# Patient Record
Sex: MALE | Race: WHITE | Employment: OTHER | URBAN - METROPOLITAN AREA
[De-identification: names, ages, dates, MRNs, and addresses within clinical notes are randomized per-mention and may not be internally consistent; named-entity substitution may affect disease eponyms.]

---

## 2017-01-01 ENCOUNTER — HOSPITAL ENCOUNTER (OUTPATIENT)
Dept: GENERAL RADIOLOGY | Age: 67
Discharge: HOME | End: 2017-01-31
Attending: INTERNAL MEDICINE | Admitting: INTERNAL MEDICINE

## 2017-01-03 ENCOUNTER — HOSPITAL ENCOUNTER (OUTPATIENT)
Dept: GENERAL RADIOLOGY | Age: 67
Discharge: HOME | End: 2017-01-03
Attending: FAMILY MEDICINE | Admitting: FAMILY MEDICINE

## 2017-01-03 LAB
ALBUMIN SERPL-MCNC: 4.3 GM/DL (ref 3.4–5)
ALP BLD-CCNC: 97 IU/L (ref 40–128)
ALT SERPL-CCNC: 26 U/L (ref 10–40)
ANION GAP SERPL CALCULATED.3IONS-SCNC: 12 MMOL/L (ref 4–16)
AST SERPL-CCNC: 24 IU/L (ref 15–37)
BASOPHILS ABSOLUTE: 0.1 K/CU MM
BASOPHILS RELATIVE PERCENT: 1 % (ref 0–1)
BILIRUB SERPL-MCNC: 0.5 MG/DL (ref 0–1)
BUN BLDV-MCNC: 10 MG/DL (ref 6–23)
CALCIUM SERPL-MCNC: 9.3 MG/DL (ref 8.3–10.6)
CHLORIDE BLD-SCNC: 103 MMOL/L (ref 99–110)
CHOLESTEROL: 139 MG/DL
CO2: 30 MMOL/L (ref 21–32)
CREAT SERPL-MCNC: 0.9 MG/DL (ref 0.9–1.3)
DIFFERENTIAL TYPE: ABNORMAL
EOSINOPHILS ABSOLUTE: 0.2 K/CU MM
EOSINOPHILS RELATIVE PERCENT: 2.3 % (ref 0–3)
ESTIMATED AVERAGE GLUCOSE: 146 MG/DL
GFR AFRICAN AMERICAN: >60 ML/MIN/1.73M2
GFR NON-AFRICAN AMERICAN: >60 ML/MIN/1.73M2
GLUCOSE FASTING: 127 MG/DL (ref 70–99)
HBA1C MFR BLD: 6.7 % (ref 4.2–6.3)
HCT VFR BLD CALC: 40.9 % (ref 42–52)
HDLC SERPL-MCNC: 36 MG/DL
HEMOGLOBIN: 14.1 GM/DL (ref 13.5–18)
IMMATURE NEUTROPHIL %: 0.4 % (ref 0–0.43)
LDL CHOLESTEROL DIRECT: 79 MG/DL
LYMPHOCYTES ABSOLUTE: 2.1 K/CU MM
LYMPHOCYTES RELATIVE PERCENT: 25.2 % (ref 24–44)
MCH RBC QN AUTO: 34.4 PG (ref 27–31)
MCHC RBC AUTO-ENTMCNC: 34.5 % (ref 32–36)
MCV RBC AUTO: 99.8 FL (ref 78–100)
MONOCYTES ABSOLUTE: 0.6 K/CU MM
MONOCYTES RELATIVE PERCENT: 7.8 % (ref 0–4)
NUCLEATED RBC %: 0 %
PDW BLD-RTO: 13.9 % (ref 11.7–14.9)
PLATELET # BLD: 214 K/CU MM (ref 140–440)
PMV BLD AUTO: 10.1 FL (ref 7.5–11.1)
POTASSIUM SERPL-SCNC: 4.3 MMOL/L (ref 3.5–5.1)
RBC # BLD: 4.1 M/CU MM (ref 4.6–6.2)
SEGMENTED NEUTROPHILS ABSOLUTE COUNT: 5.2 K/CU MM
SEGMENTED NEUTROPHILS RELATIVE PERCENT: 63.3 % (ref 36–66)
SODIUM BLD-SCNC: 145 MMOL/L (ref 135–145)
T4 FREE: 1.04 NG/DL (ref 0.9–1.8)
TOTAL IMMATURE NEUTOROPHIL: 0.03 K/CU MM
TOTAL NUCLEATED RBC: 0 K/CU MM
TOTAL PROTEIN: 7.2 GM/DL (ref 6.4–8.2)
TRIGL SERPL-MCNC: 207 MG/DL
TSH HIGH SENSITIVITY: 2.54 UIU/ML (ref 0.27–4.2)
WBC # BLD: 8.2 K/CU MM (ref 4–10.5)

## 2017-01-05 ENCOUNTER — TELEPHONE (OUTPATIENT)
Dept: CARDIOLOGY CLINIC | Age: 67
End: 2017-01-05

## 2017-01-06 ENCOUNTER — ANTI-COAG VISIT (OUTPATIENT)
Dept: CARDIOLOGY CLINIC | Age: 67
End: 2017-01-06

## 2017-01-06 DIAGNOSIS — I48.91 ATRIAL FIBRILLATION, UNSPECIFIED TYPE (HCC): ICD-10-CM

## 2017-01-12 ENCOUNTER — HOSPITAL ENCOUNTER (OUTPATIENT)
Dept: OTHER | Age: 67
Discharge: HOME | End: 2017-01-31
Attending: INTERNAL MEDICINE | Admitting: INTERNAL MEDICINE

## 2017-01-12 LAB
POC INR: 1.7 INDEX
PROTHROMBIN TIME, POC: 20.1 SECONDS (ref 10–14.3)

## 2017-01-26 LAB
POC INR: 1.7 INDEX
PROTHROMBIN TIME, POC: 20.5 SECONDS (ref 10–14.3)

## 2017-02-01 ENCOUNTER — HOSPITAL ENCOUNTER (OUTPATIENT)
Dept: GENERAL RADIOLOGY | Age: 67
Discharge: HOME | End: 2017-02-28
Attending: INTERNAL MEDICINE | Admitting: INTERNAL MEDICINE

## 2017-02-01 ENCOUNTER — HOSPITAL ENCOUNTER (OUTPATIENT)
Dept: OTHER | Age: 67
Discharge: HOME | End: 2017-02-28
Attending: INTERNAL MEDICINE | Admitting: INTERNAL MEDICINE

## 2017-02-09 LAB
POC INR: 2.4 INDEX
PROTHROMBIN TIME, POC: 28.7 SECONDS (ref 10–14.3)

## 2017-03-01 ENCOUNTER — HOSPITAL ENCOUNTER (OUTPATIENT)
Dept: GENERAL RADIOLOGY | Age: 67
Discharge: HOME | End: 2017-03-31
Attending: INTERNAL MEDICINE | Admitting: INTERNAL MEDICINE

## 2017-03-09 LAB
POC INR: 2.2 INDEX
PROTHROMBIN TIME, POC: 26.7 SECONDS (ref 10–14.3)

## 2017-04-01 ENCOUNTER — HOSPITAL ENCOUNTER (OUTPATIENT)
Dept: GENERAL RADIOLOGY | Age: 67
Discharge: HOME | End: 2017-04-30
Attending: INTERNAL MEDICINE | Admitting: INTERNAL MEDICINE

## 2017-05-08 ENCOUNTER — TELEPHONE (OUTPATIENT)
Dept: CARDIOLOGY CLINIC | Age: 67
End: 2017-05-08

## 2017-05-12 ENCOUNTER — TELEPHONE (OUTPATIENT)
Dept: CARDIOLOGY CLINIC | Age: 67
End: 2017-05-12

## 2017-06-07 RX ORDER — WARFARIN SODIUM 2 MG/1
2 TABLET ORAL
Qty: 30 TABLET | Refills: 0 | Status: SHIPPED | OUTPATIENT
Start: 2017-06-07 | End: 2017-12-27 | Stop reason: SDUPTHER

## 2017-06-07 RX ORDER — WARFARIN SODIUM 3 MG/1
3 TABLET ORAL EVERY 24 HOURS
Qty: 30 TABLET | Refills: 0 | Status: SHIPPED | OUTPATIENT
Start: 2017-06-07 | End: 2017-08-10 | Stop reason: SDUPTHER

## 2017-06-09 ENCOUNTER — TELEPHONE (OUTPATIENT)
Dept: CARDIOLOGY CLINIC | Age: 67
End: 2017-06-09

## 2017-06-09 DIAGNOSIS — Z79.01 ANTICOAGULATED ON WARFARIN: ICD-10-CM

## 2017-06-09 DIAGNOSIS — I48.91 ATRIAL FIBRILLATION, UNSPECIFIED TYPE (HCC): Primary | ICD-10-CM

## 2017-06-16 ENCOUNTER — HOSPITAL ENCOUNTER (OUTPATIENT)
Dept: GENERAL RADIOLOGY | Age: 67
Discharge: HOME | End: 2017-06-16
Attending: INTERNAL MEDICINE | Admitting: INTERNAL MEDICINE

## 2017-06-16 LAB
INR BLD: 2.32 INDEX
PROTHROMBIN TIME: 27.2 SECONDS (ref 9.12–12.5)

## 2017-06-19 ENCOUNTER — ANTI-COAG VISIT (OUTPATIENT)
Dept: CARDIOLOGY CLINIC | Age: 67
End: 2017-06-19

## 2017-06-19 DIAGNOSIS — I48.91 ATRIAL FIBRILLATION, UNSPECIFIED TYPE (HCC): ICD-10-CM

## 2017-06-19 RX ORDER — DILTIAZEM HYDROCHLORIDE 120 MG/1
120 TABLET, FILM COATED ORAL DAILY
Qty: 90 TABLET | Refills: 3 | Status: SHIPPED | OUTPATIENT
Start: 2017-06-19 | End: 2018-04-02 | Stop reason: SDUPTHER

## 2017-06-19 RX ORDER — DIGOXIN 250 MCG
250 TABLET ORAL DAILY
Qty: 90 TABLET | Refills: 3 | Status: SHIPPED | OUTPATIENT
Start: 2017-06-19 | End: 2018-05-31 | Stop reason: SDUPTHER

## 2017-06-20 ENCOUNTER — TELEPHONE (OUTPATIENT)
Dept: CARDIOLOGY CLINIC | Age: 67
End: 2017-06-20

## 2017-06-28 ENCOUNTER — OFFICE VISIT (OUTPATIENT)
Dept: INTERNAL MEDICINE CLINIC | Age: 67
End: 2017-06-28

## 2017-06-28 VITALS
HEART RATE: 70 BPM | BODY MASS INDEX: 40.21 KG/M2 | HEIGHT: 73 IN | WEIGHT: 303.4 LBS | DIASTOLIC BLOOD PRESSURE: 64 MMHG | OXYGEN SATURATION: 96 % | SYSTOLIC BLOOD PRESSURE: 128 MMHG

## 2017-06-28 DIAGNOSIS — J44.9 OBSTRUCTIVE CHRONIC BRONCHITIS WITHOUT EXACERBATION (HCC): ICD-10-CM

## 2017-06-28 DIAGNOSIS — E66.01 MORBID OBESITY WITH BMI OF 40.0-44.9, ADULT (HCC): ICD-10-CM

## 2017-06-28 DIAGNOSIS — I48.91 ATRIAL FIBRILLATION, UNSPECIFIED TYPE (HCC): Primary | ICD-10-CM

## 2017-06-28 DIAGNOSIS — Z23 NEED FOR PROPHYLACTIC VACCINATION AGAINST STREPTOCOCCUS PNEUMONIAE (PNEUMOCOCCUS): ICD-10-CM

## 2017-06-28 DIAGNOSIS — E11.9 TYPE 2 DIABETES MELLITUS WITHOUT COMPLICATION, WITH LONG-TERM CURRENT USE OF INSULIN (HCC): ICD-10-CM

## 2017-06-28 DIAGNOSIS — J44.9 CHRONIC OBSTRUCTIVE PULMONARY DISEASE, UNSPECIFIED COPD TYPE (HCC): ICD-10-CM

## 2017-06-28 DIAGNOSIS — E66.01 MORBID OBESITY DUE TO EXCESS CALORIES (HCC): ICD-10-CM

## 2017-06-28 DIAGNOSIS — Z79.4 TYPE 2 DIABETES MELLITUS WITHOUT COMPLICATION, WITH LONG-TERM CURRENT USE OF INSULIN (HCC): ICD-10-CM

## 2017-06-28 DIAGNOSIS — I25.10 CORONARY ARTERY DISEASE INVOLVING NATIVE CORONARY ARTERY OF NATIVE HEART WITHOUT ANGINA PECTORIS: ICD-10-CM

## 2017-06-28 DIAGNOSIS — Z11.59 NEED FOR HEPATITIS C SCREENING TEST: ICD-10-CM

## 2017-06-28 LAB
A/G RATIO: 1.3 (ref 1.1–2.2)
ALBUMIN SERPL-MCNC: 4.3 G/DL (ref 3.4–5)
ALP BLD-CCNC: 116 U/L (ref 40–129)
ALT SERPL-CCNC: 26 U/L (ref 10–40)
ANION GAP SERPL CALCULATED.3IONS-SCNC: 15 MMOL/L (ref 3–16)
AST SERPL-CCNC: 24 U/L (ref 15–37)
BILIRUB SERPL-MCNC: 0.3 MG/DL (ref 0–1)
BUN BLDV-MCNC: 12 MG/DL (ref 7–20)
CALCIUM SERPL-MCNC: 9.3 MG/DL (ref 8.3–10.6)
CHLORIDE BLD-SCNC: 102 MMOL/L (ref 99–110)
CO2: 28 MMOL/L (ref 21–32)
CREAT SERPL-MCNC: 1.1 MG/DL (ref 0.8–1.3)
CREATININE URINE: 429.7 MG/DL (ref 39–259)
GFR AFRICAN AMERICAN: >60
GFR NON-AFRICAN AMERICAN: >60
GLOBULIN: 3.3 G/DL
GLUCOSE BLD-MCNC: 75 MG/DL (ref 70–99)
HCT VFR BLD CALC: 40.3 % (ref 40.5–52.5)
HEMOGLOBIN: 13.8 G/DL (ref 13.5–17.5)
HEPATITIS C ANTIBODY INTERPRETATION: NORMAL
INTERNATIONAL NORMALIZATION RATIO, POC: 2.8
MCH RBC QN AUTO: 34.1 PG (ref 26–34)
MCHC RBC AUTO-ENTMCNC: 34.2 G/DL (ref 31–36)
MCV RBC AUTO: 99.8 FL (ref 80–100)
MICROALBUMIN UR-MCNC: 23.3 MG/DL
MICROALBUMIN/CREAT UR-RTO: 54.2 MG/G (ref 0–30)
PDW BLD-RTO: 15 % (ref 12.4–15.4)
PLATELET # BLD: 222 K/UL (ref 135–450)
PMV BLD AUTO: 8.3 FL (ref 5–10.5)
POTASSIUM SERPL-SCNC: 4 MMOL/L (ref 3.5–5.1)
PROTHROMBIN TIME, POC: 33
RBC # BLD: 4.04 M/UL (ref 4.2–5.9)
SODIUM BLD-SCNC: 145 MMOL/L (ref 136–145)
TOTAL PROTEIN: 7.6 G/DL (ref 6.4–8.2)
WBC # BLD: 8.2 K/UL (ref 4–11)

## 2017-06-28 PROCEDURE — 3288F FALL RISK ASSESSMENT DOCD: CPT | Performed by: NURSE PRACTITIONER

## 2017-06-28 PROCEDURE — G0009 ADMIN PNEUMOCOCCAL VACCINE: HCPCS | Performed by: NURSE PRACTITIONER

## 2017-06-28 PROCEDURE — 85610 PROTHROMBIN TIME: CPT | Performed by: NURSE PRACTITIONER

## 2017-06-28 PROCEDURE — G8510 SCR DEP NEG, NO PLAN REQD: HCPCS | Performed by: NURSE PRACTITIONER

## 2017-06-28 PROCEDURE — 90670 PCV13 VACCINE IM: CPT | Performed by: NURSE PRACTITIONER

## 2017-06-28 PROCEDURE — 99215 OFFICE O/P EST HI 40 MIN: CPT | Performed by: NURSE PRACTITIONER

## 2017-06-28 ASSESSMENT — PATIENT HEALTH QUESTIONNAIRE - PHQ9
SUM OF ALL RESPONSES TO PHQ QUESTIONS 1-9: 0
1. LITTLE INTEREST OR PLEASURE IN DOING THINGS: 0
2. FEELING DOWN, DEPRESSED OR HOPELESS: 0
SUM OF ALL RESPONSES TO PHQ9 QUESTIONS 1 & 2: 0

## 2017-07-03 DIAGNOSIS — E11.9 TYPE 2 DIABETES MELLITUS WITHOUT COMPLICATION, WITH LONG-TERM CURRENT USE OF INSULIN (HCC): Primary | ICD-10-CM

## 2017-07-03 DIAGNOSIS — Z79.4 TYPE 2 DIABETES MELLITUS WITHOUT COMPLICATION, WITH LONG-TERM CURRENT USE OF INSULIN (HCC): Primary | ICD-10-CM

## 2017-07-25 ENCOUNTER — NURSE ONLY (OUTPATIENT)
Dept: INTERNAL MEDICINE CLINIC | Age: 67
End: 2017-07-25

## 2017-07-25 VITALS — DIASTOLIC BLOOD PRESSURE: 62 MMHG | OXYGEN SATURATION: 97 % | HEART RATE: 73 BPM | SYSTOLIC BLOOD PRESSURE: 136 MMHG

## 2017-07-25 DIAGNOSIS — I25.10 CORONARY ARTERY DISEASE INVOLVING NATIVE CORONARY ARTERY OF NATIVE HEART WITHOUT ANGINA PECTORIS: Primary | ICD-10-CM

## 2017-07-25 LAB
INTERNATIONAL NORMALIZATION RATIO, POC: 2.8
PROTHROMBIN TIME, POC: 33.3

## 2017-07-25 PROCEDURE — 85610 PROTHROMBIN TIME: CPT | Performed by: NURSE PRACTITIONER

## 2017-08-08 RX ORDER — WARFARIN SODIUM 3 MG/1
3 TABLET ORAL EVERY 24 HOURS
Qty: 30 TABLET | Refills: 0 | OUTPATIENT
Start: 2017-08-08

## 2017-08-10 RX ORDER — WARFARIN SODIUM 3 MG/1
3 TABLET ORAL EVERY 24 HOURS
Qty: 30 TABLET | Refills: 0 | Status: SHIPPED | OUTPATIENT
Start: 2017-08-10 | End: 2017-09-30 | Stop reason: SDUPTHER

## 2017-08-22 ENCOUNTER — NURSE ONLY (OUTPATIENT)
Dept: INTERNAL MEDICINE CLINIC | Age: 67
End: 2017-08-22

## 2017-08-22 VITALS
BODY MASS INDEX: 40.42 KG/M2 | SYSTOLIC BLOOD PRESSURE: 146 MMHG | WEIGHT: 306.4 LBS | HEART RATE: 98 BPM | DIASTOLIC BLOOD PRESSURE: 78 MMHG | OXYGEN SATURATION: 97 %

## 2017-08-22 DIAGNOSIS — I25.10 CORONARY ARTERY DISEASE INVOLVING NATIVE CORONARY ARTERY OF NATIVE HEART WITHOUT ANGINA PECTORIS: ICD-10-CM

## 2017-08-22 LAB
INTERNATIONAL NORMALIZATION RATIO, POC: 2.1
PROTHROMBIN TIME, POC: 24.7

## 2017-08-22 PROCEDURE — 85610 PROTHROMBIN TIME: CPT | Performed by: NURSE PRACTITIONER

## 2017-08-29 ENCOUNTER — TELEPHONE (OUTPATIENT)
Dept: FAMILY MEDICINE CLINIC | Age: 67
End: 2017-08-29

## 2017-09-05 ENCOUNTER — OFFICE VISIT (OUTPATIENT)
Dept: INTERNAL MEDICINE CLINIC | Age: 67
End: 2017-09-05

## 2017-09-05 ENCOUNTER — HOSPITAL ENCOUNTER (OUTPATIENT)
Dept: GENERAL RADIOLOGY | Age: 67
Discharge: HOME | End: 2017-09-05
Attending: NURSE PRACTITIONER | Admitting: NURSE PRACTITIONER

## 2017-09-05 VITALS
HEIGHT: 73 IN | DIASTOLIC BLOOD PRESSURE: 68 MMHG | BODY MASS INDEX: 40.82 KG/M2 | OXYGEN SATURATION: 96 % | SYSTOLIC BLOOD PRESSURE: 144 MMHG | HEART RATE: 68 BPM | WEIGHT: 308 LBS

## 2017-09-05 DIAGNOSIS — Z79.4 TYPE 2 DIABETES MELLITUS WITHOUT COMPLICATION, WITH LONG-TERM CURRENT USE OF INSULIN (HCC): Primary | ICD-10-CM

## 2017-09-05 DIAGNOSIS — E11.9 TYPE 2 DIABETES MELLITUS WITHOUT COMPLICATION, WITH LONG-TERM CURRENT USE OF INSULIN (HCC): Primary | ICD-10-CM

## 2017-09-05 DIAGNOSIS — I48.91 ATRIAL FIBRILLATION, UNSPECIFIED TYPE (HCC): ICD-10-CM

## 2017-09-05 DIAGNOSIS — J44.9 CHRONIC OBSTRUCTIVE PULMONARY DISEASE, UNSPECIFIED COPD TYPE (HCC): ICD-10-CM

## 2017-09-05 DIAGNOSIS — E78.2 MIXED HYPERLIPIDEMIA: ICD-10-CM

## 2017-09-05 DIAGNOSIS — I10 ESSENTIAL HYPERTENSION: ICD-10-CM

## 2017-09-05 LAB
ALBUMIN SERPL-MCNC: 4.2 GM/DL (ref 3.4–5)
ALP BLD-CCNC: 109 IU/L (ref 40–128)
ALT SERPL-CCNC: 23 U/L (ref 10–40)
ANION GAP SERPL CALCULATED.3IONS-SCNC: 15 MMOL/L (ref 4–16)
AST SERPL-CCNC: 21 IU/L (ref 15–37)
BILIRUB SERPL-MCNC: 0.4 MG/DL (ref 0–1)
BUN BLDV-MCNC: 13 MG/DL (ref 6–23)
CALCIUM SERPL-MCNC: 9.3 MG/DL (ref 8.3–10.6)
CHLORIDE BLD-SCNC: 103 MMOL/L (ref 99–110)
CHOLESTEROL: 152 MG/DL
CO2: 25 MMOL/L (ref 21–32)
CREAT SERPL-MCNC: 0.9 MG/DL (ref 0.9–1.3)
CREATININE URINE: 166.4 MG/DL (ref 39–259)
ESTIMATED AVERAGE GLUCOSE: 166 MG/DL
GFR AFRICAN AMERICAN: >60 ML/MIN/1.73M2
GFR NON-AFRICAN AMERICAN: >60 ML/MIN/1.73M2
GLUCOSE FASTING: 93 MG/DL (ref 70–99)
HBA1C MFR BLD: 7.4 % (ref 4.2–6.3)
HDLC SERPL-MCNC: 35 MG/DL
INTERNATIONAL NORMALIZATION RATIO, POC: 2.8
LDL CHOLESTEROL DIRECT: 88 MG/DL
MICROALBUMIN/CREAT 24H UR: 3.7 MG/DL
MICROALBUMIN/CREAT UR-RTO: 22.2 MG/G CREAT (ref 0–30)
POTASSIUM SERPL-SCNC: 3.9 MMOL/L (ref 3.5–5.1)
PROTHROMBIN TIME, POC: 34.1
SODIUM BLD-SCNC: 143 MMOL/L (ref 135–145)
TOTAL PROTEIN: 7.6 GM/DL (ref 6.4–8.2)
TRIGL SERPL-MCNC: 190 MG/DL

## 2017-09-05 PROCEDURE — 99214 OFFICE O/P EST MOD 30 MIN: CPT | Performed by: NURSE PRACTITIONER

## 2017-09-05 PROCEDURE — 85610 PROTHROMBIN TIME: CPT | Performed by: NURSE PRACTITIONER

## 2017-09-13 ENCOUNTER — OFFICE VISIT (OUTPATIENT)
Dept: CARDIOLOGY CLINIC | Age: 67
End: 2017-09-13

## 2017-09-13 VITALS
DIASTOLIC BLOOD PRESSURE: 62 MMHG | BODY MASS INDEX: 40.82 KG/M2 | HEART RATE: 68 BPM | HEIGHT: 73 IN | WEIGHT: 308 LBS | SYSTOLIC BLOOD PRESSURE: 138 MMHG

## 2017-09-13 DIAGNOSIS — I25.110 CORONARY ARTERY DISEASE INVOLVING NATIVE CORONARY ARTERY OF NATIVE HEART WITH UNSTABLE ANGINA PECTORIS (HCC): Primary | ICD-10-CM

## 2017-09-13 PROCEDURE — 99214 OFFICE O/P EST MOD 30 MIN: CPT | Performed by: INTERNAL MEDICINE

## 2017-09-20 ENCOUNTER — PROCEDURE VISIT (OUTPATIENT)
Dept: CARDIOLOGY CLINIC | Age: 67
End: 2017-09-20

## 2017-09-20 DIAGNOSIS — I25.110 CORONARY ARTERY DISEASE INVOLVING NATIVE CORONARY ARTERY OF NATIVE HEART WITH UNSTABLE ANGINA PECTORIS (HCC): ICD-10-CM

## 2017-09-20 DIAGNOSIS — R07.9 CHEST PAIN, UNSPECIFIED TYPE: Primary | ICD-10-CM

## 2017-09-20 DIAGNOSIS — R06.02 SOBOE (SHORTNESS OF BREATH ON EXERTION): ICD-10-CM

## 2017-09-20 LAB
LV EF: 60 %
LVEF MODALITY: NORMAL

## 2017-09-20 PROCEDURE — A9500 TC99M SESTAMIBI: HCPCS | Performed by: INTERNAL MEDICINE

## 2017-09-20 PROCEDURE — 93017 CV STRESS TEST TRACING ONLY: CPT | Performed by: INTERNAL MEDICINE

## 2017-09-20 PROCEDURE — 78452 HT MUSCLE IMAGE SPECT MULT: CPT | Performed by: INTERNAL MEDICINE

## 2017-09-20 PROCEDURE — 93018 CV STRESS TEST I&R ONLY: CPT | Performed by: INTERNAL MEDICINE

## 2017-09-20 PROCEDURE — 93016 CV STRESS TEST SUPVJ ONLY: CPT | Performed by: INTERNAL MEDICINE

## 2017-09-21 ENCOUNTER — TELEPHONE (OUTPATIENT)
Dept: CARDIOLOGY CLINIC | Age: 67
End: 2017-09-21

## 2017-09-26 ENCOUNTER — TELEPHONE (OUTPATIENT)
Dept: INTERNAL MEDICINE CLINIC | Age: 67
End: 2017-09-26

## 2017-09-26 DIAGNOSIS — K59.00 CONSTIPATION, UNSPECIFIED CONSTIPATION TYPE: Primary | ICD-10-CM

## 2017-09-26 RX ORDER — POLYETHYLENE GLYCOL 3350 17 G/17G
17 POWDER, FOR SOLUTION ORAL DAILY
Qty: 225 G | Refills: 2 | Status: SHIPPED | OUTPATIENT
Start: 2017-09-26 | End: 2017-12-04 | Stop reason: SDUPTHER

## 2017-09-26 RX ORDER — CARVEDILOL 25 MG/1
25 TABLET ORAL 3 TIMES DAILY
Qty: 90 TABLET | Refills: 3 | Status: SHIPPED | OUTPATIENT
Start: 2017-09-26 | End: 2018-04-02 | Stop reason: SDUPTHER

## 2017-09-26 NOTE — TELEPHONE ENCOUNTER
Patient called wanting a refill on Glycolax. He would like this sent to [facility] on [facility]. Please advise.

## 2017-10-02 DIAGNOSIS — I48.91 ATRIAL FIBRILLATION, UNSPECIFIED TYPE (HCC): Primary | ICD-10-CM

## 2017-10-02 RX ORDER — WARFARIN SODIUM 3 MG/1
TABLET ORAL
Qty: 30 TABLET | Refills: 0 | Status: SHIPPED | OUTPATIENT
Start: 2017-10-02 | End: 2018-02-13 | Stop reason: SDUPTHER

## 2017-10-02 RX ORDER — WARFARIN SODIUM 3 MG/1
TABLET ORAL
Qty: 30 TABLET | Refills: 0 | OUTPATIENT
Start: 2017-10-02

## 2017-10-02 RX ORDER — WARFARIN SODIUM 3 MG/1
3 TABLET ORAL EVERY 24 HOURS
Qty: 30 TABLET | Refills: 1 | Status: SHIPPED | OUTPATIENT
Start: 2017-10-02 | End: 2018-03-08 | Stop reason: SDUPTHER

## 2017-10-11 NOTE — TELEPHONE ENCOUNTER
Patient calls requesting refill Cymbalta 60mg once daily. Don't where [facility] Alonzo has approved it in the past. Please advise.

## 2017-10-12 RX ORDER — DULOXETIN HYDROCHLORIDE 60 MG/1
60 CAPSULE, DELAYED RELEASE ORAL DAILY
Qty: 30 CAPSULE | Refills: 3 | Status: SHIPPED | OUTPATIENT
Start: 2017-10-12 | End: 2018-02-18 | Stop reason: SDUPTHER

## 2017-10-18 RX ORDER — FUROSEMIDE 40 MG/1
40 TABLET ORAL NIGHTLY
Qty: 60 TABLET | Refills: 2 | Status: SHIPPED | OUTPATIENT
Start: 2017-10-18 | End: 2018-04-02 | Stop reason: SDUPTHER

## 2017-10-18 RX ORDER — VALSARTAN 160 MG/1
160 TABLET ORAL DAILY
Qty: 30 TABLET | Refills: 2 | Status: SHIPPED | OUTPATIENT
Start: 2017-10-18 | End: 2018-01-25 | Stop reason: SDUPTHER

## 2017-11-03 ENCOUNTER — TELEPHONE (OUTPATIENT)
Dept: INTERNAL MEDICINE CLINIC | Age: 67
End: 2017-11-03

## 2017-11-03 DIAGNOSIS — E78.2 MIXED HYPERLIPIDEMIA: ICD-10-CM

## 2017-11-03 DIAGNOSIS — E07.9 THYROID DISEASE: ICD-10-CM

## 2017-11-03 RX ORDER — PRAVASTATIN SODIUM 80 MG/1
80 TABLET ORAL DAILY
Qty: 30 TABLET | Refills: 2 | Status: SHIPPED | OUTPATIENT
Start: 2017-11-03 | End: 2018-01-05 | Stop reason: SDUPTHER

## 2017-11-03 RX ORDER — LEVOTHYROXINE SODIUM 0.05 MG/1
50 TABLET ORAL DAILY
Qty: 30 TABLET | Refills: 2 | Status: SHIPPED | OUTPATIENT
Start: 2017-11-03 | End: 2017-11-06 | Stop reason: SDUPTHER

## 2017-11-03 NOTE — TELEPHONE ENCOUNTER
Patient calls requesting refill on Synthroid 50mcg once a day and Pravastatin 80mg once a day. Don't see where Nina Pope has prescribed in the past. Please advise.

## 2017-11-06 ENCOUNTER — TELEPHONE (OUTPATIENT)
Dept: INTERNAL MEDICINE CLINIC | Age: 67
End: 2017-11-06

## 2017-11-06 ENCOUNTER — OFFICE VISIT (OUTPATIENT)
Dept: INTERNAL MEDICINE CLINIC | Age: 67
End: 2017-11-06

## 2017-11-06 VITALS
RESPIRATION RATE: 17 BRPM | HEART RATE: 82 BPM | SYSTOLIC BLOOD PRESSURE: 126 MMHG | OXYGEN SATURATION: 96 % | DIASTOLIC BLOOD PRESSURE: 82 MMHG

## 2017-11-06 DIAGNOSIS — I48.91 ATRIAL FIBRILLATION, UNSPECIFIED TYPE (HCC): ICD-10-CM

## 2017-11-06 DIAGNOSIS — D68.9 COAGULOPATHY (HCC): ICD-10-CM

## 2017-11-06 DIAGNOSIS — E07.9 THYROID DISEASE: ICD-10-CM

## 2017-11-06 DIAGNOSIS — E11.9 TYPE 2 DIABETES MELLITUS WITHOUT COMPLICATION, WITH LONG-TERM CURRENT USE OF INSULIN (HCC): ICD-10-CM

## 2017-11-06 DIAGNOSIS — Z79.4 TYPE 2 DIABETES MELLITUS WITHOUT COMPLICATION, WITH LONG-TERM CURRENT USE OF INSULIN (HCC): ICD-10-CM

## 2017-11-06 DIAGNOSIS — J01.00 ACUTE NON-RECURRENT MAXILLARY SINUSITIS: Primary | ICD-10-CM

## 2017-11-06 LAB
HBA1C MFR BLD: 7.7 %
INTERNATIONAL NORMALIZATION RATIO, POC: 2.2
PROTHROMBIN TIME, POC: 26.5

## 2017-11-06 PROCEDURE — 85610 PROTHROMBIN TIME: CPT | Performed by: INTERNAL MEDICINE

## 2017-11-06 PROCEDURE — 83036 HEMOGLOBIN GLYCOSYLATED A1C: CPT | Performed by: INTERNAL MEDICINE

## 2017-11-06 PROCEDURE — 99213 OFFICE O/P EST LOW 20 MIN: CPT | Performed by: INTERNAL MEDICINE

## 2017-11-06 RX ORDER — LEVOTHYROXINE SODIUM 0.05 MG/1
50 TABLET ORAL DAILY
Qty: 30 TABLET | Refills: 2 | Status: SHIPPED | OUTPATIENT
Start: 2017-11-06 | End: 2018-01-05 | Stop reason: SDUPTHER

## 2017-11-06 RX ORDER — AZITHROMYCIN 250 MG/1
TABLET, FILM COATED ORAL
Qty: 6 TABLET | Refills: 0 | Status: SHIPPED | OUTPATIENT
Start: 2017-11-06 | End: 2017-11-06

## 2017-11-06 RX ORDER — AMOXICILLIN 500 MG/1
500 CAPSULE ORAL 2 TIMES DAILY
Qty: 14 CAPSULE | Refills: 0 | Status: SHIPPED | OUTPATIENT
Start: 2017-11-06 | End: 2017-11-13

## 2017-11-06 NOTE — PROGRESS NOTES
Ron Alonzo  1950 11/06/17    SUBJECTIVE:    The past two weeks w onset of sinus congestion, yellow discharge, no fever or chills. Sl coughing is noted  Denies n/v/diarrhea. DM- [facility] too expensive but last a1c in Sept not bad at 7.4. However, sugars ~200 in am.  Regimen is NPH 60 units at night/hs and R/novolin is 30 units TID. Afib- due for recheck INR and ok today at 2.2. Coumadin is 2mg WMF and 3mg all other days. Denies palpitations. Hypothyroid- due for rf synthroid    OBJECTIVE:    /82   Pulse 82   Resp 17   SpO2 96%     Physical Exam   Constitutional: He appears well-developed and well-nourished. No distress. HENT:   Head: Normocephalic and atraumatic. Nose: Mucosal edema and rhinorrhea present. No nasal deformity. No epistaxis. Mouth/Throat: Oropharynx is clear and moist. No oropharyngeal exudate. Bilateral nasal congestion with clear discharge noted, no bilateral maxillary sinus tenderness   Eyes: Conjunctivae are normal. No scleral icterus. Neck: Neck supple. Cardiovascular: Normal rate and normal heart sounds. No murmur heard. IRR   Pulmonary/Chest: Effort normal and breath sounds normal. No respiratory distress. He has no wheezes. He has no rales. Abdominal: Soft. Bowel sounds are normal. He exhibits no distension. There is no tenderness. Musculoskeletal: He exhibits no edema. Lymphadenopathy:     He has cervical adenopathy. Neurological: He is alert. Vitals reviewed. ASSESSMENT:    1. Acute non-recurrent maxillary sinusitis    2. Type 2 diabetes mellitus without complication, with long-term current use of insulin ([address]    3. Atrial fibrillation, unspecified type ([address]    4. Coronary artery disease involving native coronary artery of native heart without angina pectoris    5. Thyroid disease        PLAN:    Jackie Paez was seen today for sinusitis and office visit for anticoagulation management.     Diagnoses and all orders for this visit:    Acute non-recurrent maxillary sinusitis- INITIALLY PLANNED EMPIRIC RX ON ZPAK BUT W INTERACTION TO DIG, SWITCH TO AMOX FOR ONE WEEK, 500MG BID.   -     Discontinue: azithromycin (ZITHROMAX Z-MARYANA) 250 MG tablet; Take two tabs once then one tab daily till completed    Type 2 diabetes mellitus without complication, with long-term current use of insulin ([address]- A1C HIGHER AND W ELEVATED GLC IN AM INCR NPH FR 60-65 UNITS QHS THEN TO D/W GILBERTO GANDARA CNP AT F/U VISIT ONE MONTH  -     POCT glycosylated hemoglobin (Hb A1C)  -     insulin NPH (HUMULIN N;NOVOLIN N) 100 UNIT/ML injection vial; Inject 65 Units into the skin nightly    Atrial fibrillation, unspecified type ([address]- RATE STABLE AS WELL AS INR, CONT CURRENT DOSING    Thyroid disease- RF RX  -     levothyroxine (SYNTHROID) 50 MCG tablet; Take 1 tablet by mouth daily    Coagulopathy ([address]- COUMADIN DOSE SAME/MANTAINED.     Other orders  -     POCT INR  -     POCT INR

## 2017-11-13 RX ORDER — ALLOPURINOL 100 MG/1
100 TABLET ORAL 2 TIMES DAILY
Qty: 60 TABLET | Refills: 3 | Status: SHIPPED | OUTPATIENT
Start: 2017-11-13 | End: 2018-03-19 | Stop reason: SDUPTHER

## 2017-12-04 ENCOUNTER — TELEPHONE (OUTPATIENT)
Dept: INTERNAL MEDICINE CLINIC | Age: 67
End: 2017-12-04

## 2017-12-04 ENCOUNTER — OFFICE VISIT (OUTPATIENT)
Dept: INTERNAL MEDICINE CLINIC | Age: 67
End: 2017-12-04

## 2017-12-04 VITALS
HEART RATE: 84 BPM | WEIGHT: 311.2 LBS | SYSTOLIC BLOOD PRESSURE: 130 MMHG | BODY MASS INDEX: 41.06 KG/M2 | OXYGEN SATURATION: 97 % | DIASTOLIC BLOOD PRESSURE: 76 MMHG

## 2017-12-04 DIAGNOSIS — I48.91 ATRIAL FIBRILLATION, UNSPECIFIED TYPE (HCC): ICD-10-CM

## 2017-12-04 DIAGNOSIS — J40 BRONCHITIS: Primary | ICD-10-CM

## 2017-12-04 DIAGNOSIS — E11.9 TYPE 2 DIABETES MELLITUS WITHOUT COMPLICATION, WITH LONG-TERM CURRENT USE OF INSULIN (HCC): ICD-10-CM

## 2017-12-04 DIAGNOSIS — E78.2 MIXED HYPERLIPIDEMIA: ICD-10-CM

## 2017-12-04 DIAGNOSIS — Z79.4 TYPE 2 DIABETES MELLITUS WITHOUT COMPLICATION, WITH LONG-TERM CURRENT USE OF INSULIN (HCC): ICD-10-CM

## 2017-12-04 DIAGNOSIS — K59.00 CONSTIPATION, UNSPECIFIED CONSTIPATION TYPE: ICD-10-CM

## 2017-12-04 DIAGNOSIS — K59.01 SLOW TRANSIT CONSTIPATION: ICD-10-CM

## 2017-12-04 DIAGNOSIS — M72.0 DUPUYTREN'S CONTRACTURE OF HAND: ICD-10-CM

## 2017-12-04 LAB
INTERNATIONAL NORMALIZATION RATIO, POC: 2.2
PROTHROMBIN TIME, POC: 26.4

## 2017-12-04 PROCEDURE — 85610 PROTHROMBIN TIME: CPT | Performed by: NURSE PRACTITIONER

## 2017-12-04 PROCEDURE — 99214 OFFICE O/P EST MOD 30 MIN: CPT | Performed by: NURSE PRACTITIONER

## 2017-12-04 RX ORDER — PREDNISONE 10 MG/1
TABLET ORAL
Qty: 20 TABLET | Refills: 0 | Status: SHIPPED | OUTPATIENT
Start: 2017-12-04 | End: 2018-01-05 | Stop reason: ALTCHOICE

## 2017-12-04 RX ORDER — POLYETHYLENE GLYCOL 3350 17 G/17G
17 POWDER, FOR SOLUTION ORAL DAILY
Qty: 225 G | Refills: 2 | Status: SHIPPED | OUTPATIENT
Start: 2017-12-04 | End: 2018-02-05 | Stop reason: SDUPTHER

## 2017-12-04 NOTE — PROGRESS NOTES
Ron lAonzo  1950 12/04/17    Chief Complaint   Patient presents with    Office Visit for Anticoagulation Management       SUBJECTIVE:      Patient reports compliant with medications for diabetes. Blood sugars have averaged around 150-225, with pt checking blood sugar 3 time daily. Last Eye Exam was August 2017. Pt denies foot ulcerations, hypoglycemia , increase appetite, nausea, paresthesia of the feet, polydipsia, polyuria and visual disturbances. Patient here for follow-up of elevated blood pressure. Patient denies: chest pain, dyspnea, lower extremity edema, or TIA's. He reports compliant with taking his medications as directed to control blood pressure and reports no medication side effects noted. Patient presents with hyperlipidemia. Patient tolerating cholesterol medication without difficulty, denies muscle pain or jaundice associated with his medications. The patient is eating a low fat diet and exercising. Anticoagulation: Patient here for followup of chronic anticoagulation  Indication: atrial fibrillation  Bleeding Signs/Symptoms:  None  Thromboembolic Signs/Symptoms:  None  Missed Coumadin Doses:  None  Medication Changes:  no  Dietary Changes:  No  INR:  2.2  Patient has been on the same dose for over 5 years. Patient also with complaints of cough. Onset was a couple weeks ago. States symptoms have improved but continues with cough. Review of Systems    OBJECTIVE:    /76 (Site: Left Arm, Position: Sitting, Cuff Size: Large Adult)   Pulse 84   Wt (!) 311 lb 3.2 oz (141.2 kg)   SpO2 97%   BMI 41.06 kg/m²     Physical Exam   Constitutional: He is oriented to person, place, and time. He appears well-developed and well-nourished. Eyes: Conjunctivae are normal. No scleral icterus. Cardiovascular: Normal rate, regular rhythm and normal heart sounds. No murmur heard. Pulmonary/Chest: Effort normal and breath sounds normal. No respiratory distress.  He has no

## 2017-12-04 NOTE — TELEPHONE ENCOUNTER
[address] [facility] called stating pt would like Miralax refill, and also a rx for Novolin R, as he states he would like to take both the Novolin N and R. Please advise.

## 2017-12-14 ENCOUNTER — TELEPHONE (OUTPATIENT)
Dept: INTERNAL MEDICINE CLINIC | Age: 67
End: 2017-12-14

## 2017-12-14 DIAGNOSIS — E11.9 TYPE 2 DIABETES MELLITUS WITHOUT COMPLICATION, WITH LONG-TERM CURRENT USE OF INSULIN (HCC): ICD-10-CM

## 2017-12-14 DIAGNOSIS — Z79.4 TYPE 2 DIABETES MELLITUS WITHOUT COMPLICATION, WITH LONG-TERM CURRENT USE OF INSULIN (HCC): ICD-10-CM

## 2017-12-27 RX ORDER — WARFARIN SODIUM 2 MG/1
2 TABLET ORAL
Qty: 30 TABLET | Refills: 1 | Status: SHIPPED | OUTPATIENT
Start: 2017-12-27 | End: 2018-03-08 | Stop reason: SDUPTHER

## 2018-01-05 ENCOUNTER — OFFICE VISIT (OUTPATIENT)
Dept: INTERNAL MEDICINE CLINIC | Age: 68
End: 2018-01-05

## 2018-01-05 VITALS
SYSTOLIC BLOOD PRESSURE: 144 MMHG | WEIGHT: 314.8 LBS | HEART RATE: 66 BPM | BODY MASS INDEX: 41.53 KG/M2 | DIASTOLIC BLOOD PRESSURE: 80 MMHG | OXYGEN SATURATION: 98 %

## 2018-01-05 DIAGNOSIS — I48.20 CHRONIC ATRIAL FIBRILLATION (HCC): Primary | ICD-10-CM

## 2018-01-05 DIAGNOSIS — E78.2 MIXED HYPERLIPIDEMIA: ICD-10-CM

## 2018-01-05 DIAGNOSIS — J44.9 CHRONIC OBSTRUCTIVE PULMONARY DISEASE, UNSPECIFIED COPD TYPE (HCC): ICD-10-CM

## 2018-01-05 DIAGNOSIS — E07.9 THYROID DISEASE: ICD-10-CM

## 2018-01-05 DIAGNOSIS — J00 ACUTE NASOPHARYNGITIS: ICD-10-CM

## 2018-01-05 DIAGNOSIS — E11.9 TYPE 2 DIABETES MELLITUS WITHOUT COMPLICATION, WITH LONG-TERM CURRENT USE OF INSULIN (HCC): ICD-10-CM

## 2018-01-05 DIAGNOSIS — E66.01 MORBID OBESITY WITH BMI OF 40.0-44.9, ADULT (HCC): ICD-10-CM

## 2018-01-05 DIAGNOSIS — R10.84 GENERALIZED ABDOMINAL PAIN: ICD-10-CM

## 2018-01-05 DIAGNOSIS — Z79.4 TYPE 2 DIABETES MELLITUS WITHOUT COMPLICATION, WITH LONG-TERM CURRENT USE OF INSULIN (HCC): ICD-10-CM

## 2018-01-05 LAB
HBA1C MFR BLD: 8.1 %
INTERNATIONAL NORMALIZATION RATIO, POC: 2.5
PROTHROMBIN TIME, POC: 29.7

## 2018-01-05 PROCEDURE — 85610 PROTHROMBIN TIME: CPT | Performed by: NURSE PRACTITIONER

## 2018-01-05 PROCEDURE — 99214 OFFICE O/P EST MOD 30 MIN: CPT | Performed by: NURSE PRACTITIONER

## 2018-01-05 PROCEDURE — 83036 HEMOGLOBIN GLYCOSYLATED A1C: CPT | Performed by: NURSE PRACTITIONER

## 2018-01-05 RX ORDER — LEVOTHYROXINE SODIUM 0.05 MG/1
50 TABLET ORAL DAILY
Qty: 30 TABLET | Refills: 2 | Status: SHIPPED | OUTPATIENT
Start: 2018-01-05 | End: 2018-04-02 | Stop reason: SDUPTHER

## 2018-01-05 RX ORDER — PRAVASTATIN SODIUM 80 MG/1
80 TABLET ORAL DAILY
Qty: 30 TABLET | Refills: 2 | Status: SHIPPED | OUTPATIENT
Start: 2018-01-05 | End: 2018-04-02 | Stop reason: SDUPTHER

## 2018-01-05 RX ORDER — FLUTICASONE PROPIONATE 50 MCG
1 SPRAY, SUSPENSION (ML) NASAL 2 TIMES DAILY
Qty: 1 BOTTLE | Refills: 2 | Status: SHIPPED | OUTPATIENT
Start: 2018-01-05 | End: 2018-04-02 | Stop reason: SDUPTHER

## 2018-01-05 NOTE — PROGRESS NOTES
Syed [facility]  1950 01/05/18    Chief Complaint   Patient presents with    Coagulation Disorder     PT/INR and A1C (POCT)       SUBJECTIVE:      Patient reports compliant with medications for diabetes. Blood sugars have averaged around 200, with pt checking blood sugar 3 time daily. A1C today is 8.1. Pt denies foot ulcerations, hypoglycemia , increase appetite, nausea, paresthesia of the feet, polydipsia, polyuria and visual disturbances. Patient presents with hyperlipidemia. Patient tolerating cholesterol medication without difficulty, denies muscle pain or jaundice associated with his medications.     Anticoagulation: Patient here for followup of chronic anticoagulation  Indication: atrial fibrillation  Bleeding Signs/Symptoms:  None  Thromboembolic Signs/Symptoms:  None  Missed Coumadin Doses:  None  Medication Changes:  no  Dietary Changes:  No  INR:  2.5  Patient has been on the same dose for over 5 years. Patient states he has noticed some abdominal pain in his stomach. Has noticed increase in pain after he eats. Pain is constant and describes as a \"bruise\" feeling. Patient states he has had an [address] previously for the same issue. States does have an enlarged and fatty liver. Also had a CT abdomen/pelvis in August.    Review of Systems    OBJECTIVE:    BP (!) 144/80 (Site: Left Arm, Position: Sitting, Cuff Size: Large Adult)   Pulse 66   Wt (!) 314 lb 12.8 oz (142.8 kg)   SpO2 98%   BMI 41.53 kg/m²     Physical Exam   Constitutional: He appears well-developed and well-nourished. Eyes: Conjunctivae are normal. No scleral icterus. Cardiovascular: Normal rate, regular rhythm and normal heart sounds. No murmur heard. Pulmonary/Chest: Effort normal and breath sounds normal. No respiratory distress. He has no wheezes. Abdominal: There is generalized tenderness (but increased in LLQ and RUQ). ASSESSMENT:    1. Atrial fibrillation, unspecified type ([address]    2.  Type 2 diabetes mellitus without complication, with long-term current use of insulin ([address]    3. Thyroid disease    4. Mixed hyperlipidemia    5. Generalized abdominal pain      PLAN:    Enrique Schuster was seen today for coagulation disorder. INR today is 2.5; continue current dose of coumadin and recheck in 4 weeks. Will reorder medication and encouraged patient to minimize nighttime snacking. Will recheck A1C in 3 months; may need to add/increase medication. Patient with a history of abdominal pain and diverticulosis; sees Dr. Brigida Ogden. Advised to follow up with him. Diagnoses and all orders for this visit:    Atrial fibrillation, unspecified type ([address]  -     POCT INR    Type 2 diabetes mellitus without complication, with long-term current use of insulin (HCC)  -     POCT glycosylated hemoglobin (Hb A1C)    Thyroid disease  -     levothyroxine (SYNTHROID) 50 MCG tablet; Take 1 tablet by mouth daily    Mixed hyperlipidemia  -     pravastatin (PRAVACHOL) 80 MG tablet; Take 1 tablet by mouth daily    Generalized abdominal pain    Other orders  -     fluticasone (FLONASE) 50 MCG/ACT nasal spray; 1 spray by Nasal route 2 times daily    Return in about 3 months (around 4/5/2018).

## 2018-01-25 RX ORDER — VALSARTAN 160 MG/1
160 TABLET ORAL DAILY
Qty: 30 TABLET | Refills: 3 | Status: SHIPPED | OUTPATIENT
Start: 2018-01-25 | End: 2018-05-30 | Stop reason: SDUPTHER

## 2018-02-02 ENCOUNTER — NURSE ONLY (OUTPATIENT)
Dept: INTERNAL MEDICINE CLINIC | Age: 68
End: 2018-02-02

## 2018-02-02 VITALS
OXYGEN SATURATION: 96 % | BODY MASS INDEX: 41.64 KG/M2 | WEIGHT: 315 LBS | DIASTOLIC BLOOD PRESSURE: 64 MMHG | HEART RATE: 103 BPM | SYSTOLIC BLOOD PRESSURE: 116 MMHG

## 2018-02-02 DIAGNOSIS — I48.91 ATRIAL FIBRILLATION, UNSPECIFIED TYPE (HCC): Primary | ICD-10-CM

## 2018-02-02 LAB
INTERNATIONAL NORMALIZATION RATIO, POC: 1.7
PROTHROMBIN TIME, POC: 20.7

## 2018-02-02 PROCEDURE — 85610 PROTHROMBIN TIME: CPT | Performed by: NURSE PRACTITIONER

## 2018-02-05 ENCOUNTER — TELEPHONE (OUTPATIENT)
Dept: INTERNAL MEDICINE CLINIC | Age: 68
End: 2018-02-05

## 2018-02-05 DIAGNOSIS — K59.00 CONSTIPATION, UNSPECIFIED CONSTIPATION TYPE: ICD-10-CM

## 2018-02-05 RX ORDER — POLYETHYLENE GLYCOL 3350 17 G/17G
17 POWDER, FOR SOLUTION ORAL DAILY
Qty: 225 G | Refills: 2 | Status: SHIPPED | OUTPATIENT
Start: 2018-02-05 | End: 2018-11-02 | Stop reason: SDUPTHER

## 2018-02-09 ENCOUNTER — TELEPHONE (OUTPATIENT)
Dept: INTERNAL MEDICINE CLINIC | Age: 68
End: 2018-02-09

## 2018-02-13 ENCOUNTER — NURSE ONLY (OUTPATIENT)
Dept: INTERNAL MEDICINE CLINIC | Age: 68
End: 2018-02-13

## 2018-02-13 VITALS
DIASTOLIC BLOOD PRESSURE: 52 MMHG | HEART RATE: 78 BPM | SYSTOLIC BLOOD PRESSURE: 102 MMHG | WEIGHT: 315 LBS | OXYGEN SATURATION: 97 % | BODY MASS INDEX: 41.64 KG/M2

## 2018-02-13 DIAGNOSIS — E11.9 TYPE 2 DIABETES MELLITUS WITHOUT COMPLICATION, WITH LONG-TERM CURRENT USE OF INSULIN (HCC): ICD-10-CM

## 2018-02-13 DIAGNOSIS — Z79.4 TYPE 2 DIABETES MELLITUS WITHOUT COMPLICATION, WITH LONG-TERM CURRENT USE OF INSULIN (HCC): ICD-10-CM

## 2018-02-13 DIAGNOSIS — I48.91 ATRIAL FIBRILLATION, UNSPECIFIED TYPE (HCC): Primary | ICD-10-CM

## 2018-02-13 LAB
INTERNATIONAL NORMALIZATION RATIO, POC: 2.1
PROTHROMBIN TIME, POC: 24.6

## 2018-02-13 PROCEDURE — 85610 PROTHROMBIN TIME: CPT | Performed by: NURSE PRACTITIONER

## 2018-02-19 RX ORDER — DULOXETIN HYDROCHLORIDE 60 MG/1
CAPSULE, DELAYED RELEASE ORAL
Qty: 30 CAPSULE | Refills: 3 | Status: SHIPPED | OUTPATIENT
Start: 2018-02-19 | End: 2018-05-31 | Stop reason: SDUPTHER

## 2018-03-08 DIAGNOSIS — I48.91 ATRIAL FIBRILLATION, UNSPECIFIED TYPE (HCC): ICD-10-CM

## 2018-03-08 RX ORDER — WARFARIN SODIUM 2 MG/1
2 TABLET ORAL
Qty: 30 TABLET | Refills: 1 | Status: SHIPPED | OUTPATIENT
Start: 2018-03-09 | End: 2018-07-10 | Stop reason: SDUPTHER

## 2018-03-08 RX ORDER — WARFARIN SODIUM 3 MG/1
3 TABLET ORAL EVERY 24 HOURS
Qty: 30 TABLET | Refills: 1 | Status: SHIPPED | OUTPATIENT
Start: 2018-03-08 | End: 2018-06-19 | Stop reason: SDUPTHER

## 2018-03-19 RX ORDER — ALLOPURINOL 100 MG/1
100 TABLET ORAL 2 TIMES DAILY
Qty: 60 TABLET | Refills: 0 | Status: SHIPPED | OUTPATIENT
Start: 2018-03-19 | End: 2018-04-02 | Stop reason: SDUPTHER

## 2018-03-20 DIAGNOSIS — E11.9 TYPE 2 DIABETES MELLITUS WITHOUT COMPLICATION, WITH LONG-TERM CURRENT USE OF INSULIN (HCC): ICD-10-CM

## 2018-03-20 DIAGNOSIS — Z79.4 TYPE 2 DIABETES MELLITUS WITHOUT COMPLICATION, WITH LONG-TERM CURRENT USE OF INSULIN (HCC): ICD-10-CM

## 2018-04-02 ENCOUNTER — OFFICE VISIT (OUTPATIENT)
Dept: INTERNAL MEDICINE CLINIC | Age: 68
End: 2018-04-02

## 2018-04-02 VITALS
SYSTOLIC BLOOD PRESSURE: 144 MMHG | OXYGEN SATURATION: 97 % | WEIGHT: 315 LBS | DIASTOLIC BLOOD PRESSURE: 76 MMHG | BODY MASS INDEX: 42.88 KG/M2 | HEART RATE: 58 BPM

## 2018-04-02 DIAGNOSIS — M10.9 ACUTE GOUTY ARTHRITIS: ICD-10-CM

## 2018-04-02 DIAGNOSIS — Z79.4 TYPE 2 DIABETES MELLITUS WITHOUT COMPLICATION, WITH LONG-TERM CURRENT USE OF INSULIN (HCC): ICD-10-CM

## 2018-04-02 DIAGNOSIS — J00 ACUTE NASOPHARYNGITIS: ICD-10-CM

## 2018-04-02 DIAGNOSIS — R60.0 BILATERAL LOWER EXTREMITY EDEMA: ICD-10-CM

## 2018-04-02 DIAGNOSIS — E11.9 TYPE 2 DIABETES MELLITUS WITHOUT COMPLICATION, WITH LONG-TERM CURRENT USE OF INSULIN (HCC): ICD-10-CM

## 2018-04-02 DIAGNOSIS — R06.02 SHORTNESS OF BREATH: ICD-10-CM

## 2018-04-02 DIAGNOSIS — I48.91 ATRIAL FIBRILLATION, UNSPECIFIED TYPE (HCC): Primary | ICD-10-CM

## 2018-04-02 DIAGNOSIS — E78.2 MIXED HYPERLIPIDEMIA: ICD-10-CM

## 2018-04-02 DIAGNOSIS — E07.9 THYROID DISEASE: ICD-10-CM

## 2018-04-02 DIAGNOSIS — J44.9 CHRONIC OBSTRUCTIVE PULMONARY DISEASE, UNSPECIFIED COPD TYPE (HCC): ICD-10-CM

## 2018-04-02 LAB
INTERNATIONAL NORMALIZATION RATIO, POC: 2.2
PROTHROMBIN TIME, POC: 26.7

## 2018-04-02 PROCEDURE — 99214 OFFICE O/P EST MOD 30 MIN: CPT | Performed by: NURSE PRACTITIONER

## 2018-04-02 PROCEDURE — 85610 PROTHROMBIN TIME: CPT | Performed by: NURSE PRACTITIONER

## 2018-04-02 RX ORDER — CARVEDILOL 25 MG/1
25 TABLET ORAL 3 TIMES DAILY
Qty: 90 TABLET | Refills: 3 | Status: SHIPPED | OUTPATIENT
Start: 2018-04-02 | End: 2018-05-30 | Stop reason: SDUPTHER

## 2018-04-02 RX ORDER — FUROSEMIDE 40 MG/1
40 TABLET ORAL NIGHTLY
Qty: 60 TABLET | Refills: 2 | Status: SHIPPED | OUTPATIENT
Start: 2018-04-02 | End: 2018-10-24 | Stop reason: SDUPTHER

## 2018-04-02 RX ORDER — DILTIAZEM HYDROCHLORIDE 120 MG/1
120 TABLET, FILM COATED ORAL DAILY
Qty: 90 TABLET | Refills: 3 | Status: SHIPPED | OUTPATIENT
Start: 2018-04-02 | End: 2018-11-02 | Stop reason: SDUPTHER

## 2018-04-02 RX ORDER — ALLOPURINOL 100 MG/1
100 TABLET ORAL 2 TIMES DAILY
Qty: 60 TABLET | Refills: 0 | Status: SHIPPED | OUTPATIENT
Start: 2018-04-02 | End: 2018-05-22 | Stop reason: SDUPTHER

## 2018-04-02 RX ORDER — LEVOTHYROXINE SODIUM 0.05 MG/1
50 TABLET ORAL DAILY
Qty: 30 TABLET | Refills: 2 | Status: SHIPPED | OUTPATIENT
Start: 2018-04-02 | End: 2018-07-10 | Stop reason: SDUPTHER

## 2018-04-02 RX ORDER — PRAVASTATIN SODIUM 80 MG/1
80 TABLET ORAL DAILY
Qty: 30 TABLET | Refills: 2 | Status: SHIPPED | OUTPATIENT
Start: 2018-04-02 | End: 2018-07-10 | Stop reason: SDUPTHER

## 2018-04-02 RX ORDER — FLUTICASONE PROPIONATE 50 MCG
1 SPRAY, SUSPENSION (ML) NASAL 2 TIMES DAILY
Qty: 1 BOTTLE | Refills: 2 | Status: SHIPPED | OUTPATIENT
Start: 2018-04-02 | End: 2018-09-12

## 2018-04-03 DIAGNOSIS — J44.9 CHRONIC OBSTRUCTIVE PULMONARY DISEASE, UNSPECIFIED COPD TYPE (HCC): ICD-10-CM

## 2018-04-03 DIAGNOSIS — E11.9 TYPE 2 DIABETES MELLITUS WITHOUT COMPLICATION, WITH LONG-TERM CURRENT USE OF INSULIN (HCC): ICD-10-CM

## 2018-04-03 DIAGNOSIS — E78.2 MIXED HYPERLIPIDEMIA: ICD-10-CM

## 2018-04-03 DIAGNOSIS — E07.9 THYROID DISEASE: ICD-10-CM

## 2018-04-03 DIAGNOSIS — I48.91 ATRIAL FIBRILLATION, UNSPECIFIED TYPE (HCC): ICD-10-CM

## 2018-04-03 DIAGNOSIS — Z79.4 TYPE 2 DIABETES MELLITUS WITHOUT COMPLICATION, WITH LONG-TERM CURRENT USE OF INSULIN (HCC): ICD-10-CM

## 2018-04-03 LAB
HCT VFR BLD CALC: 43.6 % (ref 40.5–52.5)
HEMOGLOBIN: 14.8 G/DL (ref 13.5–17.5)
MCH RBC QN AUTO: 34.7 PG (ref 26–34)
MCHC RBC AUTO-ENTMCNC: 34 G/DL (ref 31–36)
MCV RBC AUTO: 102.1 FL (ref 80–100)
PDW BLD-RTO: 15.1 % (ref 12.4–15.4)
PLATELET # BLD: 189 K/UL (ref 135–450)
PMV BLD AUTO: 8.7 FL (ref 5–10.5)
RBC # BLD: 4.27 M/UL (ref 4.2–5.9)
WBC # BLD: 6.9 K/UL (ref 4–11)

## 2018-04-04 LAB
A/G RATIO: 1.4 (ref 1.1–2.2)
ALBUMIN SERPL-MCNC: 4.1 G/DL (ref 3.4–5)
ALP BLD-CCNC: 93 U/L (ref 40–129)
ALT SERPL-CCNC: 27 U/L (ref 10–40)
ANION GAP SERPL CALCULATED.3IONS-SCNC: 18 MMOL/L (ref 3–16)
AST SERPL-CCNC: 31 U/L (ref 15–37)
BILIRUB SERPL-MCNC: 0.5 MG/DL (ref 0–1)
BUN BLDV-MCNC: 14 MG/DL (ref 7–20)
CALCIUM SERPL-MCNC: 9 MG/DL (ref 8.3–10.6)
CHLORIDE BLD-SCNC: 101 MMOL/L (ref 99–110)
CHOLESTEROL, TOTAL: 164 MG/DL (ref 0–199)
CO2: 25 MMOL/L (ref 21–32)
CREAT SERPL-MCNC: 0.8 MG/DL (ref 0.8–1.3)
ESTIMATED AVERAGE GLUCOSE: 185.8 MG/DL
GFR AFRICAN AMERICAN: >60
GFR NON-AFRICAN AMERICAN: >60
GLOBULIN: 2.9 G/DL
GLUCOSE BLD-MCNC: 189 MG/DL (ref 70–99)
HBA1C MFR BLD: 8.1 %
HDLC SERPL-MCNC: 38 MG/DL (ref 40–60)
LDL CHOLESTEROL CALCULATED: 77 MG/DL
POTASSIUM SERPL-SCNC: 4.3 MMOL/L (ref 3.5–5.1)
PRO-BNP: 499 PG/ML (ref 0–124)
SODIUM BLD-SCNC: 144 MMOL/L (ref 136–145)
TOTAL PROTEIN: 7 G/DL (ref 6.4–8.2)
TRIGL SERPL-MCNC: 243 MG/DL (ref 0–150)
TSH SERPL DL<=0.05 MIU/L-ACNC: 1.66 UIU/ML (ref 0.27–4.2)
VLDLC SERPL CALC-MCNC: 49 MG/DL

## 2018-04-16 ENCOUNTER — TELEPHONE (OUTPATIENT)
Dept: INTERNAL MEDICINE CLINIC | Age: 68
End: 2018-04-16

## 2018-05-22 DIAGNOSIS — M10.9 ACUTE GOUTY ARTHRITIS: ICD-10-CM

## 2018-05-22 RX ORDER — ALLOPURINOL 100 MG/1
100 TABLET ORAL 2 TIMES DAILY
Qty: 60 TABLET | Refills: 0 | Status: SHIPPED | OUTPATIENT
Start: 2018-05-22 | End: 2018-05-31 | Stop reason: SDUPTHER

## 2018-05-30 DIAGNOSIS — I48.91 ATRIAL FIBRILLATION, UNSPECIFIED TYPE (HCC): ICD-10-CM

## 2018-05-31 DIAGNOSIS — M10.9 ACUTE GOUTY ARTHRITIS: ICD-10-CM

## 2018-05-31 RX ORDER — VALSARTAN 160 MG/1
160 TABLET ORAL DAILY
Qty: 30 TABLET | Refills: 3 | Status: SHIPPED | OUTPATIENT
Start: 2018-05-31 | End: 2018-07-26 | Stop reason: SINTOL

## 2018-05-31 RX ORDER — ALLOPURINOL 100 MG/1
TABLET ORAL
Qty: 60 TABLET | Refills: 0 | Status: SHIPPED | OUTPATIENT
Start: 2018-05-31 | End: 2018-07-23 | Stop reason: SDUPTHER

## 2018-05-31 RX ORDER — VALSARTAN 160 MG/1
TABLET ORAL
Qty: 30 TABLET | Refills: 3 | Status: SHIPPED | OUTPATIENT
Start: 2018-05-31 | End: 2018-09-12

## 2018-05-31 RX ORDER — CARVEDILOL 25 MG/1
25 TABLET ORAL 3 TIMES DAILY
Qty: 90 TABLET | Refills: 3 | Status: SHIPPED | OUTPATIENT
Start: 2018-05-31 | End: 2018-11-02 | Stop reason: SDUPTHER

## 2018-05-31 RX ORDER — WARFARIN SODIUM 3 MG/1
TABLET ORAL
Qty: 30 TABLET | Refills: 0 | OUTPATIENT
Start: 2018-05-31

## 2018-05-31 RX ORDER — DULOXETIN HYDROCHLORIDE 60 MG/1
CAPSULE, DELAYED RELEASE ORAL
Qty: 30 CAPSULE | Refills: 3 | Status: SHIPPED | OUTPATIENT
Start: 2018-05-31 | End: 2018-10-24 | Stop reason: SDUPTHER

## 2018-06-06 RX ORDER — DIGOXIN 250 MCG
250 TABLET ORAL DAILY
Qty: 90 TABLET | Refills: 3 | Status: SHIPPED | OUTPATIENT
Start: 2018-06-06 | End: 2018-07-10 | Stop reason: SDUPTHER

## 2018-06-19 DIAGNOSIS — I48.91 ATRIAL FIBRILLATION, UNSPECIFIED TYPE (HCC): ICD-10-CM

## 2018-06-19 RX ORDER — WARFARIN SODIUM 3 MG/1
3 TABLET ORAL EVERY 24 HOURS
Qty: 30 TABLET | Refills: 1 | Status: SHIPPED | OUTPATIENT
Start: 2018-06-19 | End: 2018-07-10 | Stop reason: SDUPTHER

## 2018-07-10 ENCOUNTER — OFFICE VISIT (OUTPATIENT)
Dept: INTERNAL MEDICINE CLINIC | Age: 68
End: 2018-07-10

## 2018-07-10 VITALS
BODY MASS INDEX: 42.43 KG/M2 | SYSTOLIC BLOOD PRESSURE: 120 MMHG | DIASTOLIC BLOOD PRESSURE: 64 MMHG | HEART RATE: 74 BPM | WEIGHT: 315 LBS | OXYGEN SATURATION: 97 %

## 2018-07-10 DIAGNOSIS — E11.9 TYPE 2 DIABETES MELLITUS WITHOUT COMPLICATION, WITH LONG-TERM CURRENT USE OF INSULIN (HCC): ICD-10-CM

## 2018-07-10 DIAGNOSIS — Z79.4 TYPE 2 DIABETES MELLITUS WITHOUT COMPLICATION, WITH LONG-TERM CURRENT USE OF INSULIN (HCC): ICD-10-CM

## 2018-07-10 DIAGNOSIS — E78.2 MIXED HYPERLIPIDEMIA: ICD-10-CM

## 2018-07-10 DIAGNOSIS — E07.9 THYROID DISEASE: ICD-10-CM

## 2018-07-10 DIAGNOSIS — I48.91 ATRIAL FIBRILLATION, UNSPECIFIED TYPE (HCC): Primary | ICD-10-CM

## 2018-07-10 DIAGNOSIS — J44.9 CHRONIC OBSTRUCTIVE PULMONARY DISEASE, UNSPECIFIED COPD TYPE (HCC): ICD-10-CM

## 2018-07-10 LAB
INTERNATIONAL NORMALIZATION RATIO, POC: 2.4
PROTHROMBIN TIME, POC: 29.1

## 2018-07-10 PROCEDURE — 85610 PROTHROMBIN TIME: CPT | Performed by: NURSE PRACTITIONER

## 2018-07-10 PROCEDURE — 99214 OFFICE O/P EST MOD 30 MIN: CPT | Performed by: NURSE PRACTITIONER

## 2018-07-10 RX ORDER — WARFARIN SODIUM 3 MG/1
3 TABLET ORAL EVERY 24 HOURS
Qty: 30 TABLET | Refills: 1 | Status: SHIPPED | OUTPATIENT
Start: 2018-07-10 | End: 2018-11-02 | Stop reason: SDUPTHER

## 2018-07-10 RX ORDER — WARFARIN SODIUM 2 MG/1
2 TABLET ORAL
Qty: 30 TABLET | Refills: 1 | Status: SHIPPED | OUTPATIENT
Start: 2018-07-11 | End: 2018-11-02 | Stop reason: SDUPTHER

## 2018-07-10 RX ORDER — DIGOXIN 250 MCG
250 TABLET ORAL DAILY
Qty: 90 TABLET | Refills: 3 | Status: SHIPPED | OUTPATIENT
Start: 2018-07-10 | End: 2018-11-02 | Stop reason: SDUPTHER

## 2018-07-10 RX ORDER — PRAVASTATIN SODIUM 80 MG/1
80 TABLET ORAL DAILY
Qty: 30 TABLET | Refills: 2 | Status: SHIPPED | OUTPATIENT
Start: 2018-07-10 | End: 2018-11-02 | Stop reason: SDUPTHER

## 2018-07-10 RX ORDER — LEVOTHYROXINE SODIUM 0.05 MG/1
50 TABLET ORAL DAILY
Qty: 30 TABLET | Refills: 2 | Status: SHIPPED | OUTPATIENT
Start: 2018-07-10 | End: 2018-11-02 | Stop reason: SDUPTHER

## 2018-07-10 ASSESSMENT — PATIENT HEALTH QUESTIONNAIRE - PHQ9
2. FEELING DOWN, DEPRESSED OR HOPELESS: 0
SUM OF ALL RESPONSES TO PHQ QUESTIONS 1-9: 0
1. LITTLE INTEREST OR PLEASURE IN DOING THINGS: 0
SUM OF ALL RESPONSES TO PHQ9 QUESTIONS 1 & 2: 0

## 2018-07-10 NOTE — PROGRESS NOTES
Physical Exam   Constitutional: He is oriented to person, place, and time. He appears well-developed and well-nourished. No distress. HENT:   Head: Normocephalic and atraumatic. Mouth/Throat: Oropharynx is clear and moist. No oropharyngeal exudate. Eyes: Conjunctivae and EOM are normal. Pupils are equal, round, and reactive to light. Neck: Normal range of motion. Neck supple. No JVD present. No tracheal deviation present. No thyromegaly present. Cardiovascular: Normal rate and intact distal pulses. An irregular rhythm present. Pulmonary/Chest: Effort normal and breath sounds normal. No stridor. No respiratory distress. He has no wheezes. He has no rales. Abdominal: Soft. He exhibits no distension and no mass. There is tenderness. There is no rebound and no guarding. A knot is felt with palpation over RUQ. There is no change with bearing down. Musculoskeletal: Normal range of motion. He exhibits edema. Lymphadenopathy:     He has no cervical adenopathy. Neurological: He is alert and oriented to person, place, and time. Skin: Skin is warm and dry. No rash noted. He is not diaphoretic. No erythema. No pallor. Psychiatric: He has a normal mood and affect.  His behavior is normal. Judgment and thought content normal.       Lab Results   Component Value Date    WBC 6.9 04/03/2018    HGB 14.8 04/03/2018    HCT 43.6 04/03/2018    .1 (H) 04/03/2018     04/03/2018     Lab Results   Component Value Date     04/03/2018    K 4.3 04/03/2018     04/03/2018    CO2 25 04/03/2018    BUN 14 04/03/2018    CREATININE 0.8 04/03/2018    GLUCOSE 189 (H) 04/03/2018    CALCIUM 9.0 04/03/2018    PROT 7.0 04/03/2018    LABALBU 4.1 04/03/2018    BILITOT 0.5 04/03/2018    ALKPHOS 93 04/03/2018    AST 31 04/03/2018    ALT 27 04/03/2018    LABGLOM >60 04/03/2018    GFRAA >60 04/03/2018    AGRATIO 1.4 04/03/2018    GLOB 2.9 04/03/2018     Lab Results   Component Value Date    CHOL 164 Take 1 tablet by mouth daily     Dispense:  30 tablet     Refill:  2    pravastatin (PRAVACHOL) 80 MG tablet     Sig: Take 1 tablet by mouth daily     Dispense:  30 tablet     Refill:  2       Return in about 3 months (around 10/10/2018).     ANKITA Maradiaga CNP  07/10/18  10:20 PM

## 2018-07-23 DIAGNOSIS — I48.91 ATRIAL FIBRILLATION, UNSPECIFIED TYPE (HCC): ICD-10-CM

## 2018-07-23 DIAGNOSIS — E78.2 MIXED HYPERLIPIDEMIA: ICD-10-CM

## 2018-07-23 DIAGNOSIS — E11.9 TYPE 2 DIABETES MELLITUS WITHOUT COMPLICATION, WITH LONG-TERM CURRENT USE OF INSULIN (HCC): ICD-10-CM

## 2018-07-23 DIAGNOSIS — M10.9 ACUTE GOUTY ARTHRITIS: ICD-10-CM

## 2018-07-23 DIAGNOSIS — Z79.4 TYPE 2 DIABETES MELLITUS WITHOUT COMPLICATION, WITH LONG-TERM CURRENT USE OF INSULIN (HCC): ICD-10-CM

## 2018-07-23 DIAGNOSIS — J44.9 CHRONIC OBSTRUCTIVE PULMONARY DISEASE, UNSPECIFIED COPD TYPE (HCC): ICD-10-CM

## 2018-07-23 LAB
A/G RATIO: 1.5 (ref 1.1–2.2)
ALBUMIN SERPL-MCNC: 4.3 G/DL (ref 3.4–5)
ALP BLD-CCNC: 110 U/L (ref 40–129)
ALT SERPL-CCNC: 22 U/L (ref 10–40)
ANION GAP SERPL CALCULATED.3IONS-SCNC: 17 MMOL/L (ref 3–16)
AST SERPL-CCNC: 24 U/L (ref 15–37)
BILIRUB SERPL-MCNC: 0.4 MG/DL (ref 0–1)
BUN BLDV-MCNC: 14 MG/DL (ref 7–20)
CALCIUM SERPL-MCNC: 9 MG/DL (ref 8.3–10.6)
CHLORIDE BLD-SCNC: 102 MMOL/L (ref 99–110)
CHOLESTEROL, TOTAL: 139 MG/DL (ref 0–199)
CO2: 25 MMOL/L (ref 21–32)
CREAT SERPL-MCNC: 0.9 MG/DL (ref 0.8–1.3)
GFR AFRICAN AMERICAN: >60
GFR NON-AFRICAN AMERICAN: >60
GLOBULIN: 2.9 G/DL
GLUCOSE BLD-MCNC: 212 MG/DL (ref 70–99)
HCT VFR BLD CALC: 40.7 % (ref 40.5–52.5)
HDLC SERPL-MCNC: 35 MG/DL (ref 40–60)
HEMOGLOBIN: 14.1 G/DL (ref 13.5–17.5)
LDL CHOLESTEROL CALCULATED: 69 MG/DL
MCH RBC QN AUTO: 35.6 PG (ref 26–34)
MCHC RBC AUTO-ENTMCNC: 34.6 G/DL (ref 31–36)
MCV RBC AUTO: 102.9 FL (ref 80–100)
PDW BLD-RTO: 14.5 % (ref 12.4–15.4)
PLATELET # BLD: 223 K/UL (ref 135–450)
PMV BLD AUTO: 8.5 FL (ref 5–10.5)
POTASSIUM SERPL-SCNC: 4.5 MMOL/L (ref 3.5–5.1)
RBC # BLD: 3.95 M/UL (ref 4.2–5.9)
SODIUM BLD-SCNC: 144 MMOL/L (ref 136–145)
TOTAL PROTEIN: 7.2 G/DL (ref 6.4–8.2)
TRIGL SERPL-MCNC: 175 MG/DL (ref 0–150)
VLDLC SERPL CALC-MCNC: 35 MG/DL
WBC # BLD: 7 K/UL (ref 4–11)

## 2018-07-23 RX ORDER — ALLOPURINOL 100 MG/1
TABLET ORAL
Qty: 60 TABLET | Refills: 0 | Status: SHIPPED | OUTPATIENT
Start: 2018-07-23 | End: 2018-07-26 | Stop reason: SDUPTHER

## 2018-07-24 LAB
ESTIMATED AVERAGE GLUCOSE: 194.4 MG/DL
HBA1C MFR BLD: 8.4 %

## 2018-07-26 DIAGNOSIS — M10.9 ACUTE GOUTY ARTHRITIS: ICD-10-CM

## 2018-07-26 RX ORDER — ALLOPURINOL 100 MG/1
TABLET ORAL
Qty: 180 TABLET | Refills: 0 | Status: SHIPPED | OUTPATIENT
Start: 2018-07-26 | End: 2018-11-02 | Stop reason: SDUPTHER

## 2018-07-26 RX ORDER — LOSARTAN POTASSIUM 50 MG/1
50 TABLET ORAL DAILY
Qty: 30 TABLET | Refills: 3 | Status: SHIPPED | OUTPATIENT
Start: 2018-07-26 | End: 2018-11-02 | Stop reason: SDUPTHER

## 2018-07-26 NOTE — TELEPHONE ENCOUNTER
Pt stated his pharmacy said we need to send over rx for allopurinol, and I let him it was sent 7/23/18, but we would resend as a #90 per pharmacy note. Order pending.

## 2018-08-07 LAB — DIABETIC RETINOPATHY: NEGATIVE

## 2018-09-12 ENCOUNTER — OFFICE VISIT (OUTPATIENT)
Dept: FAMILY MEDICINE CLINIC | Age: 68
End: 2018-09-12

## 2018-09-12 VITALS
HEIGHT: 73 IN | SYSTOLIC BLOOD PRESSURE: 108 MMHG | WEIGHT: 315 LBS | TEMPERATURE: 98 F | HEART RATE: 76 BPM | BODY MASS INDEX: 41.75 KG/M2 | OXYGEN SATURATION: 97 % | DIASTOLIC BLOOD PRESSURE: 64 MMHG

## 2018-09-12 DIAGNOSIS — R59.9 LYMPH NODE ENLARGEMENT: Primary | ICD-10-CM

## 2018-09-12 DIAGNOSIS — J30.2 SEASONAL ALLERGIES: ICD-10-CM

## 2018-09-12 DIAGNOSIS — J44.9 CHRONIC OBSTRUCTIVE PULMONARY DISEASE, UNSPECIFIED COPD TYPE (HCC): ICD-10-CM

## 2018-09-12 LAB
A/G RATIO: 1.6 (ref 1.1–2.2)
ALBUMIN SERPL-MCNC: 4.6 G/DL (ref 3.4–5)
ALP BLD-CCNC: 115 U/L (ref 40–129)
ALT SERPL-CCNC: 29 U/L (ref 10–40)
ANION GAP SERPL CALCULATED.3IONS-SCNC: 17 MMOL/L (ref 3–16)
AST SERPL-CCNC: 29 U/L (ref 15–37)
BASOPHILS ABSOLUTE: 0.1 K/UL (ref 0–0.2)
BASOPHILS RELATIVE PERCENT: 1.1 %
BILIRUB SERPL-MCNC: 0.4 MG/DL (ref 0–1)
BUN BLDV-MCNC: 20 MG/DL (ref 7–20)
CALCIUM SERPL-MCNC: 9.6 MG/DL (ref 8.3–10.6)
CHLORIDE BLD-SCNC: 99 MMOL/L (ref 99–110)
CO2: 28 MMOL/L (ref 21–32)
CREAT SERPL-MCNC: 1.1 MG/DL (ref 0.8–1.3)
EOSINOPHILS ABSOLUTE: 0.2 K/UL (ref 0–0.6)
EOSINOPHILS RELATIVE PERCENT: 1.8 %
GFR AFRICAN AMERICAN: >60
GFR NON-AFRICAN AMERICAN: >60
GLOBULIN: 2.8 G/DL
GLUCOSE BLD-MCNC: 182 MG/DL (ref 70–99)
HCT VFR BLD CALC: 41 % (ref 40.5–52.5)
HEMOGLOBIN: 14 G/DL (ref 13.5–17.5)
LACTATE DEHYDROGENASE: 170 U/L (ref 100–190)
LYMPHOCYTES ABSOLUTE: 2.1 K/UL (ref 1–5.1)
LYMPHOCYTES RELATIVE PERCENT: 23.5 %
MCH RBC QN AUTO: 34.8 PG (ref 26–34)
MCHC RBC AUTO-ENTMCNC: 34.1 G/DL (ref 31–36)
MCV RBC AUTO: 102 FL (ref 80–100)
MONOCYTES ABSOLUTE: 0.9 K/UL (ref 0–1.3)
MONOCYTES RELATIVE PERCENT: 10 %
NEUTROPHILS ABSOLUTE: 5.5 K/UL (ref 1.7–7.7)
NEUTROPHILS RELATIVE PERCENT: 63.6 %
PDW BLD-RTO: 14.5 % (ref 12.4–15.4)
PLATELET # BLD: 222 K/UL (ref 135–450)
PMV BLD AUTO: 9.2 FL (ref 5–10.5)
POTASSIUM SERPL-SCNC: 4.8 MMOL/L (ref 3.5–5.1)
RBC # BLD: 4.02 M/UL (ref 4.2–5.9)
SODIUM BLD-SCNC: 144 MMOL/L (ref 136–145)
TOTAL PROTEIN: 7.4 G/DL (ref 6.4–8.2)
WBC # BLD: 8.7 K/UL (ref 4–11)

## 2018-09-12 PROCEDURE — 36415 COLL VENOUS BLD VENIPUNCTURE: CPT | Performed by: NURSE PRACTITIONER

## 2018-09-12 PROCEDURE — 99213 OFFICE O/P EST LOW 20 MIN: CPT | Performed by: NURSE PRACTITIONER

## 2018-09-12 RX ORDER — GEMFIBROZIL 600 MG/1
600 TABLET, FILM COATED ORAL
COMMUNITY
Start: 2015-11-09 | End: 2018-09-12

## 2018-09-12 RX ORDER — CLOPIDOGREL BISULFATE 75 MG/1
75 TABLET ORAL DAILY
COMMUNITY
End: 2018-11-02

## 2018-09-12 RX ORDER — INSULIN GLARGINE 100 [IU]/ML
60 INJECTION, SOLUTION SUBCUTANEOUS NIGHTLY
COMMUNITY
End: 2018-09-12

## 2018-09-12 RX ORDER — LANCETS 30 GAUGE
EACH MISCELLANEOUS
COMMUNITY
Start: 2012-12-03 | End: 2019-01-29 | Stop reason: SDUPTHER

## 2018-09-12 RX ORDER — ALBUTEROL SULFATE 90 UG/1
2 AEROSOL, METERED RESPIRATORY (INHALATION) EVERY 6 HOURS PRN
Qty: 1 INHALER | Refills: 3 | Status: SHIPPED | OUTPATIENT
Start: 2018-09-12 | End: 2018-11-02 | Stop reason: SDUPTHER

## 2018-09-12 ASSESSMENT — ENCOUNTER SYMPTOMS
CHEST TIGHTNESS: 0
COUGH: 0
TROUBLE SWALLOWING: 0
WHEEZING: 0
SHORTNESS OF BREATH: 1
EYES NEGATIVE: 1
SORE THROAT: 0
SINUS PRESSURE: 1

## 2018-09-12 NOTE — PROGRESS NOTES
Jensen Mejía  1950  80 y.o. SUBJECT [place]:    Chief Complaint   Patient presents with    Mass     soft mass found on right side of his jose alberto's apple and a lump under chin    Ear Fullness     feels like they are swollen and hurt alot    Shortness of Breath     HPI 80year old male presents for abnormal cervical lymph nodes which he first noticed several months ago. He states the lymph nodes (initially just post mandibular-cervical, now posterior cervical) have gotten progressively larger, harder, and more tender. He has also noticed tenderness to bilateral axilla. Pt denies fever, chills, diaphoresis, and unexplained weight loss. He has had some sinus congestion and pressure which is common for him this time of year with seasonal allergies. He has flonase at home but has not used recently. Pt's medical record indicates history of COPD. He does not believe that he has ever had PFT. He has felt more SOB than usual. No cough, wheezing. Past Medical History:   Diagnosis Date    Allergic rhinitis     Anticoagulated on Coumadin     1/6/17-**[facility] to follow pt's PT/INRs, along w/prescribing his Coumadin. **    Anxiety     Arthritis     Atrial fibrillation (HCC)     On Coumadin.     CAD (coronary artery disease)     COPD (chronic obstructive pulmonary disease) (HCC)     Depression     Diabetes mellitus (HCC)     NIDDM- dx over 10 yrs ago- follows with Dr Gris Pinon's contracture of hand     Right hand    Family history of cardiovascular disease     Father-MI    GERD (gastroesophageal reflux disease)     H/O cardiac catheterization 03/2007    cardiac cath- stent LAD patent, [id number]%, RCA 40-50%    H/O cardiac catheterization 06/12/2008    cardiac cath- mild disease mid RCA    H/O cardiovascular stress test 4/10/2014    cardiolite- normal, no ischemia, EF63%    H/O cardiovascular stress test 09/21/2016    EF59% normal study  pt in atrial fib    H/O cardiovascular stress test 09/20/2017    EF 60%   afib    H/O Doppler ultrasound     1/11/2010-CAROTID_Intimal thickening but no significant atherosclerotic plaque noted in LAUREN. Doppler flow velocities within the LAUREN are WNL. Intimal thickening but no significant atherosclerotic plaque noted in LICA. Doppler flow velociities within the LICA are WNL.  H/O echocardiogram 04/10/2014    EF 60%, normal LV systolic function, mild mitral and tricuspid insufficiencies, no pericardial effusion.     H/O echocardiogram 09/21/2016    EF60% normal study    H/O hiatal hernia     Hyperlipidemia     Hypertension     Obesity     S/P PTCA (percutaneous transluminal coronary angioplasty) 03/21/2005    s/p PTCA with 3.5 X 16 TAXUS stent to LAD- follows with Dr Annalee Hartman Sleep apnea     had sleep study done 10+ yrs ago- has cpap but does not use it    Thyroid disease     hypothyroid       Current Outpatient Prescriptions on File Prior to Visit   Medication Sig Dispense Refill    losartan (COZAAR) 50 MG tablet Take 1 tablet by mouth daily 30 tablet 3    allopurinol (ZYLOPRIM) 100 MG tablet TAKE 1 TABLET BY MOUTH TWICE DAILY 180 tablet 0    warfarin (COUMADIN) 2 MG tablet Take 1 tablet by mouth three times a week **On MWF.** 30 tablet 1    warfarin (COUMADIN) 3 MG tablet Take 1 tablet by mouth every 24 hours **OR AS DIRECTED PER ** 30 tablet 1    digoxin (LANOXIN) 250 MCG tablet Take 1 tablet by mouth daily 90 tablet 3    levothyroxine (SYNTHROID) 50 MCG tablet Take 1 tablet by mouth daily 30 tablet 2    pravastatin (PRAVACHOL) 80 MG tablet Take 1 tablet by mouth daily 30 tablet 2    carvedilol (COREG) 25 MG tablet Take 1 tablet by mouth 3 times daily 90 tablet 3    DULoxetine (CYMBALTA) 60 MG extended release capsule TAKE 1 CAPSULE BY MOUTH ONCE DAILY 30 capsule 3    metFORMIN (GLUCOPHAGE) 1000 MG tablet TAKE ONE TABLET BY MOUTH TWICE DAILY WITH MEALS 60 tablet 3    furosemide (LASIX) 40 MG tablet Take 1 22 07/23/2018    ALKPHOS 110 07/23/2018             No results found for this visit on 09/12/18. ASSESSMENT AND PLAN:     1. Lymph node enlargement  - CT SOFT TISSUE NECK W WO CONTRAST; Future  - CBC WITH AUTO DIFFERENTIAL; Future  - COMPREHENSIVE METABOLIC PANEL; Future  - LACTATE DEHYDROGENASE; Future  - CALCIUM; Future    2. Chronic obstructive pulmonary disease, unspecified COPD type (HCC)  - albuterol sulfate HFA (VENTOLIN HFA) 108 (90 Base) MCG/ACT inhaler; Inhale 2 puffs into the lungs every 6 hours as needed for Wheezing  Dispense: 1 Inhaler; Refill: 3  - FULL PFT STUDY WITH PRE AND POST; Future    3. Seasonal allergies  - Advised to resume use of flonase. Return in about 2 weeks (around 9/26/2018) for Lab Results, Medication Re-Evaluation. Care discussed with patient. Patient educated on signs and symptoms of exacerbation and when to seek further medical attention. Advised to call for any problems, questions, or concerns. Patient verbalizes understanding and agrees with plan. Medications reviewed and reconciled. Continue current medications. Appropriate prescriptions are ordered. Risks and benefits of meds are discussed. After visit summary provided.

## 2018-09-12 NOTE — PATIENT INSTRUCTIONS
We are committed to providing you the best care possible. If you receive a survey after visiting one of our offices, please take time to share your experience concerning your physician office visit. These surveys are confidential and no health information about you is shared. We are eager to improve for you and we are counting on your feedback to help make that happen. Patient Education        Seasonal Allergies: Care Instructions  Your Care Instructions  Allergies occur when your body's defense system (immune system) overreacts to certain substances. The immune system treats a harmless substance as if it were a harmful germ or virus. Many things can cause this to happen. Examples include pollens, medicine, food, dust, animal dander, and mold. Your allergies are seasonal if you have symptoms just at certain times of the year. In that case, you are probably allergic to pollens from certain trees, grasses, or weeds. Allergies can be mild or severe. Over-the-counter allergy medicine may help with some symptoms. Read and follow all instructions on the label. Managing your allergies is an important part of staying healthy. Your doctor may suggest that you have tests to help find the cause of your allergies. When you know what things trigger your symptoms, you can avoid them. This can prevent allergy symptoms and other health problems. In some cases, immunotherapy might help. For this treatment, you get shots or use pills that have a small amount of certain allergens in them. Your body \"gets used to\" the allergen, so you react less to it over time. This kind of treatment may help prevent or reduce some allergy symptoms. Follow-up care is a key part of your treatment and safety. Be sure to make and go to all appointments, and call your doctor if you are having problems. It's also a good idea to know your test results and keep a list of the medicines you take. How can you care for yourself at home?   · Be always ask your healthcare professional. [place] [address] any warranty or liability for your use of this information. Patient Education        Chronic Obstructive Pulmonary Disease (COPD): Care Instructions  Your Care Instructions    Chronic obstructive pulmonary disease (COPD) is a general term for a group of lung diseases, including emphysema and chronic bronchitis. People with COPD have decreased airflow in and out of the lungs, which makes it hard to breathe. The airways also can get clogged with thick mucus. Cigarette smoking is a major cause of COPD. Although there is no cure for COPD, you can slow its progress. Following your treatment plan and taking care of yourself can help you feel better and live longer. Follow-up care is a key part of your treatment and safety. Be sure to make and go to all appointments, and call your doctor if you are having problems. It's also a good idea to know your test results and keep a list of the medicines you take. How can you care for yourself at home?   Staying healthy    · Do not smoke. This is the most important step you can take to prevent more damage to your lungs. If you need help quitting, talk to your doctor about stop-smoking programs and medicines. These can increase your chances of quitting for good.     · Avoid colds and flu. Get a pneumococcal vaccine shot. If you have had one before, ask your doctor whether you need a second dose. Get the flu vaccine every fall. If you must be around people with colds or the flu, wash your hands often.     · Avoid secondhand smoke, air pollution, and high altitudes. Also avoid cold, dry air and hot, humid air. Stay at home with your windows closed when air pollution is bad.    Medicines and oxygen therapy    · Take your medicines exactly as prescribed. Call your doctor if you think you are having a problem with your medicine.     · You may be taking medicines such as:  ¨ Bronchodilators.  These help

## 2018-09-19 ENCOUNTER — OFFICE VISIT (OUTPATIENT)
Dept: CARDIOLOGY CLINIC | Age: 68
End: 2018-09-19

## 2018-09-19 VITALS
SYSTOLIC BLOOD PRESSURE: 138 MMHG | HEIGHT: 73 IN | DIASTOLIC BLOOD PRESSURE: 70 MMHG | WEIGHT: 315 LBS | HEART RATE: 60 BPM | BODY MASS INDEX: 41.75 KG/M2

## 2018-09-19 DIAGNOSIS — I48.0 PAF (PAROXYSMAL ATRIAL FIBRILLATION) (HCC): ICD-10-CM

## 2018-09-19 DIAGNOSIS — I25.110 CORONARY ARTERY DISEASE INVOLVING NATIVE CORONARY ARTERY OF NATIVE HEART WITH UNSTABLE ANGINA PECTORIS (HCC): Primary | ICD-10-CM

## 2018-09-19 PROCEDURE — 99214 OFFICE O/P EST MOD 30 MIN: CPT | Performed by: INTERNAL MEDICINE

## 2018-09-19 NOTE — PROGRESS NOTES
SURGERY Right 1996      As reviewed   Family History   Problem Relation Age of Onset    Cancer Mother         breast ca    Coronary Art Dis Father          MI    Heart Disease Father     Rheum Arthritis Other     Rheum Arthritis Sister     No Known Problems Brother     Rheum Arthritis Sister      Social History   Substance Use Topics    Smoking status: Former Smoker     Packs/day: 1.00     Years: 10.00     Types: Cigarettes     Quit date: 1/1/1976    Smokeless tobacco: Never Used    Alcohol use 0.6 - 1.2 oz/week     1 - 2 Cans of beer per week      Comment: occasionally      Review of Systems:    Constitutional: Negative for diaphoresis and fatigue  Psychological:Negative for anxiety or depression  HENT: Negative for headaches, nasal congestion, sinus pain or vertigo  Eyes: Negative for visual disturbance. Endocrine: Negative for polydipsia/polyuria  Respiratory: Negative for shortness of breath  Cardiovascular: SOB, CP  Gastrointestinal: Negative for abdominal pain or heartburn  Genito-Urinary: Negative for urinary frequency/urgency  Musculoskeletal: Negative for muscle pain, muscular weakness, negative for pain in arm and leg or swelling in foot and leg  Neurological: Negative for dizziness, headaches, memory loss, numbness/tingling, visual changes, syncope  Dermatological: Negative for rash    Objective:  /70   Pulse 60   Ht 6' 1\" (1.854 m)   Wt (!) 331 lb 3.2 oz (150.2 kg)   BMI 43.70 kg/m²   Wt Readings from Last 3 Encounters:   09/19/18 (!) 331 lb 3.2 oz (150.2 kg)   09/12/18 (!) 321 lb 6.4 oz (145.8 kg)   07/10/18 (!) 321 lb 9.6 oz (145.9 kg)     Body mass index is 43.7 kg/m². GENERAL - Alert, oriented, pleasant, in no apparent distress. EYES: No jaundice, no conjunctival pallor. SKIN: It is warm & dry. No rashes. No Echhymosis    HEENT  No clinically significant abnormalities seen. Neck - Supple. No jugular venous distention noted. No carotid bruits.    Cardiovascular  Normal S1 E78.5    Dupuytren's contracture of hand M72.0       Assessment & Plan:         - cardiolite normal 9/17      -     CORONARY ARTERY DISEASE:  asymptomatic     All available  tests in chart reviewed. Management discussed . Testing ordered  no                               -  Hypertension: Patients blood pressure is normal. Patient is advised about low sodium diet. Present medical regimen will not be changed. -  LIPID MANAGEMENT:  Available lipid  lab data reviewed  and patient was given dietary advice. NCEP- ATP III guidelines reviewed with patient. -   Changes  in medicines made: No         -  afib   .chadsvasc of 3  CPM    -   DIABETES MELLITUS: Available pertinent lab data reviewed   and  patient was given dietary advice . Advised to check blood glucose level on a regular basis. -   Changes  in medicines made: No                Body mass index is 43.7 kg/m². Mortality from the morbid obesity is very high:   Wt loss advised                                             [address]

## 2018-09-25 ENCOUNTER — HOSPITAL ENCOUNTER (OUTPATIENT)
Age: 68
Discharge: HOME | End: 2018-09-25
Payer: COMMERCIAL

## 2018-09-25 ENCOUNTER — HOSPITAL ENCOUNTER (OUTPATIENT)
Dept: CT IMAGING | Age: 68
Discharge: HOME | End: 2018-09-25
Payer: COMMERCIAL

## 2018-09-25 DIAGNOSIS — R59.9 LYMPH NODE ENLARGEMENT: ICD-10-CM

## 2018-09-25 PROCEDURE — 70491 CT SOFT TISSUE NECK W/DYE: CPT

## 2018-09-25 PROCEDURE — 6360000004 HC RX CONTRAST MEDICATION: Performed by: NURSE PRACTITIONER

## 2018-09-25 RX ADMIN — IOPAMIDOL 75 ML: 755 INJECTION, SOLUTION INTRAVENOUS at 11:56

## 2018-09-26 ENCOUNTER — HOSPITAL ENCOUNTER (OUTPATIENT)
Dept: CT IMAGING | Age: 68
Discharge: HOME | End: 2018-09-26
Payer: COMMERCIAL

## 2018-10-08 DIAGNOSIS — R59.9 SWOLLEN LYMPH NODES: Primary | ICD-10-CM

## 2018-10-24 RX ORDER — DULOXETIN HYDROCHLORIDE 60 MG/1
CAPSULE, DELAYED RELEASE ORAL
Qty: 30 CAPSULE | Refills: 0 | Status: SHIPPED | OUTPATIENT
Start: 2018-10-24 | End: 2018-11-02 | Stop reason: SDUPTHER

## 2018-10-24 RX ORDER — FUROSEMIDE 40 MG/1
40 TABLET ORAL NIGHTLY
Qty: 30 TABLET | Refills: 0 | Status: SHIPPED | OUTPATIENT
Start: 2018-10-24 | End: 2018-11-02 | Stop reason: SDUPTHER

## 2018-11-01 ENCOUNTER — HOSPITAL ENCOUNTER (OUTPATIENT)
Dept: PULMONOLOGY | Age: 68
Discharge: HOME | End: 2018-11-01
Payer: COMMERCIAL

## 2018-11-01 LAB
DLCO %PRED: 82 %
DLCO PRED: NORMAL ML/MIN/MMHG
DLCO/VA %PRED: NORMAL %
DLCO/VA PRED: NORMAL ML/MIN/MMHG
DLCO/VA: NORMAL ML/MIN/MMHG
DLCO: NORMAL ML/MIN/MMHG
EXPIRATORY TIME: NORMAL SEC
FEF 25-75% %PRED-PRE: NORMAL L/SEC
FEF 25-75% PRED: NORMAL L/SEC
FEF 25-75%-PRE: NORMAL L/SEC
FEV1 %PRED-PRE: 71 %
FEV1 PRED: NORMAL L
FEV1/FVC %PRED-PRE: NORMAL %
FEV1/FVC PRED: NORMAL %
FEV1/FVC: 81 %
FEV1: NORMAL L
FVC %PRED-PRE: NORMAL %
FVC PRED: NORMAL L
FVC: NORMAL L
GAW %PRED: NORMAL %
GAW PRED: NORMAL L/S/CMH2O
GAW: NORMAL L/S/CMH2O
IC %PRED: NORMAL %
IC PRED: NORMAL L
IC: NORMAL L
MVV %PRED-PRE: NORMAL %
MVV PRED: NORMAL L/MIN
MVV-PRE: NORMAL L/MIN
PEF %PRED-PRE: NORMAL L/SEC
PEF PRED: NORMAL L/SEC
PEF-PRE: NORMAL L/SEC
RAW %PRED: NORMAL %
RAW PRED: NORMAL CMH2O/L/S
RAW: NORMAL CMH2O/L/S
RV %PRED: NORMAL %
RV PRED: NORMAL L
RV: NORMAL L
SVC %PRED: NORMAL %
SVC PRED: NORMAL L
SVC: NORMAL L
TLC %PRED: 72 %
TLC PRED: NORMAL L
TLC: NORMAL L
VA %PRED: NORMAL %
VA PRED: NORMAL L
VA: NORMAL L
VTG %PRED: NORMAL %
VTG PRED: NORMAL L
VTG: NORMAL L

## 2018-11-01 PROCEDURE — 94060 EVALUATION OF WHEEZING: CPT

## 2018-11-01 PROCEDURE — 94727 GAS DIL/WSHOT DETER LNG VOL: CPT

## 2018-11-01 PROCEDURE — 94729 DIFFUSING CAPACITY: CPT

## 2018-11-01 ASSESSMENT — PULMONARY FUNCTION TESTS
FEV1/FVC: 81
FEV1_PERCENT_PREDICTED_PRE: 71

## 2018-11-02 ENCOUNTER — OFFICE VISIT (OUTPATIENT)
Dept: INTERNAL MEDICINE CLINIC | Age: 68
End: 2018-11-02
Payer: COMMERCIAL

## 2018-11-02 VITALS
HEART RATE: 63 BPM | OXYGEN SATURATION: 97 % | WEIGHT: 315 LBS | BODY MASS INDEX: 43.17 KG/M2 | DIASTOLIC BLOOD PRESSURE: 60 MMHG | SYSTOLIC BLOOD PRESSURE: 132 MMHG

## 2018-11-02 DIAGNOSIS — R06.09 DYSPNEA ON EXERTION: ICD-10-CM

## 2018-11-02 DIAGNOSIS — L03.039 INFECTED NAIL BED OF TOE, UNSPECIFIED LATERALITY: ICD-10-CM

## 2018-11-02 DIAGNOSIS — E78.2 MIXED HYPERLIPIDEMIA: ICD-10-CM

## 2018-11-02 DIAGNOSIS — K59.00 CONSTIPATION, UNSPECIFIED CONSTIPATION TYPE: ICD-10-CM

## 2018-11-02 DIAGNOSIS — J44.9 CHRONIC OBSTRUCTIVE PULMONARY DISEASE, UNSPECIFIED COPD TYPE (HCC): ICD-10-CM

## 2018-11-02 DIAGNOSIS — I48.91 ATRIAL FIBRILLATION, UNSPECIFIED TYPE (HCC): ICD-10-CM

## 2018-11-02 DIAGNOSIS — E07.9 THYROID DISEASE: ICD-10-CM

## 2018-11-02 DIAGNOSIS — M10.9 ACUTE GOUTY ARTHRITIS: ICD-10-CM

## 2018-11-02 DIAGNOSIS — R07.89 ATYPICAL CHEST PAIN: Primary | ICD-10-CM

## 2018-11-02 DIAGNOSIS — E11.9 TYPE 2 DIABETES MELLITUS WITHOUT COMPLICATION, WITH LONG-TERM CURRENT USE OF INSULIN (HCC): ICD-10-CM

## 2018-11-02 DIAGNOSIS — Z79.4 TYPE 2 DIABETES MELLITUS WITHOUT COMPLICATION, WITH LONG-TERM CURRENT USE OF INSULIN (HCC): ICD-10-CM

## 2018-11-02 DIAGNOSIS — M79.89 LEG SWELLING: ICD-10-CM

## 2018-11-02 LAB
INTERNATIONAL NORMALIZATION RATIO, POC: 2.5
PROTHROMBIN TIME, POC: 29.9

## 2018-11-02 PROCEDURE — 85610 PROTHROMBIN TIME: CPT | Performed by: NURSE PRACTITIONER

## 2018-11-02 PROCEDURE — 99213 OFFICE O/P EST LOW 20 MIN: CPT | Performed by: NURSE PRACTITIONER

## 2018-11-02 RX ORDER — CARVEDILOL 25 MG/1
25 TABLET ORAL 3 TIMES DAILY
Qty: 90 TABLET | Refills: 3 | Status: SHIPPED | OUTPATIENT
Start: 2018-11-02 | End: 2019-01-29 | Stop reason: SDUPTHER

## 2018-11-02 RX ORDER — ALLOPURINOL 100 MG/1
TABLET ORAL
Qty: 180 TABLET | Refills: 0 | Status: SHIPPED | OUTPATIENT
Start: 2018-11-02 | End: 2019-01-29 | Stop reason: SDUPTHER

## 2018-11-02 RX ORDER — WARFARIN SODIUM 2 MG/1
2 TABLET ORAL
Qty: 30 TABLET | Refills: 1 | Status: SHIPPED | OUTPATIENT
Start: 2018-11-02 | End: 2019-01-29 | Stop reason: SDUPTHER

## 2018-11-02 RX ORDER — WARFARIN SODIUM 3 MG/1
3 TABLET ORAL EVERY 24 HOURS
Qty: 30 TABLET | Refills: 1 | Status: SHIPPED | OUTPATIENT
Start: 2018-11-02 | End: 2019-01-29 | Stop reason: SDUPTHER

## 2018-11-02 RX ORDER — FUROSEMIDE 40 MG/1
40 TABLET ORAL NIGHTLY
Qty: 30 TABLET | Refills: 0 | Status: SHIPPED | OUTPATIENT
Start: 2018-11-02 | End: 2019-01-25 | Stop reason: SDUPTHER

## 2018-11-02 RX ORDER — DULOXETIN HYDROCHLORIDE 60 MG/1
CAPSULE, DELAYED RELEASE ORAL
Qty: 30 CAPSULE | Refills: 0 | Status: SHIPPED | OUTPATIENT
Start: 2018-11-02 | End: 2019-01-03 | Stop reason: SDUPTHER

## 2018-11-02 RX ORDER — PRAVASTATIN SODIUM 80 MG/1
80 TABLET ORAL DAILY
Qty: 30 TABLET | Refills: 2 | Status: SHIPPED | OUTPATIENT
Start: 2018-11-02 | End: 2019-01-29 | Stop reason: SDUPTHER

## 2018-11-02 RX ORDER — LEVOTHYROXINE SODIUM 0.05 MG/1
50 TABLET ORAL DAILY
Qty: 30 TABLET | Refills: 2 | Status: SHIPPED | OUTPATIENT
Start: 2018-11-02 | End: 2019-01-29 | Stop reason: SDUPTHER

## 2018-11-02 RX ORDER — DILTIAZEM HYDROCHLORIDE 120 MG/1
120 TABLET, FILM COATED ORAL DAILY
Qty: 90 TABLET | Refills: 3 | Status: SHIPPED | OUTPATIENT
Start: 2018-11-02 | End: 2019-01-29 | Stop reason: SDUPTHER

## 2018-11-02 RX ORDER — DIGOXIN 250 MCG
250 TABLET ORAL DAILY
Qty: 90 TABLET | Refills: 3 | Status: SHIPPED | OUTPATIENT
Start: 2018-11-02 | End: 2019-01-29 | Stop reason: SDUPTHER

## 2018-11-02 RX ORDER — ALBUTEROL SULFATE 90 UG/1
2 AEROSOL, METERED RESPIRATORY (INHALATION) EVERY 6 HOURS PRN
Qty: 1 INHALER | Refills: 3 | Status: SHIPPED | OUTPATIENT
Start: 2018-11-02 | End: 2019-01-29 | Stop reason: SDUPTHER

## 2018-11-02 RX ORDER — LOSARTAN POTASSIUM 50 MG/1
50 TABLET ORAL DAILY
Qty: 30 TABLET | Refills: 3 | Status: SHIPPED | OUTPATIENT
Start: 2018-11-02 | End: 2019-01-29 | Stop reason: SDUPTHER

## 2018-11-02 RX ORDER — POLYETHYLENE GLYCOL 3350 17 G/17G
17 POWDER, FOR SOLUTION ORAL DAILY
Qty: 225 G | Refills: 2 | Status: SHIPPED | OUTPATIENT
Start: 2018-11-02 | End: 2019-01-29 | Stop reason: SDUPTHER

## 2018-11-02 ASSESSMENT — ENCOUNTER SYMPTOMS
SORE THROAT: 0
EYES NEGATIVE: 1
ABDOMINAL PAIN: 0
WHEEZING: 0
CHEST TIGHTNESS: 0
DIARRHEA: 0
SINUS PAIN: 0
SHORTNESS OF BREATH: 1
NAUSEA: 0
CONSTIPATION: 0
ABDOMINAL DISTENTION: 0
SINUS PRESSURE: 0
COLOR CHANGE: 0

## 2018-11-02 NOTE — PROGRESS NOTES
Shannan Canela  1950 11/02/18    Chief Complaint   Patient presents with    3 Month Follow-Up       SUBJECTIVE:      Patient presents for 3 month follow up:    Patient reports c/o worsening shortness of breath over over the last few months. States that he experiences significant dyspnea on exertion even with minimal effort and that this is happening frequently. Was recently evaluated by Dr. Leona Pugh on 9/19/2018, however, last stress test was 2016 and last heart catheterization was approximately ten years. Patient states symptoms have been progressively worsening and he is concerned. While at [facility], patient also had c/o lymph node swelling which he underwent a CT Neck W WO Contrast which was normal. States symptoms have since resolved mostly. Atrial-Fibrillation:  Anticoagulation: Patient here for followup of chronic anticoagulation. Indication: atrial fibrillation  Bleeding Signs/Symptoms:  None  Missed Coumadin Doses:  None  Medication Changes:  no  Dietary Changes:  no    VITAL SIGNS reviewed    PLAN:  Continue current regiment. INR 2.5    Patient is tolerating cholesterol medications without difficulty without c/o myalgia. Patient does report c/o constant BLE swelling. States his swelling has worsened somewhat over the last few months. Is currently on Lasix 40 mg daily. Admits he does not exercise and that his diet is poor. Patient also reports c/o toe nail color change and loss. States his toe nails are consistently yellow in color and 1 has even fallen of his right great toe recently. States he trims his own nails at home. Has never seen podiatry, even as a diabetic. Review of Systems   Constitutional: Positive for fatigue. Negative for activity change, appetite change and fever. HENT: Negative for congestion, sinus pain, sinus pressure and sore throat. Eyes: Negative. Respiratory: Positive for shortness of breath. Negative for chest tightness and wheezing. adenopathy.   +2 pitting edema noted to BLE. Neurological: He is alert and oriented to person, place, and time. He displays normal reflexes. No cranial nerve deficit or sensory deficit. Coordination normal. GCS eye subscore is 4. GCS verbal subscore is 5. GCS motor subscore is 6. Skin: Skin is warm and dry. Capillary refill takes less than 2 seconds. No rash noted. He is not diaphoretic. Psychiatric: He has a normal mood and affect. His behavior is normal. Thought content normal.       ASSESSMENT:    1. Atypical chest pain    2. Dyspnea on exertion    3. Atrial fibrillation, unspecified type ([address]    4. Type 2 diabetes mellitus without complication, with long-term current use of insulin ([address]    5. Chronic obstructive pulmonary disease, unspecified COPD type (HCC)    6. Leg swelling    7. Mixed hyperlipidemia    8. Thyroid disease    9. Constipation, unspecified constipation type    10. Acute gouty arthritis    11. Infected nail bed of toe, unspecified laterality        PLAN:    Telma Bowers was seen today for 3 month follow-up. Diagnoses and all orders for this visit:    Atypical chest pain   [facility], Tess Johansen MD  Dyspnea on Exertion  -     BRAIN NATRIURETIC PEPTIDE (BNP); Future   --[facility], Tess Johansen MD   With reports of continued dyspnea on minimal exertion, I have suspicion for worsening CAD. Given that patient's INR is therapeutic and lung exam was normal, I have low concern for PE or other respiratory issue. PFT results are still pending at this time, however, I feel patients ATP/FORDE is cardiac in origin. Will have patient re-evaluated with cardiologist for possible stress test.    Atrial fibrillation, unspecified type ([address]  -     POCT INR  -     warfarin (COUMADIN) 2 MG tablet; Take 1 tablet by mouth three times a week **On MWF.**  -     warfarin (COUMADIN) 3 MG tablet;  Take 1 tablet by mouth every 24 hours **OR AS DIRECTED PER **  -     carvedilol Differential; Future  -     Comprehensive Metabolic Panel; Future  -     Lipid, Fasting; Future    Thyroid disease  -     levothyroxine (SYNTHROID) 50 MCG tablet; Take 1 tablet by mouth daily  -     TSH with Reflex; Future    Constipation, unspecified constipation type  -     polyethylene glycol (GLYCOLAX) powder; Take 17 g by mouth daily    Acute gouty arthritis  -     allopurinol (ZYLOPRIM) 100 MG tablet; TAKE 1 TABLET BY MOUTH TWICE DAILY    Infected nail bed of toe, unspecified laterality  -     External Referral To Podiatry    Leg swelling  -     Cancel: Compression Stocking  -     Elastic Bandages & Supports (MEDICAL COMPRESSION STOCKINGS) MISC; 1 each by Does not apply route daily   -given that patient is already on 40 mg of Lasix daily, I believe  patient could benefit from compression stocking use. Other orders  -     diltiazem (CARDIZEM) 120 MG tablet; Take 1 tablet by mouth daily  -     DULoxetine (CYMBALTA) 60 MG extended release capsule; TAKE 1 CAPSULE BY MOUTH ONCE DAILY  -     furosemide (LASIX) 40 MG tablet; Take 1 tablet by mouth nightly  -     metFORMIN (GLUCOPHAGE) 1000 MG tablet; TAKE ONE TABLET BY MOUTH TWICE DAILY WITH MEALS  -     losartan (COZAAR) 50 MG tablet; Take 1 tablet by mouth daily  -     Cancel: INFLUENZA, HIGH DOSE, 65 YRS +, IM, PF, PREFILL SYR, 0.5ML (FLUZONE HD)        No flowsheet data found. Return in about 3 months (around 2/2/2019).

## 2018-11-06 ENCOUNTER — OFFICE VISIT (OUTPATIENT)
Dept: CARDIOLOGY CLINIC | Age: 68
End: 2018-11-06
Payer: COMMERCIAL

## 2018-11-06 ENCOUNTER — CARE COORDINATION (OUTPATIENT)
Dept: CARE COORDINATION | Age: 68
End: 2018-11-06

## 2018-11-06 ENCOUNTER — TELEPHONE (OUTPATIENT)
Dept: INTERNAL MEDICINE CLINIC | Age: 68
End: 2018-11-06

## 2018-11-06 VITALS
BODY MASS INDEX: 40.43 KG/M2 | DIASTOLIC BLOOD PRESSURE: 82 MMHG | SYSTOLIC BLOOD PRESSURE: 128 MMHG | HEIGHT: 74 IN | HEART RATE: 66 BPM | WEIGHT: 315 LBS

## 2018-11-06 DIAGNOSIS — I25.119 CORONARY ARTERY DISEASE INVOLVING NATIVE CORONARY ARTERY OF NATIVE HEART WITH ANGINA PECTORIS (HCC): Primary | ICD-10-CM

## 2018-11-06 DIAGNOSIS — R06.02 SOB (SHORTNESS OF BREATH): ICD-10-CM

## 2018-11-06 PROCEDURE — 99214 OFFICE O/P EST MOD 30 MIN: CPT | Performed by: INTERNAL MEDICINE

## 2018-11-06 NOTE — PROGRESS NOTES
ENDOSCOPY, COLON, DIAGNOSTIC  2014    egd    HAND SURGERY Right 1997      As reviewed   Family History   Problem Relation Age of Onset    Cancer Mother         breast ca    Coronary Art Dis Father          MI    Heart Disease Father     Rheum Arthritis Other     Rheum Arthritis Sister     No Known Problems Brother     Rheum Arthritis Sister      Social History   Substance Use Topics    Smoking status: Former Smoker     Packs/day: 1.00     Years: 10.00     Types: Cigarettes     Quit date: 1/1/1976    Smokeless tobacco: Never Used    Alcohol use 0.6 - 1.2 oz/week     1 - 2 Cans of beer per week      Comment: occasionally      Review of Systems:    Constitutional: Negative for diaphoresis and fatigue  Psychological:Negative for anxiety or depression  HENT: Negative for headaches, nasal congestion, sinus pain or vertigo  Eyes: Negative for visual disturbance. Endocrine: Negative for polydipsia/polyuria  Respiratory: Negative for shortness of breath  Cardiovascular: SOB, CP  Gastrointestinal: Negative for abdominal pain or heartburn  Genito-Urinary: Negative for urinary frequency/urgency  Musculoskeletal: Negative for muscle pain, muscular weakness, negative for pain in arm and leg or swelling in foot and leg  Neurological: Negative for dizziness, headaches, memory loss, numbness/tingling, visual changes, syncope  Dermatological: Negative for rash    Objective:  /82   Pulse 66   Ht 6' 2\" (1.88 m)   Wt (!) 330 lb (149.7 kg)   BMI 42.37 kg/m²   Wt Readings from Last 3 Encounters:   11/06/18 (!) 330 lb (149.7 kg)   11/02/18 (!) 327 lb 3.2 oz (148.4 kg)   09/19/18 (!) 331 lb 3.2 oz (150.2 kg)     Body mass index is 42.37 kg/m². GENERAL - Alert, oriented, pleasant, in no apparent distress. EYES: No jaundice, no conjunctival pallor. SKIN: It is warm & dry. No rashes. No Echhymosis    HEENT - No clinically significant abnormalities seen. Neck - Supple. No jugular venous distention noted.  No carotid

## 2018-11-06 NOTE — CARE COORDINATION
[facility] received referral for pt enroll in CC Program from Irvin Junior NP, due to poorly controlled diabetes and COPD worsening. Mailed Introduction Letter in hopes to enroll pt in the Care Coordination Program, [facility] will f/u per CC Program Protocol. Keith Nicholson RN  Ambulatory Care Coordinator  [phone]  [email or web address]

## 2018-11-07 ENCOUNTER — PROCEDURE VISIT (OUTPATIENT)
Dept: CARDIOLOGY CLINIC | Age: 68
End: 2018-11-07
Payer: COMMERCIAL

## 2018-11-07 DIAGNOSIS — I25.119 CORONARY ARTERY DISEASE INVOLVING NATIVE CORONARY ARTERY OF NATIVE HEART WITH ANGINA PECTORIS (HCC): ICD-10-CM

## 2018-11-07 DIAGNOSIS — R06.02 SOB (SHORTNESS OF BREATH): ICD-10-CM

## 2018-11-07 DIAGNOSIS — R06.02 SOB (SHORTNESS OF BREATH): Primary | ICD-10-CM

## 2018-11-07 LAB
LV EF: 58 %
LV EF: 60 %
LVEF MODALITY: NORMAL
LVEF MODALITY: NORMAL

## 2018-11-07 PROCEDURE — 78452 HT MUSCLE IMAGE SPECT MULT: CPT | Performed by: INTERNAL MEDICINE

## 2018-11-07 PROCEDURE — 93017 CV STRESS TEST TRACING ONLY: CPT | Performed by: INTERNAL MEDICINE

## 2018-11-07 PROCEDURE — 93306 TTE W/DOPPLER COMPLETE: CPT | Performed by: INTERNAL MEDICINE

## 2018-11-07 PROCEDURE — 93018 CV STRESS TEST I&R ONLY: CPT | Performed by: INTERNAL MEDICINE

## 2018-11-07 PROCEDURE — 93016 CV STRESS TEST SUPVJ ONLY: CPT | Performed by: INTERNAL MEDICINE

## 2018-11-07 PROCEDURE — A9500 TC99M SESTAMIBI: HCPCS | Performed by: INTERNAL MEDICINE

## 2018-11-09 ENCOUNTER — TELEPHONE (OUTPATIENT)
Dept: CARDIOLOGY CLINIC | Age: 68
End: 2018-11-09

## 2018-11-09 NOTE — TELEPHONE ENCOUNTER
Normal Lexiscan nuclear scintigraphic study suggestive of normal myocardial    perfusion.    Gated images demonstrate normal left ventricular systolic function with EF    of 60 %.           Left ventricular systolic function is normal with an ejection fraction of   55-60%.   No significant valvular disease or diastolic dysfunction noted.   Mild concentric left ventricular hypertrophy.   The left atrium is mildly dilated.   No evidence of pericardial effusion    Results to patient

## 2019-01-03 RX ORDER — DULOXETIN HYDROCHLORIDE 60 MG/1
CAPSULE, DELAYED RELEASE ORAL
Qty: 30 CAPSULE | Refills: 0 | Status: SHIPPED | OUTPATIENT
Start: 2019-01-03 | End: 2019-01-29 | Stop reason: SDUPTHER

## 2019-01-07 ENCOUNTER — HOSPITAL ENCOUNTER (OUTPATIENT)
Age: 69
Discharge: HOME | End: 2019-01-07
Payer: COMMERCIAL

## 2019-01-07 ENCOUNTER — OFFICE VISIT (OUTPATIENT)
Dept: INTERNAL MEDICINE CLINIC | Age: 69
End: 2019-01-07
Payer: COMMERCIAL

## 2019-01-07 ENCOUNTER — HOSPITAL ENCOUNTER (OUTPATIENT)
Dept: GENERAL RADIOLOGY | Age: 69
Discharge: HOME | End: 2019-01-07
Payer: COMMERCIAL

## 2019-01-07 VITALS
DIASTOLIC BLOOD PRESSURE: 54 MMHG | HEIGHT: 74 IN | TEMPERATURE: 98.5 F | WEIGHT: 315 LBS | OXYGEN SATURATION: 93 % | RESPIRATION RATE: 16 BRPM | BODY MASS INDEX: 40.43 KG/M2 | HEART RATE: 113 BPM | SYSTOLIC BLOOD PRESSURE: 102 MMHG

## 2019-01-07 DIAGNOSIS — R05.9 COUGH: ICD-10-CM

## 2019-01-07 DIAGNOSIS — Z79.4 TYPE 2 DIABETES MELLITUS WITHOUT COMPLICATION, WITH LONG-TERM CURRENT USE OF INSULIN (HCC): ICD-10-CM

## 2019-01-07 DIAGNOSIS — R06.09 DYSPNEA ON EXERTION: ICD-10-CM

## 2019-01-07 DIAGNOSIS — E11.9 TYPE 2 DIABETES MELLITUS WITHOUT COMPLICATION, WITH LONG-TERM CURRENT USE OF INSULIN (HCC): ICD-10-CM

## 2019-01-07 DIAGNOSIS — I48.91 ATRIAL FIBRILLATION, UNSPECIFIED TYPE (HCC): ICD-10-CM

## 2019-01-07 DIAGNOSIS — E07.9 THYROID DISEASE: ICD-10-CM

## 2019-01-07 DIAGNOSIS — J01.00 ACUTE NON-RECURRENT MAXILLARY SINUSITIS: Primary | ICD-10-CM

## 2019-01-07 DIAGNOSIS — E78.2 MIXED HYPERLIPIDEMIA: ICD-10-CM

## 2019-01-07 LAB
A/G RATIO: 1.3 (ref 1.1–2.2)
ALBUMIN SERPL-MCNC: 4.5 G/DL (ref 3.4–5)
ALP BLD-CCNC: 115 U/L (ref 40–129)
ALT SERPL-CCNC: 29 U/L (ref 10–40)
ANION GAP SERPL CALCULATED.3IONS-SCNC: 19 MMOL/L (ref 3–16)
AST SERPL-CCNC: 30 U/L (ref 15–37)
BASOPHILS ABSOLUTE: 0.1 K/UL (ref 0–0.2)
BASOPHILS RELATIVE PERCENT: 1.2 %
BILIRUB SERPL-MCNC: 0.3 MG/DL (ref 0–1)
BUN BLDV-MCNC: 15 MG/DL (ref 7–20)
CALCIUM SERPL-MCNC: 9.4 MG/DL (ref 8.3–10.6)
CHLORIDE BLD-SCNC: 99 MMOL/L (ref 99–110)
CHOLESTEROL, FASTING: 151 MG/DL (ref 0–199)
CO2: 27 MMOL/L (ref 21–32)
CREAT SERPL-MCNC: 1 MG/DL (ref 0.8–1.3)
CREATININE URINE: 211.1 MG/DL (ref 39–259)
EOSINOPHILS ABSOLUTE: 0 K/UL (ref 0–0.6)
EOSINOPHILS RELATIVE PERCENT: 0.8 %
GFR AFRICAN AMERICAN: >60
GFR NON-AFRICAN AMERICAN: >60
GLOBULIN: 3.4 G/DL
GLUCOSE BLD-MCNC: 136 MG/DL (ref 70–99)
HCT VFR BLD CALC: 43.9 % (ref 40.5–52.5)
HDLC SERPL-MCNC: 37 MG/DL (ref 40–60)
HEMOGLOBIN: 15.1 G/DL (ref 13.5–17.5)
LDL CHOLESTEROL CALCULATED: 82 MG/DL
LYMPHOCYTES ABSOLUTE: 1.4 K/UL (ref 1–5.1)
LYMPHOCYTES RELATIVE PERCENT: 30 %
MCH RBC QN AUTO: 35.3 PG (ref 26–34)
MCHC RBC AUTO-ENTMCNC: 34.3 G/DL (ref 31–36)
MCV RBC AUTO: 102.8 FL (ref 80–100)
MICROALBUMIN UR-MCNC: 10.3 MG/DL
MICROALBUMIN/CREAT UR-RTO: 48.8 MG/G (ref 0–30)
MONOCYTES ABSOLUTE: 0.9 K/UL (ref 0–1.3)
MONOCYTES RELATIVE PERCENT: 20.4 %
NEUTROPHILS ABSOLUTE: 2.2 K/UL (ref 1.7–7.7)
NEUTROPHILS RELATIVE PERCENT: 47.6 %
PDW BLD-RTO: 14.6 % (ref 12.4–15.4)
PLATELET # BLD: 184 K/UL (ref 135–450)
PMV BLD AUTO: 9.2 FL (ref 5–10.5)
POTASSIUM SERPL-SCNC: 4 MMOL/L (ref 3.5–5.1)
PRO-BNP: 436 PG/ML (ref 0–124)
RBC # BLD: 4.27 M/UL (ref 4.2–5.9)
SODIUM BLD-SCNC: 145 MMOL/L (ref 136–145)
TOTAL PROTEIN: 7.9 G/DL (ref 6.4–8.2)
TRIGLYCERIDE, FASTING: 159 MG/DL (ref 0–150)
TSH REFLEX: 3 UIU/ML (ref 0.27–4.2)
VLDLC SERPL CALC-MCNC: 32 MG/DL
WBC # BLD: 4.6 K/UL (ref 4–11)

## 2019-01-07 PROCEDURE — 71046 X-RAY EXAM CHEST 2 VIEWS: CPT

## 2019-01-07 PROCEDURE — 99213 OFFICE O/P EST LOW 20 MIN: CPT | Performed by: NURSE PRACTITIONER

## 2019-01-07 RX ORDER — GUAIFENESIN/DEXTROMETHORPHAN 100-10MG/5
5 SYRUP ORAL 3 TIMES DAILY PRN
Qty: 120 ML | Refills: 0 | Status: SHIPPED | OUTPATIENT
Start: 2019-01-07 | End: 2019-01-17

## 2019-01-07 RX ORDER — METHYLPREDNISOLONE 4 MG/1
TABLET ORAL
Qty: 1 KIT | Refills: 0 | Status: SHIPPED | OUTPATIENT
Start: 2019-01-07 | End: 2019-01-13

## 2019-01-07 RX ORDER — DOXYCYCLINE HYCLATE 100 MG
100 TABLET ORAL 2 TIMES DAILY
Qty: 14 TABLET | Refills: 0 | Status: SHIPPED | OUTPATIENT
Start: 2019-01-07 | End: 2019-01-14

## 2019-01-07 ASSESSMENT — ENCOUNTER SYMPTOMS
ABDOMINAL PAIN: 0
COUGH: 1
CONSTIPATION: 0
EYES NEGATIVE: 1
DIARRHEA: 0
SINUS PAIN: 1
COLOR CHANGE: 0
SINUS PRESSURE: 1
SHORTNESS OF BREATH: 0
CHEST TIGHTNESS: 0
NAUSEA: 0
ABDOMINAL DISTENTION: 0
SORE THROAT: 0
WHEEZING: 0

## 2019-01-08 DIAGNOSIS — Z79.4 TYPE 2 DIABETES MELLITUS WITHOUT COMPLICATION, WITH LONG-TERM CURRENT USE OF INSULIN (HCC): ICD-10-CM

## 2019-01-08 DIAGNOSIS — E11.9 TYPE 2 DIABETES MELLITUS WITHOUT COMPLICATION, WITH LONG-TERM CURRENT USE OF INSULIN (HCC): ICD-10-CM

## 2019-01-08 LAB
ESTIMATED AVERAGE GLUCOSE: 200.1 MG/DL
HBA1C MFR BLD: 8.6 %

## 2019-01-09 ENCOUNTER — APPOINTMENT (OUTPATIENT)
Dept: GENERAL RADIOLOGY | Age: 69
End: 2019-01-09
Payer: COMMERCIAL

## 2019-01-09 ENCOUNTER — HOSPITAL ENCOUNTER (EMERGENCY)
Age: 69
Discharge: HOME | End: 2019-01-09
Attending: EMERGENCY MEDICINE
Payer: COMMERCIAL

## 2019-01-09 ENCOUNTER — TELEPHONE (OUTPATIENT)
Dept: INTERNAL MEDICINE CLINIC | Age: 69
End: 2019-01-09

## 2019-01-09 ENCOUNTER — APPOINTMENT (OUTPATIENT)
Dept: CT IMAGING | Age: 69
End: 2019-01-09
Payer: COMMERCIAL

## 2019-01-09 VITALS
TEMPERATURE: 98.1 F | RESPIRATION RATE: 18 BRPM | SYSTOLIC BLOOD PRESSURE: 170 MMHG | OXYGEN SATURATION: 95 % | HEIGHT: 74 IN | WEIGHT: 315 LBS | HEART RATE: 78 BPM | BODY MASS INDEX: 40.43 KG/M2 | DIASTOLIC BLOOD PRESSURE: 80 MMHG

## 2019-01-09 DIAGNOSIS — J18.9 PNEUMONIA OF RIGHT LOWER LOBE DUE TO INFECTIOUS ORGANISM: Primary | ICD-10-CM

## 2019-01-09 DIAGNOSIS — R05.9 COUGH: ICD-10-CM

## 2019-01-09 LAB
ALBUMIN SERPL-MCNC: 4.1 GM/DL (ref 3.4–5)
ALP BLD-CCNC: 145 IU/L (ref 40–129)
ALT SERPL-CCNC: 27 U/L (ref 10–40)
ANION GAP SERPL CALCULATED.3IONS-SCNC: 12 MMOL/L (ref 4–16)
AST SERPL-CCNC: 21 IU/L (ref 15–37)
BASOPHILS ABSOLUTE: 0 K/CU MM
BASOPHILS RELATIVE PERCENT: 0.4 % (ref 0–1)
BILIRUB SERPL-MCNC: 0.3 MG/DL (ref 0–1)
BUN BLDV-MCNC: 18 MG/DL (ref 6–23)
CALCIUM SERPL-MCNC: 9.8 MG/DL (ref 8.3–10.6)
CHLORIDE BLD-SCNC: 102 MMOL/L (ref 99–110)
CO2: 28 MMOL/L (ref 21–32)
CREAT SERPL-MCNC: 0.8 MG/DL (ref 0.9–1.3)
DIFFERENTIAL TYPE: ABNORMAL
EOSINOPHILS ABSOLUTE: 0 K/CU MM
EOSINOPHILS RELATIVE PERCENT: 0.1 % (ref 0–3)
GFR AFRICAN AMERICAN: >60 ML/MIN/1.73M2
GFR NON-AFRICAN AMERICAN: >60 ML/MIN/1.73M2
GLUCOSE BLD-MCNC: 171 MG/DL (ref 70–99)
HCT VFR BLD CALC: 45.6 % (ref 42–52)
HEMOGLOBIN: 14.7 GM/DL (ref 13.5–18)
IMMATURE NEUTROPHIL %: 1 % (ref 0–0.43)
LACTATE: 2.1 MMOL/L (ref 0.4–2)
LYMPHOCYTES ABSOLUTE: 1.3 K/CU MM
LYMPHOCYTES RELATIVE PERCENT: 18 % (ref 24–44)
MCH RBC QN AUTO: 33.3 PG (ref 27–31)
MCHC RBC AUTO-ENTMCNC: 32.2 % (ref 32–36)
MCV RBC AUTO: 103.2 FL (ref 78–100)
MONOCYTES ABSOLUTE: 0.7 K/CU MM
MONOCYTES RELATIVE PERCENT: 9.9 % (ref 0–4)
NUCLEATED RBC %: 0 %
PDW BLD-RTO: 13.5 % (ref 11.7–14.9)
PLATELET # BLD: 199 K/CU MM (ref 140–440)
PMV BLD AUTO: 9.5 FL (ref 7.5–11.1)
POTASSIUM SERPL-SCNC: 4.3 MMOL/L (ref 3.5–5.1)
PRO-BNP: 440.9 PG/ML
RBC # BLD: 4.42 M/CU MM (ref 4.6–6.2)
SEGMENTED NEUTROPHILS ABSOLUTE COUNT: 5 K/CU MM
SEGMENTED NEUTROPHILS RELATIVE PERCENT: 70.6 % (ref 36–66)
SODIUM BLD-SCNC: 142 MMOL/L (ref 135–145)
TOTAL IMMATURE NEUTOROPHIL: 0.07 K/CU MM
TOTAL NUCLEATED RBC: 0 K/CU MM
TOTAL PROTEIN: 8 GM/DL (ref 6.4–8.2)
WBC # BLD: 7.1 K/CU MM (ref 4–10.5)

## 2019-01-09 PROCEDURE — 6370000000 HC RX 637 (ALT 250 FOR IP): Performed by: EMERGENCY MEDICINE

## 2019-01-09 PROCEDURE — 93005 ELECTROCARDIOGRAM TRACING: CPT | Performed by: PHYSICIAN ASSISTANT

## 2019-01-09 PROCEDURE — 83605 ASSAY OF LACTIC ACID: CPT

## 2019-01-09 PROCEDURE — 94761 N-INVAS EAR/PLS OXIMETRY MLT: CPT

## 2019-01-09 PROCEDURE — 36415 COLL VENOUS BLD VENIPUNCTURE: CPT

## 2019-01-09 PROCEDURE — 99284 EMERGENCY DEPT VISIT MOD MDM: CPT

## 2019-01-09 PROCEDURE — 94640 AIRWAY INHALATION TREATMENT: CPT

## 2019-01-09 PROCEDURE — 71046 X-RAY EXAM CHEST 2 VIEWS: CPT

## 2019-01-09 PROCEDURE — 71250 CT THORAX DX C-: CPT

## 2019-01-09 PROCEDURE — 85025 COMPLETE CBC W/AUTO DIFF WBC: CPT

## 2019-01-09 PROCEDURE — 83880 ASSAY OF NATRIURETIC PEPTIDE: CPT

## 2019-01-09 PROCEDURE — 80053 COMPREHEN METABOLIC PANEL: CPT

## 2019-01-09 PROCEDURE — 6370000000 HC RX 637 (ALT 250 FOR IP): Performed by: PHYSICIAN ASSISTANT

## 2019-01-09 PROCEDURE — 93010 ELECTROCARDIOGRAM REPORT: CPT | Performed by: INTERNAL MEDICINE

## 2019-01-09 RX ORDER — HYDROCODONE POLISTIREX AND CHLORPHENIRAMINE POLISTIREX 10; 8 MG/5ML; MG/5ML
5 SUSPENSION, EXTENDED RELEASE ORAL ONCE
Status: COMPLETED | OUTPATIENT
Start: 2019-01-09 | End: 2019-01-09

## 2019-01-09 RX ORDER — AMOXICILLIN AND CLAVULANATE POTASSIUM 875; 125 MG/1; MG/1
1 TABLET, FILM COATED ORAL 2 TIMES DAILY
Qty: 20 TABLET | Refills: 0 | Status: SHIPPED | OUTPATIENT
Start: 2019-01-09 | End: 2019-01-19

## 2019-01-09 RX ORDER — ACETAMINOPHEN AND CODEINE PHOSPHATE 120; 12 MG/5ML; MG/5ML
5-10 SOLUTION ORAL EVERY 6 HOURS PRN
Qty: 90 ML | Refills: 0 | Status: SHIPPED | OUTPATIENT
Start: 2019-01-09 | End: 2019-01-14

## 2019-01-09 RX ORDER — IPRATROPIUM BROMIDE AND ALBUTEROL SULFATE 2.5; .5 MG/3ML; MG/3ML
1 SOLUTION RESPIRATORY (INHALATION) ONCE
Status: COMPLETED | OUTPATIENT
Start: 2019-01-09 | End: 2019-01-09

## 2019-01-09 RX ORDER — PREDNISONE 20 MG/1
60 TABLET ORAL ONCE
Status: DISCONTINUED | OUTPATIENT
Start: 2019-01-09 | End: 2019-01-09 | Stop reason: HOSPADM

## 2019-01-09 RX ADMIN — IPRATROPIUM BROMIDE AND ALBUTEROL SULFATE 1 AMPULE: .5; 3 SOLUTION RESPIRATORY (INHALATION) at 16:42

## 2019-01-09 RX ADMIN — Medication 5 ML: at 16:19

## 2019-01-10 ENCOUNTER — CARE COORDINATION (OUTPATIENT)
Dept: CARE COORDINATION | Age: 69
End: 2019-01-10

## 2019-01-10 ASSESSMENT — ENCOUNTER SYMPTOMS: DYSPNEA ASSOCIATED WITH: MINIMAL EXERTION

## 2019-01-11 LAB
EKG ATRIAL RATE: 72 BPM
EKG DIAGNOSIS: NORMAL
EKG Q-T INTERVAL: 414 MS
EKG QRS DURATION: 158 MS
EKG QTC CALCULATION (BAZETT): 406 MS
EKG R AXIS: -41 DEGREES
EKG T AXIS: 34 DEGREES
EKG VENTRICULAR RATE: 58 BPM

## 2019-01-21 ENCOUNTER — TELEPHONE (OUTPATIENT)
Dept: INTERNAL MEDICINE CLINIC | Age: 69
End: 2019-01-21

## 2019-01-28 ENCOUNTER — OFFICE VISIT (OUTPATIENT)
Dept: INTERNAL MEDICINE CLINIC | Age: 69
End: 2019-01-28
Payer: COMMERCIAL

## 2019-01-28 VITALS
HEART RATE: 60 BPM | SYSTOLIC BLOOD PRESSURE: 150 MMHG | HEIGHT: 74 IN | RESPIRATION RATE: 20 BRPM | BODY MASS INDEX: 40.43 KG/M2 | DIASTOLIC BLOOD PRESSURE: 70 MMHG | WEIGHT: 315 LBS

## 2019-01-28 DIAGNOSIS — Z79.01 CHRONIC ANTICOAGULATION: ICD-10-CM

## 2019-01-28 DIAGNOSIS — E11.9 TYPE 2 DIABETES MELLITUS WITHOUT COMPLICATION, WITH LONG-TERM CURRENT USE OF INSULIN (HCC): ICD-10-CM

## 2019-01-28 DIAGNOSIS — J44.1 COPD EXACERBATION (HCC): Primary | ICD-10-CM

## 2019-01-28 DIAGNOSIS — Z79.4 TYPE 2 DIABETES MELLITUS WITHOUT COMPLICATION, WITH LONG-TERM CURRENT USE OF INSULIN (HCC): ICD-10-CM

## 2019-01-28 DIAGNOSIS — I48.20 CHRONIC ATRIAL FIBRILLATION (HCC): ICD-10-CM

## 2019-01-28 LAB
INTERNATIONAL NORMALIZATION RATIO, POC: 1.5
PROTHROMBIN TIME, POC: 17.8

## 2019-01-28 PROCEDURE — 85610 PROTHROMBIN TIME: CPT | Performed by: NURSE PRACTITIONER

## 2019-01-28 PROCEDURE — 99213 OFFICE O/P EST LOW 20 MIN: CPT | Performed by: NURSE PRACTITIONER

## 2019-01-28 RX ORDER — LEVOFLOXACIN 500 MG/1
500 TABLET, FILM COATED ORAL DAILY
Qty: 10 TABLET | Refills: 0 | Status: SHIPPED | OUTPATIENT
Start: 2019-01-28 | End: 2019-02-07

## 2019-01-28 RX ORDER — PREDNISONE 10 MG/1
TABLET ORAL
Qty: 20 TABLET | Refills: 0 | Status: SHIPPED | OUTPATIENT
Start: 2019-01-28 | End: 2019-02-26 | Stop reason: ALTCHOICE

## 2019-01-28 RX ORDER — GLIMEPIRIDE 2 MG/1
2 TABLET ORAL
Qty: 90 TABLET | Refills: 1 | Status: SHIPPED | OUTPATIENT
Start: 2019-01-28 | End: 2020-12-16 | Stop reason: SDUPTHER

## 2019-01-28 RX ORDER — FUROSEMIDE 40 MG/1
40 TABLET ORAL NIGHTLY
Qty: 30 TABLET | Refills: 1 | OUTPATIENT
Start: 2019-01-28

## 2019-01-28 ASSESSMENT — ENCOUNTER SYMPTOMS
SORE THROAT: 0
SHORTNESS OF BREATH: 1
SINUS PRESSURE: 0
ALLERGIC/IMMUNOLOGIC NEGATIVE: 1
COLOR CHANGE: 0
DIARRHEA: 0
SINUS PAIN: 0
CHEST TIGHTNESS: 0
ABDOMINAL DISTENTION: 0
ABDOMINAL PAIN: 0
CONSTIPATION: 0
WHEEZING: 0
COUGH: 1
EYES NEGATIVE: 1
NAUSEA: 0

## 2019-01-29 DIAGNOSIS — J44.9 CHRONIC OBSTRUCTIVE PULMONARY DISEASE, UNSPECIFIED COPD TYPE (HCC): ICD-10-CM

## 2019-01-29 DIAGNOSIS — E11.9 TYPE 2 DIABETES MELLITUS WITHOUT COMPLICATION, WITH LONG-TERM CURRENT USE OF INSULIN (HCC): ICD-10-CM

## 2019-01-29 DIAGNOSIS — E78.2 MIXED HYPERLIPIDEMIA: ICD-10-CM

## 2019-01-29 DIAGNOSIS — I48.91 ATRIAL FIBRILLATION, UNSPECIFIED TYPE (HCC): ICD-10-CM

## 2019-01-29 DIAGNOSIS — E07.9 THYROID DISEASE: ICD-10-CM

## 2019-01-29 DIAGNOSIS — K59.00 CONSTIPATION, UNSPECIFIED CONSTIPATION TYPE: ICD-10-CM

## 2019-01-29 DIAGNOSIS — Z79.4 TYPE 2 DIABETES MELLITUS WITHOUT COMPLICATION, WITH LONG-TERM CURRENT USE OF INSULIN (HCC): ICD-10-CM

## 2019-01-29 DIAGNOSIS — M10.9 ACUTE GOUTY ARTHRITIS: ICD-10-CM

## 2019-01-29 DIAGNOSIS — M79.89 LEG SWELLING: ICD-10-CM

## 2019-01-29 DIAGNOSIS — J44.1 COPD EXACERBATION (HCC): ICD-10-CM

## 2019-01-29 RX ORDER — FUROSEMIDE 40 MG/1
40 TABLET ORAL NIGHTLY
Qty: 30 TABLET | Refills: 1 | Status: SHIPPED | OUTPATIENT
Start: 2019-01-29 | End: 2019-06-29 | Stop reason: SDUPTHER

## 2019-01-29 RX ORDER — LANCETS 30 GAUGE
EACH MISCELLANEOUS
Qty: 100 EACH | Refills: 3 | Status: SHIPPED | OUTPATIENT
Start: 2019-01-29 | End: 2022-07-25 | Stop reason: SDUPTHER

## 2019-01-29 RX ORDER — OMEPRAZOLE 20 MG/1
20 CAPSULE, DELAYED RELEASE ORAL DAILY
Qty: 90 CAPSULE | Refills: 1 | Status: SHIPPED | OUTPATIENT
Start: 2019-01-29 | End: 2019-04-01 | Stop reason: CLARIF

## 2019-01-29 RX ORDER — LOSARTAN POTASSIUM 50 MG/1
50 TABLET ORAL DAILY
Qty: 30 TABLET | Refills: 3 | Status: SHIPPED | OUTPATIENT
Start: 2019-01-29 | End: 2019-07-15 | Stop reason: SDUPTHER

## 2019-01-29 RX ORDER — GLIMEPIRIDE 2 MG/1
2 TABLET ORAL
Qty: 90 TABLET | Refills: 1 | OUTPATIENT
Start: 2019-01-29

## 2019-01-29 RX ORDER — LEVOFLOXACIN 500 MG/1
500 TABLET, FILM COATED ORAL DAILY
Qty: 10 TABLET | Refills: 0 | OUTPATIENT
Start: 2019-01-29 | End: 2019-02-08

## 2019-01-29 RX ORDER — PRAVASTATIN SODIUM 80 MG/1
80 TABLET ORAL DAILY
Qty: 30 TABLET | Refills: 2 | Status: SHIPPED | OUTPATIENT
Start: 2019-01-29 | End: 2019-05-10 | Stop reason: SDUPTHER

## 2019-01-29 RX ORDER — DULOXETIN HYDROCHLORIDE 60 MG/1
60 CAPSULE, DELAYED RELEASE ORAL DAILY
Qty: 90 CAPSULE | Refills: 1 | Status: SHIPPED | OUTPATIENT
Start: 2019-01-29 | End: 2019-08-30 | Stop reason: SDUPTHER

## 2019-01-29 RX ORDER — LEVOTHYROXINE SODIUM 0.05 MG/1
50 TABLET ORAL DAILY
Qty: 30 TABLET | Refills: 2 | Status: SHIPPED | OUTPATIENT
Start: 2019-01-29 | End: 2019-06-29 | Stop reason: SDUPTHER

## 2019-01-29 RX ORDER — WARFARIN SODIUM 2 MG/1
2 TABLET ORAL
Qty: 30 TABLET | Refills: 1 | Status: SHIPPED | OUTPATIENT
Start: 2019-01-30 | End: 2019-09-18 | Stop reason: SDUPTHER

## 2019-01-29 RX ORDER — WARFARIN SODIUM 3 MG/1
3 TABLET ORAL EVERY 24 HOURS
Qty: 30 TABLET | Refills: 1 | Status: SHIPPED | OUTPATIENT
Start: 2019-01-29 | End: 2019-09-18 | Stop reason: SDUPTHER

## 2019-01-29 RX ORDER — CARVEDILOL 25 MG/1
25 TABLET ORAL 3 TIMES DAILY
Qty: 90 TABLET | Refills: 3 | Status: ON HOLD | OUTPATIENT
Start: 2019-01-29 | End: 2019-06-05 | Stop reason: SDUPTHER

## 2019-01-29 RX ORDER — ALBUTEROL SULFATE 90 UG/1
2 AEROSOL, METERED RESPIRATORY (INHALATION) EVERY 6 HOURS PRN
Qty: 1 INHALER | Refills: 3 | Status: SHIPPED | OUTPATIENT
Start: 2019-01-29 | End: 2019-04-01 | Stop reason: SDUPTHER

## 2019-01-29 RX ORDER — POLYETHYLENE GLYCOL 3350 17 G/17G
17 POWDER, FOR SOLUTION ORAL DAILY
Qty: 225 G | Refills: 2 | Status: SHIPPED | OUTPATIENT
Start: 2019-01-29 | End: 2020-12-16 | Stop reason: SDUPTHER

## 2019-01-29 RX ORDER — ALLOPURINOL 100 MG/1
TABLET ORAL
Qty: 180 TABLET | Refills: 0 | Status: SHIPPED | OUTPATIENT
Start: 2019-01-29 | End: 2019-10-15 | Stop reason: SDUPTHER

## 2019-01-29 RX ORDER — DILTIAZEM HYDROCHLORIDE 120 MG/1
120 TABLET, FILM COATED ORAL DAILY
Qty: 90 TABLET | Refills: 3 | Status: SHIPPED | OUTPATIENT
Start: 2019-01-29 | End: 2019-10-15 | Stop reason: SDUPTHER

## 2019-01-29 RX ORDER — DIGOXIN 250 MCG
250 TABLET ORAL DAILY
Qty: 90 TABLET | Refills: 3 | Status: ON HOLD | OUTPATIENT
Start: 2019-01-29 | End: 2019-06-05 | Stop reason: HOSPADM

## 2019-01-29 RX ORDER — PREDNISONE 10 MG/1
TABLET ORAL
Qty: 20 TABLET | Refills: 0 | OUTPATIENT
Start: 2019-01-29

## 2019-02-26 ENCOUNTER — OFFICE VISIT (OUTPATIENT)
Dept: CARDIOLOGY CLINIC | Age: 69
End: 2019-02-26
Payer: COMMERCIAL

## 2019-02-26 ENCOUNTER — TELEPHONE (OUTPATIENT)
Dept: INTERNAL MEDICINE CLINIC | Age: 69
End: 2019-02-26

## 2019-02-26 ENCOUNTER — CARE COORDINATION (OUTPATIENT)
Dept: CARE COORDINATION | Age: 69
End: 2019-02-26

## 2019-02-26 VITALS
BODY MASS INDEX: 42.66 KG/M2 | SYSTOLIC BLOOD PRESSURE: 138 MMHG | OXYGEN SATURATION: 97 % | HEIGHT: 72 IN | WEIGHT: 315 LBS | DIASTOLIC BLOOD PRESSURE: 86 MMHG | HEART RATE: 96 BPM

## 2019-02-26 DIAGNOSIS — M79.89 SWELLING OF EXTREMITY: ICD-10-CM

## 2019-02-26 PROCEDURE — 99214 OFFICE O/P EST MOD 30 MIN: CPT | Performed by: INTERNAL MEDICINE

## 2019-03-04 DIAGNOSIS — M10.9 ACUTE GOUTY ARTHRITIS: ICD-10-CM

## 2019-03-04 RX ORDER — ALLOPURINOL 100 MG/1
TABLET ORAL
Qty: 180 TABLET | Refills: 0 | Status: SHIPPED | OUTPATIENT
Start: 2019-03-04 | End: 2019-04-01 | Stop reason: CLARIF

## 2019-03-05 ENCOUNTER — NURSE ONLY (OUTPATIENT)
Dept: INTERNAL MEDICINE CLINIC | Age: 69
End: 2019-03-05
Payer: COMMERCIAL

## 2019-03-05 DIAGNOSIS — Z79.01 CHRONIC ANTICOAGULATION: Primary | ICD-10-CM

## 2019-03-05 LAB
INTERNATIONAL NORMALIZATION RATIO, POC: 1.9
PROTHROMBIN TIME, POC: 2.5

## 2019-03-05 PROCEDURE — 85610 PROTHROMBIN TIME: CPT | Performed by: NURSE PRACTITIONER

## 2019-03-05 PROCEDURE — 99212 OFFICE O/P EST SF 10 MIN: CPT | Performed by: NURSE PRACTITIONER

## 2019-03-05 ASSESSMENT — ENCOUNTER SYMPTOMS
CONSTIPATION: 0
DIARRHEA: 0
COUGH: 0
ABDOMINAL DISTENTION: 0
SHORTNESS OF BREATH: 0
NAUSEA: 0
EYES NEGATIVE: 1
SORE THROAT: 0
WHEEZING: 0
CHEST TIGHTNESS: 0
SINUS PRESSURE: 0
ABDOMINAL PAIN: 0
COLOR CHANGE: 0
SINUS PAIN: 0
ALLERGIC/IMMUNOLOGIC NEGATIVE: 1

## 2019-03-14 ENCOUNTER — PROCEDURE VISIT (OUTPATIENT)
Dept: CARDIOLOGY CLINIC | Age: 69
End: 2019-03-14
Payer: COMMERCIAL

## 2019-03-14 DIAGNOSIS — M79.89 SWELLING OF EXTREMITY: ICD-10-CM

## 2019-03-14 PROCEDURE — 93970 EXTREMITY STUDY: CPT | Performed by: INTERNAL MEDICINE

## 2019-03-18 ENCOUNTER — TELEPHONE (OUTPATIENT)
Dept: CARDIOLOGY CLINIC | Age: 69
End: 2019-03-18

## 2019-04-01 ENCOUNTER — OFFICE VISIT (OUTPATIENT)
Dept: INTERNAL MEDICINE CLINIC | Age: 69
End: 2019-04-01
Payer: COMMERCIAL

## 2019-04-01 VITALS
BODY MASS INDEX: 40.43 KG/M2 | SYSTOLIC BLOOD PRESSURE: 130 MMHG | HEIGHT: 74 IN | DIASTOLIC BLOOD PRESSURE: 70 MMHG | RESPIRATION RATE: 20 BRPM | WEIGHT: 315 LBS

## 2019-04-01 DIAGNOSIS — E11.9 TYPE 2 DIABETES MELLITUS WITHOUT COMPLICATION, WITH LONG-TERM CURRENT USE OF INSULIN (HCC): ICD-10-CM

## 2019-04-01 DIAGNOSIS — E07.9 THYROID DISEASE: ICD-10-CM

## 2019-04-01 DIAGNOSIS — I48.20 CHRONIC ATRIAL FIBRILLATION (HCC): ICD-10-CM

## 2019-04-01 DIAGNOSIS — E78.2 MIXED HYPERLIPIDEMIA: ICD-10-CM

## 2019-04-01 DIAGNOSIS — Z23 NEED FOR PNEUMOCOCCAL VACCINATION: ICD-10-CM

## 2019-04-01 DIAGNOSIS — R09.81 NASAL CONGESTION: ICD-10-CM

## 2019-04-01 DIAGNOSIS — J44.9 CHRONIC OBSTRUCTIVE PULMONARY DISEASE, UNSPECIFIED COPD TYPE (HCC): Primary | ICD-10-CM

## 2019-04-01 DIAGNOSIS — Z79.4 TYPE 2 DIABETES MELLITUS WITHOUT COMPLICATION, WITH LONG-TERM CURRENT USE OF INSULIN (HCC): ICD-10-CM

## 2019-04-01 DIAGNOSIS — I25.119 CORONARY ARTERY DISEASE INVOLVING NATIVE CORONARY ARTERY OF NATIVE HEART WITH ANGINA PECTORIS (HCC): ICD-10-CM

## 2019-04-01 LAB
INTERNATIONAL NORMALIZATION RATIO, POC: 2.4
PROTHROMBIN TIME, POC: 28.9

## 2019-04-01 PROCEDURE — G0009 ADMIN PNEUMOCOCCAL VACCINE: HCPCS | Performed by: NURSE PRACTITIONER

## 2019-04-01 PROCEDURE — 90732 PPSV23 VACC 2 YRS+ SUBQ/IM: CPT | Performed by: NURSE PRACTITIONER

## 2019-04-01 PROCEDURE — 85610 PROTHROMBIN TIME: CPT | Performed by: NURSE PRACTITIONER

## 2019-04-01 PROCEDURE — 99214 OFFICE O/P EST MOD 30 MIN: CPT | Performed by: NURSE PRACTITIONER

## 2019-04-01 PROCEDURE — G0444 DEPRESSION SCREEN ANNUAL: HCPCS | Performed by: NURSE PRACTITIONER

## 2019-04-01 RX ORDER — ALBUTEROL SULFATE 90 UG/1
2 AEROSOL, METERED RESPIRATORY (INHALATION) EVERY 6 HOURS PRN
Qty: 1 INHALER | Refills: 3 | Status: SHIPPED | OUTPATIENT
Start: 2019-04-01 | End: 2019-10-15 | Stop reason: SDUPTHER

## 2019-04-01 RX ORDER — CETIRIZINE HYDROCHLORIDE 10 MG/1
10 TABLET ORAL DAILY
Qty: 90 TABLET | Refills: 1 | Status: SHIPPED | OUTPATIENT
Start: 2019-04-01 | End: 2019-10-15 | Stop reason: SDUPTHER

## 2019-04-01 ASSESSMENT — ENCOUNTER SYMPTOMS
SINUS PRESSURE: 0
EYES NEGATIVE: 1
DIARRHEA: 0
ABDOMINAL PAIN: 0
ALLERGIC/IMMUNOLOGIC NEGATIVE: 1
SINUS PAIN: 0
SORE THROAT: 0
CONSTIPATION: 0
ABDOMINAL DISTENTION: 0
WHEEZING: 0
SHORTNESS OF BREATH: 0
NAUSEA: 0
COLOR CHANGE: 0
CHEST TIGHTNESS: 0
COUGH: 0

## 2019-04-01 ASSESSMENT — PATIENT HEALTH QUESTIONNAIRE - PHQ9
DEPRESSION UNABLE TO ASSESS: FUNCTIONAL CAPACITY MOTIVATION LIMITS ACCURACY
SUM OF ALL RESPONSES TO PHQ9 QUESTIONS 1 & 2: 2
7. TROUBLE CONCENTRATING ON THINGS, SUCH AS READING THE NEWSPAPER OR WATCHING TELEVISION: 2
4. FEELING TIRED OR HAVING LITTLE ENERGY: 3
SUM OF ALL RESPONSES TO PHQ QUESTIONS 1-9: 13
SUM OF ALL RESPONSES TO PHQ QUESTIONS 1-9: 13
1. LITTLE INTEREST OR PLEASURE IN DOING THINGS: 0
5. POOR APPETITE OR OVEREATING: 3
9. THOUGHTS THAT YOU WOULD BE BETTER OFF DEAD, OR OF HURTING YOURSELF: 0
8. MOVING OR SPEAKING SO SLOWLY THAT OTHER PEOPLE COULD HAVE NOTICED. OR THE OPPOSITE, BEING SO FIGETY OR RESTLESS THAT YOU HAVE BEEN MOVING AROUND A LOT MORE THAN USUAL: 0
6. FEELING BAD ABOUT YOURSELF - OR THAT YOU ARE A FAILURE OR HAVE LET YOURSELF OR YOUR FAMILY DOWN: 0
3. TROUBLE FALLING OR STAYING ASLEEP: 3
2. FEELING DOWN, DEPRESSED OR HOPELESS: 2
10. IF YOU CHECKED OFF ANY PROBLEMS, HOW DIFFICULT HAVE THESE PROBLEMS MADE IT FOR YOU TO DO YOUR WORK, TAKE CARE OF THINGS AT HOME, OR GET ALONG WITH OTHER PEOPLE: 1

## 2019-04-01 NOTE — PROGRESS NOTES
Uzma Freeze  1950 04/01/19    Chief Complaint   Patient presents with    Blister     blisters on the top of feet        SUBJECTIVE:      Patient presents for 1 month follow up:    Patient's COPD well controled on current medications. Patient denies any shortness of breath, wheezing. Able to execute activities of daily living without breathing complications. Patient compliant with all current medications for diabetes. Blood sugars have been averaging around 110-200, and the patient reports checking their blood sugar 1-2 times daily. Patient has had no episodes of significant hypoglycemia, fainting, dizziness, or loss of consciousness. States he does often miss his sliding scale coverage with meals. Patient denies any chest pain, shortness of breath, myalgias, Patient is tolerating cholesterol medications without difficulty. Lab Results   Component Value Date    LDLCALC 82 01/07/2019    LDLDIRECT 88 09/05/2017     Patient also with 2 small blisters to top of feet, reports over last week but improving. A-fib/Coumadin use:  Lab Results   Component Value Date    INR 2.4 04/01/2019    INR 1.9 03/05/2019    INR 1.5 01/28/2019    PROTIME 28.9 04/01/2019    PROTIME 2.5 03/05/2019    PROTIME 17.8 01/28/2019     Anticoagulation: Patient here for followup of chronic anticoagulation. Indication: atrial fibrillation  Bleeding Signs/Symptoms:  None  Missed Coumadin Doses:  None  Medication Changes:  no  Dietary Changes:  no    VITAL SIGNS reviewed    PLAN:  Keep current regiment: 2 mg MWF, 3 mg Sat, T, R, Sun    Health Maintenance:  -wants pneumonia vaccine today    Review of Systems   Constitutional: Negative for activity change, appetite change, fatigue and fever. HENT: Negative for congestion, sinus pressure, sinus pain and sore throat. Eyes: Negative. Respiratory: Negative for cough, chest tightness, shortness of breath and wheezing. Cardiovascular: Negative for chest pain and palpitations. Gastrointestinal: Negative for abdominal distention, abdominal pain, constipation, diarrhea and nausea. Endocrine: Negative. Genitourinary: Negative for difficulty urinating, dysuria, flank pain, frequency and hematuria. Musculoskeletal: Negative for arthralgias, gait problem, joint swelling and myalgias. Skin: Positive for wound. Negative for color change and rash. Allergic/Immunologic: Negative. Neurological: Negative for dizziness, light-headedness and numbness. Hematological: Negative. Psychiatric/Behavioral: Negative. OBJECTIVE:    /70   Resp 20   Ht 6' 2\" (1.88 m)   Wt (!) 330 lb (149.7 kg)   BMI 42.37 kg/m²     Physical Exam   Constitutional: He is oriented to person, place, and time. He appears well-developed and well-nourished. No distress. HENT:   Head: Normocephalic and atraumatic. Nose: Nose normal.   Eyes: Pupils are equal, round, and reactive to light. Conjunctivae and EOM are normal.   Neck: Normal range of motion. Neck supple. No thyromegaly present. Cardiovascular: Normal rate, regular rhythm and normal heart sounds. Pulmonary/Chest: Effort normal and breath sounds normal.   Abdominal: Soft. Bowel sounds are normal. He exhibits no distension. There is no tenderness. Musculoskeletal: Normal range of motion. Lymphadenopathy:     He has no cervical adenopathy. Neurological: He is alert and oriented to person, place, and time. GCS eye subscore is 4. GCS verbal subscore is 5. GCS motor subscore is 6. Skin: Skin is warm and dry. Capillary refill takes less than 2 seconds. No rash noted. He is not diaphoretic.        2 small blisters to anterior aspect of feet, scabbing present. Healing. Psychiatric: He has a normal mood and affect. His behavior is normal. Thought content normal.       ASSESSMENT:    1. Chronic obstructive pulmonary disease, unspecified COPD type ([facility]    2.  Coronary artery disease involving native coronary artery of native heart

## 2019-04-19 ENCOUNTER — APPOINTMENT (OUTPATIENT)
Dept: CT IMAGING | Age: 69
End: 2019-04-19
Payer: COMMERCIAL

## 2019-04-19 ENCOUNTER — HOSPITAL ENCOUNTER (EMERGENCY)
Age: 69
Discharge: HOME | End: 2019-04-19
Attending: EMERGENCY MEDICINE
Payer: COMMERCIAL

## 2019-04-19 VITALS
BODY MASS INDEX: 40.43 KG/M2 | RESPIRATION RATE: 16 BRPM | HEIGHT: 74 IN | OXYGEN SATURATION: 93 % | DIASTOLIC BLOOD PRESSURE: 78 MMHG | WEIGHT: 315 LBS | TEMPERATURE: 97.8 F | SYSTOLIC BLOOD PRESSURE: 169 MMHG | HEART RATE: 72 BPM

## 2019-04-19 DIAGNOSIS — M54.50 ACUTE RIGHT-SIDED LOW BACK PAIN WITHOUT SCIATICA: Primary | ICD-10-CM

## 2019-04-19 LAB
ALBUMIN SERPL-MCNC: 4 GM/DL (ref 3.4–5)
ALP BLD-CCNC: 92 IU/L (ref 40–129)
ALT SERPL-CCNC: 23 U/L (ref 10–40)
ANION GAP SERPL CALCULATED.3IONS-SCNC: 12 MMOL/L (ref 4–16)
AST SERPL-CCNC: 25 IU/L (ref 15–37)
BACTERIA: NEGATIVE /HPF
BASOPHILS ABSOLUTE: 0.1 K/CU MM
BASOPHILS RELATIVE PERCENT: 1 % (ref 0–1)
BILIRUB SERPL-MCNC: 0.5 MG/DL (ref 0–1)
BILIRUBIN URINE: NEGATIVE MG/DL
BLOOD, URINE: NEGATIVE
BUN BLDV-MCNC: 14 MG/DL (ref 6–23)
CALCIUM SERPL-MCNC: 9.3 MG/DL (ref 8.3–10.6)
CHLORIDE BLD-SCNC: 102 MMOL/L (ref 99–110)
CLARITY: CLEAR
CO2: 29 MMOL/L (ref 21–32)
COLOR: YELLOW
CREAT SERPL-MCNC: 1 MG/DL (ref 0.9–1.3)
DIFFERENTIAL TYPE: ABNORMAL
EOSINOPHILS ABSOLUTE: 0.2 K/CU MM
EOSINOPHILS RELATIVE PERCENT: 2.8 % (ref 0–3)
GFR AFRICAN AMERICAN: >60 ML/MIN/1.73M2
GFR NON-AFRICAN AMERICAN: >60 ML/MIN/1.73M2
GLUCOSE BLD-MCNC: 146 MG/DL (ref 70–99)
GLUCOSE, URINE: NEGATIVE MG/DL
HCT VFR BLD CALC: 39.8 % (ref 42–52)
HEMOGLOBIN: 13.3 GM/DL (ref 13.5–18)
IMMATURE NEUTROPHIL %: 0.9 % (ref 0–0.43)
KETONES, URINE: NEGATIVE MG/DL
LEUKOCYTE ESTERASE, URINE: NEGATIVE
LYMPHOCYTES ABSOLUTE: 2.1 K/CU MM
LYMPHOCYTES RELATIVE PERCENT: 26.4 % (ref 24–44)
MCH RBC QN AUTO: 34.7 PG (ref 27–31)
MCHC RBC AUTO-ENTMCNC: 33.4 % (ref 32–36)
MCV RBC AUTO: 103.9 FL (ref 78–100)
MONOCYTES ABSOLUTE: 0.8 K/CU MM
MONOCYTES RELATIVE PERCENT: 9.9 % (ref 0–4)
MUCUS: ABNORMAL HPF
NITRITE URINE, QUANTITATIVE: NEGATIVE
NUCLEATED RBC %: 0.3 %
PDW BLD-RTO: 14.4 % (ref 11.7–14.9)
PH, URINE: 5 (ref 5–8)
PLATELET # BLD: 225 K/CU MM (ref 140–440)
PMV BLD AUTO: 9.6 FL (ref 7.5–11.1)
POTASSIUM SERPL-SCNC: 4.3 MMOL/L (ref 3.5–5.1)
PROTEIN UA: NEGATIVE MG/DL
RBC # BLD: 3.83 M/CU MM (ref 4.6–6.2)
RBC URINE: <1 /HPF (ref 0–3)
SEGMENTED NEUTROPHILS ABSOLUTE COUNT: 4.6 K/CU MM
SEGMENTED NEUTROPHILS RELATIVE PERCENT: 59 % (ref 36–66)
SODIUM BLD-SCNC: 143 MMOL/L (ref 135–145)
SPECIFIC GRAVITY UA: 1.02 (ref 1–1.03)
SQUAMOUS EPITHELIAL: <1 /HPF
TOTAL IMMATURE NEUTOROPHIL: 0.07 K/CU MM
TOTAL NUCLEATED RBC: 0 K/CU MM
TOTAL PROTEIN: 7.6 GM/DL (ref 6.4–8.2)
TRICHOMONAS: ABNORMAL /HPF
UROBILINOGEN, URINE: 2 MG/DL (ref 0.2–1)
WBC # BLD: 7.8 K/CU MM (ref 4–10.5)
WBC UA: <1 /HPF (ref 0–2)

## 2019-04-19 PROCEDURE — 99284 EMERGENCY DEPT VISIT MOD MDM: CPT

## 2019-04-19 PROCEDURE — 74176 CT ABD & PELVIS W/O CONTRAST: CPT

## 2019-04-19 PROCEDURE — 85025 COMPLETE CBC W/AUTO DIFF WBC: CPT

## 2019-04-19 PROCEDURE — 80053 COMPREHEN METABOLIC PANEL: CPT

## 2019-04-19 PROCEDURE — 81001 URINALYSIS AUTO W/SCOPE: CPT

## 2019-04-19 RX ORDER — CYCLOBENZAPRINE HCL 5 MG
5 TABLET ORAL 2 TIMES DAILY PRN
Qty: 10 TABLET | Refills: 0 | Status: SHIPPED | OUTPATIENT
Start: 2019-04-19 | End: 2019-04-29

## 2019-04-19 ASSESSMENT — ENCOUNTER SYMPTOMS
SORE THROAT: 0
DIARRHEA: 0
SHORTNESS OF BREATH: 0
VOMITING: 0
EYE REDNESS: 0
CONSTIPATION: 0
NAUSEA: 0
ABDOMINAL PAIN: 1
COUGH: 0
BACK PAIN: 1
RHINORRHEA: 0

## 2019-04-19 ASSESSMENT — PAIN DESCRIPTION - DESCRIPTORS: DESCRIPTORS: ACHING

## 2019-04-19 ASSESSMENT — PAIN DESCRIPTION - PAIN TYPE: TYPE: ACUTE PAIN

## 2019-04-19 ASSESSMENT — PAIN SCALES - GENERAL: PAINLEVEL_OUTOF10: 10

## 2019-04-19 ASSESSMENT — PAIN DESCRIPTION - FREQUENCY: FREQUENCY: CONTINUOUS

## 2019-04-19 ASSESSMENT — PAIN DESCRIPTION - ORIENTATION: ORIENTATION: RIGHT

## 2019-04-19 ASSESSMENT — PAIN DESCRIPTION - LOCATION: LOCATION: FLANK

## 2019-04-19 NOTE — ED PROVIDER NOTES
abused: Not on file     Physically abused: Not on file     Forced sexual activity: Not on file   Other Topics Concern    Not on file   Social History Narrative    Not on file     No current facility-administered medications for this encounter.       Current Outpatient Medications   Medication Sig Dispense Refill    cyclobenzaprine (FLEXERIL) 5 MG tablet Take 1 tablet by mouth 2 times daily as needed for Muscle spasms 10 tablet 0    albuterol sulfate HFA (VENTOLIN HFA) 108 (90 Base) MCG/ACT inhaler Inhale 2 puffs into the lungs every 6 hours as needed for Wheezing 1 Inhaler 3    cetirizine (ZYRTEC) 10 MG tablet Take 1 tablet by mouth daily 90 tablet 1    carvedilol (COREG) 25 MG tablet Take 1 tablet by mouth 3 times daily 90 tablet 3    allopurinol (ZYLOPRIM) 100 MG tablet TAKE 1 TABLET BY MOUTH TWICE DAILY 180 tablet 0    digoxin (LANOXIN) 250 MCG tablet Take 1 tablet by mouth daily 90 tablet 3    diltiazem (CARDIZEM) 120 MG tablet Take 1 tablet by mouth daily 90 tablet 3    DULoxetine (CYMBALTA) 60 MG extended release capsule Take 1 capsule by mouth daily 90 capsule 1    Elastic Bandages & Supports (MEDICAL COMPRESSION STOCKINGS) MISC 1 each by Does not apply route daily 1 each 0    furosemide (LASIX) 40 MG tablet Take 1 tablet by mouth nightly 30 tablet 1    insulin NPH (HUMULIN N;NOVOLIN N) 100 UNIT/ML injection vial Inject 35 Units into the skin 2 times daily (before meals) 3 vial 2    insulin regular (NOVOLIN R) 100 UNIT/ML injection Inject 0-10 Units into the skin 3 times daily (before meals) 1 vial 3    levothyroxine (SYNTHROID) 50 MCG tablet Take 1 tablet by mouth daily 30 tablet 2    losartan (COZAAR) 50 MG tablet Take 1 tablet by mouth daily 30 tablet 3    metFORMIN (GLUCOPHAGE) 1000 MG tablet TAKE ONE TABLET BY MOUTH TWICE DAILY WITH MEALS 180 tablet 1    polyethylene glycol (GLYCOLAX) powder Take 17 g by mouth daily 225 g 2    pravastatin (PRAVACHOL) 80 MG tablet Take 1 tablet by mouth daily 30 tablet 2    warfarin (COUMADIN) 2 MG tablet Take 1 tablet by mouth three times a week **On MWF.** 30 tablet 1    warfarin (COUMADIN) 3 MG tablet Take 1 tablet by mouth every 24 hours **OR AS DIRECTED PER ** 30 tablet 1    Lancets MISC by D3EA route three times a day. 100 each 3    glimepiride (AMARYL) 2 MG tablet Take 1 tablet by mouth every morning (before breakfast) 90 tablet 1     No Known Allergies    Nursing Notes Reviewed     Physical Exam:   ED Triage Vitals [04/19/19 0803]   Enc Vitals Group      BP (!) 141/69      Pulse 77      Resp 16      Temp 97.8 °F (36.6 °C)      Temp Source Oral      SpO2 95 %      Weight (!) 320 lb (145.2 kg)      Height 6' 2\" (1.88 m)      Head Circumference       Peak Flow       Pain Score       Pain Loc       Pain Edu? Excl. in [address]? BP (!) 169/78   Pulse 72   Temp 97.8 °F (36.6 °C) (Oral)   Resp 16   Ht 6' 2\" (1.88 m)   Wt (!) 320 lb (145.2 kg)   SpO2 93%   BMI 41.09 kg/m²   My pulse ox interpretation is - normal  Physical Exam   Constitutional: He appears well-developed and well-nourished. No distress. HENT:   Head: Normocephalic and atraumatic. Eyes: Conjunctivae are normal. Right eye exhibits no discharge. Left eye exhibits no discharge. Cardiovascular: Normal rate, regular rhythm and intact distal pulses. Pulmonary/Chest: Effort normal and breath sounds normal. No respiratory distress. He has no wheezes. Abdominal: Soft. He exhibits no distension and no mass. There is tenderness (mild RLQ). There is no rebound. Musculoskeletal: Normal range of motion. He exhibits no deformity. Back:    Neurological: He is alert. No cranial nerve deficit. Skin: Skin is warm and dry. He is not diaphoretic. Psychiatric: He has a normal mood and affect.  His behavior is normal.       I have reviewed and interpreted all of the currently available lab results from this visit (if applicable):  Results for orders placed or performed during the hospital encounter of 04/19/19   CBC Auto Differential   Result Value Ref Range    WBC 7.8 4.0 - 10.5 K/CU MM    RBC 3.83 (L) 4.6 - 6.2 M/CU MM    Hemoglobin 13.3 (L) 13.5 - 18.0 GM/DL    Hematocrit 39.8 (L) 42 - 52 %    .9 (H) 78 - 100 FL    MCH 34.7 (H) 27 - 31 PG    MCHC 33.4 32.0 - 36.0 %    RDW 14.4 11.7 - 14.9 %    Platelets 112 010 - 739 K/CU MM    MPV 9.6 7.5 - 11.1 FL    Differential Type AUTOMATED DIFFERENTIAL     Segs Relative 59.0 36 - 66 %    Lymphocytes % 26.4 24 - 44 %    Monocytes % 9.9 (H) 0 - 4 %    Eosinophils % 2.8 0 - 3 %    Basophils % 1.0 0 - 1 %    Segs Absolute 4.6 K/CU MM    Lymphocytes # 2.1 K/CU MM    Monocytes # 0.8 K/CU MM    Eosinophils # 0.2 K/CU MM    Basophils # 0.1 K/CU MM    Nucleated RBC % 0.3 %    Total Nucleated RBC 0.0 K/CU MM    Total Immature Neutrophil 0.07 K/CU MM    Immature Neutrophil % 0.9 (H) 0 - 0.43 %   Comprehensive Metabolic Panel w/ Reflex to MG   Result Value Ref Range    Sodium 143 135 - 145 MMOL/L    Potassium 4.3 3.5 - 5.1 MMOL/L    Chloride 102 99 - 110 mMol/L    CO2 29 21 - 32 MMOL/L    BUN 14 6 - 23 MG/DL    CREATININE 1.0 0.9 - 1.3 MG/DL    Glucose 146 (H) 70 - 99 MG/DL    Calcium 9.3 8.3 - 10.6 MG/DL    Alb 4.0 3.4 - 5.0 GM/DL    Total Protein 7.6 6.4 - 8.2 GM/DL    Total Bilirubin 0.5 0.0 - 1.0 MG/DL    ALT 23 10 - 40 U/L    AST 25 15 - 37 IU/L    Alkaline Phosphatase 92 40 - 129 IU/L    GFR Non-African American >60 >60 mL/min/1.73m2    GFR African American >60 >60 mL/min/1.73m2    Anion Gap 12 4 - 16   Urinalysis, reflex to microscopic   Result Value Ref Range    Color, UA YELLOW YELLOW    Clarity, UA CLEAR CLEAR    Glucose, Urine NEGATIVE NEGATIVE MG/DL    Bilirubin Urine NEGATIVE NEGATIVE MG/DL    Ketones, Urine NEGATIVE NEGATIVE MG/DL    Specific Gravity, UA 1.017 1.001 - 1.035    Blood, Urine NEGATIVE NEGATIVE    pH, Urine 5.0 5.0 - 8.0    Protein, UA NEGATIVE NEGATIVE MG/DL    Urobilinogen, Urine 2.0 (H) 0.2 - 1.0 MG/DL    Nitrite Urine, Quantitative NEGATIVE NEGATIVE    Leukocyte Esterase, Urine NEGATIVE NEGATIVE    RBC, UA <1 0 - 3 /HPF    WBC, UA <1 0 - 2 /HPF    Bacteria, UA NEGATIVE NEGATIVE /HPF    Squam Epithel, UA <1 /HPF    Mucus, UA RARE (A) NEGATIVE HPF    Trichomonas, UA NONE SEEN NONE SEEN /HPF      Radiographs (if obtained):  [] The following radiograph was interpreted by myself in the absence of a radiologist:  [x]Radiologist's Report Reviewed:  CT ABDOMEN PELVIS WO CONTRAST Additional Contrast? None   Preliminary Result   1. No evidence of obstructive renal calculi or hydronephrosis. 2. Nonobstructive 4 mm right renal calculus. 3. Moderate sigmoid diverticulosis, without acute diverticulitis. EKG (if obtained): (All EKG's are interpreted by myself in the absence of a cardiologist)    MDM:  Differential diagnoses considered include renal colic, pyelonephritis, urinary tract infection, appendicitis, muscle strain, muscle spasm. Basic labs are obtained and are unremarkable. Urine is not concerning for infection and does not have any red blood cells in it. CT scan of his abdomen shows no evidence of obstructive renal calculi or hydronephrosis. There is a nonobstructive stone in his right renal pelvis, though I do not suspect this is causing his symptoms. No evidence of diverticulitis. No acute abnormalities seen within his abdomen or pelvis. I suspect his pain is muscular as it is worse with movement. I do not suspect an emergent cause of his symptoms. We'll have him continue to take ibuprofen and add Tylenol as needed. We'll also give him exercises to help with his muscular pain. Recommended he follow up closely with his primary care physician. Plan of care explained to patient. Concerning signs and symptoms warranting a return visit to the Emergency Department were explained in detail. All questions and concerns were addressed to the patient's satisfaction.  Patient understood and agreed with plan.    The likelihood of other entities in the differential is insufficient to justify any further testing for them. This was explained to the patient. The patient was advised that persistent or worsening symptoms would requirefurther evaluation. Clinical Impression:  1. Acute right-sided low back pain without sciatica          Isaiah Terrell MD       Please note that portions of this note may have been complete with a voice recognition program.  Effortswere made to edit the dictations, but occasional words are mis-transcribed.          Isaiah Terrell MD  04/19/19 0706

## 2019-04-19 NOTE — ED NOTES
Discharge instructions reviewed with patient; pt voiced understanding at this time.       Chester Swan RN  04/19/19 2157

## 2019-05-10 DIAGNOSIS — E78.2 MIXED HYPERLIPIDEMIA: ICD-10-CM

## 2019-05-10 RX ORDER — PRAVASTATIN SODIUM 80 MG/1
TABLET ORAL
Qty: 30 TABLET | Refills: 2 | Status: SHIPPED | OUTPATIENT
Start: 2019-05-10 | End: 2019-09-05 | Stop reason: SDUPTHER

## 2019-06-02 ENCOUNTER — APPOINTMENT (OUTPATIENT)
Dept: GENERAL RADIOLOGY | Age: 69
DRG: 494 | End: 2019-06-02
Payer: COMMERCIAL

## 2019-06-02 ENCOUNTER — HOSPITAL ENCOUNTER (INPATIENT)
Age: 69
LOS: 3 days | Discharge: NURSING, REHAB OR LONG-TERM CARE | DRG: 494 | End: 2019-06-05
Attending: EMERGENCY MEDICINE | Admitting: HOSPITALIST
Payer: COMMERCIAL

## 2019-06-02 DIAGNOSIS — S82.891A CLOSED FRACTURE OF RIGHT ANKLE, INITIAL ENCOUNTER: Primary | ICD-10-CM

## 2019-06-02 DIAGNOSIS — I48.91 ATRIAL FIBRILLATION, UNSPECIFIED TYPE (HCC): ICD-10-CM

## 2019-06-02 DIAGNOSIS — S82.841A BIMALLEOLAR FRACTURE, RIGHT, CLOSED, INITIAL ENCOUNTER: ICD-10-CM

## 2019-06-02 PROBLEM — S82.899A ANKLE FRACTURE: Status: ACTIVE | Noted: 2019-06-02

## 2019-06-02 PROBLEM — S82.892A ANKLE FRACTURE, LEFT, CLOSED, INITIAL ENCOUNTER: Status: ACTIVE | Noted: 2019-06-02

## 2019-06-02 PROCEDURE — 73610 X-RAY EXAM OF ANKLE: CPT

## 2019-06-02 PROCEDURE — 6360000002 HC RX W HCPCS: Performed by: EMERGENCY MEDICINE

## 2019-06-02 PROCEDURE — 1200000000 HC SEMI PRIVATE

## 2019-06-02 PROCEDURE — 4500000028 HC INTERMEDIATE PROCEDURE

## 2019-06-02 PROCEDURE — 96372 THER/PROPH/DIAG INJ SC/IM: CPT

## 2019-06-02 PROCEDURE — 73560 X-RAY EXAM OF KNEE 1 OR 2: CPT

## 2019-06-02 PROCEDURE — 99285 EMERGENCY DEPT VISIT HI MDM: CPT

## 2019-06-02 RX ORDER — MORPHINE SULFATE 4 MG/ML
4 INJECTION, SOLUTION INTRAMUSCULAR; INTRAVENOUS EVERY 30 MIN PRN
Status: DISCONTINUED | OUTPATIENT
Start: 2019-06-02 | End: 2019-06-03

## 2019-06-02 RX ADMIN — MORPHINE SULFATE 4 MG: 4 INJECTION INTRAVENOUS at 23:34

## 2019-06-02 ASSESSMENT — PAIN DESCRIPTION - FREQUENCY: FREQUENCY: CONTINUOUS

## 2019-06-02 ASSESSMENT — PAIN DESCRIPTION - ORIENTATION: ORIENTATION: RIGHT

## 2019-06-02 ASSESSMENT — PAIN DESCRIPTION - PROGRESSION: CLINICAL_PROGRESSION: NOT CHANGED

## 2019-06-02 ASSESSMENT — PAIN DESCRIPTION - LOCATION: LOCATION: ANKLE

## 2019-06-02 ASSESSMENT — ENCOUNTER SYMPTOMS: SHORTNESS OF BREATH: 0

## 2019-06-02 ASSESSMENT — PAIN DESCRIPTION - PAIN TYPE: TYPE: ACUTE PAIN

## 2019-06-02 ASSESSMENT — PAIN DESCRIPTION - DESCRIPTORS: DESCRIPTORS: SHARP

## 2019-06-02 ASSESSMENT — PAIN SCALES - GENERAL
PAINLEVEL_OUTOF10: 7
PAINLEVEL_OUTOF10: 4

## 2019-06-02 ASSESSMENT — PAIN DESCRIPTION - ONSET: ONSET: ON-GOING

## 2019-06-03 ENCOUNTER — ANESTHESIA (OUTPATIENT)
Dept: OPERATING ROOM | Age: 69
DRG: 494 | End: 2019-06-03
Payer: COMMERCIAL

## 2019-06-03 ENCOUNTER — APPOINTMENT (OUTPATIENT)
Dept: GENERAL RADIOLOGY | Age: 69
DRG: 494 | End: 2019-06-03
Payer: COMMERCIAL

## 2019-06-03 ENCOUNTER — ANESTHESIA EVENT (OUTPATIENT)
Dept: OPERATING ROOM | Age: 69
DRG: 494 | End: 2019-06-03
Payer: COMMERCIAL

## 2019-06-03 VITALS
RESPIRATION RATE: 5 BRPM | DIASTOLIC BLOOD PRESSURE: 45 MMHG | SYSTOLIC BLOOD PRESSURE: 100 MMHG | TEMPERATURE: 88.6 F | OXYGEN SATURATION: 86 %

## 2019-06-03 PROBLEM — S82.62XD: Status: ACTIVE | Noted: 2019-06-03

## 2019-06-03 PROBLEM — S82.872D CLOSED DISPLACED PILON FRACTURE OF LEFT TIBIA WITH ROUTINE HEALING: Status: ACTIVE | Noted: 2019-06-03

## 2019-06-03 LAB
ALBUMIN SERPL-MCNC: 3.9 GM/DL (ref 3.4–5)
ALP BLD-CCNC: 99 IU/L (ref 40–128)
ALT SERPL-CCNC: 25 U/L (ref 10–40)
ANION GAP SERPL CALCULATED.3IONS-SCNC: 15 MMOL/L (ref 4–16)
ANION GAP SERPL CALCULATED.3IONS-SCNC: 15 MMOL/L (ref 4–16)
APTT: 32.3 SECONDS (ref 21.2–33)
AST SERPL-CCNC: 38 IU/L (ref 15–37)
BASOPHILS ABSOLUTE: 0.1 K/CU MM
BASOPHILS ABSOLUTE: 0.1 K/CU MM
BASOPHILS RELATIVE PERCENT: 0.8 % (ref 0–1)
BASOPHILS RELATIVE PERCENT: 0.9 % (ref 0–1)
BILIRUB SERPL-MCNC: 0.3 MG/DL (ref 0–1)
BUN BLDV-MCNC: 19 MG/DL (ref 6–23)
BUN BLDV-MCNC: 20 MG/DL (ref 6–23)
CALCIUM SERPL-MCNC: 9 MG/DL (ref 8.3–10.6)
CALCIUM SERPL-MCNC: 9.2 MG/DL (ref 8.3–10.6)
CHLORIDE BLD-SCNC: 100 MMOL/L (ref 99–110)
CHLORIDE BLD-SCNC: 103 MMOL/L (ref 99–110)
CO2: 23 MMOL/L (ref 21–32)
CO2: 24 MMOL/L (ref 21–32)
CREAT SERPL-MCNC: 0.9 MG/DL (ref 0.9–1.3)
CREAT SERPL-MCNC: 0.9 MG/DL (ref 0.9–1.3)
DIFFERENTIAL TYPE: ABNORMAL
DIFFERENTIAL TYPE: ABNORMAL
EOSINOPHILS ABSOLUTE: 0.2 K/CU MM
EOSINOPHILS ABSOLUTE: 0.2 K/CU MM
EOSINOPHILS RELATIVE PERCENT: 2.3 % (ref 0–3)
EOSINOPHILS RELATIVE PERCENT: 2.3 % (ref 0–3)
ESTIMATED AVERAGE GLUCOSE: 169 MG/DL
GFR AFRICAN AMERICAN: >60 ML/MIN/1.73M2
GFR AFRICAN AMERICAN: >60 ML/MIN/1.73M2
GFR NON-AFRICAN AMERICAN: >60 ML/MIN/1.73M2
GFR NON-AFRICAN AMERICAN: >60 ML/MIN/1.73M2
GLUCOSE BLD-MCNC: 150 MG/DL (ref 70–99)
GLUCOSE BLD-MCNC: 156 MG/DL (ref 70–99)
GLUCOSE BLD-MCNC: 162 MG/DL (ref 70–99)
GLUCOSE BLD-MCNC: 251 MG/DL (ref 70–99)
GLUCOSE BLD-MCNC: 263 MG/DL (ref 70–99)
GLUCOSE BLD-MCNC: 270 MG/DL (ref 70–99)
GLUCOSE BLD-MCNC: 276 MG/DL (ref 70–99)
GLUCOSE BLD-MCNC: 348 MG/DL (ref 70–99)
HBA1C MFR BLD: 7.5 % (ref 4.2–6.3)
HCT VFR BLD CALC: 39 % (ref 42–52)
HCT VFR BLD CALC: 39.2 % (ref 42–52)
HEMOGLOBIN: 12.8 GM/DL (ref 13.5–18)
HEMOGLOBIN: 12.9 GM/DL (ref 13.5–18)
IMMATURE NEUTROPHIL %: 0.6 % (ref 0–0.43)
IMMATURE NEUTROPHIL %: 0.7 % (ref 0–0.43)
INR BLD: 1.65 INDEX
INR BLD: 1.98 INDEX
INR BLD: 2.08 INDEX
LYMPHOCYTES ABSOLUTE: 2.3 K/CU MM
LYMPHOCYTES ABSOLUTE: 2.3 K/CU MM
LYMPHOCYTES RELATIVE PERCENT: 26.2 % (ref 24–44)
LYMPHOCYTES RELATIVE PERCENT: 27.1 % (ref 24–44)
MCH RBC QN AUTO: 33.1 PG (ref 27–31)
MCH RBC QN AUTO: 34.3 PG (ref 27–31)
MCHC RBC AUTO-ENTMCNC: 32.7 % (ref 32–36)
MCHC RBC AUTO-ENTMCNC: 33.1 % (ref 32–36)
MCV RBC AUTO: 101.3 FL (ref 78–100)
MCV RBC AUTO: 103.7 FL (ref 78–100)
MONOCYTES ABSOLUTE: 0.7 K/CU MM
MONOCYTES ABSOLUTE: 0.7 K/CU MM
MONOCYTES RELATIVE PERCENT: 8 % (ref 0–4)
MONOCYTES RELATIVE PERCENT: 8.2 % (ref 0–4)
NUCLEATED RBC %: 0 %
NUCLEATED RBC %: 0 %
PDW BLD-RTO: 13.9 % (ref 11.7–14.9)
PDW BLD-RTO: 13.9 % (ref 11.7–14.9)
PLATELET # BLD: 193 K/CU MM (ref 140–440)
PLATELET # BLD: 199 K/CU MM (ref 140–440)
PMV BLD AUTO: 9.5 FL (ref 7.5–11.1)
PMV BLD AUTO: 9.7 FL (ref 7.5–11.1)
POTASSIUM SERPL-SCNC: 4.1 MMOL/L (ref 3.5–5.1)
POTASSIUM SERPL-SCNC: 4.7 MMOL/L (ref 3.5–5.1)
PROTHROMBIN TIME: 19.1 SECONDS (ref 9.12–12.5)
PROTHROMBIN TIME: 22.4 SECONDS (ref 9.12–12.5)
PROTHROMBIN TIME: 24 SECONDS (ref 9.12–12.5)
RBC # BLD: 3.76 M/CU MM (ref 4.6–6.2)
RBC # BLD: 3.87 M/CU MM (ref 4.6–6.2)
SEGMENTED NEUTROPHILS ABSOLUTE COUNT: 5.3 K/CU MM
SEGMENTED NEUTROPHILS ABSOLUTE COUNT: 5.4 K/CU MM
SEGMENTED NEUTROPHILS RELATIVE PERCENT: 60.9 % (ref 36–66)
SEGMENTED NEUTROPHILS RELATIVE PERCENT: 62 % (ref 36–66)
SODIUM BLD-SCNC: 139 MMOL/L (ref 135–145)
SODIUM BLD-SCNC: 141 MMOL/L (ref 135–145)
TOTAL IMMATURE NEUTOROPHIL: 0.05 K/CU MM
TOTAL IMMATURE NEUTOROPHIL: 0.06 K/CU MM
TOTAL NUCLEATED RBC: 0 K/CU MM
TOTAL NUCLEATED RBC: 0 K/CU MM
TOTAL PROTEIN: 7.4 GM/DL (ref 6.4–8.2)
WBC # BLD: 8.6 K/CU MM (ref 4–10.5)
WBC # BLD: 8.7 K/CU MM (ref 4–10.5)

## 2019-06-03 PROCEDURE — 76000 FLUOROSCOPY <1 HR PHYS/QHP: CPT

## 2019-06-03 PROCEDURE — 6360000002 HC RX W HCPCS: Performed by: NURSE ANESTHETIST, CERTIFIED REGISTERED

## 2019-06-03 PROCEDURE — 6370000000 HC RX 637 (ALT 250 FOR IP): Performed by: ANESTHESIOLOGY

## 2019-06-03 PROCEDURE — 0QSG04Z REPOSITION RIGHT TIBIA WITH INTERNAL FIXATION DEVICE, OPEN APPROACH: ICD-10-PCS | Performed by: ORTHOPAEDIC SURGERY

## 2019-06-03 PROCEDURE — 2500000003 HC RX 250 WO HCPCS: Performed by: NURSE ANESTHETIST, CERTIFIED REGISTERED

## 2019-06-03 PROCEDURE — 2700000000 HC OXYGEN THERAPY PER DAY

## 2019-06-03 PROCEDURE — 6370000000 HC RX 637 (ALT 250 FOR IP): Performed by: PHYSICIAN ASSISTANT

## 2019-06-03 PROCEDURE — 6370000000 HC RX 637 (ALT 250 FOR IP): Performed by: ORTHOPAEDIC SURGERY

## 2019-06-03 PROCEDURE — 0QSJ04Z REPOSITION RIGHT FIBULA WITH INTERNAL FIXATION DEVICE, OPEN APPROACH: ICD-10-PCS | Performed by: ORTHOPAEDIC SURGERY

## 2019-06-03 PROCEDURE — 7100000001 HC PACU RECOVERY - ADDTL 15 MIN: Performed by: ORTHOPAEDIC SURGERY

## 2019-06-03 PROCEDURE — 6370000000 HC RX 637 (ALT 250 FOR IP): Performed by: NURSE PRACTITIONER

## 2019-06-03 PROCEDURE — 27828 TREAT LOWER LEG FRACTURE: CPT | Performed by: ORTHOPAEDIC SURGERY

## 2019-06-03 PROCEDURE — 6360000002 HC RX W HCPCS: Performed by: EMERGENCY MEDICINE

## 2019-06-03 PROCEDURE — 85730 THROMBOPLASTIN TIME PARTIAL: CPT

## 2019-06-03 PROCEDURE — C1713 ANCHOR/SCREW BN/BN,TIS/BN: HCPCS | Performed by: ORTHOPAEDIC SURGERY

## 2019-06-03 PROCEDURE — 3700000000 HC ANESTHESIA ATTENDED CARE: Performed by: ORTHOPAEDIC SURGERY

## 2019-06-03 PROCEDURE — 99223 1ST HOSP IP/OBS HIGH 75: CPT | Performed by: ORTHOPAEDIC SURGERY

## 2019-06-03 PROCEDURE — 82962 GLUCOSE BLOOD TEST: CPT

## 2019-06-03 PROCEDURE — 96374 THER/PROPH/DIAG INJ IV PUSH: CPT

## 2019-06-03 PROCEDURE — 6360000002 HC RX W HCPCS: Performed by: ORTHOPAEDIC SURGERY

## 2019-06-03 PROCEDURE — 2709999900 HC NON-CHARGEABLE SUPPLY: Performed by: ORTHOPAEDIC SURGERY

## 2019-06-03 PROCEDURE — 6370000000 HC RX 637 (ALT 250 FOR IP): Performed by: HOSPITALIST

## 2019-06-03 PROCEDURE — 85610 PROTHROMBIN TIME: CPT

## 2019-06-03 PROCEDURE — 85025 COMPLETE CBC W/AUTO DIFF WBC: CPT

## 2019-06-03 PROCEDURE — 3700000001 HC ADD 15 MINUTES (ANESTHESIA): Performed by: ORTHOPAEDIC SURGERY

## 2019-06-03 PROCEDURE — 2580000003 HC RX 258: Performed by: ANESTHESIOLOGY

## 2019-06-03 PROCEDURE — 2580000003 HC RX 258: Performed by: HOSPITALIST

## 2019-06-03 PROCEDURE — 94761 N-INVAS EAR/PLS OXIMETRY MLT: CPT

## 2019-06-03 PROCEDURE — 6360000002 HC RX W HCPCS: Performed by: PHYSICIAN ASSISTANT

## 2019-06-03 PROCEDURE — 7100000000 HC PACU RECOVERY - FIRST 15 MIN: Performed by: ORTHOPAEDIC SURGERY

## 2019-06-03 PROCEDURE — 36415 COLL VENOUS BLD VENIPUNCTURE: CPT

## 2019-06-03 PROCEDURE — 80048 BASIC METABOLIC PNL TOTAL CA: CPT

## 2019-06-03 PROCEDURE — 3600000004 HC SURGERY LEVEL 4 BASE: Performed by: ORTHOPAEDIC SURGERY

## 2019-06-03 PROCEDURE — 2580000003 HC RX 258: Performed by: PHYSICIAN ASSISTANT

## 2019-06-03 PROCEDURE — 1200000000 HC SEMI PRIVATE

## 2019-06-03 PROCEDURE — 6360000002 HC RX W HCPCS: Performed by: HOSPITALIST

## 2019-06-03 PROCEDURE — 2580000003 HC RX 258: Performed by: ORTHOPAEDIC SURGERY

## 2019-06-03 PROCEDURE — 80053 COMPREHEN METABOLIC PANEL: CPT

## 2019-06-03 PROCEDURE — 6360000002 HC RX W HCPCS: Performed by: ANESTHESIOLOGY

## 2019-06-03 PROCEDURE — 3600000014 HC SURGERY LEVEL 4 ADDTL 15MIN: Performed by: ORTHOPAEDIC SURGERY

## 2019-06-03 PROCEDURE — 83036 HEMOGLOBIN GLYCOSYLATED A1C: CPT

## 2019-06-03 PROCEDURE — 94640 AIRWAY INHALATION TREATMENT: CPT

## 2019-06-03 PROCEDURE — 2720000010 HC SURG SUPPLY STERILE: Performed by: ORTHOPAEDIC SURGERY

## 2019-06-03 DEVICE — IMPLANTABLE DEVICE: Type: IMPLANTABLE DEVICE | Site: ANKLE | Status: FUNCTIONAL

## 2019-06-03 DEVICE — SCREW BNE L30MM DIA3.5MM CORT S STL ST NONCANNULATED LOK: Type: IMPLANTABLE DEVICE | Site: ANKLE | Status: FUNCTIONAL

## 2019-06-03 DEVICE — SCREW BNE L36MM DIA3.5MM CORT S STL ST NONCANNULATED LOK: Type: IMPLANTABLE DEVICE | Site: ANKLE | Status: FUNCTIONAL

## 2019-06-03 DEVICE — SCREW BNE L50MM DIA3.5MM CORT S STL ST NONCANNULATED LOK: Type: IMPLANTABLE DEVICE | Site: ANKLE | Status: FUNCTIONAL

## 2019-06-03 DEVICE — SCREW BNE L28MM DIA3.5MM CORT S STL ST NONCANNULATED LOK: Type: IMPLANTABLE DEVICE | Site: ANKLE | Status: FUNCTIONAL

## 2019-06-03 DEVICE — SCREW BNE L46MM DIA3.5MM S STL ST FULL THRD T15 STARDRV: Type: IMPLANTABLE DEVICE | Site: ANKLE | Status: FUNCTIONAL

## 2019-06-03 DEVICE — SCREW BNE L42MM DIA3.5MM CORT S STL ST LOK FULL THRD: Type: IMPLANTABLE DEVICE | Site: ANKLE | Status: FUNCTIONAL

## 2019-06-03 RX ORDER — SODIUM CHLORIDE, SODIUM LACTATE, POTASSIUM CHLORIDE, CALCIUM CHLORIDE 600; 310; 30; 20 MG/100ML; MG/100ML; MG/100ML; MG/100ML
INJECTION, SOLUTION INTRAVENOUS CONTINUOUS
Status: DISCONTINUED | OUTPATIENT
Start: 2019-06-03 | End: 2019-06-05 | Stop reason: HOSPADM

## 2019-06-03 RX ORDER — SUCCINYLCHOLINE/SOD CL,ISO/PF 100 MG/5ML
SYRINGE (ML) INTRAVENOUS PRN
Status: DISCONTINUED | OUTPATIENT
Start: 2019-06-03 | End: 2019-06-05 | Stop reason: SDUPTHER

## 2019-06-03 RX ORDER — DEXTROSE MONOHYDRATE 25 G/50ML
12.5 INJECTION, SOLUTION INTRAVENOUS PRN
Status: DISCONTINUED | OUTPATIENT
Start: 2019-06-03 | End: 2019-06-05 | Stop reason: HOSPADM

## 2019-06-03 RX ORDER — SODIUM CHLORIDE 0.9 % (FLUSH) 0.9 %
SYRINGE (ML) INJECTION
Status: DISPENSED
Start: 2019-06-03 | End: 2019-06-04

## 2019-06-03 RX ORDER — PHYTONADIONE 5 MG/1
5 TABLET ORAL ONCE
Status: COMPLETED | OUTPATIENT
Start: 2019-06-03 | End: 2019-06-03

## 2019-06-03 RX ORDER — LEVOTHYROXINE SODIUM 0.05 MG/1
50 TABLET ORAL DAILY
Status: DISCONTINUED | OUTPATIENT
Start: 2019-06-03 | End: 2019-06-05 | Stop reason: HOSPADM

## 2019-06-03 RX ORDER — SODIUM CHLORIDE 0.9 % (FLUSH) 0.9 %
10 SYRINGE (ML) INJECTION EVERY 12 HOURS SCHEDULED
Status: DISCONTINUED | OUTPATIENT
Start: 2019-06-03 | End: 2019-06-05 | Stop reason: HOSPADM

## 2019-06-03 RX ORDER — CEFAZOLIN SODIUM 2 G/100ML
2 INJECTION, SOLUTION INTRAVENOUS EVERY 8 HOURS
Status: COMPLETED | OUTPATIENT
Start: 2019-06-03 | End: 2019-06-04

## 2019-06-03 RX ORDER — ALBUTEROL SULFATE 2.5 MG/3ML
2.5 SOLUTION RESPIRATORY (INHALATION) EVERY 6 HOURS PRN
Status: DISCONTINUED | OUTPATIENT
Start: 2019-06-03 | End: 2019-06-04

## 2019-06-03 RX ORDER — ACETAMINOPHEN 325 MG/1
650 TABLET ORAL EVERY 4 HOURS PRN
Status: DISCONTINUED | OUTPATIENT
Start: 2019-06-03 | End: 2019-06-05 | Stop reason: HOSPADM

## 2019-06-03 RX ORDER — DIGOXIN 125 MCG
125 TABLET ORAL DAILY
Status: DISCONTINUED | OUTPATIENT
Start: 2019-06-03 | End: 2019-06-05 | Stop reason: HOSPADM

## 2019-06-03 RX ORDER — DULOXETIN HYDROCHLORIDE 30 MG/1
60 CAPSULE, DELAYED RELEASE ORAL DAILY
Status: DISCONTINUED | OUTPATIENT
Start: 2019-06-03 | End: 2019-06-05 | Stop reason: HOSPADM

## 2019-06-03 RX ORDER — LIDOCAINE HYDROCHLORIDE 20 MG/ML
INJECTION, SOLUTION EPIDURAL; INFILTRATION; INTRACAUDAL; PERINEURAL PRN
Status: DISCONTINUED | OUTPATIENT
Start: 2019-06-03 | End: 2019-06-05 | Stop reason: SDUPTHER

## 2019-06-03 RX ORDER — MORPHINE SULFATE 4 MG/ML
2 INJECTION, SOLUTION INTRAMUSCULAR; INTRAVENOUS EVERY 4 HOURS PRN
Status: DISCONTINUED | OUTPATIENT
Start: 2019-06-03 | End: 2019-06-05 | Stop reason: HOSPADM

## 2019-06-03 RX ORDER — ALBUTEROL SULFATE 2.5 MG/3ML
2.5 SOLUTION RESPIRATORY (INHALATION) EVERY 6 HOURS PRN
Status: DISCONTINUED | OUTPATIENT
Start: 2019-06-03 | End: 2019-06-05 | Stop reason: HOSPADM

## 2019-06-03 RX ORDER — SODIUM CHLORIDE, SODIUM LACTATE, POTASSIUM CHLORIDE, CALCIUM CHLORIDE 600; 310; 30; 20 MG/100ML; MG/100ML; MG/100ML; MG/100ML
INJECTION, SOLUTION INTRAVENOUS ONCE
Status: COMPLETED | OUTPATIENT
Start: 2019-06-03 | End: 2019-06-03

## 2019-06-03 RX ORDER — NICOTINE POLACRILEX 4 MG
15 LOZENGE BUCCAL PRN
Status: DISCONTINUED | OUTPATIENT
Start: 2019-06-03 | End: 2019-06-05 | Stop reason: HOSPADM

## 2019-06-03 RX ORDER — HYDROMORPHONE HCL 110MG/55ML
PATIENT CONTROLLED ANALGESIA SYRINGE INTRAVENOUS PRN
Status: DISCONTINUED | OUTPATIENT
Start: 2019-06-03 | End: 2019-06-05 | Stop reason: SDUPTHER

## 2019-06-03 RX ORDER — PRAVASTATIN SODIUM 80 MG/1
1 TABLET ORAL DAILY
Status: DISCONTINUED | OUTPATIENT
Start: 2019-06-03 | End: 2019-06-03 | Stop reason: CLARIF

## 2019-06-03 RX ORDER — ONDANSETRON 2 MG/ML
INJECTION INTRAMUSCULAR; INTRAVENOUS PRN
Status: DISCONTINUED | OUTPATIENT
Start: 2019-06-03 | End: 2019-06-05 | Stop reason: SDUPTHER

## 2019-06-03 RX ORDER — CEFAZOLIN SODIUM 2 G/50ML
2 SOLUTION INTRAVENOUS EVERY 8 HOURS
Status: DISCONTINUED | OUTPATIENT
Start: 2019-06-03 | End: 2019-06-03 | Stop reason: CLARIF

## 2019-06-03 RX ORDER — CARVEDILOL 25 MG/1
25 TABLET ORAL 3 TIMES DAILY
Status: DISCONTINUED | OUTPATIENT
Start: 2019-06-03 | End: 2019-06-05 | Stop reason: HOSPADM

## 2019-06-03 RX ORDER — SIMVASTATIN 40 MG
40 TABLET ORAL NIGHTLY
Status: DISCONTINUED | OUTPATIENT
Start: 2019-06-03 | End: 2019-06-05 | Stop reason: HOSPADM

## 2019-06-03 RX ORDER — PROPOFOL 10 MG/ML
INJECTION, EMULSION INTRAVENOUS PRN
Status: DISCONTINUED | OUTPATIENT
Start: 2019-06-03 | End: 2019-06-05 | Stop reason: SDUPTHER

## 2019-06-03 RX ORDER — IPRATROPIUM BROMIDE AND ALBUTEROL SULFATE 2.5; .5 MG/3ML; MG/3ML
1 SOLUTION RESPIRATORY (INHALATION)
Status: DISCONTINUED | OUTPATIENT
Start: 2019-06-03 | End: 2019-06-03 | Stop reason: SDUPTHER

## 2019-06-03 RX ORDER — DILTIAZEM HYDROCHLORIDE 60 MG/1
120 TABLET, FILM COATED ORAL DAILY
Status: DISCONTINUED | OUTPATIENT
Start: 2019-06-03 | End: 2019-06-05 | Stop reason: HOSPADM

## 2019-06-03 RX ORDER — LIDOCAINE HYDROCHLORIDE 20 MG/ML
INJECTION, SOLUTION INTRAVENOUS PRN
Status: DISCONTINUED | OUTPATIENT
Start: 2019-06-03 | End: 2019-06-05 | Stop reason: SDUPTHER

## 2019-06-03 RX ORDER — DEXAMETHASONE SODIUM PHOSPHATE 4 MG/ML
INJECTION, SOLUTION INTRA-ARTICULAR; INTRALESIONAL; INTRAMUSCULAR; INTRAVENOUS; SOFT TISSUE PRN
Status: DISCONTINUED | OUTPATIENT
Start: 2019-06-03 | End: 2019-06-05 | Stop reason: SDUPTHER

## 2019-06-03 RX ORDER — MORPHINE SULFATE 4 MG/ML
4 INJECTION, SOLUTION INTRAMUSCULAR; INTRAVENOUS EVERY 30 MIN PRN
Status: DISCONTINUED | OUTPATIENT
Start: 2019-06-03 | End: 2019-06-04 | Stop reason: SDUPTHER

## 2019-06-03 RX ORDER — LOSARTAN POTASSIUM 50 MG/1
50 TABLET ORAL DAILY
Status: DISCONTINUED | OUTPATIENT
Start: 2019-06-03 | End: 2019-06-05 | Stop reason: HOSPADM

## 2019-06-03 RX ORDER — DEXTROSE MONOHYDRATE 50 MG/ML
100 INJECTION, SOLUTION INTRAVENOUS PRN
Status: DISCONTINUED | OUTPATIENT
Start: 2019-06-03 | End: 2019-06-05 | Stop reason: HOSPADM

## 2019-06-03 RX ORDER — SODIUM CHLORIDE 0.9 % (FLUSH) 0.9 %
10 SYRINGE (ML) INJECTION PRN
Status: DISCONTINUED | OUTPATIENT
Start: 2019-06-03 | End: 2019-06-05 | Stop reason: HOSPADM

## 2019-06-03 RX ORDER — WARFARIN SODIUM 4 MG/1
4 TABLET ORAL
Status: COMPLETED | OUTPATIENT
Start: 2019-06-03 | End: 2019-06-03

## 2019-06-03 RX ORDER — OXYCODONE HYDROCHLORIDE 5 MG/1
5 TABLET ORAL EVERY 4 HOURS PRN
Status: DISCONTINUED | OUTPATIENT
Start: 2019-06-03 | End: 2019-06-05 | Stop reason: HOSPADM

## 2019-06-03 RX ORDER — ROCURONIUM BROMIDE 10 MG/ML
INJECTION, SOLUTION INTRAVENOUS PRN
Status: DISCONTINUED | OUTPATIENT
Start: 2019-06-03 | End: 2019-06-03

## 2019-06-03 RX ORDER — ROCURONIUM BROMIDE 10 MG/ML
INJECTION, SOLUTION INTRAVENOUS PRN
Status: DISCONTINUED | OUTPATIENT
Start: 2019-06-03 | End: 2019-06-05 | Stop reason: SDUPTHER

## 2019-06-03 RX ORDER — ALLOPURINOL 100 MG/1
100 TABLET ORAL 2 TIMES DAILY
Status: DISCONTINUED | OUTPATIENT
Start: 2019-06-03 | End: 2019-06-05 | Stop reason: HOSPADM

## 2019-06-03 RX ORDER — FENTANYL CITRATE 50 UG/ML
INJECTION, SOLUTION INTRAMUSCULAR; INTRAVENOUS PRN
Status: DISCONTINUED | OUTPATIENT
Start: 2019-06-03 | End: 2019-06-05 | Stop reason: SDUPTHER

## 2019-06-03 RX ORDER — IPRATROPIUM BROMIDE AND ALBUTEROL SULFATE 2.5; .5 MG/3ML; MG/3ML
1 SOLUTION RESPIRATORY (INHALATION) ONCE
Status: COMPLETED | OUTPATIENT
Start: 2019-06-03 | End: 2019-06-03

## 2019-06-03 RX ORDER — KETOROLAC TROMETHAMINE 30 MG/ML
INJECTION, SOLUTION INTRAMUSCULAR; INTRAVENOUS PRN
Status: DISCONTINUED | OUTPATIENT
Start: 2019-06-03 | End: 2019-06-05 | Stop reason: SDUPTHER

## 2019-06-03 RX ORDER — ACETAMINOPHEN 10 MG/ML
1000 INJECTION, SOLUTION INTRAVENOUS ONCE
Status: COMPLETED | OUTPATIENT
Start: 2019-06-03 | End: 2019-06-03

## 2019-06-03 RX ORDER — ONDANSETRON 2 MG/ML
4 INJECTION INTRAMUSCULAR; INTRAVENOUS EVERY 6 HOURS PRN
Status: DISCONTINUED | OUTPATIENT
Start: 2019-06-03 | End: 2019-06-05 | Stop reason: HOSPADM

## 2019-06-03 RX ADMIN — SODIUM CHLORIDE, POTASSIUM CHLORIDE, SODIUM LACTATE AND CALCIUM CHLORIDE: 600; 310; 30; 20 INJECTION, SOLUTION INTRAVENOUS at 13:15

## 2019-06-03 RX ADMIN — SUGAMMADEX 100 MG: 100 INJECTION, SOLUTION INTRAVENOUS at 16:12

## 2019-06-03 RX ADMIN — INSULIN LISPRO 1 UNITS: 100 INJECTION, SOLUTION INTRAVENOUS; SUBCUTANEOUS at 02:34

## 2019-06-03 RX ADMIN — CARVEDILOL 25 MG: 25 TABLET, FILM COATED ORAL at 06:17

## 2019-06-03 RX ADMIN — KETOROLAC TROMETHAMINE 15 MG: 30 INJECTION, SOLUTION INTRAMUSCULAR; INTRAVENOUS at 15:50

## 2019-06-03 RX ADMIN — SODIUM CHLORIDE, PRESERVATIVE FREE 10 ML: 5 INJECTION INTRAVENOUS at 09:46

## 2019-06-03 RX ADMIN — HYDRALAZINE HYDROCHLORIDE 10 MG: 20 INJECTION INTRAMUSCULAR; INTRAVENOUS at 02:34

## 2019-06-03 RX ADMIN — PHENYLEPHRINE HYDROCHLORIDE 200 MCG: 10 INJECTION INTRAVENOUS at 15:10

## 2019-06-03 RX ADMIN — PHENYLEPHRINE HYDROCHLORIDE 200 MCG: 10 INJECTION INTRAVENOUS at 14:31

## 2019-06-03 RX ADMIN — ONDANSETRON 4 MG: 2 INJECTION INTRAMUSCULAR; INTRAVENOUS at 18:45

## 2019-06-03 RX ADMIN — PHYTONADIONE 5 MG: 5 TABLET ORAL at 09:31

## 2019-06-03 RX ADMIN — FENTANYL CITRATE 100 MCG: 50 INJECTION INTRAMUSCULAR; INTRAVENOUS at 14:12

## 2019-06-03 RX ADMIN — CEFAZOLIN SODIUM 2 G: 2 INJECTION, SOLUTION INTRAVENOUS at 21:29

## 2019-06-03 RX ADMIN — SODIUM CHLORIDE, POTASSIUM CHLORIDE, SODIUM LACTATE AND CALCIUM CHLORIDE: 600; 310; 30; 20 INJECTION, SOLUTION INTRAVENOUS at 15:58

## 2019-06-03 RX ADMIN — LIDOCAINE HYDROCHLORIDE 100 MG: 20 INJECTION, SOLUTION INTRAVENOUS at 15:48

## 2019-06-03 RX ADMIN — PHENYLEPHRINE HYDROCHLORIDE 200 MCG: 10 INJECTION INTRAVENOUS at 14:58

## 2019-06-03 RX ADMIN — IPRATROPIUM BROMIDE AND ALBUTEROL SULFATE 1 AMPULE: .5; 3 SOLUTION RESPIRATORY (INHALATION) at 13:25

## 2019-06-03 RX ADMIN — LOSARTAN POTASSIUM 50 MG: 50 TABLET, FILM COATED ORAL at 09:37

## 2019-06-03 RX ADMIN — PHENYLEPHRINE HYDROCHLORIDE 200 MCG: 10 INJECTION INTRAVENOUS at 14:36

## 2019-06-03 RX ADMIN — INSULIN LISPRO 3 UNITS: 100 INJECTION, SOLUTION INTRAVENOUS; SUBCUTANEOUS at 18:51

## 2019-06-03 RX ADMIN — HYDROMORPHONE HYDROCHLORIDE 0.4 MG: 2 INJECTION INTRAMUSCULAR; INTRAVENOUS; SUBCUTANEOUS at 15:40

## 2019-06-03 RX ADMIN — MORPHINE SULFATE 2 MG: 4 INJECTION INTRAVENOUS at 05:59

## 2019-06-03 RX ADMIN — PHENYLEPHRINE HYDROCHLORIDE 200 MCG: 10 INJECTION INTRAVENOUS at 15:25

## 2019-06-03 RX ADMIN — DIGOXIN 125 MCG: 125 TABLET ORAL at 09:37

## 2019-06-03 RX ADMIN — SODIUM CHLORIDE, POTASSIUM CHLORIDE, SODIUM LACTATE AND CALCIUM CHLORIDE: 600; 310; 30; 20 INJECTION, SOLUTION INTRAVENOUS at 14:06

## 2019-06-03 RX ADMIN — DILTIAZEM HYDROCHLORIDE 120 MG: 60 TABLET, FILM COATED ORAL at 09:37

## 2019-06-03 RX ADMIN — INSULIN LISPRO 3 UNITS: 100 INJECTION, SOLUTION INTRAVENOUS; SUBCUTANEOUS at 09:38

## 2019-06-03 RX ADMIN — ACETAMINOPHEN 1000 MG: 10 INJECTION, SOLUTION INTRAVENOUS at 15:58

## 2019-06-03 RX ADMIN — CARVEDILOL 25 MG: 25 TABLET, FILM COATED ORAL at 21:27

## 2019-06-03 RX ADMIN — Medication 100 MG: at 14:18

## 2019-06-03 RX ADMIN — PHENYLEPHRINE HYDROCHLORIDE 200 MCG: 10 INJECTION INTRAVENOUS at 15:37

## 2019-06-03 RX ADMIN — LIDOCAINE HYDROCHLORIDE 50 MG: 20 INJECTION, SOLUTION EPIDURAL; INFILTRATION; INTRACAUDAL; PERINEURAL at 14:16

## 2019-06-03 RX ADMIN — SODIUM CHLORIDE, POTASSIUM CHLORIDE, SODIUM LACTATE AND CALCIUM CHLORIDE: 600; 310; 30; 20 INJECTION, SOLUTION INTRAVENOUS at 23:21

## 2019-06-03 RX ADMIN — SUGAMMADEX 50 MG: 100 INJECTION, SOLUTION INTRAVENOUS at 16:10

## 2019-06-03 RX ADMIN — SIMVASTATIN 40 MG: 40 TABLET, FILM COATED ORAL at 02:34

## 2019-06-03 RX ADMIN — PROPOFOL 50 MG: 10 INJECTION, EMULSION INTRAVENOUS at 14:56

## 2019-06-03 RX ADMIN — SIMVASTATIN 40 MG: 40 TABLET, FILM COATED ORAL at 21:27

## 2019-06-03 RX ADMIN — FENTANYL CITRATE 50 MCG: 50 INJECTION INTRAMUSCULAR; INTRAVENOUS at 15:25

## 2019-06-03 RX ADMIN — FENTANYL CITRATE 25 MCG: 50 INJECTION INTRAMUSCULAR; INTRAVENOUS at 15:08

## 2019-06-03 RX ADMIN — MORPHINE SULFATE 4 MG: 4 INJECTION INTRAVENOUS at 00:40

## 2019-06-03 RX ADMIN — DEXAMETHASONE SODIUM PHOSPHATE 4 MG: 4 INJECTION, SOLUTION INTRAMUSCULAR; INTRAVENOUS at 14:35

## 2019-06-03 RX ADMIN — PHENYLEPHRINE HYDROCHLORIDE 200 MCG: 10 INJECTION INTRAVENOUS at 15:17

## 2019-06-03 RX ADMIN — INSULIN LISPRO 2 UNITS: 100 INJECTION, SOLUTION INTRAVENOUS; SUBCUTANEOUS at 21:31

## 2019-06-03 RX ADMIN — ALLOPURINOL 100 MG: 100 TABLET ORAL at 21:27

## 2019-06-03 RX ADMIN — PROPOFOL 150 MG: 10 INJECTION, EMULSION INTRAVENOUS at 14:16

## 2019-06-03 RX ADMIN — WARFARIN SODIUM 4 MG: 2 TABLET ORAL at 21:29

## 2019-06-03 RX ADMIN — FENTANYL CITRATE 25 MCG: 50 INJECTION INTRAMUSCULAR; INTRAVENOUS at 15:32

## 2019-06-03 RX ADMIN — DEXTROSE MONOHYDRATE 3 G: 50 INJECTION, SOLUTION INTRAVENOUS at 14:06

## 2019-06-03 RX ADMIN — LEVOTHYROXINE SODIUM 50 MCG: 50 TABLET ORAL at 06:17

## 2019-06-03 RX ADMIN — PHENYLEPHRINE HYDROCHLORIDE 200 MCG: 10 INJECTION INTRAVENOUS at 14:51

## 2019-06-03 RX ADMIN — PHENYLEPHRINE HYDROCHLORIDE 200 MCG: 10 INJECTION INTRAVENOUS at 15:39

## 2019-06-03 RX ADMIN — ROCURONIUM BROMIDE 50 MG: 10 INJECTION INTRAVENOUS at 14:33

## 2019-06-03 RX ADMIN — ONDANSETRON 4 MG: 2 INJECTION INTRAMUSCULAR; INTRAVENOUS at 14:16

## 2019-06-03 RX ADMIN — SUGAMMADEX 50 MG: 100 INJECTION, SOLUTION INTRAVENOUS at 15:58

## 2019-06-03 RX ADMIN — MORPHINE SULFATE 2 MG: 4 INJECTION INTRAVENOUS at 23:30

## 2019-06-03 RX ADMIN — INSULIN LISPRO 3 UNITS: 100 INJECTION, SOLUTION INTRAVENOUS; SUBCUTANEOUS at 16:49

## 2019-06-03 RX ADMIN — ALLOPURINOL 100 MG: 100 TABLET ORAL at 02:33

## 2019-06-03 ASSESSMENT — PULMONARY FUNCTION TESTS
PIF_VALUE: 30
PIF_VALUE: 20
PIF_VALUE: 2
PIF_VALUE: 31
PIF_VALUE: 31
PIF_VALUE: 35
PIF_VALUE: 12
PIF_VALUE: 33
PIF_VALUE: 34
PIF_VALUE: 4
PIF_VALUE: 17
PIF_VALUE: 19
PIF_VALUE: 30
PIF_VALUE: 1
PIF_VALUE: 18
PIF_VALUE: 21
PIF_VALUE: 36
PIF_VALUE: 34
PIF_VALUE: 1
PIF_VALUE: 34
PIF_VALUE: 5
PIF_VALUE: 30
PIF_VALUE: 34
PIF_VALUE: 1
PIF_VALUE: 20
PIF_VALUE: 35
PIF_VALUE: 22
PIF_VALUE: 18
PIF_VALUE: 31
PIF_VALUE: 15
PIF_VALUE: 6
PIF_VALUE: 17
PIF_VALUE: 20
PIF_VALUE: 31
PIF_VALUE: 31
PIF_VALUE: 40
PIF_VALUE: 34
PIF_VALUE: 13
PIF_VALUE: 30
PIF_VALUE: 16
PIF_VALUE: 33
PIF_VALUE: 1
PIF_VALUE: 15
PIF_VALUE: 23
PIF_VALUE: 19
PIF_VALUE: 35
PIF_VALUE: 1
PIF_VALUE: 21
PIF_VALUE: 34
PIF_VALUE: 34
PIF_VALUE: 30
PIF_VALUE: 17
PIF_VALUE: 30
PIF_VALUE: 13
PIF_VALUE: 4
PIF_VALUE: 34
PIF_VALUE: 33
PIF_VALUE: 17
PIF_VALUE: 34
PIF_VALUE: 36
PIF_VALUE: 36
PIF_VALUE: 1
PIF_VALUE: 20
PIF_VALUE: 34
PIF_VALUE: 17
PIF_VALUE: 30
PIF_VALUE: 17
PIF_VALUE: 34
PIF_VALUE: 16
PIF_VALUE: 35
PIF_VALUE: 35
PIF_VALUE: 1
PIF_VALUE: 36
PIF_VALUE: 16
PIF_VALUE: 18
PIF_VALUE: 30
PIF_VALUE: 34
PIF_VALUE: 22
PIF_VALUE: 21
PIF_VALUE: 30
PIF_VALUE: 35
PIF_VALUE: 36
PIF_VALUE: 29
PIF_VALUE: 19
PIF_VALUE: 18
PIF_VALUE: 21
PIF_VALUE: 35
PIF_VALUE: 19
PIF_VALUE: 34
PIF_VALUE: 34
PIF_VALUE: 21
PIF_VALUE: 24
PIF_VALUE: 4
PIF_VALUE: 19
PIF_VALUE: 30
PIF_VALUE: 17
PIF_VALUE: 18
PIF_VALUE: 34
PIF_VALUE: 17
PIF_VALUE: 29
PIF_VALUE: 22
PIF_VALUE: 21
PIF_VALUE: 34
PIF_VALUE: 36
PIF_VALUE: 33
PIF_VALUE: 33
PIF_VALUE: 34
PIF_VALUE: 34
PIF_VALUE: 18
PIF_VALUE: 1
PIF_VALUE: 17
PIF_VALUE: 5
PIF_VALUE: 34
PIF_VALUE: 40
PIF_VALUE: 17
PIF_VALUE: 21
PIF_VALUE: 5
PIF_VALUE: 17
PIF_VALUE: 36
PIF_VALUE: 34
PIF_VALUE: 18
PIF_VALUE: 32
PIF_VALUE: 34
PIF_VALUE: 28
PIF_VALUE: 34

## 2019-06-03 ASSESSMENT — PAIN SCALES - GENERAL
PAINLEVEL_OUTOF10: 4
PAINLEVEL_OUTOF10: 0
PAINLEVEL_OUTOF10: 0
PAINLEVEL_OUTOF10: 7
PAINLEVEL_OUTOF10: 7
PAINLEVEL_OUTOF10: 2

## 2019-06-03 ASSESSMENT — ENCOUNTER SYMPTOMS: SHORTNESS OF BREATH: 1

## 2019-06-03 ASSESSMENT — PAIN DESCRIPTION - ONSET: ONSET: ON-GOING

## 2019-06-03 ASSESSMENT — PAIN DESCRIPTION - PAIN TYPE: TYPE: ACUTE PAIN

## 2019-06-03 ASSESSMENT — PAIN DESCRIPTION - FREQUENCY: FREQUENCY: CONTINUOUS

## 2019-06-03 ASSESSMENT — PAIN - FUNCTIONAL ASSESSMENT: PAIN_FUNCTIONAL_ASSESSMENT: PREVENTS OR INTERFERES SOME ACTIVE ACTIVITIES AND ADLS

## 2019-06-03 ASSESSMENT — PAIN DESCRIPTION - DESCRIPTORS: DESCRIPTORS: DISCOMFORT;SHARP

## 2019-06-03 ASSESSMENT — PAIN DESCRIPTION - ORIENTATION: ORIENTATION: RIGHT

## 2019-06-03 ASSESSMENT — PAIN DESCRIPTION - LOCATION: LOCATION: ANKLE

## 2019-06-03 ASSESSMENT — PAIN DESCRIPTION - PROGRESSION: CLINICAL_PROGRESSION: GRADUALLY IMPROVING

## 2019-06-03 ASSESSMENT — COPD QUESTIONNAIRES: CAT_SEVERITY: MILD

## 2019-06-03 NOTE — CONSULTS
ORTHOPEDIC CONSULT      6/3/2019    Patient name: Fan Shepard  : 1950    CHIEF COMPLAINT  Chief Complaint   Patient presents with    Ankle Pain     R ankle injury after falling off lawnmower       HPI  The patient was seen and examined. Fan Shepard is a 77 y.o. male who presents after he jumped off of his tractor yesterday landing on his right leg. He minimizes complains. He does live alone. PAST MEDICAL HISTORY  Past Medical History:   Diagnosis Date    Allergic rhinitis     Anticoagulated on Coumadin     17-**[place] to follow pt's PT/INRs, along w/prescribing his Coumadin. **    Anxiety     Arthritis     Atrial fibrillation (HCC)     On Coumadin.  CAD (coronary artery disease)     COPD (chronic obstructive pulmonary disease) (HCC)     Depression     Diabetes mellitus (HCC)     NIDDM- dx over 10 yrs ago- follows with Dr Nolberto Pinon's contracture of hand     Right hand    Family history of cardiovascular disease     Father-MI    GERD (gastroesophageal reflux disease)     H/O cardiac catheterization 2007    cardiac cath- stent LAD patent, [id number]-28-49%, RCA 40-50%    H/O cardiac catheterization 2008    cardiac cath- mild disease mid RCA    H/O cardiovascular stress test 4/10/2014    cardiolite- normal, no ischemia, EF63%    H/O cardiovascular stress test 2016    EF59% normal study  pt in atrial fib    H/O cardiovascular stress test 2017    EF 60%   afib    H/O cardiovascular stress test 2018    EF60% normal study    H/O Doppler ultrasound     2010-CAROTID_Intimal thickening but no significant atherosclerotic plaque noted in LAUREN. Doppler flow velocities within the LAUREN are WNL. Intimal thickening but no significant atherosclerotic plaque noted in LICA. Doppler flow velociities within the LICA are WNL.      H/O echocardiogram 04/10/2014    EF 60%, normal LV systolic function, mild mitral and tricuspid insufficiencies, no pericardial effusion.  H/O echocardiogram 09/21/2016    EF60% normal study    H/O echocardiogram 11/07/2018    EF55-60% no significant valular disease    H/O hiatal hernia     Hx of Venous Doppler 03/14/2019    Significant reflux in Left Deep System, No reflux in right lower extremity, No DVT or SVT    Hyperlipidemia     Hypertension     Obesity     S/P PTCA (percutaneous transluminal coronary angioplasty) 03/21/2005    s/p PTCA with 3.5 X 16 TAXUS stent to LAD- follows with Dr Zander Dumont Sleep apnea     had sleep study done 10+ yrs ago- has cpap but does not use it    Thyroid disease     hypothyroid       CURRENT MEDICATIONS  Prior to Admission medications    Medication Sig Start Date End Date Taking?  Authorizing Provider   pravastatin (PRAVACHOL) 80 MG tablet TAKE 1 TABLET BY MOUTH ONCE DAILY 5/10/19  Yes ANKITA Henderson CNP   albuterol sulfate HFA (VENTOLIN HFA) 108 (90 Base) MCG/ACT inhaler Inhale 2 puffs into the lungs every 6 hours as needed for Wheezing 4/1/19  Yes ANKITA Henderson CNP   cetirizine (ZYRTEC) 10 MG tablet Take 1 tablet by mouth daily 4/1/19  Yes ANKITA Henderson CNP   carvedilol (COREG) 25 MG tablet Take 1 tablet by mouth 3 times daily 1/29/19  Yes ANKITA Henderson CNP   allopurinol (ZYLOPRIM) 100 MG tablet TAKE 1 TABLET BY MOUTH TWICE DAILY 1/29/19  Yes ANKITA Henderson CNP   digoxin (LANOXIN) 250 MCG tablet Take 1 tablet by mouth daily 1/29/19  Yes ANKITA Henderson CNP   diltiazem (CARDIZEM) 120 MG tablet Take 1 tablet by mouth daily 1/29/19  Yes ANKITA Henderson CNP   DULoxetine (CYMBALTA) 60 MG extended release capsule Take 1 capsule by mouth daily 1/29/19 6/3/19 Yes ANKITA Henderson CNP   Elastic Bandages & Supports ([address]) [facility] 1 each by Does not apply route daily 1/29/19  Yes ANKITA Henderson CNP   furosemide (LASIX) 40 MG tablet Take 1 tablet by mouth nightly 1/29/19  Yes Alexis Rojas, APRN - CNP   insulin regular (NOVOLIN R) 100 UNIT/ML injection Inject 0-10 Units into the skin 3 times daily (before meals) 1/29/19  Yes Ed ANKITA Clement CNP   levothyroxine (SYNTHROID) 50 MCG tablet Take 1 tablet by mouth daily 1/29/19  Yes Ed ANKITA Clement CNP   losartan (COZAAR) 50 MG tablet Take 1 tablet by mouth daily 1/29/19  Yes Ed ANKITA Clement CNP   metFORMIN (GLUCOPHAGE) 1000 MG tablet TAKE ONE TABLET BY MOUTH TWICE DAILY WITH MEALS 1/29/19  Yes Ed ANKITA Clement CNP   polyethylene glycol (GLYCOLAX) powder Take 17 g by mouth daily 1/29/19  Yes Ed ANKITA Clement CNP   warfarin (COUMADIN) 2 MG tablet Take 1 tablet by mouth three times a week **On MWF.** 1/30/19  Yes Ed ANKITA Clement CNP   warfarin (COUMADIN) 3 MG tablet Take 1 tablet by mouth every 24 hours **OR AS DIRECTED PER ** 1/29/19  Yes Ed ANKITA Clement CNP   Lancets MISC by D3EA route three times a day.  1/29/19  Yes Ed ANKITA Clement CNP   glimepiride (AMARYL) 2 MG tablet Take 1 tablet by mouth every morning (before breakfast) 1/28/19  Yes Ed ANKITA Clement CNP   insulin NPH (HUMULIN N;NOVOLIN N) 100 UNIT/ML injection vial Inject 35 Units into the skin 2 times daily (before meals) 1/29/19   Ed ANKITA Clement CNP       ALLERGIES  No Known Allergies    SURGICAL HISTORY  Past Surgical History:   Procedure Laterality Date    CARDIAC CATHETERIZATION  6/12/08    mild disease mid RCA,  stent to LAD patent,    CARDIAC CATHETERIZATION  3/07    Stent to LAD patent, Diagonal 40-50%, RCA 40-50%    CARDIAC CATHETERIZATION  3/05    COLONOSCOPY  2011    COLONOSCOPY  5/18/16    1 polyp removed, diverticulosis and hemorrhoids noted    CORONARY ANGIOPLASTY WITH STENT PLACEMENT  03/21/2005    PTCA with 3.5 x 16 TAXUS stent to LAD    ENDOSCOPY, COLON, DIAGNOSTIC  2014    egd    HAND SURGERY Right 1997       FAMILY HISTORY  Family History   Problem Relation Age of Onset    Cancer Mother         breast ca   Baljinder Valdez Coronary Art Dis Father          MI    Heart Disease Father     Rheum Arthritis Other     Rheum Arthritis Sister     No Known Problems Brother     Rheum Arthritis Sister        SOCIAL HISTORY  Social History     Socioeconomic History    Marital status:      Spouse name: None    Number of children: 1    Years of education: None    Highest education level: None   Occupational History     Comment: RETIRED   Social Needs    Financial resource strain: None    Food insecurity:     Worry: None     Inability: None    Transportation needs:     Medical: None     Non-medical: None   Tobacco Use    Smoking status: Former Smoker     Packs/day: 1.00     Years: 10.00     Pack years: 10.00     Types: Cigarettes     Last attempt to quit: 1976     Years since quittin.4    Smokeless tobacco: Never Used   Substance and Sexual Activity    Alcohol use:  Yes     Alcohol/week: 0.6 - 1.2 oz     Types: 1 - 2 Cans of beer per week     Comment: 1-2 times per week    Drug use: No    Sexual activity: Never   Lifestyle    Physical activity:     Days per week: None     Minutes per session: None    Stress: None   Relationships    Social connections:     Talks on phone: None     Gets together: None     Attends [employer] service: None     Active member of club or organization: None     Attends meetings of clubs or organizations: None     Relationship status: None    Intimate partner violence:     Fear of current or ex partner: None     Emotionally abused: None     Physically abused: None     Forced sexual activity: None   Other Topics Concern    None   Social History Narrative    None       REVIEW OF SYSTEMS   Review of Systems - { denies f/c/n/v    PHYSICAL EXAM  VITAL SIGNS: BP (!) 176/81   Pulse 102   Temp 98.8 °F (37.1 °C) (Oral)   Resp 18   Ht 6' 2\" (1.88 m)   Wt (!) 310 lb (140.6 kg)   SpO2 93%   BMI 39.80 kg/m²    General Appearance Alert, well appearing, No acute distress   Eyes clear   Ears, Nose,

## 2019-06-03 NOTE — ED PROVIDER NOTES
Triage Chief Complaint:   Ankle Pain (R ankle injury after falling off lawnmower)    [place]:  Sebas De La Rosa is a 77 y.o. male that presents with right ankle pain after he stepped off his lawnmower and felt instant pain and a pop in that ankle. He feels some numbness over his toes in that foot which is new. He does have some chronic swelling is bilateral legs but feels that there is more swelling in his right ankle than previous. He denies any other injuries from the incident. He is on coumadin. ROS:   Review of Systems   Constitutional: Negative for chills and diaphoresis. Respiratory: Negative for shortness of breath. Cardiovascular: Negative for chest pain. Musculoskeletal: Positive for arthralgias (R ankle pain) and joint swelling (R ankle). Neurological: Positive for numbness (right toes). Negative for dizziness, weakness and light-headedness. Past Medical History:   Diagnosis Date    Allergic rhinitis     Anticoagulated on Coumadin     1/6/17-**[facility] to follow pt's PT/INRs, along w/prescribing his Coumadin. **    Anxiety     Arthritis     Atrial fibrillation (HCC)     On Coumadin.     CAD (coronary artery disease)     COPD (chronic obstructive pulmonary disease) (HCC)     Depression     Diabetes mellitus (HCC)     NIDDM- dx over 10 yrs ago- follows with Dr Christiano Pinon's contracture of hand     Right hand    Family history of cardiovascular disease     Father-MI    GERD (gastroesophageal reflux disease)     H/O cardiac catheterization 03/2007    cardiac cath- stent LAD patent, [place]-56-50%, RCA 40-50%    H/O cardiac catheterization 06/12/2008    cardiac cath- mild disease mid RCA    H/O cardiovascular stress test 4/10/2014    cardiolite- normal, no ischemia, EF63%    H/O cardiovascular stress test 09/21/2016    EF59% normal study  pt in atrial fib    H/O cardiovascular stress test 09/20/2017    EF 60%   afib    H/O cardiovascular stress test 11/07/2018    EF60% normal study    H/O Doppler ultrasound     1/11/2010-CAROTID_Intimal thickening but no significant atherosclerotic plaque noted in LAUREN. Doppler flow velocities within the LAUREN are WNL. Intimal thickening but no significant atherosclerotic plaque noted in LICA. Doppler flow velociities within the LICA are WNL.  H/O echocardiogram 04/10/2014    EF 60%, normal LV systolic function, mild mitral and tricuspid insufficiencies, no pericardial effusion.  H/O echocardiogram 09/21/2016    EF60% normal study    H/O echocardiogram 11/07/2018    EF55-60% no significant valular disease    H/O hiatal hernia     Hx of Venous Doppler 03/14/2019    Significant reflux in Left Deep System, No reflux in right lower extremity, No DVT or SVT    Hyperlipidemia     Hypertension     Obesity     S/P PTCA (percutaneous transluminal coronary angioplasty) 03/21/2005    s/p PTCA with 3.5 X 16 TAXUS stent to LAD- follows with Dr Ruben Hylton Sleep apnea     had sleep study done 10+ yrs ago- has cpap but does not use it    Thyroid disease     hypothyroid     Past Surgical History:   Procedure Laterality Date    CARDIAC CATHETERIZATION  6/12/08    mild disease mid RCA,  stent to LAD patent,    CARDIAC CATHETERIZATION  3/07    Stent to LAD patent, Diagonal 40-50%, RCA 40-50%    CARDIAC CATHETERIZATION  3/05    COLONOSCOPY  2011    COLONOSCOPY  5/18/16    1 polyp removed, diverticulosis and hemorrhoids noted    CORONARY ANGIOPLASTY WITH STENT PLACEMENT  03/21/2005    PTCA with 3.5 x 16 TAXUS stent to LAD    ENDOSCOPY, COLON, DIAGNOSTIC  2014    egd    HAND SURGERY Right 1997     Family History   Problem Relation Age of Onset    Cancer Mother         breast ca    Coronary Art Dis Father          MI    Heart Disease Father     Rheum Arthritis Other     Rheum Arthritis Sister     No Known Problems Brother     Rheum Arthritis Sister      Social History     Socioeconomic History    Marital status:   Spouse name: Not on file    Number of children: 1    Years of education: Not on file    Highest education level: Not on file   Occupational History     Comment: RETIRED   Social Needs    Financial resource strain: Not on file    Food insecurity:     Worry: Not on file     Inability: Not on file    Transportation needs:     Medical: Not on file     Non-medical: Not on file   Tobacco Use    Smoking status: Former Smoker     Packs/day: 1.00     Years: 10.00     Pack years: 10.00     Types: Cigarettes     Last attempt to quit: 1976     Years since quittin.4    Smokeless tobacco: Never Used   Substance and Sexual Activity    Alcohol use:  Yes     Alcohol/week: 0.6 - 1.2 oz     Types: 1 - 2 Cans of beer per week     Comment: 1-2 times per week    Drug use: No    Sexual activity: Never   Lifestyle    Physical activity:     Days per week: Not on file     Minutes per session: Not on file    Stress: Not on file   Relationships    Social connections:     Talks on phone: Not on file     Gets together: Not on file     Attends Baptism service: Not on file     Active member of club or organization: Not on file     Attends meetings of clubs or organizations: Not on file     Relationship status: Not on file    Intimate partner violence:     Fear of current or ex partner: Not on file     Emotionally abused: Not on file     Physically abused: Not on file     Forced sexual activity: Not on file   Other Topics Concern    Not on file   Social History Narrative    Not on file     Current Facility-Administered Medications   Medication Dose Route Frequency Provider Last Rate Last Dose    morphine sulfate (PF) injection 4 mg  4 mg Intramuscular Q30 Min PRN Lalit Jackson MD         Current Outpatient Medications   Medication Sig Dispense Refill    pravastatin (PRAVACHOL) 80 MG tablet TAKE 1 TABLET BY MOUTH ONCE DAILY 30 tablet 2    albuterol sulfate HFA (VENTOLIN HFA) 108 (90 Base) MCG/ACT inhaler Inhale 2 puffs into the lungs every 6 hours as needed for Wheezing 1 Inhaler 3    cetirizine (ZYRTEC) 10 MG tablet Take 1 tablet by mouth daily 90 tablet 1    carvedilol (COREG) 25 MG tablet Take 1 tablet by mouth 3 times daily 90 tablet 3    allopurinol (ZYLOPRIM) 100 MG tablet TAKE 1 TABLET BY MOUTH TWICE DAILY 180 tablet 0    digoxin (LANOXIN) 250 MCG tablet Take 1 tablet by mouth daily 90 tablet 3    diltiazem (CARDIZEM) 120 MG tablet Take 1 tablet by mouth daily 90 tablet 3    DULoxetine (CYMBALTA) 60 MG extended release capsule Take 1 capsule by mouth daily 90 capsule 1    Elastic Bandages & Supports (MEDICAL COMPRESSION STOCKINGS) MISC 1 each by Does not apply route daily 1 each 0    furosemide (LASIX) 40 MG tablet Take 1 tablet by mouth nightly 30 tablet 1    insulin NPH (HUMULIN N;NOVOLIN N) 100 UNIT/ML injection vial Inject 35 Units into the skin 2 times daily (before meals) 3 vial 2    insulin regular (NOVOLIN R) 100 UNIT/ML injection Inject 0-10 Units into the skin 3 times daily (before meals) 1 vial 3    levothyroxine (SYNTHROID) 50 MCG tablet Take 1 tablet by mouth daily 30 tablet 2    losartan (COZAAR) 50 MG tablet Take 1 tablet by mouth daily 30 tablet 3    metFORMIN (GLUCOPHAGE) 1000 MG tablet TAKE ONE TABLET BY MOUTH TWICE DAILY WITH MEALS 180 tablet 1    polyethylene glycol (GLYCOLAX) powder Take 17 g by mouth daily 225 g 2    warfarin (COUMADIN) 2 MG tablet Take 1 tablet by mouth three times a week **On MWF.** 30 tablet 1    warfarin (COUMADIN) 3 MG tablet Take 1 tablet by mouth every 24 hours **OR AS DIRECTED PER ** 30 tablet 1    Lancets MISC by D3EA route three times a day.  100 each 3    glimepiride (AMARYL) 2 MG tablet Take 1 tablet by mouth every morning (before breakfast) 90 tablet 1     No Known Allergies    Nursing Notes Reviewed     Physical Exam:   ED Triage Vitals [06/02/19 2137]   Enc Vitals Group      BP       Pulse       Resp       Temp       Temp Source Oral      SpO2 strain, sprain. X-ray shows a comminuted mildly displaced fracture of the distal tibia with involvement of the articular surface. There is also a comminuted impacted fracture of the distal fibula and medial malleolus with subtle widening of the ankle mortise. Patient given pain medication as needed. I did consult with Dr. Pooja Rodriguez - on call for orthopedic surgery - and discussed patient's history, ED presentation/course including any pertinent laboratory findings and imaging study findings. He recommends admission to the hospital for repair of the fracture. I spoke with Dr. Rut Richardson, who graciously agreed to admit the patient. Plan of care explained to patient. All questions and concerns were addressed to the patient's satisfaction. Patient understood and agreed with plan. Clinical Impression:  1. Bimalleolar fracture, right, closed, initial encounter          Angelita Shahid MD       Please note that portions of this note may have been complete with a voice recognition program.  Effortswere made to edit the dictations, but occasional words are mis-transcribed. Procedure Note:  SPLINT PLACEMENT   R ankle     A splint was applied by the emergency department technician. The splint is in good position. The patient's extremity is neurovascularly intact after the splint placement.        Angelita Shahid MD  06/02/19 0737

## 2019-06-03 NOTE — H&P
History and Physical      Name:  Uzma Coronado /Age/Sex: 1950  (77 y.o. male)   MRN & CSN:  [id number] & [id number] Admission Date/Time: 2019  9:36 PM   Location:  [place] [address] PCP: Jony Maciel 112 Day: 1    Assessment and Plan:   Uzma Coronado is a 77 y.o.  male  who presents with Right ankle pain s/p fall, dx with ankle fracture. > Acute right ankle fracture s/p fall  - admit, NPO, ortho consulted, warfarin held, INR pending, plan to give Vit K po if elevated. > Chronic atrial fibrillation   - hold warfarin, continue with digoxin, cardizem, INR pending, plan to give Vit K po if elevated. > Type 2 diabetes mellitus without complication, with long-term current use of insulin  - hold home meds, cover with SSI, consult endo for kylah op management. ,     > Essential HTN  - continue home meds, IV hydralazine prn    > h/o COPD  > h/o Coronary artery disease   > Hypothyroidism  > Mixed hyperlipidemia   - continue home meds    Diet No diet orders on file   DVT Prophylaxis Hold warfarin for anticipated surgery   Code Status Prior     History of Present Illness:     Chief Complaint: Right ankle pain s/p fall, dx with ankle fracture. Uzma Coronado is a 77 y.o.  male  who presents with Right ankle pain s/p fall, dx with ankle fracture. Pt presented with right ankle sharp pain, no radiation, worse with movement, better with rest, associated with edema. Happened after he fell off of his mower. No dizziness, no chest pain, no dyspnea, no abd pain, no N/V, no F/C.     10-14 point ROS reviewed negative, unless as noted above     Objective:   No intake or output data in the 24 hours ending 19 2332   Vitals:   Vitals:    19 2205   BP: (!) 156/77   Pulse: 83   Resp: 23   Temp:    SpO2: 97%     Physical Exam:    GEN Awake male, sitting upright in bed in no apparent distress. Obese, Appears given age. EYES Pupils are equally round.   No scleral erythema, discharge, or conjunctivitis. HENT Mucous membranes are moist.   NECK No apparent thyromegaly or masses. RESP Clear to auscultation, no wheezes, rales or rhonchi. Symmetric chest movement . CARDIO/VASC S1/S2 auscultated. Regular rate . peripheral edema. GI Abdomen is soft without significant tenderness, or guarding. Bowel sounds are normoactive. Rectal exam deferred.  Gonzalez catheter is not present. MSK Right ankle tender localized swelling, tenderness with ROM. Spontaneous movement of all extremities  SKIN Normal coloration, warm, dry. NEURO Cranial nerves appear grossly intact, normal speech, no lateralizing weakness. PSYCH Awake, alert, oriented x 4. Affect appropriate. Past Medical History:      Past Medical History:   Diagnosis Date    Allergic rhinitis     Anticoagulated on Coumadin     1/6/17-**[facility] to follow pt's PT/INRs, along w/prescribing his Coumadin. **    Anxiety     Arthritis     Atrial fibrillation (HCC)     On Coumadin.  CAD (coronary artery disease)     COPD (chronic obstructive pulmonary disease) (HCC)     Depression     Diabetes mellitus (HCC)     NIDDM- dx over 10 yrs ago- follows with Dr Leena New's contracture of hand     Right hand    Family history of cardiovascular disease     Father-MI    GERD (gastroesophageal reflux disease)     H/O cardiac catheterization 03/2007    cardiac cath- stent LAD patent, [place]-12-05%, RCA 40-50%    H/O cardiac catheterization 06/12/2008    cardiac cath- mild disease mid RCA    H/O cardiovascular stress test 4/10/2014    cardiolite- normal, no ischemia, EF63%    H/O cardiovascular stress test 09/21/2016    EF59% normal study  pt in atrial fib    H/O cardiovascular stress test 09/20/2017    EF 60%   afib    H/O cardiovascular stress test 11/07/2018    EF60% normal study    H/O Doppler ultrasound     1/11/2010-CAROTID_Intimal thickening but no significant atherosclerotic plaque noted in LAUREN. Doppler flow velocities within the LAUREN are WNL. Intimal thickening but no significant atherosclerotic plaque noted in LICA. Doppler flow velociities within the LICA are WNL.  H/O echocardiogram 04/10/2014    EF 60%, normal LV systolic function, mild mitral and tricuspid insufficiencies, no pericardial effusion.  H/O echocardiogram 09/21/2016    EF60% normal study    H/O echocardiogram 11/07/2018    EF55-60% no significant valular disease    H/O hiatal hernia     Hx of Venous Doppler 03/14/2019    Significant reflux in Left Deep System, No reflux in right lower extremity, No DVT or SVT    Hyperlipidemia     Hypertension     Obesity     S/P PTCA (percutaneous transluminal coronary angioplasty) 03/21/2005    s/p PTCA with 3.5 X 16 TAXUS stent to LAD- follows with Dr Laura Atkinson Sleep apnea     had sleep study done 10+ yrs ago- has cpap but does not use it    Thyroid disease     hypothyroid     PSHX:  has a past surgical history that includes Coronary angioplasty with stent (03/21/2005); Cardiac catheterization (6/12/08); Cardiac catheterization (3/07); Cardiac catheterization (3/05); Endoscopy, colon, diagnostic (2014); Colonoscopy (2011); Colonoscopy (5/18/16); and Hand surgery (Right, 1997). Allergies: No Known Allergies    FAM HX: family history includes Cancer in his mother; Coronary Art Dis in his father; Heart Disease in his father; No Known Problems in his brother; Rheum Arthritis in his sister, sister and another family member. Soc HX:   Social History     Socioeconomic History    Marital status:       Spouse name: None    Number of children: 1    Years of education: None    Highest education level: None   Occupational History     Comment: RETIRED   Social Needs    Financial resource strain: None    Food insecurity:     Worry: None     Inability: None    Transportation needs:     Medical: None     Non-medical: None   Tobacco Use    Smoking status: Former Smoker     Packs/day: .00     Years: 10.00     Pack years: 10.00     Types: Cigarettes     Last attempt to quit: 1976     Years since quittin.4    Smokeless tobacco: Never Used   Substance and Sexual Activity    Alcohol use: Yes     Alcohol/week: 0.6 - 1.2 oz     Types: 1 - 2 Cans of beer per week     Comment: 1-2 times per week    Drug use: No    Sexual activity: Never   Lifestyle    Physical activity:     Days per week: None     Minutes per session: None    Stress: None   Relationships    Social connections:     Talks on phone: None     Gets together: None     Attends Buddhism service: None     Active member of club or organization: None     Attends meetings of clubs or organizations: None     Relationship status: None    Intimate partner violence:     Fear of current or ex partner: None     Emotionally abused: None     Physically abused: None     Forced sexual activity: None   Other Topics Concern    None   Social History Narrative    None       Medications:   Medications:   Prior to Admission medications    Medication Sig Start Date End Date Taking?  Authorizing Provider   pravastatin (PRAVACHOL) 80 MG tablet TAKE 1 TABLET BY MOUTH ONCE DAILY 5/10/19   Janeice ANKITA Dias CNP   albuterol sulfate HFA (VENTOLIN HFA) 108 (90 Base) MCG/ACT inhaler Inhale 2 puffs into the lungs every 6 hours as needed for Wheezing 4/1/19   Janeice FootANKITA menezes CNP   cetirizine (ZYRTEC) 10 MG tablet Take 1 tablet by mouth daily 4/1/19   Janeice FootANKITA menezes CNP   carvedilol (COREG) 25 MG tablet Take 1 tablet by mouth 3 times daily 1/29/19   Janeice FootANKITA menezes CNP   allopurinol (ZYLOPRIM) 100 MG tablet TAKE 1 TABLET BY MOUTH TWICE DAILY 1/29/19   Janeice Footman, APRN - CNP   digoxin (LANOXIN) 250 MCG tablet Take 1 tablet by mouth daily 1/29/19   Janeice Footclif, APRN - CNP   diltiazem (CARDIZEM) 120 MG tablet Take 1 tablet by mouth daily 1/29/19   Janeice Footclif, ANKITA - CNP   DULoxetine (CYMBALTA) 60 MG extended release capsule Take 1 capsule by mouth daily 1/29/19 4/29/19  Ed ANKITA Clement CNP   Elastic Bandages & Supports ([address] [facility]) [address] 1 each by Does not apply route daily 1/29/19   Ed ANKITA Clement CNP   furosemide (LASIX) 40 MG tablet Take 1 tablet by mouth nightly 1/29/19   Ed ANKITA Clement CNP   insulin NPH (HUMULIN N;NOVOLIN N) 100 UNIT/ML injection vial Inject 35 Units into the skin 2 times daily (before meals) 1/29/19   Ed ANKITA Clement CNP   insulin regular (NOVOLIN R) 100 UNIT/ML injection Inject 0-10 Units into the skin 3 times daily (before meals) 1/29/19   Ed ANKITA Clement CNP   levothyroxine (SYNTHROID) 50 MCG tablet Take 1 tablet by mouth daily 1/29/19   Ed ANKITA Clement CNP   losartan (COZAAR) 50 MG tablet Take 1 tablet by mouth daily 1/29/19   Ed ANKITA Clement CNP   metFORMIN (GLUCOPHAGE) 1000 MG tablet TAKE ONE TABLET BY MOUTH TWICE DAILY WITH MEALS 1/29/19   Ed ANKITA Clement CNP   polyethylene glycol [facility]) powder Take 17 g by mouth daily 1/29/19   Ed ANKITA Clement CNP   warfarin (COUMADIN) 2 MG tablet Take 1 tablet by mouth three times a week **On MWF.** 1/30/19   Ed ANKITA Clement CNP   warfarin (COUMADIN) 3 MG tablet Take 1 tablet by mouth every 24 hours **OR AS DIRECTED PER ** 1/29/19   Ed ANKITA Clement CNP   Lancets MISC by D3EA route three times a day.  1/29/19   Ed ANKITA Clement CNP   glimepiride (AMARYL) 2 MG tablet Take 1 tablet by mouth every morning (before breakfast) 1/28/19   Ed ANKITA Clement CNP      Infusions:   PRN Meds:   morphine 4 mg Q30 Min PRN         Electronically signed by Amanda Linn MD on 6/2/2019 at 11:32 PM

## 2019-06-03 NOTE — ED NOTES
Dr Ira Peña and Mya CHOUDHARY at bedside applying splint at this time.       Caren Hernández RN  06/03/19 [id number]

## 2019-06-03 NOTE — ED NOTES
Bed: 04TR-04  Expected date:   Expected time:   Means of arrival:   Comments:  Jeff Less. 77 M fell while getting off , Possible Right ankle fracture, VSS, A&O     Heather B. Hyla Severs  06/02/19 [id number]

## 2019-06-03 NOTE — ANESTHESIA PRE PROCEDURE
Department of Anesthesiology  Preprocedure Note       Name:  Pilar Lambert   Age:  77 y.o.  :  1950                                          MRN:  [id number]         Date:  6/3/2019      Surgeon: Ashley Paul):  Tae España MD    Procedure: RIGHT ANKLE EXTERNAL FIXATOR APPLICATION VS. OPEN REDUCTION INTERNAL FIXATION (Right )    Medications prior to admission:   Prior to Admission medications    Medication Sig Start Date End Date Taking?  Authorizing Provider   pravastatin (PRAVACHOL) 80 MG tablet TAKE 1 TABLET BY MOUTH ONCE DAILY 5/10/19  Yes Derryl Rolls, APRN - CNP   albuterol sulfate HFA (VENTOLIN HFA) 108 (90 Base) MCG/ACT inhaler Inhale 2 puffs into the lungs every 6 hours as needed for Wheezing 19  Yes Derryl Rolls, APRN - CNP   cetirizine (ZYRTEC) 10 MG tablet Take 1 tablet by mouth daily 19  Yes Derryl Rolls, APRN - CNP   carvedilol (COREG) 25 MG tablet Take 1 tablet by mouth 3 times daily 19  Yes Derryl Rolls, APRN - CNP   allopurinol (ZYLOPRIM) 100 MG tablet TAKE 1 TABLET BY MOUTH TWICE DAILY 19  Yes Derryl Rolls, APRN - CNP   digoxin (LANOXIN) 250 MCG tablet Take 1 tablet by mouth daily 19  Yes Derryl Rolls, APRN - CNP   diltiazem (CARDIZEM) 120 MG tablet Take 1 tablet by mouth daily 19  Yes Derryl Rolls, APRN - CNP   DULoxetine (CYMBALTA) 60 MG extended release capsule Take 1 capsule by mouth daily 1/29/19 6/3/19 Yes Derryl Rolls, APRN - CNP   Elastic Bandages & Supports ([address] [facility]) [address] 1 each by Does not apply route daily 19  Yes Derryl Rolls, APRN - CNP   furosemide (LASIX) 40 MG tablet Take 1 tablet by mouth nightly 19  Yes Derryl Rolls, APRN - CNP   insulin regular (NOVOLIN R) 100 UNIT/ML injection Inject 0-10 Units into the skin 3 times daily (before meals) 19  Yes Derryl Rolls, APRN - CNP   levothyroxine (SYNTHROID) 50 MCG tablet Take 1 tablet by mouth daily 19  Yes Maribel Frias, APRN - MAGED   losartan (COZAAR) 50 MG tablet Take 1 tablet by mouth daily 1/29/19  Yes ANKITA Thompson CNP   metFORMIN (GLUCOPHAGE) 1000 MG tablet TAKE ONE TABLET BY MOUTH TWICE DAILY WITH MEALS 1/29/19  Yes ANKITA Thompson CNP   polyethylene glycol [facility]) powder Take 17 g by mouth daily 1/29/19  Yes ANKITA Thompson CNP   warfarin (COUMADIN) 2 MG tablet Take 1 tablet by mouth three times a week **On MWF.** 1/30/19  Yes ANKITA Thompson CNP   warfarin (COUMADIN) 3 MG tablet Take 1 tablet by mouth every 24 hours **OR AS DIRECTED PER ** 1/29/19  Yes ANKITA Thompson CNP   Lancets MISC by D3EA route three times a day.  1/29/19  Yes ANKITA Thompson CNP   glimepiride (AMARYL) 2 MG tablet Take 1 tablet by mouth every morning (before breakfast) 1/28/19  Yes ANKITA Thompson CNP   insulin NPH (HUMULIN N;NOVOLIN N) 100 UNIT/ML injection vial Inject 35 Units into the skin 2 times daily (before meals) 1/29/19   ANKITA Thompson CNP       Current medications:    Current Facility-Administered Medications   Medication Dose Route Frequency Provider Last Rate Last Dose    sodium chloride flush 0.9 % injection 10 mL  10 mL Intravenous 2 times per day Kyree Castellanos MD   10 mL at 06/03/19 0946    sodium chloride flush 0.9 % injection 10 mL  10 mL Intravenous PRN Kyree Castellanos MD        magnesium hydroxide (MILK OF MAGNESIA) 400 MG/5ML suspension 30 mL  30 mL Oral Daily PRN Kyree Castellanos MD        ondansetron (ZOFRAN) injection 4 mg  4 mg Intravenous Q6H PRN Yimi Brown MD        glucose (GLUTOSE) 40 % oral gel 15 g  15 g Oral PRN Kyree Castellanos MD        dextrose 50 % solution 12.5 g  12.5 g Intravenous PRN Kyree Castellanos MD        glucagon (rDNA) injection 1 mg  1 mg Intramuscular PRN Kyree Castellanos MD        dextrose 5 % solution  100 mL/hr Intravenous PRN Kyree Castellanos MD        insulin lispro (HUMALOG) injection vial 0-6 Units  0-6 Units Subcutaneous TID  Kyree Castellanos MD   3 Units at 06/03/19 4232    insulin lispro (HUMALOG) injection vial 0-3 Units  0-3 Units Subcutaneous Nightly Violette Ramirez MD   1 Units at 06/03/19 0234    albuterol (PROVENTIL) nebulizer solution 2.5 mg  2.5 mg Nebulization Q6H PRN Yimi Brown MD        acetaminophen (TYLENOL) tablet 650 mg  650 mg Oral Q4H PRN Yimi Brown MD        oxyCODONE (ROXICODONE) immediate release tablet 5 mg  5 mg Oral Q4H PRN Yimi Brown MD        morphine sulfate (PF) injection 2 mg  2 mg Intravenous Q4H PRN Violette Ramirez MD   2 mg at 06/03/19 0559    morphine sulfate (PF) injection 4 mg  4 mg Intravenous Q30 Min PRN Violette Ramirez MD   4 mg at 06/03/19 0040    albuterol (PROVENTIL) nebulizer solution 2.5 mg  2.5 mg Nebulization Q6H PRN Yimi Brown MD        allopurinol (ZYLOPRIM) tablet 100 mg  100 mg Oral BID Violette Ramirez MD   100 mg at 06/03/19 0233    carvedilol (COREG) tablet 25 mg  25 mg Oral TID Violette Ramirez MD   25 mg at 06/03/19 0617    digoxin (LANOXIN) tablet 125 mcg  125 mcg Oral Daily Yimi Brown MD   125 mcg at 06/03/19 1477    diltiazem (CARDIZEM) tablet 120 mg  120 mg Oral Daily Violette Ramirez MD   120 mg at 06/03/19 9315    DULoxetine (CYMBALTA) extended release capsule 60 mg  60 mg Oral Daily Violette Ramirez MD        levothyroxine (SYNTHROID) tablet 50 mcg  50 mcg Oral Daily Yimi Brown MD   50 mcg at 06/03/19 0617    losartan (COZAAR) tablet 50 mg  50 mg Oral Daily Violette Ramirez MD   50 mg at 06/03/19 1729    hydrALAZINE (APRESOLINE) 10 mg in sodium chloride 0.9 % 50 mL ivpb  10 mg Intravenous Q4H PRN Violette Ramirez MD   Stopped at 06/03/19 0315    simvastatin (ZOCOR) tablet 40 mg  40 mg Oral Nightly Violette Ramirez MD   40 mg at 06/03/19 0234       Allergies:  No Known Allergies    Problem List:    Patient Active Problem List   Diagnosis Code    Atrial fibrillation (HCC) I48.91    CAD (coronary artery disease) I25.10    Obesity E66.9    [facility] J44.9    Sleep apnea G47.30    Type 2 diabetes mellitus (HCC) E11.9    Acute gouty arthritis M10.9    Thyroid disease E07.9    Hyperlipidemia E78.5    Dupuytren's contracture of hand M72.0    SOB (shortness of breath) R06.02    Swelling of extremity M79.89    Ankle fracture S82.899A    Ankle fracture, left, closed, initial encounter G76.275O       Past Medical History:        Diagnosis Date    Allergic rhinitis     Anticoagulated on Coumadin     1/6/17-**[place] to follow pt's PT/INRs, along w/prescribing his Coumadin. **    Anxiety     Arthritis     Atrial fibrillation (HCC)     On Coumadin.  CAD (coronary artery disease)     [facility]) ([facility])     Depression     Diabetes mellitus ([facility])     NIDDM- dx over 10 yrs ago- follows with Dr Celestino Duongtrekatie's contracture of hand     Right hand    Family history of cardiovascular disease     Father-MI    GERD (gastroesophageal reflux disease)     H/O cardiac catheterization 03/2007    cardiac cath- stent LAD patent, Qsdjoxrs-12-21%, RCA 40-50%    H/O cardiac catheterization 06/12/2008    cardiac cath- mild disease mid RCA    H/O cardiovascular stress test 4/10/2014    cardiolite- normal, no ischemia, EF63%    H/O cardiovascular stress test 09/21/2016    EF59% normal study  pt in atrial fib    H/O cardiovascular stress test 09/20/2017    EF 60%   afib    H/O cardiovascular stress test 11/07/2018    EF60% normal study    H/O Doppler ultrasound     1/11/2010-CAROTID_Intimal thickening but no significant atherosclerotic plaque noted in LAUREN. Doppler flow velocities within the LAUREN are WNL. Intimal thickening but no significant atherosclerotic plaque noted in LICA. Doppler flow velociities within the LICA are WNL.  H/O echocardiogram 04/10/2014    EF 60%, normal LV systolic function, mild mitral and tricuspid insufficiencies, no pericardial effusion.     H/O echocardiogram 09/21/2016 EF60% normal study    H/O echocardiogram 2018    EF55-60% no significant valular disease    H/O hiatal hernia     Hx of Venous Doppler 2019    Significant reflux in Left Deep System, No reflux in right lower extremity, No DVT or SVT    Hyperlipidemia     Hypertension     Obesity     S/P PTCA (percutaneous transluminal coronary angioplasty) 2005    s/p PTCA with 3.5 X 16 TAXUS stent to LAD- follows with Dr Enedina Hyatt Sleep apnea     had sleep study done 10+ yrs ago- has cpap but does not use it    Thyroid disease     hypothyroid       Past Surgical History:        Procedure Laterality Date    CARDIAC CATHETERIZATION  08    mild disease mid RCA,  stent to LAD patent,    CARDIAC CATHETERIZATION  3/07    Stent to LAD patent, Diagonal 40-50%, RCA 40-50%    CARDIAC CATHETERIZATION  3/05    COLONOSCOPY      COLONOSCOPY  16    1 polyp removed, diverticulosis and hemorrhoids noted    CORONARY ANGIOPLASTY WITH STENT PLACEMENT  2005    PTCA with 3.5 x 16 TAXUS stent to LAD    ENDOSCOPY, COLON, DIAGNOSTIC      egd    HAND SURGERY Right        Social History:    Social History     Tobacco Use    Smoking status: Former Smoker     Packs/day: 1.00     Years: 10.00     Pack years: 10.00     Types: Cigarettes     Last attempt to quit: 1976     Years since quittin.4    Smokeless tobacco: Never Used   Substance Use Topics    Alcohol use:  Yes     Alcohol/week: 0.6 - 1.2 oz     Types: 1 - 2 Cans of beer per week     Comment: 1-2 times per week                                Counseling given: Not Answered      Vital Signs (Current):   Vitals:    19 0030 19 0105 19 0345 19 0930   BP: (!) 172/59 (!) 188/87 (!) 160/70 (!) 176/81   Pulse: 86 93 105 102   Resp:  18   Temp:  37 °C (98.6 °F) 37.3 °C (99.1 °F) 37.1 °C (98.8 °F)   TempSrc:  Oral Oral Oral   SpO2: 97% 94% 94% 93%   Weight:       Height: BP Readings from Last 3 Encounters:   06/03/19 (!) 176/81   04/19/19 (!) 169/78   04/01/19 130/70       NPO Status:                                                                                 BMI:   Wt Readings from Last 3 Encounters:   06/02/19 (!) 310 lb (140.6 kg)   04/19/19 (!) 320 lb (145.2 kg)   04/01/19 (!) 330 lb (149.7 kg)     Body mass index is 39.8 kg/m². CBC:   Lab Results   Component Value Date    WBC 8.7 06/03/2019    RBC 3.76 06/03/2019    HGB 12.9 06/03/2019    HCT 39.0 06/03/2019    .7 06/03/2019    RDW 13.9 06/03/2019     06/03/2019       CMP:   Lab Results   Component Value Date     06/03/2019    K 4.1 06/03/2019     06/03/2019    CO2 23 06/03/2019    BUN 20 06/03/2019    CREATININE 0.9 06/03/2019    GFRAA >60 06/03/2019    AGRATIO 1.3 01/07/2019    LABGLOM >60 06/03/2019    GLUCOSE 162 06/03/2019    PROT 7.4 06/03/2019    CALCIUM 9.0 06/03/2019    BILITOT 0.3 06/03/2019    ALKPHOS 99 06/03/2019    AST 38 06/03/2019    ALT 25 06/03/2019       POC Tests:   Recent Labs     06/03/19  0917   POCGLU 270*       Coags:   Lab Results   Component Value Date    PROTIME 22.4 06/03/2019    PROTIME 28.9 04/01/2019    INR 1.98 06/03/2019    APTT 32.3 06/03/2019       HCG (If Applicable): No results found for: PREGTESTUR, PREGSERUM, HCG, HCGQUANT     ABGs: No results found for: PHART, PO2ART, JVD7CDG, SER3WZQ, BEART, J9UQHKOC     Type & Screen (If Applicable):  No results found for: [facility]    Anesthesia Evaluation  Patient summary reviewed and Nursing notes reviewed  Airway: Mallampati: III  TM distance: >3 FB   Neck ROM: full  Mouth opening: > = 3 FB Dental: normal exam         Pulmonary:normal exam    (+) COPD: mild,  shortness of breath:  sleep apnea: on noncompliant,            Patient did not smoke on day of surgery.                 ROS comment: etoh use fw beers 3 days week  Ex smoker    Cardiovascular:  Exercise tolerance: poor (<4 METS),   (+) hypertension: mild, CAD:, CABG/stent:, dysrhythmias: atrial fibrillation, hyperlipidemia        Rhythm: irregular  Rate: abnormal           Beta Blocker:  Dose within 24 Hrs      ROS comment: Atrial fibrillation with slow ventricular response with a competing junctional pacemaker   Left axis deviation   Right bundle branch block   Minimal voltage criteria for LVH, may be normal variant   Abnormal ECG   When compared with ECG of 24-FEB-2017 10:40,   Right bundle branch block has replaced Incomplete right bundle branch block   Confirmed by BRIA Drake ([id number]) on 1/9/2019 5:50:34 PM   Testing Performed By     [facility]   Left ventricular systolic function is normal with an ejection fraction of   55-60%.   No significant valvular disease or diastolic dysfunction noted.   Mild concentric left ventricular hypertrophy.   The left atrium is mildly dilated.   No evidence of pericardial effusion.      Signature      ------------------------------------------------------------------   Electronically signed by Joao Nur physician Dr. Evelio Winter .  Valentino Childress nuclear scintigraphic study suggestive of normal myocardial   perfusion.   Gated images demonstrate normal left ventricular systolic function with EF   of 60 %.       Recommendation   Continue present medical therapy and followup in office as scheduled      Signatures      ------------------------------------------------------------------   Electronically signed by Joao Monroe MD  [id number] cardiologist) on 11/08/2018 at 08:13   ------------------------------------------------------------------       hysician) on 11/07/2018 at 11:21 AM   ------------------------------------------------------------------   stent 2004 lad     Neuro/Psych:   (+) depression/anxiety             GI/Hepatic/Renal:   (+) GERD: well controlled, morbid obesity          Endo/Other:    (+) DiabetesType I DM, using insulin, hypothyroidism, blood dyscrasia: anticoagulation therapy, arthritis: OA., .                 Abdominal:           Vascular:   + PVD, aortic or cerebral, . Anesthesia Plan      general     ASA 3       Induction: intravenous. MIPS: Postoperative opioids intended and Prophylactic antiemetics administered. Anesthetic plan and risks discussed with patient. Use of blood products discussed with patient whom consented to blood products. Plan discussed with CRNA.     Attending anesthesiologist reviewed and agrees with Pre Eval content            ANKITA Samuels - CRNA   6/3/2019

## 2019-06-03 NOTE — ED NOTES
Report called and given to 4E RN. Pt to be transported to room 4030.      Sreedhar Dubose RN  06/03/19 [id number]

## 2019-06-03 NOTE — ED NOTES
Pt fell getting off of mower. Landed on R ankle, has R ankle pain and swelling. PMS intact. Pt does feel tingling around ankle area.       Rosina Vazquez RN  06/02/19 [id number]

## 2019-06-03 NOTE — CONSULTS
Endocrinology   Consult Note  Dear Doctor Hung Sage for the Consult     Pt. Was Admitted for : Ankle fracture after a fall    Reason for Consult: Better control of blood glucose    History Obtained From:  Patient/ EMR       HISTORY OF PRESENT ILLNESS:                The patient is a 77 y.o. male with significant past medical history of diabetes mellitus, CAD, COPD, Dupuytren's contractures of hand mostly right hand, hypothyroidism, sleep apnea, CAD with PTCA, hypertension and hyperlipidemia had a fall and fracture right ankle. I was  consulted for better control of blood glucose. ROS:   Pt's ROS done in detail. Abnormal ROS are noted in Medical and Surgical History Section below: Other Medical History:        Diagnosis Date    Allergic rhinitis     Anticoagulated on Coumadin     1/6/17-**[facility] to follow pt's PT/INRs, along w/prescribing his Coumadin. **    Anxiety     Arthritis     Atrial fibrillation (HCC)     On Coumadin.  CAD (coronary artery disease)     COPD (chronic obstructive pulmonary disease) (HCC)     Depression     Diabetes mellitus (HCC)     NIDDM- dx over 10 yrs ago- follows with Dr Merlin Faden Dupjesustrekatie's contracture of hand     Right hand    Family history of cardiovascular disease     Father-MI    GERD (gastroesophageal reflux disease)     H/O cardiac catheterization 03/2007    cardiac cath- stent LAD patent, [id number]%, RCA 40-50%    H/O cardiac catheterization 06/12/2008    cardiac cath- mild disease mid RCA    H/O cardiovascular stress test 4/10/2014    cardiolite- normal, no ischemia, EF63%    H/O cardiovascular stress test 09/21/2016    EF59% normal study  pt in atrial fib    H/O cardiovascular stress test 09/20/2017    EF 60%   afib    H/O cardiovascular stress test 11/07/2018    EF60% normal study    H/O Doppler ultrasound     1/11/2010-CAROTID_Intimal thickening but no significant atherosclerotic plaque noted in [facility].  Doppler m)   Wt (!) 310 lb (140.6 kg)   SpO2 94%   BMI 39.80 kg/m²     CONSTITUTIONAL:  awake, alert, cooperative, appears stated age   EYES:  vision intact Fundoscopic Exam not performed   ENT:Normal  NECK:  Supple, No JVD. Thyroid Exam:Normal   LUNGS:  Has Vesicular Breath Sounds,   CARDIOVASCULAR:  Normal apical impulse, regular rate and rhythm, normal S1 and S2, no S3 or S4, and has no  murmur   ABDOMEN:  No scars, normal bowel sounds, soft, non-distended, non-tender, no masses palpated, no hepatolienomegaly  Musculoskeletal: Normal  Extremities: Normal, peripheral pulses normal, , has no edema fracture right ankle  NEUROLOGIC:  Awake, alert, oriented to name, place and time. Cranial nerves II-XII are grossly intact. Motor is  intact. Sensory neuropathy t. ,  and gait is abnormal and unstable l.     DATA:    CBC:   Recent Labs     06/03/19  0017 06/03/19  0238   WBC 8.6 8.7   HGB 12.8* 12.9*    193    CMP:  Recent Labs     06/03/19  0017 06/03/19  0238    141   K 4.7 4.1    103   CO2 24 23   BUN 19 20   CREATININE 0.9 0.9   CALCIUM 9.2 9.0   PROT 7.4  --    LABALBU 3.9  --    BILITOT 0.3  --    ALKPHOS 99  --    AST 38*  --    ALT 25  --      Lipids:   Lab Results   Component Value Date    CHOL 139 07/23/2018    HDL 37 01/07/2019    TRIG 175 07/23/2018     Glucose:   Recent Labs     06/03/19  0226   POCGLU 156*     Hemoglobin A1C:   Lab Results   Component Value Date    LABA1C 7.5 06/03/2019     Free T4:   Lab Results   Component Value Date    T4FREE 1.04 01/03/2017     Free T3: No results found for: FT3  TSH High Sensitivity:   Lab Results   Component Value Date    TSHHS 2.540 01/03/2017       Xr Knee Right (1-2 Views)    Result Date: 6/3/2019  EXAMINATION: 2 X-RAY VIEWS OF THE RIGHT KNEE, 6/2/2019 11:28 pm COMPARISON: None HISTORY: ORDERING SYSTEM PROVIDED HISTORY: Pain TECHNOLOGIST PROVIDED HISTORY: Reason for exam:->Pain Ordering Physician Provided Reason for Exam: Pain Acuity: Acute Type of Exam: Initial Right knee pain. FINDINGS: There is no acute fracture, dislocation, or bone destruction. Significant patellar enthesopathy is noted. There is no evidence of a joint effusion. Moderate to severe patellar enthesopathy. No acute osseous abnormality. Xr Ankle Right (min 3 Views)    Result Date: 6/2/2019  EXAMINATION: 3 XRAY VIEWS OF THE RIGHT ANKLE 6/2/2019 9:40 pm COMPARISON: None. HISTORY: ORDERING SYSTEM PROVIDED HISTORY: trauma TECHNOLOGIST PROVIDED HISTORY: Reason for exam:->trauma Ordering Physician Provided Reason for Exam: fall off tractor Acuity: Acute Type of Exam: Initial FINDINGS: Comminuted oblique displaced fracture of the distal shaft of the tibia is noted. A component of this fracture appears to extend to the articular surface. There is also a comminuted impacted fracture of the lateral malleolus at the level of the joint line. Subtle asymmetric widening of the ankle mortise is noted medially. The talar dome is intact. Diffuse soft tissue swelling is noted. Enthesophyte formation is noted about the Achilles insertion and there is relative soft tissue enlargement near the Achilles insertion. Plantar calcaneal spur. Comminuted mildly displaced fracture in the distal tibia with involvement of the articular surface. Comminuted impacted fracture of the distal fibula and medial malleolus with subtle widening of the ankle mortise. Soft tissue enlargement at the level of the Achilles insertion. Enthesophyte formation is noted without discrete fracture in this location.        Scheduled Medicines   Medications:    sodium chloride flush  10 mL Intravenous 2 times per day    insulin lispro  0-6 Units Subcutaneous TID WC    insulin lispro  0-3 Units Subcutaneous Nightly    allopurinol  100 mg Oral BID    carvedilol  25 mg Oral TID    digoxin  125 mcg Oral Daily    diltiazem  120 mg Oral Daily    DULoxetine  60 mg Oral Daily    levothyroxine  50 mcg Oral Daily    losartan  50 mg Oral Daily    simvastatin  40 mg Oral Nightly      Infusions:    dextrose           IMPRESSION  Patient Active Problem List   Diagnosis    Atrial fibrillation (HCC)    CAD (coronary artery disease)    Obesity    COPD (chronic obstructive pulmonary disease) (HCC)    Sleep apnea    Type 2 diabetes mellitus (HCC)    Acute gouty arthritis    Thyroid disease    Hyperlipidemia    Dupuytren's contracture of hand    SOB (shortness of breath)    Swelling of extremity    Ankle fracture    Ankle fracture, right closed, initial encounter         RECOMMENDATIONS:      1. Reviewed POC blood glucose . Labs and X ray results   2. Reviewed Home and Current Medicines   3. Will Start On Correction bolus Humalog Insulin regime  4. Monitor Blood glucose frequently   5. Modify  the dose of Insulin/ OHGD  as needed        Will follow with you  Again thank you for sharing pt's care with me.      Truly yours,       Reva Roper MD

## 2019-06-03 NOTE — BRIEF OP NOTE
Brief Postoperative Note  ______________________________________________________________    Patient: Marybeth Wolff  YOB: 1950  MRN: [id number]  Date of Procedure: 6/3/2019    Pre-Op Diagnosis: Right closed displaced comminute distal tibia and fibula fracture     Post-Op Diagnosis: Same       Procedure(s):  RIGHT OPEN REDUCTION INTERNAL FIXATION OF DISTAL TIBIA  AND FIBULA    Anesthesia: General    Surgeon(s):  Fidelia Chaparro MD    Assistant: Cristhian Mccloud PA-C    Estimated Blood Loss (mL): 197     Complications: None    Specimens:   * No specimens in log *    Implants:  Implant Name Type Inv.  Item Serial No.  Lot No. LRB No. Used   SCREW CORTX SLFTP FTHRD 3.5X36MM Screw/Plate/Nail/Tutu SCREW CORTX SLFTP FTHRD 3.5X36MM  SYNTHES  Right 1   SCREW LK FTHRD SLFTP 3.5X46MM Screw/Plate/Nail/Tutu SCREW LK FTHRD SLFTP 3.5X46MM  SYNTHES  Right 3   SCREW CORTX SLFTP FTHRD 3.5X50MM Screw/Plate/Nail/Tutu SCREW CORTX SLFTP FTHRD 3.5X50MM  SYNTHES  Right 1   PLATE TIB M D LCP ST  12 RT [id number] Screw/Plate/Nail/Tutu PLATE TIB M D LCP ST  12 RT 3.0L583NX  SYNTHES  Right 1   SCREW LK SLFTP W/ 3.5X42MM Screw/Plate/Nail/Tutu SCREW LK SLFTP W/ 3.5X42MM  SYNTHES  Right 2   SCREW CORTX SLFTP FTHRD 3.5X28MM Screw/Plate/Nail/Tutu SCREW CORTX SLFTP FTHRD 3.5X28MM  SYNTHES  Right 1   SCREW CORTX SLFTP FTHRD 3.5X30MM Screw/Plate/Nail/Tutu SCREW CORTX SLFTP FTHRD 3.5X30MM  SYNTHES  Right 2   SCREW CORTX FTHRD SLFTP 3.7U798LH Screw/Plate/Nail/Tutu SCREW CORTX FTHRD SLFTP 3.8N941ON  SYNTHES  Right 1         Drains: * No LDAs found *    Findings: consistent with preop    Dictation#:    Plan  1.) Non-weight bearing right leg 8-10 weeks  2.) Elevate right leg slightly above heart level  3.) Encourage patient to wiggle toes  4.) Ancef x 24 hours  5.) DVT proph: OK to restart coumadin today, ok for Lovenox 12 hours post op  6.) Follow up with Dr. Allison Lopez in 2 weeks  7.) D/C planning: Patient will likely need SNF placement. He lives along, numerous medical problems and NWBRLE for 2 months.       Bess Varghese MD  Date: 6/3/2019  Time: 4:07 PM

## 2019-06-04 LAB
GLUCOSE BLD-MCNC: 215 MG/DL (ref 70–99)
GLUCOSE BLD-MCNC: 282 MG/DL (ref 70–99)
GLUCOSE BLD-MCNC: 314 MG/DL (ref 70–99)
GLUCOSE BLD-MCNC: 356 MG/DL (ref 70–99)
INR BLD: 1.42 INDEX
PROTHROMBIN TIME: 16.1 SECONDS (ref 9.12–12.5)

## 2019-06-04 PROCEDURE — 97530 THERAPEUTIC ACTIVITIES: CPT

## 2019-06-04 PROCEDURE — 97535 SELF CARE MNGMENT TRAINING: CPT

## 2019-06-04 PROCEDURE — 97166 OT EVAL MOD COMPLEX 45 MIN: CPT

## 2019-06-04 PROCEDURE — 6370000000 HC RX 637 (ALT 250 FOR IP): Performed by: NURSE PRACTITIONER

## 2019-06-04 PROCEDURE — 6360000002 HC RX W HCPCS: Performed by: PHYSICIAN ASSISTANT

## 2019-06-04 PROCEDURE — 82962 GLUCOSE BLOOD TEST: CPT

## 2019-06-04 PROCEDURE — 2580000003 HC RX 258: Performed by: PHYSICIAN ASSISTANT

## 2019-06-04 PROCEDURE — 6370000000 HC RX 637 (ALT 250 FOR IP): Performed by: PHYSICIAN ASSISTANT

## 2019-06-04 PROCEDURE — 6370000000 HC RX 637 (ALT 250 FOR IP): Performed by: INTERNAL MEDICINE

## 2019-06-04 PROCEDURE — 27828 TREAT LOWER LEG FRACTURE: CPT | Performed by: PHYSICIAN ASSISTANT

## 2019-06-04 PROCEDURE — 97162 PT EVAL MOD COMPLEX 30 MIN: CPT

## 2019-06-04 PROCEDURE — 1200000000 HC SEMI PRIVATE

## 2019-06-04 PROCEDURE — 85610 PROTHROMBIN TIME: CPT

## 2019-06-04 RX ORDER — INSULIN GLARGINE 100 [IU]/ML
20 INJECTION, SOLUTION SUBCUTANEOUS NIGHTLY
Status: DISCONTINUED | OUTPATIENT
Start: 2019-06-04 | End: 2019-06-05

## 2019-06-04 RX ORDER — WARFARIN SODIUM 3 MG/1
3 TABLET ORAL
Status: COMPLETED | OUTPATIENT
Start: 2019-06-04 | End: 2019-06-04

## 2019-06-04 RX ORDER — INSULIN GLARGINE 100 [IU]/ML
30 INJECTION, SOLUTION SUBCUTANEOUS NIGHTLY
Status: DISCONTINUED | OUTPATIENT
Start: 2019-06-04 | End: 2019-06-04

## 2019-06-04 RX ADMIN — INSULIN LISPRO 4 UNITS: 100 INJECTION, SOLUTION INTRAVENOUS; SUBCUTANEOUS at 21:44

## 2019-06-04 RX ADMIN — LEVOTHYROXINE SODIUM 50 MCG: 50 TABLET ORAL at 06:47

## 2019-06-04 RX ADMIN — MAGNESIUM HYDROXIDE 30 ML: 400 SUSPENSION ORAL at 14:51

## 2019-06-04 RX ADMIN — CARVEDILOL 25 MG: 25 TABLET, FILM COATED ORAL at 06:47

## 2019-06-04 RX ADMIN — INSULIN LISPRO 10 UNITS: 100 INJECTION, SOLUTION INTRAVENOUS; SUBCUTANEOUS at 17:46

## 2019-06-04 RX ADMIN — ENOXAPARIN SODIUM 40 MG: 100 INJECTION SUBCUTANEOUS at 05:24

## 2019-06-04 RX ADMIN — ALLOPURINOL 100 MG: 100 TABLET ORAL at 21:43

## 2019-06-04 RX ADMIN — INSULIN LISPRO 8 UNITS: 100 INJECTION, SOLUTION INTRAVENOUS; SUBCUTANEOUS at 09:31

## 2019-06-04 RX ADMIN — INSULIN LISPRO 8 UNITS: 100 INJECTION, SOLUTION INTRAVENOUS; SUBCUTANEOUS at 12:57

## 2019-06-04 RX ADMIN — CARVEDILOL 25 MG: 25 TABLET, FILM COATED ORAL at 13:00

## 2019-06-04 RX ADMIN — LOSARTAN POTASSIUM 50 MG: 50 TABLET, FILM COATED ORAL at 09:33

## 2019-06-04 RX ADMIN — ALLOPURINOL 100 MG: 100 TABLET ORAL at 09:33

## 2019-06-04 RX ADMIN — INSULIN LISPRO 6 UNITS: 100 INJECTION, SOLUTION INTRAVENOUS; SUBCUTANEOUS at 17:46

## 2019-06-04 RX ADMIN — CARVEDILOL 25 MG: 25 TABLET, FILM COATED ORAL at 21:43

## 2019-06-04 RX ADMIN — SIMVASTATIN 40 MG: 40 TABLET, FILM COATED ORAL at 21:43

## 2019-06-04 RX ADMIN — INSULIN LISPRO 10 UNITS: 100 INJECTION, SOLUTION INTRAVENOUS; SUBCUTANEOUS at 09:30

## 2019-06-04 RX ADMIN — HYDRALAZINE HYDROCHLORIDE 10 MG: 20 INJECTION INTRAMUSCULAR; INTRAVENOUS at 05:31

## 2019-06-04 RX ADMIN — SODIUM CHLORIDE, POTASSIUM CHLORIDE, SODIUM LACTATE AND CALCIUM CHLORIDE: 600; 310; 30; 20 INJECTION, SOLUTION INTRAVENOUS at 21:50

## 2019-06-04 RX ADMIN — DILTIAZEM HYDROCHLORIDE 120 MG: 60 TABLET, FILM COATED ORAL at 09:33

## 2019-06-04 RX ADMIN — OXYCODONE HYDROCHLORIDE 5 MG: 5 TABLET ORAL at 02:58

## 2019-06-04 RX ADMIN — DIGOXIN 125 MCG: 125 TABLET ORAL at 09:33

## 2019-06-04 RX ADMIN — CEFAZOLIN SODIUM 2 G: 2 INJECTION, SOLUTION INTRAVENOUS at 06:47

## 2019-06-04 RX ADMIN — INSULIN GLARGINE 20 UNITS: 100 INJECTION, SOLUTION SUBCUTANEOUS at 21:44

## 2019-06-04 RX ADMIN — DULOXETINE HYDROCHLORIDE 60 MG: 30 CAPSULE, DELAYED RELEASE ORAL at 09:33

## 2019-06-04 RX ADMIN — WARFARIN SODIUM 3 MG: 3 TABLET ORAL at 17:46

## 2019-06-04 RX ADMIN — OXYCODONE HYDROCHLORIDE 5 MG: 5 TABLET ORAL at 11:56

## 2019-06-04 RX ADMIN — INSULIN LISPRO 10 UNITS: 100 INJECTION, SOLUTION INTRAVENOUS; SUBCUTANEOUS at 12:57

## 2019-06-04 RX ADMIN — MORPHINE SULFATE 2 MG: 4 INJECTION INTRAVENOUS at 21:32

## 2019-06-04 ASSESSMENT — PAIN DESCRIPTION - PROGRESSION: CLINICAL_PROGRESSION: GRADUALLY IMPROVING

## 2019-06-04 ASSESSMENT — PAIN DESCRIPTION - ORIENTATION: ORIENTATION: RIGHT

## 2019-06-04 ASSESSMENT — PAIN - FUNCTIONAL ASSESSMENT: PAIN_FUNCTIONAL_ASSESSMENT: PREVENTS OR INTERFERES SOME ACTIVE ACTIVITIES AND ADLS

## 2019-06-04 ASSESSMENT — PAIN SCALES - GENERAL
PAINLEVEL_OUTOF10: 6
PAINLEVEL_OUTOF10: 4
PAINLEVEL_OUTOF10: 8

## 2019-06-04 ASSESSMENT — PAIN DESCRIPTION - FREQUENCY: FREQUENCY: CONTINUOUS

## 2019-06-04 ASSESSMENT — PAIN DESCRIPTION - LOCATION: LOCATION: ANKLE

## 2019-06-04 ASSESSMENT — PAIN DESCRIPTION - DESCRIPTORS: DESCRIPTORS: ACHING;DISCOMFORT;THROBBING;DULL

## 2019-06-04 ASSESSMENT — PAIN DESCRIPTION - ONSET: ONSET: ON-GOING

## 2019-06-04 ASSESSMENT — PAIN DESCRIPTION - PAIN TYPE: TYPE: ACUTE PAIN

## 2019-06-04 NOTE — PROGRESS NOTES
Physical Therapy  [facility] ACUTE CARE PHYSICAL THERAPY EVALUATION  Kurt Freedman, 1950, 4030/4030-A, 6/4/2019    History  [place]:  The encounter diagnosis was Bimalleolar fracture, right, closed, initial encounter. Patient  has a past medical history of Allergic rhinitis, Anticoagulated on Coumadin, Anxiety, Arthritis, Atrial fibrillation (HCC), CAD (coronary artery disease), COPD (chronic obstructive pulmonary disease) ([address] Depression, Diabetes mellitus ([address] Dupuytren's contracture of hand, Family history of cardiovascular disease, GERD (gastroesophageal reflux disease), H/O cardiac catheterization, H/O cardiac catheterization, H/O cardiovascular stress test, H/O cardiovascular stress test, H/O cardiovascular stress test, H/O cardiovascular stress test, H/O Doppler ultrasound, H/O echocardiogram, H/O echocardiogram, H/O echocardiogram, H/O hiatal hernia, Hx of Venous Doppler, Hyperlipidemia, Hypertension, Obesity, S/P PTCA (percutaneous transluminal coronary angioplasty), Sleep apnea, and Thyroid disease. Patient  has a past surgical history that includes Coronary angioplasty with stent (03/21/2005); Cardiac catheterization (6/12/08); Cardiac catheterization (3/07); Cardiac catheterization (3/05); Endoscopy, colon, diagnostic (2014); Colonoscopy (2011); Colonoscopy (5/18/16); Hand surgery (Right, 1997); and pr open rx ankle dislocatn+fixatn (Right, 6/3/2019). Subjective:  Patient states:  \"I gained a lot of weight in my custodial and its just too much to have on one leg\"   Pain:  Denies   Communication with other providers:  Handoff to RN, co-eval with OT.    Restrictions: general precautions, fall risk, NWB RLE, IV     Home Setup/Prior level of function  Social/Functional History  Lives With: Alone  Type of Home: House  Home Layout: One level  Home Access: Stairs to enter without rails(holds onto door molding )  Entrance Stairs - Number of Steps: 2  Bathroom Shower/Tub: Tub/Shower unit  Bathroom Toilet: Standard  Bathroom Equipment: Grab bars in shower, Shower chair  Home Equipment: Rolling walker(can borrow equipment from [employer])  ADL Assistance: [address]: [address] [place] [address] Responsibilities: Yes  Ambulation Assistance: Independent  Transfer Assistance: Independent  Active : Yes  Occupation: Retired  Additional Comments: Brother lives near by and is retired     Examination of body systems (includes body structures/functions, activity/participation limitations):  · Observation:  Supine in bed upon arrival. Agreeable to work with PT/OT. Casted R ankle   · Vision:  Glasses at all times   · Hearing:  [facility]   · Cardiopulmonary:  Stable on room air   · Orientation: [facility]     Musculoskeletal  · ROM R/L:  WFL BLEs (R ankle NT). · Strength R/L:  RLE grossly 4/5 (R ankle NT w/ cast), LLE 5/5, fair in function and endurance. Quick fatigue and inc effort when in function performing with NWB RLE. · Neuro:  [facility]     Mobility/treatment:  · Rolling L/R:  NT  · Supine to sit:  SBA with HOB flat and use of bed rail   · Transfers: sit>stand from EOB modA. Stand>sit to recliner maxA. Step pivot with RW NWB RLE Marcio for balance. Lack of eccentric control lowering to chair. Inc cues and demonstration for UE placement/sequencing as well as R LE placement to maintain NWB precautions. · Sitting balance:   SBA EOB static and light dynamic   · Standing balance:  Marcio at RW with UE support   · Gait: 3ft with RW to recliner Marcio. Dec foot clearance, quick fatigue, unsteady, cues for walker placement/management, inc effort to maintain NWB RLE. · Educated on POC, role of PT, NWB precautions, DME, discharge recommendation.  VCs/demonstration for sequencing, UE/LE placement, weight shift, balance, posture     [facility] 6 Clicks Inpatient Mobility:  AM-PAC Inpatient Mobility Raw Score : 15    Safety: patient left in chair, call light within reach, RN notified, gait belt used.    Assessment: Body structures, Functions, Activity limitations: Decreased functional mobility ; Decreased ROM; Decreased endurance; Decreased safe awareness; Decreased balance; Decreased high-level IADLs; Decreased ADL status; Decreased strength  Pt is a 77year old male admitted with distal tib/fib fx s/p ORIF. Recommend subacute rehab once medically stable. Prior to admission pt was indep without use of AD for gross mobility in home/community, ADLS, and IADLS. Currently, pt is needing up to maxA to safely perform transfers. Pt functioning below his typical baseline and has no 24/7 assist at home. Pt would benefit from continued therapy to address deficits, dec potential fall risk, and restore function. Complexity: Moderate  Prognosis: Good, no significant barriers to participation at this time. Plan Times per week: 6+/week, 1 week,   Discharge Recommendations: Subacute/Skilled Nursing Facility  Equipment: continue to assess at next level of care     Goals:  Short term goals  Time Frame for Short term goals: 1 week   Short term goal 1: Pt will perform sit><supine indep   Short term goal 2: Pt will transfer to bed/recliner CGA w/ RW while maintainin NWB RLE  Short term goal 3: Pt will transition sit><stand while maintaining NWB RLE CGA   Short term goal 4: Pt will ambulate 15ft with RW NWB RLE to safely access bathroom   Short term goal 5: Pt will propel WC 150ft Gwendolyn        Treatment plan:  Strengthening; Balance Training; Transfer Training; ROM; Functional Mobility Training; ADL/Self-care Training; Gait Training; Stair training; Endurance Training; IADL Training; Wheelchair Mobility Training; Neuromuscular Re-education; Home Exercise Program; Patient/Caregiver Education & Training; Modalities;  Safety Education & Training; Equipment Evaluation, Education, & procurement; Positioning    Recommendations for NURSING mobility: up to recliner with RW     Time:   Time in: 0830  Time out: 0909  Timed treatment

## 2019-06-04 NOTE — PROGRESS NOTES
Occupational Therapy  [facility] OCCUPATIONAL THERAPY EVALUATION    History  [place]:  The encounter diagnosis was Bimalleolar fracture, right, closed, initial encounter. Restrictions:  Restrictions/Precautions  Restrictions/Precautions: Weight Bearing, General Precautions, Fall Risk           Lower Extremity Weight Bearing Restrictions  Right Lower Extremity Weight Bearing: Non Weight Bearing            Communication with other providers: RN, PT    Subjective:  Patient states:  \"so you want to see if I can go home? \"  Pain:  Denies at rest.  Patient goal:  Home. Occupational profile (relevant social history and personal factors):    Social/Functional History  Lives With: Alone  Type of Home: House  Home Layout: One level  Home Access: Stairs to enter without rails(holds onto door molding )  Entrance Stairs - Number of Steps: 2  Bathroom Shower/Tub: Tub/Shower unit  Bathroom Toilet: Standard  Bathroom Equipment: Grab bars in shower, Shower chair  Home Equipment: Rolling walker(can borrow equipment from [employer])  ADL Assistance: [address] [place] [address] Avenue: Independent  Homemaking Responsibilities: Yes  Ambulation Assistance: Independent  Transfer Assistance: Independent  Active : Yes  Occupation: Retired  Additional Comments: Brother lives near by and is retired     Examination of body systems (includes body structures/functions, activity/participation limitations):  · Orientation: [employer]   · Cognition:  WFL   · Observation:  Received pt in bed. Eating breakfast. R ankle splinted. · Vision:  glasses  · Hearing:  Good. · ROM:  WFL BUE  · Strength: WFL BUE with MMT, but functional weakness observed with mobility  · Sensation: WFL BUE    ADLs  Feeding: independent    Grooming: independent in seated    Dressing: UB predict SBA in seated LB Dep. Will need training with [address] AE for distal components of dressing.  Pt attempted to don sock and shoe but was unable due to unable to avoid RLE WB with trunk flexion seated EOB. Unable to release 1UE from RW for pants hiking. Bathing: UB predict SBA in seated LB predict MaxA based on standing balance and reaching abilities    Toileting: predict MaxA based on standing and t/f     *Some ADL determined per observation of actual ADL performance, functional mobility, balance, activity tolerance, and cognition. AM-PAC 6 click short form for inpatient daily activity:    24/24 = unimpaired  23/24 = 1-20% impaired   20/24-22/24 = 21-40% impaired  15/24-19/24 = 41-59% impaired   10/24-14/24 = 60%-79% impaired  7/24-9/24 = 80%-99% impaired  6/24 = 100% impaired    Functional Mobility  Bed mobility:   Supine to sit: modified indep c bed rail and with max effort    Sitting balance: modified indep     Transfers:   Sit to stand: ModA from EOB  Stand to sit: MaxA into low chair  *cues for safe and effective BUE placement    Standing balance: Ivan c RW. Needs continuous UE support on walker    Ambulation:  Ivan c RW 3'. Unsteady, shaky, effortful. Reports BUE fatigue. Highly appropriate for w/c while NWB. Activity tolerance  good    Assessment:  Assessment  Performance deficits / Impairments: Decreased functional mobility , Decreased balance, Decreased ADL status, Decreased high-level IADLs, Decreased strength  Treatment Diagnosis: ORIF right distal tibia and fibula  Prognosis: Good  Decision Making: Medium Complexity  REQUIRES OT FOLLOW UP: Yes  Discharge Recommendations: Subacute/Skilled Nursing Facility(77 y.o. M normally independent but now well below baseline s/p ORIF right distal tibia and fibula. Will need OT to address modified ADL and mobility to restore functional independence. )        Goals:  By d/c or goals met:     Pt will perform all bed mobility with modified independence in prep for EOB/OOB activity. Pt will perform all functional transfers with Ivan and appropriate use of LRD to bed, toilet, chair in prep for increased functional independence.    Pt will perform UB ADLs with modified independence in seated to increase functional independence. Pt will perform LB ADLs with ModA using AE to increase functional independence. Pt will perform all aspects of toileting with Ivan to increase functional independence. Pt will participate in therex/therax c emphasis on strength, activity tolerance,  safety, FARZAD tasks. Plan:  Plan  Times per week: 3x    Treatment today:    Self Care Training:   Self care training was performed today. Cues were given for safety, sequence, UE/LE placement, visual cues, and balance. Activities performed today included dressing, toileting, hand hygiene, and grooming (simulation and education)  Therapeutic Activity Training:   Therapeutic activity training was instructed today. Cues were given for safety, sequence, UE/LE placement, awareness, and balance. Activities performed today included bed mobility training, sup-sit, sit-stand, SPT. Education: Role of OT, OT POC, d/c needs, home safety    Safety: Left in chair with all needs in reach. chair alarm applied. Gait belt used for transfer and mobility. Time in:  0830  Time out:  0909  Timed treatment minutes:  24  Total treatment time:  44    Electronically signed by:    [address], OTR/L, [facility]   [id number]   1:03 PM, 6/4/2019

## 2019-06-04 NOTE — PLAN OF CARE
Problem: Falls - Risk of:  Goal: Will remain free from falls  Description  Will remain free from falls  Outcome: Met This Shift  Goal: Absence of physical injury  Description  Absence of physical injury  Outcome: Met This Shift     Problem: Safety:  Goal: Free from accidental physical injury  Description  Free from accidental physical injury  Outcome: Met This Shift  Goal: Free from intentional harm  Description  Free from intentional harm  Outcome: Met This Shift     Problem: Daily Care:  Goal: Daily care needs are met  Description  Daily care needs are met  Outcome: Met This Shift     Problem: Infection:  Goal: Will remain free from infection  Description  Will remain free from infection  Outcome: Ongoing     Problem: Pain:  Goal: Patient's pain/discomfort is manageable  Description  Patient's pain/discomfort is manageable  Outcome: Ongoing  Goal: Pain level will decrease  Description  Pain level will decrease  Outcome: Ongoing  Goal: Control of acute pain  Description  Control of acute pain  Outcome: Ongoing  Goal: Control of chronic pain  Description  Control of chronic pain  Outcome: Ongoing

## 2019-06-04 NOTE — OP NOTE
[address], [address] [place] [address]                                OPERATIVE REPORT    PATIENT NAME: Glenis Jain                    :        1950  MED REC NO:   [id number]                          ROOM:       [id number]  ACCOUNT NO:   [de-identified]                           ADMIT DATE: 2019  PROVIDER:     Romulo Jaimes    DATE OF PROCEDURE:  2019    PREOPERATIVE DIAGNOSES:  1. Right closed displaced extra-articular distal tibia fracture. 2.  Comminuted displaced distal fibula fracture. POSTOPERATIVE DIAGNOSES:  1. Right closed displaced extra-articular distal tibia fracture. 2.  Comminuted displaced distal fibula fracture. PROCEDURES:  1. Open reduction and internal fixation, right distal tibia. 2.  Open reduction and internal fixation, right distal fibula. SURGEON:  Dr. Romulo Jaimes. ASSISTANT:  Caroilne Renteria PA-C    ANESTHESIA:  General endotracheal.    ESTIMATED BLOOD LOSS:  100 mL. SPECIMENS:  None. IMPLANTS:  Synthes 3.5 cortical screw, Synthes 3.5 mm medial distal  tibial locking plate, 96-CIGV. COMPLICATIONS:  None. INDICATIONS:  A 59-year-old male who is a diabetic with AFib, on  Coumadin, was on his tractor and jumped off and he states that he landed  all of his weight onto this right ankle, sustained a comminuted distal  tibia fracture and fibula fracture. He was admitted. INR was corrected  to less than 1.7. Risk assessment was complete. I discussed with him  the risks and benefits of operative versus nonoperative treatment, ORIF  Verse ex-fix. Risks and benefits included, but not limited to infection,  bleeding, nerve injury, malunion, nonunion, postoperative infection,  need for additional surgery, extended recovery, increased risk for  complications related to his numerus comorbidities.   After our discussion,  all questions answered, he was agreeable to operative another locking screw, had three good  points of fixation distally. For added fixation, I did place two  additional screws slight more proximal around the distal level of the  fracture. I then placed two additional cortical screws proximally,  pleased with the overall alignment on the AP and lateral.  He did have a  comminuted distal fibula fracture. With his comorbidities and the  alignment and good fixation of the medial side, I felt that a retrograde  fibular screw for added stability, he had a small fibular canal and I  placed a guidewire for a 4-0 cannulated screw, drilled to the fracture  site. I then placed a 3.5 fully-threaded cortical screw across the  fracture, had good fixation and adequate alignment of the fibula. Final  x-rays were taken. The wounds were irrigated and closed with 2-0 Vicryl  and 3-0 nylon. Xeroform, 4 x 4's and a bulky splint were applied. General anesthesia was withdrawn. He was taken to postanesthesia care  unit in stable condition. Abby Anderson PA-C, was present and  assisted with all aspects of the case.         Hira Rivera    D: 06/04/2019 10:49:23       T: 06/04/2019 12:21:28     [email or web address]  Job#: [id number]     Doc#: [id number]    CC:

## 2019-06-04 NOTE — CONSULTS
PHARMACY TO DOSE WARFARIN PER NP KAYLA JEANINEYAW  INDICATION = Atrial Fibrillation   GOAL INR = 2-3     HOME DOSE = 2mg on Mondays/Wednesdays/Fridays; 3mg all other days  DAILY INR ORDERED? Yes    AGE = 77 y.o.  SEX = male  HEIGHT = 6' 2\" (1.88 m)  Wt Readings from Last 3 Encounters:   06/02/19 (!) 310 lb (140.6 kg)   04/19/19 (!) 320 lb (145.2 kg)   04/01/19 (!) 330 lb (149.7 kg)     Recent Labs     06/03/19  0017 06/03/19  0238 06/03/19  1030   INR 2.08 1.98 1.65   HGB 12.8* 12.9*  --    HCT 39.2* 39.0*  --     193  --        BLEEDING RISKS = Hx HTN  POTENTIAL DRUG INTERACTIONS = Cefazolin (may potentiate INR)  DIETARY INTAKE of VITAMIN K = Consistent     Patient previously received PO vitamin K (6/3/19) prior to right open reduction of tibia & fibula. DOSING PLAN:  1). Give warfarin 4mg tonight (slightly higher dose from home regimen due to sub-therapeutic INR and recent vitamin K intake)  2). Monitor INR & drug-drug interactions daily and adjust warfarin dose accordingly to achieve a goal INR between 2-3    Thank you for the consult & the opportunity to serve you and your patient!   Saleem Dewitt [facility], PharmD, BCPS  6/3/2019   8:34 PM  _______________________________________________

## 2019-06-04 NOTE — PROGRESS NOTES
.Progress Note (Hospitalist, Internal Medicine)  IDENTIFYING INFORMATION   PATIENT:  Fredy Sepulveda  MRN:  [id number]  ADMIT DATE: 6/2/2019  TIME OF EVALUATION: 6/4/2019 3:47 PM      HISTORY OF PRESENT ILLNESS   Fredy Sepulveda is a 77 y.o. male who presents with fall with right ankle fracture    Patient is a 50-year-old male who presented to the ER with right ankle pain after failing of his mower. X-ray done on presentation showed comminuted mildly displaced fracture in the distal tibia with involvement of the articular surface. Comminuted impacted fracture of the distal fibula and medial malleolus with subtle widening of the ankle mortise. Patient seen by orthopedic surgery and he had an open reduction and internal fixation done on 6/3/19. He tolerated the procedure well  SUBJECTIVE     Postop day 1 of right ankle repair.   Pain is well controlled    MEDICATIONS   Medications Prior to Admission  Medications Prior to Admission: pravastatin (PRAVACHOL) 80 MG tablet, TAKE 1 TABLET BY MOUTH ONCE DAILY  albuterol sulfate HFA (VENTOLIN HFA) 108 (90 Base) MCG/ACT inhaler, Inhale 2 puffs into the lungs every 6 hours as needed for Wheezing  cetirizine (ZYRTEC) 10 MG tablet, Take 1 tablet by mouth daily  carvedilol (COREG) 25 MG tablet, Take 1 tablet by mouth 3 times daily  allopurinol (ZYLOPRIM) 100 MG tablet, TAKE 1 TABLET BY MOUTH TWICE DAILY  digoxin (LANOXIN) 250 MCG tablet, Take 1 tablet by mouth daily  diltiazem (CARDIZEM) 120 MG tablet, Take 1 tablet by mouth daily  DULoxetine (CYMBALTA) 60 MG extended release capsule, Take 1 capsule by mouth daily  Elastic Bandages & Supports (MEDICAL COMPRESSION STOCKINGS) MISC, 1 each by Does not apply route daily  furosemide (LASIX) 40 MG tablet, Take 1 tablet by mouth nightly  insulin regular (NOVOLIN R) 100 UNIT/ML injection, Inject 0-10 Units into the skin 3 times daily (before meals)  levothyroxine (SYNTHROID) 50 MCG tablet, Take 1 tablet by mouth daily  losartan (COZAAR) 50 MG tablet, Take 1 tablet by mouth daily  metFORMIN (GLUCOPHAGE) 1000 MG tablet, TAKE ONE TABLET BY MOUTH TWICE DAILY WITH MEALS  polyethylene glycol (GLYCOLAX) powder, Take 17 g by mouth daily  warfarin (COUMADIN) 2 MG tablet, Take 1 tablet by mouth three times a week **On MWF.**  warfarin (COUMADIN) 3 MG tablet, Take 1 tablet by mouth every 24 hours **OR AS DIRECTED PER ** (Patient taking differently: Take 3 mg by mouth every 24 hours Indications: Sat/Tue/Thur/Sun Sat/Tue/Thur/Sun)  Lancets MISC, by D3EA route three times a day.   glimepiride (AMARYL) 2 MG tablet, Take 1 tablet by mouth every morning (before breakfast)  insulin NPH (HUMULIN N;NOVOLIN N) 100 UNIT/ML injection vial, Inject 35 Units into the skin 2 times daily (before meals)    Current Medications  Current Facility-Administered Medications   Medication Dose Route Frequency Provider Last Rate Last Dose    insulin lispro (HUMALOG) injection vial 10 Units  10 Units Subcutaneous TID UGO Otto MD   10 Units at 06/04/19 1257    insulin glargine (LANTUS) injection vial 20 Units  20 Units Subcutaneous Nightly Ne Otto MD        insulin lispro (HUMALOG) injection vial 0-12 Units  0-12 Units Subcutaneous TID UGO Otto MD   8 Units at 06/04/19 1257    insulin lispro (HUMALOG) injection vial 0-12 Units  0-12 Units Subcutaneous 2 times per day Ne Otto MD        warfarin (COUMADIN) tablet 3 mg  3 mg Oral Once Morley Bence, APRN - CNP        sodium chloride flush 0.9 % injection 10 mL  10 mL Intravenous 2 times per day Paramjit Hunt PA-C   10 mL at 06/03/19 0946    sodium chloride flush 0.9 % injection 10 mL  10 mL Intravenous PRN Caroline Renteria PA-C        magnesium hydroxide (MILK OF MAGNESIA) 400 MG/5ML suspension 30 mL  30 mL Oral Daily PRN Caroline Renteria PA-C   30 mL at 06/04/19 1451    ondansetron (ZOFRAN) injection 4 mg  4 mg Intravenous Q6H PRN Caroline Renteria PA-C   4 mg at 06/03/19 1841    PA-C   40 mg at 06/04/19 0524    warfarin (COUMADIN) daily dosing (placeholder)   Other RX Placeholder ANKITA Luque CNP         Facility-Administered Medications Ordered in Other Encounters   Medication Dose Route Frequency Provider Last Rate Last Dose    fentaNYL (SUBLIMAZE) injection    PRN Gara Crissy, APRN - CRNA   25 mcg at 06/03/19 1532    propofol injection    PRN Gara Weakley, APRN - CRNA   50 mg at 06/03/19 1456    lidocaine PF 2 % injection    PRN Gara Crissy, APRN - CRNA   50 mg at 06/03/19 1416    ondansetron (ZOFRAN) injection    PRN Gara Crissy, APRN - CRNA   4 mg at 06/03/19 1416    succinylcholine chloride (ANECTINE) injection    PRN Gara Crissy, APRN - CRNA   100 mg at 06/03/19 1418    Dexamethasone Sodium Phosphate injection    PRN Sandrea Commander, APRN - CRNA   4 mg at 06/03/19 1435    phenylephrine (SAMMY-SYNEPHRINE) injection    PRN Sandrea Commander, APRN - CRNA   200 mcg at 06/03/19 1539    rocuronium (ZEMURON) injection    PRN Sandrea Commander, APRN - CRNA   50 mg at 06/03/19 1433    HYDROmorphone (DILAUDID) injection    PRN Artelia Ped, APRN - CRNA   0.4 mg at 06/03/19 1540    ketorolac (TORADOL) injection    PRN Artelia Ped, APRN - CRNA   15 mg at 06/03/19 1550    lidocaine (cardiac) (XYLOCAINE) injection    PRN Artelia Ped, APRN - CRNA   100 mg at 06/03/19 1548    sugammadex (BRIDION) injection    PRN Artelia Ped, APRN - CRNA   100 mg at 06/03/19 1612         Allergies  No Known Allergies    REVIEW OF SYSTEMS     Within above limitations. 14 point review of systems reviewed. Pertinent positive or negative as per HPI or otherwise negative per 14 point systems review. Reviewed 6/4/2019 at 3:47 PM    PHYSICAL EXAM       Blood pressure (!) 146/67, pulse 96, temperature 98 °F (36.7 °C), temperature source Oral, resp. rate 18, height 6' 2\" (1.88 m), weight (!) 310 lb (140.6 kg), SpO2 93 %.     General - AAO x 3  Psych - Appropriate affect/speech. No agitation  Eyes - Eye lids intact. No scleral icterus  ENT - Lips wnl. External ear clear/dry/intact. No thyromegaly on inspection  Neuro - No gross peripheral or central neuro deficits on inspection  Heart - Sinus. RRR. S1 and S2 present. No added HS/murmurs appreciated. No elevated JVD appreciated  Lung - Adequate air entry b/l, No crackles/wheezes appreciated  GI -  Soft. No guarding/rigidity. No hepatosplenomegaly/ascites. BS+    Skin - Intact. No rash/petechiae/ecchymosis. Warm extremities  MSK - right leg ankle and foot in a cast      Lines/Drains/Airways/Wounds:  [unfilled]    LABS AND IMAGING   CBC  [unfilled]    Last 3 Hemoglobin  Lab Results   Component Value Date    HGB 12.9 06/03/2019    HGB 12.8 06/03/2019    HGB 13.3 04/19/2019     Last 3 WBC/ANC  Lab Results   Component Value Date    WBC 8.7 06/03/2019    WBC 8.6 06/03/2019    WBC 7.8 04/19/2019     No components found for: GRNLOCTYABS  Last 3 Platelets  No results found for: PLATELET  Chemistry  [unfilled]  [unfilled]  Lab Results   Component Value Date     09/12/2018     Coagulation Studies  Lab Results   Component Value Date    INR 1.42 06/04/2019     Liver Function Studies  Lab Results   Component Value Date    ALT 25 06/03/2019    AST 38 06/03/2019    ALKPHOS 99 06/03/2019       Recent Imaging        Relevant labs and imaging reviewed    ASSESSMENT AND PLAN     Right ankle fracture secondary to a fall  Status post open reduction and internal fixation.   He tolerated the procedure well  Ensure adequate pain control    Chronic atrial fibrillation  Patient is currently rate controlled  Stroke prophylaxis with Coumadin    DM type II  Blood sugar control suboptimal  Added long-acting insulin to his regimen    Essential hypertension  Blood pressure control acceptable    History of COPD not in exacerbation  Continue home medications    CAD  Hypothyroidism  Levothyroxine    DVT prophylaxis  Lovenox    Disposition: Discharge to a skilled nursing facility for short-term rehab    [facility], Internal Medicine  6/4/2019 at 3:47 PM

## 2019-06-04 NOTE — CARE COORDINATION
Met c pt to initiate discharge planning. Pt worked with therapy today who rec'd SNF, pt agreeable. Provided SNF list, [facility] [place]. Pt will review and let me know choice. Met c pt again to follow up on SNF choice, pt has not decided yet, states he wants to discuss with family/ lady friend first.  Advised I would be back in am to follow up and I anticipate pt will be ready for discharge tomorrow so choice would be needed by then, pt expressed understanding.

## 2019-06-04 NOTE — PROGRESS NOTES
Progress Note( Dr. Sloan Saravia)  6/4/2019  Subjective:   Admit Date: 6/2/2019  PCP: ANKITA Carrillo - [facility]    Admitted For : Right spiral ankle fracture after a fall     Consulted For: Better control of blood glucose    Interval History:  Right ankle spiral fracture   open reduction with internal fixation right distal tibia and fibula (ORIF) done 6/3/2019      Denies any chest pains,   Denies SOB . Denies nausea or vomiting. No new bowel or bladder symptoms. Intake/Output Summary (Last 24 hours) at 6/4/2019 0709  Last data filed at 6/4/2019 9899  Gross per 24 hour   Intake 2225 ml   Output 1650 ml   Net 575 ml       DATA    CBC:   Recent Labs     06/03/19  0017 06/03/19  0238   WBC 8.6 8.7   HGB 12.8* 12.9*    193    CMP:  Recent Labs     06/03/19  0017 06/03/19  0238    141   K 4.7 4.1    103   CO2 24 23   BUN 19 20   CREATININE 0.9 0.9   CALCIUM 9.2 9.0   PROT 7.4  --    LABALBU 3.9  --    BILITOT 0.3  --    ALKPHOS 99  --    AST 38*  --    ALT 25  --      Lipids:   Lab Results   Component Value Date    CHOL 139 07/23/2018    HDL 37 01/07/2019    TRIG 175 07/23/2018     Glucose:  Recent Labs     06/03/19  1620 06/03/19  1849 06/03/19  2111   POCGLU 263* 276* 348*     [id number]:  Lab Results   Component Value Date    LABA1C 7.5 06/03/2019     High Sensitivity TSH:   Lab Results   Component Value Date    TSHHS 2.540 01/03/2017     Free T3: No results found for: FT3  Free T4:  Lab Results   Component Value Date    T4FREE 1.04 01/03/2017       Xr Knee Right (1-2 Views)    Result Date: 6/3/2019  EXAMINATION: 2 X-RAY VIEWS OF THE RIGHT KNEE, 6/2/2019 11:28 pm COMPARISON: None HISTORY: ORDERING SYSTEM PROVIDED HISTORY: Pain TECHNOLOGIST PROVIDED HISTORY: Reason for exam:->Pain Ordering Physician Provided Reason for Exam: Pain Acuity: Acute Type of Exam: Initial Right knee pain. FINDINGS: There is no acute fracture, dislocation, or bone destruction.   Significant patellar enthesopathy is noted.  There is no evidence of a joint effusion. Moderate to severe patellar enthesopathy. No acute osseous abnormality. Xr Ankle Right (min 3 Views)    Result Date: 6/2/2019  EXAMINATION: 3 XRAY VIEWS OF THE RIGHT ANKLE 6/2/2019 9:40 pm COMPARISON: None. HISTORY: ORDERING SYSTEM PROVIDED HISTORY: trauma TECHNOLOGIST PROVIDED HISTORY: Reason for exam:->trauma Ordering Physician Provided Reason for Exam: fall off tractor Acuity: Acute Type of Exam: Initial FINDINGS: Comminuted oblique displaced fracture of the distal shaft of the tibia is noted. A component of this fracture appears to extend to the articular surface. There is also a comminuted impacted fracture of the lateral malleolus at the level of the joint line. Subtle asymmetric widening of the ankle mortise is noted medially. The talar dome is intact. Diffuse soft tissue swelling is noted. Enthesophyte formation is noted about the Achilles insertion and there is relative soft tissue enlargement near the Achilles insertion. Plantar calcaneal spur. Comminuted mildly displaced fracture in the distal tibia with involvement of the articular surface. Comminuted impacted fracture of the distal fibula and medial malleolus with subtle widening of the ankle mortise. Soft tissue enlargement at the level of the Achilles insertion. Enthesophyte formation is noted without discrete fracture in this location.        Scheduled Medicines   Medications:    insulin lispro  10 Units Subcutaneous TID WC    insulin glargine  20 Units Subcutaneous Nightly    insulin lispro  0-12 Units Subcutaneous TID     insulin lispro  0-12 Units Subcutaneous 2 times per day    sodium chloride flush  10 mL Intravenous 2 times per day    allopurinol  100 mg Oral BID    carvedilol  25 mg Oral TID    digoxin  125 mcg Oral Daily    diltiazem  120 mg Oral Daily    DULoxetine  60 mg Oral Daily    levothyroxine  50 mcg Oral Daily    losartan  50 mg Oral Daily    simvastatin  40 mg Oral Nightly    enoxaparin  40 mg Subcutaneous Daily    ceFAZolin  2 g Intravenous Q8H    warfarin (COUMADIN) daily dosing (placeholder)   Other RX Placeholder      Infusions:    dextrose      lactated ringers 100 mL/hr at 06/03/19 9171         Objective:   Vitals: BP (!) 170/64   Pulse 90   Temp 97.9 °F (36.6 °C) (Oral)   Resp 18   Ht 6' 2\" (1.88 m)   Wt (!) 310 lb (140.6 kg)   SpO2 97%   BMI 39.80 kg/m²   General appearance: alert and cooperative with exam  Neck: no JVD or bruit  Thyroid : Normal lobes   Lungs: Has Vesicular Breath sounds   Heart:  regular rate and rhythm  Abdomen: soft, non-tender; bowel sounds normal; no masses,  no organomegaly  Musculoskeletal: Normal  Extremities: extremities normal, , no edema  Neurologic:  Awake, alert, oriented to name, place and time. Cranial nerves II-XII are grossly intact. Motor is  intact. Sensory is intact. ,  and gait is normal.    Assessment:     Patient Active Problem List:     Atrial fibrillation (HCC)     CAD (coronary artery disease)     Obesity     COPD (chronic obstructive pulmonary disease) (HCC)     Sleep apnea     Type 2 diabetes mellitus (HCC)     Acute gouty arthritis     Thyroid disease     Hyperlipidemia     Dupuytren's contracture of hand     SOB (shortness of breath)     Swelling of extremity     Ankle fracture     Ankle fracture, right, closed, initial encounter     Closed displaced pilon fracture of l right tibia with routine healing     Closed disp fracture of left right lateral malleolus with routine healing      open reduction with internal fixation right distal tibia and fibula (ORIF) 6/3/2019        Plan:     1. Reviewed POC blood glucose . Labs and X ray results   2. Reviewed Current Medicines   3. On meal/ Correction bolus Humalog/ Basal Lantus Insulin regime   4. Monitor Blood glucose frequently   5. Modified  the dose of Insulin/ other medicines as needed   6. Will follow     .      Ryan Duran MD

## 2019-06-04 NOTE — PLAN OF CARE
Problem: Falls - Risk of:  Goal: Will remain free from falls  Description  Will remain free from falls  6/4/2019 0838 by Santos Hernandez LPN  Outcome: Ongoing  6/4/2019 0328 by Tab Medeiros RN  Outcome: Met This Shift  Goal: Absence of physical injury  Description  Absence of physical injury  6/4/2019 0838 by Santos Hernandez LPN  Outcome: Ongoing  6/4/2019 0328 by Tab Medeiros RN  Outcome: Met This Shift     Problem: Infection:  Goal: Will remain free from infection  Description  Will remain free from infection  6/4/2019 0838 by Santos Hernandez LPN  Outcome: Ongoing  6/4/2019 0328 by Tab Medeiros RN  Outcome: Ongoing     Problem: Safety:  Goal: Free from accidental physical injury  Description  Free from accidental physical injury  6/4/2019 0838 by Santos Hernandez LPN  Outcome: Ongoing  6/4/2019 0328 by Tab Medeiros RN  Outcome: Met This Shift  Goal: Free from intentional harm  Description  Free from intentional harm  6/4/2019 0838 by Santos Hernandez LPN  Outcome: Ongoing  6/4/2019 0328 by Tab Medeiros RN  Outcome: Met This Shift     Problem: Daily Care:  Goal: Daily care needs are met  Description  Daily care needs are met  6/4/2019 0838 by Santos Hernandez LPN  Outcome: Ongoing  6/4/2019 0328 by Tab Medeiros RN  Outcome: Met This Shift     Problem: Pain:  Goal: Patient's pain/discomfort is manageable  Description  Patient's pain/discomfort is manageable  6/4/2019 0838 by Santos Hernandez LPN  Outcome: Ongoing  6/4/2019 0328 by Tab Medeiros RN  Outcome: Ongoing  Goal: Pain level will decrease  Description  Pain level will decrease  6/4/2019 0838 by Santos Hernandez LPN  Outcome: Ongoing  6/4/2019 0328 by Tab Medeiros RN  Outcome: Ongoing  Goal: Control of acute pain  Description  Control of acute pain  6/4/2019 0838 by Santos Hernandez LPN  Outcome: Ongoing  6/4/2019 0328 by Tab Medeiros RN  Outcome: Ongoing  Goal: Control of chronic pain  Description  Control of chronic pain  6/4/2019 0838 by Brennon Gray LPN  Outcome: Ongoing  6/4/2019 0328 by David Lockwood RN  Outcome: Ongoing

## 2019-06-04 NOTE — DISCHARGE INSTR - COC
Continuity of Care Form    Patient Name: Elmira Damico   :  1950  MRN:  [id number]    Admit date:  2019  Discharge date:  2019      Code Status Order: Full Code   Advance Directives:   Advance Care Flowsheet Documentation     Date/Time Healthcare Directive Type of Healthcare Directive Copy in [address] Agent's Name Healthcare Agent's Phone Number    19 1151  No, patient does not have an advance directive for healthcare treatment -- -- -- -- --    19 0601  No, patient does not have an advance directive for healthcare treatment -- -- -- -- --          Admitting Physician:  Percival Homans, MD  PCP: ANKITA Barahona - CNP    Discharging Nurse: Electronically signed by Dante Sosa RN on 2019 at [address] [place] [address] Unit/Room#: 4030/4030-A  Discharging Unit Phone Number: [phone]    Emergency Contact:   Extended Emergency Contact Information  Primary Emergency Contact:  Paul Brown  Address: [address] Phone: [phone]  Mobile Phone: [phone]  Relation: Brother/Sister    Past Surgical History:  Past Surgical History:   Procedure Laterality Date    CARDIAC CATHETERIZATION  08    mild disease mid RCA,  stent to LAD patent,    CARDIAC CATHETERIZATION  3/07    Stent to LAD patent, Diagonal 40-50%, RCA 40-50%    CARDIAC CATHETERIZATION  3/05    COLONOSCOPY      COLONOSCOPY  16    1 polyp removed, diverticulosis and hemorrhoids noted    CORONARY ANGIOPLASTY WITH STENT PLACEMENT  2005    PTCA with 3.5 x 16 TAXUS stent to LAD    ENDOSCOPY, COLON, DIAGNOSTIC      egd    HAND SURGERY Right     ND OPEN RX ANKLE DISLOCATN+FIXATN Right 6/3/2019    RIGHT OPEN REDUCTION INTERNAL FIXATION OF DISTAL TIBIA  AND FIBULA performed by Robyn Muñoz MD at [facility] OR       Immunization History:   Immunization History   Administered Date(s) Administered  Influenza Vaccine, unspecified formulation 11/01/2017    Influenza Virus Vaccine 10/01/2018    Influenza, Triv, 3 Years and older, IM (Afluria (5 yrs and older) 10/01/2011, 10/15/2014    Pneumococcal 13-valent Conjugate ([id number]) 06/28/2017    Pneumococcal Polysaccharide ([id number]) 01/01/2008, 05/18/2013, 04/01/2019    Tdap (Boostrix, Adacel) 12/17/2011       Active Problems:  Patient Active Problem List   Diagnosis Code    Atrial fibrillation ([facility]) I48.91    CAD (coronary artery disease) I25.10    Obesity E66.9    COPD (chronic obstructive pulmonary disease) ([facility]) J44.9    Sleep apnea G47.30    Type 2 diabetes mellitus ([facility]) E11.9    Acute gouty arthritis M10.9    Thyroid disease E07.9    Hyperlipidemia E78.5    Dupuytren's contracture of hand M72.0    SOB (shortness of breath) R06.02    Swelling of extremity M79.89    Ankle fracture S82.899A    Ankle fracture, left, closed, initial encounter S82.892A    Closed displaced pilon fracture of left tibia with routine healing S82.872D    Closed disp fracture of left lateral malleolus with routine healing S82. 58XD    Bimalleolar fracture, right, closed, initial encounter S82.841A       Isolation/Infection:   Isolation          No Isolation            Nurse Assessment:  Last Vital Signs: BP (!) 146/67   Pulse 96   Temp 98 °F (36.7 °C) (Oral)   Resp 18   Ht 6' 2\" (1.88 m)   Wt (!) 310 lb (140.6 kg)   SpO2 93%   BMI 39.80 kg/m²     Last documented pain score (0-10 scale): Pain Level: 6  Last Weight:   Wt Readings from Last 1 Encounters:   06/02/19 (!) 310 lb (140.6 kg)     Mental Status:  oriented    IV Access:  - None    Nursing Mobility/ADLs:  Walking   Dependent  Transfer  Assisted  Bathing  Assisted  Dressing  Assisted  Toileting  Assisted  Feeding  Independent  Med Admin  Dependent  Med Delivery   whole    Wound Care Documentation and Therapy:        Elimination:  Continence:   · Bowel:  Yes  · Bladder: Yes  Urinary Catheter: None Colostomy/Ileostomy/Ileal Conduit: No       Date of Last BM: 6/3/19    Intake/Output Summary (Last 24 hours) at 6/4/2019 1333  Last data filed at 6/4/2019 1114  Gross per 24 hour   Intake 2225 ml   Output 1850 ml   Net 375 ml     I/O last 3 completed shifts: In: 1200 [I.V.:1200]  Out: 1375 [Urine:1275; Blood:100]    Safety Concerns:     History of Falls (last 30 days)    Impairments/Disabilities:      None      Patient's personal belongings (please select all that are sent with patient):  Glasses    RN SIGNATURE:  Electronically signed by Shonna Healy RN on 6/5/19 at 4:15 PM        PHYSICIAN SECTION  Nutrition Therapy:  Current Nutrition Therapy:   - Oral Diet:  Cardiac    Routes of Feeding: Oral  Liquids: No Restrictions  Daily Fluid Restriction: no  Last Modified Barium Swallow with Video (Video Swallowing Test): not done    Treatments at the Time of Hospital Discharge:   Respiratory Treatments:   Oxygen Therapy:  is not on home oxygen therapy. Ventilator:    - No ventilator support    Rehab Therapies: Physical Therapy and Occupational Therapy  Weight Bearing Status/Restrictions: Non-weight bearing on right leg  Other Medical Equipment (for information only, NOT a DME order):  wheelchair  Other Treatments: ***    Prognosis: Good    Condition at Discharge: Stable    Rehab Potential (if transferring to Rehab): Good    Recommended Labs or Other Treatments After Discharge:     Physician Certification: I certify the above information and transfer of Chris Dunlap  is necessary for the continuing treatment of the diagnosis listed and that he requires [facility] for less 30 days.      Update Admission H&P: No change in H&P    PHYSICIAN SIGNATURE:  Electronically signed by Lucy Torres MD on 6/5/19 at 2:00 PM

## 2019-06-05 VITALS
TEMPERATURE: 98.2 F | BODY MASS INDEX: 39.78 KG/M2 | SYSTOLIC BLOOD PRESSURE: 139 MMHG | DIASTOLIC BLOOD PRESSURE: 65 MMHG | HEIGHT: 74 IN | HEART RATE: 101 BPM | OXYGEN SATURATION: 90 % | RESPIRATION RATE: 16 BRPM | WEIGHT: 310 LBS

## 2019-06-05 LAB
ANION GAP SERPL CALCULATED.3IONS-SCNC: 8 MMOL/L (ref 4–16)
BUN BLDV-MCNC: 18 MG/DL (ref 6–23)
CALCIUM SERPL-MCNC: 8.1 MG/DL (ref 8.3–10.6)
CHLORIDE BLD-SCNC: 103 MMOL/L (ref 99–110)
CO2: 28 MMOL/L (ref 21–32)
CREAT SERPL-MCNC: 0.9 MG/DL (ref 0.9–1.3)
GFR AFRICAN AMERICAN: >60 ML/MIN/1.73M2
GFR NON-AFRICAN AMERICAN: >60 ML/MIN/1.73M2
GLUCOSE BLD-MCNC: 231 MG/DL (ref 70–99)
GLUCOSE BLD-MCNC: 238 MG/DL (ref 70–99)
GLUCOSE BLD-MCNC: 247 MG/DL (ref 70–99)
GLUCOSE BLD-MCNC: 272 MG/DL (ref 70–99)
HCT VFR BLD CALC: 31.4 % (ref 42–52)
HEMOGLOBIN: 10.3 GM/DL (ref 13.5–18)
INR BLD: 1.63 INDEX
MCH RBC QN AUTO: 35.3 PG (ref 27–31)
MCHC RBC AUTO-ENTMCNC: 32.8 % (ref 32–36)
MCV RBC AUTO: 107.5 FL (ref 78–100)
PDW BLD-RTO: 14.1 % (ref 11.7–14.9)
PLATELET # BLD: 152 K/CU MM (ref 140–440)
PMV BLD AUTO: 10 FL (ref 7.5–11.1)
POTASSIUM SERPL-SCNC: 4.4 MMOL/L (ref 3.5–5.1)
PROTHROMBIN TIME: 18.5 SECONDS (ref 9.12–12.5)
RBC # BLD: 2.92 M/CU MM (ref 4.6–6.2)
SODIUM BLD-SCNC: 139 MMOL/L (ref 135–145)
WBC # BLD: 7.4 K/CU MM (ref 4–10.5)

## 2019-06-05 PROCEDURE — 6370000000 HC RX 637 (ALT 250 FOR IP): Performed by: PHYSICIAN ASSISTANT

## 2019-06-05 PROCEDURE — 97110 THERAPEUTIC EXERCISES: CPT

## 2019-06-05 PROCEDURE — 97530 THERAPEUTIC ACTIVITIES: CPT

## 2019-06-05 PROCEDURE — 6360000002 HC RX W HCPCS: Performed by: PHYSICIAN ASSISTANT

## 2019-06-05 PROCEDURE — 36415 COLL VENOUS BLD VENIPUNCTURE: CPT

## 2019-06-05 PROCEDURE — 80048 BASIC METABOLIC PNL TOTAL CA: CPT

## 2019-06-05 PROCEDURE — 82962 GLUCOSE BLOOD TEST: CPT

## 2019-06-05 PROCEDURE — 2580000003 HC RX 258: Performed by: PHYSICIAN ASSISTANT

## 2019-06-05 PROCEDURE — 85027 COMPLETE CBC AUTOMATED: CPT

## 2019-06-05 PROCEDURE — 6370000000 HC RX 637 (ALT 250 FOR IP): Performed by: INTERNAL MEDICINE

## 2019-06-05 PROCEDURE — 85610 PROTHROMBIN TIME: CPT

## 2019-06-05 RX ORDER — WARFARIN SODIUM 3 MG/1
3 TABLET ORAL
Status: DISCONTINUED | OUTPATIENT
Start: 2019-06-05 | End: 2019-06-05 | Stop reason: HOSPADM

## 2019-06-05 RX ORDER — OXYCODONE HYDROCHLORIDE 5 MG/1
5 TABLET ORAL EVERY 4 HOURS PRN
Qty: 15 TABLET | Refills: 0 | Status: SHIPPED | OUTPATIENT
Start: 2019-06-05 | End: 2019-06-10

## 2019-06-05 RX ORDER — DIGOXIN 125 MCG
125 TABLET ORAL DAILY
Qty: 30 TABLET | Refills: 3 | Status: SHIPPED | OUTPATIENT
Start: 2019-06-06 | End: 2019-10-15 | Stop reason: SDUPTHER

## 2019-06-05 RX ORDER — INSULIN GLARGINE 100 [IU]/ML
30 INJECTION, SOLUTION SUBCUTANEOUS NIGHTLY
Status: DISCONTINUED | OUTPATIENT
Start: 2019-06-05 | End: 2019-06-05 | Stop reason: HOSPADM

## 2019-06-05 RX ORDER — CARVEDILOL 25 MG/1
25 TABLET ORAL 2 TIMES DAILY
Qty: 90 TABLET | Refills: 3 | Status: SHIPPED | OUTPATIENT
Start: 2019-06-05 | End: 2019-10-15 | Stop reason: SDUPTHER

## 2019-06-05 RX ADMIN — CARVEDILOL 25 MG: 25 TABLET, FILM COATED ORAL at 06:45

## 2019-06-05 RX ADMIN — INSULIN LISPRO 6 UNITS: 100 INJECTION, SOLUTION INTRAVENOUS; SUBCUTANEOUS at 14:45

## 2019-06-05 RX ADMIN — MORPHINE SULFATE 2 MG: 4 INJECTION INTRAVENOUS at 02:07

## 2019-06-05 RX ADMIN — INSULIN LISPRO 15 UNITS: 100 INJECTION, SOLUTION INTRAVENOUS; SUBCUTANEOUS at 09:29

## 2019-06-05 RX ADMIN — INSULIN LISPRO 4 UNITS: 100 INJECTION, SOLUTION INTRAVENOUS; SUBCUTANEOUS at 09:29

## 2019-06-05 RX ADMIN — LEVOTHYROXINE SODIUM 50 MCG: 50 TABLET ORAL at 06:45

## 2019-06-05 RX ADMIN — ALLOPURINOL 100 MG: 100 TABLET ORAL at 09:23

## 2019-06-05 RX ADMIN — LOSARTAN POTASSIUM 50 MG: 50 TABLET, FILM COATED ORAL at 09:23

## 2019-06-05 RX ADMIN — OXYCODONE HYDROCHLORIDE 5 MG: 5 TABLET ORAL at 14:46

## 2019-06-05 RX ADMIN — INSULIN LISPRO 4 UNITS: 100 INJECTION, SOLUTION INTRAVENOUS; SUBCUTANEOUS at 01:59

## 2019-06-05 RX ADMIN — SODIUM CHLORIDE, POTASSIUM CHLORIDE, SODIUM LACTATE AND CALCIUM CHLORIDE: 600; 310; 30; 20 INJECTION, SOLUTION INTRAVENOUS at 06:57

## 2019-06-05 RX ADMIN — ENOXAPARIN SODIUM 40 MG: 100 INJECTION SUBCUTANEOUS at 09:22

## 2019-06-05 RX ADMIN — MAGNESIUM HYDROXIDE 30 ML: 400 SUSPENSION ORAL at 13:10

## 2019-06-05 RX ADMIN — MORPHINE SULFATE 2 MG: 4 INJECTION INTRAVENOUS at 06:58

## 2019-06-05 RX ADMIN — CARVEDILOL 25 MG: 25 TABLET, FILM COATED ORAL at 14:46

## 2019-06-05 RX ADMIN — DULOXETINE HYDROCHLORIDE 60 MG: 30 CAPSULE, DELAYED RELEASE ORAL at 09:23

## 2019-06-05 RX ADMIN — DILTIAZEM HYDROCHLORIDE 120 MG: 60 TABLET, FILM COATED ORAL at 09:23

## 2019-06-05 RX ADMIN — DIGOXIN 125 MCG: 125 TABLET ORAL at 09:23

## 2019-06-05 ASSESSMENT — PAIN SCALES - GENERAL
PAINLEVEL_OUTOF10: 8
PAINLEVEL_OUTOF10: 7
PAINLEVEL_OUTOF10: 4

## 2019-06-05 NOTE — PLAN OF CARE
Problem: Falls - Risk of:  Goal: Will remain free from falls  Description  Will remain free from falls  Outcome: Met This Shift  Goal: Absence of physical injury  Description  Absence of physical injury  Outcome: Met This Shift     Problem: Safety:  Goal: Free from accidental physical injury  Description  Free from accidental physical injury  Outcome: Met This Shift  Goal: Free from intentional harm  Description  Free from intentional harm  Outcome: Met This Shift     Problem: Infection:  Goal: Will remain free from infection  Description  Will remain free from infection  Outcome: Ongoing     Problem: Daily Care:  Goal: Daily care needs are met  Description  Daily care needs are met  Outcome: Ongoing     Problem: Pain:  Goal: Patient's pain/discomfort is manageable  Description  Patient's pain/discomfort is manageable  Outcome: Ongoing  Goal: Pain level will decrease  Description  Pain level will decrease  Outcome: Ongoing  Goal: Control of acute pain  Description  Control of acute pain  Outcome: Ongoing  Goal: Control of chronic pain  Description  Control of chronic pain  Outcome: Ongoing

## 2019-06-05 NOTE — ANESTHESIA POSTPROCEDURE EVALUATION
Department of Anesthesiology  Postprocedure Note    Patient: Adelso Zelaya  MRN: [id number]  YOB: 1950  Date of evaluation: 6/5/2019  Time:  3:58 PM     Procedure Summary     Date:  06/03/19 Room / Location:  [place] [address] 02 / [place] [address]    Anesthesia Start:  1406 Anesthesia Stop:      Procedure:  RIGHT OPEN REDUCTION INTERNAL FIXATION OF DISTAL TIBIA  AND FIBULA (Right Ankle) Diagnosis:  (ankle)    Surgeon:  Alyssa Alexander MD Responsible Provider:  Marcy Cosme MD    Anesthesia Type:  general ASA Status:  3          Anesthesia Type: general    Jay Phase I: Jay Score: 8    Jay Phase II:      Last vitals: Reviewed and per EMR flowsheets.        Anesthesia Post Evaluation    Patient location during evaluation: bedside  Patient participation: complete - patient participated  Level of consciousness: awake and alert  Pain score: 0  Airway patency: patent  Nausea & Vomiting: no nausea and no vomiting  Complications: no  Cardiovascular status: hemodynamically stable  Respiratory status: acceptable  Hydration status: euvolemic

## 2019-06-05 NOTE — PROGRESS NOTES
Physical Therapy    Physical Therapy Treatment Note  Name: Delfin Quintana MRN: [id number] :   1950   Date:  2019   Admission Date: 2019 Room:  [address] [place], [place] [address]A   Restrictions/Precautions:  Restrictions/Precautions  Restrictions/Precautions: Weight Bearing, General Precautions, Fall Risk Lower Extremity Weight Bearing Restrictions  Right Lower Extremity Weight Bearing: Non Weight Bearing     Communication with other providers:  Cleared by nursing to work with therapy   Subjective:  Patient states:  \"I'm wearing myself out with all this\"   Pain:   Location, Type, Intensity (0/10 to 10/10): Denies   Objective:    Observation:    Sitting on BSC, alert and oriented, agreeable to work with therapy. Casted RLE. Treatment, including education/measures:  Sit><stand trials performed multiple times from BSC/recliner surface. Pt needing maxA for heavy weight shift, steadying, facilitating, and lift assist. Multiple times pt unsuccessful with transfer. Inc education and demonstration for weight shift, hand placement, and set up LEs to improve ease/effectiveness of transfer as well as maintain NWB RLE. Pt performed transfer to RW from [facility] and built up recliner chair as well as pulling to stand from recliner with 1 UE from bed rail/pushing with the other from chair. Quick fatigue needing adequate time for rest between trails. Recliner chair built up for height. Step pivot with RW Marcio/modA for balance and device management from [facility] to recliner. Constant cues for RLE placement to maintain NWB precautions. Pt performed seated bilat hip flexion, LAQ, hip abduction/adduction, L ankle PF/DF 10x with demonstration and education on exercises, techniques, and pace. Pt unsteady with standing mobility with very quick fatigue and overall weakness/dec in activity tolerance. Educate on POC, role of PT, DME, weightbearing precautions, and discharge recommendation.     Assessment / Impression:     Patient's tolerance of treatment:  good   Adverse Reaction: no  Significant change in status and impact:  no  Barriers to improvement:  Activity tolerance, strength, balance, WB restrictions, obesity.    Plan for Next Session:    Activity tolerance, strength, balance, transfers, gait, bed mobility   Time in:  1120  Time out:  1147  Timed treatment minutes:  27  Total treatment time:  27    Previously filed items:  Social/Functional History  Lives With: Alone  Type of Home: House  Home Layout: One level  Home Access: Stairs to enter without rails(holds onto door molding )  Entrance Stairs - Number of Steps: 2  Bathroom Shower/Tub: Tub/Shower unit  Bathroom Toilet: Standard  Bathroom Equipment: Grab bars in shower, Shower chair  Home Equipment: Rolling walker(can borrow equipment from [employer])  ADL Assistance: [address] [place] [address]: [address] [place] [address] Responsibilities: Yes  Ambulation Assistance: Independent  Transfer Assistance: Independent  Active : Yes  Occupation: Retired  Additional Comments: Brother lives near by and is retired   Short term goals  Time Frame for Lyndsay Chasity term goals: 1 week   Short term goal 1: Pt will perform sit><supine indep   Short term goal 2: Pt will transfer to bed/recliner CGA w/ RW while maintainin NWB RLE  Short term goal 3: Pt will transition sit><stand while maintaining NWB RLE CGA   Short term goal 4: Pt will ambulate 15ft with RW NWB RLE to safely access bathroom   Short term goal 5: Pt will propel WC 150ft Gwendolyn        Electronically signed by:    Inder Olsen, [id number]  6/5/2019, 1:27 PM

## 2019-06-05 NOTE — PROGRESS NOTES
PHARMACY ANTICOAGULATION MONITORING SERVICE    Sebas De La Rosa is a 77 y.o. male on warfarin therapy for A fib. Pharmacy consulted by Dr. Shireen Rivero for monitoring and adjustment of treatment. Indication for anticoagulation: A fib  INR goal: 2-3  Warfarin dose prior to admission: 2 mg Mon/Wed/Fri, 3 mg all other days    Pertinent Laboratory Values   Recent Labs     06/03/19  0017 06/03/19  0238  06/05/19  0703   INR 2.08 1.98   < > 1.63   HGB 12.8* 12.9*  --  10.3*   HCT 39.2* 39.0*  --  31.4*    193  --  152    < > = values in this interval not displayed. Assessment/Plan:   Drug Interactions: continues on enoxaparin for DVT prophylaxis while INR is sub-therapeutic per Fany CHOUDHARY   INR sub-therapeutic at 1.63 due to vitamin k administration on 6/3   Will give a slightly boosted dose of warfarin 3 mg tonight    Pharmacy will continue to monitor and adjust warfarin therapy as indicated    Thank you for the consult. Delfina Narayanan, Pharm. D., BCPS  6/5/2019 11:02 AM

## 2019-06-05 NOTE — CARE COORDINATION
Received approval from [facility]OM for pt to go to [place], packet prepared/ [facility] completed.    Set up pt with [employer] (via [employer]) 4:30pm.

## 2019-06-05 NOTE — PROGRESS NOTES
Progress Note( Dr. Jolie Pang)  6/5/2019  Subjective:   Admit Date: 6/2/2019  PCP: Musa Barkley APRN - CNP    Admitted For : Fall and fracture of right ankle bones    Consulted For:     Interval History:  Right ankle spiral fracture ///open reduction with internal fixation right distal tibia and fibula     Denies any chest pains,   Denies SOB . Denies nausea or vomiting. No new bowel or bladder symptoms. Intake/Output Summary (Last 24 hours) at 6/5/2019 0740  Last data filed at 6/5/2019 0644  Gross per 24 hour   Intake 890 ml   Output 1475 ml   Net -585 ml       DATA    CBC:   Recent Labs     06/03/19  0017 06/03/19  0238   WBC 8.6 8.7   HGB 12.8* 12.9*    193    CMP:  Recent Labs     06/03/19  0017 06/03/19  0238    141   K 4.7 4.1    103   CO2 24 23   BUN 19 20   CREATININE 0.9 0.9   CALCIUM 9.2 9.0   PROT 7.4  --    LABALBU 3.9  --    BILITOT 0.3  --    ALKPHOS 99  --    AST 38*  --    ALT 25  --      Lipids:   Lab Results   Component Value Date    CHOL 139 07/23/2018    HDL 37 01/07/2019    TRIG 175 07/23/2018     Glucose:  Recent Labs     06/04/19  1732 06/04/19  2142 06/05/19  0157   POCGLU 282* 215* 231*     [id number]:  Lab Results   Component Value Date    LABA1C 7.5 06/03/2019     High Sensitivity TSH:   Lab Results   Component Value Date    TSHHS 2.540 01/03/2017     Free T3: No results found for: FT3  Free T4:  Lab Results   Component Value Date    T4FREE 1.04 01/03/2017       Xr Knee Right (1-2 Views)    Result Date: 6/3/2019  EXAMINATION: 2 X-RAY VIEWS OF THE RIGHT KNEE, 6/2/2019 11:28 pm COMPARISON: None HISTORY: ORDERING SYSTEM PROVIDED HISTORY: Pain TECHNOLOGIST PROVIDED HISTORY: Reason for exam:->Pain Ordering Physician Provided Reason for Exam: Pain Acuity: Acute Type of Exam: Initial Right knee pain. FINDINGS: There is no acute fracture, dislocation, or bone destruction. Significant patellar enthesopathy is noted. There is no evidence of a joint effusion. Moderate to severe patellar enthesopathy. No acute osseous abnormality. Xr Ankle Right (min 3 Views)    Result Date: 6/2/2019  EXAMINATION: 3 XRAY VIEWS OF THE RIGHT ANKLE 6/2/2019 9:40 pm COMPARISON: None. HISTORY: ORDERING SYSTEM PROVIDED HISTORY: trauma TECHNOLOGIST PROVIDED HISTORY: Reason for exam:->trauma Ordering Physician Provided Reason for Exam: fall off tractor Acuity: Acute Type of Exam: Initial FINDINGS: Comminuted oblique displaced fracture of the distal shaft of the tibia is noted. A component of this fracture appears to extend to the articular surface. There is also a comminuted impacted fracture of the lateral malleolus at the level of the joint line. Subtle asymmetric widening of the ankle mortise is noted medially. The talar dome is intact. Diffuse soft tissue swelling is noted. Enthesophyte formation is noted about the Achilles insertion and there is relative soft tissue enlargement near the Achilles insertion. Plantar calcaneal spur. Comminuted mildly displaced fracture in the distal tibia with involvement of the articular surface. Comminuted impacted fracture of the distal fibula and medial malleolus with subtle widening of the ankle mortise. Soft tissue enlargement at the level of the Achilles insertion. Enthesophyte formation is noted without discrete fracture in this location.        Scheduled Medicines   Medications:    insulin glargine  30 Units Subcutaneous Nightly    insulin lispro  15 Units Subcutaneous TID WC    insulin NPH  8 Units Subcutaneous Once    insulin lispro  0-12 Units Subcutaneous TID WC    insulin lispro  0-12 Units Subcutaneous 2 times per day    sodium chloride flush  10 mL Intravenous 2 times per day    allopurinol  100 mg Oral BID    carvedilol  25 mg Oral TID    digoxin  125 mcg Oral Daily    diltiazem  120 mg Oral Daily    DULoxetine  60 mg Oral Daily    levothyroxine  50 mcg Oral Daily    losartan  50 mg Oral Daily    simvastatin 40 mg Oral Nightly    enoxaparin  40 mg Subcutaneous Daily    warfarin (COUMADIN) daily dosing (placeholder)   Other RX Placeholder      Infusions:    dextrose      lactated ringers 100 mL/hr at 06/05/19 0657         Objective:   Vitals: /75   Pulse 82   Temp 98.4 °F (36.9 °C) (Oral)   Resp 16   Ht 6' 2\" (1.88 m)   Wt (!) 310 lb (140.6 kg)   SpO2 92%   BMI 39.80 kg/m²   General appearance: alert and cooperative with exam  Neck: no JVD or bruit  Thyroid : Normal lobes   Lungs: Has Vesicular Breath sounds   Heart:  regular rate and rhythm  Abdomen: soft, non-tender; bowel sounds normal; no masses,  no organomegaly  Musculoskeletal: Normal  Extremities: extremities normal, , no edema fracture of right ankle bones  Neurologic:  Awake, alert, oriented to name, place and time. Cranial nerves II-XII are grossly intact. Motor is  intact. Sensory is intact. ,  and gait is normal.    Assessment:     Patient Active Problem List:     Atrial fibrillation (HCC)     CAD (coronary artery disease)     Obesity     COPD (chronic obstructive pulmonary disease) (HCC)     Sleep apnea     Type 2 diabetes mellitus (HCC)     Acute gouty arthritis     Thyroid disease     Hyperlipidemia     Dupuytren's contracture of hand     SOB (shortness of breath)     Swelling of extremity     Ankle fracture     Ankle fracture, Right , closed, initial encounter     Closed displaced pilon Right  lateral malleolus with routine healing  open reduction with internal fixation right distal tibia and fibula (ORIF)      Plan:     1. Reviewed POC blood glucose . Labs and X ray results   2. Reviewed Current Medicines   3. On meal/ Correction bolus Humalog/ Basal Lantus Insulin regime / and Oral Hypoglycemic drugs   4. Monitor Blood glucose frequently   5. Modified  the dose of Insulin/ other medicines as needed   6. Will follow     .      Juan Pablo Newby MD

## 2019-06-05 NOTE — CARE COORDINATION
Met with pt to continue discharge planning. Pt agreeable to SNF and chose [facility] as first choice, sent referral to [facility] TOM, advised pt ready for discharge today is accepted.

## 2019-06-05 NOTE — PROGRESS NOTES
Physical Therapy  Attempted to see pt for PT session. Pt declined stating \"You're not expecting me to get up out of this bed this drowsy and without breakfast are you? \". Will check back on pt for session later today.

## 2019-06-05 NOTE — PROGRESS NOTES
ORTHOPEDIC POST-OP PROGRESS NOTE    2019    Patient name: Darian Murphy  : 1950    SUBJECTIVE   The patient was seen and examined. Darian Murphy is POD#2 from ORIF right distal tibia and fibula. Denies concerns. Pain controlled. Sitting up in chair and was able to work with therapy today. Girlfriend at bedside. OBJECTIVE     Vitals:    19 0956   BP: 139/65   Pulse: 101   Resp: 16   Temp: 98.2 °F (36.8 °C)   SpO2: 90%     I/O last 3 completed shifts: In:  [I.V.:1665; IV Piggyback:250]  Out:  [Urine:1750]    Physical Exam:   GEN: AO NAD  MS: RLE: CR<2sec, SILT, wiggles toes, bulky splint in place and fitting properly    Labs:   CBC/COAGS  Recent Labs     19  0238  19  0830 19  0703   WBC 8.7  --   --  7.4   HCT 39.0*  --   --  31.4*     --   --  152   INR 1.98   < > 1.42 1.63    < > = values in this interval not displayed. BMP  Recent Labs     19  0238 19  0703    139   K 4.1 4.4    103   CO2 23 28   BUN 20 18   CREATININE 0.9 0.9         ASSESSMENT     77 y.o. male, POD#2    PLAN     1.) Non-weight bearing right leg 8-10 weeks  2.) Elevate right leg slightly above heart level  3.) Encourage patient to wiggle toes  4.) Ancef x 24 hours; COMPLETED   5.) DVT proph: OK to restart coumadin, ok for Lovenox 12 hours post op  6.) Follow up with Dr. Mani Hooks in 2 weeks  7.) D/C planning: Patient will likely need SNF placement. He lives along, numerous medical problems and NWBRLE for 2 months.     Electronically signed by:Caroline Paredes PA-C, 2019 1:17 PM

## 2019-06-05 NOTE — DISCHARGE SUMMARY
Stable    Discharge Medications: Letcher Oppenheim   Home Medication Instructions IRD:[id number]    Printed on:06/05/19 1400   Medication Information                      albuterol sulfate HFA (VENTOLIN HFA) 108 (90 Base) MCG/ACT inhaler  Inhale 2 puffs into the lungs every 6 hours as needed for Wheezing             allopurinol (ZYLOPRIM) 100 MG tablet  TAKE 1 TABLET BY MOUTH TWICE DAILY             carvedilol (COREG) 25 MG tablet  Take 1 tablet by mouth 2 times daily             cetirizine (ZYRTEC) 10 MG tablet  Take 1 tablet by mouth daily             digoxin (LANOXIN) 125 MCG tablet  Take 1 tablet by mouth daily             diltiazem (CARDIZEM) 120 MG tablet  Take 1 tablet by mouth daily             DULoxetine (CYMBALTA) 60 MG extended release capsule  Take 1 capsule by mouth daily             Elastic Bandages & Supports (MEDICAL COMPRESSION STOCKINGS) MISC  1 each by Does not apply route daily             furosemide (LASIX) 40 MG tablet  Take 1 tablet by mouth nightly             glimepiride (AMARYL) 2 MG tablet  Take 1 tablet by mouth every morning (before breakfast)             insulin NPH (HUMULIN N;NOVOLIN N) 100 UNIT/ML injection vial  Inject 35 Units into the skin 2 times daily (before meals)             insulin regular (NOVOLIN R) 100 UNIT/ML injection  Inject 0-10 Units into the skin 3 times daily (before meals)             Lancets MISC  by D3EA route three times a day. levothyroxine (SYNTHROID) 50 MCG tablet  Take 1 tablet by mouth daily             losartan (COZAAR) 50 MG tablet  Take 1 tablet by mouth daily             metFORMIN (GLUCOPHAGE) 1000 MG tablet  TAKE ONE TABLET BY MOUTH TWICE DAILY WITH MEALS             oxyCODONE (ROXICODONE) 5 MG immediate release tablet  Take 1 tablet by mouth every 4 hours as needed for Pain for up to 5 days.              polyethylene glycol (GLYCOLAX) powder  Take 17 g by mouth daily             pravastatin (PRAVACHOL) 80 MG tablet  TAKE 1 TABLET BY MOUTH ONCE DAILY             warfarin (COUMADIN) 2 MG tablet  Take 1 tablet by mouth three times a week **On MWF.**             warfarin (COUMADIN) 3 MG tablet  Take 1 tablet by mouth every 24 hours **OR AS DIRECTED PER **                 Objective Findings at Discharge:   /65   Pulse 101   Temp 98.2 °F (36.8 °C) (Oral)   Resp 16   Ht 6' 2\" (1.88 m)   Wt (!) 310 lb (140.6 kg)   SpO2 90%   BMI 39.80 kg/m²            PHYSICAL EXAM     General - AAO x 3  Psych - Appropriate affect/speech. No agitation  Eyes - Eye lids intact. No scleral icterus  ENT - Lips wnl. External ear clear/dry/intact. No thyromegaly on inspection  Neuro - No gross peripheral or central neuro deficits on inspection  Heart - Sinus. RRR. S1 and S2 present. No added HS/murmurs appreciated. No elevated JVD appreciated  Lung - Adequate air entry b/l, No crackles/wheezes appreciated  GI -  Soft. No guarding/rigidity. No hepatosplenomegaly/ascites. BS+     Skin - Intact. No rash/petechiae/ecchymosis.  Warm extremities  MSK - right leg ankle and foot in a cast       BMP/CBC  Recent Labs     06/03/19  0017 06/03/19  0238 06/05/19  0703    141 139   K 4.7 4.1 4.4    103 103   CO2 24 23 28   BUN 19 20 18   CREATININE 0.9 0.9 0.9   WBC 8.6 8.7 7.4   HCT 39.2* 39.0* 31.4*    193 152         Discharge Time of 35 minutes    Electronically signed by Lucy Torres MD on 6/5/2019 at 2:00 PM

## 2019-06-07 ENCOUNTER — HOSPITAL ENCOUNTER (OUTPATIENT)
Age: 69
Discharge: HOME | End: 2019-06-07

## 2019-06-07 LAB
INR BLD: 1.58 INDEX
PROTHROMBIN TIME: 17.9 SECONDS (ref 9.12–12.5)

## 2019-06-07 PROCEDURE — 85610 PROTHROMBIN TIME: CPT

## 2019-06-07 PROCEDURE — 36415 COLL VENOUS BLD VENIPUNCTURE: CPT

## 2019-06-11 ENCOUNTER — HOSPITAL ENCOUNTER (OUTPATIENT)
Age: 69
Discharge: HOME | End: 2019-06-11

## 2019-06-11 LAB
ANION GAP SERPL CALCULATED.3IONS-SCNC: 9 MMOL/L (ref 4–16)
BUN BLDV-MCNC: 12 MG/DL (ref 6–23)
CALCIUM SERPL-MCNC: 8.4 MG/DL (ref 8.3–10.6)
CHLORIDE BLD-SCNC: 105 MMOL/L (ref 99–110)
CO2: 26 MMOL/L (ref 21–32)
CREAT SERPL-MCNC: 0.9 MG/DL (ref 0.9–1.3)
GFR AFRICAN AMERICAN: >60 ML/MIN/1.73M2
GFR NON-AFRICAN AMERICAN: >60 ML/MIN/1.73M2
GLUCOSE BLD-MCNC: 236 MG/DL (ref 70–99)
HCT VFR BLD CALC: 31.6 % (ref 42–52)
HEMOGLOBIN: 10.3 GM/DL (ref 13.5–18)
INR BLD: 1.77 INDEX
MCH RBC QN AUTO: 35.5 PG (ref 27–31)
MCHC RBC AUTO-ENTMCNC: 32.6 % (ref 32–36)
MCV RBC AUTO: 109 FL (ref 78–100)
PDW BLD-RTO: 14.4 % (ref 11.7–14.9)
PLATELET # BLD: 227 K/CU MM (ref 140–440)
PMV BLD AUTO: 9.7 FL (ref 7.5–11.1)
POTASSIUM SERPL-SCNC: 4.5 MMOL/L (ref 3.5–5.1)
PROTHROMBIN TIME: 20.1 SECONDS (ref 9.12–12.5)
RBC # BLD: 2.9 M/CU MM (ref 4.6–6.2)
SODIUM BLD-SCNC: 140 MMOL/L (ref 135–145)
WBC # BLD: 5.7 K/CU MM (ref 4–10.5)

## 2019-06-11 PROCEDURE — 85610 PROTHROMBIN TIME: CPT

## 2019-06-11 PROCEDURE — 80048 BASIC METABOLIC PNL TOTAL CA: CPT

## 2019-06-11 PROCEDURE — 36415 COLL VENOUS BLD VENIPUNCTURE: CPT

## 2019-06-11 PROCEDURE — 85027 COMPLETE CBC AUTOMATED: CPT

## 2019-06-14 ENCOUNTER — HOSPITAL ENCOUNTER (OUTPATIENT)
Age: 69
Discharge: HOME | End: 2019-06-14

## 2019-06-14 LAB
INR BLD: 2.07 INDEX
PROTHROMBIN TIME: 23.8 SECONDS (ref 9.12–12.5)

## 2019-06-14 PROCEDURE — 36415 COLL VENOUS BLD VENIPUNCTURE: CPT

## 2019-06-14 PROCEDURE — 85610 PROTHROMBIN TIME: CPT

## 2019-06-18 ENCOUNTER — HOSPITAL ENCOUNTER (OUTPATIENT)
Age: 69
Discharge: HOME | End: 2019-06-18
Payer: COMMERCIAL

## 2019-06-18 LAB
ANION GAP SERPL CALCULATED.3IONS-SCNC: 11 MMOL/L (ref 4–16)
BUN BLDV-MCNC: 9 MG/DL (ref 6–23)
CALCIUM SERPL-MCNC: 8.6 MG/DL (ref 8.3–10.6)
CHLORIDE BLD-SCNC: 106 MMOL/L (ref 99–110)
CO2: 25 MMOL/L (ref 21–32)
CREAT SERPL-MCNC: 0.9 MG/DL (ref 0.9–1.3)
GFR AFRICAN AMERICAN: >60 ML/MIN/1.73M2
GFR NON-AFRICAN AMERICAN: >60 ML/MIN/1.73M2
GLUCOSE BLD-MCNC: 162 MG/DL (ref 70–99)
INR BLD: 2.41 INDEX
POTASSIUM SERPL-SCNC: 4.1 MMOL/L (ref 3.5–5.1)
PROTHROMBIN TIME: 27.7 SECONDS (ref 9.12–12.5)
SODIUM BLD-SCNC: 142 MMOL/L (ref 135–145)

## 2019-06-18 PROCEDURE — 36415 COLL VENOUS BLD VENIPUNCTURE: CPT

## 2019-06-18 PROCEDURE — 85610 PROTHROMBIN TIME: CPT

## 2019-06-18 PROCEDURE — 80048 BASIC METABOLIC PNL TOTAL CA: CPT

## 2019-06-19 ENCOUNTER — HOSPITAL ENCOUNTER (OUTPATIENT)
Age: 69
Setting detail: SPECIMEN
Discharge: HOME | End: 2019-06-19
Payer: COMMERCIAL

## 2019-06-19 LAB
HCT VFR BLD CALC: 33.6 % (ref 42–52)
HEMOGLOBIN: 10.8 GM/DL (ref 13.5–18)
MCH RBC QN AUTO: 34 PG (ref 27–31)
MCHC RBC AUTO-ENTMCNC: 32.1 % (ref 32–36)
MCV RBC AUTO: 105.7 FL (ref 78–100)
PDW BLD-RTO: 14.1 % (ref 11.7–14.9)
PLATELET # BLD: 278 K/CU MM (ref 140–440)
PMV BLD AUTO: 9.6 FL (ref 7.5–11.1)
RBC # BLD: 3.18 M/CU MM (ref 4.6–6.2)
WBC # BLD: 6 K/CU MM (ref 4–10.5)

## 2019-06-19 PROCEDURE — 85027 COMPLETE CBC AUTOMATED: CPT

## 2019-06-19 PROCEDURE — 36415 COLL VENOUS BLD VENIPUNCTURE: CPT

## 2019-06-21 ENCOUNTER — HOSPITAL ENCOUNTER (OUTPATIENT)
Age: 69
Discharge: HOME | End: 2019-06-21

## 2019-06-21 LAB
INR BLD: 2.37 INDEX
PROTHROMBIN TIME: 27.3 SECONDS (ref 9.12–12.5)

## 2019-06-21 PROCEDURE — 85610 PROTHROMBIN TIME: CPT

## 2019-06-21 PROCEDURE — 36415 COLL VENOUS BLD VENIPUNCTURE: CPT

## 2019-06-25 ENCOUNTER — OFFICE VISIT (OUTPATIENT)
Dept: ORTHOPEDIC SURGERY | Age: 69
End: 2019-06-25

## 2019-06-25 ENCOUNTER — HOSPITAL ENCOUNTER (OUTPATIENT)
Age: 69
Discharge: HOME | End: 2019-06-25

## 2019-06-25 VITALS — HEIGHT: 74 IN | BODY MASS INDEX: 40.43 KG/M2 | RESPIRATION RATE: 18 BRPM | WEIGHT: 315 LBS

## 2019-06-25 DIAGNOSIS — Z98.890 S/P ORIF (OPEN REDUCTION INTERNAL FIXATION) FRACTURE: Primary | ICD-10-CM

## 2019-06-25 DIAGNOSIS — Z87.81 S/P ORIF (OPEN REDUCTION INTERNAL FIXATION) FRACTURE: Primary | ICD-10-CM

## 2019-06-25 LAB
ANION GAP SERPL CALCULATED.3IONS-SCNC: 10 MMOL/L (ref 4–16)
BUN BLDV-MCNC: 8 MG/DL (ref 6–23)
CALCIUM SERPL-MCNC: 8.6 MG/DL (ref 8.3–10.6)
CHLORIDE BLD-SCNC: 105 MMOL/L (ref 99–110)
CO2: 28 MMOL/L (ref 21–32)
CREAT SERPL-MCNC: 0.9 MG/DL (ref 0.9–1.3)
GFR AFRICAN AMERICAN: >60 ML/MIN/1.73M2
GFR NON-AFRICAN AMERICAN: >60 ML/MIN/1.73M2
GLUCOSE BLD-MCNC: 62 MG/DL (ref 70–99)
HCT VFR BLD CALC: 34 % (ref 42–52)
HEMOGLOBIN: 11.1 GM/DL (ref 13.5–18)
INR BLD: 1.99 INDEX
MCH RBC QN AUTO: 34 PG (ref 27–31)
MCHC RBC AUTO-ENTMCNC: 32.6 % (ref 32–36)
MCV RBC AUTO: 104.3 FL (ref 78–100)
PDW BLD-RTO: 14.3 % (ref 11.7–14.9)
PLATELET # BLD: 238 K/CU MM (ref 140–440)
PMV BLD AUTO: 9.7 FL (ref 7.5–11.1)
POTASSIUM SERPL-SCNC: 3.9 MMOL/L (ref 3.5–5.1)
PROTHROMBIN TIME: 22.5 SECONDS (ref 9.12–12.5)
RBC # BLD: 3.26 M/CU MM (ref 4.6–6.2)
SODIUM BLD-SCNC: 143 MMOL/L (ref 135–145)
WBC # BLD: 4.9 K/CU MM (ref 4–10.5)

## 2019-06-25 PROCEDURE — 85027 COMPLETE CBC AUTOMATED: CPT

## 2019-06-25 PROCEDURE — 36415 COLL VENOUS BLD VENIPUNCTURE: CPT

## 2019-06-25 PROCEDURE — 85610 PROTHROMBIN TIME: CPT

## 2019-06-25 PROCEDURE — 80048 BASIC METABOLIC PNL TOTAL CA: CPT

## 2019-06-25 PROCEDURE — 99024 POSTOP FOLLOW-UP VISIT: CPT | Performed by: PHYSICIAN ASSISTANT

## 2019-06-25 RX ORDER — GEMFIBROZIL 600 MG/1
600 TABLET, FILM COATED ORAL
COMMUNITY
Start: 2015-11-09 | End: 2019-10-15 | Stop reason: SDUPTHER

## 2019-06-25 RX ORDER — ALPRAZOLAM 0.5 MG/1
0.5 TABLET ORAL
COMMUNITY
End: 2019-10-15 | Stop reason: CLARIF

## 2019-06-25 RX ORDER — INSULIN GLARGINE 100 [IU]/ML
60 INJECTION, SOLUTION SUBCUTANEOUS
COMMUNITY
End: 2019-07-15 | Stop reason: ALTCHOICE

## 2019-06-25 NOTE — PATIENT INSTRUCTIONS
Sutures removed   Fitted for boot, remove boot for hygiene and ROM of ankle   Continue Non weight bearing   Follow up with Dr Chinyere Lopez in 4 weeks with x-rays

## 2019-06-28 ENCOUNTER — HOSPITAL ENCOUNTER (OUTPATIENT)
Age: 69
Discharge: HOME | End: 2019-06-28

## 2019-06-29 DIAGNOSIS — E07.9 THYROID DISEASE: ICD-10-CM

## 2019-07-01 RX ORDER — FUROSEMIDE 40 MG/1
40 TABLET ORAL NIGHTLY
Qty: 30 TABLET | Refills: 1 | Status: SHIPPED | OUTPATIENT
Start: 2019-07-01 | End: 2019-07-15 | Stop reason: SDUPTHER

## 2019-07-01 RX ORDER — LEVOTHYROXINE SODIUM 0.05 MG/1
TABLET ORAL
Qty: 30 TABLET | Refills: 2 | Status: SHIPPED | OUTPATIENT
Start: 2019-07-01 | End: 2019-09-10

## 2019-07-02 ENCOUNTER — TELEPHONE (OUTPATIENT)
Dept: INTERNAL MEDICINE CLINIC | Age: 69
End: 2019-07-02

## 2019-07-02 NOTE — TELEPHONE ENCOUNTER
Ultimately, I cannot force him to do PT at home, although I would strongly encourage it. Patient should have follow up appointment with me when possible within next couple weeks or so.

## 2019-07-15 ENCOUNTER — OFFICE VISIT (OUTPATIENT)
Dept: INTERNAL MEDICINE CLINIC | Age: 69
End: 2019-07-15
Payer: COMMERCIAL

## 2019-07-15 VITALS
WEIGHT: 315 LBS | OXYGEN SATURATION: 98 % | BODY MASS INDEX: 40.43 KG/M2 | HEART RATE: 80 BPM | HEIGHT: 74 IN | RESPIRATION RATE: 20 BRPM | SYSTOLIC BLOOD PRESSURE: 119 MMHG | DIASTOLIC BLOOD PRESSURE: 70 MMHG

## 2019-07-15 DIAGNOSIS — Z79.4 TYPE 2 DIABETES MELLITUS WITHOUT COMPLICATION, WITH LONG-TERM CURRENT USE OF INSULIN (HCC): ICD-10-CM

## 2019-07-15 DIAGNOSIS — I48.20 CHRONIC ATRIAL FIBRILLATION (HCC): ICD-10-CM

## 2019-07-15 DIAGNOSIS — Z76.89 ENCOUNTER FOR SUPPORT AND COORDINATION OF TRANSITION OF CARE: Primary | ICD-10-CM

## 2019-07-15 DIAGNOSIS — E11.9 TYPE 2 DIABETES MELLITUS WITHOUT COMPLICATION, WITH LONG-TERM CURRENT USE OF INSULIN (HCC): ICD-10-CM

## 2019-07-15 DIAGNOSIS — Z76.89 ENCOUNTER FOR SUPPORT AND COORDINATION OF TRANSITION OF CARE: ICD-10-CM

## 2019-07-15 DIAGNOSIS — D64.9 MILD ANEMIA: ICD-10-CM

## 2019-07-15 DIAGNOSIS — J44.9 CHRONIC OBSTRUCTIVE PULMONARY DISEASE, UNSPECIFIED COPD TYPE (HCC): ICD-10-CM

## 2019-07-15 LAB
A/G RATIO: 1.4 (ref 1.1–2.2)
ALBUMIN SERPL-MCNC: 4.2 G/DL (ref 3.4–5)
ALP BLD-CCNC: 201 U/L (ref 40–129)
ALT SERPL-CCNC: 20 U/L (ref 10–40)
ANION GAP SERPL CALCULATED.3IONS-SCNC: 17 MMOL/L (ref 3–16)
AST SERPL-CCNC: 25 U/L (ref 15–37)
BASOPHILS ABSOLUTE: 0.1 K/UL (ref 0–0.2)
BASOPHILS RELATIVE PERCENT: 1.1 %
BILIRUB SERPL-MCNC: 0.6 MG/DL (ref 0–1)
BUN BLDV-MCNC: 17 MG/DL (ref 7–20)
CALCIUM SERPL-MCNC: 9.7 MG/DL (ref 8.3–10.6)
CHLORIDE BLD-SCNC: 98 MMOL/L (ref 99–110)
CO2: 26 MMOL/L (ref 21–32)
CREAT SERPL-MCNC: 1.4 MG/DL (ref 0.8–1.3)
EOSINOPHILS ABSOLUTE: 0.1 K/UL (ref 0–0.6)
EOSINOPHILS RELATIVE PERCENT: 1.7 %
GFR AFRICAN AMERICAN: >60
GFR NON-AFRICAN AMERICAN: 50
GLOBULIN: 3.1 G/DL
GLUCOSE BLD-MCNC: 385 MG/DL (ref 70–99)
HBA1C MFR BLD: 7.6 %
HCT VFR BLD CALC: 37.9 % (ref 40.5–52.5)
HEMOGLOBIN: 12.8 G/DL (ref 13.5–17.5)
INTERNATIONAL NORMALIZATION RATIO, POC: 2.3
LYMPHOCYTES ABSOLUTE: 1.7 K/UL (ref 1–5.1)
LYMPHOCYTES RELATIVE PERCENT: 26.3 %
MCH RBC QN AUTO: 33.5 PG (ref 26–34)
MCHC RBC AUTO-ENTMCNC: 33.6 G/DL (ref 31–36)
MCV RBC AUTO: 99.5 FL (ref 80–100)
MONOCYTES ABSOLUTE: 0.6 K/UL (ref 0–1.3)
MONOCYTES RELATIVE PERCENT: 8.8 %
NEUTROPHILS ABSOLUTE: 4.1 K/UL (ref 1.7–7.7)
NEUTROPHILS RELATIVE PERCENT: 62.1 %
PDW BLD-RTO: 15.4 % (ref 12.4–15.4)
PLATELET # BLD: 310 K/UL (ref 135–450)
PMV BLD AUTO: 8.5 FL (ref 5–10.5)
POTASSIUM SERPL-SCNC: 4.7 MMOL/L (ref 3.5–5.1)
PROTHROMBIN TIME, POC: NORMAL
RBC # BLD: 3.81 M/UL (ref 4.2–5.9)
SODIUM BLD-SCNC: 141 MMOL/L (ref 136–145)
TOTAL PROTEIN: 7.3 G/DL (ref 6.4–8.2)
WBC # BLD: 6.6 K/UL (ref 4–11)

## 2019-07-15 PROCEDURE — 99214 OFFICE O/P EST MOD 30 MIN: CPT | Performed by: NURSE PRACTITIONER

## 2019-07-15 PROCEDURE — 1111F DSCHRG MED/CURRENT MED MERGE: CPT | Performed by: NURSE PRACTITIONER

## 2019-07-15 PROCEDURE — 85610 PROTHROMBIN TIME: CPT | Performed by: NURSE PRACTITIONER

## 2019-07-15 PROCEDURE — 83036 HEMOGLOBIN GLYCOSYLATED A1C: CPT | Performed by: NURSE PRACTITIONER

## 2019-07-15 RX ORDER — LOSARTAN POTASSIUM 50 MG/1
50 TABLET ORAL DAILY
Qty: 30 TABLET | Refills: 3 | Status: SHIPPED | OUTPATIENT
Start: 2019-07-15 | End: 2019-10-15 | Stop reason: SDUPTHER

## 2019-07-15 RX ORDER — OXYCODONE AND ACETAMINOPHEN 7.5; 325 MG/1; MG/1
1 TABLET ORAL EVERY 4 HOURS PRN
COMMUNITY
End: 2019-08-13

## 2019-07-15 RX ORDER — FUROSEMIDE 40 MG/1
40 TABLET ORAL NIGHTLY
Qty: 30 TABLET | Refills: 1 | Status: SHIPPED | OUTPATIENT
Start: 2019-07-15 | End: 2019-10-15 | Stop reason: SDUPTHER

## 2019-07-15 NOTE — PROGRESS NOTES
Post-Discharge Transitional Care Management Services or Hospital Follow Up      Ondina Louis   YOB: 1950    Date of Office Visit:  7/15/2019  Date of Hospital Admission: 6/2/19  Date of Hospital Discharge: 6/5/19  Risk of hospital readmission (high >=14%. Medium >=10%) :Readmission Risk Score: 17      Care management risk score Rising risk (score 2-5) and Complex Care (Scores >=6): 3     Non face to face  following discharge, date last encounter closed (first attempt may have been earlier): *No documented post hospital discharge outreach found in the last 14 days    Call initiated 2 business days of discharge: *No response recorded in the last 14 days    Patient Active Problem List   Diagnosis    Atrial fibrillation ([address]    CAD (coronary artery disease)    Obesity    COPD (chronic obstructive pulmonary disease) ([address]    Sleep apnea    [facility]    Acute gouty arthritis    Thyroid disease    Hyperlipidemia    Dupuytren's contracture of hand    SOB (shortness of breath)    Swelling of extremity    Ankle fracture    Ankle fracture, left, closed, initial encounter    Closed displaced pilon fracture of left tibia with routine healing    Closed disp fracture of left lateral malleolus with routine healing    Bimalleolar fracture, right, closed, initial encounter       No Known Allergies    Medications listed as ordered at the time of discharge from [facility] Medication Instructions CHARANJIT:    Printed on:07/15/19 1411   Medication Information                      albuterol sulfate HFA (VENTOLIN HFA) 108 (90 Base) MCG/ACT inhaler  Inhale 2 puffs into the lungs every 6 hours as needed for Wheezing             allopurinol (ZYLOPRIM) 100 MG tablet  TAKE 1 TABLET BY MOUTH TWICE DAILY             ALPRAZolam (XANAX) 0.5 MG tablet  Take 0.5 mg by mouth.              carvedilol (COREG) 25 MG tablet  Take 1 tablet by mouth 2 times daily Indications: Sat/Tue/Thur/Sun Sat/Tue/Thur/Sun) 30 tablet 1    Lancets MISC by D3EA route three times a day. 100 each 3    glimepiride (AMARYL) 2 MG tablet Take 1 tablet by mouth every morning (before breakfast) 90 tablet 1        Medications patient taking as of now reconciled against medications ordered at time of hospital discharge: Yes    Chief Complaint   Patient presents with    Follow-up     from [address]        History of Present illness - Follow up of Hospital diagnosis(es): Closed fracture of right ankle, initial encounter; Bi-malleolar fracture, right, closed, initial encounter; Atrial Fibrillation    Inpatient course: Discharge summary reviewed- see chart. Interval history/Current status: improving    A comprehensive review of systems was negative except for what was noted in the HPI. Vitals:    07/15/19 1405   Resp: 20   Weight: (!) 325 lb (147.4 kg)   Height: 6' 2\" (1.88 m)     Body mass index is 41.73 kg/m².    Wt Readings from Last 3 Encounters:   07/15/19 (!) 325 lb (147.4 kg)   06/25/19 (!) 325 lb (147.4 kg)   06/02/19 (!) 310 lb (140.6 kg)     BP Readings from Last 3 Encounters:   06/05/19 139/65   06/03/19 (!) 100/45   04/19/19 (!) 169/78        Physical Exam:  General Appearance: alert and oriented to person, place and time, well developed and well- nourished, in no acute distress  Skin: warm and dry, no rash or erythema  Head: normocephalic and atraumatic  Eyes: pupils equal, round, and reactive to light, extraocular eye movements intact, conjunctivae normal  ENT: tympanic membrane, external ear and ear canal normal bilaterally, nose without deformity, nasal mucosa and turbinates normal without polyps  Neck: supple and non-tender without mass, no thyromegaly or thyroid nodules, no cervical lymphadenopathy  Pulmonary/Chest: clear to auscultation bilaterally- no wheezes, rales or rhonchi, normal air movement, no respiratory distress  Cardiovascular: normal rate, regular rhythm,

## 2019-07-23 ENCOUNTER — OFFICE VISIT (OUTPATIENT)
Dept: ORTHOPEDIC SURGERY | Age: 69
End: 2019-07-23
Payer: COMMERCIAL

## 2019-07-23 VITALS — RESPIRATION RATE: 18 BRPM | BODY MASS INDEX: 40.43 KG/M2 | HEIGHT: 74 IN | WEIGHT: 315 LBS

## 2019-07-23 DIAGNOSIS — Z87.81 S/P ORIF (OPEN REDUCTION INTERNAL FIXATION) FRACTURE: Primary | ICD-10-CM

## 2019-07-23 DIAGNOSIS — Z98.890 S/P ORIF (OPEN REDUCTION INTERNAL FIXATION) FRACTURE: Primary | ICD-10-CM

## 2019-07-23 PROCEDURE — 99024 POSTOP FOLLOW-UP VISIT: CPT | Performed by: ORTHOPAEDIC SURGERY

## 2019-07-23 PROCEDURE — 73610 X-RAY EXAM OF ANKLE: CPT | Performed by: ORTHOPAEDIC SURGERY

## 2019-07-23 RX ORDER — DIGOXIN 250 MCG
TABLET ORAL
Refills: 3 | COMMUNITY
Start: 2019-06-30 | End: 2019-07-23

## 2019-07-25 ENCOUNTER — TELEPHONE (OUTPATIENT)
Dept: ORTHOPEDIC SURGERY | Age: 69
End: 2019-07-25

## 2019-07-26 ENCOUNTER — CARE COORDINATION (OUTPATIENT)
Dept: CARE COORDINATION | Age: 69
End: 2019-07-26

## 2019-07-30 ENCOUNTER — CARE COORDINATION (OUTPATIENT)
Dept: CARE COORDINATION | Age: 69
End: 2019-07-30

## 2019-07-31 NOTE — TELEPHONE ENCOUNTER
Thanks Mitzy gonsalves you had that experience. I will try to reach out to [facility] and see if I can evaluate the situation further.   Thank you    I agree with the [place]

## 2019-08-13 ENCOUNTER — OFFICE VISIT (OUTPATIENT)
Dept: ORTHOPEDIC SURGERY | Age: 69
End: 2019-08-13
Payer: COMMERCIAL

## 2019-08-13 VITALS — RESPIRATION RATE: 16 BRPM | BODY MASS INDEX: 40.43 KG/M2 | HEIGHT: 74 IN | WEIGHT: 315 LBS

## 2019-08-13 DIAGNOSIS — Z98.890 S/P ORIF (OPEN REDUCTION INTERNAL FIXATION) FRACTURE: Primary | ICD-10-CM

## 2019-08-13 DIAGNOSIS — Z87.81 S/P ORIF (OPEN REDUCTION INTERNAL FIXATION) FRACTURE: Primary | ICD-10-CM

## 2019-08-13 PROCEDURE — 73600 X-RAY EXAM OF ANKLE: CPT | Performed by: ORTHOPAEDIC SURGERY

## 2019-08-13 PROCEDURE — 99024 POSTOP FOLLOW-UP VISIT: CPT | Performed by: ORTHOPAEDIC SURGERY

## 2019-08-13 NOTE — PROGRESS NOTES
Radiology Report    Name: Pal Nielsen  YOB: 1950  Procedure: 3 views right ankle  Date: 8/13/2019  Comparison: Previous x-rays  Reason for X-ray:   Patient Active Problem List   Diagnosis    [facility]    CAD (coronary artery disease)    Obesity    COPD (chronic obstructive pulmonary disease) ([facility]    Sleep apnea    Type 2 diabetes mellitus [facility]    Acute gouty arthritis    Thyroid disease    Hyperlipidemia    Dupuytren's contracture of hand    SOB (shortness of breath)    Swelling of extremity    Ankle fracture    Ankle fracture, left, closed, initial encounter    Closed displaced pilon fracture of left tibia with routine healing    Closed disp fracture of left lateral malleolus with routine healing    Bimalleolar fracture, right, closed, initial encounter      Reading: Multiple views of the right  ankle are reviewed which shows further consolidation of the distal tibia fracture no evidence of loosening hardware there is initial displacement of the retrograde fibular screw from intraoperative films but is not appear to change in alignment and new x-rays today   impression: Stable alignment distal tibia and fibula fractures with osseous consolidation no signs of hardware failure slight loss change in alignment of retrograde fibular screw from intraoperative x-rays    Electronically signed by: Chelsy Mcmullen MD, 8/13/2019 2:53 PM

## 2019-08-30 RX ORDER — DULOXETIN HYDROCHLORIDE 60 MG/1
60 CAPSULE, DELAYED RELEASE ORAL DAILY
Qty: 90 CAPSULE | Refills: 1 | Status: SHIPPED | OUTPATIENT
Start: 2019-08-30 | End: 2019-10-15 | Stop reason: SDUPTHER

## 2019-09-05 ENCOUNTER — OFFICE VISIT (OUTPATIENT)
Dept: ORTHOPEDIC SURGERY | Age: 69
End: 2019-09-05
Payer: COMMERCIAL

## 2019-09-05 VITALS — OXYGEN SATURATION: 96 % | TEMPERATURE: 98.3 F | HEART RATE: 63 BPM | RESPIRATION RATE: 14 BRPM

## 2019-09-05 DIAGNOSIS — S82.872D CLOSED DISPLACED PILON FRACTURE OF LEFT TIBIA WITH ROUTINE HEALING: Primary | ICD-10-CM

## 2019-09-05 DIAGNOSIS — E78.2 MIXED HYPERLIPIDEMIA: ICD-10-CM

## 2019-09-05 DIAGNOSIS — S82.62XD CLOSED DISP FRACTURE OF LEFT LATERAL MALLEOLUS WITH ROUTINE HEALING: ICD-10-CM

## 2019-09-05 PROCEDURE — 73610 X-RAY EXAM OF ANKLE: CPT | Performed by: ORTHOPAEDIC SURGERY

## 2019-09-05 PROCEDURE — 99212 OFFICE O/P EST SF 10 MIN: CPT | Performed by: ORTHOPAEDIC SURGERY

## 2019-09-05 RX ORDER — PRAVASTATIN SODIUM 80 MG/1
TABLET ORAL
Qty: 30 TABLET | Refills: 2 | Status: SHIPPED | OUTPATIENT
Start: 2019-09-05 | End: 2019-10-15 | Stop reason: SDUPTHER

## 2019-09-05 ASSESSMENT — ENCOUNTER SYMPTOMS
GASTROINTESTINAL NEGATIVE: 1
ALLERGIC/IMMUNOLOGIC NEGATIVE: 1
RESPIRATORY NEGATIVE: 1
EYES NEGATIVE: 1

## 2019-09-10 ENCOUNTER — OFFICE VISIT (OUTPATIENT)
Dept: ORTHOPEDIC SURGERY | Age: 69
End: 2019-09-10
Payer: COMMERCIAL

## 2019-09-10 VITALS — HEART RATE: 93 BPM | HEIGHT: 74 IN | BODY MASS INDEX: 41.96 KG/M2 | RESPIRATION RATE: 14 BRPM | OXYGEN SATURATION: 95 %

## 2019-09-10 DIAGNOSIS — S82.872D CLOSED DISPLACED PILON FRACTURE OF LEFT TIBIA WITH ROUTINE HEALING: Primary | ICD-10-CM

## 2019-09-10 PROCEDURE — 99212 OFFICE O/P EST SF 10 MIN: CPT | Performed by: PHYSICIAN ASSISTANT

## 2019-09-18 DIAGNOSIS — I48.91 ATRIAL FIBRILLATION, UNSPECIFIED TYPE (HCC): ICD-10-CM

## 2019-09-18 RX ORDER — WARFARIN SODIUM 2 MG/1
2 TABLET ORAL
Qty: 30 TABLET | Refills: 1 | Status: SHIPPED | OUTPATIENT
Start: 2019-09-18 | End: 2019-10-15 | Stop reason: SDUPTHER

## 2019-09-18 RX ORDER — WARFARIN SODIUM 3 MG/1
3 TABLET ORAL EVERY 24 HOURS
Qty: 30 TABLET | Refills: 1 | Status: SHIPPED | OUTPATIENT
Start: 2019-09-18 | End: 2019-10-15 | Stop reason: SDUPTHER

## 2019-09-24 ENCOUNTER — OFFICE VISIT (OUTPATIENT)
Dept: ORTHOPEDIC SURGERY | Age: 69
End: 2019-09-24
Payer: COMMERCIAL

## 2019-09-24 ENCOUNTER — HOSPITAL ENCOUNTER (OUTPATIENT)
Dept: ULTRASOUND IMAGING | Age: 69
Discharge: HOME | End: 2019-09-24
Payer: COMMERCIAL

## 2019-09-24 VITALS — WEIGHT: 315 LBS | OXYGEN SATURATION: 98 % | BODY MASS INDEX: 40.43 KG/M2 | HEIGHT: 74 IN

## 2019-09-24 DIAGNOSIS — M10.9 ACUTE GOUTY ARTHRITIS: ICD-10-CM

## 2019-09-24 DIAGNOSIS — R52 PAIN: Primary | ICD-10-CM

## 2019-09-24 DIAGNOSIS — R60.0 LEG EDEMA, RIGHT: ICD-10-CM

## 2019-09-24 DIAGNOSIS — R52 PAIN: ICD-10-CM

## 2019-09-24 PROCEDURE — 99212 OFFICE O/P EST SF 10 MIN: CPT | Performed by: PHYSICIAN ASSISTANT

## 2019-09-24 PROCEDURE — 93971 EXTREMITY STUDY: CPT

## 2019-09-25 ENCOUNTER — OFFICE VISIT (OUTPATIENT)
Dept: CARDIOLOGY CLINIC | Age: 69
End: 2019-09-25
Payer: COMMERCIAL

## 2019-09-25 VITALS
HEART RATE: 64 BPM | SYSTOLIC BLOOD PRESSURE: 138 MMHG | DIASTOLIC BLOOD PRESSURE: 82 MMHG | BODY MASS INDEX: 40.43 KG/M2 | HEIGHT: 74 IN | WEIGHT: 315 LBS

## 2019-09-25 DIAGNOSIS — E78.2 MIXED HYPERLIPIDEMIA: ICD-10-CM

## 2019-09-25 DIAGNOSIS — I25.119 CORONARY ARTERY DISEASE INVOLVING NATIVE CORONARY ARTERY OF NATIVE HEART WITH ANGINA PECTORIS (HCC): ICD-10-CM

## 2019-09-25 DIAGNOSIS — M79.89 SWELLING OF EXTREMITY: ICD-10-CM

## 2019-09-25 DIAGNOSIS — I48.20 CHRONIC ATRIAL FIBRILLATION (HCC): Primary | ICD-10-CM

## 2019-09-25 DIAGNOSIS — E66.01 CLASS 3 SEVERE OBESITY DUE TO EXCESS CALORIES WITH SERIOUS COMORBIDITY AND BODY MASS INDEX (BMI) OF 40.0 TO 44.9 IN ADULT (HCC): ICD-10-CM

## 2019-09-25 PROCEDURE — 99213 OFFICE O/P EST LOW 20 MIN: CPT | Performed by: NURSE PRACTITIONER

## 2019-09-25 RX ORDER — ALLOPURINOL 100 MG/1
TABLET ORAL
Qty: 180 TABLET | Refills: 0 | Status: SHIPPED | OUTPATIENT
Start: 2019-09-25 | End: 2019-10-08

## 2019-09-25 NOTE — PROGRESS NOTES
[address] [facility],Third [address]  Phone: [phone]    Fax [phone]                  Samson Granado MD, Iftikhar Lewis MD, [address] [facility], MD, MD Alisia Leonard MD Liborio Beal, MD Othella Duke, APRN      Amando De Los Santos, APRN  Smitha Carreon, APRN     Bonnie Thomas, APRN    CARDIOLOGY  NOTE      9/25/2019    RE: Gordon Baig  ([birth year])                               TO:  Dr. Davi Amaral, APRN - CNP  The primary cardiologist is Dr. Bailey Bennett    CC:   1. Chronic atrial fibrillation    2. Coronary artery disease involving native coronary artery of native heart with angina pectoris (HCC)    3. Class 3 severe obesity due to excess calories with serious comorbidity and body mass index (BMI) of 40.0 to 44.9 in adult ([facility]    4. Swelling of extremity    5. Mixed hyperlipidemia        Patient denies all of the following:  Chest Pain  Palpitations  Shortness of Breath  Edema  Dizziness  Syncope      HPI: Thank you for involving me in taking care of your patient Gordon Baig, who is a  77y.o. year old male with a history as listed above. Patient presents to the office for follow up on CAD, HTN, and hyperlipidemia. Patient is not an active male who does not walk regularly due to right leg fracture in June. He is planned to start physical therapy soon. Large amount of leg edema and U/S of right leg yesterday was negative for DVT. Patient is  compliant with he medications. Patient denies any cardiac complaints or needs.      Vitals:    09/25/19 1100   BP: 138/82   Pulse: 64       Current Outpatient Medications   Medication Sig Dispense Refill    allopurinol (ZYLOPRIM) 100 MG tablet TAKE 1 TABLET BY MOUTH TWICE DAILY 180 tablet 0    warfarin (COUMADIN) 2 MG tablet Take 1 tablet by mouth three times a week **On MWF.** 30 tablet 1    warfarin (COUMADIN) 3 MG tablet Take 1 tablet by mouth every 24 hours Indications: Allergies: Patient has no known allergies. Past Medical History:   Diagnosis Date    Allergic rhinitis     Anticoagulated on Coumadin     1/6/17-**[facility] to follow pt's PT/INRs, along w/prescribing his Coumadin. **    Anxiety     Arthritis     Atrial fibrillation (HCC)     On Coumadin.  CAD (coronary artery disease)     COPD (chronic obstructive pulmonary disease) (HCC)     Depression     Diabetes mellitus (HCC)     NIDDM- dx over 10 yrs ago- follows with Dr Denita Pinon's contracture of hand     Right hand    Family history of cardiovascular disease     Father-MI    GERD (gastroesophageal reflux disease)     H/O cardiac catheterization 03/2007    cardiac cath- stent LAD patent, [id number]%, RCA 40-50%    H/O cardiac catheterization 06/12/2008    cardiac cath- mild disease mid RCA    H/O cardiovascular stress test 4/10/2014    cardiolite- normal, no ischemia, EF63%    H/O cardiovascular stress test 09/21/2016    EF59% normal study  pt in atrial fib    H/O cardiovascular stress test 09/20/2017    EF 60%   afib    H/O cardiovascular stress test 11/07/2018    EF60% normal study    H/O Doppler ultrasound     1/11/2010-CAROTID_Intimal thickening but no significant atherosclerotic plaque noted in LAUREN. Doppler flow velocities within the LAUREN are WNL. Intimal thickening but no significant atherosclerotic plaque noted in LICA. Doppler flow velociities within the LICA are WNL.  H/O echocardiogram 04/10/2014    EF 60%, normal LV systolic function, mild mitral and tricuspid insufficiencies, no pericardial effusion.     H/O echocardiogram 09/21/2016    EF60% normal study    H/O echocardiogram 11/07/2018    EF55-60% no significant valular disease    H/O hiatal hernia     Hx of Venous Doppler 03/14/2019    Significant reflux in Left Deep System, No reflux in right lower extremity, No DVT or SVT    Hyperlipidemia     Hypertension     Obesity     S/P PTCA (percutaneous

## 2019-09-25 NOTE — ASSESSMENT & PLAN NOTE
Patient has not been able to exercise due to right leg fracture. Discussed diet changes with patient.

## 2019-10-08 ENCOUNTER — OFFICE VISIT (OUTPATIENT)
Dept: ORTHOPEDIC SURGERY | Age: 69
End: 2019-10-08
Payer: COMMERCIAL

## 2019-10-08 VITALS — RESPIRATION RATE: 18 BRPM | BODY MASS INDEX: 40.43 KG/M2 | WEIGHT: 315 LBS | HEIGHT: 74 IN

## 2019-10-08 DIAGNOSIS — R60.0 LEG EDEMA, RIGHT: ICD-10-CM

## 2019-10-08 DIAGNOSIS — Z51.89 VISIT FOR WOUND CHECK: Primary | ICD-10-CM

## 2019-10-08 DIAGNOSIS — S81.801S NON-HEALING WOUND OF LOWER EXTREMITY, RIGHT, SEQUELA: ICD-10-CM

## 2019-10-08 DIAGNOSIS — Z98.890 S/P ORIF (OPEN REDUCTION INTERNAL FIXATION) FRACTURE: ICD-10-CM

## 2019-10-08 DIAGNOSIS — Z87.81 S/P ORIF (OPEN REDUCTION INTERNAL FIXATION) FRACTURE: ICD-10-CM

## 2019-10-08 PROCEDURE — 99212 OFFICE O/P EST SF 10 MIN: CPT | Performed by: PHYSICIAN ASSISTANT

## 2019-10-08 RX ORDER — LOSARTAN POTASSIUM 50 MG/1
TABLET ORAL
Qty: 30 TABLET | Refills: 3 | Status: SHIPPED | OUTPATIENT
Start: 2019-10-08 | End: 2019-10-15 | Stop reason: CLARIF

## 2019-10-08 RX ORDER — DIGOXIN 250 MCG
TABLET ORAL
COMMUNITY
Start: 2019-09-24 | End: 2019-10-08 | Stop reason: ALTCHOICE

## 2019-10-11 DIAGNOSIS — E07.9 THYROID DISEASE: ICD-10-CM

## 2019-10-11 RX ORDER — LEVOTHYROXINE SODIUM 0.05 MG/1
TABLET ORAL
Qty: 30 TABLET | Refills: 2 | Status: SHIPPED | OUTPATIENT
Start: 2019-10-11 | End: 2019-10-15 | Stop reason: CLARIF

## 2019-10-15 ENCOUNTER — OFFICE VISIT (OUTPATIENT)
Dept: INTERNAL MEDICINE CLINIC | Age: 69
End: 2019-10-15
Payer: COMMERCIAL

## 2019-10-15 VITALS
BODY MASS INDEX: 40.43 KG/M2 | SYSTOLIC BLOOD PRESSURE: 161 MMHG | RESPIRATION RATE: 18 BRPM | DIASTOLIC BLOOD PRESSURE: 81 MMHG | HEIGHT: 74 IN | WEIGHT: 315 LBS | OXYGEN SATURATION: 97 % | HEART RATE: 66 BPM

## 2019-10-15 DIAGNOSIS — Z79.01 CHRONIC ANTICOAGULATION: ICD-10-CM

## 2019-10-15 DIAGNOSIS — I48.91 ATRIAL FIBRILLATION, UNSPECIFIED TYPE (HCC): ICD-10-CM

## 2019-10-15 DIAGNOSIS — I10 ESSENTIAL HYPERTENSION: ICD-10-CM

## 2019-10-15 DIAGNOSIS — Z79.4 TYPE 2 DIABETES MELLITUS WITHOUT COMPLICATION, WITH LONG-TERM CURRENT USE OF INSULIN (HCC): Primary | ICD-10-CM

## 2019-10-15 DIAGNOSIS — S81.802A WOUND OF LEFT LOWER EXTREMITY, INITIAL ENCOUNTER: ICD-10-CM

## 2019-10-15 DIAGNOSIS — J44.9 CHRONIC OBSTRUCTIVE PULMONARY DISEASE, UNSPECIFIED COPD TYPE (HCC): ICD-10-CM

## 2019-10-15 DIAGNOSIS — E78.2 MIXED HYPERLIPIDEMIA: ICD-10-CM

## 2019-10-15 DIAGNOSIS — E11.9 TYPE 2 DIABETES MELLITUS WITHOUT COMPLICATION, WITH LONG-TERM CURRENT USE OF INSULIN (HCC): Primary | ICD-10-CM

## 2019-10-15 DIAGNOSIS — M79.89 LEG SWELLING: ICD-10-CM

## 2019-10-15 PROBLEM — R06.02 SOB (SHORTNESS OF BREATH): Status: RESOLVED | Noted: 2018-11-06 | Resolved: 2019-10-15

## 2019-10-15 LAB
HBA1C MFR BLD: 7.8 %
INTERNATIONAL NORMALIZATION RATIO, POC: 2.1
PROTHROMBIN TIME, POC: NORMAL

## 2019-10-15 PROCEDURE — 85610 PROTHROMBIN TIME: CPT | Performed by: NURSE PRACTITIONER

## 2019-10-15 PROCEDURE — 99214 OFFICE O/P EST MOD 30 MIN: CPT | Performed by: NURSE PRACTITIONER

## 2019-10-15 PROCEDURE — 83036 HEMOGLOBIN GLYCOSYLATED A1C: CPT | Performed by: NURSE PRACTITIONER

## 2019-10-15 RX ORDER — WARFARIN SODIUM 3 MG/1
3 TABLET ORAL EVERY 24 HOURS
Qty: 30 TABLET | Refills: 1 | Status: SHIPPED | OUTPATIENT
Start: 2019-10-15 | End: 2020-04-14 | Stop reason: SDUPTHER

## 2019-10-15 RX ORDER — LEVOTHYROXINE SODIUM 0.05 MG/1
50 TABLET ORAL DAILY
COMMUNITY
End: 2019-10-15 | Stop reason: SDUPTHER

## 2019-10-15 RX ORDER — DIGOXIN 125 MCG
125 TABLET ORAL DAILY
Qty: 30 TABLET | Refills: 3 | Status: SHIPPED | OUTPATIENT
Start: 2019-10-15 | End: 2020-04-14 | Stop reason: SDUPTHER

## 2019-10-15 RX ORDER — CLINDAMYCIN HYDROCHLORIDE 300 MG/1
300 CAPSULE ORAL 3 TIMES DAILY
Qty: 21 CAPSULE | Refills: 0 | Status: SHIPPED | OUTPATIENT
Start: 2019-10-15 | End: 2019-10-22

## 2019-10-15 RX ORDER — PRAVASTATIN SODIUM 80 MG/1
TABLET ORAL
Qty: 30 TABLET | Refills: 2 | Status: SHIPPED | OUTPATIENT
Start: 2019-10-15 | End: 2020-02-03 | Stop reason: SDUPTHER

## 2019-10-15 RX ORDER — LOSARTAN POTASSIUM 100 MG/1
100 TABLET ORAL DAILY
Qty: 30 TABLET | Refills: 2 | Status: SHIPPED | OUTPATIENT
Start: 2019-10-15 | End: 2020-02-03 | Stop reason: SDUPTHER

## 2019-10-15 RX ORDER — DULOXETIN HYDROCHLORIDE 60 MG/1
60 CAPSULE, DELAYED RELEASE ORAL DAILY
Qty: 90 CAPSULE | Refills: 1 | Status: SHIPPED | OUTPATIENT
Start: 2019-10-15 | End: 2020-04-14 | Stop reason: SDUPTHER

## 2019-10-15 RX ORDER — DILTIAZEM HYDROCHLORIDE 120 MG/1
120 TABLET, FILM COATED ORAL DAILY
Qty: 90 TABLET | Refills: 3 | Status: SHIPPED | OUTPATIENT
Start: 2019-10-15 | End: 2020-07-06 | Stop reason: SDUPTHER

## 2019-10-15 RX ORDER — FUROSEMIDE 40 MG/1
40 TABLET ORAL NIGHTLY
Qty: 30 TABLET | Refills: 3 | Status: SHIPPED | OUTPATIENT
Start: 2019-10-15 | End: 2020-02-14

## 2019-10-15 RX ORDER — LEVOTHYROXINE SODIUM 0.05 MG/1
50 TABLET ORAL DAILY
Qty: 30 TABLET | Refills: 5 | Status: SHIPPED | OUTPATIENT
Start: 2019-10-15 | End: 2020-04-14 | Stop reason: SDUPTHER

## 2019-10-15 RX ORDER — ALPRAZOLAM 0.5 MG/1
0.5 TABLET ORAL
Status: CANCELLED | OUTPATIENT
Start: 2019-10-15

## 2019-10-15 RX ORDER — GEMFIBROZIL 600 MG/1
600 TABLET, FILM COATED ORAL 2 TIMES DAILY
Qty: 60 TABLET | Refills: 3 | Status: SHIPPED | OUTPATIENT
Start: 2019-10-15 | End: 2020-04-14 | Stop reason: SDUPTHER

## 2019-10-15 RX ORDER — CARVEDILOL 25 MG/1
25 TABLET ORAL 2 TIMES DAILY
Qty: 90 TABLET | Refills: 3 | Status: SHIPPED | OUTPATIENT
Start: 2019-10-15 | End: 2020-08-14 | Stop reason: SDUPTHER

## 2019-10-15 RX ORDER — ALBUTEROL SULFATE 90 UG/1
2 AEROSOL, METERED RESPIRATORY (INHALATION) EVERY 6 HOURS PRN
Qty: 1 INHALER | Refills: 3 | Status: SHIPPED | OUTPATIENT
Start: 2019-10-15 | End: 2020-04-14 | Stop reason: SDUPTHER

## 2019-10-15 RX ORDER — ALLOPURINOL 100 MG/1
TABLET ORAL
Qty: 180 TABLET | Refills: 0 | Status: SHIPPED | OUTPATIENT
Start: 2019-10-15 | End: 2020-04-14 | Stop reason: SDUPTHER

## 2019-10-15 RX ORDER — CETIRIZINE HYDROCHLORIDE 10 MG/1
10 TABLET ORAL DAILY
Qty: 90 TABLET | Refills: 1 | Status: SHIPPED | OUTPATIENT
Start: 2019-10-15 | End: 2020-04-14 | Stop reason: SDUPTHER

## 2019-10-15 RX ORDER — WARFARIN SODIUM 2 MG/1
2 TABLET ORAL
Qty: 30 TABLET | Refills: 1 | Status: SHIPPED | OUTPATIENT
Start: 2019-10-16 | End: 2020-04-14 | Stop reason: SDUPTHER

## 2019-10-15 RX ORDER — GLIMEPIRIDE 2 MG/1
2 TABLET ORAL
Qty: 90 TABLET | Refills: 1 | Status: CANCELLED | OUTPATIENT
Start: 2019-10-15

## 2019-10-15 RX ORDER — POLYETHYLENE GLYCOL 3350 17 G/17G
17 POWDER, FOR SOLUTION ORAL DAILY
Qty: 225 G | Refills: 2 | Status: CANCELLED | OUTPATIENT
Start: 2019-10-15

## 2019-10-15 ASSESSMENT — ENCOUNTER SYMPTOMS
SINUS PAIN: 0
EYES NEGATIVE: 1
SINUS PRESSURE: 0
WHEEZING: 0
COLOR CHANGE: 1
COUGH: 0
SHORTNESS OF BREATH: 0
CHEST TIGHTNESS: 0
GASTROINTESTINAL NEGATIVE: 1
DIARRHEA: 0
SORE THROAT: 0

## 2019-11-04 ENCOUNTER — OFFICE VISIT (OUTPATIENT)
Dept: INTERNAL MEDICINE CLINIC | Age: 69
End: 2019-11-04
Payer: COMMERCIAL

## 2019-11-04 VITALS
DIASTOLIC BLOOD PRESSURE: 82 MMHG | BODY MASS INDEX: 40.43 KG/M2 | SYSTOLIC BLOOD PRESSURE: 149 MMHG | HEART RATE: 87 BPM | WEIGHT: 315 LBS | RESPIRATION RATE: 18 BRPM | OXYGEN SATURATION: 98 % | HEIGHT: 74 IN

## 2019-11-04 DIAGNOSIS — L03.115 CELLULITIS OF RIGHT LOWER EXTREMITY: Primary | ICD-10-CM

## 2019-11-04 DIAGNOSIS — Z79.4 TYPE 2 DIABETES MELLITUS WITHOUT COMPLICATION, WITH LONG-TERM CURRENT USE OF INSULIN (HCC): ICD-10-CM

## 2019-11-04 DIAGNOSIS — E11.9 TYPE 2 DIABETES MELLITUS WITHOUT COMPLICATION, WITH LONG-TERM CURRENT USE OF INSULIN (HCC): ICD-10-CM

## 2019-11-04 PROCEDURE — 99213 OFFICE O/P EST LOW 20 MIN: CPT | Performed by: NURSE PRACTITIONER

## 2019-11-04 RX ORDER — CLINDAMYCIN HYDROCHLORIDE 300 MG/1
300 CAPSULE ORAL 3 TIMES DAILY
Qty: 30 CAPSULE | Refills: 0 | Status: SHIPPED | OUTPATIENT
Start: 2019-11-04 | End: 2019-11-14

## 2019-11-04 ASSESSMENT — ENCOUNTER SYMPTOMS
SINUS PAIN: 0
COUGH: 0
SHORTNESS OF BREATH: 0
SINUS PRESSURE: 0
EYES NEGATIVE: 1
CHEST TIGHTNESS: 0
ABDOMINAL DISTENTION: 0
DIARRHEA: 0
CONSTIPATION: 0
SORE THROAT: 0
ABDOMINAL PAIN: 0
COLOR CHANGE: 1
NAUSEA: 0
ALLERGIC/IMMUNOLOGIC NEGATIVE: 1
WHEEZING: 0

## 2019-11-06 ENCOUNTER — HOSPITAL ENCOUNTER (OUTPATIENT)
Dept: PHYSICAL THERAPY | Age: 69
Setting detail: THERAPIES SERIES
Discharge: HOME | End: 2019-11-06
Payer: COMMERCIAL

## 2019-11-06 PROCEDURE — 97162 PT EVAL MOD COMPLEX 30 MIN: CPT

## 2019-11-06 PROCEDURE — 97535 SELF CARE MNGMENT TRAINING: CPT

## 2019-12-11 ENCOUNTER — OFFICE VISIT (OUTPATIENT)
Dept: INTERNAL MEDICINE CLINIC | Age: 69
End: 2019-12-11
Payer: COMMERCIAL

## 2019-12-11 VITALS
SYSTOLIC BLOOD PRESSURE: 121 MMHG | HEART RATE: 75 BPM | OXYGEN SATURATION: 98 % | RESPIRATION RATE: 20 BRPM | HEIGHT: 74 IN | BODY MASS INDEX: 40.43 KG/M2 | WEIGHT: 315 LBS | DIASTOLIC BLOOD PRESSURE: 61 MMHG

## 2019-12-11 DIAGNOSIS — J01.00 ACUTE NON-RECURRENT MAXILLARY SINUSITIS: Primary | ICD-10-CM

## 2019-12-11 PROCEDURE — 99213 OFFICE O/P EST LOW 20 MIN: CPT | Performed by: NURSE PRACTITIONER

## 2019-12-11 RX ORDER — AMOXICILLIN AND CLAVULANATE POTASSIUM 875; 125 MG/1; MG/1
1 TABLET, FILM COATED ORAL 2 TIMES DAILY
Qty: 20 TABLET | Refills: 0 | Status: SHIPPED | OUTPATIENT
Start: 2019-12-11 | End: 2019-12-18 | Stop reason: ALTCHOICE

## 2019-12-11 RX ORDER — METHYLPREDNISOLONE 4 MG/1
TABLET ORAL
Qty: 1 KIT | Refills: 0 | Status: SHIPPED | OUTPATIENT
Start: 2019-12-11 | End: 2019-12-17

## 2019-12-11 ASSESSMENT — ENCOUNTER SYMPTOMS
COLOR CHANGE: 0
DIARRHEA: 0
ABDOMINAL PAIN: 0
CONSTIPATION: 0
WHEEZING: 0
EYES NEGATIVE: 1
SORE THROAT: 0
CHEST TIGHTNESS: 0
ABDOMINAL DISTENTION: 0
NAUSEA: 0
SINUS PRESSURE: 1
SINUS PAIN: 1
SHORTNESS OF BREATH: 0
COUGH: 1

## 2019-12-18 ENCOUNTER — TELEPHONE (OUTPATIENT)
Dept: INTERNAL MEDICINE CLINIC | Age: 69
End: 2019-12-18

## 2019-12-18 DIAGNOSIS — J01.90 ACUTE BACTERIAL SINUSITIS: Primary | ICD-10-CM

## 2019-12-18 DIAGNOSIS — B96.89 ACUTE BACTERIAL SINUSITIS: Primary | ICD-10-CM

## 2019-12-18 RX ORDER — DOXYCYCLINE HYCLATE 100 MG
100 TABLET ORAL 2 TIMES DAILY
Qty: 14 TABLET | Refills: 0 | Status: SHIPPED | OUTPATIENT
Start: 2019-12-18 | End: 2019-12-25

## 2019-12-18 RX ORDER — LEVOFLOXACIN 500 MG/1
500 TABLET, FILM COATED ORAL DAILY
Qty: 7 TABLET | Refills: 0 | Status: SHIPPED | OUTPATIENT
Start: 2019-12-18 | End: 2019-12-18 | Stop reason: ALTCHOICE

## 2019-12-31 ENCOUNTER — OFFICE VISIT (OUTPATIENT)
Dept: ORTHOPEDIC SURGERY | Age: 69
End: 2019-12-31
Payer: COMMERCIAL

## 2019-12-31 VITALS — WEIGHT: 315 LBS | BODY MASS INDEX: 40.43 KG/M2 | HEIGHT: 74 IN | RESPIRATION RATE: 18 BRPM

## 2019-12-31 DIAGNOSIS — M19.071 DJD (DEGENERATIVE JOINT DISEASE), ANKLE AND FOOT, RIGHT: ICD-10-CM

## 2019-12-31 DIAGNOSIS — R60.0 LEG EDEMA, RIGHT: ICD-10-CM

## 2019-12-31 DIAGNOSIS — G89.29 CHRONIC PAIN OF RIGHT ANKLE: Primary | ICD-10-CM

## 2019-12-31 DIAGNOSIS — Z98.890 S/P ORIF (OPEN REDUCTION INTERNAL FIXATION) FRACTURE: ICD-10-CM

## 2019-12-31 DIAGNOSIS — Z87.81 S/P ORIF (OPEN REDUCTION INTERNAL FIXATION) FRACTURE: ICD-10-CM

## 2019-12-31 DIAGNOSIS — S82.872D CLOSED DISPLACED PILON FRACTURE OF LEFT TIBIA WITH ROUTINE HEALING: ICD-10-CM

## 2019-12-31 DIAGNOSIS — M25.571 CHRONIC PAIN OF RIGHT ANKLE: Primary | ICD-10-CM

## 2019-12-31 PROCEDURE — 99213 OFFICE O/P EST LOW 20 MIN: CPT | Performed by: PHYSICIAN ASSISTANT

## 2020-01-14 ENCOUNTER — OFFICE VISIT (OUTPATIENT)
Dept: INTERNAL MEDICINE CLINIC | Age: 70
End: 2020-01-14
Payer: COMMERCIAL

## 2020-01-14 VITALS
BODY MASS INDEX: 40.43 KG/M2 | SYSTOLIC BLOOD PRESSURE: 139 MMHG | WEIGHT: 315 LBS | HEART RATE: 66 BPM | DIASTOLIC BLOOD PRESSURE: 78 MMHG | OXYGEN SATURATION: 98 % | HEIGHT: 74 IN | RESPIRATION RATE: 20 BRPM

## 2020-01-14 PROBLEM — M79.89 SWELLING OF EXTREMITY: Status: RESOLVED | Noted: 2019-02-26 | Resolved: 2020-01-14

## 2020-01-14 PROCEDURE — 99214 OFFICE O/P EST MOD 30 MIN: CPT | Performed by: NURSE PRACTITIONER

## 2020-01-14 RX ORDER — BLOOD PRESSURE TEST KIT
1 KIT MISCELLANEOUS ONCE
Qty: 1 KIT | Refills: 0 | Status: SHIPPED | OUTPATIENT
Start: 2020-01-14 | End: 2022-02-15

## 2020-01-14 ASSESSMENT — PATIENT HEALTH QUESTIONNAIRE - PHQ9
DEPRESSION UNABLE TO ASSESS: FUNCTIONAL CAPACITY MOTIVATION LIMITS ACCURACY
SUM OF ALL RESPONSES TO PHQ QUESTIONS 1-9: 0
SUM OF ALL RESPONSES TO PHQ9 QUESTIONS 1 & 2: 0
2. FEELING DOWN, DEPRESSED OR HOPELESS: 0
SUM OF ALL RESPONSES TO PHQ QUESTIONS 1-9: 0
1. LITTLE INTEREST OR PLEASURE IN DOING THINGS: 0

## 2020-01-14 NOTE — PROGRESS NOTES
Judy Manuel  1950  01/15/20    Chief Complaint   Patient presents with    1 Month Follow-Up       SUBJECTIVE:      Patient presents to the office this afternoon for 3 month follow up:    Patient compliant with all current medications for diabetes. Blood sugars have been averaging around 130-150, and the patient reports checking their blood sugar 1-3 time daily. Patient has had no episodes of significant hypoglycemia, fainting, dizziness, or loss of consciousness. Daily readings have improved since basal insulin was increased to 80 units nightly. Lab Results   Component Value Date    LABA1C 7.8 10/15/2019     Lab Results   Component Value Date     06/03/2019     The patient is currently taking all hypertensive medications compliantly without c/o of any side effects. Denies chest pain, dyspnea, edema, palpiations. Blood pressure has been averaging  130/70-80 over the last 1-3 months. This is improved since increase in Losartan to 100 mg daily. Patient denies any chest pain, shortness of breath, myalgias, Patient is tolerating cholesterol medications without difficulty. Lab Results   Component Value Date    LDLCALC 82 01/07/2019    LDLDIRECT 88 09/05/2017     Continues on coumadin for [facility] for his Afib. INR is 2.4 today. .Anticoagulation: Patient here for followup of chronic anticoagulation. Indication: atrial fibrillation  Bleeding Signs/Symptoms:  None  Missed Coumadin Doses:  None  Medication Changes:  no  Dietary Changes:  no    VITAL SIGNS reviewed    PLAN:  continue current regiment without changes. Patient's COPD well controled on current medications. Patient denies any shortness of breath, wheezing. Able to execute activities of daily living without breathing complications. Review of Systems   Constitutional: Negative for activity change, appetite change, fatigue and fever. HENT: Negative for congestion, sinus pressure, sinus pain and sore throat. Eyes: Negative. fibrillation, unspecified type ([facility]    5. Chronic obstructive pulmonary disease, unspecified COPD type ([facility]        PLAN:    Jude Bui was seen today for 1 month follow-up. Diagnoses and all orders for this visit:    Type 2 diabetes mellitus without complication, with long-term current use of insulin ([facility]- will recheck labs today including microalbumin. Adjust therapy accordingly, based on results. -     CBC Auto Differential; Future  -     Comprehensive Metabolic Panel; Future  -     Hemoglobin A1C; Future  -     Lipid, Fasting; Future  -      DIABETES FOOT EXAM  -     MICROALBUMIN / CREATININE URINE RATIO; Future  -     External Referral To Endocrinology    Essential hypertension- slightly above goal, but much improved. Work on increasing exercise, especially since cleared from ortho standpoint (ankle fracture healed). DASH diet discussed at length. -     CBC Auto Differential; Future  -     Comprehensive Metabolic Panel; Future  -     Hemoglobin A1C; Future  -     Lipid, Fasting; Future  -     Blood Pressure Monitor KIT; 1 kit by Does not apply route once for 1 dose    Mixed hyperlipidemia - well controlled; no changes in management at this time; continue statin; recheck labs today  -     CBC Auto Differential; Future  -     Comprehensive Metabolic Panel; Future  -     Hemoglobin A1C; Future  -     Lipid, Fasting; Future    Atrial fibrillation, unspecified type ([facility]- rate controlled and INR at goal; no changes to current coumadin therapy. Continue routine surveillance. Chronic obstructive pulmonary disease, unspecified COPD type ([facility] - well controlled; no changes in management at this time. Course of treatment, including any medications, possible imaging, referrals, and follow ups discussed with patient. All risks and benefits and possible side effects discussed with patient who agrees to plan of care and verbalizes understanding. All labs and imaging reviewed.        No flowsheet data

## 2020-01-15 LAB
A/G RATIO: 1.6 (ref 1.1–2.2)
ALBUMIN SERPL-MCNC: 4.4 G/DL (ref 3.4–5)
ALP BLD-CCNC: 126 U/L (ref 40–129)
ALT SERPL-CCNC: 14 U/L (ref 10–40)
ANION GAP SERPL CALCULATED.3IONS-SCNC: 15 MMOL/L (ref 3–16)
AST SERPL-CCNC: 17 U/L (ref 15–37)
BASOPHILS ABSOLUTE: 0.1 K/UL (ref 0–0.2)
BASOPHILS RELATIVE PERCENT: 1.3 %
BILIRUB SERPL-MCNC: 0.3 MG/DL (ref 0–1)
BUN BLDV-MCNC: 13 MG/DL (ref 7–20)
CALCIUM SERPL-MCNC: 9.1 MG/DL (ref 8.3–10.6)
CHLORIDE BLD-SCNC: 103 MMOL/L (ref 99–110)
CHOLESTEROL, FASTING: 116 MG/DL (ref 0–199)
CO2: 24 MMOL/L (ref 21–32)
CREAT SERPL-MCNC: 0.8 MG/DL (ref 0.8–1.3)
CREATININE URINE: 160.1 MG/DL (ref 39–259)
EOSINOPHILS ABSOLUTE: 0.1 K/UL (ref 0–0.6)
EOSINOPHILS RELATIVE PERCENT: 2.2 %
GFR AFRICAN AMERICAN: >60
GFR NON-AFRICAN AMERICAN: >60
GLOBULIN: 2.8 G/DL
GLUCOSE BLD-MCNC: 135 MG/DL (ref 70–99)
HCT VFR BLD CALC: 38.6 % (ref 40.5–52.5)
HDLC SERPL-MCNC: 35 MG/DL (ref 40–60)
HEMOGLOBIN: 13.4 G/DL (ref 13.5–17.5)
LDL CHOLESTEROL CALCULATED: 35 MG/DL
LYMPHOCYTES ABSOLUTE: 2.4 K/UL (ref 1–5.1)
LYMPHOCYTES RELATIVE PERCENT: 37.5 %
MCH RBC QN AUTO: 34.4 PG (ref 26–34)
MCHC RBC AUTO-ENTMCNC: 34.8 G/DL (ref 31–36)
MCV RBC AUTO: 98.9 FL (ref 80–100)
MICROALBUMIN UR-MCNC: 20.4 MG/DL
MICROALBUMIN/CREAT UR-RTO: 127.4 MG/G (ref 0–30)
MONOCYTES ABSOLUTE: 0.7 K/UL (ref 0–1.3)
MONOCYTES RELATIVE PERCENT: 10.9 %
NEUTROPHILS ABSOLUTE: 3.1 K/UL (ref 1.7–7.7)
NEUTROPHILS RELATIVE PERCENT: 48.1 %
PDW BLD-RTO: 15.8 % (ref 12.4–15.4)
PLATELET # BLD: 218 K/UL (ref 135–450)
PMV BLD AUTO: 8.7 FL (ref 5–10.5)
POTASSIUM SERPL-SCNC: 4.1 MMOL/L (ref 3.5–5.1)
RBC # BLD: 3.9 M/UL (ref 4.2–5.9)
SODIUM BLD-SCNC: 142 MMOL/L (ref 136–145)
TOTAL PROTEIN: 7.2 G/DL (ref 6.4–8.2)
TRIGLYCERIDE, FASTING: 230 MG/DL (ref 0–150)
VLDLC SERPL CALC-MCNC: 46 MG/DL
WBC # BLD: 6.4 K/UL (ref 4–11)

## 2020-01-15 PROCEDURE — 36415 COLL VENOUS BLD VENIPUNCTURE: CPT | Performed by: NURSE PRACTITIONER

## 2020-01-15 ASSESSMENT — ENCOUNTER SYMPTOMS
NAUSEA: 0
ABDOMINAL PAIN: 0
SORE THROAT: 0
EYES NEGATIVE: 1
COUGH: 0
WHEEZING: 0
SHORTNESS OF BREATH: 0
ABDOMINAL DISTENTION: 0
SINUS PRESSURE: 0
CONSTIPATION: 0
COLOR CHANGE: 0
CHEST TIGHTNESS: 0
SINUS PAIN: 0
DIARRHEA: 0

## 2020-01-16 LAB
ESTIMATED AVERAGE GLUCOSE: 180 MG/DL
HBA1C MFR BLD: 7.9 %

## 2020-02-03 RX ORDER — PRAVASTATIN SODIUM 80 MG/1
TABLET ORAL
Qty: 90 TABLET | Refills: 1 | Status: SHIPPED | OUTPATIENT
Start: 2020-02-03 | End: 2020-05-12 | Stop reason: SDUPTHER

## 2020-02-03 RX ORDER — LOSARTAN POTASSIUM 100 MG/1
100 TABLET ORAL DAILY
Qty: 30 TABLET | Refills: 2 | Status: SHIPPED | OUTPATIENT
Start: 2020-02-03 | End: 2020-02-03 | Stop reason: SDUPTHER

## 2020-02-03 RX ORDER — LOSARTAN POTASSIUM 100 MG/1
100 TABLET ORAL DAILY
Qty: 90 TABLET | Refills: 1 | Status: SHIPPED | OUTPATIENT
Start: 2020-02-03 | End: 2020-08-05 | Stop reason: SDUPTHER

## 2020-02-03 RX ORDER — PRAVASTATIN SODIUM 80 MG/1
TABLET ORAL
Qty: 30 TABLET | Refills: 2 | Status: SHIPPED | OUTPATIENT
Start: 2020-02-03 | End: 2020-02-03 | Stop reason: SDUPTHER

## 2020-03-18 ENCOUNTER — TELEPHONE (OUTPATIENT)
Dept: CARDIOLOGY CLINIC | Age: 70
End: 2020-03-18

## 2020-03-18 NOTE — TELEPHONE ENCOUNTER
Called patient to further assess his chest pain. Sharp pain that last a couple seconds. No radiation. This is new a symptoms. No associated features. Patient states that is a little tender to the touch. Patient has had dizziness just not when he had the chest. After discussing the symptoms I feel it is necessary for the patient to been seen by Dr. Kwame Gates tomorrow.

## 2020-03-19 ENCOUNTER — OFFICE VISIT (OUTPATIENT)
Dept: CARDIOLOGY CLINIC | Age: 70
End: 2020-03-19
Payer: COMMERCIAL

## 2020-03-19 VITALS
HEIGHT: 74 IN | BODY MASS INDEX: 40.43 KG/M2 | HEART RATE: 82 BPM | SYSTOLIC BLOOD PRESSURE: 120 MMHG | DIASTOLIC BLOOD PRESSURE: 84 MMHG | WEIGHT: 315 LBS

## 2020-03-19 PROCEDURE — 93000 ELECTROCARDIOGRAM COMPLETE: CPT | Performed by: INTERNAL MEDICINE

## 2020-03-19 PROCEDURE — 99214 OFFICE O/P EST MOD 30 MIN: CPT | Performed by: INTERNAL MEDICINE

## 2020-03-19 PROCEDURE — 3051F HG A1C>EQUAL 7.0%<8.0%: CPT | Performed by: INTERNAL MEDICINE

## 2020-03-19 NOTE — ASSESSMENT & PLAN NOTE
Clinically stable. Has very atypical chest pain noncardiac in etiology do not recommend further cardiac workup. Continue aggressive risk modification for secondary prevention.

## 2020-03-19 NOTE — PROGRESS NOTES
Jackson Schmid  1950  ANKITA Baer - MAGED    Chief complaint and HPI:  Jackson Schmid  is a 72 y.o. male following up for known coronary artery disease and chronic atrial fibrillation hyperlipidemia hypertension and type 2 diabetes. Patient reports some sharp chest pain lasting only for seconds or so addressed nonradiating and not associated with any nausea vomiting or diaphoresis. He has these symptoms chronically off and on and for which she had had stress test done in 2018 which was negative for any ischemia. He lived by himself. He does not smoke. He walks with a cane and not very active. He follows up with the primary care for PT/INR regulation. Apparently it has not been done monthly. He is due to see them next month. Medications are reviewed. He was admitted last year with left ankle fracture requiring open reduction. He walks with a cane. He is known to have sleep apnea but not using CPAP therapy. He denies any palpitations, syncope or near-syncope. He had syncope prior to coronary stenting many years ago none since then. Rest of the Cardiovascular system review is otherwise unchanged from prior encounter.     Past medical history:  has a past medical history of Allergic rhinitis, Anticoagulated on Coumadin, Anxiety, Arthritis, Atrial fibrillation (HCC), CAD (coronary artery disease), COPD (chronic obstructive pulmonary disease) ([address] Depression, Diabetes mellitus ([facility] Dupuytren's contracture of hand, Family history of cardiovascular disease, GERD (gastroesophageal reflux disease), H/O cardiac catheterization, H/O cardiac catheterization, H/O cardiovascular stress test, H/O cardiovascular stress test, H/O cardiovascular stress test, H/O cardiovascular stress test, H/O Doppler ultrasound, H/O echocardiogram, H/O echocardiogram, H/O echocardiogram, H/O hiatal hernia, Hx of Venous Doppler, Hyperlipidemia, Hypertension, Obesity, S/P PTCA (percutaneous transluminal coronary angioplasty), Sleep apnea, and Thyroid disease. Past surgical history:  has a past surgical history that includes Coronary angioplasty with stent (2005); Cardiac catheterization (08); Cardiac catheterization (3/07); Cardiac catheterization (3/05); Endoscopy, colon, diagnostic (); Colonoscopy (); Colonoscopy (16); Hand surgery (Right, ); and pr open rx ankle dislocatn+fixatn (Right, 6/3/2019). Social History:   Social History     Tobacco Use    Smoking status: Former Smoker     Packs/day: 1.00     Years: 10.00     Pack years: 10.00     Types: Cigarettes     Last attempt to quit: 1976     Years since quittin.2    Smokeless tobacco: Never Used   Substance Use Topics    Alcohol use: Not Currently     Alcohol/week: 1.0 - 2.0 standard drinks     Types: 1 - 2 Cans of beer per week     Family history: family history includes Cancer in his mother; Coronary Art Dis in his father; Heart Disease in his father; No Known Problems in his brother; Rheum Arthritis in his sister, sister and another family member. ALLERGIES:  Patient has no known allergies. Prior to Admission medications    Medication Sig Start Date End Date Taking?  Authorizing Provider   furosemide (LASIX) 40 MG tablet TAKE 1 TABLET BY MOUTH NIGHTLY 20  Yes ANKITA Baires CNP   metFORMIN (GLUCOPHAGE) 1000 MG tablet TAKE ONE TABLET BY MOUTH TWICE DAILY WITH MEALS 2/3/20  Yes ANKITA Baires CNP   losartan (COZAAR) 100 MG tablet Take 1 tablet by mouth daily 2/3/20  Yes ANKITA Baires CNP   pravastatin (PRAVACHOL) 80 MG tablet TAKE 1 TABLET BY MOUTH ONCE DAILY 2/3/20  Yes ANKITA Baires CNP   Blood Pressure Monitor KIT 1 kit by Does not apply route once for 1 dose 1/14/20 3/19/20 Yes ANKITA Baires CNP   warfarin (COUMADIN) 3 MG tablet Take 1 tablet by mouth every 24 hours Indications: Sat/Tue/Thur/Sun **OR AS DIRECTED PER ** 10/15/19  Yes ANKITA Baires CNP   warfarin (COUMADIN) 2 MG tablet Take 1 tablet by mouth three times a week **On MWF.** 10/16/19  Yes Agustín Led, APRN - CNP   insulin regular (NOVOLIN R) 100 UNIT/ML injection Inject 0-10 Units into the skin 3 times daily (before meals) 10/15/19  Yes Agustín Led, APRN - CNP   insulin NPH (HUMULIN N;NOVOLIN N) 100 UNIT/ML injection vial Inject 80 Units into the skin nightly 10/15/19  Yes Agustín Led, APRN - CNP   DULoxetine (CYMBALTA) 60 MG extended release capsule Take 1 capsule by mouth daily 10/15/19 3/19/20 Yes Agustín Led, APRN - CNP   diltiazem (CARDIZEM) 120 MG tablet Take 1 tablet by mouth daily 10/15/19  Yes Agustín Led, APRN - CNP   digoxin (LANOXIN) 125 MCG tablet Take 1 tablet by mouth daily 10/15/19  Yes Agustín Led, APRN - CNP   gemfibrozil (LOPID) 600 MG tablet Take 1 tablet by mouth 2 times daily 10/15/19  Yes Agustín Led, APRN - CNP   levothyroxine (SYNTHROID) 50 MCG tablet Take 1 tablet by mouth Daily 10/15/19  Yes Agustín Led, APRN - CNP   albuterol sulfate HFA (VENTOLIN HFA) 108 (90 Base) MCG/ACT inhaler Inhale 2 puffs into the lungs every 6 hours as needed for Wheezing 10/15/19  Yes Agustín Led, APRN - CNP   allopurinol (ZYLOPRIM) 100 MG tablet TAKE 1 TABLET BY MOUTH TWICE DAILY 10/15/19  Yes Agustín Led, APRN - CNP   carvedilol (COREG) 25 MG tablet Take 1 tablet by mouth 2 times daily 10/15/19  Yes Agustín Led, APRN - CNP   cetirizine (ZYRTEC) 10 MG tablet Take 1 tablet by mouth daily 10/15/19  Yes Agustín Led, APRN - CNP   insulin aspart (NOVOLOG) 100 UNIT/ML injection vial Inject into the skin   Yes Historical Provider, MD   Elastic Bandages & Supports ([facility]) [facility] 1 each by Does not apply route daily 1/29/19  Yes Agustín Led, APRN - CNP   Lancets MISC by D3EA route three times a day.  1/29/19  Yes Agustín Herman, APRN - CNP   polyethylene glycol [facility]) powder Take 17 g by mouth daily 1/29/19   ANKITA Burris - CNP   glimepiride (AMARYL) 2 MG scintigraphic study suggestive of normal myocardial  perfusion. Gated images demonstrate normal left ventricular systolic function with EF  of 60 %. LAB REVIEW:  CBC:   Lab Results   Component Value Date    WBC 6.4 01/15/2020    HGB 13.4 01/15/2020    HCT 38.6 01/15/2020     01/15/2020     Lipids:    116  0 - 199 mg/dL Final 01/15/2020  7:08 AM [address] [facility]   Triglyceride, Fasting 230High   0 - 150 mg/dL Final 01/15/2020  7:08 AM [address] [facility]   HDL 35Low   40 - 60 mg/dL Final 01/15/2020  7:08 AM [address] [facility]   LDL Calculated 35  <100 mg/dL Final 01/15/2020  7:08 AM [address] [facility]   VLDL Cholesterol Calculated 46          Renal:   Lab Results   Component Value Date    BUN 13 01/15/2020    CREATININE 0.8 01/15/2020     01/15/2020    K 4.1 01/15/2020     PT/INR:   Lab Results   Component Value Date    INR 2.1 10/15/2019    INR 1.99 06/25/2019     Lab Results   Component Value Date    LABA1C 7.9 01/15/2020     IMPRESSION and RECOMMENDATIONS:      CAD (coronary artery disease)  Clinically stable. Has very atypical chest pain noncardiac in etiology do not recommend further cardiac workup. Continue aggressive risk modification for secondary prevention. Sleep apnea  Not using CPAP therapy. Counseled to lose weight with diet. Hyperlipidemia  Well-controlled on current medications. Last LDL was 35. Triglycerides are high is counseled to avoid eating excessive sweets and follow up with PCP for optimal diabetes management. Type 2 diabetes mellitus (HCC)  Recent hemoglobin A1c was 7.9 suggesting fairly well-controlled diabetes. Continue follow-up with PCP. Atrial fibrillation  Rate is well controlled and he is anticoagulated. Morbid obesity ([address]  Counseled for calorie counting, reduction in serving size and exercise and lifestyle modification for weight loss. Continue current medications. Follow up with PCP for PT/INR regulation.  Counseled to diet and lose. Primary/secondary prevention is the goal by aggressive risk modification, healthy and therapeutic life style changes for cardiovascular risk reduction. Various goals are discussed and questions answered. Appropriate prescriptions if needed on this visit are addressed. After visit summery is provided. Questions answered and patient verbalizes understanding. Follow up in 6 months with Dr Wiliam Moraels,  sooner if needed. Erin Peace MD, 3/19/2020 11:39 AM     Please note this report has been partially produced using speech recognition software and may contain errors related to that system including errors in grammar, punctuation, and spelling, as well as words and phrases that may be inappropriate. If there are any questions or concerns please feel free to contact the dictating provider for clarification.

## 2020-04-14 RX ORDER — GEMFIBROZIL 600 MG/1
600 TABLET, FILM COATED ORAL 2 TIMES DAILY
Qty: 60 TABLET | Refills: 3 | Status: SHIPPED | OUTPATIENT
Start: 2020-04-14 | End: 2020-12-16 | Stop reason: SDUPTHER

## 2020-04-14 RX ORDER — WARFARIN SODIUM 2 MG/1
2 TABLET ORAL
Qty: 30 TABLET | Refills: 1 | Status: SHIPPED | OUTPATIENT
Start: 2020-04-15 | End: 2020-05-12 | Stop reason: SDUPTHER

## 2020-04-14 RX ORDER — CETIRIZINE HYDROCHLORIDE 10 MG/1
10 TABLET ORAL DAILY
Qty: 90 TABLET | Refills: 1 | Status: SHIPPED | OUTPATIENT
Start: 2020-04-14 | End: 2020-05-12 | Stop reason: SDUPTHER

## 2020-04-14 RX ORDER — WARFARIN SODIUM 3 MG/1
3 TABLET ORAL EVERY 24 HOURS
Qty: 30 TABLET | Refills: 1 | Status: SHIPPED | OUTPATIENT
Start: 2020-04-14 | End: 2020-08-14 | Stop reason: SDUPTHER

## 2020-04-14 RX ORDER — ALBUTEROL SULFATE 90 UG/1
2 AEROSOL, METERED RESPIRATORY (INHALATION) EVERY 6 HOURS PRN
Qty: 1 INHALER | Refills: 3 | Status: SHIPPED | OUTPATIENT
Start: 2020-04-14 | End: 2020-08-14 | Stop reason: SDUPTHER

## 2020-04-14 RX ORDER — LEVOTHYROXINE SODIUM 0.05 MG/1
50 TABLET ORAL DAILY
Qty: 30 TABLET | Refills: 5 | Status: SHIPPED | OUTPATIENT
Start: 2020-04-14 | End: 2020-08-14 | Stop reason: SDUPTHER

## 2020-04-14 RX ORDER — DIGOXIN 125 MCG
125 TABLET ORAL DAILY
Qty: 30 TABLET | Refills: 3 | Status: SHIPPED | OUTPATIENT
Start: 2020-04-14 | End: 2020-09-21 | Stop reason: SDUPTHER

## 2020-04-14 RX ORDER — DULOXETIN HYDROCHLORIDE 60 MG/1
60 CAPSULE, DELAYED RELEASE ORAL DAILY
Qty: 90 CAPSULE | Refills: 1 | Status: SHIPPED | OUTPATIENT
Start: 2020-04-14 | End: 2020-06-16 | Stop reason: SDUPTHER

## 2020-04-14 RX ORDER — ALLOPURINOL 100 MG/1
TABLET ORAL
Qty: 180 TABLET | Refills: 0 | Status: SHIPPED | OUTPATIENT
Start: 2020-04-14 | End: 2020-07-28 | Stop reason: SDUPTHER

## 2020-05-01 ENCOUNTER — HOSPITAL ENCOUNTER (OUTPATIENT)
Age: 70
Discharge: HOME | End: 2020-05-01
Payer: COMMERCIAL

## 2020-05-01 LAB
ALBUMIN SERPL-MCNC: 4.3 GM/DL (ref 3.4–5)
ALP BLD-CCNC: 141 IU/L (ref 40–128)
ALT SERPL-CCNC: 16 U/L (ref 10–40)
ANION GAP SERPL CALCULATED.3IONS-SCNC: 13 MMOL/L (ref 4–16)
AST SERPL-CCNC: 17 IU/L (ref 15–37)
BILIRUB SERPL-MCNC: 0.5 MG/DL (ref 0–1)
BUN BLDV-MCNC: 16 MG/DL (ref 6–23)
CALCIUM SERPL-MCNC: 9.1 MG/DL (ref 8.3–10.6)
CHLORIDE BLD-SCNC: 100 MMOL/L (ref 99–110)
CHOLESTEROL: 147 MG/DL
CO2: 26 MMOL/L (ref 21–32)
CREAT SERPL-MCNC: 1 MG/DL (ref 0.9–1.3)
ESTIMATED AVERAGE GLUCOSE: 183 MG/DL
GFR AFRICAN AMERICAN: >60 ML/MIN/1.73M2
GFR NON-AFRICAN AMERICAN: >60 ML/MIN/1.73M2
GLUCOSE BLD-MCNC: 150 MG/DL (ref 70–99)
HBA1C MFR BLD: 8 % (ref 4.2–6.3)
HDLC SERPL-MCNC: 41 MG/DL
LDL CHOLESTEROL DIRECT: 84 MG/DL
POTASSIUM SERPL-SCNC: 3.8 MMOL/L (ref 3.5–5.1)
SODIUM BLD-SCNC: 139 MMOL/L (ref 135–145)
T4 FREE: 1.13 NG/DL (ref 0.9–1.8)
TOTAL PROTEIN: 7.2 GM/DL (ref 6.4–8.2)
TRIGL SERPL-MCNC: 236 MG/DL
TSH HIGH SENSITIVITY: 4.65 UIU/ML (ref 0.27–4.2)

## 2020-05-01 PROCEDURE — 36415 COLL VENOUS BLD VENIPUNCTURE: CPT

## 2020-05-01 PROCEDURE — 84439 ASSAY OF FREE THYROXINE: CPT

## 2020-05-01 PROCEDURE — 80061 LIPID PANEL: CPT

## 2020-05-01 PROCEDURE — 83721 ASSAY OF BLOOD LIPOPROTEIN: CPT

## 2020-05-01 PROCEDURE — 84443 ASSAY THYROID STIM HORMONE: CPT

## 2020-05-01 PROCEDURE — 83036 HEMOGLOBIN GLYCOSYLATED A1C: CPT

## 2020-05-01 PROCEDURE — 80053 COMPREHEN METABOLIC PANEL: CPT

## 2020-05-12 ENCOUNTER — OFFICE VISIT (OUTPATIENT)
Dept: INTERNAL MEDICINE CLINIC | Age: 70
End: 2020-05-12
Payer: COMMERCIAL

## 2020-05-12 VITALS
HEIGHT: 74 IN | SYSTOLIC BLOOD PRESSURE: 130 MMHG | HEART RATE: 90 BPM | OXYGEN SATURATION: 98 % | BODY MASS INDEX: 40.43 KG/M2 | RESPIRATION RATE: 20 BRPM | WEIGHT: 315 LBS | DIASTOLIC BLOOD PRESSURE: 82 MMHG

## 2020-05-12 PROBLEM — S82.62XD: Status: RESOLVED | Noted: 2019-06-03 | Resolved: 2020-05-12

## 2020-05-12 PROBLEM — S82.872D CLOSED DISPLACED PILON FRACTURE OF LEFT TIBIA WITH ROUTINE HEALING: Status: RESOLVED | Noted: 2019-06-03 | Resolved: 2020-05-12

## 2020-05-12 PROBLEM — S82.899A ANKLE FRACTURE: Status: RESOLVED | Noted: 2019-06-02 | Resolved: 2020-05-12

## 2020-05-12 PROBLEM — S82.892A ANKLE FRACTURE, LEFT, CLOSED, INITIAL ENCOUNTER: Status: RESOLVED | Noted: 2019-06-02 | Resolved: 2020-05-12

## 2020-05-12 LAB
INTERNATIONAL NORMALIZATION RATIO, POC: 2
PROTHROMBIN TIME, POC: 24.5

## 2020-05-12 PROCEDURE — 3052F HG A1C>EQUAL 8.0%<EQUAL 9.0%: CPT | Performed by: NURSE PRACTITIONER

## 2020-05-12 PROCEDURE — 99214 OFFICE O/P EST MOD 30 MIN: CPT | Performed by: NURSE PRACTITIONER

## 2020-05-12 PROCEDURE — 85610 PROTHROMBIN TIME: CPT | Performed by: NURSE PRACTITIONER

## 2020-05-12 RX ORDER — WARFARIN SODIUM 2 MG/1
2 TABLET ORAL
Qty: 30 TABLET | Refills: 1 | Status: SHIPPED | OUTPATIENT
Start: 2020-05-13 | End: 2020-08-14 | Stop reason: SDUPTHER

## 2020-05-12 RX ORDER — PRAVASTATIN SODIUM 80 MG/1
TABLET ORAL
Qty: 90 TABLET | Refills: 1 | Status: SHIPPED | OUTPATIENT
Start: 2020-05-12 | End: 2020-08-14 | Stop reason: SDUPTHER

## 2020-05-12 RX ORDER — CETIRIZINE HYDROCHLORIDE 10 MG/1
10 TABLET ORAL DAILY
Qty: 90 TABLET | Refills: 1 | Status: SHIPPED | OUTPATIENT
Start: 2020-05-12 | End: 2020-08-14 | Stop reason: SDUPTHER

## 2020-05-12 ASSESSMENT — ENCOUNTER SYMPTOMS
SHORTNESS OF BREATH: 0
WHEEZING: 0
EYES NEGATIVE: 1
DIARRHEA: 0
SINUS PAIN: 0
CONSTIPATION: 0
COLOR CHANGE: 0
SORE THROAT: 0
CHEST TIGHTNESS: 0
NAUSEA: 0
COUGH: 0
ABDOMINAL PAIN: 0
ABDOMINAL DISTENTION: 0
SINUS PRESSURE: 0

## 2020-06-16 RX ORDER — DULOXETIN HYDROCHLORIDE 60 MG/1
60 CAPSULE, DELAYED RELEASE ORAL DAILY
Qty: 90 CAPSULE | Refills: 1 | Status: SHIPPED | OUTPATIENT
Start: 2020-06-16 | End: 2020-08-14 | Stop reason: SDUPTHER

## 2020-07-06 ENCOUNTER — OFFICE VISIT (OUTPATIENT)
Dept: INTERNAL MEDICINE CLINIC | Age: 70
End: 2020-07-06
Payer: COMMERCIAL

## 2020-07-06 VITALS
DIASTOLIC BLOOD PRESSURE: 72 MMHG | BODY MASS INDEX: 40.43 KG/M2 | HEIGHT: 74 IN | WEIGHT: 315 LBS | SYSTOLIC BLOOD PRESSURE: 131 MMHG | OXYGEN SATURATION: 98 % | RESPIRATION RATE: 18 BRPM | HEART RATE: 82 BPM

## 2020-07-06 PROCEDURE — 99213 OFFICE O/P EST LOW 20 MIN: CPT | Performed by: NURSE PRACTITIONER

## 2020-07-06 RX ORDER — CLINDAMYCIN HYDROCHLORIDE 300 MG/1
300 CAPSULE ORAL 3 TIMES DAILY
Qty: 21 CAPSULE | Refills: 0 | Status: SHIPPED | OUTPATIENT
Start: 2020-07-06 | End: 2020-07-13

## 2020-07-06 RX ORDER — DILTIAZEM HYDROCHLORIDE 120 MG/1
120 TABLET, FILM COATED ORAL DAILY
Qty: 90 TABLET | Refills: 1 | Status: SHIPPED | OUTPATIENT
Start: 2020-07-06 | End: 2020-08-14 | Stop reason: SDUPTHER

## 2020-07-06 RX ORDER — POTASSIUM CHLORIDE 750 MG/1
10 TABLET, EXTENDED RELEASE ORAL 2 TIMES DAILY
Qty: 60 TABLET | Refills: 0 | Status: SHIPPED | OUTPATIENT
Start: 2020-07-06 | End: 2020-08-14 | Stop reason: SDUPTHER

## 2020-07-06 ASSESSMENT — ENCOUNTER SYMPTOMS
NAUSEA: 0
SINUS PAIN: 0
CONSTIPATION: 0
EYES NEGATIVE: 1
WHEEZING: 0
SHORTNESS OF BREATH: 0
SINUS PRESSURE: 0
COLOR CHANGE: 1
SORE THROAT: 0
COUGH: 0
CHEST TIGHTNESS: 0
ABDOMINAL PAIN: 0
DIARRHEA: 0
ABDOMINAL DISTENTION: 0

## 2020-07-06 NOTE — PROGRESS NOTES
[facility]  1950 07/06/20    Chief Complaint   Patient presents with    Laceration     drainage on bottom of both legs, swelling sx started saturday        SUBJECTIVE:      Mr Donald Wan presents to the office this afternoon for c/o BLE redness which has been ongoing over the last 3-4 days, along with slightly worse swelling over the last 1-2 months. Denies any chest pain or shortness of breath. He denies any known injury, but does have 2 small abrasions noted (one to the anterior LLE and one to the posterior RLE). No fevers or chills. Denies any pain to either extremity or change in sensation. Swelling is symmetrical.     Review of Systems   Constitutional: Negative for activity change, appetite change, fatigue and fever. HENT: Negative for congestion, sinus pressure, sinus pain and sore throat. Eyes: Negative. Respiratory: Negative for cough, chest tightness, shortness of breath and wheezing. Cardiovascular: Positive for leg swelling. Negative for chest pain and palpitations. Gastrointestinal: Negative for abdominal distention, abdominal pain, constipation, diarrhea and nausea. Genitourinary: Negative for difficulty urinating, dysuria, flank pain, frequency and hematuria. Musculoskeletal: Negative for arthralgias, gait problem, joint swelling and myalgias. Skin: Positive for color change and wound. Negative for rash. Neurological: Negative for dizziness, light-headedness and numbness. Hematological: Negative. Psychiatric/Behavioral: Negative. OBJECTIVE:    /72   Pulse 82   Resp 18   Ht 6' 2\" (1.88 m)   Wt (!) 334 lb 12.8 oz (151.9 kg)   SpO2 98%   BMI 42.99 kg/m²     Physical Exam  Constitutional:       General: He is not in acute distress. Appearance: He is well-developed. He is not diaphoretic. HENT:      Head: Normocephalic and atraumatic. Neck:      Thyroid: No thyromegaly. Cardiovascular:      Rate and Rhythm: Normal rate and regular rhythm. Heart sounds: Normal heart sounds. No murmur. Comments: +2 pitting edema of BLE  Pulmonary:      Effort: Pulmonary effort is normal.      Breath sounds: Normal breath sounds. Abdominal:      General: Bowel sounds are normal. There is no distension. Palpations: Abdomen is soft. Tenderness: There is no abdominal tenderness. Musculoskeletal: Normal range of motion. Lymphadenopathy:      Cervical: No cervical adenopathy. Skin:     General: Skin is warm and dry. Capillary Refill: Capillary refill takes less than 2 seconds. Findings: Abrasion, erythema and rash present. Neurological:      Mental Status: He is alert and oriented to person, place, and time. GCS: GCS eye subscore is 4. GCS verbal subscore is 5. GCS motor subscore is 6. Coordination: Coordination normal.   Psychiatric:         Behavior: Behavior normal.         Thought Content: Thought content normal.         ASSESSMENT:    1. Cellulitis of lower extremity, unspecified laterality    2. Leg swelling        PLAN:    Ernestina Rush was seen today for laceration. Diagnoses and all orders for this visit:    Cellulitis of lower extremity, unspecified laterality- exam most consistent with likely early cellulitis; will start Clinda as below. Leg swelling will be addressed as follows: increase lasix to 40 mg BID x 1 week, along with potassium replacement. Check CMP in 1 week to reassess renal status and K levels. Will call if no improvement in 1 week. -     clindamycin (CLEOCIN) 300 MG capsule; Take 1 capsule by mouth 3 times daily for 7 days  -     Comprehensive Metabolic Panel; Future    Leg swelling- as above. -     clindamycin (CLEOCIN) 300 MG capsule; Take 1 capsule by mouth 3 times daily for 7 days  -     potassium chloride (KLOR-CON M) 10 MEQ extended release tablet; Take 1 tablet by mouth 2 times daily  -     Comprehensive Metabolic Panel;  Future    Course of treatment, including any medications, possible imaging, referrals, and follow ups discussed with patient. All risks and benefits and possible side effects discussed with patient who agrees to plan of care and verbalizes understanding. All labs and imaging reviewed. No flowsheet data found. Return if symptoms worsen or fail to improve.

## 2020-07-13 ENCOUNTER — TELEPHONE (OUTPATIENT)
Dept: INTERNAL MEDICINE CLINIC | Age: 70
End: 2020-07-13

## 2020-07-24 RX ORDER — FUROSEMIDE 40 MG/1
40 TABLET ORAL NIGHTLY
Qty: 30 TABLET | Refills: 3 | Status: SHIPPED | OUTPATIENT
Start: 2020-07-24 | End: 2020-08-14 | Stop reason: SDUPTHER

## 2020-07-28 RX ORDER — ALLOPURINOL 100 MG/1
TABLET ORAL
Qty: 180 TABLET | Refills: 1 | Status: SHIPPED | OUTPATIENT
Start: 2020-07-28 | End: 2020-08-14 | Stop reason: SDUPTHER

## 2020-08-05 RX ORDER — LOSARTAN POTASSIUM 100 MG/1
100 TABLET ORAL DAILY
Qty: 90 TABLET | Refills: 1 | Status: SHIPPED | OUTPATIENT
Start: 2020-08-05 | End: 2020-12-16 | Stop reason: SDUPTHER

## 2020-08-12 LAB
A/G RATIO: 1.6 (ref 1.1–2.2)
ALBUMIN SERPL-MCNC: 4.4 G/DL (ref 3.4–5)
ALP BLD-CCNC: 107 U/L (ref 40–129)
ALT SERPL-CCNC: 18 U/L (ref 10–40)
ANION GAP SERPL CALCULATED.3IONS-SCNC: 12 MMOL/L (ref 3–16)
AST SERPL-CCNC: 19 U/L (ref 15–37)
BILIRUB SERPL-MCNC: 0.6 MG/DL (ref 0–1)
BUN BLDV-MCNC: 16 MG/DL (ref 7–20)
CALCIUM SERPL-MCNC: 9.3 MG/DL (ref 8.3–10.6)
CHLORIDE BLD-SCNC: 105 MMOL/L (ref 99–110)
CO2: 29 MMOL/L (ref 21–32)
CREAT SERPL-MCNC: 0.8 MG/DL (ref 0.8–1.3)
GFR AFRICAN AMERICAN: >60
GFR NON-AFRICAN AMERICAN: >60
GLOBULIN: 2.7 G/DL
GLUCOSE BLD-MCNC: 95 MG/DL (ref 70–99)
POTASSIUM SERPL-SCNC: 4 MMOL/L (ref 3.5–5.1)
SODIUM BLD-SCNC: 146 MMOL/L (ref 136–145)
TOTAL PROTEIN: 7.1 G/DL (ref 6.4–8.2)

## 2020-08-12 PROCEDURE — 36415 COLL VENOUS BLD VENIPUNCTURE: CPT | Performed by: NURSE PRACTITIONER

## 2020-08-14 ENCOUNTER — OFFICE VISIT (OUTPATIENT)
Dept: INTERNAL MEDICINE CLINIC | Age: 70
End: 2020-08-14
Payer: COMMERCIAL

## 2020-08-14 VITALS
SYSTOLIC BLOOD PRESSURE: 109 MMHG | OXYGEN SATURATION: 98 % | RESPIRATION RATE: 22 BRPM | HEIGHT: 74 IN | HEART RATE: 71 BPM | WEIGHT: 315 LBS | BODY MASS INDEX: 40.43 KG/M2 | DIASTOLIC BLOOD PRESSURE: 68 MMHG

## 2020-08-14 LAB
INTERNATIONAL NORMALIZATION RATIO, POC: 2.6
PROTHROMBIN TIME, POC: NORMAL

## 2020-08-14 PROCEDURE — 99214 OFFICE O/P EST MOD 30 MIN: CPT | Performed by: NURSE PRACTITIONER

## 2020-08-14 PROCEDURE — 85610 PROTHROMBIN TIME: CPT | Performed by: NURSE PRACTITIONER

## 2020-08-14 PROCEDURE — 3052F HG A1C>EQUAL 8.0%<EQUAL 9.0%: CPT | Performed by: NURSE PRACTITIONER

## 2020-08-14 RX ORDER — LEVOTHYROXINE SODIUM 0.05 MG/1
50 TABLET ORAL DAILY
Qty: 30 TABLET | Refills: 5 | Status: SHIPPED | OUTPATIENT
Start: 2020-08-14 | End: 2020-12-16 | Stop reason: SDUPTHER

## 2020-08-14 RX ORDER — WARFARIN SODIUM 3 MG/1
3 TABLET ORAL EVERY 24 HOURS
Qty: 30 TABLET | Refills: 1 | Status: SHIPPED | OUTPATIENT
Start: 2020-08-14 | End: 2020-12-17

## 2020-08-14 RX ORDER — CETIRIZINE HYDROCHLORIDE 10 MG/1
10 TABLET ORAL DAILY
Qty: 90 TABLET | Refills: 1 | Status: SHIPPED | OUTPATIENT
Start: 2020-08-14 | End: 2020-12-16 | Stop reason: SDUPTHER

## 2020-08-14 RX ORDER — CARVEDILOL 25 MG/1
25 TABLET ORAL 2 TIMES DAILY
Qty: 90 TABLET | Refills: 3 | Status: SHIPPED | OUTPATIENT
Start: 2020-08-14 | End: 2020-12-16 | Stop reason: SDUPTHER

## 2020-08-14 RX ORDER — PRAVASTATIN SODIUM 80 MG/1
TABLET ORAL
Qty: 90 TABLET | Refills: 1 | Status: SHIPPED | OUTPATIENT
Start: 2020-08-14 | End: 2020-12-16 | Stop reason: SDUPTHER

## 2020-08-14 RX ORDER — FUROSEMIDE 40 MG/1
40 TABLET ORAL NIGHTLY
Qty: 30 TABLET | Refills: 3 | Status: SHIPPED | OUTPATIENT
Start: 2020-08-14 | End: 2020-12-16 | Stop reason: SDUPTHER

## 2020-08-14 RX ORDER — ALBUTEROL SULFATE 90 UG/1
2 AEROSOL, METERED RESPIRATORY (INHALATION) EVERY 6 HOURS PRN
Qty: 1 INHALER | Refills: 3 | Status: SHIPPED | OUTPATIENT
Start: 2020-08-14 | End: 2020-12-16 | Stop reason: SDUPTHER

## 2020-08-14 RX ORDER — DULOXETIN HYDROCHLORIDE 60 MG/1
60 CAPSULE, DELAYED RELEASE ORAL DAILY
Qty: 90 CAPSULE | Refills: 1 | Status: SHIPPED | OUTPATIENT
Start: 2020-08-14 | End: 2020-12-16 | Stop reason: SDUPTHER

## 2020-08-14 RX ORDER — ALLOPURINOL 100 MG/1
TABLET ORAL
Qty: 180 TABLET | Refills: 1 | Status: SHIPPED | OUTPATIENT
Start: 2020-08-14 | End: 2020-12-16 | Stop reason: SDUPTHER

## 2020-08-14 RX ORDER — WARFARIN SODIUM 2 MG/1
2 TABLET ORAL
Qty: 30 TABLET | Refills: 1 | Status: SHIPPED | OUTPATIENT
Start: 2020-08-14 | End: 2020-12-16 | Stop reason: SDUPTHER

## 2020-08-14 RX ORDER — POTASSIUM CHLORIDE 750 MG/1
10 TABLET, EXTENDED RELEASE ORAL 2 TIMES DAILY
Qty: 60 TABLET | Refills: 0 | Status: SHIPPED | OUTPATIENT
Start: 2020-08-14 | End: 2020-12-16 | Stop reason: SDUPTHER

## 2020-08-14 RX ORDER — DILTIAZEM HYDROCHLORIDE 120 MG/1
120 TABLET, FILM COATED ORAL DAILY
Qty: 90 TABLET | Refills: 1 | Status: SHIPPED | OUTPATIENT
Start: 2020-08-14 | End: 2020-10-29 | Stop reason: SDUPTHER

## 2020-08-14 ASSESSMENT — ENCOUNTER SYMPTOMS
SHORTNESS OF BREATH: 0
COLOR CHANGE: 0
EYES NEGATIVE: 1
ABDOMINAL DISTENTION: 0
SORE THROAT: 0
ABDOMINAL PAIN: 0
SINUS PAIN: 0
DIARRHEA: 0
SINUS PRESSURE: 0
NAUSEA: 0
WHEEZING: 0
CHEST TIGHTNESS: 0
CONSTIPATION: 0
COUGH: 0

## 2020-08-14 NOTE — PROGRESS NOTES
Sudeep Bulllaurie  1950 08/14/20    Chief Complaint   Patient presents with    3 Month Follow-Up       SUBJECTIVE:      Mr Yvette Viera presents to the office this afternoon for 3 month follow up: Anticoagulation: Patient here for followup of chronic anticoagulation. Indication: atrial fibrillation  Bleeding Signs/Symptoms:  None  Missed Coumadin Doses:  None  Medication Changes:  no  Dietary Changes:  no    VITAL SIGNS reviewed    PLAN:  Continue current Warfarin regiment without change; continue routine PT/INT check. Patient compliant with all current medications for diabetes. Blood sugars have been averaging around 150-120, and the patient reports checking their blood sugar 2-3 time daily. Patient has had no episodes of significant hypoglycemia, fainting, dizziness, or loss of consciousness. Currently on sliding scale with 30 units of lantus each morning and 80 units each evening. Has follow up with Dr Layla Ruiz next week. Lab Results   Component Value Date    LABA1C 8.0 (H) 05/01/2020     Lab Results   Component Value Date     05/01/2020     Patient's COPD well controled on current medications. Patient denies any shortness of breath, wheezing. Able to execute activities of daily living without breathing complications. Patient denies any chest pain, shortness of breath, myalgias, Patient is tolerating cholesterol medications without difficulty. Lab Results   Component Value Date    LDLCALC 35 01/15/2020    LDLDIRECT 84 05/01/2020     Patient continues to be compliant with synthroid regiment for her hypothyroidism. Denies c/o fatigue, weight gain or loss, heat or cold intolerance, diarrhea, or other GI symptoms. However, patient with c/o of a small knot noted on the right aspect of his neck. Non-tender to palpation, but has been present for 3-6 months. Denies any recent fevers, chills, or night sweats.    Lab Results   Component Value Date    TSH 1.66 04/03/2018       Review of Systems Constitutional: Negative for activity change, appetite change, fatigue and fever. HENT: Negative for congestion, sinus pressure, sinus pain and sore throat. Eyes: Negative. Respiratory: Negative for cough, chest tightness, shortness of breath and wheezing. Cardiovascular: Negative for chest pain and palpitations. Gastrointestinal: Negative for abdominal distention, abdominal pain, constipation, diarrhea and nausea. Genitourinary: Negative for difficulty urinating, dysuria, flank pain, frequency and hematuria. Musculoskeletal: Negative for arthralgias, gait problem, joint swelling and myalgias. Skin: Negative for color change and rash. Neurological: Negative for dizziness, light-headedness and numbness. Hematological: Negative. Psychiatric/Behavioral: Negative. OBJECTIVE:    /68   Pulse 71   Resp 22   Ht 6' 2\" (1.88 m)   Wt (!) 322 lb (146.1 kg)   SpO2 98%   BMI 41.34 kg/m²     Physical Exam  Constitutional:       General: He is not in acute distress. Appearance: He is well-developed. He is obese. He is not diaphoretic. HENT:      Head: Normocephalic and atraumatic. Mass present. Comments: Possible small mass felt on right lateral aspect on thyroid region. Neck:      Thyroid: No thyromegaly. Cardiovascular:      Rate and Rhythm: Normal rate and regular rhythm. Heart sounds: Normal heart sounds. No murmur. Pulmonary:      Effort: Pulmonary effort is normal.      Breath sounds: Normal breath sounds. Abdominal:      General: Bowel sounds are normal. There is no distension. Palpations: Abdomen is soft. Tenderness: There is no abdominal tenderness. Musculoskeletal: Normal range of motion. Lymphadenopathy:      Cervical: No cervical adenopathy. Skin:     General: Skin is warm and dry. Capillary Refill: Capillary refill takes less than 2 seconds.    Neurological:      Mental Status: He is alert and oriented to person, place, and time.      GCS: GCS eye subscore is 4. GCS verbal subscore is 5. GCS motor subscore is 6. Coordination: Coordination normal.         ASSESSMENT:    1. Chronic anticoagulation    2. Persistent atrial fibrillation    3. Type 2 diabetes mellitus with hyperosmolarity without coma, with long-term current use of insulin ([address]    4. Chronic obstructive pulmonary disease, unspecified COPD type ([address]    5. Thyroid disease    6. Thyroid nodule    7. Atrial fibrillation, unspecified type ([address]    8. Leg swelling    9. Chronic allergic rhinitis    10. Mixed hyperlipidemia        PLAN:    Natalie Forde was seen today for 3 month follow-up. Diagnoses and all orders for this visit:    Chronic anticoagulation- INR remains in goal; no changes to warfarin; continue routine surveillance; dietary precautions discussed at length. -     POCT INR    Persistent atrial fibrillation- on AC and PT/INR at goal; keep follow up with cardiology next month. -     warfarin (COUMADIN) 2 MG tablet; Take 1 tablet by mouth three times a week **On MWF.**    Type 2 diabetes mellitus with hyperosmolarity without coma, with long-term current use of insulin (HCC)-as per Dr Diania Hashimoto; will likely need med adjustment; getting repeat labs next week. Chronic obstructive pulmonary disease, unspecified COPD type ([address] - well controlled; no changes in management at this time. Using rescue inhaler minimally. -     albuterol sulfate HFA (VENTOLIN HFA) 108 (90 Base) MCG/ACT inhaler; Inhale 2 puffs into the lungs every 6 hours as needed for Wheezing    Thyroid disease- on synthroid; recent T4 normal; keep endocrinology follow up next week. Thyroid nodule- concern for nodule on exam; will check Thyroid US.  -     US HEAD NECK SOFT TISSUE THYROID; Future    Atrial fibrillation, unspecified type ([address]- rate controlled; keep cardiology follow up next week. -     warfarin (COUMADIN) 3 MG tablet;  Take 1 tablet by mouth every 24 hours Indications: Sat/Tue/Thur/Sun **OR AS DIRECTED PER **  -     carvedilol (COREG) 25 MG tablet; Take 1 tablet by mouth 2 times daily    Leg swelling- improved; no changes at this time. -     potassium chloride (KLOR-CON M) 10 MEQ extended release tablet; Take 1 tablet by mouth 2 times daily  -     furosemide (LASIX) 40 MG tablet; Take 1 tablet by mouth nightly    Chronic allergic rhinitis - well controlled; no changes in management at this time. -     cetirizine (ZYRTEC) 10 MG tablet; Take 1 tablet by mouth daily    Mixed hyperlipidemia- continue statin; LDL in normal range  -     pravastatin (PRAVACHOL) 80 MG tablet; TAKE 1 TABLET BY MOUTH ONCE DAILY    Course of treatment, including any medications, possible imaging, referrals, and follow ups discussed with patient. All risks and benefits and possible side effects discussed with patient who agrees to plan of care and verbalizes understanding. All labs and imaging reviewed. No flowsheet data found. Return in about 3 months (around 11/14/2020).

## 2020-08-21 ENCOUNTER — HOSPITAL ENCOUNTER (OUTPATIENT)
Dept: ULTRASOUND IMAGING | Age: 70
Discharge: HOME | End: 2020-08-21
Payer: COMMERCIAL

## 2020-08-21 PROCEDURE — 76536 US EXAM OF HEAD AND NECK: CPT

## 2020-09-21 ENCOUNTER — OFFICE VISIT (OUTPATIENT)
Dept: CARDIOLOGY CLINIC | Age: 70
End: 2020-09-21
Payer: COMMERCIAL

## 2020-09-21 VITALS
DIASTOLIC BLOOD PRESSURE: 68 MMHG | HEART RATE: 76 BPM | SYSTOLIC BLOOD PRESSURE: 132 MMHG | WEIGHT: 315 LBS | BODY MASS INDEX: 40.43 KG/M2 | HEIGHT: 74 IN

## 2020-09-21 PROCEDURE — 99214 OFFICE O/P EST MOD 30 MIN: CPT | Performed by: INTERNAL MEDICINE

## 2020-09-21 RX ORDER — DIGOXIN 125 MCG
125 TABLET ORAL DAILY
Qty: 30 TABLET | Refills: 3 | Status: CANCELLED | OUTPATIENT
Start: 2020-09-21

## 2020-09-21 RX ORDER — DIGOXIN 125 MCG
125 TABLET ORAL DAILY
Qty: 30 TABLET | Refills: 3 | Status: SHIPPED | OUTPATIENT
Start: 2020-09-21 | End: 2020-12-16 | Stop reason: SDUPTHER

## 2020-10-29 RX ORDER — DILTIAZEM HYDROCHLORIDE 120 MG/1
120 TABLET, FILM COATED ORAL DAILY
Qty: 90 TABLET | Refills: 0 | Status: SHIPPED | OUTPATIENT
Start: 2020-10-29 | End: 2020-12-16 | Stop reason: SDUPTHER

## 2020-11-04 ENCOUNTER — VIRTUAL VISIT (OUTPATIENT)
Dept: INTERNAL MEDICINE CLINIC | Age: 70
End: 2020-11-04
Payer: COMMERCIAL

## 2020-11-04 PROCEDURE — 99442 PR PHYS/QHP TELEPHONE EVALUATION 11-20 MIN: CPT | Performed by: INTERNAL MEDICINE

## 2020-11-04 RX ORDER — PREDNISONE 1 MG/1
TABLET ORAL
Qty: 20 TABLET | Refills: 0 | Status: SHIPPED | OUTPATIENT
Start: 2020-11-04 | End: 2020-11-10 | Stop reason: SDUPTHER

## 2020-11-04 RX ORDER — BENZONATATE 100 MG/1
100 CAPSULE ORAL 3 TIMES DAILY PRN
Qty: 30 CAPSULE | Refills: 0 | Status: SHIPPED | OUTPATIENT
Start: 2020-11-04 | End: 2020-11-11

## 2020-11-04 RX ORDER — AZITHROMYCIN 250 MG/1
250 TABLET, FILM COATED ORAL SEE ADMIN INSTRUCTIONS
Qty: 6 TABLET | Refills: 0 | Status: SHIPPED | OUTPATIENT
Start: 2020-11-04 | End: 2020-11-10 | Stop reason: SDUPTHER

## 2020-11-04 NOTE — PROGRESS NOTES
Ashley Roper is a 72 y.o. male evaluated via telephone on 11/4/2020. Consent:  He and/or health care decision maker is aware that that he may receive a bill for this telephone service, depending on his insurance coverage, and has provided verbal consent to proceed: Yes      Documentation:  I communicated with the patient and/or health care decision maker about cough. Details of this discussion including any medical advice provided:     Pt complains of sinus pressure, scracthy throat, cough with yellow mucous, fatigue, bilat ear pains, frontal and retro-orbital sinus pain, mild wheezing,     He denies any SOB, F/C, N/V, diarrhea, loss of taste or smell. Symptoms started Sunday. He has used robitussin with some benefit; albuterol. Sugars have been ~200. WillTx with low ose prednisone taper given DM, azith, and tessalon. Cont inhalers. I affirm this is a Patient Initiated Episode with a Patient who has not had a related appointment within my department in the past 7 days or scheduled within the next 24 hours.     Patient identification was verified at the start of the visit: Yes    Total Time: minutes: 11-20 minutes    Note: not billable if this call serves to triage the patient into an appointment for the relevant concern      [address] [place] [address]

## 2020-11-10 ENCOUNTER — TELEPHONE (OUTPATIENT)
Dept: INTERNAL MEDICINE CLINIC | Age: 70
End: 2020-11-10

## 2020-11-10 RX ORDER — AZITHROMYCIN 250 MG/1
250 TABLET, FILM COATED ORAL SEE ADMIN INSTRUCTIONS
Qty: 6 TABLET | Refills: 0 | Status: SHIPPED | OUTPATIENT
Start: 2020-11-10 | End: 2020-11-15

## 2020-11-10 RX ORDER — PREDNISONE 1 MG/1
TABLET ORAL
Qty: 20 TABLET | Refills: 0 | Status: SHIPPED | OUTPATIENT
Start: 2020-11-10 | End: 2020-12-16 | Stop reason: CLARIF

## 2020-11-10 NOTE — TELEPHONE ENCOUNTER
Pt called and stated that he not feeling any better. He was seen last week on [facility] by dr Luis Alberto Quijano and given low ose prednisone taper given DM, azith, and tessalon. Pt would like to have a refill of medication. Please advise.

## 2020-11-13 ENCOUNTER — HOSPITAL ENCOUNTER (OUTPATIENT)
Age: 70
Setting detail: SPECIMEN
Discharge: HOME | End: 2020-11-13
Payer: COMMERCIAL

## 2020-11-13 ENCOUNTER — OFFICE VISIT (OUTPATIENT)
Dept: PRIMARY CARE CLINIC | Age: 70
End: 2020-11-13
Payer: COMMERCIAL

## 2020-11-13 VITALS — HEART RATE: 86 BPM | OXYGEN SATURATION: 93 % | TEMPERATURE: 96.4 F

## 2020-11-13 PROCEDURE — 99213 OFFICE O/P EST LOW 20 MIN: CPT | Performed by: NURSE PRACTITIONER

## 2020-11-13 PROCEDURE — U0002 COVID-19 LAB TEST NON-CDC: HCPCS

## 2020-11-13 NOTE — PATIENT INSTRUCTIONS
Your COVID 19 test can take 3-5 days for the results come back. We ask that you make a Mychart page and view your test results this way. You will need to Self quarantine until you know your results. Increase fluids rest  Saline nasal spray as directed  Warm salt gargles for throat discomfort  Monitor temperature twice a day  Tylenol for fevers and/or discomfort. If symptoms are worse -Go to the ER. Follow up with your primary doctor    To Whom it May Concern:    Carson Ricci has been tested for COVID on 11/13/20. They may NOT return to work until their lab test results back and they been fever free for 3 days. If test is positive they must stay home for 2 weeks or until they test negative or as directed by the [facility].

## 2020-11-13 NOTE — PROGRESS NOTES
11/13/2020    HPI:  Chief complaint and history of present illness as per medical assistant/nurse documented today in the Flu/COVID-19 clinic. MEDICATIONS:  Prior to Visit Medications    Medication Sig Taking?  Authorizing Provider   azithromycin (ZITHROMAX) 250 MG tablet Take 1 tablet by mouth See Admin Instructions for 5 days 500mg on day 1 followed by 250mg on days 2 - 5  ANKITA Serrato CNP   predniSONE (DELTASONE) 5 MG tablet Take 4 tablets daily for 2 days, then 3 tablets daily for 2 days, then two tablets daily for 2 days, then one tablet daily for 2 days  ANKITA Serrato CNP   dilTIAZem (CARDIZEM) 120 MG tablet Take 1 tablet by mouth daily  Vinod Morales MD   digoxin (LANOXIN) 125 MCG tablet Take 1 tablet by mouth daily  ANKITA Serrato CNP   warfarin (COUMADIN) 3 MG tablet Take 1 tablet by mouth every 24 hours Indications: Sat/Tue/Thur/Sun **OR AS DIRECTED PER **  ANKITA Serrato CNP   warfarin (COUMADIN) 2 MG tablet Take 1 tablet by mouth three times a week **On MWF.**  ANKITA Serrato CNP   potassium chloride (KLOR-CON M) 10 MEQ extended release tablet Take 1 tablet by mouth 2 times daily  ANKITA Serrato CNP   DULoxetine (CYMBALTA) 60 MG extended release capsule Take 1 capsule by mouth daily  ANKITA Serrato CNP   cetirizine (ZYRTEC) 10 MG tablet Take 1 tablet by mouth daily  ANKITA Serrato CNP   pravastatin (PRAVACHOL) 80 MG tablet TAKE 1 TABLET BY MOUTH ONCE DAILY  ANKITA Serrato CNP   metFORMIN (GLUCOPHAGE) 1000 MG tablet TAKE ONE TABLET BY MOUTH TWICE DAILY WITH MEALS  ANKITA Serrato CNP   furosemide (LASIX) 40 MG tablet Take 1 tablet by mouth nightly  ANKITA Serrato CNP   allopurinol (ZYLOPRIM) 100 MG tablet TAKE 1 TABLET BY MOUTH TWICE DAILY  ANKITA eSrrato CNP   levothyroxine (SYNTHROID) 50 MCG tablet Take 1 tablet by mouth Daily  ANKITA Serrato CNP   albuterol sulfate HFA (VENTOLIN HFA) 108 (90 Base) MCG/ACT inhaler Inhale 2 puffs into the lungs every 6 hours as needed for Wheezing  ANKITA Dorado CNP   carvedilol (COREG) 25 MG tablet Take 1 tablet by mouth 2 times daily  Patient taking differently: Take 12.5 mg by mouth 3 times daily   ANKITA Dorado CNP   losartan (COZAAR) 100 MG tablet Take 1 tablet by mouth daily  ANKITA Dorado CNP   insulin regular (NOVOLIN R) 100 UNIT/ML injection Inject 0-10 Units into the skin 3 times daily (before meals)  ANKITA Dorado CNP   insulin NPH (HUMULIN N;NOVOLIN N) 100 UNIT/ML injection vial Inject 80 Units into the skin nightly  ANKITA Dorado CNP   gemfibrozil (LOPID) 600 MG tablet Take 1 tablet by mouth 2 times daily  ANKITA Dorado CNP   Blood Pressure Monitor KIT 1 kit by Does not apply route once for 1 dose  ANKITA Dorado CNP   insulin aspart (NOVOLOG) 100 UNIT/ML injection vial Inject into the skin  Historical Provider, MD   polyethylene glycol (GLYCOLAX) powder Take 17 g by mouth daily  ANKITA Dorado CNP   Lancets MISC by D3EA route three times a day. ANKITA Dorado CNP   glimepiride (AMARYL) 2 MG tablet Take 1 tablet by mouth every morning (before breakfast)  ANKITA Dorado CNP       No Known Allergies,   Past Medical History:   Diagnosis Date    Allergic rhinitis     Anticoagulated on Coumadin     1/6/17-**[facility] to follow pt's PT/INRs, along w/prescribing his Coumadin. **    Anxiety     Arthritis     Atrial fibrillation (HCC)     On Coumadin.     CAD (coronary artery disease)     COPD (chronic obstructive pulmonary disease) (HCC)     Depression     Diabetes mellitus (HCC)     NIDDM- dx over 10 yrs ago- follows with Dr Ly Pinon's contracture of hand     Right hand    Family history of cardiovascular disease     Father-MI    GERD (gastroesophageal reflux disease)     H/O cardiac catheterization 03/2007    cardiac cath- stent LAD patent, Hzrocwbo-15-97%, RCA 40-50%    H/O cardiac catheterization 06/12/2008    cardiac cath- mild disease mid RCA    H/O cardiovascular stress test 4/10/2014    cardiolite- normal, no ischemia, EF63%    H/O cardiovascular stress test 09/21/2016    EF59% normal study  pt in atrial fib    H/O cardiovascular stress test 09/20/2017    EF 60%   afib    H/O cardiovascular stress test 11/07/2018    EF60% normal study    H/O Doppler ultrasound     1/11/2010-CAROTID_Intimal thickening but no significant atherosclerotic plaque noted in LAUREN. Doppler flow velocities within the LAUREN are WNL. Intimal thickening but no significant atherosclerotic plaque noted in LICA. Doppler flow velociities within the LICA are WNL.  H/O echocardiogram 04/10/2014    EF 60%, normal LV systolic function, mild mitral and tricuspid insufficiencies, no pericardial effusion.     H/O echocardiogram 09/21/2016    EF60% normal study    H/O echocardiogram 11/07/2018    EF55-60% no significant valular disease    H/O hiatal hernia     Hx of Venous Doppler 03/14/2019    Significant reflux in Left Deep System, No reflux in right lower extremity, No DVT or SVT    Hyperlipidemia     Hypertension     Obesity     S/P PTCA (percutaneous transluminal coronary angioplasty) 03/21/2005    s/p PTCA with 3.5 X 16 TAXUS stent to LAD- follows with Dr Nory Patel Sleep apnea     had sleep study done 10+ yrs ago- has cpap but does not use it    Thyroid disease     hypothyroid   ,   Past Surgical History:   Procedure Laterality Date    CARDIAC CATHETERIZATION  6/12/08    mild disease mid RCA,  stent to LAD patent,    CARDIAC CATHETERIZATION  3/07    Stent to LAD patent, Diagonal 40-50%, RCA 40-50%    CARDIAC CATHETERIZATION  3/05    COLONOSCOPY  2011    COLONOSCOPY  5/18/16    1 polyp removed, diverticulosis and hemorrhoids noted    CORONARY ANGIOPLASTY WITH STENT PLACEMENT  03/21/2005    PTCA with 3.5 x 16 TAXUS stent to LAD    ENDOSCOPY, COLON, DIAGNOSTIC  2014    egd    HAND SURGERY Right 1997    MN OPEN RX ANKLE DISLOCATN+FIXATN Right 6/3/2019    RIGHT OPEN REDUCTION INTERNAL FIXATION OF DISTAL TIBIA  AND FIBULA performed by Pj Natarajan MD at [facility]:  Physical Exam  Constitutional:       Appearance: He is well-developed. Eyes:      General: No scleral icterus. Conjunctiva/sclera: Conjunctivae normal.   Cardiovascular:      Rate and Rhythm: Normal rate and regular rhythm. Heart sounds: Normal heart sounds. No murmur. Pulmonary:      Effort: Pulmonary effort is normal. No respiratory distress. Breath sounds: Normal breath sounds. No wheezing. ASSESSMENT/PLAN:  1. Flu-like symptoms - patient states he is currently on antibiotics and steroids. Encouraged to finish medication and call PCP if symptoms do not continue to improve or worsen. - COVID-19 Ambulatory    FOLLOW-UP:  Return if symptoms worsen or fail to improve.     In addition to other information, the printed after visit summary provided to the patient includes:  [x] COVID-19 Self care instructions  [x] COVID-19 General patient information

## 2020-11-13 NOTE — PROGRESS NOTES
11/13/20  Louann Navarrete  1950    FLU/COVID-19 CLINIC EVALUATION    HPI SYMPTOMS:    Employer: Retired    [] Fevers  [x] Chills  [x] Cough  [] Coughing up blood  [x] Chest Congestion  [] Nasal Congestion  [x] Feeling short of breath  [x] Sometimes  [] Frequently  [] All the time  [] Chest pain  [] Headaches  []Tolerable  [] Severe  [x] Sore throat  [x] Muscle aches  [] Nausea  [] Vomiting  []Unable to keep fluids down  [x] Diarrhea  []Severe    [] OTHER SYMPTOMS:      Symptom Duration:   [] 1  [] 2   [] 3   [] 4    [] 5   [] 6   [] 7   [] 8   [] 9   [] 10   [] 11   [] 12   [] 13   [x] 14   [] Longer than 14 days    Symptom course:   [] Worsening     [x] Stable     [] Improving    RISK FACTORS:    [] Pregnant or possibly pregnant  [x] Age over 61  [x] Diabetes  [] Heart disease  [] Asthma  [x] COPD/Other chronic lung diseases  [] Active Cancer  [] On Chemotherapy  [] Taking oral steroids  [] History Lymphoma/Leukemia  [] Close contact with a lab confirmed COVID-19 patient within 14 days of symptom onset  [] History of travel from affected geographical areas within 14 days of symptom onset       VITALS:  There were no vitals filed for this visit. TESTS:    POCT FLU:  [] Positive     []Negative    ASSESSMENT:    [] Flu  [] Possible COVID-19  [] Strep    PLAN:    [] Discharge home with written instructions for:  [] Flu management  [] Possible COVID-19 infection with self-quarantine and management of symptoms  [] Follow-up with primary care physician or emergency department if worsens  [] Evaluation per physician/NP/PA in clinic  [] Sent to ER       An  electronic signature was used to authenticate this note.      --Dalton Mejia MA on 11/13/2020 at 12:03 PM

## 2020-11-14 LAB
SARS-COV-2: DETECTED
SOURCE: ABNORMAL

## 2020-12-08 NOTE — TELEPHONE ENCOUNTER
Refills sent, but please make sure pt gets on scheduled within next 1-2 months for regular follow up

## 2020-12-16 ENCOUNTER — OFFICE VISIT (OUTPATIENT)
Dept: INTERNAL MEDICINE CLINIC | Age: 70
End: 2020-12-16
Payer: COMMERCIAL

## 2020-12-16 VITALS
RESPIRATION RATE: 20 BRPM | HEART RATE: 94 BPM | SYSTOLIC BLOOD PRESSURE: 142 MMHG | OXYGEN SATURATION: 98 % | WEIGHT: 315 LBS | HEIGHT: 74 IN | BODY MASS INDEX: 40.43 KG/M2 | DIASTOLIC BLOOD PRESSURE: 82 MMHG

## 2020-12-16 LAB
INTERNATIONAL NORMALIZATION RATIO, POC: 2.7
PROTHROMBIN TIME, POC: NORMAL

## 2020-12-16 PROCEDURE — 85610 PROTHROMBIN TIME: CPT | Performed by: NURSE PRACTITIONER

## 2020-12-16 PROCEDURE — 99214 OFFICE O/P EST MOD 30 MIN: CPT | Performed by: NURSE PRACTITIONER

## 2020-12-16 PROCEDURE — 3052F HG A1C>EQUAL 8.0%<EQUAL 9.0%: CPT | Performed by: NURSE PRACTITIONER

## 2020-12-16 RX ORDER — LEVOTHYROXINE SODIUM 0.05 MG/1
50 TABLET ORAL DAILY
Qty: 90 TABLET | Refills: 1 | Status: SHIPPED | OUTPATIENT
Start: 2020-12-16 | End: 2021-04-19 | Stop reason: SDUPTHER

## 2020-12-16 RX ORDER — POLYETHYLENE GLYCOL 3350 17 G/17G
17 POWDER, FOR SOLUTION ORAL DAILY
Qty: 225 G | Refills: 2 | Status: SHIPPED | OUTPATIENT
Start: 2020-12-16

## 2020-12-16 RX ORDER — ALLOPURINOL 100 MG/1
TABLET ORAL
Qty: 180 TABLET | Refills: 1 | Status: SHIPPED | OUTPATIENT
Start: 2020-12-16 | End: 2021-09-14 | Stop reason: SDUPTHER

## 2020-12-16 RX ORDER — CETIRIZINE HYDROCHLORIDE 10 MG/1
10 TABLET ORAL DAILY
Qty: 90 TABLET | Refills: 1 | Status: SHIPPED | OUTPATIENT
Start: 2020-12-16 | End: 2021-12-01

## 2020-12-16 RX ORDER — DULOXETIN HYDROCHLORIDE 60 MG/1
60 CAPSULE, DELAYED RELEASE ORAL DAILY
Qty: 90 CAPSULE | Refills: 1 | Status: SHIPPED | OUTPATIENT
Start: 2020-12-16 | End: 2021-06-30 | Stop reason: SDUPTHER

## 2020-12-16 RX ORDER — GLIMEPIRIDE 2 MG/1
2 TABLET ORAL
Qty: 90 TABLET | Refills: 1 | Status: SHIPPED | OUTPATIENT
Start: 2020-12-16 | End: 2021-10-27

## 2020-12-16 RX ORDER — WARFARIN SODIUM 2 MG/1
2 TABLET ORAL
Qty: 30 TABLET | Refills: 1 | Status: SHIPPED | OUTPATIENT
Start: 2020-12-16 | End: 2021-04-19 | Stop reason: SDUPTHER

## 2020-12-16 RX ORDER — CARVEDILOL 25 MG/1
25 TABLET ORAL 2 TIMES DAILY
Qty: 90 TABLET | Refills: 3 | Status: SHIPPED | OUTPATIENT
Start: 2020-12-16 | End: 2021-07-20 | Stop reason: SDUPTHER

## 2020-12-16 RX ORDER — DIGOXIN 125 MCG
125 TABLET ORAL DAILY
Qty: 90 TABLET | Refills: 3 | Status: SHIPPED | OUTPATIENT
Start: 2020-12-16 | End: 2022-02-11 | Stop reason: SDUPTHER

## 2020-12-16 RX ORDER — DILTIAZEM HYDROCHLORIDE 120 MG/1
120 TABLET, FILM COATED ORAL DAILY
Qty: 90 TABLET | Refills: 1 | Status: SHIPPED | OUTPATIENT
Start: 2020-12-16 | End: 2021-08-11 | Stop reason: SDUPTHER

## 2020-12-16 RX ORDER — ALBUTEROL SULFATE 90 UG/1
2 AEROSOL, METERED RESPIRATORY (INHALATION) EVERY 6 HOURS PRN
Qty: 1 INHALER | Refills: 3 | Status: ON HOLD | OUTPATIENT
Start: 2020-12-16 | End: 2022-09-24 | Stop reason: HOSPADM

## 2020-12-16 RX ORDER — TIZANIDINE 4 MG/1
4 TABLET ORAL 3 TIMES DAILY PRN
Qty: 30 TABLET | Refills: 0 | Status: SHIPPED | OUTPATIENT
Start: 2020-12-16 | End: 2021-04-19 | Stop reason: SDUPTHER

## 2020-12-16 RX ORDER — LOSARTAN POTASSIUM 100 MG/1
100 TABLET ORAL DAILY
Qty: 90 TABLET | Refills: 1 | Status: SHIPPED | OUTPATIENT
Start: 2020-12-16 | End: 2021-06-01

## 2020-12-16 RX ORDER — GEMFIBROZIL 600 MG/1
600 TABLET, FILM COATED ORAL 2 TIMES DAILY
Qty: 180 TABLET | Refills: 1 | Status: SHIPPED | OUTPATIENT
Start: 2020-12-16 | End: 2022-09-09

## 2020-12-16 RX ORDER — FUROSEMIDE 40 MG/1
40 TABLET ORAL NIGHTLY
Qty: 30 TABLET | Refills: 3 | Status: SHIPPED | OUTPATIENT
Start: 2020-12-16 | End: 2021-04-13 | Stop reason: SDUPTHER

## 2020-12-16 RX ORDER — POTASSIUM CHLORIDE 750 MG/1
10 TABLET, EXTENDED RELEASE ORAL 2 TIMES DAILY
Qty: 60 TABLET | Refills: 5 | Status: ON HOLD | OUTPATIENT
Start: 2020-12-16 | End: 2022-09-15

## 2020-12-16 RX ORDER — PRAVASTATIN SODIUM 80 MG/1
TABLET ORAL
Qty: 90 TABLET | Refills: 1 | Status: SHIPPED | OUTPATIENT
Start: 2020-12-16 | End: 2021-09-03 | Stop reason: SDUPTHER

## 2020-12-16 ASSESSMENT — ENCOUNTER SYMPTOMS
CHEST TIGHTNESS: 0
ABDOMINAL DISTENTION: 0
SORE THROAT: 0
SINUS PAIN: 0
ABDOMINAL PAIN: 0
BACK PAIN: 1
EYES NEGATIVE: 1
COUGH: 0
DIARRHEA: 0
WHEEZING: 0
CONSTIPATION: 0
NAUSEA: 0
SHORTNESS OF BREATH: 0
SINUS PRESSURE: 0
COLOR CHANGE: 0

## 2020-12-16 NOTE — PROGRESS NOTES
Louann Navarrete  1950 12/16/20    Chief Complaint   Patient presents with    Back Pain     lower back pain sx started 2 days ago pt states that he has difficulty getting in and out of his car     Coagulation Disorder       SUBJECTIVE:      3 month follow up:    Continue on Digoxin/Diltiazem for a-fib and is rate controlled at this time. [facility] for [facility] issue. Anticoagulation: Patient here for followup of chronic anticoagulation. Indication: atrial fibrillation  Bleeding Signs/Symptoms:  None  Missed Coumadin Doses:  None  Medication Changes:  no  Dietary Changes:  no    VITAL SIGNS reviewed    PLAN:  Continue current regiment; INR therapeutic at 2.7 today. Patient compliant with all current medications for diabetes. Blood sugars have been averaging around 140-160, and the patient reports checking their blood sugar 1-2 time daily. Patient has had no episodes of significant hypoglycemia, fainting, dizziness, or loss of consciousness. Has not had labs or repeat F/u with Dr Pricila Herrera yet as requested and is calling this week to reschedule. Lab Results   Component Value Date    LABA1C 8.0 (H) 05/01/2020     Lab Results   Component Value Date     05/01/2020     Patient's COPD well controled on current medications. Patient denies any shortness of breath, wheezing. Able to execute activities of daily living without breathing complications. Patient continues to be compliant with synthroid regiment for her hypothyroidism. Denies c/o fatigue, weight gain or loss, heat or cold intolerance, diarrhea, or other GI symptoms. Lab Results   Component Value Date    TSH 1.66 04/03/2018     Patient denies any chest pain, shortness of breath, myalgias, Patient is tolerating cholesterol medications without difficulty.    Lab Results   Component Value Date    LDLCALC 35 01/15/2020    LDLDIRECT 84 05/01/2020     The patient is currently taking all hypertensive medications compliantly without c/o of any side effects. Denies chest pain, dyspnea, edema, palpiations. Blood pressure has been averaging  130/80 over the last few months, slightly higher today. Allergic rhinitis symptoms remain stable on current Zyrtec regiment. Patient also with c/o right sided lower back pain, described as a tightness and spasm Denies any recent falls or injuries. Denies any loss of bowel/bladder control or other red flag warning signs. No urinary issues. Patient also due for routine prostate cancer screening. Review of Systems   Constitutional: Negative for activity change, appetite change, fatigue and fever. HENT: Negative for congestion, sinus pressure, sinus pain and sore throat. Eyes: Negative. Respiratory: Negative for cough, chest tightness, shortness of breath and wheezing. Cardiovascular: Negative for chest pain and palpitations. Gastrointestinal: Negative for abdominal distention, abdominal pain, constipation, diarrhea and nausea. Genitourinary: Negative for difficulty urinating, dysuria, flank pain, frequency and hematuria. Musculoskeletal: Positive for back pain. Negative for arthralgias, gait problem, joint swelling and myalgias. Skin: Negative for color change and rash. Neurological: Negative for dizziness, light-headedness and numbness. Hematological: Negative. Psychiatric/Behavioral: Negative. OBJECTIVE:    BP (!) 142/82   Pulse 94   Resp 20   Ht 6' 2\" (1.88 m)   Wt (!) 335 lb 3.2 oz (152 kg)   SpO2 98%   BMI 43.04 kg/m²     Physical Exam  Constitutional:       General: He is not in acute distress. Appearance: He is well-developed. He is not diaphoretic. HENT:      Head: Normocephalic and atraumatic. Neck:      Thyroid: No thyromegaly. Cardiovascular:      Rate and Rhythm: Normal rate and regular rhythm. Heart sounds: Normal heart sounds. No murmur. Pulmonary:      Effort: Pulmonary effort is normal.      Breath sounds: Normal breath sounds. Abdominal:      General: Bowel sounds are normal.      Palpations: Abdomen is soft. Musculoskeletal: Normal range of motion. Lumbar back: He exhibits tenderness. He exhibits no bony tenderness. Back:       Comments: High sensitivity Neuro Exam for Lumbar spine  -(L1-L2)  inner thigh sensation intact bilaterally  -(S3,4,5) No loss of bowel/bladder control     -Lower extremity ROM:       -L2: Adduct thighs       -L3/S1: extend/ flex knees       -L4: dorsiflex ankles       -L5: point great toes upward & plantar flex toes (S2)       -L2,3,4: Knee reflexes intact bilaterally    PARASPINAL TENDERNESS TO AREA NOTED. Lymphadenopathy:      Cervical: No cervical adenopathy. Skin:     General: Skin is warm and dry. Capillary Refill: Capillary refill takes less than 2 seconds. Neurological:      Mental Status: He is alert and oriented to person, place, and time. GCS: GCS eye subscore is 4. GCS verbal subscore is 5. GCS motor subscore is 6. Coordination: Coordination normal.         ASSESSMENT:    1. Atrial fibrillation, unspecified type ([address]    2. Type 2 diabetes mellitus with hyperosmolarity without coma, with long-term current use of insulin ([address]    3. Chronic obstructive pulmonary disease, unspecified COPD type ([address]    4. Thyroid disease    5. Mixed hyperlipidemia    6. Essential hypertension    7. Chronic allergic rhinitis    8. Encounter for prostate cancer screening    9. Acute right-sided low back pain without sciatica        PLAN:    Susana Garcia was seen today for back pain and coagulation disorder. Diagnoses and all orders for this visit:    Atrial fibrillation, unspecified type ([address]- rate controlled; pt asymptomatic and INR is therapeutic at this time; continue current Coumadin regiment without change; recheck labs in 3 months. Keep cardiology follow up. -     POCT INR  -     warfarin (COUMADIN) 2 MG tablet;  Take 1 tablet by mouth three times a week **On MWF.**  -     dilTIAZem (CARDIZEM) 120 MG tablet; Take 1 tablet by mouth daily  -     digoxin (LANOXIN) 125 MCG tablet; Take 1 tablet by mouth daily  -     carvedilol (COREG) 25 MG tablet; Take 1 tablet by mouth 2 times daily    Type 2 diabetes mellitus with hyperosmolarity without coma, with long-term current use of insulin ([facility]- as per Dr Bob Gifford, however, will provide refills today and order labs to be completed at this time and forward to his office.   -     insulin regular (NOVOLIN R) 100 UNIT/ML injection; Inject 0-10 Units into the skin 3 times daily (before meals)  -     insulin NPH (HUMULIN N;NOVOLIN N) 100 UNIT/ML injection vial; Inject 80 Units into the skin nightly  -     glimepiride (AMARYL) 2 MG tablet; Take 1 tablet by mouth every morning (before breakfast)  -     CBC Auto Differential; Future  -     Comprehensive Metabolic Panel; Future  -     Hemoglobin A1C; Future  -     Lipid, Fasting; Future    Chronic obstructive pulmonary disease, unspecified COPD type ([facility] - well controlled; no changes in management at this time. -     albuterol sulfate HFA (VENTOLIN HFA) 108 (90 Base) MCG/ACT inhaler; Inhale 2 puffs into the lungs every 6 hours as needed for Wheezing    Thyroid disease- recheck TSH and adjust Synthroid as needed; refills provided. -     levothyroxine (SYNTHROID) 50 MCG tablet; Take 1 tablet by mouth Daily  -     TSH with Reflex; Future  -     T4, Free; Future    Mixed hyperlipidemia- check lipid panel; continue Lopid and statin  -     gemfibrozil (LOPID) 600 MG tablet; Take 1 tablet by mouth 2 times daily  -     pravastatin (PRAVACHOL) 80 MG tablet; TAKE 1 TABLET BY MOUTH ONCE DAILY  -     CBC Auto Differential; Future  -     Comprehensive Metabolic Panel; Future  -     Hemoglobin A1C; Future  -     Lipid, Fasting;  Future    Essential hypertension- slightly elevated today, but feel likely 2/2 recent pain; this is treated as below, but if after pain resolves BP still not at goal, we will discuss other options and pt may need new agent. Continue to monitor at home and call if any issues. -     potassium chloride (KLOR-CON M) 10 MEQ extended release tablet; Take 1 tablet by mouth 2 times daily  -     losartan (COZAAR) 100 MG tablet; Take 1 tablet by mouth daily  -     furosemide (LASIX) 40 MG tablet; Take 1 tablet by mouth nightly  -     CBC Auto Differential; Future  -     Comprehensive Metabolic Panel; Future  -     Hemoglobin A1C; Future  -     Lipid, Fasting; Future    Chronic allergic rhinitis - well controlled; no changes in management at this time. -     cetirizine (ZYRTEC) 10 MG tablet; Take 1 tablet by mouth daily    Encounter for prostate cancer screening- check annual PSA  -     Psa screening; Future    Acute right-sided low back pain without sciatica- seems to be muscular in nature; apply heat, gentle stretching daily and PRN Tizanidine as below; RTO in 1-2 weeks if not improving.   -     tiZANidine (ZANAFLEX) 4 MG tablet; Take 1 tablet by mouth 3 times daily as needed (back pain)    Course of treatment, including any medications, possible imaging, referrals, and follow ups discussed with patient. All risks and benefits and possible side effects discussed with patient who agrees to plan of care and verbalizes understanding. All labs and imaging reviewed. No flowsheet data found. No follow-ups on file.

## 2021-02-22 NOTE — TELEPHONE ENCOUNTER
Pt called requesting refill of Metformin 1000 mg.     Pharmacy - [facility]

## 2021-03-24 ENCOUNTER — OFFICE VISIT (OUTPATIENT)
Dept: CARDIOLOGY CLINIC | Age: 71
End: 2021-03-24
Payer: COMMERCIAL

## 2021-03-24 VITALS
HEIGHT: 74 IN | WEIGHT: 315 LBS | HEART RATE: 84 BPM | BODY MASS INDEX: 40.43 KG/M2 | SYSTOLIC BLOOD PRESSURE: 130 MMHG | DIASTOLIC BLOOD PRESSURE: 82 MMHG

## 2021-03-24 DIAGNOSIS — I48.91 ATRIAL FIBRILLATION, UNSPECIFIED TYPE (HCC): Primary | ICD-10-CM

## 2021-03-24 PROCEDURE — 99214 OFFICE O/P EST MOD 30 MIN: CPT | Performed by: INTERNAL MEDICINE

## 2021-03-24 PROCEDURE — 93000 ELECTROCARDIOGRAM COMPLETE: CPT | Performed by: INTERNAL MEDICINE

## 2021-03-24 NOTE — PROGRESS NOTES
[id number] Score for Atrial Fibrillation Stroke Risk   Risk   Factors  Component Value   C CHF No 0   H HTN Yes 1   A2 Age >= 76 No,  (70 y.o.) 0   D DM Yes 1   S2 Prior Stroke/TIA No 0   V Vascular Disease No 0   A Age 74-69 Yes,  (70 y.o.) 1   Sc Sex male 0    [id number]  Score  3   Score last updated 3/24/21 4:21 PM EDT

## 2021-04-12 DIAGNOSIS — I48.91 ATRIAL FIBRILLATION, UNSPECIFIED TYPE (HCC): ICD-10-CM

## 2021-04-12 RX ORDER — DIGOXIN 125 MCG
125 TABLET ORAL DAILY
Qty: 90 TABLET | Refills: 3 | OUTPATIENT
Start: 2021-04-12

## 2021-04-13 DIAGNOSIS — I10 ESSENTIAL HYPERTENSION: ICD-10-CM

## 2021-04-13 RX ORDER — FUROSEMIDE 40 MG/1
40 TABLET ORAL NIGHTLY
Qty: 30 TABLET | Refills: 3 | Status: SHIPPED | OUTPATIENT
Start: 2021-04-13 | End: 2021-08-11 | Stop reason: SDUPTHER

## 2021-04-14 ENCOUNTER — HOSPITAL ENCOUNTER (OUTPATIENT)
Age: 71
Discharge: HOME | End: 2021-04-14
Payer: COMMERCIAL

## 2021-04-14 DIAGNOSIS — E11.00 TYPE 2 DIABETES MELLITUS WITH HYPEROSMOLARITY WITHOUT COMA, WITH LONG-TERM CURRENT USE OF INSULIN (HCC): Primary | ICD-10-CM

## 2021-04-14 DIAGNOSIS — Z79.4 TYPE 2 DIABETES MELLITUS WITH HYPEROSMOLARITY WITHOUT COMA, WITH LONG-TERM CURRENT USE OF INSULIN (HCC): Primary | ICD-10-CM

## 2021-04-14 LAB
ALBUMIN SERPL-MCNC: 4.1 GM/DL (ref 3.4–5)
ALP BLD-CCNC: 100 IU/L (ref 40–128)
ALT SERPL-CCNC: 18 U/L (ref 10–40)
ANION GAP SERPL CALCULATED.3IONS-SCNC: 12 MMOL/L (ref 4–16)
AST SERPL-CCNC: 20 IU/L (ref 15–37)
BASOPHILS ABSOLUTE: 0.1 K/CU MM
BASOPHILS RELATIVE PERCENT: 1.1 % (ref 0–1)
BILIRUB SERPL-MCNC: 0.5 MG/DL (ref 0–1)
BUN BLDV-MCNC: 17 MG/DL (ref 6–23)
CALCIUM SERPL-MCNC: 9.3 MG/DL (ref 8.3–10.6)
CHLORIDE BLD-SCNC: 103 MMOL/L (ref 99–110)
CHOLESTEROL: 152 MG/DL
CO2: 29 MMOL/L (ref 21–32)
CREAT SERPL-MCNC: 1.1 MG/DL (ref 0.9–1.3)
DIFFERENTIAL TYPE: ABNORMAL
EOSINOPHILS ABSOLUTE: 0.1 K/CU MM
EOSINOPHILS RELATIVE PERCENT: 1.9 % (ref 0–3)
ESTIMATED AVERAGE GLUCOSE: 194 MG/DL
GFR AFRICAN AMERICAN: >60 ML/MIN/1.73M2
GFR NON-AFRICAN AMERICAN: >60 ML/MIN/1.73M2
GLUCOSE BLD-MCNC: 110 MG/DL (ref 70–99)
HBA1C MFR BLD: 8.4 % (ref 4.2–6.3)
HCT VFR BLD CALC: 36.1 % (ref 42–52)
HDLC SERPL-MCNC: 36 MG/DL
HEMOGLOBIN: 14 GM/DL (ref 13.5–18)
IMMATURE NEUTROPHIL %: 0.7 % (ref 0–0.43)
LDL CHOLESTEROL DIRECT: 90 MG/DL
LYMPHOCYTES ABSOLUTE: 2.1 K/CU MM
LYMPHOCYTES RELATIVE PERCENT: 29 % (ref 24–44)
MCH RBC QN AUTO: 40.2 PG (ref 27–31)
MCHC RBC AUTO-ENTMCNC: 38.8 % (ref 32–36)
MCV RBC AUTO: 103.7 FL (ref 78–100)
MONOCYTES ABSOLUTE: 0.7 K/CU MM
MONOCYTES RELATIVE PERCENT: 10.1 % (ref 0–4)
NUCLEATED RBC %: 0 %
PDW BLD-RTO: 14.1 % (ref 11.7–14.9)
PLATELET # BLD: 194 K/CU MM (ref 140–440)
PMV BLD AUTO: 9.8 FL (ref 7.5–11.1)
POTASSIUM SERPL-SCNC: 3.9 MMOL/L (ref 3.5–5.1)
PROSTATE SPECIFIC ANTIGEN: 0.09 NG/ML (ref 0–4)
RBC # BLD: 3.48 M/CU MM (ref 4.6–6.2)
SEGMENTED NEUTROPHILS ABSOLUTE COUNT: 4.2 K/CU MM
SEGMENTED NEUTROPHILS RELATIVE PERCENT: 57.2 % (ref 36–66)
SODIUM BLD-SCNC: 144 MMOL/L (ref 135–145)
T4 FREE: 1.16 NG/DL (ref 0.9–1.8)
TOTAL IMMATURE NEUTOROPHIL: 0.05 K/CU MM
TOTAL NUCLEATED RBC: 0 K/CU MM
TOTAL PROTEIN: 7 GM/DL (ref 6.4–8.2)
TRIGL SERPL-MCNC: 212 MG/DL
TSH HIGH SENSITIVITY: 3.76 UIU/ML (ref 0.27–4.2)
WBC # BLD: 7.3 K/CU MM (ref 4–10.5)

## 2021-04-14 PROCEDURE — 83036 HEMOGLOBIN GLYCOSYLATED A1C: CPT

## 2021-04-14 PROCEDURE — 80053 COMPREHEN METABOLIC PANEL: CPT

## 2021-04-14 PROCEDURE — 85025 COMPLETE CBC W/AUTO DIFF WBC: CPT

## 2021-04-14 PROCEDURE — 83721 ASSAY OF BLOOD LIPOPROTEIN: CPT

## 2021-04-14 PROCEDURE — 80061 LIPID PANEL: CPT

## 2021-04-14 PROCEDURE — G0103 PSA SCREENING: HCPCS

## 2021-04-14 PROCEDURE — 36415 COLL VENOUS BLD VENIPUNCTURE: CPT

## 2021-04-14 PROCEDURE — 84443 ASSAY THYROID STIM HORMONE: CPT

## 2021-04-14 PROCEDURE — 84439 ASSAY OF FREE THYROXINE: CPT

## 2021-04-19 ENCOUNTER — OFFICE VISIT (OUTPATIENT)
Dept: INTERNAL MEDICINE CLINIC | Age: 71
End: 2021-04-19
Payer: COMMERCIAL

## 2021-04-19 VITALS
WEIGHT: 315 LBS | OXYGEN SATURATION: 98 % | HEIGHT: 74 IN | DIASTOLIC BLOOD PRESSURE: 88 MMHG | HEART RATE: 101 BPM | BODY MASS INDEX: 40.43 KG/M2 | RESPIRATION RATE: 22 BRPM | SYSTOLIC BLOOD PRESSURE: 131 MMHG

## 2021-04-19 DIAGNOSIS — E11.00 TYPE 2 DIABETES MELLITUS WITH HYPEROSMOLARITY WITHOUT COMA, WITH LONG-TERM CURRENT USE OF INSULIN (HCC): ICD-10-CM

## 2021-04-19 DIAGNOSIS — M25.511 ACUTE PAIN OF RIGHT SHOULDER: ICD-10-CM

## 2021-04-19 DIAGNOSIS — I48.91 ATRIAL FIBRILLATION, UNSPECIFIED TYPE (HCC): Primary | ICD-10-CM

## 2021-04-19 DIAGNOSIS — E78.2 MIXED HYPERLIPIDEMIA: ICD-10-CM

## 2021-04-19 DIAGNOSIS — J44.9 CHRONIC OBSTRUCTIVE PULMONARY DISEASE, UNSPECIFIED COPD TYPE (HCC): ICD-10-CM

## 2021-04-19 DIAGNOSIS — I10 ESSENTIAL HYPERTENSION: ICD-10-CM

## 2021-04-19 DIAGNOSIS — J30.9 CHRONIC ALLERGIC RHINITIS: ICD-10-CM

## 2021-04-19 DIAGNOSIS — E07.9 THYROID DISEASE: ICD-10-CM

## 2021-04-19 DIAGNOSIS — Z79.4 TYPE 2 DIABETES MELLITUS WITH HYPEROSMOLARITY WITHOUT COMA, WITH LONG-TERM CURRENT USE OF INSULIN (HCC): ICD-10-CM

## 2021-04-19 LAB
INTERNATIONAL NORMALIZATION RATIO, POC: 1.9
PROTHROMBIN TIME, POC: 22.4

## 2021-04-19 PROCEDURE — 3052F HG A1C>EQUAL 8.0%<EQUAL 9.0%: CPT | Performed by: NURSE PRACTITIONER

## 2021-04-19 PROCEDURE — 85610 PROTHROMBIN TIME: CPT | Performed by: NURSE PRACTITIONER

## 2021-04-19 PROCEDURE — 99214 OFFICE O/P EST MOD 30 MIN: CPT | Performed by: NURSE PRACTITIONER

## 2021-04-19 RX ORDER — TRAMADOL HYDROCHLORIDE 50 MG/1
50 TABLET ORAL EVERY 8 HOURS PRN
Qty: 21 TABLET | Refills: 0 | Status: SHIPPED | OUTPATIENT
Start: 2021-04-19 | End: 2021-04-26

## 2021-04-19 RX ORDER — FLUTICASONE PROPIONATE 50 MCG
2 SPRAY, SUSPENSION (ML) NASAL DAILY
Qty: 1 BOTTLE | Refills: 2 | Status: ON HOLD | OUTPATIENT
Start: 2021-04-19 | End: 2022-09-24 | Stop reason: HOSPADM

## 2021-04-19 RX ORDER — LEVOTHYROXINE SODIUM 0.05 MG/1
50 TABLET ORAL DAILY
Qty: 90 TABLET | Refills: 1 | Status: SHIPPED | OUTPATIENT
Start: 2021-04-19 | End: 2021-07-20 | Stop reason: SDUPTHER

## 2021-04-19 RX ORDER — TIZANIDINE 4 MG/1
4 TABLET ORAL 3 TIMES DAILY PRN
Qty: 30 TABLET | Refills: 0 | Status: ON HOLD | OUTPATIENT
Start: 2021-04-19 | End: 2022-09-24 | Stop reason: HOSPADM

## 2021-04-19 RX ORDER — WARFARIN SODIUM 2 MG/1
2 TABLET ORAL
Qty: 30 TABLET | Refills: 1 | Status: SHIPPED | OUTPATIENT
Start: 2021-04-19 | End: 2021-10-25

## 2021-04-19 ASSESSMENT — PATIENT HEALTH QUESTIONNAIRE - PHQ9
DEPRESSION UNABLE TO ASSESS: FUNCTIONAL CAPACITY MOTIVATION LIMITS ACCURACY
1. LITTLE INTEREST OR PLEASURE IN DOING THINGS: 0
2. FEELING DOWN, DEPRESSED OR HOPELESS: 0
SUM OF ALL RESPONSES TO PHQ QUESTIONS 1-9: 0
SUM OF ALL RESPONSES TO PHQ QUESTIONS 1-9: 0

## 2021-04-19 ASSESSMENT — ENCOUNTER SYMPTOMS
SORE THROAT: 0
COUGH: 0
SHORTNESS OF BREATH: 0
CHEST TIGHTNESS: 0
WHEEZING: 0
DIARRHEA: 0
SINUS PRESSURE: 0
NAUSEA: 0
CONSTIPATION: 0
COLOR CHANGE: 0
EYES NEGATIVE: 1
SINUS PAIN: 0
ABDOMINAL DISTENTION: 0
RHINORRHEA: 1
ABDOMINAL PAIN: 0

## 2021-04-19 NOTE — PROGRESS NOTES
2021    Jairo Jennings (:  1950) is a 80 y.o. male, here for a preventive medicine evaluation. Patient Active Problem List   Diagnosis    Atrial fibrillation ([facility]    CAD (coronary artery disease)    Morbid obesity ([facility]    COPD (chronic obstructive pulmonary disease) ([facility]    Sleep apnea    Type 2 diabetes mellitus ([facility]    Thyroid disease    Hyperlipidemia     Anticoagulation: Patient here for followup of chronic anticoagulation. Indication: atrial fibrillation  Bleeding Signs/Symptoms:  None  Missed Coumadin Doses:  None  Medication Changes:  no  Dietary Changes:  no    MWF- takes 2 mg daily  Every other day- takes 3 mg daily    VITAL SIGNS reviewed    PLAN:  Concerns for 1-2 possibly missed doses in the last week. He has historically otherwise been well controlled. We will continue current regimen recheck at follow-up appointment. Patient compliant with all current medications for diabetes. Blood sugars have been averaging around 150-250, and the patient reports checking their blood sugar 3 time daily. Patient has had no episodes of significant hypoglycemia, fainting, dizziness, or loss of consciousness. He admits he has missed his last follow-up with endocrinology and is planning on scheduling the next week for a follow-up. Last A1c was still not at goal and 8.4. Patient's COPD well controled on current medications. Patient denies any shortness of breath, wheezing. Able to execute activities of daily living without breathing complications. Needing rescue inhaler 1-2 times/week on average. Patient continues to be compliant with synthroid regiment for her hypothyroidism. Denies c/o fatigue, weight gain or loss, heat or cold intolerance, diarrhea, or other GI symptoms. Lab Results   Component Value Date    TSH 1.66 2018     Patient denies any chest pain, shortness of breath, myalgias, Patient is tolerating cholesterol medications without difficulty.    Lab Results   Component Value Date    LDLCALC 35 01/15/2020    LDLDIRECT 90 04/14/2021     The patient is currently taking all hypertensive medications compliantly without c/o of any side effects. Denies chest pain, dyspnea, edema, palpiations. Blood pressure has been averaging  120-130/80 over the last several months. Allergic rhinitis symptoms remain stable on current Zyrtec regiment, however, in last few weeks with elevated pollen counts has been having increase nasal drainage ad rhinorrhea in recent months. [place] and this becomes an issue for him every spring. States about a week ago he was trimming some branches at home/working in the garden and since then his right shoulder has been sore and tight. States \"it feels as if I have pulled a muscle. \" Has been taking extra strength tylenol with minimal benefit. -he did get both doses of his COVID 19 vaccine. Review of Systems   Constitutional: Negative for activity change, appetite change, fatigue and fever. HENT: Positive for rhinorrhea. Negative for congestion, sinus pressure, sinus pain and sore throat. Eyes: Negative. Respiratory: Negative for cough, chest tightness, shortness of breath and wheezing. Cardiovascular: Negative for chest pain and palpitations. Gastrointestinal: Negative for abdominal distention, abdominal pain, constipation, diarrhea and nausea. Genitourinary: Negative for difficulty urinating, dysuria, flank pain, frequency and hematuria. Musculoskeletal: Positive for arthralgias (right shoulder). Negative for gait problem, joint swelling and myalgias. Skin: Negative for color change and rash. Neurological: Negative for dizziness, light-headedness and numbness. Hematological: Negative. Psychiatric/Behavioral: Negative. Prior to Visit Medications    Medication Sig Taking?  Authorizing Provider   fluticasone (FLONASE) 50 MCG/ACT nasal spray 2 sprays by Each Nostril route daily Yes ANKITA Neal CNP   warfarin (COUMADIN) 2 MG tablet Take 1 tablet by mouth three times a week **On MWF.** Yes ANKITA Neal CNP   levothyroxine (SYNTHROID) 50 MCG tablet Take 1 tablet by mouth Daily Yes ANKITA Neal CNP   tiZANidine (ZANAFLEX) 4 MG tablet Take 1 tablet by mouth 3 times daily as needed (shoulder pain) Yes ANKITA Neal CNP   traMADol (ULTRAM) 50 MG tablet Take 1 tablet by mouth every 8 hours as needed for Pain for up to 7 days. Intended supply: 5 days. Take lowest dose possible to manage pain Yes ANKITA Neal CNP   furosemide (LASIX) 40 MG tablet Take 1 tablet by mouth nightly Yes ANKITA Neal CNP   metFORMIN (GLUCOPHAGE) 1000 MG tablet TAKE ONE TABLET BY MOUTH TWICE DAILY WITH MEALS Yes ANKITA Neal CNP   warfarin (COUMADIN) 3 MG tablet TAKE 1 TABLET BY MOUTH ONCE DAILY INDICATIONS  SAT/TUES/THURS/SUN  OR  AS  DIRECTED  PER    Yes ANKITA Neal CNP   potassium chloride (KLOR-CON M) 10 MEQ extended release tablet Take 1 tablet by mouth 2 times daily Yes ANKITA Neal CNP   polyethylene glycol (GLYCOLAX) 17 GM/SCOOP powder Take 17 g by mouth daily Yes ANKITA Neal CNP   losartan (COZAAR) 100 MG tablet Take 1 tablet by mouth daily Yes ANKITA Neal CNP   insulin regular (NOVOLIN R) 100 UNIT/ML injection Inject 0-10 Units into the skin 3 times daily (before meals) Yes ANKITA Neal CNP   insulin NPH (HUMULIN N;NOVOLIN N) 100 UNIT/ML injection vial Inject 80 Units into the skin nightly Yes ANKITA Neal CNP   glimepiride (AMARYL) 2 MG tablet Take 1 tablet by mouth every morning (before breakfast) Yes ANKITA Neal CNP   gemfibrozil (LOPID) 600 MG tablet Take 1 tablet by mouth 2 times daily Yes ANKITA Neal CNP   DULoxetine (CYMBALTA) 60 MG extended release capsule Take 1 capsule by mouth daily Yes ANKITA Neal CNP   dilTIAZem (CARDIZEM) 120 MG tablet Take 1 tablet by mouth daily Yes ANKITA Aburto CNP   digoxin (LANOXIN) 125 MCG tablet Take 1 tablet by mouth daily Yes ANKITA Aburto CNP   cetirizine (ZYRTEC) 10 MG tablet Take 1 tablet by mouth daily Yes ANKITA Aburto CNP   carvedilol (COREG) 25 MG tablet Take 1 tablet by mouth 2 times daily Yes ANKITA Aburto CNP   allopurinol (ZYLOPRIM) 100 MG tablet TAKE 1 TABLET BY MOUTH TWICE DAILY Yes ANKITA Aburto CNP   albuterol sulfate HFA (VENTOLIN HFA) 108 (90 Base) MCG/ACT inhaler Inhale 2 puffs into the lungs every 6 hours as needed for Wheezing Yes ANKITA Aburto CNP   pravastatin (PRAVACHOL) 80 MG tablet TAKE 1 TABLET BY MOUTH ONCE DAILY Yes ANKITA Aburto CNP   insulin aspart (NOVOLOG) 100 UNIT/ML injection vial Inject into the skin Yes Historical Provider, MD   Lancets MISC by D3EA route three times a day. Yes ANKITA Aburto CNP   Blood Pressure Monitor KIT 1 kit by Does not apply route once for 1 dose  ANKITA Aburto CNP        No Known Allergies    Past Medical History:   Diagnosis Date    Allergic rhinitis     Anticoagulated on Coumadin     1/6/17-**[facility] to follow pt's PT/INRs, along w/prescribing his Coumadin. **    Anxiety     Arthritis     Atrial fibrillation (HCC)     On Coumadin.     CAD (coronary artery disease)     COPD (chronic obstructive pulmonary disease) (HCC)     Depression     Diabetes mellitus (HCC)     NIDDM- dx over 10 yrs ago- follows with Dr Burnell Hashimoto Dupuytren's contracture of hand     Right hand    Family history of cardiovascular disease     Father-MI    GERD (gastroesophageal reflux disease)     H/O cardiac catheterization 03/2007    cardiac cath- stent LAD patent, Cogohich-20-96%, RCA 40-50%    H/O cardiac catheterization 06/12/2008    cardiac cath- mild disease mid RCA    H/O cardiovascular stress test 4/10/2014    cardiolite- normal, no ischemia, EF63%    H/O cardiovascular stress test 09/21/2016    EF59% normal study  pt in atrial fib    H/O cardiovascular stress test 09/20/2017    EF 60%   afib    H/O cardiovascular stress test 11/07/2018    EF60% normal study    H/O Doppler ultrasound     1/11/2010-CAROTID_Intimal thickening but no significant atherosclerotic plaque noted in LAUREN. Doppler flow velocities within the LAUREN are WNL. Intimal thickening but no significant atherosclerotic plaque noted in LICA. Doppler flow velociities within the LICA are WNL.  H/O echocardiogram 04/10/2014    EF 60%, normal LV systolic function, mild mitral and tricuspid insufficiencies, no pericardial effusion.     H/O echocardiogram 09/21/2016    EF60% normal study    H/O echocardiogram 11/07/2018    EF55-60% no significant valular disease    H/O hiatal hernia     Hx of Venous Doppler 03/14/2019    Significant reflux in Left Deep System, No reflux in right lower extremity, No DVT or SVT    Hyperlipidemia     Hypertension     Obesity     S/P PTCA (percutaneous transluminal coronary angioplasty) 03/21/2005    s/p PTCA with 3.5 X 16 TAXUS stent to LAD- follows with Dr Jonh Coats Sleep apnea     had sleep study done 10+ yrs ago- has cpap but does not use it    Thyroid disease     hypothyroid       Past Surgical History:   Procedure Laterality Date    CARDIAC CATHETERIZATION  6/12/08    mild disease mid RCA,  stent to LAD patent,    CARDIAC CATHETERIZATION  3/07    Stent to LAD patent, Diagonal 40-50%, RCA 40-50%    CARDIAC CATHETERIZATION  3/05    COLONOSCOPY  2011    COLONOSCOPY  5/18/16    1 polyp removed, diverticulosis and hemorrhoids noted    CORONARY ANGIOPLASTY WITH STENT PLACEMENT  03/21/2005    PTCA with 3.5 x 16 TAXUS stent to LAD    ENDOSCOPY, COLON, DIAGNOSTIC  2014    egd    HAND SURGERY Right 1997    UT OPEN RX ANKLE DISLOCATN+FIXATN Right 6/3/2019    RIGHT OPEN REDUCTION INTERNAL FIXATION OF DISTAL TIBIA  AND FIBULA performed by Perez Lin MD at [address] [place] [address] History     Socioeconomic History    Marital status:       Spouse name: Not on file    Number of children: 1    Years of education: Not on file    Highest education level: Not on file   Occupational History     Comment: RETIRED   Social Needs    Financial resource strain: Not on file    Food insecurity     Worry: Not on file     Inability: Not on file    Transportation needs     Medical: Not on file     Non-medical: Not on file   Tobacco Use    Smoking status: Former Smoker     Packs/day: 1.00     Years: 10.00     Pack years: 10.00     Types: Cigarettes     Quit date: 1976     Years since quittin.3    Smokeless tobacco: Never Used   Substance and Sexual Activity    Alcohol use: Not Currently     Alcohol/week: 6.0 standard drinks     Types: 6 Cans of beer per week     Comment: caffeine 2 cups of tea a day     Drug use: No    Sexual activity: Never   Lifestyle    Physical activity     Days per week: Not on file     Minutes per session: Not on file    Stress: Not on file   Relationships    Social connections     Talks on phone: Not on file     Gets together: Not on file     Attends Sikhism service: Not on file     Active member of club or organization: Not on file     Attends meetings of clubs or organizations: Not on file     Relationship status: Not on file    Intimate partner violence     Fear of current or ex partner: Not on file     Emotionally abused: Not on file     Physically abused: Not on file     Forced sexual activity: Not on file   Other Topics Concern    Not on file   Social History Narrative    Not on file        Family History   Problem Relation Age of Onset    Cancer Mother         breast ca    Coronary Art Dis Father          MI   [employer] Disease Father     Rheum Arthritis Other     Rheum Arthritis Sister     No Known Problems Brother     Rheum Arthritis Sister        ADVANCE DIRECTIVE: N, <no information>    Vitals:    21 1401   BP: 131/88   Pulse: 101 Resp: 22   SpO2: 98%   Weight: (!) 334 lb 3.2 oz (151.6 kg)   Height: 6' 2\" (1.88 m)     Estimated body mass index is 42.91 kg/m² as calculated from the following:    Height as of this encounter: 6' 2\" (1.88 m). Weight as of this encounter: 334 lb 3.2 oz (151.6 kg). Physical Exam  Constitutional:       General: He is not in acute distress. Appearance: He is well-developed. He is obese. He is not diaphoretic. HENT:      Head: Normocephalic and atraumatic. Nose: Nose normal.   Eyes:      Conjunctiva/sclera: Conjunctivae normal.      Pupils: Pupils are equal, round, and reactive to light. Neck:      Thyroid: No thyromegaly. Cardiovascular:      Rate and Rhythm: Normal rate and regular rhythm. Heart sounds: Normal heart sounds. No murmur. Pulmonary:      Effort: Pulmonary effort is normal.      Breath sounds: Normal breath sounds. Abdominal:      General: Bowel sounds are normal. There is no distension. Palpations: Abdomen is soft. Tenderness: There is no abdominal tenderness. Musculoskeletal:      Right shoulder: He exhibits decreased range of motion and pain. Comments: Positive Apley scratch test, painful arc. Mildly positive empty can test   Lymphadenopathy:      Cervical: No cervical adenopathy. Skin:     General: Skin is warm and dry. Capillary Refill: Capillary refill takes less than 2 seconds. Findings: No rash. Neurological:      Mental Status: He is alert and oriented to person, place, and time. GCS: GCS eye subscore is 4. GCS verbal subscore is 5. GCS motor subscore is 6. Cranial Nerves: No cranial nerve deficit. Sensory: No sensory deficit. Coordination: Coordination normal.      Deep Tendon Reflexes: Reflexes normal.   Psychiatric:         Behavior: Behavior normal.         Thought Content: Thought content normal.         No flowsheet data found.     Lab Results   Component Value Date    CHOL 152 04/14/2021    CHOL 147 screen  Completed    Hepatitis C screen  Completed    Hepatitis A vaccine  Aged Out    Hib vaccine  Aged Out    Meningococcal (ACWY) vaccine  Aged Out       ASSESSMENT/PLAN:  1. Atrial fibrillation, unspecified type (HCC)-denies any chest pain palpitations or other symptoms related to this process. INR today slightly below goal 1.9, however there is concern for 1 possibly missed dose and historically been well controlled. Will resume recheck at his next visit. -     POCT INR  -     warfarin (COUMADIN) 2 MG tablet; Take 1 tablet by mouth three times a week **On MWF.**, Disp-30 tablet, R-1Normal    2. Type 2 diabetes mellitus with hyperosmolarity without coma, with long-term current use of insulin (HCC)-not at goal.  He has been noncompliant with his endocrinology follow-up. I strongly encouraged him to call his endocrinologist today to set up a follow-up since possible. Most recent A1c at 8.4 will. Will need adjustment of his insulin. Patient defers this to endocrinology. 3. Chronic obstructive pulmonary disease, unspecified COPD type ([address] - well controlled; no changes in management at this time. 4. Thyroid disease recent thyroid function at goal.  We will make no changes at this time. Continue Synthroid at current dose. -   -     levothyroxine (SYNTHROID) 50 MCG tablet; Take 1 tablet by mouth Daily, Disp-90 tablet, R-1Normal    5. Essential hypertension - well controlled; no changes in management at this time. 6. Mixed hyperlipidemia- at goal; continue statin without change. 7. Chronic allergic rhinitis- recently worse with seasonal change; continue Zyrtec, but add daily flonase for next few weeks. -     fluticasone (FLONASE) 50 MCG/ACT nasal spray; 2 sprays by Each Nostril route daily, Disp-1 Bottle, R-2Normal    8. Acute pain of right shoulder- exam c/w rotator tendinopathy vs impingement. Daily stretches encouraged, PRN tizanidine and PRN Tramadol at night if impairing sleep.  If fails to improve with these measures in 1-2 weeks, pt will call for ortho consult to discuss possible injections. -     tiZANidine (ZANAFLEX) 4 MG tablet; Take 1 tablet by mouth 3 times daily as needed (shoulder pain), Disp-30 tablet, R-0Normal  -     traMADol (ULTRAM) 50 MG tablet; Take 1 tablet by mouth every 8 hours as needed for Pain for up to 7 days. Intended supply: 5 days. Take lowest dose possible to manage pain, Disp-21 tablet, R-0Normal    Course of treatment, including any medications, possible imaging, referrals, and follow ups discussed with patient. All risks and benefits and possible side effects discussed with patient who agrees to plan of care and verbalizes understanding. All labs and imaging reviewed. Please note this reports has been produced speech recognition software and may contain errors related to that system including errors in grammar, punctuation, and spelling, as well as words and phrases that may be appropriate. If there are any questions or concerns please feel free to contact the dictating provider for clarification. Return in about 3 months (around 7/19/2021). An electronic signature was used to authenticate this note.     --ANKITA Flores - CNP on 4/19/2021 at 2:38 PM

## 2021-05-31 DIAGNOSIS — I10 ESSENTIAL HYPERTENSION: ICD-10-CM

## 2021-06-01 RX ORDER — LOSARTAN POTASSIUM 100 MG/1
TABLET ORAL
Qty: 90 TABLET | Refills: 0 | Status: SHIPPED | OUTPATIENT
Start: 2021-06-01 | End: 2021-07-20 | Stop reason: SDUPTHER

## 2021-06-21 NOTE — PROGRESS NOTES
Pt seen and evaluated, stable no distress. Right distal tib-fib fx with ankle involvement, Vit K given, re-check INR, if < 1.7 then plan for OR today. none

## 2021-06-30 ENCOUNTER — OFFICE VISIT (OUTPATIENT)
Dept: INTERNAL MEDICINE CLINIC | Age: 71
End: 2021-06-30
Payer: COMMERCIAL

## 2021-06-30 VITALS
DIASTOLIC BLOOD PRESSURE: 79 MMHG | HEIGHT: 76 IN | BODY MASS INDEX: 38.36 KG/M2 | RESPIRATION RATE: 22 BRPM | WEIGHT: 315 LBS | HEART RATE: 67 BPM | SYSTOLIC BLOOD PRESSURE: 130 MMHG | OXYGEN SATURATION: 98 %

## 2021-06-30 DIAGNOSIS — R39.12 WEAK URINARY STREAM: ICD-10-CM

## 2021-06-30 DIAGNOSIS — R10.9 ACUTE LEFT FLANK PAIN: Primary | ICD-10-CM

## 2021-06-30 LAB
BILIRUBIN, POC: NORMAL
BLOOD URINE, POC: NORMAL
CLARITY, POC: CLEAR
COLOR, POC: YELLOW
GLUCOSE URINE, POC: NORMAL
KETONES, POC: NORMAL
LEUKOCYTE EST, POC: NORMAL
NITRITE, POC: NORMAL
PH, POC: 6.5
PROTEIN, POC: NORMAL
SPECIFIC GRAVITY, POC: 1.02
UROBILINOGEN, POC: 8

## 2021-06-30 PROCEDURE — 99213 OFFICE O/P EST LOW 20 MIN: CPT | Performed by: NURSE PRACTITIONER

## 2021-06-30 PROCEDURE — 81002 URINALYSIS NONAUTO W/O SCOPE: CPT | Performed by: NURSE PRACTITIONER

## 2021-06-30 RX ORDER — HYDROCODONE BITARTRATE AND ACETAMINOPHEN 5; 325 MG/1; MG/1
1 TABLET ORAL EVERY 8 HOURS PRN
Qty: 21 TABLET | Refills: 0 | Status: SHIPPED | OUTPATIENT
Start: 2021-06-30 | End: 2021-07-07

## 2021-06-30 RX ORDER — DULOXETIN HYDROCHLORIDE 60 MG/1
60 CAPSULE, DELAYED RELEASE ORAL DAILY
Qty: 90 CAPSULE | Refills: 1 | Status: SHIPPED | OUTPATIENT
Start: 2021-06-30 | End: 2022-01-07 | Stop reason: SDUPTHER

## 2021-06-30 ASSESSMENT — ENCOUNTER SYMPTOMS
WHEEZING: 0
SORE THROAT: 0
ABDOMINAL DISTENTION: 0
SINUS PAIN: 0
EYES NEGATIVE: 1
COUGH: 0
CHEST TIGHTNESS: 0
DIARRHEA: 0
COLOR CHANGE: 0
ABDOMINAL PAIN: 0
NAUSEA: 0
SHORTNESS OF BREATH: 0
CONSTIPATION: 0
SINUS PRESSURE: 0
BACK PAIN: 1

## 2021-06-30 NOTE — PROGRESS NOTES
Nose normal.   Eyes:      Conjunctiva/sclera: Conjunctivae normal.      Pupils: Pupils are equal, round, and reactive to light. Neck:      Thyroid: No thyromegaly. Cardiovascular:      Rate and Rhythm: Normal rate and regular rhythm. Heart sounds: Normal heart sounds. No murmur heard. Pulmonary:      Effort: Pulmonary effort is normal.      Breath sounds: Normal breath sounds. Abdominal:      General: Bowel sounds are normal. There is no distension. Palpations: Abdomen is soft. Tenderness: There is no abdominal tenderness. Musculoskeletal:         General: Normal range of motion. Lymphadenopathy:      Cervical: No cervical adenopathy. Skin:     General: Skin is warm and dry. Capillary Refill: Capillary refill takes less than 2 seconds. Findings: No rash. Neurological:      Mental Status: He is alert and oriented to person, place, and time. GCS: GCS eye subscore is 4. GCS verbal subscore is 5. GCS motor subscore is 6. Coordination: Coordination normal.   Psychiatric:         Behavior: Behavior normal.         Thought Content: Thought content normal.         ASSESSMENT:    1. Acute left flank pain    2. Weak urinary stream        PLAN:    Ghislaine Dykes was seen today for lower back pain. Diagnoses and all orders for this visit:    Acute left flank pain- given acute onset of left sided flank pain in addition to weak urinary stream/pressure, I am concerned for nephrolithiasis. Will obtain CT Abd/Pelv WO Con and also provide short course of norco for pain control. Call or RTO or ED if sudden issue urinating.   -     POCT Urinalysis no Micro  -     CT ABDOMEN PELVIS WO CONTRAST Additional Contrast? None; Future  -     HYDROcodone-acetaminophen (NORCO) 5-325 MG per tablet; Take 1 tablet by mouth every 8 hours as needed for Pain for up to 7 days. Intended supply: 5 days.  Take lowest dose possible to manage pain    Weak urinary stream  -     CT ABDOMEN PELVIS WO CONTRAST Additional Contrast? None; Future    Course of treatment, including any medications, possible imaging, referrals, and follow ups discussed with patient. All risks and benefits and possible side effects discussed with patient who agrees to plan of care and verbalizes understanding. All labs and imaging reviewed. No flowsheet data found. No follow-ups on file. Cecily note this reports has been produced speech recognition software and may contain errors related to that system including errors in grammar, punctuation, and spelling, as well as words and phrases that may be appropriate. If there are any questions or concerns please feel free to contact the dictating provider for clarification.

## 2021-07-06 ENCOUNTER — HOSPITAL ENCOUNTER (OUTPATIENT)
Dept: CT IMAGING | Age: 71
Discharge: HOME | End: 2021-07-06
Payer: COMMERCIAL

## 2021-07-06 DIAGNOSIS — R10.9 ACUTE LEFT FLANK PAIN: ICD-10-CM

## 2021-07-06 DIAGNOSIS — R39.12 WEAK URINARY STREAM: ICD-10-CM

## 2021-07-06 PROCEDURE — 74176 CT ABD & PELVIS W/O CONTRAST: CPT

## 2021-07-07 DIAGNOSIS — R39.12 WEAK URINARY STREAM: ICD-10-CM

## 2021-07-07 DIAGNOSIS — R10.9 ACUTE LEFT FLANK PAIN: Primary | ICD-10-CM

## 2021-07-16 DIAGNOSIS — I48.91 ATRIAL FIBRILLATION, UNSPECIFIED TYPE (HCC): ICD-10-CM

## 2021-07-16 RX ORDER — WARFARIN SODIUM 3 MG/1
TABLET ORAL
Qty: 30 TABLET | Refills: 3 | Status: SHIPPED | OUTPATIENT
Start: 2021-07-16 | End: 2022-02-15 | Stop reason: SDUPTHER

## 2021-07-20 ENCOUNTER — OFFICE VISIT (OUTPATIENT)
Dept: INTERNAL MEDICINE CLINIC | Age: 71
End: 2021-07-20
Payer: COMMERCIAL

## 2021-07-20 VITALS
BODY MASS INDEX: 40.43 KG/M2 | HEIGHT: 74 IN | OXYGEN SATURATION: 98 % | SYSTOLIC BLOOD PRESSURE: 132 MMHG | RESPIRATION RATE: 20 BRPM | HEART RATE: 78 BPM | WEIGHT: 315 LBS | DIASTOLIC BLOOD PRESSURE: 82 MMHG

## 2021-07-20 DIAGNOSIS — I10 ESSENTIAL HYPERTENSION: ICD-10-CM

## 2021-07-20 DIAGNOSIS — I48.91 ATRIAL FIBRILLATION, UNSPECIFIED TYPE (HCC): ICD-10-CM

## 2021-07-20 DIAGNOSIS — R10.32 LLQ PAIN: Primary | ICD-10-CM

## 2021-07-20 DIAGNOSIS — Z79.4 TYPE 2 DIABETES MELLITUS WITH HYPEROSMOLARITY WITHOUT COMA, WITH LONG-TERM CURRENT USE OF INSULIN (HCC): ICD-10-CM

## 2021-07-20 DIAGNOSIS — J44.9 CHRONIC OBSTRUCTIVE PULMONARY DISEASE, UNSPECIFIED COPD TYPE (HCC): ICD-10-CM

## 2021-07-20 DIAGNOSIS — E07.9 THYROID DISEASE: ICD-10-CM

## 2021-07-20 DIAGNOSIS — E11.00 TYPE 2 DIABETES MELLITUS WITH HYPEROSMOLARITY WITHOUT COMA, WITH LONG-TERM CURRENT USE OF INSULIN (HCC): ICD-10-CM

## 2021-07-20 DIAGNOSIS — E78.2 MIXED HYPERLIPIDEMIA: ICD-10-CM

## 2021-07-20 DIAGNOSIS — I25.10 CORONARY ARTERY DISEASE INVOLVING NATIVE CORONARY ARTERY OF NATIVE HEART WITHOUT ANGINA PECTORIS: ICD-10-CM

## 2021-07-20 LAB
INTERNATIONAL NORMALIZATION RATIO, POC: 2.2
PROTHROMBIN TIME, POC: 26.1

## 2021-07-20 PROCEDURE — 85610 PROTHROMBIN TIME: CPT | Performed by: NURSE PRACTITIONER

## 2021-07-20 PROCEDURE — 99214 OFFICE O/P EST MOD 30 MIN: CPT | Performed by: NURSE PRACTITIONER

## 2021-07-20 PROCEDURE — 3052F HG A1C>EQUAL 8.0%<EQUAL 9.0%: CPT | Performed by: NURSE PRACTITIONER

## 2021-07-20 RX ORDER — LOSARTAN POTASSIUM 100 MG/1
100 TABLET ORAL DAILY
Qty: 90 TABLET | Refills: 3 | Status: SHIPPED | OUTPATIENT
Start: 2021-07-20 | End: 2021-08-26 | Stop reason: SDUPTHER

## 2021-07-20 RX ORDER — AMOXICILLIN AND CLAVULANATE POTASSIUM 875; 125 MG/1; MG/1
1 TABLET, FILM COATED ORAL 2 TIMES DAILY
Qty: 20 TABLET | Refills: 0 | Status: SHIPPED | OUTPATIENT
Start: 2021-07-20 | End: 2021-07-30

## 2021-07-20 RX ORDER — CARVEDILOL 25 MG/1
25 TABLET ORAL 2 TIMES DAILY
Qty: 90 TABLET | Refills: 3 | Status: SHIPPED | OUTPATIENT
Start: 2021-07-20 | End: 2022-03-08 | Stop reason: SDUPTHER

## 2021-07-20 RX ORDER — LEVOTHYROXINE SODIUM 0.05 MG/1
50 TABLET ORAL DAILY
Qty: 90 TABLET | Refills: 1 | Status: SHIPPED | OUTPATIENT
Start: 2021-07-20 | End: 2022-05-31 | Stop reason: SDUPTHER

## 2021-07-20 ASSESSMENT — ENCOUNTER SYMPTOMS
CHEST TIGHTNESS: 0
SHORTNESS OF BREATH: 0
WHEEZING: 0
NAUSEA: 0
ABDOMINAL PAIN: 1
CONSTIPATION: 0
SORE THROAT: 0
ABDOMINAL DISTENTION: 0
SINUS PRESSURE: 0
COLOR CHANGE: 0
COUGH: 0
DIARRHEA: 0
EYES NEGATIVE: 1
SINUS PAIN: 0

## 2021-07-20 NOTE — PROGRESS NOTES
Lane Gypsy  1950 07/20/21    Chief Complaint   Patient presents with    3 Month Follow-Up    Abdominal Pain     Pt states that he is having pain in his lower abdomin. He is concerned he may be having a diverticulitis flair     Swelling     swelling in both legs with pain sx started 1 month ago        SUBJECTIVE:      3 month follow up: The patient is currently taking all hypertensive medications compliantly without c/o of any side effects. Denies chest pain, dyspnea, edema, palpiations. Blood pressure has been averaging  120-130/80 over the last several months on current regiment. Anticoagulation: Patient here for followup of chronic anticoagulation. Continues to be rate controlled on Digoxin and Diltiazem. Indication: atrial fibrillation  Bleeding Signs/Symptoms:  None  Missed Coumadin Doses:  None  Medication Changes:  no  Dietary Changes:  no    VITAL SIGNS reviewed    PLAN:  Continue current dose; INR at goal today at 2.2    States over the last week approximately he has had  LLQ pain, gradually worsening as well as intermittent constipation. Does have a hx of diverticulitis and states this feels similar to previous flares. Denies any blood in stools or black tarry stools. Patient's COPD well controled on current medications. Patient denies any shortness of breath, wheezing. Able to execute activities of daily living without breathing complications. Continues with minimal MELNAIE demand. Patient continues to be compliant with synthroid regiment for her hypothyroidism. Denies c/o fatigue, weight gain or loss, heat or cold intolerance, diarrhea, or other GI symptoms. Lab Results   Component Value Date    TSH 1.66 04/03/2018     Patient compliant with all current medications for diabetes. Blood sugars have been averaging around 150-200, and the patient reports checking their blood sugar 2-3 time daily.  Patient has had no episodes of significant hypoglycemia, fainting, dizziness, or loss of consciousness. Has not followed with Dr Joni Wiley in several months, reports difficulty conversating with him. Patient denies any chest pain, shortness of breath, myalgias, Patient is tolerating cholesterol medications without difficulty. Lab Results   Component Value Date    LDLCALC 35 01/15/2020    LDLDIRECT 90 04/14/2021       Review of Systems   Constitutional: Negative for activity change, appetite change, fatigue and fever. HENT: Negative for congestion, sinus pressure, sinus pain and sore throat. Eyes: Negative. Respiratory: Negative for cough, chest tightness, shortness of breath and wheezing. Cardiovascular: Negative for chest pain and palpitations. Gastrointestinal: Positive for abdominal pain. Negative for abdominal distention, constipation, diarrhea and nausea. Genitourinary: Negative for difficulty urinating, dysuria, flank pain, frequency and hematuria. Musculoskeletal: Negative for arthralgias, gait problem, joint swelling and myalgias. Skin: Negative for color change and rash. Neurological: Negative for dizziness, light-headedness and numbness. Hematological: Negative. Psychiatric/Behavioral: Negative. OBJECTIVE:    /82   Pulse 78   Resp 20   Ht 6' 2\" (1.88 m)   Wt (!) 333 lb 6.4 oz (151.2 kg)   SpO2 98%   BMI 42.81 kg/m²     Physical Exam  Constitutional:       General: He is not in acute distress. Appearance: He is well-developed. He is obese. He is not diaphoretic. HENT:      Head: Normocephalic and atraumatic. Nose: Nose normal.   Eyes:      Conjunctiva/sclera: Conjunctivae normal.      Pupils: Pupils are equal, round, and reactive to light. Neck:      Thyroid: No thyromegaly. Cardiovascular:      Rate and Rhythm: Normal rate and regular rhythm. Heart sounds: Normal heart sounds. No murmur heard. Pulmonary:      Effort: Pulmonary effort is normal.      Breath sounds: Normal breath sounds.    Abdominal:      General: Bowel sounds are normal. There is no distension. Palpations: Abdomen is soft. Tenderness: There is abdominal tenderness (mild) in the left lower quadrant. Musculoskeletal:         General: Normal range of motion. Lymphadenopathy:      Cervical: No cervical adenopathy. Skin:     General: Skin is warm and dry. Capillary Refill: Capillary refill takes less than 2 seconds. Findings: No rash. Neurological:      Mental Status: He is alert and oriented to person, place, and time. GCS: GCS eye subscore is 4. GCS verbal subscore is 5. GCS motor subscore is 6. Cranial Nerves: No cranial nerve deficit. Sensory: No sensory deficit. Coordination: Coordination normal.      Deep Tendon Reflexes: Reflexes normal.   Psychiatric:         Behavior: Behavior normal.         Thought Content: Thought content normal.         ASSESSMENT:    1. LLQ pain    2. Essential hypertension    3. Atrial fibrillation, unspecified type ([address]    4. Type 2 diabetes mellitus with hyperosmolarity without coma, with long-term current use of insulin ([address]    5. Coronary artery disease involving native coronary artery of native heart without angina pectoris    6. Chronic obstructive pulmonary disease, unspecified COPD type ([address]    7. Mixed hyperlipidemia    8. Thyroid disease        PLAN:    Letty Carreon was seen today for 3 month follow-up, abdominal pain and swelling. Diagnoses and all orders for this visit:    LLQ pain- exam and presentation c/w previous diverticulitis flares; will start Augmentin below and if issues acutely worsen/fails to improve, then RTO.   -     amoxicillin-clavulanate (AUGMENTIN) 875-125 MG per tablet; Take 1 tablet by mouth 2 times daily for 10 days    Essential hypertension - well controlled; no changes in management at this time. Check labs as below. -     losartan (COZAAR) 100 MG tablet; Take 1 tablet by mouth daily  -     Comprehensive Metabolic Panel;  Future  -     Hemoglobin

## 2021-08-11 DIAGNOSIS — I48.91 ATRIAL FIBRILLATION, UNSPECIFIED TYPE (HCC): ICD-10-CM

## 2021-08-11 DIAGNOSIS — I10 ESSENTIAL HYPERTENSION: ICD-10-CM

## 2021-08-11 RX ORDER — DILTIAZEM HYDROCHLORIDE 120 MG/1
120 TABLET, FILM COATED ORAL DAILY
Qty: 90 TABLET | Refills: 1 | Status: SHIPPED | OUTPATIENT
Start: 2021-08-11 | End: 2022-02-15 | Stop reason: SDUPTHER

## 2021-08-11 RX ORDER — FUROSEMIDE 40 MG/1
40 TABLET ORAL NIGHTLY
Qty: 30 TABLET | Refills: 3 | Status: SHIPPED | OUTPATIENT
Start: 2021-08-11 | End: 2021-11-29

## 2021-08-24 ENCOUNTER — VIRTUAL VISIT (OUTPATIENT)
Dept: INTERNAL MEDICINE CLINIC | Age: 71
End: 2021-08-24
Payer: COMMERCIAL

## 2021-08-24 DIAGNOSIS — Z00.00 ROUTINE GENERAL MEDICAL EXAMINATION AT A HEALTH CARE FACILITY: Primary | ICD-10-CM

## 2021-08-24 PROCEDURE — G0438 PPPS, INITIAL VISIT: HCPCS | Performed by: NURSE PRACTITIONER

## 2021-08-24 SDOH — ECONOMIC STABILITY: FOOD INSECURITY: WITHIN THE PAST 12 MONTHS, THE FOOD YOU BOUGHT JUST DIDN'T LAST AND YOU DIDN'T HAVE MONEY TO GET MORE.: NEVER TRUE

## 2021-08-24 SDOH — ECONOMIC STABILITY: FOOD INSECURITY: WITHIN THE PAST 12 MONTHS, YOU WORRIED THAT YOUR FOOD WOULD RUN OUT BEFORE YOU GOT MONEY TO BUY MORE.: NEVER TRUE

## 2021-08-24 ASSESSMENT — LIFESTYLE VARIABLES
HOW OFTEN DO YOU HAVE A DRINK CONTAINING ALCOHOL: 3
AUDIT-C TOTAL SCORE: 3
HAVE YOU OR SOMEONE ELSE BEEN INJURED AS A RESULT OF YOUR DRINKING: 0
HOW OFTEN DURING THE LAST YEAR HAVE YOU NEEDED AN ALCOHOLIC DRINK FIRST THING IN THE MORNING TO GET YOURSELF GOING AFTER A NIGHT OF HEAVY DRINKING: 0
HOW OFTEN DURING THE LAST YEAR HAVE YOU BEEN UNABLE TO REMEMBER WHAT HAPPENED THE NIGHT BEFORE BECAUSE YOU HAD BEEN DRINKING: 0
HOW MANY STANDARD DRINKS CONTAINING ALCOHOL DO YOU HAVE ON A TYPICAL DAY: 0
HOW OFTEN DO YOU HAVE SIX OR MORE DRINKS ON ONE OCCASION: 0
HAS A RELATIVE, FRIEND, DOCTOR, OR ANOTHER HEALTH PROFESSIONAL EXPRESSED CONCERN ABOUT YOUR DRINKING OR SUGGESTED YOU CUT DOWN: 0
HOW OFTEN DURING THE LAST YEAR HAVE YOU FOUND THAT YOU WERE NOT ABLE TO STOP DRINKING ONCE YOU HAD STARTED: 0
HOW OFTEN DURING THE LAST YEAR HAVE YOU HAD A FEELING OF GUILT OR REMORSE AFTER DRINKING: 0
AUDIT TOTAL SCORE: 3
HOW OFTEN DURING THE LAST YEAR HAVE YOU FAILED TO DO WHAT WAS NORMALLY EXPECTED FROM YOU BECAUSE OF DRINKING: 0

## 2021-08-24 ASSESSMENT — PATIENT HEALTH QUESTIONNAIRE - PHQ9
SUM OF ALL RESPONSES TO PHQ QUESTIONS 1-9: 0
2. FEELING DOWN, DEPRESSED OR HOPELESS: 0
1. LITTLE INTEREST OR PLEASURE IN DOING THINGS: 0
SUM OF ALL RESPONSES TO PHQ9 QUESTIONS 1 & 2: 0
SUM OF ALL RESPONSES TO PHQ QUESTIONS 1-9: 0
SUM OF ALL RESPONSES TO PHQ QUESTIONS 1-9: 0

## 2021-08-24 ASSESSMENT — SOCIAL DETERMINANTS OF HEALTH (SDOH): HOW HARD IS IT FOR YOU TO PAY FOR THE VERY BASICS LIKE FOOD, HOUSING, MEDICAL CARE, AND HEATING?: NOT HARD AT ALL

## 2021-08-24 NOTE — PATIENT INSTRUCTIONS
Personalized Preventive Plan for Ashley Roper - 8/24/2021  Medicare offers a range of preventive health benefits. Some of the tests and screenings are paid in full while other may be subject to a deductible, co-insurance, and/or copay. Some of these benefits include a comprehensive review of your medical history including lifestyle, illnesses that may run in your family, and various assessments and screenings as appropriate. After reviewing your medical record and screening and assessments performed today your provider may have ordered immunizations, labs, imaging, and/or referrals for you. A list of these orders (if applicable) as well as your Preventive Care list are included within your After Visit Summary for your review. Other Preventive Recommendations:    · A preventive eye exam performed by an eye specialist is recommended every 1-2 years to screen for glaucoma; cataracts, macular degeneration, and other eye disorders. · A preventive dental visit is recommended every 6 months. · Try to get at least 150 minutes of exercise per week or 10,000 steps per day on a pedometer . · Order or download the FREE \"Exercise & Physical Activity: Your Everyday Guide\" from [employer]. Call 0-681.796.1845 or search [employer] online. · You need 7396-2317 mg of calcium and 0482-2053 IU of vitamin D per day. It is possible to meet your calcium requirement with diet alone, but a vitamin D supplement is usually necessary to meet this goal.  · When exposed to the sun, use a sunscreen that protects against both UVA and UVB radiation with an SPF of 30 or greater. Reapply every 2 to 3 hours or after sweating, drying off with a towel, or swimming. · Always wear a seat belt when traveling in a car. Always wear a helmet when riding a bicycle or motorcycle.

## 2021-08-24 NOTE — PROGRESS NOTES
RCA 40-50%    CARDIAC CATHETERIZATION  3/05    COLONOSCOPY  2011    COLONOSCOPY  5/18/16    1 polyp removed, diverticulosis and hemorrhoids noted    CORONARY ANGIOPLASTY WITH STENT PLACEMENT  03/21/2005    PTCA with 3.5 x 16 TAXUS stent to LAD    ENDOSCOPY, COLON, DIAGNOSTIC  2014    egd    HAND SURGERY Right 1997    MI OPEN RX ANKLE DISLOCATN+FIXATN Right 6/3/2019    RIGHT OPEN REDUCTION INTERNAL FIXATION OF DISTAL TIBIA  AND FIBULA performed by Geneva Lowery [facility] at [facility] OR         Family History   Problem Relation Age of Onset    Cancer Mother         breast ca    Coronary Art Dis Father          MI   [facility] Heart Disease Father     Rheum Arthritis Other     Rheum Arthritis Sister     No Known Problems Brother     Rheum Arthritis Sister        CareTeam (Including outside providers/suppliers regularly involved in providing care):   Patient Care Team:  ANKITA Garrido CNP as PCP - General (Internal Medicine)  ANKITA Garrido CNP as PCP - [facility] Empaneled Provider  Preston Allen MD as Consulting Physician (Cardiology)    Wt Readings from Last 3 Encounters:   07/20/21 (!) 333 lb 6.4 oz (151.2 kg)   06/30/21 (!) 344 lb (156 kg)   04/19/21 (!) 334 lb 3.2 oz (151.6 kg)     There were no vitals filed for this visit. There is no height or weight on file to calculate BMI. Based upon direct observation of the patient, evaluation of cognition reveals recent and remote memory intact. Patient's complete Health Risk Assessment and screening values have been reviewed and are found in Flowsheets. The following problems were reviewed today and where indicated follow up appointments were made and/or referrals ordered. Positive Risk Factor Screenings with Interventions:            General Health and ACP:  General  In general, how would you say your health is?: Good  In the past 7 days, have you experienced any of the following?  New or Increased Pain, New or Increased Fatigue, Loneliness, Social Isolation, credentialed to provide care. Total time spent for this encounter: Not billed by time    --Macie Hale LPN on 0/69/4627 at 6:12 AM    An electronic signature was used to authenticate this note.

## 2021-08-26 DIAGNOSIS — I10 ESSENTIAL HYPERTENSION: ICD-10-CM

## 2021-08-26 RX ORDER — LOSARTAN POTASSIUM 100 MG/1
100 TABLET ORAL DAILY
Qty: 90 TABLET | Refills: 3 | Status: ON HOLD | OUTPATIENT
Start: 2021-08-26 | End: 2022-09-24 | Stop reason: HOSPADM

## 2021-09-03 DIAGNOSIS — E78.2 MIXED HYPERLIPIDEMIA: ICD-10-CM

## 2021-09-03 RX ORDER — PRAVASTATIN SODIUM 80 MG/1
TABLET ORAL
Qty: 90 TABLET | Refills: 1 | Status: SHIPPED | OUTPATIENT
Start: 2021-09-03 | End: 2022-03-14 | Stop reason: SDUPTHER

## 2021-09-14 RX ORDER — ALLOPURINOL 100 MG/1
TABLET ORAL
Qty: 180 TABLET | Refills: 1 | Status: SHIPPED | OUTPATIENT
Start: 2021-09-14 | End: 2022-04-11

## 2021-09-21 ENCOUNTER — TELEPHONE (OUTPATIENT)
Dept: INTERNAL MEDICINE CLINIC | Age: 71
End: 2021-09-21

## 2021-09-21 DIAGNOSIS — M54.50 ACUTE RIGHT-SIDED LOW BACK PAIN WITHOUT SCIATICA: Primary | ICD-10-CM

## 2021-09-21 RX ORDER — HYDROCODONE BITARTRATE AND ACETAMINOPHEN 5; 325 MG/1; MG/1
1 TABLET ORAL EVERY 8 HOURS PRN
Qty: 15 TABLET | Refills: 0 | Status: SHIPPED | OUTPATIENT
Start: 2021-09-21 | End: 2021-09-26

## 2021-09-21 NOTE — TELEPHONE ENCOUNTER
Pt called and stated he is currently experiencing lower back pain. He stated that he had a very busy weekend and isnt sure if he pulled a muscle or injured himself in some way. He would like to know if he could get a prescription of pain medication to help. Please advise.

## 2021-10-18 ENCOUNTER — HOSPITAL ENCOUNTER (OUTPATIENT)
Age: 71
Discharge: HOME | End: 2021-10-18
Payer: COMMERCIAL

## 2021-10-18 DIAGNOSIS — I10 ESSENTIAL HYPERTENSION: ICD-10-CM

## 2021-10-18 DIAGNOSIS — E07.9 THYROID DISEASE: ICD-10-CM

## 2021-10-18 DIAGNOSIS — Z79.4 TYPE 2 DIABETES MELLITUS WITH HYPEROSMOLARITY WITHOUT COMA, WITH LONG-TERM CURRENT USE OF INSULIN (HCC): ICD-10-CM

## 2021-10-18 DIAGNOSIS — E11.00 TYPE 2 DIABETES MELLITUS WITH HYPEROSMOLARITY WITHOUT COMA, WITH LONG-TERM CURRENT USE OF INSULIN (HCC): ICD-10-CM

## 2021-10-18 DIAGNOSIS — E78.2 MIXED HYPERLIPIDEMIA: ICD-10-CM

## 2021-10-18 LAB
ALBUMIN SERPL-MCNC: 4.4 GM/DL (ref 3.4–5)
ALP BLD-CCNC: 129 IU/L (ref 40–128)
ALT SERPL-CCNC: 18 U/L (ref 10–40)
ANION GAP SERPL CALCULATED.3IONS-SCNC: 10 MMOL/L (ref 4–16)
AST SERPL-CCNC: 18 IU/L (ref 15–37)
BILIRUB SERPL-MCNC: 0.6 MG/DL (ref 0–1)
BUN BLDV-MCNC: 11 MG/DL (ref 6–23)
CALCIUM SERPL-MCNC: 9.5 MG/DL (ref 8.3–10.6)
CHLORIDE BLD-SCNC: 104 MMOL/L (ref 99–110)
CHOLESTEROL, FASTING: 161 MG/DL
CO2: 28 MMOL/L (ref 21–32)
CREAT SERPL-MCNC: 1 MG/DL (ref 0.9–1.3)
CREATININE URINE: 123 MG/DL (ref 39–259)
ESTIMATED AVERAGE GLUCOSE: 171 MG/DL
GFR AFRICAN AMERICAN: >60 ML/MIN/1.73M2
GFR NON-AFRICAN AMERICAN: >60 ML/MIN/1.73M2
GLUCOSE BLD-MCNC: 52 MG/DL (ref 70–99)
HBA1C MFR BLD: 7.6 % (ref 4.2–6.3)
HDLC SERPL-MCNC: 41 MG/DL
LDL CHOLESTEROL DIRECT: 94 MG/DL
MICROALBUMIN/CREAT 24H UR: 31.2 MG/DL
MICROALBUMIN/CREAT UR-RTO: 253.7 MG/G CREAT (ref 0–30)
POTASSIUM SERPL-SCNC: 4.1 MMOL/L (ref 3.5–5.1)
SODIUM BLD-SCNC: 142 MMOL/L (ref 135–145)
TOTAL PROTEIN: 7.7 GM/DL (ref 6.4–8.2)
TRIGLYCERIDE, FASTING: 131 MG/DL

## 2021-10-18 PROCEDURE — 83036 HEMOGLOBIN GLYCOSYLATED A1C: CPT

## 2021-10-18 PROCEDURE — 84443 ASSAY THYROID STIM HORMONE: CPT

## 2021-10-18 PROCEDURE — 80053 COMPREHEN METABOLIC PANEL: CPT

## 2021-10-18 PROCEDURE — 82043 UR ALBUMIN QUANTITATIVE: CPT

## 2021-10-18 PROCEDURE — 82570 ASSAY OF URINE CREATININE: CPT

## 2021-10-18 PROCEDURE — 80061 LIPID PANEL: CPT

## 2021-10-18 PROCEDURE — 36415 COLL VENOUS BLD VENIPUNCTURE: CPT

## 2021-10-19 LAB — TSH HIGH SENSITIVITY: 3.93 UIU/ML (ref 0.27–4.2)

## 2021-10-27 ENCOUNTER — OFFICE VISIT (OUTPATIENT)
Dept: INTERNAL MEDICINE CLINIC | Age: 71
End: 2021-10-27
Payer: COMMERCIAL

## 2021-10-27 VITALS
OXYGEN SATURATION: 98 % | BODY MASS INDEX: 40.43 KG/M2 | RESPIRATION RATE: 22 BRPM | HEIGHT: 74 IN | SYSTOLIC BLOOD PRESSURE: 131 MMHG | HEART RATE: 73 BPM | WEIGHT: 315 LBS | DIASTOLIC BLOOD PRESSURE: 79 MMHG

## 2021-10-27 DIAGNOSIS — G89.29 CHRONIC MIDLINE LOW BACK PAIN WITHOUT SCIATICA: ICD-10-CM

## 2021-10-27 DIAGNOSIS — I25.10 CORONARY ARTERY DISEASE INVOLVING NATIVE CORONARY ARTERY OF NATIVE HEART WITHOUT ANGINA PECTORIS: ICD-10-CM

## 2021-10-27 DIAGNOSIS — E11.00 TYPE 2 DIABETES MELLITUS WITH HYPEROSMOLARITY WITHOUT COMA, WITH LONG-TERM CURRENT USE OF INSULIN (HCC): ICD-10-CM

## 2021-10-27 DIAGNOSIS — E07.9 THYROID DISEASE: ICD-10-CM

## 2021-10-27 DIAGNOSIS — E78.2 MIXED HYPERLIPIDEMIA: ICD-10-CM

## 2021-10-27 DIAGNOSIS — Z23 NEED FOR IMMUNIZATION AGAINST INFLUENZA: ICD-10-CM

## 2021-10-27 DIAGNOSIS — I48.91 ATRIAL FIBRILLATION, UNSPECIFIED TYPE (HCC): Primary | ICD-10-CM

## 2021-10-27 DIAGNOSIS — Z79.4 TYPE 2 DIABETES MELLITUS WITH HYPEROSMOLARITY WITHOUT COMA, WITH LONG-TERM CURRENT USE OF INSULIN (HCC): ICD-10-CM

## 2021-10-27 DIAGNOSIS — M54.50 CHRONIC MIDLINE LOW BACK PAIN WITHOUT SCIATICA: ICD-10-CM

## 2021-10-27 LAB
INTERNATIONAL NORMALIZATION RATIO, POC: 2.5
PROTHROMBIN TIME, POC: 29.7

## 2021-10-27 PROCEDURE — 99214 OFFICE O/P EST MOD 30 MIN: CPT | Performed by: NURSE PRACTITIONER

## 2021-10-27 PROCEDURE — 90694 VACC AIIV4 NO PRSRV 0.5ML IM: CPT | Performed by: NURSE PRACTITIONER

## 2021-10-27 PROCEDURE — 85610 PROTHROMBIN TIME: CPT | Performed by: NURSE PRACTITIONER

## 2021-10-27 PROCEDURE — G0008 ADMIN INFLUENZA VIRUS VAC: HCPCS | Performed by: NURSE PRACTITIONER

## 2021-10-27 PROCEDURE — 3051F HG A1C>EQUAL 7.0%<8.0%: CPT | Performed by: NURSE PRACTITIONER

## 2021-10-27 RX ORDER — EMPAGLIFLOZIN 25 MG/1
1 TABLET, FILM COATED ORAL DAILY
Qty: 30 TABLET | Refills: 2 | Status: SHIPPED | OUTPATIENT
Start: 2021-10-27 | End: 2022-02-15

## 2021-10-27 RX ORDER — GABAPENTIN 300 MG/1
300 CAPSULE ORAL 2 TIMES DAILY
Qty: 60 CAPSULE | Refills: 2 | Status: SHIPPED | OUTPATIENT
Start: 2021-10-27 | End: 2022-02-11 | Stop reason: SDUPTHER

## 2021-10-27 ASSESSMENT — ENCOUNTER SYMPTOMS
EYES NEGATIVE: 1
ABDOMINAL DISTENTION: 0
SHORTNESS OF BREATH: 0
COUGH: 0
NAUSEA: 0
CHEST TIGHTNESS: 0
CONSTIPATION: 0
SORE THROAT: 0
BACK PAIN: 1
ABDOMINAL PAIN: 0
SINUS PAIN: 0
SINUS PRESSURE: 0
DIARRHEA: 0
COLOR CHANGE: 0
WHEEZING: 0

## 2021-10-27 NOTE — PROGRESS NOTES
Ulises Foreman  1950  10/27/21    Chief Complaint   Patient presents with    3 Month Follow-Up       SUBJECTIVE:      3 month follow up: Anticoagulation: Patient here for followup of chronic anticoagulation. Indication: atrial fibrillation  Bleeding Signs/Symptoms:  None  Missed Coumadin Doses:  None  Medication Changes:  no  Dietary Changes:  no    Patient compliant with all current medications for diabetes. Blood sugars have been averaging around 150-200, and the patient reports checking their blood sugar 3 time daily. Patient has had no episodes of significant hypoglycemia, fainting, dizziness, or loss of consciousness. Continues on Metformin 1000 mg BID, Sliding scale TID with meals and 80 units of N each evening/30 each morning. Patient denies any chest pain, shortness of breath, myalgias, Patient is tolerating cholesterol medications without difficulty. Lab Results   Component Value Date    LDLCALC 35 01/15/2020    LDLDIRECT 94 10/18/2021     Patient continues to be compliant with synthroid regiment for her hypothyroidism. Denies c/o fatigue, weight gain or loss, heat or cold intolerance, diarrhea, or other GI symptoms. Lab Results   Component Value Date    TSH 1.66 04/03/2018     Continues having ongoing issues with CLBP over the last several months to a year. Denies any known injuries, falls, or trauma. No loss of bowel/bladder control or other red flag warning signs. Norco helps, but OTC NSAIDs have provided no benefit.     -wants flu shot today    Review of Systems   Constitutional: Negative for activity change, appetite change, fatigue and fever. HENT: Negative for congestion, sinus pressure, sinus pain and sore throat. Eyes: Negative. Respiratory: Negative for cough, chest tightness, shortness of breath and wheezing. Cardiovascular: Negative for chest pain and palpitations.    Gastrointestinal: Negative for abdominal distention, abdominal pain, constipation, diarrhea and nausea. Genitourinary: Negative for difficulty urinating, dysuria, flank pain, frequency and hematuria. Musculoskeletal: Positive for back pain. Negative for arthralgias, gait problem, joint swelling and myalgias. Skin: Negative for color change and rash. Neurological: Negative for dizziness, light-headedness and numbness. Hematological: Negative. Psychiatric/Behavioral: Negative. OBJECTIVE:    /79   Pulse 73   Resp 22   Ht 6' 2\" (1.88 m)   Wt (!) 338 lb (153.3 kg)   SpO2 98%   BMI 43.40 kg/m²     Physical Exam  Constitutional:       General: He is not in acute distress. Appearance: He is well-developed. He is obese. He is not diaphoretic. HENT:      Head: Normocephalic and atraumatic. Nose: Nose normal.   Eyes:      Conjunctiva/sclera: Conjunctivae normal.      Pupils: Pupils are equal, round, and reactive to light. Neck:      Thyroid: No thyromegaly. Cardiovascular:      Rate and Rhythm: Normal rate and regular rhythm. Heart sounds: Normal heart sounds. No murmur heard. Pulmonary:      Effort: Pulmonary effort is normal.      Breath sounds: Normal breath sounds. Abdominal:      General: Bowel sounds are normal. There is no distension. Palpations: Abdomen is soft. Tenderness: There is no abdominal tenderness. Musculoskeletal:         General: Normal range of motion. Right foot: Normal range of motion. No deformity. Left foot: Normal range of motion. No deformity. Feet:      Right foot:      Protective Sensation: 6 sites tested. 5 sites sensed. Left foot:      Protective Sensation: 6 sites tested. 5 sites sensed. Lymphadenopathy:      Cervical: No cervical adenopathy. Skin:     General: Skin is warm and dry. Capillary Refill: Capillary refill takes less than 2 seconds. Findings: No rash. Neurological:      Mental Status: He is alert and oriented to person, place, and time. GCS: GCS eye subscore is 4.  GCS verbal subscore is 5. GCS motor subscore is 6. Coordination: Coordination normal.   Psychiatric:         Behavior: Behavior normal.         Thought Content: Thought content normal.         ASSESSMENT:    1. Atrial fibrillation, unspecified type ([address]    2. Coronary artery disease involving native coronary artery of native heart without angina pectoris    3. Type 2 diabetes mellitus with hyperosmolarity without coma, with long-term current use of insulin ([address]    4. Mixed hyperlipidemia    5. Thyroid disease    6. Chronic midline low back pain without sciatica    7. Need for immunization against influenza        PLAN:    Perir Moulton was seen today for 3 month follow-up. Diagnoses and all orders for this visit:    Atrial fibrillation, unspecified type ([address]- INR remains at goal, today at 2.5. No changes needed at this time. Continue Coumadin at current dose. Rate controlled. Follow otherwise as per cardiology. -     POCT INR    Coronary artery disease involving native coronary artery of native heart without angina pectoris- BP, CHOl under control. Working on better glycemic control (see below). On statin, AC, BB.   -     Comprehensive Metabolic Panel; Future  -     Hemoglobin A1C; Future  -     Lipid, Fasting; Future    Type 2 diabetes mellitus with hyperosmolarity without coma, with long-term current use of insulin ([address]- has continued to make significant progress over the last year, but A1C still about 1 point over goal. Given CAD, I do feel Sofia Emanuel would be a good option. 1 month of samples provided and assuming he tolerates without issue, can continue to fill as below. -     empagliflozin (JARDIANCE) 25 MG tablet; Take 1 tablet by mouth daily  -      DIABETES FOOT EXAM  -     Comprehensive Metabolic Panel; Future  -     Hemoglobin A1C; Future  -     Lipid, Fasting; Future    Mixed hyperlipidemia- at goal; continue statin; recheck lipid panel and hepatic function again in 3 months.    -     Comprehensive Metabolic Panel; Future  -     Hemoglobin A1C; Future  -     Lipid, Fasting; Future    Thyroid disease- at goal; recheck TSH in 3 months.   -     TSH with Reflex; Future    Chronic midline low back pain without sciatica- check XR given chronicity; also given DM and neuropathic pain in feet, will start lower dose Gabapentin as below. -     XR LUMBAR SPINE (MIN 4 VIEWS); Future  -     gabapentin (NEURONTIN) 300 MG capsule; Take 1 capsule by mouth 2 times daily for 30 days. Intended supply: 30 days    Need for immunization against influenza- updated today. -     INFLUENZA, QUADV, ADJUVANTED, 72 YRS =, IM, PF, PREFILL SYR, 0.5ML (FLUAD)    Course of treatment, including any medications, possible imaging, referrals, and follow ups discussed with patient. All risks and benefits and possible side effects discussed with patient who agrees to plan of care and verbalizes understanding. All labs and imaging reviewed. No flowsheet data found. Return in about 3 months (around 1/27/2022). Cecily note this reports has been produced speech recognition software and may contain errors related to that system including errors in grammar, punctuation, and spelling, as well as words and phrases that may be appropriate. If there are any questions or concerns please feel free to contact the dictating provider for clarification.

## 2021-11-18 ENCOUNTER — OFFICE VISIT (OUTPATIENT)
Dept: CARDIOLOGY CLINIC | Age: 71
End: 2021-11-18
Payer: COMMERCIAL

## 2021-11-18 VITALS
DIASTOLIC BLOOD PRESSURE: 90 MMHG | WEIGHT: 315 LBS | BODY MASS INDEX: 40.43 KG/M2 | HEIGHT: 74 IN | SYSTOLIC BLOOD PRESSURE: 162 MMHG

## 2021-11-18 DIAGNOSIS — I48.91 ATRIAL FIBRILLATION, UNSPECIFIED TYPE (HCC): Primary | ICD-10-CM

## 2021-11-18 PROCEDURE — 99214 OFFICE O/P EST MOD 30 MIN: CPT | Performed by: INTERNAL MEDICINE

## 2021-11-18 NOTE — PATIENT INSTRUCTIONS
Please be informed that if you contact our office outside of normal business hours the physician on call cannot help with any scheduling or rescheduling issues, procedure instruction questions or any type of medication issue. We advise you for any urgent/emergency that you go to the nearest emergency room! PLEASE CALL OUR OFFICE DURING NORMAL BUSINESS HOURS    Monday - Friday   8 am to 5 pm    [place]: [phone]    [place]:  [phone]  **It is YOUR responsibilty to bring medication bottles and/or updated medication list to [address] [place] [address].  This will allow us to better serve you and all your healthcare needs**

## 2021-11-18 NOTE — LETTER
Horald [address]  1950  [id number]    Have you had any Chest Pain that is not new? - No  If Yes DO EKG - How does it feel -    How long does the pain last -      How long have you been having the pain -    Did you take a    And did it relieve the pain -      DO EKG IF: Patient has a Heart Rate above 100 or below 40     CAD (Coronary Artery Disease) patient should have one on file every 6 months        Have you had any Shortness of Breath - Yes  If Yes - When on exertion    Have you had any dizziness - No  If Yes DO ORTHOSTATIC BP - when do you feel dizzy    How long does it last .        Sitting wait 5 minutes do supine (laying down) wait 5 minutes then do standing - log each in \"vitals\" area in Epic   Be sure to ask what symptoms they are having if they get dizzy while completing ortho stats such as: room spinning, nausea, etc.    Have you had any palpitations that are not new? - no  If Yes DO EKG - Do you feel your heart   How long does it last - . Is the patient on any of the following medications -   If Yes DO EKG - Needs done every 6 months    Do you have any edema - swelling in bilateral the same as before    If Yes - CHECK TO SEE IF THE EDEMA IS PITTING  How long have they been having edema -   If Yes - Have they worn compression stockings   If they have worn compression stockings      Vein \"LEG PROBLEM Questionnaire\"  1. Do you have prominent leg veins?  no   2. Do you have any skin discoloration? Yes  3. Do you have any healed or active sores? No  4. Do you have swelling of the legs? Yes  5. Do you have a family history of varicose veins? No  6. Does your profession involve pro-longed        standing or heavy lifting? 7. Have you been fighting overweight problems? 8. Do you have restless legs? 9. Do you have any night time cramps?     10. Do you have any of the following in your legs:             When did you have your last labs drawn   Where did you have them done   What doctor ordered     If we do not have these labs you are retrieve these labs for these providers! Do you have a surgery or procedure scheduled in the near future - No  If Yes- DO EKG  If Yes - Who is the surgery with?    Phone number of physician   Fax number of physician   Type of surgery   GIVE THIS INFORMATION TO EMILEE RODRIGUEZ     Ask patient if they want to sign up for MyChart if they are not already signed up     Check to see if we have an E-MAIL on file for the patient     Check medication list thoroughly!!! AND RECONCILE OUTSIDE MEDICATIONS  If dose has changed change the entire order not just the [place] At check out add to every patient's \"wrap up\" the following dot phrase AFTERHOURSEDUCATION and ensure we explain this to our patients

## 2021-11-18 NOTE — PROGRESS NOTES
Hina Nascimento  is a  Established patient  ,80 y.o.   male here for evaluation of the following chief complaint(s):    cad        SUBJECTIVE/OBJECTIVE:  HPI : h/o  Cad, htn, hyperlipidimea, DM now here  Has no cardiac complains    Review of Systems    neg    There were no vitals filed for this visit. There were no vitals taken for this visit. No flowsheet data found. Wt Readings from Last 3 Encounters:   10/27/21 (!) 338 lb (153.3 kg)   07/20/21 (!) 333 lb 6.4 oz (151.2 kg)   06/30/21 (!) 344 lb (156 kg)     There is no height or weight on file to calculate BMI. Physical Exam     Neck: JVD      Lungs : clear    Cardio : Si and S2 audilble      Ext: edema      All pertinent data reviewed      Meds : reviewed         Tests ordered        ASSESSMENT/PLAN:               -     CORONARY ARTERY DISEASE:  asymptomatic     All available  tests in chart reviewed. Management discussed . Testing ordered  no                                 -  Hypertension: Patients blood pressure is normal. Patient is advised about low sodium diet. Present medical regimen will not be changed. -   DIABETES MELLITUS: Available pertinent lab data reviewed   and  patient was given dietary advice . Advised to check blood glucose level on a regular basis. -   Changes  in medicines made: No                - Atrial fibrillation, pt is not compliant with meds. Patient does not have symptoms from atrial fibrillation                  -  LIPID MANAGEMENT:  Importance of lipid levels discussed with patient   and patient was given dietary advice. NCEP- ATP III guidelines reviewed with patient. -   Changes  in medicines made: No                         Mortality from the morbid obesity is very high: There is no height or weight on file to calculate BMI. An electronic signature was used to authenticate this note.     --Keegan Hdez MD

## 2021-11-30 DIAGNOSIS — J30.9 CHRONIC ALLERGIC RHINITIS: ICD-10-CM

## 2021-11-30 DIAGNOSIS — I10 ESSENTIAL HYPERTENSION: ICD-10-CM

## 2021-12-01 DIAGNOSIS — J30.9 CHRONIC ALLERGIC RHINITIS: ICD-10-CM

## 2021-12-01 DIAGNOSIS — I10 ESSENTIAL HYPERTENSION: ICD-10-CM

## 2021-12-01 RX ORDER — CETIRIZINE HYDROCHLORIDE 10 MG/1
10 TABLET ORAL DAILY
Qty: 90 TABLET | Refills: 5 | Status: SHIPPED | OUTPATIENT
Start: 2021-12-01 | End: 2022-03-02 | Stop reason: SDUPTHER

## 2021-12-01 RX ORDER — CETIRIZINE HYDROCHLORIDE 10 MG/1
TABLET, FILM COATED ORAL
Qty: 90 TABLET | Refills: 0 | Status: SHIPPED | OUTPATIENT
Start: 2021-12-01 | End: 2021-12-01 | Stop reason: SDUPTHER

## 2021-12-01 RX ORDER — FUROSEMIDE 40 MG/1
40 TABLET ORAL NIGHTLY
Qty: 30 TABLET | Refills: 5 | Status: ON HOLD | OUTPATIENT
Start: 2021-12-01 | End: 2022-09-24 | Stop reason: HOSPADM

## 2021-12-01 RX ORDER — FUROSEMIDE 40 MG/1
40 TABLET ORAL NIGHTLY
Qty: 30 TABLET | Refills: 0 | Status: SHIPPED | OUTPATIENT
Start: 2021-12-01 | End: 2021-12-01 | Stop reason: SDUPTHER

## 2021-12-02 ENCOUNTER — HOSPITAL ENCOUNTER (EMERGENCY)
Age: 71
Discharge: HOME | End: 2021-12-02
Attending: EMERGENCY MEDICINE
Payer: COMMERCIAL

## 2021-12-02 ENCOUNTER — APPOINTMENT (OUTPATIENT)
Dept: GENERAL RADIOLOGY | Age: 71
End: 2021-12-02
Payer: COMMERCIAL

## 2021-12-02 VITALS
HEART RATE: 83 BPM | TEMPERATURE: 97.9 F | BODY MASS INDEX: 43.65 KG/M2 | SYSTOLIC BLOOD PRESSURE: 130 MMHG | WEIGHT: 315 LBS | DIASTOLIC BLOOD PRESSURE: 40 MMHG | RESPIRATION RATE: 18 BRPM | OXYGEN SATURATION: 98 %

## 2021-12-02 DIAGNOSIS — R06.89 DYSPNEA AND RESPIRATORY ABNORMALITIES: Primary | ICD-10-CM

## 2021-12-02 DIAGNOSIS — R03.0 ELEVATED BLOOD PRESSURE READING: ICD-10-CM

## 2021-12-02 DIAGNOSIS — R06.00 DYSPNEA AND RESPIRATORY ABNORMALITIES: Primary | ICD-10-CM

## 2021-12-02 DIAGNOSIS — I50.9 CONGESTIVE HEART FAILURE, UNSPECIFIED HF CHRONICITY, UNSPECIFIED HEART FAILURE TYPE (HCC): ICD-10-CM

## 2021-12-02 LAB
ALBUMIN SERPL-MCNC: 4.1 GM/DL (ref 3.4–5)
ALP BLD-CCNC: 118 IU/L (ref 40–129)
ALT SERPL-CCNC: 22 U/L (ref 10–40)
ANION GAP SERPL CALCULATED.3IONS-SCNC: 10 MMOL/L (ref 4–16)
APTT: 33 SECONDS (ref 25.1–37.1)
AST SERPL-CCNC: 22 IU/L (ref 15–37)
BASE EXCESS MIXED: 1.7 (ref 0–1.2)
BASOPHILS ABSOLUTE: 0.1 K/CU MM
BASOPHILS RELATIVE PERCENT: 1.2 % (ref 0–1)
BILIRUB SERPL-MCNC: 0.7 MG/DL (ref 0–1)
BUN BLDV-MCNC: 11 MG/DL (ref 6–23)
CALCIUM SERPL-MCNC: 9.1 MG/DL (ref 8.3–10.6)
CHLORIDE BLD-SCNC: 104 MMOL/L (ref 99–110)
CO2: 24 MMOL/L (ref 21–32)
COMMENT: ABNORMAL
CREAT SERPL-MCNC: 0.8 MG/DL (ref 0.9–1.3)
DIFFERENTIAL TYPE: ABNORMAL
DIGOXIN LEVEL: 0.3 NG/ML (ref 0.8–2)
DOSE AMOUNT: ABNORMAL
DOSE TIME: ABNORMAL
EKG ATRIAL RATE: 97 BPM
EKG ATRIAL RATE: 97 BPM
EKG DIAGNOSIS: NORMAL
EKG DIAGNOSIS: NORMAL
EKG Q-T INTERVAL: 384 MS
EKG Q-T INTERVAL: 384 MS
EKG QRS DURATION: 150 MS
EKG QRS DURATION: 150 MS
EKG QTC CALCULATION (BAZETT): 474 MS
EKG QTC CALCULATION (BAZETT): 474 MS
EKG R AXIS: -59 DEGREES
EKG R AXIS: -59 DEGREES
EKG T AXIS: 15 DEGREES
EKG T AXIS: 15 DEGREES
EKG VENTRICULAR RATE: 92 BPM
EKG VENTRICULAR RATE: 92 BPM
EOSINOPHILS ABSOLUTE: 0.1 K/CU MM
EOSINOPHILS RELATIVE PERCENT: 1.8 % (ref 0–3)
GFR AFRICAN AMERICAN: >60 ML/MIN/1.73M2
GFR NON-AFRICAN AMERICAN: >60 ML/MIN/1.73M2
GLUCOSE BLD-MCNC: 142 MG/DL (ref 70–99)
HCO3 VENOUS: 28.2 MMOL/L (ref 19–25)
HCT VFR BLD CALC: 38.5 % (ref 42–52)
HEMOGLOBIN: 13.4 GM/DL (ref 13.5–18)
IMMATURE NEUTROPHIL %: 0.8 % (ref 0–0.43)
INR BLD: 2.07 INDEX
LYMPHOCYTES ABSOLUTE: 2 K/CU MM
LYMPHOCYTES RELATIVE PERCENT: 26.3 % (ref 24–44)
MCH RBC QN AUTO: 36.1 PG (ref 27–31)
MCHC RBC AUTO-ENTMCNC: 34.8 % (ref 32–36)
MCV RBC AUTO: 103.8 FL (ref 78–100)
MONOCYTES ABSOLUTE: 0.7 K/CU MM
MONOCYTES RELATIVE PERCENT: 8.6 % (ref 0–4)
NUCLEATED RBC %: 0 %
O2 SAT, VEN: 67.6 % (ref 50–70)
PCO2, VEN: 51 MMHG (ref 38–52)
PDW BLD-RTO: 14.6 % (ref 11.7–14.9)
PH VENOUS: 7.35 (ref 7.32–7.42)
PLATELET # BLD: 245 K/CU MM (ref 140–440)
PMV BLD AUTO: 9.3 FL (ref 7.5–11.1)
PO2, VEN: 38 MMHG (ref 28–48)
POTASSIUM SERPL-SCNC: 3.9 MMOL/L (ref 3.5–5.1)
PRO-BNP: 641.7 PG/ML
PROTHROMBIN TIME: 26.9 SECONDS (ref 11.7–14.5)
RBC # BLD: 3.71 M/CU MM (ref 4.6–6.2)
SEGMENTED NEUTROPHILS ABSOLUTE COUNT: 4.7 K/CU MM
SEGMENTED NEUTROPHILS RELATIVE PERCENT: 61.3 % (ref 36–66)
SODIUM BLD-SCNC: 138 MMOL/L (ref 135–145)
TOTAL IMMATURE NEUTOROPHIL: 0.06 K/CU MM
TOTAL NUCLEATED RBC: 0 K/CU MM
TOTAL PROTEIN: 7.8 GM/DL (ref 6.4–8.2)
TROPONIN T: <0.01 NG/ML
TSH HIGH SENSITIVITY: 4.08 UIU/ML (ref 0.27–4.2)
WBC # BLD: 7.6 K/CU MM (ref 4–10.5)

## 2021-12-02 PROCEDURE — 84443 ASSAY THYROID STIM HORMONE: CPT

## 2021-12-02 PROCEDURE — 85610 PROTHROMBIN TIME: CPT

## 2021-12-02 PROCEDURE — 93010 ELECTROCARDIOGRAM REPORT: CPT | Performed by: INTERNAL MEDICINE

## 2021-12-02 PROCEDURE — 71045 X-RAY EXAM CHEST 1 VIEW: CPT

## 2021-12-02 PROCEDURE — 80053 COMPREHEN METABOLIC PANEL: CPT

## 2021-12-02 PROCEDURE — 84484 ASSAY OF TROPONIN QUANT: CPT

## 2021-12-02 PROCEDURE — 6360000002 HC RX W HCPCS: Performed by: EMERGENCY MEDICINE

## 2021-12-02 PROCEDURE — 93005 ELECTROCARDIOGRAM TRACING: CPT | Performed by: EMERGENCY MEDICINE

## 2021-12-02 PROCEDURE — 83880 ASSAY OF NATRIURETIC PEPTIDE: CPT

## 2021-12-02 PROCEDURE — 96374 THER/PROPH/DIAG INJ IV PUSH: CPT

## 2021-12-02 PROCEDURE — 6370000000 HC RX 637 (ALT 250 FOR IP): Performed by: EMERGENCY MEDICINE

## 2021-12-02 PROCEDURE — 85730 THROMBOPLASTIN TIME PARTIAL: CPT

## 2021-12-02 PROCEDURE — 80162 ASSAY OF DIGOXIN TOTAL: CPT

## 2021-12-02 PROCEDURE — 99283 EMERGENCY DEPT VISIT LOW MDM: CPT

## 2021-12-02 PROCEDURE — 36415 COLL VENOUS BLD VENIPUNCTURE: CPT

## 2021-12-02 PROCEDURE — 82805 BLOOD GASES W/O2 SATURATION: CPT

## 2021-12-02 PROCEDURE — 85025 COMPLETE CBC W/AUTO DIFF WBC: CPT

## 2021-12-02 RX ORDER — FUROSEMIDE 20 MG/1
40 TABLET ORAL NIGHTLY
Qty: 60 TABLET | Refills: 0 | Status: SHIPPED | OUTPATIENT
Start: 2021-12-02 | End: 2022-02-15

## 2021-12-02 RX ORDER — FUROSEMIDE 10 MG/ML
40 INJECTION INTRAMUSCULAR; INTRAVENOUS ONCE
Status: COMPLETED | OUTPATIENT
Start: 2021-12-02 | End: 2021-12-02

## 2021-12-02 RX ORDER — DILTIAZEM HYDROCHLORIDE 60 MG/1
120 TABLET, FILM COATED ORAL ONCE
Status: DISCONTINUED | OUTPATIENT
Start: 2021-12-02 | End: 2021-12-02 | Stop reason: HOSPADM

## 2021-12-02 RX ORDER — NITROGLYCERIN 0.4 MG/1
0.4 TABLET SUBLINGUAL EVERY 5 MIN PRN
Status: DISCONTINUED | OUTPATIENT
Start: 2021-12-02 | End: 2021-12-02 | Stop reason: HOSPADM

## 2021-12-02 RX ORDER — CARVEDILOL 6.25 MG/1
25 TABLET ORAL 2 TIMES DAILY WITH MEALS
Status: DISCONTINUED | OUTPATIENT
Start: 2021-12-02 | End: 2021-12-02 | Stop reason: HOSPADM

## 2021-12-02 RX ADMIN — CARVEDILOL 25 MG: 6.25 TABLET, FILM COATED ORAL at 10:54

## 2021-12-02 RX ADMIN — FUROSEMIDE 40 MG: 10 INJECTION, SOLUTION INTRAVENOUS at 10:54

## 2021-12-02 ASSESSMENT — ENCOUNTER SYMPTOMS
GASTROINTESTINAL NEGATIVE: 1
SHORTNESS OF BREATH: 1
ALLERGIC/IMMUNOLOGIC NEGATIVE: 1
EYES NEGATIVE: 1

## 2021-12-02 NOTE — ED NOTES
Discharge instructions reviewed with pt. All questions answered at this time. Pt verbalized understanding.       Curtis Lee RN  12/02/21 0782

## 2021-12-02 NOTE — ED PROVIDER NOTES
[facility]      TRIAGE CHIEF COMPLAINT:   Shortness of Breath (Reports has been off lasix for 1 week and thinks he is filling up with fluid)      [place]:  Linus Negron is a 80 y.o. male that presents with complaint of worsening chest pain shortness of breath edema. Patient states he has been off of his Lasix for the past week or so. Denies any fevers nausea vomiting abdominal pain. No history of DVT PE or AAA states. No other questions or concerns. Denies any Covid exposure he has been vaccinated. REVIEW OF SYSTEMS:  At least 10 systems reviewed and otherwise acutely negative except as in the [address] Review of Systems   Constitutional: Positive for fatigue. HENT: Negative. Eyes: Negative. Respiratory: Positive for shortness of breath. Cardiovascular: Positive for chest pain and leg swelling. Gastrointestinal: Negative. Endocrine: Negative. Genitourinary: Negative. Musculoskeletal: Negative. Skin: Negative. Allergic/Immunologic: Negative. Neurological: Negative. Hematological: Negative. Psychiatric/Behavioral: Negative. All other systems reviewed and are negative. Past Medical History:   Diagnosis Date    Allergic rhinitis     Anticoagulated on Coumadin     1/6/17-**[facility] to follow pt's PT/INRs, along w/prescribing his Coumadin. **    Anxiety     Arthritis     Atrial fibrillation (HCC)     On Coumadin.     CAD (coronary artery disease)     COPD (chronic obstructive pulmonary disease) (HCC)     Depression     Diabetes mellitus (HCC)     NIDDM- dx over 10 yrs ago- follows with Dr Thiago Pinon's contracture of hand     Right hand    Family history of cardiovascular disease     Father-MI    GERD (gastroesophageal reflux disease)     H/O cardiac catheterization 03/2007    cardiac cath- stent LAD patent, [id number]%, RCA 40-50%    H/O cardiac catheterization 06/12/2008    cardiac cath- mild disease mid RCA  H/O cardiovascular stress test 4/10/2014    cardiolite- normal, no ischemia, EF63%    H/O cardiovascular stress test 09/21/2016    EF59% normal study  pt in atrial fib    H/O cardiovascular stress test 09/20/2017    EF 60%   afib    H/O cardiovascular stress test 11/07/2018    EF60% normal study    H/O Doppler ultrasound     1/11/2010-CAROTID_Intimal thickening but no significant atherosclerotic plaque noted in LAUREN. Doppler flow velocities within the LAUREN are WNL. Intimal thickening but no significant atherosclerotic plaque noted in LICA. Doppler flow velociities within the LICA are WNL.  H/O echocardiogram 04/10/2014    EF 60%, normal LV systolic function, mild mitral and tricuspid insufficiencies, no pericardial effusion.     H/O echocardiogram 09/21/2016    EF60% normal study    H/O echocardiogram 11/07/2018    EF55-60% no significant valular disease    H/O hiatal hernia     Hx of Venous Doppler 03/14/2019    Significant reflux in Left Deep System, No reflux in right lower extremity, No DVT or SVT    Hyperlipidemia     Hypertension     Obesity     S/P PTCA (percutaneous transluminal coronary angioplasty) 03/21/2005    s/p PTCA with 3.5 X 16 TAXUS stent to LAD- follows with Dr Cristiano Barger Sleep apnea     had sleep study done 10+ yrs ago- has cpap but does not use it    Thyroid disease     hypothyroid     Past Surgical History:   Procedure Laterality Date    CARDIAC CATHETERIZATION  6/12/08    mild disease mid RCA,  stent to LAD patent,    CARDIAC CATHETERIZATION  3/07    Stent to LAD patent, Diagonal 40-50%, RCA 40-50%    CARDIAC CATHETERIZATION  3/05    COLONOSCOPY  2011    COLONOSCOPY  5/18/16    1 polyp removed, diverticulosis and hemorrhoids noted    CORONARY ANGIOPLASTY WITH STENT PLACEMENT  03/21/2005    PTCA with 3.5 x 16 TAXUS stent to LAD    ENDOSCOPY, COLON, DIAGNOSTIC  2014    egd    HAND SURGERY Right 1997    AL OPEN RX ANKLE DISLOCATN+FIXATN Right 6/3/2019    RIGHT OPEN REDUCTION INTERNAL FIXATION OF DISTAL TIBIA  AND FIBULA performed by Renetta [facility] OR     Family History   Problem Relation Age of Onset    Cancer Mother         breast ca    Coronary Art Dis Father          MI    Heart Disease Father     Rheum Arthritis Other     Rheum Arthritis Sister     No Known Problems Brother     Rheum Arthritis Sister      Social History     Socioeconomic History    Marital status:      Spouse name: Not on file    Number of children: 1    Years of education: Not on file    Highest education level: Not on file   Occupational History     Comment: RETIRED   Tobacco Use    Smoking status: Former Smoker     Packs/day: 1.00     Years: 10.00     Pack years: 10.00     Types: Cigarettes     Quit date: 1976     Years since quittin.9    Smokeless tobacco: Never Used   Vaping Use    Vaping Use: Never used   Substance and Sexual Activity    Alcohol use: Not Currently     Alcohol/week: 6.0 standard drinks     Types: 6 Cans of beer per week     Comment: caffeine 2 cups of tea a day     Drug use: No    Sexual activity: Never   Other Topics Concern    Not on file   Social History Narrative    Not on file     Social Determinants of Health     Financial Resource Strain: Low Risk     Difficulty of Paying Living Expenses: Not hard at all   Food Insecurity: No Food Insecurity    Worried About Running Out of Food in the Last Year: Never true    Mary Jo of Food in the Last Year: Never true   Transportation Needs:     Lack of Transportation (Medical): Not on file    Lack of Transportation (Non-Medical):  Not on file   Physical Activity:     Days of Exercise per Week: Not on file    Minutes of Exercise per Session: Not on file   Stress:     Feeling of Stress : Not on file   Social Connections:     Frequency of Communication with Friends and Family: Not on file    Frequency of Social Gatherings with Friends and Family: Not on file    Attends Buddhism Services: Not on file    Active Member of Clubs or Organizations: Not on file    Attends Club or Organization Meetings: Not on file    Marital Status: Not on file   Intimate Partner Violence:     Fear of Current or Ex-Partner: Not on file    Emotionally Abused: Not on file    Physically Abused: Not on file    Sexually Abused: Not on file   Housing Stability:     Unable to Pay for Housing in the Last Year: Not on file    Number of [place] in the Last Year: Not on file    Unstable Housing in the Last Year: Not on file     Current Facility-Administered Medications   Medication Dose Route Frequency Provider Last Rate Last Admin    carvedilol (COREG) tablet 25 mg  25 mg Oral BID  German Rosa, DO   25 mg at 12/02/21 1054    dilTIAZem (CARDIZEM) tablet 120 mg  120 mg Oral Once Rain Rimes, DO        nitroGLYCERIN (NITROSTAT) SL tablet 0.4 mg  0.4 mg SubLINGual Q5 Min PRN Rain Rimes, DO         Current Outpatient Medications   Medication Sig Dispense Refill    furosemide (LASIX) 20 MG tablet Take 2 tablets by mouth nightly 60 tablet 0    furosemide (LASIX) 40 MG tablet Take 1 tablet by mouth nightly 30 tablet 5    cetirizine (EQ ALLERGY RELIEF, CETIRIZINE,) 10 MG tablet Take 1 tablet by mouth daily 90 tablet 5    gabapentin (NEURONTIN) 300 MG capsule Take 1 capsule by mouth 2 times daily for 30 days.  Intended supply: 30 days 60 capsule 2    empagliflozin (JARDIANCE) 25 MG tablet Take 1 tablet by mouth daily 30 tablet 2    warfarin (COUMADIN) 2 MG tablet TAKE 1 TABLET BY MOUTH THREE TIMES A WEEK (MON,WED,&FRI) 30 tablet 2    metFORMIN (GLUCOPHAGE) 1000 MG tablet TAKE 1 TABLET BY MOUTH TWICE DAILY WITH MEALS 180 tablet 0    allopurinol (ZYLOPRIM) 100 MG tablet TAKE 1 TABLET BY MOUTH TWICE DAILY 180 tablet 1    pravastatin (PRAVACHOL) 80 MG tablet TAKE 1 TABLET BY MOUTH ONCE DAILY 90 tablet 1    losartan (COZAAR) 100 MG tablet Take 1 tablet by mouth daily 90 tablet 3    dilTIAZem (CARDIZEM) 120 MG tablet Take 1 tablet by mouth daily 90 tablet 1    carvedilol (COREG) 25 MG tablet Take 1 tablet by mouth 2 times daily 90 tablet 3    levothyroxine (SYNTHROID) 50 MCG tablet Take 1 tablet by mouth Daily 90 tablet 1    warfarin (COUMADIN) 3 MG tablet TAKE 1 TABLET BY MOUTH ONCE DAILY INDICATIONS  SAT/TUES/THURS/SUN  OR  AS  DIRECTED  PER   30 tablet 3    DULoxetine (CYMBALTA) 60 MG extended release capsule Take 1 capsule by mouth daily 90 capsule 1    fluticasone (FLONASE) 50 MCG/ACT nasal spray 2 sprays by Each Nostril route daily 1 Bottle 2    tiZANidine (ZANAFLEX) 4 MG tablet Take 1 tablet by mouth 3 times daily as needed (shoulder pain) 30 tablet 0    potassium chloride (KLOR-CON M) 10 MEQ extended release tablet Take 1 tablet by mouth 2 times daily 60 tablet 5    polyethylene glycol (GLYCOLAX) 17 GM/SCOOP powder Take 17 g by mouth daily 225 g 2    insulin regular (NOVOLIN R) 100 UNIT/ML injection Inject 0-10 Units into the skin 3 times daily (before meals) 1 vial 3    insulin NPH (HUMULIN N;NOVOLIN N) 100 UNIT/ML injection vial Inject 80 Units into the skin nightly 3 vial 2    gemfibrozil (LOPID) 600 MG tablet Take 1 tablet by mouth 2 times daily 180 tablet 1    digoxin (LANOXIN) 125 MCG tablet Take 1 tablet by mouth daily 90 tablet 3    albuterol sulfate HFA (VENTOLIN HFA) 108 (90 Base) MCG/ACT inhaler Inhale 2 puffs into the lungs every 6 hours as needed for Wheezing 1 Inhaler 3    Blood Pressure Monitor KIT 1 kit by Does not apply route once for 1 dose 1 kit 0    insulin aspart (NOVOLOG) 100 UNIT/ML injection vial Inject into the skin      Lancets MISC by D3EA route three times a day.  100 each 3      No Known Allergies  Current Facility-Administered Medications   Medication Dose Route Frequency Provider Last Rate Last Admin    carvedilol (COREG) tablet 25 mg  25 mg Oral BID UGO Rosa DO   25 mg at 12/02/21 1054    dilTIAZem (CARDIZEM) tablet 120 mg  120 mg Oral Once Miladys Reza DO        nitroGLYCERIN (NITROSTAT) SL tablet 0.4 mg  0.4 mg SubLINGual Q5 Min PRN Miladys Reza DO         Current Outpatient Medications   Medication Sig Dispense Refill    furosemide (LASIX) 20 MG tablet Take 2 tablets by mouth nightly 60 tablet 0    furosemide (LASIX) 40 MG tablet Take 1 tablet by mouth nightly 30 tablet 5    cetirizine (EQ ALLERGY RELIEF, CETIRIZINE,) 10 MG tablet Take 1 tablet by mouth daily 90 tablet 5    gabapentin (NEURONTIN) 300 MG capsule Take 1 capsule by mouth 2 times daily for 30 days.  Intended supply: 30 days 60 capsule 2    empagliflozin (JARDIANCE) 25 MG tablet Take 1 tablet by mouth daily 30 tablet 2    warfarin (COUMADIN) 2 MG tablet TAKE 1 TABLET BY MOUTH THREE TIMES A WEEK (MON,WED,&FRI) 30 tablet 2    metFORMIN (GLUCOPHAGE) 1000 MG tablet TAKE 1 TABLET BY MOUTH TWICE DAILY WITH MEALS 180 tablet 0    allopurinol (ZYLOPRIM) 100 MG tablet TAKE 1 TABLET BY MOUTH TWICE DAILY 180 tablet 1    pravastatin (PRAVACHOL) 80 MG tablet TAKE 1 TABLET BY MOUTH ONCE DAILY 90 tablet 1    losartan (COZAAR) 100 MG tablet Take 1 tablet by mouth daily 90 tablet 3    dilTIAZem (CARDIZEM) 120 MG tablet Take 1 tablet by mouth daily 90 tablet 1    carvedilol (COREG) 25 MG tablet Take 1 tablet by mouth 2 times daily 90 tablet 3    levothyroxine (SYNTHROID) 50 MCG tablet Take 1 tablet by mouth Daily 90 tablet 1    warfarin (COUMADIN) 3 MG tablet TAKE 1 TABLET BY MOUTH ONCE DAILY INDICATIONS  SAT/TUES/THURS/SUN  OR  AS  DIRECTED  PER   30 tablet 3    DULoxetine (CYMBALTA) 60 MG extended release capsule Take 1 capsule by mouth daily 90 capsule 1    fluticasone (FLONASE) 50 MCG/ACT nasal spray 2 sprays by Each Nostril route daily 1 Bottle 2    tiZANidine (ZANAFLEX) 4 MG tablet Take 1 tablet by mouth 3 times daily as needed (shoulder pain) 30 tablet 0    potassium chloride (KLOR-CON M) 10 MEQ extended release tablet Take 1 tablet by mouth 2 times daily 60 tablet 5    polyethylene glycol (GLYCOLAX) 17 GM/SCOOP powder Take 17 g by mouth daily 225 g 2    insulin regular (NOVOLIN R) 100 UNIT/ML injection Inject 0-10 Units into the skin 3 times daily (before meals) 1 vial 3    insulin NPH (HUMULIN N;NOVOLIN N) 100 UNIT/ML injection vial Inject 80 Units into the skin nightly 3 vial 2    gemfibrozil (LOPID) 600 MG tablet Take 1 tablet by mouth 2 times daily 180 tablet 1    digoxin (LANOXIN) 125 MCG tablet Take 1 tablet by mouth daily 90 tablet 3    albuterol sulfate HFA (VENTOLIN HFA) 108 (90 Base) MCG/ACT inhaler Inhale 2 puffs into the lungs every 6 hours as needed for Wheezing 1 Inhaler 3    Blood Pressure Monitor KIT 1 kit by Does not apply route once for 1 dose 1 kit 0    insulin aspart (NOVOLOG) 100 UNIT/ML injection vial Inject into the skin      Lancets MISC by D3EA route three times a day. 100 each 3       Nursing Notes Reviewed    VITAL SIGNS:  ED Triage Vitals   Enc Vitals Group      BP       Pulse       Resp       Temp       Temp src       SpO2       Weight       Height       Head Circumference       Peak Flow       Pain Score       Pain Loc       Pain Edu? Excl. in [address]? PHYSICAL EXAM:  Physical Exam  Vitals and nursing note reviewed. Constitutional:       General: He is not in acute distress. Appearance: Normal appearance. He is well-developed and well-groomed. He is not ill-appearing, toxic-appearing or diaphoretic. HENT:      Head: Normocephalic and atraumatic. Right Ear: External ear normal.      Left Ear: External ear normal.      Nose: No congestion or rhinorrhea. Eyes:      General: No scleral icterus. Right eye: No discharge. Left eye: No discharge. Extraocular Movements: Extraocular movements intact. Conjunctiva/sclera: Conjunctivae normal.   Neck:      Vascular: No JVD. Trachea: Phonation normal.   Cardiovascular:      Rate and Rhythm: Normal rate and regular rhythm. Pulses: Normal pulses. Heart sounds: Normal heart sounds. No murmur heard. No friction rub. No gallop. Pulmonary:      Effort: Pulmonary effort is normal. No respiratory distress. Breath sounds: Normal breath sounds. No stridor. No wheezing, rhonchi or rales. Abdominal:      General: Bowel sounds are normal. There is no distension. Palpations: Abdomen is soft. There is no mass. Tenderness: There is no abdominal tenderness. There is no guarding or rebound. Negative signs include Sanz's sign, Rovsing's sign and McBurney's sign. Hernia: No hernia is present. Musculoskeletal:         General: Swelling present. No tenderness, deformity or signs of injury. Normal range of motion. Cervical back: Full passive range of motion without pain and normal range of motion. No edema, erythema, signs of trauma, rigidity, torticollis or crepitus. No pain with movement. Normal range of motion. Right lower leg: Edema present. Left lower leg: Edema present. Skin:     General: Skin is warm. Coloration: Skin is not jaundiced or pale. Findings: No bruising, erythema, lesion or rash. Neurological:      General: No focal deficit present. Mental Status: He is alert and oriented to person, place, and time. GCS: GCS eye subscore is 4. GCS verbal subscore is 5. GCS motor subscore is 6. Cranial Nerves: Cranial nerves are intact. No cranial nerve deficit, dysarthria or facial asymmetry. Sensory: Sensation is intact. No sensory deficit. Motor: Motor function is intact. No weakness, tremor, atrophy, abnormal muscle tone or seizure activity. Coordination: Coordination is intact. Coordination normal.   Psychiatric:         Mood and Affect: Mood normal.         Behavior: Behavior normal. Behavior is cooperative. Thought Content:  Thought content normal.         Judgment: Judgment normal.           I have reviewed andinterpreted all of the currently available lab results from this visit (if applicable):         Radiographs (if obtained):  [] The following radiograph was interpreted by myself in the absence of a radiologist:  [x] Radiologist's Report Reviewed:    CXR    EKG (if obtained): (All EKG's are interpreted by myself in the absence of a cardiologist)    12 lead EKG per my interpretation:  Atrial Fibrillation 92  Axis is   Left axis deviation  QTc is  474  There is specific T wave changes appreciated. Inverted T wave V2  There is no specific ST wave changes appreciated. Prior EKG to compare with was not available       MDM:    Patient here with worsening chest pain shortness of breath. Again he has a history of hypertension CHF has been off of his diuretics for the past week. He states he had worsening swelling. Denies any history of DVT PE or AAA. He has not had his blood pressure medications today. I did order his blood pressure medication as well as Lasix as he is hypertensive and complains of shortness of breath, no chest pain just feels full of fluid I did order nitro. He otherwise appears well will check labs, imaging and EKG is otherwise negative x-ray has some stable cardiomegaly. Patient observed for several hours she did well. He has no chest pain improved shortness of breath. I gave him his home medication his blood pressure improved on its own no nitro needed. Patient also given Lasix his home dose which has been off her for a week. Patient did have good diuresis here labs otherwise negative EKG x-ray troponin negative. INR therapeutic. Patient feels comfortable on repeat examination he feels okay going home likely CHF, fluid overload will discharge home with his home prescription of Lasix 40 mg at night which has been off of for the past week. Patient stable discharged home given return precautions and follow-up information. Stable.     CLINICAL IMPRESSION:  Final diagnoses:   Dyspnea and respiratory abnormalities   Elevated blood pressure reading Congestive heart failure, unspecified HF chronicity, unspecified heart failure type ([facility] [address]       (Please note that portions of this note may have been completed with a voice recognition program. Efforts were made to edit the dictations but occasionally words aremis-transcribed.)    DISPOSITION REFERRAL (if applicable):  Gloria Dwyer, APRN - CNP  [address] [facility]  [phone]    Schedule an appointment as soon as possible for a visit in 1 day      [facility] Emergency Department  [address] [place] [address]  [phone]    If symptoms worsen      DISPOSITION MEDICATIONS (if applicable):  New Prescriptions    FUROSEMIDE (LASIX) 20 MG TABLET    Take 2 tablets by mouth nightly          DO Jarad Blackwood DO  12/02/21 1431

## 2022-01-07 RX ORDER — DULOXETIN HYDROCHLORIDE 60 MG/1
60 CAPSULE, DELAYED RELEASE ORAL DAILY
Qty: 90 CAPSULE | Refills: 1 | Status: SHIPPED | OUTPATIENT
Start: 2022-01-07 | End: 2022-07-13 | Stop reason: SDUPTHER

## 2022-02-11 DIAGNOSIS — I48.91 ATRIAL FIBRILLATION, UNSPECIFIED TYPE (HCC): ICD-10-CM

## 2022-02-11 DIAGNOSIS — M54.50 CHRONIC MIDLINE LOW BACK PAIN WITHOUT SCIATICA: ICD-10-CM

## 2022-02-11 DIAGNOSIS — G89.29 CHRONIC MIDLINE LOW BACK PAIN WITHOUT SCIATICA: ICD-10-CM

## 2022-02-11 RX ORDER — DIGOXIN 125 MCG
125 TABLET ORAL DAILY
Qty: 90 TABLET | Refills: 3 | Status: ON HOLD | OUTPATIENT
Start: 2022-02-11 | End: 2022-09-24 | Stop reason: HOSPADM

## 2022-02-11 RX ORDER — GABAPENTIN 300 MG/1
300 CAPSULE ORAL 2 TIMES DAILY
Qty: 60 CAPSULE | Refills: 2 | Status: ON HOLD | OUTPATIENT
Start: 2022-02-11 | End: 2022-09-24 | Stop reason: HOSPADM

## 2022-02-15 ENCOUNTER — OFFICE VISIT (OUTPATIENT)
Dept: INTERNAL MEDICINE CLINIC | Age: 72
End: 2022-02-15
Payer: COMMERCIAL

## 2022-02-15 VITALS
SYSTOLIC BLOOD PRESSURE: 131 MMHG | OXYGEN SATURATION: 98 % | HEIGHT: 74 IN | BODY MASS INDEX: 40.43 KG/M2 | HEART RATE: 90 BPM | WEIGHT: 315 LBS | DIASTOLIC BLOOD PRESSURE: 80 MMHG | RESPIRATION RATE: 20 BRPM

## 2022-02-15 DIAGNOSIS — I10 ESSENTIAL HYPERTENSION: ICD-10-CM

## 2022-02-15 DIAGNOSIS — E07.9 THYROID DISEASE: ICD-10-CM

## 2022-02-15 DIAGNOSIS — E11.00 TYPE 2 DIABETES MELLITUS WITH HYPEROSMOLARITY WITHOUT COMA, WITH LONG-TERM CURRENT USE OF INSULIN (HCC): ICD-10-CM

## 2022-02-15 DIAGNOSIS — Z12.11 SCREENING FOR MALIGNANT NEOPLASM OF COLON: ICD-10-CM

## 2022-02-15 DIAGNOSIS — Z79.4 TYPE 2 DIABETES MELLITUS WITH HYPEROSMOLARITY WITHOUT COMA, WITH LONG-TERM CURRENT USE OF INSULIN (HCC): ICD-10-CM

## 2022-02-15 DIAGNOSIS — I48.91 ATRIAL FIBRILLATION, UNSPECIFIED TYPE (HCC): Primary | ICD-10-CM

## 2022-02-15 DIAGNOSIS — E78.2 MIXED HYPERLIPIDEMIA: ICD-10-CM

## 2022-02-15 DIAGNOSIS — I25.10 CORONARY ARTERY DISEASE INVOLVING NATIVE CORONARY ARTERY OF NATIVE HEART WITHOUT ANGINA PECTORIS: ICD-10-CM

## 2022-02-15 LAB
A/G RATIO: 1.1 (ref 1.1–2.2)
ALBUMIN SERPL-MCNC: 4.2 G/DL (ref 3.4–5)
ALP BLD-CCNC: 151 U/L (ref 40–129)
ALT SERPL-CCNC: 18 U/L (ref 10–40)
ANION GAP SERPL CALCULATED.3IONS-SCNC: 20 MMOL/L (ref 3–16)
AST SERPL-CCNC: 24 U/L (ref 15–37)
BASOPHILS ABSOLUTE: 0.1 K/UL (ref 0–0.2)
BASOPHILS RELATIVE PERCENT: 0.9 %
BILIRUB SERPL-MCNC: 0.4 MG/DL (ref 0–1)
BUN BLDV-MCNC: 19 MG/DL (ref 7–20)
CALCIUM SERPL-MCNC: 9.5 MG/DL (ref 8.3–10.6)
CHLORIDE BLD-SCNC: 105 MMOL/L (ref 99–110)
CHOLESTEROL, FASTING: 130 MG/DL (ref 0–199)
CO2: 21 MMOL/L (ref 21–32)
CREAT SERPL-MCNC: 1.3 MG/DL (ref 0.8–1.3)
EOSINOPHILS ABSOLUTE: 0.2 K/UL (ref 0–0.6)
EOSINOPHILS RELATIVE PERCENT: 2.3 %
GFR AFRICAN AMERICAN: >60
GFR NON-AFRICAN AMERICAN: 54
GLUCOSE BLD-MCNC: 122 MG/DL (ref 70–99)
HCT VFR BLD CALC: 41 % (ref 40.5–52.5)
HDLC SERPL-MCNC: 36 MG/DL (ref 40–60)
HEMOGLOBIN: 14.3 G/DL (ref 13.5–17.5)
INTERNATIONAL NORMALIZATION RATIO, POC: 2.2
LDL CHOLESTEROL CALCULATED: 48 MG/DL
LYMPHOCYTES ABSOLUTE: 2.3 K/UL (ref 1–5.1)
LYMPHOCYTES RELATIVE PERCENT: 33.9 %
MCH RBC QN AUTO: 35.3 PG (ref 26–34)
MCHC RBC AUTO-ENTMCNC: 35 G/DL (ref 31–36)
MCV RBC AUTO: 101 FL (ref 80–100)
MONOCYTES ABSOLUTE: 0.8 K/UL (ref 0–1.3)
MONOCYTES RELATIVE PERCENT: 12.2 %
NEUTROPHILS ABSOLUTE: 3.4 K/UL (ref 1.7–7.7)
NEUTROPHILS RELATIVE PERCENT: 50.7 %
PDW BLD-RTO: 15.9 % (ref 12.4–15.4)
PLATELET # BLD: 229 K/UL (ref 135–450)
PMV BLD AUTO: 7.9 FL (ref 5–10.5)
POTASSIUM SERPL-SCNC: 4.6 MMOL/L (ref 3.5–5.1)
PROTHROMBIN TIME, POC: 26.5
RBC # BLD: 4.06 M/UL (ref 4.2–5.9)
SODIUM BLD-SCNC: 146 MMOL/L (ref 136–145)
TOTAL PROTEIN: 7.9 G/DL (ref 6.4–8.2)
TRIGLYCERIDE, FASTING: 228 MG/DL (ref 0–150)
TSH REFLEX: 3.82 UIU/ML (ref 0.27–4.2)
VLDLC SERPL CALC-MCNC: 46 MG/DL
WBC # BLD: 6.7 K/UL (ref 4–11)

## 2022-02-15 PROCEDURE — 36415 COLL VENOUS BLD VENIPUNCTURE: CPT | Performed by: NURSE PRACTITIONER

## 2022-02-15 PROCEDURE — 85610 PROTHROMBIN TIME: CPT | Performed by: NURSE PRACTITIONER

## 2022-02-15 PROCEDURE — 99214 OFFICE O/P EST MOD 30 MIN: CPT | Performed by: NURSE PRACTITIONER

## 2022-02-15 RX ORDER — FUROSEMIDE 40 MG/1
40 TABLET ORAL NIGHTLY
Qty: 90 TABLET | Refills: 1 | Status: SHIPPED | OUTPATIENT
Start: 2022-02-15 | End: 2022-05-31 | Stop reason: CLARIF

## 2022-02-15 RX ORDER — DILTIAZEM HYDROCHLORIDE 120 MG/1
120 TABLET, FILM COATED ORAL DAILY
Qty: 90 TABLET | Refills: 1 | Status: SHIPPED | OUTPATIENT
Start: 2022-02-15 | End: 2022-08-22 | Stop reason: SDUPTHER

## 2022-02-15 RX ORDER — WARFARIN SODIUM 3 MG/1
TABLET ORAL
Qty: 30 TABLET | Refills: 5 | Status: SHIPPED | OUTPATIENT
Start: 2022-02-15 | End: 2022-09-12 | Stop reason: ALTCHOICE

## 2022-02-15 RX ORDER — WARFARIN SODIUM 2 MG/1
TABLET ORAL
Qty: 30 TABLET | Refills: 5 | Status: SHIPPED | OUTPATIENT
Start: 2022-02-15 | End: 2022-09-12 | Stop reason: ALTCHOICE

## 2022-02-15 RX ORDER — EMPAGLIFLOZIN 25 MG/1
1 TABLET, FILM COATED ORAL DAILY
Qty: 30 TABLET | Refills: 2 | Status: SHIPPED | OUTPATIENT
Start: 2022-02-15 | End: 2022-05-31 | Stop reason: SDUPTHER

## 2022-02-15 ASSESSMENT — ENCOUNTER SYMPTOMS
ABDOMINAL PAIN: 0
CHEST TIGHTNESS: 0
COLOR CHANGE: 0
SINUS PRESSURE: 0
CONSTIPATION: 1
WHEEZING: 0
COUGH: 0
SINUS PAIN: 0
ABDOMINAL DISTENTION: 0
SORE THROAT: 0
DIARRHEA: 0
EYES NEGATIVE: 1
NAUSEA: 0
SHORTNESS OF BREATH: 0

## 2022-02-15 ASSESSMENT — PATIENT HEALTH QUESTIONNAIRE - PHQ9
2. FEELING DOWN, DEPRESSED OR HOPELESS: 0
SUM OF ALL RESPONSES TO PHQ QUESTIONS 1-9: 0
1. LITTLE INTEREST OR PLEASURE IN DOING THINGS: 0
SUM OF ALL RESPONSES TO PHQ QUESTIONS 1-9: 0
DEPRESSION UNABLE TO ASSESS: FUNCTIONAL CAPACITY MOTIVATION LIMITS ACCURACY
SUM OF ALL RESPONSES TO PHQ9 QUESTIONS 1 & 2: 0

## 2022-02-15 NOTE — PROGRESS NOTES
Yuliareynaldo Winchester  1950  02/15/22    Chief Complaint   Patient presents with    3 Month Follow-Up       SUBJECTIVE:      3 month follow up:    . Anticoagulation: Patient here for followup of chronic anticoagulation. Indication: atrial fibrillation  Bleeding Signs/Symptoms:  None  Missed Coumadin Doses:  None  Medication Changes:  no  Dietary Changes:  no    VITAL SIGNS reviewed    PLAN:  INR today at 2.2. Will continue coumadin at current dose. Patient compliant with all current medications for diabetes. Blood sugars have been averaging around 150-250, and the patient reports checking their blood sugar 2-3 time daily. Patient has had no episodes of significant hypoglycemia, fainting, dizziness, or loss of consciousness. Continues on Metformin 1000 mg BID, Sliding scale TID with meals and 80 units of N each evening/30 each morning. We did add Jardiance 25 mg daily at his last visit. Lab Results   Component Value Date    LABA1C 7.6 (H) 10/18/2021     Lab Results   Component Value Date     10/18/2021     The patient is currently taking all hypertensive medications compliantly without c/o of any side effects. Denies chest pain, dyspnea, edema, palpiations. Blood pressure has been averaging  120-130/80 over the last several months. Patient denies any chest pain, shortness of breath, myalgias, Patient is tolerating cholesterol medications without difficulty. Lab Results   Component Value Date    LDLCALC 35 01/15/2020    LDLDIRECT 94 10/18/2021     Patient continues to be compliant with synthroid regiment for her hypothyroidism. Denies c/o fatigue, weight gain or loss, heat or cold intolerance, diarrhea, or other GI symptoms. Lab Results   Component Value Date    TSH 1.66 04/03/2018     Does admit that he has issues with small, flat hard bowel movements several days per week, followed by a large BM each week. Last c-scope was in 2016 by Dr Malinda Gamez. Past due for 5 year follow up.  Prefers to see  Andrew with [facility]. Review of Systems   Constitutional: Negative for activity change, appetite change, fatigue and fever. HENT: Negative for congestion, sinus pressure, sinus pain and sore throat. Eyes: Negative. Respiratory: Negative for cough, chest tightness, shortness of breath and wheezing. Cardiovascular: Negative for chest pain and palpitations. Gastrointestinal: Positive for constipation. Negative for abdominal distention, abdominal pain, diarrhea and nausea. Genitourinary: Negative for difficulty urinating, dysuria, flank pain, frequency and hematuria. Musculoskeletal: Negative for arthralgias, gait problem, joint swelling and myalgias. Skin: Negative for color change and rash. Neurological: Negative for dizziness, light-headedness and numbness. Hematological: Negative. Psychiatric/Behavioral: Negative. OBJECTIVE:    /80   Pulse 90   Resp 20   Ht 6' 2\" (1.88 m)   Wt (!) 332 lb (150.6 kg)   SpO2 98%   BMI 42.63 kg/m²     Physical Exam  Constitutional:       General: He is not in acute distress. Appearance: He is well-developed. He is not diaphoretic. HENT:      Head: Normocephalic and atraumatic. Neck:      Thyroid: No thyromegaly. Cardiovascular:      Rate and Rhythm: Normal rate and regular rhythm. Heart sounds: Normal heart sounds. No murmur heard. Pulmonary:      Effort: Pulmonary effort is normal.      Breath sounds: Normal breath sounds. Abdominal:      General: Bowel sounds are normal. There is no distension. Palpations: Abdomen is soft. Tenderness: There is no abdominal tenderness. Musculoskeletal:         General: Normal range of motion. Lymphadenopathy:      Cervical: No cervical adenopathy. Skin:     General: Skin is warm and dry. Capillary Refill: Capillary refill takes less than 2 seconds. Neurological:      Mental Status: He is alert and oriented to person, place, and time. GCS: GCS eye subscore is 4. GCS verbal subscore is 5. GCS motor subscore is 6. Coordination: Coordination normal.   Psychiatric:         Behavior: Behavior normal.         Thought Content: Thought content normal.         ASSESSMENT:    1. Atrial fibrillation, unspecified type ([address]    2. Type 2 diabetes mellitus with hyperosmolarity without coma, with long-term current use of insulin ([address]    3. Essential hypertension    4. Coronary artery disease involving native coronary artery of native heart without angina pectoris    5. Mixed hyperlipidemia    6. Thyroid disease    7. Screening for malignant neoplasm of colon        PLAN:    Jeff Han was seen today for 3 month follow-up. Diagnoses and all orders for this visit:    Atrial fibrillation, unspecified type ([address]- rate controlled and INR at goal; no changes to current [facility] therapy. -     POCT INR  -     dilTIAZem (CARDIZEM) 120 MG tablet; Take 1 tablet by mouth daily  -     warfarin (COUMADIN) 2 MG tablet; TAKE 1 TABLET BY MOUTH THREE TIMES A WEEK (MON,WED,&FRI)  -     warfarin (COUMADIN) 3 MG tablet; TAKE 1 TABLET BY MOUTH ONCE DAILY INDICATIONS  SAT/TUES/THURS/SUN  OR  AS  DIRECTED  PER   Type 2 diabetes mellitus with hyperosmolarity without coma, with long-term current use of insulin ([address]- recheck A1C today since addition of jardiance. If still not improving, consider Trulicity. -     empagliflozin (JARDIANCE) 25 MG tablet; Take 1 tablet by mouth daily  -     CBC Auto Differential; Future  -     Comprehensive Metabolic Panel; Future  -     Hemoglobin A1C; Future  -     Lipid, Fasting; Future    Essential hypertension - well controlled; no changes in management at this time. Monitor renal function/electrolytes routinely, today as below. -     CBC Auto Differential; Future  -     Comprehensive Metabolic Panel; Future  -     Hemoglobin A1C; Future  -     Lipid, Fasting;  Future    Coronary artery disease involving native coronary artery of native heart without angina pectoris- risk factor modification measures discussed at length; otherwise, as per recommendations of his cardiologist Dr Nic Sebastian.   -     CBC Auto Differential; Future  -     Comprehensive Metabolic Panel; Future  -     Hemoglobin A1C; Future  -     Lipid, Fasting; Future    Mixed hyperlipidemia- continue statin; recheck lipid panel/hepatic function today. -     CBC Auto Differential; Future  -     Comprehensive Metabolic Panel; Future  -     Lipid, Fasting; Future    Thyroid disease- recheck TSH; adjust synthroid as appropriate.  -     TSH with Reflex; Future    Screening for malignant neoplasm of colon- referral [facility] per patient request to discuss c-scope. -     External Referral To Gastroenterology    Other orders  -     furosemide (LASIX) 40 MG tablet; Take 1 tablet by mouth nightly    Course of treatment, including any medications, possible imaging, referrals, and follow ups discussed with patient. All risks and benefits and possible side effects discussed with patient who agrees to plan of care and verbalizes understanding. All labs and imaging reviewed. No flowsheet data found. Return in about 3 months (around 5/15/2022). Cecily note this reports has been produced speech recognition software and may contain errors related to that system including errors in grammar, punctuation, and spelling, as well as words and phrases that may be appropriate. If there are any questions or concerns please feel free to contact the dictating provider for clarification.

## 2022-02-16 LAB
ESTIMATED AVERAGE GLUCOSE: 177.2 MG/DL
HBA1C MFR BLD: 7.8 %

## 2022-02-17 RX ORDER — DULAGLUTIDE 1.5 MG/.5ML
1.5 INJECTION, SOLUTION SUBCUTANEOUS WEEKLY
Qty: 12 PEN | Refills: 1 | Status: SHIPPED | OUTPATIENT
Start: 2022-02-17 | End: 2022-05-31

## 2022-02-21 ENCOUNTER — TELEPHONE (OUTPATIENT)
Dept: INTERNAL MEDICINE CLINIC | Age: 72
End: 2022-02-21

## 2022-02-21 DIAGNOSIS — E11.00 TYPE 2 DIABETES MELLITUS WITH HYPEROSMOLARITY WITHOUT COMA, WITH LONG-TERM CURRENT USE OF INSULIN (HCC): Primary | ICD-10-CM

## 2022-02-21 DIAGNOSIS — Z79.4 TYPE 2 DIABETES MELLITUS WITH HYPEROSMOLARITY WITHOUT COMA, WITH LONG-TERM CURRENT USE OF INSULIN (HCC): Primary | ICD-10-CM

## 2022-02-21 NOTE — TELEPHONE ENCOUNTER
Continue the Jardiance for now. Can we refer over the care coordinator to see if she can contact insurance for possible discount program/coupons?

## 2022-02-21 NOTE — TELEPHONE ENCOUNTER
Pt called and stated that the trulicity is too expensive for his budget. He would like to know if there is a cheaper alternative. Please advise.

## 2022-03-02 DIAGNOSIS — J30.9 CHRONIC ALLERGIC RHINITIS: ICD-10-CM

## 2022-03-02 RX ORDER — CETIRIZINE HYDROCHLORIDE 10 MG/1
10 TABLET ORAL DAILY
Qty: 90 TABLET | Refills: 5 | Status: ON HOLD | OUTPATIENT
Start: 2022-03-02 | End: 2022-09-24 | Stop reason: HOSPADM

## 2022-03-08 DIAGNOSIS — I48.91 ATRIAL FIBRILLATION, UNSPECIFIED TYPE (HCC): ICD-10-CM

## 2022-03-08 RX ORDER — CARVEDILOL 25 MG/1
25 TABLET ORAL 2 TIMES DAILY
Qty: 90 TABLET | Refills: 3 | Status: SHIPPED | OUTPATIENT
Start: 2022-03-08 | End: 2022-09-09 | Stop reason: SDUPTHER

## 2022-03-14 DIAGNOSIS — E78.2 MIXED HYPERLIPIDEMIA: ICD-10-CM

## 2022-03-14 RX ORDER — PRAVASTATIN SODIUM 80 MG/1
TABLET ORAL
Qty: 90 TABLET | Refills: 1 | Status: SHIPPED | OUTPATIENT
Start: 2022-03-14

## 2022-04-11 RX ORDER — ALLOPURINOL 100 MG/1
TABLET ORAL
Qty: 180 TABLET | Refills: 0 | Status: SHIPPED | OUTPATIENT
Start: 2022-04-11 | End: 2022-07-19 | Stop reason: SDUPTHER

## 2022-05-31 ENCOUNTER — OFFICE VISIT (OUTPATIENT)
Dept: INTERNAL MEDICINE CLINIC | Age: 72
End: 2022-05-31
Payer: COMMERCIAL

## 2022-05-31 VITALS
BODY MASS INDEX: 40.43 KG/M2 | RESPIRATION RATE: 20 BRPM | WEIGHT: 315 LBS | HEART RATE: 65 BPM | DIASTOLIC BLOOD PRESSURE: 78 MMHG | OXYGEN SATURATION: 98 % | SYSTOLIC BLOOD PRESSURE: 130 MMHG | HEIGHT: 74 IN

## 2022-05-31 DIAGNOSIS — E07.9 THYROID DISEASE: ICD-10-CM

## 2022-05-31 DIAGNOSIS — I48.91 ATRIAL FIBRILLATION, UNSPECIFIED TYPE (HCC): ICD-10-CM

## 2022-05-31 DIAGNOSIS — E11.00 TYPE 2 DIABETES MELLITUS WITH HYPEROSMOLARITY WITHOUT COMA, WITH LONG-TERM CURRENT USE OF INSULIN (HCC): ICD-10-CM

## 2022-05-31 DIAGNOSIS — J44.9 CHRONIC OBSTRUCTIVE PULMONARY DISEASE, UNSPECIFIED COPD TYPE (HCC): ICD-10-CM

## 2022-05-31 DIAGNOSIS — I50.9 CONGESTIVE HEART FAILURE, UNSPECIFIED HF CHRONICITY, UNSPECIFIED HEART FAILURE TYPE (HCC): ICD-10-CM

## 2022-05-31 DIAGNOSIS — E66.01 OBESITY, CLASS III, BMI 40-49.9 (MORBID OBESITY) (HCC): ICD-10-CM

## 2022-05-31 DIAGNOSIS — N18.30 STAGE 3 CHRONIC KIDNEY DISEASE, UNSPECIFIED WHETHER STAGE 3A OR 3B CKD (HCC): ICD-10-CM

## 2022-05-31 DIAGNOSIS — Z00.00 ENCOUNTER FOR PREVENTIVE HEALTH EXAMINATION: Primary | ICD-10-CM

## 2022-05-31 DIAGNOSIS — I25.119 CORONARY ARTERY DISEASE INVOLVING NATIVE CORONARY ARTERY OF NATIVE HEART WITH ANGINA PECTORIS (HCC): ICD-10-CM

## 2022-05-31 DIAGNOSIS — Z79.4 TYPE 2 DIABETES MELLITUS WITH HYPEROSMOLARITY WITHOUT COMA, WITH LONG-TERM CURRENT USE OF INSULIN (HCC): ICD-10-CM

## 2022-05-31 LAB
HBA1C MFR BLD: 7.5 %
INTERNATIONAL NORMALIZATION RATIO, POC: 2
PROTHROMBIN TIME, POC: 24.3

## 2022-05-31 PROCEDURE — 83036 HEMOGLOBIN GLYCOSYLATED A1C: CPT | Performed by: NURSE PRACTITIONER

## 2022-05-31 PROCEDURE — G0439 PPPS, SUBSEQ VISIT: HCPCS | Performed by: NURSE PRACTITIONER

## 2022-05-31 PROCEDURE — 85610 PROTHROMBIN TIME: CPT | Performed by: NURSE PRACTITIONER

## 2022-05-31 RX ORDER — LEVOTHYROXINE SODIUM 0.05 MG/1
50 TABLET ORAL DAILY
Qty: 90 TABLET | Refills: 1 | Status: SHIPPED | OUTPATIENT
Start: 2022-05-31

## 2022-05-31 ASSESSMENT — ENCOUNTER SYMPTOMS
EYES NEGATIVE: 1
WHEEZING: 0
CONSTIPATION: 0
CHEST TIGHTNESS: 0
COLOR CHANGE: 0
SHORTNESS OF BREATH: 0
DIARRHEA: 0
SINUS PRESSURE: 0
COUGH: 0
ABDOMINAL PAIN: 0
SINUS PAIN: 0
NAUSEA: 0
ABDOMINAL DISTENTION: 0
SORE THROAT: 0

## 2022-05-31 NOTE — PROGRESS NOTES
2022    Jose Giordano (:  1950) is a 71 y.o. male, here for a preventive medicine evaluation. Patient Active Problem List   Diagnosis    [facility]rial fibrillation ([facility]    CAD (coronary artery disease)    Morbid obesity ([address]    COPD (chronic obstructive pulmonary disease) ([address]    Sleep apnea    Type 2 diabetes mellitus ([facility]    Thyroid disease    Hyperlipidemia    Congestive heart failure, unspecified HF chronicity, unspecified heart failure type ([address]    Coronary artery disease involving native coronary artery of native heart with angina pectoris ([address]    Chronic renal disease, stage III ([facility] [[id number]]     Anticoagulation: Patient here for followup of chronic anticoagulation. Indication: atrial fibrillation  Bleeding Signs/Symptoms:  None  Missed Coumadin Doses:  None  Medication Changes:  no  Dietary Changes:  no    VITAL SIGNS reviewed    PLAN:  Continue current Coumadin regimen without change. Patient compliant with all current medications for diabetes. Blood sugars have been averaging around 150-250, and the patient reports checking their blood sugar 2-3 time daily. Patient has had no episodes of significant hypoglycemia, fainting, dizziness, or loss of consciousness. Continues on Metformin 1000 mg BID, Sliding scale NovoLog TID with meals and 80 units of N each evening/30 each morning as well as Jardiance 25 mg daily. We did attempt to add Trulicity 3 months ago his last appointment however this medication was far too expensive and opted to continue the additional Jardiance for now. His A1c has made a moderate improvement in the last 3 months from 7.8-7.2. The patient is currently taking all hypertensive medications compliantly without c/o of any side effects. Denies chest pain, dyspnea, edema, palpiations.  Blood pressure has been averaging  120-130/80 over the last several months.     Patient denies any chest pain, shortness of breath, myalgias, Patient is tolerating cholesterol medications without difficulty. Lab Results   Component Value Date    LDLCALC 48 02/15/2022    LDLDIRECT 94 10/18/2021     Patient continues to be compliant with synthroid regiment for her hypothyroidism. Denies c/o fatigue, weight gain or loss, heat or cold intolerance, diarrhea, or other GI symptoms. Lab Results   Component Value Date    TSH 1.66 04/03/2018     He did see Dr. Carol Pedraza earlier this spring for his ongoing bowel movement irregularities/inconsistencies. And the fact that he was due for 5-year colonoscopy. Patient states that they did an ultrasound and found a gastric polyp? Is scheduled to go back to this provider June 22 at [facility] for colonoscopy/EGD. Review of Systems   Constitutional: Negative for activity change, appetite change, fatigue and fever. HENT: Negative for congestion, sinus pressure, sinus pain and sore throat. Eyes: Negative. Respiratory: Negative for cough, chest tightness, shortness of breath and wheezing. Cardiovascular: Negative for chest pain and palpitations. Gastrointestinal: Negative for abdominal distention, abdominal pain, constipation, diarrhea and nausea. Genitourinary: Negative for difficulty urinating, dysuria, flank pain, frequency and hematuria. Musculoskeletal: Negative for arthralgias, gait problem, joint swelling and myalgias. Skin: Negative for color change and rash. Neurological: Negative for dizziness, light-headedness and numbness. Hematological: Negative. Psychiatric/Behavioral: Negative. Prior to Visit Medications    Medication Sig Taking?  Authorizing Provider   levothyroxine (SYNTHROID) 50 MCG tablet Take 1 tablet by mouth Daily Yes ANKITA Andrews CNP   empagliflozin (JARDIANCE) 25 MG tablet Take 1 tablet by mouth daily Yes ANKITA Andrews CNP   allopurinol (ZYLOPRIM) 100 MG tablet Take 1 tablet by mouth twice daily Yes ANKITA Andrews CNP   pravastatin (PRAVACHOL) 80 MG tablet TAKE 1 TABLET BY MOUTH ONCE DAILY Yes Ashley Brashear, APRN - CNP   carvedilol (COREG) 25 MG tablet Take 1 tablet by mouth 2 times daily Yes Ashley Brashear, APRN - CNP   cetirizine (EQ ALLERGY RELIEF, CETIRIZINE,) 10 MG tablet Take 1 tablet by mouth daily Yes Ashley Brashear, APRN - CNP   warfarin (COUMADIN) 2 MG tablet TAKE 1 TABLET BY MOUTH THREE TIMES A WEEK (MON,WED,&FRI) Yes Ashley Brashear, APRN - CNP   warfarin (COUMADIN) 3 MG tablet TAKE 1 TABLET BY MOUTH ONCE DAILY INDICATIONS  SAT/TUES/THURS/SUN  OR  AS  DIRECTED  PER    Yes Ashley Fajardoon, APRN - CNP   digoxin (LANOXIN) 125 MCG tablet Take 1 tablet by mouth daily Yes Ashley Fajardoon APRN - CNP   metFORMIN (GLUCOPHAGE) 1000 MG tablet TAKE 1 TABLET BY MOUTH TWICE DAILY WITH MEALS Yes Ashley Brashear APRN - CNP   furosemide (LASIX) 40 MG tablet Take 1 tablet by mouth nightly Yes Ashley Brashear, APRN - CNP   losartan (COZAAR) 100 MG tablet Take 1 tablet by mouth daily Yes Ashley Brashear, APRN - CNP   fluticasone (FLONASE) 50 MCG/ACT nasal spray 2 sprays by Each Nostril route daily Yes Ashley Brashear, APRN - CNP   tiZANidine (ZANAFLEX) 4 MG tablet Take 1 tablet by mouth 3 times daily as needed (shoulder pain) Yes Ashley Brashear APRN - CNP   potassium chloride (KLOR-CON M) 10 MEQ extended release tablet Take 1 tablet by mouth 2 times daily Yes Ashley Brashear, APRN - CNP   polyethylene glycol (GLYCOLAX) 17 GM/SCOOP powder Take 17 g by mouth daily Yes Ashley Brashear, APRN - CNP   insulin regular (NOVOLIN R) 100 UNIT/ML injection Inject 0-10 Units into the skin 3 times daily (before meals) Yes Ashley Brashear, APRN - CNP   insulin NPH (HUMULIN N;NOVOLIN N) 100 UNIT/ML injection vial Inject 80 Units into the skin nightly Yes Ashley Brashear, APRN - CNP   gemfibrozil (LOPID) 600 MG tablet Take 1 tablet by mouth 2 times daily Yes Ashley Brashear, APRN - CNP   albuterol sulfate HFA (VENTOLIN HFA) 108 (90 Base) MCG/ACT inhaler Inhale 2 puffs into the lungs every 6 hours as needed for Wheezing Yes ANKITA Conde CNP   insulin aspart (NOVOLOG) 100 UNIT/ML injection vial Inject into the skin Yes Historical Provider, MD   Lancets [facility] by D3EA route three times a day. Yes ANKITA Conde CNP   dilTIAZem (CARDIZEM) 120 MG tablet Take 1 tablet by mouth daily  ANKITA Conde CNP   gabapentin (NEURONTIN) 300 MG capsule Take 1 capsule by mouth 2 times daily for 30 days. Intended supply: 30 days  ANKITA Conde CNP   DULoxetine (CYMBALTA) 60 MG extended release capsule Take 1 capsule by mouth daily  ANKITA Conde CNP        No Known Allergies    Past Medical History:   Diagnosis Date    Allergic rhinitis     Anticoagulated on Coumadin     1/6/17-**[facility] to follow pt's PT/INRs, along w/prescribing his Coumadin. **    Anxiety     Arthritis     Atrial fibrillation (HCC)     On Coumadin.  CAD (coronary artery disease)     COPD (chronic obstructive pulmonary disease) (HCC)     Depression     Diabetes mellitus (HCC)     NIDDM- dx over 10 yrs ago- follows with Dr Sergio Gitelman Dupuytren's contracture of hand     Right hand    Family history of cardiovascular disease     Father-MI    GERD (gastroesophageal reflux disease)     H/O cardiac catheterization 03/2007    cardiac cath- stent LAD patent, Xcxydaxz-46-94%, RCA 40-50%    H/O cardiac catheterization 06/12/2008    cardiac cath- mild disease mid RCA    H/O cardiovascular stress test 4/10/2014    cardiolite- normal, no ischemia, EF63%    H/O cardiovascular stress test 09/21/2016    EF59% normal study  pt in atrial fib    H/O cardiovascular stress test 09/20/2017    EF 60%   afib    H/O cardiovascular stress test 11/07/2018    EF60% normal study    H/O Doppler ultrasound     1/11/2010-CAROTID_Intimal thickening but no significant atherosclerotic plaque noted in LAUREN. Doppler flow velocities within the LAUREN are WNL.   Intimal thickening but no significant atherosclerotic plaque noted in LICA. Doppler flow velociities within the LICA are WNL.  H/O echocardiogram 04/10/2014    EF 60%, normal LV systolic function, mild mitral and tricuspid insufficiencies, no pericardial effusion.  H/O echocardiogram 2016    EF60% normal study    H/O echocardiogram 2018    EF55-60% no significant valular disease    H/O hiatal hernia     Hx of Venous Doppler 2019    Significant reflux in Left Deep System, No reflux in right lower extremity, No DVT or SVT    Hyperlipidemia     Hypertension     Obesity     S/P PTCA (percutaneous transluminal coronary angioplasty) 2005    s/p PTCA with 3.5 X 16 TAXUS stent to LAD- follows with Dr Dereck Lynn Sleep apnea     had sleep study done 10+ yrs ago- has cpap but does not use it    Thyroid disease     hypothyroid       Past Surgical History:   Procedure Laterality Date    CARDIAC CATHETERIZATION  08    mild disease mid RCA,  stent to LAD patent,    CARDIAC CATHETERIZATION  3/07    Stent to LAD patent, Diagonal 40-50%, RCA 40-50%    CARDIAC CATHETERIZATION  3/05    COLONOSCOPY      COLONOSCOPY  16    1 polyp removed, diverticulosis and hemorrhoids noted    CORONARY ANGIOPLASTY WITH STENT PLACEMENT  2005    PTCA with 3.5 x 16 TAXUS stent to LAD    ENDOSCOPY, COLON, DIAGNOSTIC      egd    HAND SURGERY Right     [place] OPEN RX ANKLE DISLOCATN+FIXATN Right 6/3/2019    RIGHT OPEN REDUCTION INTERNAL FIXATION OF DISTAL TIBIA  AND FIBULA performed by Nir Alejandro MD at [address] [place] [address] S History     Socioeconomic History    Marital status:       Spouse name: Not on file    Number of children: 1    Years of education: Not on file    Highest education level: Not on file   Occupational History     Comment: RETIRED   Tobacco Use    Smoking status: Former Smoker     Packs/day: 1.00     Years: 10.00     Pack years: 10.00     Types: Cigarettes     Quit date: 1976     Years since quittin.4    Smokeless tobacco: Never Used   Vaping Use    Vaping Use: Never used   Substance and Sexual Activity    Alcohol use: Not Currently     Alcohol/week: 6.0 standard drinks     Types: 6 Cans of beer per week     Comment: caffeine 2 cups of tea a day     Drug use: No    Sexual activity: Never   Other Topics Concern    Not on file   Social History Narrative    Not on file     Social Determinants of Health     Financial Resource Strain: Low Risk     Difficulty of Paying Living Expenses: Not hard at all   Food Insecurity: No Food Insecurity    Worried About Running Out of Food in the Last Year: Never true    Mary Jo of Food in the Last Year: Never true   Transportation Needs:     Lack of Transportation (Medical): Not on file    Lack of Transportation (Non-Medical):  Not on file   Physical Activity:     Days of Exercise per Week: Not on file    Minutes of Exercise per Session: Not on file   Stress:     Feeling of Stress : Not on file   Social Connections:     Frequency of Communication with Friends and Family: Not on file    Frequency of Social Gatherings with Friends and Family: Not on file    Attends Jainism Services: Not on file    Active Member of Clubs or Organizations: Not on file    Attends Club or Organization Meetings: Not on file    Marital Status: Not on file   Intimate Partner Violence:     Fear of Current or Ex-Partner: Not on file    Emotionally Abused: Not on file    Physically Abused: Not on file    Sexually Abused: Not on file   Housing Stability:     Unable to Pay for Housing in the Last Year: Not on file    Number of [place] in the Last Year: Not on file    Unstable Housing in the Last Year: Not on file        Family History   Problem Relation Age of Onset    Cancer Mother         breast ca    Coronary Art Dis Father          MI    Heart Disease Father     Rheum Arthritis Other     Rheum Arthritis Sister     No Known Problems Brother     Rheum Arthritis Sister        ADVANCE DIRECTIVE: N, <no information>    Vitals:    05/31/22 1504   BP: 130/78   Pulse: 65   Resp: 20   SpO2: 98%   Weight: (!) 331 lb (150.1 kg)   Height: 6' 2\" (1.88 m)     Estimated body mass index is 42.5 kg/m² as calculated from the following:    Height as of this encounter: 6' 2\" (1.88 m). Weight as of this encounter: 331 lb (150.1 kg). Physical Exam  Constitutional:       General: He is not in acute distress. Appearance: He is well-developed. He is not diaphoretic. HENT:      Head: Normocephalic and atraumatic. Nose: Nose normal.   Eyes:      Conjunctiva/sclera: Conjunctivae normal.      Pupils: Pupils are equal, round, and reactive to light. Neck:      Thyroid: No thyromegaly. Cardiovascular:      Rate and Rhythm: Normal rate and regular rhythm. Heart sounds: Normal heart sounds. No murmur heard. Pulmonary:      Effort: Pulmonary effort is normal.      Breath sounds: Normal breath sounds. Abdominal:      General: Bowel sounds are normal. There is no distension. Palpations: Abdomen is soft. Tenderness: There is no abdominal tenderness. Musculoskeletal:         General: Normal range of motion. Lymphadenopathy:      Cervical: No cervical adenopathy. Skin:     General: Skin is warm and dry. Capillary Refill: Capillary refill takes less than 2 seconds. Findings: No rash. Neurological:      Mental Status: He is alert and oriented to person, place, and time. GCS: GCS eye subscore is 4. GCS verbal subscore is 5. GCS motor subscore is 6. Cranial Nerves: No cranial nerve deficit. Sensory: No sensory deficit. Coordination: Coordination normal.      Deep Tendon Reflexes: Reflexes normal.   Psychiatric:         Behavior: Behavior normal.         Thought Content: Thought content normal.         No flowsheet data found.     Lab Results   Component Value Date    CHOL 152 04/14/2021    CHOL 147 05/01/2020    CHOL 139 07/23/2018    CHOL 164 04/03/2018 CHOL 152 09/05/2017    CHOLFAST 130 02/15/2022    CHOLFAST 161 10/18/2021    CHOLFAST 116 01/15/2020    TRIG 212 04/14/2021    TRIG 236 05/01/2020    TRIG 175 07/23/2018    TRIGLYCFAST 228 02/15/2022    TRIGLYCFAST 131 10/18/2021    TRIGLYCFAST 230 01/15/2020    HDL 36 02/15/2022    HDL 41 10/18/2021    HDL 36 04/14/2021    LDLCALC 48 02/15/2022    LDLCALC 35 01/15/2020    LDLCALC 82 01/07/2019    GLUF 93 09/05/2017    GLUCOSE 122 02/15/2022    LABA1C 7.5 05/31/2022    LABA1C 7.8 02/15/2022    LABA1C 7.6 10/18/2021       The 10-year ASCVD risk score (Florence Lazo, et al., 2013) is: 37.4%    Values used to calculate the score:      Age: 71 years      Sex: Male      Is Non- : No      Diabetic: Yes      Tobacco smoker: No      Systolic Blood Pressure: 270 mmHg      Is BP treated: Yes      HDL Cholesterol: 36 mg/dL      Total Cholesterol: 130 mg/dL    Immunization History   Administered Date(s) Administered    COVID-19, Pfizer Purple top, DILUTE for use, 12+ yrs, 30mcg/0.3mL dose 03/09/2021, 03/30/2021, 11/17/2021    Influenza Vaccine, unspecified formulation 11/01/2017    Influenza Virus Vaccine 10/01/2018    Influenza, Quadv, adjuvanted, 65 yrs +, IM, PF (Fluad) 10/27/2021    Influenza, Triv, 3 Years and older, IM (Afluria (5 yrs and older) 10/01/2011, 10/15/2014    Pneumococcal Conjugate 13-valent ([id number]) 06/28/2017    Pneumococcal Polysaccharide ([id number]) 01/01/2008, 05/18/2013, 04/01/2019    Tdap (Boostrix, Adacel) 12/17/2011       Health Maintenance   Topic Date Due    Shingles vaccine (1 of 2) Never done    DTaP/Tdap/Td vaccine (2 - Td or Tdap) 12/17/2021    Diabetic retinal exam  01/08/2022    Annual Wellness Visit (AWV)  08/25/2022    Diabetic foot exam  10/27/2022    Lipids  02/15/2023    Depression Screen  02/15/2023    A1C test (Diabetic or Prediabetic)  05/31/2023    Colorectal Cancer Screen  05/19/2026    Flu vaccine  Completed    Pneumococcal 65+ years Vaccine  Completed    COVID-19 Vaccine  Completed    AAA screen  Completed    Hepatitis C screen  Completed    Hepatitis A vaccine  Aged Out    Hib vaccine  Aged Out    Meningococcal (ACWY) vaccine  Aged Out       Assessment & Plan   Encounter for preventive health examination-no acute concerns on exam.  Blood pressure and all vital signs are goal.  We will check preventative labs as below again and 3-month follow-up and address any concerns. -     CBC with Auto Differential; Future  -     Comprehensive Metabolic Panel; Future  -     Hemoglobin A1C; Future  -     Lipid, Fasting; Future  -     TSH with Reflex; Future    Thyroid disease-recheck TSH and T4. Adjust Synthroid as appropriate once resulted. -     levothyroxine (SYNTHROID) 50 MCG tablet; Take 1 tablet by mouth Daily, Disp-90 tablet, R-1Normal  -     TSH with Reflex; Future    Atrial fibrillation, unspecified type (HCC)-rate controlled with no obvious arrhythmias noted on cardiac exam today. INR in goal range. We will make no changes to current Coumadin dose. -     POCT INR  -     Protime-INR; Future    Type 2 diabetes mellitus with hyperosmolarity without coma, with long-term current use of insulin (HCC)-patient has made moderate improvement with the addition of Jardiance from an A1c of 7.8-7.2 in the last 3 months. We will continue to watch for now and watch carb control closely. If A1c is not 7 or less by 3-month follow-up I will revisit the addition of Trulicity and discussed the cost with our care coordinator.  -     empagliflozin (JARDIANCE) 25 MG tablet; Take 1 tablet by mouth daily, Disp-90 tablet, R-1Normal  -     CBC with Auto Differential; Future  -     Comprehensive Metabolic Panel; Future  -     Hemoglobin A1C; Future  -     Lipid, Fasting; Future  -     POCT glycosylated hemoglobin (Hb A1C)    Congestive heart failure, unspecified HF chronicity, unspecified heart failure type (HCC)-no signs of fluid overload.   Tolerating diuretic therapy well. We will monitor renal function and electrolyte status routinely with labs again today as below. -     CBC with Auto Differential; Future  -     Comprehensive Metabolic Panel; Future    Coronary artery disease involving native coronary artery of native heart   with angina pectoris (HCC)-discussed risk factor modification including good diabetic control, blood pressure control, and lipid management. See otherwise as discussed above and also strongly encouraged to keep his routine follow-ups as recommended per cardiology. -     CBC with Auto Differential; Future  -     Comprehensive Metabolic Panel; Future  -     Hemoglobin A1C; Future  -     Lipid, Fasting; Future    Obesity, Class III, BMI 40-49.9 (morbid obesity) ([facility])-diet and exercise measures discussed at length. Weight loss encouraged. -     CBC with Auto Differential; Future  -     Comprehensive Metabolic Panel; Future  -     Hemoglobin A1C; Future  -     Lipid, Fasting; Future    Stage 3 chronic kidney disease, unspecified whether stage 3a or 3b CKD ([facility])-ongoing routine renal function management given his diuretic use. -     CBC with Auto Differential; Future  -     Comprehensive Metabolic Panel; Future    Chronic obstructive pulmonary disease, unspecified COPD type ([facility])-well controlled with minimal Iliana demand. We will make no changes at this time. Course of treatment, including any medications, possible imaging, referrals, and follow ups discussed with patient. All risks and benefits and possible side effects discussed with patient who agrees to plan of care and verbalizes understanding. All labs and imaging reviewed. Please note this reports has been produced speech recognition software and may contain errors related to that system including errors in grammar, punctuation, and spelling, as well as words and phrases that may be appropriate.  If there are any questions or concerns please feel free to contact the dictating provider for clarification. Return in about 3 months (around 8/31/2022).          --Lucy Toro, ANKITA - CNP

## 2022-07-13 RX ORDER — DULOXETIN HYDROCHLORIDE 60 MG/1
60 CAPSULE, DELAYED RELEASE ORAL DAILY
Qty: 90 CAPSULE | Refills: 1 | Status: ON HOLD | OUTPATIENT
Start: 2022-07-13 | End: 2022-09-24 | Stop reason: HOSPADM

## 2022-07-19 RX ORDER — ALLOPURINOL 100 MG/1
TABLET ORAL
Qty: 180 TABLET | Refills: 0 | Status: ON HOLD | OUTPATIENT
Start: 2022-07-19 | End: 2022-09-24 | Stop reason: HOSPADM

## 2022-07-25 RX ORDER — GLUCOSAMINE HCL/CHONDROITIN SU 500-400 MG
CAPSULE ORAL
Qty: 200 STRIP | Refills: 0 | Status: SHIPPED | OUTPATIENT
Start: 2022-07-25 | End: 2022-07-26 | Stop reason: SDUPTHER

## 2022-07-25 RX ORDER — LANCETS 30 GAUGE
EACH MISCELLANEOUS
Qty: 100 EACH | Refills: 3 | Status: SHIPPED | OUTPATIENT
Start: 2022-07-25

## 2022-07-26 DIAGNOSIS — E11.9 TYPE 2 DIABETES MELLITUS WITHOUT COMPLICATION, UNSPECIFIED WHETHER LONG TERM INSULIN USE (HCC): Primary | ICD-10-CM

## 2022-07-26 RX ORDER — GLUCOSAMINE HCL/CHONDROITIN SU 500-400 MG
CAPSULE ORAL
Qty: 200 STRIP | Refills: 0 | Status: ON HOLD | OUTPATIENT
Start: 2022-07-26 | End: 2022-09-24 | Stop reason: HOSPADM

## 2022-08-05 ENCOUNTER — OFFICE (OUTPATIENT)
Dept: URBAN - METROPOLITAN AREA CLINIC 13 | Facility: CLINIC | Age: 72
End: 2022-08-05

## 2022-08-05 VITALS
SYSTOLIC BLOOD PRESSURE: 150 MMHG | WEIGHT: 315 LBS | OXYGEN SATURATION: 97 % | DIASTOLIC BLOOD PRESSURE: 80 MMHG | HEART RATE: 81 BPM | HEIGHT: 74 IN

## 2022-08-05 DIAGNOSIS — I48.91 UNSPECIFIED ATRIAL FIBRILLATION: ICD-10-CM

## 2022-08-05 DIAGNOSIS — Z79.01 LONG TERM (CURRENT) USE OF ANTICOAGULANTS: ICD-10-CM

## 2022-08-05 DIAGNOSIS — E66.3 OVERWEIGHT: ICD-10-CM

## 2022-08-05 DIAGNOSIS — K82.4 CHOLESTEROLOSIS OF GALLBLADDER: ICD-10-CM

## 2022-08-05 DIAGNOSIS — K59.00 CONSTIPATION, UNSPECIFIED: ICD-10-CM

## 2022-08-05 DIAGNOSIS — K76.0 FATTY (CHANGE OF) LIVER, NOT ELSEWHERE CLASSIFIED: ICD-10-CM

## 2022-08-05 DIAGNOSIS — K21.9 GASTRO-ESOPHAGEAL REFLUX DISEASE WITHOUT ESOPHAGITIS: ICD-10-CM

## 2022-08-05 PROCEDURE — 99214 OFFICE O/P EST MOD 30 MIN: CPT | Performed by: INTERNAL MEDICINE

## 2022-08-08 ENCOUNTER — TELEPHONE (OUTPATIENT)
Dept: CARDIOLOGY CLINIC | Age: 72
End: 2022-08-08

## 2022-08-08 NOTE — TELEPHONE ENCOUNTER
Cardiologist: Dr. Dayday Feliz  Surgeon: Dr. Blaire Gracia  Surgery: Colonoscopy  Anesthesia: Propofol  Date: Pending  FAX# [phone]  Ph#     Last OV 11/18/2021 w/Haydee      -     CORONARY ARTERY DISEASE:  asymptomatic     All available  tests in chart reviewed. Management discussed . Testing ordered  no                                  -  Hypertension: Patients blood pressure is normal. Patient is advised about low sodium diet. Present medical regimen will not be changed. -   DIABETES MELLITUS: Available pertinent lab data reviewed   and  patient was given dietary advice . Advised to check blood glucose level on a regular basis. -   Changes  in medicines made: No           - Atrial fibrillation, pt is not compliant with meds. Patient does not have symptoms from atrial fibrillation      -  LIPID MANAGEMENT:  Importance of lipid levels discussed with patient   and patient was given dietary advice. NCEP- ATP III guidelines reviewed with patient. -   Changes  in medicines made: No                         Mortality from the morbid obesity is very high:        Last EKG- 12/2/2021    NM-11/7/2018   Normal Lexiscan nuclear scintigraphic study suggestive of normal myocardial    perfusion. Gated images demonstrate normal left ventricular systolic function with EF      of 60 %. Echo- 11/7/2018   Left ventricular systolic function is normal with an ejection fraction of   55-60%. No significant valvular disease or diastolic dysfunction noted. Mild concentric left ventricular hypertrophy. The left atrium is mildly dilated. No evidence of pericardial effusion.       Cath- 6/2008      Coumadin

## 2022-08-22 DIAGNOSIS — I48.91 ATRIAL FIBRILLATION, UNSPECIFIED TYPE (HCC): ICD-10-CM

## 2022-08-22 RX ORDER — DILTIAZEM HYDROCHLORIDE 120 MG/1
120 TABLET, FILM COATED ORAL DAILY
Qty: 90 TABLET | Refills: 1 | Status: ON HOLD | OUTPATIENT
Start: 2022-08-22 | End: 2022-09-24 | Stop reason: HOSPADM

## 2022-08-26 ENCOUNTER — NURSE ONLY (OUTPATIENT)
Dept: CARDIOLOGY CLINIC | Age: 72
End: 2022-08-26
Payer: COMMERCIAL

## 2022-08-26 ENCOUNTER — HOSPITAL ENCOUNTER (OUTPATIENT)
Age: 72
Discharge: HOME | End: 2022-08-26
Payer: COMMERCIAL

## 2022-08-26 VITALS — DIASTOLIC BLOOD PRESSURE: 78 MMHG | HEART RATE: 91 BPM | SYSTOLIC BLOOD PRESSURE: 162 MMHG

## 2022-08-26 DIAGNOSIS — I25.119 CORONARY ARTERY DISEASE INVOLVING NATIVE CORONARY ARTERY OF NATIVE HEART WITH ANGINA PECTORIS (HCC): Primary | ICD-10-CM

## 2022-08-26 DIAGNOSIS — E66.01 OBESITY, CLASS III, BMI 40-49.9 (MORBID OBESITY) (HCC): ICD-10-CM

## 2022-08-26 DIAGNOSIS — N18.30 STAGE 3 CHRONIC KIDNEY DISEASE, UNSPECIFIED WHETHER STAGE 3A OR 3B CKD (HCC): ICD-10-CM

## 2022-08-26 DIAGNOSIS — Z79.4 TYPE 2 DIABETES MELLITUS WITH HYPEROSMOLARITY WITHOUT COMA, WITH LONG-TERM CURRENT USE OF INSULIN (HCC): ICD-10-CM

## 2022-08-26 DIAGNOSIS — I10 PRIMARY HYPERTENSION: ICD-10-CM

## 2022-08-26 DIAGNOSIS — E11.00 TYPE 2 DIABETES MELLITUS WITH HYPEROSMOLARITY WITHOUT COMA, WITH LONG-TERM CURRENT USE OF INSULIN (HCC): ICD-10-CM

## 2022-08-26 DIAGNOSIS — I48.91 ATRIAL FIBRILLATION, UNSPECIFIED TYPE (HCC): ICD-10-CM

## 2022-08-26 DIAGNOSIS — I50.9 CONGESTIVE HEART FAILURE, UNSPECIFIED HF CHRONICITY, UNSPECIFIED HEART FAILURE TYPE (HCC): ICD-10-CM

## 2022-08-26 DIAGNOSIS — I25.119 CORONARY ARTERY DISEASE INVOLVING NATIVE CORONARY ARTERY OF NATIVE HEART WITH ANGINA PECTORIS (HCC): ICD-10-CM

## 2022-08-26 DIAGNOSIS — Z00.00 ENCOUNTER FOR PREVENTIVE HEALTH EXAMINATION: ICD-10-CM

## 2022-08-26 DIAGNOSIS — E07.9 THYROID DISEASE: ICD-10-CM

## 2022-08-26 LAB
ALBUMIN SERPL-MCNC: 4.6 GM/DL (ref 3.4–5)
ALP BLD-CCNC: 151 IU/L (ref 40–129)
ALT SERPL-CCNC: 19 U/L (ref 10–40)
ANION GAP SERPL CALCULATED.3IONS-SCNC: 9 MMOL/L (ref 4–16)
AST SERPL-CCNC: 25 IU/L (ref 15–37)
BASOPHILS ABSOLUTE: 0.1 K/CU MM
BASOPHILS RELATIVE PERCENT: 0.9 % (ref 0–1)
BILIRUB SERPL-MCNC: 0.5 MG/DL (ref 0–1)
BUN BLDV-MCNC: 18 MG/DL (ref 6–23)
CALCIUM SERPL-MCNC: 9.4 MG/DL (ref 8.3–10.6)
CHLORIDE BLD-SCNC: 102 MMOL/L (ref 99–110)
CHOLESTEROL, FASTING: 160 MG/DL
CO2: 26 MMOL/L (ref 21–32)
CREAT SERPL-MCNC: 1 MG/DL (ref 0.9–1.3)
DIFFERENTIAL TYPE: ABNORMAL
EOSINOPHILS ABSOLUTE: 0.2 K/CU MM
EOSINOPHILS RELATIVE PERCENT: 2.3 % (ref 0–3)
GFR AFRICAN AMERICAN: >60 ML/MIN/1.73M2
GFR NON-AFRICAN AMERICAN: >60 ML/MIN/1.73M2
GLUCOSE BLD-MCNC: 109 MG/DL (ref 70–99)
HCT VFR BLD CALC: 45.8 % (ref 42–52)
HDLC SERPL-MCNC: 43 MG/DL
HEMOGLOBIN: 15.2 GM/DL (ref 13.5–18)
IMMATURE NEUTROPHIL %: 1 % (ref 0–0.43)
INR BLD: 2.62 INDEX
LDL CHOLESTEROL CALCULATED: 67 MG/DL
LYMPHOCYTES ABSOLUTE: 2.4 K/CU MM
LYMPHOCYTES RELATIVE PERCENT: 27.5 % (ref 24–44)
MCH RBC QN AUTO: 34.2 PG (ref 27–31)
MCHC RBC AUTO-ENTMCNC: 33.2 % (ref 32–36)
MCV RBC AUTO: 102.9 FL (ref 78–100)
MONOCYTES ABSOLUTE: 0.9 K/CU MM
MONOCYTES RELATIVE PERCENT: 10.2 % (ref 0–4)
NUCLEATED RBC %: 0.2 %
PDW BLD-RTO: 15.1 % (ref 11.7–14.9)
PLATELET # BLD: 246 K/CU MM (ref 140–440)
PMV BLD AUTO: 9.9 FL (ref 7.5–11.1)
POTASSIUM SERPL-SCNC: 4.3 MMOL/L (ref 3.5–5.1)
PROTHROMBIN TIME: 34.1 SECONDS (ref 11.7–14.5)
RBC # BLD: 4.45 M/CU MM (ref 4.6–6.2)
SEGMENTED NEUTROPHILS ABSOLUTE COUNT: 5.2 K/CU MM
SEGMENTED NEUTROPHILS RELATIVE PERCENT: 58.1 % (ref 36–66)
SODIUM BLD-SCNC: 137 MMOL/L (ref 135–145)
TOTAL IMMATURE NEUTOROPHIL: 0.09 K/CU MM
TOTAL NUCLEATED RBC: 0 K/CU MM
TOTAL PROTEIN: 7.5 GM/DL (ref 6.4–8.2)
TRIGLYCERIDE, FASTING: 249 MG/DL
TSH HIGH SENSITIVITY: 3.87 UIU/ML (ref 0.27–4.2)
WBC # BLD: 8.9 K/CU MM (ref 4–10.5)

## 2022-08-26 PROCEDURE — 84443 ASSAY THYROID STIM HORMONE: CPT

## 2022-08-26 PROCEDURE — 1123F ACP DISCUSS/DSCN MKR DOCD: CPT | Performed by: NURSE PRACTITIONER

## 2022-08-26 PROCEDURE — 93000 ELECTROCARDIOGRAM COMPLETE: CPT | Performed by: NURSE PRACTITIONER

## 2022-08-26 PROCEDURE — 80053 COMPREHEN METABOLIC PANEL: CPT

## 2022-08-26 PROCEDURE — 85025 COMPLETE CBC W/AUTO DIFF WBC: CPT

## 2022-08-26 PROCEDURE — 36415 COLL VENOUS BLD VENIPUNCTURE: CPT

## 2022-08-26 PROCEDURE — 99214 OFFICE O/P EST MOD 30 MIN: CPT | Performed by: NURSE PRACTITIONER

## 2022-08-26 PROCEDURE — 80061 LIPID PANEL: CPT

## 2022-08-26 PROCEDURE — 85610 PROTHROMBIN TIME: CPT

## 2022-08-26 ASSESSMENT — ENCOUNTER SYMPTOMS
SHORTNESS OF BREATH: 0
CHANGE IN BOWEL HABIT: 1
CONSTIPATION: 1
DIARRHEA: 1
BLOATING: 1
ORTHOPNEA: 0

## 2022-08-26 NOTE — PATIENT INSTRUCTIONS
Please be informed that if you contact our office outside of normal business hours the physician on call cannot help with any scheduling or rescheduling issues, procedure instruction questions or any type of medication issue. We advise you for any urgent/emergency that you go to the nearest emergency room! PLEASE CALL OUR OFFICE DURING NORMAL BUSINESS HOURS    Monday - Friday   8 am to 5 pm    [place]: [phone]    [place]:  [phone]  **It is YOUR responsibilty to bring medication bottles and/or updated medication list to [address] [place] [address]. This will allow us to better serve you and all your healthcare needs**  [facility] Locations - No appointment necessary. Sites open Monday to Friday. Call your preferred location for test preparation, business hours and other information you need. [employer] accepts [employer]. [place] [facility] [address] Chloe Wang, [address] [facility]  Phone: [phone] [facility] [address] [facility]  [address]. [place], [address] [facility]  Phone: [phone]     Please hold on to these instructions the  will call you within 1-9 business days when we receive authorization from your insurance. Nuclear Stress Test    WHAT TO EXPECT:   You will need to confirm the test or it could be cancelled. This test will take approximately 2 hours: 1 hour in the AM &    1 hour in the PM. You will be given a time by the Technologist after the first part is completed to come back. You will be given a medication, through an IV in the hand, this will safely simulate exercise. This IV is also needed to inject the radioactive isotope unless you are able toe walk the treadmill. You will receive an injection in the AM & PM before the pictures.    Using a special camera, you will have one set of pictures of your heart taken in the AM and a set of pictures in the PM.     PREPARATION FOR TEST:  Eat a light breakfast such as water or juice and toast.  If you are DIABETIC: Eat a normal breakfast with NO CAFFEINE and take your insulin as normal.   AVOID ALL FOODS & DRINKS containing CAFFEINE 12 HOURS PRIOR TO THE TEST: Including coffee, Tea, Jesusita and other soft drinks even those labeled  caffeine free or decaffeinated. HOLD THESE MEDICATIONS Persantine & Theophylline (Theodur)  24 hours prior & bring your inhaler with you.    The physician will specify if the following Beta blockers need to be held for 24 hours prior to test: Coreg (carvedilol)

## 2022-08-26 NOTE — PROGRESS NOTES
8/26/2022  Primary cardiologist: Dr. Dwight Fishman    CC:   Lissa Dill  is an established 71 y.o.  male here for a chest pain       SUBJECTIVE/OBJECTIVE:  Lissa Dill is a 71 y.o. male with a history of coonary artery disease s/p PCI ( 2005)- hypertension- hyperlipidemia       HPI :   Lissa Dill reports over the past few weeks he has noticed chest pain-located the left chest-no radiating -no aggravating factors- goes away on own- lasting for 4 minutes- no associated symptoms. Pain is not reproducible to touch. he has noticed shortness of breath with activity as well. He is planning for a colonoscopy in the near future d/t change in bowel habits    Review of Systems   Constitutional: Negative for diaphoresis and malaise/fatigue. Cardiovascular:  Positive for chest pain and dyspnea on exertion. Negative for claudication, irregular heartbeat, leg swelling, near-syncope, orthopnea, palpitations and paroxysmal nocturnal dyspnea. Respiratory:  Negative for shortness of breath. Gastrointestinal:  Positive for bloating, change in bowel habit, constipation and diarrhea. Neurological:  Negative for dizziness and light-headedness. Vitals:    08/26/22 1121   BP: (!) 162/78   Site: Left Upper Arm   Position: Sitting   Cuff Size: Large Adult   Pulse: 91     No flowsheet data found. Wt Readings from Last 3 Encounters:   05/31/22 (!) 331 lb (150.1 kg)   02/15/22 (!) 332 lb (150.6 kg)   12/02/21 (!) 340 lb (154.2 kg)     There is no height or weight on file to calculate BMI. Physical Exam  Constitutional:       Appearance: He is obese. HENT:      Head: Normocephalic and atraumatic. Eyes:      Extraocular Movements: Extraocular movements intact. Pupils: Pupils are equal, round, and reactive to light. Neck:      Vascular: No carotid bruit. Cardiovascular:      Rate and Rhythm: Normal rate. Rhythm irregular. Pulses: Normal pulses.    Pulmonary:      Effort: Pulmonary effort is normal.      Breath sounds: Normal breath sounds. No wheezing or rales. Abdominal:      General: There is no distension. Palpations: Abdomen is soft. Tenderness: There is no abdominal tenderness. Musculoskeletal:         General: Normal range of motion. Cervical back: No tenderness. Right lower leg: No edema. Left lower leg: No edema. Skin:     General: Skin is warm and dry. Capillary Refill: Capillary refill takes less than 2 seconds. Neurological:      General: No focal deficit present. Mental Status: He is alert and oriented to person, place, and time. Psychiatric:         Mood and Affect: Mood normal.         Behavior: Behavior normal.              Current Outpatient Medications   Medication Sig Dispense Refill    dilTIAZem (CARDIZEM) 120 MG tablet Take 1 tablet by mouth daily 90 tablet 1    blood glucose monitor kit and supplies Dispense sufficient amount for indicated testing frequency plus additional to accommodate PRN testing needs. Dispense all needed supplies to include: monitor, strips, lancing device, lancets, control solutions, alcohol swabs. 1 kit 0    blood glucose monitor strips Test 2 times a day & as needed for symptoms of irregular blood glucose. Dispense sufficient amount for indicated testing frequency plus additional to accommodate PRN testing needs. 200 strip 0    Lancets MISC by D3EA route three times a day.  100 each 3    allopurinol (ZYLOPRIM) 100 MG tablet Take 1 tab by mouth twice daily 180 tablet 0    DULoxetine (CYMBALTA) 60 MG extended release capsule Take 1 capsule by mouth daily 90 capsule 1    levothyroxine (SYNTHROID) 50 MCG tablet Take 1 tablet by mouth Daily 90 tablet 1    empagliflozin (JARDIANCE) 25 MG tablet Take 1 tablet by mouth daily 90 tablet 1    pravastatin (PRAVACHOL) 80 MG tablet TAKE 1 TABLET BY MOUTH ONCE DAILY 90 tablet 1    carvedilol (COREG) 25 MG tablet Take 1 tablet by mouth 2 times daily 90 tablet 3    cetirizine (EQ ALLERGY RELIEF, CETIRIZINE,) 10 MG tablet Take 1 tablet by mouth daily 90 tablet 5    warfarin (COUMADIN) 2 MG tablet TAKE 1 TABLET BY MOUTH THREE TIMES A WEEK (MON,WED,&FRI) 30 tablet 5    warfarin (COUMADIN) 3 MG tablet TAKE 1 TABLET BY MOUTH ONCE DAILY INDICATIONS  SAT/TUES/THURS/SUN  OR  AS  DIRECTED  PER   30 tablet 5    digoxin (LANOXIN) 125 MCG tablet Take 1 tablet by mouth daily 90 tablet 3    metFORMIN (GLUCOPHAGE) 1000 MG tablet TAKE 1 TABLET BY MOUTH TWICE DAILY WITH MEALS 180 tablet 2    furosemide (LASIX) 40 MG tablet Take 1 tablet by mouth nightly 30 tablet 5    losartan (COZAAR) 100 MG tablet Take 1 tablet by mouth daily 90 tablet 3    fluticasone (FLONASE) 50 MCG/ACT nasal spray 2 sprays by Each Nostril route daily 1 Bottle 2    tiZANidine (ZANAFLEX) 4 MG tablet Take 1 tablet by mouth 3 times daily as needed (shoulder pain) 30 tablet 0    potassium chloride (KLOR-CON M) 10 MEQ extended release tablet Take 1 tablet by mouth 2 times daily 60 tablet 5    polyethylene glycol (GLYCOLAX) 17 GM/SCOOP powder Take 17 g by mouth daily 225 g 2    insulin regular (NOVOLIN R) 100 UNIT/ML injection Inject 0-10 Units into the skin 3 times daily (before meals) 1 vial 3    insulin NPH (HUMULIN N;NOVOLIN N) 100 UNIT/ML injection vial Inject 80 Units into the skin nightly 3 vial 2    gemfibrozil (LOPID) 600 MG tablet Take 1 tablet by mouth 2 times daily 180 tablet 1    albuterol sulfate HFA (VENTOLIN HFA) 108 (90 Base) MCG/ACT inhaler Inhale 2 puffs into the lungs every 6 hours as needed for Wheezing 1 Inhaler 3    insulin aspart (NOVOLOG) 100 UNIT/ML injection vial Inject into the skin      gabapentin (NEURONTIN) 300 MG capsule Take 1 capsule by mouth 2 times daily for 30 days. Intended supply: 30 days 60 capsule 2     No current facility-administered medications for this visit. All pertinent data reviewed and discussed with patient       ASSESSMENT/PLAN:      1.  Coronary artery disease involving native coronary artery of native heart with angina pectoris ([address]  Has noted episodes of chest pain recently with activity. Episodes are concerning for angina. Has known history of coronary disease with PCI in the remote past.  Recommend check Anam Isbell as he is unable to walk the treadmill. We will follow-up for results. Continue with carvedilol  - EKG 12 lead-atrial fibrillation right bundle branch block  - NM MYOCARDIAL SPECT REST EXERCISE OR RX; Future    2. Atrial fibrillation, unspecified type ([address]  EKG atrial flutter with controlled rate. Continue with Lanoxin and Cardizem. Recommend to continue with anticoagulation with warfarin for stroke prevention    3. Primary hypertension  Blood pressure is elevated today. One-time reading of elevated blood pressure. Recommended continue to monitor blood pressure and will readjust medications at next office visit if blood pressure continues to be elevated. Obesity  BMI 42  Morbidity and mortality increase with morbid obesity and weight loss is encouraged      Tests ordered: Lexiscan  Follow-up after testing    Signed:  ANKITA Mcdowell CNP, 8/26/2022, 11:29 AM    An electronic signature was used to authenticate this note. Please note this report has been partially produced using speech recognition software and may contain errors related to that system including errors in grammar, punctuation, and spelling, as well as words and phrases that may be inappropriate. If there are any questions or concerns please feel free to contact the dictating provider for clarification.

## 2022-08-29 ENCOUNTER — TELEPHONE (OUTPATIENT)
Dept: INTERNAL MEDICINE CLINIC | Age: 72
End: 2022-08-29

## 2022-08-29 RX ORDER — GLIPIZIDE 5 MG/1
5 TABLET ORAL DAILY
Qty: 30 TABLET | Refills: 3 | Status: ON HOLD | OUTPATIENT
Start: 2022-08-29 | End: 2022-09-24 | Stop reason: HOSPADM

## 2022-08-29 NOTE — TELEPHONE ENCOUNTER
Pt called [facility] about new prescription( Januvia) that was called and and stated that new medication will be $340.00. Pt states unfortunately he cannot afford that medication either. Please advise.

## 2022-08-29 NOTE — TELEPHONE ENCOUNTER
Pt called and stated that his jardiance is 380.00 per [facility] pharmacy. He would like to know if there is anything cheaper. Please advise.

## 2022-08-30 ENCOUNTER — TELEMEDICINE (OUTPATIENT)
Dept: INTERNAL MEDICINE CLINIC | Age: 72
End: 2022-08-30
Payer: COMMERCIAL

## 2022-08-30 DIAGNOSIS — Z00.00 MEDICARE ANNUAL WELLNESS VISIT, SUBSEQUENT: Primary | ICD-10-CM

## 2022-08-30 PROCEDURE — 1123F ACP DISCUSS/DSCN MKR DOCD: CPT | Performed by: NURSE PRACTITIONER

## 2022-08-30 PROCEDURE — G0439 PPPS, SUBSEQ VISIT: HCPCS | Performed by: NURSE PRACTITIONER

## 2022-08-30 SDOH — ECONOMIC STABILITY: FOOD INSECURITY: WITHIN THE PAST 12 MONTHS, THE FOOD YOU BOUGHT JUST DIDN'T LAST AND YOU DIDN'T HAVE MONEY TO GET MORE.: NEVER TRUE

## 2022-08-30 SDOH — ECONOMIC STABILITY: FOOD INSECURITY: WITHIN THE PAST 12 MONTHS, YOU WORRIED THAT YOUR FOOD WOULD RUN OUT BEFORE YOU GOT MONEY TO BUY MORE.: NEVER TRUE

## 2022-08-30 ASSESSMENT — PATIENT HEALTH QUESTIONNAIRE - PHQ9
DEPRESSION UNABLE TO ASSESS: FUNCTIONAL CAPACITY MOTIVATION LIMITS ACCURACY
SUM OF ALL RESPONSES TO PHQ9 QUESTIONS 1 & 2: 0
SUM OF ALL RESPONSES TO PHQ QUESTIONS 1-9: 0
1. LITTLE INTEREST OR PLEASURE IN DOING THINGS: 0
2. FEELING DOWN, DEPRESSED OR HOPELESS: 0

## 2022-08-30 ASSESSMENT — SOCIAL DETERMINANTS OF HEALTH (SDOH): HOW HARD IS IT FOR YOU TO PAY FOR THE VERY BASICS LIKE FOOD, HOUSING, MEDICAL CARE, AND HEATING?: NOT HARD AT ALL

## 2022-08-30 NOTE — PATIENT INSTRUCTIONS
Body Mass Index: Care Instructions  Your Care Instructions     Body mass index (BMI) can help you see if your weight is raising your risk for health problems. It uses a formula to compare how much you weigh with how tallyou are. A BMI lower than 18.5 is considered underweight. A BMI between 18.5 and 24.9 is considered healthy. A BMI between 25 and 29.9 is considered overweight. A BMI of 30 or higher is considered obese. If your BMI is in the normal range, it means that you have a lower risk for weight-related health problems. If your BMI is in the overweight or obese range, you may be at increased risk for weight-related health problems, such as high blood pressure, heart disease, stroke, arthritis or joint pain, and diabetes. If your BMI is in the underweight range, you may be at increased risk for health problems such as fatigue, lower protection (immunity) againstillness, muscle loss, bone loss, hair loss, and hormone problems. BMI is just one measure of your risk for weight-related health problems. You may be at higher risk for health problems if you are not active, you eat anunhealthy diet, or you drink too much alcohol or use tobacco products. Follow-up care is a key part of your treatment and safety. Be sure to make and go to all appointments, and call your doctor if you are having problems. It's also a good idea to know your test results and keep alist of the medicines you take. How can you care for yourself at home? Practice healthy eating habits. This includes eating plenty of fruits, vegetables, whole grains, lean protein, and low-fat dairy. If your doctor recommends it, get more exercise. Walking is a good choice. Bit by bit, increase the amount you walk every day. Try for at least 30 minutes on most days of the week. Do not smoke. Smoking can increase your risk for health problems. If you need help quitting, talk to your doctor about stop-smoking programs and medicines.  These can increase your chances of quitting for good. Limit alcohol to 2 drinks a day for men and 1 drink a day for women. Too much alcohol can cause health problems. If you have a BMI higher than 25  Your doctor may do other tests to check your risk for weight-related health problems. This may include measuring the distance around your waist. A waist measurement of more than 40 inches in men or 35 inches in women can increase the risk of weight-related health problems. Talk with your doctor about steps you can take to stay healthy or improve your health. You may need to make lifestyle changes to lose weight and stay healthy, such as changing your diet and getting regular exercise. If you have a BMI lower than 18.5  Your doctor may do other tests to check your risk for health problems. Talk with your doctor about steps you can take to stay healthy or improve your health. You may need to make lifestyle changes to gain or maintain weight and stay healthy, such as getting more healthy foods in your diet and doing exercises to build muscle. Where can you learn more? Go to https://Gra[employer] WorldflorYouScience.360imaging. org and sign in to your [employer] account. Enter S176 in the [employer] box to learn more about \"Body Mass Index: Care Instructions. \"     If you do not have an account, please click on the \"Sign Up Now\" link. Current as of: December 27, 2021               Content Version: 13.3  © 2006-2022 Healthwise, Incorporated. Care instructions adapted under license by [facility] ([facility]). If you have questions about a medical condition or this instruction, always ask your healthcare professional. [place] [address] any warranty or liability for your use of this information. DASH Diet: Care Instructions  Your Care Instructions     The DASH diet is an eating plan that can help lower your blood pressure. DASH stands for Dietary Approaches to Stop Hypertension. Hypertension is high bloodpressure.   The DASH diet focuses on eating foods that are high in calcium, potassium, and magnesium. These nutrients can lower blood pressure. The foods that are highest in these nutrients are fruits, vegetables, low-fat dairy products, nuts, seeds, and legumes. But taking calcium, potassium, and magnesium supplements instead of eating foods that are high in those nutrients does not have the same effect. The DASH diet also includes whole grains, fish, and poultry. The DASH diet is one of several lifestyle changes your doctor may recommend to lower your high blood pressure. Your doctor may also want you to decrease the amount of sodium in your diet. Lowering sodium while following the DASH dietcan lower blood pressure even further than just the DASH diet alone. Follow-up care is a key part of your treatment and safety. Be sure to make and go to all appointments, and call your doctor if you are having problems. It's also a good idea to know your test results and keep alist of the medicines you take. How can you care for yourself at home? Following the DASH diet  Eat 4 to 5 servings of fruit each day. A serving is 1 medium-sized piece of fruit, ½ cup chopped or canned fruit, 1/4 cup dried fruit, or 4 ounces (½ cup) of fruit juice. Choose fruit more often than fruit juice. Eat 4 to 5 servings of vegetables each day. A serving is 1 cup of lettuce or raw leafy vegetables, ½ cup of chopped or cooked vegetables, or 4 ounces (½ cup) of vegetable juice. Choose vegetables more often than vegetable juice. Get 2 to 3 servings of low-fat and fat-free dairy each day. A serving is 8 ounces of milk, 1 cup of yogurt, or 1 ½ ounces of cheese. Eat 6 to 8 servings of grains each day. A serving is 1 slice of bread, 1 ounce of dry cereal, or ½ cup of cooked rice, pasta, or cooked cereal. Try to choose whole-grain products as much as possible. Limit lean meat, poultry, and fish to 2 servings each day.  A serving is 3 ounces, about the size of a deck of cards. Eat 4 to 5 servings of nuts, seeds, and legumes (cooked dried beans, lentils, and split peas) each week. A serving is 1/3 cup of nuts, 2 tablespoons of seeds, or ½ cup of cooked beans or peas. Limit fats and oils to 2 to 3 servings each day. A serving is 1 teaspoon of vegetable oil or 2 tablespoons of salad dressing. Limit sweets and added sugars to 5 servings or less a week. A serving is 1 tablespoon jelly or jam, ½ cup sorbet, or 1 cup of lemonade. Eat less than 2,300 milligrams (mg) of sodium a day. If you limit your sodium to 1,500 mg a day, you can lower your blood pressure even more. Be aware that all of these are the suggested number of servings for people who eat 1,800 to 2,000 calories a day. Your recommended number of servings may be different if you need more or fewer calories. Tips for success  Start small. Do not try to make dramatic changes to your diet all at once. You might feel that you are missing out on your favorite foods and then be more likely to not follow the plan. Make small changes, and stick with them. Once those changes become habit, add a few more changes. Try some of the following:  Make it a goal to eat a fruit or vegetable at every meal and at snacks. This will make it easy to get the recommended amount of fruits and vegetables each day. Try yogurt topped with fruit and nuts for a snack or healthy dessert. Add lettuce, tomato, cucumber, and onion to sandwiches. Combine a ready-made pizza crust with low-fat mozzarella cheese and lots of vegetable toppings. Try using tomatoes, squash, spinach, broccoli, carrots, cauliflower, and onions. Have a variety of cut-up vegetables with a low-fat dip as an appetizer instead of chips and dip. Sprinkle sunflower seeds or chopped almonds over salads. Or try adding chopped walnuts or almonds to cooked vegetables. Try some vegetarian meals using beans and peas. Add garbanzo or kidney beans to salads.  Make burritos and tacos with mashed agee beans or black beans. Where can you learn more? Go to https://chpepiceweb.[employer]. org and sign in to your [employer] account. Enter V846 in the [facility] box to learn more about \"DASH Diet: Care Instructions. \"     If you do not have an account, please click on the \"Sign Up Now\" link. Current as of: January 10, 2022               Content Version: 13.3  © 4996-3145 Healthwise, Incorporated. Care instructions adapted under license by [facility]. If you have questions about a medical condition or this instruction, always ask your healthcare professional. [place] [address] any warranty or liability for your use of this information. Personalized Preventive Plan for Anh Gonzalez - 8/30/2022  Medicare offers a range of preventive health benefits. Some of the tests and screenings are paid in full while other may be subject to a deductible, co-insurance, and/or copay. Some of these benefits include a comprehensive review of your medical history including lifestyle, illnesses that may run in your family, and various assessments and screenings as appropriate. After reviewing your medical record and screening and assessments performed today your provider may have ordered immunizations, labs, imaging, and/or referrals for you. A list of these orders (if applicable) as well as your Preventive Care list are included within your After Visit Summary for your review. Other Preventive Recommendations:    A preventive eye exam performed by an eye specialist is recommended every 1-2 years to screen for glaucoma; cataracts, macular degeneration, and other eye disorders. A preventive dental visit is recommended every 6 months. Try to get at least 150 minutes of exercise per week or 10,000 steps per day on a pedometer . Order or download the FREE \"Exercise & Physical Activity: Your Everyday Guide\" from [employer].  Call 1-913.948.8831 or search [employer] online. You need 7904-6860 mg of calcium and 6383-1629 IU of vitamin D per day. It is possible to meet your calcium requirement with diet alone, but a vitamin D supplement is usually necessary to meet this goal.  When exposed to the sun, use a sunscreen that protects against both UVA and UVB radiation with an SPF of 30 or greater. Reapply every 2 to 3 hours or after sweating, drying off with a towel, or swimming. Always wear a seat belt when traveling in a car. Always wear a helmet when riding a bicycle or motorcycle.

## 2022-09-01 ENCOUNTER — PROCEDURE VISIT (OUTPATIENT)
Dept: CARDIOLOGY CLINIC | Age: 72
End: 2022-09-01
Payer: COMMERCIAL

## 2022-09-01 DIAGNOSIS — I25.119 CORONARY ARTERY DISEASE INVOLVING NATIVE CORONARY ARTERY OF NATIVE HEART WITH ANGINA PECTORIS (HCC): ICD-10-CM

## 2022-09-01 DIAGNOSIS — R07.9 CHEST PAIN, UNSPECIFIED TYPE: ICD-10-CM

## 2022-09-01 DIAGNOSIS — R06.09 DYSPNEA ON EXERTION: Primary | ICD-10-CM

## 2022-09-01 LAB
LV EF: 60 %
LVEF MODALITY: NORMAL

## 2022-09-01 PROCEDURE — 93018 CV STRESS TEST I&R ONLY: CPT | Performed by: INTERNAL MEDICINE

## 2022-09-01 PROCEDURE — A9500 TC99M SESTAMIBI: HCPCS | Performed by: INTERNAL MEDICINE

## 2022-09-01 PROCEDURE — 93017 CV STRESS TEST TRACING ONLY: CPT | Performed by: INTERNAL MEDICINE

## 2022-09-01 PROCEDURE — 78452 HT MUSCLE IMAGE SPECT MULT: CPT | Performed by: INTERNAL MEDICINE

## 2022-09-01 PROCEDURE — 93016 CV STRESS TEST SUPVJ ONLY: CPT | Performed by: INTERNAL MEDICINE

## 2022-09-06 ENCOUNTER — TELEPHONE (OUTPATIENT)
Dept: CARDIOLOGY CLINIC | Age: 72
End: 2022-09-06

## 2022-09-06 NOTE — TELEPHONE ENCOUNTER
----- Message from ANKITA Miles CNP sent at 9/2/2022  8:28 AM EDT -----  Please phone results - testing is abn- he needs to follow up for results or we can do a [facility]     09/01/2022  NM      Summary    Supervising physician Dr. Jami Pardo . Small sized defect of mild severity which is reversible involving inferior    wall of myocardium. Abnormal Lexiscan nuclear scintigraphic study suggestive    of abnormal myocardial perfusion. Mild degree of inferior wall ischemia    noted involving a small sized area of left ventricular myocardium. Gated    images demonstrate normal left ventricular systolic function with EF of 60    %.              PT NOTIFIED OF RESULTS AND JUST WANTS TO BE SCHEDULED FOR THE [facility]

## 2022-09-09 ENCOUNTER — OFFICE VISIT (OUTPATIENT)
Dept: INTERNAL MEDICINE CLINIC | Age: 72
End: 2022-09-09
Payer: COMMERCIAL

## 2022-09-09 VITALS
OXYGEN SATURATION: 98 % | BODY MASS INDEX: 40.43 KG/M2 | HEART RATE: 62 BPM | DIASTOLIC BLOOD PRESSURE: 78 MMHG | SYSTOLIC BLOOD PRESSURE: 136 MMHG | WEIGHT: 315 LBS | RESPIRATION RATE: 20 BRPM | HEIGHT: 74 IN

## 2022-09-09 DIAGNOSIS — I25.119 CORONARY ARTERY DISEASE INVOLVING NATIVE CORONARY ARTERY OF NATIVE HEART WITH ANGINA PECTORIS (HCC): ICD-10-CM

## 2022-09-09 DIAGNOSIS — Z79.4 TYPE 2 DIABETES MELLITUS WITH HYPEROSMOLARITY WITHOUT COMA, WITH LONG-TERM CURRENT USE OF INSULIN (HCC): ICD-10-CM

## 2022-09-09 DIAGNOSIS — I48.91 ATRIAL FIBRILLATION, UNSPECIFIED TYPE (HCC): Primary | ICD-10-CM

## 2022-09-09 DIAGNOSIS — M79.89 PAIN AND SWELLING OF RIGHT LOWER LEG: ICD-10-CM

## 2022-09-09 DIAGNOSIS — R94.39 ABNORMAL STRESS TEST: ICD-10-CM

## 2022-09-09 DIAGNOSIS — I10 ESSENTIAL HYPERTENSION: ICD-10-CM

## 2022-09-09 DIAGNOSIS — E11.00 TYPE 2 DIABETES MELLITUS WITH HYPEROSMOLARITY WITHOUT COMA, WITH LONG-TERM CURRENT USE OF INSULIN (HCC): ICD-10-CM

## 2022-09-09 DIAGNOSIS — E78.2 MIXED HYPERLIPIDEMIA: ICD-10-CM

## 2022-09-09 DIAGNOSIS — E07.9 THYROID DISEASE: ICD-10-CM

## 2022-09-09 DIAGNOSIS — M79.661 PAIN AND SWELLING OF RIGHT LOWER LEG: ICD-10-CM

## 2022-09-09 LAB
HBA1C MFR BLD: 7.6 %
INTERNATIONAL NORMALIZATION RATIO, POC: 2.3
PROTHROMBIN TIME, POC: 27.3

## 2022-09-09 PROCEDURE — 3051F HG A1C>EQUAL 7.0%<8.0%: CPT | Performed by: NURSE PRACTITIONER

## 2022-09-09 PROCEDURE — 85610 PROTHROMBIN TIME: CPT | Performed by: NURSE PRACTITIONER

## 2022-09-09 PROCEDURE — 83036 HEMOGLOBIN GLYCOSYLATED A1C: CPT | Performed by: NURSE PRACTITIONER

## 2022-09-09 PROCEDURE — 99214 OFFICE O/P EST MOD 30 MIN: CPT | Performed by: NURSE PRACTITIONER

## 2022-09-09 PROCEDURE — 1123F ACP DISCUSS/DSCN MKR DOCD: CPT | Performed by: NURSE PRACTITIONER

## 2022-09-09 RX ORDER — CARVEDILOL 25 MG/1
25 TABLET ORAL 2 TIMES DAILY
Qty: 90 TABLET | Refills: 3 | Status: ON HOLD | OUTPATIENT
Start: 2022-09-09 | End: 2022-09-24 | Stop reason: HOSPADM

## 2022-09-09 ASSESSMENT — ENCOUNTER SYMPTOMS
ABDOMINAL PAIN: 0
ABDOMINAL DISTENTION: 0
CONSTIPATION: 0
EYES NEGATIVE: 1
SHORTNESS OF BREATH: 1
SINUS PRESSURE: 0
COLOR CHANGE: 0
COUGH: 0
SORE THROAT: 0
DIARRHEA: 0
NAUSEA: 0
SINUS PAIN: 0
WHEEZING: 0
CHEST TIGHTNESS: 0

## 2022-09-09 NOTE — PROGRESS NOTES
Rigoberto Liang  1950 09/09/22    Chief Complaint   Patient presents with    3 Month Follow-Up    Swelling     Right leg swelling sx started 2 weeks ago. Pt denies any known injury       SUBJECTIVE:      Anticoagulation: Patient here for followup of chronic anticoagulation. Indication: atrial fibrillation  Bleeding Signs/Symptoms:  None  Missed Coumadin Doses:  None  Medication Changes:  no  Dietary Changes:  no     VITAL SIGNS reviewed     PLAN:  Continue current Coumadin regimen without change. INR in goal range at 2.3 today. The patient is currently taking all hypertensive medications compliantly without c/o of any side effects. Blood pressure has been averaging  120-130/80 over the last several months. Continues on losartan 100 mg daily, Cardizem 120 mg daily, Coreg 25 mg daily and Lasix 40 mg daily. Of note, patient has been having intermittent chest pain and worsening dyspnea on exertion. Patient did recently have an abnormal stress test which showed as below and he is scheduled for left heart cath on September 15  Small sized defect of mild severity which is reversible involving inferior    wall of myocardium. Patient denies any chest pain, shortness of breath, myalgias, Patient is tolerating cholesterol medications without difficulty. Continues on pravastatin 80 mg daily. Lab Results   Component Value Date    LDLCALC 67 08/26/2022    LDLDIRECT 94 10/18/2021     Patient continues to be compliant with synthroid regiment for her hypothyroidism. Denies c/o fatigue, weight gain or loss, heat or cold intolerance, diarrhea, or other GI symptoms. Lab Results   Component Value Date    TSH 1.66 04/03/2018    TSHREFLEX 3.82 02/15/2022     Patient compliant with all current medications for diabetes. Blood sugars have been averaging around 150-200, and the patient reports checking their blood sugar 2-3 time daily.  Patient has had no episodes of significant hypoglycemia, fainting, dizziness, or loss of consciousness. Continues on Metformin 1000 mg BID, Sliding scale NovoLog TID with meals and 80 units of N each evening/30 each morning and most recently Mariama, however, last month discovered as insurance is not going to not cover this medication and it will be $380 per month out of pocket. We did transition him to glipizide 5 mg daily as Trulicity and Januvia both were not covered either and greater than $300 per month which patient cannot afford. The only complaint the patient has today is for complaints of worsening right lower extremity swelling. In brief he states his BLE are always mildly swollen to an extent intermittently, however, over the last 2 weeks his right lower extremity has been significantly more swollen and now has a wound to the posterior aspect. Denies any recent injuries. Review of Systems   Constitutional:  Negative for activity change, appetite change, fatigue and fever. HENT:  Negative for congestion, sinus pressure, sinus pain and sore throat. Eyes: Negative. Respiratory:  Positive for shortness of breath. Negative for cough, chest tightness and wheezing. Cardiovascular:  Positive for chest pain and leg swelling. Negative for palpitations. Gastrointestinal:  Negative for abdominal distention, abdominal pain, constipation, diarrhea and nausea. Genitourinary:  Negative for difficulty urinating, dysuria, flank pain, frequency and hematuria. Musculoskeletal:  Negative for arthralgias, gait problem, joint swelling and myalgias. Skin:  Negative for color change and rash. Neurological:  Negative for dizziness, light-headedness and numbness. Hematological: Negative. Psychiatric/Behavioral: Negative. OBJECTIVE:    /78   Pulse 62   Resp 20   Ht 6' 2\" (1.88 m)   Wt (!) 331 lb (150.1 kg)   SpO2 98%   BMI 42.50 kg/m²     Physical Exam  Constitutional:       General: He is not in acute distress. Appearance: He is well-developed.  He is obese. He is not diaphoretic. HENT:      Head: Normocephalic and atraumatic. Neck:      Thyroid: No thyromegaly. Cardiovascular:      Rate and Rhythm: Normal rate and regular rhythm. Heart sounds: Normal heart sounds. No murmur heard. Pulmonary:      Effort: Pulmonary effort is normal.      Breath sounds: Normal breath sounds. Abdominal:      General: Bowel sounds are normal. There is no distension. Palpations: Abdomen is soft. Tenderness: There is no abdominal tenderness. Musculoskeletal:         General: Normal range of motion. Right lower le+ Edema present. Left lower le+ Edema present. Legs:    Lymphadenopathy:      Cervical: No cervical adenopathy. Skin:     General: Skin is warm and dry. Capillary Refill: Capillary refill takes less than 2 seconds. Findings: No rash. Neurological:      Mental Status: He is alert and oriented to person, place, and time. GCS: GCS eye subscore is 4. GCS verbal subscore is 5. GCS motor subscore is 6. Coordination: Coordination normal.   Psychiatric:         Behavior: Behavior normal.         Thought Content: Thought content normal.       ASSESSMENT:    1. Atrial fibrillation, unspecified type ([address]    2. Abnormal stress test    3. Coronary artery disease involving native coronary artery of native heart with angina pectoris ([address]    4. Type 2 diabetes mellitus with hyperosmolarity without coma, with long-term current use of insulin ([address]    5. Essential hypertension    6. Mixed hyperlipidemia    7. Thyroid disease    8. Pain and swelling of right lower leg        PLAN:    Héctor Duval was seen today for 3 month follow-up and swelling. Diagnoses and all orders for this visit:    Atrial fibrillation, unspecified type ([address]- rate controlled; INR at goal. No changes at this time. -     POCT INR  -     carvedilol (COREG) 25 MG tablet;  Take 1 tablet by mouth 2 times daily    Abnormal stress test- keep [facility] on 9/15/22. Coronary artery disease involving native coronary artery of native heart with angina pectoris ([facility] [address]- BP at goal; CHOL in goal range; working on closer glycemic control as discussed below. -     CBC with Auto Differential; Future  -     Comprehensive Metabolic Panel; Future  -     Hemoglobin A1C; Future  -     Lipid, Fasting; Future    Type 2 diabetes mellitus with hyperosmolarity without coma, with long-term current use of insulin ([facility] [address]- not at goal and unfortunately, jardiance now no longer covered by insurance so recently transitioned to glipizide. Recheck A1C in 3 months.   -     POCT glycosylated hemoglobin (Hb A1C)  -     metFORMIN (GLUCOPHAGE) 1000 MG tablet; TAKE 1 TABLET BY MOUTH TWICE DAILY WITH MEALS  -     CBC with Auto Differential; Future  -     Comprehensive Metabolic Panel; Future  -     Hemoglobin A1C; Future  -     Lipid, Fasting; Future    Essential hypertension - well controlled; no changes in management at this time. -     CBC with Auto Differential; Future  -     Comprehensive Metabolic Panel; Future  -     Hemoglobin A1C; Future  -     Lipid, Fasting; Future    Mixed hyperlipidemia - well controlled; no changes in management at this time. -     CBC with Auto Differential; Future  -     Comprehensive Metabolic Panel; Future  -     Hemoglobin A1C; Future  -     Lipid, Fasting; Future    Thyroid disease- recheck TSH in 3 months and adjust regiment as appropriate.   -     TSH with Reflex; Future    Pain and swelling of right lower leg- check US given concern for increased swelling. Unfortunately, pt cannot complete until Monday so was scheduled accordingly. Could also be seroma? -     VL LOWER EXTREMITY VENOUS RIGHT; Future    Course of treatment, including any medications, possible imaging, referrals, and follow ups discussed with patient. All risks and benefits and possible side effects discussed with patient who agrees to plan of care and verbalizes understanding.  All labs and imaging reviewed. No flowsheet data found. No follow-ups on file. Cecily note this reports has been produced speech recognition software and may contain errors related to that system including errors in grammar, punctuation, and spelling, as well as words and phrases that may be appropriate. If there are any questions or concerns please feel free to contact the dictating provider for clarification.

## 2022-09-12 ENCOUNTER — HOSPITAL ENCOUNTER (OUTPATIENT)
Age: 72
Discharge: HOME | DRG: 233 | End: 2022-09-12
Payer: COMMERCIAL

## 2022-09-12 ENCOUNTER — NURSE ONLY (OUTPATIENT)
Dept: CARDIOLOGY CLINIC | Age: 72
End: 2022-09-12

## 2022-09-12 ENCOUNTER — HOSPITAL ENCOUNTER (OUTPATIENT)
Dept: GENERAL RADIOLOGY | Age: 72
Discharge: HOME | DRG: 233 | End: 2022-09-12
Payer: COMMERCIAL

## 2022-09-12 ENCOUNTER — HOSPITAL ENCOUNTER (OUTPATIENT)
Dept: ULTRASOUND IMAGING | Age: 72
Discharge: HOME | DRG: 233 | End: 2022-09-12
Payer: COMMERCIAL

## 2022-09-12 ENCOUNTER — HOSPITAL ENCOUNTER (EMERGENCY)
Age: 72
Discharge: HOME | DRG: 233 | End: 2022-09-12
Attending: EMERGENCY MEDICINE
Payer: COMMERCIAL

## 2022-09-12 VITALS
SYSTOLIC BLOOD PRESSURE: 157 MMHG | TEMPERATURE: 98.2 F | RESPIRATION RATE: 18 BRPM | HEART RATE: 76 BPM | DIASTOLIC BLOOD PRESSURE: 133 MMHG | OXYGEN SATURATION: 96 %

## 2022-09-12 DIAGNOSIS — I82.411 DEEP VEIN THROMBOSIS (DVT) OF FEMORAL VEIN OF RIGHT LOWER EXTREMITY, UNSPECIFIED CHRONICITY (HCC): Primary | ICD-10-CM

## 2022-09-12 DIAGNOSIS — M79.89 PAIN AND SWELLING OF RIGHT LOWER LEG: ICD-10-CM

## 2022-09-12 DIAGNOSIS — M79.661 PAIN AND SWELLING OF RIGHT LOWER LEG: ICD-10-CM

## 2022-09-12 DIAGNOSIS — Z01.810 PRE-OPERATIVE CARDIOVASCULAR EXAMINATION: ICD-10-CM

## 2022-09-12 LAB
ALBUMIN SERPL-MCNC: 4.1 GM/DL (ref 3.4–5)
ALP BLD-CCNC: 135 IU/L (ref 40–129)
ALT SERPL-CCNC: 15 U/L (ref 10–40)
ANION GAP SERPL CALCULATED.3IONS-SCNC: 14 MMOL/L (ref 4–16)
AST SERPL-CCNC: 18 IU/L (ref 15–37)
BASOPHILS ABSOLUTE: 0.1 K/CU MM
BASOPHILS RELATIVE PERCENT: 0.8 % (ref 0–1)
BILIRUB SERPL-MCNC: 0.5 MG/DL (ref 0–1)
BUN BLDV-MCNC: 20 MG/DL (ref 6–23)
CALCIUM SERPL-MCNC: 9.5 MG/DL (ref 8.3–10.6)
CHLORIDE BLD-SCNC: 104 MMOL/L (ref 99–110)
CO2: 22 MMOL/L (ref 21–32)
CREAT SERPL-MCNC: 0.9 MG/DL (ref 0.9–1.3)
D DIMER: <200 NG/ML(DDU)
DIFFERENTIAL TYPE: ABNORMAL
EOSINOPHILS ABSOLUTE: 0.1 K/CU MM
EOSINOPHILS RELATIVE PERCENT: 1.9 % (ref 0–3)
GFR AFRICAN AMERICAN: >60 ML/MIN/1.73M2
GFR NON-AFRICAN AMERICAN: >60 ML/MIN/1.73M2
GLUCOSE BLD-MCNC: 206 MG/DL (ref 70–99)
HCT VFR BLD CALC: 39.2 % (ref 42–52)
HEMOGLOBIN: 13.4 GM/DL (ref 13.5–18)
IMMATURE NEUTROPHIL %: 0.8 % (ref 0–0.43)
INR BLD: 1.48 INDEX
LYMPHOCYTES ABSOLUTE: 1.9 K/CU MM
LYMPHOCYTES RELATIVE PERCENT: 26 % (ref 24–44)
MCH RBC QN AUTO: 35.1 PG (ref 27–31)
MCHC RBC AUTO-ENTMCNC: 34.2 % (ref 32–36)
MCV RBC AUTO: 102.6 FL (ref 78–100)
MONOCYTES ABSOLUTE: 0.7 K/CU MM
MONOCYTES RELATIVE PERCENT: 9.6 % (ref 0–4)
NUCLEATED RBC %: 0 %
PDW BLD-RTO: 14.9 % (ref 11.7–14.9)
PLATELET # BLD: 237 K/CU MM (ref 140–440)
PMV BLD AUTO: 9.9 FL (ref 7.5–11.1)
POTASSIUM SERPL-SCNC: 4.1 MMOL/L (ref 3.5–5.1)
PROTHROMBIN TIME: 19.2 SECONDS (ref 11.7–14.5)
RBC # BLD: 3.82 M/CU MM (ref 4.6–6.2)
SEGMENTED NEUTROPHILS ABSOLUTE COUNT: 4.4 K/CU MM
SEGMENTED NEUTROPHILS RELATIVE PERCENT: 60.9 % (ref 36–66)
SODIUM BLD-SCNC: 140 MMOL/L (ref 135–145)
TOTAL IMMATURE NEUTOROPHIL: 0.06 K/CU MM
TOTAL NUCLEATED RBC: 0 K/CU MM
TOTAL PROTEIN: 7.4 GM/DL (ref 6.4–8.2)
TOTAL RETICULOCYTE COUNT: 0.07 K/CU MM
WBC # BLD: 7.2 K/CU MM (ref 4–10.5)

## 2022-09-12 PROCEDURE — 36415 COLL VENOUS BLD VENIPUNCTURE: CPT

## 2022-09-12 PROCEDURE — 93971 EXTREMITY STUDY: CPT

## 2022-09-12 PROCEDURE — 6360000002 HC RX W HCPCS: Performed by: EMERGENCY MEDICINE

## 2022-09-12 PROCEDURE — 86922 COMPATIBILITY TEST ANTIGLOB: CPT

## 2022-09-12 PROCEDURE — 99284 EMERGENCY DEPT VISIT MOD MDM: CPT

## 2022-09-12 PROCEDURE — 86850 RBC ANTIBODY SCREEN: CPT

## 2022-09-12 PROCEDURE — 86900 BLOOD TYPING SEROLOGIC ABO: CPT

## 2022-09-12 PROCEDURE — 85025 COMPLETE CBC W/AUTO DIFF WBC: CPT

## 2022-09-12 PROCEDURE — 86901 BLOOD TYPING SEROLOGIC RH(D): CPT

## 2022-09-12 PROCEDURE — 85610 PROTHROMBIN TIME: CPT

## 2022-09-12 PROCEDURE — 96372 THER/PROPH/DIAG INJ SC/IM: CPT

## 2022-09-12 PROCEDURE — 80053 COMPREHEN METABOLIC PANEL: CPT

## 2022-09-12 PROCEDURE — 99999 PR OFFICE/OUTPT VISIT,PROCEDURE ONLY: CPT | Performed by: INTERNAL MEDICINE

## 2022-09-12 PROCEDURE — 85379 FIBRIN DEGRADATION QUANT: CPT

## 2022-09-12 PROCEDURE — 71046 X-RAY EXAM CHEST 2 VIEWS: CPT

## 2022-09-12 RX ORDER — ENOXAPARIN SODIUM 150 MG/ML
1 INJECTION SUBCUTANEOUS ONCE
Status: COMPLETED | OUTPATIENT
Start: 2022-09-12 | End: 2022-09-12

## 2022-09-12 RX ORDER — ENOXAPARIN SODIUM 100 MG/ML
1 INJECTION SUBCUTANEOUS DAILY
Qty: 30 ML | Refills: 0 | Status: ON HOLD | OUTPATIENT
Start: 2022-09-12 | End: 2022-09-24 | Stop reason: HOSPADM

## 2022-09-12 RX ADMIN — ENOXAPARIN SODIUM 150 MG: 150 INJECTION SUBCUTANEOUS at 22:32

## 2022-09-12 ASSESSMENT — ENCOUNTER SYMPTOMS
NAUSEA: 0
RHINORRHEA: 0
EYE PAIN: 0
EYE DISCHARGE: 0
VOMITING: 0
ABDOMINAL PAIN: 0
BACK PAIN: 0
SHORTNESS OF BREATH: 0
SORE THROAT: 0
COUGH: 0

## 2022-09-12 ASSESSMENT — PAIN DESCRIPTION - ORIENTATION: ORIENTATION: RIGHT

## 2022-09-12 ASSESSMENT — PAIN DESCRIPTION - LOCATION: LOCATION: LEG

## 2022-09-12 ASSESSMENT — PAIN DESCRIPTION - FREQUENCY: FREQUENCY: CONTINUOUS

## 2022-09-12 ASSESSMENT — PAIN SCALES - GENERAL: PAINLEVEL_OUTOF10: 5

## 2022-09-12 ASSESSMENT — PAIN DESCRIPTION - DESCRIPTORS: DESCRIPTORS: DULL

## 2022-09-13 ENCOUNTER — CARE COORDINATION (OUTPATIENT)
Dept: CARE COORDINATION | Age: 72
End: 2022-09-13

## 2022-09-13 ENCOUNTER — TELEPHONE (OUTPATIENT)
Dept: CARDIOLOGY CLINIC | Age: 72
End: 2022-09-13

## 2022-09-13 NOTE — ED PROVIDER NOTES
[address] ENCOUNTER      Pt Name: Ramya Morejon  MRN: [id number]  [place] 1950  Date of evaluation: 9/12/2022  Provider: Adele Lockwood MD    [address] [place] [address]       Chief Complaint   Patient presents with    Leg Pain     had ultrasound on right leg and was called to report to ER for a blood clot in his leg         HISTORY OF PRESENT ILLNESS      Ramya Morejon is a 71 y.o. male who presents to the emergency department  for   Chief Complaint   Patient presents with    Leg Pain     had ultrasound on right leg and was called to report to ER for a blood clot in his leg       67-year-old male with a history of paroxysmal atrial fibrillation, CAD status post prior stenting presents complaining of some right leg pain. He is scheduled to have a heart catheterization later this week in 3 days. He reports he had been having some right leg pain and swelling so his primary care physician ordered an outpatient ultrasound. He reports that that ultrasound showed blood clot he was told to come to the emergency department. He states that in anticipation of his heart catheterization in a few days he was told to stop his Coumadin a few days ago. He was not to take his Coumadin 5 days before the procedure. He denies any respiratory symptoms. Not have any cough or shortness of breath. No dyspnea on exertion. He denies any history of DVT or PE. Denies any trauma or injury to his right leg. He is ambulatory. He does use a cane at baseline. Denies any numbness or tingling in the right leg. Does endorse that he feels a \"bulging\" on the inside of the leg. Does present with a GCS of 15. No signs of respiratory distress. Nursing Notes, Triage Notes & Vital Signs were reviewed. REVIEW OF SYSTEMS    (2-9 systems for level 4, 10 or more for level 5)     Review of Systems   Constitutional:  Negative for chills and fever. HENT:  Negative for congestion, rhinorrhea and sore throat. Eyes:  Negative for pain and discharge. Respiratory:  Negative for cough and shortness of breath. Cardiovascular:  Negative for chest pain and palpitations. Gastrointestinal:  Negative for abdominal pain, nausea and vomiting. Endocrine: Negative for polydipsia and polyuria. Genitourinary:  Negative for dysuria and flank pain. Musculoskeletal:  Negative for back pain and neck pain. Right leg pain   Skin:  Negative for pallor and wound. Neurological:  Negative for dizziness, facial asymmetry, light-headedness, numbness and headaches. Psychiatric/Behavioral:  Negative for confusion. Except as noted above the remainder of the review of systems was reviewed and negative. PAST MEDICAL HISTORY     Past Medical History:   Diagnosis Date    Allergic rhinitis     Anticoagulated on Coumadin     1/6/17-**[facility] to follow pt's PT/INRs, along w/prescribing his Coumadin. **    Anxiety     Arthritis     Atrial fibrillation (HCC)     On Coumadin.     CAD (coronary artery disease)     COPD (chronic obstructive pulmonary disease) (HCC)     Depression     Diabetes mellitus (HCC)     NIDDM- dx over 10 yrs ago- follows with Dr Chapis Ferguson    Dupuytren's contracture of hand     Right hand    Family history of cardiovascular disease     Father-MI    GERD (gastroesophageal reflux disease)     H/O cardiac catheterization 03/2007    cardiac cath- stent LAD patent, [id number]%, RCA 40-50%    H/O cardiac catheterization 06/12/2008    cardiac cath- mild disease mid RCA    H/O cardiovascular stress test 4/10/2014    cardiolite- normal, no ischemia, EF63%    H/O cardiovascular stress test 09/21/2016    EF59% normal study  pt in atrial fib    H/O cardiovascular stress test 09/20/2017    EF 60%   afib    H/O cardiovascular stress test 11/07/2018    EF60% normal study    H/O Doppler ultrasound     1/11/2010-CAROTID_Intimal thickening but no significant atherosclerotic plaque noted in LAUREN. Doppler flow velocities within the LAUREN are WNL. Intimal thickening but no significant atherosclerotic plaque noted in LICA. Doppler flow velociities within the LICA are WNL. H/O echocardiogram 04/10/2014    EF 60%, normal LV systolic function, mild mitral and tricuspid insufficiencies, no pericardial effusion. H/O echocardiogram 09/21/2016    EF60% normal study    H/O echocardiogram 11/07/2018    EF55-60% no significant valular disease    H/O hiatal hernia     Hx of Venous Doppler 03/14/2019    Significant reflux in Left Deep System, No reflux in right lower extremity, No DVT or SVT    Hyperlipidemia     Hypertension     Obesity     S/P PTCA (percutaneous transluminal coronary angioplasty) 03/21/2005    s/p PTCA with 3.5 X 16 TAXUS stent to LAD- follows with Dr Jennifer De La Cruz    Sleep apnea     had sleep study done 10+ yrs ago- has cpap but does not use it    Thyroid disease     hypothyroid       Prior to Admission medications    Medication Sig Start Date End Date Taking? Authorizing Provider   enoxaparin (LOVENOX) 100 MG/ML Inject 1.5 mLs into the skin daily for 15 days 9/12/22 9/27/22 Yes Lloyd Latham MD   metFORMIN (GLUCOPHAGE) 1000 MG tablet TAKE 1 TABLET BY MOUTH TWICE DAILY WITH MEALS 9/9/22   ANKITA Munson Cera, CNP   carvedilol (COREG) 25 MG tablet Take 1 tablet by mouth 2 times daily 9/9/22   ANKITA Munson Cera, CNP   glipiZIDE (GLUCOTROL) 5 MG tablet Take 1 tablet by mouth daily 8/29/22   ANKITA Munson Cera, CNP   dilTIAZem (CARDIZEM) 120 MG tablet Take 1 tablet by mouth daily 8/22/22   ANKITA Munson Cera, CNP   blood glucose monitor kit and supplies Dispense sufficient amount for indicated testing frequency plus additional to accommodate PRN testing needs. Dispense all needed supplies to include: monitor, strips, lancing device, lancets, control solutions, alcohol swabs.  7/26/22   ANKITA Munson Cera, CNP   blood glucose monitor strips Units into the skin 3 times daily (before meals) 12/16/20   ANKITA Flor CNP   insulin NPH (HUMULIN N;NOVOLIN N) 100 UNIT/ML injection vial Inject 80 Units into the skin nightly 12/16/20   ANKITA Flor CNP   albuterol sulfate HFA (VENTOLIN HFA) 108 (90 Base) MCG/ACT inhaler Inhale 2 puffs into the lungs every 6 hours as needed for Wheezing 12/16/20   ANKITA Flor CNP   insulin aspart (NOVOLOG) 100 UNIT/ML injection vial Inject into the skin    Historical Provider, MD        Patient Active Problem List   Diagnosis    Atrial fibrillation ([facility]    CAD (coronary artery disease)    Morbid obesity ([address]    COPD (chronic obstructive pulmonary disease) ([address]    Sleep apnea    Type 2 diabetes mellitus ([facility]    Thyroid disease    Hyperlipidemia    Congestive heart failure, unspecified HF chronicity, unspecified heart failure type ([facility]    Coronary artery disease involving native coronary artery of native heart with angina pectoris ([facility]    Chronic renal disease, stage III ([address] [251210]    Hypertension         SURGICAL HISTORY       Past Surgical History:   Procedure Laterality Date    CARDIAC CATHETERIZATION  6/12/08    mild disease mid RCA,  stent to LAD patent,    CARDIAC CATHETERIZATION  3/07    Stent to LAD patent, Diagonal 40-50%, RCA 40-50%    CARDIAC CATHETERIZATION  3/05    COLONOSCOPY  2011    COLONOSCOPY  5/18/16    1 polyp removed, diverticulosis and hemorrhoids noted    CORONARY ANGIOPLASTY WITH STENT PLACEMENT  03/21/2005    PTCA with 3.5 x 16 TAXUS stent to LAD    ENDOSCOPY, COLON, DIAGNOSTIC  2014    egd    HAND SURGERY Right 1997    UT OPEN RX ANKLE DISLOCATN+FIXATN Right 6/3/2019    RIGHT OPEN REDUCTION INTERNAL FIXATION OF DISTAL TIBIA  AND FIBULA performed by Wilian Chicas MD at [address] [place] [address]       Discharge Medication List as of 9/12/2022 10:55 PM        CONTINUE these medications which have NOT CHANGED    Details   metFORMIN (GLUCOPHAGE) 1000 MG tablet Disp-30 tablet, R-5Normal      losartan (COZAAR) 100 MG tablet Take 1 tablet by mouth daily, Disp-90 tablet, R-3Normal      fluticasone (FLONASE) 50 MCG/ACT nasal spray 2 sprays by Each Nostril route daily, Disp-1 Bottle, R-2Normal      tiZANidine (ZANAFLEX) 4 MG tablet Take 1 tablet by mouth 3 times daily as needed (shoulder pain), Disp-30 tablet, R-0Normal      potassium chloride (KLOR-CON M) 10 MEQ extended release tablet Take 1 tablet by mouth 2 times daily, Disp-60 tablet, R-5Normal      polyethylene glycol (GLYCOLAX) 17 GM/SCOOP powder Take 17 g by mouth daily, Disp-225 g, R-2Normal      insulin regular (NOVOLIN R) 100 UNIT/ML injection Inject 0-10 Units into the skin 3 times daily (before meals), Disp-1 vial, R-: 0 units 201-250: 2 units 251-300: 3 units 301-350: 4 units 351-400: 5 units 401-450: 6 units 451-500: 7 units -Call if above 500Normal      insulin NPH (HUMULIN N;NOVOLIN N) 100 UNIT/ML injection vial Inject 80 Units into the skin nightly, Disp-3 vial, R-2Normal      albuterol sulfate HFA (VENTOLIN HFA) 108 (90 Base) MCG/ACT inhaler Inhale 2 puffs into the lungs every 6 hours as needed for Wheezing, Disp-1 Inhaler, R-3Normal      insulin aspart (NOVOLOG) 100 UNIT/ML injection vial Inject into the skinHistorical Med             ALLERGIES     Patient has no known allergies. FAMILY HISTORY       Family History   Problem Relation Age of Onset    Cancer Mother         breast ca    Coronary Art Dis Father          MI    Heart Disease Father     Rheum Arthritis Other     Rheum Arthritis Sister     No Known Problems Brother     Rheum Arthritis Sister           SOCIAL HISTORY       Social History     Socioeconomic History    Marital status:       Spouse name: None    Number of children: 1    Years of education: None    Highest education level: None   Occupational History     Comment: RETIRED   Tobacco Use    Smoking status: Former     Packs/day: 1.00     Years: 10.00     Pack years: 10.00 Types: Cigarettes     Quit date: 1976     Years since quittin.7    Smokeless tobacco: Never   Vaping Use    Vaping Use: Never used   Substance and Sexual Activity    Alcohol use: Not Currently     Alcohol/week: 6.0 standard drinks     Types: 6 Cans of beer per week     Comment: caffeine 2 cups of tea a day     Drug use: No    Sexual activity: Never     Social Determinants of Health     Financial Resource Strain: Low Risk     Difficulty of Paying Living Expenses: Not hard at all   Food Insecurity: No Food Insecurity    Worried About Running Out of Food in the Last Year: Never true    Ran Out of Food in the Last Year: Never true   Physical Activity: Sufficiently Active    Days of Exercise per Week: 7 days    Minutes of Exercise per Session: 30 min       SCREENINGS    Harvey Coma Scale  Eye Opening: Spontaneous  Best Verbal Response: Oriented  Best Motor Response: Obeys commands  [place] Coma Scale Score: 15          PHYSICAL EXAM    (up to 7 for level 4, 8 or more for level 5)     ED Triage Vitals [22 1905]   BP Temp Temp Source Heart Rate Resp SpO2 Height Weight   (!) 194/83 98.2 °F (36.8 °C) Oral 97 18 97 % -- --       Physical Exam  Vitals reviewed. Constitutional:       Appearance: He is not ill-appearing or toxic-appearing. HENT:      Head: Normocephalic and atraumatic. Nose: No congestion or rhinorrhea. Mouth/Throat:      Mouth: Mucous membranes are moist.      Pharynx: No oropharyngeal exudate or posterior oropharyngeal erythema. Eyes:      Extraocular Movements: Extraocular movements intact. Pupils: Pupils are equal, round, and reactive to light. Cardiovascular:      Rate and Rhythm: Normal rate. Heart sounds: No friction rub. No gallop. Pulmonary:      Effort: Pulmonary effort is normal.      Breath sounds: No stridor. No rales. Abdominal:      Palpations: Abdomen is soft. Tenderness: There is no abdominal tenderness. There is no guarding. Musculoskeletal:         General: Tenderness present. Cervical back: Normal range of motion and neck supple. No tenderness. Comments: Mild diffuse tenderness right medial lower calf; no palpable cords  RLE neurovascularly intact with 2 dp/pt pulses   Lymphadenopathy:      Cervical: No cervical adenopathy. Skin:     General: Skin is warm. Capillary Refill: Capillary refill takes less than 2 seconds. Neurological:      General: No focal deficit present. Mental Status: He is alert and oriented to person, place, and time. DIAGNOSTIC RESULTS     Labs Reviewed   CBC WITH AUTO DIFFERENTIAL - Abnormal; Notable for the following components:       Result Value    RBC 3.82 (*)     Hemoglobin 13.4 (*)     Hematocrit 39.2 (*)     .6 (*)     MCH 35.1 (*)     Monocytes % 9.6 (*)     Immature Neutrophil % 0.8 (*)     All other components within normal limits   COMPREHENSIVE METABOLIC PANEL - Abnormal; Notable for the following components:    Glucose 206 (*)     Alkaline Phosphatase 135 (*)     All other components within normal limits   PROTIME-INR - Abnormal; Notable for the following components:    Protime 19.2 (*)     All other components within normal limits   D-DIMER, QUANTITATIVE      RADIOLOGY:     Non-plain film images such as CT, Ultrasound and MRI are read by the radiologist. Plain radiographic images are visualized and preliminarily interpreted by the emergency physician.        Interpretation per the Radiologist below, if available at the time of this note:    No orders to display         ED BEDSIDE ULTRASOUND:   Performed by ED Physician Duong Epstein MD       LABS:  Labs Reviewed   CBC WITH AUTO DIFFERENTIAL - Abnormal; Notable for the following components:       Result Value    RBC 3.82 (*)     Hemoglobin 13.4 (*)     Hematocrit 39.2 (*)     .6 (*)     MCH 35.1 (*)     Monocytes % 9.6 (*)     Immature Neutrophil % 0.8 (*)     All other components within normal limits   COMPREHENSIVE METABOLIC PANEL - Abnormal; Notable for the following components:    Glucose 206 (*)     Alkaline Phosphatase 135 (*)     All other components within normal limits   PROTIME-INR - Abnormal; Notable for the following components:    Protime 19.2 (*)     All other components within normal limits   D-DIMER, QUANTITATIVE       All other labs were within normal range or not returned as of this dictation. EMERGENCY DEPARTMENT COURSE and DIFFERENTIAL DIAGNOSIS/MDM:   Vitals:    Vitals:    09/12/22 1905 09/12/22 2140 09/12/22 2200   BP: (!) 194/83 (!) 188/90 (!) 157/133   Pulse: 97  76   Resp: 18  18   Temp: 98.2 °F (36.8 °C)     TempSrc: Oral     SpO2: 97% 98% 96%           MDM  Number of Diagnoses or Management Options  Deep vein thrombosis (DVT) of femoral vein of right lower extremity, unspecified chronicity (HCC)  Diagnosis management comments: 52-year-old male presents for evaluation of a blood clot that was found at an outpatient ultrasound earlier today. He has a history of CAD status post prior stenting, he also has a history of paroxysmal atrial fibrillation. Is typically on Coumadin. He supposed to be having a heart cath in 3 days. He was told to stop his Coumadin 5 days before. He has not been on his Coumadin for several days. He had been having some right leg pain and swelling to his primary care physician ordered an ultrasound of the leg. The ultrasound did show a blood clot he was instructed to come to the emergency department for evaluation. Not any respiratory symptoms. He present with elevated blood pressure. Vitals otherwise unremarkable. Is afebrile. Nontachycardic. Respirations within normal limits. Saturations in the high 90s on room air. He is amatory without difficulty. Does use a cane at baseline. He endorses some mild pain in his right leg that is worse medially. Denies any trauma or injury to the leg.   I did review the ultrasound that shows a nonocclusive thrombus in the right mid femoral vein. We did obtain some labs in the emergency department that are overall at baseline and nonacute. Patient is going to be having his heart catheterization with Dr. Anoop Nash. I consulted cardiology and spoke with Dr. Leigh Ricci (the covering cardiologist). He suggested anticoagulation with Lovenox. Patient is agreeable with this plan. He is given a 1 mg/kg dose of Lovenox in the emergency department. We did discuss that he should continue this medication but that the day before his procedure he should not take it. These were instructions given to her by the cardiologist.  Patient agree with plan of care. Strict return precautions for worsening concerning signs are discussed. He is discharged in stable condition with return precautions. He will follow-up promptly with his primary care physician and cardiology for further management of his anticoagulation       Amount and/or Complexity of Data Reviewed  Clinical lab tests: reviewed        -  Patient seen and evaluated in the emergency department. -  Triage and nursing notes reviewed and incorporated. -  Old chart records reviewed and incorporated. -  Work-up included:  See above  -  Results discussed with patient. CONSULTS:  IP CONSULT TO CARDIOLOGY    PROCEDURES:  None performed unless otherwise noted below     Procedures        FINAL IMPRESSION      1. Deep vein thrombosis (DVT) of femoral vein of right lower extremity, unspecified chronicity ([facility]          DISPOSITION/PLAN   DISPOSITION Decision To Discharge 09/12/2022 10:43:00 PM      PATIENT REFERRED TO:  Sonya Waller MD  [address] [address]  [phone]    Go in 3 days  Please follow-up as you have scheduled for your Heart Cateterization    ANKITA Chapman - CNP  [address]  [phone]    Schedule an appointment as soon as possible for a visit in 1 day      DISCHARGE MEDICATIONS:  Discharge Medication List as of 9/12/2022 10:55 PM        START taking these medications    Details   enoxaparin (LOVENOX) 100 MG/ML Inject 1.5 mLs into the skin daily for 15 days, Disp-30 mL, R-0Normal             ED Provider Disposition Time  DISPOSITION Decision To Discharge 09/12/2022 10:43:00 PM      Appropriate personal protective equipment was worn during the patient's evaluation. These included surgical, eye protection, surgical mask or in 95 respirator and gloves. The patient was also placed in a surgical mask for the prevention of possible spread of respiratory viral illnesses. The Patient was instructed to read the package inserts with any medication that was prescribed. Major potential reactions and medication interactions were discussed. The Patient understands that there are numerous possible adverse reactions not covered. The patient was also instructed to arrange follow-up with his or her primary care provider for review of any pending labwork or incidental findings on any radiology results that were obtained. All efforts were made to discuss any incidental findings that require further monitoring. Controlled Substances Monitoring:     No flowsheet data found.     (Please note that portions of this note were completed with a voice recognition program.  Efforts were made to edit the dictations but occasionally words are mis-transcribed.)    Jenny Curry MD (electronically signed)  Attending Emergency Physician           Jenny Curry MD  09/12/22 6424

## 2022-09-13 NOTE — TELEPHONE ENCOUNTER
Per Dr. Naylor Batter patient to hold Lovenox day before procedure. Patient acknowledged understanding.

## 2022-09-13 NOTE — DISCHARGE INSTRUCTIONS
The ultrasound that you had done earlier today did show a blood clot in one of the veins in your right leg. Spoke with your cardiologist.  He would like us to start you on an injected medicine for blood clot. This medicine is called Lovenox. Do not take the Lovenox on the night before your procedure for your heart catheterization. Please follow-up with your primary care physician in 1 day for further management of your blood thinning medications. Please follow-up with cardiology for further evaluation and management. If you develop any worsening concerning symptoms, please seek immediate medical evaluation.

## 2022-09-13 NOTE — CARE COORDINATION
Attempted to reach patient for ACM follow up  [facility] eligible for enrollment. No answer to phone. Message left with ACM contact information and request for call back. Introduction letter sent via My Chart.

## 2022-09-13 NOTE — TELEPHONE ENCOUNTER
Patient called stating that he is scheduled for a cath but recently went in the hospital for a blood clot in his leg and was put on Lovenox; Patient wants to know if that will interfere with his cath. Please call him back at ph# [phone].

## 2022-09-14 ENCOUNTER — TELEPHONE (OUTPATIENT)
Dept: CARDIOLOGY CLINIC | Age: 72
End: 2022-09-14

## 2022-09-15 ENCOUNTER — APPOINTMENT (OUTPATIENT)
Dept: ULTRASOUND IMAGING | Age: 72
DRG: 233 | End: 2022-09-15
Attending: INTERNAL MEDICINE
Payer: COMMERCIAL

## 2022-09-15 ENCOUNTER — HOSPITAL ENCOUNTER (INPATIENT)
Dept: CARDIAC CATH/INVASIVE PROCEDURES | Age: 72
LOS: 9 days | Discharge: ANOTHER HOSPITAL | DRG: 233 | End: 2022-09-24
Attending: INTERNAL MEDICINE | Admitting: INTERNAL MEDICINE
Payer: COMMERCIAL

## 2022-09-15 ENCOUNTER — APPOINTMENT (OUTPATIENT)
Dept: GENERAL RADIOLOGY | Age: 72
DRG: 233 | End: 2022-09-15
Attending: INTERNAL MEDICINE
Payer: COMMERCIAL

## 2022-09-15 DIAGNOSIS — I25.10 CAD, MULTIPLE VESSEL: Primary | ICD-10-CM

## 2022-09-15 LAB
ALBUMIN SERPL-MCNC: 4.2 GM/DL (ref 3.4–5)
ALP BLD-CCNC: 103 IU/L (ref 40–129)
ALT SERPL-CCNC: 20 U/L (ref 10–40)
ANION GAP SERPL CALCULATED.3IONS-SCNC: 12 MMOL/L (ref 4–16)
AST SERPL-CCNC: 25 IU/L (ref 15–37)
BASE EXCESS MIXED: 1.2 (ref 0–1.2)
BASE EXCESS MIXED: 1.4 (ref 0–1.2)
BASOPHILS ABSOLUTE: 0.1 K/CU MM
BASOPHILS RELATIVE PERCENT: 1 % (ref 0–1)
BILIRUB SERPL-MCNC: 0.9 MG/DL (ref 0–1)
BUN BLDV-MCNC: 10 MG/DL (ref 6–23)
CALCIUM SERPL-MCNC: 9 MG/DL (ref 8.3–10.6)
CARBON MONOXIDE, BLOOD: 2.2 % (ref 0–5)
CARBON MONOXIDE, BLOOD: 2.2 % (ref 0–5)
CHLORIDE BLD-SCNC: 104 MMOL/L (ref 99–110)
CO2 CONTENT: 26 MMOL/L (ref 19–24)
CO2 CONTENT: 27.3 MMOL/L (ref 19–24)
CO2: 25 MMOL/L (ref 21–32)
COMMENT: ABNORMAL
COMMENT: ABNORMAL
CREAT SERPL-MCNC: 0.7 MG/DL (ref 0.9–1.3)
DIFFERENTIAL TYPE: ABNORMAL
EOSINOPHILS ABSOLUTE: 0.1 K/CU MM
EOSINOPHILS RELATIVE PERCENT: 1.8 % (ref 0–3)
ESTIMATED AVERAGE GLUCOSE: 169 MG/DL
GFR AFRICAN AMERICAN: >60 ML/MIN/1.73M2
GFR NON-AFRICAN AMERICAN: >60 ML/MIN/1.73M2
GLUCOSE BLD-MCNC: 165 MG/DL (ref 70–99)
GLUCOSE BLD-MCNC: 181 MG/DL (ref 70–99)
GLUCOSE BLD-MCNC: 227 MG/DL (ref 70–99)
HBA1C MFR BLD: 7.5 % (ref 4.2–6.3)
HCO3 ARTERIAL: 24.9 MMOL/L (ref 18–23)
HCO3 ARTERIAL: 26 MMOL/L (ref 18–23)
HCT VFR BLD CALC: 41.3 % (ref 42–52)
HEMOGLOBIN: 14 GM/DL (ref 13.5–18)
IMMATURE NEUTROPHIL %: 0.5 % (ref 0–0.43)
INR BLD: 1.14 INDEX
LV EF: 53 %
LVEF MODALITY: NORMAL
LYMPHOCYTES ABSOLUTE: 1.8 K/CU MM
LYMPHOCYTES RELATIVE PERCENT: 29.4 % (ref 24–44)
MAGNESIUM: 1.9 MG/DL (ref 1.8–2.4)
MCH RBC QN AUTO: 34.1 PG (ref 27–31)
MCHC RBC AUTO-ENTMCNC: 33.9 % (ref 32–36)
MCV RBC AUTO: 100.5 FL (ref 78–100)
METHEMOGLOBIN ARTERIAL: 0.7 %
METHEMOGLOBIN ARTERIAL: 0.8 %
MONOCYTES ABSOLUTE: 0.6 K/CU MM
MONOCYTES RELATIVE PERCENT: 10 % (ref 0–4)
NUCLEATED RBC %: 0 %
O2 SATURATION: 91.9 % (ref 96–97)
O2 SATURATION: 93.2 % (ref 96–97)
PCO2 ARTERIAL: 35 MMHG (ref 32–45)
PCO2 ARTERIAL: 41 MMHG (ref 32–45)
PDW BLD-RTO: 14.3 % (ref 11.7–14.9)
PH BLOOD: 7.41 (ref 7.34–7.45)
PH BLOOD: 7.46 (ref 7.34–7.45)
PLATELET # BLD: 208 K/CU MM (ref 140–440)
PMV BLD AUTO: 9.8 FL (ref 7.5–11.1)
PO2 ARTERIAL: 68 MMHG (ref 75–100)
PO2 ARTERIAL: 71 MMHG (ref 75–100)
POTASSIUM SERPL-SCNC: 3.8 MMOL/L (ref 3.5–5.1)
PROTHROMBIN TIME: 14.7 SECONDS (ref 11.7–14.5)
RBC # BLD: 4.11 M/CU MM (ref 4.6–6.2)
SEGMENTED NEUTROPHILS ABSOLUTE COUNT: 3.6 K/CU MM
SEGMENTED NEUTROPHILS RELATIVE PERCENT: 57.3 % (ref 36–66)
SODIUM BLD-SCNC: 141 MMOL/L (ref 135–145)
TOTAL IMMATURE NEUTOROPHIL: 0.03 K/CU MM
TOTAL NUCLEATED RBC: 0 K/CU MM
TOTAL PROTEIN: 6.9 GM/DL (ref 6.4–8.2)
TROPONIN T: <0.01 NG/ML
WBC # BLD: 6.2 K/CU MM (ref 4–10.5)

## 2022-09-15 PROCEDURE — 6360000004 HC RX CONTRAST MEDICATION

## 2022-09-15 PROCEDURE — 36600 WITHDRAWAL OF ARTERIAL BLOOD: CPT

## 2022-09-15 PROCEDURE — 82962 GLUCOSE BLOOD TEST: CPT

## 2022-09-15 PROCEDURE — 2500000003 HC RX 250 WO HCPCS

## 2022-09-15 PROCEDURE — 84484 ASSAY OF TROPONIN QUANT: CPT

## 2022-09-15 PROCEDURE — 83735 ASSAY OF MAGNESIUM: CPT

## 2022-09-15 PROCEDURE — 93005 ELECTROCARDIOGRAM TRACING: CPT

## 2022-09-15 PROCEDURE — 71045 X-RAY EXAM CHEST 1 VIEW: CPT

## 2022-09-15 PROCEDURE — 6370000000 HC RX 637 (ALT 250 FOR IP): Performed by: INTERNAL MEDICINE

## 2022-09-15 PROCEDURE — 6360000002 HC RX W HCPCS

## 2022-09-15 PROCEDURE — B2111ZZ FLUOROSCOPY OF MULTIPLE CORONARY ARTERIES USING LOW OSMOLAR CONTRAST: ICD-10-PCS | Performed by: INTERNAL MEDICINE

## 2022-09-15 PROCEDURE — 83036 HEMOGLOBIN GLYCOSYLATED A1C: CPT

## 2022-09-15 PROCEDURE — 80053 COMPREHEN METABOLIC PANEL: CPT

## 2022-09-15 PROCEDURE — 94761 N-INVAS EAR/PLS OXIMETRY MLT: CPT

## 2022-09-15 PROCEDURE — 93454 CORONARY ARTERY ANGIO S&I: CPT | Performed by: INTERNAL MEDICINE

## 2022-09-15 PROCEDURE — 2580000003 HC RX 258: Performed by: INTERNAL MEDICINE

## 2022-09-15 PROCEDURE — 82803 BLOOD GASES ANY COMBINATION: CPT

## 2022-09-15 PROCEDURE — 93306 TTE W/DOPPLER COMPLETE: CPT

## 2022-09-15 PROCEDURE — 85610 PROTHROMBIN TIME: CPT

## 2022-09-15 PROCEDURE — 93970 EXTREMITY STUDY: CPT

## 2022-09-15 PROCEDURE — 86850 RBC ANTIBODY SCREEN: CPT

## 2022-09-15 PROCEDURE — 93880 EXTRACRANIAL BILAT STUDY: CPT

## 2022-09-15 PROCEDURE — 6370000000 HC RX 637 (ALT 250 FOR IP)

## 2022-09-15 PROCEDURE — 2000000000 HC ICU R&B

## 2022-09-15 PROCEDURE — 4A023N7 MEASUREMENT OF CARDIAC SAMPLING AND PRESSURE, LEFT HEART, PERCUTANEOUS APPROACH: ICD-10-PCS | Performed by: INTERNAL MEDICINE

## 2022-09-15 PROCEDURE — 85025 COMPLETE CBC W/AUTO DIFF WBC: CPT

## 2022-09-15 PROCEDURE — 87081 CULTURE SCREEN ONLY: CPT

## 2022-09-15 PROCEDURE — 93454 CORONARY ARTERY ANGIO S&I: CPT

## 2022-09-15 PROCEDURE — 2580000003 HC RX 258

## 2022-09-15 RX ORDER — FUROSEMIDE 40 MG/1
40 TABLET ORAL NIGHTLY
Status: DISCONTINUED | OUTPATIENT
Start: 2022-09-15 | End: 2022-09-23

## 2022-09-15 RX ORDER — CETIRIZINE HYDROCHLORIDE 10 MG/1
10 TABLET ORAL DAILY
Status: DISCONTINUED | OUTPATIENT
Start: 2022-09-15 | End: 2022-09-24 | Stop reason: HOSPADM

## 2022-09-15 RX ORDER — SODIUM CHLORIDE 0.9 % (FLUSH) 0.9 %
10 SYRINGE (ML) INJECTION PRN
Status: DISCONTINUED | OUTPATIENT
Start: 2022-09-15 | End: 2022-09-22 | Stop reason: SDUPTHER

## 2022-09-15 RX ORDER — DULOXETIN HYDROCHLORIDE 30 MG/1
60 CAPSULE, DELAYED RELEASE ORAL DAILY
Status: DISCONTINUED | OUTPATIENT
Start: 2022-09-15 | End: 2022-09-24 | Stop reason: HOSPADM

## 2022-09-15 RX ORDER — LEVOTHYROXINE SODIUM 0.05 MG/1
50 TABLET ORAL DAILY
Status: CANCELLED | OUTPATIENT
Start: 2022-09-15

## 2022-09-15 RX ORDER — LEVOTHYROXINE SODIUM 0.05 MG/1
50 TABLET ORAL DAILY
Status: DISCONTINUED | OUTPATIENT
Start: 2022-09-16 | End: 2022-09-22

## 2022-09-15 RX ORDER — POLYETHYLENE GLYCOL 3350 17 G/17G
17 POWDER, FOR SOLUTION ORAL DAILY
Status: DISCONTINUED | OUTPATIENT
Start: 2022-09-15 | End: 2022-09-20

## 2022-09-15 RX ORDER — FUROSEMIDE 20 MG/1
40 TABLET ORAL DAILY
Status: CANCELLED | OUTPATIENT
Start: 2022-09-15

## 2022-09-15 RX ORDER — DIAZEPAM 5 MG/1
5 TABLET ORAL ONCE
Status: COMPLETED | OUTPATIENT
Start: 2022-09-15 | End: 2022-09-15

## 2022-09-15 RX ORDER — INSULIN LISPRO 100 [IU]/ML
0-8 INJECTION, SOLUTION INTRAVENOUS; SUBCUTANEOUS
Status: DISCONTINUED | OUTPATIENT
Start: 2022-09-15 | End: 2022-09-23

## 2022-09-15 RX ORDER — FLUTICASONE PROPIONATE 50 MCG
2 SPRAY, SUSPENSION (ML) NASAL DAILY
Status: DISCONTINUED | OUTPATIENT
Start: 2022-09-15 | End: 2022-09-24 | Stop reason: HOSPADM

## 2022-09-15 RX ORDER — SODIUM CHLORIDE 0.9 % (FLUSH) 0.9 %
10 SYRINGE (ML) INJECTION EVERY 12 HOURS SCHEDULED
Status: CANCELLED | OUTPATIENT
Start: 2022-09-15

## 2022-09-15 RX ORDER — AMLODIPINE BESYLATE 10 MG/1
10 TABLET ORAL DAILY
Status: DISCONTINUED | OUTPATIENT
Start: 2022-09-15 | End: 2022-09-22

## 2022-09-15 RX ORDER — DEXTROSE MONOHYDRATE 100 MG/ML
INJECTION, SOLUTION INTRAVENOUS CONTINUOUS PRN
Status: DISCONTINUED | OUTPATIENT
Start: 2022-09-15 | End: 2022-09-21

## 2022-09-15 RX ORDER — CHLORHEXIDINE GLUCONATE 4 G/100ML
SOLUTION TOPICAL SEE ADMIN INSTRUCTIONS
Status: DISCONTINUED | OUTPATIENT
Start: 2022-09-15 | End: 2022-09-22

## 2022-09-15 RX ORDER — DULOXETIN HYDROCHLORIDE 30 MG/1
60 CAPSULE, DELAYED RELEASE ORAL DAILY
Status: CANCELLED | OUTPATIENT
Start: 2022-09-15

## 2022-09-15 RX ORDER — ASPIRIN 81 MG/1
81 TABLET ORAL DAILY
Status: CANCELLED | OUTPATIENT
Start: 2022-09-15

## 2022-09-15 RX ORDER — GLIPIZIDE 5 MG/1
5 TABLET ORAL DAILY
Status: DISCONTINUED | OUTPATIENT
Start: 2022-09-16 | End: 2022-09-16

## 2022-09-15 RX ORDER — CHLORHEXIDINE GLUCONATE 0.12 MG/ML
15 RINSE ORAL ONCE
Status: CANCELLED | OUTPATIENT
Start: 2022-09-15 | End: 2022-09-15

## 2022-09-15 RX ORDER — ACETAMINOPHEN 325 MG/1
650 TABLET ORAL EVERY 4 HOURS PRN
Status: DISCONTINUED | OUTPATIENT
Start: 2022-09-15 | End: 2022-09-16

## 2022-09-15 RX ORDER — ATORVASTATIN CALCIUM 40 MG/1
40 TABLET, FILM COATED ORAL NIGHTLY
Status: CANCELLED | OUTPATIENT
Start: 2022-09-15

## 2022-09-15 RX ORDER — ALLOPURINOL 100 MG/1
100 TABLET ORAL DAILY
Status: CANCELLED | OUTPATIENT
Start: 2022-09-15

## 2022-09-15 RX ORDER — ENOXAPARIN SODIUM 150 MG/ML
1 INJECTION SUBCUTANEOUS 2 TIMES DAILY
Status: DISCONTINUED | OUTPATIENT
Start: 2022-09-15 | End: 2022-09-20

## 2022-09-15 RX ORDER — SODIUM CHLORIDE 0.9 % (FLUSH) 0.9 %
10 SYRINGE (ML) INJECTION PRN
Status: CANCELLED | OUTPATIENT
Start: 2022-09-15

## 2022-09-15 RX ORDER — SODIUM CHLORIDE 0.9 % (FLUSH) 0.9 %
10 SYRINGE (ML) INJECTION EVERY 12 HOURS SCHEDULED
Status: DISCONTINUED | OUTPATIENT
Start: 2022-09-15 | End: 2022-09-22 | Stop reason: SDUPTHER

## 2022-09-15 RX ORDER — HYDRALAZINE HYDROCHLORIDE 20 MG/ML
10 INJECTION INTRAMUSCULAR; INTRAVENOUS EVERY 6 HOURS PRN
Status: DISCONTINUED | OUTPATIENT
Start: 2022-09-15 | End: 2022-09-24 | Stop reason: HOSPADM

## 2022-09-15 RX ORDER — NITROGLYCERIN 20 MG/100ML
5-200 INJECTION INTRAVENOUS CONTINUOUS PRN
Status: DISCONTINUED | OUTPATIENT
Start: 2022-09-15 | End: 2022-09-16

## 2022-09-15 RX ORDER — INSULIN LISPRO 100 [IU]/ML
0-4 INJECTION, SOLUTION INTRAVENOUS; SUBCUTANEOUS
Status: CANCELLED | OUTPATIENT
Start: 2022-09-15

## 2022-09-15 RX ORDER — ATORVASTATIN CALCIUM 40 MG/1
40 TABLET, FILM COATED ORAL NIGHTLY
Status: DISCONTINUED | OUTPATIENT
Start: 2022-09-15 | End: 2022-09-22

## 2022-09-15 RX ORDER — CHLORHEXIDINE GLUCONATE 4 G/100ML
SOLUTION TOPICAL SEE ADMIN INSTRUCTIONS
Status: CANCELLED | OUTPATIENT
Start: 2022-09-15

## 2022-09-15 RX ORDER — CARVEDILOL 25 MG/1
25 TABLET ORAL 2 TIMES DAILY
Status: DISCONTINUED | OUTPATIENT
Start: 2022-09-15 | End: 2022-09-22

## 2022-09-15 RX ORDER — CHLORHEXIDINE GLUCONATE 0.12 MG/ML
15 RINSE ORAL ONCE
Status: COMPLETED | OUTPATIENT
Start: 2022-09-15 | End: 2022-09-19

## 2022-09-15 RX ORDER — DILTIAZEM HYDROCHLORIDE 60 MG/1
120 TABLET, FILM COATED ORAL DAILY
Status: DISCONTINUED | OUTPATIENT
Start: 2022-09-16 | End: 2022-09-15

## 2022-09-15 RX ORDER — INSULIN LISPRO 100 [IU]/ML
0-4 INJECTION, SOLUTION INTRAVENOUS; SUBCUTANEOUS NIGHTLY
Status: CANCELLED | OUTPATIENT
Start: 2022-09-15

## 2022-09-15 RX ORDER — SODIUM CHLORIDE 9 MG/ML
INJECTION, SOLUTION INTRAVENOUS PRN
Status: DISCONTINUED | OUTPATIENT
Start: 2022-09-15 | End: 2022-09-22 | Stop reason: SDUPTHER

## 2022-09-15 RX ORDER — DIPHENHYDRAMINE HCL 25 MG
25 TABLET ORAL ONCE
Status: COMPLETED | OUTPATIENT
Start: 2022-09-15 | End: 2022-09-15

## 2022-09-15 RX ORDER — DIGOXIN 125 MCG
125 TABLET ORAL DAILY
Status: DISCONTINUED | OUTPATIENT
Start: 2022-09-16 | End: 2022-09-24 | Stop reason: HOSPADM

## 2022-09-15 RX ORDER — ALLOPURINOL 100 MG/1
100 TABLET ORAL 2 TIMES DAILY
Status: DISCONTINUED | OUTPATIENT
Start: 2022-09-15 | End: 2022-09-24 | Stop reason: HOSPADM

## 2022-09-15 RX ORDER — NITROGLYCERIN 20 MG/100ML
5-200 INJECTION INTRAVENOUS CONTINUOUS PRN
Status: DISCONTINUED | OUTPATIENT
Start: 2022-09-15 | End: 2022-09-15

## 2022-09-15 RX ORDER — PRAVASTATIN SODIUM 40 MG
80 TABLET ORAL DAILY
Status: DISCONTINUED | OUTPATIENT
Start: 2022-09-15 | End: 2022-09-15

## 2022-09-15 RX ORDER — TIZANIDINE 4 MG/1
4 TABLET ORAL 3 TIMES DAILY PRN
Status: DISCONTINUED | OUTPATIENT
Start: 2022-09-15 | End: 2022-09-21

## 2022-09-15 RX ORDER — ALBUTEROL SULFATE 90 UG/1
2 AEROSOL, METERED RESPIRATORY (INHALATION) EVERY 6 HOURS PRN
Status: CANCELLED | OUTPATIENT
Start: 2022-09-15

## 2022-09-15 RX ORDER — SODIUM CHLORIDE 9 MG/ML
INJECTION, SOLUTION INTRAVENOUS CONTINUOUS
Status: DISCONTINUED | OUTPATIENT
Start: 2022-09-15 | End: 2022-09-15

## 2022-09-15 RX ORDER — INSULIN LISPRO 100 [IU]/ML
0-4 INJECTION, SOLUTION INTRAVENOUS; SUBCUTANEOUS NIGHTLY
Status: DISCONTINUED | OUTPATIENT
Start: 2022-09-15 | End: 2022-09-16

## 2022-09-15 RX ORDER — LOSARTAN POTASSIUM 100 MG/1
100 TABLET ORAL DAILY
Status: DISCONTINUED | OUTPATIENT
Start: 2022-09-16 | End: 2022-09-15

## 2022-09-15 RX ORDER — ENOXAPARIN SODIUM 100 MG/ML
1 INJECTION SUBCUTANEOUS 2 TIMES DAILY
Status: DISCONTINUED | OUTPATIENT
Start: 2022-09-15 | End: 2022-09-15

## 2022-09-15 RX ADMIN — HYDRALAZINE HYDROCHLORIDE 10 MG: 20 INJECTION INTRAMUSCULAR; INTRAVENOUS at 13:54

## 2022-09-15 RX ADMIN — ATORVASTATIN CALCIUM 40 MG: 40 TABLET, FILM COATED ORAL at 20:54

## 2022-09-15 RX ADMIN — CARVEDILOL 25 MG: 25 TABLET, FILM COATED ORAL at 20:54

## 2022-09-15 RX ADMIN — ALLOPURINOL 100 MG: 100 TABLET ORAL at 15:41

## 2022-09-15 RX ADMIN — ACETAMINOPHEN 650 MG: 325 TABLET ORAL at 18:55

## 2022-09-15 RX ADMIN — PERFLUTREN 2.2 MG: 6.52 INJECTION, SUSPENSION INTRAVENOUS at 14:40

## 2022-09-15 RX ADMIN — FOLIC ACID: 5 INJECTION, SOLUTION INTRAMUSCULAR; INTRAVENOUS; SUBCUTANEOUS at 17:35

## 2022-09-15 RX ADMIN — FUROSEMIDE 40 MG: 40 TABLET ORAL at 20:54

## 2022-09-15 RX ADMIN — POLYETHYLENE GLYCOL (3350) 17 G: 17 POWDER, FOR SOLUTION ORAL at 15:40

## 2022-09-15 RX ADMIN — FLUTICASONE PROPIONATE 2 SPRAY: 50 SPRAY, METERED NASAL at 16:17

## 2022-09-15 RX ADMIN — INSULIN HUMAN 80 UNITS: 100 INJECTION, SUSPENSION SUBCUTANEOUS at 20:54

## 2022-09-15 RX ADMIN — Medication: at 20:54

## 2022-09-15 RX ADMIN — ALLOPURINOL 100 MG: 100 TABLET ORAL at 20:54

## 2022-09-15 RX ADMIN — DULOXETINE 60 MG: 30 CAPSULE, DELAYED RELEASE ORAL at 15:41

## 2022-09-15 RX ADMIN — DIAZEPAM 5 MG: 5 TABLET ORAL at 10:43

## 2022-09-15 RX ADMIN — NITROGLYCERIN 5 MCG/MIN: 20 INJECTION INTRAVENOUS at 15:39

## 2022-09-15 RX ADMIN — SODIUM CHLORIDE: 9 INJECTION, SOLUTION INTRAVENOUS at 13:29

## 2022-09-15 RX ADMIN — CETIRIZINE HYDROCHLORIDE 10 MG: 10 TABLET, FILM COATED ORAL at 15:41

## 2022-09-15 RX ADMIN — Medication: at 15:41

## 2022-09-15 RX ADMIN — ENOXAPARIN SODIUM 150 MG: 150 INJECTION SUBCUTANEOUS at 20:55

## 2022-09-15 RX ADMIN — DIPHENHYDRAMINE HYDROCHLORIDE 25 MG: 25 TABLET ORAL at 10:43

## 2022-09-15 RX ADMIN — AMLODIPINE BESYLATE 10 MG: 10 TABLET ORAL at 15:41

## 2022-09-15 RX ADMIN — SODIUM CHLORIDE, PRESERVATIVE FREE 10 ML: 5 INJECTION INTRAVENOUS at 20:57

## 2022-09-15 RX ADMIN — SODIUM CHLORIDE: 9 INJECTION, SOLUTION INTRAVENOUS at 10:44

## 2022-09-15 ASSESSMENT — PAIN - FUNCTIONAL ASSESSMENT: PAIN_FUNCTIONAL_ASSESSMENT: ACTIVITIES ARE NOT PREVENTED

## 2022-09-15 ASSESSMENT — PAIN DESCRIPTION - LOCATION
LOCATION: HEAD
LOCATION: HEAD

## 2022-09-15 ASSESSMENT — PAIN DESCRIPTION - DESCRIPTORS
DESCRIPTORS: ACHING
DESCRIPTORS: ACHING

## 2022-09-15 ASSESSMENT — PAIN SCALES - GENERAL
PAINLEVEL_OUTOF10: 5
PAINLEVEL_OUTOF10: 0
PAINLEVEL_OUTOF10: 5

## 2022-09-15 NOTE — PROGRESS NOTES
TR band to right radial wrist removed. Puncture site with no redness, swelling, or drainage  noted at this time. Will continue to monitor.

## 2022-09-15 NOTE — PROGRESS NOTES
Patient seen and examined by Dr. Nilton CHOUDHARY and I.    Cardiothoracic surgery consulted for multivessel coronary artery disease by Dr. Bambi Espinal. Left heart cath revealing significant disease of LAD and RCA. Patient does have history of PCI in 2005. Patient will receive CABG work-up including bilateral carotid artery ultrasound, bilateral lower extremity venous mapping, CT chest without contrast, as well as lab work-up. Patient does have history of atrial fibrillation being managed with digoxin, Coreg, diltiazem, and warfarin. Patient last received dose of warfarin 5 days ago. Patient was supposed to start taking Lovenox prior to left heart cath but never picked up his Lovenox. Patient has been found to have DVT. Will recheck venous duplex ultrasound to reevaluate DVT. Patient is being treated for his hypertension. Patient does take Cozaar at home. Most recent dose was today. We will hold Cozaar in order to help prevent vasoplegia. Potential plan for CABG this upcoming Monday if patient remains stable. He is currently chest pain-free. We will continue to evaluate patient.     Full note to follow by Dr. Roopa Collado PA-C 09/15/22 4:18 PM

## 2022-09-15 NOTE — H&P
CC:   Padmini Ho  is an established 71 y.o.  male here for a chest pain    here for [facility]  Has cp and abn stress  Summary    Supervising physician Dr. Beatriz Lo . Small sized defect of mild severity which is reversible involving inferior    wall of myocardium. Abnormal Lexiscan nuclear scintigraphic study suggestive    of abnormal myocardial perfusion. Mild degree of inferior wall ischemia    noted involving a small sized area of left ventricular myocardium. Gated    images demonstrate normal left ventricular systolic function with EF of 60    %. Recommendation    Suggest office visit to discuss results or schedule cardiac catheterization    . Signatures        ------------------------------------------------------------------    Electronically signed by Joshua Maurice MD    (Interpreting cardiologist) on 09/01/2022 at 14:58        SUBJECTIVE/OBJECTIVE:  Padmini Ho is a 71 y.o. male with a history of coonary artery disease s/p PCI ( 2005)- hypertension- hyperlipidemia         HPI :   Padmini Ho reports over the past few weeks he has noticed chest pain-located the left chest-no radiating -no aggravating factors- goes away on own- lasting for 4 minutes- no associated symptoms. Pain is not reproducible to touch. he has noticed shortness of breath with activity as well. He is planning for a colonoscopy in the near future d/t change in bowel habits     Review of Systems   Constitutional: Negative for diaphoresis and malaise/fatigue. Cardiovascular:  Positive for chest pain and dyspnea on exertion. Negative for claudication, irregular heartbeat, leg swelling, near-syncope, orthopnea, palpitations and paroxysmal nocturnal dyspnea. Respiratory:  Negative for shortness of breath. Gastrointestinal:  Positive for bloating, change in bowel habit, constipation and diarrhea. Neurological:  Negative for dizziness and light-headedness.       Vitals       Vitals:     08/26/22 1121   BP: (!) 162/78   Site: Left Upper Arm   Position: Sitting   Cuff Size: Large Adult   Pulse: 91         No flowsheet data found. Wt Readings from Last 3 Encounters:   05/31/22 (!) 331 lb (150.1 kg)   02/15/22 (!) 332 lb (150.6 kg)   12/02/21 (!) 340 lb (154.2 kg)      There is no height or weight on file to calculate BMI. Physical Exam  Constitutional:       Appearance: He is obese. HENT:      Head: Normocephalic and atraumatic. Eyes:      Extraocular Movements: Extraocular movements intact. Pupils: Pupils are equal, round, and reactive to light. Neck:      Vascular: No carotid bruit. Cardiovascular:      Rate and Rhythm: Normal rate. Rhythm irregular. Pulses: Normal pulses. Pulmonary:      Effort: Pulmonary effort is normal.      Breath sounds: Normal breath sounds. No wheezing or rales. Abdominal:      General: There is no distension. Palpations: Abdomen is soft. Tenderness: There is no abdominal tenderness. Musculoskeletal:         General: Normal range of motion. Cervical back: No tenderness. Right lower leg: No edema. Left lower leg: No edema. Skin:     General: Skin is warm and dry. Capillary Refill: Capillary refill takes less than 2 seconds. Neurological:      General: No focal deficit present. Mental Status: He is alert and oriented to person, place, and time. Psychiatric:         Mood and Affect: Mood normal.         Behavior: Behavior normal.                  Current Facility-Administered Medications          Current Outpatient Medications   Medication Sig Dispense Refill    dilTIAZem (CARDIZEM) 120 MG tablet Take 1 tablet by mouth daily 90 tablet 1    blood glucose monitor kit and supplies Dispense sufficient amount for indicated testing frequency plus additional to accommodate PRN testing needs. Dispense all needed supplies to include: monitor, strips, lancing device, lancets, control solutions, alcohol swabs.  1 kit 0    blood glucose monitor strips Test 2 times a day & as needed for symptoms of irregular blood glucose. Dispense sufficient amount for indicated testing frequency plus additional to accommodate PRN testing needs. 200 strip 0    Lancets MISC by D3EA route three times a day.  100 each 3    allopurinol (ZYLOPRIM) 100 MG tablet Take 1 tab by mouth twice daily 180 tablet 0    DULoxetine (CYMBALTA) 60 MG extended release capsule Take 1 capsule by mouth daily 90 capsule 1    levothyroxine (SYNTHROID) 50 MCG tablet Take 1 tablet by mouth Daily 90 tablet 1    empagliflozin (JARDIANCE) 25 MG tablet Take 1 tablet by mouth daily 90 tablet 1    pravastatin (PRAVACHOL) 80 MG tablet TAKE 1 TABLET BY MOUTH ONCE DAILY 90 tablet 1    carvedilol (COREG) 25 MG tablet Take 1 tablet by mouth 2 times daily 90 tablet 3    cetirizine (EQ ALLERGY RELIEF, CETIRIZINE,) 10 MG tablet Take 1 tablet by mouth daily 90 tablet 5    warfarin (COUMADIN) 2 MG tablet TAKE 1 TABLET BY MOUTH THREE TIMES A WEEK (MON,WED,&FRI) 30 tablet 5    warfarin (COUMADIN) 3 MG tablet TAKE 1 TABLET BY MOUTH ONCE DAILY INDICATIONS  SAT/TUES/THURS/SUN  OR  AS  DIRECTED  PER  DRJose Martin 30 tablet 5    digoxin (LANOXIN) 125 MCG tablet Take 1 tablet by mouth daily 90 tablet 3    metFORMIN (GLUCOPHAGE) 1000 MG tablet TAKE 1 TABLET BY MOUTH TWICE DAILY WITH MEALS 180 tablet 2    furosemide (LASIX) 40 MG tablet Take 1 tablet by mouth nightly 30 tablet 5    losartan (COZAAR) 100 MG tablet Take 1 tablet by mouth daily 90 tablet 3    fluticasone (FLONASE) 50 MCG/ACT nasal spray 2 sprays by Each Nostril route daily 1 Bottle 2    tiZANidine (ZANAFLEX) 4 MG tablet Take 1 tablet by mouth 3 times daily as needed (shoulder pain) 30 tablet 0    potassium chloride (KLOR-CON M) 10 MEQ extended release tablet Take 1 tablet by mouth 2 times daily 60 tablet 5    polyethylene glycol (GLYCOLAX) 17 GM/SCOOP powder Take 17 g by mouth daily 225 g 2    insulin regular (NOVOLIN R) 100 UNIT/ML injection Inject 0-10 Units into the skin 3 times daily (before meals) 1 vial 3    insulin NPH (HUMULIN N;NOVOLIN N) 100 UNIT/ML injection vial Inject 80 Units into the skin nightly 3 vial 2    gemfibrozil (LOPID) 600 MG tablet Take 1 tablet by mouth 2 times daily 180 tablet 1    albuterol sulfate HFA (VENTOLIN HFA) 108 (90 Base) MCG/ACT inhaler Inhale 2 puffs into the lungs every 6 hours as needed for Wheezing 1 Inhaler 3    insulin aspart (NOVOLOG) 100 UNIT/ML injection vial Inject into the skin        gabapentin (NEURONTIN) 300 MG capsule Take 1 capsule by mouth 2 times daily for 30 days. Intended supply: 30 days 60 capsule 2      No current facility-administered medications for this visit. All pertinent data reviewed and discussed with patient        ASSESSMENT/PLAN:        1. Coronary artery disease involving native coronary artery of native heart with angina pectoris ([facility] [address]  Has noted episodes of chest pain recently with activity. Episodes are concerning for angina. Has known history of coronary disease with PCI in the remote past.  Recommend check Cassandrachristina Allen as he is unable to walk the treadmill. We will follow-up for results. Continue with carvedilol  - EKG 12 lead-atrial fibrillation right bundle branch block  - NM MYOCARDIAL SPECT REST EXERCISE OR RX; Future     2. Atrial fibrillation, unspecified type ([address]  EKG atrial flutter with controlled rate. Continue with Lanoxin and Cardizem. Recommend to continue with anticoagulation with warfarin for stroke prevention     3. Primary hypertension  Blood pressure is elevated today. One-time reading of elevated blood pressure. Recommended continue to monitor blood pressure and will readjust medications at next office visit if blood pressure continues to be elevated.      Obesity  BMI 42  Morbidity and mortality increase with morbid obesity a

## 2022-09-16 ENCOUNTER — APPOINTMENT (OUTPATIENT)
Dept: ULTRASOUND IMAGING | Age: 72
DRG: 233 | End: 2022-09-16
Attending: INTERNAL MEDICINE
Payer: COMMERCIAL

## 2022-09-16 ENCOUNTER — APPOINTMENT (OUTPATIENT)
Dept: CT IMAGING | Age: 72
DRG: 233 | End: 2022-09-16
Attending: INTERNAL MEDICINE
Payer: COMMERCIAL

## 2022-09-16 LAB
ALBUMIN SERPL-MCNC: 3.8 GM/DL (ref 3.4–5)
ALP BLD-CCNC: 94 IU/L (ref 40–129)
ALT SERPL-CCNC: 18 U/L (ref 10–40)
ANION GAP SERPL CALCULATED.3IONS-SCNC: 9 MMOL/L (ref 4–16)
AST SERPL-CCNC: 21 IU/L (ref 15–37)
BASOPHILS ABSOLUTE: 0.1 K/CU MM
BASOPHILS RELATIVE PERCENT: 0.6 % (ref 0–1)
BILIRUB SERPL-MCNC: 0.8 MG/DL (ref 0–1)
BUN BLDV-MCNC: 10 MG/DL (ref 6–23)
CALCIUM SERPL-MCNC: 8.4 MG/DL (ref 8.3–10.6)
CHLORIDE BLD-SCNC: 105 MMOL/L (ref 99–110)
CO2: 26 MMOL/L (ref 21–32)
CREAT SERPL-MCNC: 0.8 MG/DL (ref 0.9–1.3)
DIFFERENTIAL TYPE: ABNORMAL
EKG ATRIAL RATE: 60 BPM
EKG ATRIAL RATE: 84 BPM
EKG DIAGNOSIS: NORMAL
EKG DIAGNOSIS: NORMAL
EKG Q-T INTERVAL: 394 MS
EKG Q-T INTERVAL: 446 MS
EKG QRS DURATION: 158 MS
EKG QRS DURATION: 160 MS
EKG QTC CALCULATION (BAZETT): 452 MS
EKG QTC CALCULATION (BAZETT): 516 MS
EKG R AXIS: -68 DEGREES
EKG R AXIS: 242 DEGREES
EKG T AXIS: 168 DEGREES
EKG T AXIS: 63 DEGREES
EKG VENTRICULAR RATE: 103 BPM
EKG VENTRICULAR RATE: 62 BPM
EOSINOPHILS ABSOLUTE: 0.1 K/CU MM
EOSINOPHILS RELATIVE PERCENT: 0.9 % (ref 0–3)
GFR AFRICAN AMERICAN: >60 ML/MIN/1.73M2
GFR NON-AFRICAN AMERICAN: >60 ML/MIN/1.73M2
GLUCOSE BLD-MCNC: 121 MG/DL (ref 70–99)
GLUCOSE BLD-MCNC: 146 MG/DL (ref 70–99)
GLUCOSE BLD-MCNC: 170 MG/DL (ref 70–99)
GLUCOSE BLD-MCNC: 190 MG/DL (ref 70–99)
GLUCOSE BLD-MCNC: 196 MG/DL (ref 70–99)
HCT VFR BLD CALC: 32.8 % (ref 42–52)
HEMOGLOBIN: 13 GM/DL (ref 13.5–18)
IMMATURE NEUTROPHIL %: 0.3 % (ref 0–0.43)
INR BLD: 1.05 INDEX
LYMPHOCYTES ABSOLUTE: 1.2 K/CU MM
LYMPHOCYTES RELATIVE PERCENT: 14.2 % (ref 24–44)
MAGNESIUM: 1.8 MG/DL (ref 1.8–2.4)
MCH RBC QN AUTO: 41 PG (ref 27–31)
MCHC RBC AUTO-ENTMCNC: 39.6 % (ref 32–36)
MCV RBC AUTO: 103.5 FL (ref 78–100)
MONOCYTES ABSOLUTE: 0.9 K/CU MM
MONOCYTES RELATIVE PERCENT: 9.7 % (ref 0–4)
NUCLEATED RBC %: 0 %
PDW BLD-RTO: 14.3 % (ref 11.7–14.9)
PLATELET # BLD: 171 K/CU MM (ref 140–440)
PMV BLD AUTO: 9.7 FL (ref 7.5–11.1)
POTASSIUM SERPL-SCNC: 3.6 MMOL/L (ref 3.5–5.1)
PROTHROMBIN TIME: 13.6 SECONDS (ref 11.7–14.5)
RBC # BLD: 3.17 M/CU MM (ref 4.6–6.2)
SEGMENTED NEUTROPHILS ABSOLUTE COUNT: 6.5 K/CU MM
SEGMENTED NEUTROPHILS RELATIVE PERCENT: 74.3 % (ref 36–66)
SODIUM BLD-SCNC: 140 MMOL/L (ref 135–145)
TOTAL IMMATURE NEUTOROPHIL: 0.03 K/CU MM
TOTAL NUCLEATED RBC: 0 K/CU MM
TOTAL PROTEIN: 6.3 GM/DL (ref 6.4–8.2)
TROPONIN T: <0.01 NG/ML
WBC # BLD: 8.8 K/CU MM (ref 4–10.5)

## 2022-09-16 PROCEDURE — 6370000000 HC RX 637 (ALT 250 FOR IP): Performed by: INTERNAL MEDICINE

## 2022-09-16 PROCEDURE — 2000000000 HC ICU R&B

## 2022-09-16 PROCEDURE — 2580000003 HC RX 258

## 2022-09-16 PROCEDURE — 82962 GLUCOSE BLOOD TEST: CPT

## 2022-09-16 PROCEDURE — 93010 ELECTROCARDIOGRAM REPORT: CPT | Performed by: INTERNAL MEDICINE

## 2022-09-16 PROCEDURE — 2500000003 HC RX 250 WO HCPCS

## 2022-09-16 PROCEDURE — 6370000000 HC RX 637 (ALT 250 FOR IP)

## 2022-09-16 PROCEDURE — 85025 COMPLETE CBC W/AUTO DIFF WBC: CPT

## 2022-09-16 PROCEDURE — 94150 VITAL CAPACITY TEST: CPT

## 2022-09-16 PROCEDURE — 6360000002 HC RX W HCPCS

## 2022-09-16 PROCEDURE — 71250 CT THORAX DX C-: CPT

## 2022-09-16 PROCEDURE — 80053 COMPREHEN METABOLIC PANEL: CPT

## 2022-09-16 PROCEDURE — 94761 N-INVAS EAR/PLS OXIMETRY MLT: CPT

## 2022-09-16 PROCEDURE — 94664 DEMO&/EVAL PT USE INHALER: CPT

## 2022-09-16 PROCEDURE — 36415 COLL VENOUS BLD VENIPUNCTURE: CPT

## 2022-09-16 PROCEDURE — 84484 ASSAY OF TROPONIN QUANT: CPT

## 2022-09-16 PROCEDURE — 84439 ASSAY OF FREE THYROXINE: CPT

## 2022-09-16 PROCEDURE — 93005 ELECTROCARDIOGRAM TRACING: CPT

## 2022-09-16 PROCEDURE — 93970 EXTREMITY STUDY: CPT

## 2022-09-16 PROCEDURE — P9017 PLASMA 1 DONOR FRZ W/IN 8 HR: HCPCS

## 2022-09-16 PROCEDURE — 85610 PROTHROMBIN TIME: CPT

## 2022-09-16 PROCEDURE — 83735 ASSAY OF MAGNESIUM: CPT

## 2022-09-16 RX ORDER — METOPROLOL TARTRATE 5 MG/5ML
5 INJECTION INTRAVENOUS EVERY 5 MIN PRN
Status: DISCONTINUED | OUTPATIENT
Start: 2022-09-16 | End: 2022-09-18

## 2022-09-16 RX ORDER — GLIPIZIDE 5 MG/1
10 TABLET ORAL DAILY
Status: DISCONTINUED | OUTPATIENT
Start: 2022-09-17 | End: 2022-09-16

## 2022-09-16 RX ORDER — HYDRALAZINE HYDROCHLORIDE 25 MG/1
25 TABLET, FILM COATED ORAL EVERY 8 HOURS SCHEDULED
Status: DISCONTINUED | OUTPATIENT
Start: 2022-09-16 | End: 2022-09-18

## 2022-09-16 RX ORDER — GLIPIZIDE 5 MG/1
5 TABLET ORAL DAILY
Status: DISCONTINUED | OUTPATIENT
Start: 2022-09-17 | End: 2022-09-24 | Stop reason: HOSPADM

## 2022-09-16 RX ORDER — POTASSIUM CHLORIDE 20 MEQ/1
40 TABLET, EXTENDED RELEASE ORAL
Status: DISCONTINUED | OUTPATIENT
Start: 2022-09-16 | End: 2022-09-24 | Stop reason: HOSPADM

## 2022-09-16 RX ORDER — NITROGLYCERIN 20 MG/100ML
5-200 INJECTION INTRAVENOUS CONTINUOUS PRN
Status: DISCONTINUED | OUTPATIENT
Start: 2022-09-16 | End: 2022-09-22 | Stop reason: SDUPTHER

## 2022-09-16 RX ORDER — SODIUM CHLORIDE 9 MG/ML
INJECTION, SOLUTION INTRAVENOUS PRN
Status: DISCONTINUED | OUTPATIENT
Start: 2022-09-16 | End: 2022-09-20

## 2022-09-16 RX ORDER — INSULIN GLARGINE 100 [IU]/ML
50 INJECTION, SOLUTION SUBCUTANEOUS NIGHTLY
Status: DISCONTINUED | OUTPATIENT
Start: 2022-09-16 | End: 2022-09-23

## 2022-09-16 RX ORDER — INSULIN GLARGINE 100 [IU]/ML
60 INJECTION, SOLUTION SUBCUTANEOUS NIGHTLY
Status: DISCONTINUED | OUTPATIENT
Start: 2022-09-16 | End: 2022-09-16

## 2022-09-16 RX ORDER — MAGNESIUM OXIDE 400 MG/1
200 TABLET ORAL 2 TIMES DAILY
Status: DISCONTINUED | OUTPATIENT
Start: 2022-09-16 | End: 2022-09-16

## 2022-09-16 RX ORDER — INSULIN LISPRO 100 [IU]/ML
0-8 INJECTION, SOLUTION INTRAVENOUS; SUBCUTANEOUS
Status: DISCONTINUED | OUTPATIENT
Start: 2022-09-16 | End: 2022-09-16

## 2022-09-16 RX ORDER — INSULIN LISPRO 100 [IU]/ML
0-8 INJECTION, SOLUTION INTRAVENOUS; SUBCUTANEOUS
Status: DISCONTINUED | OUTPATIENT
Start: 2022-09-16 | End: 2022-09-23

## 2022-09-16 RX ORDER — ACETAMINOPHEN 500 MG
1000 TABLET ORAL EVERY 4 HOURS PRN
Status: DISCONTINUED | OUTPATIENT
Start: 2022-09-16 | End: 2022-09-21

## 2022-09-16 RX ORDER — LANOLIN ALCOHOL/MO/W.PET/CERES
200 CREAM (GRAM) TOPICAL 2 TIMES DAILY
Status: DISCONTINUED | OUTPATIENT
Start: 2022-09-16 | End: 2022-09-24 | Stop reason: HOSPADM

## 2022-09-16 RX ADMIN — Medication 200 MG: at 21:13

## 2022-09-16 RX ADMIN — HYDRALAZINE HYDROCHLORIDE 10 MG: 20 INJECTION INTRAMUSCULAR; INTRAVENOUS at 09:22

## 2022-09-16 RX ADMIN — HYDRALAZINE HYDROCHLORIDE 25 MG: 25 TABLET, FILM COATED ORAL at 21:12

## 2022-09-16 RX ADMIN — CETIRIZINE HYDROCHLORIDE 10 MG: 10 TABLET, FILM COATED ORAL at 09:17

## 2022-09-16 RX ADMIN — ACETAMINOPHEN 1000 MG: 500 TABLET ORAL at 22:11

## 2022-09-16 RX ADMIN — ACETAMINOPHEN 650 MG: 325 TABLET ORAL at 06:31

## 2022-09-16 RX ADMIN — AMLODIPINE BESYLATE 10 MG: 10 TABLET ORAL at 09:17

## 2022-09-16 RX ADMIN — ENOXAPARIN SODIUM 150 MG: 150 INJECTION SUBCUTANEOUS at 09:22

## 2022-09-16 RX ADMIN — Medication 200 MG: at 09:17

## 2022-09-16 RX ADMIN — LEVOTHYROXINE SODIUM 50 MCG: 0.05 TABLET ORAL at 06:31

## 2022-09-16 RX ADMIN — INSULIN GLARGINE 50 UNITS: 100 INJECTION, SOLUTION SUBCUTANEOUS at 21:13

## 2022-09-16 RX ADMIN — CARVEDILOL 25 MG: 25 TABLET, FILM COATED ORAL at 09:30

## 2022-09-16 RX ADMIN — Medication: at 09:17

## 2022-09-16 RX ADMIN — NITROGLYCERIN 45 MCG/MIN: 20 INJECTION INTRAVENOUS at 11:18

## 2022-09-16 RX ADMIN — Medication: at 21:13

## 2022-09-16 RX ADMIN — HYDRALAZINE HYDROCHLORIDE 25 MG: 25 TABLET, FILM COATED ORAL at 14:31

## 2022-09-16 RX ADMIN — POLYETHYLENE GLYCOL (3350) 17 G: 17 POWDER, FOR SOLUTION ORAL at 09:17

## 2022-09-16 RX ADMIN — ACETAMINOPHEN 650 MG: 325 TABLET ORAL at 02:22

## 2022-09-16 RX ADMIN — SODIUM CHLORIDE, PRESERVATIVE FREE 10 ML: 5 INJECTION INTRAVENOUS at 21:24

## 2022-09-16 RX ADMIN — ATORVASTATIN CALCIUM 40 MG: 40 TABLET, FILM COATED ORAL at 21:13

## 2022-09-16 RX ADMIN — ALLOPURINOL 100 MG: 100 TABLET ORAL at 09:17

## 2022-09-16 RX ADMIN — ENOXAPARIN SODIUM 150 MG: 150 INJECTION SUBCUTANEOUS at 21:13

## 2022-09-16 RX ADMIN — POTASSIUM CHLORIDE 40 MEQ: 1500 TABLET, EXTENDED RELEASE ORAL at 09:22

## 2022-09-16 RX ADMIN — ALLOPURINOL 100 MG: 100 TABLET ORAL at 21:12

## 2022-09-16 RX ADMIN — HYDRALAZINE HYDROCHLORIDE 25 MG: 25 TABLET, FILM COATED ORAL at 09:31

## 2022-09-16 RX ADMIN — GLIPIZIDE 5 MG: 5 TABLET ORAL at 09:16

## 2022-09-16 RX ADMIN — CARVEDILOL 25 MG: 25 TABLET, FILM COATED ORAL at 21:13

## 2022-09-16 RX ADMIN — DULOXETINE 60 MG: 30 CAPSULE, DELAYED RELEASE ORAL at 09:17

## 2022-09-16 RX ADMIN — FLUTICASONE PROPIONATE 2 SPRAY: 50 SPRAY, METERED NASAL at 09:28

## 2022-09-16 RX ADMIN — FUROSEMIDE 40 MG: 40 TABLET ORAL at 21:13

## 2022-09-16 ASSESSMENT — ENCOUNTER SYMPTOMS
COUGH: 0
VOMITING: 0
DIARRHEA: 0
SHORTNESS OF BREATH: 0
ABDOMINAL PAIN: 0

## 2022-09-16 ASSESSMENT — PAIN SCALES - GENERAL
PAINLEVEL_OUTOF10: 0
PAINLEVEL_OUTOF10: 8
PAINLEVEL_OUTOF10: 6
PAINLEVEL_OUTOF10: 0
PAINLEVEL_OUTOF10: 8

## 2022-09-16 NOTE — CONSULTS
Endocrinology   Consult Note  9/15/2022 10:07 AM     Primary Care provider: ANKITA Monte - CNP     Referring physician:  Kati Blevins MD     Dear Doctor Jorje Benson     Thank You for the Consult     Pt. Was Admitted for : Chest pain has history of CAD had cardiac cath three-vessel disease  For CABG 9/19/2022    Reason for Consult: Better control of blood glucose      History Obtained From:  Patient/ EMR       HISTORY OF PRESENT ILLNESS:                The patient is a 71 y.o. male with significant past medical history of type 2 diabetes mellitus on low-dose oral hypoglycemic drugs and NPH insulin, PAD, has PTCA, stent COPD, GERD, hypertension, hyperlipidemia, sleep apnea not hypothyroid on CPAP him underwent cardiac cath because of chest pain. To have multivessel disease and recommended to have CABG done CABG is planned on 9/19/2022. I was  consulted for better control of blood glucose. ROS:   Pt's ROS done in detail. Abnormal ROS are noted in Medical and Surgical History Section below: Other Medical History:        Diagnosis Date    Allergic rhinitis     Anticoagulated on Coumadin     1/6/17-**[place] to follow pt's PT/INRs, along w/prescribing his Coumadin. **    Anxiety     Arthritis     Atrial fibrillation (HCC)     On Coumadin.     CAD (coronary artery disease)     COPD (chronic obstructive pulmonary disease) (HCC)     Depression     Diabetes mellitus (HCC)     NIDDM- dx over 10 yrs ago- follows with Dr Ginger Pinon's contracture of hand     Right hand    Family history of cardiovascular disease     Father-MI    GERD (gastroesophageal reflux disease)     H/O cardiac catheterization 03/2007    cardiac cath- stent LAD patent, [id number]%, RCA 40-50%    H/O cardiac catheterization 06/12/2008    cardiac cath- mild disease mid RCA    H/O cardiovascular stress test 4/10/2014    cardiolite- normal, no ischemia, EF63%    H/O cardiovascular stress test 09/21/2016    EF59% normal study  pt in atrial fib    H/O cardiovascular stress test 09/20/2017    EF 60%   afib    H/O cardiovascular stress test 11/07/2018    EF60% normal study    H/O Doppler ultrasound     1/11/2010-CAROTID_Intimal thickening but no significant atherosclerotic plaque noted in LAUREN. Doppler flow velocities within the LAUREN are WNL. Intimal thickening but no significant atherosclerotic plaque noted in LICA. Doppler flow velociities within the LICA are WNL. H/O echocardiogram 04/10/2014    EF 60%, normal LV systolic function, mild mitral and tricuspid insufficiencies, no pericardial effusion. H/O echocardiogram 09/21/2016    EF60% normal study    H/O echocardiogram 11/07/2018    EF55-60% no significant valular disease    H/O hiatal hernia     Hx of Venous Doppler 03/14/2019    Significant reflux in Left Deep System, No reflux in right lower extremity, No DVT or SVT    Hyperlipidemia     Hypertension     Obesity     S/P PTCA (percutaneous transluminal coronary angioplasty) 03/21/2005    s/p PTCA with 3.5 X 16 TAXUS stent to LAD- follows with Dr Rubén Yanez    Sleep apnea     had sleep study done 10+ yrs ago- has cpap but does not use it    Thyroid disease     hypothyroid     Surgical History:        Procedure Laterality Date    CARDIAC CATHETERIZATION  6/12/08    mild disease mid RCA,  stent to LAD patent,    CARDIAC CATHETERIZATION  3/07    Stent to LAD patent, Diagonal 40-50%, RCA 40-50%    CARDIAC CATHETERIZATION  3/05    COLONOSCOPY  2011    COLONOSCOPY  5/18/16    1 polyp removed, diverticulosis and hemorrhoids noted    CORONARY ANGIOPLASTY WITH STENT PLACEMENT  03/21/2005    PTCA with 3.5 x 16 TAXUS stent to LAD    ENDOSCOPY, COLON, DIAGNOSTIC  2014    egd    HAND SURGERY Right 1997    SC OPEN RX ANKLE DISLOCATN+FIXATN Right 6/3/2019    RIGHT OPEN REDUCTION INTERNAL FIXATION OF DISTAL TIBIA  AND FIBULA performed by Isaac Sommer MD at [address]:  Patient has no known allergies.     Family History: Problem Relation Age of Onset    Cancer Mother         breast ca    Coronary Art Dis Father          MI    Heart Disease Father     Rheum Arthritis Other     Rheum Arthritis Sister     No Known Problems Brother     Rheum Arthritis Sister      REVIEW OF SYSTEMS:  Review of System Done as noted above     PHYSICAL EXAM:      Vitals:    BP (!) 150/70   Pulse 88   Temp 98 °F (36.7 °C) (Oral)   Resp 26   Ht 6' 2\" (1.88 m)   Wt (!) 324 lb 1.2 oz (147 kg)   SpO2 95%   BMI 41.61 kg/m²     CONSTITUTIONAL:  awake, alert, cooperative, appears stated age   EYES:  vision intact Fundoscopic Exam not performed   ENT:Normal  NECK:  Supple, No JVD. Thyroid Exam:Normal   LUNGS:  Has Vesicular Breath Sounds,   CARDIOVASCULAR:  Normal apical impulse, regular rate and rhythm, normal S1 and S2, no S3 or S4, and has no  murmur   ABDOMEN:  No scars, normal bowel sounds, soft, non-distended, non-tender, no masses palpated, no hepatolienomegaly  Musculoskeletal: Normal  Extremities: Normal, peripheral pulses normal, , has no edema   NEUROLOGIC:  Awake, alert, oriented to name, place and time. Cranial nerves II-XII are grossly intact. Motor is  intact. Sensory is intact.  ,  and gait is normal.    DATA:    CBC:   Recent Labs     09/15/22  1800 09/16/22  0711   WBC 6.2 8.8   HGB 14.0 13.0*    171    CMP:  Recent Labs     09/15/22  1800 09/16/22  0711    140   K 3.8 3.6    105   CO2 25 26   BUN 10 10   CREATININE 0.7* 0.8*   CALCIUM 9.0 8.4   PROT 6.9 6.3*   LABALBU 4.2 3.8   BILITOT 0.9 0.8   ALKPHOS 103 94   AST 25 21   ALT 20 18     Lipids:   Lab Results   Component Value Date/Time    CHOL 152 04/14/2021 10:02 AM    CHOL 139 07/23/2018 08:01 AM    HDL 43 08/26/2022 10:39 AM    TRIG 212 04/14/2021 10:02 AM     Glucose:   Recent Labs     09/16/22  0829 09/16/22  1212 09/16/22  1638   POCGLU 146* 196* 170*     Hemoglobin A1C:   Lab Results   Component Value Date/Time    LABA1C 7.5 09/15/2022 12:55 PM     Free T4: Lab Results   Component Value Date/Time    T4FREE 1.16 04/14/2021 10:02 AM     Free T3: No results found for: FT3  TSH High Sensitivity:   Lab Results   Component Value Date/Time    [facility] 3.870 08/26/2022 10:39 AM       CT CHEST WO CONTRAST   Final Result   1. No acute abnormality. 2.  Coronary artery disease. VL DUP LOWER EXTREMITY VENOUS BILATERAL   Final Result   1. Difficulty with limited evaluation of the mid right femoral vein, though a   focal area of intraluminal echogenicity similar to that of the previous exam   is again noted felt to represent nonocclusive DVT. 2. No deep venous thrombosis seen elsewhere in the right lower extremity. 3. No left lower extremity DVT. 4. Left calf veins were not seen, with difficulty scanning overall per the   technologist         US DUP LOWER EXTREMITY MAPPING BILAT VENOUS   Final Result   Right greater saphenous vein is patent with measurements ranging from 2.1 to   5.2 mmmm as discussed above. Left greater saphenous vein is patent with measurements ranging from 2.7 to   8.5 mm as discussed above. RECOMMENDATIONS:   Unavailable         Vascular carotid duplex bilateral   Final Result   The right internal carotid artery demonstrates 0-50% stenosis. The left internal carotid artery demonstrates 0-50% stenosis. Bilateral vertebral arteries are patent with flow in the normal direction. Chest x-ray:      Cardiomegaly and congestion but no evidence of pulmonary edema or focal   infiltrates. No active of pleural disease. RECOMMENDATIONS:   Unavailable         XR CHEST PORTABLE   Final Result   No acute process.                 Scheduled Medicines   Medications:    potassium chloride  40 mEq Oral Daily with breakfast    hydrALAZINE  25 mg Oral 3 times per day    magnesium oxide  200 mg Oral BID    insulin lispro  0-8 Units SubCUTAneous 2 times per day    insulin glargine  50 Units SubCUTAneous Nightly    [START ON 9/17/2022] glipiZIDE  5 mg Oral Daily    polyethylene glycol  17 g Oral Daily    fluticasone  2 spray Each Nostril Daily    furosemide  40 mg Oral Nightly    [Held by provider] digoxin  125 mcg Oral Daily    cetirizine  10 mg Oral Daily    levothyroxine  50 mcg Oral Daily    DULoxetine  60 mg Oral Daily    carvedilol  25 mg Oral BID    atorvastatin  40 mg Oral Nightly    allopurinol  100 mg Oral BID    sodium chloride flush  10 mL IntraVENous 2 times per day    mupirocin   Nasal BID    chlorhexidine  15 mL Mouth/Throat Once    chlorhexidine   Topical See Admin Instructions    amLODIPine  10 mg Oral Daily    insulin lispro  0-8 Units SubCUTAneous TID WC    enoxaparin  1 mg/kg SubCUTAneous BID      Infusions:    nitroGLYCERIN 25 mcg/min (09/16/22 1642)    sodium chloride      dextrose      sodium chloride           IMPRESSION    Patient Active Problem List   Diagnosis    Atrial fibrillation (HCC)    CAD (coronary artery disease)    Morbid obesity (HCC)    COPD (chronic obstructive pulmonary disease) (HCC)    Sleep apnea    Type 2 diabetes mellitus (HCC)    Thyroid disease    Hyperlipidemia    Congestive heart failure, unspecified HF chronicity, unspecified heart failure type ([address]    Coronary artery disease involving native coronary artery of native heart with angina pectoris (HCC)    Chronic renal disease, stage III ([address] [[id number]]    Hypertension    CAD, multiple vessel         RECOMMENDATIONS:      Reviewed POC blood glucose . Labs and X ray results   Reviewed Home and Current Medicines   Will Start On Correction bolus Humalog/ Lantus Insulin regime / OHGD   Monitor Blood glucose frequently   Modify  the dose of Insulin/ OHGD  as needed        Will follow with you  Again thank you for sharing pt's care with me.      Truly yours,       Andria Gresham MD

## 2022-09-16 NOTE — PROGRESS NOTES
Cardiothoracic/Vascular Surgery Note    Name:  Vincenzo Sherman /Age/Sex: 1950  (63 y.o. male)   MRN & CSN:  [id number] & [id number] Admission Date/Time: 9/15/2022 10:07 AM   Location:  -A PCP: Diana Dunne, [address] [facility] Day: 2    CT Surgery Consulted for: Multivessel coronary artery disease    -  Interval history:    Echo: 9/15/2022   Left ventricular systolic function is normal.   Ejection fraction is visually estimated at 50-55%. Grade III diastolic dysfunction. No significant valvular disease noted. No evidence of any pericardial effusion. Bilateral lower extremity venous mappin/15/2020    Sapheno-femoral Junction: 5.2mm. Proximal Thigh:  3.2mm. Mid Thigh:  4.0mm. Distal Thigh:  4.3mm. Knee:  4.2mm. Proximal Calf: 3.1mm. Mid Calf:  2.1mm. Ankle: 5.6mm. Left:       Sapheno-femoral Junction: 8.5mm. Proximal Thigh:  3.3mm. Mid Thigh:  2.7mm. Distal Thigh:  3.6mm. Knee:  4.5mm. Proximal Calf: 3.4mm. Mid Calf:  4.8mm. Ankle: 4.1mm     Bilateral carotid ultrasound: 9/15/2022    The right internal carotid artery demonstrates 0-50% stenosis. The left internal carotid artery demonstrates 0-50% stenosis. Bilateral vertebral arteries are patent with flow in the normal direction. Chest x-ray:       Cardiomegaly and congestion but no evidence of pulmonary edema or focal   infiltrates. No active of pleural disease. Bilateral lower extremity venous duplex ultrasound: 2020    The right internal carotid artery demonstrates 0-50% stenosis. The left internal carotid artery demonstrates 0-50% stenosis. Bilateral vertebral arteries are patent with flow in the normal direction. Chest x-ray:       Cardiomegaly and congestion but no evidence of pulmonary edema or focal   infiltrates. No active of pleural disease.        EKG reviewed by me.      Subjective:  Pooja Proctor is a 71 y. o.year old male    Patient did complain of chest pain that was occurring throughout the night, but upon interview this morning he is not experiencing any chest pain and is resting comfortably in the chair. Patient is not complaining of any significant shortness of breath, lightheadedness, dizziness, palpitations. Patient does have lower extremity edema but this is chronic, skin changes have been noted for several years at this time. Patient notes that he is in chronic atrial fibrillation, no symptoms at this time. Heart rate has been very throughout the morning. Explained to the patient results of testing, understands findings. Patient is agreeable to undergoing CABG on Monday morning. Objective: Temperature:  Current - Temp: 97.9 °F (36.6 °C); Max - Temp  Av.9 °F (36.6 °C)  Min: 97.7 °F (36.5 °C)  Max: 98.2 °F (36.8 °C)    Respiratory Rate : Resp  Av  Min: 14  Max: 30    Pulse Range: Pulse  Av  Min: 58  Max: 103    Blood Presuure Range:  Systolic (24STM), QJE:836 , Min:96 , DTB:596   ; Diastolic (54KGZ), ARE:08, Min:62, Max:98      Pulse ox Range: SpO2  Av.8 %  Min: 91 %  Max: 98 %    24hr I & O:    Intake/Output Summary (Last 24 hours) at 2022 1158  Last data filed at 2022 0400  Gross per 24 hour   Intake --   Output 1050 ml   Net -1050 ml         BP (!) 143/63   Pulse 100   Temp 97.9 °F (36.6 °C) (Oral)   Resp 22   Ht 6' 2\" (1.88 m)   Wt (!) 324 lb 1.2 oz (147 kg)   SpO2 95%   BMI 41.61 kg/m²           Review of Systems:   Review of Systems   Constitutional:  Negative for diaphoresis, fatigue, fever and unexpected weight change. HENT: Negative. Eyes:  Negative for visual disturbance. Respiratory:  Negative for cough and shortness of breath. Cardiovascular:  Positive for chest pain (No pain at this moment, but did experience pain throughout the night.). Negative for palpitations and leg swelling. Gastrointestinal:  Negative for abdominal pain, diarrhea and vomiting. Endocrine: Negative for cold intolerance and heat intolerance. Musculoskeletal: Negative. Skin:  Negative for pallor and rash. Neurological:  Positive for headaches (Following initiation of nitroglycerin drip, alleviated with Tylenol). Negative for dizziness, syncope and light-headedness. Hematological:  Does not bruise/bleed easily. Psychiatric/Behavioral:  Negative for dysphoric mood. The patient is not nervous/anxious. has a past medical history of Allergic rhinitis, Anticoagulated on Coumadin, Anxiety, Arthritis, Atrial fibrillation (HCC), CAD (coronary artery disease), COPD (chronic obstructive pulmonary disease) ([facility] [address] Depression, Diabetes mellitus ([facility] [address] Dupuytren's contracture of hand, Family history of cardiovascular disease, GERD (gastroesophageal reflux disease), H/O cardiac catheterization, H/O cardiac catheterization, H/O cardiovascular stress test, H/O cardiovascular stress test, H/O cardiovascular stress test, H/O cardiovascular stress test, H/O Doppler ultrasound, H/O echocardiogram, H/O echocardiogram, H/O echocardiogram, H/O hiatal hernia, Hx of Venous Doppler, Hyperlipidemia, Hypertension, Obesity, S/P PTCA (percutaneous transluminal coronary angioplasty), Sleep apnea, and Thyroid disease. has a past surgical history that includes Coronary angioplasty with stent (03/21/2005); Cardiac catheterization (6/12/08); Cardiac catheterization (3/07); Cardiac catheterization (3/05); Endoscopy, colon, diagnostic (2014); Colonoscopy (2011); Colonoscopy (5/18/16); Hand surgery (Right, 1997); and pr open rx ankle dislocatn+fixatn (Right, 6/3/2019). Physical Exam  Constitutional:       General: He is not in acute distress. Appearance: He is obese. He is not diaphoretic. HENT:      Head: Normocephalic and atraumatic.       Right Ear: External ear normal.      Left Ear: External ear normal.      Nose: Nose normal.      Mouth/Throat:      Mouth: Mucous membranes are moist.   Eyes:      Extraocular Movements: Extraocular movements intact. Comments: Pupils equal and round   Neck:      Vascular: No carotid bruit. Cardiovascular:      Rate and Rhythm: Rhythm irregular. Pulses: Normal pulses. Heart sounds: S1 normal and S2 normal. No murmur heard. No friction rub. No gallop. Comments: Patient showing signs of both bradycardia and tachycardia  Pulmonary:      Effort: Pulmonary effort is normal. No respiratory distress. Breath sounds: Normal breath sounds. Comments: Mildly decreased breath sounds  Abdominal:      Palpations: Abdomen is soft. Tenderness: There is no abdominal tenderness. Musculoskeletal:      Right lower leg: Edema present. Left lower leg: Edema present. Skin:     General: Skin is warm and dry. Capillary Refill: Capillary refill takes less than 2 seconds. Findings: No rash. Comments: Skin turgor brisk  Chronic venous stasis changes of bilateral lower extremities. Neurological:      Mental Status: He is alert and oriented to person, place, and time. Mental status is at baseline. Psychiatric:         Behavior: Behavior is cooperative. Thought Content:  Thought content normal.         Judgment: Judgment normal.        Medications:    potassium chloride  40 mEq Oral Daily with breakfast    hydrALAZINE  25 mg Oral 3 times per day    magnesium oxide  200 mg Oral BID    polyethylene glycol  17 g Oral Daily    insulin NPH  80 Units SubCUTAneous Nightly    fluticasone  2 spray Each Nostril Daily    furosemide  40 mg Oral Nightly    [Held by provider] digoxin  125 mcg Oral Daily    cetirizine  10 mg Oral Daily    levothyroxine  50 mcg Oral Daily    DULoxetine  60 mg Oral Daily    glipiZIDE  5 mg Oral Daily    carvedilol  25 mg Oral BID    atorvastatin  40 mg Oral Nightly    allopurinol  100 mg Oral BID    sodium chloride flush  10 mL IntraVENous 2 times per day    mupirocin   Nasal BID    chlorhexidine  15 mL Mouth/Throat Once    chlorhexidine   Topical See Admin Instructions    amLODIPine  10 mg Oral Daily    insulin lispro  0-8 Units SubCUTAneous TID WC    insulin lispro  0-4 Units SubCUTAneous Nightly    enoxaparin  1 mg/kg SubCUTAneous BID      nitroGLYCERIN 40 mcg/min (09/16/22 1119)    dextrose      sodium chloride       acetaminophen, metoprolol, nitroGLYCERIN, tiZANidine, glucose, dextrose bolus **OR** dextrose bolus, glucagon (rDNA), dextrose, hydrALAZINE, sodium chloride flush, sodium chloride    Lab Data:  CBC:   Recent Labs     09/15/22  1800 09/16/22  0711   WBC 6.2 8.8   HGB 14.0 13.0*   HCT 41.3* 32.8*   .5* 103.5*    171     BMP:   Recent Labs     09/15/22  1800 09/16/22  0711    140   K 3.8 3.6    105   CO2 25 26   BUN 10 10   CREATININE 0.7* 0.8*     LIVER PROFILE:   Recent Labs     09/15/22  1800 09/16/22  0711   AST 25 21   ALT 20 18   BILITOT 0.9 0.8   ALKPHOS 103 94     PT/INR:   Recent Labs     09/15/22  1800 09/16/22  0711   PROTIME 14.7* 13.6   INR 1.14 1.05     APTT: No results for input(s): APTT in the last 72 hours. BNP:  No results for input(s): BNP in the last 72 hours. TROPONIN:   Recent Labs     09/15/22  1800 09/16/22  0711   TROPONINT <0.010 <0.010     Labs, consult, tests reviewed          ASSESSMENT/ PLAN:  Multivessel coronary artery disease with history of PCI in 2005.  -Patient did complain of chest pain throughout the night. -Repeat EKG showing no change from previous. Troponins negative, will trend if chest pain reoccurs.  -Possible CABG on Monday, September 19  -Goal potassium of 4.1-4.9, goal magnesium of 2.0-2.4, supplements as needed. Hypertension  -Patient last received Cozaar on 9/15/2022. Will hold prevent vasa plegia.     -Patient currently on nitro drip to also help with chest pain  -Patient started on Norvasc 10 mg and 25 mg hydralazine 3 times daily  Atrial fibrillation  -Currently in A. fib significant heart rate variation. Heart rate this morning varying between low 40s to low 60s. Heart rate has also increased to low 100s. -We will hold digoxin and diltiazem at this time  -Patient on Coreg 25 mg twice daily  Deep venous thrombosis  -Patient currently on Lovenox 150 mg twice daily.  -We will need to hold 24 hours prior to CABG. -Check CTA pulmonary to rule out PE  Insulin-dependent type 2 diabetes mellitus  -Endocrinology consulted. Thank  Venous stasis  -Chronic bilateral lower extremity skin changes.  -Bilateral lower extremity venous mapping performed  History of HFpEF  -Echo on 9/15/2022 showing EF of 50 to 55% and grade 3 diastolic dysfunction.    -Patient appears well compensated at this time  Hypothyroidism  -Patient currently on Synthroid        Patient seen and examined with Dr. Kelly Hilliard. Management discussed with him, he is agreeable to above plan.      Tra Bundy PA-C, 9/16/2022 11:58 AM

## 2022-09-16 NOTE — CARE COORDINATION
Pt underwent cardiac Cath 9/15 and is expected to have Cabg on 9/19. Cm contacted pt by phone. Cm introduced self and role of CM. Pt lives alone in a 1 story home with no basement. Pt drives and has a working vehicle. Pt has a son in [facility] and a brother that lives about 6mi from pt. Pt has PCP and ins. Pt was not using DME PTA but has a w/c, cane,walker and shower seat. Pt has a recliner. Cm discussed with pt that PT/OT will see post op and make recommendation for discharge plans ie; home with [facility]. vs need for short term rehab. Cm advised that if pt is able to discharge to home it is recommended that he have someone stay with him while in his early days of recovery. He states that he may be able to ask his lady friend if she could stay with him if needed. CM will follow up with pt post op to continue discharge planning.

## 2022-09-17 LAB
ALBUMIN SERPL-MCNC: 3.8 GM/DL (ref 3.4–5)
ALP BLD-CCNC: 91 IU/L (ref 40–128)
ALT SERPL-CCNC: 14 U/L (ref 10–40)
ANION GAP SERPL CALCULATED.3IONS-SCNC: 11 MMOL/L (ref 4–16)
AST SERPL-CCNC: 15 IU/L (ref 15–37)
BACTERIA: NEGATIVE /HPF
BASOPHILS ABSOLUTE: 0.1 K/CU MM
BASOPHILS RELATIVE PERCENT: 0.7 % (ref 0–1)
BILIRUB SERPL-MCNC: 0.9 MG/DL (ref 0–1)
BILIRUBIN URINE: NEGATIVE MG/DL
BLOOD, URINE: NEGATIVE
BUN BLDV-MCNC: 11 MG/DL (ref 6–23)
CALCIUM SERPL-MCNC: 8.6 MG/DL (ref 8.3–10.6)
CHLORIDE BLD-SCNC: 107 MMOL/L (ref 99–110)
CLARITY: CLEAR
CO2: 20 MMOL/L (ref 21–32)
COLOR: YELLOW
CREAT SERPL-MCNC: 0.9 MG/DL (ref 0.9–1.3)
CULTURE: NORMAL
DIFFERENTIAL TYPE: ABNORMAL
EOSINOPHILS ABSOLUTE: 0.1 K/CU MM
EOSINOPHILS RELATIVE PERCENT: 1.3 % (ref 0–3)
GFR AFRICAN AMERICAN: >60 ML/MIN/1.73M2
GFR NON-AFRICAN AMERICAN: >60 ML/MIN/1.73M2
GLUCOSE BLD-MCNC: 147 MG/DL (ref 70–99)
GLUCOSE BLD-MCNC: 164 MG/DL (ref 70–99)
GLUCOSE BLD-MCNC: 173 MG/DL (ref 70–99)
GLUCOSE BLD-MCNC: 196 MG/DL (ref 70–99)
GLUCOSE BLD-MCNC: 196 MG/DL (ref 70–99)
GLUCOSE BLD-MCNC: 229 MG/DL (ref 70–99)
GLUCOSE, URINE: 250 MG/DL
HCT VFR BLD CALC: 36.6 % (ref 42–52)
HEMOGLOBIN: 12.1 GM/DL (ref 13.5–18)
IMMATURE NEUTROPHIL %: 0.7 % (ref 0–0.43)
INR BLD: 1.09 INDEX
KETONES, URINE: ABNORMAL MG/DL
LEUKOCYTE ESTERASE, URINE: NEGATIVE
LYMPHOCYTES ABSOLUTE: 1.6 K/CU MM
LYMPHOCYTES RELATIVE PERCENT: 20.3 % (ref 24–44)
Lab: NORMAL
MAGNESIUM: 2.1 MG/DL (ref 1.8–2.4)
MCH RBC QN AUTO: 33.9 PG (ref 27–31)
MCHC RBC AUTO-ENTMCNC: 33.1 % (ref 32–36)
MCV RBC AUTO: 102.5 FL (ref 78–100)
MONOCYTES ABSOLUTE: 0.8 K/CU MM
MONOCYTES RELATIVE PERCENT: 10.9 % (ref 0–4)
MUCUS: ABNORMAL HPF
NITRITE URINE, QUANTITATIVE: NEGATIVE
NUCLEATED RBC %: 0 %
PDW BLD-RTO: 14.6 % (ref 11.7–14.9)
PH, URINE: 7 (ref 5–8)
PLATELET # BLD: 144 K/CU MM (ref 140–440)
PMV BLD AUTO: 9.9 FL (ref 7.5–11.1)
POTASSIUM SERPL-SCNC: 4 MMOL/L (ref 3.5–5.1)
PROTEIN UA: 30 MG/DL
PROTHROMBIN TIME: 14.1 SECONDS (ref 11.7–14.5)
RBC # BLD: 3.57 M/CU MM (ref 4.6–6.2)
RBC URINE: ABNORMAL /HPF (ref 0–3)
SEGMENTED NEUTROPHILS ABSOLUTE COUNT: 5.1 K/CU MM
SEGMENTED NEUTROPHILS RELATIVE PERCENT: 66.1 % (ref 36–66)
SODIUM BLD-SCNC: 138 MMOL/L (ref 135–145)
SPECIFIC GRAVITY UA: 1.01 (ref 1–1.03)
SPECIMEN: NORMAL
T4 FREE: 1.14 NG/DL (ref 0.9–1.8)
TOTAL IMMATURE NEUTOROPHIL: 0.05 K/CU MM
TOTAL NUCLEATED RBC: 0 K/CU MM
TOTAL PROTEIN: 6.3 GM/DL (ref 6.4–8.2)
TRICHOMONAS: ABNORMAL /HPF
TSH HIGH SENSITIVITY: 3.93 UIU/ML (ref 0.27–4.2)
UROBILINOGEN, URINE: 4 MG/DL (ref 0.2–1)
WBC # BLD: 7.6 K/CU MM (ref 4–10.5)
WBC UA: ABNORMAL /HPF (ref 0–2)

## 2022-09-17 PROCEDURE — 82962 GLUCOSE BLOOD TEST: CPT

## 2022-09-17 PROCEDURE — 6360000002 HC RX W HCPCS

## 2022-09-17 PROCEDURE — 94150 VITAL CAPACITY TEST: CPT

## 2022-09-17 PROCEDURE — 83735 ASSAY OF MAGNESIUM: CPT

## 2022-09-17 PROCEDURE — 6370000000 HC RX 637 (ALT 250 FOR IP): Performed by: INTERNAL MEDICINE

## 2022-09-17 PROCEDURE — 80053 COMPREHEN METABOLIC PANEL: CPT

## 2022-09-17 PROCEDURE — 6370000000 HC RX 637 (ALT 250 FOR IP)

## 2022-09-17 PROCEDURE — 94761 N-INVAS EAR/PLS OXIMETRY MLT: CPT

## 2022-09-17 PROCEDURE — 85610 PROTHROMBIN TIME: CPT

## 2022-09-17 PROCEDURE — 36415 COLL VENOUS BLD VENIPUNCTURE: CPT

## 2022-09-17 PROCEDURE — 84443 ASSAY THYROID STIM HORMONE: CPT

## 2022-09-17 PROCEDURE — 85025 COMPLETE CBC W/AUTO DIFF WBC: CPT

## 2022-09-17 PROCEDURE — 81001 URINALYSIS AUTO W/SCOPE: CPT

## 2022-09-17 PROCEDURE — 2000000000 HC ICU R&B

## 2022-09-17 PROCEDURE — 99223 1ST HOSP IP/OBS HIGH 75: CPT | Performed by: INTERNAL MEDICINE

## 2022-09-17 PROCEDURE — 84439 ASSAY OF FREE THYROXINE: CPT

## 2022-09-17 PROCEDURE — 2580000003 HC RX 258

## 2022-09-17 RX ORDER — FUROSEMIDE 40 MG/1
40 TABLET ORAL NIGHTLY
Status: CANCELLED | OUTPATIENT
Start: 2022-09-17

## 2022-09-17 RX ADMIN — CETIRIZINE HYDROCHLORIDE 10 MG: 10 TABLET, FILM COATED ORAL at 08:39

## 2022-09-17 RX ADMIN — ALLOPURINOL 100 MG: 100 TABLET ORAL at 08:39

## 2022-09-17 RX ADMIN — FUROSEMIDE 40 MG: 40 TABLET ORAL at 21:03

## 2022-09-17 RX ADMIN — AMLODIPINE BESYLATE 10 MG: 10 TABLET ORAL at 08:39

## 2022-09-17 RX ADMIN — SODIUM CHLORIDE, PRESERVATIVE FREE 10 ML: 5 INJECTION INTRAVENOUS at 21:05

## 2022-09-17 RX ADMIN — HYDRALAZINE HYDROCHLORIDE 25 MG: 25 TABLET, FILM COATED ORAL at 07:42

## 2022-09-17 RX ADMIN — LEVOTHYROXINE SODIUM 50 MCG: 0.05 TABLET ORAL at 07:42

## 2022-09-17 RX ADMIN — Medication 1 EACH: at 21:02

## 2022-09-17 RX ADMIN — Medication: at 08:37

## 2022-09-17 RX ADMIN — DULOXETINE 60 MG: 30 CAPSULE, DELAYED RELEASE ORAL at 08:39

## 2022-09-17 RX ADMIN — INSULIN GLARGINE 50 UNITS: 100 INJECTION, SOLUTION SUBCUTANEOUS at 21:05

## 2022-09-17 RX ADMIN — Medication 200 MG: at 08:39

## 2022-09-17 RX ADMIN — SODIUM CHLORIDE, PRESERVATIVE FREE 10 ML: 5 INJECTION INTRAVENOUS at 08:38

## 2022-09-17 RX ADMIN — GLIPIZIDE 5 MG: 5 TABLET ORAL at 08:39

## 2022-09-17 RX ADMIN — ALLOPURINOL 100 MG: 100 TABLET ORAL at 21:04

## 2022-09-17 RX ADMIN — ENOXAPARIN SODIUM 150 MG: 150 INJECTION SUBCUTANEOUS at 21:05

## 2022-09-17 RX ADMIN — FLUTICASONE PROPIONATE 2 SPRAY: 50 SPRAY, METERED NASAL at 08:46

## 2022-09-17 RX ADMIN — POTASSIUM CHLORIDE 40 MEQ: 1500 TABLET, EXTENDED RELEASE ORAL at 08:39

## 2022-09-17 RX ADMIN — ATORVASTATIN CALCIUM 40 MG: 40 TABLET, FILM COATED ORAL at 21:03

## 2022-09-17 RX ADMIN — ENOXAPARIN SODIUM 150 MG: 150 INJECTION SUBCUTANEOUS at 08:38

## 2022-09-17 RX ADMIN — POLYETHYLENE GLYCOL (3350) 17 G: 17 POWDER, FOR SOLUTION ORAL at 08:38

## 2022-09-17 RX ADMIN — CARVEDILOL 25 MG: 25 TABLET, FILM COATED ORAL at 08:39

## 2022-09-17 RX ADMIN — HYDRALAZINE HYDROCHLORIDE 25 MG: 25 TABLET, FILM COATED ORAL at 14:32

## 2022-09-17 RX ADMIN — HYDRALAZINE HYDROCHLORIDE 25 MG: 25 TABLET, FILM COATED ORAL at 21:03

## 2022-09-17 RX ADMIN — Medication 200 MG: at 21:03

## 2022-09-17 RX ADMIN — CARVEDILOL 25 MG: 25 TABLET, FILM COATED ORAL at 21:04

## 2022-09-17 RX ADMIN — INSULIN LISPRO 2 UNITS: 100 INJECTION, SOLUTION INTRAVENOUS; SUBCUTANEOUS at 12:44

## 2022-09-17 NOTE — CONSULTS
INPATIENT CARDIOLOGY CONSULT NOTE         Reason for consultation:         Primary care physician: ANKITA Nichole CNP       History of present illness:Ji is a 71 y. o.year old who  presents with CP    Patient underwent left heart cardiac catheterization on 9/15/2022 which revealed multivessel CAD. Patient is anticipated to undergo coronary bypass graft surgery. Prior history of atrial fibrillation previously on warfarin, history of hypertension, obesity. Currently chest pain-free  Telemetry shows atrial fibrillation, rate controlled           Past medical history:    has a past medical history of Allergic rhinitis, Anticoagulated on Coumadin, Anxiety, Arthritis, Atrial fibrillation (HCC), CAD (coronary artery disease), COPD (chronic obstructive pulmonary disease) ([facility] [address] Depression, Diabetes mellitus ([facility] [address] Dupuytren's contracture of hand, Family history of cardiovascular disease, GERD (gastroesophageal reflux disease), H/O cardiac catheterization, H/O cardiac catheterization, H/O cardiovascular stress test, H/O cardiovascular stress test, H/O cardiovascular stress test, H/O cardiovascular stress test, H/O Doppler ultrasound, H/O echocardiogram, H/O echocardiogram, H/O echocardiogram, H/O hiatal hernia, Hx of Venous Doppler, Hyperlipidemia, Hypertension, Obesity, S/P PTCA (percutaneous transluminal coronary angioplasty), Sleep apnea, and Thyroid disease. Past surgical history:   has a past surgical history that includes Coronary angioplasty with stent (03/21/2005); Cardiac catheterization (6/12/08); Cardiac catheterization (3/07); Cardiac catheterization (3/05); Endoscopy, colon, diagnostic (2014); Colonoscopy (2011); Colonoscopy (5/18/16); Hand surgery (Right, 1997); and pr open rx ankle dislocatn+fixatn (Right, 6/3/2019). Social History:   reports that he quit smoking about 46 years ago. His smoking use included cigarettes. He has a 10.00 pack-year smoking history.  He has never used smokeless tobacco. He reports that he does not currently use alcohol after a past usage of about 6.0 standard drinks per week. He reports that he does not use drugs.   Family history:   no family history of CAD, STROKE of DM    No Known Allergies    potassium chloride (KLOR-CON M) extended release tablet 40 mEq, Daily with breakfast  acetaminophen (TYLENOL) tablet 1,000 mg, Q4H PRN  hydrALAZINE (APRESOLINE) tablet 25 mg, 3 times per day  magnesium oxide (MAG-OX) tablet 200 mg, BID  metoprolol (LOPRESSOR) injection 5 mg, Q5 Min PRN  nitroGLYCERIN 50 mg in dextrose 5% 250 mL infusion, Continuous PRN  0.9 % sodium chloride infusion, PRN  insulin lispro (HUMALOG) injection vial 0-8 Units, 2 times per day  insulin glargine (LANTUS) injection vial 50 Units, Nightly  glipiZIDE (GLUCOTROL) tablet 5 mg, Daily  polyethylene glycol (GLYCOLAX) packet 17 g, Daily  fluticasone (FLONASE) 50 MCG/ACT nasal spray 2 spray, Daily  tiZANidine (ZANAFLEX) tablet 4 mg, TID PRN  furosemide (LASIX) tablet 40 mg, Nightly  [Held by provider] digoxin (LANOXIN) tablet 125 mcg, Daily  cetirizine (ZYRTEC) tablet 10 mg, Daily  levothyroxine (SYNTHROID) tablet 50 mcg, Daily  DULoxetine (CYMBALTA) extended release capsule 60 mg, Daily  carvedilol (COREG) tablet 25 mg, BID  glucose chewable tablet 16 g, PRN  dextrose bolus 10% 125 mL, PRN   Or  dextrose bolus 10% 250 mL, PRN  glucagon (rDNA) injection 1 mg, PRN  dextrose 10 % infusion, Continuous PRN  hydrALAZINE (APRESOLINE) injection 10 mg, Q6H PRN  atorvastatin (LIPITOR) tablet 40 mg, Nightly  allopurinol (ZYLOPRIM) tablet 100 mg, BID  sodium chloride flush 0.9 % injection 10 mL, 2 times per day  sodium chloride flush 0.9 % injection 10 mL, PRN  0.9 % sodium chloride infusion, PRN  mupirocin (BACTROBAN) 2 % ointment, BID  chlorhexidine (PERIDEX) 0.12 % solution 15 mL, Once  chlorhexidine (HIBICLENS) 4 % liquid, See Admin Instructions  amLODIPine (NORVASC) tablet 10 mg, Daily  insulin lispro (HUMALOG) Payal Mg MD   10 mg at 09/17/22 0839    levothyroxine (SYNTHROID) tablet 50 mcg  50 mcg Oral Daily Payal Mg MD   50 mcg at 09/17/22 0742    DULoxetine (CYMBALTA) extended release capsule 60 mg  60 mg Oral Daily Payal Mg MD   60 mg at 09/17/22 0839    carvedilol (COREG) tablet 25 mg  25 mg Oral BID FUNMI Malone-C   25 mg at 09/17/22 0839    glucose chewable tablet 16 g  4 tablet Oral PRN Payal Mg MD        dextrose bolus 10% 125 mL  125 mL IntraVENous PRN Payal Mg MD        Or    dextrose bolus 10% 250 mL  250 mL IntraVENous PRN Payal Mg MD        glucagon (rDNA) injection 1 mg  1 mg SubCUTAneous PRN Payal Mg MD        dextrose 10 % infusion   IntraVENous Continuous PRN Payal Mg MD        hydrALAZINE (APRESOLINE) injection 10 mg  10 mg IntraVENous Q6H PRN FUNMI Malone-C   10 mg at 09/16/22 1730    atorvastatin (LIPITOR) tablet 40 mg  40 mg Oral Nightly Flor Castro, PA-C   40 mg at 09/16/22 2113    allopurinol (ZYLOPRIM) tablet 100 mg  100 mg Oral BID Flor Castro, PA-C   100 mg at 09/17/22 3970    sodium chloride flush 0.9 % injection 10 mL  10 mL IntraVENous 2 times per day Flor Castro PA-C   10 mL at 09/17/22 1764    sodium chloride flush 0.9 % injection 10 mL  10 mL IntraVENous PRN Flor Castro, PA-C        0.9 % sodium chloride infusion   IntraVENous PRN Flor Castro, PA-C        mupirocin (BACTROBAN) 2 % ointment   Nasal BID Flor Castro PA-C   Given at 09/17/22 0837    chlorhexidine (PERIDEX) 0.12 % solution 15 mL  15 mL Mouth/Throat Once Flor Castro PA-C        chlorhexidine (HIBICLENS) 4 % liquid   Topical See Admin Instructions Flor Castro PA-C        amLODIPine (NORVASC) tablet 10 mg  10 mg Oral Daily Flor Castro PA-C   10 mg at 09/17/22 0839    insulin lispro (HUMALOG) injection vial 0-8 Units  0-8 Units SubCUTAneous TID  Flor Castro PA-C        enoxaparin (LOVENOX) injection 150 mg  1 mg/kg SubCUTAneous BID Gina Calzada PA-C   150 mg at 09/17/22 0482         Review of Systems:     Constitutional: No Fever or Weight Loss   Eyes: No Decreased Vision  ENT: No Headaches, Hearing Loss or Vertigo  Cardiovascular:   no chest pain,  no dyspnea on exertion,  no palpitations or loss of consciousness  Respiratory: No cough or wheezing    Gastrointestinal: No abdominal pain, appetite loss, blood in stools, constipation, diarrhea or heartburn  Genitourinary: No dysuria, trouble voiding, or hematuria  Musculoskeletal:  No gait disturbance, weakness or joint complaints  Integumentary: No rash or pruritis  Neurological: No TIA or stroke symptoms  Psychiatric: No anxiety or depression  Endocrine: No malaise, fatigue or temperature intolerance  Hematologic/Lymphatic: No bleeding problems, blood clots or swollen lymph nodes  Allergic/Immunologic: No nasal congestion or hives    All other systems were reviewed and were negative otherwise. Physical Examination:      Vitals:    09/17/22 0815   BP: (!) 163/84   Pulse: 88   Resp: 25   Temp: 98.1 °F (36.7 °C)   SpO2: 98%      Wt Readings from Last 3 Encounters:   09/16/22 (!) 324 lb 1.2 oz (147 kg)   09/09/22 (!) 331 lb (150.1 kg)   05/31/22 (!) 331 lb (150.1 kg)     Body mass index is 41.61 kg/m². General Appearance:  No distress, conversant  Constitutional:  Well developed, Well nourished  HEENT:  Normocephalic, Atraumatic, Oropharynx moist   Nose normal. Neck Supple Carotid: no carotid bruit  Eyes:  Conjunctiva normal, No discharge. Respiratory:    Normal breath sounds, No respiratory distress, No wheezing, no use of accessory muscles, diaphragm movement is normal  No chest Tenderness  Cardiovascular: S1-S2 No murmurs auscultated. No rubs, thrills or gallops. IRIR  rhythm. Pedal pulses are normal. No pedal edema  GI:  Soft Non tender, non distended. Musculoskeletal:   No tenderness, No cyanosis, No clubbing.    Integument: Warm, Dry, No erythema, No rash. Lymphatic:  No lymphadenopathy noted. Neurologic:  Alert & oriented x 3  No focal deficits noted. Psychiatric:  Affect normal, Judgment normal, Mood normal.       Lab Review     Recent Labs     09/16/22  0711   WBC 8.8   HGB 13.0*   HCT 32.8*         Recent Labs     09/16/22  0711      K 3.6      CO2 26   BUN 10   CREATININE 0.8*     Recent Labs     09/16/22  0711   AST 21   ALT 18   BILITOT 0.8   ALKPHOS 94     No results for input(s): TROPONINI in the last 72 hours. Lab Results   Component Value Date    BNP 21 10/22/2013     Lab Results   Component Value Date    INR 1.05 09/16/2022    PROTIME 13.6 09/16/2022         All labs, images, EKGs were personally reviewed      Assessment: 71 y. o.year old with PMH of  has a past medical history of Allergic rhinitis, Anticoagulated on Coumadin, Anxiety, Arthritis, Atrial fibrillation (HCC), CAD (coronary artery disease), COPD (chronic obstructive pulmonary disease) ([facility] [address] Depression, Diabetes mellitus ([facility] [address] Dupuytren's contracture of hand, Family history of cardiovascular disease, GERD (gastroesophageal reflux disease), H/O cardiac catheterization, H/O cardiac catheterization, H/O cardiovascular stress test, H/O cardiovascular stress test, H/O cardiovascular stress test, H/O cardiovascular stress test, H/O Doppler ultrasound, H/O echocardiogram, H/O echocardiogram, H/O echocardiogram, H/O hiatal hernia, Hx of Venous Doppler, Hyperlipidemia, Hypertension, Obesity, S/P PTCA (percutaneous transluminal coronary angioplasty), Sleep apnea, and Thyroid disease. Medical Decision Making :       Multivessel coronary artery disease: Anticipating CABG surgery next week  History of permanent atrial fibrillation: Rate controlled. Oral anticoagulation on hold due to surgery.   We will plan for [place]TON Monday morning to assess for atrial appendage, rule out thrombus (requested by CVT surgery for likely ligation)  Essential

## 2022-09-17 NOTE — PROGRESS NOTES
Progress Note( Dr. Ruiz Settler)  9/17/2022  Subjective:   Admit Date: 9/15/2022  PCP: ANKITA Chapman CNP    Admitted For :  Chest pain has history of CAD had cardiac cath three-vessel disease  For CABG 9/19/2022    Consulted For: Better control of blood glucose    Interval History:Better control of blood glucose    Denies any chest pains,   midSOB . Denies nausea or vomiting. No new bowel or bladder symptoms. Intake/Output Summary (Last 24 hours) at 9/17/2022 1025  Last data filed at 9/17/2022 0818  Gross per 24 hour   Intake --   Output 1275 ml   Net -1275 ml       DATA    CBC:   Recent Labs     09/15/22  1800 09/16/22  0711   WBC 6.2 8.8   HGB 14.0 13.0*    171    CMP:  Recent Labs     09/15/22  1800 09/16/22  0711    140   K 3.8 3.6    105   CO2 25 26   BUN 10 10   CREATININE 0.7* 0.8*   CALCIUM 9.0 8.4   PROT 6.9 6.3*   LABALBU 4.2 3.8   BILITOT 0.9 0.8   ALKPHOS 103 94   AST 25 21   ALT 20 18     Lipids:   Lab Results   Component Value Date/Time    CHOL 152 04/14/2021 10:02 AM    CHOL 139 07/23/2018 08:01 AM    HDL 43 08/26/2022 10:39 AM    TRIG 212 04/14/2021 10:02 AM     Glucose:  Recent Labs     09/16/22  2111 09/17/22  0337 09/17/22  0826   POCGLU 190* 173* 196*     [id number]:  Lab Results   Component Value Date/Time    LABA1C 7.5 09/15/2022 12:55 PM     High Sensitivity TSH:   Lab Results   Component Value Date/Time    TSHHS 3.870 08/26/2022 10:39 AM     Free T3: No results found for: FT3  Free T4:  Lab Results   Component Value Date/Time    T4FREE 1.16 04/14/2021 10:02 AM       CT CHEST WO CONTRAST   Final Result   1. No acute abnormality. 2.  Coronary artery disease. VL DUP LOWER EXTREMITY VENOUS BILATERAL   Final Result   1. Difficulty with limited evaluation of the mid right femoral vein, though a   focal area of intraluminal echogenicity similar to that of the previous exam   is again noted felt to represent nonocclusive DVT.    2. No deep venous thrombosis seen elsewhere in the right lower extremity. 3. No left lower extremity DVT. 4. Left calf veins were not seen, with difficulty scanning overall per the   technologist         US DUP LOWER EXTREMITY MAPPING BILAT VENOUS   Final Result   Right greater saphenous vein is patent with measurements ranging from 2.1 to   5.2 mmmm as discussed above. Left greater saphenous vein is patent with measurements ranging from 2.7 to   8.5 mm as discussed above. RECOMMENDATIONS:   Unavailable         Vascular carotid duplex bilateral   Final Result   The right internal carotid artery demonstrates 0-50% stenosis. The left internal carotid artery demonstrates 0-50% stenosis. Bilateral vertebral arteries are patent with flow in the normal direction. Chest x-ray:      Cardiomegaly and congestion but no evidence of pulmonary edema or focal   infiltrates. No active of pleural disease. RECOMMENDATIONS:   Unavailable         XR CHEST PORTABLE   Final Result   No acute process.               Scheduled Medicines   Medications:    potassium chloride  40 mEq Oral Daily with breakfast    hydrALAZINE  25 mg Oral 3 times per day    magnesium oxide  200 mg Oral BID    insulin lispro  0-8 Units SubCUTAneous 2 times per day    insulin glargine  50 Units SubCUTAneous Nightly    glipiZIDE  5 mg Oral Daily    polyethylene glycol  17 g Oral Daily    fluticasone  2 spray Each Nostril Daily    furosemide  40 mg Oral Nightly    [Held by provider] digoxin  125 mcg Oral Daily    cetirizine  10 mg Oral Daily    levothyroxine  50 mcg Oral Daily    DULoxetine  60 mg Oral Daily    carvedilol  25 mg Oral BID    atorvastatin  40 mg Oral Nightly    allopurinol  100 mg Oral BID    sodium chloride flush  10 mL IntraVENous 2 times per day    mupirocin   Nasal BID    chlorhexidine  15 mL Mouth/Throat Once    chlorhexidine   Topical See Admin Instructions    amLODIPine  10 mg Oral Daily    insulin lispro  0-8 Units SubCUTAneous TID     enoxaparin  1 mg/kg SubCUTAneous BID      Infusions:    nitroGLYCERIN 25 mcg/min (09/16/22 1642)    sodium chloride      dextrose      sodium chloride           Objective:   Vitals: BP (!) 163/84   Pulse 88   Temp 98.1 °F (36.7 °C) (Oral)   Resp 25   Ht 6' 2\" (1.88 m)   Wt (!) 324 lb 1.2 oz (147 kg)   SpO2 98%   BMI 41.61 kg/m²   General appearance: alert and cooperative with exam  Neck: no JVD or bruit  Thyroid : Normal lobes   Lungs: Has Vesicular Breath sounds   Heart:  regular rate and rhythm  Abdomen: soft, non-tender; bowel sounds normal; no masses,  no organomegaly  Musculoskeletal: Normal  Extremities: extremities normal, , no edema  Neurologic:  Awake, alert, oriented to name, place and time. Cranial nerves II-XII are grossly intact. Motor is  intact. Sensory neuropathy. ,  and gait is normal.    Assessment:     Patient Active Problem List:     Atrial fibrillation (HCC)     CAD (coronary artery disease)     Morbid obesity (HCC)     COPD (chronic obstructive pulmonary disease) (HCC)     Sleep apnea     Type 2 diabetes mellitus (HCC)     Thyroid disease     Hyperlipidemia     Congestive heart failure, unspecified HF chronicity, unspecified heart failure type ([address]     Coronary artery disease involving native coronary artery of native heart with angina pectoris (HCC)     Chronic renal disease, stage III ([address] [304825]     Hypertension     CAD, multiple vessel disease      Plan:     Reviewed POC blood glucose . Labs and X ray results   Reviewed Current Medicines   On meal/ Correction bolus Humalog/ Basal Lantus Insulin regime / and Oral Hypoglycemic drugs   Monitor Blood glucose frequently   Modified  the dose of Insulin/ other medicines as needed   Will follow     .      Alban Dimas MD, MD

## 2022-09-18 ENCOUNTER — APPOINTMENT (OUTPATIENT)
Dept: CT IMAGING | Age: 72
DRG: 233 | End: 2022-09-18
Attending: INTERNAL MEDICINE
Payer: COMMERCIAL

## 2022-09-18 LAB
ALBUMIN SERPL-MCNC: 3.6 GM/DL (ref 3.4–5)
ALP BLD-CCNC: 88 IU/L (ref 40–128)
ALT SERPL-CCNC: 13 U/L (ref 10–40)
ANION GAP SERPL CALCULATED.3IONS-SCNC: 10 MMOL/L (ref 4–16)
AST SERPL-CCNC: 28 IU/L (ref 15–37)
BASOPHILS ABSOLUTE: 0.1 K/CU MM
BASOPHILS RELATIVE PERCENT: 1 % (ref 0–1)
BILIRUB SERPL-MCNC: 0.7 MG/DL (ref 0–1)
BUN BLDV-MCNC: 12 MG/DL (ref 6–23)
CALCIUM SERPL-MCNC: 8.4 MG/DL (ref 8.3–10.6)
CHLORIDE BLD-SCNC: 106 MMOL/L (ref 99–110)
CO2: 23 MMOL/L (ref 21–32)
CREAT SERPL-MCNC: 0.8 MG/DL (ref 0.9–1.3)
DIFFERENTIAL TYPE: ABNORMAL
EOSINOPHILS ABSOLUTE: 0.2 K/CU MM
EOSINOPHILS RELATIVE PERCENT: 2.8 % (ref 0–3)
GFR AFRICAN AMERICAN: >60 ML/MIN/1.73M2
GFR NON-AFRICAN AMERICAN: >60 ML/MIN/1.73M2
GLUCOSE BLD-MCNC: 152 MG/DL (ref 70–99)
GLUCOSE BLD-MCNC: 162 MG/DL (ref 70–99)
GLUCOSE BLD-MCNC: 172 MG/DL (ref 70–99)
GLUCOSE BLD-MCNC: 181 MG/DL (ref 70–99)
GLUCOSE BLD-MCNC: 197 MG/DL (ref 70–99)
GLUCOSE BLD-MCNC: 310 MG/DL (ref 70–99)
HCT VFR BLD CALC: 36.5 % (ref 42–52)
HEMOGLOBIN: 12 GM/DL (ref 13.5–18)
IMMATURE NEUTROPHIL %: 0.7 % (ref 0–0.43)
LYMPHOCYTES ABSOLUTE: 1.8 K/CU MM
LYMPHOCYTES RELATIVE PERCENT: 27.1 % (ref 24–44)
MAGNESIUM: 2.2 MG/DL (ref 1.8–2.4)
MCH RBC QN AUTO: 33.9 PG (ref 27–31)
MCHC RBC AUTO-ENTMCNC: 32.9 % (ref 32–36)
MCV RBC AUTO: 103.1 FL (ref 78–100)
MONOCYTES ABSOLUTE: 0.7 K/CU MM
MONOCYTES RELATIVE PERCENT: 9.6 % (ref 0–4)
NUCLEATED RBC %: 0 %
PDW BLD-RTO: 14.7 % (ref 11.7–14.9)
PLATELET # BLD: 155 K/CU MM (ref 140–440)
PMV BLD AUTO: 11.5 FL (ref 7.5–11.1)
POTASSIUM SERPL-SCNC: 4.4 MMOL/L (ref 3.5–5.1)
RBC # BLD: 3.54 M/CU MM (ref 4.6–6.2)
SEGMENTED NEUTROPHILS ABSOLUTE COUNT: 4 K/CU MM
SEGMENTED NEUTROPHILS RELATIVE PERCENT: 58.8 % (ref 36–66)
SODIUM BLD-SCNC: 139 MMOL/L (ref 135–145)
TOTAL IMMATURE NEUTOROPHIL: 0.05 K/CU MM
TOTAL NUCLEATED RBC: 0 K/CU MM
TOTAL PROTEIN: 6.5 GM/DL (ref 6.4–8.2)
WBC # BLD: 6.8 K/CU MM (ref 4–10.5)

## 2022-09-18 PROCEDURE — 80053 COMPREHEN METABOLIC PANEL: CPT

## 2022-09-18 PROCEDURE — 82962 GLUCOSE BLOOD TEST: CPT

## 2022-09-18 PROCEDURE — 6370000000 HC RX 637 (ALT 250 FOR IP): Performed by: INTERNAL MEDICINE

## 2022-09-18 PROCEDURE — 71275 CT ANGIOGRAPHY CHEST: CPT

## 2022-09-18 PROCEDURE — 94761 N-INVAS EAR/PLS OXIMETRY MLT: CPT

## 2022-09-18 PROCEDURE — 2000000000 HC ICU R&B

## 2022-09-18 PROCEDURE — 6370000000 HC RX 637 (ALT 250 FOR IP)

## 2022-09-18 PROCEDURE — 6370000000 HC RX 637 (ALT 250 FOR IP): Performed by: THORACIC SURGERY (CARDIOTHORACIC VASCULAR SURGERY)

## 2022-09-18 PROCEDURE — 6360000002 HC RX W HCPCS

## 2022-09-18 PROCEDURE — 6360000002 HC RX W HCPCS: Performed by: INTERNAL MEDICINE

## 2022-09-18 PROCEDURE — 99233 SBSQ HOSP IP/OBS HIGH 50: CPT | Performed by: INTERNAL MEDICINE

## 2022-09-18 PROCEDURE — 6360000004 HC RX CONTRAST MEDICATION: Performed by: THORACIC SURGERY (CARDIOTHORACIC VASCULAR SURGERY)

## 2022-09-18 PROCEDURE — 94640 AIRWAY INHALATION TREATMENT: CPT

## 2022-09-18 PROCEDURE — 2580000003 HC RX 258

## 2022-09-18 PROCEDURE — 94150 VITAL CAPACITY TEST: CPT

## 2022-09-18 PROCEDURE — 99223 1ST HOSP IP/OBS HIGH 75: CPT | Performed by: THORACIC SURGERY (CARDIOTHORACIC VASCULAR SURGERY)

## 2022-09-18 PROCEDURE — 85025 COMPLETE CBC W/AUTO DIFF WBC: CPT

## 2022-09-18 PROCEDURE — 83735 ASSAY OF MAGNESIUM: CPT

## 2022-09-18 RX ORDER — HYDRALAZINE HYDROCHLORIDE 50 MG/1
50 TABLET, FILM COATED ORAL EVERY 8 HOURS SCHEDULED
Status: DISCONTINUED | OUTPATIENT
Start: 2022-09-18 | End: 2022-09-22

## 2022-09-18 RX ORDER — ALBUTEROL SULFATE 90 UG/1
2 AEROSOL, METERED RESPIRATORY (INHALATION) EVERY 6 HOURS PRN
Status: DISCONTINUED | OUTPATIENT
Start: 2022-09-18 | End: 2022-09-18 | Stop reason: SDUPTHER

## 2022-09-18 RX ORDER — ALBUTEROL SULFATE 90 UG/1
2 AEROSOL, METERED RESPIRATORY (INHALATION) EVERY 6 HOURS PRN
Status: DISCONTINUED | OUTPATIENT
Start: 2022-09-18 | End: 2022-09-22 | Stop reason: SDUPTHER

## 2022-09-18 RX ORDER — METHYLPREDNISOLONE SODIUM SUCCINATE 125 MG/2ML
125 INJECTION, POWDER, LYOPHILIZED, FOR SOLUTION INTRAMUSCULAR; INTRAVENOUS ONCE
Status: COMPLETED | OUTPATIENT
Start: 2022-09-18 | End: 2022-09-18

## 2022-09-18 RX ORDER — METOPROLOL TARTRATE 5 MG/5ML
5 INJECTION INTRAVENOUS EVERY 5 MIN PRN
Status: DISCONTINUED | OUTPATIENT
Start: 2022-09-18 | End: 2022-09-24 | Stop reason: HOSPADM

## 2022-09-18 RX ORDER — BUDESONIDE AND FORMOTEROL FUMARATE DIHYDRATE 160; 4.5 UG/1; UG/1
2 AEROSOL RESPIRATORY (INHALATION) 2 TIMES DAILY
Status: DISCONTINUED | OUTPATIENT
Start: 2022-09-18 | End: 2022-09-24 | Stop reason: HOSPADM

## 2022-09-18 RX ADMIN — ATORVASTATIN CALCIUM 40 MG: 40 TABLET, FILM COATED ORAL at 20:53

## 2022-09-18 RX ADMIN — HYDRALAZINE HYDROCHLORIDE 50 MG: 50 TABLET, FILM COATED ORAL at 20:53

## 2022-09-18 RX ADMIN — FLUTICASONE PROPIONATE 2 SPRAY: 50 SPRAY, METERED NASAL at 09:27

## 2022-09-18 RX ADMIN — DULOXETINE 60 MG: 30 CAPSULE, DELAYED RELEASE ORAL at 09:26

## 2022-09-18 RX ADMIN — CETIRIZINE HYDROCHLORIDE 10 MG: 10 TABLET, FILM COATED ORAL at 09:26

## 2022-09-18 RX ADMIN — ALLOPURINOL 100 MG: 100 TABLET ORAL at 20:54

## 2022-09-18 RX ADMIN — ENOXAPARIN SODIUM 150 MG: 150 INJECTION SUBCUTANEOUS at 20:54

## 2022-09-18 RX ADMIN — SODIUM CHLORIDE, PRESERVATIVE FREE 10 ML: 5 INJECTION INTRAVENOUS at 09:27

## 2022-09-18 RX ADMIN — BUDESONIDE AND FORMOTEROL FUMARATE DIHYDRATE 2 PUFF: 160; 4.5 AEROSOL RESPIRATORY (INHALATION) at 19:44

## 2022-09-18 RX ADMIN — Medication: at 09:24

## 2022-09-18 RX ADMIN — SODIUM CHLORIDE, PRESERVATIVE FREE 10 ML: 5 INJECTION INTRAVENOUS at 20:54

## 2022-09-18 RX ADMIN — IOPAMIDOL 80 ML: 755 INJECTION, SOLUTION INTRAVENOUS at 15:57

## 2022-09-18 RX ADMIN — POLYETHYLENE GLYCOL (3350) 17 G: 17 POWDER, FOR SOLUTION ORAL at 09:26

## 2022-09-18 RX ADMIN — INSULIN GLARGINE 50 UNITS: 100 INJECTION, SOLUTION SUBCUTANEOUS at 21:01

## 2022-09-18 RX ADMIN — HYDRALAZINE HYDROCHLORIDE 25 MG: 25 TABLET, FILM COATED ORAL at 16:50

## 2022-09-18 RX ADMIN — POTASSIUM CHLORIDE 40 MEQ: 1500 TABLET, EXTENDED RELEASE ORAL at 09:26

## 2022-09-18 RX ADMIN — HYDRALAZINE HYDROCHLORIDE 25 MG: 25 TABLET, FILM COATED ORAL at 09:26

## 2022-09-18 RX ADMIN — ALLOPURINOL 100 MG: 100 TABLET ORAL at 09:26

## 2022-09-18 RX ADMIN — GLIPIZIDE 5 MG: 5 TABLET ORAL at 09:26

## 2022-09-18 RX ADMIN — AMLODIPINE BESYLATE 10 MG: 10 TABLET ORAL at 09:26

## 2022-09-18 RX ADMIN — FUROSEMIDE 40 MG: 40 TABLET ORAL at 20:54

## 2022-09-18 RX ADMIN — METHYLPREDNISOLONE SODIUM SUCCINATE 125 MG: 125 INJECTION, POWDER, FOR SOLUTION INTRAMUSCULAR; INTRAVENOUS at 16:50

## 2022-09-18 RX ADMIN — ENOXAPARIN SODIUM 150 MG: 150 INJECTION SUBCUTANEOUS at 09:26

## 2022-09-18 RX ADMIN — Medication: at 20:54

## 2022-09-18 RX ADMIN — Medication 200 MG: at 20:54

## 2022-09-18 RX ADMIN — INSULIN LISPRO 6 UNITS: 100 INJECTION, SOLUTION INTRAVENOUS; SUBCUTANEOUS at 21:00

## 2022-09-18 RX ADMIN — LEVOTHYROXINE SODIUM 50 MCG: 0.05 TABLET ORAL at 08:37

## 2022-09-18 RX ADMIN — Medication 200 MG: at 09:26

## 2022-09-18 NOTE — PROGRESS NOTES
CARDIOLOGY PROGRESS NOTE                                                  Name:  Eri Pascal /Age/Sex: 1950  (71 y.o. male)   MRN & CSN:  [id number] & [id number] Admission Date/Time: 9/15/2022 10:07 AM   Location:  -A PCP: ANKITA Cormier CNP         Admit Date:  9/15/2022  Hospital Day: 4      SUBJECTIVE:   Seen patient as follow up as consultation for CAD/AFIB    No chest pain. No shortness of breath  No palpations    TELEMETRY: AFIB (bradycardia overnight while asleep)      Intake/Output Summary (Last 24 hours) at 2022 1153  Last data filed at 2022 0430  Gross per 24 hour   Intake 161 ml   Output 950 ml   Net -789 ml       Assessment/Plan:     Multivessel coronary artery disease: Anticipating CABG surgery next week    Overnight bradycardia while asleep, A. fib with slow ventricular response, likely secondary to underlying sleep apnea. Start patient on CPAP    History of permanent atrial fibrillation: Rate controlled. Oral anticoagulation on hold due to surgery. We will plan for MILTON Monday morning to assess for atrial appendage, rule out thrombus (requested by CVT surgery for likely ligation)    Mild wheezing on examination: Normotensive, heart rate under control. No suspicion for PE    Essential hypertension: Blood pressure controlled.   Continue with hydralazine Norvasc Coreg    Hyperlipidemia: Continue with Lipitor 40 daily     Case was discussed with Dr. Guillermo Mejia and nursing staff     Past medical history:    has a past medical history of Allergic rhinitis, Anticoagulated on Coumadin, Anxiety, Arthritis, Atrial fibrillation ([address] CAD (coronary artery disease), COPD (chronic obstructive pulmonary disease) ([address] Depression, Diabetes mellitus ([address] Dupuytren's contracture of hand, Family history of cardiovascular disease, GERD (gastroesophageal reflux disease), H/O cardiac catheterization, H/O cardiac catheterization, H/O cardiovascular stress test, H/O cardiovascular stress test, H/O cardiovascular stress test, H/O cardiovascular stress test, H/O Doppler ultrasound, H/O echocardiogram, H/O echocardiogram, H/O echocardiogram, H/O hiatal hernia, Hx of Venous Doppler, Hyperlipidemia, Hypertension, Obesity, S/P PTCA (percutaneous transluminal coronary angioplasty), Sleep apnea, and Thyroid disease. Past surgical history:   has a past surgical history that includes Coronary angioplasty with stent (03/21/2005); Cardiac catheterization (6/12/08); Cardiac catheterization (3/07); Cardiac catheterization (3/05); Endoscopy, colon, diagnostic (2014); Colonoscopy (2011); Colonoscopy (5/18/16); Hand surgery (Right, 1997); and pr open rx ankle dislocatn+fixatn (Right, 6/3/2019). Social History:   reports that he quit smoking about 46 years ago. His smoking use included cigarettes. He has a 10.00 pack-year smoking history. He has never used smokeless tobacco. He reports that he does not currently use alcohol after a past usage of about 6.0 standard drinks per week. He reports that he does not use drugs. Family history:  family history includes Cancer in his mother; Coronary Art Dis in his father; Heart Disease in his father; No Known Problems in his brother; Rheum Arthritis in his sister, sister and another family member. OBJECTIVE:     BP (!) 147/83   Pulse 80   Temp 98 °F (36.7 °C) (Oral)   Resp 22   Ht 6' 2\" (1.88 m)   Wt (!) 324 lb 1.2 oz (147 kg)   SpO2 98%   BMI 41.61 kg/m²     Intake/Output Summary (Last 24 hours) at 9/18/2022 1153  Last data filed at 9/18/2022 0430  Gross per 24 hour   Intake 161 ml   Output 950 ml   Net -789 ml       Physical Exam:    Constitutional:  Well developed, Well nourished, No acute distress, Non-toxic appearance. HENT:  Normocephalic, Atraumatic, Bilateral external ears normal, Oropharynx moist, No oral exudates, Nose normal. Neck- Normal range of motion, No tenderness, Supple, No stridor.    Eyes:  EOMI, Conjunctiva normal, No discharge. Respiratory:  Normal breath sounds, No respiratory distress, mild  wheezing, No chest tenderness. ,no use of accessory muscles, diaphragm movement is normal  Cardiovascular  IRIR S1-S2 No Murmurs, added sounds. Normal rate rhythm. No rubs gallops. Carotid pulses and amplitude are normal no bruit noted. Pedal pulses normal femoral pulses normal.  No pedal edema  GI:  Bowel sounds normal, Soft, No tenderness  : No CVA tenderness. Musculoskeletal: No edema, No tenderness, No cyanosis, No clubbing. Back- No tenderness. Integument:  Warm, Dry, No erythema, No rash. Lymphatic:  No lymphadenopathy noted. Neurologic:  Alert & oriented x 3, No focal deficits noted.    Psychiatric:  Affect normal, Judgment normal, Mood normal.           MEDICATIONS:     potassium chloride  40 mEq Oral Daily with breakfast    hydrALAZINE  25 mg Oral 3 times per day    magnesium oxide  200 mg Oral BID    insulin lispro  0-8 Units SubCUTAneous 2 times per day    insulin glargine  50 Units SubCUTAneous Nightly    glipiZIDE  5 mg Oral Daily    polyethylene glycol  17 g Oral Daily    fluticasone  2 spray Each Nostril Daily    furosemide  40 mg Oral Nightly    [Held by provider] digoxin  125 mcg Oral Daily    cetirizine  10 mg Oral Daily    levothyroxine  50 mcg Oral Daily    DULoxetine  60 mg Oral Daily    [Held by provider] carvedilol  25 mg Oral BID    atorvastatin  40 mg Oral Nightly    allopurinol  100 mg Oral BID    sodium chloride flush  10 mL IntraVENous 2 times per day    mupirocin   Nasal BID    chlorhexidine  15 mL Mouth/Throat Once    chlorhexidine   Topical See Admin Instructions    amLODIPine  10 mg Oral Daily    insulin lispro  0-8 Units SubCUTAneous TID WC    enoxaparin  1 mg/kg SubCUTAneous BID      nitroGLYCERIN 10 mcg/min (09/17/22 1610)    sodium chloride      dextrose      sodium chloride       metoprolol, acetaminophen, nitroGLYCERIN, sodium chloride, tiZANidine, glucose, dextrose bolus **OR** dextrose bolus, glucagon (rDNA), dextrose, hydrALAZINE, sodium chloride flush, sodium chloride  No Known Allergies    Lab Data:  CBC:   Recent Labs     09/16/22  0711 09/17/22  1226 09/18/22  0710   WBC 8.8 7.6 6.8   HGB 13.0* 12.1* 12.0*   HCT 32.8* 36.6* 36.5*   .5* 102.5* 103.1*    144 155     BMP:   Recent Labs     09/16/22  0711 09/17/22  1226 09/18/22  0710    138 139   K 3.6 4.0 4.4    107 106   CO2 26 20* 23   BUN 10 11 12   CREATININE 0.8* 0.9 0.8*     LIVER PROFILE:   Recent Labs     09/16/22  0711 09/17/22  1226 09/18/22  0710   AST 21 15 28   ALT 18 14 13   BILITOT 0.8 0.9 0.7   ALKPHOS 94 91 88     PT/INR:   Recent Labs     09/15/22  1800 09/16/22  0711 09/17/22  1226   PROTIME 14.7* 13.6 14.1   INR 1.14 1.05 1.09        Allegra Valentino MD, MD 9/18/2022 11:53 AM

## 2022-09-18 NOTE — PROGRESS NOTES
Cardiothoracic Surgery Note      Name:  Richard Conn /Age/Sex: 1950  (81 y.o. male)   MRN & CSN:  [id number] & [id number] Admission Date/Time: 9/15/2022 10:07 AM   Location:  -A PCP: Dheeraj Kilgore 15 Day: 4    Subjective:  Hay Weeks is a 71 y. o.year old who and presents with angina and shortness of breath developed acute wheezing and bradycardia this morning. Evaluated by cardiology team and pulmonologist          Objective: Temperature:  Current - Temp: 98 °F (36.7 °C);  Max - Temp  Av.2 °F (36.8 °C)  Min: 98 °F (36.7 °C)  Max: 98.6 °F (37 °C)    Respiratory Rate : Resp  Av.8  Min: 16  Max: 28    Pulse Range: Pulse  Av.5  Min: 60  Max: 89    Blood Presuure Range:  Systolic (43HBI), KD , Min:104 , THD:869   ; Diastolic (38WRO), QXK:72, Min:59, Max:97      Pulse ox Range: No data recorded    24hr I & O:    Intake/Output Summary (Last 24 hours) at 2022 1458  Last data filed at 2022 0430  Gross per 24 hour   Intake 161 ml   Output 950 ml   Net -789 ml                 Review of Systems:     Constitutional: No Fever, chills change in appetite,  or drastic weight loss/gain   Eyes: No changes in vision  ENT: No Headaches, Hearing Loss or Vertigo  Cardiovascular: No chest pain, chest tightness, palpitations, lower extremity edema, or dyspnea on exertion  Respiratory: No shortness of breath or cough  Gastrointestinal: No abdominal pain, appetite loss, blood in stools, constipation, diarrhea or heartburn  Genitourinary: No dysuria, trouble voiding, or hematuria  Musculoskeletal:  No gait disturbance, weakness or joint complaints  Integumentary: No rash, pallor, or skin lesions  Neurological: No lightheadedness, dizziness, seizure, or loss of consciousness  Psychiatric: No anxiety or depression  Endocrine: No malaise, fatigue or temperature intolerance  Hematologic/Lymphatic: No bleeding problems, blood clots or swollen lymph nodes    All systems negative except as marked. has a past medical history of Allergic rhinitis, Anticoagulated on Coumadin, Anxiety, Arthritis, Atrial fibrillation (HCC), CAD (coronary artery disease), COPD (chronic obstructive pulmonary disease) ([facility] Depression, Diabetes mellitus ([facility] Dupuytren's contracture of hand, Family history of cardiovascular disease, GERD (gastroesophageal reflux disease), H/O cardiac catheterization, H/O cardiac catheterization, H/O cardiovascular stress test, H/O cardiovascular stress test, H/O cardiovascular stress test, H/O cardiovascular stress test, H/O Doppler ultrasound, H/O echocardiogram, H/O echocardiogram, H/O echocardiogram, H/O hiatal hernia, Hx of Venous Doppler, Hyperlipidemia, Hypertension, Obesity, S/P PTCA (percutaneous transluminal coronary angioplasty), Sleep apnea, and Thyroid disease. has a past surgical history that includes Coronary angioplasty with stent (03/21/2005); Cardiac catheterization (6/12/08); Cardiac catheterization (3/07); Cardiac catheterization (3/05); Endoscopy, colon, diagnostic (2014); Colonoscopy (2011); Colonoscopy (5/18/16); Hand surgery (Right, 1997); and pr open rx ankle dislocatn+fixatn (Right, 6/3/2019). Physical Exam:  BP (!) 147/83   Pulse 80   Temp 98 °F (36.7 °C) (Oral)   Resp 22   Ht 6' 2\" (1.88 m)   Wt (!) 324 lb 1.2 oz (147 kg)   SpO2 98%   BMI 41.61 kg/m²       Constitutional:  Well developed, Well nourished, No acute distress, Non-toxic appearance. HENT:  Normocephalic, Atraumatic, Bilateral external ears normal, Oropharynx moist, No oral exudates, Neck- Normal range of motion, No tenderness, Supple, No stridor  Eyes:  Pupils equal and round, extraocular muscles intact. Conjunctiva normal, No discharge.    Respiratory:  Clear to auscultation,No wheezing or crackles, no signs of respiratory distress   Cardiovascular: Regular Rhythm and rate,S1 and S2 audible, No murmur, rub or gallop, No evidence of JVD. Pulses 2+, no carotid bruit  GI:  Soft, No tenderness, No masses, No gross visceromegaly   :  No costovertebral angle tenderness   Musculoskeletal: Severe edema present, no tenderness, no deformities. Integument:  Well hydrated, no rash   Lymphatic:  No lymphadenopathy noted   Neurologic:  Alert & oriented x 3. Can move all four appendages separately.    Psychiatric:  Speech and behavior appropriate      Medications:    budesonide-formoterol  2 puff Inhalation BID    methylPREDNISolone  125 mg IntraVENous Once    potassium chloride  40 mEq Oral Daily with breakfast    hydrALAZINE  25 mg Oral 3 times per day    magnesium oxide  200 mg Oral BID    insulin lispro  0-8 Units SubCUTAneous 2 times per day    insulin glargine  50 Units SubCUTAneous Nightly    glipiZIDE  5 mg Oral Daily    polyethylene glycol  17 g Oral Daily    fluticasone  2 spray Each Nostril Daily    furosemide  40 mg Oral Nightly    [Held by provider] digoxin  125 mcg Oral Daily    cetirizine  10 mg Oral Daily    levothyroxine  50 mcg Oral Daily    DULoxetine  60 mg Oral Daily    [Held by provider] carvedilol  25 mg Oral BID    atorvastatin  40 mg Oral Nightly    allopurinol  100 mg Oral BID    sodium chloride flush  10 mL IntraVENous 2 times per day    mupirocin   Nasal BID    chlorhexidine  15 mL Mouth/Throat Once    chlorhexidine   Topical See Admin Instructions    amLODIPine  10 mg Oral Daily    insulin lispro  0-8 Units SubCUTAneous TID WC    enoxaparin  1 mg/kg SubCUTAneous BID      nitroGLYCERIN Stopped (09/18/22 1332)    sodium chloride      dextrose      sodium chloride       metoprolol, albuterol sulfate HFA, acetaminophen, nitroGLYCERIN, sodium chloride, tiZANidine, glucose, dextrose bolus **OR** dextrose bolus, glucagon (rDNA), dextrose, hydrALAZINE, sodium chloride flush, sodium chloride    Lab Data:  CBC:   Recent Labs     09/16/22  0711 09/17/22  1226 09/18/22  0710   WBC 8.8 7.6 6.8   HGB 13.0* 12.1* 12.0*   HCT 32.8* 36.6* 36.5*   .5* 102.5* 103.1*    144 155     BMP:   Recent Labs     09/16/22  0711 09/17/22  1226 09/18/22  0710    138 139   K 3.6 4.0 4.4    107 106   CO2 26 20* 23   BUN 10 11 12   CREATININE 0.8* 0.9 0.8*     LIVER PROFILE:   Recent Labs     09/16/22  0711 09/17/22  1226 09/18/22  0710   AST 21 15 28   ALT 18 14 13   BILITOT 0.8 0.9 0.7   ALKPHOS 94 91 88     PT/INR:   Recent Labs     09/15/22  1800 09/16/22  0711 09/17/22  1226   PROTIME 14.7* 13.6 14.1   INR 1.14 1.05 1.09     APTT: No results for input(s): APTT in the last 72 hours. BNP:  No results for input(s): BNP in the last 72 hours. TROPONIN:   Recent Labs     09/15/22  1800 09/16/22  0711   TROPONINT <0.010 <0.010     Labs, consult, tests reviewed          ASSESSMENT/PLAN:  Multivessel coronary disease; multiple comorbid factors; rule out pulmonary embolism  Bradycardic episode with wheezing.         Lucy Almaguer MD, 9/18/2022 2:58 PM

## 2022-09-18 NOTE — H&P
catheterization (3/05); Endoscopy, colon, diagnostic (2014); Colonoscopy (2011); Colonoscopy (5/18/16); Hand surgery (Right, 1997); and pr open rx ankle dislocatn+fixatn (Right, 6/3/2019). Allergies:  No Known Allergies     Home Meds:    Prior to Admission medications    Medication Sig Start Date End Date Taking? Authorizing Provider   enoxaparin (LOVENOX) 100 MG/ML Inject 1.5 mLs into the skin daily for 15 days 9/12/22 9/27/22  Duong Epstein MD   metFORMIN (GLUCOPHAGE) 1000 MG tablet TAKE 1 TABLET BY MOUTH TWICE DAILY WITH MEALS 9/9/22   ANKITA Monte CNP   carvedilol (COREG) 25 MG tablet Take 1 tablet by mouth 2 times daily 9/9/22   ANKITA Monte CNP   glipiZIDE (GLUCOTROL) 5 MG tablet Take 1 tablet by mouth daily 8/29/22   ANKITA Monte CNP   dilTIAZem (CARDIZEM) 120 MG tablet Take 1 tablet by mouth daily 8/22/22   ANKITA Monte CNP   blood glucose monitor kit and supplies Dispense sufficient amount for indicated testing frequency plus additional to accommodate PRN testing needs. Dispense all needed supplies to include: monitor, strips, lancing device, lancets, control solutions, alcohol swabs. 7/26/22   ANKITA Monte CNP   blood glucose monitor strips Test 2 times a day & as needed for symptoms of irregular blood glucose. Dispense sufficient amount for indicated testing frequency plus additional to accommodate PRN testing needs. 7/26/22   ANKITA Monte CNP   Lancets MISC by D3EA route three times a day.  7/25/22   ANKITA Monte CNP   allopurinol (ZYLOPRIM) 100 MG tablet Take 1 tab by mouth twice daily 7/19/22   ANKITA Monte CNP   DULoxetine (CYMBALTA) 60 MG extended release capsule Take 1 capsule by mouth daily 7/13/22 11/15/22  ANKITA Monte CNP   levothyroxine (SYNTHROID) 50 MCG tablet Take 1 tablet by mouth Daily 5/31/22   ANKITA Monte CNP   pravastatin (PRAVACHOL) 80 MG tablet TAKE 1 TABLET BY MOUTH ONCE DAILY 3/14/22 Cigarettes     Quit date: 1976     Years since quittin.7    Smokeless tobacco: Never   Vaping Use    Vaping Use: Never used   Substance and Sexual Activity    Alcohol use: Not Currently     Alcohol/week: 6.0 standard drinks     Types: 6 Cans of beer per week     Comment: caffeine 2 cups of tea a day     Drug use: No    Sexual activity: Never   Other Topics Concern    Not on file   Social History Narrative    Not on file     Social Determinants of Health     Financial Resource Strain: Low Risk     Difficulty of Paying Living Expenses: Not hard at all   Food Insecurity: No Food Insecurity    Worried About Running Out of Food in the Last Year: Never true    Ran Out of Food in the Last Year: Never true   Transportation Needs: Not on file   Physical Activity: Sufficiently Active    Days of Exercise per Week: 7 days    Minutes of Exercise per Session: 30 min   Stress: Not on file   Social Connections: Not on file   Intimate Partner Violence: Not on file   Housing Stability: Not on file       Family History   Problem Relation Age of Onset    Cancer Mother         breast ca    Coronary Art Dis Father          MI    Heart Disease Father     Rheum Arthritis Other     Rheum Arthritis Sister     No Known Problems Brother     Rheum Arthritis Sister        REVIEW OF SYSTEMS:    Constitutional: No Fever, chills change in appetite,  or drastic weight loss/gain   Eyes: No changes in vision  ENT: No Headaches, Hearing Loss or Vertigo  Cardiovascular:  chest pain, chest tightness, palpitations, lower extremity edema,  dyspnea on exertion  Respiratory: shortness of breath no cough  Gastrointestinal: No abdominal pain, appetite loss, blood in stools, constipation, diarrhea or heartburn  Genitourinary: No dysuria, trouble voiding, or hematuria  Musculoskeletal:  No gait disturbance, weakness or joint complaints  Integumentary: Brawny induration inferior extremities r skin lesions  Neurological: No lightheadedness, dizziness, seizure, or loss of consciousness  Psychiatric:   depression  Endocrine: No malaise, fatigue or temperature intolerance  Hematologic/Lymphatic: No bleeding problems, blood clots or swollen lymph nodes       Physical Exam    Vital Signs:   /76   Pulse 67   Temp 98.6 °F (37 °C) (Oral)   Resp 20   Ht 6' 2\" (1.88 m)   Wt (!) 324 lb 1.2 oz (147 kg)   SpO2 98%   BMI 41.61 kg/m²      Constitutional: Morbid obesity, No acute distress, Non-toxic appearance. HENT:  Normocephalic, Atraumatic, Bilateral external ears normal, Oropharynx moist, No oral exudates, Neck- Normal range of motion, No tenderness, Supple, No stridor  Eyes:  Pupils equal and round, extraocular muscles intact. Conjunctiva normal, No discharge. Respiratory:  Clear to auscultation,No wheezing or crackles, no signs of respiratory distress   Cardiovascular: Atrial fibrillation r Rhythm and rate,S1 and S2 audible, No murmur, rub or gallop, No evidence of JVD. Pulses 2+, no carotid bruit  GI:  Soft, No tenderness, No masses, No gross visceromegaly   :  No costovertebral angle tenderness   Musculoskeletal: Moderately severe edema present, no tenderness, no deformities. Integument:  Well hydrated, no rash   Lymphatic:  No lymphadenopathy noted   Neurologic:  Alert & oriented x 3. Can move all four appendages separately.  Follows commands  Psychiatric:  Speech and behavior appropriate     MEDICAL DECISION MAKING/TESTING    Cardiac Cath: Cardiac cath film was reviewed by me and case was discussed with Dr. Shady Steinberg: Echocardiogram was reviewed by me    CXR: Findings are noted    EKG: Findings are noted; bifascicular block with bradycardia at times and atrial fibrillation    CT/MRI Scan: CT scan findings are noted           Labs:    BMP:   Lab Results   Component Value Date/Time     09/17/2022 12:26 PM    K 4.0 09/17/2022 12:26 PM     09/17/2022 12:26 PM    CO2 20 09/17/2022 12:26 PM    BUN 11 09/17/2022 12:26 PM    CREATININE 0.9 09/17/2022 12:26 PM      No results found for: GLU  Lab Results   Component Value Date/Time    MG 2.1 09/17/2022 12:26 PM      CBC:   Lab Results   Component Value Date/Time    WBC 7.6 09/17/2022 12:26 PM    HGB 12.1 09/17/2022 12:26 PM    HCT 36.6 09/17/2022 12:26 PM    .5 09/17/2022 12:26 PM     09/17/2022 12:26 PM      Coagulation:   Lab Results   Component Value Date/Time    INR 1.09 09/17/2022 12:26 PM    APTT 33.0 12/02/2021 09:18 AM     Cardiac markers:   Lab Results   Component Value Date/Time    TROPONINI <0.006 10/22/2013 03:20 PM     Lab Results   Component Value Date/Time    LABA1C 7.5 09/15/2022 12:55 PM    No results found for: ALB    Lab Results   Component Value Date/Time    BILITOT 0.9 09/17/2022 12:26 PM    AST 15 09/17/2022 12:26 PM    ALT 14 09/17/2022 12:26 PM    ALKPHOS 91 09/17/2022 12:26 PM      Blood Gas:  Lab Results   Component Value Date/Time    PH 7.46 09/15/2022 02:00 PM    VMA2JDY 35.0 09/15/2022 02:00 PM    PO2ART 71 09/15/2022 02:00 PM    BECMIX 1.4 09/15/2022 02:00 PM    EUS6CAR 24.9 09/15/2022 02:00 PM    CO2CT 26.0 09/15/2022 02:00 PM    O2SAT 93.2 09/15/2022 02:00 PM    LABCARB 2.2 09/15/2022 02:00 PM    METHGBART 0.8 09/15/2022 02:00 PM    COMMENT RA 09/15/2022 02:00 PM     LIVER PROFILE:   Recent Labs     09/15/22  1800 09/16/22  0711 09/17/22  1226   AST 25 21 15   ALT 20 18 14   BILITOT 0.9 0.8 0.9   ALKPHOS 103 94 91     PT/INR:   Recent Labs     09/15/22  1800 09/16/22  0711 09/17/22  1226   PROTIME 14.7* 13.6 14.1   INR 1.14 1.05 1.09     APTT:   No results for input(s): APTT in the last 72 hours. BNP:    No results for input(s): BNP in the last 72 hours.       Diagnostic tests ordered:        Records reviewed:  Current med chart     Risk Factors: Multiple risk factors including morbid obesity type 1 diabetes hyperlipidemia strong family history of coronary artery disease DVT and venous insufficiency elephantiasis legs hypothyroidism depression    Diagnosis:  Severe multivessel coronary disease; chronic stable angina; rule out pulmonary embolism; DVT inferior extremity and venous insufficiency; atrial fibrillation; type 1 diabetes mellitus hyperlipidemia family history of coronary artery disease    Assessment:  71 y.o. male patient with above diagnosis. Patient is undergoing pulmonary evaluation for dyspnea. Anticoagulation is withheld in preparation for potential surgery. MILTON being planned to evaluate left atrial appendage prior to ligation during surgery. Plan:  As above    No follow-ups on file.      Signed:  Doron Pitts MD 9/18/2022 8:55 AM

## 2022-09-18 NOTE — PROGRESS NOTES
Progress Note( Dr. Araiza Para)  9/18/2022  Subjective:   Admit Date: 9/15/2022  PCP: ANKITA Cormier CNP    Admitted For :  Chest pain has history of CAD had cardiac cath three-vessel disease  For CABG 9/19/2022    Consulted For: Better control of blood glucose    Interval History:Better control of blood glucose  Patient is surgery is postponed till Wednesday as planning to do a MILTON to assess the left atrial functions    Denies any chest pains,   Mid SOB . Denies nausea or vomiting. No new bowel or bladder symptoms. Intake/Output Summary (Last 24 hours) at 9/18/2022 1224  Last data filed at 9/18/2022 0430  Gross per 24 hour   Intake 161 ml   Output 950 ml   Net -789 ml         DATA    CBC:   Recent Labs     09/16/22  0711 09/17/22  1226 09/18/22  0710   WBC 8.8 7.6 6.8   HGB 13.0* 12.1* 12.0*    144 155      CMP:  Recent Labs     09/16/22  0711 09/17/22  1226 09/18/22  0710    138 139   K 3.6 4.0 4.4    107 106   CO2 26 20* 23   BUN 10 11 12   CREATININE 0.8* 0.9 0.8*   CALCIUM 8.4 8.6 8.4   PROT 6.3* 6.3* 6.5   LABALBU 3.8 3.8 3.6   BILITOT 0.8 0.9 0.7   ALKPHOS 94 91 88   AST 21 15 28   ALT 18 14 13       Lipids:   Lab Results   Component Value Date/Time    CHOL 152 04/14/2021 10:02 AM    CHOL 139 07/23/2018 08:01 AM    HDL 43 08/26/2022 10:39 AM    TRIG 212 04/14/2021 10:02 AM     Glucose:  Recent Labs     09/17/22 2059 09/18/22  0310 09/18/22  0755   POCGLU 164* 181* 197*       [id number]:  Lab Results   Component Value Date/Time    LABA1C 7.5 09/15/2022 12:55 PM     High Sensitivity TSH:   Lab Results   Component Value Date/Time    TSHHS 3.930 09/17/2022 12:26 PM     Free T3: No results found for: FT3  Free T4:  Lab Results   Component Value Date/Time    T4FREE 1.14 09/17/2022 12:26 PM       CT CHEST WO CONTRAST   Final Result   1. No acute abnormality. 2.  Coronary artery disease. VL DUP LOWER EXTREMITY VENOUS BILATERAL   Final Result   1.  Difficulty with limited evaluation of the mid right femoral vein, though a   focal area of intraluminal echogenicity similar to that of the previous exam   is again noted felt to represent nonocclusive DVT. 2. No deep venous thrombosis seen elsewhere in the right lower extremity. 3. No left lower extremity DVT. 4. Left calf veins were not seen, with difficulty scanning overall per the   technologist         US DUP LOWER EXTREMITY MAPPING BILAT VENOUS   Final Result   Right greater saphenous vein is patent with measurements ranging from 2.1 to   5.2 mmmm as discussed above. Left greater saphenous vein is patent with measurements ranging from 2.7 to   8.5 mm as discussed above. RECOMMENDATIONS:   Unavailable         Vascular carotid duplex bilateral   Final Result   The right internal carotid artery demonstrates 0-50% stenosis. The left internal carotid artery demonstrates 0-50% stenosis. Bilateral vertebral arteries are patent with flow in the normal direction. Chest x-ray:      Cardiomegaly and congestion but no evidence of pulmonary edema or focal   infiltrates. No active of pleural disease. RECOMMENDATIONS:   Unavailable         XR CHEST PORTABLE   Final Result   No acute process.          CTA PULMONARY W CONTRAST    (Results Pending)        Scheduled Medicines   Medications:    budesonide-formoterol  2 puff Inhalation BID    potassium chloride  40 mEq Oral Daily with breakfast    hydrALAZINE  25 mg Oral 3 times per day    magnesium oxide  200 mg Oral BID    insulin lispro  0-8 Units SubCUTAneous 2 times per day    insulin glargine  50 Units SubCUTAneous Nightly    glipiZIDE  5 mg Oral Daily    polyethylene glycol  17 g Oral Daily    fluticasone  2 spray Each Nostril Daily    furosemide  40 mg Oral Nightly    [Held by provider] digoxin  125 mcg Oral Daily    cetirizine  10 mg Oral Daily    levothyroxine  50 mcg Oral Daily    DULoxetine  60 mg Oral Daily    [Held by provider] carvedilol  25 mg Oral BID    atorvastatin  40 mg Oral Nightly    allopurinol  100 mg Oral BID    sodium chloride flush  10 mL IntraVENous 2 times per day    mupirocin   Nasal BID    chlorhexidine  15 mL Mouth/Throat Once    chlorhexidine   Topical See Admin Instructions    amLODIPine  10 mg Oral Daily    insulin lispro  0-8 Units SubCUTAneous TID WC    enoxaparin  1 mg/kg SubCUTAneous BID      Infusions:    nitroGLYCERIN 10 mcg/min (09/17/22 1610)    sodium chloride      dextrose      sodium chloride           Objective:   Vitals: BP (!) 147/83   Pulse 80   Temp 98 °F (36.7 °C) (Oral)   Resp 22   Ht 6' 2\" (1.88 m)   Wt (!) 324 lb 1.2 oz (147 kg)   SpO2 98%   BMI 41.61 kg/m²   General appearance: alert and cooperative with exam  Neck: no JVD or bruit  Thyroid : Normal lobes   Lungs: Has Vesicular Breath sounds   Heart: Atrial fibrillation  Abdomen: soft, non-tender; bowel sounds normal; no masses,  no organomegaly  Musculoskeletal: Normal  Extremities: extremities normal, , no edema  Neurologic:  Awake, alert, oriented to name, place and time. Cranial nerves II-XII are grossly intact. Motor is  intact. Sensory neuropathy. ,  and gait is normal.    Assessment:     Patient Active Problem List:     Atrial fibrillation (HCC)     CAD (coronary artery disease)     Morbid obesity (HCC)     COPD (chronic obstructive pulmonary disease) (HCC)     Sleep apnea     Type 2 diabetes mellitus (HCC)     Thyroid disease     Hyperlipidemia     Congestive heart failure, unspecified HF chronicity, unspecified heart failure type ([address]     Coronary artery disease involving native coronary artery of native heart with angina pectoris (HCC)     Chronic renal disease, stage III ([address] [[id number]]     Hypertension     CAD, multiple vessel disease      Plan:     Reviewed POC blood glucose .  Labs and X ray results   Reviewed Current Medicines   On Correction bolus Humalog/ Basal Lantus Insulin regime / and Oral Hypoglycemic drugs   Monitor Blood glucose frequently   Modified  the dose of Insulin/ other medicines as needed   Will follow     .      Burak Gambino MD, MD

## 2022-09-18 NOTE — CONSULTS
Subjective:   CHIEF COMPLAINT / HPI:  71year old male who needs CABG. He has sob with exertion and this is accompanied by wheeze. He has mild cough. He denie sany fever or chest pain or hemoptysuis. He was diagnosed with abilio about 20 years ago but failed to use cpap. He does not feel fresh when wakes up and feels tired and fatigued all days. He has EDS. Past Medical History:  Past Medical History:   Diagnosis Date    Allergic rhinitis     Anticoagulated on Coumadin     1/6/17-**[facility] to follow pt's PT/INRs, along w/prescribing his Coumadin. **    Anxiety     Arthritis     Atrial fibrillation (HCC)     On Coumadin. CAD (coronary artery disease)     COPD (chronic obstructive pulmonary disease) (HCC)     Depression     Diabetes mellitus (HCC)     NIDDM- dx over 10 yrs ago- follows with Dr Jacqui Pinon's contracture of hand     Right hand    Family history of cardiovascular disease     Father-MI    GERD (gastroesophageal reflux disease)     H/O cardiac catheterization 03/2007    cardiac cath- stent LAD patent, [id number]%, RCA 40-50%    H/O cardiac catheterization 06/12/2008    cardiac cath- mild disease mid RCA    H/O cardiovascular stress test 4/10/2014    cardiolite- normal, no ischemia, EF63%    H/O cardiovascular stress test 09/21/2016    EF59% normal study  pt in atrial fib    H/O cardiovascular stress test 09/20/2017    EF 60%   afib    H/O cardiovascular stress test 11/07/2018    EF60% normal study    H/O Doppler ultrasound     1/11/2010-CAROTID_Intimal thickening but no significant atherosclerotic plaque noted in LAUREN. Doppler flow velocities within the LAUREN are WNL. Intimal thickening but no significant atherosclerotic plaque noted in LICA. Doppler flow velociities within the LICA are WNL. H/O echocardiogram 04/10/2014    EF 60%, normal LV systolic function, mild mitral and tricuspid insufficiencies, no pericardial effusion.     H/O echocardiogram 09/21/2016    EF60% normal study    H/O echocardiogram 11/07/2018    EF55-60% no significant valular disease    H/O hiatal hernia     Hx of Venous Doppler 03/14/2019    Significant reflux in Left Deep System, No reflux in right lower extremity, No DVT or SVT    Hyperlipidemia     Hypertension     Obesity     S/P PTCA (percutaneous transluminal coronary angioplasty) 03/21/2005    s/p PTCA with 3.5 X 16 TAXUS stent to LAD- follows with Dr Gina Carrillo    Sleep apnea     had sleep study done 10+ yrs ago- has cpap but does not use it    Thyroid disease     hypothyroid       Past Surgical History:        Procedure Laterality Date    CARDIAC CATHETERIZATION  6/12/08    mild disease mid RCA,  stent to LAD patent,    CARDIAC CATHETERIZATION  3/07    Stent to LAD patent, Diagonal 40-50%, RCA 40-50%    CARDIAC CATHETERIZATION  3/05    COLONOSCOPY  2011    COLONOSCOPY  5/18/16    1 polyp removed, diverticulosis and hemorrhoids noted    CORONARY ANGIOPLASTY WITH STENT PLACEMENT  03/21/2005    PTCA with 3.5 x 16 TAXUS stent to LAD    ENDOSCOPY, COLON, DIAGNOSTIC  2014    egd    HAND SURGERY Right 1997    TX OPEN RX ANKLE DISLOCATN+FIXATN Right 6/3/2019    RIGHT OPEN REDUCTION INTERNAL FIXATION OF DISTAL TIBIA  AND FIBULA performed by Marly Delcid MD at [facility] OR       Current Medications:    Current Facility-Administered Medications: metoprolol (LOPRESSOR) injection 5 mg, 5 mg, IntraVENous, Q5 Min PRN  albuterol sulfate HFA (PROVENTIL;VENTOLIN;PROAIR) 108 (90 Base) MCG/ACT inhaler 2 puff, 2 puff, Inhalation, Q6H PRN  budesonide-formoterol (SYMBICORT) 160-4.5 MCG/ACT inhaler 2 puff, 2 puff, Inhalation, BID  methylPREDNISolone sodium (SOLU-MEDROL) injection 125 mg, 125 mg, IntraVENous, Once  potassium chloride (KLOR-CON M) extended release tablet 40 mEq, 40 mEq, Oral, Daily with breakfast  acetaminophen (TYLENOL) tablet 1,000 mg, 1,000 mg, Oral, Q4H PRN  hydrALAZINE (APRESOLINE) tablet 25 mg, 25 mg, Oral, 3 times per day  magnesium oxide (MAG-OX) tablet 200 mg, 200 mg, Oral, BID  nitroGLYCERIN 50 mg in dextrose 5% 250 mL infusion, 5-200 mcg/min, IntraVENous, Continuous PRN  0.9 % sodium chloride infusion, , IntraVENous, PRN  insulin lispro (HUMALOG) injection vial 0-8 Units, 0-8 Units, SubCUTAneous, 2 times per day  insulin glargine (LANTUS) injection vial 50 Units, 50 Units, SubCUTAneous, Nightly  glipiZIDE (GLUCOTROL) tablet 5 mg, 5 mg, Oral, Daily  polyethylene glycol (GLYCOLAX) packet 17 g, 17 g, Oral, Daily  fluticasone (FLONASE) 50 MCG/ACT nasal spray 2 spray, 2 spray, Each Nostril, Daily  tiZANidine (ZANAFLEX) tablet 4 mg, 4 mg, Oral, TID PRN  furosemide (LASIX) tablet 40 mg, 40 mg, Oral, Nightly  [Held by provider] digoxin (LANOXIN) tablet 125 mcg, 125 mcg, Oral, Daily  cetirizine (ZYRTEC) tablet 10 mg, 10 mg, Oral, Daily  levothyroxine (SYNTHROID) tablet 50 mcg, 50 mcg, Oral, Daily  DULoxetine (CYMBALTA) extended release capsule 60 mg, 60 mg, Oral, Daily  [Held by provider] carvedilol (COREG) tablet 25 mg, 25 mg, Oral, BID  glucose chewable tablet 16 g, 4 tablet, Oral, PRN  dextrose bolus 10% 125 mL, 125 mL, IntraVENous, PRN **OR** dextrose bolus 10% 250 mL, 250 mL, IntraVENous, PRN  glucagon (rDNA) injection 1 mg, 1 mg, SubCUTAneous, PRN  dextrose 10 % infusion, , IntraVENous, Continuous PRN  hydrALAZINE (APRESOLINE) injection 10 mg, 10 mg, IntraVENous, Q6H PRN  atorvastatin (LIPITOR) tablet 40 mg, 40 mg, Oral, Nightly  allopurinol (ZYLOPRIM) tablet 100 mg, 100 mg, Oral, BID  sodium chloride flush 0.9 % injection 10 mL, 10 mL, IntraVENous, 2 times per day  sodium chloride flush 0.9 % injection 10 mL, 10 mL, IntraVENous, PRN  0.9 % sodium chloride infusion, , IntraVENous, PRN  mupirocin (BACTROBAN) 2 % ointment, , Nasal, BID  chlorhexidine (PERIDEX) 0.12 % solution 15 mL, 15 mL, Mouth/Throat, Once  chlorhexidine (HIBICLENS) 4 % liquid, , Topical, See Admin Instructions  amLODIPine (NORVASC) tablet 10 mg, 10 mg, Oral, Daily  insulin lispro (HUMALOG) injection vial 0-8 Units, 0-8 Units, SubCUTAneous, TID WC  enoxaparin (LOVENOX) injection 150 mg, 1 mg/kg, SubCUTAneous, BID    No Known Allergies    Social History:    Social History     Socioeconomic History    Marital status:       Spouse name: None    Number of children: 1    Years of education: None    Highest education level: None   Occupational History     Comment: RETIRED   Tobacco Use    Smoking status: Former     Packs/day: 1.00     Years: 10.00     Pack years: 10.00     Types: Cigarettes     Quit date: 1976     Years since quittin.7    Smokeless tobacco: Never   Vaping Use    Vaping Use: Never used   Substance and Sexual Activity    Alcohol use: Not Currently     Alcohol/week: 6.0 standard drinks     Types: 6 Cans of beer per week     Comment: caffeine 2 cups of tea a day     Drug use: No    Sexual activity: Never     Social Determinants of Health     Financial Resource Strain: Low Risk     Difficulty of Paying Living Expenses: Not hard at all   Food Insecurity: No Food Insecurity    Worried About Running Out of Food in the Last Year: Never true    Ran Out of Food in the Last Year: Never true   Physical Activity: Sufficiently Active    Days of Exercise per Week: 7 days    Minutes of Exercise per Session: 30 min       Family History:   Family History   Problem Relation Age of Onset    Cancer Mother         breast ca    Coronary Art Dis Father          MI    Heart Disease Father     Rheum Arthritis Other     Rheum Arthritis Sister     No Known Problems Brother     Rheum Arthritis Sister        Immunization:  Immunization History   Administered Date(s) Administered    COVID-19, PFIZER PURPLE top, DILUTE for use, (age 15 y+), 30mcg/0.3mL 2021, 2021, 2021    Influenza Vaccine, unspecified formulation 2017    Influenza Virus Vaccine 10/01/2018    Influenza, FLUAD, (age 72 y+), Adjuvanted, 0.5mL 10/27/2021    Influenza, Triv, 3 Years and older, IM ([facility] (5 yrs and older) 10/01/2011, 10/15/2014    Pneumococcal Conjugate 13-valent ([id number]) 06/28/2017    Pneumococcal Polysaccharide ([id number]) 01/01/2008, 05/18/2013, 04/01/2019    Tdap (Boostrix, Adacel) 12/17/2011         REVIEW OF SYSTEMS:    CONSTITUTIONAL:  negative for fevers, chills, diaphoresis, activity change, appetite change, fatigue, night sweats and unexpected weight change. EYES:  negative for blurred vision, eye discharge, visual disturbance and icterus  HEENT:  negative for hearing loss, tinnitus, ear drainage, sinus pressure, nasal congestion, epistaxis and snoring  RESPIRATORY:  See HPI  CARDIOVASCULAR:  negative for chest pain, palpitations, exertional chest pressure/discomfort, edema, syncope  GASTROINTESTINAL:  negative for nausea, vomiting, diarrhea, constipation, blood in stool and abdominal pain  GENITOURINARY:  negative for frequency, dysuria and hematuria  HEMATOLOGIC/LYMPHATIC:  negative for easy bruising, bleeding and lymphadenopathy  ALLERGIC/IMMUNOLOGIC:  negative for recurrent infections, angioedema, anaphylaxis and drug reactions  ENDOCRINE:  negative for weight changes and diabetic symptoms including polyuria, polydipsia and polyphagia    MUSCULOSKELETAL:  negative for  pain, joint swelling, decreased range of motion and muscle weakness  NEUROLOGICAL:  negative for headaches, slurred speech, unilateral weakness  PSYCHIATRIC/BEHAVIORAL: negative for hallucinations, behavioral problems, confusion and agitation.      Objective:   PHYSICAL EXAM:      VITALS:    Vitals:    09/18/22 0600 09/18/22 0630 09/18/22 0735 09/18/22 0915   BP: 136/65 128/76  (!) 147/83   Pulse: 89 71 67 80   Resp: 21 18 20 22   Temp:    98 °F (36.7 °C)   TempSrc:    Oral   SpO2:       Weight:       Height:             CONSTITUTIONAL:  awake, alert, cooperative, no apparent distress, and appears stated age  NECK:  Supple, symmetrical, trachea midline, no adenopathy, thyroid symmetric, not enlarged and no tenderness  CHEST: Chest expansion equal and symmetrical, no intercostal retraction. LUNGS:  no increased work of breathing, has expiratory wheezes both lungs, no crackles. CARDIOVASCULAR: S1 and S2, no edema and no JVD  ABDOMEN:  normal bowel sounds, non-distended and no masses palpated, and no tenderness to palpation. No hepatospleenomegaly  LYMPHADENOPATHY:  no axillary or supraclavicular adenopathy. No cervical adnenopathy  PSYCHIATRIC: Oriented to person place and time. No obvious depression or anxiety. MUSCULOSKELETAL: No obvious misalignment or effusion of the joints. No clubbing, cyanosis of the digits. RIGHT AND LEFT LOWER EXTREMITIES: No edema, no inflammation, no tenderness. SKIN:  normal skin color, texture, turgor and no redness, warmth, or swelling. No palpable nodules    DATA:                        Assessment:      Ac bronchospasm prob asthma  Jeffrey  Needs CABG          Plan:     D/w pt  O2 adm  Inc leida  Bd rx  One dose iv solumedrol  Start autopap  Thanks will follow

## 2022-09-19 ENCOUNTER — CARE COORDINATION (OUTPATIENT)
Dept: CARE COORDINATION | Age: 72
End: 2022-09-19

## 2022-09-19 LAB
ALBUMIN SERPL-MCNC: 4 GM/DL (ref 3.4–5)
ALP BLD-CCNC: 103 IU/L (ref 40–128)
ALT SERPL-CCNC: 17 U/L (ref 10–40)
ANION GAP SERPL CALCULATED.3IONS-SCNC: 11 MMOL/L (ref 4–16)
AST SERPL-CCNC: 17 IU/L (ref 15–37)
BASOPHILS ABSOLUTE: 0 K/CU MM
BASOPHILS RELATIVE PERCENT: 0.3 % (ref 0–1)
BILIRUB SERPL-MCNC: 0.5 MG/DL (ref 0–1)
BUN BLDV-MCNC: 13 MG/DL (ref 6–23)
CALCIUM SERPL-MCNC: 8.9 MG/DL (ref 8.3–10.6)
CHLORIDE BLD-SCNC: 105 MMOL/L (ref 99–110)
CO2: 22 MMOL/L (ref 21–32)
CREAT SERPL-MCNC: 0.8 MG/DL (ref 0.9–1.3)
DIFFERENTIAL TYPE: ABNORMAL
EOSINOPHILS ABSOLUTE: 0 K/CU MM
EOSINOPHILS RELATIVE PERCENT: 0 % (ref 0–3)
GFR AFRICAN AMERICAN: >60 ML/MIN/1.73M2
GFR NON-AFRICAN AMERICAN: >60 ML/MIN/1.73M2
GLUCOSE BLD-MCNC: 246 MG/DL (ref 70–99)
GLUCOSE BLD-MCNC: 248 MG/DL (ref 70–99)
GLUCOSE BLD-MCNC: 269 MG/DL (ref 70–99)
GLUCOSE BLD-MCNC: 273 MG/DL (ref 70–99)
GLUCOSE BLD-MCNC: 330 MG/DL (ref 70–99)
GLUCOSE BLD-MCNC: 374 MG/DL (ref 70–99)
HCT VFR BLD CALC: 38.6 % (ref 42–52)
HEMOGLOBIN: 12.7 GM/DL (ref 13.5–18)
IMMATURE NEUTROPHIL %: 1.5 % (ref 0–0.43)
LYMPHOCYTES ABSOLUTE: 0.8 K/CU MM
LYMPHOCYTES RELATIVE PERCENT: 11.9 % (ref 24–44)
MAGNESIUM: 2.3 MG/DL (ref 1.8–2.4)
MCH RBC QN AUTO: 33.6 PG (ref 27–31)
MCHC RBC AUTO-ENTMCNC: 32.9 % (ref 32–36)
MCV RBC AUTO: 102.1 FL (ref 78–100)
MONOCYTES ABSOLUTE: 0.1 K/CU MM
MONOCYTES RELATIVE PERCENT: 1.2 % (ref 0–4)
NUCLEATED RBC %: 0.3 %
PDW BLD-RTO: 14.4 % (ref 11.7–14.9)
PLATELET # BLD: 176 K/CU MM (ref 140–440)
PMV BLD AUTO: 10.1 FL (ref 7.5–11.1)
POTASSIUM SERPL-SCNC: 4.3 MMOL/L (ref 3.5–5.1)
RBC # BLD: 3.78 M/CU MM (ref 4.6–6.2)
SEGMENTED NEUTROPHILS ABSOLUTE COUNT: 5.8 K/CU MM
SEGMENTED NEUTROPHILS RELATIVE PERCENT: 85.1 % (ref 36–66)
SODIUM BLD-SCNC: 138 MMOL/L (ref 135–145)
TOTAL IMMATURE NEUTOROPHIL: 0.1 K/CU MM
TOTAL NUCLEATED RBC: 0 K/CU MM
TOTAL PROTEIN: 6.8 GM/DL (ref 6.4–8.2)
WBC # BLD: 6.8 K/CU MM (ref 4–10.5)

## 2022-09-19 PROCEDURE — 7100000001 HC PACU RECOVERY - ADDTL 15 MIN

## 2022-09-19 PROCEDURE — 80053 COMPREHEN METABOLIC PANEL: CPT

## 2022-09-19 PROCEDURE — 6360000002 HC RX W HCPCS

## 2022-09-19 PROCEDURE — 2580000003 HC RX 258

## 2022-09-19 PROCEDURE — 93325 DOPPLER ECHO COLOR FLOW MAPG: CPT | Performed by: INTERNAL MEDICINE

## 2022-09-19 PROCEDURE — 6370000000 HC RX 637 (ALT 250 FOR IP): Performed by: INTERNAL MEDICINE

## 2022-09-19 PROCEDURE — 93320 DOPPLER ECHO COMPLETE: CPT | Performed by: INTERNAL MEDICINE

## 2022-09-19 PROCEDURE — 36415 COLL VENOUS BLD VENIPUNCTURE: CPT

## 2022-09-19 PROCEDURE — 6370000000 HC RX 637 (ALT 250 FOR IP)

## 2022-09-19 PROCEDURE — 99233 SBSQ HOSP IP/OBS HIGH 50: CPT | Performed by: INTERNAL MEDICINE

## 2022-09-19 PROCEDURE — 82962 GLUCOSE BLOOD TEST: CPT

## 2022-09-19 PROCEDURE — 93312 ECHO TRANSESOPHAGEAL: CPT | Performed by: INTERNAL MEDICINE

## 2022-09-19 PROCEDURE — 2500000003 HC RX 250 WO HCPCS: Performed by: INTERNAL MEDICINE

## 2022-09-19 PROCEDURE — 83735 ASSAY OF MAGNESIUM: CPT

## 2022-09-19 PROCEDURE — 7100000000 HC PACU RECOVERY - FIRST 15 MIN

## 2022-09-19 PROCEDURE — 94761 N-INVAS EAR/PLS OXIMETRY MLT: CPT

## 2022-09-19 PROCEDURE — 2700000000 HC OXYGEN THERAPY PER DAY

## 2022-09-19 PROCEDURE — B24BZZ4 ULTRASONOGRAPHY OF HEART WITH AORTA, TRANSESOPHAGEAL: ICD-10-PCS | Performed by: INTERNAL MEDICINE

## 2022-09-19 PROCEDURE — 2000000000 HC ICU R&B

## 2022-09-19 PROCEDURE — 94640 AIRWAY INHALATION TREATMENT: CPT

## 2022-09-19 PROCEDURE — 93312 ECHO TRANSESOPHAGEAL: CPT

## 2022-09-19 PROCEDURE — 85025 COMPLETE CBC W/AUTO DIFF WBC: CPT

## 2022-09-19 RX ORDER — FLUMAZENIL 0.1 MG/ML
0.2 INJECTION, SOLUTION INTRAVENOUS ONCE
Status: COMPLETED | OUTPATIENT
Start: 2022-09-19 | End: 2022-09-19

## 2022-09-19 RX ORDER — INSULIN LISPRO 100 [IU]/ML
4 INJECTION, SOLUTION INTRAVENOUS; SUBCUTANEOUS ONCE
Status: COMPLETED | OUTPATIENT
Start: 2022-09-19 | End: 2022-09-19

## 2022-09-19 RX ADMIN — CHLORHEXIDINE GLUCONATE 0.12% ORAL RINSE 15 ML: 1.2 LIQUID ORAL at 11:52

## 2022-09-19 RX ADMIN — ENOXAPARIN SODIUM 150 MG: 150 INJECTION SUBCUTANEOUS at 12:07

## 2022-09-19 RX ADMIN — GLIPIZIDE 5 MG: 5 TABLET ORAL at 11:50

## 2022-09-19 RX ADMIN — INSULIN GLARGINE 50 UNITS: 100 INJECTION, SOLUTION SUBCUTANEOUS at 21:43

## 2022-09-19 RX ADMIN — CETIRIZINE HYDROCHLORIDE 10 MG: 10 TABLET, FILM COATED ORAL at 11:50

## 2022-09-19 RX ADMIN — Medication: at 21:33

## 2022-09-19 RX ADMIN — Medication 200 MG: at 21:33

## 2022-09-19 RX ADMIN — ATORVASTATIN CALCIUM 40 MG: 40 TABLET, FILM COATED ORAL at 21:37

## 2022-09-19 RX ADMIN — DULOXETINE 60 MG: 30 CAPSULE, DELAYED RELEASE ORAL at 11:49

## 2022-09-19 RX ADMIN — TIZANIDINE 4 MG: 4 TABLET ORAL at 11:49

## 2022-09-19 RX ADMIN — AMLODIPINE BESYLATE 10 MG: 10 TABLET ORAL at 11:50

## 2022-09-19 RX ADMIN — FUROSEMIDE 40 MG: 40 TABLET ORAL at 20:16

## 2022-09-19 RX ADMIN — LEVOTHYROXINE SODIUM 50 MCG: 0.05 TABLET ORAL at 11:50

## 2022-09-19 RX ADMIN — POLYETHYLENE GLYCOL (3350) 17 G: 17 POWDER, FOR SOLUTION ORAL at 11:52

## 2022-09-19 RX ADMIN — ALLOPURINOL 100 MG: 100 TABLET ORAL at 11:49

## 2022-09-19 RX ADMIN — BUDESONIDE AND FORMOTEROL FUMARATE DIHYDRATE 2 PUFF: 160; 4.5 AEROSOL RESPIRATORY (INHALATION) at 20:15

## 2022-09-19 RX ADMIN — SODIUM CHLORIDE, PRESERVATIVE FREE 10 ML: 5 INJECTION INTRAVENOUS at 21:40

## 2022-09-19 RX ADMIN — FLUMAZENIL 0.2 MG: 0.1 INJECTION, SOLUTION INTRAVENOUS at 11:10

## 2022-09-19 RX ADMIN — SODIUM CHLORIDE, PRESERVATIVE FREE 10 ML: 5 INJECTION INTRAVENOUS at 14:13

## 2022-09-19 RX ADMIN — INSULIN LISPRO 4 UNITS: 100 INJECTION, SOLUTION INTRAVENOUS; SUBCUTANEOUS at 18:48

## 2022-09-19 RX ADMIN — BUDESONIDE AND FORMOTEROL FUMARATE DIHYDRATE 2 PUFF: 160; 4.5 AEROSOL RESPIRATORY (INHALATION) at 08:09

## 2022-09-19 RX ADMIN — ALLOPURINOL 100 MG: 100 TABLET ORAL at 21:33

## 2022-09-19 RX ADMIN — INSULIN LISPRO 2 UNITS: 100 INJECTION, SOLUTION INTRAVENOUS; SUBCUTANEOUS at 21:41

## 2022-09-19 RX ADMIN — HYDRALAZINE HYDROCHLORIDE 50 MG: 50 TABLET, FILM COATED ORAL at 21:40

## 2022-09-19 RX ADMIN — Medication 200 MG: at 11:50

## 2022-09-19 RX ADMIN — INSULIN LISPRO 8 UNITS: 100 INJECTION, SOLUTION INTRAVENOUS; SUBCUTANEOUS at 17:59

## 2022-09-19 RX ADMIN — INSULIN LISPRO 2 UNITS: 100 INJECTION, SOLUTION INTRAVENOUS; SUBCUTANEOUS at 14:08

## 2022-09-19 RX ADMIN — POTASSIUM CHLORIDE 40 MEQ: 1500 TABLET, EXTENDED RELEASE ORAL at 11:49

## 2022-09-19 RX ADMIN — FLUTICASONE PROPIONATE 2 SPRAY: 50 SPRAY, METERED NASAL at 14:07

## 2022-09-19 ASSESSMENT — PAIN SCALES - GENERAL
PAINLEVEL_OUTOF10: 0

## 2022-09-19 ASSESSMENT — ENCOUNTER SYMPTOMS
SHORTNESS OF BREATH: 0
ABDOMINAL PAIN: 0
VOMITING: 0
COUGH: 0
DIARRHEA: 0

## 2022-09-19 ASSESSMENT — PAIN - FUNCTIONAL ASSESSMENT: PAIN_FUNCTIONAL_ASSESSMENT: PREVENTS OR INTERFERES SOME ACTIVE ACTIVITIES AND ADLS

## 2022-09-19 ASSESSMENT — PAIN DESCRIPTION - FREQUENCY: FREQUENCY: CONTINUOUS

## 2022-09-19 NOTE — PROGRESS NOTES
Cardiothoracic/Vascular Surgery Note    Name:  Bill Ortega /Age/Sex: 1950  (11 y.o. male)   MRN & CSN:  [id number] & [id number] Admission Date/Time: 9/15/2022 10:07 AM   Location:  -A PCP: Ree Leger, [address] [facility] Day: 5    CT Surgery Consulted for: Multivessel coronary artery disease    -  Interval history:    Echo: 9/15/2022   Left ventricular systolic function is normal.   Ejection fraction is visually estimated at 50-55%. Grade III diastolic dysfunction. No significant valvular disease noted. No evidence of any pericardial effusion. Bilateral lower extremity venous mappin/15/2020    Sapheno-femoral Junction: 5.2mm. Proximal Thigh:  3.2mm. Mid Thigh:  4.0mm. Distal Thigh:  4.3mm. Knee:  4.2mm. Proximal Calf: 3.1mm. Mid Calf:  2.1mm. Ankle: 5.6mm. Left:       Sapheno-femoral Junction: 8.5mm. Proximal Thigh:  3.3mm. Mid Thigh:  2.7mm. Distal Thigh:  3.6mm. Knee:  4.5mm. Proximal Calf: 3.4mm. Mid Calf:  4.8mm. Ankle: 4.1mm     Bilateral carotid ultrasound: 9/15/2022    The right internal carotid artery demonstrates 0-50% stenosis. The left internal carotid artery demonstrates 0-50% stenosis. Bilateral vertebral arteries are patent with flow in the normal direction. Chest x-ray:       Cardiomegaly and congestion but no evidence of pulmonary edema or focal   infiltrates. No active of pleural disease. Bilateral lower extremity venous duplex ultrasound: 2020    The right internal carotid artery demonstrates 0-50% stenosis. The left internal carotid artery demonstrates 0-50% stenosis. Bilateral vertebral arteries are patent with flow in the normal direction. Chest x-ray:       Cardiomegaly and congestion but no evidence of pulmonary edema or focal   infiltrates. No active of pleural disease. EKG reviewed by me.   CTA pulmonary w/ contrast: no acute pulmonary embolism   MILTON - no thrombus       Subjective:  Perri Moulton is a 71 y. o.year old male    Patient states that he is not experiencing chest pain, SOB or palpitations at this time. Patient has history of chronic atrial fibrillation that is rate controlled. No thrombus or PE on imaging. Plan for CABG this Wednesday or Thursday this week. Patient does have lower extremity edema but this is chronic, skin changes have been noted for several years at this time. Objective: Temperature:  Current - Temp: 98.2 °F (36.8 °C); Max - Temp  Av.9 °F (36.6 °C)  Min: 97 °F (36.1 °C)  Max: 98.3 °F (36.8 °C)    Respiratory Rate : Resp  Av.5  Min: 7  Max: 26    Pulse Range: Pulse  Av.4  Min: 60  Max: 112    Blood Presuure Range:  Systolic (67AGQ), VVD:568 , Min:112 , IAU:815   ; Diastolic (46VTV), CHH:23, Min:59, Max:106      Pulse ox Range: SpO2  Av.8 %  Min: 94 %  Max: 98 %    24hr I & O:    Intake/Output Summary (Last 24 hours) at 2022 1549  Last data filed at 2022 0032  Gross per 24 hour   Intake 10 ml   Output 350 ml   Net -340 ml           BP (!) 117/59   Pulse 74   Temp 98.2 °F (36.8 °C) (Oral)   Resp 19   Ht 6' 2\" (1.88 m)   Wt (!) 327 lb 0.8 oz (148.3 kg)   SpO2 98%   BMI 41.99 kg/m²           Review of Systems:   Review of Systems   Constitutional:  Negative for diaphoresis, fatigue, fever and unexpected weight change. HENT: Negative. Eyes:  Negative for visual disturbance. Respiratory:  Negative for cough and shortness of breath. Cardiovascular:  Negative for chest pain, palpitations and leg swelling. Gastrointestinal:  Negative for abdominal pain, diarrhea and vomiting. Endocrine: Negative for cold intolerance and heat intolerance. Musculoskeletal: Negative. Skin:  Negative for pallor and rash. Neurological:  Positive for headaches (Following initiation of nitroglycerin drip, alleviated with Tylenol).  Negative for dizziness, syncope and light-headedness. Hematological:  Does not bruise/bleed easily. Psychiatric/Behavioral:  Negative for dysphoric mood. The patient is not nervous/anxious. has a past medical history of Allergic rhinitis, Anticoagulated on Coumadin, Anxiety, Arthritis, Atrial fibrillation (HCC), CAD (coronary artery disease), COPD (chronic obstructive pulmonary disease) ([address] Depression, Diabetes mellitus ([address] Dupuytren's contracture of hand, Family history of cardiovascular disease, GERD (gastroesophageal reflux disease), H/O cardiac catheterization, H/O cardiac catheterization, H/O cardiovascular stress test, H/O cardiovascular stress test, H/O cardiovascular stress test, H/O cardiovascular stress test, H/O Doppler ultrasound, H/O echocardiogram, H/O echocardiogram, H/O echocardiogram, H/O hiatal hernia, Hx of Venous Doppler, Hyperlipidemia, Hypertension, Obesity, S/P PTCA (percutaneous transluminal coronary angioplasty), Sleep apnea, and Thyroid disease. has a past surgical history that includes Coronary angioplasty with stent (03/21/2005); Cardiac catheterization (6/12/08); Cardiac catheterization (3/07); Cardiac catheterization (3/05); Endoscopy, colon, diagnostic (2014); Colonoscopy (2011); Colonoscopy (5/18/16); Hand surgery (Right, 1997); and pr open rx ankle dislocatn+fixatn (Right, 6/3/2019). Physical Exam  Constitutional:       General: He is not in acute distress. Appearance: He is obese. He is not diaphoretic. HENT:      Head: Normocephalic and atraumatic. Right Ear: External ear normal.      Left Ear: External ear normal.      Nose: Nose normal.      Mouth/Throat:      Mouth: Mucous membranes are moist.   Eyes:      Extraocular Movements: Extraocular movements intact. Comments: Pupils equal and round   Neck:      Vascular: No carotid bruit. Cardiovascular:      Rate and Rhythm: Rhythm irregular. Pulses: Normal pulses.       Heart sounds: S1 normal and S2 normal. No murmur heard.    No friction rub. No gallop. Comments: Patient showing signs of both bradycardia and tachycardia  Pulmonary:      Effort: Pulmonary effort is normal. No respiratory distress. Breath sounds: Normal breath sounds. Comments: Mildly decreased breath sounds  Abdominal:      Palpations: Abdomen is soft. Tenderness: There is no abdominal tenderness. Musculoskeletal:      Right lower leg: Edema present. Left lower leg: Edema present. Skin:     General: Skin is warm and dry. Capillary Refill: Capillary refill takes less than 2 seconds. Findings: No rash. Comments: Skin turgor brisk  Chronic venous stasis changes of bilateral lower extremities. Neurological:      Mental Status: He is alert and oriented to person, place, and time. Mental status is at baseline. Psychiatric:         Behavior: Behavior is cooperative. Thought Content:  Thought content normal.         Judgment: Judgment normal.        Medications:    budesonide-formoterol  2 puff Inhalation BID    hydrALAZINE  50 mg Oral 3 times per day    potassium chloride  40 mEq Oral Daily with breakfast    magnesium oxide  200 mg Oral BID    insulin lispro  0-8 Units SubCUTAneous 2 times per day    insulin glargine  50 Units SubCUTAneous Nightly    glipiZIDE  5 mg Oral Daily    polyethylene glycol  17 g Oral Daily    fluticasone  2 spray Each Nostril Daily    furosemide  40 mg Oral Nightly    [Held by provider] digoxin  125 mcg Oral Daily    cetirizine  10 mg Oral Daily    levothyroxine  50 mcg Oral Daily    DULoxetine  60 mg Oral Daily    [Held by provider] carvedilol  25 mg Oral BID    atorvastatin  40 mg Oral Nightly    allopurinol  100 mg Oral BID    sodium chloride flush  10 mL IntraVENous 2 times per day    mupirocin   Nasal BID    chlorhexidine   Topical See Admin Instructions    amLODIPine  10 mg Oral Daily    insulin lispro  0-8 Units SubCUTAneous TID WC    enoxaparin  1 mg/kg SubCUTAneous BID nitroGLYCERIN Stopped (09/18/22 1332)    sodium chloride      dextrose      sodium chloride       metoprolol, albuterol sulfate HFA, acetaminophen, nitroGLYCERIN, sodium chloride, tiZANidine, glucose, dextrose bolus **OR** dextrose bolus, glucagon (rDNA), dextrose, hydrALAZINE, sodium chloride flush, sodium chloride    Lab Data:  CBC:   Recent Labs     09/17/22  1226 09/18/22  0710 09/19/22  0547   WBC 7.6 6.8 6.8   HGB 12.1* 12.0* 12.7*   HCT 36.6* 36.5* 38.6*   .5* 103.1* 102.1*    155 176       BMP:   Recent Labs     09/17/22  1226 09/18/22  0710 09/19/22  0547    139 138   K 4.0 4.4 4.3    106 105   CO2 20* 23 22   BUN 11 12 13   CREATININE 0.9 0.8* 0.8*       LIVER PROFILE:   Recent Labs     09/17/22  1226 09/18/22  0710 09/19/22  0547   AST 15 28 17   ALT 14 13 17   BILITOT 0.9 0.7 0.5   ALKPHOS 91 88 103       PT/INR:   Recent Labs     09/17/22  1226   PROTIME 14.1   INR 1.09       APTT: No results for input(s): APTT in the last 72 hours. BNP:  No results for input(s): BNP in the last 72 hours. TROPONIN:   No results for input(s): TROPONINT in the last 72 hours. Labs, consult, tests reviewed          ASSESSMENT/ PLAN:  Multivessel coronary artery disease with history of PCI in 2005. -Repeat EKG showing no change from previous. Troponins negative, will trend if chest pain reoccurs.  -Possible CABG on Wednesday or Thursday this week  -Goal potassium of 4.1-4.9, goal magnesium of 2.0-2.4, supplements as needed. Hypertension  -Patient started on Norvasc 10 mg and 25 mg hydralazine 3 times daily  Atrial fibrillation  -Currently in A. fib significant heart rate variation.    -We will hold digoxin and diltiazem at this time  Deep venous thrombosis  -Patient currently on Lovenox 150 mg twice daily.  -We will need to hold 24 hours prior to CABG. -Check CTA pulmonary to rule out PE  Insulin-dependent type 2 diabetes mellitus  -Endocrinology consulted.   POC glucose 246 today (trending down from 330)  Venous stasis  -Chronic bilateral lower extremity skin changes.  -Bilateral lower extremity venous mapping performed  History of HFpEF  -Echo on 9/15/2022 showing EF of 50 to 55% and grade 3 diastolic dysfunction.    -Patient appears well compensated at this time  Hypothyroidism  -Patient currently on Synthroid        Patient seen and examined with Dr. Moraima Peace. Management discussed with him, he is agreeable to above plan.      Ollie Tovar PA-C, 9/19/2022 3:49 PM

## 2022-09-19 NOTE — PROGRESS NOTES
Chart reviewed by cardiac rehab nurse. Met patient in room 2113. Introduced myself and teaching plan. Patient's planned procedure is CABG surgery. Teaching done on A&P of the heart and formation of CAD. Explained the surgical process, Pre-Op,Inter-Op and Post-Op. Discussed length of stay and recovery. Explanation given of pre op routine tests, lines/tubes/equipment, and infection control. Educated on weaning from ventilator, medication and equipment to expect, on progressive activity, post op pain, and how it will be managed. Encouraged pt to communicate about pain in order to create a partnership for his recovery success. Discussed importance of coughing and deep breathing and sternal precautions. Introduced multidisciplinary approach and daily routine in CVICU. Pt verbalized commitment to recovery program.        Teaching done on post discharge recovery, activity guidelines, and weight monitoring. Discussed follow up appointments, possibility of ECF, role of home health, and family involvement. Pt lives alone and is unsure who will be taken care of him when he returns home. Introduced benefits of out patient cardiac rehab after appropriate time frame. Went over visitation policy with patient. Given Understanding Heart Surgery book. No other questions or needs at this time.

## 2022-09-19 NOTE — PROGRESS NOTES
Mack Kelly BSN RN clinical  for [facility], [place], operated by [facility] SURG[facility] RN students 07-19:00 this date.

## 2022-09-19 NOTE — FLOWSHEET NOTE
Dr Rafat Regan made aware that Dr Yany Driver wanted David Live notified of periods of bradycardia stated he would let him know in the morning.

## 2022-09-19 NOTE — PROGRESS NOTES
pulmonary      SUBJECTIVE:  sleeping with loud snoring     OBJECTIVE    VITALS:  BP (!) 112/91   Pulse 78   Temp 97.9 °F (36.6 °C) (Oral)   Resp 20   Ht 6' 2\" (1.88 m)   Wt (!) 324 lb 1.2 oz (147 kg)   SpO2 98%   BMI 41.61 kg/m²   HEAD AND FACE EXAM:  No throat injection, no active exudate,no thrush  NECK EXAM;No JVD, no masses, symmetrical  CHEST EXAM; Expansion equal and symmetrical, no masses  LUNG EXAM; Good breath sounds bilaterally. There are expiratory wheezes both lungs, there are crackles at both lung bases  CARDIOVASCULAR EXAM: Positive S1 and S2, no S3 or S4, no clicks ,no murmurs  RIGHT AND LEFT LOWER EXTRIMITY EXAM: No edema, no swelling, no inflamation      LABS   Lab Results   Component Value Date    WBC 6.8 09/19/2022    HGB 12.7 (L) 09/19/2022    HCT 38.6 (L) 09/19/2022    .1 (H) 09/19/2022     09/19/2022     Lab Results   Component Value Date    CREATININE 0.8 (L) 09/18/2022    BUN 12 09/18/2022     09/18/2022    K 4.4 09/18/2022     09/18/2022    CO2 23 09/18/2022     Lab Results   Component Value Date    INR 1.09 09/17/2022    PROTIME 14.1 09/17/2022        No results found for: PHOS   No results for input(s): PH, PO2ART, XMX0QDL, HCO3, BEART, O2SAT in the last 72 hours.       Wt Readings from Last 3 Encounters:   09/16/22 (!) 324 lb 1.2 oz (147 kg)   09/09/22 (!) 331 lb (150.1 kg)   05/31/22 (!) 331 lb (150.1 kg)               ASSESMENT  Jeffrey  Ac bronchospasm prob asthma        PLAN  Inc leida  Bd rx  Start autopap    9/19/2022  Dayami Robertson MD, M.D.

## 2022-09-19 NOTE — CARE COORDINATION
Attempted to reach patient for ACM follow up. No answer to phone. Message left with ACM contact information and request for call back. Noted patient Inpatient status; with procedure pending. Return call received from patient. Oriented to the role of ACM. Patient reports that he feels about the same. Confirms pending procedure; tentative plan to return home at discharge. Patient is agreeable to ongoing ACM follow up; plan for enrollment at discharge. ACM contact information provided should questions arise.

## 2022-09-19 NOTE — PROGRESS NOTES
Cardiac Surgery Risk Factor Worksheet      STS Criteria  Possible Score Yes/No Score ICD 10 Notes   Emergency Case 6 No      Serum Creatinine         >1.2 0 No   CR: 0.8  M.3  BUN: 13  K: 4.3   >1.6 to <1.8mg/dl 1 No      >.1.9 4 No      Acute/Chronic Renal Failure/Renal Insufficiency 0   No  GFR  Stage of CKD GFR: >60   Left Ventricular Dysfunction(EF<40%) 3   No   EF: 50-55%   Operative Mitral Valve Insufficiency 3   No      Reoperation 3 No      Age >71 and <74 years 1  Yes   1  71 yoa Male   Age >75 years 2 No      Prior Vascular Surgery 2   No      COPD +History of Patient Use of Bronchodilators 2   Yes 2 Acuity of  COPD Albuterol inhaler   COPD 0 Yes 0     Current Smoker of History of Smoking  0   Yes 0  Former Smoker   Anemia (Hemotocrit<0.34) 2   No   HCT: 38.6  HGB: 12.7  PLAT: 176   ASA / Anticoagulants/ Bleeding Tendiencies / Antiplatelets 0   No   PT: 13.6  PTT:   INR: 1.05   Operative Aortic Valve Stenosis 1 No        Weight<143 lbs (  BMI<18.5) 1   No  Obesity with  BMI    Weight >200 lbs (BMI >30    0   Yes 0  WT: 324/147kg  HT: 6'2  BMI: 41.61   Diabetes Oral or Insulin Therapy 1   Yes 1 Type & Control Type 1 Diabetic  B  A1C: 7.5   Cerebrovascular Disease 1   No   Georgi Carotid study:   L: 0-50%  R: 0-50%   Impaired Mobility 0 Yes 0  SOB when walking   Walker/Cane/Wheelchair 0 Yes 0  Uses a cane  Right ankle surgery 3 years ago. Recent MI 0 No   Zelda heart stent   A-fib on  EKG   Hypertension 0 Yes 0     Peripheral Vascular Disease 0 No      Lives Alone 0 Yes 0  Patient lives alone and doesn't have anyone there to help take care of him at D/C   Other (GI Auto Immune Hepatic etc) 0 No  Malnutrition &   Acuity    CHF & Type & Acuity    TOTAL   4     Note The surgeon must be notified for all risk scores >7    Notified by YUSUF Castle RN   2022

## 2022-09-19 NOTE — PROGRESS NOTES
CARDIOLOGY PROGRESS NOTE                                                  Name:  Marilu Finch /Age/Sex: 1950  (71 y.o. male)   MRN & CSN:  [id number] & [id number] Admission Date/Time: 9/15/2022 10:07 AM   Location:  -A PCP: ANKITA Escobedo CNP         Admit Date:  9/15/2022  Hospital Day: 5      SUBJECTIVE:   Seen patient as follow up as consultation for CAD/AFIB    No chest pain. No shortness of breath  No palpations    TELEMETRY: AFIB (bradycardia overnight while asleep)      Intake/Output Summary (Last 24 hours) at 2022 1112  Last data filed at 2022 0032  Gross per 24 hour   Intake 10 ml   Output 350 ml   Net -340 ml         Assessment/Plan:     Multivessel coronary artery disease: Anticipating CABG surgery next week    Overnight bradycardia while asleep, A. fib with slow ventricular response, occasional PVCs and PACs, likely secondary to underlying sleep apnea. Start patient on CPAP. History of permanent atrial fibrillation: Rate controlled. Oral anticoagulation on hold due to surgery. We will plan for MILTON today  morning to assess for atrial appendage, rule out thrombus (requested by CVT surgery for likely ligation)    Mild wheezing on examination: Normotensive, heart rate under control. No suspicion for PE    Essential hypertension: Blood pressure controlled.   Continue with hydralazine Norvasc Coreg    Hyperlipidemia: Continue with Lipitor 40 daily     Case was discussed with Dr. Chato Mancera and nursing staff     Past medical history:    has a past medical history of Allergic rhinitis, Anticoagulated on Coumadin, Anxiety, Arthritis, Atrial fibrillation ([address] CAD (coronary artery disease), COPD (chronic obstructive pulmonary disease) ([address] Depression, Diabetes mellitus ([address] Dupuytren's contracture of hand, Family history of cardiovascular disease, GERD (gastroesophageal reflux disease), H/O cardiac catheterization, H/O cardiac catheterization, H/O cardiovascular stress test, H/O cardiovascular stress test, H/O cardiovascular stress test, H/O cardiovascular stress test, H/O Doppler ultrasound, H/O echocardiogram, H/O echocardiogram, H/O echocardiogram, H/O hiatal hernia, Hx of Venous Doppler, Hyperlipidemia, Hypertension, Obesity, S/P PTCA (percutaneous transluminal coronary angioplasty), Sleep apnea, and Thyroid disease. Past surgical history:   has a past surgical history that includes Coronary angioplasty with stent (03/21/2005); Cardiac catheterization (6/12/08); Cardiac catheterization (3/07); Cardiac catheterization (3/05); Endoscopy, colon, diagnostic (2014); Colonoscopy (2011); Colonoscopy (5/18/16); Hand surgery (Right, 1997); and pr open rx ankle dislocatn+fixatn (Right, 6/3/2019). Social History:   reports that he quit smoking about 46 years ago. His smoking use included cigarettes. He has a 10.00 pack-year smoking history. He has never used smokeless tobacco. He reports that he does not currently use alcohol after a past usage of about 6.0 standard drinks per week. He reports that he does not use drugs. Family history:  family history includes Cancer in his mother; Coronary Art Dis in his father; Heart Disease in his father; No Known Problems in his brother; Rheum Arthritis in his sister, sister and another family member. OBJECTIVE:     BP (!) 195/90   Pulse 100   Temp 98.1 °F (36.7 °C) (Oral)   Resp 21   Ht 6' 2\" (1.88 m)   Wt (!) 324 lb 1.2 oz (147 kg)   SpO2 94%   BMI 41.61 kg/m²     Intake/Output Summary (Last 24 hours) at 9/19/2022 1112  Last data filed at 9/19/2022 0032  Gross per 24 hour   Intake 10 ml   Output 350 ml   Net -340 ml         Physical Exam:    Constitutional:  Well developed, Well nourished, No acute distress, Non-toxic appearance. HENT:  Normocephalic, Atraumatic, Bilateral external ears normal, Oropharynx moist, No oral exudates, Nose normal. Neck- Normal range of motion, No tenderness, Supple, No stridor.    Eyes:  EOMI, Conjunctiva normal, No discharge. Respiratory:  Normal breath sounds, No respiratory distress, mild  wheezing, No chest tenderness. ,no use of accessory muscles, diaphragm movement is normal  Cardiovascular  IRIR S1-S2 No Murmurs, added sounds. Normal rate rhythm. No rubs gallops. Carotid pulses and amplitude are normal no bruit noted. Pedal pulses normal femoral pulses normal.  No pedal edema  GI:  Bowel sounds normal, Soft, No tenderness  : No CVA tenderness. Musculoskeletal: No edema, No tenderness, No cyanosis, No clubbing. Back- No tenderness. Integument:  Warm, Dry, No erythema, No rash. Lymphatic:  No lymphadenopathy noted. Neurologic:  Alert & oriented x 3, No focal deficits noted.    Psychiatric:  Affect normal, Judgment normal, Mood normal.           MEDICATIONS:     budesonide-formoterol  2 puff Inhalation BID    hydrALAZINE  50 mg Oral 3 times per day    potassium chloride  40 mEq Oral Daily with breakfast    magnesium oxide  200 mg Oral BID    insulin lispro  0-8 Units SubCUTAneous 2 times per day    insulin glargine  50 Units SubCUTAneous Nightly    glipiZIDE  5 mg Oral Daily    polyethylene glycol  17 g Oral Daily    fluticasone  2 spray Each Nostril Daily    furosemide  40 mg Oral Nightly    [Held by provider] digoxin  125 mcg Oral Daily    cetirizine  10 mg Oral Daily    levothyroxine  50 mcg Oral Daily    DULoxetine  60 mg Oral Daily    [Held by provider] carvedilol  25 mg Oral BID    atorvastatin  40 mg Oral Nightly    allopurinol  100 mg Oral BID    sodium chloride flush  10 mL IntraVENous 2 times per day    mupirocin   Nasal BID    chlorhexidine  15 mL Mouth/Throat Once    chlorhexidine   Topical See Admin Instructions    amLODIPine  10 mg Oral Daily    insulin lispro  0-8 Units SubCUTAneous TID WC    enoxaparin  1 mg/kg SubCUTAneous BID      nitroGLYCERIN Stopped (09/18/22 1332)    sodium chloride      dextrose      sodium chloride       metoprolol, albuterol sulfate HFA, acetaminophen, nitroGLYCERIN, sodium chloride, tiZANidine, glucose, dextrose bolus **OR** dextrose bolus, glucagon (rDNA), dextrose, hydrALAZINE, sodium chloride flush, sodium chloride  No Known Allergies    Lab Data:  CBC:   Recent Labs     09/17/22  1226 09/18/22  0710 09/19/22  0547   WBC 7.6 6.8 6.8   HGB 12.1* 12.0* 12.7*   HCT 36.6* 36.5* 38.6*   .5* 103.1* 102.1*    155 176       BMP:   Recent Labs     09/17/22  1226 09/18/22  0710 09/19/22  0547    139 138   K 4.0 4.4 4.3    106 105   CO2 20* 23 22   BUN 11 12 13   CREATININE 0.9 0.8* 0.8*       LIVER PROFILE:   Recent Labs     09/17/22  1226 09/18/22  0710 09/19/22  0547   AST 15 28 17   ALT 14 13 17   BILITOT 0.9 0.7 0.5   ALKPHOS 91 88 103       PT/INR:   Recent Labs     09/17/22  1226   PROTIME 14.1   INR 1.09          Paul Jain MD, MD 9/19/2022 11:12 AM

## 2022-09-20 LAB
ALBUMIN SERPL-MCNC: 4 GM/DL (ref 3.4–5)
ALP BLD-CCNC: 98 IU/L (ref 40–129)
ALT SERPL-CCNC: 20 U/L (ref 10–40)
ANION GAP SERPL CALCULATED.3IONS-SCNC: 10 MMOL/L (ref 4–16)
AST SERPL-CCNC: 19 IU/L (ref 15–37)
BASOPHILS ABSOLUTE: 0 K/CU MM
BASOPHILS RELATIVE PERCENT: 0.4 % (ref 0–1)
BILIRUB SERPL-MCNC: 0.5 MG/DL (ref 0–1)
BUN BLDV-MCNC: 18 MG/DL (ref 6–23)
CALCIUM SERPL-MCNC: 8.3 MG/DL (ref 8.3–10.6)
CHLORIDE BLD-SCNC: 107 MMOL/L (ref 99–110)
CO2: 23 MMOL/L (ref 21–32)
CREAT SERPL-MCNC: 0.8 MG/DL (ref 0.9–1.3)
DIFFERENTIAL TYPE: ABNORMAL
EOSINOPHILS ABSOLUTE: 0.1 K/CU MM
EOSINOPHILS RELATIVE PERCENT: 0.6 % (ref 0–3)
GFR AFRICAN AMERICAN: >60 ML/MIN/1.73M2
GFR NON-AFRICAN AMERICAN: >60 ML/MIN/1.73M2
GLUCOSE BLD-MCNC: 116 MG/DL (ref 70–99)
GLUCOSE BLD-MCNC: 135 MG/DL (ref 70–99)
GLUCOSE BLD-MCNC: 155 MG/DL (ref 70–99)
GLUCOSE BLD-MCNC: 170 MG/DL (ref 70–99)
GLUCOSE BLD-MCNC: 193 MG/DL (ref 70–99)
GLUCOSE BLD-MCNC: 213 MG/DL (ref 70–99)
HCT VFR BLD CALC: 39.2 % (ref 42–52)
HEMOGLOBIN: 12.7 GM/DL (ref 13.5–18)
IMMATURE NEUTROPHIL %: 1.2 % (ref 0–0.43)
LYMPHOCYTES ABSOLUTE: 1.9 K/CU MM
LYMPHOCYTES RELATIVE PERCENT: 20.6 % (ref 24–44)
MAGNESIUM: 2.2 MG/DL (ref 1.8–2.4)
MCH RBC QN AUTO: 33.5 PG (ref 27–31)
MCHC RBC AUTO-ENTMCNC: 32.4 % (ref 32–36)
MCV RBC AUTO: 103.4 FL (ref 78–100)
MONOCYTES ABSOLUTE: 0.7 K/CU MM
MONOCYTES RELATIVE PERCENT: 7.5 % (ref 0–4)
NUCLEATED RBC %: 0 %
PDW BLD-RTO: 14.6 % (ref 11.7–14.9)
PLATELET # BLD: 201 K/CU MM (ref 140–440)
PMV BLD AUTO: 10.3 FL (ref 7.5–11.1)
POTASSIUM SERPL-SCNC: 3.8 MMOL/L (ref 3.5–5.1)
RBC # BLD: 3.79 M/CU MM (ref 4.6–6.2)
SEGMENTED NEUTROPHILS ABSOLUTE COUNT: 6.5 K/CU MM
SEGMENTED NEUTROPHILS RELATIVE PERCENT: 69.7 % (ref 36–66)
SODIUM BLD-SCNC: 140 MMOL/L (ref 135–145)
TOTAL IMMATURE NEUTOROPHIL: 0.11 K/CU MM
TOTAL NUCLEATED RBC: 0 K/CU MM
TOTAL PROTEIN: 6.7 GM/DL (ref 6.4–8.2)
WBC # BLD: 9.3 K/CU MM (ref 4–10.5)

## 2022-09-20 PROCEDURE — 94640 AIRWAY INHALATION TREATMENT: CPT

## 2022-09-20 PROCEDURE — 6370000000 HC RX 637 (ALT 250 FOR IP): Performed by: PHYSICIAN ASSISTANT

## 2022-09-20 PROCEDURE — 6370000000 HC RX 637 (ALT 250 FOR IP)

## 2022-09-20 PROCEDURE — 86901 BLOOD TYPING SEROLOGIC RH(D): CPT

## 2022-09-20 PROCEDURE — P9017 PLASMA 1 DONOR FRZ W/IN 8 HR: HCPCS

## 2022-09-20 PROCEDURE — 80053 COMPREHEN METABOLIC PANEL: CPT

## 2022-09-20 PROCEDURE — 85025 COMPLETE CBC W/AUTO DIFF WBC: CPT

## 2022-09-20 PROCEDURE — 6370000000 HC RX 637 (ALT 250 FOR IP): Performed by: INTERNAL MEDICINE

## 2022-09-20 PROCEDURE — 2000000000 HC ICU R&B

## 2022-09-20 PROCEDURE — 2580000003 HC RX 258

## 2022-09-20 PROCEDURE — 86850 RBC ANTIBODY SCREEN: CPT

## 2022-09-20 PROCEDURE — 6360000002 HC RX W HCPCS

## 2022-09-20 PROCEDURE — 94150 VITAL CAPACITY TEST: CPT

## 2022-09-20 PROCEDURE — 82962 GLUCOSE BLOOD TEST: CPT

## 2022-09-20 PROCEDURE — 83735 ASSAY OF MAGNESIUM: CPT

## 2022-09-20 PROCEDURE — 36415 COLL VENOUS BLD VENIPUNCTURE: CPT

## 2022-09-20 PROCEDURE — 99223 1ST HOSP IP/OBS HIGH 75: CPT | Performed by: INTERNAL MEDICINE

## 2022-09-20 PROCEDURE — APPNB60 APP NON BILLABLE TIME 46-60 MINS: Performed by: NURSE PRACTITIONER

## 2022-09-20 RX ORDER — NOREPINEPHRINE BIT/0.9 % NACL 16MG/250ML
2 INFUSION BOTTLE (ML) INTRAVENOUS CONTINUOUS
Status: DISCONTINUED | OUTPATIENT
Start: 2022-09-21 | End: 2022-09-21

## 2022-09-20 RX ORDER — INSULIN GLARGINE 100 [IU]/ML
15 INJECTION, SOLUTION SUBCUTANEOUS ONCE
Status: COMPLETED | OUTPATIENT
Start: 2022-09-20 | End: 2022-09-20

## 2022-09-20 RX ORDER — FENTANYL CITRATE 50 UG/ML
25 INJECTION, SOLUTION INTRAMUSCULAR; INTRAVENOUS
Status: DISCONTINUED | OUTPATIENT
Start: 2022-09-20 | End: 2022-09-22 | Stop reason: SDUPTHER

## 2022-09-20 RX ORDER — SODIUM PHOSPHATE, DIBASIC AND SODIUM PHOSPHATE, MONOBASIC 7; 19 G/133ML; G/133ML
1 ENEMA RECTAL ONCE
Status: COMPLETED | OUTPATIENT
Start: 2022-09-20 | End: 2022-09-20

## 2022-09-20 RX ORDER — ASPIRIN 81 MG/1
81 TABLET, CHEWABLE ORAL ONCE
Status: COMPLETED | OUTPATIENT
Start: 2022-09-20 | End: 2022-09-20

## 2022-09-20 RX ORDER — SODIUM CHLORIDE 9 MG/ML
INJECTION, SOLUTION INTRAVENOUS PRN
Status: DISCONTINUED | OUTPATIENT
Start: 2022-09-20 | End: 2022-09-20

## 2022-09-20 RX ORDER — CHLORHEXIDINE GLUCONATE 0.12 MG/ML
15 RINSE ORAL 2 TIMES DAILY
Status: DISCONTINUED | OUTPATIENT
Start: 2022-09-20 | End: 2022-09-22

## 2022-09-20 RX ADMIN — FUROSEMIDE 40 MG: 40 TABLET ORAL at 21:37

## 2022-09-20 RX ADMIN — HYDRALAZINE HYDROCHLORIDE 50 MG: 50 TABLET, FILM COATED ORAL at 14:37

## 2022-09-20 RX ADMIN — AMLODIPINE BESYLATE 10 MG: 10 TABLET ORAL at 08:13

## 2022-09-20 RX ADMIN — INSULIN GLARGINE 15 UNITS: 100 INJECTION, SOLUTION SUBCUTANEOUS at 22:40

## 2022-09-20 RX ADMIN — CHLORHEXIDINE GLUCONATE 0.12% ORAL RINSE 15 ML: 1.2 LIQUID ORAL at 14:36

## 2022-09-20 RX ADMIN — INSULIN LISPRO 2 UNITS: 100 INJECTION, SOLUTION INTRAVENOUS; SUBCUTANEOUS at 17:30

## 2022-09-20 RX ADMIN — FENTANYL CITRATE 25 MCG: 50 INJECTION, SOLUTION INTRAMUSCULAR; INTRAVENOUS at 15:36

## 2022-09-20 RX ADMIN — LEVOTHYROXINE SODIUM 50 MCG: 0.05 TABLET ORAL at 05:50

## 2022-09-20 RX ADMIN — POLYETHYLENE GLYCOL (3350) 17 G: 17 POWDER, FOR SOLUTION ORAL at 08:14

## 2022-09-20 RX ADMIN — CARVEDILOL 25 MG: 25 TABLET, FILM COATED ORAL at 08:14

## 2022-09-20 RX ADMIN — HYDRALAZINE HYDROCHLORIDE 50 MG: 50 TABLET, FILM COATED ORAL at 21:37

## 2022-09-20 RX ADMIN — Medication: at 21:37

## 2022-09-20 RX ADMIN — ACETAMINOPHEN 1000 MG: 500 TABLET ORAL at 06:47

## 2022-09-20 RX ADMIN — Medication 200 MG: at 08:13

## 2022-09-20 RX ADMIN — SODIUM CHLORIDE, PRESERVATIVE FREE 10 ML: 5 INJECTION INTRAVENOUS at 08:14

## 2022-09-20 RX ADMIN — ASPIRIN 81 MG CHEWABLE TABLET 81 MG: 81 TABLET CHEWABLE at 16:32

## 2022-09-20 RX ADMIN — GLIPIZIDE 5 MG: 5 TABLET ORAL at 08:13

## 2022-09-20 RX ADMIN — ALLOPURINOL 100 MG: 100 TABLET ORAL at 21:37

## 2022-09-20 RX ADMIN — BUDESONIDE AND FORMOTEROL FUMARATE DIHYDRATE 2 PUFF: 160; 4.5 AEROSOL RESPIRATORY (INHALATION) at 19:38

## 2022-09-20 RX ADMIN — ALLOPURINOL 100 MG: 100 TABLET ORAL at 08:13

## 2022-09-20 RX ADMIN — SODIUM CHLORIDE, PRESERVATIVE FREE 10 ML: 5 INJECTION INTRAVENOUS at 21:38

## 2022-09-20 RX ADMIN — DULOXETINE 60 MG: 30 CAPSULE, DELAYED RELEASE ORAL at 08:13

## 2022-09-20 RX ADMIN — CHLORHEXIDINE GLUCONATE 0.12% ORAL RINSE 15 ML: 1.2 LIQUID ORAL at 21:37

## 2022-09-20 RX ADMIN — SODIUM PHOSPHATE 1 ENEMA: 7; 19 ENEMA RECTAL at 18:38

## 2022-09-20 RX ADMIN — CETIRIZINE HYDROCHLORIDE 10 MG: 10 TABLET, FILM COATED ORAL at 08:14

## 2022-09-20 RX ADMIN — POTASSIUM CHLORIDE 40 MEQ: 1500 TABLET, EXTENDED RELEASE ORAL at 08:13

## 2022-09-20 RX ADMIN — ATORVASTATIN CALCIUM 40 MG: 40 TABLET, FILM COATED ORAL at 21:37

## 2022-09-20 RX ADMIN — HYDRALAZINE HYDROCHLORIDE 50 MG: 50 TABLET, FILM COATED ORAL at 05:50

## 2022-09-20 RX ADMIN — ALBUTEROL SULFATE 2 PUFF: 90 AEROSOL, METERED RESPIRATORY (INHALATION) at 07:50

## 2022-09-20 RX ADMIN — Medication: at 08:13

## 2022-09-20 RX ADMIN — Medication 200 MG: at 21:37

## 2022-09-20 RX ADMIN — ENOXAPARIN SODIUM 150 MG: 150 INJECTION SUBCUTANEOUS at 00:05

## 2022-09-20 RX ADMIN — ACETAMINOPHEN 1000 MG: 500 TABLET ORAL at 14:36

## 2022-09-20 RX ADMIN — BUDESONIDE AND FORMOTEROL FUMARATE DIHYDRATE 2 PUFF: 160; 4.5 AEROSOL RESPIRATORY (INHALATION) at 07:52

## 2022-09-20 RX ADMIN — CARVEDILOL 25 MG: 25 TABLET, FILM COATED ORAL at 21:36

## 2022-09-20 ASSESSMENT — PAIN DESCRIPTION - PAIN TYPE
TYPE: ACUTE PAIN

## 2022-09-20 ASSESSMENT — ENCOUNTER SYMPTOMS
VOMITING: 0
ABDOMINAL PAIN: 0
SHORTNESS OF BREATH: 0
BACK PAIN: 1
COUGH: 0
DIARRHEA: 0

## 2022-09-20 ASSESSMENT — PAIN DESCRIPTION - FREQUENCY
FREQUENCY: INTERMITTENT

## 2022-09-20 ASSESSMENT — PAIN SCALES - GENERAL
PAINLEVEL_OUTOF10: 5
PAINLEVEL_OUTOF10: 9
PAINLEVEL_OUTOF10: 10
PAINLEVEL_OUTOF10: 9
PAINLEVEL_OUTOF10: 7
PAINLEVEL_OUTOF10: 10

## 2022-09-20 ASSESSMENT — PAIN DESCRIPTION - LOCATION
LOCATION: BACK

## 2022-09-20 ASSESSMENT — PAIN DESCRIPTION - ORIENTATION
ORIENTATION: LOWER
ORIENTATION: OTHER (COMMENT)
ORIENTATION: LOWER

## 2022-09-20 ASSESSMENT — PAIN - FUNCTIONAL ASSESSMENT
PAIN_FUNCTIONAL_ASSESSMENT: ACTIVITIES ARE NOT PREVENTED
PAIN_FUNCTIONAL_ASSESSMENT: ACTIVITIES ARE NOT PREVENTED

## 2022-09-20 ASSESSMENT — PAIN DESCRIPTION - ONSET
ONSET: PROGRESSIVE
ONSET: ON-GOING
ONSET: ON-GOING

## 2022-09-20 ASSESSMENT — PAIN DESCRIPTION - DESCRIPTORS
DESCRIPTORS: SHARP
DESCRIPTORS: SPASM
DESCRIPTORS: SHARP

## 2022-09-20 NOTE — PROGRESS NOTES
Progress Note( Dr. Luis Enrique Powell)  9/20/2022  Subjective:   Admit Date: 9/15/2022  PCP: ANKITA Grande CNP    Admitted For :  Chest pain has history of CAD had cardiac cath three-vessel disease  For CABG 9/19/2022    Consulted For: Better control of blood glucose    Interval History:Better control of blood glucose  Patient is surgery is postponed till Wednesday as planning to do a MILTON to assess the left atrial functions  Had MILTON 9/19/2022 the results no major problem with atrium  Had an episode of bradycardia yesterday    Denies any chest pains,   Mid SOB . Denies nausea or vomiting. No new bowel or bladder symptoms. Intake/Output Summary (Last 24 hours) at 9/20/2022 0852  Last data filed at 9/20/2022 0600  Gross per 24 hour   Intake 300 ml   Output 375 ml   Net -75 ml         DATA    CBC:   Recent Labs     09/17/22  1226 09/18/22  0710 09/19/22  0547   WBC 7.6 6.8 6.8   HGB 12.1* 12.0* 12.7*    155 176      CMP:  Recent Labs     09/18/22  0710 09/19/22  0547 09/20/22  0703    138 140   K 4.4 4.3 3.8    105 107   CO2 23 22 23   BUN 12 13 18   CREATININE 0.8* 0.8* 0.8*   CALCIUM 8.4 8.9 8.3   PROT 6.5 6.8 6.7   LABALBU 3.6 4.0 4.0   BILITOT 0.7 0.5 0.5   ALKPHOS 88 103 98   AST 28 17 19   ALT 13 17 20       Lipids:   Lab Results   Component Value Date/Time    CHOL 152 04/14/2021 10:02 AM    CHOL 139 07/23/2018 08:01 AM    HDL 43 08/26/2022 10:39 AM    TRIG 212 04/14/2021 10:02 AM     Glucose:  Recent Labs     09/19/22  2132 09/20/22  0206 09/20/22  0750   POCGLU 248* 193* 116*       [id number]:  Lab Results   Component Value Date/Time    LABA1C 7.5 09/15/2022 12:55 PM     High Sensitivity TSH:   Lab Results   Component Value Date/Time    TSHHS 3.930 09/17/2022 12:26 PM     Free T3: No results found for: FT3  Free T4:  Lab Results   Component Value Date/Time    T4FREE 1.14 09/17/2022 12:26 PM       CTA PULMONARY W CONTRAST   Final Result   1. No acute pulmonary embolism.    2. Main SubCUTAneous Nightly    glipiZIDE  5 mg Oral Daily    polyethylene glycol  17 g Oral Daily    fluticasone  2 spray Each Nostril Daily    furosemide  40 mg Oral Nightly    [Held by provider] digoxin  125 mcg Oral Daily    cetirizine  10 mg Oral Daily    levothyroxine  50 mcg Oral Daily    DULoxetine  60 mg Oral Daily    carvedilol  25 mg Oral BID    atorvastatin  40 mg Oral Nightly    allopurinol  100 mg Oral BID    sodium chloride flush  10 mL IntraVENous 2 times per day    mupirocin   Nasal BID    chlorhexidine   Topical See Admin Instructions    amLODIPine  10 mg Oral Daily    insulin lispro  0-8 Units SubCUTAneous TID     enoxaparin  1 mg/kg SubCUTAneous BID      Infusions:    nitroGLYCERIN Stopped (09/18/22 1332)    sodium chloride      dextrose      sodium chloride           Objective:   Vitals: /87   Pulse 74   Temp 97.8 °F (36.6 °C)   Resp 23   Ht 6' 2\" (1.88 m)   Wt (!) 327 lb 0.8 oz (148.3 kg)   SpO2 96%   BMI 41.99 kg/m²   General appearance: alert and cooperative with exam  Neck: no JVD or bruit  Thyroid : Normal lobes   Lungs: Has Vesicular Breath sounds   Heart: Atrial fibrillation  Abdomen: soft, non-tender; bowel sounds normal; no masses,  no organomegaly  Musculoskeletal: Normal  Extremities: extremities normal, , no edema  Neurologic:  Awake, alert, oriented to name, place and time. Cranial nerves II-XII are grossly intact. Motor is  intact. Sensory neuropathy. ,  and gait is normal.    Assessment:     Patient Active Problem List:     Atrial fibrillation (HCC)     CAD (coronary artery disease)     Morbid obesity (HCC)     COPD (chronic obstructive pulmonary disease) (HCC)     Sleep apnea     Type 2 diabetes mellitus (HCC)     Thyroid disease     Hyperlipidemia     Congestive heart failure, unspecified HF chronicity, unspecified heart failure type ([facility]     Coronary artery disease involving native coronary artery of native heart with angina pectoris (HCC)     Chronic renal disease, stage III [facility]) [[id number]]     Hypertension     CAD, multiple vessel disease      Plan:     Reviewed POC blood glucose . Labs and X ray results   Reviewed Current Medicines   On Correction bolus Humalog/ Basal Lantus Insulin regime / and Oral Hypoglycemic drugs   Monitor Blood glucose frequently   Modified  the dose of Insulin/ other medicines as needed   Still scheduled for surgery tomorrow  Will follow     .      Yen Green MD, MD

## 2022-09-20 NOTE — PROGRESS NOTES
Cardiothoracic/Vascular Surgery Note    Name:  Jean Bianchi /Age/Sex: 1950  (40 y.o. male)   MRN & CSN:  [id number] & [id number] Admission Date/Time: 9/15/2022 10:07 AM   Location:  -A PCP: Rosio Vee, [address] [facility] Day: 6    CT Surgery Consulted for: Multivessel coronary artery disease    Subjective:  Africa Sandhu is a 71 y. o.year old male    Patient is resting in bed with brother at bedside this afternoon. Patient has been complaining of back pain throughout the day. However he is not complaining of any chest pain or shortness of breath at this time. Patient to undergo CABG with possible maze procedure tomorrow. The surgeries risk, benefits and details were explained thoroughly to the patient. All questions were answered. Patient is agreeable to undergoing surgery. Consent was obtained. Objective: Temperature:  Current - Temp: 97.6 °F (36.4 °C); Max - Temp  Av.7 °F (36.5 °C)  Min: 97.3 °F (36.3 °C)  Max: 98.1 °F (36.7 °C)    Respiratory Rate : Resp  Av.5  Min: 16  Max: 26    Pulse Range: Pulse  Av.8  Min: 59  Max: 88    Blood Presuure Range:  Systolic (05IPS), FAS:898 , Min:111 , NYT:001   ; Diastolic (60MGN), PFE:53, Min:52, Max:97      Pulse ox Range: SpO2  Av.9 %  Min: 94 %  Max: 100 %    24hr I & O:    Intake/Output Summary (Last 24 hours) at 2022 1608  Last data filed at 2022 0600  Gross per 24 hour   Intake 300 ml   Output 375 ml   Net -75 ml           BP (!) 160/89   Pulse 80   Temp 97.6 °F (36.4 °C) (Oral)   Resp 16   Ht 6' 2\" (1.88 m)   Wt (!) 327 lb 0.8 oz (148.3 kg)   SpO2 98%   BMI 41.99 kg/m²           Review of Systems:   Review of Systems   Constitutional:  Negative for diaphoresis, fatigue, fever and unexpected weight change. HENT: Negative. Eyes:  Negative for visual disturbance. Respiratory:  Negative for cough and shortness of breath.     Cardiovascular:  Negative for chest pain, palpitations and leg swelling. Gastrointestinal:  Negative for abdominal pain, diarrhea and vomiting. Endocrine: Negative for cold intolerance and heat intolerance. Musculoskeletal:  Positive for back pain. Skin:  Negative for pallor and rash. Neurological:  Negative for dizziness, syncope, light-headedness and headaches. Hematological:  Does not bruise/bleed easily. Psychiatric/Behavioral:  Negative for dysphoric mood. The patient is not nervous/anxious. has a past medical history of Allergic rhinitis, Anticoagulated on Coumadin, Anxiety, Arthritis, Atrial fibrillation (HCC), CAD (coronary artery disease), COPD (chronic obstructive pulmonary disease) ([facility] [address] [address] Depression, Diabetes mellitus ([facility] [address] Dupuytren's contracture of hand, Family history of cardiovascular disease, GERD (gastroesophageal reflux disease), H/O cardiac catheterization, H/O cardiac catheterization, H/O cardiovascular stress test, H/O cardiovascular stress test, H/O cardiovascular stress test, H/O cardiovascular stress test, H/O Doppler ultrasound, H/O echocardiogram, H/O echocardiogram, H/O echocardiogram, H/O hiatal hernia, Hx of Venous Doppler, Hyperlipidemia, Hypertension, Obesity, S/P PTCA (percutaneous transluminal coronary angioplasty), Sleep apnea, and Thyroid disease. has a past surgical history that includes Coronary angioplasty with stent (03/21/2005); Cardiac catheterization (6/12/08); Cardiac catheterization (3/07); Cardiac catheterization (3/05); Endoscopy, colon, diagnostic (2014); Colonoscopy (2011); Colonoscopy (5/18/16); Hand surgery (Right, 1997); and pr open rx ankle dislocatn+fixatn (Right, 6/3/2019). Physical Exam  Constitutional:       General: He is not in acute distress. Appearance: He is obese. He is not diaphoretic. HENT:      Head: Normocephalic and atraumatic.       Right Ear: External ear normal.      Left Ear: External ear normal.      Nose: Nose normal.      Mouth/Throat:      Mouth: Mucous membranes are moist.   Eyes:      Extraocular Movements: Extraocular movements intact. Comments: Pupils equal and round   Neck:      Vascular: No carotid bruit. Cardiovascular:      Rate and Rhythm: Normal rate. Rhythm irregularly irregular. Pulses: Normal pulses. Heart sounds: S1 normal and S2 normal. No murmur heard. No friction rub. No gallop. Comments: Patient showing signs of both bradycardia and tachycardia  Pulmonary:      Effort: Pulmonary effort is normal. No respiratory distress. Breath sounds: Normal breath sounds. Comments: Mildly decreased breath sounds  Abdominal:      Palpations: Abdomen is soft. Tenderness: There is no abdominal tenderness. Musculoskeletal:      Right lower leg: Edema present. Left lower leg: Edema present. Skin:     General: Skin is warm and dry. Capillary Refill: Capillary refill takes less than 2 seconds. Findings: No rash. Comments: Skin turgor brisk  Chronic venous stasis changes of bilateral lower extremities. Neurological:      Mental Status: He is alert and oriented to person, place, and time. Mental status is at baseline. Psychiatric:         Behavior: Behavior is cooperative. Thought Content:  Thought content normal.         Judgment: Judgment normal.        Medications:    chlorhexidine  15 mL Mouth/Throat BID    fleet  1 enema Rectal Once    budesonide-formoterol  2 puff Inhalation BID    hydrALAZINE  50 mg Oral 3 times per day    potassium chloride  40 mEq Oral Daily with breakfast    magnesium oxide  200 mg Oral BID    insulin lispro  0-8 Units SubCUTAneous 2 times per day    insulin glargine  50 Units SubCUTAneous Nightly    glipiZIDE  5 mg Oral Daily    fluticasone  2 spray Each Nostril Daily    furosemide  40 mg Oral Nightly    [Held by provider] digoxin  125 mcg Oral Daily    cetirizine  10 mg Oral Daily    levothyroxine  50 mcg Oral Daily    DULoxetine  60 mg Oral Daily    carvedilol  25 mg Oral BID atorvastatin  40 mg Oral Nightly    allopurinol  100 mg Oral BID    sodium chloride flush  10 mL IntraVENous 2 times per day    mupirocin   Nasal BID    chlorhexidine   Topical See Admin Instructions    amLODIPine  10 mg Oral Daily    insulin lispro  0-8 Units SubCUTAneous TID WC    enoxaparin  1 mg/kg SubCUTAneous BID      nitroGLYCERIN Stopped (09/18/22 1332)    dextrose      sodium chloride       fentanNYL, metoprolol, albuterol sulfate HFA, acetaminophen, nitroGLYCERIN, tiZANidine, glucose, dextrose bolus **OR** dextrose bolus, glucagon (rDNA), dextrose, hydrALAZINE, sodium chloride flush, sodium chloride    Lab Data:  CBC:   Recent Labs     09/18/22  0710 09/19/22  0547 09/20/22  0703   WBC 6.8 6.8 9.3   HGB 12.0* 12.7* 12.7*   HCT 36.5* 38.6* 39.2*   .1* 102.1* 103.4*    176 201       BMP:   Recent Labs     09/18/22  0710 09/19/22  0547 09/20/22  0703    138 140   K 4.4 4.3 3.8    105 107   CO2 23 22 23   BUN 12 13 18   CREATININE 0.8* 0.8* 0.8*       LIVER PROFILE:   Recent Labs     09/18/22  0710 09/19/22  0547 09/20/22  0703   AST 28 17 19   ALT 13 17 20   BILITOT 0.7 0.5 0.5   ALKPHOS 88 103 98       PT/INR:   No results for input(s): PROTIME, INR in the last 72 hours. APTT: No results for input(s): APTT in the last 72 hours. BNP:  No results for input(s): BNP in the last 72 hours. TROPONIN:   No results for input(s): TROPONINT in the last 72 hours.     Labs, consult, tests reviewed          ASSESSMENT/ PLAN:  Multivessel coronary artery disease with history of PCI in 2005.   -Plan for CABG tomorrow around 11 AM.  -Patient currently on Coreg, lipitor 40 mg and aspirin  -Goal potassium of 4.1-4.9, goal magnesium of 2.0-2.4, supplements as needed.  -Patient to receive Ancef as prophylactic antibiotic  -Patient to receive fleet enema tonight, Hibiclens bath, peridex mouthwash, and be NPO after midnight  Hypertension  -Blood pressure Elevated, 160/89.   -Continue Norvasc 10 mg and 50 mg hydralazine 3 times daily  Persistent Atrial fibrillation  -Currently in A. fib significant heart rate variation.    -We will hold digoxin and diltiazem at this time  Deep venous thrombosis  -Discontinue Lovenox  -No PE on CTA pulm  Insulin-dependent type 2 diabetes mellitus  -Endocrinology consulted. Venous stasis  -Chronic bilateral lower extremity skin changes.  -Bilateral lower extremity venous mapping performed  History of HFpEF  -Echo on 9/15/2022 showing EF of 50 to 55% and grade 3 diastolic dysfunction.    -Patient appears well compensated at this time  Hypothyroidism  -Patient currently on Synthroid        Patient seen and examined with Dr. Consuelo Hamilton. Management discussed with him, he is agreeable to above plan.      Valerio Elkins PA-C, 9/20/2022 4:08 PM 75

## 2022-09-20 NOTE — PROGRESS NOTES
Persistent atrial fibrillation   Multi vessel CAD    Patient appears to be in longstanding persistent atrial fibrillation according to him. Patient is rate controlled on Coreg. Patient now has multivessel coronary artery disease and is scheduled for CABG. Patient is having CABG tomorrow. It is reasonable to consider maze with left atrial appendage ligation if possible.     The chances of his conversion from atrial fibrillation may be limited especially with longstanding atrial fibrillation but it is worth an attempt especially with her left atrial size is not severely dilated as per the MILTON report    We will follow the patient post surgery    Full note to follow

## 2022-09-20 NOTE — PROGRESS NOTES
Progress Note( Dr. Travis Oakes)  9/19/2022  Subjective:   Admit Date: 9/15/2022  PCP: ANKITA Escobedo CNP    Admitted For :  Chest pain has history of CAD had cardiac cath three-vessel disease  For CABG 9/19/2022    Consulted For: Better control of blood glucose    Interval History:Better control of blood glucose  Patient is surgery is postponed till Wednesday as planning to do a MILTON to assess the left atrial functions  Going for MILTON later today    Denies any chest pains,   Mid SOB . Denies nausea or vomiting. No new bowel or bladder symptoms. Intake/Output Summary (Last 24 hours) at 9/19/2022 2024  Last data filed at 9/19/2022 0032  Gross per 24 hour   Intake 10 ml   Output 350 ml   Net -340 ml         DATA    CBC:   Recent Labs     09/17/22  1226 09/18/22  0710 09/19/22  0547   WBC 7.6 6.8 6.8   HGB 12.1* 12.0* 12.7*    155 176      CMP:  Recent Labs     09/17/22  1226 09/18/22  0710 09/19/22  0547    139 138   K 4.0 4.4 4.3    106 105   CO2 20* 23 22   BUN 11 12 13   CREATININE 0.9 0.8* 0.8*   CALCIUM 8.6 8.4 8.9   PROT 6.3* 6.5 6.8   LABALBU 3.8 3.6 4.0   BILITOT 0.9 0.7 0.5   ALKPHOS 91 88 103   AST 15 28 17   ALT 14 13 17       Lipids:   Lab Results   Component Value Date/Time    CHOL 152 04/14/2021 10:02 AM    CHOL 139 07/23/2018 08:01 AM    HDL 43 08/26/2022 10:39 AM    TRIG 212 04/14/2021 10:02 AM     Glucose:  Recent Labs     09/19/22  0910 09/19/22  1140 09/19/22  1643   POCGLU 269* 246* 374*       [id number]:  Lab Results   Component Value Date/Time    LABA1C 7.5 09/15/2022 12:55 PM     High Sensitivity TSH:   Lab Results   Component Value Date/Time    TSHHS 3.930 09/17/2022 12:26 PM     Free T3: No results found for: FT3  Free T4:  Lab Results   Component Value Date/Time    T4FREE 1.14 09/17/2022 12:26 PM       CTA PULMONARY W CONTRAST   Final Result   1. No acute pulmonary embolism.    2. Main pulmonary artery dilatation can be seen with pulmonary hypertension   but is nonspecific. 3. No acute pulmonary disease. 4. Mild-to-moderate severity calcific atherosclerosis coronary arteries. 5. Cardiomegaly. CT CHEST WO CONTRAST   Final Result   1. No acute abnormality. 2.  Coronary artery disease.  DUP LOWER EXTREMITY VENOUS BILATERAL   Final Result   1. Difficulty with limited evaluation of the mid right femoral vein, though a   focal area of intraluminal echogenicity similar to that of the previous exam   is again noted felt to represent nonocclusive DVT. 2. No deep venous thrombosis seen elsewhere in the right lower extremity. 3. No left lower extremity DVT. 4. Left calf veins were not seen, with difficulty scanning overall per the   technologist          DUP LOWER EXTREMITY MAPPING BILAT VENOUS   Final Result   Right greater saphenous vein is patent with measurements ranging from 2.1 to   5.2 mmmm as discussed above. Left greater saphenous vein is patent with measurements ranging from 2.7 to   8.5 mm as discussed above. RECOMMENDATIONS:   Unavailable         Vascular carotid duplex bilateral   Final Result   The right internal carotid artery demonstrates 0-50% stenosis. The left internal carotid artery demonstrates 0-50% stenosis. Bilateral vertebral arteries are patent with flow in the normal direction. Chest x-ray:      Cardiomegaly and congestion but no evidence of pulmonary edema or focal   infiltrates. No active of pleural disease. RECOMMENDATIONS:   Unavailable         XR CHEST PORTABLE   Final Result   No acute process.               Scheduled Medicines   Medications:    budesonide-formoterol  2 puff Inhalation BID    hydrALAZINE  50 mg Oral 3 times per day    potassium chloride  40 mEq Oral Daily with breakfast    magnesium oxide  200 mg Oral BID    insulin lispro  0-8 Units SubCUTAneous 2 times per day    insulin glargine  50 Units SubCUTAneous Nightly    glipiZIDE  5 mg Oral Daily    polyethylene glycol 17 g Oral Daily    fluticasone  2 spray Each Nostril Daily    furosemide  40 mg Oral Nightly    [Held by provider] digoxin  125 mcg Oral Daily    cetirizine  10 mg Oral Daily    levothyroxine  50 mcg Oral Daily    DULoxetine  60 mg Oral Daily    [Held by provider] carvedilol  25 mg Oral BID    atorvastatin  40 mg Oral Nightly    allopurinol  100 mg Oral BID    sodium chloride flush  10 mL IntraVENous 2 times per day    mupirocin   Nasal BID    chlorhexidine   Topical See Admin Instructions    amLODIPine  10 mg Oral Daily    insulin lispro  0-8 Units SubCUTAneous TID WC    enoxaparin  1 mg/kg SubCUTAneous BID      Infusions:    nitroGLYCERIN Stopped (09/18/22 1332)    sodium chloride      dextrose      sodium chloride           Objective:   Vitals: BP (!) 142/60   Pulse 71   Temp 97.3 °F (36.3 °C) (Oral)   Resp 23   Ht 6' 2\" (1.88 m)   Wt (!) 327 lb 0.8 oz (148.3 kg)   SpO2 98%   BMI 41.99 kg/m²   General appearance: alert and cooperative with exam  Neck: no JVD or bruit  Thyroid : Normal lobes   Lungs: Has Vesicular Breath sounds   Heart: Atrial fibrillation  Abdomen: soft, non-tender; bowel sounds normal; no masses,  no organomegaly  Musculoskeletal: Normal  Extremities: extremities normal, , no edema  Neurologic:  Awake, alert, oriented to name, place and time. Cranial nerves II-XII are grossly intact. Motor is  intact. Sensory neuropathy. ,  and gait is normal.    Assessment:     Patient Active Problem List:     Atrial fibrillation ([facility] [address]     CAD (coronary artery disease)     Morbid obesity ([facility] [address]     COPD (chronic obstructive pulmonary disease) ([facility] [address]     Sleep apnea     Type 2 diabetes mellitus ([facility] [address]     Thyroid disease     Hyperlipidemia     Congestive heart failure, unspecified HF chronicity, unspecified heart failure type ([address]     Coronary artery disease involving native coronary artery of native heart with angina pectoris ([address]     Chronic renal disease, stage III ([facility] [address] [[id number]]     Hypertension CAD, multiple vessel disease      Plan:     Reviewed POC blood glucose . Labs and X ray results   Reviewed Current Medicines   On Correction bolus Humalog/ Basal Lantus Insulin regime / and Oral Hypoglycemic drugs   Monitor Blood glucose frequently   Modified  the dose of Insulin/ other medicines as needed   Plan for MILTON later today  Will follow     .      Sly Tobin MD, MD

## 2022-09-20 NOTE — PROGRESS NOTES
pulmonary      SUBJECTIVE:  he has declined to wear cpap     OBJECTIVE    VITALS:  BP (!) 164/75   Pulse 73   Temp 97.5 °F (36.4 °C) (Oral)   Resp 22   Ht 6' 2\" (1.88 m)   Wt (!) 327 lb 0.8 oz (148.3 kg)   SpO2 94%   BMI 41.99 kg/m²   HEAD AND FACE EXAM:  No throat injection, no active exudate,no thrush  NECK EXAM;No JVD, no masses, symmetrical  CHEST EXAM; Expansion equal and symmetrical, no masses  LUNG EXAM; Good breath sounds bilaterally. There are expiratory wheezes both lungs, there are crackles at both lung bases  CARDIOVASCULAR EXAM: Positive S1 and S2, no S3 or S4, no clicks ,no murmurs  RIGHT AND LEFT LOWER EXTRIMITY EXAM: No edema, no swelling, no inflamation  CNS EXAM: Alert and oriented X3          LABS   Lab Results   Component Value Date    WBC 6.8 09/19/2022    HGB 12.7 (L) 09/19/2022    HCT 38.6 (L) 09/19/2022    .1 (H) 09/19/2022     09/19/2022     Lab Results   Component Value Date    CREATININE 0.8 (L) 09/19/2022    BUN 13 09/19/2022     09/19/2022    K 4.3 09/19/2022     09/19/2022    CO2 22 09/19/2022     Lab Results   Component Value Date    INR 1.09 09/17/2022    PROTIME 14.1 09/17/2022        No results found for: PHOS   No results for input(s): PH, PO2ART, RII2USE, HCO3, BEART, O2SAT in the last 72 hours.       Wt Readings from Last 3 Encounters:   09/19/22 (!) 327 lb 0.8 oz (148.3 kg)   09/09/22 (!) 331 lb (150.1 kg)   05/31/22 (!) 331 lb (150.1 kg)               ASSESMENT  Ac bronchospasm p[mathieu asthma  Prob abilio        [address]  Bd rx  O2 as needed    9/20/2022  Lis Zayas MD, M.D.

## 2022-09-20 NOTE — CONSULTS
Electrophysiology Consult Note      Reason for consultation:  atrial fibrillation    Chief complaint :  Shortness of breath    Referring physician: Samy Lama      Primary care physician: ANKITA Rojas - CNP      History of Present Illness:       Adwoa Lorenzo is a 23-year-old male with a history of hypertension, coronary artery disease, CKD, diabetes type 2, COPD, hypertension, hyperlipidemia, KHUSHBU, hypothyroidism, chronic anticoagulation and obesity presented with complaints of shortness of breath on exertion and chest pain that has been ongoing for the last 1 month. Patient had an abnormal stress test.  He was then scheduled for a diagnostic left heart cath when noted to have severe multivessel CAD. Patient is scheduled for CABG during this admission. Electrophysiology was consulted for management of atrial fibrillation. Patient reports that he has had atrial fibrillation for 10 years. He is on chronic anticoagulation. She reports that he will have intermittent episodes of palpitations that come on suddenly and resolve on their own. He denies lightheadedness dizziness edema or syncope. Patient is scheduled to have Maze procedure and left atrial appendage ligation in addition to CABG. EF this admission 50 to 55%. No wall motion abnormalities. Mildly dilated left atrium. Pastmedical history:   Past Medical History:   Diagnosis Date    Allergic rhinitis     Anticoagulated on Coumadin     1/6/17-**[facility] to follow pt's PT/INRs, along w/prescribing his Coumadin. **    Anxiety     Arthritis     Atrial fibrillation (HCC)     On Coumadin.     CAD (coronary artery disease)     COPD (chronic obstructive pulmonary disease) (HCC)     Depression     Diabetes mellitus (HCC)     NIDDM- dx over 10 yrs ago- follows with Dr Maggie Pinon's contracture of hand     Right hand    Family history of cardiovascular disease     Father-MI    GERD (gastroesophageal reflux disease)     H/O cardiac catheterization 03/2007    cardiac cath- stent LAD patent, Holesjit-07-34%, RCA 40-50%    H/O cardiac catheterization 06/12/2008    cardiac cath- mild disease mid RCA    H/O cardiovascular stress test 4/10/2014    cardiolite- normal, no ischemia, EF63%    H/O cardiovascular stress test 09/21/2016    EF59% normal study  pt in atrial fib    H/O cardiovascular stress test 09/20/2017    EF 60%   afib    H/O cardiovascular stress test 11/07/2018    EF60% normal study    H/O Doppler ultrasound     1/11/2010-CAROTID_Intimal thickening but no significant atherosclerotic plaque noted in LAUREN. Doppler flow velocities within the LAUREN are WNL. Intimal thickening but no significant atherosclerotic plaque noted in LICA. Doppler flow velociities within the LICA are WNL. H/O echocardiogram 04/10/2014    EF 60%, normal LV systolic function, mild mitral and tricuspid insufficiencies, no pericardial effusion.     H/O echocardiogram 09/21/2016    EF60% normal study    H/O echocardiogram 11/07/2018    EF55-60% no significant valular disease    H/O hiatal hernia     Hx of Venous Doppler 03/14/2019    Significant reflux in Left Deep System, No reflux in right lower extremity, No DVT or SVT    Hyperlipidemia     Hypertension     Obesity     S/P PTCA (percutaneous transluminal coronary angioplasty) 03/21/2005    s/p PTCA with 3.5 X 16 TAXUS stent to LAD- follows with Dr Love Carson    Sleep apnea     had sleep study done 10+ yrs ago- has cpap but does not use it    Thyroid disease     hypothyroid       Surgical history :   Past Surgical History:   Procedure Laterality Date    CARDIAC CATHETERIZATION  6/12/08    mild disease mid RCA,  stent to LAD patent,    CARDIAC CATHETERIZATION  3/07    Stent to LAD patent, Diagonal 40-50%, RCA 40-50%    CARDIAC CATHETERIZATION  3/05    COLONOSCOPY  2011    COLONOSCOPY  5/18/16    1 polyp removed, diverticulosis and hemorrhoids noted    CORONARY ANGIOPLASTY WITH STENT PLACEMENT  03/21/2005    PTCA with 3.5 x 16 TAXUS stent to LAD    ENDOSCOPY, COLON, DIAGNOSTIC  2014    egd    HAND SURGERY Right 1997    [place] OPEN RX ANKLE DISLOCATN+FIXATN Right 6/3/2019    RIGHT OPEN REDUCTION INTERNAL FIXATION OF DISTAL TIBIA  AND FIBULA performed by Beckie Muñoz MD at [facility] OR       Family history:   Family History   Problem Relation Age of Onset    Cancer Mother         breast ca    Coronary Art Dis Father          MI    Heart Disease Father     Rheum Arthritis Other     Rheum Arthritis Sister     No Known Problems Brother     Rheum Arthritis Sister            Social history :  reports that he quit smoking about 46 years ago. His smoking use included cigarettes. He has a 10.00 pack-year smoking history. He has never used smokeless tobacco. He reports that he does not currently use alcohol after a past usage of about 6.0 standard drinks per week. He reports that he does not use drugs. No Known Allergies    No current facility-administered medications on file prior to encounter. Current Outpatient Medications on File Prior to Encounter   Medication Sig Dispense Refill    enoxaparin (LOVENOX) 100 MG/ML Inject 1.5 mLs into the skin daily for 15 days 30 mL 0    metFORMIN (GLUCOPHAGE) 1000 MG tablet TAKE 1 TABLET BY MOUTH TWICE DAILY WITH MEALS 180 tablet 2    carvedilol (COREG) 25 MG tablet Take 1 tablet by mouth 2 times daily 90 tablet 3    glipiZIDE (GLUCOTROL) 5 MG tablet Take 1 tablet by mouth daily 30 tablet 3    dilTIAZem (CARDIZEM) 120 MG tablet Take 1 tablet by mouth daily 90 tablet 1    blood glucose monitor kit and supplies Dispense sufficient amount for indicated testing frequency plus additional to accommodate PRN testing needs. Dispense all needed supplies to include: monitor, strips, lancing device, lancets, control solutions, alcohol swabs. 1 kit 0    blood glucose monitor strips Test 2 times a day & as needed for symptoms of irregular blood glucose. congestion, ear pain and tinnitus. Eyes:  Negative for photophobia, pain and visual disturbance. Respiratory:  Positive for shortness of breath. Negative for cough, chest tightness and wheezing. Cardiovascular:  Positive for chest pain and palpitations. Negative for leg swelling. Gastrointestinal:  Negative for abdominal pain, blood in stool, constipation, diarrhea, nausea and vomiting. Endocrine: Negative for cold intolerance and heat intolerance. Genitourinary:  Negative for dysuria, flank pain and hematuria. Musculoskeletal:  Negative for arthralgias, back pain, myalgias and neck stiffness. Skin:  Negative for color change and rash. Allergic/Immunologic: Negative for food allergies. Neurological:  Negative for dizziness, light-headedness, numbness and headaches. Hematological:  Does not bruise/bleed easily. Psychiatric/Behavioral:  Negative for agitation, behavioral problems and confusion. Examination:      Vitals:    09/20/22 1100 09/20/22 1200 09/20/22 1300 09/20/22 1400   BP: (!) 170/66 (!) 163/89 111/78 (!) 143/73   Pulse: 87 80 86 84   Resp:  16     Temp:  97.6 °F (36.4 °C)     TempSrc:  Oral     SpO2:  98%     Weight:       Height:            Body mass index is 41.99 kg/m². Physical Exam  Constitutional:       General: He is not in acute distress. Appearance: He is not ill-appearing or diaphoretic. HENT:      Head: Normocephalic and atraumatic. Right Ear: External ear normal.      Left Ear: External ear normal.      Nose: No congestion. Mouth/Throat:      Mouth: Mucous membranes are moist.   Eyes:      Extraocular Movements: Extraocular movements intact. Conjunctiva/sclera: Conjunctivae normal.      Pupils: Pupils are equal, round, and reactive to light. Cardiovascular:      Rate and Rhythm: Normal rate. Rhythm irregular. Heart sounds: Murmur (grade 2/6 systolic murmur) heard.    Pulmonary:      Effort: Pulmonary effort is normal. No respiratory distress. Breath sounds: Normal breath sounds. No wheezing or rhonchi. Abdominal:      General: Abdomen is flat. Bowel sounds are normal. There is no distension. Palpations: Abdomen is soft. Tenderness: There is no abdominal tenderness. Musculoskeletal:         General: No tenderness. Cervical back: Normal range of motion. No tenderness. Right lower leg: No edema. Left lower leg: No edema. Skin:     General: Skin is warm. Neurological:      General: No focal deficit present. Mental Status: He is alert and oriented to person, place, and time. Cranial Nerves: No cranial nerve deficit. Psychiatric:         Mood and Affect: Mood normal.             CBC:   Lab Results   Component Value Date/Time    WBC 9.3 09/20/2022 07:03 AM    HGB 12.7 09/20/2022 07:03 AM    HCT 39.2 09/20/2022 07:03 AM     09/20/2022 07:03 AM     Lipids:  Lab Results   Component Value Date    CHOL 152 04/14/2021    TRIG 212 (H) 04/14/2021    HDL 43 08/26/2022    LDLCALC 67 08/26/2022    LDLDIRECT 94 10/18/2021     PT/INR:   Lab Results   Component Value Date/Time    INR 1.09 09/17/2022 12:26 PM        BMP:    Lab Results   Component Value Date     09/20/2022    K 3.8 09/20/2022     09/20/2022    CO2 23 09/20/2022    BUN 18 09/20/2022     CMP:   Lab Results   Component Value Date    AST 19 09/20/2022    PROT 6.7 09/20/2022    BILITOT 0.5 09/20/2022    ALKPHOS 98 09/20/2022     TSH:    Lab Results   Component Value Date/Time    TSH 1.66 04/03/2018 11:33 AM       EKGINTERPRETATION - EKG Interpretation:        IMPRESSION / RECOMMENDATIONS:     Persistent atrial fibrillation   Multi vessel CAD  COPD  DM-2  Sleep apnea  Obesity Morbid BMI ABOVE 40     Patient appears to be in longstanding persistent atrial fibrillation according to him. Patient is rate controlled on Coreg. Patient now has multivessel coronary artery disease and is scheduled for CABG.   Patient is having CABG tomorrow. It is reasonable to consider maze with left atrial appendage ligation if possible. The chances of his conversion from atrial fibrillation may be limited especially with longstanding atrial fibrillation but it is worth an attempt especially with her left atrial size is not severely dilated as per the MILTON report     We will follow the patient post surgery      Thanks again for allowing me to participate in care of this patient. Please call me if you have any questions. With best regards. Zonia Seaman MD, 9/20/2022 2:59 PM     Please note this report has been partially produced using speech recognition software and may contain errors related to that system including errors in grammar, punctuation, and spelling, as well as words and phrases that may be inappropriate. If there are any questions or concerns please feel free to contact the dictating provider for clarification.

## 2022-09-21 ENCOUNTER — ANESTHESIA (OUTPATIENT)
Dept: OPERATING ROOM | Age: 72
DRG: 233 | End: 2022-09-21
Payer: COMMERCIAL

## 2022-09-21 ENCOUNTER — ANESTHESIA EVENT (OUTPATIENT)
Dept: OPERATING ROOM | Age: 72
DRG: 233 | End: 2022-09-21
Payer: COMMERCIAL

## 2022-09-21 LAB
ALBUMIN SERPL-MCNC: 4.1 GM/DL (ref 3.4–5)
ALP BLD-CCNC: 97 IU/L (ref 40–129)
ALT SERPL-CCNC: 20 U/L (ref 10–40)
ANION GAP SERPL CALCULATED.3IONS-SCNC: 12 MMOL/L (ref 4–16)
AST SERPL-CCNC: 15 IU/L (ref 15–37)
BANDED NEUTROPHILS ABSOLUTE COUNT: 1.01 K/CU MM
BANDED NEUTROPHILS RELATIVE PERCENT: 12 % (ref 5–11)
BILIRUB SERPL-MCNC: 0.7 MG/DL (ref 0–1)
BUN BLDV-MCNC: 18 MG/DL (ref 6–23)
CALCIUM SERPL-MCNC: 8.5 MG/DL (ref 8.3–10.6)
CHLORIDE BLD-SCNC: 107 MMOL/L (ref 99–110)
CO2: 21 MMOL/L (ref 21–32)
CREAT SERPL-MCNC: 0.8 MG/DL (ref 0.9–1.3)
DIFFERENTIAL TYPE: ABNORMAL
DOHLE BODIES: PRESENT
EOSINOPHILS ABSOLUTE: 0.1 K/CU MM
EOSINOPHILS RELATIVE PERCENT: 1 % (ref 0–3)
GFR AFRICAN AMERICAN: >60 ML/MIN/1.73M2
GFR NON-AFRICAN AMERICAN: >60 ML/MIN/1.73M2
GLUCOSE BLD-MCNC: 165 MG/DL (ref 70–99)
GLUCOSE BLD-MCNC: 195 MG/DL (ref 70–99)
GLUCOSE BLD-MCNC: 199 MG/DL (ref 70–99)
GLUCOSE BLD-MCNC: 217 MG/DL (ref 70–99)
GLUCOSE BLD-MCNC: 220 MG/DL (ref 70–99)
HCT VFR BLD CALC: 38.8 % (ref 42–52)
HEMOGLOBIN: 12.4 GM/DL (ref 13.5–18)
LYMPHOCYTES ABSOLUTE: 1.8 K/CU MM
LYMPHOCYTES RELATIVE PERCENT: 21 % (ref 24–44)
MAGNESIUM: 2.3 MG/DL (ref 1.8–2.4)
MCH RBC QN AUTO: 33.7 PG (ref 27–31)
MCHC RBC AUTO-ENTMCNC: 32 % (ref 32–36)
MCV RBC AUTO: 105.4 FL (ref 78–100)
METAMYELOCYTES ABSOLUTE COUNT: 0.08 K/CU MM
METAMYELOCYTES PERCENT: 1 %
MONOCYTES ABSOLUTE: 0.9 K/CU MM
MONOCYTES RELATIVE PERCENT: 11 % (ref 0–4)
PDW BLD-RTO: 14.8 % (ref 11.7–14.9)
PLATELET # BLD: 194 K/CU MM (ref 140–440)
PMV BLD AUTO: 10.1 FL (ref 7.5–11.1)
POTASSIUM SERPL-SCNC: 4.1 MMOL/L (ref 3.5–5.1)
RBC # BLD: 3.68 M/CU MM (ref 4.6–6.2)
RBC # BLD: ABNORMAL 10*6/UL
SEGMENTED NEUTROPHILS ABSOLUTE COUNT: 4.5 K/CU MM
SEGMENTED NEUTROPHILS RELATIVE PERCENT: 54 % (ref 36–66)
SODIUM BLD-SCNC: 140 MMOL/L (ref 135–145)
TOTAL PROTEIN: 6.7 GM/DL (ref 6.4–8.2)
WBC # BLD: 8.4 K/CU MM (ref 4–10.5)

## 2022-09-21 PROCEDURE — 6370000000 HC RX 637 (ALT 250 FOR IP): Performed by: INTERNAL MEDICINE

## 2022-09-21 PROCEDURE — 82962 GLUCOSE BLOOD TEST: CPT

## 2022-09-21 PROCEDURE — 83735 ASSAY OF MAGNESIUM: CPT

## 2022-09-21 PROCEDURE — 6370000000 HC RX 637 (ALT 250 FOR IP): Performed by: PHYSICIAN ASSISTANT

## 2022-09-21 PROCEDURE — 6370000000 HC RX 637 (ALT 250 FOR IP)

## 2022-09-21 PROCEDURE — 80053 COMPREHEN METABOLIC PANEL: CPT

## 2022-09-21 PROCEDURE — 94640 AIRWAY INHALATION TREATMENT: CPT

## 2022-09-21 PROCEDURE — 99233 SBSQ HOSP IP/OBS HIGH 50: CPT | Performed by: PHYSICIAN ASSISTANT

## 2022-09-21 PROCEDURE — 85007 BL SMEAR W/DIFF WBC COUNT: CPT

## 2022-09-21 PROCEDURE — 2000000000 HC ICU R&B

## 2022-09-21 PROCEDURE — 85027 COMPLETE CBC AUTOMATED: CPT

## 2022-09-21 PROCEDURE — 2580000003 HC RX 258

## 2022-09-21 PROCEDURE — 36415 COLL VENOUS BLD VENIPUNCTURE: CPT

## 2022-09-21 RX ORDER — ACETAMINOPHEN 500 MG
1000 TABLET ORAL EVERY 6 HOURS PRN
Status: DISCONTINUED | OUTPATIENT
Start: 2022-09-21 | End: 2022-09-22

## 2022-09-21 RX ORDER — SODIUM PHOSPHATE, DIBASIC AND SODIUM PHOSPHATE, MONOBASIC 7; 19 G/133ML; G/133ML
1 ENEMA RECTAL ONCE
Status: COMPLETED | OUTPATIENT
Start: 2022-09-21 | End: 2022-09-21

## 2022-09-21 RX ORDER — NOREPINEPHRINE BIT/0.9 % NACL 16MG/250ML
2 INFUSION BOTTLE (ML) INTRAVENOUS CONTINUOUS
Status: DISCONTINUED | OUTPATIENT
Start: 2022-09-22 | End: 2022-09-22 | Stop reason: SDUPTHER

## 2022-09-21 RX ADMIN — Medication: at 12:27

## 2022-09-21 RX ADMIN — CARVEDILOL 25 MG: 25 TABLET, FILM COATED ORAL at 21:12

## 2022-09-21 RX ADMIN — POTASSIUM CHLORIDE 40 MEQ: 1500 TABLET, EXTENDED RELEASE ORAL at 12:10

## 2022-09-21 RX ADMIN — BUDESONIDE AND FORMOTEROL FUMARATE DIHYDRATE 2 PUFF: 160; 4.5 AEROSOL RESPIRATORY (INHALATION) at 19:52

## 2022-09-21 RX ADMIN — LEVOTHYROXINE SODIUM 50 MCG: 0.05 TABLET ORAL at 12:11

## 2022-09-21 RX ADMIN — HYDRALAZINE HYDROCHLORIDE 50 MG: 50 TABLET, FILM COATED ORAL at 21:12

## 2022-09-21 RX ADMIN — SODIUM PHOSPHATE, DIBASIC AND SODIUM PHOSPHATE, MONOBASIC 1 ENEMA: 7; 19 ENEMA RECTAL at 21:59

## 2022-09-21 RX ADMIN — ACETAMINOPHEN 1000 MG: 500 TABLET ORAL at 00:02

## 2022-09-21 RX ADMIN — AMLODIPINE BESYLATE 10 MG: 10 TABLET ORAL at 12:11

## 2022-09-21 RX ADMIN — ALBUTEROL SULFATE 2 PUFF: 90 AEROSOL, METERED RESPIRATORY (INHALATION) at 19:51

## 2022-09-21 RX ADMIN — DULOXETINE 60 MG: 30 CAPSULE, DELAYED RELEASE ORAL at 12:10

## 2022-09-21 RX ADMIN — ATORVASTATIN CALCIUM 40 MG: 40 TABLET, FILM COATED ORAL at 21:13

## 2022-09-21 RX ADMIN — HYDRALAZINE HYDROCHLORIDE 50 MG: 50 TABLET, FILM COATED ORAL at 15:55

## 2022-09-21 RX ADMIN — GLIPIZIDE 5 MG: 5 TABLET ORAL at 12:11

## 2022-09-21 RX ADMIN — CHLORHEXIDINE GLUCONATE 0.12% ORAL RINSE 15 ML: 1.2 LIQUID ORAL at 21:59

## 2022-09-21 RX ADMIN — Medication: at 21:12

## 2022-09-21 RX ADMIN — INSULIN LISPRO 2 UNITS: 100 INJECTION, SOLUTION INTRAVENOUS; SUBCUTANEOUS at 12:13

## 2022-09-21 RX ADMIN — Medication 200 MG: at 12:11

## 2022-09-21 RX ADMIN — ALLOPURINOL 100 MG: 100 TABLET ORAL at 12:11

## 2022-09-21 RX ADMIN — CETIRIZINE HYDROCHLORIDE 10 MG: 10 TABLET, FILM COATED ORAL at 12:12

## 2022-09-21 RX ADMIN — CARVEDILOL 25 MG: 25 TABLET, FILM COATED ORAL at 12:18

## 2022-09-21 RX ADMIN — SODIUM CHLORIDE, PRESERVATIVE FREE 10 ML: 5 INJECTION INTRAVENOUS at 21:18

## 2022-09-21 RX ADMIN — TIZANIDINE 4 MG: 4 TABLET ORAL at 00:03

## 2022-09-21 ASSESSMENT — ENCOUNTER SYMPTOMS
VOMITING: 0
ABDOMINAL PAIN: 0
COUGH: 0
SHORTNESS OF BREATH: 1
DIARRHEA: 0
BACK PAIN: 1

## 2022-09-21 ASSESSMENT — PAIN SCALES - GENERAL
PAINLEVEL_OUTOF10: 0

## 2022-09-21 NOTE — ANESTHESIA PRE PROCEDURE
Department of Anesthesiology  Preprocedure Note       Name:  Ramya Morejon   Age:  71 y.o.  :  1950                                          MRN:  [id number]         Date:  2022      Surgeon: Ashwini Aragon):  Marli Pimentel MD    Procedure: Procedure(s):  CABG CORONARY ARTERY BYPASS    Medications prior to admission:   Prior to Admission medications    Medication Sig Start Date End Date Taking? Authorizing Provider   enoxaparin (LOVENOX) 100 MG/ML Inject 1.5 mLs into the skin daily for 15 days 22  Adele Lockwood MD   metFORMIN (GLUCOPHAGE) 1000 MG tablet TAKE 1 TABLET BY MOUTH TWICE DAILY WITH MEALS 22   ANKITA Pacheco CNP   carvedilol (COREG) 25 MG tablet Take 1 tablet by mouth 2 times daily 22   ANKITA Pacheco CNP   glipiZIDE (GLUCOTROL) 5 MG tablet Take 1 tablet by mouth daily 22   ANKITA Pacheco CNP   dilTIAZem (CARDIZEM) 120 MG tablet Take 1 tablet by mouth daily 22   ANKITA Pacheco CNP   blood glucose monitor kit and supplies Dispense sufficient amount for indicated testing frequency plus additional to accommodate PRN testing needs. Dispense all needed supplies to include: monitor, strips, lancing device, lancets, control solutions, alcohol swabs. 22   ANKITA Pacheco CNP   blood glucose monitor strips Test 2 times a day & as needed for symptoms of irregular blood glucose. Dispense sufficient amount for indicated testing frequency plus additional to accommodate PRN testing needs. 22   ANKITA Pacheco CNP   Lancets MISC by D3EA route three times a day.  22   ANKITA Pacheco CNP   allopurinol (ZYLOPRIM) 100 MG tablet Take 1 tab by mouth twice daily 22   ANKITA Pacheco CNP   DULoxetine (CYMBALTA) 60 MG extended release capsule Take 1 capsule by mouth daily 7/13/22 11/15/22  ANKITA Pacheco CNP   levothyroxine (SYNTHROID) 50 MCG tablet Take 1 tablet by mouth Daily 22   Duckwater Candy, APRN - CNP   pravastatin (PRAVACHOL) 80 MG tablet TAKE 1 TABLET BY MOUTH ONCE DAILY 3/14/22   ANKITA Flor CNP   cetirizine [facility]ATA KEYS [facility] ALLERGY RELIEF, CETIRIZINE,) 10 MG tablet Take 1 tablet by mouth daily 3/2/22   ANKITA Flor CNP   gabapentin (NEURONTIN) 300 MG capsule Take 1 capsule by mouth 2 times daily for 30 days.  Intended supply: 30 days  Patient not taking: Reported on 9/15/2022 2/11/22 3/13/22  ANKITA Flor CNP   digoxin (LANOXIN) 125 MCG tablet Take 1 tablet by mouth daily 2/11/22   ANKITA Flor CNP   furosemide (LASIX) 40 MG tablet Take 1 tablet by mouth nightly 12/1/21   ANKITA Flor CNP   losartan (COZAAR) 100 MG tablet Take 1 tablet by mouth daily 8/26/21   ANKITA Flor CNP   fluticasone [facility]) 50 MCG/ACT nasal spray 2 sprays by Each Nostril route daily 4/19/21   ANKITA Flor CNP   tiZANidine (ZANAFLEX) 4 MG tablet Take 1 tablet by mouth 3 times daily as needed (shoulder pain) 4/19/21   ANKITA Flor CNP   polyethylene glycol [facility]) 17 GM/SCOOP powder Take 17 g by mouth daily 12/16/20   ANKITA Flor CNP   insulin regular (NOVOLIN R) 100 UNIT/ML injection Inject 0-10 Units into the skin 3 times daily (before meals) 12/16/20   ANKITA Flor CNP   insulin NPH (HUMULIN N;NOVOLIN N) 100 UNIT/ML injection vial Inject 80 Units into the skin nightly 12/16/20   ANKITA Flor CNP   albuterol sulfate HFA (VENTOLIN HFA) 108 (90 Base) MCG/ACT inhaler Inhale 2 puffs into the lungs every 6 hours as needed for Wheezing 12/16/20   ANKITA Flor CNP   insulin aspart (NOVOLOG) 100 UNIT/ML injection vial Inject into the skin    Historical Provider, MD       Current medications:    Current Facility-Administered Medications   Medication Dose Route Frequency Provider Last Rate Last Admin    chlorhexidine (PERIDEX) 0.12 % solution 15 mL  15 mL Mouth/Throat BID Yovany Mack PA-C   15 mL at 09/20/22 2132    fentaNYL (SUBLIMAZE) injection 25 mcg  25 mcg IntraVENous Q2H PRN Yesica Rdz PA-C   25 mcg at 09/20/22 1536    aminocaproic acid (AMICAR) 10,000 mg in sodium chloride 0.9 % 210 mL infusion  1,000 mg/hr IntraVENous On Call to [address], MD        EPINEPHrine (EPINEPHrine HCL) 5 mg in sodium chloride 0.9 % 250 mL infusion  1 mcg/min IntraVENous On Call to [address], MD        heparin (porcine) 30,000 Units in sodium chloride 0.9 % 1,000 mL (cell saver)  30,000 Units IntraVENous On Call to [id number] Gisselle Mancini MD        norepinephrine (LEVOPHED) 16 mg in sodium chloride 0.9 % 250 mL infusion  2 mcg/min IntraVENous Continuous Aniya Redmond MD        insulin regular (HUMULIN R;NOVOLIN R) 100 Units in sodium chloride 0.9 % 100 mL infusion  0.5 Units/hr IntraVENous On Call to [address], MD        methylene blue 1 % 222 mg in dextrose 5 % 50 mL bolus IVPB  1.5 mg/kg IntraVENous On Call to [address], MD        methylene blue 1 % 300 mg in dextrose 5 % 120 mL infusion  0.5 mg/kg/hr IntraVENous On Call to [address], MD        metoprolol (LOPRESSOR) injection 5 mg  5 mg IntraVENous Q5 Min PRN Aniya Redmond MD        albuterol sulfate HFA (PROVENTIL;VENTOLIN;PROAIR) 108 (90 Base) MCG/ACT inhaler 2 puff  2 puff Inhalation Q6H PRN Aniya Redmond MD   2 puff at 09/20/22 0750    budesonide-formoterol (SYMBICORT) 160-4.5 MCG/ACT inhaler 2 puff  2 puff Inhalation BID Paola Sunshine MD   2 puff at 09/20/22 1938    hydrALAZINE (APRESOLINE) tablet 50 mg  50 mg Oral 3 times per day Wesley Closs, MD   50 mg at 09/20/22 2137    potassium chloride (KLOR-CON M) extended release tablet 40 mEq  40 mEq Oral Daily with breakfast Yesica Rdz PA-C   40 mEq at 09/20/22 0813    acetaminophen (TYLENOL) tablet 1,000 mg  1,000 mg Oral Q4H PRN Yesica Rdz PA-C   1,000 mg at 09/21/22 0002    magnesium oxide (MAG-OX) tablet 200 mg  200 mg Oral BID Yesica Rdz PA-C   200 mg at 09/20/22 2137    nitroGLYCERIN 50 mg in dextrose 5% 250 mL infusion  5-200 mcg/min IntraVENous Continuous PRN Nick Joshi PA-C   Stopped at 09/18/22 1332    insulin lispro (HUMALOG) injection vial 0-8 Units  0-8 Units SubCUTAneous 2 times per day Trevon Newman MD   2 Units at 09/19/22 2141    insulin glargine (LANTUS) injection vial 50 Units  50 Units SubCUTAneous Nightly Trevon Newman MD   50 Units at 09/19/22 2143    glipiZIDE (GLUCOTROL) tablet 5 mg  5 mg Oral Daily Trevon Newman MD   5 mg at 09/20/22 0813    fluticasone (FLONASE) 50 MCG/ACT nasal spray 2 spray  2 spray Each Nostril Daily Kati Blevins MD   2 spray at 09/19/22 1407    tiZANidine (ZANAFLEX) tablet 4 mg  4 mg Oral TID PRN Kati Blevins MD   4 mg at 09/21/22 0003    furosemide (LASIX) tablet 40 mg  40 mg Oral Nightly Kati Blevins MD   40 mg at 09/20/22 2137    [Held by provider] digoxin (LANOXIN) tablet 125 mcg  125 mcg Oral Daily Kati Blevins MD        cetirizine (ZYRTEC) tablet 10 mg  10 mg Oral Daily Kati Blevins MD   10 mg at 09/20/22 0814    levothyroxine (SYNTHROID) tablet 50 mcg  50 mcg Oral Daily Kati Blevins MD   50 mcg at 09/20/22 0550    DULoxetine (CYMBALTA) extended release capsule 60 mg  60 mg Oral Daily Kati Blevins MD   60 mg at 09/20/22 0813    carvedilol (COREG) tablet 25 mg  25 mg Oral BID Nick Joshi PA-C   25 mg at 09/20/22 2136    glucose chewable tablet 16 g  4 tablet Oral PRN Kati Blevins MD        dextrose bolus 10% 125 mL  125 mL IntraVENous PRN Kati Blevins MD        Or    dextrose bolus 10% 250 mL  250 mL IntraVENous PRN Kait Blevins MD        glucagon (rDNA) injection 1 mg  1 mg SubCUTAneous PRN Kati Blevins MD        dextrose 10 % infusion   IntraVENous Continuous PRN Kati Blevins MD        hydrALAZINE (APRESOLINE) injection 10 mg  10 mg IntraVENous Q6H PRN Nick Joshi PA-C   10 mg at 09/16/22 9148    atorvastatin (LIPITOR) tablet 40 mg  40 mg Oral Nightly Nick Joshi, obstructive pulmonary disease) (HCC)     Depression     Diabetes mellitus (HCC)     NIDDM- dx over 10 yrs ago- follows with Dr Sherice Pinon's contracture of hand     Right hand    Family history of cardiovascular disease     Father-MI    GERD (gastroesophageal reflux disease)     H/O cardiac catheterization 03/2007    cardiac cath- stent LAD patent, [id number]%, RCA 40-50%    H/O cardiac catheterization 06/12/2008    cardiac cath- mild disease mid RCA    H/O cardiovascular stress test 4/10/2014    cardiolite- normal, no ischemia, EF63%    H/O cardiovascular stress test 09/21/2016    EF59% normal study  pt in atrial fib    H/O cardiovascular stress test 09/20/2017    EF 60%   afib    H/O cardiovascular stress test 11/07/2018    EF60% normal study    H/O Doppler ultrasound     1/11/2010-CAROTID_Intimal thickening but no significant atherosclerotic plaque noted in LAUREN. Doppler flow velocities within the LAUREN are WNL. Intimal thickening but no significant atherosclerotic plaque noted in LICA. Doppler flow velociities within the LICA are WNL.  H/O echocardiogram 04/10/2014    EF 60%, normal LV systolic function, mild mitral and tricuspid insufficiencies, no pericardial effusion.     H/O echocardiogram 09/21/2016    EF60% normal study    H/O echocardiogram 11/07/2018    EF55-60% no significant valular disease    H/O hiatal hernia     Hx of Venous Doppler 03/14/2019    Significant reflux in Left Deep System, No reflux in right lower extremity, No DVT or SVT    Hyperlipidemia     Hypertension     Obesity     S/P PTCA (percutaneous transluminal coronary angioplasty) 03/21/2005    s/p PTCA with 3.5 X 16 TAXUS stent to LAD- follows with Dr Talat Dimas Sleep apnea     had sleep study done 10+ yrs ago- has cpap but does not use it    Thyroid disease     hypothyroid       Past Surgical History:        Procedure Laterality Date    CARDIAC CATHETERIZATION  6/12/08    mild disease mid RCA,  stent Component Value Date/Time    WBC 9.3 09/20/2022 07:03 AM    RBC 3.79 09/20/2022 07:03 AM    HGB 12.7 09/20/2022 07:03 AM    HCT 39.2 09/20/2022 07:03 AM    .4 09/20/2022 07:03 AM    RDW 14.6 09/20/2022 07:03 AM     09/20/2022 07:03 AM       CMP:   Lab Results   Component Value Date/Time     09/20/2022 07:03 AM    K 3.8 09/20/2022 07:03 AM     09/20/2022 07:03 AM    CO2 23 09/20/2022 07:03 AM    BUN 18 09/20/2022 07:03 AM    CREATININE 0.8 09/20/2022 07:03 AM    GFRAA >60 09/20/2022 07:03 AM    AGRATIO 1.1 02/15/2022 03:35 PM    LABGLOM >60 09/20/2022 07:03 AM    GLUCOSE 135 09/20/2022 07:03 AM    PROT 6.7 09/20/2022 07:03 AM    CALCIUM 8.3 09/20/2022 07:03 AM    BILITOT 0.5 09/20/2022 07:03 AM    ALKPHOS 98 09/20/2022 07:03 AM    AST 19 09/20/2022 07:03 AM    ALT 20 09/20/2022 07:03 AM       POC Tests:   Recent Labs     09/21/22  0300   POCGLU 195*       Coags:   Lab Results   Component Value Date/Time    PROTIME 14.1 09/17/2022 12:26 PM    PROTIME 27.3 09/09/2022 03:22 PM    INR 1.09 09/17/2022 12:26 PM    APTT 33.0 12/02/2021 09:18 AM       HCG (If Applicable): No results found for: PREGTESTUR, PREGSERUM, HCG, HCGQUANT     ABGs:   Lab Results   Component Value Date/Time    PO2ART 71 09/15/2022 02:00 PM    GLH9TOC 35.0 09/15/2022 02:00 PM    IMK7YGH 24.9 09/15/2022 02:00 PM        Type & Screen (If Applicable):  No results found for: LABABO, LABRH    Drug/Infectious Status (If Applicable):  No results found for: HIV, HEPCAB    COVID-19 Screening (If Applicable):   Lab Results   Component Value Date/Time    COVID19 DETECTED 11/13/2020 12:09 PM           Anesthesia Evaluation  Patient summary reviewed and Nursing notes reviewed  Airway: Mallampati: II  TM distance: >3 FB   Neck ROM: full  Mouth opening: > = 3 FB   Dental:          Pulmonary:normal exam    (+) COPD:  sleep apnea:                            ROS comment: Former smoker   Cardiovascular:  Exercise tolerance: poor (<4 METS), (+) hypertension:, angina: at rest, CAD: obstructive, CABG/stent:, dysrhythmias: atrial fibrillation, CHF:, hyperlipidemia         Beta Blocker:  Dose within 24 Hrs      ROS comment: Echo:     Left ventricular systolic function is normal.   Essentially normal left atrium. Small left atrial appendage with smoke noted; no thrombus present. Negative bubble study; no ASD or PFO noted. Neuro/Psych:   (+) depression/anxiety             GI/Hepatic/Renal:   (+) GERD:, morbid obesity          Endo/Other:    (+) DiabetesType II DM, using insulin, hypothyroidism, blood dyscrasia: bridge therapy and anemia:., .                 Abdominal:             Vascular: negative vascular ROS. Other Findings:           Anesthesia Plan      general     ASA 4       Induction: intravenous. arterial line, BIS, central line, CVP, PA catheter and MILTON  MIPS: Postoperative opioids intended. Anesthetic plan and risks discussed with patient. Plan discussed with CRNA.     Attending anesthesiologist reviewed and agrees with Pre Eval content                Sherita Lo DO   9/21/2022

## 2022-09-21 NOTE — PROGRESS NOTES
New verbal order give all oral medications to patient today, patient will have open heart surgery 9/22/2022. Blood bank called and updated on new surgery date.

## 2022-09-21 NOTE — PROGRESS NOTES
pulmonary      SUBJECTIVE:  comfortable breathing     OBJECTIVE    VITALS:  BP (!) 133/59   Pulse (!) 40   Temp 97.2 °F (36.2 °C) (Oral)   Resp 17   Ht 6' 2\" (1.88 m)   Wt (!) 327 lb 0.8 oz (148.3 kg)   SpO2 97%   BMI 41.99 kg/m²   HEAD AND FACE EXAM:  No throat injection, no active exudate,no thrush  NECK EXAM;No JVD, no masses, symmetrical  CHEST EXAM; Expansion equal and symmetrical, no masses  LUNG EXAM; Good breath sounds bilaterally. There are expiratory wheezes both lungs, there are crackles at both lung bases  CARDIOVASCULAR EXAM: Positive S1 and S2, no S3 or S4, no clicks ,no murmurs  RIGHT AND LEFT LOWER EXTRIMITY EXAM: No edema, no swelling, no inflamation  CNS EXAM: Alert and oriented X3          LABS   Lab Results   Component Value Date    WBC 9.3 09/20/2022    HGB 12.7 (L) 09/20/2022    HCT 39.2 (L) 09/20/2022    .4 (H) 09/20/2022     09/20/2022     Lab Results   Component Value Date    CREATININE 0.8 (L) 09/20/2022    BUN 18 09/20/2022     09/20/2022    K 3.8 09/20/2022     09/20/2022    CO2 23 09/20/2022     Lab Results   Component Value Date    INR 1.09 09/17/2022    PROTIME 14.1 09/17/2022        No results found for: PHOS   No results for input(s): PH, PO2ART, YOI4RWN, HCO3, BEART, O2SAT in the last 72 hours.       Wt Readings from Last 3 Encounters:   09/19/22 (!) 327 lb 0.8 oz (148.3 kg)   09/09/22 (!) 331 lb (150.1 kg)   05/31/22 (!) 331 lb (150.1 kg)               ASSESMENT  Ac bronchospasm prob asthma  abilio        PLAN  Bd rx  Inc leida  To OR today    9/21/2022  Marely Thomas MD, M.D.

## 2022-09-21 NOTE — PROGRESS NOTES
Cardiothoracic/Vascular Surgery Note    Name:  Reno See /Age/Sex: 1950  (8 y.o. male)   MRN & CSN:  [id number] & [id number] Admission Date/Time: 9/15/2022 10:07 AM   Location:  -A PCP: Ashanti Mejia, [address] [facility] Day: 7    CT Surgery Consulted for: Multivessel coronary artery disease    Subjective:  Sheryl Markham is a 71 y. o.year old male    Patient had overnight event of HR of 22 when in deep sleep. Patient has history of KHUSHBU and has a CPAP at home but doesn't use it regularly. Patient is agreeable to use CPAP in hospital now. Patient has been complaining of back pain throughout the day. However he is not complaining of any chest pain. He is complaining of SOB on exertion that is his baseline. Patient states he had BM after fleet enema. Patient to undergo CABG with possible maze procedure tomorrow. The surgeries risk, benefits and details were explained thoroughly to the patient. All questions were answered. Patient is agreeable to undergoing surgery. Consent was obtained. Objective: Temperature:  Current - Temp: 97.8 °F (36.6 °C);  Max - Temp  Av.5 °F (36.4 °C)  Min: 97.2 °F (36.2 °C)  Max: 97.8 °F (36.6 °C)    Respiratory Rate : Resp  Av.1  Min: 14  Max: 34    Pulse Range: Pulse  Av.7  Min: 40  Max: 107    Blood Presuure Range:  Systolic (91BZG), YLV:235 , Min:113 , ZOX:430   ; Diastolic (49VQU), JIK:27, Min:53, Max:110      Pulse ox Range: SpO2  Av.6 %  Min: 94 %  Max: 97 %    24hr I & O:    Intake/Output Summary (Last 24 hours) at 2022 1530  Last data filed at 2022 1300  Gross per 24 hour   Intake 440 ml   Output 1000 ml   Net -560 ml           BP (!) 132/98   Pulse 74   Temp 97.8 °F (36.6 °C) (Oral)   Resp 20   Ht 6' 2\" (1.88 m)   Wt (!) 327 lb 0.8 oz (148.3 kg)   SpO2 96%   BMI 41.99 kg/m²           Review of Systems:   Review of Systems   Constitutional:  Negative for diaphoresis, fatigue, fever and unexpected weight change. HENT: Negative. Eyes:  Negative for visual disturbance. Respiratory:  Positive for shortness of breath (baseline with exertion). Negative for cough. Cardiovascular:  Negative for chest pain, palpitations and leg swelling. Gastrointestinal:  Negative for abdominal pain, diarrhea and vomiting. Endocrine: Negative for cold intolerance and heat intolerance. Musculoskeletal:  Positive for back pain. Skin:  Negative for pallor and rash. Neurological:  Negative for dizziness, syncope, light-headedness and headaches. Hematological:  Does not bruise/bleed easily. Psychiatric/Behavioral:  Negative for dysphoric mood. The patient is not nervous/anxious. has a past medical history of Allergic rhinitis, Anticoagulated on Coumadin, Anxiety, Arthritis, Atrial fibrillation (HCC), CAD (coronary artery disease), COPD (chronic obstructive pulmonary disease) ([address] Depression, Diabetes mellitus ([facility] [address] Dupuytren's contracture of hand, Family history of cardiovascular disease, GERD (gastroesophageal reflux disease), H/O cardiac catheterization, H/O cardiac catheterization, H/O cardiovascular stress test, H/O cardiovascular stress test, H/O cardiovascular stress test, H/O cardiovascular stress test, H/O Doppler ultrasound, H/O echocardiogram, H/O echocardiogram, H/O echocardiogram, H/O hiatal hernia, Hx of Venous Doppler, Hyperlipidemia, Hypertension, Obesity, S/P PTCA (percutaneous transluminal coronary angioplasty), Sleep apnea, and Thyroid disease. has a past surgical history that includes Coronary angioplasty with stent (03/21/2005); Cardiac catheterization (6/12/08); Cardiac catheterization (3/07); Cardiac catheterization (3/05); Endoscopy, colon, diagnostic (2014); Colonoscopy (2011); Colonoscopy (5/18/16); Hand surgery (Right, 1997); and pr open rx ankle dislocatn+fixatn (Right, 6/3/2019). Physical Exam  Constitutional:       General: He is not in acute distress.      Appearance: He is obese. He is not diaphoretic. HENT:      Head: Normocephalic and atraumatic. Right Ear: External ear normal.      Left Ear: External ear normal.      Nose: Nose normal.      Mouth/Throat:      Mouth: Mucous membranes are moist.   Eyes:      Extraocular Movements: Extraocular movements intact. Comments: Pupils equal and round   Neck:      Vascular: No carotid bruit. Cardiovascular:      Rate and Rhythm: Normal rate. Rhythm irregularly irregular. Pulses: Normal pulses. Heart sounds: S1 normal and S2 normal. No murmur heard. No friction rub. No gallop. Comments: Patient showing signs of both bradycardia and tachycardia  Pulmonary:      Effort: Pulmonary effort is normal. No respiratory distress. Breath sounds: Normal breath sounds. Comments: Mildly decreased breath sounds  Abdominal:      Palpations: Abdomen is soft. Tenderness: There is no abdominal tenderness. Musculoskeletal:      Right lower leg: Edema present. Left lower leg: Edema present. Skin:     General: Skin is warm and dry. Capillary Refill: Capillary refill takes less than 2 seconds. Findings: No rash. Comments: Skin turgor brisk  Chronic venous stasis changes of bilateral lower extremities. Neurological:      Mental Status: He is alert and oriented to person, place, and time. Mental status is at baseline. Psychiatric:         Behavior: Behavior is cooperative. Thought Content:  Thought content normal.         Judgment: Judgment normal.        Medications:    fleet  1 enema Rectal Once    chlorhexidine  15 mL Mouth/Throat BID    aminocaproic acid (AMICAR) IV infusion  1,000 mg/hr IntraVENous On Call to OR    heparin (porcine)  30,000 Units IntraVENous On Call to OR    methylene blue (PROVAYBLUE) bolus IVPB  1.5 mg/kg IntraVENous On Call to OR    methylene blue (PROVAYBLUE) infusion  0.5 mg/kg/hr IntraVENous On Call to OR    budesonide-formoterol  2 puff Inhalation BID hydrALAZINE  50 mg Oral 3 times per day    potassium chloride  40 mEq Oral Daily with breakfast    magnesium oxide  200 mg Oral BID    insulin lispro  0-8 Units SubCUTAneous 2 times per day    insulin glargine  50 Units SubCUTAneous Nightly    glipiZIDE  5 mg Oral Daily    fluticasone  2 spray Each Nostril Daily    furosemide  40 mg Oral Nightly    [Held by provider] digoxin  125 mcg Oral Daily    cetirizine  10 mg Oral Daily    levothyroxine  50 mcg Oral Daily    DULoxetine  60 mg Oral Daily    carvedilol  25 mg Oral BID    atorvastatin  40 mg Oral Nightly    allopurinol  100 mg Oral BID    sodium chloride flush  10 mL IntraVENous 2 times per day    mupirocin   Nasal BID    chlorhexidine   Topical See Admin Instructions    amLODIPine  10 mg Oral Daily    insulin lispro  0-8 Units SubCUTAneous TID WC      EPINEPHrine infusion      norepinephrine      insulin (HUMAN R) non-weight based infusion      nitroGLYCERIN Stopped (09/18/22 1332)    sodium chloride       acetaminophen, fentanNYL, metoprolol, albuterol sulfate HFA, nitroGLYCERIN, glucose, glucagon (rDNA), hydrALAZINE, sodium chloride flush, sodium chloride    Lab Data:  CBC:   Recent Labs     09/19/22  0547 09/20/22  0703 09/21/22  0625   WBC 6.8 9.3 8.4   HGB 12.7* 12.7* 12.4*   HCT 38.6* 39.2* 38.8*   .1* 103.4* 105.4*    201 194       BMP:   Recent Labs     09/19/22  0547 09/20/22  0703 09/21/22  0625    140 140   K 4.3 3.8 4.1    107 107   CO2 22 23 21   BUN 13 18 18   CREATININE 0.8* 0.8* 0.8*       LIVER PROFILE:   Recent Labs     09/19/22  0547 09/20/22  0703 09/21/22  0625   AST 17 19 15   ALT 17 20 20   BILITOT 0.5 0.5 0.7   ALKPHOS 103 98 97       PT/INR:   No results for input(s): PROTIME, INR in the last 72 hours. APTT: No results for input(s): APTT in the last 72 hours. BNP:  No results for input(s): BNP in the last 72 hours. TROPONIN:   No results for input(s): TROPONINT in the last 72 hours.     Labs, consult, tests reviewed          ASSESSMENT/ PLAN:  Multivessel coronary artery disease with history of PCI in 2005.   -Plan for CABG tomorrow around 7:30 am  -Patient currently on Coreg, lipitor 40 mg and aspirin  -Goal potassium of 4.1-4.9, goal magnesium of 2.0-2.4, supplements as needed.  -Patient to receive Ancef as prophylactic antibiotic  -Patient to receive fleet enema tonight, Hibiclens bath, peridex mouthwash, and be NPO after midnight  Hypertension  -Blood pressure good today  -Continue Norvasc 10 mg and 50 mg hydralazine 3 times daily  Persistent Atrial fibrillation  -Currently in A. fib significant heart rate variation.    -We will hold digoxin and diltiazem at this time  Deep venous thrombosis  -Discontinue Lovenox  -No PE on CTA pulm  Insulin-dependent type 2 diabetes mellitus  -Endocrinology consulted. Venous stasis  -Chronic bilateral lower extremity skin changes.  -Bilateral lower extremity venous mapping performed  History of HFpEF  -Echo on 9/15/2022 showing EF of 50 to 55% and grade 3 diastolic dysfunction.    -Patient appears well compensated at this time  Hypothyroidism  -Patient currently on Synthroid        Patient seen and examined with Dr. Boogie Later. Management discussed with him, he is agreeable to above plan.      Stephanie Pope PA-C, 9/21/2022 3:30 PM

## 2022-09-21 NOTE — PROGRESS NOTES
Patient sleeping and heart rate dropped to 22 bpm. Updated Porter CHOUDHARY, no new orders at this time.

## 2022-09-21 NOTE — PROGRESS NOTES
Educated patient face to face for one hour on what to expect after open heart surgery. All questions answered at this time also explained how to use the IS, patient is achieving 1000, goal is 2000.

## 2022-09-21 NOTE — PROGRESS NOTES
Progress Note( Dr. Hernandez Hitchcock)  9/21/2022  Subjective:   Admit Date: 9/15/2022  PCP: ANKITA Cline CNP    Admitted For :  Chest pain has history of CAD had cardiac cath three-vessel disease  For CABG 9/19/2022    Consulted For: Better control of blood glucose    Interval History:Better control of blood glucose  Patient is surgery is postponed till Wednesday as planning to do a MILTON to assess the left atrial functions  Had MILTON 9/19/2022 the results no major problem with atrium  Had an episode of bradycardia yesterday  For CABG later in the day    Denies any chest pains,   Mid SOB . Denies nausea or vomiting. No new bowel or bladder symptoms. Intake/Output Summary (Last 24 hours) at 9/21/2022 0843  Last data filed at 9/21/2022 0600  Gross per 24 hour   Intake 200 ml   Output 1000 ml   Net -800 ml         DATA    CBC:   Recent Labs     09/19/22  0547 09/20/22  0703 09/21/22  0625   WBC 6.8 9.3 8.4   HGB 12.7* 12.7* 12.4*    201 194      CMP:  Recent Labs     09/19/22  0547 09/20/22  0703 09/21/22  0625    140 140   K 4.3 3.8 4.1    107 107   CO2 22 23 21   BUN 13 18 18   CREATININE 0.8* 0.8* 0.8*   CALCIUM 8.9 8.3 8.5   PROT 6.8 6.7 6.7   LABALBU 4.0 4.0 4.1   BILITOT 0.5 0.5 0.7   ALKPHOS 103 98 97   AST 17 19 15   ALT 17 20 20       Lipids:   Lab Results   Component Value Date/Time    CHOL 152 04/14/2021 10:02 AM    CHOL 139 07/23/2018 08:01 AM    HDL 43 08/26/2022 10:39 AM    TRIG 212 04/14/2021 10:02 AM     Glucose:  Recent Labs     09/20/22  1724 09/20/22  2130 09/21/22  0300   POCGLU 213* 170* 195*       [id number]:  Lab Results   Component Value Date/Time    LABA1C 7.5 09/15/2022 12:55 PM     High Sensitivity TSH:   Lab Results   Component Value Date/Time    TSHHS 3.930 09/17/2022 12:26 PM     Free T3: No results found for: FT3  Free T4:  Lab Results   Component Value Date/Time    T4FREE 1.14 09/17/2022 12:26 PM       CTA PULMONARY W CONTRAST   Final Result   1.   No acute pulmonary embolism. 2. Main pulmonary artery dilatation can be seen with pulmonary hypertension   but is nonspecific. 3. No acute pulmonary disease. 4. Mild-to-moderate severity calcific atherosclerosis coronary arteries. 5. Cardiomegaly. CT CHEST WO CONTRAST   Final Result   1. No acute abnormality. 2.  Coronary artery disease.  DUP LOWER EXTREMITY VENOUS BILATERAL   Final Result   1. Difficulty with limited evaluation of the mid right femoral vein, though a   focal area of intraluminal echogenicity similar to that of the previous exam   is again noted felt to represent nonocclusive DVT. 2. No deep venous thrombosis seen elsewhere in the right lower extremity. 3. No left lower extremity DVT. 4. Left calf veins were not seen, with difficulty scanning overall per the   technologist          DUP LOWER EXTREMITY MAPPING BILAT VENOUS   Final Result   Right greater saphenous vein is patent with measurements ranging from 2.1 to   5.2 mmmm as discussed above. Left greater saphenous vein is patent with measurements ranging from 2.7 to   8.5 mm as discussed above. RECOMMENDATIONS:   Unavailable         Vascular carotid duplex bilateral   Final Result   The right internal carotid artery demonstrates 0-50% stenosis. The left internal carotid artery demonstrates 0-50% stenosis. Bilateral vertebral arteries are patent with flow in the normal direction. Chest x-ray:      Cardiomegaly and congestion but no evidence of pulmonary edema or focal   infiltrates. No active of pleural disease. RECOMMENDATIONS:   Unavailable         XR CHEST PORTABLE   Final Result   No acute process.               Scheduled Medicines   Medications:    chlorhexidine  15 mL Mouth/Throat BID    aminocaproic acid (AMICAR) IV infusion  1,000 mg/hr IntraVENous On Call to OR    heparin (porcine)  30,000 Units IntraVENous On Call to OR    methylene blue (PROVAYBLUE) bolus IVPB  1.5 mg/kg IntraVENous On Call to OR    methylene blue (PROVAYBLUE) infusion  0.5 mg/kg/hr IntraVENous On Call to OR    budesonide-formoterol  2 puff Inhalation BID    hydrALAZINE  50 mg Oral 3 times per day    potassium chloride  40 mEq Oral Daily with breakfast    magnesium oxide  200 mg Oral BID    insulin lispro  0-8 Units SubCUTAneous 2 times per day    insulin glargine  50 Units SubCUTAneous Nightly    glipiZIDE  5 mg Oral Daily    fluticasone  2 spray Each Nostril Daily    furosemide  40 mg Oral Nightly    [Held by provider] digoxin  125 mcg Oral Daily    cetirizine  10 mg Oral Daily    levothyroxine  50 mcg Oral Daily    DULoxetine  60 mg Oral Daily    carvedilol  25 mg Oral BID    atorvastatin  40 mg Oral Nightly    allopurinol  100 mg Oral BID    sodium chloride flush  10 mL IntraVENous 2 times per day    mupirocin   Nasal BID    chlorhexidine   Topical See Admin Instructions    amLODIPine  10 mg Oral Daily    insulin lispro  0-8 Units SubCUTAneous TID WC      Infusions:    EPINEPHrine infusion      norepinephrine      insulin (HUMAN R) non-weight based infusion      nitroGLYCERIN Stopped (09/18/22 1332)    dextrose      sodium chloride           Objective:   Vitals: /62   Pulse 65   Temp 97.2 °F (36.2 °C) (Oral)   Resp 19   Ht 6' 2\" (1.88 m)   Wt (!) 327 lb 0.8 oz (148.3 kg)   SpO2 94%   BMI 41.99 kg/m²   General appearance: alert and cooperative with exam  Neck: no JVD or bruit  Thyroid : Normal lobes   Lungs: Has Vesicular Breath sounds   Heart: Atrial fibrillation  Abdomen: soft, non-tender; bowel sounds normal; no masses,  no organomegaly  Musculoskeletal: Normal  Extremities: extremities normal, , no edema  Neurologic:  Awake, alert, oriented to name, place and time. Cranial nerves II-XII are grossly intact. Motor is  intact. Sensory neuropathy. ,  and gait is normal.    Assessment:     Patient Active Problem List:     Atrial fibrillation ([facility]     CAD (coronary artery disease)     Morbid obesity ([facility]     COPD (chronic obstructive pulmonary disease) ([facility])     Sleep apnea     Type 2 diabetes mellitus (HCC)     Thyroid disease     Hyperlipidemia     Congestive heart failure, unspecified HF chronicity, unspecified heart failure type ([facility]     Coronary artery disease involving native coronary artery of native heart with angina pectoris ([facility])     Chronic renal disease, stage III ([facility] [[id number]]     Hypertension     CAD, multiple vessel disease      Plan:     Reviewed POC blood glucose . Labs and X ray results   Reviewed Current Medicines   On Correction bolus Humalog/ Basal Lantus Insulin regime / and Oral Hypoglycemic drugs   Monitor Blood glucose frequently   Modified  the dose of Insulin/ other medicines as needed   Still scheduled for surgery today  Will follow     .      Ashwini Torrez MD, MD

## 2022-09-21 NOTE — PROGRESS NOTES
Blood bank called and verified that patients type and screen still active. Pts thermal amplitudes done and in chart.  Patient has 2 units on hold for surgery in AM.       Balaji Cook RN

## 2022-09-22 ENCOUNTER — APPOINTMENT (OUTPATIENT)
Dept: GENERAL RADIOLOGY | Age: 72
DRG: 233 | End: 2022-09-22
Attending: INTERNAL MEDICINE
Payer: COMMERCIAL

## 2022-09-22 LAB
ALBUMIN SERPL-MCNC: 2.5 GM/DL (ref 3.4–5)
ALP BLD-CCNC: 54 IU/L (ref 40–128)
ALT SERPL-CCNC: 15 U/L (ref 10–40)
ANION GAP SERPL CALCULATED.3IONS-SCNC: 10 MMOL/L (ref 4–16)
APTT: 33.2 SECONDS (ref 25.1–37.1)
AST SERPL-CCNC: 54 IU/L (ref 15–37)
BASE EXCESS MIXED: 0.4 (ref 0–1.2)
BASE EXCESS MIXED: 0.5 (ref 0–1.2)
BASE EXCESS MIXED: 0.5 (ref 0–2)
BASE EXCESS MIXED: 0.7 (ref 0–1.2)
BASE EXCESS MIXED: 0.9 (ref 0–1.2)
BASE EXCESS MIXED: 0.9 (ref 0–1.2)
BASE EXCESS MIXED: 1 (ref 0–1.2)
BASE EXCESS MIXED: 1.2 (ref 0–1.2)
BASE EXCESS MIXED: 1.3 (ref 0–2)
BASE EXCESS MIXED: 1.6 (ref 0–1.2)
BASE EXCESS MIXED: 1.9 (ref 0–1.2)
BASE EXCESS MIXED: 2 (ref 0–1.2)
BASE EXCESS MIXED: 2.1 (ref 0–1.2)
BASE EXCESS MIXED: 2.1 (ref 0–1.2)
BASE EXCESS MIXED: 2.8 (ref 0–1.2)
BASE EXCESS MIXED: 3.2 (ref 0–1.2)
BASE EXCESS MIXED: 3.5 (ref 0–1.2)
BASE EXCESS MIXED: 3.6 (ref 0–1.2)
BASE EXCESS MIXED: 4.4 (ref 0–1.2)
BASE EXCESS MIXED: 5.1 (ref 0–1.2)
BASE EXCESS: ABNORMAL (ref 0–2)
BASE EXCESS: ABNORMAL (ref 0–2)
BASE EXCESS: ABNORMAL (ref 0–3.3)
BILIRUB SERPL-MCNC: 1 MG/DL (ref 0–1)
BUN BLDV-MCNC: 18 MG/DL (ref 6–23)
CALCIUM SERPL-MCNC: 7.2 MG/DL (ref 8.3–10.6)
CHLORIDE BLD-SCNC: 110 MMOL/L (ref 99–110)
CO2 CONTENT: 21.2 MMOL/L (ref 19–24)
CO2 CONTENT: 21.7 MMOL/L (ref 19–24)
CO2 CONTENT: 22.6 MMOL/L (ref 19–24)
CO2 CONTENT: 22.8 MMOL/L (ref 19–24)
CO2 CONTENT: 23.1 MMOL/L (ref 19–24)
CO2 CONTENT: 24.3 MMOL/L (ref 19–24)
CO2 CONTENT: 24.5 MMOL/L (ref 19–24)
CO2 CONTENT: 24.5 MMOL/L (ref 19–24)
CO2 CONTENT: 24.5 MMOL/L (ref 22–30)
CO2 CONTENT: 24.7 MMOL/L (ref 19–24)
CO2: 23 MMOL/L (ref 21–32)
CO2: 23 MMOL/L (ref 21–32)
CO2: 24 MMOL/L (ref 21–32)
CO2: 25 MMOL/L (ref 21–32)
CO2: 26 MMOL/L (ref 21–32)
CREAT SERPL-MCNC: 1 MG/DL (ref 0.9–1.3)
FIBRINOGEN LEVEL: 246 MG/DL (ref 196.9–442.1)
GFR AFRICAN AMERICAN: >60 ML/MIN/1.73M2
GFR AFRICAN AMERICAN: ABNORMAL ML/MIN/1.73M2
GFR NON-AFRICAN AMERICAN: >60 ML/MIN/1.73M2
GFR NON-AFRICAN AMERICAN: ABNORMAL ML/MIN/1.73M2
GLUCOSE BLD-MCNC: 121 MG/DL (ref 70–99)
GLUCOSE BLD-MCNC: 138 MG/DL (ref 70–99)
GLUCOSE BLD-MCNC: 138 MG/DL (ref 70–99)
GLUCOSE BLD-MCNC: 142 MG/DL (ref 70–99)
GLUCOSE BLD-MCNC: 144 MG/DL (ref 70–99)
GLUCOSE BLD-MCNC: 144 MG/DL (ref 70–99)
GLUCOSE BLD-MCNC: 151 MG/DL (ref 70–99)
GLUCOSE BLD-MCNC: 154 MG/DL (ref 70–99)
GLUCOSE BLD-MCNC: 154 MG/DL (ref 70–99)
GLUCOSE BLD-MCNC: 161 MG/DL (ref 70–99)
GLUCOSE BLD-MCNC: 162 MG/DL (ref 70–99)
GLUCOSE BLD-MCNC: 164 MG/DL (ref 70–99)
GLUCOSE BLD-MCNC: 165 MG/DL (ref 70–99)
GLUCOSE BLD-MCNC: 168 MG/DL (ref 70–99)
GLUCOSE BLD-MCNC: 175 MG/DL (ref 70–99)
GLUCOSE BLD-MCNC: 177 MG/DL (ref 70–99)
GLUCOSE BLD-MCNC: 184 MG/DL (ref 70–99)
GLUCOSE BLD-MCNC: 201 MG/DL (ref 70–99)
GLUCOSE BLD-MCNC: 207 MG/DL (ref 70–99)
GLUCOSE BLD-MCNC: 210 MG/DL (ref 70–99)
HCO3 ARTERIAL: 20.2 MMOL/L (ref 18–23)
HCO3 ARTERIAL: 20.6 MMOL/L (ref 18–23)
HCO3 ARTERIAL: 21.4 MMOL/L (ref 18–23)
HCO3 ARTERIAL: 21.5 MMOL/L (ref 18–23)
HCO3 ARTERIAL: 21.7 MMOL/L (ref 18–23)
HCO3 ARTERIAL: 22 MMOL/L (ref 18–23)
HCO3 ARTERIAL: 22.5 MMOL/L (ref 18–23)
HCO3 ARTERIAL: 23.1 MMOL/L (ref 18–23)
HCO3 ARTERIAL: 23.3 MMOL/L (ref 18–23)
HCO3 ARTERIAL: 23.3 MMOL/L (ref 22–26)
HCO3 ARTERIAL: 23.4 MMOL/L (ref 18–23)
HCO3 ARTERIAL: 23.5 MMOL/L (ref 18–23)
HCO3 ARTERIAL: 23.7 MMOL/L (ref 18–23)
HCO3 ARTERIAL: 23.8 MMOL/L (ref 18–23)
HCO3 ARTERIAL: 23.8 MMOL/L (ref 22–26)
HCO3 ARTERIAL: 23.9 MMOL/L (ref 18–23)
HCO3 ARTERIAL: 23.9 MMOL/L (ref 18–23)
HCO3 ARTERIAL: 24 MMOL/L (ref 18–23)
HCO3 ARTERIAL: 24.1 MMOL/L (ref 18–23)
HCO3 ARTERIAL: 25 MMOL/L (ref 18–23)
HCT VFR BLD CALC: 23 % (ref 42–52)
HCT VFR BLD CALC: 26 % (ref 42–52)
HCT VFR BLD CALC: 27 % (ref 42–52)
HCT VFR BLD CALC: 29 % (ref 42–52)
HCT VFR BLD CALC: 30 % (ref 42–52)
HCT VFR BLD CALC: 31 % (ref 42–52)
HCT VFR BLD CALC: 37 % (ref 42–52)
HCT VFR BLD CALC: 38 % (ref 42–52)
HEMOGLOBIN: 10 GM/DL (ref 13.5–18)
HEMOGLOBIN: 10.1 GM/DL (ref 13.5–18)
HEMOGLOBIN: 10.2 GM/DL (ref 13.5–18)
HEMOGLOBIN: 10.4 GM/DL (ref 13.5–18)
HEMOGLOBIN: 10.5 GM/DL (ref 13.5–18)
HEMOGLOBIN: 10.5 GM/DL (ref 13.5–18)
HEMOGLOBIN: 12.5 GM/DL (ref 13.5–18)
HEMOGLOBIN: 13 GM/DL (ref 13.5–18)
HEMOGLOBIN: 8 GM/DL (ref 13.5–18)
HEMOGLOBIN: 8.8 GM/DL (ref 13.5–18)
HEMOGLOBIN: 9.2 GM/DL (ref 13.5–18)
HEMOGLOBIN: 9.3 GM/DL (ref 13.5–18)
HEMOGLOBIN: 9.3 GM/DL (ref 13.5–18)
HEMOGLOBIN: 9.7 GM/DL (ref 13.5–18)
HEMOGLOBIN: 9.8 GM/DL (ref 13.5–18)
HEMOGLOBIN: 9.9 GM/DL (ref 13.5–18)
INR BLD: 1.31 INDEX
MAGNESIUM: 2.6 MG/DL (ref 1.8–2.4)
O2 SATURATION: 100 % (ref 96–97)
O2 SATURATION: 91.9 % (ref 96–97)
O2 SATURATION: 92.1 % (ref 96–97)
O2 SATURATION: 92.8 % (ref 96–97)
O2 SATURATION: 93.5 % (ref 96–97)
O2 SATURATION: 94.3 % (ref 96–97)
O2 SATURATION: 95.3 % (ref 96–97)
O2 SATURATION: 95.7 % (ref 96–97)
O2 SATURATION: 99 % (ref 96–97)
O2 SATURATION: 99.3 % (ref 96–97)
O2 SATURATION: 99.4 % (ref 96–97)
O2 SATURATION: 99.7 % (ref 94–100)
O2 SATURATION: 99.7 % (ref 96–97)
O2 SATURATION: 99.8 % (ref 94–100)
O2 SATURATION: 99.8 % (ref 96–97)
O2 SATURATION: 99.9 % (ref 96–97)
PCO2 ARTERIAL: 33.8 MMHG (ref 32–45)
PCO2 ARTERIAL: 34.1 MMHG (ref 32–45)
PCO2 ARTERIAL: 35.4 MMHG (ref 32–45)
PCO2 ARTERIAL: 36.5 MMHG (ref 32–45)
PCO2 ARTERIAL: 36.8 MMHG (ref 32–45)
PCO2 ARTERIAL: 36.9 MMHG (ref 32–45)
PCO2 ARTERIAL: 37 MMHG (ref 32–45)
PCO2 ARTERIAL: 37.1 MMHG (ref 32–45)
PCO2 ARTERIAL: 37.2 MMHG (ref 32–45)
PCO2 ARTERIAL: 37.6 MMHG (ref 32–45)
PCO2 ARTERIAL: 37.8 MMHG (ref 32–45)
PCO2 ARTERIAL: 38.5 MMHG (ref 32–45)
PCO2 ARTERIAL: 38.7 MMHG (ref 32–45)
PCO2 ARTERIAL: 39 MMHG (ref 32–45)
PCO2 ARTERIAL: 40.5 MMHG (ref 32–45)
PCO2 ARTERIAL: 42.8 MMHG (ref 32–45)
PCO2 ARTERIAL: 42.9 MMHG (ref 32–45)
PCO2 ARTERIAL: 44.4 MMHG (ref 32–45)
PCO2 ARTERIAL: 44.4 MMHG (ref 32–45)
PCO2 ARTERIAL: 45.2 MMHG (ref 32–45)
PH BLOOD: 7.29 (ref 7.34–7.45)
PH BLOOD: 7.33 (ref 7.34–7.45)
PH BLOOD: 7.34 (ref 7.34–7.45)
PH BLOOD: 7.35 (ref 7.34–7.45)
PH BLOOD: 7.36 (ref 7.34–7.45)
PH BLOOD: 7.37 (ref 7.34–7.45)
PH BLOOD: 7.37 (ref 7.34–7.45)
PH BLOOD: 7.38 (ref 7.34–7.45)
PH BLOOD: 7.39 (ref 7.34–7.45)
PH BLOOD: 7.4 (ref 7.34–7.45)
PH BLOOD: 7.41 (ref 7.34–7.45)
PH BLOOD: 7.41 (ref 7.34–7.45)
PH BLOOD: 7.42 (ref 7.34–7.45)
PH BLOOD: 7.43 (ref 7.34–7.45)
PO2 ARTERIAL: 145.4 MMHG (ref 75–100)
PO2 ARTERIAL: 155.3 MMHG (ref 75–100)
PO2 ARTERIAL: 156.2 MMHG (ref 75–100)
PO2 ARTERIAL: 206.6 MMHG (ref 80–90)
PO2 ARTERIAL: 211 MMHG (ref 75–100)
PO2 ARTERIAL: 222 MMHG (ref 80–90)
PO2 ARTERIAL: 243.6 MMHG (ref 75–100)
PO2 ARTERIAL: 255.9 MMHG (ref 75–100)
PO2 ARTERIAL: 259.3 MMHG (ref 75–100)
PO2 ARTERIAL: 270.8 MMHG (ref 75–100)
PO2 ARTERIAL: 281.8 MMHG (ref 75–100)
PO2 ARTERIAL: 292.1 MMHG (ref 75–100)
PO2 ARTERIAL: 428.5 MMHG (ref 75–100)
PO2 ARTERIAL: 64.7 MMHG (ref 75–100)
PO2 ARTERIAL: 68.9 MMHG (ref 75–100)
PO2 ARTERIAL: 70.8 MMHG (ref 75–100)
PO2 ARTERIAL: 72.3 MMHG (ref 75–100)
PO2 ARTERIAL: 72.4 MMHG (ref 75–100)
PO2 ARTERIAL: 76 MMHG (ref 75–100)
PO2 ARTERIAL: 80.4 MMHG (ref 75–100)
POC ACT PLUS: 115 SEC
POC ACT PLUS: 115 SEC
POC ACT PLUS: 129 SEC
POC ACT PLUS: 389 SEC
POC ACT PLUS: 449 SEC
POC ACT PLUS: 463 SEC
POC ACT PLUS: 464 SEC
POC ACT PLUS: 468 SEC
POC ACT PLUS: 478 SEC
POC ACT PLUS: 490 SEC
POC ACT PLUS: 493 SEC
POC ACT PLUS: 503 SEC
POC ACT PLUS: 514 SEC
POC ACT PLUS: 517 SEC
POC ACT PLUS: 520 SEC
POC CALCIUM: 1.01 MMOL/L (ref 1.12–1.32)
POC CALCIUM: 1.07 MMOL/L (ref 1.12–1.32)
POC CALCIUM: 1.07 MMOL/L (ref 1.12–1.32)
POC CALCIUM: 1.08 MMOL/L (ref 1.12–1.32)
POC CALCIUM: 1.08 MMOL/L (ref 1.12–1.32)
POC CALCIUM: 1.09 MMOL/L (ref 1.12–1.32)
POC CALCIUM: 1.09 MMOL/L (ref 1.12–1.32)
POC CALCIUM: 1.1 MMOL/L (ref 1.12–1.32)
POC CALCIUM: 1.11 MMOL/L (ref 1.12–1.32)
POC CALCIUM: 1.11 MMOL/L (ref 1.12–1.32)
POC CALCIUM: 1.12 MMOL/L (ref 1.12–1.32)
POC CALCIUM: 1.12 MMOL/L (ref 1.12–1.32)
POC CALCIUM: 1.13 MMOL/L (ref 1.12–1.32)
POC CALCIUM: 1.17 MMOL/L (ref 1.12–1.32)
POC CALCIUM: 1.18 MMOL/L (ref 1.12–1.32)
POC CALCIUM: 1.2 MMOL/L (ref 1.12–1.32)
POC CALCIUM: 1.21 MMOL/L (ref 1.12–1.32)
POC CALCIUM: 1.29 MMOL/L (ref 1.12–1.32)
POC CHLORIDE: 109 MMOL/L (ref 98–109)
POC CHLORIDE: 110 MMOL/L (ref 98–109)
POC CHLORIDE: 111 MMOL/L (ref 98–109)
POC CHLORIDE: 112 MMOL/L (ref 98–109)
POC CHLORIDE: 113 MMOL/L (ref 98–109)
POC CHLORIDE: 114 MMOL/L (ref 98–109)
POC CREATININE: 0.9 MG/DL (ref 0.9–1.3)
POC CREATININE: 0.9 MG/DL (ref 0.9–1.3)
POC CREATININE: 1 MG/DL (ref 0.9–1.3)
POC CREATININE: 1 MG/DL (ref 0.9–1.3)
POC CREATININE: 1.1 MG/DL (ref 0.9–1.3)
POC CREATININE: 1.2 MG/DL (ref 0.9–1.3)
POTASSIUM SERPL-SCNC: 3.7 MMOL/L (ref 3.5–4.5)
POTASSIUM SERPL-SCNC: 3.7 MMOL/L (ref 3.5–4.5)
POTASSIUM SERPL-SCNC: 3.8 MMOL/L (ref 3.5–4.5)
POTASSIUM SERPL-SCNC: 3.8 MMOL/L (ref 3.5–5.1)
POTASSIUM SERPL-SCNC: 3.9 MMOL/L (ref 3.5–4.5)
POTASSIUM SERPL-SCNC: 4.3 MMOL/L (ref 3.6–5.1)
POTASSIUM SERPL-SCNC: 4.4 MMOL/L (ref 3.6–5.1)
POTASSIUM SERPL-SCNC: 4.5 MMOL/L (ref 3.5–4.5)
POTASSIUM SERPL-SCNC: 4.6 MMOL/L (ref 3.5–4.5)
POTASSIUM SERPL-SCNC: 4.6 MMOL/L (ref 3.5–4.5)
POTASSIUM SERPL-SCNC: 4.8 MMOL/L (ref 3.5–4.5)
POTASSIUM SERPL-SCNC: 4.9 MMOL/L (ref 3.5–4.5)
POTASSIUM SERPL-SCNC: 5.1 MMOL/L (ref 3.5–4.5)
PROTHROMBIN TIME: 16.9 SECONDS (ref 11.7–14.5)
SODIUM BLD-SCNC: 143 MMOL/L (ref 135–145)
SODIUM BLD-SCNC: 143 MMOL/L (ref 138–146)
SODIUM BLD-SCNC: 143 MMOL/L (ref 138–146)
SODIUM BLD-SCNC: 144 MMOL/L (ref 136–145)
SODIUM BLD-SCNC: 144 MMOL/L (ref 138–146)
SODIUM BLD-SCNC: 145 MMOL/L (ref 138–146)
SODIUM BLD-SCNC: 146 MMOL/L (ref 136–145)
SODIUM BLD-SCNC: 146 MMOL/L (ref 138–146)
SODIUM BLD-SCNC: 147 MMOL/L (ref 138–146)
SODIUM BLD-SCNC: 147 MMOL/L (ref 138–146)
SODIUM BLD-SCNC: 148 MMOL/L (ref 138–146)
SOURCE, BLOOD GAS: ABNORMAL
TOTAL PROTEIN: 4.4 GM/DL (ref 6.4–8.2)

## 2022-09-22 PROCEDURE — 6370000000 HC RX 637 (ALT 250 FOR IP)

## 2022-09-22 PROCEDURE — C1729 CATH, DRAINAGE: HCPCS | Performed by: THORACIC SURGERY (CARDIOTHORACIC VASCULAR SURGERY)

## 2022-09-22 PROCEDURE — 2580000003 HC RX 258

## 2022-09-22 PROCEDURE — 82330 ASSAY OF CALCIUM: CPT

## 2022-09-22 PROCEDURE — C1894 INTRO/SHEATH, NON-LASER: HCPCS | Performed by: THORACIC SURGERY (CARDIOTHORACIC VASCULAR SURGERY)

## 2022-09-22 PROCEDURE — 6360000002 HC RX W HCPCS: Performed by: THORACIC SURGERY (CARDIOTHORACIC VASCULAR SURGERY)

## 2022-09-22 PROCEDURE — 33533 CABG ARTERIAL SINGLE: CPT | Performed by: THORACIC SURGERY (CARDIOTHORACIC VASCULAR SURGERY)

## 2022-09-22 PROCEDURE — 6360000002 HC RX W HCPCS

## 2022-09-22 PROCEDURE — P9047 ALBUMIN (HUMAN), 25%, 50ML: HCPCS

## 2022-09-22 PROCEDURE — 2500000003 HC RX 250 WO HCPCS

## 2022-09-22 PROCEDURE — 82962 GLUCOSE BLOOD TEST: CPT

## 2022-09-22 PROCEDURE — 82803 BLOOD GASES ANY COMBINATION: CPT

## 2022-09-22 PROCEDURE — 74018 RADEX ABDOMEN 1 VIEW: CPT

## 2022-09-22 PROCEDURE — 2000000000 HC ICU R&B

## 2022-09-22 PROCEDURE — 33257 ABLATE ATRIA LMTD ADD-ON: CPT | Performed by: THORACIC SURGERY (CARDIOTHORACIC VASCULAR SURGERY)

## 2022-09-22 PROCEDURE — 85379 FIBRIN DEGRADATION QUANT: CPT

## 2022-09-22 PROCEDURE — 84132 ASSAY OF SERUM POTASSIUM: CPT

## 2022-09-22 PROCEDURE — 6370000000 HC RX 637 (ALT 250 FOR IP): Performed by: INTERNAL MEDICINE

## 2022-09-22 PROCEDURE — 6370000000 HC RX 637 (ALT 250 FOR IP): Performed by: THORACIC SURGERY (CARDIOTHORACIC VASCULAR SURGERY)

## 2022-09-22 PROCEDURE — 3700000001 HC ADD 15 MINUTES (ANESTHESIA): Performed by: THORACIC SURGERY (CARDIOTHORACIC VASCULAR SURGERY)

## 2022-09-22 PROCEDURE — 85014 HEMATOCRIT: CPT

## 2022-09-22 PROCEDURE — 85347 COAGULATION TIME ACTIVATED: CPT

## 2022-09-22 PROCEDURE — C9290 INJ, BUPIVACAINE LIPOSOME: HCPCS | Performed by: THORACIC SURGERY (CARDIOTHORACIC VASCULAR SURGERY)

## 2022-09-22 PROCEDURE — 0211093 BYPASS CORONARY ARTERY, TWO ARTERIES FROM CORONARY ARTERY WITH AUTOLOGOUS VENOUS TISSUE, OPEN APPROACH: ICD-10-PCS | Performed by: THORACIC SURGERY (CARDIOTHORACIC VASCULAR SURGERY)

## 2022-09-22 PROCEDURE — P9016 RBC LEUKOCYTES REDUCED: HCPCS

## 2022-09-22 PROCEDURE — 02L70CK OCCLUSION OF LEFT ATRIAL APPENDAGE WITH EXTRALUMINAL DEVICE, OPEN APPROACH: ICD-10-PCS | Performed by: THORACIC SURGERY (CARDIOTHORACIC VASCULAR SURGERY)

## 2022-09-22 PROCEDURE — 83735 ASSAY OF MAGNESIUM: CPT

## 2022-09-22 PROCEDURE — 85018 HEMOGLOBIN: CPT

## 2022-09-22 PROCEDURE — 85730 THROMBOPLASTIN TIME PARTIAL: CPT

## 2022-09-22 PROCEDURE — 2500000003 HC RX 250 WO HCPCS: Performed by: THORACIC SURGERY (CARDIOTHORACIC VASCULAR SURGERY)

## 2022-09-22 PROCEDURE — 84295 ASSAY OF SERUM SODIUM: CPT

## 2022-09-22 PROCEDURE — 85610 PROTHROMBIN TIME: CPT

## 2022-09-22 PROCEDURE — 3600000018 HC SURGERY OHS ADDTL 15MIN: Performed by: THORACIC SURGERY (CARDIOTHORACIC VASCULAR SURGERY)

## 2022-09-22 PROCEDURE — 2720000010 HC SURG SUPPLY STERILE: Performed by: THORACIC SURGERY (CARDIOTHORACIC VASCULAR SURGERY)

## 2022-09-22 PROCEDURE — 0210099 BYPASS CORONARY ARTERY, ONE ARTERY FROM LEFT INTERNAL MAMMARY WITH AUTOLOGOUS VENOUS TISSUE, OPEN APPROACH: ICD-10-PCS | Performed by: THORACIC SURGERY (CARDIOTHORACIC VASCULAR SURGERY)

## 2022-09-22 PROCEDURE — 80053 COMPREHEN METABOLIC PANEL: CPT

## 2022-09-22 PROCEDURE — 7100000000 HC PACU RECOVERY - FIRST 15 MIN

## 2022-09-22 PROCEDURE — C1713 ANCHOR/SCREW BN/BN,TIS/BN: HCPCS | Performed by: THORACIC SURGERY (CARDIOTHORACIC VASCULAR SURGERY)

## 2022-09-22 PROCEDURE — 3700000000 HC ANESTHESIA ATTENDED CARE: Performed by: THORACIC SURGERY (CARDIOTHORACIC VASCULAR SURGERY)

## 2022-09-22 PROCEDURE — 06BQ3ZZ EXCISION OF LEFT SAPHENOUS VEIN, PERCUTANEOUS APPROACH: ICD-10-PCS | Performed by: THORACIC SURGERY (CARDIOTHORACIC VASCULAR SURGERY)

## 2022-09-22 PROCEDURE — 33508 ENDOSCOPIC VEIN HARVEST: CPT | Performed by: THORACIC SURGERY (CARDIOTHORACIC VASCULAR SURGERY)

## 2022-09-22 PROCEDURE — 85027 COMPLETE CBC AUTOMATED: CPT

## 2022-09-22 PROCEDURE — 6370000000 HC RX 637 (ALT 250 FOR IP): Performed by: PHYSICIAN ASSISTANT

## 2022-09-22 PROCEDURE — 2709999900 HC NON-CHARGEABLE SUPPLY: Performed by: THORACIC SURGERY (CARDIOTHORACIC VASCULAR SURGERY)

## 2022-09-22 PROCEDURE — C1751 CATH, INF, PER/CENT/MIDLINE: HCPCS | Performed by: THORACIC SURGERY (CARDIOTHORACIC VASCULAR SURGERY)

## 2022-09-22 PROCEDURE — 2580000003 HC RX 258: Performed by: THORACIC SURGERY (CARDIOTHORACIC VASCULAR SURGERY)

## 2022-09-22 PROCEDURE — 33518 CABG ARTERY-VEIN TWO: CPT | Performed by: THORACIC SURGERY (CARDIOTHORACIC VASCULAR SURGERY)

## 2022-09-22 PROCEDURE — 94002 VENT MGMT INPAT INIT DAY: CPT

## 2022-09-22 PROCEDURE — 94640 AIRWAY INHALATION TREATMENT: CPT

## 2022-09-22 PROCEDURE — 82565 ASSAY OF CREATININE: CPT

## 2022-09-22 PROCEDURE — 80051 ELECTROLYTE PANEL: CPT

## 2022-09-22 PROCEDURE — 3E0T3BZ INTRODUCTION OF ANESTHETIC AGENT INTO PERIPHERAL NERVES AND PLEXI, PERCUTANEOUS APPROACH: ICD-10-PCS | Performed by: THORACIC SURGERY (CARDIOTHORACIC VASCULAR SURGERY)

## 2022-09-22 PROCEDURE — 85384 FIBRINOGEN ACTIVITY: CPT

## 2022-09-22 PROCEDURE — A4217 STERILE WATER/SALINE, 500 ML: HCPCS | Performed by: THORACIC SURGERY (CARDIOTHORACIC VASCULAR SURGERY)

## 2022-09-22 PROCEDURE — 5A02110 ASSISTANCE WITH CARDIAC OUTPUT USING BALLOON PUMP, INTERMITTENT: ICD-10-PCS | Performed by: THORACIC SURGERY (CARDIOTHORACIC VASCULAR SURGERY)

## 2022-09-22 PROCEDURE — C1889 IMPLANT/INSERT DEVICE, NOC: HCPCS | Performed by: THORACIC SURGERY (CARDIOTHORACIC VASCULAR SURGERY)

## 2022-09-22 PROCEDURE — 86927 PLASMA FRESH FROZEN: CPT

## 2022-09-22 PROCEDURE — 33967 INSERT I-AORT PERCUT DEVICE: CPT | Performed by: THORACIC SURGERY (CARDIOTHORACIC VASCULAR SURGERY)

## 2022-09-22 PROCEDURE — P9017 PLASMA 1 DONOR FRZ W/IN 8 HR: HCPCS

## 2022-09-22 PROCEDURE — P9034 PLATELETS, PHERESIS: HCPCS

## 2022-09-22 PROCEDURE — 3600000008 HC SURGERY OHS BASE: Performed by: THORACIC SURGERY (CARDIOTHORACIC VASCULAR SURGERY)

## 2022-09-22 PROCEDURE — 85025 COMPLETE CBC W/AUTO DIFF WBC: CPT

## 2022-09-22 PROCEDURE — 80048 BASIC METABOLIC PNL TOTAL CA: CPT

## 2022-09-22 DEVICE — AGENT HEMSTAT 2GM ABSRB SYNTH BONE PUTTY W/ SPAT: Type: IMPLANTABLE DEVICE | Site: STERNUM | Status: FUNCTIONAL

## 2022-09-22 DEVICE — LEAD PACE CARDIO 2-0 TEMP DARK BL 1X24IN SKS-3 DBL ARM: Type: IMPLANTABLE DEVICE | Site: HEART | Status: FUNCTIONAL

## 2022-09-22 RX ORDER — GINSENG 100 MG
CAPSULE ORAL
Status: COMPLETED | OUTPATIENT
Start: 2022-09-22 | End: 2022-09-22

## 2022-09-22 RX ORDER — SODIUM CHLORIDE 0.9 % (FLUSH) 0.9 %
5-40 SYRINGE (ML) INJECTION EVERY 12 HOURS SCHEDULED
Status: DISCONTINUED | OUTPATIENT
Start: 2022-09-22 | End: 2022-09-24 | Stop reason: HOSPADM

## 2022-09-22 RX ORDER — SODIUM CHLORIDE 9 MG/ML
INJECTION, SOLUTION INTRAVENOUS PRN
Status: DISCONTINUED | OUTPATIENT
Start: 2022-09-22 | End: 2022-09-24 | Stop reason: HOSPADM

## 2022-09-22 RX ORDER — BISACODYL 10 MG
10 SUPPOSITORY, RECTAL RECTAL DAILY PRN
Status: DISCONTINUED | OUTPATIENT
Start: 2022-09-22 | End: 2022-09-24 | Stop reason: HOSPADM

## 2022-09-22 RX ORDER — DOBUTAMINE HYDROCHLORIDE 200 MG/100ML
2.5-1 INJECTION INTRAVENOUS CONTINUOUS
Status: DISCONTINUED | OUTPATIENT
Start: 2022-09-22 | End: 2022-09-24 | Stop reason: HOSPADM

## 2022-09-22 RX ORDER — ONDANSETRON 2 MG/ML
4 INJECTION INTRAMUSCULAR; INTRAVENOUS EVERY 6 HOURS PRN
Status: DISCONTINUED | OUTPATIENT
Start: 2022-09-22 | End: 2022-09-22 | Stop reason: SDUPTHER

## 2022-09-22 RX ORDER — METOPROLOL TARTRATE 5 MG/5ML
2.5 INJECTION INTRAVENOUS EVERY 10 MIN PRN
Status: DISCONTINUED | OUTPATIENT
Start: 2022-09-22 | End: 2022-09-24 | Stop reason: HOSPADM

## 2022-09-22 RX ORDER — ROCURONIUM BROMIDE 10 MG/ML
INJECTION, SOLUTION INTRAVENOUS PRN
Status: DISCONTINUED | OUTPATIENT
Start: 2022-09-22 | End: 2022-09-22 | Stop reason: SDUPTHER

## 2022-09-22 RX ORDER — POTASSIUM CHLORIDE 29.8 MG/ML
20 INJECTION INTRAVENOUS PRN
Status: DISCONTINUED | OUTPATIENT
Start: 2022-09-22 | End: 2022-09-22 | Stop reason: SDUPTHER

## 2022-09-22 RX ORDER — FUROSEMIDE 10 MG/ML
40 INJECTION INTRAMUSCULAR; INTRAVENOUS
Status: ACTIVE | OUTPATIENT
Start: 2022-09-22 | End: 2022-09-23

## 2022-09-22 RX ORDER — SODIUM CHLORIDE 0.9 % (FLUSH) 0.9 %
5-40 SYRINGE (ML) INJECTION EVERY 12 HOURS SCHEDULED
Status: DISCONTINUED | OUTPATIENT
Start: 2022-09-22 | End: 2022-09-22 | Stop reason: SDUPTHER

## 2022-09-22 RX ORDER — ALBUTEROL SULFATE 90 UG/1
2 AEROSOL, METERED RESPIRATORY (INHALATION) EVERY 6 HOURS
Status: DISPENSED | OUTPATIENT
Start: 2022-09-22 | End: 2022-09-23

## 2022-09-22 RX ORDER — AMIODARONE HYDROCHLORIDE 200 MG/1
200 TABLET ORAL 2 TIMES DAILY
Status: DISCONTINUED | OUTPATIENT
Start: 2022-09-22 | End: 2022-09-22 | Stop reason: SDUPTHER

## 2022-09-22 RX ORDER — MAGNESIUM SULFATE IN WATER 40 MG/ML
2000 INJECTION, SOLUTION INTRAVENOUS PRN
Status: DISCONTINUED | OUTPATIENT
Start: 2022-09-22 | End: 2022-09-22 | Stop reason: SDUPTHER

## 2022-09-22 RX ORDER — PROTAMINE SULFATE 10 MG/ML
INJECTION, SOLUTION INTRAVENOUS PRN
Status: DISCONTINUED | OUTPATIENT
Start: 2022-09-22 | End: 2022-09-22 | Stop reason: SDUPTHER

## 2022-09-22 RX ORDER — PROPOFOL 10 MG/ML
5-50 INJECTION, EMULSION INTRAVENOUS PRN
Status: CANCELLED | OUTPATIENT
Start: 2022-09-22

## 2022-09-22 RX ORDER — LIDOCAINE 4 G/G
1 PATCH TOPICAL DAILY
Status: DISCONTINUED | OUTPATIENT
Start: 2022-09-22 | End: 2022-09-24 | Stop reason: HOSPADM

## 2022-09-22 RX ORDER — ALBUTEROL SULFATE 90 UG/1
2 AEROSOL, METERED RESPIRATORY (INHALATION) EVERY 6 HOURS PRN
Status: DISCONTINUED | OUTPATIENT
Start: 2022-09-22 | End: 2022-09-22

## 2022-09-22 RX ORDER — SODIUM PHOSPHATE, DIBASIC AND SODIUM PHOSPHATE, MONOBASIC 7; 19 G/133ML; G/133ML
1 ENEMA RECTAL DAILY PRN
Status: DISCONTINUED | OUTPATIENT
Start: 2022-09-22 | End: 2022-09-24 | Stop reason: HOSPADM

## 2022-09-22 RX ORDER — PROPOFOL 10 MG/ML
5-30 INJECTION, EMULSION INTRAVENOUS CONTINUOUS
Status: DISCONTINUED | OUTPATIENT
Start: 2022-09-22 | End: 2022-09-24 | Stop reason: HOSPADM

## 2022-09-22 RX ORDER — PROTAMINE SULFATE 10 MG/ML
50 INJECTION, SOLUTION INTRAVENOUS
Status: DISCONTINUED | OUTPATIENT
Start: 2022-09-22 | End: 2022-09-22 | Stop reason: SDUPTHER

## 2022-09-22 RX ORDER — FENTANYL CITRATE 50 UG/ML
25 INJECTION, SOLUTION INTRAMUSCULAR; INTRAVENOUS
Status: DISCONTINUED | OUTPATIENT
Start: 2022-09-22 | End: 2022-09-24 | Stop reason: HOSPADM

## 2022-09-22 RX ORDER — CLOPIDOGREL BISULFATE 75 MG/1
75 TABLET ORAL DAILY
Status: DISCONTINUED | OUTPATIENT
Start: 2022-09-24 | End: 2022-09-24 | Stop reason: HOSPADM

## 2022-09-22 RX ORDER — SENNA AND DOCUSATE SODIUM 50; 8.6 MG/1; MG/1
1 TABLET, FILM COATED ORAL 2 TIMES DAILY
Status: DISCONTINUED | OUTPATIENT
Start: 2022-09-22 | End: 2022-09-24 | Stop reason: HOSPADM

## 2022-09-22 RX ORDER — NOREPINEPHRINE BIT/0.9 % NACL 16MG/250ML
1-100 INFUSION BOTTLE (ML) INTRAVENOUS CONTINUOUS PRN
Status: DISCONTINUED | OUTPATIENT
Start: 2022-09-22 | End: 2022-09-24 | Stop reason: HOSPADM

## 2022-09-22 RX ORDER — TRAMADOL HYDROCHLORIDE 50 MG/1
50 TABLET ORAL EVERY 6 HOURS PRN
Status: DISCONTINUED | OUTPATIENT
Start: 2022-09-22 | End: 2022-09-22 | Stop reason: SDUPTHER

## 2022-09-22 RX ORDER — SODIUM CHLORIDE 0.9 % (FLUSH) 0.9 %
5-40 SYRINGE (ML) INJECTION PRN
Status: DISCONTINUED | OUTPATIENT
Start: 2022-09-22 | End: 2022-09-22 | Stop reason: SDUPTHER

## 2022-09-22 RX ORDER — FENTANYL CITRATE 0.05 MG/ML
INJECTION, SOLUTION INTRAMUSCULAR; INTRAVENOUS PRN
Status: DISCONTINUED | OUTPATIENT
Start: 2022-09-22 | End: 2022-09-22 | Stop reason: SDUPTHER

## 2022-09-22 RX ORDER — DEXTROSE MONOHYDRATE 100 MG/ML
INJECTION, SOLUTION INTRAVENOUS CONTINUOUS PRN
Status: DISCONTINUED | OUTPATIENT
Start: 2022-09-22 | End: 2022-09-22 | Stop reason: SDUPTHER

## 2022-09-22 RX ORDER — ASPIRIN 81 MG/1
81 TABLET ORAL DAILY
Status: DISCONTINUED | OUTPATIENT
Start: 2022-09-22 | End: 2022-09-23 | Stop reason: ALTCHOICE

## 2022-09-22 RX ORDER — SODIUM PHOSPHATE, DIBASIC AND SODIUM PHOSPHATE, MONOBASIC 7; 19 G/133ML; G/133ML
1 ENEMA RECTAL DAILY PRN
Status: DISCONTINUED | OUTPATIENT
Start: 2022-09-22 | End: 2022-09-22 | Stop reason: SDUPTHER

## 2022-09-22 RX ORDER — ONDANSETRON 4 MG/1
4 TABLET, ORALLY DISINTEGRATING ORAL EVERY 8 HOURS PRN
Status: DISCONTINUED | OUTPATIENT
Start: 2022-09-22 | End: 2022-09-22 | Stop reason: SDUPTHER

## 2022-09-22 RX ORDER — TRAMADOL HYDROCHLORIDE 50 MG/1
100 TABLET ORAL EVERY 6 HOURS PRN
Status: DISCONTINUED | OUTPATIENT
Start: 2022-09-22 | End: 2022-09-22 | Stop reason: SDUPTHER

## 2022-09-22 RX ORDER — MAGNESIUM SULFATE IN WATER 40 MG/ML
2000 INJECTION, SOLUTION INTRAVENOUS PRN
Status: DISCONTINUED | OUTPATIENT
Start: 2022-09-22 | End: 2022-09-24 | Stop reason: HOSPADM

## 2022-09-22 RX ORDER — VECURONIUM BROMIDE 1 MG/ML
INJECTION, POWDER, LYOPHILIZED, FOR SOLUTION INTRAVENOUS PRN
Status: DISCONTINUED | OUTPATIENT
Start: 2022-09-22 | End: 2022-09-22 | Stop reason: SDUPTHER

## 2022-09-22 RX ORDER — DOBUTAMINE HYDROCHLORIDE 200 MG/100ML
2-10 INJECTION INTRAVENOUS CONTINUOUS PRN
Status: DISCONTINUED | OUTPATIENT
Start: 2022-09-22 | End: 2022-09-24 | Stop reason: HOSPADM

## 2022-09-22 RX ORDER — DEXTROSE MONOHYDRATE 100 MG/ML
INJECTION, SOLUTION INTRAVENOUS CONTINUOUS PRN
Status: DISCONTINUED | OUTPATIENT
Start: 2022-09-22 | End: 2022-09-24 | Stop reason: HOSPADM

## 2022-09-22 RX ORDER — ALBUMIN (HUMAN) 12.5 G/50ML
25 SOLUTION INTRAVENOUS PRN
Status: DISCONTINUED | OUTPATIENT
Start: 2022-09-22 | End: 2022-09-24 | Stop reason: HOSPADM

## 2022-09-22 RX ORDER — ACETAMINOPHEN 325 MG/1
650 TABLET ORAL EVERY 4 HOURS PRN
Status: DISCONTINUED | OUTPATIENT
Start: 2022-09-22 | End: 2022-09-22 | Stop reason: SDUPTHER

## 2022-09-22 RX ORDER — MILRINONE LACTATE 0.2 MG/ML
.0625-.75 INJECTION, SOLUTION INTRAVENOUS CONTINUOUS
Status: DISCONTINUED | OUTPATIENT
Start: 2022-09-22 | End: 2022-09-24 | Stop reason: HOSPADM

## 2022-09-22 RX ORDER — LIDOCAINE HYDROCHLORIDE 20 MG/ML
INJECTION, SOLUTION EPIDURAL; INFILTRATION; INTRACAUDAL; PERINEURAL PRN
Status: DISCONTINUED | OUTPATIENT
Start: 2022-09-22 | End: 2022-09-22 | Stop reason: SDUPTHER

## 2022-09-22 RX ORDER — ACETAMINOPHEN 325 MG/1
650 TABLET ORAL EVERY 4 HOURS PRN
Status: DISCONTINUED | OUTPATIENT
Start: 2022-09-22 | End: 2022-09-24 | Stop reason: HOSPADM

## 2022-09-22 RX ORDER — FENTANYL CITRATE 50 UG/ML
50 INJECTION, SOLUTION INTRAMUSCULAR; INTRAVENOUS
Status: DISCONTINUED | OUTPATIENT
Start: 2022-09-22 | End: 2022-09-24 | Stop reason: HOSPADM

## 2022-09-22 RX ORDER — TRAMADOL HYDROCHLORIDE 50 MG/1
50 TABLET ORAL EVERY 6 HOURS PRN
Status: DISCONTINUED | OUTPATIENT
Start: 2022-09-22 | End: 2022-09-24 | Stop reason: HOSPADM

## 2022-09-22 RX ORDER — PROPOFOL 10 MG/ML
INJECTION, EMULSION INTRAVENOUS
Status: COMPLETED
Start: 2022-09-22 | End: 2022-09-22

## 2022-09-22 RX ORDER — PROTAMINE SULFATE 10 MG/ML
50 INJECTION, SOLUTION INTRAVENOUS
Status: ACTIVE | OUTPATIENT
Start: 2022-09-22 | End: 2022-09-23

## 2022-09-22 RX ORDER — ONDANSETRON 4 MG/1
4 TABLET, ORALLY DISINTEGRATING ORAL EVERY 8 HOURS PRN
Status: DISCONTINUED | OUTPATIENT
Start: 2022-09-22 | End: 2022-09-24 | Stop reason: HOSPADM

## 2022-09-22 RX ORDER — SODIUM CHLORIDE 0.9 % (FLUSH) 0.9 %
5-40 SYRINGE (ML) INJECTION PRN
Status: DISCONTINUED | OUTPATIENT
Start: 2022-09-22 | End: 2022-09-24 | Stop reason: HOSPADM

## 2022-09-22 RX ORDER — POTASSIUM CHLORIDE 29.8 MG/ML
20 INJECTION INTRAVENOUS PRN
Status: DISCONTINUED | OUTPATIENT
Start: 2022-09-22 | End: 2022-09-24 | Stop reason: HOSPADM

## 2022-09-22 RX ORDER — MIDAZOLAM HYDROCHLORIDE 1 MG/ML
INJECTION INTRAMUSCULAR; INTRAVENOUS PRN
Status: DISCONTINUED | OUTPATIENT
Start: 2022-09-22 | End: 2022-09-22 | Stop reason: SDUPTHER

## 2022-09-22 RX ORDER — NITROGLYCERIN 20 MG/100ML
5-300 INJECTION INTRAVENOUS CONTINUOUS PRN
Status: DISCONTINUED | OUTPATIENT
Start: 2022-09-22 | End: 2022-09-24 | Stop reason: HOSPADM

## 2022-09-22 RX ORDER — TRAMADOL HYDROCHLORIDE 50 MG/1
100 TABLET ORAL EVERY 6 HOURS PRN
Status: DISCONTINUED | OUTPATIENT
Start: 2022-09-22 | End: 2022-09-24 | Stop reason: HOSPADM

## 2022-09-22 RX ORDER — HEPARIN SODIUM 1000 [USP'U]/ML
INJECTION, SOLUTION INTRAVENOUS; SUBCUTANEOUS PRN
Status: DISCONTINUED | OUTPATIENT
Start: 2022-09-22 | End: 2022-09-22 | Stop reason: SDUPTHER

## 2022-09-22 RX ORDER — PROPOFOL 10 MG/ML
INJECTION, EMULSION INTRAVENOUS PRN
Status: DISCONTINUED | OUTPATIENT
Start: 2022-09-22 | End: 2022-09-22 | Stop reason: SDUPTHER

## 2022-09-22 RX ORDER — ONDANSETRON 2 MG/ML
4 INJECTION INTRAMUSCULAR; INTRAVENOUS EVERY 6 HOURS PRN
Status: DISCONTINUED | OUTPATIENT
Start: 2022-09-22 | End: 2022-09-24 | Stop reason: HOSPADM

## 2022-09-22 RX ADMIN — MIDAZOLAM 2 MG: 1 INJECTION INTRAMUSCULAR; INTRAVENOUS at 09:05

## 2022-09-22 RX ADMIN — SODIUM CHLORIDE, PRESERVATIVE FREE 10 ML: 5 INJECTION INTRAVENOUS at 21:00

## 2022-09-22 RX ADMIN — HYDRALAZINE HYDROCHLORIDE 50 MG: 50 TABLET, FILM COATED ORAL at 06:16

## 2022-09-22 RX ADMIN — AMINOCAPROIC ACID 5000 MG/HR: 250 INJECTION, SOLUTION INTRAVENOUS at 09:07

## 2022-09-22 RX ADMIN — FENTANYL CITRATE 250 MCG: 50 INJECTION, SOLUTION INTRAMUSCULAR; INTRAVENOUS at 11:00

## 2022-09-22 RX ADMIN — Medication: at 05:49

## 2022-09-22 RX ADMIN — SODIUM BICARBONATE 50 MEQ: 84 INJECTION, SOLUTION INTRAVENOUS at 17:06

## 2022-09-22 RX ADMIN — ROCURONIUM BROMIDE 100 MG: 10 INJECTION INTRAVENOUS at 08:03

## 2022-09-22 RX ADMIN — PROPOFOL 20 MCG/KG/MIN: 10 INJECTION, EMULSION INTRAVENOUS at 19:35

## 2022-09-22 RX ADMIN — DOBUTAMINE HYDROCHLORIDE 3 MCG/KG/MIN: 200 INJECTION INTRAVENOUS at 14:16

## 2022-09-22 RX ADMIN — DOBUTAMINE HYDROCHLORIDE 0.23 MCG/KG/MIN: 200 INJECTION INTRAVENOUS at 19:20

## 2022-09-22 RX ADMIN — FENTANYL CITRATE 100 MCG: 50 INJECTION, SOLUTION INTRAMUSCULAR; INTRAVENOUS at 08:37

## 2022-09-22 RX ADMIN — HYDROXOCOBALAMIN 5 G: 5 INJECTION, POWDER, LYOPHILIZED, FOR SOLUTION INTRAVENOUS at 21:00

## 2022-09-22 RX ADMIN — CHLORHEXIDINE GLUCONATE 0.12% ORAL RINSE 15 ML: 1.2 LIQUID ORAL at 05:58

## 2022-09-22 RX ADMIN — PHENYLEPHRINE HYDROCHLORIDE 5 MCG/MIN: 10 INJECTION INTRAVENOUS at 09:51

## 2022-09-22 RX ADMIN — VECURONIUM BROMIDE 10 MG: 1 INJECTION, POWDER, LYOPHILIZED, FOR SOLUTION INTRAVENOUS at 16:20

## 2022-09-22 RX ADMIN — CEFAZOLIN 2000 MG: 2 INJECTION, POWDER, FOR SOLUTION INTRAMUSCULAR; INTRAVENOUS at 12:50

## 2022-09-22 RX ADMIN — PROPOFOL 30 MCG/KG/MIN: 10 INJECTION, EMULSION INTRAVENOUS at 23:27

## 2022-09-22 RX ADMIN — FENTANYL CITRATE 400 MCG: 50 INJECTION, SOLUTION INTRAMUSCULAR; INTRAVENOUS at 09:03

## 2022-09-22 RX ADMIN — FENTANYL CITRATE 100 MCG: 50 INJECTION, SOLUTION INTRAMUSCULAR; INTRAVENOUS at 08:20

## 2022-09-22 RX ADMIN — VANCOMYCIN HYDROCHLORIDE 1000 MG: 1 INJECTION, POWDER, LYOPHILIZED, FOR SOLUTION INTRAVENOUS at 22:19

## 2022-09-22 RX ADMIN — EPINEPHRINE 5 MCG/MIN: 1 INJECTION INTRAMUSCULAR; INTRAVENOUS; SUBCUTANEOUS at 15:00

## 2022-09-22 RX ADMIN — SODIUM CHLORIDE: 900 INJECTION INTRAVENOUS at 07:45

## 2022-09-22 RX ADMIN — ROCURONIUM BROMIDE 100 MG: 10 INJECTION INTRAVENOUS at 10:40

## 2022-09-22 RX ADMIN — SODIUM CHLORIDE 3 UNITS/HR: 9 INJECTION, SOLUTION INTRAVENOUS at 09:02

## 2022-09-22 RX ADMIN — MILRINONE LACTATE 2.5 MCG/KG/MIN: 200 INJECTION, SOLUTION INTRAVENOUS at 19:20

## 2022-09-22 RX ADMIN — SODIUM CHLORIDE 8.8 UNITS/HR: 9 INJECTION, SOLUTION INTRAVENOUS at 23:33

## 2022-09-22 RX ADMIN — VECURONIUM BROMIDE 10 MG: 1 INJECTION, POWDER, LYOPHILIZED, FOR SOLUTION INTRAVENOUS at 18:47

## 2022-09-22 RX ADMIN — LIDOCAINE HYDROCHLORIDE 100 MG: 20 INJECTION, SOLUTION EPIDURAL; INFILTRATION; INTRACAUDAL; PERINEURAL at 08:03

## 2022-09-22 RX ADMIN — EPINEPHRINE 8 MCG/MIN: 1 INJECTION INTRAMUSCULAR; INTRAVENOUS; SUBCUTANEOUS at 19:20

## 2022-09-22 RX ADMIN — VASOPRESSIN 0.02 UNITS/MIN: 20 INJECTION INTRAVENOUS at 19:20

## 2022-09-22 RX ADMIN — MIDAZOLAM 2 MG: 1 INJECTION INTRAMUSCULAR; INTRAVENOUS at 16:22

## 2022-09-22 RX ADMIN — HEPARIN SODIUM 45000 UNITS: 1000 INJECTION INTRAVENOUS; SUBCUTANEOUS at 10:54

## 2022-09-22 RX ADMIN — SODIUM CHLORIDE 5 UNITS/HR: 9 INJECTION, SOLUTION INTRAVENOUS at 19:20

## 2022-09-22 RX ADMIN — PROPOFOL 130 MG: 10 INJECTION, EMULSION INTRAVENOUS at 08:03

## 2022-09-22 RX ADMIN — AMIODARONE HYDROCHLORIDE 0.5 MG/MIN: 50 INJECTION, SOLUTION INTRAVENOUS at 19:20

## 2022-09-22 RX ADMIN — POTASSIUM CHLORIDE 20 MEQ: 400 INJECTION, SOLUTION INTRAVENOUS at 20:50

## 2022-09-22 RX ADMIN — POTASSIUM CHLORIDE 20 MEQ: 400 INJECTION, SOLUTION INTRAVENOUS at 21:51

## 2022-09-22 RX ADMIN — Medication 10 MCG/MIN: at 19:20

## 2022-09-22 RX ADMIN — HEPARIN SODIUM 5000 UNITS: 1000 INJECTION INTRAVENOUS; SUBCUTANEOUS at 10:22

## 2022-09-22 RX ADMIN — CEFAZOLIN 2000 MG: 2 INJECTION, POWDER, FOR SOLUTION INTRAMUSCULAR; INTRAVENOUS at 16:50

## 2022-09-22 RX ADMIN — ALBUTEROL SULFATE 2 PUFF: 90 AEROSOL, METERED RESPIRATORY (INHALATION) at 21:05

## 2022-09-22 RX ADMIN — EPINEPHRINE 10 MCG/MIN: 1 INJECTION INTRAMUSCULAR; INTRAVENOUS; SUBCUTANEOUS at 23:42

## 2022-09-22 RX ADMIN — MIDAZOLAM 2 MG: 1 INJECTION INTRAMUSCULAR; INTRAVENOUS at 07:54

## 2022-09-22 RX ADMIN — FENTANYL CITRATE 150 MCG: 50 INJECTION, SOLUTION INTRAMUSCULAR; INTRAVENOUS at 09:27

## 2022-09-22 RX ADMIN — MILRINONE LACTATE 0.25 MCG/KG/MIN: 200 INJECTION, SOLUTION INTRAVENOUS at 15:10

## 2022-09-22 RX ADMIN — Medication 1 MCG/MIN: at 14:16

## 2022-09-22 RX ADMIN — PROTAMINE SULFATE 450 MG: 10 INJECTION, SOLUTION INTRAVENOUS at 18:06

## 2022-09-22 RX ADMIN — CEFAZOLIN 2000 MG: 2 INJECTION, POWDER, FOR SOLUTION INTRAMUSCULAR; INTRAVENOUS at 08:50

## 2022-09-22 RX ADMIN — THIAMINE HYDROCHLORIDE: 100 INJECTION, SOLUTION INTRAMUSCULAR; INTRAVENOUS at 22:07

## 2022-09-22 RX ADMIN — MIDAZOLAM 2 MG: 1 INJECTION INTRAMUSCULAR; INTRAVENOUS at 18:47

## 2022-09-22 NOTE — ANESTHESIA PROCEDURE NOTES
Central Venous Line:    A central venous line was placed using surface landmarks, in the OR for the following indication(s): central venous access and CVP monitoring. Sterility preparation included the following: hand hygiene performed prior to procedure, maximum sterile barriers used and sterile technique used to drape from head to toe. The patient was placed in Trendelenburg position. The    The site was prepped with Chloraprep. A 9 Fr (size), introducer     During the procedure, the following specific steps were taken: target vein identified, needle advanced into vein and blood aspirated and guidewire advanced into vein. Intravenous verification was obtained by ultrasound and venous blood return. Anesthesia type: generalA(n) oximetric, 7.5 (size) Pulmonary Artery Catheter (PAC) was placed through the Introducer CVL in the right internal jugular vein. The PAC placement was confirmed by pressure tracing changes. PA Cath placed?: Yes  Staffing  Performed: Anesthesiologist   Anesthesiologist: Gaston Hendricks MD  Preanesthetic Checklist  Completed: patient identified, timeout performed and monitors applied/VS acknowledged

## 2022-09-22 NOTE — PROGRESS NOTES
pulmonary      SUBJECTIVE:  no sob     OBJECTIVE    VITALS:  BP (!) 167/84   Pulse 97   Temp 98.1 °F (36.7 °C) (Oral)   Resp 25   Ht 6' 2\" (1.88 m)   Wt (!) (P) 325 lb 9.9 oz (147.7 kg)   SpO2 96%   BMI (P) 41.81 kg/m²   HEAD AND FACE EXAM:  No throat injection, no active exudate,no thrush  NECK EXAM;No JVD, no masses, symmetrical  CHEST EXAM; Expansion equal and symmetrical, no masses  LUNG EXAM; Good breath sounds bilaterally. There are expiratory wheezes both lungs, there are crackles at both lung bases  CARDIOVASCULAR EXAM: Positive S1 and S2, no S3 or S4, no clicks ,no murmurs  RIGHT AND LEFT LOWER EXTRIMITY EXAM: No edema, no swelling, no inflamation  CNS EXAM: Alert and oriented X3          LABS   Lab Results   Component Value Date    WBC 8.4 09/21/2022    HGB 12.4 (L) 09/21/2022    HCT 38.8 (L) 09/21/2022    .4 (H) 09/21/2022     09/21/2022     Lab Results   Component Value Date    CREATININE 0.8 (L) 09/21/2022    BUN 18 09/21/2022     09/21/2022    K 4.1 09/21/2022     09/21/2022    CO2 21 09/21/2022     Lab Results   Component Value Date    INR 1.09 09/17/2022    PROTIME 14.1 09/17/2022        No results found for: PHOS   No results for input(s): PH, PO2ART, PTK7ECQ, HCO3, BEART, O2SAT in the last 72 hours.       Wt Readings from Last 3 Encounters:   09/22/22 (!) (P) 325 lb 9.9 oz (147.7 kg)   09/09/22 (!) 331 lb (150.1 kg)   05/31/22 (!) 331 lb (150.1 kg)               ASSESMENT  Ac bronchospasm prob asthma  abilio        [facility]  Bd rx  Cabg today    9/22/2022  Santiago Gillespie MD, M.D.

## 2022-09-22 NOTE — OP NOTE
Operative Note      Patient: Wen Pablo  YOB: 1950  MRN: [id number]  Preoperative diagnosis: Severe coronary artery disease; chronic atrial fibrillation; morbid obesity; DVT; elephantiasis and chronic leg edema; bronchospasm possible COPD diabetes mellitus bifascicular block and Coumadin usage sleep apnea and hypothyroidism; cold agglutinin; stone hard sternum with pectus deformity in the center    date of Procedure: 9/22/2022 CABG x3 with] LIMA to LAD; SVG sequential to PDA RCA; modified maze procedure with exclusion of bilateral pulmonary veins and AtriCure clipping of left atrial appendage isolation; endovascular vein harvesting;exparel intercostal nerve block    Repair of bleeding right superior pulmonary vein; intra-aortic balloon pump placement  Post op Diagnosis: Post-Op Diagnosis: Same;       Surgeon(s):  MD Lorene Waters PA-C Delana Pili, PA-C    Assistant:    Assistant: La Loja RN  Physician Assistant: Geno Edmondson PA-C    Anesthesia: General    Estimated Blood Loss (mL): 705    Complications: Bleeding    Specimens:   * No specimens in log *    Implants:  Implant Name Type Inv. Item Serial No.  Lot No. LRB No. Used Action   LEAD PACE CARDIO 2-0 TEMP DARK BL 1X24IN SKS-3 DB ARM - [id number] Cardiac Lead LEAD PACE CARDIO 2-0 TEMP DARK BL 1X24IN SKS-3 DBL ARM  [facility]-WD [id number] N/A 5 Implanted   AGENT HEMSTAT 2GM ABSRB SYNTH BONE PUTTY W/ SPAT - [id number]  AGENT HEMSTAT 2GM ABSRB SYNTH BONE PUTTY W/ SPAT  [employer]-WD [id number] N/A 1 Implanted         Drains:   Chest Tube Left Pleural 1 (Active)       Chest Tube Mediastinal;Pericardial 2 (Active)       Urinary Catheter 09/22/22 Gonzalez-Temperature (Active)   Urine Color Yellow 09/22/22 1907   Urine Appearance Clear 09/22/22 1907   Output (mL) 550 mL 09/22/22 1907       Findings:  Morbid obesity;   severe coronary artery disease with calcified right coronary system and small caliber PDA beyond occlusive disease; LAD with thickened calcific walls; normal size aorta and enlarged heart with high pulmonary artery pressures and distended right atrium suggestive of right heart failure; cold agglutinin    Detailed Description of Procedure:   Patient underwent anesthesia endotracheally under monitored conditions. The operative was prepped and draped. Chest was opened by midsternotomy incision. Patient had a pectus on mid part of sternum and excessively hard sternal bone. Left internal mammary artery was harvested by standard maneuvers. It was of normal size and flow. [facility] artery was skeletonized. Saphenous vein was harvested by endovascular technique and was a good size and caliber. Median pericardiotomy was done and stay sutures applied. Patient had an enlarged right atrium and high pulmonary artery pressures in the mid to high 57V systolic ; heart was moderately enlarged. Left atrial appendage was average size. Patient was placed on cardiopulmonary bypass after adequate heparinization. The pulmonary vein isolation was carried out on cardiopulmonary bypass. Utilizing AtriCure    radiofrequency occlusive clamp right-sided pulmonary veins isolation was carried out by 2 sets x2 lesions    After removal of the partial clamp there was  bleeding noted coming from  posterior and behind the pulmonary veins. The left pulmonary veins were then isolated after dividing the marshal's ligament and then 2 sets of 2 radiofrequency ablation lesions were created over the atrium . Next the left atrial appendage base was measured   35 mm AtriCure Flex V clip was placed at the base of the appendage. Next the aorta was crossclamped and warm blood cardioplegia was utilized in view of cold agglutinins. The target vessels where exposed and then first sequential graft was created between the PDA and RCA followed by LIMA to mid LAD.   The vessels were  approximately 1 mm 1-1/2 mm and 1/2 mm respectively. On completion of distal anastomosis proximal vein graft anastomosis was carried on a single cross-clamp while rewarming was continued. Patient's temperature was not allowed to drop below 36 degrees during the entire case    After completion of proximal anastomosis warm hotshot cardioplegia was given and then aortic cross-clamp was released. Patient initially required pacing and inotropic support  and was in bradycardia  &AV block. There was significant bright  bleeding still noted coming from behind the right pulmonary veins. Precise area could not be identified. Therefore the patient was placed back on bypass. The left atrium was dissected out at the [facility] groove and then opened horizontally. Inspection of the pulmonary veins opening and beyond  did not reveal any obvious  tear . The left atrial incision was then closed in layers. There was still  significant  bleeding from the posterior part  behind the pulmonary veins  It was decided to explore this bleeding. Right Superior pulmonary vein was dissected to its branches & clamped distally and then divided half a centimeter from the clamp; on lifting the divided edge off superior pulmonary vein up, a  small tear almost 2 to 3 mm size was noted posteriorly in the wall of the vein. This was repaired with 4-0 Prolene suture. Next the cut ends of the pulmonary veins where reanastomosed using 4-0 Prolene sutures. During final stages the atrium was filled and de-aired and the clamp was removed. There was hemostasis    After closure of the pulmonary vein attempt was made to come off bypass. Some degree of ST segment changes were  noted on the EKG which was unusual  and therefore it was  decided to place an iabp and thereafter  patient was taken off bypass. with inotropic support  . Kellie Gip   Atrial and ventricular pacing wires were used and AV sequential pacing was carried out     Heart appeared enlarged especially the right

## 2022-09-22 NOTE — ANESTHESIA POSTPROCEDURE EVALUATION
Department of Anesthesiology  Postprocedure Note    Patient: Jean Bianchi  MRN: [id number]  YOB: 1950  Date of evaluation: 9/22/2022      Procedure Summary     Date: 09/22/22 Room / Location: [address]    Anesthesia Start: 46 Anesthesia Stop: 1940    Procedure: CABG CORONARY ARTERY BYPASS x3 (LIMA TO LAD, SVG TO PDA-RCA SEQUENTIAL) , INTRAOPERATIVE MILTON, ENDOVEIN HARVEST OF LEFT SAPHENOUS VEIN, MODIFIED MAZE PROCEDURE, LEFT ATRIAL APPENDAGE LIGATION, INTERCOSTAL NERVE BLOCK, INTRA-AORTIC BALLOON PUMP INSERTION (Chest) Diagnosis:       Coronary artery disease, unspecified vessel or lesion type, unspecified whether angina present, unspecified whether native or transplanted heart      (Coronary artery disease, unspecified vessel or lesion type, unspecified whether angina present, unspecified whether native or transplanted heart [I25.10])    Surgeons: Jigar Mehta MD Responsible Provider: Delilah Rolle MD    Anesthesia Type: General ASA Status: 4          Anesthesia Type: General    Jay Phase I:      Jay Phase II:        Anesthesia Post Evaluation    Patient location during evaluation: ICU  Patient participation: complete - patient cannot participate  Level of consciousness: sedated and ventilated  Airway patency: patent  Nausea & Vomiting: no nausea and no vomiting  Complications: no  Cardiovascular status: hemodynamically stable  Respiratory status: acceptable, airway suctioned, intubated and ventilator  Hydration status: stable

## 2022-09-22 NOTE — ANESTHESIA PROCEDURE NOTES
Central Venous Line:    A central venous line was placed using surface landmarks, in the OR for the following indication(s): central venous access and CVP monitoring. Sterility preparation included the following: hand hygiene performed prior to procedure, maximum sterile barriers used and sterile technique used to drape from head to toe. The patient was placed in Trendelenburg position. The    The site was prepped with Chloraprep. A 7 Fr (size), triple lumen was placed. During the procedure, the following specific steps were taken: target vein identified, needle advanced into vein and blood aspirated and guidewire advanced into vein. Intravenous verification was obtained by ultrasound and venous blood return. Post insertion care included: all ports aspirated, all ports flushed easily, guidewire removed intact, Biopatch applied, line sutured in place and dressing applied.         Outcomes: patient tolerated procedure well  Anesthesia type: general  Staffing  Performed: Anesthesiologist   Anesthesiologist: Moreno Chandler MD  Preanesthetic Checklist  Completed: patient identified, timeout performed and monitors applied/VS acknowledged

## 2022-09-22 NOTE — PERIOP NOTE
Update History & Physical    The patient's History and Physical of September 18, 2022 was reviewed with the patient and I examined the patient. There was no change. The surgical site was confirmed by the patient and me. Plan: The risks, benefits, expected outcome, and alternative to the recommended procedure have been discussed with the patient by the attending physician. Patient understands and wants to proceed with the procedure.      Electronically signed by Erica Resendez PA-C on 9/22/2022 at 7:20 AM

## 2022-09-23 ENCOUNTER — ANESTHESIA EVENT (OUTPATIENT)
Dept: OPERATING ROOM | Age: 72
DRG: 233 | End: 2022-09-23
Payer: COMMERCIAL

## 2022-09-23 ENCOUNTER — APPOINTMENT (OUTPATIENT)
Dept: GENERAL RADIOLOGY | Age: 72
DRG: 233 | End: 2022-09-23
Attending: INTERNAL MEDICINE
Payer: COMMERCIAL

## 2022-09-23 LAB
ALBUMIN SERPL-MCNC: 3 GM/DL (ref 3.4–5)
ALP BLD-CCNC: 57 IU/L (ref 40–128)
ALT SERPL-CCNC: 16 U/L (ref 10–40)
ANION GAP SERPL CALCULATED.3IONS-SCNC: 13 MMOL/L (ref 4–16)
ANISOCYTOSIS: ABNORMAL
APTT: 41.4 SECONDS (ref 25.1–37.1)
AST SERPL-CCNC: 72 IU/L (ref 15–37)
BANDED NEUTROPHILS ABSOLUTE COUNT: 0.76 K/CU MM
BANDED NEUTROPHILS ABSOLUTE COUNT: 4.84 K/CU MM
BANDED NEUTROPHILS RELATIVE PERCENT: 28 % (ref 5–11)
BANDED NEUTROPHILS RELATIVE PERCENT: 6 % (ref 5–11)
BASE EXCESS MIXED: 0.6 (ref 0–1.2)
BASE EXCESS MIXED: 1.2 (ref 0–1.2)
BASE EXCESS MIXED: 1.2 (ref 0–1.2)
BASE EXCESS MIXED: 1.3 (ref 0–1.2)
BASE EXCESS MIXED: 1.3 (ref 0–2)
BASE EXCESS MIXED: 1.6 (ref 0–1.2)
BASE EXCESS MIXED: 1.6 (ref 0–1.2)
BASE EXCESS MIXED: 1.9 (ref 0–1.2)
BASE EXCESS MIXED: 2 (ref 0–1.2)
BASE EXCESS MIXED: 2.1 (ref 0–1.2)
BASE EXCESS MIXED: 2.1 (ref 0–1.2)
BASE EXCESS MIXED: 2.3 (ref 0–1.2)
BASE EXCESS MIXED: 2.4 (ref 0–1.2)
BASE EXCESS MIXED: 2.5 (ref 0–1.2)
BASE EXCESS MIXED: 5.2 (ref 0–1.2)
BASE EXCESS: ABNORMAL (ref 0–2)
BASE EXCESS: ABNORMAL (ref 0–3.3)
BASOPHILS ABSOLUTE: 0 K/CU MM
BASOPHILS RELATIVE PERCENT: 0.2 % (ref 0–1)
BILIRUB SERPL-MCNC: 0.6 MG/DL (ref 0–1)
BUN BLDV-MCNC: 22 MG/DL (ref 6–23)
CALCIUM SERPL-MCNC: 7.4 MG/DL (ref 8.3–10.6)
CHLORIDE BLD-SCNC: 109 MMOL/L (ref 99–110)
CO2 CONTENT: 22.4 MMOL/L (ref 19–24)
CO2 CONTENT: 23.2 MMOL/L (ref 19–24)
CO2: 20 MMOL/L (ref 21–32)
CO2: 20 MMOL/L (ref 21–32)
CO2: 21 MMOL/L (ref 21–32)
CO2: 22 MMOL/L (ref 21–32)
CO2: 22 MMOL/L (ref 21–32)
CO2: 23 MMOL/L (ref 21–32)
CO2: 24 MMOL/L (ref 21–32)
CO2: 25 MMOL/L (ref 21–32)
COMPONENT: NORMAL
CREAT SERPL-MCNC: 1.4 MG/DL (ref 0.9–1.3)
D DIMER: NORMAL NG/ML(DDU)
DIFFERENTIAL TYPE: ABNORMAL
EOSINOPHILS ABSOLUTE: 0 K/CU MM
EOSINOPHILS RELATIVE PERCENT: 0.1 % (ref 0–3)
FIBRINOGEN LEVEL: 285 MG/DL (ref 196.9–442.1)
GFR AFRICAN AMERICAN: >60 ML/MIN/1.73M2
GFR NON-AFRICAN AMERICAN: 50 ML/MIN/1.73M2
GLUCOSE BLD-MCNC: 101 MG/DL (ref 70–99)
GLUCOSE BLD-MCNC: 106 MG/DL (ref 70–99)
GLUCOSE BLD-MCNC: 108 MG/DL (ref 70–99)
GLUCOSE BLD-MCNC: 111 MG/DL (ref 70–99)
GLUCOSE BLD-MCNC: 114 MG/DL (ref 70–99)
GLUCOSE BLD-MCNC: 117 MG/DL (ref 70–99)
GLUCOSE BLD-MCNC: 119 MG/DL (ref 70–99)
GLUCOSE BLD-MCNC: 120 MG/DL (ref 70–99)
GLUCOSE BLD-MCNC: 125 MG/DL (ref 70–99)
GLUCOSE BLD-MCNC: 128 MG/DL (ref 70–99)
GLUCOSE BLD-MCNC: 130 MG/DL (ref 70–99)
GLUCOSE BLD-MCNC: 133 MG/DL (ref 70–99)
GLUCOSE BLD-MCNC: 134 MG/DL (ref 70–99)
GLUCOSE BLD-MCNC: 136 MG/DL (ref 70–99)
GLUCOSE BLD-MCNC: 137 MG/DL (ref 70–99)
GLUCOSE BLD-MCNC: 152 MG/DL (ref 70–99)
GLUCOSE BLD-MCNC: 168 MG/DL (ref 70–99)
GLUCOSE BLD-MCNC: 180 MG/DL (ref 70–99)
GLUCOSE BLD-MCNC: 186 MG/DL (ref 70–99)
GLUCOSE BLD-MCNC: 187 MG/DL (ref 70–99)
GLUCOSE BLD-MCNC: 204 MG/DL (ref 70–99)
GLUCOSE BLD-MCNC: 90 MG/DL (ref 70–99)
GLUCOSE BLD-MCNC: 94 MG/DL (ref 70–99)
HCO3 ARTERIAL: 19.2 MMOL/L (ref 18–23)
HCO3 ARTERIAL: 20.6 MMOL/L (ref 18–23)
HCO3 ARTERIAL: 21.3 MMOL/L (ref 18–23)
HCO3 ARTERIAL: 21.4 MMOL/L (ref 18–23)
HCO3 ARTERIAL: 21.5 MMOL/L (ref 18–23)
HCO3 ARTERIAL: 21.7 MMOL/L (ref 18–23)
HCO3 ARTERIAL: 22.1 MMOL/L (ref 18–23)
HCO3 ARTERIAL: 22.2 MMOL/L (ref 18–23)
HCO3 ARTERIAL: 22.3 MMOL/L (ref 18–23)
HCO3 ARTERIAL: 22.4 MMOL/L (ref 18–23)
HCO3 ARTERIAL: 22.4 MMOL/L (ref 18–23)
HCO3 ARTERIAL: 22.5 MMOL/L (ref 18–23)
HCO3 ARTERIAL: 22.5 MMOL/L (ref 22–26)
HCO3 ARTERIAL: 22.6 MMOL/L (ref 18–23)
HCO3 ARTERIAL: 22.7 MMOL/L (ref 18–23)
HCO3 ARTERIAL: 22.8 MMOL/L (ref 18–23)
HCO3 ARTERIAL: 23 MMOL/L (ref 18–23)
HCO3 ARTERIAL: 23 MMOL/L (ref 18–23)
HCO3 ARTERIAL: 23.2 MMOL/L (ref 18–23)
HCO3 ARTERIAL: 23.4 MMOL/L (ref 18–23)
HCT VFR BLD CALC: 22 % (ref 42–52)
HCT VFR BLD CALC: 23 % (ref 42–52)
HCT VFR BLD CALC: 25 % (ref 42–52)
HCT VFR BLD CALC: 27 % (ref 42–52)
HCT VFR BLD CALC: 28 % (ref 42–52)
HCT VFR BLD CALC: 28.6 % (ref 42–52)
HCT VFR BLD CALC: 29 % (ref 42–52)
HCT VFR BLD CALC: 29.7 % (ref 42–52)
HCT VFR BLD CALC: 30 % (ref 42–52)
HCT VFR BLD CALC: 31.1 % (ref 42–52)
HEMOGLOBIN: 10 GM/DL (ref 13.5–18)
HEMOGLOBIN: 10 GM/DL (ref 13.5–18)
HEMOGLOBIN: 10.1 GM/DL (ref 13.5–18)
HEMOGLOBIN: 10.2 GM/DL (ref 13.5–18)
HEMOGLOBIN: 10.3 GM/DL (ref 13.5–18)
HEMOGLOBIN: 10.9 GM/DL (ref 13.5–18)
HEMOGLOBIN: 7.5 GM/DL (ref 13.5–18)
HEMOGLOBIN: 7.8 GM/DL (ref 13.5–18)
HEMOGLOBIN: 7.9 GM/DL (ref 13.5–18)
HEMOGLOBIN: 8 GM/DL (ref 13.5–18)
HEMOGLOBIN: 8.5 GM/DL (ref 13.5–18)
HEMOGLOBIN: 8.5 GM/DL (ref 13.5–18)
HEMOGLOBIN: 8.6 GM/DL (ref 13.5–18)
HEMOGLOBIN: 9.1 GM/DL (ref 13.5–18)
HEMOGLOBIN: 9.4 GM/DL (ref 13.5–18)
HEMOGLOBIN: 9.5 GM/DL (ref 13.5–18)
HEMOGLOBIN: 9.6 GM/DL (ref 13.5–18)
HEMOGLOBIN: 9.9 GM/DL (ref 13.5–18)
IMMATURE NEUTROPHIL %: 3 % (ref 0–0.43)
INR BLD: 1.19 INDEX
INR BLD: 1.3 INDEX
LYMPHOCYTES ABSOLUTE: 1.2 K/CU MM
LYMPHOCYTES ABSOLUTE: 1.9 K/CU MM
LYMPHOCYTES ABSOLUTE: 2.1 K/CU MM
LYMPHOCYTES RELATIVE PERCENT: 12 % (ref 24–44)
LYMPHOCYTES RELATIVE PERCENT: 15 % (ref 24–44)
LYMPHOCYTES RELATIVE PERCENT: 8.7 % (ref 24–44)
MAGNESIUM: 2.4 MG/DL (ref 1.8–2.4)
MCH RBC QN AUTO: 33.9 PG (ref 27–31)
MCH RBC QN AUTO: 34.9 PG (ref 27–31)
MCH RBC QN AUTO: 35 PG (ref 27–31)
MCHC RBC AUTO-ENTMCNC: 34.7 % (ref 32–36)
MCHC RBC AUTO-ENTMCNC: 35 % (ref 32–36)
MCHC RBC AUTO-ENTMCNC: 35.3 % (ref 32–36)
MCV RBC AUTO: 100 FL (ref 78–100)
MCV RBC AUTO: 97.7 FL (ref 78–100)
MCV RBC AUTO: 99 FL (ref 78–100)
METAMYELOCYTES ABSOLUTE COUNT: 0.25 K/CU MM
METAMYELOCYTES PERCENT: 2 %
MONOCYTES ABSOLUTE: 0.5 K/CU MM
MONOCYTES ABSOLUTE: 0.9 K/CU MM
MONOCYTES ABSOLUTE: 1.4 K/CU MM
MONOCYTES RELATIVE PERCENT: 10.3 % (ref 0–4)
MONOCYTES RELATIVE PERCENT: 3 % (ref 0–4)
MONOCYTES RELATIVE PERCENT: 7 % (ref 0–4)
NUCLEATED RBC %: 0.1 %
O2 SATURATION: 92.3 % (ref 96–97)
O2 SATURATION: 92.5 % (ref 96–97)
O2 SATURATION: 93 % (ref 96–97)
O2 SATURATION: 93.3 % (ref 96–97)
O2 SATURATION: 93.8 % (ref 96–97)
O2 SATURATION: 94.7 % (ref 96–97)
O2 SATURATION: 94.8 % (ref 96–97)
O2 SATURATION: 95 % (ref 96–97)
O2 SATURATION: 95.1 % (ref 96–97)
O2 SATURATION: 95.2 % (ref 96–97)
O2 SATURATION: 95.2 % (ref 96–97)
O2 SATURATION: 95.3 % (ref 96–97)
O2 SATURATION: 95.6 % (ref 96–97)
O2 SATURATION: 95.7 % (ref 96–97)
O2 SATURATION: 95.7 % (ref 96–97)
O2 SATURATION: 95.9 % (ref 96–97)
O2 SATURATION: 96.2 % (ref 96–97)
O2 SATURATION: 96.6 % (ref 94–100)
O2 SATURATION: 97.7 % (ref 96–97)
O2 SATURATION: 98 % (ref 96–97)
PCO2 ARTERIAL: 27.4 MMHG (ref 32–45)
PCO2 ARTERIAL: 28.6 MMHG (ref 32–45)
PCO2 ARTERIAL: 29.5 MMHG (ref 32–45)
PCO2 ARTERIAL: 30.4 MMHG (ref 32–45)
PCO2 ARTERIAL: 31.3 MMHG (ref 32–45)
PCO2 ARTERIAL: 31.4 MMHG (ref 32–45)
PCO2 ARTERIAL: 31.9 MMHG (ref 32–45)
PCO2 ARTERIAL: 32.1 MMHG (ref 32–45)
PCO2 ARTERIAL: 33 MMHG (ref 32–45)
PCO2 ARTERIAL: 34.8 MMHG (ref 32–45)
PCO2 ARTERIAL: 35.7 MMHG (ref 32–45)
PCO2 ARTERIAL: 36.1 MMHG (ref 32–45)
PCO2 ARTERIAL: 36.9 MMHG (ref 32–45)
PCO2 ARTERIAL: 37.4 MMHG (ref 32–45)
PCO2 ARTERIAL: 37.5 MMHG (ref 32–45)
PCO2 ARTERIAL: 37.7 MMHG (ref 32–45)
PCO2 ARTERIAL: 37.9 MMHG (ref 32–45)
PCO2 ARTERIAL: 38 MMHG (ref 32–45)
PCO2 ARTERIAL: 39.4 MMHG (ref 32–45)
PCO2 ARTERIAL: 41.1 MMHG (ref 32–45)
PDW BLD-RTO: 17.5 % (ref 11.7–14.9)
PDW BLD-RTO: 17.8 % (ref 11.7–14.9)
PDW BLD-RTO: 17.9 % (ref 11.7–14.9)
PH BLOOD: 7.36 (ref 7.34–7.45)
PH BLOOD: 7.37 (ref 7.34–7.45)
PH BLOOD: 7.38 (ref 7.34–7.45)
PH BLOOD: 7.38 (ref 7.34–7.45)
PH BLOOD: 7.39 (ref 7.34–7.45)
PH BLOOD: 7.4 (ref 7.34–7.45)
PH BLOOD: 7.4 (ref 7.34–7.45)
PH BLOOD: 7.41 (ref 7.34–7.45)
PH BLOOD: 7.44 (ref 7.34–7.45)
PH BLOOD: 7.46 (ref 7.34–7.45)
PH BLOOD: 7.46 (ref 7.34–7.45)
PH BLOOD: 7.48 (ref 7.34–7.45)
PH BLOOD: 7.48 (ref 7.34–7.45)
PH BLOOD: 7.49 (ref 7.34–7.45)
PLATELET # BLD: 151 K/CU MM (ref 140–440)
PLATELET # BLD: 156 K/CU MM (ref 140–440)
PLATELET # BLD: 160 K/CU MM (ref 140–440)
PMV BLD AUTO: 10.5 FL (ref 7.5–11.1)
PMV BLD AUTO: 10.6 FL (ref 7.5–11.1)
PMV BLD AUTO: 10.7 FL (ref 7.5–11.1)
PO2 ARTERIAL: 60 MMHG (ref 75–100)
PO2 ARTERIAL: 65.1 MMHG (ref 75–100)
PO2 ARTERIAL: 66.7 MMHG (ref 75–100)
PO2 ARTERIAL: 67.5 MMHG (ref 75–100)
PO2 ARTERIAL: 70.3 MMHG (ref 75–100)
PO2 ARTERIAL: 70.3 MMHG (ref 75–100)
PO2 ARTERIAL: 70.7 MMHG (ref 75–100)
PO2 ARTERIAL: 71.7 MMHG (ref 75–100)
PO2 ARTERIAL: 72.8 MMHG (ref 75–100)
PO2 ARTERIAL: 74.8 MMHG (ref 75–100)
PO2 ARTERIAL: 75.4 MMHG (ref 75–100)
PO2 ARTERIAL: 75.5 MMHG (ref 75–100)
PO2 ARTERIAL: 76 MMHG (ref 75–100)
PO2 ARTERIAL: 79 MMHG (ref 75–100)
PO2 ARTERIAL: 80.4 MMHG (ref 75–100)
PO2 ARTERIAL: 80.8 MMHG (ref 75–100)
PO2 ARTERIAL: 82.5 MMHG (ref 80–90)
PO2 ARTERIAL: 86.5 MMHG (ref 75–100)
PO2 ARTERIAL: 93 MMHG (ref 75–100)
PO2 ARTERIAL: 94.3 MMHG (ref 75–100)
POC CALCIUM: 0.98 MMOL/L (ref 1.12–1.32)
POC CALCIUM: 1.04 MMOL/L (ref 1.12–1.32)
POC CALCIUM: 1.05 MMOL/L (ref 1.12–1.32)
POC CALCIUM: 1.06 MMOL/L (ref 1.12–1.32)
POC CALCIUM: 1.07 MMOL/L (ref 1.12–1.32)
POC CALCIUM: 1.07 MMOL/L (ref 1.12–1.32)
POC CALCIUM: 1.08 MMOL/L (ref 1.12–1.32)
POC CALCIUM: 1.08 MMOL/L (ref 1.12–1.32)
POC CALCIUM: 1.09 MMOL/L (ref 1.12–1.32)
POC CALCIUM: 1.1 MMOL/L (ref 1.12–1.32)
POC CALCIUM: 1.1 MMOL/L (ref 1.12–1.32)
POC CALCIUM: 1.12 MMOL/L (ref 1.12–1.32)
POC CALCIUM: 1.13 MMOL/L (ref 1.12–1.32)
POC CHLORIDE: 109 MMOL/L (ref 98–109)
POC CHLORIDE: 109 MMOL/L (ref 98–109)
POC CHLORIDE: 110 MMOL/L (ref 98–109)
POC CHLORIDE: 110 MMOL/L (ref 98–109)
POC CHLORIDE: 111 MMOL/L (ref 98–109)
POC CHLORIDE: 112 MMOL/L (ref 98–109)
POC CHLORIDE: 113 MMOL/L (ref 98–109)
POC CHLORIDE: 115 MMOL/L (ref 98–109)
POC CHLORIDE: 116 MMOL/L (ref 98–109)
POC CREATININE: 1.2 MG/DL (ref 0.9–1.3)
POC CREATININE: 1.3 MG/DL (ref 0.9–1.3)
POC CREATININE: 1.4 MG/DL (ref 0.9–1.3)
POC CREATININE: 1.5 MG/DL (ref 0.9–1.3)
POC CREATININE: 1.5 MG/DL (ref 0.9–1.3)
POLYCHROMASIA: ABNORMAL
POTASSIUM SERPL-SCNC: 3.4 MMOL/L (ref 3.5–4.5)
POTASSIUM SERPL-SCNC: 3.7 MMOL/L (ref 3.5–4.5)
POTASSIUM SERPL-SCNC: 3.8 MMOL/L (ref 3.5–4.5)
POTASSIUM SERPL-SCNC: 3.8 MMOL/L (ref 3.6–5.1)
POTASSIUM SERPL-SCNC: 3.9 MMOL/L (ref 3.5–4.5)
POTASSIUM SERPL-SCNC: 4 MMOL/L (ref 3.5–4.5)
POTASSIUM SERPL-SCNC: 4.1 MMOL/L (ref 3.5–4.5)
POTASSIUM SERPL-SCNC: 4.1 MMOL/L (ref 3.5–4.5)
POTASSIUM SERPL-SCNC: 4.2 MMOL/L (ref 3.5–4.5)
POTASSIUM SERPL-SCNC: 4.2 MMOL/L (ref 3.5–5.1)
POTASSIUM SERPL-SCNC: 4.3 MMOL/L (ref 3.5–4.5)
POTASSIUM SERPL-SCNC: 4.3 MMOL/L (ref 3.5–5.1)
POTASSIUM SERPL-SCNC: 4.4 MMOL/L (ref 3.5–4.5)
POTASSIUM SERPL-SCNC: 4.7 MMOL/L (ref 3.5–4.5)
PROTHROMBIN TIME: 15.4 SECONDS (ref 11.7–14.5)
PROTHROMBIN TIME: 16.8 SECONDS (ref 11.7–14.5)
RBC # BLD: 2.89 M/CU MM (ref 4.6–6.2)
RBC # BLD: 3.04 M/CU MM (ref 4.6–6.2)
RBC # BLD: 3.11 M/CU MM (ref 4.6–6.2)
SEGMENTED NEUTROPHILS ABSOLUTE COUNT: 10.8 K/CU MM
SEGMENTED NEUTROPHILS ABSOLUTE COUNT: 8.9 K/CU MM
SEGMENTED NEUTROPHILS ABSOLUTE COUNT: 9.9 K/CU MM
SEGMENTED NEUTROPHILS RELATIVE PERCENT: 57 % (ref 36–66)
SEGMENTED NEUTROPHILS RELATIVE PERCENT: 70 % (ref 36–66)
SEGMENTED NEUTROPHILS RELATIVE PERCENT: 77.7 % (ref 36–66)
SODIUM BLD-SCNC: 142 MMOL/L (ref 135–145)
SODIUM BLD-SCNC: 143 MMOL/L (ref 138–146)
SODIUM BLD-SCNC: 144 MMOL/L (ref 138–146)
SODIUM BLD-SCNC: 145 MMOL/L (ref 138–146)
SODIUM BLD-SCNC: 146 MMOL/L (ref 136–145)
SODIUM BLD-SCNC: 146 MMOL/L (ref 138–146)
SODIUM BLD-SCNC: 147 MMOL/L (ref 138–146)
SODIUM BLD-SCNC: 147 MMOL/L (ref 138–146)
SODIUM BLD-SCNC: 148 MMOL/L (ref 138–146)
SOURCE, BLOOD GAS: ABNORMAL
STATUS: NORMAL
TOTAL IMMATURE NEUTOROPHIL: 0.41 K/CU MM
TOTAL NUCLEATED RBC: 0 K/CU MM
TOTAL PROTEIN: 4.7 GM/DL (ref 6.4–8.2)
TRANSFUSION STATUS: NORMAL
UNIT DIVISION: 0
UNIT NUMBER: NORMAL
WBC # BLD: 12.7 K/CU MM (ref 4–10.5)
WBC # BLD: 13.8 K/CU MM (ref 4–10.5)
WBC # BLD: 17.3 K/CU MM (ref 4–10.5)

## 2022-09-23 PROCEDURE — 82962 GLUCOSE BLOOD TEST: CPT

## 2022-09-23 PROCEDURE — 7100000001 HC PACU RECOVERY - ADDTL 15 MIN

## 2022-09-23 PROCEDURE — 71045 X-RAY EXAM CHEST 1 VIEW: CPT

## 2022-09-23 PROCEDURE — 2580000003 HC RX 258

## 2022-09-23 PROCEDURE — 82803 BLOOD GASES ANY COMBINATION: CPT

## 2022-09-23 PROCEDURE — 85014 HEMATOCRIT: CPT

## 2022-09-23 PROCEDURE — 93325 DOPPLER ECHO COLOR FLOW MAPG: CPT | Performed by: INTERNAL MEDICINE

## 2022-09-23 PROCEDURE — 2000000000 HC ICU R&B

## 2022-09-23 PROCEDURE — 6360000002 HC RX W HCPCS

## 2022-09-23 PROCEDURE — 6360000002 HC RX W HCPCS: Performed by: FAMILY MEDICINE

## 2022-09-23 PROCEDURE — 94003 VENT MGMT INPAT SUBQ DAY: CPT

## 2022-09-23 PROCEDURE — 2700000000 HC OXYGEN THERAPY PER DAY

## 2022-09-23 PROCEDURE — 82565 ASSAY OF CREATININE: CPT

## 2022-09-23 PROCEDURE — 6370000000 HC RX 637 (ALT 250 FOR IP)

## 2022-09-23 PROCEDURE — 94640 AIRWAY INHALATION TREATMENT: CPT

## 2022-09-23 PROCEDURE — 82330 ASSAY OF CALCIUM: CPT

## 2022-09-23 PROCEDURE — 85384 FIBRINOGEN ACTIVITY: CPT

## 2022-09-23 PROCEDURE — 7100000000 HC PACU RECOVERY - FIRST 15 MIN

## 2022-09-23 PROCEDURE — 2500000003 HC RX 250 WO HCPCS: Performed by: THORACIC SURGERY (CARDIOTHORACIC VASCULAR SURGERY)

## 2022-09-23 PROCEDURE — 85018 HEMOGLOBIN: CPT

## 2022-09-23 PROCEDURE — 85610 PROTHROMBIN TIME: CPT

## 2022-09-23 PROCEDURE — 99233 SBSQ HOSP IP/OBS HIGH 50: CPT | Performed by: PHYSICIAN ASSISTANT

## 2022-09-23 PROCEDURE — 83605 ASSAY OF LACTIC ACID: CPT

## 2022-09-23 PROCEDURE — B24BZZ4 ULTRASONOGRAPHY OF HEART WITH AORTA, TRANSESOPHAGEAL: ICD-10-PCS | Performed by: INTERNAL MEDICINE

## 2022-09-23 PROCEDURE — 6360000002 HC RX W HCPCS: Performed by: THORACIC SURGERY (CARDIOTHORACIC VASCULAR SURGERY)

## 2022-09-23 PROCEDURE — 84132 ASSAY OF SERUM POTASSIUM: CPT

## 2022-09-23 PROCEDURE — 80051 ELECTROLYTE PANEL: CPT

## 2022-09-23 PROCEDURE — A4216 STERILE WATER/SALINE, 10 ML: HCPCS

## 2022-09-23 PROCEDURE — 6370000000 HC RX 637 (ALT 250 FOR IP): Performed by: THORACIC SURGERY (CARDIOTHORACIC VASCULAR SURGERY)

## 2022-09-23 PROCEDURE — 2580000003 HC RX 258: Performed by: THORACIC SURGERY (CARDIOTHORACIC VASCULAR SURGERY)

## 2022-09-23 PROCEDURE — 93312 ECHO TRANSESOPHAGEAL: CPT

## 2022-09-23 PROCEDURE — 36415 COLL VENOUS BLD VENIPUNCTURE: CPT

## 2022-09-23 PROCEDURE — 85007 BL SMEAR W/DIFF WBC COUNT: CPT

## 2022-09-23 PROCEDURE — 85027 COMPLETE CBC AUTOMATED: CPT

## 2022-09-23 PROCEDURE — 6360000002 HC RX W HCPCS: Performed by: PHYSICIAN ASSISTANT

## 2022-09-23 PROCEDURE — 2500000003 HC RX 250 WO HCPCS

## 2022-09-23 PROCEDURE — 85379 FIBRIN DEGRADATION QUANT: CPT

## 2022-09-23 PROCEDURE — P9047 ALBUMIN (HUMAN), 25%, 50ML: HCPCS

## 2022-09-23 PROCEDURE — 84295 ASSAY OF SERUM SODIUM: CPT

## 2022-09-23 PROCEDURE — 83735 ASSAY OF MAGNESIUM: CPT

## 2022-09-23 PROCEDURE — 94761 N-INVAS EAR/PLS OXIMETRY MLT: CPT

## 2022-09-23 PROCEDURE — C9113 INJ PANTOPRAZOLE SODIUM, VIA: HCPCS

## 2022-09-23 PROCEDURE — 93312 ECHO TRANSESOPHAGEAL: CPT | Performed by: INTERNAL MEDICINE

## 2022-09-23 PROCEDURE — 80053 COMPREHEN METABOLIC PANEL: CPT

## 2022-09-23 PROCEDURE — 85730 THROMBOPLASTIN TIME PARTIAL: CPT

## 2022-09-23 PROCEDURE — 85025 COMPLETE CBC W/AUTO DIFF WBC: CPT

## 2022-09-23 RX ORDER — DEXMEDETOMIDINE HYDROCHLORIDE 4 UG/ML
.1-1.5 INJECTION, SOLUTION INTRAVENOUS CONTINUOUS
Status: DISCONTINUED | OUTPATIENT
Start: 2022-09-23 | End: 2022-09-23

## 2022-09-23 RX ORDER — ASPIRIN 81 MG/1
81 TABLET, CHEWABLE ORAL DAILY
Status: DISCONTINUED | OUTPATIENT
Start: 2022-09-23 | End: 2022-09-24 | Stop reason: HOSPADM

## 2022-09-23 RX ORDER — MORPHINE SULFATE 2 MG/ML
2 INJECTION, SOLUTION INTRAMUSCULAR; INTRAVENOUS
Status: DISCONTINUED | OUTPATIENT
Start: 2022-09-23 | End: 2022-09-23

## 2022-09-23 RX ORDER — BUMETANIDE 0.25 MG/ML
1 INJECTION, SOLUTION INTRAMUSCULAR; INTRAVENOUS ONCE
Status: COMPLETED | OUTPATIENT
Start: 2022-09-23 | End: 2022-09-23

## 2022-09-23 RX ORDER — MIDAZOLAM HYDROCHLORIDE 2 MG/2ML
6 INJECTION, SOLUTION INTRAMUSCULAR; INTRAVENOUS ONCE
Status: COMPLETED | OUTPATIENT
Start: 2022-09-23 | End: 2022-09-23

## 2022-09-23 RX ORDER — METOLAZONE 5 MG/1
5 TABLET ORAL ONCE
Status: COMPLETED | OUTPATIENT
Start: 2022-09-23 | End: 2022-09-23

## 2022-09-23 RX ORDER — CALCIUM GLUCONATE 20 MG/ML
2000 INJECTION, SOLUTION INTRAVENOUS PRN
Status: ACTIVE | OUTPATIENT
Start: 2022-09-23 | End: 2022-09-24

## 2022-09-23 RX ORDER — CALCIUM GLUCONATE 20 MG/ML
1000 INJECTION, SOLUTION INTRAVENOUS PRN
Status: DISPENSED | OUTPATIENT
Start: 2022-09-23 | End: 2022-09-24

## 2022-09-23 RX ORDER — MORPHINE SULFATE 2 MG/ML
1 INJECTION, SOLUTION INTRAMUSCULAR; INTRAVENOUS
Status: DISCONTINUED | OUTPATIENT
Start: 2022-09-23 | End: 2022-09-24 | Stop reason: HOSPADM

## 2022-09-23 RX ORDER — DEXMEDETOMIDINE HYDROCHLORIDE 4 UG/ML
.1-1.5 INJECTION, SOLUTION INTRAVENOUS CONTINUOUS
Status: DISCONTINUED | OUTPATIENT
Start: 2022-09-23 | End: 2022-09-24 | Stop reason: HOSPADM

## 2022-09-23 RX ORDER — MORPHINE SULFATE 2 MG/ML
2 INJECTION, SOLUTION INTRAMUSCULAR; INTRAVENOUS
Status: DISCONTINUED | OUTPATIENT
Start: 2022-09-23 | End: 2022-09-24 | Stop reason: HOSPADM

## 2022-09-23 RX ADMIN — BUMETANIDE 1 MG: 0.25 INJECTION, SOLUTION INTRAMUSCULAR; INTRAVENOUS at 03:15

## 2022-09-23 RX ADMIN — MIDAZOLAM HYDROCHLORIDE 6 MG: 1 INJECTION, SOLUTION INTRAMUSCULAR; INTRAVENOUS at 13:02

## 2022-09-23 RX ADMIN — CALCIUM GLUCONATE 1000 MG: 20 INJECTION, SOLUTION INTRAVENOUS at 14:53

## 2022-09-23 RX ADMIN — PROPOFOL 30 MCG/KG/MIN: 10 INJECTION, EMULSION INTRAVENOUS at 02:30

## 2022-09-23 RX ADMIN — FENTANYL CITRATE 25 MCG: 50 INJECTION, SOLUTION INTRAMUSCULAR; INTRAVENOUS at 04:05

## 2022-09-23 RX ADMIN — DEXMEDETOMIDINE HYDROCHLORIDE 0.1 MCG/KG/HR: 4 INJECTION, SOLUTION INTRAVENOUS at 10:47

## 2022-09-23 RX ADMIN — PROPOFOL 30 MCG/KG/MIN: 10 INJECTION, EMULSION INTRAVENOUS at 09:37

## 2022-09-23 RX ADMIN — CALCIUM GLUCONATE 1000 MG: 20 INJECTION, SOLUTION INTRAVENOUS at 09:31

## 2022-09-23 RX ADMIN — PROPOFOL 30 MCG/KG/MIN: 10 INJECTION, EMULSION INTRAVENOUS at 17:54

## 2022-09-23 RX ADMIN — CALCIUM GLUCONATE 1000 MG: 20 INJECTION, SOLUTION INTRAVENOUS at 01:00

## 2022-09-23 RX ADMIN — POTASSIUM CHLORIDE 20 MEQ: 400 INJECTION, SOLUTION INTRAVENOUS at 10:40

## 2022-09-23 RX ADMIN — FENTANYL CITRATE 50 MCG: 50 INJECTION, SOLUTION INTRAMUSCULAR; INTRAVENOUS at 05:09

## 2022-09-23 RX ADMIN — ALBUMIN (HUMAN) 25 G: 0.25 INJECTION, SOLUTION INTRAVENOUS at 11:54

## 2022-09-23 RX ADMIN — MILRINONE LACTATE 0.25 MCG/KG/MIN: 200 INJECTION, SOLUTION INTRAVENOUS at 07:14

## 2022-09-23 RX ADMIN — ALBUMIN (HUMAN) 25 G: 0.25 INJECTION, SOLUTION INTRAVENOUS at 14:40

## 2022-09-23 RX ADMIN — EPINEPHRINE 5 MCG/MIN: 1 INJECTION INTRAMUSCULAR; INTRAVENOUS; SUBCUTANEOUS at 20:45

## 2022-09-23 RX ADMIN — PANTOPRAZOLE SODIUM 40 MG: 40 INJECTION, POWDER, FOR SOLUTION INTRAVENOUS at 01:06

## 2022-09-23 RX ADMIN — PROPOFOL 30 MCG/KG/MIN: 10 INJECTION, EMULSION INTRAVENOUS at 20:51

## 2022-09-23 RX ADMIN — VASOPRESSIN 0.04 UNITS/MIN: 20 INJECTION INTRAVENOUS at 18:24

## 2022-09-23 RX ADMIN — VANCOMYCIN HYDROCHLORIDE 1000 MG: 1 INJECTION, POWDER, LYOPHILIZED, FOR SOLUTION INTRAVENOUS at 11:02

## 2022-09-23 RX ADMIN — Medication 10 MCG/MIN: at 09:09

## 2022-09-23 RX ADMIN — IRON SUCROSE 100 MG: 20 INJECTION, SOLUTION INTRAVENOUS at 09:44

## 2022-09-23 RX ADMIN — DOBUTAMINE HYDROCHLORIDE 3 MCG/KG/MIN: 200 INJECTION INTRAVENOUS at 09:34

## 2022-09-23 RX ADMIN — EPINEPHRINE 10 MCG/MIN: 1 INJECTION INTRAMUSCULAR; INTRAVENOUS; SUBCUTANEOUS at 09:05

## 2022-09-23 RX ADMIN — POTASSIUM CHLORIDE 20 MEQ: 400 INJECTION, SOLUTION INTRAVENOUS at 19:08

## 2022-09-23 RX ADMIN — SODIUM CHLORIDE, PRESERVATIVE FREE 10 ML: 5 INJECTION INTRAVENOUS at 21:18

## 2022-09-23 RX ADMIN — PROPOFOL 30 MCG/KG/MIN: 10 INJECTION, EMULSION INTRAVENOUS at 13:42

## 2022-09-23 RX ADMIN — FENTANYL CITRATE 25 MCG: 50 INJECTION, SOLUTION INTRAMUSCULAR; INTRAVENOUS at 02:32

## 2022-09-23 RX ADMIN — AMIODARONE HYDROCHLORIDE 0.5 MG/MIN: 50 INJECTION, SOLUTION INTRAVENOUS at 19:42

## 2022-09-23 RX ADMIN — BUDESONIDE AND FORMOTEROL FUMARATE DIHYDRATE 2 PUFF: 160; 4.5 AEROSOL RESPIRATORY (INHALATION) at 21:14

## 2022-09-23 RX ADMIN — METOLAZONE 5 MG: 5 TABLET ORAL at 21:18

## 2022-09-23 RX ADMIN — CALCIUM GLUCONATE 1000 MG: 20 INJECTION, SOLUTION INTRAVENOUS at 18:26

## 2022-09-23 RX ADMIN — MORPHINE SULFATE 2 MG: 2 INJECTION, SOLUTION INTRAMUSCULAR; INTRAVENOUS at 15:57

## 2022-09-23 RX ADMIN — MORPHINE SULFATE 2 MG: 2 INJECTION, SOLUTION INTRAMUSCULAR; INTRAVENOUS at 10:38

## 2022-09-23 RX ADMIN — VANCOMYCIN HYDROCHLORIDE 1000 MG: 1 INJECTION, POWDER, LYOPHILIZED, FOR SOLUTION INTRAVENOUS at 21:31

## 2022-09-23 RX ADMIN — DEXMEDETOMIDINE HYDROCHLORIDE 0.4 MCG/KG/HR: 4 INJECTION, SOLUTION INTRAVENOUS at 18:09

## 2022-09-23 RX ADMIN — VASOPRESSIN 0.04 UNITS/MIN: 20 INJECTION INTRAVENOUS at 00:32

## 2022-09-23 RX ADMIN — VASOPRESSIN 0.04 UNITS/MIN: 20 INJECTION INTRAVENOUS at 09:07

## 2022-09-23 RX ADMIN — BUMETANIDE 1 MG: 0.25 INJECTION, SOLUTION INTRAMUSCULAR; INTRAVENOUS at 21:17

## 2022-09-23 RX ADMIN — ALBUTEROL SULFATE 2 PUFF: 90 AEROSOL, METERED RESPIRATORY (INHALATION) at 03:31

## 2022-09-23 RX ADMIN — POTASSIUM CHLORIDE 20 MEQ: 400 INJECTION, SOLUTION INTRAVENOUS at 09:32

## 2022-09-23 RX ADMIN — POTASSIUM CHLORIDE 20 MEQ: 400 INJECTION, SOLUTION INTRAVENOUS at 13:43

## 2022-09-23 RX ADMIN — AMIODARONE HYDROCHLORIDE 0.5 MG/MIN: 50 INJECTION, SOLUTION INTRAVENOUS at 02:30

## 2022-09-23 RX ADMIN — PANTOPRAZOLE SODIUM 40 MG: 40 INJECTION, POWDER, FOR SOLUTION INTRAVENOUS at 09:44

## 2022-09-23 RX ADMIN — POTASSIUM CHLORIDE 20 MEQ: 400 INJECTION, SOLUTION INTRAVENOUS at 20:36

## 2022-09-23 RX ADMIN — SODIUM CHLORIDE 9.5 UNITS/HR: 9 INJECTION, SOLUTION INTRAVENOUS at 08:59

## 2022-09-23 ASSESSMENT — PULMONARY FUNCTION TESTS
PIF_VALUE: 26
PIF_VALUE: 24
PIF_VALUE: 26
PIF_VALUE: 25
PIF_VALUE: 22
PIF_VALUE: 24
PIF_VALUE: 25
PIF_VALUE: 25
PIF_VALUE: 29
PIF_VALUE: 24
PIF_VALUE: 23
PIF_VALUE: 26
PIF_VALUE: 26
PIF_VALUE: 25
PIF_VALUE: 25
PIF_VALUE: 24
PIF_VALUE: 25
PIF_VALUE: 25
PIF_VALUE: 22
PIF_VALUE: 27
PIF_VALUE: 22
PIF_VALUE: 24
PIF_VALUE: 25
PIF_VALUE: 25
PIF_VALUE: 24
PIF_VALUE: 24
PIF_VALUE: 26
PIF_VALUE: 24
PIF_VALUE: 23
PIF_VALUE: 23
PIF_VALUE: 20
PIF_VALUE: 27
PIF_VALUE: 29
PIF_VALUE: 26
PIF_VALUE: 23
PIF_VALUE: 24
PIF_VALUE: 23
PIF_VALUE: 26
PIF_VALUE: 22
PIF_VALUE: 28
PIF_VALUE: 26
PIF_VALUE: 24
PIF_VALUE: 25
PIF_VALUE: 26
PIF_VALUE: 29
PIF_VALUE: 24
PIF_VALUE: 27
PIF_VALUE: 22
PIF_VALUE: 28

## 2022-09-23 ASSESSMENT — ENCOUNTER SYMPTOMS
SHORTNESS OF BREATH: 1
ABDOMINAL PAIN: 0
BACK PAIN: 0
WHEEZING: 0
COUGH: 0
COLOR CHANGE: 0
CONSTIPATION: 0
DIARRHEA: 0
VOMITING: 0
PHOTOPHOBIA: 0
BLOOD IN STOOL: 0
NAUSEA: 0
CHEST TIGHTNESS: 0
EYE PAIN: 0

## 2022-09-23 NOTE — PROGRESS NOTES
Cardiothoracic and Vascular Surgery  Daily Progress Note  Today's Date: 09/23/22    Patient's Name: Letty Rodriguez  YOB: 1950  MRN: [id number]  Code Status: Full Code       SUBJECTIVE/UPDATES   Overnight:  S/p CABG (SVG-RCA, LIMA-LAD), MAZE, IABP insertion, temporary sternal closure dressing on 9/22/22. Transferred to ICU in critical condition. Gtts: Epi, levo, dobutamine, norepi, amiodarone, IVF: banana bag. Insulin infusion. OBJECTIVE     PHYSICAL EXAM  Gen: no acute distress, intubated and sedated, in critical condition  Neuro: sedated with propofol and precedex, intermittently moving extremities x4, not following commands  HEENT: normocephalic, atraumatic, sclera anicteric, ETT in place  Cardio: HR 80, AV paced via temp pacer wires. Vasopressors infusing (see below) for MAP>70. IABP in place 1:1 (R fem)  Pulm: diminished bilaterally, mechanical ventilation. FiO2 100%. Chest tube in place with dark bloody output, connected to wall suction  GI: mildly distended, OG to suction with minimal output. : dark red/brown urine in Gonzalez bag  MSK: extremities: no focal deficits. DP/PT pulses 2+bilaterally. No BLE edema  Skin:sternal incision remains open with temporary dressing for closure. Betadine soaked lap pads+ioban for dressing. No obvious signs bleeding at this time. ASSESSMENT AND PLAN   Multivessel coronary artery disease, atrial fibrillation  9/22/22: S/p CABG (SVG-RCA, LIMA-LAD), MAZE, EVH LLE, IABP insertion, temporary sternal closure dressing  Plan to return to OR on 9/24 for sternal washout and possible closure. Sedation:   Propofol max 30 mcgs  Precedex as needed for ongoing agitation  Chest tube management:  Continue chest tube to continuous wall suction. Monitor hourly output.  since OR: 200cc. Pain management   Fentanyl 25mcgs PRN  Morphine 1-2mg Q2H PRN  Labs:  CBC,CMP, LA daily at 4am.  ABG+full panel labs Q2 and PRN. Maintain K+ 4.1-4.9. Maintain Mag 2.0-2.9.

## 2022-09-23 NOTE — PROGRESS NOTES
Progress Note( Dr. Nyla Durham)  9/22/2022  Subjective:   Admit Date: 9/15/2022  PCP: ANKITA Espino CNP    Admitted For :  Chest pain has history of CAD had cardiac cath three-vessel disease  For CABG 9/19/2022    Consulted For: Better control of blood glucose    Interval History:Better control of blood glucose  Patient is surgery is postponed till today there was some scheduling problems  Did not go for CABG yesterday was plan but went today  Came back from OR is a long process procedure took about 12 hours  Is very complicated course according to the surgery note  Since chest wall was not closed left open  Patient still on balloon pump  Patient is sedated intubated on ventilator    Denies any chest pains,   Mid SOB . Denies nausea or vomiting. No new bowel or bladder symptoms. Intake/Output Summary (Last 24 hours) at 9/22/2022 2306  Last data filed at 9/22/2022 1938  Gross per 24 hour   Intake 4619.85 ml   Output 2800 ml   Net 1819.85 ml         DATA    CBC:   Recent Labs     09/20/22  0703 09/21/22  0625 09/22/22  0902 09/22/22 1948 09/22/22 2034 09/22/22 2113   WBC 9.3 8.4  --  17.3*  --   --    HGB 12.7* 12.4*   < > 10.9* 10.1* 10.5*    194  --  156  --   --     < > = values in this interval not displayed. CMP:  Recent Labs     09/20/22  0703 09/21/22  0625 09/22/22  0902 09/22/22 1948 09/22/22 2034 09/22/22  2113    140   < > 143 144 148*   K 3.8 4.1   < > 3.8 3.7 3.9    107  --  110  --   --    CO2 23 21   < > 23 25 25   BUN 18 18  --  18  --   --    CREATININE 0.8* 0.8*   < > 1.0 1.2 1.2   CALCIUM 8.3 8.5  --  7.2*  --   --    PROT 6.7 6.7  --  4.4*  --   --    LABALBU 4.0 4.1  --  2.5*  --   --    BILITOT 0.5 0.7  --  1.0  --   --    ALKPHOS 98 97  --  54  --   --    AST 19 15  --  54*  --   --    ALT 20 20  --  15  --   --     < > = values in this interval not displayed.        Lipids:   Lab Results   Component Value Date/Time    CHOL 152 04/14/2021 10:02 AM patent with measurements ranging from 2.1 to   5.2 mmmm as discussed above. Left greater saphenous vein is patent with measurements ranging from 2.7 to   8.5 mm as discussed above. RECOMMENDATIONS:   Unavailable         Vascular carotid duplex bilateral   Final Result   The right internal carotid artery demonstrates 0-50% stenosis. The left internal carotid artery demonstrates 0-50% stenosis. Bilateral vertebral arteries are patent with flow in the normal direction. Chest x-ray:      Cardiomegaly and congestion but no evidence of pulmonary edema or focal   infiltrates. No active of pleural disease. RECOMMENDATIONS:   Unavailable         XR CHEST PORTABLE   Final Result   No acute process.          XR CHEST PORTABLE    (Results Pending)        Scheduled Medicines   Medications:    aspirin  81 mg Oral Daily    [START ON 9/24/2022] clopidogrel  75 mg Oral Daily    sennosides-docusate sodium  1 tablet Oral BID    pantoprazole (PROTONIX) 40 mg injection  40 mg IntraVENous Daily    lidocaine  1 patch TransDERmal Daily    sodium chloride flush  5-40 mL IntraVENous 2 times per day    vancomycin  1,000 mg IntraVENous Q12H    albuterol sulfate HFA  2 puff Inhalation Q6H    budesonide-formoterol  2 puff Inhalation BID    [Held by provider] potassium chloride  40 mEq Oral Daily with breakfast    [Held by provider] magnesium oxide  200 mg Oral BID    [Held by provider] insulin lispro  0-8 Units SubCUTAneous 2 times per day    [Held by provider] insulin glargine  50 Units SubCUTAneous Nightly    [Held by provider] glipiZIDE  5 mg Oral Daily    [Held by provider] fluticasone  2 spray Each Nostril Daily    [Held by provider] furosemide  40 mg Oral Nightly    [Held by provider] digoxin  125 mcg Oral Daily    [Held by provider] cetirizine  10 mg Oral Daily    [Held by provider] DULoxetine  60 mg Oral Daily    [Held by provider] allopurinol  100 mg Oral BID    [Held by provider] insulin lispro  0-8 Units SubCUTAneous TID WC      Infusions:    sodium chloride      propofol 20 mcg/kg/min (09/22/22 1935)    DOBUTamine 1.124 mcg/kg/min (09/22/22 1744)    norepinephrine 10 mcg/min (09/22/22 1920)    EPINEPHrine 8 mcg/min (09/22/22 1920)    nitroGLYCERIN      insulin 5.1 Units/hr (09/22/22 2219)    dextrose      DOBUTamine 0.225 mcg/kg/min (09/22/22 1920)    amiodarone 450mg/250ml D5W infusion 0.5 mg/min (09/22/22 2130)    milrinone 2.5 mcg/kg/min (09/22/22 1920)    sodium chloride      vasopressin (Septic Shock) infusion 0.02 Units/min (09/22/22 1920)    sodium chloride           Objective:   Vitals: BP (!) 135/35   Pulse 92   Temp 98.1 °F (36.7 °C) (Oral)   Resp (!) 0   Ht 6' 2\" (1.88 m)   Wt (!) (P) 325 lb 9.9 oz (147.7 kg)   SpO2 94%   BMI (P) 41.81 kg/m²   General appearance: Is sedated intubated on ventilator  Neck: no JVD or bruit  Thyroid : Normal lobes   Lungs: Has Vesicular Breath sounds   Heart: Atrial fibrillation past chest wall after surgery was not closed yet  Abdomen: soft, non-tender; bowel sounds normal; no masses,  no organomegaly  Musculoskeletal: Normal  Extremities: extremities normal, , no edema  Neurologic: Is sedated intubated and on ventilator also on balloon pump    Assessment:     Patient Active Problem List:     Atrial fibrillation (HCC)     CAD (coronary artery disease)     Morbid obesity (HCC)     COPD (chronic obstructive pulmonary disease) (HCC)     Sleep apnea     Type 2 diabetes mellitus (HCC)     Thyroid disease     Hyperlipidemia     Congestive heart failure, unspecified HF chronicity, unspecified heart failure type (HCC)     Coronary artery disease involving native coronary artery of native heart with angina pectoris (HCC)     Chronic renal disease, stage III ([facility] [address] [[id number]]     Hypertension     CAD, multiple vessel disease      Plan:     Reviewed POC blood glucose .  Labs and X ray results   Reviewed Current Medicines   Started on post CABG surgery protocol including IV insulin infusion drip  Monitor Blood glucose frequently   Modified  the dose of Insulin/ other medicines as needed   Will follow     .      Bethany Bernard MD, MD

## 2022-09-23 NOTE — PROGRESS NOTES
[address] [facility]  consulted by FUNMI Moscoso for monitoring and adjustment. Indication for treatment: Surgical prophylaxis  Goal trough: [] 10-15 mcg/mL or [x] 15-20 mcg/ml  AUC/MT: [] <500 or [x] 400-600    Pertinent Laboratory Values:   Temp Readings from Last 3 Encounters:   09/22/22 98.1 °F (36.7 °C) (Oral)   09/12/22 98.2 °F (36.8 °C) (Oral)   12/02/21 97.9 °F (36.6 °C) (Oral)     Recent Labs     09/20/22  0703 09/21/22  0625   WBC 9.3 8.4     Recent Labs     09/20/22  0703 09/21/22  0625 09/22/22  1007 09/22/22  1134 09/22/22  1233 09/22/22  1600   BUN 18 18  --   --   --   --    CREATININE 0.8* 0.8*   < > 0.9 1.0 1.0    < > = values in this interval not displayed. Estimated Creatinine Clearance: 104 mL/min (based on SCr of 1 mg/dL). Intake/Output Summary (Last 24 hours) at 9/22/2022 2033  Last data filed at 9/22/2022 1938  Gross per 24 hour   Intake 4679.85 ml   Output 2800 ml   Net 1879.85 ml       Vancomycin level:   TROUGH:  No results for input(s): VANCOTROUGH in the last 72 hours. RANDOM:  No results for input(s): VANCORANDOM in the last 72 hours. Assessment:  SCr, BUN, and urine output:  WNL  Day(s) of therapy: 1 (last dose 9/24 @ 0900)  Vancomycin concentration: none    Plan:  Vancomycin 1000mg IV every 12 hours x 48 hours for surgical prophylaxis  No levels ordered due to short course of therapy  Pharmacy will continue to monitor patient and adjust therapy as indicated      Thank you for the consult.   Venessa Bianchi, [facility]  9/22/2022 8:33 PM

## 2022-09-23 NOTE — FLOWSHEET NOTE
Pt. Waking up. Drowsy. Restless. Reaching for ett with left hand. Following commands. Emotional support given. Fentanyl 25 mcg ivp given. Will continue to monitor

## 2022-09-23 NOTE — CONSULTS
Comprehensive Nutrition Assessment    Type and Reason for Visit:  Consult    Nutrition Recommendations/Plan:   EN Order: Vital HP @ 80 ml/hr which will provide ~2652 kcal (including kcal from propofol) and 168 g protein  Advance to goal rate as tolerated  Will closely monitor GI tolerance, nutrition status, respiratory status, poc     Malnutrition Assessment:  Malnutrition Status:  No malnutrition (09/22/22 1040)    Context:  Acute Illness       Nutrition Assessment:    s/p CABG, pt sedated on vent, +vasopressin ggt, +humulin ggt, +dobutrex ggt, +OGT, dietiitan consult for tube feed order and manage, will follow at high nutrition risk    Nutrition Related Findings:    K+ 3.4, H/H: 7.5/22.0 Wound Type: Surgical Incision       Current Nutrition Intake & Therapies:    Average Meal Intake: NPO  Average Supplements Intake: NPO  Additional Calorie Sources:  Pt is receiving ~705 kcal fom current propofol rate    Anthropometric Measures:  Height: 6' 2.02\" (188 cm)  Ideal Body Weight (IBW): 190 lbs (86 kg)    Admission Body Weight: 330 lb 14.6 oz (150.1 kg) (stated)  Current Body Weight: 343 lb 4.1 oz (155.7 kg), 180.7 % IBW.  Weight Source: Bed Scale  Current BMI (kg/m2): 44.1  Usual Body Weight: 330 lb (149.7 kg)  % Weight Change (Calculated): -1.3  Weight Adjustment For: No Adjustment  BMI Categories: Obese Class 3 (BMI 40.0 or greater)    Estimated Daily Nutrient Needs:  Energy Requirements Based On: Formula  Weight Used for Energy Requirements: Current  Energy (kcal/day): 4834 ([address] [facility] 2010)  Weight Used for Protein Requirements: Ideal  Protein (g/day): 172 (2.0 g/kg)  Method Used for Fluid Requirements: 1 ml/kcal  Fluid (ml/day): 8063-9464    Nutrition Diagnosis:   Inadequate oral intake related to acute injury/trauma as evidenced by NPO or clear liquid status due to medical condition    Nutrition Interventions:   Food and/or Nutrient Delivery: Start Tube Feeding  Nutrition Education/Counseling: No recommendation at

## 2022-09-23 NOTE — PROGRESS NOTES
[address] SURGERY PROGRESS NOTE    Marilu Finch is a 71 y.o. male who is postop day # 1 status post cabg/ mod maze    Overnight events:low urine output                Subjective:    Pain: 0n vent/10  Diet: Diet NPO Exceptions are: Ice Chips, Sips of Water with Meds  ADULT TUBE FEEDING; Orogastric; Other Tube Feeding (specify); Jevity; Continuous; 25; No; 30; Q 4 hours  Activity: bed rest  Bowel movement: none          Objective:    Vitals: VITALS:  BP (!) 148/44   Pulse 80   Temp 98.4 °F (36.9 °C) (Core)   Resp 23   Ht 6' 2.02\" (1.88 m)   Wt (!) 343 lb 4.1 oz (155.7 kg)   SpO2 98%   BMI 44.05 kg/m²   TEMPERATURE:  Current - Temp: 98.4 °F (36.9 °C);  Max - Temp  Av °F (36.7 °C)  Min: 97.3 °F (36.3 °C)  Max: 98.8 °F (37.1 °C)    I/O:  0701 -  0700  In: 6606.5 [I.V.:3586.5]  Out: 3575 [Urine:1725]    Labs/Imaging Results:  Lab Results   Component Value Date    WBC 12.7 (H) 2022    HGB 7.5 (L) 2022    HCT 22.0 (L) 2022    MCV 99.0 2022     2022     Lab Results   Component Value Date/Time     2022 10:09 AM    K 3.4 2022 10:09 AM     2022 07:48 PM    CO2 20 2022 10:09 AM    BUN 18 2022 07:48 PM    CREATININE 1.2 2022 10:09 AM    CREATININE 1.0 2022 07:48 PM    GLUCOSE 142 2022 07:48 PM    CALCIUM 7.2 2022 07:48 PM          IV Fluids:   dexmedetomidine HCl in NaCl Last Rate: 0.1 mcg/kg/hr (22 1047)    sodium chloride    propofol Last Rate: 30 mcg/kg/min (22 0937)    DOBUTamine Last Rate: 3 mcg/kg/min (2234)    norepinephrine Last Rate: 10 mcg/min (22)    EPINEPHrine Last Rate: 10 mcg/min (22)    nitroGLYCERIN    insulin Last Rate: 6.63 Units/hr (22 1122)    dextrose    DOBUTamine Last Rate: 3 mcg/kg/min (22)    amiodarone 450mg/250ml D5W infusion Last Rate: 0.5 mg/min (22 09)    milrinone Last Rate: 0.25 mcg/kg/min (22 0900)    sodium chloride    vasopressin (Septic Shock) infusion Last Rate: 0.04 Units/min (09/23/22 0907)    sodium chloride    Scheduled Meds:   aspirin, 81 mg, Per NG tube, Daily    iron sucrose, 100 mg, IntraVENous, Daily    [START ON 9/24/2022] clopidogrel, 75 mg, Oral, Daily    [Held by provider] sennosides-docusate sodium, 1 tablet, Oral, BID    pantoprazole (PROTONIX) 40 mg injection, 40 mg, IntraVENous, Daily    lidocaine, 1 patch, TransDERmal, Daily    sodium chloride flush, 5-40 mL, IntraVENous, 2 times per day    vancomycin, 1,000 mg, IntraVENous, Q12H    albuterol sulfate HFA, 2 puff, Inhalation, Q6H    budesonide-formoterol, 2 puff, Inhalation, BID    [Held by provider] potassium chloride, 40 mEq, Oral, Daily with breakfast    [Held by provider] magnesium oxide, 200 mg, Oral, BID    [Held by provider] glipiZIDE, 5 mg, Oral, Daily    [Held by provider] fluticasone, 2 spray, Each Nostril, Daily    [Held by provider] digoxin, 125 mcg, Oral, Daily    [Held by provider] cetirizine, 10 mg, Oral, Daily    [Held by provider] DULoxetine, 60 mg, Oral, Daily    [Held by provider] allopurinol, 100 mg, Oral, BID    Physical Exam:      General appearance: No apparent distress, appears stated age and cooperative. Skin: Skin color normal.  No obvious rashes/ lesions. HEENT: Normocephalic, atraumatic without obvious deformity. EOMI   Neck: Supple, No JVD/bruits. Trachea midline   Lungs: Good respiratory effort. Clear bilaterally. Heart: Regular rate/rhythm   Abdomen: Soft, non-tender, non-distended   Extremities: No clubbing, cyanosis, bilaterally. Full range of motion without deformity. Neurologic: Grossly intact  Psych: Normal Affect      Assessment and Plan:        S/p cabg; maze; iabp, multiple inotropes    Disposition: supportive care.  Tried to reach brother on phone last night but not Chris Hyatt MD 9/23/2022 11:41 AM

## 2022-09-23 NOTE — PROGRESS NOTES
[address] [facility]  consulted by FUNMI Lobato for monitoring and adjustment. Indication for treatment: Surgical prophylaxis  Goal trough: [] 10-15 mcg/mL or [x] 15-20 mcg/ml  AUC/MT: [] <500 or [x] 400-600    Pertinent Laboratory Values:   Temp Readings from Last 3 Encounters:   09/23/22 98.4 °F (36.9 °C) (Core)   09/12/22 98.2 °F (36.8 °C) (Oral)   12/02/21 97.9 °F (36.6 °C) (Oral)     Recent Labs     09/22/22 1948 09/23/22  0035 09/23/22  0615   WBC 17.3* 13.8* 12.7*       Recent Labs     09/21/22  0625 09/22/22  1007 09/22/22 1948 09/22/22  2034 09/23/22  0615 09/23/22  0732 09/23/22  0919 09/23/22  1009 09/23/22  1112   BUN 18  --  18  --  22  --   --   --   --    CREATININE 0.8*   < > 1.0   < > 1.4*   < > 1.4* 1.2 1.4*    < > = values in this interval not displayed. Estimated Creatinine Clearance: 76 mL/min (A) (based on SCr of 1.4 mg/dL (H)). Intake/Output Summary (Last 24 hours) at 9/23/2022 1244  Last data filed at 9/23/2022 1200  Gross per 24 hour   Intake 7852.73 ml   Output 3375 ml   Net 4477.73 ml         Assessment:  Pt with WINIFRED  Day(s) of therapy: 2 (last dose 9/24 @ 0900)    Plan:  Vancomycin 1000mg IV every 12 hours x 48 hours for surgical prophylaxis  No levels ordered due to short course of therapy  Pharmacy will continue to monitor patient and adjust therapy as indicated      Thank you for the consult.   Lacie Herrera, PharmD, [facility]

## 2022-09-23 NOTE — PROGRESS NOTES
pulmonary      SUBJECTIVE:  he stays on full vent support     OBJECTIVE    VITALS:  BP (!) 128/45   Pulse 80   Temp 97.6 °F (36.4 °C) (Core)   Resp 20   Ht 6' 2.02\" (1.88 m)   Wt (!) 343 lb 4.1 oz (155.7 kg)   SpO2 100%   BMI 44.05 kg/m²   HEAD AND FACE EXAM:  No throat injection, no active exudate,no thrush  NECK EXAM;No JVD, no masses, symmetrical  CHEST EXAM; Expansion equal and symmetrical, no masses  LUNG EXAM; Good breath sounds bilaterally.  There are expiratory wheezes both lungs, there are crackles at both lung bases  CARDIOVASCULAR EXAM: Positive S1 and S2, no S3 or S4, no clicks ,no murmurs  RIGHT AND LEFT LOWER EXTRIMITY EXAM: No edema, no           LABS   Lab Results   Component Value Date    WBC 12.7 (H) 09/23/2022    HGB 7.5 (L) 09/23/2022    HCT 22.0 (L) 09/23/2022    MCV 99.0 09/23/2022     09/23/2022     Lab Results   Component Value Date    CREATININE 1.2 09/23/2022    BUN 18 09/22/2022     (H) 09/23/2022    K 3.4 (L) 09/23/2022     09/22/2022    CO2 20 (L) 09/23/2022     Lab Results   Component Value Date    INR 1.19 09/23/2022    PROTIME 15.4 (H) 09/23/2022        No results found for: PHOS     Recent Labs     09/23/22  0819 09/23/22  0919 09/23/22  1009   PH 7.41 7.41 7.39   PO2ART 80.4 76.0 65.1*   HGK0PFG 36.9 36.1 32.1   O2SAT 95.9* 95.3* 92.5*         Wt Readings from Last 3 Encounters:   09/23/22 (!) 343 lb 4.1 oz (155.7 kg)   09/09/22 (!) 331 lb (150.1 kg)   05/31/22 (!) 331 lb (150.1 kg)               ASSESMENT  Ac resp failure  Asthma  abilio        PLAN  On IABP  Cont full vent support for now will wean when overall condition improves  Pressors to maintain bp    9/23/2022  Elise Landon MD, M.D.

## 2022-09-23 NOTE — PLAN OF CARE
Received PT referral on POD0. Today, POD1, patient remains intubated. Will await extubation and beginning tolerance for nursing activity/mobility. Intend to complete eval as patient condition allows, potentially in POD2-3 range, will review chart daily.   Shahnaz [address] [place] [address] [id number]  11:01 AM 9/23/2022

## 2022-09-23 NOTE — PROGRESS NOTES
Progress Note( Dr. Sindy Canales)  9/23/2022  Subjective:   Admit Date: 9/15/2022  PCP: ANKITA Gallardo CNP    Admitted For :  Chest pain has history of CAD had cardiac cath three-vessel disease  For CABG 9/19/2022    Consulted For: Better control of blood glucose    Interval History:Better control of blood glucose  Patient is surgery is postponed till today there was some scheduling problems  CABG done on 9/22/2022  Postop day 1    Is very complicated course according to the surgery note  Since chest wall was not closed left open  Patient still on balloon pump  Patient is sedated intubated on ventilator    Denies any chest pains,   Mid SOB . Denies nausea or vomiting. No new bowel or bladder symptoms. Intake/Output Summary (Last 24 hours) at 9/23/2022 1750  Last data filed at 9/23/2022 1503  Gross per 24 hour   Intake 6520.8 ml   Output 4095 ml   Net 2425.8 ml         DATA    CBC:   Recent Labs     09/22/22 1948 09/22/22 2034 09/23/22  0035 09/23/22  0117 09/23/22  0615 09/23/22  0732 09/23/22  1433 09/23/22  1506 09/23/22  1638   WBC 17.3*  --  13.8*  --  12.7*  --   --   --   --    HGB 10.9*   < > 10.3*   < > 10.1*   < > 8.5* 8.6* 7.9*     --  151  --  160  --   --   --   --     < > = values in this interval not displayed.       CMP:  Recent Labs     09/21/22  0625 09/22/22  0902 09/22/22 1948 09/22/22 2034 09/23/22  0615 09/23/22  0732 09/23/22  1433 09/23/22  1506 09/23/22  1638      < > 143   < > 142   < > 144 145 144   K 4.1   < > 3.8   < > 4.2  4.3   < > 4.1 4.3 3.9     --  110  --  109  --   --   --   --    CO2 21   < > 23   < > 20*   < > 23 22 23   BUN 18  --  18  --  22  --   --   --   --    CREATININE 0.8*   < > 1.0   < > 1.4*   < > 1.3 1.2 1.4*   CALCIUM 8.5  --  7.2*  --  7.4*  --   --   --   --    PROT 6.7  --  4.4*  --  4.7*  --   --   --   --    LABALBU 4.1  --  2.5*  --  3.0*  --   --   --   --    BILITOT 0.7  --  1.0  --  0.6  --   --   --   --    ALKPHOS 97  -- 54  --  57  --   --   --   --    AST 15  --  54*  --  72*  --   --   --   --    ALT 20  --  15  --  16  --   --   --   --     < > = values in this interval not displayed. Lipids:   Lab Results   Component Value Date/Time    CHOL 152 04/14/2021 10:02 AM    CHOL 139 07/23/2018 08:01 AM    HDL 43 08/26/2022 10:39 AM    TRIG 212 04/14/2021 10:02 AM     Glucose:  Recent Labs     09/23/22  1433 09/23/22  1506 09/23/22  1638   POCGLU 101* 94 120*       RzalewzsllC0H:  Lab Results   Component Value Date/Time    LABA1C 7.5 09/15/2022 12:55 PM     High Sensitivity TSH:   Lab Results   Component Value Date/Time    TSHHS 3.930 09/17/2022 12:26 PM     Free T3: No results found for: FT3  Free T4:  Lab Results   Component Value Date/Time    T4FREE 1.14 09/17/2022 12:26 PM       XR CHEST PORTABLE   Preliminary Result   The tip of the endotracheal tube is at the T1 level near the thoracic inlet. There are 2 linear parallel air channels along the superior mediastinum which   are felt to be related to the patient's open sternotomy rather than   mediastinal air. There is cardiomegaly and mild vascular congestion. There atelectasis or   infiltrates in the lung bases with bilateral effusions, right greater than   left. XR CHEST PORTABLE   Final Result   Persistent small bilateral pleural effusions with likely overlying   atelectasis. Support lines and tubes appear unchanged. XR CHEST PORTABLE   Final Result   1. Endotracheal tube terminates 5.7 cm above the natalia. 2. [place] catheter tip in the pulmonary outflow tract. 3. Post sternotomy changes. New small right greater than left layering   pleural effusions with bibasilar atelectasis. XR ABDOMEN FOR NG/OG/NE TUBE PLACEMENT   Final Result   Enteric tube tip and side-port in the proximal stomach below the   gastroesophageal junction. CTA PULMONARY W CONTRAST   Final Result   1. No acute pulmonary embolism.    2. Main pulmonary artery dilatation can be seen with pulmonary hypertension   but is nonspecific. 3. No acute pulmonary disease. 4. Mild-to-moderate severity calcific atherosclerosis coronary arteries. 5. Cardiomegaly. CT CHEST WO CONTRAST   Final Result   1. No acute abnormality. 2.  Coronary artery disease.  DUP LOWER EXTREMITY VENOUS BILATERAL   Final Result   1. Difficulty with limited evaluation of the mid right femoral vein, though a   focal area of intraluminal echogenicity similar to that of the previous exam   is again noted felt to represent nonocclusive DVT. 2. No deep venous thrombosis seen elsewhere in the right lower extremity. 3. No left lower extremity DVT. 4. Left calf veins were not seen, with difficulty scanning overall per the   technologist          DUP LOWER EXTREMITY MAPPING BILAT VENOUS   Final Result   Right greater saphenous vein is patent with measurements ranging from 2.1 to   5.2 mmmm as discussed above. Left greater saphenous vein is patent with measurements ranging from 2.7 to   8.5 mm as discussed above. RECOMMENDATIONS:   Unavailable         Vascular carotid duplex bilateral   Final Result   The right internal carotid artery demonstrates 0-50% stenosis. The left internal carotid artery demonstrates 0-50% stenosis. Bilateral vertebral arteries are patent with flow in the normal direction. Chest x-ray:      Cardiomegaly and congestion but no evidence of pulmonary edema or focal   infiltrates. No active of pleural disease. RECOMMENDATIONS:   Unavailable         XR CHEST PORTABLE   Final Result   No acute process.          XR CHEST PORTABLE    (Results Pending)        Scheduled Medicines   Medications:    aspirin  81 mg Per NG tube Daily    iron sucrose  100 mg IntraVENous Daily    [START ON 9/24/2022] clopidogrel  75 mg Oral Daily    [Held by provider] sennosides-docusate sodium  1 tablet Oral BID    pantoprazole (PROTONIX) 40 mg injection  40 mg IntraVENous Daily    lidocaine  1 patch TransDERmal Daily    sodium chloride flush  5-40 mL IntraVENous 2 times per day    vancomycin  1,000 mg IntraVENous Q12H    albuterol sulfate HFA  2 puff Inhalation Q6H    budesonide-formoterol  2 puff Inhalation BID    [Held by provider] potassium chloride  40 mEq Oral Daily with breakfast    [Held by provider] magnesium oxide  200 mg Oral BID    [Held by provider] glipiZIDE  5 mg Oral Daily    [Held by provider] fluticasone  2 spray Each Nostril Daily    [Held by provider] digoxin  125 mcg Oral Daily    [Held by provider] cetirizine  10 mg Oral Daily    [Held by provider] DULoxetine  60 mg Oral Daily    [Held by provider] allopurinol  100 mg Oral BID      Infusions:    dexmedetomidine HCl in NaCl 0.4 mcg/kg/hr (09/23/22 1500)    sodium chloride      propofol 30 mcg/kg/min (09/23/22 1500)    DOBUTamine 3 mcg/kg/min (09/23/22 1500)    norepinephrine Stopped (09/23/22 1414)    EPINEPHrine 7 mcg/min (09/23/22 1534)    nitroGLYCERIN      insulin 8.4 Units/hr (09/23/22 1716)    dextrose      DOBUTamine 3 mcg/kg/min (09/23/22 0900)    amiodarone 450mg/250ml D5W infusion 0.5 mg/min (09/23/22 1500)    milrinone 0.25 mcg/kg/min (09/23/22 1500)    sodium chloride      vasopressin (Septic Shock) infusion 0.04 Units/min (09/23/22 1200)    sodium chloride           Objective:   Vitals: BP (!) 152/40   Pulse 80   Temp 97.3 °F (36.3 °C) (Core)   Resp 25   Ht 6' 2.02\" (1.88 m)   Wt (!) 343 lb 4.1 oz (155.7 kg)   SpO2 99%   BMI 44.05 kg/m²   General appearance: Is sedated intubated on ventilator  Neck: no JVD or bruit  Thyroid : Normal lobes   Lungs: Has Vesicular Breath sounds   Heart: Atrial fibrillation past chest wall after surgery was not closed yet  Abdomen: soft, non-tender; bowel sounds normal; no masses,  no organomegaly  Musculoskeletal: Normal  Extremities: extremities normal, , no edema  Neurologic: Is sedated intubated and on ventilator also on balloon pump    Assessment:     Patient Active Problem List:     Atrial fibrillation (HCC)     CAD (coronary artery disease)     Morbid obesity (HCC)     COPD (chronic obstructive pulmonary disease) (HCC)     Sleep apnea     Type 2 diabetes mellitus (HCC)     Thyroid disease     Hyperlipidemia     Congestive heart failure, unspecified HF chronicity, unspecified heart failure type ([facility]     Coronary artery disease involving native coronary artery of native heart with angina pectoris (HCC)     Chronic renal disease, stage III ([facility] [[id number]]     Hypertension     CAD, multiple vessel disease      Plan:     Reviewed POC blood glucose . Labs and X ray results   Reviewed Current Medicines   Started on post CABG surgery protocol including IV insulin infusion drip  Monitor Blood glucose frequently   Modified  the dose of Insulin/ other medicines as needed   Will follow     .      Flaca Constantino MD, MD

## 2022-09-24 ENCOUNTER — APPOINTMENT (OUTPATIENT)
Dept: GENERAL RADIOLOGY | Age: 72
DRG: 233 | End: 2022-09-24
Attending: INTERNAL MEDICINE
Payer: COMMERCIAL

## 2022-09-24 ENCOUNTER — ANESTHESIA (OUTPATIENT)
Dept: OPERATING ROOM | Age: 72
DRG: 233 | End: 2022-09-24
Payer: COMMERCIAL

## 2022-09-24 VITALS
WEIGHT: 315 LBS | BODY MASS INDEX: 40.43 KG/M2 | SYSTOLIC BLOOD PRESSURE: 119 MMHG | DIASTOLIC BLOOD PRESSURE: 36 MMHG | TEMPERATURE: 97.2 F | RESPIRATION RATE: 11 BRPM | OXYGEN SATURATION: 95 % | HEART RATE: 84 BPM | HEIGHT: 74 IN

## 2022-09-24 LAB
ALBUMIN SERPL-MCNC: 3 GM/DL (ref 3.4–5)
ALBUMIN SERPL-MCNC: 3.2 GM/DL (ref 3.4–5)
ALP BLD-CCNC: 58 IU/L (ref 40–128)
ALP BLD-CCNC: 58 IU/L (ref 40–129)
ALT SERPL-CCNC: 13 U/L (ref 10–40)
ALT SERPL-CCNC: 14 U/L (ref 10–40)
ANION GAP SERPL CALCULATED.3IONS-SCNC: 12 MMOL/L (ref 4–16)
ANION GAP SERPL CALCULATED.3IONS-SCNC: 13 MMOL/L (ref 4–16)
AST SERPL-CCNC: 45 IU/L (ref 15–37)
AST SERPL-CCNC: 56 IU/L (ref 15–37)
BASE EXCESS MIXED: 2.6 (ref 0–1.2)
BASE EXCESS MIXED: 2.9 (ref 0–1.2)
BASE EXCESS MIXED: 3 (ref 0–1.2)
BASE EXCESS MIXED: 3.1 (ref 0–1.2)
BASE EXCESS MIXED: 3.2 (ref 0–1.2)
BASE EXCESS MIXED: 3.5 (ref 0–1.2)
BASE EXCESS MIXED: 3.5 (ref 0–1.2)
BASE EXCESS: ABNORMAL (ref 0–3.3)
BILIRUB SERPL-MCNC: 0.5 MG/DL (ref 0–1)
BILIRUB SERPL-MCNC: 0.6 MG/DL (ref 0–1)
BUN BLDV-MCNC: 22 MG/DL (ref 6–23)
BUN BLDV-MCNC: 27 MG/DL (ref 6–23)
CALCIUM SERPL-MCNC: 7.3 MG/DL (ref 8.3–10.6)
CALCIUM SERPL-MCNC: 7.5 MG/DL (ref 8.3–10.6)
CHLORIDE BLD-SCNC: 109 MMOL/L (ref 99–110)
CHLORIDE BLD-SCNC: 109 MMOL/L (ref 99–110)
CO2: 19 MMOL/L (ref 21–32)
CO2: 21 MMOL/L (ref 21–32)
CO2: 22 MMOL/L (ref 21–32)
CO2: 22 MMOL/L (ref 21–32)
CO2: 23 MMOL/L (ref 21–32)
CO2: 24 MMOL/L (ref 21–32)
CO2: 25 MMOL/L (ref 21–32)
CREAT SERPL-MCNC: 1.3 MG/DL (ref 0.9–1.3)
CREAT SERPL-MCNC: 1.4 MG/DL (ref 0.9–1.3)
GFR AFRICAN AMERICAN: >60 ML/MIN/1.73M2
GFR NON-AFRICAN AMERICAN: 50 ML/MIN/1.73M2
GFR NON-AFRICAN AMERICAN: 50 ML/MIN/1.73M2
GFR NON-AFRICAN AMERICAN: 54 ML/MIN/1.73M2
GLUCOSE BLD-MCNC: 105 MG/DL (ref 70–99)
GLUCOSE BLD-MCNC: 115 MG/DL (ref 70–99)
GLUCOSE BLD-MCNC: 124 MG/DL (ref 70–99)
GLUCOSE BLD-MCNC: 165 MG/DL (ref 70–99)
GLUCOSE BLD-MCNC: 221 MG/DL (ref 70–99)
GLUCOSE BLD-MCNC: 236 MG/DL (ref 70–99)
GLUCOSE BLD-MCNC: 86 MG/DL (ref 70–99)
GLUCOSE BLD-MCNC: 86 MG/DL (ref 70–99)
GLUCOSE BLD-MCNC: 87 MG/DL (ref 70–99)
GLUCOSE BLD-MCNC: 87 MG/DL (ref 70–99)
GLUCOSE BLD-MCNC: 96 MG/DL (ref 70–99)
HCO3 ARTERIAL: 21 MMOL/L (ref 18–23)
HCO3 ARTERIAL: 21.2 MMOL/L (ref 18–23)
HCO3 ARTERIAL: 21.6 MMOL/L (ref 18–23)
HCO3 ARTERIAL: 21.7 MMOL/L (ref 18–23)
HCO3 ARTERIAL: 22 MMOL/L (ref 18–23)
HCO3 ARTERIAL: 22.4 MMOL/L (ref 18–23)
HCO3 ARTERIAL: 23.5 MMOL/L (ref 18–23)
HCT VFR BLD CALC: 22 % (ref 42–52)
HCT VFR BLD CALC: 23 % (ref 42–52)
HCT VFR BLD CALC: 23 % (ref 42–52)
HCT VFR BLD CALC: 24 % (ref 42–52)
HCT VFR BLD CALC: 24.5 % (ref 42–52)
HCT VFR BLD CALC: 25 % (ref 42–52)
HEMOGLOBIN: 7.6 GM/DL (ref 13.5–18)
HEMOGLOBIN: 7.7 GM/DL (ref 13.5–18)
HEMOGLOBIN: 7.9 GM/DL (ref 13.5–18)
HEMOGLOBIN: 8 GM/DL (ref 13.5–18)
HEMOGLOBIN: 8.2 GM/DL (ref 13.5–18)
HEMOGLOBIN: 8.3 GM/DL (ref 13.5–18)
HEMOGLOBIN: 8.3 GM/DL (ref 13.5–18)
HEMOGLOBIN: 8.4 GM/DL (ref 13.5–18)
INR BLD: 1.39 INDEX
LACTATE: 1.8 MMOL/L (ref 0.4–2)
MAGNESIUM: 2.4 MG/DL (ref 1.8–2.4)
MCH RBC QN AUTO: 35 PG (ref 27–31)
MCHC RBC AUTO-ENTMCNC: 33.9 % (ref 32–36)
MCV RBC AUTO: 103.4 FL (ref 78–100)
O2 SATURATION: 90.5 % (ref 96–97)
O2 SATURATION: 92.6 % (ref 96–97)
O2 SATURATION: 92.8 % (ref 96–97)
O2 SATURATION: 93 % (ref 96–97)
O2 SATURATION: 93.6 % (ref 96–97)
O2 SATURATION: 93.7 % (ref 96–97)
O2 SATURATION: 94.6 % (ref 96–97)
PCO2 ARTERIAL: 32 MMHG (ref 32–45)
PCO2 ARTERIAL: 34.8 MMHG (ref 32–45)
PCO2 ARTERIAL: 35.5 MMHG (ref 32–45)
PCO2 ARTERIAL: 35.6 MMHG (ref 32–45)
PCO2 ARTERIAL: 40.5 MMHG (ref 32–45)
PCO2 ARTERIAL: 40.9 MMHG (ref 32–45)
PCO2 ARTERIAL: 50.3 MMHG (ref 32–45)
PDW BLD-RTO: 18.7 % (ref 11.7–14.9)
PH BLOOD: 7.28 (ref 7.34–7.45)
PH BLOOD: 7.34 (ref 7.34–7.45)
PH BLOOD: 7.35 (ref 7.34–7.45)
PH BLOOD: 7.38 (ref 7.34–7.45)
PH BLOOD: 7.39 (ref 7.34–7.45)
PH BLOOD: 7.4 (ref 7.34–7.45)
PH BLOOD: 7.43 (ref 7.34–7.45)
PLATELET # BLD: 106 K/CU MM (ref 140–440)
PMV BLD AUTO: 10.2 FL (ref 7.5–11.1)
PO2 ARTERIAL: 63.6 MMHG (ref 75–100)
PO2 ARTERIAL: 68 MMHG (ref 75–100)
PO2 ARTERIAL: 68.3 MMHG (ref 75–100)
PO2 ARTERIAL: 69.2 MMHG (ref 75–100)
PO2 ARTERIAL: 70.3 MMHG (ref 75–100)
PO2 ARTERIAL: 70.5 MMHG (ref 75–100)
PO2 ARTERIAL: 73.4 MMHG (ref 75–100)
POC CALCIUM: 1.01 MMOL/L (ref 1.12–1.32)
POC CALCIUM: 1.04 MMOL/L (ref 1.12–1.32)
POC CALCIUM: 1.08 MMOL/L (ref 1.12–1.32)
POC CALCIUM: 1.09 MMOL/L (ref 1.12–1.32)
POC CALCIUM: 1.12 MMOL/L (ref 1.12–1.32)
POC CALCIUM: 1.12 MMOL/L (ref 1.12–1.32)
POC CALCIUM: 1.13 MMOL/L (ref 1.12–1.32)
POC CHLORIDE: 108 MMOL/L (ref 98–109)
POC CHLORIDE: 109 MMOL/L (ref 98–109)
POC CHLORIDE: 110 MMOL/L (ref 98–109)
POC CHLORIDE: 110 MMOL/L (ref 98–109)
POC CHLORIDE: 111 MMOL/L (ref 98–109)
POC CHLORIDE: 111 MMOL/L (ref 98–109)
POC CHLORIDE: 112 MMOL/L (ref 98–109)
POC CREATININE: 1.3 MG/DL (ref 0.9–1.3)
POC CREATININE: 1.4 MG/DL (ref 0.9–1.3)
POC CREATININE: 1.5 MG/DL (ref 0.9–1.3)
POTASSIUM SERPL-SCNC: 4.3 MMOL/L (ref 3.5–4.5)
POTASSIUM SERPL-SCNC: 4.4 MMOL/L (ref 3.5–4.5)
POTASSIUM SERPL-SCNC: 4.7 MMOL/L (ref 3.5–4.5)
POTASSIUM SERPL-SCNC: 4.7 MMOL/L (ref 3.5–5.1)
POTASSIUM SERPL-SCNC: 4.9 MMOL/L (ref 3.5–4.5)
POTASSIUM SERPL-SCNC: 5.1 MMOL/L (ref 3.5–5.1)
PROTHROMBIN TIME: 18 SECONDS (ref 11.7–14.5)
RBC # BLD: 2.37 M/CU MM (ref 4.6–6.2)
SARS-COV-2, NAAT: NOT DETECTED
SODIUM BLD-SCNC: 140 MMOL/L (ref 135–145)
SODIUM BLD-SCNC: 141 MMOL/L (ref 138–146)
SODIUM BLD-SCNC: 143 MMOL/L (ref 135–145)
SODIUM BLD-SCNC: 143 MMOL/L (ref 138–146)
SODIUM BLD-SCNC: 144 MMOL/L (ref 138–146)
SODIUM BLD-SCNC: 144 MMOL/L (ref 138–146)
SODIUM BLD-SCNC: 145 MMOL/L (ref 138–146)
SOURCE, BLOOD GAS: ABNORMAL
SOURCE: NORMAL
TOTAL PROTEIN: 4.8 GM/DL (ref 6.4–8.2)
TOTAL PROTEIN: 4.8 GM/DL (ref 6.4–8.2)
WBC # BLD: 9.8 K/CU MM (ref 4–10.5)

## 2022-09-24 PROCEDURE — 3700000001 HC ADD 15 MINUTES (ANESTHESIA): Performed by: THORACIC SURGERY (CARDIOTHORACIC VASCULAR SURGERY)

## 2022-09-24 PROCEDURE — 2580000003 HC RX 258

## 2022-09-24 PROCEDURE — 3600000013 HC SURGERY LEVEL 3 ADDTL 15MIN: Performed by: THORACIC SURGERY (CARDIOTHORACIC VASCULAR SURGERY)

## 2022-09-24 PROCEDURE — 71045 X-RAY EXAM CHEST 1 VIEW: CPT

## 2022-09-24 PROCEDURE — 83605 ASSAY OF LACTIC ACID: CPT

## 2022-09-24 PROCEDURE — 2500000003 HC RX 250 WO HCPCS

## 2022-09-24 PROCEDURE — 94003 VENT MGMT INPAT SUBQ DAY: CPT

## 2022-09-24 PROCEDURE — 6370000000 HC RX 637 (ALT 250 FOR IP)

## 2022-09-24 PROCEDURE — 3700000000 HC ANESTHESIA ATTENDED CARE: Performed by: THORACIC SURGERY (CARDIOTHORACIC VASCULAR SURGERY)

## 2022-09-24 PROCEDURE — 82565 ASSAY OF CREATININE: CPT

## 2022-09-24 PROCEDURE — 32100 EXPLORATION OF CHEST: CPT | Performed by: THORACIC SURGERY (CARDIOTHORACIC VASCULAR SURGERY)

## 2022-09-24 PROCEDURE — 6360000002 HC RX W HCPCS

## 2022-09-24 PROCEDURE — 87635 SARS-COV-2 COVID-19 AMP PRB: CPT

## 2022-09-24 PROCEDURE — 2500000003 HC RX 250 WO HCPCS: Performed by: THORACIC SURGERY (CARDIOTHORACIC VASCULAR SURGERY)

## 2022-09-24 PROCEDURE — 3600000003 HC SURGERY LEVEL 3 BASE: Performed by: THORACIC SURGERY (CARDIOTHORACIC VASCULAR SURGERY)

## 2022-09-24 PROCEDURE — 83735 ASSAY OF MAGNESIUM: CPT

## 2022-09-24 PROCEDURE — P9016 RBC LEUKOCYTES REDUCED: HCPCS

## 2022-09-24 PROCEDURE — 86022 PLATELET ANTIBODIES: CPT

## 2022-09-24 PROCEDURE — 6360000002 HC RX W HCPCS: Performed by: THORACIC SURGERY (CARDIOTHORACIC VASCULAR SURGERY)

## 2022-09-24 PROCEDURE — 99233 SBSQ HOSP IP/OBS HIGH 50: CPT | Performed by: INTERNAL MEDICINE

## 2022-09-24 PROCEDURE — 85610 PROTHROMBIN TIME: CPT

## 2022-09-24 PROCEDURE — 85027 COMPLETE CBC AUTOMATED: CPT

## 2022-09-24 PROCEDURE — 6370000000 HC RX 637 (ALT 250 FOR IP): Performed by: THORACIC SURGERY (CARDIOTHORACIC VASCULAR SURGERY)

## 2022-09-24 PROCEDURE — 2709999900 HC NON-CHARGEABLE SUPPLY: Performed by: THORACIC SURGERY (CARDIOTHORACIC VASCULAR SURGERY)

## 2022-09-24 PROCEDURE — 6360000002 HC RX W HCPCS: Performed by: PHYSICIAN ASSISTANT

## 2022-09-24 PROCEDURE — 82803 BLOOD GASES ANY COMBINATION: CPT

## 2022-09-24 PROCEDURE — C1729 CATH, DRAINAGE: HCPCS | Performed by: THORACIC SURGERY (CARDIOTHORACIC VASCULAR SURGERY)

## 2022-09-24 PROCEDURE — 2580000003 HC RX 258: Performed by: THORACIC SURGERY (CARDIOTHORACIC VASCULAR SURGERY)

## 2022-09-24 PROCEDURE — 80053 COMPREHEN METABOLIC PANEL: CPT

## 2022-09-24 PROCEDURE — 94640 AIRWAY INHALATION TREATMENT: CPT

## 2022-09-24 PROCEDURE — 80051 ELECTROLYTE PANEL: CPT

## 2022-09-24 RX ORDER — ALBUMIN (HUMAN) 12.5 G/50ML
25 SOLUTION INTRAVENOUS EVERY 12 HOURS
Status: DISCONTINUED | OUTPATIENT
Start: 2022-09-24 | End: 2022-09-24 | Stop reason: HOSPADM

## 2022-09-24 RX ORDER — BUMETANIDE 0.25 MG/ML
1 INJECTION, SOLUTION INTRAMUSCULAR; INTRAVENOUS ONCE
Status: COMPLETED | OUTPATIENT
Start: 2022-09-24 | End: 2022-09-24

## 2022-09-24 RX ORDER — MAGNESIUM SULFATE 1 G/100ML
1000 INJECTION INTRAVENOUS PRN
Status: DISCONTINUED | OUTPATIENT
Start: 2022-09-24 | End: 2022-09-24 | Stop reason: HOSPADM

## 2022-09-24 RX ORDER — CALCIUM GLUCONATE 20 MG/ML
1000 INJECTION, SOLUTION INTRAVENOUS PRN
Status: DISCONTINUED | OUTPATIENT
Start: 2022-09-24 | End: 2022-09-24 | Stop reason: HOSPADM

## 2022-09-24 RX ORDER — NITROGLYCERIN 20 MG/100ML
5-300 INJECTION INTRAVENOUS CONTINUOUS PRN
Qty: 10 ML | Refills: 1 | Status: SHIPPED | OUTPATIENT
Start: 2022-09-24

## 2022-09-24 RX ORDER — ROCURONIUM BROMIDE 10 MG/ML
INJECTION, SOLUTION INTRAVENOUS PRN
Status: DISCONTINUED | OUTPATIENT
Start: 2022-09-24 | End: 2022-09-24 | Stop reason: SDUPTHER

## 2022-09-24 RX ORDER — POTASSIUM CHLORIDE 29.8 MG/ML
20 INJECTION INTRAVENOUS PRN
Status: DISCONTINUED | OUTPATIENT
Start: 2022-09-24 | End: 2022-09-24 | Stop reason: HOSPADM

## 2022-09-24 RX ORDER — ASPIRIN 81 MG/1
81 TABLET, CHEWABLE ORAL DAILY
Qty: 30 TABLET | Refills: 3 | Status: SHIPPED | OUTPATIENT
Start: 2022-09-25

## 2022-09-24 RX ORDER — 0.9 % SODIUM CHLORIDE 0.9 %
1000 INTRAVENOUS SOLUTION INTRAVENOUS
Status: DISCONTINUED | OUTPATIENT
Start: 2022-09-24 | End: 2022-09-24 | Stop reason: HOSPADM

## 2022-09-24 RX ORDER — ALBUTEROL SULFATE 90 UG/1
2 AEROSOL, METERED RESPIRATORY (INHALATION) EVERY 6 HOURS SCHEDULED
Status: DISCONTINUED | OUTPATIENT
Start: 2022-09-24 | End: 2022-09-24 | Stop reason: HOSPADM

## 2022-09-24 RX ORDER — MORPHINE SULFATE 2 MG/ML
1 INJECTION, SOLUTION INTRAMUSCULAR; INTRAVENOUS AS NEEDED
Qty: 2 ML | Refills: 0 | Status: SHIPPED | OUTPATIENT
Start: 2022-09-24 | End: 2022-09-27

## 2022-09-24 RX ORDER — CALCIUM GLUCONATE 20 MG/ML
2000 INJECTION, SOLUTION INTRAVENOUS PRN
Status: DISCONTINUED | OUTPATIENT
Start: 2022-09-24 | End: 2022-09-24 | Stop reason: HOSPADM

## 2022-09-24 RX ORDER — CEFAZOLIN SODIUM 1 G/3ML
INJECTION, POWDER, FOR SOLUTION INTRAMUSCULAR; INTRAVENOUS PRN
Status: DISCONTINUED | OUTPATIENT
Start: 2022-09-24 | End: 2022-09-24 | Stop reason: SDUPTHER

## 2022-09-24 RX ORDER — ALBUTEROL SULFATE 90 UG/1
2 AEROSOL, METERED RESPIRATORY (INHALATION) EVERY 6 HOURS SCHEDULED
Status: DISCONTINUED | OUTPATIENT
Start: 2022-09-24 | End: 2022-09-24

## 2022-09-24 RX ORDER — BUMETANIDE 0.25 MG/ML
1 INJECTION, SOLUTION INTRAMUSCULAR; INTRAVENOUS EVERY 12 HOURS
Status: DISCONTINUED | OUTPATIENT
Start: 2022-09-24 | End: 2022-09-24 | Stop reason: HOSPADM

## 2022-09-24 RX ORDER — MIDAZOLAM HYDROCHLORIDE 1 MG/ML
INJECTION INTRAMUSCULAR; INTRAVENOUS PRN
Status: DISCONTINUED | OUTPATIENT
Start: 2022-09-24 | End: 2022-09-24 | Stop reason: SDUPTHER

## 2022-09-24 RX ADMIN — DEXMEDETOMIDINE HYDROCHLORIDE 0.4 MCG/KG/HR: 4 INJECTION, SOLUTION INTRAVENOUS at 09:33

## 2022-09-24 RX ADMIN — FOLIC ACID: 5 INJECTION, SOLUTION INTRAMUSCULAR; INTRAVENOUS; SUBCUTANEOUS at 11:43

## 2022-09-24 RX ADMIN — SODIUM BICARBONATE 50 MEQ: 84 INJECTION, SOLUTION INTRAVENOUS at 10:32

## 2022-09-24 RX ADMIN — AMIODARONE HYDROCHLORIDE 0.5 MG/MIN: 50 INJECTION, SOLUTION INTRAVENOUS at 12:34

## 2022-09-24 RX ADMIN — VASOPRESSIN 0.02 UNITS/MIN: 20 INJECTION INTRAVENOUS at 11:42

## 2022-09-24 RX ADMIN — ALBUTEROL SULFATE 2 PUFF: 90 AEROSOL, METERED RESPIRATORY (INHALATION) at 12:07

## 2022-09-24 RX ADMIN — SODIUM CHLORIDE 7.7 UNITS/HR: 9 INJECTION, SOLUTION INTRAVENOUS at 00:18

## 2022-09-24 RX ADMIN — ROCURONIUM BROMIDE 100 MG: 10 INJECTION INTRAVENOUS at 07:43

## 2022-09-24 RX ADMIN — SODIUM CHLORIDE 4.83 UNITS/HR: 9 INJECTION, SOLUTION INTRAVENOUS at 12:12

## 2022-09-24 RX ADMIN — DEXMEDETOMIDINE HYDROCHLORIDE 0.4 MCG/KG/HR: 4 INJECTION, SOLUTION INTRAVENOUS at 00:47

## 2022-09-24 RX ADMIN — MAGNESIUM HYDROXIDE 30 ML: 400 SUSPENSION ORAL at 06:47

## 2022-09-24 RX ADMIN — FENTANYL CITRATE 25 MCG: 50 INJECTION, SOLUTION INTRAMUSCULAR; INTRAVENOUS at 12:34

## 2022-09-24 RX ADMIN — VANCOMYCIN HYDROCHLORIDE 1000 MG: 1 INJECTION, POWDER, LYOPHILIZED, FOR SOLUTION INTRAVENOUS at 11:50

## 2022-09-24 RX ADMIN — MORPHINE SULFATE 2 MG: 2 INJECTION, SOLUTION INTRAMUSCULAR; INTRAVENOUS at 07:01

## 2022-09-24 RX ADMIN — MIDAZOLAM 2 MG: 1 INJECTION INTRAMUSCULAR; INTRAVENOUS at 07:44

## 2022-09-24 RX ADMIN — ALBUTEROL SULFATE 2 PUFF: 90 AEROSOL, METERED RESPIRATORY (INHALATION) at 05:21

## 2022-09-24 RX ADMIN — DOBUTAMINE HYDROCHLORIDE 3 MCG/KG/MIN: 200 INJECTION INTRAVENOUS at 06:31

## 2022-09-24 RX ADMIN — PROPOFOL 30 MCG/KG/MIN: 10 INJECTION, EMULSION INTRAVENOUS at 00:43

## 2022-09-24 RX ADMIN — MORPHINE SULFATE 2 MG: 2 INJECTION, SOLUTION INTRAMUSCULAR; INTRAVENOUS at 04:17

## 2022-09-24 RX ADMIN — MILRINONE LACTATE 0.25 MCG/KG/MIN: 200 INJECTION, SOLUTION INTRAVENOUS at 01:47

## 2022-09-24 RX ADMIN — SODIUM BICARBONATE 50 MEQ: 84 INJECTION, SOLUTION INTRAVENOUS at 08:27

## 2022-09-24 RX ADMIN — BUMETANIDE 1 MG: 0.25 INJECTION INTRAMUSCULAR; INTRAVENOUS at 06:44

## 2022-09-24 RX ADMIN — SODIUM CHLORIDE, PRESERVATIVE FREE 10 ML: 5 INJECTION INTRAVENOUS at 12:20

## 2022-09-24 RX ADMIN — CEFAZOLIN 2000 MG: 1 INJECTION, POWDER, FOR SOLUTION INTRAMUSCULAR; INTRAVENOUS; PARENTERAL at 08:21

## 2022-09-24 RX ADMIN — PROPOFOL 30 MCG/KG/MIN: 10 INJECTION, EMULSION INTRAVENOUS at 04:50

## 2022-09-24 RX ADMIN — PROPOFOL 30 MCG/KG/MIN: 10 INJECTION, EMULSION INTRAVENOUS at 12:22

## 2022-09-24 ASSESSMENT — PULMONARY FUNCTION TESTS
PIF_VALUE: 32
PIF_VALUE: 32
PIF_VALUE: 30
PIF_VALUE: 26
PIF_VALUE: 33
PIF_VALUE: 25
PIF_VALUE: 29
PIF_VALUE: 31
PIF_VALUE: 27
PIF_VALUE: 39
PIF_VALUE: 26
PIF_VALUE: 41
PIF_VALUE: 33
PIF_VALUE: 30
PIF_VALUE: 27
PIF_VALUE: 26
PIF_VALUE: 27
PIF_VALUE: 26
PIF_VALUE: 29
PIF_VALUE: 29
PIF_VALUE: 30
PIF_VALUE: 29
PIF_VALUE: 36
PIF_VALUE: 25
PIF_VALUE: 28
PIF_VALUE: 27
PIF_VALUE: 26
PIF_VALUE: 25
PIF_VALUE: 38
PIF_VALUE: 29
PIF_VALUE: 29
PIF_VALUE: 35
PIF_VALUE: 31

## 2022-09-24 ASSESSMENT — LIFESTYLE VARIABLES: SMOKING_STATUS: 1

## 2022-09-24 NOTE — CONSULTS
PHARMACY CONSULT FOR RENAL DOSING FOR CRRT PER DR WOODS    RENAL LAB EVALUATION  Estimated Creatinine Clearance: 72 mL/min (A) (based on SCr of 1.5 mg/dL (H)). Recent Labs     09/22/22  1948 09/22/22 2034 09/23/22  0615 09/23/22  0732 09/24/22  0505 09/24/22  0508 09/24/22  0719 09/24/22  0928 09/24/22  1027   BUN 18  --  22  --  22  --   --   --   --    CREATININE 1.0   < > 1.4*   < > 1.4*   < > 1.5* 1.4* 1.5*    < > = values in this interval not displayed. CRRT STARTED THIS AM    MEDICATIONS MONITORED FOR RENAL DOSE ADJUSTMENT:  -Several po home medications currently remain on hold. Will evaluate for adjustment if re-ordered. -Milrinone drip continues, guidelines for adjustment not established (monitor for possible accumulation - hypotension, arrhythmia)  -Vancomycin to be doses intermittently per levels (followed in a separate intervention)     Pharmacy will review medications daily and adjust the dose for CRRT where appropriate. Scarlet Tenorio, [address] [facility]  9/24/2022  11:41 AM

## 2022-09-24 NOTE — PROGRESS NOTES
Patient seen and examined discussed with cardiothoracic surgery at bedside. Try to call patient's brother left message for him as well. Patient is a full code and now noted to have 100% FiO2 needs with pressor support not diuresing effectively to remove extra volume. Increased PA pressures. I have discussed with intensive care doctor and they will place a temporary HD line and will start CRRT today. As well as maintain diuresis. We will start on TPN as well. Full note to follow.

## 2022-09-24 NOTE — PROGRESS NOTES
Called son Osmany Ny at this time to notify that patient has a bed available at [facility].      Electronically signed by Shayna Live RN on 9/24/2022 at 11:10 AM

## 2022-09-24 NOTE — CONSULTS
Nephrology Service Consultation    Patient:  Shanique Thomas  MRN: [id number]  Consulting physician:  Jessica Najera MD  Reason for Consult: Fluid overload    History Obtained From:  electronic medical record brother  PCP: ANKITA Bower - [facility]    HISTORY OF PRESENT ILLNESS:   The patient is a 71 y.o. male who presents with underlying chest pain dyspnea presents to the hospital about 1 week ago abnormal heart cath noting multivessel coronary disease. Status post CABG 2 days prior was going to try to close today but unfortunately due to increased oxygen demands with increased pulmonary pressures concern for fluid overload unable to close. Patient getting IV B12 (urine and hematuria creatinine was somewhat elevated was given diuretics but did not make good urine output. To safely improve oxygenation and decrease FiO2 settings noted to have increased CVP over 18. Patient currently on pressor support history of DVT chronic lymphedema is a diabetic and morbidly obese. I discussed with patient's brother on the phone and above setting plan was temporarily try dialysis for fluid removal with CRRT. After initial discussion I again discussed with cardiothoracic surgery and now concern may need benefit from ECMO and will be transferred to [place] for treatment. Past Medical History:        Diagnosis Date    Allergic rhinitis     Anticoagulated on Coumadin     1/6/17-**[facility] to follow pt's PT/INRs, along w/prescribing his Coumadin. **    Anxiety     Arthritis     Atrial fibrillation (HCC)     On Coumadin.     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 09/21/2022    positive at 15C, negative at 18C    COPD (chronic obstructive pulmonary disease) (HCC)     Depression     Diabetes mellitus (HCC)     NIDDM- dx over 10 yrs ago- follows with Dr Nikolas Pinon's contracture of hand     Right hand    Family history of cardiovascular disease     Father-MI    GERD (gastroesophageal reflux disease)     H/O cardiac catheterization 03/2007    cardiac cath- stent LAD patent, [id number]%, RCA 40-50%    H/O cardiac catheterization 06/12/2008    cardiac cath- mild disease mid RCA    H/O cardiovascular stress test 04/10/2014    cardiolite- normal, no ischemia, EF63%    H/O cardiovascular stress test 09/21/2016    EF59% normal study  pt in atrial fib    H/O cardiovascular stress test 09/20/2017    EF 60%   afib    H/O cardiovascular stress test 11/07/2018    EF60% normal study    H/O Doppler ultrasound     1/11/2010-CAROTID_Intimal thickening but no significant atherosclerotic plaque noted in LAUREN. Doppler flow velocities within the LAUREN are WNL. Intimal thickening but no significant atherosclerotic plaque noted in LICA. Doppler flow velociities within the LICA are WNL. H/O echocardiogram 04/10/2014    EF 60%, normal LV systolic function, mild mitral and tricuspid insufficiencies, no pericardial effusion.     H/O echocardiogram 09/21/2016    EF60% normal study    H/O echocardiogram 11/07/2018    EF55-60% no significant valular disease    H/O hiatal hernia     Hx of Venous Doppler 03/14/2019    Significant reflux in Left Deep System, No reflux in right lower extremity, No DVT or SVT    Hyperlipidemia     Hypertension     Obesity     S/P PTCA (percutaneous transluminal coronary angioplasty) 03/21/2005    s/p PTCA with 3.5 X 16 TAXUS stent to LAD- follows with Dr Dayday Feliz    Sleep apnea     had sleep study done 10+ yrs ago- has cpap but does not use it    Thyroid disease     hypothyroid       Past Surgical History:        Procedure Laterality Date    CARDIAC CATHETERIZATION  6/12/08    mild disease mid RCA,  stent to LAD patent,    CARDIAC CATHETERIZATION  3/07    Stent to LAD patent, Diagonal 40-50%, RCA 40-50%    CARDIAC CATHETERIZATION  3/05    COLONOSCOPY  2011    COLONOSCOPY  5/18/16    1 polyp removed, diverticulosis and hemorrhoids noted    CORONARY ANGIOPLASTY WITH STENT PLACEMENT  03/21/2005    PTCA with 3.5 x 16 TAXUS stent to LAD    ENDOSCOPY, COLON, DIAGNOSTIC  2014    egd    HAND SURGERY Right 1997    ND OPEN RX ANKLE DISLOCATN+FIXATN Right 6/3/2019    RIGHT OPEN REDUCTION INTERNAL FIXATION OF DISTAL TIBIA  AND FIBULA performed [facility] Ba [facility] MD [facility] OR       Medications:   Scheduled Meds:   albuterol sulfate HFA  2 puff Inhalation 4 times per day    albumin human  25 g IntraVENous Q12H    bumetanide  1 mg IntraVENous Q12H    aspirin  81 mg Per NG tube Daily    iron sucrose  100 mg IntraVENous Daily    clopidogrel  75 mg Oral Daily    [Held by provider] sennosides-docusate sodium  1 tablet Oral BID    pantoprazole (PROTONIX) 40 mg injection  40 mg IntraVENous Daily    lidocaine  1 patch TransDERmal Daily    sodium chloride flush  5-40 mL IntraVENous 2 times per day    vancomycin  1,000 mg IntraVENous Q12H    budesonide-formoterol  2 puff Inhalation BID    [Held by provider] potassium chloride  40 mEq Oral Daily with breakfast    [Held by provider] magnesium oxide  200 mg Oral BID    [Held by provider] glipiZIDE  5 mg Oral Daily    [Held by provider] fluticasone  2 spray Each Nostril Daily    [Held by provider] digoxin  125 mcg Oral Daily    [Held by provider] cetirizine  10 mg Oral Daily    [Held by provider] DULoxetine  60 mg Oral Daily    [Held by provider] allopurinol  100 mg Oral BID     Continuous Infusions:   prismaSATE BGK 4/2.5      prismaSATE BGK 4/2.5      prismaSATE BGK 4/2.5      PN-Adult Premix 5/15 - Central      dexmedetomidine HCl in NaCl 0.4 mcg/kg/hr (09/24/22 0933)    sodium chloride      propofol 30 mcg/kg/min (09/24/22 0743)    DOBUTamine 3 mcg/kg/min (09/24/22 0743)    norepinephrine Stopped (09/23/22 1414)    EPINEPHrine 5 mcg/min (09/24/22 0743)    nitroGLYCERIN      insulin Stopped (09/24/22 0822)    dextrose      DOBUTamine 5 mcg/kg/min (09/24/22 0802)    amiodarone 450mg/250ml D5W infusion 0.5 mg/min (09/24/22 0743)    milrinone 0.25 mcg/kg/min (09/24/22 0743)    sodium chloride      vasopressin (Septic Shock) infusion 0.02 Units/min (09/24/22 1142)    sodium chloride       PRN Meds:.sodium chloride, potassium chloride, magnesium sulfate, calcium gluconate **OR** calcium gluconate **OR** calcium gluconate **OR** calcium gluconate, sodium phosphate IVPB **OR** sodium phosphate IVPB **OR** sodium phosphate IVPB **OR** sodium phosphate IVPB, morphine **OR** morphine, sodium chloride flush, sodium chloride, ondansetron **OR** ondansetron, acetaminophen, metoprolol, bisacodyl, fleet, potassium chloride, albumin human, DOBUTamine, norepinephrine, EPINEPHrine, nitroGLYCERIN, glucose, glucagon (rDNA), dextrose, HYDROcodone-acetaminophen, albumin human, sodium chloride, sodium chloride, magnesium sulfate, dextrose bolus **OR** dextrose bolus, traMADol **OR** traMADol, fentanNYL **OR** fentanNYL, metoprolol, hydrALAZINE    Allergies:  Patient has no known allergies. Social History:   TOBACCO:   reports that he quit smoking about 46 years ago. His smoking use included cigarettes. He has a 10.00 pack-year smoking history. He has never used smokeless tobacco.  ETOH:   reports that he does not currently use alcohol after a past usage of about 6.0 standard drinks per week. OCCUPATION:      Family History:       Problem Relation Age of Onset    Cancer Mother         breast ca    Coronary Art Dis Father          MI    Heart Disease Father     Rheum Arthritis Other     Rheum Arthritis Sister     No Known Problems Brother     Rheum Arthritis Sister        REVIEW OF SYSTEMS:  Negative except for sedated on the ventilator.     Physical Exam:    I/O: 09/23 0701 - 09/24 0700  In: 1116 [I.V.:2943.8]  Out: 1985 [Urine:1775]    Vitals: BP (!) 119/36   Pulse 80   Temp 97.2 °F (36.2 °C) (Core)   Resp 16   Ht 6' 2.02\" (1.88 m)   Wt (!) 350 lb 15.6 oz (159.2 kg)   SpO2 96%   BMI 45.04 kg/m²   General appearance: Sedated ET tube  HEENT: Head: Normal, normocephalic, atraumatic. Neck: supple, symmetrical, trachea midline  Lungs: diminished breath sounds bilaterally  Heart: S1, S2 open chest  Abdomen: abnormal findings:  soft NT  Extremities: edema trace  Neurologic: Mental status: alertness: Sedated      CBC:   Recent Labs     09/23/22  0035 09/23/22  0117 09/23/22  0615 09/23/22  0732 09/24/22  0505 09/24/22  0508 09/24/22  0719 09/24/22  0928 09/24/22  1027   WBC 13.8*  --  12.7*  --  9.8  --   --   --   --    HGB 10.3*   < > 10.1*   < > 8.3*   < > 8.4* 8.0* 7.9*     --  160  --  106*  --   --   --   --     < > = values in this interval not displayed. BMP:    Recent Labs     09/22/22 1948 09/22/22  2034 09/23/22  0615 09/23/22  0732 09/24/22  0505 09/24/22  0508 09/24/22  0719 09/24/22  0928 09/24/22  1027      < > 142   < > 140   < > 144 144 141   K 3.8   < > 4.2  4.3   < > 4.7   < > 4.3 4.4 4.7*     --  109  --  109  --   --   --   --    CO2 23   < > 20*   < > 19*   < > 23 25 23   BUN 18  --  22  --  22  --   --   --   --    CREATININE 1.0   < > 1.4*   < > 1.4*   < > 1.5* 1.4* 1.5*   GLUCOSE 142*  --  137*  --  86  --   --   --   --     < > = values in this interval not displayed. Hepatic:   Recent Labs     09/22/22 1948 09/23/22  0615 09/24/22  0505   AST 54* 72* 56*   ALT 15 16 14   BILITOT 1.0 0.6 0.5   ALKPHOS 54 57 58     Troponin: No results for input(s): TROPONINI in the last 72 hours. BNP: No results for input(s): BNP in the last 72 hours. Lipids: No results for input(s): CHOL, HDL in the last 72 hours.     Invalid input(s): LDLCALCU  ABGs:   Lab Results   Component Value Date/Time    PO2ART 69.2 09/24/2022 10:27 AM    PIA6FCR 40.5 09/24/2022 10:27 AM     INR:   Recent Labs     09/23/22  0035 09/23/22  0615 09/24/22  0505   INR 1.30 1.19 1.39     Renal Labs  Albumin:    Lab Results   Component Value Date/Time    LABALBU 3.2 09/24/2022 05:05 AM     Calcium:    Lab Results   Component Value Date/Time    CALCIUM 7.5 09/24/2022 05:05 AM Phosphorus:  No results found for: PHOS  U/A:    Lab Results   Component Value Date/Time    NITRU NEGATIVE 09/17/2022 06:21 PM    COLORU YELLOW 09/17/2022 06:21 PM    PHUR 6.5 06/30/2021 02:11 PM    WBCUA NONE SEEN 09/17/2022 06:21 PM    RBCUA NONE SEEN 09/17/2022 06:21 PM    MUCUS RARE 09/17/2022 06:21 PM    TRICHOMONAS NONE SEEN 09/17/2022 06:21 PM    BACTERIA NEGATIVE 09/17/2022 06:21 PM    CLARITYU CLEAR 09/17/2022 06:21 PM    SPECGRAV 1.015 09/17/2022 06:21 PM    UROBILINOGEN 4.0 09/17/2022 06:21 PM    BILIRUBINUR NEGATIVE 09/17/2022 06:21 PM    BILIRUBINUR small 06/30/2021 02:11 PM    BLOODU NEGATIVE 09/17/2022 06:21 PM    GLUCOSEU neg 06/30/2021 02:11 PM    KETUA TRACE 09/17/2022 06:21 PM     ABG:    Lab Results   Component Value Date/Time    ART0MIH 40.5 09/24/2022 10:27 AM    PO2ART 69.2 09/24/2022 10:27 AM    OUR2OOT 22.0 09/24/2022 10:27 AM     HgBA1c:    Lab Results   Component Value Date/Time    LABA1C 7.5 09/15/2022 12:55 PM     Microalbumen/Creatinine ratio:  No components found for: RUCREAT  TSH:    Lab Results   Component Value Date/Time    TSH 1.66 04/03/2018 11:33 AM     IRON:  No results found for: IRON  Iron Saturation:  No components found for: PERCENTFE  TIBC:  No results found for: TIBC  FERRITIN:  No results found for: FERRITIN  RPR:  No results found for: RPR  YAYA:  No results found for: ANATITER, YAYA  24 Hour Urine for Creatinine Clearance:  No components found for: CREAT4, UHRS10, UTV10  -----------------------------------------------------------------      Assessment and Recommendations     Patient Active Problem List   Diagnosis Code    Atrial fibrillation (HCC) I48.91    CAD (coronary artery disease) I25.10    Morbid obesity ([facility] E66.01    COPD (chronic obstructive pulmonary disease) ([facility] J44.9    Sleep apnea G47.30    Type 2 diabetes mellitus ([facility] E11.9    Thyroid disease E07.9    Hyperlipidemia E78.5    Congestive heart failure, unspecified HF chronicity, unspecified heart failure type [facility] I50.9    Coronary artery disease involving native coronary artery of native heart with angina pectoris (HCC) I25.119    Chronic renal disease, stage III ([facility] [[id number]] N18.30    Hypertension I10    CAD, multiple vessel I25.10     Impression plan  #1 coronary disease status post CABG  #2 fluid overload  #3 hypotension  #4 type 2 diabetes  #5 protein malnutrition    Plan  #1 post CABG cardiology following cardiothoracic surgery planning on transferring to [facility] in need of possible ECMO  #2  Placing temporary HD line and do CRRT dialysis discussed with patient's brother but now since moving to [facility] we will do there instead if needed  #3 monitor blood pressure currently on pressors  #4 monitor glucose  #5 unable to tolerate tube feeding will start on TPN today  We will follow monitor closely in above setting until transfer  Discussed and updated patient's brother whose name is Kori phone number is [phone]  Electronically signed by Brendon Hassan MD on 9/24/2022 at 11:48 AM

## 2022-09-24 NOTE — PROGRESS NOTES
Access center called at this time and transport for patient is set up for 1230p,.     Electronically signed by Niko Harry RN on 9/24/2022 at 11:03 AM

## 2022-09-24 NOTE — PROGRESS NOTES
Report called at this time to Jumana Sumner at [facility]. [phone].     Electronically signed by Stephan Burkett RN on 9/24/2022 at 11:38 AM

## 2022-09-24 NOTE — PROGRESS NOTES
Pt returned to room 2130 from OR at this time. Report received from [id number] and [place]. Dobutamine increased to 5mcg.     Electronically signed by Wilbert Claudio RN on 9/24/2022 at 9:25 AM

## 2022-09-24 NOTE — FLOWSHEET NOTE
Call received from Dr. Sahra Menard. Pt. Vital signs, hemodynamic readings, lab results , abgs from [facility] result, hourly uop. IV gtts/ doses. Orders given:   1. 30 ml milk of magnesium now per og   2. Hold ASA 81 mg today d/t platelet 226 k   3. Give bumex 1 mg ivp now d/t cvp 19, pap 40's  4. Leave vasopressin at 0.02 unit/min and wean down Epi gtt to keep Map > 70. Leave Epi gtt at 1 mcg/min and call to reevaluate next gtt to wean down.

## 2022-09-24 NOTE — DISCHARGE SUMMARY
Discharge Summary     Patient ID  Jean Bianchi   1950  [id number]      Primary Care Doctor:  ANKITA Stroud CNP    Admit date: 9/15/2022   Discharge date: 9/24/2022      Admitting Provider: Adriana Ching MD   Discharge Provider: Jigar Mehta MD     Discharge Diagnoses: Active Hospital Problems    Diagnosis Date Noted    CAD, multiple vessel [I25.10] 09/15/2022     Priority: Medium     Discharged Condition: stable. Hospital Course:  Patient was admitted for cabg % maze. Underwent surgery of cabg, mod. Maze,pulmonary vein repair after discussion and preparation. Post op ; monitored rhythm, O2 sat, intake and output, Labs, serial CXRs   Neuro status , BP and vital signs monitored     Started on diet and activity. At discharge, on vent; iabp    Special concerns: high pa pressures  Further plans after discharge: [facility] for [facility]    VS at discharge   Vitals:    09/24/22 1208   BP:    Pulse: 80   Resp: 14   Temp:    SpO2: 95%       Consults: cardiology    Disposition: to [facility]    Patient Instructions:      Medication List        CONTINUE taking these medications      blood glucose monitor kit and supplies  Dispense sufficient amount for indicated testing frequency plus additional to accommodate PRN testing needs. Dispense all needed supplies to include: monitor, strips, lancing device, lancets, control solutions, alcohol swabs. Lancets Misc  by D3EA route three times a day. ASK your doctor about these medications      albuterol sulfate  (90 Base) MCG/ACT inhaler  Commonly known as: Ventolin HFA  Inhale 2 puffs into the lungs every 6 hours as needed for Wheezing     allopurinol 100 MG tablet  Commonly known as: ZYLOPRIM  Take 1 tab by mouth twice daily     blood glucose test strips  Test 2 times a day & as needed for symptoms of irregular blood glucose. Dispense sufficient amount for indicated testing frequency plus additional to accommodate PRN testing needs. carvedilol 25 MG tablet  Commonly known as: COREG  Take 1 tablet by mouth 2 times daily     cetirizine 10 MG tablet  Commonly known as: EQ Allergy Relief (Cetirizine)  Take 1 tablet by mouth daily     digoxin 125 MCG tablet  Commonly known as: LANOXIN  Take 1 tablet by mouth daily     dilTIAZem 120 MG tablet  Commonly known as: CARDIZEM  Take 1 tablet by mouth daily     DULoxetine 60 MG extended release capsule  Commonly known as: CYMBALTA  Take 1 capsule by mouth daily     enoxaparin 100 MG/ML  Commonly known as: LOVENOX  Inject 1.5 mLs into the skin daily for 15 days     fluticasone 50 MCG/ACT nasal spray  Commonly known as: FLONASE  2 sprays by Each Nostril route daily     furosemide 40 MG tablet  Commonly known as: LASIX  Take 1 tablet by mouth nightly     gabapentin 300 MG capsule  Commonly known as: NEURONTIN  Take 1 capsule by mouth 2 times daily for 30 days.  Intended supply: 30 days     glipiZIDE 5 MG tablet  Commonly known as: GLUCOTROL  Take 1 tablet by mouth daily     insulin aspart 100 UNIT/ML injection vial  Commonly known as: NOVOLOG     insulin  UNIT/ML injection vial  Commonly known as: HUMULIN N;NOVOLIN N  Inject 80 Units into the skin nightly     insulin regular 100 UNIT/ML injection  Commonly known as: NovoLIN R  Inject 0-10 Units into the skin 3 times daily (before meals)     levothyroxine 50 MCG tablet  Commonly known as: SYNTHROID  Take 1 tablet by mouth Daily     losartan 100 MG tablet  Commonly known as: COZAAR  Take 1 tablet by mouth daily     metFORMIN 1000 MG tablet  Commonly known as: GLUCOPHAGE  TAKE 1 TABLET BY MOUTH TWICE DAILY WITH MEALS     polyethylene glycol 17 GM/SCOOP powder  Commonly known as: GLYCOLAX  Take 17 g by mouth daily     pravastatin 80 MG tablet  Commonly known as: PRAVACHOL  TAKE 1 TABLET BY MOUTH ONCE DAILY     tiZANidine 4 MG tablet  Commonly known as: ZANAFLEX  Take 1 tablet by mouth 3 times daily as needed (shoulder pain)            Activity: activity as tolerated and no lifting, Driving, or Strenuous exercise until seen by physician in office  Diet: cardiac diet  Wound Care: as directed    Follow-up as directed at discharge    Signed: Sourav Casas MD 12:12 PM 09/24/22       Time spent on discharge 35 minutes

## 2022-09-24 NOTE — FLOWSHEET NOTE
Call received per Dr. Jose Daniel Cortes. Vital signs, hemodynamic readings, hourly uop, abgs per epoc, vent settings given. Orders given:   1. Wean off vasopressin gtt first to keep map > 70. When Vasopressin gtt at 0.01 units/min then   2. Wean Epi gtt next keeping map > 70   3. Turn off TF at 0600 9/24/22 for am procedure. 4. Give bumex 1mg ivp now. 5. Give zaroxolyn 5mg per ng now.

## 2022-09-24 NOTE — PROGRESS NOTES
Dr. Cruz Peers at bedside at this time.     Electronically signed by Christine Campos RN on 9/24/2022 at 9:51 AM

## 2022-09-24 NOTE — PROGRESS NOTES
Pulmonary and Critical Care  Progress Note      VITALS:  BP (!) 119/36   Pulse 80   Temp 97.2 °F (36.2 °C) (Core)   Resp 16   Ht 6' 2.02\" (1.88 m)   Wt (!) 350 lb 15.6 oz (159.2 kg)   SpO2 96%   BMI 45.04 kg/m²     Subjective:   CHIEF COMPLAINT :SOB     HPI:                The patient is a 71 y.o. male is on the vent and sedated. He is on the balloon pump. He is awaiting to be transferred to [facility] for ECMO. Objective:   PHYSICAL EXAM:    LUNGS:Basal crackles  Abd-distended, BS+,NT  Ext-2 + pedal edema  CVS-s1s2, no murmurs      DATA:    CBC:  Recent Labs     09/22/22  1948 09/22/22  2034 09/23/22  0035 09/23/22  0117 09/23/22  0615 09/23/22  0732 09/24/22  0505 09/24/22  0508 09/24/22  0719 09/24/22  0928 09/24/22  1027   WBC 17.3*  --  13.8*  --  12.7*  --  9.8  --   --   --   --    RBC 3.11*  --  3.04*  --  2.89*  --  2.37*  --   --   --   --    HGB 10.9*   < > 10.3*   < > 10.1*   < > 8.3*   < > 8.4* 8.0* 7.9*   HCT 31.1*   < > 29.7*   < > 28.6*   < > 24.5*   < > 25.0* 24.0* 23.0*     --  151  --  160  --  106*  --   --   --   --    .0  --  97.7  --  99.0  --  103.4*  --   --   --   --    MCH 35.0*  --  33.9*  --  34.9*  --  35.0*  --   --   --   --    MCHC 35.0  --  34.7  --  35.3  --  33.9  --   --   --   --    RDW 17.5*  --  17.8*  --  17.9*  --  18.7*  --   --   --   --    SEGSPCT 57.0  --  77.7*  --  70.0*  --   --   --   --   --   --    BANDSPCT 28*  --   --   --  6  --   --   --   --   --   --     < > = values in this interval not displayed.       BMP:  Recent Labs     09/22/22  1948 09/22/22 2034 09/23/22  0615 09/23/22  0732 09/24/22  0505 09/24/22  0508 09/24/22  0719 09/24/22  0928 09/24/22  1027      < > 142   < > 140   < > 144 144 141   K 3.8   < > 4.2  4.3   < > 4.7   < > 4.3 4.4 4.7*     --  109  --  109  --   --   --   --    CO2 23   < > 20*   < > 19*   < > 23 25 23   BUN 18  --  22  --  22  --   --   --   --    CREATININE 1.0   < > 1.4*   < > 1.4*   < > 1.5* 1.4* 1.5*   CALCIUM 7.2*  --  7.4*  --  7.5*  --   --   --   --    GLUCOSE 142*  --  137*  --  86  --   --   --   --     < > = values in this interval not displayed. ABG:  Recent Labs     09/24/22  0719 09/24/22  0928 09/24/22  1027   PH 7.40 7.28* 7.34   PO2ART 68.3* 68.0* 69.2*   BVL1LNU 34.8 50.3* 40.5   O2SAT 93.6* 90.5* 92.6*     BNP  Lab Results   Component Value Date    BNP 21 10/22/2013      D-Dimer:  Lab Results   Component Value Date    DDIMER REQUEST CREDITED 09/23/2022      Radiology:   . Endotracheal tube approximately 5 cm above the natalia. 2. Stable bilateral pleural effusions and atelectasis. No pneumothorax.    3. Right CVC with tip extending into the right axillary vein stable in   position compared the prior exam         Assessment/Plan     Patient Active Problem List    Diagnosis Date Noted    CAD, multiple vessel 09/15/2022     Priority: Medium    Hypertension      Priority: Medium    Congestive heart failure, unspecified HF chronicity, unspecified heart failure type ([facility] 05/31/2022     Priority: Medium    Coronary artery disease involving native coronary artery of native heart with angina pectoris ([facility] 05/31/2022     Priority: Medium    Chronic renal disease, stage III [facility]) [[id number]] 05/31/2022     Priority: Medium    Thyroid disease      Overview Note:     hypothyroid      Hyperlipidemia     Type 2 diabetes mellitus ([facility] 11/09/2015    Atrial fibrillation (HCC)     Morbid obesity (HCC)     COPD (chronic obstructive pulmonary disease) (HCC)     Sleep apnea     CAD (coronary artery disease) 03/21/2005     Overview Note:     s/p PTCA with 3.5 X 16 TAXUS stent to LAD     Acute Hypoxic resp failure  VDRF  Morbid Obesity  KHUSHBU  CAD s/p CABG  Cardiogenic Shock         Pressors for a MAP > 70  Dobutamine  IABP per CT Surgery  Inhalers  Keep sats > 92%  DVT and GI Prophylaxis  Await transfer to [facility] for possible ECMO  SAT and SBT when stable  C/w present management    Electronically signed by Delia Feldman Jorge Renee MD on 9/24/2022 at 11:45 AM

## 2022-09-24 NOTE — PROGRESS NOTES
Wan Eason, pt brother at bedside at this time. Wan Eason took all of patients belongings. Updated with room number and unit phone number where patient is being transferred to.      Freddy Pemberton RN

## 2022-09-24 NOTE — ANESTHESIA PRE PROCEDURE
Department of Anesthesiology  Preprocedure Note       Name:  Beck Dillard   Age:  71 y.o.  :  1950                                          MRN:  [id number]         Date:  2022      Surgeon: Venecia Sethi):  Lottie Damon MD    Procedure: Procedure(s):  STERNUM DEBRIDEMENT WOUND CLOSURE    Medications prior to admission:   Prior to Admission medications    Medication Sig Start Date End Date Taking? Authorizing Provider   enoxaparin (LOVENOX) 100 MG/ML Inject 1.5 mLs into the skin daily for 15 days 22  Solomon Castrejon MD   metFORMIN (GLUCOPHAGE) 1000 MG tablet TAKE 1 TABLET BY MOUTH TWICE DAILY WITH MEALS 22   ANKITA Morrell CNP   carvedilol (COREG) 25 MG tablet Take 1 tablet by mouth 2 times daily 22   ANKITA Morrell CNP   glipiZIDE (GLUCOTROL) 5 MG tablet Take 1 tablet by mouth daily 22   ANKITA Morrell CNP   dilTIAZem (CARDIZEM) 120 MG tablet Take 1 tablet by mouth daily 22   ANKITA Morrell CNP   blood glucose monitor kit and supplies Dispense sufficient amount for indicated testing frequency plus additional to accommodate PRN testing needs. Dispense all needed supplies to include: monitor, strips, lancing device, lancets, control solutions, alcohol swabs. 22   ANKITA Morrell CNP   blood glucose monitor strips Test 2 times a day & as needed for symptoms of irregular blood glucose. Dispense sufficient amount for indicated testing frequency plus additional to accommodate PRN testing needs. 22   ANKITA Morrell CNP   Lancets MISC by D3EA route three times a day.  22   ANKITA Morrell CNP   allopurinol (ZYLOPRIM) 100 MG tablet Take 1 tab by mouth twice daily 22   ANKITA Morrell CNP   DULoxetine (CYMBALTA) 60 MG extended release capsule Take 1 capsule by mouth daily 7/13/22 11/15/22  ANKITA Morrell CNP   levothyroxine (SYNTHROID) 50 MCG tablet Take 1 tablet by mouth Daily 22   Brett Mohamud ANKITA Meza CNP   pravastatin (PRAVACHOL) 80 MG tablet TAKE 1 TABLET BY MOUTH ONCE DAILY 3/14/22   ANKITA Monte CNP   cetirizine [facility] ALLERGY RELIEF, CETIRIZINE,) 10 MG tablet Take 1 tablet by mouth daily 3/2/22   ANKITA Monte CNP   gabapentin (NEURONTIN) 300 MG capsule Take 1 capsule by mouth 2 times daily for 30 days. Intended supply: 30 days  Patient not taking: Reported on 9/15/2022 2/11/22 3/13/22  ANKITA Monte CNP   digoxin (LANOXIN) 125 MCG tablet Take 1 tablet by mouth daily 2/11/22   ANKITA Monte CNP   furosemide (LASIX) 40 MG tablet Take 1 tablet by mouth nightly 12/1/21   ANKITA Monte CNP   losartan (COZAAR) 100 MG tablet Take 1 tablet by mouth daily 8/26/21   ANKITA Monte CNP   fluticasone Shyam Sanchez) 50 MCG/ACT nasal spray 2 sprays by Each Nostril route daily 4/19/21   ANKITA Monte CNP   tiZANidine (ZANAFLEX) 4 MG tablet Take 1 tablet by mouth 3 times daily as needed (shoulder pain) 4/19/21   ANKITA Monte CNP   polyethylene glycol [facility]) 17 GM/SCOOP powder Take 17 g by mouth daily 12/16/20   ANKITA Monte CNP   insulin regular (NOVOLIN R) 100 UNIT/ML injection Inject 0-10 Units into the skin 3 times daily (before meals) 12/16/20   ANKITA Monte CNP   insulin NPH (HUMULIN N;NOVOLIN N) 100 UNIT/ML injection vial Inject 80 Units into the skin nightly 12/16/20   ANKITA Monte CNP   albuterol sulfate HFA (VENTOLIN HFA) 108 (90 Base) MCG/ACT inhaler Inhale 2 puffs into the lungs every 6 hours as needed for Wheezing 12/16/20   ANKITA Monte CNP   insulin aspart (NOVOLOG) 100 UNIT/ML injection vial Inject into the skin    Historical Provider, MD       Current medications:    No current facility-administered medications for this visit. No current outpatient medications on file.      Facility-Administered Medications Ordered in Other Visits   Medication Dose Route Frequency Provider Last Rate Last Admin  albuterol sulfate HFA (PROVENTIL;VENTOLIN;PROAIR) 108 (90 Base) MCG/ACT inhaler 2 puff  2 puff Inhalation 4 times per day Khadar Chua MD   2 puff at 09/24/22 0521    aspirin chewable tablet 81 mg  81 mg Per NG tube Daily Khadar Chau MD        iron sucrose (VENOFER) injection 100 mg  100 mg IntraVENous Daily Khadar Chau MD   100 mg at 09/23/22 0944    morphine (PF) injection 1 mg  1 mg IntraVENous Q2H PRN Carisa Baires PA-C        Or    morphine (PF) injection 2 mg  2 mg IntraVENous Q2H PRN Carisa Baires PA-C   2 mg at 09/24/22 0417    dexmedetomidine (PRECEDEX) 400 mcg in sodium chloride 0.9 % 100 mL infusion  0.1-1.5 mcg/kg/hr IntraVENous Continuous Khadar Chau MD 14.8 mL/hr at 09/24/22 0047 0.4 mcg/kg/hr at 09/24/22 0047    sodium chloride flush 0.9 % injection 5-40 mL  5-40 mL IntraVENous PRN Dia Castillo PA-C        0.9 % sodium chloride infusion   IntraVENous PRN Dia Castillo PA-C        ondansetron (ZOFRAN-ODT) disintegrating tablet 4 mg  4 mg Oral Q8H PRN Dia Castillo PA-C        Or    ondansetron [facility]) injection 4 mg  4 mg IntraVENous Q6H PRN Dia Castillo PAJULITA        clopidogrel (PLAVIX) tablet 75 mg  75 mg Oral Daily DiaFUNMI Holley-C        acetaminophen (TYLENOL) tablet 650 mg  650 mg Oral Q4H PRN FUNMI Maya-PIOTR        metoprolol (LOPRESSOR) injection 2.5 mg  2.5 mg IntraVENous Q10 Min PRN Dia Castillo PA-C        propofol injection  5-30 mcg/kg/min IntraVENous Continuous ARIN MayaC 26.7 mL/hr at 09/24/22 0450 30 mcg/kg/min at 09/24/22 0450    [Held by provider] sennosides-docusate sodium (SENOKOT-S) 8.6-50 MG tablet 1 tablet  1 tablet Oral BID Dia Castillo PA-C        bisacodyl (DULCOLAX) suppository 10 mg  10 mg Rectal Daily PRN Dia Castillo PA-C        fleet rectal enema 1 enema  1 enema Rectal Daily PRN Dia Castillo PA-C        pantoprazole (PROTONIX) 40 mg in sodium chloride (PF) 10 mL injection  40 mg IntraVENous Daily Drusinabila [place], PA-C   40 mg at 09/23/22 0944    potassium chloride 20 mEq/50 mL IVPB (Central Line)  20 mEq IntraVENous PRN Juan Ma [place], PA-C   Stopped at 09/23/22 2136    albumin human 25 % IV solution 25 g  25 g IntraVENous PRN Drmanuela Kendell, PA-C   Stopped at 09/23/22 1540    DOBUTamine (DOBUTREX) 500 mg in dextrose 5 % 250 mL infusion  2-10 mcg/kg/min IntraVENous Continuous PRN Drlalolla [place], PA-C 13.3 mL/hr at 09/23/22 1500 3 mcg/kg/min at 09/23/22 1500    norepinephrine (LEVOPHED) 16 mg in sodium chloride 0.9 % 250 mL infusion  1-100 mcg/min IntraVENous Continuous PRN Drusilla [place], PA-C   Stopped at 09/23/22 1414    EPINEPHrine (EPINEPHrine HCL) 5 mg in dextrose 5 % 250 mL infusion  1-30 mcg/min IntraVENous Continuous PRN Drlalolla Kendell, PA-C 15 mL/hr at 09/23/22 2045 5 mcg/min at 09/23/22 2045    nitroGLYCERIN 50 mg in dextrose 5% 250 mL infusion  5-300 mcg/min IntraVENous Continuous PRN Juan Ma [place], PA-C        insulin regular (HUMULIN R;NOVOLIN R) 100 Units in sodium chloride 0.9 % 100 mL infusion  1-50 Units/hr IntraVENous Continuous Juan Ma Kendell, PA-C 3.6 mL/hr at 09/24/22 0638 3.6 Units/hr at 09/24/22 8625    glucose chewable tablet 16 g  4 tablet Oral PRN Juan Ma Kendell, PA-C        glucagon (rDNA) injection 1 mg  1 mg IntraMUSCular PRN Juan Ma Kendell, PA-C        dextrose 10 % infusion   IntraVENous Continuous PRN Juan Ma Kendell, PA-C        HYDROcodone-acetaminophen 7.5-325 MG per 15ML solution 5 mL  5 mL Nasogastric Q4H PRN Juan Ma [place], PA-C        lidocaine 4 % external patch 1 patch  1 patch TransDERmal Daily Candicella Kendell, PA-C        DOBUTamine (DOBUTREX) 500 mg in dextrose 5 % 250 mL infusion  2.5-10 mcg/kg/min IntraVENous Continuous Aniya Redmond MD 13.3 mL/hr at 09/24/22 0631 3 mcg/kg/min at 09/24/22 0631    albumin human 25 % IV solution 25 g potassium chloride (KLOR-CON M) extended release tablet 40 mEq  40 mEq Oral Daily with breakfast Thania Irene PA-C   40 mEq at 09/21/22 1210    [Held by provider] magnesium oxide (MAG-OX) tablet 200 mg  200 mg Oral BID Thania Irene PA-C   200 mg at 09/21/22 1211    [Held by provider] glipiZIDE (GLUCOTROL) tablet 5 mg  5 mg Oral Daily Srikanth Lopez MD   5 mg at 09/21/22 1211    [Held by provider] fluticasone (FLONASE) 50 MCG/ACT nasal spray 2 spray  2 spray Each Nostril Daily Cristal Boss MD   2 spray at 09/19/22 1407    [Held by provider] digoxin (LANOXIN) tablet 125 mcg  125 mcg Oral Daily Cristal Boss MD        [Held by provider] cetirizine (ZYRTEC) tablet 10 mg  10 mg Oral Daily Cristal Boss MD   10 mg at 09/21/22 1212    [Held by provider] DULoxetine (CYMBALTA) extended release capsule 60 mg  60 mg Oral Daily Cristal Boss MD   60 mg at 09/21/22 1210    hydrALAZINE (APRESOLINE) injection 10 mg  10 mg IntraVENous Q6H PRN Thania Irene PA-C   10 mg at 09/16/22 2631    [Held by provider] allopurinol (ZYLOPRIM) tablet 100 mg  100 mg Oral BID Thania Irene PA-C   100 mg at 09/21/22 1211       Allergies:  No Known Allergies    Problem List:    Patient Active Problem List   Diagnosis Code    Atrial fibrillation (HCC) I48.91    CAD (coronary artery disease) I25.10    Morbid obesity ([facility] E66.01    COPD (chronic obstructive pulmonary disease) ([facility] J44.9    Sleep apnea G47.30    Type 2 diabetes mellitus ([facility] E11.9    Thyroid disease E07.9    Hyperlipidemia E78.5    Congestive heart failure, unspecified HF chronicity, unspecified heart failure type ([facility] I50.9    Coronary artery disease involving native coronary artery of native heart with angina pectoris ([facility] I25.119    Chronic renal disease, stage III ([facility] [109208] N18.30    Hypertension I10    CAD, multiple vessel I25.10       Past Medical History:        Diagnosis Date    Allergic rhinitis     Anticoagulated on Coumadin     1/6/17-**[place] to follow pt's PT/INRs, along w/prescribing his Coumadin. **    Anxiety     Arthritis     Atrial fibrillation (HCC)     On Coumadin. 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 positive 09/21/2022    positive at 15C, negative at 18C    COPD (chronic obstructive pulmonary disease) (HCC)     Depression     Diabetes mellitus (HCC)     NIDDM- dx over 10 yrs ago- follows with Dr Patience Acosta Dupuytren's contracture of hand     Right hand    Family history of cardiovascular disease     Father-MI    GERD (gastroesophageal reflux disease)     H/O cardiac catheterization 03/2007    cardiac cath- stent LAD patent, [id number]%, RCA 40-50%    H/O cardiac catheterization 06/12/2008    cardiac cath- mild disease mid RCA    H/O cardiovascular stress test 04/10/2014    cardiolite- normal, no ischemia, EF63%    H/O cardiovascular stress test 09/21/2016    EF59% normal study  pt in atrial fib    H/O cardiovascular stress test 09/20/2017    EF 60%   afib    H/O cardiovascular stress test 11/07/2018    EF60% normal study    H/O Doppler ultrasound     1/11/2010-CAROTID_Intimal thickening but no significant atherosclerotic plaque noted in LAUREN. Doppler flow velocities within the LAUREN are WNL. Intimal thickening but no significant atherosclerotic plaque noted in LICA. Doppler flow velociities within the LICA are WNL.  H/O echocardiogram 04/10/2014    EF 60%, normal LV systolic function, mild mitral and tricuspid insufficiencies, no pericardial effusion.     H/O echocardiogram 09/21/2016    EF60% normal study    H/O echocardiogram 11/07/2018    EF55-60% no significant valular disease    H/O hiatal hernia     Hx of Venous Doppler 03/14/2019    Significant reflux in Left Deep System, No reflux in right lower extremity, No DVT or SVT    Hyperlipidemia     Hypertension     Obesity     S/P PTCA (percutaneous transluminal coronary angioplasty) 03/21/2005 s/p PTCA with 3.5 X 16 TAXUS stent to LAD- follows with Dr Kayla Henriquez Sleep apnea     had sleep study done 10+ yrs ago- has cpap but does not use it    Thyroid disease     hypothyroid       Past Surgical History:        Procedure Laterality Date    CARDIAC CATHETERIZATION  08    mild disease mid RCA,  stent to LAD patent,    CARDIAC CATHETERIZATION  3/07    Stent to LAD patent, Diagonal 40-50%, RCA 40-50%    CARDIAC CATHETERIZATION  3/05    COLONOSCOPY      COLONOSCOPY  16    1 polyp removed, diverticulosis and hemorrhoids noted    CORONARY ANGIOPLASTY WITH STENT PLACEMENT  2005    PTCA with 3.5 x 16 TAXUS stent to LAD    ENDOSCOPY, COLON, DIAGNOSTIC      egd    HAND SURGERY Right     OH OPEN RX ANKLE DISLOCATN+FIXATN Right 6/3/2019    RIGHT OPEN REDUCTION INTERNAL FIXATION OF DISTAL TIBIA  AND FIBULA performed by Isaias Brown MD at [address] [place] [address] History:    Social History     Tobacco Use    Smoking status: Former     Packs/day: 1.00     Years: 10.00     Pack years: 10.00     Types: Cigarettes     Quit date: 1976     Years since quittin.7    Smokeless tobacco: Never   Substance Use Topics    Alcohol use: Not Currently     Alcohol/week: 6.0 standard drinks     Types: 6 Cans of beer per week     Comment: caffeine 2 cups of tea a day                                 Counseling given: Not Answered      Vital Signs (Current): There were no vitals filed for this visit.                                            BP Readings from Last 3 Encounters:   22 (!) 133/41   22 (!) 157/133   22 136/78       NPO Status:                                                                                 BMI:   Wt Readings from Last 3 Encounters:   22 (!) 350 lb 15.6 oz (159.2 kg)   22 (!) 331 lb (150.1 kg)   22 (!) 331 lb (150.1 kg)     There is no height or weight on file to calculate BMI.    CBC:   Lab Results   Component Value Date/Time WBC 9.8 09/24/2022 05:05 AM    RBC 2.37 09/24/2022 05:05 AM    HGB 8.2 09/24/2022 05:08 AM    HCT 24.0 09/24/2022 05:08 AM    .4 09/24/2022 05:05 AM    RDW 18.7 09/24/2022 05:05 AM     09/24/2022 05:05 AM       CMP:   Lab Results   Component Value Date/Time     09/24/2022 05:08 AM    K 4.3 09/24/2022 05:08 AM     09/23/2022 06:15 AM    CO2 24 09/24/2022 05:08 AM    BUN 22 09/23/2022 06:15 AM    CREATININE 1.5 09/24/2022 05:08 AM    CREATININE 1.4 09/23/2022 06:15 AM    GFRAA >60 09/23/2022 06:15 AM    AGRATIO 1.1 02/15/2022 03:35 PM    LABGLOM 50 09/23/2022 06:15 AM    GLUCOSE 137 09/23/2022 06:15 AM    PROT 4.7 09/23/2022 06:15 AM    CALCIUM 7.4 09/23/2022 06:15 AM    BILITOT 0.6 09/23/2022 06:15 AM    ALKPHOS 57 09/23/2022 06:15 AM    AST 72 09/23/2022 06:15 AM    ALT 16 09/23/2022 06:15 AM       POC Tests:   Recent Labs     09/24/22  0508 09/24/22  0637   POCGLU 87 86   POCCL 111*  --        Coags:   Lab Results   Component Value Date/Time    PROTIME 18.0 09/24/2022 05:05 AM    PROTIME 27.3 09/09/2022 03:22 PM    INR 1.39 09/24/2022 05:05 AM    APTT 41.4 09/23/2022 12:35 AM       HCG (If Applicable): No results found for: PREGTESTUR, PREGSERUM, HCG, HCGQUANT     ABGs:   Lab Results   Component Value Date/Time    PO2ART 70.5 09/24/2022 05:08 AM    EVL3SRW 40.9 09/24/2022 05:08 AM    MDG5HWG 22.4 09/24/2022 05:08 AM        Type & Screen (If Applicable):  No results found for: LABABO, LABRH    Drug/Infectious Status (If Applicable):  No results found for: HIV, HEPCAB    COVID-19 Screening (If Applicable):   Lab Results   Component Value Date/Time    COVID19 DETECTED 11/13/2020 12:09 PM           Anesthesia Evaluation  Patient summary reviewed and Nursing notes reviewed  Airway: Mallampati: Unable to assess / NA  TM distance: >3 FB   Neck ROM: full  Mouth opening: > = 3 FB  Intubated Dental:          Pulmonary:normal exam    (+) COPD:  sleep apnea:  current smoker                          ROS comment: Former smoker   Cardiovascular:  Exercise tolerance: poor (<4 METS),   (+) hypertension:, angina: at rest, CAD: obstructive, CABG/stent:, dysrhythmias: atrial fibrillation, CHF:, hyperlipidemia         Beta Blocker:  Dose within 24 Hrs      ROS comment: Echo:     Left ventricular systolic function is normal.   Essentially normal left atrium. Small left atrial appendage with smoke noted; no thrombus present. Negative bubble study; no ASD or PFO noted. Neuro/Psych:   (+) depression/anxiety             GI/Hepatic/Renal:   (+) GERD:, morbid obesity          Endo/Other:    (+) DiabetesType II DM, using insulin, hypothyroidism, blood dyscrasia: bridge therapy and anemia:., .                 Abdominal:             Vascular: negative vascular ROS. Other Findings:             Anesthesia Plan      general     ASA 5       Induction: intravenous. BIS, MILTON, central line, CVP, PA catheter and arterial line  MIPS: Postoperative opioids intended.                         Viona Hodgkin, APRN - CRNA   9/24/2022

## 2022-09-24 NOTE — PROGRESS NOTES
[facility] here at this time for transport.     Electronically signed by Ashok Dowell RN on 9/24/2022 at 1:05 PM

## 2022-09-24 NOTE — PROGRESS NOTES
Tammy sent to Dr. Buzz Cordova for new consult at this time.     Electronically signed by Wilbert Claudio RN on 9/24/2022 at 9:39 AM

## 2022-09-24 NOTE — PROGRESS NOTES
Dr. Chato Mancera at bedside at this time. New orders for: 1. Chest xray   2. 1 amp bicarb   3. Increase Rate to 20 on ventilator.      Also see new consult for nephrology and Dr. Chato Mancera would like no blood given without his consent first.     Electronically signed by Layla Alvarado RN on 9/24/2022 at 9:40 AM

## 2022-09-24 NOTE — PROGRESS NOTES
Dr. Charles Swenson at bedside at this time. Advanced ET tube to 24.     Electronically signed by Shanya Live RN on 9/24/2022 at 9:49 AM

## 2022-09-24 NOTE — PROGRESS NOTES
09/24/22 1208   Patient Observation   Heart Rate 80   Resp 14   SpO2 95 %   Ventilator Settings   Vt (Set, mL) 550 mL   Resp Rate (Set) 20 bmp   PEEP/CPAP (cmH2O) 7   FiO2  100 %   Vent Patient Data (Readings)   Vt (Measured) 561 mL   Peak Inspiratory Pressure (cmH2O) 29 cmH2O   Rate Measured 20 br/min   Minute Volume (L/min) 11.4 Liters   Mean Airway Pressure (cmH2O) 15 cmH20   Plateau Pressure (cm H2O) 0 cm H2O   I:E Ratio 1:1.70   Vent Alarm Settings   Low Minute Volume (lpm) 2.5 L/min   High Minute Volumn (lpm) 20 L/min   Low Exhaled Vt (ml) 250 mL   High Exhaled Vt (ml) 1000 mL   RR High (bpm) 40 br/min   Apnea (secs) 20 secs   Additional Respiratoray Assessments   Humidification Source HME   Ambu Bag With Mask At Bedside Yes   Airway Clearance   Suction ET Tube   Suction Device Inline suction catheter   Sputum Amount Moderate   Sputum Color/Odor Pink tinged   Sputum Consistency Thick   ETT (adult)   Placement Date/Time: 09/22/22 0805   Present on Admission/Arrival: Yes  Placed By: In surgery  Placement Verified By: Auscultation;Capnometry;Direct visualization  Preoxygenation: Yes  Mask Ventilation: Ventilated by mask (1)  Technique: Stylet; Video . ..    Secured At 24 cm   Measured From Lips   ETT Placement Center   Secured By Commercial tube singh   Site Assessment Cool;Dry   Treatment   $Treatment Type $Inhaled Therapy/Meds

## 2022-09-24 NOTE — PROGRESS NOTES
Josie RT at bedside at this time adjusting ETT. Tube was at 24 when he left OR and now is at 22. Adjustments made and radiology called for stat chest xray.     Electronically signed by Anastacio Roa RN on 9/24/2022 at 9:40 AM

## 2022-09-24 NOTE — PROGRESS NOTES
Pt transferring to [facility]. Access center called and patient has room available. Pt will go to [facility] room 4125 ICU. Report number is [id number]. Dr. Consuelo Fulton notified.      Electronically signed by Collins Elizondo RN on 9/24/2022 at 10:56 AM

## 2022-09-24 NOTE — ANESTHESIA POSTPROCEDURE EVALUATION
Department of Anesthesiology  Postprocedure Note    Patient: Eri Pascal  MRN: [id number]  YOB: 1950  Date of evaluation: 9/24/2022      Procedure Summary     Date: 09/24/22 Room / Location: [address] [place] [address] OR [address] [place] [address]    Anesthesia Start: 5240 Anesthesia Stop: 3145    Procedure: Lovella Sloane (Chest) Diagnosis:       Bleeding      (Bleeding [R58])    Surgeons: Sandro Collado MD Responsible Provider: Abner Ascencio MD    Anesthesia Type: General ASA Status: 5          Anesthesia Type: General    Jay Phase I: Jay Score: 4    Jay Phase II:        Anesthesia Post Evaluation    Patient location during evaluation: ICU  Patient participation: complete - patient cannot participate  Level of consciousness: sedated and ventilated  Pain score: 0  Airway patency: patent  Nausea & Vomiting: no nausea and no vomiting  Complications: no  Cardiovascular status: blood pressure returned to baseline  Respiratory status: acceptable, ventilator and intubated  Hydration status: euvolemic

## 2022-09-24 NOTE — OP NOTE
Securement Method Leg strap 09/24/22 0315   Catheter Care Completed Yes 09/23/22 0810   Catheter Best Practices  Drainage tube clipped to bed;Catheter secured to thigh; Tamper seal intact; Bag below bladder;Bag not on floor; Lack of dependent loop in tubing;Drainage bag less than half full 09/24/22 0315   Status Patent 09/24/22 0315   Output (mL) 60 mL 09/24/22 0700       [REMOVED] Chest Tube Left Pleural 1 (Removed)   Chest Tube Airleak No 09/24/22 0315   Status Continuous Suction 09/24/22 0315   Suction Other (Comment) 09/24/22 0315   Y Connector Used Yes 09/24/22 0315   Drainage Description Dark red 09/24/22 0315   Dressing Status Intact 09/24/22 0315   Chest Tube Dressing Other (Comment) 09/24/22 0315   Site Assessment Clean; Intact; Other (Comment) 09/24/22 0315   Surrounding Skin Unable to view 09/24/22 0315   Output (ml) 100 ml 09/24/22 0654       [REMOVED] Chest Tube Mediastinal;Pericardial 2 (Removed)   Chest Tube Airleak No 09/24/22 0315   Status Continuous Suction 09/24/22 0315   Suction Other (Comment) 09/24/22 0315   Y Connector Used Yes 09/24/22 0315   Drainage Description Dark red 09/24/22 0315   Dressing Status Intact 09/24/22 0315   Chest Tube Dressing Other (Comment) 09/24/22 0315   Site Assessment Other (Comment) 09/24/22 0315   Surrounding Skin Unable to view 09/24/22 0315   Output (ml) 0 ml 09/23/22 1400       Findings:  distended RV    Detailed Description of Procedure:   Patient underwent general anesthesia and he is already intubated. Intra-Op MILTON was carried out by the anesthesia team and LV function appeared normal but the RV function was hypokinetic as well as moderately severe tricuspid regurgitation. Leg cold packs were removed from the open sternotomy wound. The chest tubes were taken out and flushed and cleansed. Wound was irrigated with copious amounts of saline and then Betadine. The left chest cavity was also suctioned off.     It was decided that closure will not be possible and therefore packs were placed iodoform packs were placed in the sternotomy area and then Betadine Ioban was utilized for seal.  Patient was transferred to the ICU with stable hemodynamics  Electronically signed by Lottie Damon MD on 9/24/2022 at 9:28 AM

## 2022-09-24 NOTE — PROGRESS NOTES
Pt left ICU with [facility] at this time. All gtt medications and rates handed off with [address] with Luis Guzmán. Dr. Jose Daniel Cortes updated that patient has left with Luis Guzmán.     Electronically signed by Stephan Burkett RN on 9/24/2022 at 2:09 PM

## 2022-09-24 NOTE — PROGRESS NOTES
[address] SURGERY PROGRESS NOTE    Bert Tong is a 71 y.o. male who is postop day # 2 status post cabg/mod maze    Overnight events:weaned part of inotropes but high PA pressures & sluggish response to bumex;;tube feed not tolerated                Subjective:    Pain: 0/10  Diet: Diet NPO Exceptions are: Ice Chips, Sips of Water with Meds  ADULT TUBE FEEDING; Orogastric; Other Tube Feeding (specify); Jevity; Continuous; 25; No; 30; Q 4 hours  Activity: none  Bowel movement: none          Objective:    Vitals: VITALS:  /75   Pulse 82   Temp 99 °F (37.2 °C) (Core)   Resp 22   Ht 6' 2.02\" (1.88 m)   Wt (!) 350 lb 15.6 oz (159.2 kg)   SpO2 92%   BMI 45.04 kg/m²   TEMPERATURE:  Current - Temp: 99 °F (37.2 °C);  Max - Temp  Av.2 °F (36.8 °C)  Min: 97.3 °F (36.3 °C)  Max: 99 °F (37.2 °C)    I/O:  0701 - 700  In: 4186 [I.V.:2943.8]  Out:  [ERSKY:8703]    Labs/Imaging Results:  Lab Results   Component Value Date    WBC 9.8 2022    HGB 8.4 (L) 2022    HCT 25.0 (L) 2022    .4 (H) 2022     (L) 2022     Lab Results   Component Value Date/Time     2022 07:19 AM    K 4.3 2022 07:19 AM     2022 05:05 AM    CO2 23 2022 07:19 AM    BUN 22 2022 05:05 AM    CREATININE 1.5 2022 07:19 AM    CREATININE 1.4 2022 05:05 AM    GLUCOSE 86 2022 05:05 AM    CALCIUM 7.5 2022 05:05 AM          IV Fluids:   dexmedetomidine HCl in NaCl Last Rate: 0.4 mcg/kg/hr (22)    sodium chloride    propofol Last Rate: 30 mcg/kg/min (22)    DOBUTamine Last Rate: 3 mcg/kg/min (22)    norepinephrine Last Rate: Stopped (22 1414)    EPINEPHrine Last Rate: 5 mcg/min (22)    nitroGLYCERIN    insulin Last Rate: Stopped (22)    dextrose    DOBUTamine Last Rate: 5 mcg/kg/min (22 08)    amiodarone 450mg/250ml D5W infusion Last Rate: 0.5 mg/min (22 5411)    milrinone Last Rate: 0.25 mcg/kg/min (09/24/22 0743)    sodium chloride    vasopressin (Septic Shock) infusion Last Rate: 0.02 Units/min (09/24/22 0743)    sodium chloride    Scheduled Meds:   albuterol sulfate HFA, 2 puff, Inhalation, 4 times per day    aspirin, 81 mg, Per NG tube, Daily    iron sucrose, 100 mg, IntraVENous, Daily    clopidogrel, 75 mg, Oral, Daily    [Held by provider] sennosides-docusate sodium, 1 tablet, Oral, BID    pantoprazole (PROTONIX) 40 mg injection, 40 mg, IntraVENous, Daily    lidocaine, 1 patch, TransDERmal, Daily    sodium chloride flush, 5-40 mL, IntraVENous, 2 times per day    vancomycin, 1,000 mg, IntraVENous, Q12H    budesonide-formoterol, 2 puff, Inhalation, BID    [Held by provider] potassium chloride, 40 mEq, Oral, Daily with breakfast    [Held by provider] magnesium oxide, 200 mg, Oral, BID    [Held by provider] glipiZIDE, 5 mg, Oral, Daily    [Held by provider] fluticasone, 2 spray, Each Nostril, Daily    [Held by provider] digoxin, 125 mcg, Oral, Daily    [Held by provider] cetirizine, 10 mg, Oral, Daily    [Held by provider] DULoxetine, 60 mg, Oral, Daily    [Held by provider] allopurinol, 100 mg, Oral, BID    Physical Exam:      General appearance: No apparent distress, appears stated age and cooperative. Skin: Skin color normal.  No obvious rashes/ lesions. HEENT: Normocephalic, atraumatic without obvious deformity. EOMI   Neck: Supple, No JVD/bruits. Trachea midline   Lungs: Good respiratory effort. Clear bilaterally. Heart: Regular rate/rhythm   Abdomen: Soft, non-tender, non-distended   Extremities: No clubbing, cyanosis, bilaterally. Full range of motion without deformity.   Neurologic: Grossly intact  Psych: Normal Affect      Assessment and Plan:    Rt sided high pressures; normal lv on echoyesterday         Disposition: ecmo support; dicussed with dr Gia Chew MD 9/24/2022 9:24 AM

## 2022-09-25 NOTE — PROGRESS NOTES
Progress Note( Dr. Jacqui Amin)  9/25/2022  Subjective:   Admit Date: 9/15/2022  PCP: Ida Koyanagi, APRN - CNP    Admitted For :  Chest pain has history of CAD had cardiac cath three-vessel disease  For CABG 9/19/2022    Consulted For: Better control of blood glucose    Interval History:Better control of blood glucose  Patient just came back from second surgery from the OR  According to the surgical notes his chest wall wound /has open sternotomy not believe closed  Wound was washed and cleaned and repacked the area was transferred back to ICU        Is very complicated course according to the surgery note  Since chest wall was not closed left open  Patient still on balloon pump  Patient is sedated intubated on ventilator    Denies any chest pains,   Mid SOB . Denies nausea or vomiting. No new bowel or bladder symptoms. No intake or output data in the 24 hours ending 09/25/22 1257      DATA    CBC:   Recent Labs     09/23/22  0035 09/23/22  0117 09/23/22  0615 09/23/22  0732 09/24/22  0505 09/24/22  0508 09/24/22  0928 09/24/22  1027 09/24/22  1149   WBC 13.8*  --  12.7*  --  9.8  --   --   --   --    HGB 10.3*   < > 10.1*   < > 8.3*   < > 8.0* 7.9* 7.7*     --  160  --  106*  --   --   --   --     < > = values in this interval not displayed.       CMP:  Recent Labs     09/23/22  0615 09/23/22  0732 09/24/22  0505 09/24/22  0508 09/24/22  1027 09/24/22  1149 09/24/22  1208      < > 140   < > 141 141 143   K 4.2  4.3   < > 4.7   < > 4.7* 4.9* 5.1     --  109  --   --   --  109   CO2 20*   < > 19*   < > 23 22 21   BUN 22  --  22  --   --   --  27*   CREATININE 1.4*   < > 1.4*   < > 1.5* 1.5* 1.3   CALCIUM 7.4*  --  7.5*  --   --   --  7.3*   PROT 4.7*  --  4.8*  --   --   --  4.8*   LABALBU 3.0*  --  3.2*  --   --   --  3.0*   BILITOT 0.6  --  0.5  --   --   --  0.6   ALKPHOS 57  --  58  --   --   --  58   AST 72*  --  56*  --   --   --  45*   ALT 16  --  14  --   --   --  13    < > = values in this interval not displayed. Lipids:   Lab Results   Component Value Date/Time    CHOL 152 04/14/2021 10:02 AM    CHOL 139 07/23/2018 08:01 AM    HDL 43 08/26/2022 10:39 AM    TRIG 212 04/14/2021 10:02 AM     Glucose:  Recent Labs     09/24/22  0928 09/24/22  1027 09/24/22  1149   POCGLU 124* 165* 221*       [id number]:  Lab Results   Component Value Date/Time    LABA1C 7.5 09/15/2022 12:55 PM     High Sensitivity TSH:   Lab Results   Component Value Date/Time    TSHHS 3.930 09/17/2022 12:26 PM     Free T3: No results found for: FT3  Free T4:  Lab Results   Component Value Date/Time    T4FREE 1.14 09/17/2022 12:26 PM       XR CHEST PORTABLE   Final Result   1. Endotracheal tube approximately 5 cm above the nat[place]. 2. Stable bilateral pleural effusions and atelectasis. No pneumothorax. 3. Right CVC with tip extending into the right axillary vein stable in   position compared the prior exam.         XR CHEST PORTABLE   Final Result   Endotracheal tube with the tip overlying the lower cervical spine and can be   advanced by approximately additional 4 cm. Streaky lucencies overlying the mediastinum, which may reflect   pneumomediastinum. Additional supporting devices as above. Postsurgical changes open heart   surgery. Possible new surgical hemostatic material overlying the cardiac   silhouette. Moderate pulmonary vascular congestion. Magnified cardiac silhouette. XR CHEST PORTABLE   Final Result   The tip of the endotracheal tube is at the T1 level near the thoracic inlet. There are 2 linear parallel air channels along the superior mediastinum which   are felt to be related to the patient's open sternotomy rather than   mediastinal air. There is cardiomegaly and mild vascular congestion. There atelectasis or   infiltrates in the lung bases with bilateral effusions, right greater than   left.          XR CHEST PORTABLE   Final Result   Persistent small bilateral pleural effusions with likely overlying   atelectasis. Support lines and tubes appear unchanged. XR CHEST PORTABLE   Final Result   1. Endotracheal tube terminates 5.7 cm above the natalia. 2. Florence-Ale catheter tip in the pulmonary outflow tract. 3. Post sternotomy changes. New small right greater than left layering   pleural effusions with bibasilar atelectasis. XR ABDOMEN FOR NG/OG/NE TUBE PLACEMENT   Final Result   Enteric tube tip and side-port in the proximal stomach below the   gastroesophageal junction. CTA PULMONARY W CONTRAST   Final Result   1. No acute pulmonary embolism. 2. Main pulmonary artery dilatation can be seen with pulmonary hypertension   but is nonspecific. 3. No acute pulmonary disease. 4. Mild-to-moderate severity calcific atherosclerosis coronary arteries. 5. Cardiomegaly. CT CHEST WO CONTRAST   Final Result   1. No acute abnormality. 2.  Coronary artery disease.  DUP LOWER EXTREMITY VENOUS BILATERAL   Final Result   1. Difficulty with limited evaluation of the mid right femoral vein, though a   focal area of intraluminal echogenicity similar to that of the previous exam   is again noted felt to represent nonocclusive DVT. 2. No deep venous thrombosis seen elsewhere in the right lower extremity. 3. No left lower extremity DVT. 4. Left calf veins were not seen, with difficulty scanning overall per the   technologist         US DUP LOWER EXTREMITY MAPPING BILAT VENOUS   Final Result   Right greater saphenous vein is patent with measurements ranging from 2.1 to   5.2 mmmm as discussed above. Left greater saphenous vein is patent with measurements ranging from 2.7 to   8.5 mm as discussed above. RECOMMENDATIONS:   Unavailable         Vascular carotid duplex bilateral   Final Result   The right internal carotid artery demonstrates 0-50% stenosis. The left internal carotid artery demonstrates 0-50% stenosis. Bilateral vertebral arteries are patent with flow in the normal direction. Chest x-ray:      Cardiomegaly and congestion but no evidence of pulmonary edema or focal   infiltrates. No active of pleural disease. RECOMMENDATIONS:   Unavailable         XR CHEST PORTABLE   Final Result   No acute process. Scheduled Medicines   Medications:   REM     Infusions:   REM        Objective:   Vitals: BP (!) 119/36   Pulse 84   Temp 97.2 °F (36.2 °C) (Core)   Resp 11   Ht 6' 2.02\" (1.88 m)   Wt (!) 350 lb 15.6 oz (159.2 kg)   SpO2 95%   BMI 45.04 kg/m²   General appearance: Is sedated intubated on ventilator  Neck: no JVD or bruit  Thyroid : Normal lobes   Lungs: Has Vesicular Breath sounds   Heart: Atrial fibrillation past chest wall after surgery was not closed yet  Abdomen: soft, non-tender; bowel sounds normal; no masses,  no organomegaly  Musculoskeletal: Normal  Extremities: extremities normal, , no edema  Neurologic: Is sedated intubated and on ventilator also on balloon pump    Assessment:     Patient Active Problem List:     Atrial fibrillation (HCC)     CAD (coronary artery disease)     Morbid obesity (HCC)     COPD (chronic obstructive pulmonary disease) (HCC)     Sleep apnea     Type 2 diabetes mellitus (HCC)     Thyroid disease     Hyperlipidemia     Congestive heart failure, unspecified HF chronicity, unspecified heart failure type (HCC)     Coronary artery disease involving native coronary artery of native heart with angina pectoris (HCC)     Chronic renal disease, stage III ([facility] [address] [[id number]]     Hypertension     CAD, multiple vessel disease      Plan:     Reviewed POC blood glucose .  Labs and X ray results   Reviewed Current Medicines   Started on post CABG surgery protocol including IV insulin infusion drip  Monitor Blood glucose frequently   Modified  the dose of Insulin/ other medicines as needed   The cardiovascular surgeon decided that is not possible to close the sternotomy

## 2022-09-26 LAB
ABO/RH: NORMAL
ANTIBODY SCREEN: NEGATIVE
COMMENT: NORMAL
COMMENT: NORMAL
COMPONENT: NORMAL
CROSSMATCH RESULT: NORMAL
STATUS: NORMAL
THERMAL AMPLITUDE STUDY: NORMAL
TRANSFUSION STATUS: NORMAL
UNIT DIVISION: 0
UNIT NUMBER: NORMAL

## 2022-09-27 ENCOUNTER — CARE COORDINATION (OUTPATIENT)
Dept: CARE COORDINATION | Age: 72
End: 2022-09-27

## 2022-09-27 NOTE — CARE COORDINATION
Attempted to reach patient for ACM follow up. No answer to phone. Message left with ACM contact information and request for call back. Attempted second contact. Number indicates that call's party is unavailable. Noted transfer to [facility]. Further follow up deferred.

## 2022-10-01 LAB
HEPARIN INDUCED PLATELET ANTIBODY: NEGATIVE
HEPARIN UNFRACTIONATED HD: 0 %
HEPARIN UNFRACTIONATED LD: 0 %
INTERPRETATION: NORMAL

## 2022-10-06 ENCOUNTER — CARE COORDINATION (OUTPATIENT)
Dept: CARE COORDINATION | Age: 72
End: 2022-10-06

## 2022-10-06 NOTE — CARE COORDINATION
Attempted to reach patient for AC follow up. No answer to phone. Message left with [facility] contact information and request for call back. Attempted second contact. Number indicates that calls party is temporarily unavailable. Message left on HIPPA approved contact with [facility] number and request for return call. No further outreach planned.

## 2022-12-07 ENCOUNTER — TELEPHONE (OUTPATIENT)
Dept: CARDIOLOGY CLINIC | Age: 72
End: 2022-12-07

## 2022-12-11 ENCOUNTER — HOSPITAL ENCOUNTER (INPATIENT)
Age: 72
LOS: 4 days | Discharge: NURSING, REHAB OR LONG-TERM CARE | End: 2022-12-16
Attending: STUDENT IN AN ORGANIZED HEALTH CARE EDUCATION/TRAINING PROGRAM
Payer: COMMERCIAL

## 2022-12-11 ENCOUNTER — APPOINTMENT (OUTPATIENT)
Dept: GENERAL RADIOLOGY | Age: 72
End: 2022-12-11
Payer: COMMERCIAL

## 2022-12-11 DIAGNOSIS — N17.9 AKI (ACUTE KIDNEY INJURY) (HCC): ICD-10-CM

## 2022-12-11 DIAGNOSIS — R77.8 ELEVATED TROPONIN: ICD-10-CM

## 2022-12-11 DIAGNOSIS — I50.9 ACUTE CONGESTIVE HEART FAILURE, UNSPECIFIED HEART FAILURE TYPE (HCC): Primary | ICD-10-CM

## 2022-12-11 LAB
ADENOVIRUS DETECTION BY PCR: NOT DETECTED
ALBUMIN SERPL-MCNC: 3 GM/DL (ref 3.4–5)
ALP BLD-CCNC: 228 IU/L (ref 40–129)
ALT SERPL-CCNC: 10 U/L (ref 10–40)
ANION GAP SERPL CALCULATED.3IONS-SCNC: 16 MMOL/L (ref 4–16)
AST SERPL-CCNC: 16 IU/L (ref 15–37)
BASE EXCESS MIXED: 8.5 (ref 0–1.2)
BASOPHILS ABSOLUTE: 0.1 K/CU MM
BASOPHILS RELATIVE PERCENT: 0.8 % (ref 0–1)
BILIRUB SERPL-MCNC: 0.2 MG/DL (ref 0–1)
BORDETELLA PARAPERTUSSIS BY PCR: NOT DETECTED
BORDETELLA PERTUSSIS PCR: NOT DETECTED
BUN BLDV-MCNC: 72 MG/DL (ref 6–23)
CALCIUM SERPL-MCNC: 9.9 MG/DL (ref 8.3–10.6)
CHLAMYDOPHILA PNEUMONIA PCR: NOT DETECTED
CHLORIDE BLD-SCNC: 90 MMOL/L (ref 99–110)
CO2: 27 MMOL/L (ref 21–32)
CORONAVIRUS 229E PCR: NOT DETECTED
CORONAVIRUS HKU1 PCR: NOT DETECTED
CORONAVIRUS NL63 PCR: NOT DETECTED
CORONAVIRUS OC43 PCR: NOT DETECTED
CREAT SERPL-MCNC: 6.6 MG/DL (ref 0.9–1.3)
DIFFERENTIAL TYPE: ABNORMAL
EOSINOPHILS ABSOLUTE: 0.2 K/CU MM
EOSINOPHILS RELATIVE PERCENT: 1.9 % (ref 0–3)
GFR SERPL CREATININE-BSD FRML MDRD: 8 ML/MIN/1.73M2
GLUCOSE BLD-MCNC: 155 MG/DL (ref 70–99)
HCO3 VENOUS: 34.5 MMOL/L (ref 19–25)
HCT VFR BLD CALC: 25 % (ref 42–52)
HEMOGLOBIN: 8.7 GM/DL (ref 13.5–18)
HUMAN METAPNEUMOVIRUS PCR: NOT DETECTED
IMMATURE NEUTROPHIL %: 2.1 % (ref 0–0.43)
INFLUENZA A BY PCR: NOT DETECTED
INFLUENZA A H1 (2009) PCR: NOT DETECTED
INFLUENZA A H1 PANDEMIC PCR: NOT DETECTED
INFLUENZA A H3 PCR: NOT DETECTED
INFLUENZA B BY PCR: NOT DETECTED
LACTATE: 1.9 MMOL/L (ref 0.4–2)
LYMPHOCYTES ABSOLUTE: 2.2 K/CU MM
LYMPHOCYTES RELATIVE PERCENT: 27.9 % (ref 24–44)
MCH RBC QN AUTO: 35.7 PG (ref 27–31)
MCHC RBC AUTO-ENTMCNC: 34.8 % (ref 32–36)
MCV RBC AUTO: 102.5 FL (ref 78–100)
MONOCYTES ABSOLUTE: 0.8 K/CU MM
MONOCYTES RELATIVE PERCENT: 10.4 % (ref 0–4)
MYCOPLASMA PNEUMONIAE PCR: NOT DETECTED
NUCLEATED RBC %: 0.5 %
O2 SAT, VEN: 44.9 % (ref 50–70)
PARAINFLUENZA 1 PCR: NOT DETECTED
PARAINFLUENZA 2 PCR: NOT DETECTED
PARAINFLUENZA 3 PCR: NOT DETECTED
PARAINFLUENZA 4 PCR: NOT DETECTED
PCO2, VEN: 52 MMHG (ref 38–52)
PDW BLD-RTO: 19 % (ref 11.7–14.9)
PH VENOUS: 7.43 (ref 7.32–7.42)
PLATELET # BLD: 326 K/CU MM (ref 140–440)
PMV BLD AUTO: 8.9 FL (ref 7.5–11.1)
PO2, VEN: 30 MMHG (ref 28–48)
POTASSIUM SERPL-SCNC: 4.3 MMOL/L (ref 3.5–5.1)
PRO-BNP: ABNORMAL PG/ML
RBC # BLD: 2.44 M/CU MM (ref 4.6–6.2)
RHINOVIRUS ENTEROVIRUS PCR: NOT DETECTED
RSV PCR: NOT DETECTED
SARS-COV-2: NOT DETECTED
SEGMENTED NEUTROPHILS ABSOLUTE COUNT: 4.5 K/CU MM
SEGMENTED NEUTROPHILS RELATIVE PERCENT: 56.9 % (ref 36–66)
SODIUM BLD-SCNC: 133 MMOL/L (ref 135–145)
TOTAL IMMATURE NEUTOROPHIL: 0.17 K/CU MM
TOTAL NUCLEATED RBC: 0 K/CU MM
TOTAL PROTEIN: 7.7 GM/DL (ref 6.4–8.2)
TROPONIN T: 0.25 NG/ML
WBC # BLD: 7.9 K/CU MM (ref 4–10.5)

## 2022-12-11 PROCEDURE — 87040 BLOOD CULTURE FOR BACTERIA: CPT

## 2022-12-11 PROCEDURE — 82805 BLOOD GASES W/O2 SATURATION: CPT

## 2022-12-11 PROCEDURE — 99285 EMERGENCY DEPT VISIT HI MDM: CPT

## 2022-12-11 PROCEDURE — 0202U NFCT DS 22 TRGT SARS-COV-2: CPT

## 2022-12-11 PROCEDURE — 71045 X-RAY EXAM CHEST 1 VIEW: CPT

## 2022-12-11 PROCEDURE — 80053 COMPREHEN METABOLIC PANEL: CPT

## 2022-12-11 PROCEDURE — 83605 ASSAY OF LACTIC ACID: CPT

## 2022-12-11 PROCEDURE — 85025 COMPLETE CBC W/AUTO DIFF WBC: CPT

## 2022-12-11 PROCEDURE — 2700000000 HC OXYGEN THERAPY PER DAY

## 2022-12-11 PROCEDURE — 93005 ELECTROCARDIOGRAM TRACING: CPT | Performed by: PHYSICIAN ASSISTANT

## 2022-12-11 PROCEDURE — 51798 US URINE CAPACITY MEASURE: CPT

## 2022-12-11 PROCEDURE — 89220 SPUTUM SPECIMEN COLLECTION: CPT

## 2022-12-11 PROCEDURE — 83880 ASSAY OF NATRIURETIC PEPTIDE: CPT

## 2022-12-11 PROCEDURE — 84484 ASSAY OF TROPONIN QUANT: CPT

## 2022-12-11 PROCEDURE — 6370000000 HC RX 637 (ALT 250 FOR IP): Performed by: PHYSICIAN ASSISTANT

## 2022-12-11 RX ORDER — SODIUM CHLORIDE 9 MG/ML
INJECTION, SOLUTION INTRAVENOUS CONTINUOUS
Status: DISCONTINUED | OUTPATIENT
Start: 2022-12-11 | End: 2022-12-14

## 2022-12-11 RX ORDER — ACETAMINOPHEN 500 MG
1000 TABLET ORAL ONCE
Status: COMPLETED | OUTPATIENT
Start: 2022-12-11 | End: 2022-12-11

## 2022-12-11 RX ORDER — 0.9 % SODIUM CHLORIDE 0.9 %
1000 INTRAVENOUS SOLUTION INTRAVENOUS ONCE
Status: DISCONTINUED | OUTPATIENT
Start: 2022-12-11 | End: 2022-12-11

## 2022-12-11 RX ADMIN — ACETAMINOPHEN 1000 MG: 500 TABLET ORAL at 22:34

## 2022-12-12 PROBLEM — R06.00 DYSPNEA: Status: ACTIVE | Noted: 2022-12-12

## 2022-12-12 PROBLEM — E44.0 MODERATE MALNUTRITION (HCC): Chronic | Status: ACTIVE | Noted: 2022-12-12

## 2022-12-12 LAB
ANION GAP SERPL CALCULATED.3IONS-SCNC: 17 MMOL/L (ref 4–16)
APTT: 31.5 SECONDS (ref 25.1–37.1)
BASOPHILS ABSOLUTE: 0.1 K/CU MM
BASOPHILS RELATIVE PERCENT: 0.9 % (ref 0–1)
BUN BLDV-MCNC: 75 MG/DL (ref 6–23)
CALCIUM SERPL-MCNC: 8.9 MG/DL (ref 8.3–10.6)
CHLORIDE BLD-SCNC: 93 MMOL/L (ref 99–110)
CO2: 24 MMOL/L (ref 21–32)
CREAT SERPL-MCNC: 6.7 MG/DL (ref 0.9–1.3)
DIFFERENTIAL TYPE: ABNORMAL
EOSINOPHILS ABSOLUTE: 0.1 K/CU MM
EOSINOPHILS RELATIVE PERCENT: 1.9 % (ref 0–3)
GFR SERPL CREATININE-BSD FRML MDRD: 8 ML/MIN/1.73M2
GLUCOSE BLD-MCNC: 149 MG/DL (ref 70–99)
GLUCOSE BLD-MCNC: 160 MG/DL (ref 70–99)
GLUCOSE BLD-MCNC: 162 MG/DL (ref 70–99)
GLUCOSE BLD-MCNC: 163 MG/DL (ref 70–99)
GLUCOSE BLD-MCNC: 178 MG/DL (ref 70–99)
HBV SURFACE AB TITR SER: <3.5 {TITER}
HCT VFR BLD CALC: 27.4 % (ref 42–52)
HEMOGLOBIN: 8.6 GM/DL (ref 13.5–18)
HEPATITIS B SURFACE ANTIGEN: NON REACTIVE
IMMATURE NEUTROPHIL %: 2.8 % (ref 0–0.43)
INR BLD: 1.21 INDEX
LYMPHOCYTES ABSOLUTE: 2.4 K/CU MM
LYMPHOCYTES RELATIVE PERCENT: 34.7 % (ref 24–44)
MAGNESIUM: 2.6 MG/DL (ref 1.8–2.4)
MCH RBC QN AUTO: 32 PG (ref 27–31)
MCHC RBC AUTO-ENTMCNC: 31.4 % (ref 32–36)
MCV RBC AUTO: 101.9 FL (ref 78–100)
MONOCYTES ABSOLUTE: 0.7 K/CU MM
MONOCYTES RELATIVE PERCENT: 9.9 % (ref 0–4)
NUCLEATED RBC %: 0.3 %
PDW BLD-RTO: 18.8 % (ref 11.7–14.9)
PLATELET # BLD: 302 K/CU MM (ref 140–440)
PMV BLD AUTO: 8.5 FL (ref 7.5–11.1)
POTASSIUM SERPL-SCNC: 4.5 MMOL/L (ref 3.5–5.1)
PROCALCITONIN: 0.32
PROTHROMBIN TIME: 15.7 SECONDS (ref 11.7–14.5)
RBC # BLD: 2.69 M/CU MM (ref 4.6–6.2)
SEGMENTED NEUTROPHILS ABSOLUTE COUNT: 3.4 K/CU MM
SEGMENTED NEUTROPHILS RELATIVE PERCENT: 49.8 % (ref 36–66)
SODIUM BLD-SCNC: 134 MMOL/L (ref 135–145)
TOTAL IMMATURE NEUTOROPHIL: 0.19 K/CU MM
TOTAL NUCLEATED RBC: 0 K/CU MM
TROPONIN T: 0.26 NG/ML
TROPONIN T: 0.27 NG/ML
WBC # BLD: 6.9 K/CU MM (ref 4–10.5)

## 2022-12-12 PROCEDURE — 51798 US URINE CAPACITY MEASURE: CPT

## 2022-12-12 PROCEDURE — 2580000003 HC RX 258: Performed by: STUDENT IN AN ORGANIZED HEALTH CARE EDUCATION/TRAINING PROGRAM

## 2022-12-12 PROCEDURE — 84484 ASSAY OF TROPONIN QUANT: CPT

## 2022-12-12 PROCEDURE — 89220 SPUTUM SPECIMEN COLLECTION: CPT

## 2022-12-12 PROCEDURE — 90935 HEMODIALYSIS ONE EVALUATION: CPT

## 2022-12-12 PROCEDURE — 6370000000 HC RX 637 (ALT 250 FOR IP): Performed by: STUDENT IN AN ORGANIZED HEALTH CARE EDUCATION/TRAINING PROGRAM

## 2022-12-12 PROCEDURE — 85610 PROTHROMBIN TIME: CPT

## 2022-12-12 PROCEDURE — 87205 SMEAR GRAM STAIN: CPT

## 2022-12-12 PROCEDURE — 80048 BASIC METABOLIC PNL TOTAL CA: CPT

## 2022-12-12 PROCEDURE — 6370000000 HC RX 637 (ALT 250 FOR IP): Performed by: INTERNAL MEDICINE

## 2022-12-12 PROCEDURE — 83735 ASSAY OF MAGNESIUM: CPT

## 2022-12-12 PROCEDURE — 2580000003 HC RX 258: Performed by: INTERNAL MEDICINE

## 2022-12-12 PROCEDURE — 87081 CULTURE SCREEN ONLY: CPT

## 2022-12-12 PROCEDURE — 85730 THROMBOPLASTIN TIME PARTIAL: CPT

## 2022-12-12 PROCEDURE — 87040 BLOOD CULTURE FOR BACTERIA: CPT

## 2022-12-12 PROCEDURE — 2700000000 HC OXYGEN THERAPY PER DAY

## 2022-12-12 PROCEDURE — 94761 N-INVAS EAR/PLS OXIMETRY MLT: CPT

## 2022-12-12 PROCEDURE — 36415 COLL VENOUS BLD VENIPUNCTURE: CPT

## 2022-12-12 PROCEDURE — 94640 AIRWAY INHALATION TREATMENT: CPT

## 2022-12-12 PROCEDURE — 85025 COMPLETE CBC W/AUTO DIFF WBC: CPT

## 2022-12-12 PROCEDURE — 2580000003 HC RX 258: Performed by: PHYSICIAN ASSISTANT

## 2022-12-12 PROCEDURE — 2140000000 HC CCU INTERMEDIATE R&B

## 2022-12-12 PROCEDURE — 86706 HEP B SURFACE ANTIBODY: CPT

## 2022-12-12 PROCEDURE — 5A1D70Z PERFORMANCE OF URINARY FILTRATION, INTERMITTENT, LESS THAN 6 HOURS PER DAY: ICD-10-PCS | Performed by: INTERNAL MEDICINE

## 2022-12-12 PROCEDURE — 84145 PROCALCITONIN (PCT): CPT

## 2022-12-12 PROCEDURE — 87340 HEPATITIS B SURFACE AG IA: CPT

## 2022-12-12 PROCEDURE — 93306 TTE W/DOPPLER COMPLETE: CPT

## 2022-12-12 PROCEDURE — 6360000002 HC RX W HCPCS: Performed by: INTERNAL MEDICINE

## 2022-12-12 PROCEDURE — 99223 1ST HOSP IP/OBS HIGH 75: CPT | Performed by: INTERNAL MEDICINE

## 2022-12-12 PROCEDURE — 87070 CULTURE OTHR SPECIMN AEROBIC: CPT

## 2022-12-12 PROCEDURE — 82962 GLUCOSE BLOOD TEST: CPT

## 2022-12-12 RX ORDER — CARVEDILOL 6.25 MG/1
6.25 TABLET ORAL 2 TIMES DAILY WITH MEALS
Status: DISCONTINUED | OUTPATIENT
Start: 2022-12-12 | End: 2022-12-16 | Stop reason: HOSPADM

## 2022-12-12 RX ORDER — ATORVASTATIN CALCIUM 40 MG/1
40 TABLET, FILM COATED ORAL NIGHTLY
Status: DISCONTINUED | OUTPATIENT
Start: 2022-12-12 | End: 2022-12-16 | Stop reason: HOSPADM

## 2022-12-12 RX ORDER — ALBUTEROL SULFATE 90 UG/1
2 AEROSOL, METERED RESPIRATORY (INHALATION) 4 TIMES DAILY
Status: DISCONTINUED | OUTPATIENT
Start: 2022-12-12 | End: 2022-12-15

## 2022-12-12 RX ORDER — SEVELAMER CARBONATE FOR ORAL SUSPENSION 800 MG/1
1.6 POWDER, FOR SUSPENSION ORAL EVERY 8 HOURS
COMMUNITY
Start: 2022-11-28

## 2022-12-12 RX ORDER — SEVELAMER CARBONATE FOR ORAL SUSPENSION 800 MG/1
1.6 POWDER, FOR SUSPENSION ORAL
Status: DISCONTINUED | OUTPATIENT
Start: 2022-12-12 | End: 2022-12-15

## 2022-12-12 RX ORDER — ALBUTEROL SULFATE 90 UG/1
AEROSOL, METERED RESPIRATORY (INHALATION)
COMMUNITY
Start: 2018-10-05

## 2022-12-12 RX ORDER — ONDANSETRON 4 MG/1
4 TABLET, ORALLY DISINTEGRATING ORAL EVERY 8 HOURS PRN
Status: DISCONTINUED | OUTPATIENT
Start: 2022-12-12 | End: 2022-12-16 | Stop reason: HOSPADM

## 2022-12-12 RX ORDER — LEVOTHYROXINE SODIUM 0.03 MG/1
50 TABLET ORAL DAILY
Status: DISCONTINUED | OUTPATIENT
Start: 2022-12-12 | End: 2022-12-12

## 2022-12-12 RX ORDER — LOSARTAN POTASSIUM 25 MG/1
50 TABLET ORAL DAILY
Status: DISCONTINUED | OUTPATIENT
Start: 2022-12-12 | End: 2022-12-12

## 2022-12-12 RX ORDER — ONDANSETRON 2 MG/ML
4 INJECTION INTRAMUSCULAR; INTRAVENOUS EVERY 6 HOURS PRN
Status: DISCONTINUED | OUTPATIENT
Start: 2022-12-12 | End: 2022-12-16 | Stop reason: HOSPADM

## 2022-12-12 RX ORDER — ACETAMINOPHEN 325 MG/1
650 TABLET ORAL EVERY 6 HOURS PRN
Status: DISCONTINUED | OUTPATIENT
Start: 2022-12-12 | End: 2022-12-16 | Stop reason: HOSPADM

## 2022-12-12 RX ORDER — ASPIRIN 81 MG/1
81 TABLET, CHEWABLE ORAL DAILY
Status: DISCONTINUED | OUTPATIENT
Start: 2022-12-12 | End: 2022-12-16 | Stop reason: HOSPADM

## 2022-12-12 RX ORDER — LEVOTHYROXINE SODIUM 0.07 MG/1
75 TABLET ORAL DAILY
Status: DISCONTINUED | OUTPATIENT
Start: 2022-12-12 | End: 2022-12-16 | Stop reason: HOSPADM

## 2022-12-12 RX ORDER — TRAZODONE HYDROCHLORIDE 50 MG/1
100 TABLET ORAL NIGHTLY
Status: DISCONTINUED | OUTPATIENT
Start: 2022-12-12 | End: 2022-12-16 | Stop reason: HOSPADM

## 2022-12-12 RX ORDER — INSULIN LISPRO 100 [IU]/ML
0-8 INJECTION, SOLUTION INTRAVENOUS; SUBCUTANEOUS
Status: DISCONTINUED | OUTPATIENT
Start: 2022-12-12 | End: 2022-12-16 | Stop reason: HOSPADM

## 2022-12-12 RX ORDER — TRAZODONE HYDROCHLORIDE 100 MG/1
100 TABLET ORAL NIGHTLY
COMMUNITY
Start: 2022-11-28

## 2022-12-12 RX ORDER — WARFARIN SODIUM 2 MG/1
2 TABLET ORAL DAILY
Status: DISCONTINUED | OUTPATIENT
Start: 2022-12-13 | End: 2022-12-13

## 2022-12-12 RX ORDER — LOSARTAN POTASSIUM 50 MG/1
TABLET ORAL
Status: ON HOLD | COMMUNITY
Start: 2018-07-26 | End: 2022-12-15 | Stop reason: HOSPADM

## 2022-12-12 RX ORDER — DIPHENOXYLATE HYDROCHLORIDE AND ATROPINE SULFATE 2.5; .025 MG/1; MG/1
1 TABLET ORAL DAILY
COMMUNITY
Start: 2022-11-29 | End: 2022-12-12

## 2022-12-12 RX ORDER — AMINO ACIDS/PROTEIN HYDROLYS 11G-40/45
1 LIQUID IN PACKET (ML) ORAL 3 TIMES DAILY
COMMUNITY
Start: 2022-11-28

## 2022-12-12 RX ORDER — DILTIAZEM HYDROCHLORIDE 60 MG/1
120 TABLET, FILM COATED ORAL DAILY
Status: DISCONTINUED | OUTPATIENT
Start: 2022-12-12 | End: 2022-12-12

## 2022-12-12 RX ORDER — B COMPLEX C NO.10/FOLIC ACID 900MCG/5ML
5 LIQUID (ML) ORAL DAILY
Status: DISCONTINUED | OUTPATIENT
Start: 2022-12-12 | End: 2022-12-16 | Stop reason: HOSPADM

## 2022-12-12 RX ORDER — SODIUM CHLORIDE 9 MG/ML
INJECTION, SOLUTION INTRAVENOUS PRN
Status: DISCONTINUED | OUTPATIENT
Start: 2022-12-12 | End: 2022-12-16 | Stop reason: HOSPADM

## 2022-12-12 RX ORDER — QUETIAPINE FUMARATE 50 MG/1
50 TABLET, FILM COATED ORAL NIGHTLY
COMMUNITY
Start: 2022-11-28

## 2022-12-12 RX ORDER — WARFARIN SODIUM 3 MG/1
3 TABLET ORAL
Status: COMPLETED | OUTPATIENT
Start: 2022-12-12 | End: 2022-12-12

## 2022-12-12 RX ORDER — WARFARIN SODIUM 1 MG/1
2 TABLET ORAL DAILY
Status: DISCONTINUED | OUTPATIENT
Start: 2022-12-12 | End: 2022-12-12

## 2022-12-12 RX ORDER — ACETAMINOPHEN 650 MG/1
650 SUPPOSITORY RECTAL EVERY 6 HOURS PRN
Status: DISCONTINUED | OUTPATIENT
Start: 2022-12-12 | End: 2022-12-16 | Stop reason: HOSPADM

## 2022-12-12 RX ORDER — INSULIN LISPRO 100 [IU]/ML
0-4 INJECTION, SOLUTION INTRAVENOUS; SUBCUTANEOUS NIGHTLY
Status: DISCONTINUED | OUTPATIENT
Start: 2022-12-12 | End: 2022-12-16 | Stop reason: HOSPADM

## 2022-12-12 RX ORDER — SODIUM CHLORIDE 0.9 % (FLUSH) 0.9 %
5-40 SYRINGE (ML) INJECTION EVERY 12 HOURS SCHEDULED
Status: DISCONTINUED | OUTPATIENT
Start: 2022-12-12 | End: 2022-12-16 | Stop reason: HOSPADM

## 2022-12-12 RX ORDER — ENOXAPARIN SODIUM 100 MG/ML
40 INJECTION SUBCUTANEOUS DAILY
Status: DISCONTINUED | OUTPATIENT
Start: 2022-12-12 | End: 2022-12-12

## 2022-12-12 RX ORDER — SODIUM CHLORIDE 0.9 % (FLUSH) 0.9 %
5-40 SYRINGE (ML) INJECTION PRN
Status: DISCONTINUED | OUTPATIENT
Start: 2022-12-12 | End: 2022-12-16 | Stop reason: HOSPADM

## 2022-12-12 RX ORDER — POLYETHYLENE GLYCOL 3350 17 G/17G
17 POWDER, FOR SOLUTION ORAL DAILY PRN
Status: DISCONTINUED | OUTPATIENT
Start: 2022-12-12 | End: 2022-12-16 | Stop reason: HOSPADM

## 2022-12-12 RX ORDER — DILTIAZEM HYDROCHLORIDE 120 MG/1
TABLET, FILM COATED ORAL
Status: ON HOLD | COMMUNITY
Start: 2018-09-16 | End: 2022-12-15 | Stop reason: HOSPADM

## 2022-12-12 RX ORDER — AMIODARONE HYDROCHLORIDE 200 MG/1
200 TABLET ORAL DAILY
COMMUNITY
Start: 2022-11-29

## 2022-12-12 RX ORDER — LANOLIN ALCOHOL/MO/W.PET/CERES
9 CREAM (GRAM) TOPICAL NIGHTLY
COMMUNITY
Start: 2022-11-28

## 2022-12-12 RX ORDER — SEVELAMER CARBONATE FOR ORAL SUSPENSION 800 MG/1
1.6 POWDER, FOR SUSPENSION ORAL EVERY 8 HOURS
Status: DISCONTINUED | OUTPATIENT
Start: 2022-12-12 | End: 2022-12-12

## 2022-12-12 RX ORDER — AMIODARONE HYDROCHLORIDE 200 MG/1
200 TABLET ORAL DAILY
Status: DISCONTINUED | OUTPATIENT
Start: 2022-12-12 | End: 2022-12-16 | Stop reason: HOSPADM

## 2022-12-12 RX ORDER — B COMPLEX C NO.10/FOLIC ACID 900MCG/5ML
5 LIQUID (ML) ORAL DAILY
COMMUNITY
Start: 2022-11-29

## 2022-12-12 RX ORDER — PRAVASTATIN SODIUM 20 MG
80 TABLET ORAL DAILY
Status: DISCONTINUED | OUTPATIENT
Start: 2022-12-12 | End: 2022-12-12

## 2022-12-12 RX ORDER — LACTOSE-REDUCED FOOD
50 LIQUID (ML) ORAL CONTINUOUS
COMMUNITY
Start: 2022-11-28

## 2022-12-12 RX ORDER — ATORVASTATIN CALCIUM 40 MG/1
40 TABLET, FILM COATED ORAL NIGHTLY
COMMUNITY
Start: 2022-11-28

## 2022-12-12 RX ORDER — POLYETHYLENE GLYCOL 3350 17 G/17G
17 POWDER, FOR SOLUTION ORAL 2 TIMES DAILY
Status: DISCONTINUED | OUTPATIENT
Start: 2022-12-12 | End: 2022-12-16 | Stop reason: HOSPADM

## 2022-12-12 RX ADMIN — CEFEPIME HYDROCHLORIDE 2000 MG: 2 INJECTION, POWDER, FOR SOLUTION INTRAVENOUS at 10:08

## 2022-12-12 RX ADMIN — ALBUTEROL SULFATE 2 PUFF: 90 AEROSOL, METERED RESPIRATORY (INHALATION) at 08:36

## 2022-12-12 RX ADMIN — CARVEDILOL 6.25 MG: 6.25 TABLET, FILM COATED ORAL at 17:57

## 2022-12-12 RX ADMIN — LEVOTHYROXINE SODIUM 75 MCG: 0.07 TABLET ORAL at 10:04

## 2022-12-12 RX ADMIN — SODIUM CHLORIDE: 9 INJECTION, SOLUTION INTRAVENOUS at 17:56

## 2022-12-12 RX ADMIN — SODIUM CHLORIDE: 9 INJECTION, SOLUTION INTRAVENOUS at 02:55

## 2022-12-12 RX ADMIN — ALBUTEROL SULFATE 2 PUFF: 90 AEROSOL, METERED RESPIRATORY (INHALATION) at 12:14

## 2022-12-12 RX ADMIN — TRAZODONE HYDROCHLORIDE 100 MG: 50 TABLET ORAL at 21:53

## 2022-12-12 RX ADMIN — VANCOMYCIN HYDROCHLORIDE 1000 MG: 1 INJECTION, POWDER, LYOPHILIZED, FOR SOLUTION INTRAVENOUS at 15:27

## 2022-12-12 RX ADMIN — SODIUM CHLORIDE, PRESERVATIVE FREE 10 ML: 5 INJECTION INTRAVENOUS at 21:54

## 2022-12-12 RX ADMIN — CARVEDILOL 6.25 MG: 6.25 TABLET, FILM COATED ORAL at 10:04

## 2022-12-12 RX ADMIN — POLYETHYLENE GLYCOL (3350) 17 G: 17 POWDER, FOR SOLUTION ORAL at 18:02

## 2022-12-12 RX ADMIN — WARFARIN SODIUM 3 MG: 3 TABLET ORAL at 17:57

## 2022-12-12 RX ADMIN — SODIUM CHLORIDE, PRESERVATIVE FREE 10 ML: 5 INJECTION INTRAVENOUS at 10:05

## 2022-12-12 RX ADMIN — ALBUTEROL SULFATE 2 PUFF: 90 AEROSOL, METERED RESPIRATORY (INHALATION) at 21:02

## 2022-12-12 RX ADMIN — AMIODARONE HYDROCHLORIDE 200 MG: 200 TABLET ORAL at 10:05

## 2022-12-12 RX ADMIN — ATORVASTATIN CALCIUM 40 MG: 40 TABLET, FILM COATED ORAL at 21:53

## 2022-12-12 RX ADMIN — CEFEPIME HYDROCHLORIDE 1000 MG: 1 INJECTION, POWDER, FOR SOLUTION INTRAMUSCULAR; INTRAVENOUS at 17:57

## 2022-12-12 RX ADMIN — POLYETHYLENE GLYCOL (3350) 17 G: 17 POWDER, FOR SOLUTION ORAL at 21:53

## 2022-12-12 RX ADMIN — ASPIRIN 81 MG: 81 TABLET, CHEWABLE ORAL at 10:04

## 2022-12-12 NOTE — ACP (ADVANCE CARE PLANNING)
CM unable to locate MPOA. Patient does not have any ACP documents/Medical Power of . LSW notes hospital will follow [place]'s Next of Kin hierarchy in the following descending order for priority:    Guardian  Spouse  [de-identified] of adult Children  Parents  [de-identified] of adult Siblings  Nearest Relative not described above    Per [place]'s Next of Kin hierarchy: Patients' Brother/Sister will be Primary Healthcare Decision Maker.

## 2022-12-12 NOTE — CONSULTS
Nephrology Service Consultation    Patient:  Ashley Santacruz  MRN: [id number]  Consulting physician:  Fredi Borrego MD  Reason for Consult:  end-stage renal disease    History Obtained From:  patient, electronic medical record  PCP: ANKITA Dominguez CNP    HISTORY OF PRESENT ILLNESS:   The patient is a 71 y.o. male who presents with  shortness of breath has a  Tracheostomy. Patient was seen by renal and had prior CABG September 2022. After that procedure patient retain lots of fluid possible needing of ECMO was transferred to [facility]. Patient not really aware of the events but did have a tracheostomy placed in October as well as a feeding tube. Tunneled dialysis catheter placed thereafter has been on dialysis for almost 2 months now. Currently was transferred to [facility] for rehab and dialysis care in the setting of a trach would be Monday Wednesday Friday dialysis and due for dialysis today. Patient is alert awake interactive. Patient's brother states that patient has been doing fairly well since his transfer back from [facility] was more awake interactive last week but had a slight decline over the last few days with increased shortness of breath and dyspnea. Presents initially requiring increased amount of oxygen brother was also concerned that he may not get full trach care and has some congestion which may be leading to some of the problems. Last EF was about 50 to 55% which increased to 60 to 65% in addition has underlying A. fib status post maze. DVT as well on anticoagulation with type 2 diabetes and hypertension. Per brother patient had some issues with the PEG tube initially and had to be adjusted.   And since then patient's abdominal issues and overall toleration of tube feeds has been stable with above renal function has not yet recovered and need to maintain on dialysis this time especially in the setting of shortness of breath fluid overload    Past Medical History: Diagnosis Date    Allergic rhinitis     Anticoagulated on Coumadin     1/6/17-**[facility] to follow pt's PT/INRs, along w/prescribing his Coumadin. **    Anxiety     Arthritis     Atrial fibrillation (HCC)     On Coumadin. 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 09/21/2022    positive at 15C, negative at 18C    COPD (chronic obstructive pulmonary disease) (HCC)     Depression     Diabetes mellitus (HCC)     NIDDM- dx over 10 yrs ago- follows with Dr Kahlil Pinon's contracture of hand     Right hand    Family history of cardiovascular disease     Father-MI    GERD (gastroesophageal reflux disease)     H/O cardiac catheterization 03/2007    cardiac cath- stent LAD patent, [id number]%, RCA 40-50%    H/O cardiac catheterization 06/12/2008    cardiac cath- mild disease mid RCA    H/O cardiovascular stress test 04/10/2014    cardiolite- normal, no ischemia, EF63%    H/O cardiovascular stress test 09/21/2016    EF59% normal study  pt in atrial fib    H/O cardiovascular stress test 09/20/2017    EF 60%   afib    H/O cardiovascular stress test 11/07/2018    EF60% normal study    H/O Doppler ultrasound     1/11/2010-CAROTID_Intimal thickening but no significant atherosclerotic plaque noted in LAUREN. Doppler flow velocities within the LAUREN are WNL. Intimal thickening but no significant atherosclerotic plaque noted in LICA. Doppler flow velociities within the LICA are WNL. H/O echocardiogram 04/10/2014    EF 60%, normal LV systolic function, mild mitral and tricuspid insufficiencies, no pericardial effusion.     H/O echocardiogram 09/21/2016    EF60% normal study    H/O echocardiogram 11/07/2018    EF55-60% no significant valular disease    H/O hiatal hernia     Hx of Venous Doppler 03/14/2019    Significant reflux in Left Deep System, No reflux in right lower extremity, No DVT or SVT    Hyperlipidemia     Hypertension     Obesity     S/P PTCA (percutaneous transluminal coronary angioplasty) 03/21/2005    s/p PTCA with 3.5 X 16 TAXUS stent to LAD- follows with Dr Ursula Heart    Sleep apnea     had sleep study done 10+ yrs ago- has cpap but does not use it    Thyroid disease     hypothyroid       Past Surgical History:        Procedure Laterality Date    CARDIAC CATHETERIZATION  6/12/08    mild disease mid RCA,  stent to LAD patent,    CARDIAC CATHETERIZATION  3/07    Stent to LAD patent, Diagonal 40-50%, RCA 40-50%    CARDIAC CATHETERIZATION  3/05    COLONOSCOPY  2011    COLONOSCOPY  5/18/16    1 polyp removed, diverticulosis and hemorrhoids noted    CORONARY ANGIOPLASTY WITH STENT PLACEMENT  03/21/2005    PTCA with 3.5 x 16 TAXUS stent to LAD    CORONARY ARTERY BYPASS GRAFT N/A 9/22/2022    CABG CORONARY ARTERY BYPASS x3 (LIMA TO LAD, SVG TO PDA-RCA SEQUENTIAL) , INTRAOPERATIVE [place], ENDOVEIN HARVEST OF LEFT SAPHENOUS VEIN, MODIFIED MAZE PROCEDURE, LEFT ATRIAL APPENDAGE LIGATION, INTERCOSTAL NERVE BLOCK, INTRA-AORTIC BALLOON PUMP INSERTION performed by Prince Reilly MD at [facility]  2014    egd    HAND SURGERY Right [address] DISLOCATN+FIXATN Right 6/3/2019    RIGHT OPEN REDUCTION INTERNAL FIXATION OF DISTAL TIBIA  AND FIBULA performed by Magdy Ibanez MD at [address] [place] [address] N/A 9/24/2022    STERNUM WASHOUT performed by Prince Reilly MD at [facility] [place] OR       Medications:   Scheduled Meds:   sodium chloride flush  5-40 mL IntraVENous 2 times per day    aspirin  81 mg Per NG tube Daily    albuterol sulfate HFA  2 puff Inhalation 4x daily    amiodarone  200 mg Per G Tube Daily    atorvastatin  40 mg PEG Tube Nightly    Nephronex  5 mL PEG Tube Daily    traZODone  100 mg PEG Tube Nightly    levothyroxine  75 mcg Oral Daily    insulin lispro  0-8 Units SubCUTAneous TID WC    insulin lispro  0-4 Units SubCUTAneous Nightly    sevelamer  1.6 g Per G Tube TID WC    warfarin  3 mg Oral Once    Followed by    Wilber Farias ON 12/13/2022] warfarin  2 mg Oral Daily    carvedilol  6.25 mg PEG Tube BID WC    cefepime  1,000 mg IntraVENous Q24H    vancomycin  1,000 mg IntraVENous Once in dialysis    vancomycin (VANCOCIN) intermittent dosing (placeholder)   Other RX Placeholder    polyethylene glycol  17 g Oral BID     Continuous Infusions:   sodium chloride      [Held by provider] sodium chloride 100 mL/hr at 12/12/22 0255     PRN Meds:.sodium chloride flush, sodium chloride, ondansetron **OR** ondansetron, polyethylene glycol, acetaminophen **OR** acetaminophen    Allergies:  Patient has no known allergies. Social History:   TOBACCO:   reports that he quit smoking about 46 years ago. His smoking use included cigarettes. He has a 10.00 pack-year smoking history. He has never used smokeless tobacco.  ETOH:   reports that he does not currently use alcohol after a past usage of about 6.0 standard drinks per week. OCCUPATION:      Family History:       Problem Relation Age of Onset    Cancer Mother         breast ca    Coronary Art Dis Father          MI    Heart Disease Father     Rheum Arthritis Other     Rheum Arthritis Sister     No Known Problems Brother     Rheum Arthritis Sister        REVIEW OF SYSTEMS:  Negative except for  weak short of breath anxious. Physical Exam:    I/O: No intake/output data recorded. Vitals: /73   Pulse 99   Temp 97.9 °F (36.6 °C)   Resp 14   Ht 6' 2\" (1.88 m)   Wt 268 lb 15.4 oz (122 kg)   SpO2 97%   BMI 34.53 kg/m²   General appearance: awake weak  HEENT: Head: Normal, normocephalic, atraumatic.   Neck:  positive trach  Lungs: diminished breath sounds bilaterally  Heart: S1, S2 normal  Abdomen: abnormal findings:  soft NT positive PEG tube  Extremities: edema trace  Neurologic: Mental status: alertness: alert      CBC:   Recent Labs     12/11/22 2128 12/12/22  0957   WBC 7.9 6.9   HGB 8.7* 8.6*    302     BMP:    Recent Labs     12/11/22 2128 12/12/22  0957   * 134*   K 4.3 4.5   CL 90* 93*   CO2 27 24   BUN 72* 75*   CREATININE 6.6* 6.7*   GLUCOSE 155* 160*     Hepatic:   Recent Labs     12/11/22 2128   AST 16   ALT 10   BILITOT 0.2   ALKPHOS 228*     Troponin: No results for input(s): TROPONINI in the last 72 hours. BNP: No results for input(s): BNP in the last 72 hours. Lipids: No results for input(s): CHOL, HDL in the last 72 hours.     Invalid input(s): LDLCALCU  ABGs:   Lab Results   Component Value Date/Time    PO2ART 70.3 09/24/2022 11:49 AM    JTG8NDP 35.5 09/24/2022 11:49 AM     INR:   Recent Labs     12/12/22 0316   INR 1.21     Renal Labs  Albumin:    Lab Results   Component Value Date/Time    LABALBU 3.0 12/11/2022 09:28 PM     Calcium:    Lab Results   Component Value Date/Time    CALCIUM 8.9 12/12/2022 09:57 AM     Phosphorus:  No results found for: PHOS  U/A:    Lab Results   Component Value Date/Time    NITRU NEGATIVE 09/17/2022 06:21 PM    COLORU YELLOW 09/17/2022 06:21 PM    PHUR 6.5 06/30/2021 02:11 PM    WBCUA NONE SEEN 09/17/2022 06:21 PM    RBCUA NONE SEEN 09/17/2022 06:21 PM    MUCUS RARE 09/17/2022 06:21 PM    TRICHOMONAS NONE SEEN 09/17/2022 06:21 PM    BACTERIA NEGATIVE 09/17/2022 06:21 PM    CLARITYU CLEAR 09/17/2022 06:21 PM    SPECGRAV 1.015 09/17/2022 06:21 PM    UROBILINOGEN 4.0 09/17/2022 06:21 PM    BILIRUBINUR NEGATIVE 09/17/2022 06:21 PM    BILIRUBINUR small 06/30/2021 02:11 PM    BLOODU NEGATIVE 09/17/2022 06:21 PM    GLUCOSEU neg 06/30/2021 02:11 PM    KETUA TRACE 09/17/2022 06:21 PM     ABG:    Lab Results   Component Value Date/Time    WVN0AOT 35.5 09/24/2022 11:49 AM    PO2ART 70.3 09/24/2022 11:49 AM    FEQ6GAY 21.2 09/24/2022 11:49 AM     HgBA1c:    Lab Results   Component Value Date/Time    LABA1C 7.5 09/15/2022 12:55 PM     Microalbumen/Creatinine ratio:  No components found for: RUCREAT  TSH:    Lab Results   Component Value Date/Time    TSH 1.66 04/03/2018 11:33 AM     IRON:  No results found for: IRON  Iron Saturation:  No components found for: PERCENTFE  TIBC: No results found for: TIBC  FERRITIN:  No results found for: FERRITIN  RPR:  No results found for: RPR  YAYA:  No results found for: ANATITER, YAYA  24 Hour Urine for Creatinine Clearance:  No components found for: CREAT4, UHRS10, UTV10  -----------------------------------------------------------------      Assessment and Recommendations     Patient Active Problem List   Diagnosis Code    Atrial fibrillation ([facility]) I48.91    CAD (coronary artery disease) I25.10    Morbid obesity ([facility]) E66.01    COPD (chronic obstructive pulmonary disease) ([facility] J44.9    Sleep apnea G47.30    Type 2 diabetes mellitus ([facility] E11.9    Thyroid disease E07.9    Hyperlipidemia E78.5    Congestive heart failure, unspecified HF chronicity, unspecified heart failure type ([facility] I50.9    Coronary artery disease involving native coronary artery of native heart with angina pectoris ([facility]) I25.119    Chronic renal disease, stage III ([facility] [762182] N18.30    Hypertension I10    CAD, multiple vessel I25.10    Dyspnea R06.00    Moderate malnutrition ([facility]) E44.0      impression plan   #1 end-stage renal disease on dialysis Monday Wednesday Friday   #2 status post CABG   #3 respiratory status post trach   #4  moderate protein malnutrition with PEG tube   #5 shortness of breath   #6 anemia   #7 hypertension     Plan   #1 do acute aggressive dialysis today if needed can do again tomorrow   #2 post CABG 3 months ago cardiac status monitor follow-up EF   #3 maintain trach care panculture look for other source of infection   #4 monitor tube feeding   #5 monitor oxygenation with above treatment   #6 hemoglobin monitor with Depo and anticoagulation risk for bleeding monitor   #7 blood pressure holding stable   and hemodialysis supportive care plan will be returning back to [facility] for outpatient dialysis   I discussed and updated patient's brother  Electronically signed by Horace Mcclain MD on 12/12/2022 at 3:28 PM

## 2022-12-12 NOTE — PROGRESS NOTES
V2.0  [facility] Hospitalist Progress Note      Name:  Mallory Hernández /Age/Sex: 1950  (71 y.o. male)   MRN & CSN:  [id number] & [id number] Encounter Date/Time: 2022 7:56 AM EST    Location:  [address] [place] [address] PCP: Antwan Mcnair, [address] Day: 2    Assessment and Plan:   Mallory Hernández is a 71 y.o. male with  who presents with dyspnea. Patient was discharged from this hospital in September after he presented for planned CABG on 2022 after which she was transferred to [facility] for further management on 2022. While on admission at [facility] tracheostomy was placed on 10/12/2022 and PEG tube placed on 10/19/2022. Tunneled hemodialysis catheter was placed 10/20/2022. #Acute on chronic respiratory failure s/p trach  #Cough with yellow secretions  -presented with SOB for 1 day associated with nausea, mild chest and back pain, cough  -trach at [facility] 10/12/2022  -On home 5L O2 on trach collar, now on 8L  -CXR: bibasilar atelectasis  -BNP elevated to >10 000  -Procal 0.319    Plan:  Continue nebs  Continue aggressive pulm toilet  Follow up on blood cultures  Start on cefepime and vancomycin for possible HCAP  MRSA screen    #WINIFRED likely prerenal in the setting of shock from cardiac surgery  -HD T, TH, S  -Was started on inpatient hemodialysis during recent admission at [address] [place]:  Consult nephrology to continue inpatient hemodialysis, appreciate any additional recs    #CAD s/p CABG  #Troponin elevation possibly in the setting of demand ischemia   -22 CABG x3 LIMA to LAD; SVG sequential to PDA RCA  -2D echo done in 2022 show EF of 50 to 55% with grade 3 diastolic dysfunction    -Recent echo at [facility] show EF  60 to 65 %  -Troponins on presentation 0.249  -EKG without acute ischemic changes  -Pt without chest pain    Plan:  Cardiology consulted from the ED.   Do not recommend IV heparin at this time and will follow patient  Continue trending troponin    #Macrocytic anemia  -Hb 8.7 MCV 102.5    Plan:  Anemia panel in the morning    #Elevated Alk phos   -Alk phos: 228    Plan:  GGT level    #constipation  -no BM for 5 days    Plan:  Miralax BID    Chronic medical problems:  #Chronic persistent atrial fibrillation s/p MAZE 9/22/22  Continue amiodarone 200 mg daily and Coreg 6.25 mg twice daily. Hold on mornings of hemodialysis  Continue warfarin    #DVT (non-occlusive) mid R femoral vein  -Dx on duplex RLE 9/12/22   -Continue warfarin     #Diabetes mellitus  -SSI  -Hypoglycemic protocol     #Hypertension  -Continue losartan     #Hypothyroidism  -On levothyroxine 75 mcg     #s/p PEG tube placement on 10/19/2022  -Secondary to esophageal dysphagia  -Nutrition consult for tube feeding and management    Diet Diet NPO Exceptions are: Other (Specify); Specify Other Exceptions: Tube Feed/PEG   DVT Prophylaxis [] Lovenox, []  Heparin, [] SCDs, [] Ambulation,  [] Eliquis, [] Xarelto  [x] Coumadin   Code Status Full Code   Disposition From: NH  Expected Disposition: NH  Estimated Date of Discharge: 2-3 days  Patient requires continued admission due to acute hypoxic resp failure, possible pneumonia, elevated troponin   Surrogate Decision Maker/ POHIRO Squires     Subjective:     Chief Complaint: Shortness of Breath       Patient states that he was feeling SOB at the NH. He denies fever, chills. He states that he has not had a BM in 5 days. While in the room, was able to decrease his O2 flow rate to 6L from 8L.           Review of Systems:    General: No fever, no chills  Heart: No chest pain, no palpitations, no PND, no orthopnea  Lungs: No shortness of breath, + cough  Abdomen: No abdominal pain, + nausea, no vomiting, +constipation, no diarrhea  : No frequency, no dysuria, no urgency, no decreased urination  MSK: No lower extremity swelling  Neuro: No confusion, no weakness  Skin: No rashes      Objective:   No intake or output data in the 24 hours ending 12/12/22 0756     Vitals:   Vitals: 12/12/22 0212   BP: 128/68   Pulse: 94   Resp:    Temp:    SpO2: 98%       Physical Exam:     General: NAD, AAO x3-4  Eyes: EOMI  HENT: trach in place with trach collar  CVS: Normal S1 and S2, no murmurs, RRR  Respiratory: Normal breath sounds, clear to auscultation bilaterally, no respiratory distress, secretions coming out from trach  GI: Normal bowel sounds, soft, nondistended, nontender  MSK:  no lower extremity edema  Skin: Intact, dry, warm, no rashes  Neuro: CN II to XII grossly intact  Psych: Normal mood    Medications:   Medications:    sodium chloride flush  5-40 mL IntraVENous 2 times per day    aspirin  81 mg Per NG tube Daily    albuterol sulfate HFA  2 puff Inhalation 4x daily    amiodarone  200 mg Per G Tube Daily    atorvastatin  40 mg PEG Tube Nightly    Nephronex  5 mL PEG Tube Daily    traZODone  100 mg PEG Tube Nightly    levothyroxine  75 mcg Oral Daily    insulin lispro  0-8 Units SubCUTAneous TID WC    insulin lispro  0-4 Units SubCUTAneous Nightly    sevelamer  1.6 g Per G Tube TID WC    warfarin  3 mg Oral Once    Followed by    Ramone Barlow ON 12/13/2022] warfarin  2 mg Oral Daily    carvedilol  6.25 mg PEG Tube BID WC      Infusions:    sodium chloride      [Held by provider] sodium chloride 100 mL/hr at 12/12/22 0255     PRN Meds: sodium chloride flush, 5-40 mL, PRN  sodium chloride, , PRN  ondansetron, 4 mg, Q8H PRN   Or  ondansetron, 4 mg, Q6H PRN  polyethylene glycol, 17 g, Daily PRN  acetaminophen, 650 mg, Q6H PRN   Or  acetaminophen, 650 mg, Q6H PRN        Labs           Imaging/Diagnostics Last 24 Hours   XR CHEST PORTABLE    Result Date: 12/11/2022  EXAMINATION: ONE XRAY VIEW OF THE CHEST 12/11/2022 9:40 pm COMPARISON: Chest radiograph 09/24/2022. HISTORY: ORDERING SYSTEM PROVIDED HISTORY: chest pain TECHNOLOGIST PROVIDED HISTORY: Reason for exam:->chest pain Reason for Exam: respiratory distress Additional signs and symptoms: chest pain, sob, FINDINGS: A tracheostomy tube is in place. A right chest tunneled dialysis catheter terminates in the right atrium. Status post median sternotomy. The cardiac silhouette is enlarged but stable. Shallow inspiration. Mild bibasilar opacities. No pneumothorax, vascular congestion, consolidation, or pleural effusion is identified. No acute osseous abnormality. Shallow inspiration with mild bibasilar opacities compatible with atelectasis.        Electronically signed by Trevon Davies MD on 12/12/2022 at 7:56 AM

## 2022-12-12 NOTE — ED NOTES
ED TO INPATIENT SBAR HANDOFF    Patient Name: Kandy Rutherford   :  1950  71 y.o. MRN:  [id number]  Preferred Name  Darrel Winkler  ED Room #:  ED27/ED-27  Family/Caregiver Present no   Restraints no   Sitter no   Sepsis Risk Score Sepsis Risk Score: 1.49    Situation  Code Status: Prior Full Code. Allergies: Patient has no known allergies. Weight: No data found. Arrived from: nursing home  Chief Complaint:   Chief Complaint   Patient presents with    Shortness of [place] Problem/Diagnosis:  Active Problems:    * No active hospital problems. *  Resolved Problems:    * No resolved hospital problems. *    Imaging:   XR CHEST PORTABLE   Final Result   Shallow inspiration with mild bibasilar opacities compatible with atelectasis.            Abnormal labs:   Abnormal Labs Reviewed   CBC WITH AUTO DIFFERENTIAL - Abnormal; Notable for the following components:       Result Value    RBC 2.44 (*)     Hemoglobin 8.7 (*)     Hematocrit 25.0 (*)     .5 (*)     MCH 35.7 (*)     RDW 19.0 (*)     Monocytes % 10.4 (*)     Immature Neutrophil % 2.1 (*)     All other components within normal limits   COMPREHENSIVE METABOLIC PANEL - Abnormal; Notable for the following components:    Sodium 133 (*)     Chloride 90 (*)     BUN 72 (*)     Creatinine 6.6 (*)     Est, Glom Filt Rate 8 (*)     Glucose 155 (*)     Albumin 3.0 (*)     Alkaline Phosphatase 228 (*)     All other components within normal limits   TROPONIN - Abnormal; Notable for the following components:    Troponin T 0.249 (*)     All other components within normal limits   BRAIN NATRIURETIC PEPTIDE - Abnormal; Notable for the following components:    Pro-BNP 10,348 (*)     All other components within normal limits   BLOOD GAS, VENOUS - Abnormal; Notable for the following components:    pH, Paolo 7.43 (*)     Base Exc, Mixed 8.5 (*)     HCO3, Venous 34.5 (*)     O2 Sat, Paolo 44.9 (*)     All other components within normal limits     Critical values: yes Abnormal Assessment Findings: Trop, BNP    Background  History:   Past Medical History:   Diagnosis Date    Allergic rhinitis     Anticoagulated on Coumadin     1/6/17-**[place] to follow pt's PT/INRs, along w/prescribing his Coumadin. **    Anxiety     Arthritis     Atrial fibrillation (HCC)     On Coumadin. 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 09/21/2022    positive at 15C, negative at 18C    COPD (chronic obstructive pulmonary disease) (HCC)     Depression     Diabetes mellitus (HCC)     NIDDM- dx over 10 yrs ago- follows with Dr Sy Pinon's contracture of hand     Right hand    Family history of cardiovascular disease     Father-MI    GERD (gastroesophageal reflux disease)     H/O cardiac catheterization 03/2007    cardiac cath- stent LAD patent, [id number]-87-41%, RCA 40-50%    H/O cardiac catheterization 06/12/2008    cardiac cath- mild disease mid RCA    H/O cardiovascular stress test 04/10/2014    cardiolite- normal, no ischemia, EF63%    H/O cardiovascular stress test 09/21/2016    EF59% normal study  pt in atrial fib    H/O cardiovascular stress test 09/20/2017    EF 60%   afib    H/O cardiovascular stress test 11/07/2018    EF60% normal study    H/O Doppler ultrasound     1/11/2010-CAROTID_Intimal thickening but no significant atherosclerotic plaque noted in LAUREN. Doppler flow velocities within the LAUREN are WNL. Intimal thickening but no significant atherosclerotic plaque noted in LICA. Doppler flow velociities within the LICA are WNL. H/O echocardiogram 04/10/2014    EF 60%, normal LV systolic function, mild mitral and tricuspid insufficiencies, no pericardial effusion.     H/O echocardiogram 09/21/2016    EF60% normal study    H/O echocardiogram 11/07/2018    EF55-60% no significant valular disease    H/O hiatal hernia     Hx of Venous Doppler 03/14/2019    Significant reflux in Left Deep System, No reflux in right lower extremity, No DVT or SVT Hyperlipidemia     Hypertension     Obesity     S/P PTCA (percutaneous transluminal coronary angioplasty) 03/21/2005    s/p PTCA with 3.5 X 16 TAXUS stent to LAD- follows with Dr Flaca Esparza apnea     had sleep study done 10+ yrs ago- has cpap but does not use it    Thyroid disease     hypothyroid       Assessment    Vitals/MEWS: MEWS Score: 1  Level of Consciousness: Alert (0)   Vitals:    12/11/22 2112 12/11/22 2136 12/11/22 2357   BP: (!) 122/58  128/68   Pulse: (!) 101  94   Resp: 18  17   Temp: 99.1 °F (37.3 °C)  99.8 °F (37.7 °C)   TempSrc: Oral  Oral   SpO2: 100% 95% 98%     FiO2 (%): trach mask  O2 Flow Rate: O2 Device: Trach collar O2 Flow Rate (L/min): 8 L/min  Cardiac Rhythm:    Pain Assessment:  [] Verbal [] Moises Eagles Scale  Pain Scale:    Last documented pain score (0-10 scale)    Last documented pain medication administered:   Mental Status: alert  NIH Score:    C-SSRS: Risk of Suicide: No Risk  Bedside swallow:    Harvey Coma Scale (GCS): Harvey Coma Scale  Eye Opening: Spontaneous  Best Verbal Response: Oriented  Best Motor Response: Obeys commands  [place] Coma Scale Score: 15  Active LDA's:   Peripheral IV 12/11/22 Right Antecubital (Active)     PO Status: Non-Oral Feeding Method  Pertinent or High Risk Medications/Drips: no   If Yes, please provide details:   Pending Blood Product Administration: no     You may also review the ED PT Care Timeline found under the Summary Nursing Index tab. Recommendation    Pending orders   Plan for Discharge (if known):    Additional Comments:    If any further questions, please call Sending RN at [phone]    Electronically signed by: Electronically signed by Nilo Troncoso RN on 12/11/2022 at 11:58 PM     Nilo Troncoso RN  12/11/22 0496 SERVANDO Helms RN  12/11/22 1692

## 2022-12-12 NOTE — PROGRESS NOTES
12/11/22 2136   Oxygen Therapy/Pulse Ox   O2 Therapy Oxygen humidified   $Oxygen $Daily Charge   O2 Device Trach mask   O2 Flow Rate (L/min) 8 L/min   FiO2  35 %   SpO2 95 %   Pulse Oximeter Device Mode Continuous   Pulse Oximeter Device Location Finger   Cough/Sputum   Sputum How Obtained Spontaneous cough   $Obtained Sample $Induced Sputum (charge not used for Bronchoscopy)   Cough Congested;Moist;Productive   Frequency Occasional   Sputum Amount Large   Sputum Color Yellow   Tenacity Thick

## 2022-12-12 NOTE — DIALYSIS
Tolerated 3 hour dialysis treatment well, removed 2.5 liters with treatment. Anxious toward treatment end, emotional support given. Blood cultures drawn x 2 then vancomycin 1 gm given with treatment. Right tunneled catheter used for access and lines flushed and capped post treatment. Patient Name: Santa Freire  Patient : 1950  MRN: [id number]     Acct: [de-identified]  Date of Admission: 2022  Room[facility]Bed: [facility]  Code Status:  Full Code  Allergies: No Known Allergies  Diagnosis:    Patient Active Problem List   Diagnosis    Atrial fibrillation ([facility]    CAD (coronary artery disease)    Morbid obesity ([facility]    COPD (chronic obstructive pulmonary disease) ([facility]    Sleep apnea    Type 2 diabetes mellitus ([facility]    Thyroid disease    Hyperlipidemia    Congestive heart failure, unspecified HF chronicity, unspecified heart failure type ([facility]    Coronary artery disease involving native coronary artery of native heart with angina pectoris (HCC)    Chronic renal disease, stage III ([facility] [718238]    Hypertension    CAD, multiple vessel    Dyspnea    Moderate malnutrition ([facility]         Treatment:  Hemodilaysis 2:1  Priority: Routine  Location: Acute Room    Diabetic: Yes  NPO: No  Isolation Precautions: None     Consent for Treatment Verified: Yes  Blood Consent Verified: Not Applicable     Safety Verified: Identify (I), Consent (C), Equipment (E), HepB Status (B), Orders Complete (O), Access Verified (A), and Timeliness (T)  Time out performed prior to access at 1310 hours. Report Received from Primary RN at 1245 hours. Primary RN (First Initial, Last Name, Title):  Raphael RN  Incapacitated Nurse Education Completed: Not Applicable     HBsAg ONLY:  Date Drawn: 2022       Results: Negative  HBsAb:  Date Drawn:  2022       Results: Susceptible <10    Order  Dialysis Bath  K+ (Potassium): 2  Ca+ (Calcium): 2.5  Na+ (Sodium): 138  HCO3 (Bicarb): 35  Bicarbonate Concentrate Lot No.: [id number]  Acid Concentrate Lot No.: [id number]     Na+ Modeling: Not Applicable  Dialyzer: [id number]  Dialysate Temperature (C):  35  Blood Flow Rate (BFR):  300   Dialysate Flow Rate (DFR):   600        Access to be Utilized   Access:  Tunneled Catheter  Location: Internal Jugular  Side: Right   Needle gauge:  Not Applicable  + Bruit/Thrill: Not Applicable    First Use X-ray Verified: Not Applicable  OK to use line order: Not Applicable    Site Assessment:  Signs and Symptoms of Infection/Inflammation: None  If yes: Not Applicable  Dressing: Dry and Intact  Site Prep: Medical Aseptic Technique  Dressing Changed this Treatment: Yes  If yes, by whom: Bedside RN  Date of Last Dressing Change: December 12, 2022  Antimicrobial Patch in place?: Yes  Red Alcohol Caps in place?: Yes  Gauze Dressing?: No  Non Dialysis Use?: No  Comment:    Flows: Good, Patent  If access problem, who was notified:     Pre and Post-Assessment  Patient Vitals for the past 8 hrs:   Level of Consciousness Oriented X Heart Rhythm Respiratory Quality/Effort O2 Device Bilateral Breath Sounds Skin Condition/Temp Abdomen Inspection Bowel Sounds (All Quadrants) Edema Comments   12/12/22 1000 0 -- -- Unlabored -- -- -- -- -- -- --   12/12/22 1315 0 -- Regular Unlabored Trach collar Diminished;Rhonchi Dry;Warm Soft Active None timeout completed, VSS, rec'd report from RN, hep status verified, water alarm intact   12/12/22 1645 0 112/54 Irregular Unlabored Trach collar Diminished;Rhonchi Dry;Cool Soft Active None treatment completed     Labs  Recent Labs     12/11/22 2128 12/12/22  0957   WBC 7.9 6.9   HGB 8.7* 8.6*   HCT 25.0* 27.4*    302                                                                  Recent Labs     12/11/22 2128 12/12/22  0957   * 134*   K 4.3 4.5   CL 90* 93*   CO2 27 24   BUN 72* 75*   CREATININE 6.6* 6.7*   GLUCOSE 155* 160*     IV Drips and Rate/Dose   sodium chloride      [Held by provider] sodium chloride Stopped (12/12/22 7901)      Safety - Before each treatment:   Dialysis Machine No.: [id number]  RO Machine Number: [id number]  Dialyzer Lot No.: [id number]  RO Machine Log Sheet Completed: Yes  Machine Alarm Self Test: Completed; Passed (12/12/22 1315)     Air Foam Detector: Tested, Proper Function, pH Reading  Extracorporeal Circuit Tested for Integrity: Yes  Machine Conductivity: 13.8  Manual Conductivity: 13.8     Bicarbonate Concentrate Lot No.: [id number]  Acid Concentrate Lot No.: [id number]  Manual Ph: 7.2  Bleach Test (Neg): Yes  Bath Temperature: 95 °F (35 °C)  Tubing Lot#: [id number]  Conductivity Meter Serial #: [id number]  All Connections Secure?: Yes  Venous Parameters Set?: Yes  Arterial Parameters Set?: Yes  Saline Line Double Clamped?: Yes  Air Foam Detector Engaged?: Yes  Machine Functioning Alarm Free?  Yes  Prime Given: 200ml    Chlorine Testing - Before each treatment and every 4 hours:   Treatment  Treatment Number: 1  Time Off: 0161  Weight: 268 lb 15.4 oz (122 kg) (12/12/22 0200)  1st check: less than 0.1 ppm at: 1045 hours  2nd check: less than 0.1 ppm at: 1415 hours  3rd check: Not Applicable  (if greater than 0.1 ppm, then check every 30 minutes from secondary)    Access Flows and Pressures  Patient Vitals for the past 8 hrs:   Blood Flow Rate (ml/min) Ultrafiltration Rate (ml/hr) Arterial Pressure (mmHg) Venous Pressure (mmHg) TMP DFR Comments Access Visible   12/12/22 1315 300 ml/min 1000 ml/hr -90 mmHg 110 60 600 treatment started Yes   12/12/22 1330 300 ml/min 1000 ml/hr -90 mmHg 110 60 600 no distress Yes   12/12/22 1345 300 ml/min 1000 ml/hr -90 mmHg 120 60 600 denies complaints Yes   12/12/22 1400 300 ml/min -- -110 mmHg 120 60 600 watching tv Yes   12/12/22 1415 300 ml/min 1000 ml/hr -120 mmHg 120 80 600 AWAKE AND WATHCING TV Yes   12/12/22 1430 300 ml/min 1000 ml/hr -120 mmHg 120 80 600 blood cultures drawn x2 and sent as ordered Yes   12/12/22 1445 300 ml/min 1000 ml/hr -120 mmHg 120 80 600 watching tv Yes   12/12/22 1500 300 ml/min 1000 ml/hr -120 mmHg 120 80 600 no distress Yes   12/12/22 1515 300 ml/min 1000 ml/hr -130 mmHg 120 80 600 lines secure Yes   12/12/22 1530 300 ml/min 1000 ml/hr -130 mmHg 120 80 600 vanco transfusing Yes   12/12/22 1545 300 ml/min 1000 ml/hr -130 mmHg 110 80 600 denies complaints Yes   12/12/22 1600 300 ml/min 1000 ml/hr -150 mmHg 120 80 600 lines secure Yes   12/12/22 1615 300 ml/min 1000 ml/hr -140 mmHg 120 70 600 no distress Yes   12/12/22 1630 300 ml/min 1000 ml/hr -150 mmHg 120 80 600 watching tv Yes   12/12/22 1645 200 ml/min -- -- -- -- -- treatment completed Yes     Vital Signs  Patient Vitals for the past 8 hrs:   BP Temp Pulse Resp SpO2 Height   12/12/22 0956 -- -- -- -- -- 6' 2\" (1.88 m)   12/12/22 1004 (!) 131/101 -- -- -- -- --   12/12/22 1315 (!) 124/57 97.9 °F (36.6 °C) 80 17 97 % --   12/12/22 1330 (!) 114/57 -- 93 -- -- --   12/12/22 1345 (!) 128/59 -- 88 -- -- --   12/12/22 1400 (!) 132/53 -- 92 -- -- --   12/12/22 1415 (!) 128/55 -- 87 14 -- --   12/12/22 1430 137/68 -- 96 10 -- --   12/12/22 1445 (!) 126/57 -- 98 14 -- --   12/12/22 1500 137/73 -- 99 14 -- --   12/12/22 1515 (!) 126/57 -- 100 16 -- --   12/12/22 1530 (!) 120/57 -- (!) 103 19 -- --   12/12/22 1545 121/61 -- (!) 101 15 -- --   12/12/22 1600 123/62 -- 94 16 -- --   12/12/22 1615 129/60 -- (!) 104 21 -- --   12/12/22 1630 (!) 95/56 -- (!) 113 18 -- --   12/12/22 1645 126/61 -- (!) 101 20 96 % --   12/12/22 1646 (!) 112/54 -- (!) 104 17 -- --     Post-Dialysis  Arterial Catheter Locking Solution: Not Applicable  Venous Catheter Locking Solution: Not Applicable  Post-Treatment Procedures: Blood returned, Catheter Capped, clamped with Saline x2 ports  Machine Disinfection Process: Acid/Vinegar Clean, Heat Disinfect, Exterior Machine Disinfection  Rinseback Volume (ml): 300 ml  Blood Volume Processed (Liters): 54.7 l/min  Dialyzer Clearance:  Moderately streaked  Duration of Treatment (minutes): 180 minutes     Hemodialysis Intake (ml): 500 ml  Hemodialysis Output (ml): 3000 ml     Tolerated Treatment: Good             Provider Notification        Handoff complete and report given to Primary RN at 1700 hours. Primary RN (First Initial, Last Name, Title):   Raphael RN     Education  Person Educated: Patient   Knowledge Base: Substantial  Barriers to Learning?: None  Preferred method of Learning: Oral  Topic(s): Access Care, Signs and Symptoms of Infection, Fluid Management, Procedural, Medications, and Diet   Teaching Tools: Demonstration and Explanation   Response to Education: Verbalized Understanding     Electronically signed by Catherine Quintero RN on 12/12/2022 at 5:11 PM

## 2022-12-12 NOTE — PROGRESS NOTES
PHARMACY ANTICOAGULATION MONITORING SERVICE    Wayne Evangelista is a 71 y.o. male on warfarin therapy for atrial fibrillation and history of DVT. Pharmacy consulted by Dr. Mitch Marie for monitoring and adjustment of treatment. Indication for anticoagulation: A-Fib, Hx of DVT  INR goal: 2-3  Warfarin dose prior to admission: 2 mg daily    Pertinent Laboratory Values   Recent Labs     12/11/22 2128 12/12/22  0316   INR  --  1.21   HGB 8.7*  --    HCT 25.0*  --      --        Assessment/Plan:  Drug Interactions:   Aspirin, amiodarone, levothyroxine, atorvastatin (home meds)  INR sub-therapeutic on admission  Will start warfarin 3 mg x 1, followed by 2 mg daily   Note: HGB/HCT considerably low  Pharmacy will continue to monitor and adjust warfarin therapy as indicated    Thank you for the consult.   Anjum Canela, [facility]  12/12/2022 4:47 AM

## 2022-12-12 NOTE — ED PROVIDER NOTES
Triage Chief Complaint:   Shortness of Breath    [place]:  Today in the ED I had the pleasure of caring for Ivelisse Gonzalez who is a 71 y.o. male that presents emergency department for evaluation of shortness of breath. Patient does have a history of COPD, coronary artery diseas,  chronic respiratory failure. he recently had coronary artery bypass bypass graft placement. With complication of cardiogenic shock. Postop infection. Which led to tracheostomy, acute kidney injury. Was required transfer to [facility] and dialysis. Patient has been nursing home recovering. Tracheostomy in place. Not supplemental oxygen dependent typically. However patient has had increased sputum production. And hypoxia prior to arrival here so patient was brought in for evaluation. I tried to contact nursing home where patient came from home multiple times. However able to get in contact with anyone familiar with patient's history. ROS:  REVIEW OF SYSTEMS    At least 10 systems reviewed      All other review of systems are negative  See HPI and nursing notes for additional information       Past Medical History:   Diagnosis Date    Allergic rhinitis     Anticoagulated on Coumadin     1/6/17-**[facility] to follow pt's PT/INRs, along w/prescribing his Coumadin. **    Anxiety     Arthritis     Atrial fibrillation (HCC)     On Coumadin.     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 09/21/2022    positive at 15C, negative at 18C    COPD (chronic obstructive pulmonary disease) (HCC)     Depression     Diabetes mellitus (HCC)     NIDDM- dx over 10 yrs ago- follows with Dr Terrance Pinon's contracture of hand     Right hand    Family history of cardiovascular disease     Father-MI    GERD (gastroesophageal reflux disease)     H/O cardiac catheterization 03/2007    cardiac cath- stent LAD patent, Fpcknsyt-65-90%, RCA 40-50%    H/O cardiac catheterization 06/12/2008    cardiac cath- mild disease mid RCA    H/O cardiovascular stress test 04/10/2014    cardiolite- normal, no ischemia, EF63%    H/O cardiovascular stress test 09/21/2016    EF59% normal study  pt in atrial fib    H/O cardiovascular stress test 09/20/2017    EF 60%   afib    H/O cardiovascular stress test 11/07/2018    EF60% normal study    H/O Doppler ultrasound     1/11/2010-CAROTID_Intimal thickening but no significant atherosclerotic plaque noted in LAUREN. Doppler flow velocities within the LAUREN are WNL. Intimal thickening but no significant atherosclerotic plaque noted in LICA. Doppler flow velociities within the LICA are WNL. H/O echocardiogram 04/10/2014    EF 60%, normal LV systolic function, mild mitral and tricuspid insufficiencies, no pericardial effusion.     H/O echocardiogram 09/21/2016    EF60% normal study    H/O echocardiogram 11/07/2018    EF55-60% no significant valular disease    H/O hiatal hernia     Hx of Venous Doppler 03/14/2019    Significant reflux in Left Deep System, No reflux in right lower extremity, No DVT or SVT    Hyperlipidemia     Hypertension     Obesity     S/P PTCA (percutaneous transluminal coronary angioplasty) 03/21/2005    s/p PTCA with 3.5 X 16 TAXUS stent to LAD- follows with Dr Lois Ross    Sleep apnea     had sleep study done 10+ yrs ago- has cpap but does not use it    Thyroid disease     hypothyroid     Past Surgical History:   Procedure Laterality Date    CARDIAC CATHETERIZATION  6/12/08    mild disease mid RCA,  stent to LAD patent,    CARDIAC CATHETERIZATION  3/07    Stent to LAD patent, Diagonal 40-50%, RCA 40-50%    CARDIAC CATHETERIZATION  3/05    COLONOSCOPY  2011    COLONOSCOPY  5/18/16    1 polyp removed, diverticulosis and hemorrhoids noted    CORONARY ANGIOPLASTY WITH STENT PLACEMENT  03/21/2005    PTCA with 3.5 x 16 TAXUS stent to LAD    CORONARY ARTERY BYPASS GRAFT N/A 9/22/2022    CABG CORONARY ARTERY BYPASS x3 (LIMA TO LAD, SVG TO PDA-RCA SEQUENTIAL) , INTRAOPERATIVE [place]TON, of Exercise per Week: 7 days    Minutes of Exercise per Session: 30 min   Stress: Not on file   Social Connections: Not on file   Intimate Partner Violence: Not on file   Housing Stability: Not on file     Current Facility-Administered Medications   Medication Dose Route Frequency Provider Last Rate Last Admin    0.9 % sodium chloride infusion   IntraVENous Continuous Reji Ramos PA-C         Current Outpatient Medications   Medication Sig Dispense Refill    aspirin 81 MG chewable tablet 1 tablet by Per NG tube route daily 30 tablet 3    nitroGLYCERIN 200-5 MCG/ML-% infusion Infuse 0.005-0.3 mg/min intravenously continuous prn (SBP above hemodynamic goal) 10 mL 1    pantoprazole 4 mg/mL in sodium chloride (PF) injection Infuse 10 mLs intravenously daily 3 applicator 1    blood glucose monitor kit and supplies Dispense sufficient amount for indicated testing frequency plus additional to accommodate PRN testing needs. Dispense all needed supplies to include: monitor, strips, lancing device, lancets, control solutions, alcohol swabs. 1 kit 0    Lancets MISC by D3EA route three times a day. 100 each 3    levothyroxine (SYNTHROID) 50 MCG tablet Take 1 tablet by mouth Daily 90 tablet 1    pravastatin (PRAVACHOL) 80 MG tablet TAKE 1 TABLET BY MOUTH ONCE DAILY 90 tablet 1    polyethylene glycol (GLYCOLAX) 17 GM/SCOOP powder Take 17 g by mouth daily 225 g 2     No Known Allergies    Nursing Notes Reviewed    Physical Exam:  ED Triage Vitals [12/11/22 2112]   Enc Vitals Group      BP (!) 122/58      Heart Rate (!) 101      Resp 18      Temp 99.1 °F (37.3 °C)      Temp Source Oral      SpO2 100 %      Weight       Height       Head Circumference       Peak Flow       Pain Score       Pain Loc       Pain Edu? Excl. in [address]?       General :Patient is awake alert oriented person place and time no acute distress nontoxic appearing  HEENT: Pupils are equally round and reactive to light extraocular motors are intact conjunctivae clear sclerae white there is no injection no icterus. Nose without any rhinorrhea or epistaxis. Oral mucosa is moist no exudate buccal mucosa shows no ulcerations. Uvula is midline. Tracheostomy in place. With moderate amount of mucus noted. Clear. Full range of motion of the neck. Neck: Neck is supple full range of motion trachea midline thyroid nonpalpable  Cardiac: Heart regular rate rhythm no murmurs rubs clicks or gallops  Lungs: Sounds are diffuse Rales throughout. No wheeze there is no use of accessory muscles no nasal flaring identified. Chest wall: There is no tenderness to palpation over the chest wall or over ribs  Abdomen: Abdomen is soft nontender nondistended. There is no firm or pulsatile masses no rebound rigidity or guarding negative Sanz's negative McBurney, no peritoneal signs  Suprapubic:  there is no tenderness to palpation over the external bladder   Musculoskeletal: 5 out of 5 strength in all 4 extremities full flexion extension abduction and adduction supination pronation of all extremities and all digits. No obvious muscle atrophy is noted. No focal muscle deficits are appreciated  Dermatology: Skin is warm and dry there is no obvious abscesses lacerations or lesions noted  Psych: Mentation is grossly normal cognition is grossly normal. Affect is appropriate  Neuro: Motor intact sensory intact cranial nerves II through XII are intact level of consciousness is normal cerebellar function is normal reflexes are grossly normal. No evidence of incontinence or loss of bowel or bladder no saddle anesthesia noted Lymphatic: There is no submandibular or cervical adenopathy appreciated.         I have reviewed and interpreted all of the currently available lab results from this visit (if applicable):  Results for orders placed or performed during the hospital encounter of 12/11/22   Respiratory Panel, Molecular, with COVID-19 (Restricted: peds pts or suitable admitted adults) Specimen: Nasopharyngeal   Result Value Ref Range    Adenovirus Detection by PCR NOT DETECTED NOT DETECTED    Coronavirus 229E PCR NOT DETECTED NOT DETECTED    Coronavirus HKU1 PCR NOT DETECTED NOT DETECTED    Coronavirus NL63 PCR NOT DETECTED NOT DETECTED    Coronavirus OC43 PCR NOT DETECTED NOT DETECTED    SARS-CoV-2 NOT DETECTED NOT DETECTED    Human Metapneumovirus PCR NOT DETECTED NOT DETECTED    Rhinovirus Enterovirus PCR NOT DETECTED NOT DETECTED    Influenza A by PCR NOT DETECTED NOT DETECTED    Influenza A H1 Pandemic PCR NOT DETECTED NOT DETECTED    Influenza A H1 (2009) PCR NOT DETECTED NOT DETECTED    Influenza A H3 PCR NOT DETECTED NOT DETECTED    Influenza B by PCR NOT DETECTED NOT DETECTED    Parainfluenza 1 PCR NOT DETECTED NOT DETECTED    Parainfluenza 2 PCR NOT DETECTED NOT DETECTED    Parainfluenza 3 PCR NOT DETECTED NOT DETECTED    Parainfluenza 4 PCR NOT DETECTED NOT DETECTED    RSV PCR NOT DETECTED NOT DETECTED    Bordetella parapertussis by PCR NOT DETECTED NOT DETECTED    B Pertussis by PCR NOT DETECTED NOT DETECTED    Chlamydophila Pneumonia PCR NOT DETECTED NOT DETECTED    Mycoplasma pneumo by PCR NOT DETECTED NOT DETECTED   CBC with Auto Differential   Result Value Ref Range    WBC 7.9 4.0 - 10.5 K/CU MM    RBC 2.44 (L) 4.6 - 6.2 M/CU MM    Hemoglobin 8.7 (L) 13.5 - 18.0 GM/DL    Hematocrit 25.0 (L) 42 - 52 %    .5 (H) 78 - 100 FL    MCH 35.7 (H) 27 - 31 PG    MCHC 34.8 32.0 - 36.0 %    RDW 19.0 (H) 11.7 - 14.9 %    Platelets 169 131 - 391 K/CU MM    MPV 8.9 7.5 - 11.1 FL    Differential Type AUTOMATED DIFFERENTIAL     Segs Relative 56.9 36 - 66 %    Lymphocytes % 27.9 24 - 44 %    Monocytes % 10.4 (H) 0 - 4 %    Eosinophils % 1.9 0 - 3 %    Basophils % 0.8 0 - 1 %    Segs Absolute 4.5 K/CU MM    Lymphocytes Absolute 2.2 K/CU MM    Monocytes Absolute 0.8 K/CU MM    Eosinophils Absolute 0.2 K/CU MM    Basophils Absolute 0.1 K/CU MM    Nucleated RBC % 0.5 %    Total Nucleated RBC 0.0 K/CU MM    Total Immature Neutrophil 0.17 K/CU MM    Immature Neutrophil % 2.1 (H) 0 - 0.43 %   Comprehensive Metabolic Panel   Result Value Ref Range    Sodium 133 (L) 135 - 145 MMOL/L    Potassium 4.3 3.5 - 5.1 MMOL/L    Chloride 90 (L) 99 - 110 mMol/L    CO2 27 21 - 32 MMOL/L    BUN 72 (H) 6 - 23 MG/DL    Creatinine 6.6 (H) 0.9 - 1.3 MG/DL    Est, Glom Filt Rate 8 (L) >60 mL/min/1.73m2    Glucose 155 (H) 70 - 99 MG/DL    Calcium 9.9 8.3 - 10.6 MG/DL    Albumin 3.0 (L) 3.4 - 5.0 GM/DL    Total Protein 7.7 6.4 - 8.2 GM/DL    Total Bilirubin 0.2 0.0 - 1.0 MG/DL    ALT 10 10 - 40 U/L    AST 16 15 - 37 IU/L    Alkaline Phosphatase 228 (H) 40 - 129 IU/L    Anion Gap 16 4 - 16   Troponin   Result Value Ref Range    Troponin T 0.249 (HH) <0.01 NG/ML   Brain Natriuretic Peptide   Result Value Ref Range    Pro-BNP 10,348 (H) <300 PG/ML   Blood Gas, Venous   Result Value Ref Range    pH, Paolo 7.43 (H) 7.32 - 7.42    pCO2, Paolo 52 38 - 52 mmHG    pO2, Paolo 30 28 - 48 mmHG    Base Exc, Mixed 8.5 (H) 0 - 1.2    HCO3, Venous 34.5 (H) 19 - 25 MMOL/L    O2 Sat, Paolo 44.9 (L) 50 - 70 %   Lactic Acid   Result Value Ref Range    Lactate 1.9 0.4 - 2.0 mMOL/L   EKG 12 Lead   Result Value Ref Range    Ventricular Rate 102 BPM    Atrial Rate 110 BPM    QRS Duration 154 ms    Q-T Interval 380 ms    QTc Calculation (Bazett) 495 ms    R Axis -62 degrees    T Axis 50 degrees    Diagnosis       ** Poor data quality, interpretation may be adversely affected  Atrial fibrillation with rapid ventricular response with premature ventricular or aberrantly conducted complexes  Right bundle branch block  Left anterior fascicular block  ** Bifascicular block **  Abnormal ECG  When compared with ECG of 16-SEP-2022 09:44,  No significant change was found        Radiographs (if obtained):  [] The following radiograph was interpreted by myself in the absence of a radiologist:   [] Radiologist's Report Reviewed:  XR CHEST PORTABLE   Final Result   Shallow inspiration with mild bibasilar opacities compatible with atelectasis. EKG (if obtained):   Please See Note of attending physician for EKG interpretation. Chart review shows recent radiograph(s):  XR CHEST PORTABLE    Result Date: 12/11/2022  EXAMINATION: ONE XRAY VIEW OF THE CHEST 12/11/2022 9:40 pm COMPARISON: Chest radiograph 09/24/2022. HISTORY: ORDERING SYSTEM PROVIDED HISTORY: chest pain TECHNOLOGIST PROVIDED HISTORY: Reason for exam:->chest pain Reason for Exam: respiratory distress Additional signs and symptoms: chest pain, sob, FINDINGS: A tracheostomy tube is in place. A right chest tunneled dialysis catheter terminates in the right atrium. Status post median sternotomy. The cardiac silhouette is enlarged but stable. Shallow inspiration. Mild bibasilar opacities. No pneumothorax, vascular congestion, consolidation, or pleural effusion is identified. No acute osseous abnormality. Shallow inspiration with mild bibasilar opacities compatible with atelectasis. MDM:     Interventions given this visit:   Orders Placed This Encounter   Medications    acetaminophen (TYLENOL) tablet 1,000 mg    DISCONTD: 0.9 % sodium chloride bolus    0.9 % sodium chloride infusion     Patient presents today to the emergency department. With shortness of breath. On much more oxygen now than usual.  Typically on 3 L currently on 5 to 6 L. Borderline temperature. Mildly tachycardic at 101. Tylenol was provided. Respiratory panel is negative. Respiratory therapy called down to the emergency department. And deep suctioning of the tracheostomy tube performed. Patient feels significantly better here in the emergency department. Chest x-ray per radiologist interpretation shows no pneumonia. Atelectasis is noted however. Patient has a markedly elevated creatinine of 6.6 up from 1.3 just several months ago. I did speak to on-call nephrologist who advised IV hydration. Saline in 100-hour. Patient's troponin is markedly elevated as well as 0.2+. I did speak to on-call cardiologist regarding this. They advised no blood thinners at this time. Simply treat the WINIFRED of this likely etiology of the elevated troponin given patient is not having any chest pain. Remainder of patient's labs showed no acute abnormality requiring emergent intervention here in the emergency department given patient's multiple morbidities will require admission to the hospital.      I independently managed patient today in the ED.   BP (!) 122/58   Pulse (!) 101   Temp 99.1 °F (37.3 °C) (Oral)   Resp 18   SpO2 95%       Clinical Impression:  1. Acute congestive heart failure, unspecified heart failure type ([facility] [address]    2. WINIFRED (acute kidney injury) ([facility] [address]    3. Elevated troponin        Disposition referral (if applicable):  No follow-up provider specified. Disposition medications (if applicable):  New Prescriptions    No medications on file         Comment: Please note this report has been produced using speech recognition software and may contain errors related to that system including errors in grammar, punctuation, and spelling, as well as words and phrases that may be inappropriate. If there are any questions or concerns please feel free to contact the dictating provider for clarification.       RUBEN Rosales PA-C  12/11/22 599 [facility]  12/22/22 2278

## 2022-12-12 NOTE — CONSULTS
Comprehensive Nutrition Assessment    Type and Reason for Visit:  Initial, Consult (TF order/mgt)    Nutrition Recommendations/Plan:   Begin EN with renal formula (Nepro) progressed to 60 ml/hr to provide 2592 kcal and 116 gm protein to meet 100% estimated needs     Malnutrition Assessment:  Malnutrition Status: Moderate malnutrition (12/12/22 1007)    Context:  Acute Illness     Findings of the 6 clinical characteristics of malnutrition:  Energy Intake:  75% or less of estimated energy requirements for 7 or more days  Weight Loss:  Greater than 7.5% over 3 months     Body Fat Loss:  Mild body fat loss Orbital, Buccal region   Muscle Mass Loss:  Mild muscle mass loss Temples (temporalis), Clavicles (pectoralis & deltoids), Calf (gastrocnemius)  Fluid Accumulation:  No significant fluid accumulation Extremities   Strength:  Not Performed    Nutrition Assessment:    Pt admitted for ECF with dyspnea. Pt underwent CABG in September, followed by trach, PEG (dysphagia) and started HD in October. H/O DM, CAD s/p CABG, permanent A. fib, hypertension, hyperlipidemia and COPD. Reports some nausea PTA and wt loss PTA. He has lost 17% over the past 3 months and meets criteria for moderate malnutrition. He is unsure of EN formula PTA. Will order renal formula EN and continue to follow as high nutrition risk. Nutrition Related Findings:    Na 133, BUN 72, Cr 6.6, Glu 163   Wound Type: Pressure Injury, Surgical Incision       Current Nutrition Intake & Therapies:    Average Meal Intake: NPO  Average Supplements Intake: NPO  Diet NPO Exceptions are: Other (Specify); Specify Other Exceptions: Tube Feed/PEG    Anthropometric Measures:  Height: 6' 2\" (188 cm)  Ideal Body Weight (IBW): 190 lbs (86 kg)    Admission Body Weight: 268 lb 15.4 oz (122 kg)  Current Body Weight: 268 lb 15.4 oz (122 kg), 141.6 % IBW.     Current BMI (kg/m2): 34.5  Usual Body Weight: 324 lb 1 oz (147 kg) (9/16/22)  % Weight Change (Calculated): -17 BMI Categories: Obese Class 1 (BMI 30.0-34. 9)    Estimated Daily Nutrient Needs:  Energy Requirements Based On: Kcal/kg  Weight Used for Energy Requirements: Ideal  Energy (kcal/day): 0697-1668 (30-35 kcal/kg IBW)  Weight Used for Protein Requirements: Ideal  Protein (g/day): 103-145 (1.2-1.3 g/kg IBW)  Method Used for Fluid Requirements: Standard Renal  Fluid (ml/day): per Nephrology    Nutrition Diagnosis:   Moderate malnutrition, In context of acute illness or injury related to inadequate protein-energy intake as evidenced by poor intake prior to admission, weight loss greater than 7.5% in 3 months, mild muscle loss, mild loss of subcutaneous fat  Predicted inadequate energy intake related to increase demand for energy/nutrients as evidenced by wounds, dialysis, weight loss    Nutrition Interventions:   Food and/or Nutrient Delivery: Start Tube Feeding  Nutrition Education/Counseling: No recommendation at this time  Coordination of Nutrition Care: Continue to monitor while inpatient  Plan of Care discussed with: Patient    Goals:     Goals: Meet at least 75% of estimated needs, Initiate nutrition support, Tolerate nutrition support at goal rate       Nutrition Monitoring and Evaluation:   Behavioral-Environmental Outcomes: None Identified  Food/Nutrient Intake Outcomes: Enteral Nutrition Intake/Tolerance, IVF Intake  Physical Signs/Symptoms Outcomes: Biochemical Data, Chewing or Swallowing, GI Status, Nausea or Vomiting, Fluid Status or Edema, Nutrition Focused Physical Findings, Skin, Weight    Discharge Planning:    Enteral Nutrition     Ne Mora RD, LD  Contact: [phone]

## 2022-12-12 NOTE — ED NOTES
Atrial fibrillation with rapid ventricular response. Normal axis. Right bundle branch block. Left anterior fascicular block. No specific ST or T wave changes. No pathologic Q waves.   QTC is normal.     Charles Campbell MD  12/11/22 [id number]

## 2022-12-12 NOTE — CONSULTS
INPATIENT CARDIOLOGY CONSULT NOTE         Reason for consultation:  elevated troponin    Referring physician:  Wilber Malhotra MD     Primary care physician: Ankita Botello, APRN - CNP      Dear Wilber Malhotra MD Thank you for the consult    Chief Complaint   Patient presents with    Shortness of Breath       History of present illness:Ji is a 71 y. o.year old who  presents with   Chief Complaint   Patient presents with    Shortness of Breath       Patient is a 81-year-old gentleman with prior medical history significant for recent coronary disease s/p CABG, protracted hospitalization requiring tracheostomy presents to the hospital with chief complaint of shortness of breath presents to the hospital with shortness of breath  Patient has end-stage renal disease and currently on hemodialysis Monday Wednesday Friday    Creatinine was noted to be 6.7. Cardiac troponin 0.249, 0.26 .  proBNP elevated at 10,348  Hemoglobin 8.6  Patient denies any chest pain, denies any palpitations. 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, COPD (chronic obstructive pulmonary disease) ([address] Depression, Diabetes mellitus ([address] Dupuytren's contracture of hand, Family history of cardiovascular disease, GERD (gastroesophageal reflux disease), H/O cardiac catheterization, H/O cardiac catheterization, H/O cardiovascular stress test, H/O cardiovascular stress test, H/O cardiovascular stress test, H/O cardiovascular stress test, H/O Doppler ultrasound, H/O echocardiogram, H/O echocardiogram, H/O echocardiogram, H/O hiatal hernia, Hx of Venous Doppler, Hyperlipidemia, Hypertension, Obesity, S/P PTCA (percutaneous transluminal coronary angioplasty), Sleep apnea, and Thyroid disease.   Past surgical history:   has a past surgical history that includes Coronary angioplasty with stent (03/21/2005); Cardiac catheterization (6/12/08); Cardiac catheterization (3/07); Cardiac catheterization (3/05); Endoscopy, colon, diagnostic (2014); Colonoscopy (2011); Colonoscopy (5/18/16); Hand surgery (Right, 1997); pr open rx ankle dislocatn+fixatn (Right, 6/3/2019); Sternum Debridement (N/A, 9/24/2022); and Coronary artery bypass graft (N/A, 9/22/2022). Social History:   reports that he quit smoking about 46 years ago. His smoking use included cigarettes. He has a 10.00 pack-year smoking history. He has never used smokeless tobacco. He reports that he does not currently use alcohol after a past usage of about 6.0 standard drinks per week. He reports that he does not use drugs.   Family history:   no family history of CAD, STROKE of DM    No Known Allergies    sodium chloride flush 0.9 % injection 5-40 mL, 2 times per day  sodium chloride flush 0.9 % injection 5-40 mL, PRN  0.9 % sodium chloride infusion, PRN  ondansetron (ZOFRAN-ODT) disintegrating tablet 4 mg, Q8H PRN   Or  ondansetron (ZOFRAN) injection 4 mg, Q6H PRN  polyethylene glycol (GLYCOLAX) packet 17 g, Daily PRN  acetaminophen (TYLENOL) tablet 650 mg, Q6H PRN   Or  acetaminophen (TYLENOL) suppository 650 mg, Q6H PRN  aspirin chewable tablet 81 mg, Daily  albuterol sulfate HFA (PROVENTIL;VENTOLIN;PROAIR) 108 (90 Base) MCG/ACT inhaler 2 puff, 4x daily  amiodarone (CORDARONE) tablet 200 mg, Daily  atorvastatin (LIPITOR) tablet 40 mg, Nightly  Nephronex LIQD 5 mL  (Patient Supplied), Daily  traZODone (DESYREL) tablet 100 mg, Nightly  levothyroxine (SYNTHROID) tablet 75 mcg, Daily  insulin lispro (HUMALOG) injection vial 0-8 Units, TID WC  insulin lispro (HUMALOG) injection vial 0-4 Units, Nightly  sevelamer (RENVELA) packet 1.6 g (Patient Supplied), TID WC  warfarin (COUMADIN) tablet 3 mg, Once   Followed by  Gloria Gloria ON 12/13/2022] warfarin (COUMADIN) tablet 2 mg, Daily  carvedilol (COREG) tablet 6.25 mg, BID WC  cefepime (MAXIPIME) 1000 mg IVPB minibag, Q24H  vancomycin (VANCOCIN) 1,000 mg in dextrose 5 % 250 mL IVPB ([id number]), Once in dialysis  vancomycin (VANCOCIN) intermittent dosing (placeholder), RX Placeholder  [Held by provider] 0.9 % sodium chloride infusion, Continuous      Current Facility-Administered Medications   Medication Dose Route Frequency Provider Last Rate Last Admin    sodium chloride flush 0.9 % injection 5-40 mL  5-40 mL IntraVENous 2 times per day Tristan Wilson MD   10 mL at 12/12/22 1005    sodium chloride flush 0.9 % injection 5-40 mL  5-40 mL IntraVENous PRN Tristan Wilson MD        0.9 % sodium chloride infusion   IntraVENous PRN Tristan Wilson MD        ondansetron (ZOFRAN-ODT) disintegrating tablet 4 mg  4 mg Oral Q8H PRN Tristan Wilson MD        Or    ondansetron (ZOFRAN) injection 4 mg  4 mg IntraVENous Q6H PRN Tristan Wilson MD        polyethylene glycol (GLYCOLAX) packet 17 g  17 g Oral Daily PRN Tristan Wilson MD        acetaminophen (TYLENOL) tablet 650 mg  650 mg Oral Q6H PRN Tristan Wilson MD        Or    acetaminophen (TYLENOL) suppository 650 mg  650 mg Rectal Q6H PRN Tristan Wilson MD        aspirin chewable tablet 81 mg  81 mg Per NG tube Daily Tristan Wilson MD   81 mg at 12/12/22 1004    albuterol sulfate HFA (PROVENTIL;VENTOLIN;PROAIR) 108 (90 Base) MCG/ACT inhaler 2 puff  2 puff Inhalation 4x daily Tristan Wilson MD   2 puff at 12/12/22 0836    amiodarone (CORDARONE) tablet 200 mg  200 mg Per G Tube Daily Tristan Wilson MD   200 mg at 12/12/22 1005    atorvastatin (LIPITOR) tablet 40 mg  40 mg PEG Tube Nightly Tristan Wilson MD        Nephronex LIQD 5 mL  (Patient Supplied)  5 mL PEG Tube Daily Tristan Wilson MD        traZODone (DESYREL) tablet 100 mg  100 mg PEG Tube Nightly Tristan Wilson MD        levothyroxine (SYNTHROID) tablet 75 mcg  75 mcg Oral Daily Tristan Wilson MD   75 mcg at 12/12/22 1004    insulin lispro (HUMALOG) injection vial 0-8 Units  0-8 Units SubCUTAneous TID  Danni Villeda MD        insulin lispro (HUMALOG) injection vial 0-4 Units  0-4 Units SubCUTAneous Nightly Danni Villeda MD        sevelamer (RENVELA) packet 1.6 g (Patient Supplied)  1.6 g Per G Tube TID UGO Villeda MD        warfarin (COUMADIN) tablet 3 mg  3 mg Oral Once Danni Villeda MD        Followed by    Paul Mcfadden ON 12/13/2022] warfarin (COUMADIN) tablet 2 mg  2 mg Oral Daily Danni Villeda MD        carvedilol (COREG) tablet 6.25 mg  6.25 mg PEG Tube BID  Danni Villeda MD   6.25 mg at 12/12/22 1004    cefepime (MAXIPIME) 1000 mg IVPB minibag  1,000 mg IntraVENous Q24H Gardiner Alpers, MD        vancomycin (VANCOCIN) 1,000 mg in dextrose 5 % 250 mL IVPB ([id number])  1,000 mg IntraVENous Once in dialysis Flaca Cobian MD        vancomycin (VANCOCIN) intermittent dosing (placeholder)   Other RX Sonal Meier MD        [Held by provider] 0.9 % sodium chloride infusion   IntraVENous Continuous [employer], PA-C 100 mL/hr at 12/12/22 0255 New Bag at 12/12/22 0255         Review of Systems:     Constitutional: No Fever or Weight Loss   Eyes: No Decreased Vision  ENT: No Headaches, Hearing Loss or Vertigo  Cardiovascular:   no chest pain, + dyspnea on exertion,  no palpitations or loss of consciousness  Respiratory: No cough or wheezing    Gastrointestinal: No abdominal pain, appetite loss, blood in stools, constipation, diarrhea or heartburn  Genitourinary: No dysuria, trouble voiding, or hematuria  Musculoskeletal:  No gait disturbance, weakness or joint complaints  Integumentary: No rash or pruritis  Neurological: No TIA or stroke symptoms  Psychiatric: No anxiety or depression  Endocrine: No malaise, fatigue or temperature intolerance  Hematologic/Lymphatic: No bleeding problems, blood clots or swollen lymph nodes  Allergic/Immunologic: No nasal congestion or hives    All other systems were reviewed and were negative otherwise. Physical Examination:      Vitals:    12/12/22 1004   BP: (!) 131/101   Pulse:    Resp:    Temp:    SpO2:       Wt Readings from Last 3 Encounters:   12/12/22 268 lb 15.4 oz (122 kg)   09/24/22 (!) 350 lb 15.6 oz (159.2 kg)   09/09/22 (!) 331 lb (150.1 kg)     Body mass index is 34.53 kg/m². General Appearance:  No distress, conversant  Constitutional:  Well developed, Well nourished  HEENT:  Normocephalic, Atraumatic, Oropharynx moist   Nose normal. Neck Supple Carotid: no carotid bruit  Eyes:  Conjunctiva normal, No discharge. Respiratory:    Coarse breath sounds, post tracheostomy  Cardiovascular: S1-S2 No murmurs auscultated. No rubs, thrills or gallops. Normal  rhythm. Pedal pulses are normal. No pedal edema  GI:  Soft Non tender, non distended. Musculoskeletal:   No tenderness, No cyanosis, No clubbing. Integument:  Warm, Dry, No erythema, No rash. Lymphatic:  No lymphadenopathy noted. Neurologic:  Alert & oriented x 3  No focal deficits noted. Psychiatric:  Affect normal, Judgment normal, Mood normal.       Lab Review     Recent Labs     12/12/22  0957   WBC 6.9   HGB 8.6*   HCT 27.4*         Recent Labs     12/12/22  0957   *   K 4.5   CL 93*   CO2 24   BUN 75*   CREATININE 6.7*     Recent Labs     12/11/22  2128   AST 16   ALT 10   BILITOT 0.2   ALKPHOS 228*     No results for input(s): TROPONINI in the last 72 hours. Lab Results   Component Value Date    BNP 21 10/22/2013     Lab Results   Component Value Date    INR 1.21 12/12/2022    PROTIME 15.7 (H) 12/12/2022         All labs, images, EKGs were personally reviewed      Assessment: 71 y. 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, COPD (chronic obstructive pulmonary disease) ([facility] [address] Depression, Diabetes mellitus ([facility] [address] Dupuytren's contracture of hand, Family history of cardiovascular disease, GERD (gastroesophageal reflux disease), H/O cardiac catheterization, H/O cardiac catheterization, H/O cardiovascular stress test, H/O cardiovascular stress test, H/O cardiovascular stress test, H/O cardiovascular stress test, H/O Doppler ultrasound, H/O echocardiogram, H/O echocardiogram, H/O echocardiogram, H/O hiatal hernia, Hx of Venous Doppler, Hyperlipidemia, Hypertension, Obesity, S/P PTCA (percutaneous transluminal coronary angioplasty), Sleep apnea, and Thyroid disease. Medical Decision Making :     S/p trach and PEG      Coronary disease s/p CABG x3, LIMA LAD, SVG PDA and RCA. Post modified maze procedure 9/15/2022  Elevated cardiac troponin non-ACS trend. Patient without any chest pain. EKG shows A. fib with right bundle branch block. No ischemic changes noted. No ischemic work-up planned at this point. Obtain echocardiogram.      Continue with aspirin 81 mg daily  Continue with Lipitor 40 mg daily  Continue with Coreg 6.25 twice daily    Elevated proBNP: Possible secondary to underlying pneumonia versus volume overload. Continue with IV antibiotics. Volume management with dialysis as per nephrology. Atrial fibrillation: Rate controlled. Continue with amiodarone 200 daily. Post maze procedure. On warfarin. INR target 2-3  Protracted illness post CABG requiring tracheostomy  End-stage renal disease on hemodialysis: Nephrology follow-up on hemodialysis  Acute respiratory failure s/p tracheostomy.     Thank you for the consult    Dr. Diana Powell  12/12/2022 11:41 AM

## 2022-12-12 NOTE — H&P
V2.0  History and Physical      Name:  Don Heart /Age/Sex: 1950  (71 y.o. male)   MRN & CSN:  [id number] & [id number] Encounter Date/Time: 2022 12:35 AM EST   Location:  ED27/ED-27 PCP: Delia Bryson, APRN - [phone] Day: 2    Assessment and Plan:   Don Heart is a 71 y.o. male with  who presents with dyspnea. Patient was discharged from this hospital in September after he presented for planned CABG on 2022 after which she was transferred to [facility] for further management on 2022. While on admission at [facility] tracheostomy was placed on 10/12/2022 and PEG tube placed on 10/19/2022. Tunneled hemodialysis catheter was placed 10/20/2022.        1.  Acute on chronic respiratory failure   -Status post trach placement at [facility] 10/12/2022  -On home 5 L of O2 via trach mask, currently requiring 8 L  -Chest x-ray showed bibasilar atelectasis  -Continue breathing treatment  -Continue pulmonary toilet    2. WINIFRED secondary to shock from cardiac surgery   -On hemodialysis  and   -Was started on inpatient hemodialysis during recent admission at [facility] nephrology to continue inpatient hemodialysis    3. CAD status post CABG  -22 CABG x3 LIMA to LAD; SVG sequential to PDA RCA  --2D echo done in 2022 show EF of 50 to 55% with grade 3 diastolic dysfunction. Recent echo at [facility] show EF  60 to 65 %  -Troponins on presentation 0.249. Continue to trend  -Cardiology consulted from the ED. Do not recommend IV heparin at this time and will follow patient    4. Chronic persistent atrial fibrillation s/p MAZE 22  -Continue amiodarone 200 mg daily and Coreg 6.25 mg twice daily. Hold on mornings of hemodialysis  -Continue Coumadin    5. DVT (non-occlusive) mid R femoral vein  -Dx on duplex RLE 22   -Continue Coumadin    7. Diabetes mellitus  -SSI  -Hypoglycemic protocol    8. Hypertension  -Continue losartan    9.   Hypothyroidism  -On levothyroxine 75 mcg    10. History of PEG tube placement on 10/19/2022  -Secondary to esophageal dysphagia  -Nutrition consult for tube feeding and management      Disposition:   Current Living situation: ECF  Expected Disposition: ECF  Estimated D/C: TBD    Diet No diet orders on file   DVT Prophylaxis [] Lovenox, []  Heparin, [] SCDs, [] Ambulation,  [] Eliquis, [] Xarelto, [] Coumadin   Code Status Prior   Surrogate Decision Maker/ POA      History from:     patient    History of Present Illness:     Chief Complaint: Shortness of breath  Ibrahima Reich is a 71 y.o. male with pmh of CAD status post CABG, permanent A. fib, hypertension, hyperlipidemia and COPD who presents with shortness of breath. Patient presented from a nursing home and states that he has been having difficulty in breathing since yesterday. Patient reports feeling sick with some nausea, mild chest and back pain and cough. Denies any vomiting, fever, chills or diarrhea. Work-up in the ED with labs showed creatinine of 6.6, troponins of 0.249, BNP of 10,000. Chest x-ray showed bibasilar atelectasis. EKG showed atrial fibrillation. Patient was discharged from this hospital in September after he presented for planned CABG on 9/22/2022 after which she was transferred to [facility] for further management on 9/24/2022. While was on admission at [facility] tracheostomy was placed on 10/12/2022 and PEG tube placed on 10/19/2022. Tunneled hemodialysis catheter was placed 10/20/2022. Was on 5 L of oxygen via trach mask while on admission at [facility], currently on 8 L. Review of Systems: Need 10 Elements   Review of Systems    10 point ROS reviewed.   Negative except as above in HPI    Objective:   No intake or output data in the 24 hours ending 12/12/22 0035   Vitals:   Vitals:    12/11/22 2112 12/11/22 2136 12/11/22 2356 12/11/22 2357   BP: (!) 122/58   128/68   Pulse: (!) 101  96 94   Resp: 18  25 17   Temp: 99.1 °F (37.3 °C)   99.8 °F (37.7 °C)   TempSrc: Oral   Oral   SpO2: 100% 95% 97% 98%       Medications Prior to Admission     Prior to Admission medications    Medication Sig Start Date End Date Taking? Authorizing Provider   aspirin 81 MG chewable tablet 1 tablet by Per NG tube route daily 9/25/22   Jeremie Castellon MD   nitroGLYCERIN 200-5 MCG/ML-% infusion Infuse 0.005-0.3 mg/min intravenously continuous prn (SBP above hemodynamic goal) 9/24/22   Jeremie Castellon MD   pantoprazole 4 mg/mL in sodium chloride (PF) injection Infuse 10 mLs intravenously daily 9/24/22   Jeremie Castellon MD   blood glucose monitor kit and supplies Dispense sufficient amount for indicated testing frequency plus additional to accommodate PRN testing needs. Dispense all needed supplies to include: monitor, strips, lancing device, lancets, control solutions, alcohol swabs. 7/26/22   ANKITA Hassan CNP   Lancets MISC by D3EA route three times a day. 7/25/22   ANKITA Hassan CNP   levothyroxine (SYNTHROID) 50 MCG tablet Take 1 tablet by mouth Daily 5/31/22   ANKITA Hassan CNP   pravastatin (PRAVACHOL) 80 MG tablet TAKE 1 TABLET BY MOUTH ONCE DAILY 3/14/22   ANKITA Hassan CNP   polyethylene glycol [facility]) 17 GM/SCOOP powder Take 17 g by mouth daily 12/16/20   ANKITA Hassan CNP       Physical Exam: Need 8 Elements   Physical Exam     General: NAD  Eyes: EOMI  ENT: neck supple, trach  Cardiovascular: Irregular  Respiratory: Bilateral rhonchi  Gastrointestinal: Soft, non tender, PEG tube in place  Genitourinary: no suprapubic tenderness  Musculoskeletal: No edema  Skin: warm, dry  Neuro: Alert. Psych: Mood appropriate. Past Medical History:   PMHx   Past Medical History:   Diagnosis Date    Allergic rhinitis     Anticoagulated on Coumadin     1/6/17-**[facility] to follow pt's PT/INRs, along w/prescribing his Coumadin. **    Anxiety     Arthritis     Atrial fibrillation (HCC)     On Coumadin.     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 09/21/2022    positive at 15C, negative at 18C    COPD (chronic obstructive pulmonary disease) (HCC)     Depression     Diabetes mellitus (HCC)     NIDDM- dx over 10 yrs ago- follows with Dr Ge Pinon's contracture of hand     Right hand    Family history of cardiovascular disease     Father-MI    GERD (gastroesophageal reflux disease)     H/O cardiac catheterization 03/2007    cardiac cath- stent LAD patent, [id number]%, RCA 40-50%    H/O cardiac catheterization 06/12/2008    cardiac cath- mild disease mid RCA    H/O cardiovascular stress test 04/10/2014    cardiolite- normal, no ischemia, EF63%    H/O cardiovascular stress test 09/21/2016    EF59% normal study  pt in atrial fib    H/O cardiovascular stress test 09/20/2017    EF 60%   afib    H/O cardiovascular stress test 11/07/2018    EF60% normal study    H/O Doppler ultrasound     1/11/2010-CAROTID_Intimal thickening but no significant atherosclerotic plaque noted in LAUREN. Doppler flow velocities within the LAUREN are WNL. Intimal thickening but no significant atherosclerotic plaque noted in LICA. Doppler flow velociities within the LICA are WNL. H/O echocardiogram 04/10/2014    EF 60%, normal LV systolic function, mild mitral and tricuspid insufficiencies, no pericardial effusion.     H/O echocardiogram 09/21/2016    EF60% normal study    H/O echocardiogram 11/07/2018    EF55-60% no significant valular disease    H/O hiatal hernia     Hx of Venous Doppler 03/14/2019    Significant reflux in Left Deep System, No reflux in right lower extremity, No DVT or SVT    Hyperlipidemia     Hypertension     Obesity     S/P PTCA (percutaneous transluminal coronary angioplasty) 03/21/2005    s/p PTCA with 3.5 X 16 TAXUS stent to LAD- follows with Dr Phillip Garcia    Sleep apnea     had sleep study done 10+ yrs ago- has cpap but does not use it    Thyroid disease     hypothyroid     PSHX:  has a past surgical history that includes Coronary angioplasty with stent (2005); Cardiac catheterization (08); Cardiac catheterization (3/07); Cardiac catheterization (3/05); Endoscopy, colon, diagnostic (); Colonoscopy (); Colonoscopy (16); Hand surgery (Right, ); pr open rx ankle dislocatn+fixatn (Right, 6/3/2019); Sternum Debridement (N/A, 2022); and Coronary artery bypass graft (N/A, 2022). Allergies: No Known Allergies  Fam HX:  family history includes Cancer in his mother; Coronary Art Dis in his father; Heart Disease in his father; No Known Problems in his brother; Rheum Arthritis in his sister, sister and another family member. Soc HX:   Social History     Socioeconomic History    Marital status:      Number of children: 1   Occupational History     Comment: RETIRED   Tobacco Use    Smoking status: Former     Packs/day: 1.00     Years: 10.00     Pack years: 10.00     Types: Cigarettes     Quit date: 1976     Years since quittin.9    Smokeless tobacco: Never   Vaping Use    Vaping Use: Never used   Substance and Sexual Activity    Alcohol use: Not Currently     Alcohol/week: 6.0 standard drinks     Types: 6 Cans of beer per week     Comment: caffeine 2 cups of tea a day     Drug use: No    Sexual activity: Never     Social Determinants of Health     Financial Resource Strain: Low Risk     Difficulty of Paying Living Expenses: Not hard at all   Food Insecurity: No Food Insecurity    Worried About Running Out of Food in the Last Year: Never true    Ran Out of Food in the Last Year: Never true   Physical Activity: Sufficiently Active    Days of Exercise per Week: 7 days    Minutes of Exercise per Session: 30 min       Medications:   Medications:    Infusions:    sodium chloride       PRN Meds:      Labs      CBC:   Recent Labs     22   WBC 7.9   HGB 8.7*        BMP:    Recent Labs     22   *   K 4.3   CL 90*   CO2 27   BUN 72*   CREATININE 6.6*   GLUCOSE 155*     Hepatic:   Recent Labs     12/11/22 2128   AST 16   ALT 10   BILITOT 0.2   ALKPHOS 228*     Lipids:   Lab Results   Component Value Date/Time    CHOL 152 04/14/2021 10:02 AM    CHOL 139 07/23/2018 08:01 AM    HDL 43 08/26/2022 10:39 AM    TRIG 212 04/14/2021 10:02 AM     Hemoglobin A1C:   Lab Results   Component Value Date/Time    LABA1C 7.5 09/15/2022 12:55 PM     TSH:   Lab Results   Component Value Date/Time    TSH 1.66 04/03/2018 11:33 AM     Troponin:   Lab Results   Component Value Date/Time    TROPONINT 0.249 12/11/2022 09:28 PM    TROPONINT <0.010 09/16/2022 07:11 AM    TROPONINT <0.010 09/15/2022 06:00 PM     Lactic Acid: No results for input(s): LACTA in the last 72 hours. BNP:   Recent Labs     12/11/22 2128   PROBNP 10,348*     UA:  Lab Results   Component Value Date/Time    NITRU NEGATIVE 09/17/2022 06:21 PM    COLORU YELLOW 09/17/2022 06:21 PM    PHUR 6.5 06/30/2021 02:11 PM    WBCUA NONE SEEN 09/17/2022 06:21 PM    RBCUA NONE SEEN 09/17/2022 06:21 PM    MUCUS RARE 09/17/2022 06:21 PM    TRICHOMONAS NONE SEEN 09/17/2022 06:21 PM    BACTERIA NEGATIVE 09/17/2022 06:21 PM    CLARITYU CLEAR 09/17/2022 06:21 PM    SPECGRAV 1.015 09/17/2022 06:21 PM    LEUKOCYTESUR NEGATIVE 09/17/2022 06:21 PM    UROBILINOGEN 4.0 09/17/2022 06:21 PM    BILIRUBINUR NEGATIVE 09/17/2022 06:21 PM    BILIRUBINUR small 06/30/2021 02:11 PM    BLOODU NEGATIVE 09/17/2022 06:21 PM    GLUCOSEU neg 06/30/2021 02:11 PM    KETUA TRACE 09/17/2022 06:21 PM     Urine Cultures: No results found for: LABURIN  Blood Cultures: No results found for: BC  No results found for: BLOODCULT2  Organism: No results found for: ORG    Imaging/Diagnostics Last 24 Hours   XR CHEST PORTABLE    Result Date: 12/11/2022  EXAMINATION: ONE XRAY VIEW OF THE CHEST 12/11/2022 9:40 pm COMPARISON: Chest radiograph 09/24/2022.  HISTORY: ORDERING SYSTEM PROVIDED HISTORY: chest pain TECHNOLOGIST PROVIDED HISTORY: Reason for exam:->chest pain Reason for Exam: respiratory distress Additional signs and symptoms: chest pain, sob, FINDINGS: A tracheostomy tube is in place. A right chest tunneled dialysis catheter terminates in the right atrium. Status post median sternotomy. The cardiac silhouette is enlarged but stable. Shallow inspiration. Mild bibasilar opacities. No pneumothorax, vascular congestion, consolidation, or pleural effusion is identified. No acute osseous abnormality. Shallow inspiration with mild bibasilar opacities compatible with atelectasis.          Electronically signed by Alva Robertson MD on 12/12/2022 at 12:35 AM

## 2022-12-12 NOTE — CONSULTS
[address] [facility]  consulted by Dr. Leeanna Mishra for monitoring and adjustment. Indication for treatment: Vancomycin indication: HAP  Goal trough: 21-24 pre-HD    Risk Factors for MRSA Identified:   Patient on hemodialysis    Pertinent Laboratory Values:   Temp Readings from Last 3 Encounters:   12/11/22 99.8 °F (37.7 °C) (Oral)   09/24/22 97.2 °F (36.2 °C) (Core)   09/12/22 98.2 °F (36.8 °C) (Oral)     Recent Labs     12/11/22 2128 12/12/22  0957   WBC 7.9 6.9   LACTATE 1.9  --      Recent Labs     12/11/22 2128   BUN 72*   CREATININE 6.6*     Estimated Creatinine Clearance: 14 mL/min (A) (based on SCr of 6.6 mg/dL (H)). No intake or output data in the 24 hours ending 12/12/22 1047    Pertinent Cultures:   Date    Source    Results  12/12   Blood    Ordered  12/12   Sputum/Respiratory  Ordered  12/12   MRSA Nasal   Ordered    Vancomycin level:   TROUGH:  No results for input(s): VANCOTROUGH in the last 72 hours. RANDOM:  No results for input(s): VANCORANDOM in the last 72 hours. Assessment:  HPI: Cassidy Trevino is a 71 y.o. male with  who presents with dyspnea. Patient was discharged from this hospital in September after he presented for planned CABG on 9/22/2022 after which she was transferred to [facility] for further management on 9/24/2022. While on admission at [facility] tracheostomy was placed on 10/12/2022 and PEG tube placed on 10/19/2022. Tunneled hemodialysis catheter was placed 10/20/2022. SCr, BUN, and urine output: ESRD on HD on M/W/F, pt set for HD today  Day(s) of therapy: 1 of 7  Vancomycin concentration:   12/14 - random pre-HD at 06:00    Plan:  Start intermittent vancomycin dosing based on levels and HD schedule. Vancomycin 1gm ivpb x1 dose today after HD per Dr. Malaika Daniels. Check the vanco level pre-HD on Wednesday.    Pharmacy will continue to monitor patient and adjust therapy as indicated    [address] 12/14 @06:00    Thank you for the consult. Lucy Van [facility], [address] [facility]  12/12/2022 10:47 AM

## 2022-12-12 NOTE — PROGRESS NOTES
Pt seen and examined dw him about care  After osu hd at [facility] and trach care post cabg    Doing hd today with tunnel hd line and check bld culture X2 and 1 gram vanco    I dw dr Sinha and she is aware I will provide the dialysis care for the patient as seen by us prior    I was able to speak to patient's brother he is aware of the current situation and plan of care answered his questions as well    Full note to follow

## 2022-12-12 NOTE — PROGRESS NOTES
Pharmacy Note - Renal Dosing and Extended Infusion Beta-Lactam Adjustment    Cefepime 2000mg Q12h for treatment of Hospital acquired pneumonia. Per [facility] Renal Dose Adjustment Policy and Extended Infusion Beta-Lactam Policy, cefepime will be changed to 2000mg load followed by 1000mg Q24h extended infusion    Estimated Creatinine Clearance: Estimated Creatinine Clearance: 14 mL/min (A) (based on SCr of 6.6 mg/dL (H)). BMI: Body mass index is 34.53 kg/m². Please call with any questions.     Thank you,    Tiffanie Mendoza, [facility]

## 2022-12-13 LAB
ANION GAP SERPL CALCULATED.3IONS-SCNC: 15 MMOL/L (ref 4–16)
ANISOCYTOSIS: ABNORMAL
BANDED NEUTROPHILS ABSOLUTE COUNT: 0.07 K/CU MM
BANDED NEUTROPHILS RELATIVE PERCENT: 1 % (ref 5–11)
BASOPHILS ABSOLUTE: 0.1 K/CU MM
BASOPHILS RELATIVE PERCENT: 1 % (ref 0–1)
BUN BLDV-MCNC: 41 MG/DL (ref 6–23)
CALCIUM SERPL-MCNC: 8.6 MG/DL (ref 8.3–10.6)
CHLORIDE BLD-SCNC: 95 MMOL/L (ref 99–110)
CO2: 27 MMOL/L (ref 21–32)
CREAT SERPL-MCNC: 4.9 MG/DL (ref 0.9–1.3)
DIFFERENTIAL TYPE: ABNORMAL
EKG ATRIAL RATE: 110 BPM
EKG DIAGNOSIS: NORMAL
EKG Q-T INTERVAL: 380 MS
EKG QRS DURATION: 154 MS
EKG QTC CALCULATION (BAZETT): 495 MS
EKG R AXIS: -62 DEGREES
EKG T AXIS: 50 DEGREES
EKG VENTRICULAR RATE: 102 BPM
EOSINOPHILS ABSOLUTE: 0.2 K/CU MM
EOSINOPHILS RELATIVE PERCENT: 3 % (ref 0–3)
FERRITIN: 778 NG/ML (ref 30–400)
FOLATE: 18.2 NG/ML (ref 3.1–17.5)
GFR SERPL CREATININE-BSD FRML MDRD: 12 ML/MIN/1.73M2
GLUCOSE BLD-MCNC: 165 MG/DL (ref 70–99)
GLUCOSE BLD-MCNC: 179 MG/DL (ref 70–99)
GLUCOSE BLD-MCNC: 180 MG/DL (ref 70–99)
GLUCOSE BLD-MCNC: 192 MG/DL (ref 70–99)
GLUCOSE BLD-MCNC: 196 MG/DL (ref 70–99)
HCT VFR BLD CALC: 26.6 % (ref 42–52)
HEMOGLOBIN: 8.2 GM/DL (ref 13.5–18)
INR BLD: 1.26 INDEX
IRON: 54 UG/DL (ref 59–158)
LYMPHOCYTES ABSOLUTE: 1.9 K/CU MM
LYMPHOCYTES RELATIVE PERCENT: 26 % (ref 24–44)
MACROCYTES: ABNORMAL
MCH RBC QN AUTO: 31.9 PG (ref 27–31)
MCHC RBC AUTO-ENTMCNC: 30.8 % (ref 32–36)
MCV RBC AUTO: 103.5 FL (ref 78–100)
METAMYELOCYTES ABSOLUTE COUNT: 0.22 K/CU MM
METAMYELOCYTES PERCENT: 3 %
MONOCYTES ABSOLUTE: 0.9 K/CU MM
MONOCYTES RELATIVE PERCENT: 12 % (ref 0–4)
NUCLEATED RED BLOOD CELLS: 1
PCT TRANSFERRIN: 25 % (ref 10–44)
PDW BLD-RTO: 19.4 % (ref 11.7–14.9)
PLATELET # BLD: 282 K/CU MM (ref 140–440)
PMV BLD AUTO: 8.6 FL (ref 7.5–11.1)
POLYCHROMASIA: ABNORMAL
POTASSIUM SERPL-SCNC: 4.2 MMOL/L (ref 3.5–5.1)
PROTHROMBIN TIME: 16.3 SECONDS (ref 11.7–14.5)
RBC # BLD: 2.57 M/CU MM (ref 4.6–6.2)
RETICULOCYTE COUNT PCT: 2.4 % (ref 0.2–2.2)
SEGMENTED NEUTROPHILS ABSOLUTE COUNT: 4 K/CU MM
SEGMENTED NEUTROPHILS RELATIVE PERCENT: 54 % (ref 36–66)
SODIUM BLD-SCNC: 137 MMOL/L (ref 135–145)
TOTAL IRON BINDING CAPACITY: 217 UG/DL (ref 250–450)
TROPONIN T: 0.24 NG/ML
TROPONIN T: 0.27 NG/ML
TROPONIN T: 0.28 NG/ML
TROPONIN T: 0.29 NG/ML
TROPONIN T: 0.31 NG/ML
UNSATURATED IRON BINDING CAPACITY: 163 UG/DL (ref 110–370)
VITAMIN B-12: >2000 PG/ML (ref 211–911)
WBC # BLD: 7.4 K/CU MM (ref 4–10.5)

## 2022-12-13 PROCEDURE — 6370000000 HC RX 637 (ALT 250 FOR IP): Performed by: STUDENT IN AN ORGANIZED HEALTH CARE EDUCATION/TRAINING PROGRAM

## 2022-12-13 PROCEDURE — 82746 ASSAY OF FOLIC ACID SERUM: CPT

## 2022-12-13 PROCEDURE — 82728 ASSAY OF FERRITIN: CPT

## 2022-12-13 PROCEDURE — 84484 ASSAY OF TROPONIN QUANT: CPT

## 2022-12-13 PROCEDURE — 85045 AUTOMATED RETICULOCYTE COUNT: CPT

## 2022-12-13 PROCEDURE — 6370000000 HC RX 637 (ALT 250 FOR IP): Performed by: INTERNAL MEDICINE

## 2022-12-13 PROCEDURE — 2580000003 HC RX 258: Performed by: INTERNAL MEDICINE

## 2022-12-13 PROCEDURE — 80048 BASIC METABOLIC PNL TOTAL CA: CPT

## 2022-12-13 PROCEDURE — 82607 VITAMIN B-12: CPT

## 2022-12-13 PROCEDURE — 85027 COMPLETE CBC AUTOMATED: CPT

## 2022-12-13 PROCEDURE — 85025 COMPLETE CBC W/AUTO DIFF WBC: CPT

## 2022-12-13 PROCEDURE — 82962 GLUCOSE BLOOD TEST: CPT

## 2022-12-13 PROCEDURE — 2140000000 HC CCU INTERMEDIATE R&B

## 2022-12-13 PROCEDURE — 83550 IRON BINDING TEST: CPT

## 2022-12-13 PROCEDURE — 83540 ASSAY OF IRON: CPT

## 2022-12-13 PROCEDURE — 2700000000 HC OXYGEN THERAPY PER DAY

## 2022-12-13 PROCEDURE — 85007 BL SMEAR W/DIFF WBC COUNT: CPT

## 2022-12-13 PROCEDURE — 93010 ELECTROCARDIOGRAM REPORT: CPT | Performed by: INTERNAL MEDICINE

## 2022-12-13 PROCEDURE — 36415 COLL VENOUS BLD VENIPUNCTURE: CPT

## 2022-12-13 PROCEDURE — 94761 N-INVAS EAR/PLS OXIMETRY MLT: CPT

## 2022-12-13 PROCEDURE — 94640 AIRWAY INHALATION TREATMENT: CPT

## 2022-12-13 PROCEDURE — 2580000003 HC RX 258: Performed by: STUDENT IN AN ORGANIZED HEALTH CARE EDUCATION/TRAINING PROGRAM

## 2022-12-13 PROCEDURE — APPNB60 APP NON BILLABLE TIME 46-60 MINS: Performed by: NURSE PRACTITIONER

## 2022-12-13 PROCEDURE — 6360000002 HC RX W HCPCS: Performed by: INTERNAL MEDICINE

## 2022-12-13 PROCEDURE — 85610 PROTHROMBIN TIME: CPT

## 2022-12-13 RX ORDER — MIDODRINE HYDROCHLORIDE 5 MG/1
5 TABLET ORAL
Status: DISCONTINUED | OUTPATIENT
Start: 2022-12-13 | End: 2022-12-14

## 2022-12-13 RX ORDER — WARFARIN SODIUM 2 MG/1
2 TABLET ORAL DAILY
Status: DISCONTINUED | OUTPATIENT
Start: 2022-12-14 | End: 2022-12-14

## 2022-12-13 RX ORDER — WARFARIN SODIUM 3 MG/1
3 TABLET ORAL
Status: COMPLETED | OUTPATIENT
Start: 2022-12-13 | End: 2022-12-13

## 2022-12-13 RX ADMIN — ATORVASTATIN CALCIUM 40 MG: 40 TABLET, FILM COATED ORAL at 20:53

## 2022-12-13 RX ADMIN — CEFEPIME HYDROCHLORIDE 1000 MG: 1 INJECTION, POWDER, FOR SOLUTION INTRAMUSCULAR; INTRAVENOUS at 18:11

## 2022-12-13 RX ADMIN — ALBUTEROL SULFATE 2 PUFF: 90 AEROSOL, METERED RESPIRATORY (INHALATION) at 16:08

## 2022-12-13 RX ADMIN — TRAZODONE HYDROCHLORIDE 100 MG: 50 TABLET ORAL at 20:53

## 2022-12-13 RX ADMIN — WARFARIN SODIUM 3 MG: 3 TABLET ORAL at 17:59

## 2022-12-13 RX ADMIN — SODIUM CHLORIDE, PRESERVATIVE FREE 10 ML: 5 INJECTION INTRAVENOUS at 10:35

## 2022-12-13 RX ADMIN — MIDODRINE HYDROCHLORIDE 5 MG: 5 TABLET ORAL at 17:59

## 2022-12-13 RX ADMIN — ALBUTEROL SULFATE 2 PUFF: 90 AEROSOL, METERED RESPIRATORY (INHALATION) at 08:18

## 2022-12-13 RX ADMIN — SODIUM CHLORIDE, PRESERVATIVE FREE 10 ML: 5 INJECTION INTRAVENOUS at 20:53

## 2022-12-13 RX ADMIN — POLYETHYLENE GLYCOL (3350) 17 G: 17 POWDER, FOR SOLUTION ORAL at 10:33

## 2022-12-13 RX ADMIN — AMIODARONE HYDROCHLORIDE 200 MG: 200 TABLET ORAL at 10:32

## 2022-12-13 RX ADMIN — LEVOTHYROXINE SODIUM 75 MCG: 0.07 TABLET ORAL at 06:12

## 2022-12-13 RX ADMIN — CARVEDILOL 6.25 MG: 6.25 TABLET, FILM COATED ORAL at 17:59

## 2022-12-13 RX ADMIN — ALBUTEROL SULFATE 2 PUFF: 90 AEROSOL, METERED RESPIRATORY (INHALATION) at 20:35

## 2022-12-13 RX ADMIN — ALBUTEROL SULFATE 2 PUFF: 90 AEROSOL, METERED RESPIRATORY (INHALATION) at 12:04

## 2022-12-13 RX ADMIN — ASPIRIN 81 MG: 81 TABLET, CHEWABLE ORAL at 10:33

## 2022-12-13 RX ADMIN — SODIUM CHLORIDE 5 ML/HR: 9 INJECTION, SOLUTION INTRAVENOUS at 18:11

## 2022-12-13 RX ADMIN — CARVEDILOL 6.25 MG: 6.25 TABLET, FILM COATED ORAL at 10:36

## 2022-12-13 NOTE — PROGRESS NOTES
PHARMACY ANTICOAGULATION MONITORING SERVICE    Ora Cardoso is a 71 y.o. male on warfarin therapy for atrial fibrillation and history of DVT. Pharmacy consulted by Dr. Jatinder Cohn for monitoring and adjustment of treatment. Indication for anticoagulation: A-Fib, Hx of DVT  INR goal: 2-3  Warfarin dose prior to admission: 2 mg daily    Pertinent Laboratory Values   Recent Labs     12/11/22 2128 12/12/22  0316 12/12/22  0957 12/13/22  0650   INR  --    < >  --  1.26   HGB 8.7*  --  8.6* 8.2*   HCT 25.0*  --  27.4* 26.6*     --  302 282    < > = values in this interval not displayed. INR MONITORING  Recent Labs     12/12/22  0316 12/13/22  0650   INR 1.21 1.26     Assessment/Plan:  Drug Interactions:   Aspirin, amiodarone, levothyroxine, atorvastatin, trazodone (home meds)  Pt started on continues cefepime which may increase warfarin effects. INR sub-therapeutic on admission, remains subtherapeutic today @1.26. Warfarin 3mg dose given last night. Will give a slightly higher than normal dose of warfarin 3mg again tonight and look to resume home dose of 2mg daily tomorrow, pending INR trends. Note: HGB/HCT considerably low (HD patient)  Pharmacy will continue to monitor and adjust warfarin therapy as indicated    Thank you for the consult. Wilber Lopes, [facility]  12/13/2022 1:36 PM

## 2022-12-13 NOTE — CONSULTS
[address] [facility]  consulted by Dr. Kimberley Burnett for monitoring and adjustment. Indication for treatment: Vancomycin indication: HAP  Goal trough: 21-24 pre-HD    Risk Factors for MRSA Identified:   Patient on hemodialysis    Pertinent Laboratory Values:   Temp Readings from Last 3 Encounters:   12/13/22 97.6 °F (36.4 °C) (Oral)   09/24/22 97.2 °F (36.2 °C) (Core)   09/12/22 98.2 °F (36.8 °C) (Oral)     Recent Labs     12/11/22 2128 12/12/22 0957 12/13/22  0650   WBC 7.9 6.9 7.4   LACTATE 1.9  --   --        Recent Labs     12/11/22 2128 12/12/22 0957 12/13/22  0650   BUN 72* 75* 41*   CREATININE 6.6* 6.7* 4.9*       Estimated Creatinine Clearance: 19 mL/min (A) (based on SCr of 4.9 mg/dL (H)). Intake/Output Summary (Last 24 hours) at 12/13/2022 1605  Last data filed at 12/13/2022 0617  Gross per 24 hour   Intake 981.34 ml   Output 3300 ml   Net -2318.66 ml       Pertinent Cultures:   Date    Source    Results  12/12   Blood    No growth  12/12   Sputum/Respiratory  In process  12/12   MRSA Nasal   In process    Vancomycin level:   TROUGH:  No results for input(s): VANCOTROUGH in the last 72 hours. RANDOM:  No results for input(s): VANCORANDOM in the last 72 hours. Assessment:  HPI: Kandy Rutherford is a 71 y.o. male with  who presents with dyspnea. Patient was discharged from this hospital in September after he presented for planned CABG on 9/22/2022 after which she was transferred to [address] [place] [address] for further management on 9/24/2022. While on admission at [address] [place] [address] tracheostomy was placed on 10/12/2022 and PEG tube placed on 10/19/2022. Tunneled hemodialysis catheter was placed 10/20/2022. SCr, BUN, and urine output: ESRD on HD on M/W/F, pt received HD yesterday  Day(s) of therapy: 2 of 7  Vancomycin concentration:   12/14 - random pre-HD at 06:00    Plan:  Continue intermittent vancomycin dosing based on levels and HD schedule.   Vancomycin 1gm ivpb x1 dose given yesterday after HD per  Yahir  Check the vanco level pre-HD on Wednesday. Pharmacy will continue to monitor patient and adjust therapy as indicated    [address] 3 12/14 @06:00    Thank you for the consult. Zoe Chowdary, [address] [place] [address]  12/13/2022 4:05 PM

## 2022-12-13 NOTE — CARE COORDINATION
CM in to see pt to initiate discharge planning. Pt from [facility] skilled and does not plan to return. Pt states lack of care and that he \"would have \" if he stayed at [facility]    Pt has DME to include cane, walker, shower chair. Discharge plan at this time remains SNF. PS to Dr. Terri Chambers to update, PT/OT requested. Pt SNF choice [facility]. Referral made to [facility]. Pt denies any needs at this time.      CM following

## 2022-12-13 NOTE — PROGRESS NOTES
V2.0  [facility] Hospitalist Progress Note      Name:  Jono Vega /Age/Sex: 1950  (71 y.o. male)   MRN & CSN:  [id number] & [id number] Encounter Date/Time: 2022 7:56 AM EST    Location:  [address] [place], [place] [address] PCP: Daniel Poole Day: 3    Assessment and Plan:   Jono Vega is a 71 y.o. male with  who presents with dyspnea. Patient was discharged from this hospital in September after he presented for planned CABG on 2022 after which she was transferred to [facility] for further management on 2022. While on admission at [facility] tracheostomy was placed on 10/12/2022 and PEG tube placed on 10/19/2022. Tunneled hemodialysis catheter was placed 10/20/2022. #Acute on chronic respiratory failure s/p trach  #Cough with yellow secretions  #Concern for HCAP  #Sepsis present on admission   -presented with SOB for 1 day associated with nausea, mild chest and back pain, cough  - Initially with , RR 23  -trach at [facility] 10/12/2022  -On home 5L O2 on trach collar, was on 8L in the AM but weaned back down to 5L/min  - Initially CXR: bibasilar atelectasis  -BNP elevated to >10 000  -Procal 0.319  - Cough improved compared to yesterday per patient.    - MRSA nares pending  - Blood cultures  -ve so far  - On cefepime and vanc  - Viral panel -ve  - Respiratory culture pending    Plan:  Continue nebs  Continue aggressive pulm toilet  Continue abx    #WINIFRED likely prerenal in the setting of shock from cardiac surgery  -HD T, TH, S  -Was started on inpatient hemodialysis during recent admission at [address] on board    Plan:  F/U nephro recs    #CAD s/p CABG  #Troponin elevation possibly in the setting of demand ischemia and WINIFRED  -22 CABG x3 LIMA to LAD; SVG sequential to PDA RCA  -2D echo done in 2022 show EF of 50 to 55% with grade 3 diastolic dysfunction    -Recent echo at [facility] show EF  60 to 65 %  -Troponins on presentation 0.249  -EKG without acute ischemic changes  -Pt without chest pain    Plan:  F/U Cardiology recs. #Macrocytic anemia 2/2 likely anemia of chronic disease  -Hb 8.2 .5  - Ferritin 778 with low iron and TIBC. Normal folate and vitamin B12    Plan:  Continue to monitor   Defer EPO to nephro    #Elevated Alk phos   -Alk phos: 228 on presentation       #constipation  -no BM for 5 days on presentation   - No complaints today   - On miralax    #Moderate Protein Malnutrition    Chronic medical problems:  #Chronic persistent atrial fibrillation s/p MAZE 9/22/22  Continue amiodarone 200 mg daily and Coreg 6.25 mg twice daily. Hold on mornings of hemodialysis  Continue warfarin    #DVT (non-occlusive) mid R femoral vein  -Dx on duplex RLE 9/12/22   -Continue warfarin     #Diabetes mellitus  -SSI  -Hypoglycemic protocol     #Hypertension  -Continue losartan     #Hypothyroidism  -On levothyroxine 75 mcg     #s/p PEG tube placement on 10/19/2022  -Secondary to esophageal dysphagia  -Nutrition consult for tube feeding and management  - SLP eval     Diet Diet NPO Exceptions are: Other (Specify); Specify Other Exceptions: Tube Feed/PEG  ADULT TUBE FEEDING; PEG; Renal Formula; Continuous; 20; Yes; 10; Q 4 hours; 60; 30; Q 4 hours   DVT Prophylaxis [] Lovenox, []  Heparin, [] SCDs, [] Ambulation,  [] Eliquis, [] Xarelto  [x] Coumadin   Code Status Full Code   Disposition From: NH  Expected Disposition: NH  Estimated Date of Discharge: 2-3 days  Patient requires continued admission due to acute hypoxic resp failure, possible pneumonia, SLP eval   Surrogate Decision Maker/ DEVI Romeo     Subjective:     Chief Complaint: Shortness of Breath       Patient states he feels better. He was back down to 5L/min O2 when seen. Pt. States his cough is improving and the amount of phlegm he is bringing out is also improving.        Review of Systems:    General: No fever, no chills  Heart: No chest pain, no palpitations, no PND, no orthopnea  Lungs: No shortness of breath, + cough  Abdomen: No abdominal pain, no nausea, no vomiting, +constipation, no diarrhea  : No frequency, no dysuria, no urgency, no decreased urination  MSK: No lower extremity swelling  Neuro: No confusion, no weakness  Skin: No rashes      Objective: Intake/Output Summary (Last 24 hours) at 12/13/2022 1613  Last data filed at 12/13/2022 0617  Gross per 24 hour   Intake 981.34 ml   Output 3300 ml   Net -2318.66 ml          Vitals:   Vitals:    12/13/22 1511   BP: (!) 115/58   Pulse:    Resp:    Temp: 97.6 °F (36.4 °C)   SpO2:        Physical Exam:     General: NAD, AAO x3  Eyes: EOMI  HENT: trach in place with trach collar - 5L/min  CVS: Normal S1 and S2, no murmurs, RRR. Occasional PVC  Respiratory: Normal breath sounds, clear to auscultation bilaterally, no respiratory distress, Minimal secretions coming out from trach today   GI: Normal bowel sounds, soft, nondistended, nontender.  PEG in situ  MSK:  no lower extremity edema  Skin: Intact, dry, warm, no rashes  Neuro: CN II to XII grossly intact  Psych: Normal mood    Medications:   Medications:    midodrine  5 mg Oral TID WC    [START ON 12/14/2022] warfarin  2 mg Oral Daily    warfarin  3 mg Oral Once    sodium chloride flush  5-40 mL IntraVENous 2 times per day    aspirin  81 mg Per NG tube Daily    albuterol sulfate HFA  2 puff Inhalation 4x daily    amiodarone  200 mg Per G Tube Daily    atorvastatin  40 mg PEG Tube Nightly    Nephronex  5 mL PEG Tube Daily    traZODone  100 mg PEG Tube Nightly    levothyroxine  75 mcg Oral Daily    insulin lispro  0-8 Units SubCUTAneous TID     insulin lispro  0-4 Units SubCUTAneous Nightly    sevelamer  1.6 g Per G Tube TID WC    carvedilol  6.25 mg PEG Tube BID WC    cefepime  1,000 mg IntraVENous Q24H    vancomycin (VANCOCIN) intermittent dosing (placeholder)   Other RX Placeholder    polyethylene glycol  17 g Oral BID      Infusions:    sodium chloride 5 mL/hr at 12/12/22 9160    [Held by provider] sodium chloride Stopped (12/12/22 0507)     PRN Meds: sodium chloride flush, 5-40 mL, PRN  sodium chloride, , PRN  ondansetron, 4 mg, Q8H PRN   Or  ondansetron, 4 mg, Q6H PRN  polyethylene glycol, 17 g, Daily PRN  acetaminophen, 650 mg, Q6H PRN   Or  acetaminophen, 650 mg, Q6H PRN      Labs           Imaging/Diagnostics Last 24 Hours   XR CHEST PORTABLE    Result Date: 12/11/2022  EXAMINATION: ONE XRAY VIEW OF THE CHEST 12/11/2022 9:40 pm COMPARISON: Chest radiograph 09/24/2022. HISTORY: ORDERING SYSTEM PROVIDED HISTORY: chest pain TECHNOLOGIST PROVIDED HISTORY: Reason for exam:->chest pain Reason for Exam: respiratory distress Additional signs and symptoms: chest pain, sob, FINDINGS: A tracheostomy tube is in place. A right chest tunneled dialysis catheter terminates in the right atrium. Status post median sternotomy. The cardiac silhouette is enlarged but stable. Shallow inspiration. Mild bibasilar opacities. No pneumothorax, vascular congestion, consolidation, or pleural effusion is identified. No acute osseous abnormality. Shallow inspiration with mild bibasilar opacities compatible with atelectasis.        Electronically signed by Nayely Galeana MD on 12/13/2022 at 4:13 PM

## 2022-12-13 NOTE — PROGRESS NOTES
Cardiology Progress Note     Today's Plan CPM  Will sign off - will be available if needed   To follow in office         Admit Date:  12/11/2022    Consult reason/ Seen today for: elevated troponin    Subjective and  Overnight Events:  denies chest pain or shortness of breath     Telemetry : atrial fib with PVC          History of Presenting Illness:    Chief complain on admission : 71 y. 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, COPD (chronic obstructive pulmonary disease) ([facility] Depression, Diabetes mellitus ([facility] Dupuytren's contracture of hand, Family history of cardiovascular disease, GERD (gastroesophageal reflux disease), H/O cardiac catheterization, H/O cardiac catheterization, H/O cardiovascular stress test, H/O cardiovascular stress test, H/O cardiovascular stress test, H/O cardiovascular stress test, H/O Doppler ultrasound, H/O echocardiogram, H/O echocardiogram, H/O echocardiogram, H/O hiatal hernia, Hx of Venous Doppler, Hyperlipidemia, Hypertension, Obesity, S/P PTCA (percutaneous transluminal coronary angioplasty), Sleep apnea, and Thyroid disease. Past surgical history:   has a past surgical history that includes Coronary angioplasty with stent (03/21/2005); Cardiac catheterization (6/12/08); Cardiac catheterization (3/07); Cardiac catheterization (3/05); Endoscopy, colon, diagnostic (2014); Colonoscopy (2011); Colonoscopy (5/18/16); Hand surgery (Right, 1997); pr open rx ankle dislocatn+fixatn (Right, 6/3/2019); Sternum Debridement (N/A, 9/24/2022); and Coronary artery bypass graft (N/A, 9/22/2022).   Social History:   reports that he quit smoking about 46 years ago. His smoking use included cigarettes. He has a 10.00 pack-year smoking history. He has never used smokeless tobacco. He reports that he does not currently use alcohol after a past usage of about 6.0 standard drinks per week. He reports that he does not use drugs. Family history:  family history includes Cancer in his mother; Coronary Art Dis in his father; Heart Disease in his father; No Known Problems in his brother; Rheum Arthritis in his sister, sister and another family member. No Known Allergies    Review of Systems:   All 14 systems were reviewed and are negative  Except for the positive findings which are documented     BP (!) 90/50   Pulse 92   Temp 98.1 °F (36.7 °C) (Oral)   Resp 22   Ht 6' 2\" (1.88 m)   Wt 258 lb 13.1 oz (117.4 kg)   SpO2 96%   BMI 33.23 kg/m²     Intake/Output Summary (Last 24 hours) at 12/13/2022 0858  Last data filed at 12/13/2022 0617  Gross per 24 hour   Intake 981.34 ml   Output 3300 ml   Net -2318.66 ml       Physical Exam:  Physical Exam  Vitals reviewed. Constitutional:       Appearance: He is obese. HENT:      Mouth/Throat:      Mouth: Mucous membranes are moist.   Eyes:      Extraocular Movements: Extraocular movements intact. Neck:      Comments: Trach   Cardiovascular:      Rate and Rhythm: Normal rate. Rhythm irregular. Pulses: Normal pulses. Pulmonary:      Effort: Pulmonary effort is normal.   Chest:      Chest wall: No tenderness. Comments: Tunneled catheter to the right upper chest   Abdominal:      General: There is no distension. Tenderness: There is no abdominal tenderness. Comments: PEG tube noted   Musculoskeletal:      Right lower leg: No edema. Left lower leg: No edema. Skin:     General: Skin is warm. Capillary Refill: Capillary refill takes less than 2 seconds. Comments: Chronic statis dermatitis to lower legs   Neurological:      Mental Status: Mental status is at baseline.         Medications: sodium chloride flush  5-40 mL IntraVENous 2 times per day    aspirin  81 mg Per NG tube Daily    albuterol sulfate HFA  2 puff Inhalation 4x daily    amiodarone  200 mg Per G Tube Daily    atorvastatin  40 mg PEG Tube Nightly    Nephronex  5 mL PEG Tube Daily    traZODone  100 mg PEG Tube Nightly    levothyroxine  75 mcg Oral Daily    insulin lispro  0-8 Units SubCUTAneous TID WC    insulin lispro  0-4 Units SubCUTAneous Nightly    sevelamer  1.6 g Per G Tube TID WC    warfarin  2 mg Oral Daily    carvedilol  6.25 mg PEG Tube BID WC    cefepime  1,000 mg IntraVENous Q24H    vancomycin (VANCOCIN) intermittent dosing (placeholder)   Other RX Placeholder    polyethylene glycol  17 g Oral BID      sodium chloride 5 mL/hr at 12/12/22 1756    [Held by provider] sodium chloride Stopped (12/12/22 0507)     sodium chloride flush, sodium chloride, ondansetron **OR** ondansetron, polyethylene glycol, acetaminophen **OR** acetaminophen    Lab Data:  CBC:   Recent Labs     12/11/22 2128 12/12/22  0957 12/13/22  0650   WBC 7.9 6.9 7.4   HGB 8.7* 8.6* 8.2*   HCT 25.0* 27.4* 26.6*   .5* 101.9* 103.5*    302 282     BMP:   Recent Labs     12/11/22 2128 12/12/22  0957 12/13/22  0650   * 134* 137   K 4.3 4.5 4.2   CL 90* 93* 95*   CO2 27 24 27   BUN 72* 75* 41*   CREATININE 6.6* 6.7* 4.9*     PT/INR:   Recent Labs     12/12/22  0316 12/13/22  0650   PROTIME 15.7* 16.3*   INR 1.21 1.26     BNP:    Recent Labs     12/11/22 2128   PROBNP 10,348*     TROPONIN:   Recent Labs     12/13/22  0321 12/13/22  0450 12/13/22  0650   TROPONINT 0.274* 0.281* 0.293*              Impression:  Principal Problem:    Dyspnea  Active Problems: Moderate malnutrition ([address]  Resolved Problems:    * No resolved hospital problems. *       All labs, medications and tests reviewed by myself, continue all other medications of all above medical condition listed as is except for changes mentioned above.     Thank you   Please call with questions. Electronically signed by ANKITA Lamas CNP on 12/13/2022 at 8:58 AM          CARDIOLOGY ATTENDING ADDENDUM    I have seen, spoken to and examined this patient personally, independent of the NP/PAC. I have reviewed the hospital care given to date and reviewed all pertinent labs and imaging. I have spoken with patient, nursing staff and provided written and verbal instructions . The above note has been reviewed. I have spent substantive amount of time in formulating patient care.              Physical Exam:    General:   Awake, alert  Head:normal  Eye:normal  Chest:   Clear to auscultation  0 Basilar crackles          MEDICAL DECISION MAKING :          CAD: s/p CABG x3 with modified MAZE: had LIMA to LAD, SVG to PDA and RCA (12/7143) with complicated recovery requiring return to OR  Elevated troponin: non ACS trend- he denies chest pain: EKG atrial fib with RBB- no acute/ ischemic changes: Echo completed EF normal 55-60% no sig valve disease/ no wall motion abnormality : continue coreg/ ASA/ pravachol   Atrial fib : s/p MAZE Procedure and AtriCure clipping of left atrial appendage: rate is controlled : on amiodarone and warfarin   Protracted illness post CABG requiring Trach and PEG tube   ESRD- on HD Monday -Wed- Fr-: nephrology following   Acute respiratory failure s/p trach; on tach mask  Anemia -macrocytic :  per primary ;h/h stable    No cardiac work-up planned at this time please call with questions  Dr. Jarvis Garcia MD

## 2022-12-13 NOTE — PROGRESS NOTES
Nephrology Progress Note  12/13/2022 9:46 AM  Subjective: Interval History: Aaliyah Hamilton is a 71 y.o. male with somewhat congested still not much more arousable tolerated dialysis yesterday        Data:   Scheduled Meds:   sodium chloride flush  5-40 mL IntraVENous 2 times per day    aspirin  81 mg Per NG tube Daily    albuterol sulfate HFA  2 puff Inhalation 4x daily    amiodarone  200 mg Per G Tube Daily    atorvastatin  40 mg PEG Tube Nightly    Nephronex  5 mL PEG Tube Daily    traZODone  100 mg PEG Tube Nightly    levothyroxine  75 mcg Oral Daily    insulin lispro  0-8 Units SubCUTAneous TID WC    insulin lispro  0-4 Units SubCUTAneous Nightly    sevelamer  1.6 g Per G Tube TID WC    warfarin  2 mg Oral Daily    carvedilol  6.25 mg PEG Tube BID WC    cefepime  1,000 mg IntraVENous Q24H    vancomycin (VANCOCIN) intermittent dosing (placeholder)   Other RX Placeholder    polyethylene glycol  17 g Oral BID     Continuous Infusions:   sodium chloride 5 mL/hr at 12/12/22 1756    [Held by provider] sodium chloride Stopped (12/12/22 0507)         CBC   Recent Labs     12/11/22 2128 12/12/22  0957 12/13/22  0650   WBC 7.9 6.9 7.4   HGB 8.7* 8.6* 8.2*   HCT 25.0* 27.4* 26.6*    302 282      BMP   Recent Labs     12/11/22 2128 12/12/22  0957 12/13/22  0650   * 134* 137   K 4.3 4.5 4.2   CL 90* 93* 95*   CO2 27 24 27   BUN 72* 75* 41*   CREATININE 6.6* 6.7* 4.9*     Hepatic:   Recent Labs     12/11/22 2128   AST 16   ALT 10   BILITOT 0.2   ALKPHOS 228*     Troponin: No results for input(s): TROPONINI in the last 72 hours. BNP: No results for input(s): BNP in the last 72 hours. Lipids: No results for input(s): CHOL, HDL in the last 72 hours.     Invalid input(s): LDLCALCU  ABGs:   Lab Results   Component Value Date/Time    PO2ART 70.3 09/24/2022 11:49 AM    WRS5GOI 35.5 09/24/2022 11:49 AM     INR:   Recent Labs     12/12/22  0316 12/13/22  0650   INR 1.21 1.26     Renal Labs  Albumin:    Lab Results   Component Value Date/Time    LABALBU 3.0 12/11/2022 09:28 PM     Calcium:    Lab Results   Component Value Date/Time    CALCIUM 8.6 12/13/2022 06:50 AM     Phosphorus:  No results found for: PHOS  U/A:    Lab Results   Component Value Date/Time    NITRU NEGATIVE 09/17/2022 06:21 PM    COLORU YELLOW 09/17/2022 06:21 PM    PHUR 6.5 06/30/2021 02:11 PM    WBCUA NONE SEEN 09/17/2022 06:21 PM    RBCUA NONE SEEN 09/17/2022 06:21 PM    MUCUS RARE 09/17/2022 06:21 PM    TRICHOMONAS NONE SEEN 09/17/2022 06:21 PM    BACTERIA NEGATIVE 09/17/2022 06:21 PM    CLARITYU CLEAR 09/17/2022 06:21 PM    SPECGRAV 1.015 09/17/2022 06:21 PM    UROBILINOGEN 4.0 09/17/2022 06:21 PM    BILIRUBINUR NEGATIVE 09/17/2022 06:21 PM    BILIRUBINUR small 06/30/2021 02:11 PM    BLOODU NEGATIVE 09/17/2022 06:21 PM    GLUCOSEU neg 06/30/2021 02:11 PM    KETUA TRACE 09/17/2022 06:21 PM     ABG:    Lab Results   Component Value Date/Time    LSM6XVL 35.5 09/24/2022 11:49 AM    PO2ART 70.3 09/24/2022 11:49 AM    SIT6HTW 21.2 09/24/2022 11:49 AM     HgBA1c:    Lab Results   Component Value Date/Time    LABA1C 7.5 09/15/2022 12:55 PM     Microalbumen/Creatinine ratio:  No components found for: RUCREAT  TSH:    Lab Results   Component Value Date/Time    TSH 1.66 04/03/2018 11:33 AM     IRON:    Lab Results   Component Value Date/Time    IRON 54 12/13/2022 06:50 AM     Iron Saturation:  No components found for: PERCENTFE  TIBC:    Lab Results   Component Value Date/Time    TIBC 217 12/13/2022 06:50 AM     FERRITIN:    Lab Results   Component Value Date/Time    FERRITIN 778 12/13/2022 06:50 AM     RPR:  No results found for: RPR  YAYA:  No results found for: ANATITER, YAYA  24 Hour Urine for Creatinine Clearance:  No components found for: CREAT4, UHRS10, UTV10      Objective:   I/O: 12/12 0701 - 12/13 0700  In: 981.3 [I.V.:202.6]  Out: 3300 [Urine:300]  I/O last 3 completed shifts:   In: 981.3 [I.V.:202.6; NG/GT:247; IV Piggyback:31.7]  Out: 3300 [Urine:300]  No intake/output data recorded. Vitals: BP (!) 90/50   Pulse 92   Temp 98.1 °F (36.7 °C) (Oral)   Resp 22   Ht 6' 2\" (1.88 m)   Wt 258 lb 13.1 oz (117.4 kg)   SpO2 96%   BMI 33.23 kg/m²  {  General appearance: awake weak  HEENT: Head: Normal, normocephalic, atraumatic.   Neck: Positive trach  Lungs: diminished breath sounds bilaterally positive congestion  Heart: S1, S2 normal  Abdomen: abnormal findings:  soft nt  Extremities: edema trace  Neurologic: Mental status: alertness: Awake but anxious        Assessment and Plan:      IMP:   #1 end-stage renal disease on dialysis Monday Wednesday Friday   #2 status post CABG   #3 respiratory status post trach   #4  moderate protein malnutrition with PEG tube   #5 shortness of breath   #6 anemia   #7 hypertension    Plan     #1 Next dialysis Wednesday discussed with him about AV fistula and ask if he wants to hold off for now 7 tunneled HD catheter  #2 follow-up troponins and follow cardiology Vickey recent CABG 3 months ago  #3 trach care recommend pulmonary consult to help with treatment plan is not clearing secretions  #4 maintain the tube feeds  #5 monitor oxygenation not worsening  #6 maintain EPO recommend PRBC if hemoglobin less than 7  #7 blood pressure low this morning we will just hold blood pressure meds for now    Will follow supportive care plan on returning to [facility] when stable  Discussed with brother yesterday           Ronal Cross MD, MD

## 2022-12-14 ENCOUNTER — APPOINTMENT (OUTPATIENT)
Dept: GENERAL RADIOLOGY | Age: 72
End: 2022-12-14
Payer: COMMERCIAL

## 2022-12-14 LAB
ALBUMIN SERPL-MCNC: 2.8 GM/DL (ref 3.4–5)
ANION GAP SERPL CALCULATED.3IONS-SCNC: 16 MMOL/L (ref 4–16)
BUN BLDV-MCNC: 58 MG/DL (ref 6–23)
CALCIUM SERPL-MCNC: 9.2 MG/DL (ref 8.3–10.6)
CHLORIDE BLD-SCNC: 95 MMOL/L (ref 99–110)
CO2: 25 MMOL/L (ref 21–32)
CREAT SERPL-MCNC: 5.6 MG/DL (ref 0.9–1.3)
CULTURE: NORMAL
CULTURE: NORMAL
DOSE AMOUNT: NORMAL
DOSE TIME: NORMAL
GFR SERPL CREATININE-BSD FRML MDRD: 10 ML/MIN/1.73M2
GLUCOSE BLD-MCNC: 190 MG/DL (ref 70–99)
GLUCOSE BLD-MCNC: 237 MG/DL (ref 70–99)
GLUCOSE BLD-MCNC: 240 MG/DL (ref 70–99)
GLUCOSE BLD-MCNC: 244 MG/DL (ref 70–99)
GLUCOSE BLD-MCNC: 273 MG/DL (ref 70–99)
GRAM SMEAR: NORMAL
INR BLD: 1.32 INDEX
Lab: NORMAL
Lab: NORMAL
PHOSPHORUS: 3.4 MG/DL (ref 2.5–4.9)
POTASSIUM SERPL-SCNC: 4 MMOL/L (ref 3.5–5.1)
PROCALCITONIN: 0.27
PROTHROMBIN TIME: 17.1 SECONDS (ref 11.7–14.5)
SODIUM BLD-SCNC: 136 MMOL/L (ref 135–145)
SPECIMEN: NORMAL
SPECIMEN: NORMAL
VANCOMYCIN RANDOM: <4 UG/ML

## 2022-12-14 PROCEDURE — 84145 PROCALCITONIN (PCT): CPT

## 2022-12-14 PROCEDURE — 6370000000 HC RX 637 (ALT 250 FOR IP): Performed by: INTERNAL MEDICINE

## 2022-12-14 PROCEDURE — 85610 PROTHROMBIN TIME: CPT

## 2022-12-14 PROCEDURE — 74230 X-RAY XM SWLNG FUNCJ C+: CPT

## 2022-12-14 PROCEDURE — 82962 GLUCOSE BLOOD TEST: CPT

## 2022-12-14 PROCEDURE — 92611 MOTION FLUOROSCOPY/SWALLOW: CPT

## 2022-12-14 PROCEDURE — 92597 ORAL SPEECH DEVICE EVAL: CPT

## 2022-12-14 PROCEDURE — 2140000000 HC CCU INTERMEDIATE R&B

## 2022-12-14 PROCEDURE — 97535 SELF CARE MNGMENT TRAINING: CPT

## 2022-12-14 PROCEDURE — 92610 EVALUATE SWALLOWING FUNCTION: CPT

## 2022-12-14 PROCEDURE — 2580000003 HC RX 258: Performed by: STUDENT IN AN ORGANIZED HEALTH CARE EDUCATION/TRAINING PROGRAM

## 2022-12-14 PROCEDURE — 2580000003 HC RX 258: Performed by: INTERNAL MEDICINE

## 2022-12-14 PROCEDURE — 6360000002 HC RX W HCPCS: Performed by: INTERNAL MEDICINE

## 2022-12-14 PROCEDURE — 80069 RENAL FUNCTION PANEL: CPT

## 2022-12-14 PROCEDURE — 2700000000 HC OXYGEN THERAPY PER DAY

## 2022-12-14 PROCEDURE — 94640 AIRWAY INHALATION TREATMENT: CPT

## 2022-12-14 PROCEDURE — 6370000000 HC RX 637 (ALT 250 FOR IP): Performed by: STUDENT IN AN ORGANIZED HEALTH CARE EDUCATION/TRAINING PROGRAM

## 2022-12-14 PROCEDURE — 94761 N-INVAS EAR/PLS OXIMETRY MLT: CPT

## 2022-12-14 PROCEDURE — 80202 ASSAY OF VANCOMYCIN: CPT

## 2022-12-14 PROCEDURE — 36415 COLL VENOUS BLD VENIPUNCTURE: CPT

## 2022-12-14 PROCEDURE — 97166 OT EVAL MOD COMPLEX 45 MIN: CPT

## 2022-12-14 RX ORDER — SENNA AND DOCUSATE SODIUM 50; 8.6 MG/1; MG/1
2 TABLET, FILM COATED ORAL DAILY PRN
Status: DISCONTINUED | OUTPATIENT
Start: 2022-12-14 | End: 2022-12-16 | Stop reason: HOSPADM

## 2022-12-14 RX ORDER — WARFARIN SODIUM 3 MG/1
3 TABLET ORAL DAILY
Status: DISCONTINUED | OUTPATIENT
Start: 2022-12-14 | End: 2022-12-15

## 2022-12-14 RX ORDER — MIDODRINE HYDROCHLORIDE 5 MG/1
10 TABLET ORAL
Status: DISCONTINUED | OUTPATIENT
Start: 2022-12-14 | End: 2022-12-16 | Stop reason: HOSPADM

## 2022-12-14 RX ADMIN — EPOETIN ALFA-EPBX 8000 UNITS: 4000 INJECTION, SOLUTION INTRAVENOUS; SUBCUTANEOUS at 14:51

## 2022-12-14 RX ADMIN — WARFARIN SODIUM 3 MG: 3 TABLET ORAL at 18:11

## 2022-12-14 RX ADMIN — POLYETHYLENE GLYCOL (3350) 17 G: 17 POWDER, FOR SOLUTION ORAL at 09:10

## 2022-12-14 RX ADMIN — POLYETHYLENE GLYCOL (3350) 17 G: 17 POWDER, FOR SOLUTION ORAL at 20:13

## 2022-12-14 RX ADMIN — SODIUM CHLORIDE, PRESERVATIVE FREE 10 ML: 5 INJECTION INTRAVENOUS at 09:11

## 2022-12-14 RX ADMIN — ASPIRIN 81 MG: 81 TABLET, CHEWABLE ORAL at 09:10

## 2022-12-14 RX ADMIN — LEVOTHYROXINE SODIUM 75 MCG: 0.07 TABLET ORAL at 06:13

## 2022-12-14 RX ADMIN — MIDODRINE HYDROCHLORIDE 10 MG: 5 TABLET ORAL at 18:11

## 2022-12-14 RX ADMIN — SODIUM CHLORIDE, PRESERVATIVE FREE 10 ML: 5 INJECTION INTRAVENOUS at 20:22

## 2022-12-14 RX ADMIN — AMIODARONE HYDROCHLORIDE 200 MG: 200 TABLET ORAL at 09:11

## 2022-12-14 RX ADMIN — ALBUTEROL SULFATE 2 PUFF: 90 AEROSOL, METERED RESPIRATORY (INHALATION) at 20:14

## 2022-12-14 RX ADMIN — CARVEDILOL 6.25 MG: 6.25 TABLET, FILM COATED ORAL at 09:10

## 2022-12-14 RX ADMIN — SODIUM CHLORIDE 5 ML/HR: 9 INJECTION, SOLUTION INTRAVENOUS at 18:20

## 2022-12-14 RX ADMIN — INSULIN LISPRO 2 UNITS: 100 INJECTION, SOLUTION INTRAVENOUS; SUBCUTANEOUS at 09:24

## 2022-12-14 RX ADMIN — CARVEDILOL 6.25 MG: 6.25 TABLET, FILM COATED ORAL at 18:11

## 2022-12-14 RX ADMIN — ALBUTEROL SULFATE 2 PUFF: 90 AEROSOL, METERED RESPIRATORY (INHALATION) at 08:48

## 2022-12-14 RX ADMIN — ATORVASTATIN CALCIUM 40 MG: 40 TABLET, FILM COATED ORAL at 20:13

## 2022-12-14 RX ADMIN — CEFEPIME HYDROCHLORIDE 1000 MG: 1 INJECTION, POWDER, FOR SOLUTION INTRAMUSCULAR; INTRAVENOUS at 18:20

## 2022-12-14 ASSESSMENT — PAIN SCALES - GENERAL
PAINLEVEL_OUTOF10: 0

## 2022-12-14 NOTE — CONSULTS
Subjective:   CHIEF COMPLAINT / HPI:  71year old male admitted with worsening sob and off and on wheeze. in September he had [facility] done [facility].he also was feeling sick and nauseated. Past Medical History:  Past Medical History:   Diagnosis Date    Allergic rhinitis     Anticoagulated on Coumadin     1/6/17-**[facility] to follow pt's PT/INRs, along w/prescribing his Coumadin. **    Anxiety     Arthritis     Atrial fibrillation (HCC)     On Coumadin. 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 09/21/2022    positive at 15C, negative at 18C    COPD (chronic obstructive pulmonary disease) (HCC)     Depression     Diabetes mellitus (HCC)     NIDDM- dx over 10 yrs ago- follows with Dr Lorena Pinon's contracture of hand     Right hand    Family history of cardiovascular disease     Father-MI    GERD (gastroesophageal reflux disease)     H/O cardiac catheterization 03/2007    cardiac cath- stent LAD patent, Spjuqeya-91-24%, RCA 40-50%    H/O cardiac catheterization 06/12/2008    cardiac cath- mild disease mid RCA    H/O cardiovascular stress test 04/10/2014    cardiolite- normal, no ischemia, EF63%    H/O cardiovascular stress test 09/21/2016    EF59% normal study  pt in atrial fib    H/O cardiovascular stress test 09/20/2017    EF 60%   afib    H/O cardiovascular stress test 11/07/2018    EF60% normal study    H/O Doppler ultrasound     1/11/2010-CAROTID_Intimal thickening but no significant atherosclerotic plaque noted in LAUREN. Doppler flow velocities within the LAUREN are WNL. Intimal thickening but no significant atherosclerotic plaque noted in LICA. Doppler flow velociities within the LICA are WNL. H/O echocardiogram 04/10/2014    EF 60%, normal LV systolic function, mild mitral and tricuspid insufficiencies, no pericardial effusion.     H/O echocardiogram 09/21/2016    EF60% normal study    H/O echocardiogram 11/07/2018    EF55-60% no significant valular disease H/O hiatal hernia     Hx of Venous Doppler 03/14/2019    Significant reflux in Left Deep System, No reflux in right lower extremity, No DVT or SVT    Hyperlipidemia     Hypertension     Obesity     S/P PTCA (percutaneous transluminal coronary angioplasty) 03/21/2005    s/p PTCA with 3.5 X 16 TAXUS stent to LAD- follows with Dr Davi Green    Sleep apnea     had sleep study done 10+ yrs ago- has cpap but does not use it    Thyroid disease     hypothyroid       Past Surgical History:        Procedure Laterality Date    CARDIAC CATHETERIZATION  6/12/08    mild disease mid RCA,  stent to LAD patent,    CARDIAC CATHETERIZATION  3/07    Stent to LAD patent, Diagonal 40-50%, RCA 40-50%    CARDIAC CATHETERIZATION  3/05    COLONOSCOPY  2011    COLONOSCOPY  5/18/16    1 polyp removed, diverticulosis and hemorrhoids noted    CORONARY ANGIOPLASTY WITH STENT PLACEMENT  03/21/2005    PTCA with 3.5 x 16 TAXUS stent to LAD    CORONARY ARTERY BYPASS GRAFT N/A 9/22/2022    CABG CORONARY ARTERY BYPASS x3 (LIMA TO LAD, SVG TO PDA-RCA SEQUENTIAL) , INTRAOPERATIVE MILTON, ENDOVEIN HARVEST OF LEFT SAPHENOUS VEIN, MODIFIED MAZE PROCEDURE, LEFT ATRIAL APPENDAGE LIGATION, INTERCOSTAL NERVE BLOCK, INTRA-AORTIC BALLOON PUMP INSERTION performed by Melo Houston MD at [address] [facility], [address] [place] [address], DIAGNOSTIC  2014    egd    HAND SURGERY Right [address] DISLOCATN+FIXATN Right 6/3/2019    RIGHT OPEN REDUCTION INTERNAL FIXATION OF DISTAL TIBIA  AND FIBULA performed by Rosa M Botello MD at [facility] N/A 9/24/2022    STERNUM WASHOUT performed by Melo Houston MD at [facility] OR       Current Medications:    Current Facility-Administered Medications: epoetin jessenia-epbx (RETACRIT) injection 8,000 Units, 8,000 Units, IntraVENous, Once in dialysis  midodrine (PROAMATINE) tablet 5 mg, 5 mg, Oral, TID WC  [COMPLETED] warfarin (COUMADIN) tablet 3 mg, 3 mg, Oral, Once **FOLLOWED BY** warfarin (COUMADIN) tablet 2 mg, 2 mg, Oral, Daily  sodium chloride flush 0.9 % injection 5-40 mL, 5-40 mL, IntraVENous, 2 times per day  sodium chloride flush 0.9 % injection 5-40 mL, 5-40 mL, IntraVENous, PRN  0.9 % sodium chloride infusion, , IntraVENous, PRN  ondansetron (ZOFRAN-ODT) disintegrating tablet 4 mg, 4 mg, Oral, Q8H PRN **OR** ondansetron (ZOFRAN) injection 4 mg, 4 mg, IntraVENous, Q6H PRN  polyethylene glycol (GLYCOLAX) packet 17 g, 17 g, Oral, Daily PRN  acetaminophen (TYLENOL) tablet 650 mg, 650 mg, Oral, Q6H PRN **OR** acetaminophen (TYLENOL) suppository 650 mg, 650 mg, Rectal, Q6H PRN  aspirin chewable tablet 81 mg, 81 mg, Per NG tube, Daily  albuterol sulfate HFA (PROVENTIL;VENTOLIN;PROAIR) 108 (90 Base) MCG/ACT inhaler 2 puff, 2 puff, Inhalation, 4x daily  amiodarone (CORDARONE) tablet 200 mg, 200 mg, Per G Tube, Daily  atorvastatin (LIPITOR) tablet 40 mg, 40 mg, PEG Tube, Nightly  Nephronex LIQD 5 mL  (Patient Supplied), 5 mL, PEG Tube, Daily  traZODone (DESYREL) tablet 100 mg, 100 mg, PEG Tube, Nightly  levothyroxine (SYNTHROID) tablet 75 mcg, 75 mcg, Oral, Daily  insulin lispro (HUMALOG) injection vial 0-8 Units, 0-8 Units, SubCUTAneous, TID WC  insulin lispro (HUMALOG) injection vial 0-4 Units, 0-4 Units, SubCUTAneous, Nightly  sevelamer (RENVELA) packet 1.6 g (Patient Supplied), 1.6 g, Per G Tube, TID WC  carvedilol (COREG) tablet 6.25 mg, 6.25 mg, PEG Tube, BID WC  [COMPLETED] cefepime (MAXIPIME) 2000 mg IVPB minibag, 2,000 mg, IntraVENous, Once **FOLLOWED BY** cefepime (MAXIPIME) 1000 mg IVPB minibag, 1,000 mg, IntraVENous, Q24H  vancomycin (VANCOCIN) intermittent dosing (placeholder), , Other, RX Placeholder  polyethylene glycol (GLYCOLAX) packet 17 g, 17 g, Oral, BID    No Known Allergies    Social History:    Social History     Socioeconomic History    Marital status:      Number of children: 1   Occupational History     Comment: RETIRED   Tobacco Use    Smoking status: Former     Packs/day: 1.00     Years: 10.00     Pack years: 10.00     Types: Cigarettes     Quit date: 1976     Years since quittin.9    Smokeless tobacco: Never   Vaping Use    Vaping Use: Never used   Substance and Sexual Activity    Alcohol use: Not Currently     Alcohol/week: 6.0 standard drinks     Types: 6 Cans of beer per week     Comment: caffeine 2 cups of tea a day     Drug use: No    Sexual activity: Never     Social Determinants of Health     Financial Resource Strain: Low Risk     Difficulty of Paying Living Expenses: Not hard at all   Food Insecurity: No Food Insecurity    Worried About Running Out of Food in the Last Year: Never true    Ran Out of Food in the Last Year: Never true   Physical Activity: Sufficiently Active    Days of Exercise per Week: 7 days    Minutes of Exercise per Session: 30 min       Family History:   Family History   Problem Relation Age of Onset    Cancer Mother         breast ca    Coronary Art Dis Father          MI    Heart Disease Father     Rheum Arthritis Other     Rheum Arthritis Sister     No Known Problems Brother     Rheum Arthritis Sister        Immunization:  Immunization History   Administered Date(s) Administered    COVID-19, PFIZER PURPLE top, DILUTE for use, (age 15 y+), 30mcg/0.3mL 2021, 2021, 2021    Influenza Vaccine, unspecified formulation 2017    Influenza Virus Vaccine 10/01/2018    Influenza, FLUAD, (age 72 y+), Adjuvanted, 0.5mL 10/27/2021    Influenza, Triv, 3 Years and older, IM (Afluria (5 yrs and older) 10/01/2011, 10/15/2014    Pneumococcal Conjugate 13-valent (Udaqgto40) 2017    Pneumococcal Polysaccharide ([id number]) 2008, 2013, 2019    Tdap (Boostrix, Adacel) 2011         REVIEW OF SYSTEMS:    CONSTITUTIONAL:  negative for fevers, chills, diaphoresis, activity change, appetite change, fatigue, night sweats and unexpected weight change.    EYES:  negative for blurred vision, eye discharge, visual disturbance and icterus  HEENT: negative for hearing loss, tinnitus, ear drainage, sinus pressure, nasal congestion, epistaxis and snoring  RESPIRATORY:  See HPI  CARDIOVASCULAR:  negative for chest pain, palpitations, exertional chest pressure/discomfort, edema, syncope  GASTROINTESTINAL:  negative for nausea, vomiting, diarrhea, constipation, blood in stool and abdominal pain  GENITOURINARY:  negative for frequency, dysuria and hematuria  HEMATOLOGIC/LYMPHATIC:  negative for easy bruising, bleeding and lymphadenopathy  ALLERGIC/IMMUNOLOGIC:  negative for recurrent infections, angioedema, anaphylaxis and drug reactions  ENDOCRINE:  negative for weight changes and diabetic symptoms including polyuria, polydipsia and polyphagia    MUSCULOSKELETAL:  negative for  pain, joint swelling, decreased range of motion and muscle weakness  NEUROLOGICAL:  negative for headaches, slurred speech, unilateral weakness  PSYCHIATRIC/BEHAVIORAL: negative for hallucinations, behavioral problems, confusion and agitation. Objective:   PHYSICAL EXAM:      VITALS:    Vitals:    12/13/22 2045 12/14/22 0741 12/14/22 0848 12/14/22 1110   BP: 106/69 110/63  127/61   Pulse: 91 91  83   Resp: 17 22 22 16   Temp: 97.8 °F (36.6 °C) 97.5 °F (36.4 °C)  97.9 °F (36.6 °C)   TempSrc: Oral Oral  Oral   SpO2: 100% 96% 98% 97%   Weight:       Height:             CONSTITUTIONAL:  awake, alert, cooperative, no apparent distress, and appears stated age  NECK:  Supple, symmetrical, trachea midline, no adenopathy, thyroid symmetric, not enlarged and no tenderness  CHEST: Chest expansion equal and symmetrical, no intercostal retraction. LUNGS:  no increased work of breathing, has expiratory wheezes both lungs, no crackles. CARDIOVASCULAR: S1 and S2, no edema and no JVD  ABDOMEN:  normal bowel sounds, non-distended and no masses palpated, and no tenderness to palpation. No hepatospleenomegaly  LYMPHADENOPATHY:  no axillary or supraclavicular adenopathy.  No cervical adnenopathy  PSYCHIATRIC: Oriented to person place and time. No obvious depression or anxiety. MUSCULOSKELETAL: No obvious misalignment or effusion of the joints. No clubbing, cyanosis of the digits. RIGHT AND LEFT LOWER EXTREMITIES: No edema, no inflammation, no tenderness. SKIN:  normal skin color, texture, turgor and no redness, warmth, or swelling. No palpable nodules    DATA:         EXAMINATION:   ONE XRAY VIEW OF THE CHEST       12/11/2022 9:40 pm       COMPARISON:   Chest radiograph 09/24/2022. HISTORY:   ORDERING SYSTEM PROVIDED HISTORY: chest pain   TECHNOLOGIST PROVIDED HISTORY:   Reason for exam:->chest pain   Reason for Exam: respiratory distress   Additional signs and symptoms: chest pain, sob,       FINDINGS:   A tracheostomy tube is in place. A right chest tunneled dialysis catheter   terminates in the right atrium. Status post median sternotomy. The cardiac   silhouette is enlarged but stable. Shallow inspiration. Mild bibasilar   opacities. No pneumothorax, vascular congestion, consolidation, or pleural   effusion is identified. No acute osseous abnormality. Impression   Shallow inspiration with mild bibasilar opacities compatible with atelectasis. Assessment:      Ac on ch resp failure,s/p trach September 2022  Pneumonia [facility]  Sepsis  copd          Plan:     Adequate o2 adm  Trach care  Cx  Agree with cefepime and vanc  Bd rx  Thanks will follow

## 2022-12-14 NOTE — PROGRESS NOTES
Speech Language Pathology  Facility/Department: [facility]   CLINICAL BEDSIDE SWALLOW EVALUATION    NAME: Jono Vega  : 1950  MRN: [id number]    IMPRESSIONS AND RECOMMENDATIONS: Jono Vega was referred for bedside swallow evaluation following admission to [facility] with acute on chronic respiratory failure, WINIFRED. Medical hx significant for recent CABG with postop complications resulting in tracheostomy and PEG placement at [address] [place] [address]. He was seen by SLP at [address] [place] [address] and participated in flexible endoscopic evaluation of swallow , revealing severe oropharyngeal dysphagia dysphagia. He was recommended NPO at that time. Since discharge to nursing home pt reports he has been evaluated for use of Passy-Rojas Valve (not found in pt room) but has remained NPO with TF via PEG. Pt seen for evaluation seated upright in bed, alert, cooperative given encouragement. Noted cuffless trach in place on 5L O2 via trach mask. Oral mechanism examination WFL without asymmetry. Pt presented with PO trials of ice chips. Oral stage WFL with adequate labial seal, slow/intentional manipulation, adequate clearance. Suspect pharyngeal dysphagia with delayed swallow response and reduced laryngeal elevation. Immediate coughing followed 4/5 trials. Pt also noted to frequently expectorate secretions and was instructed to attempt cough/swallow to improve secretion management and decrease dependence on suctioning. Education was completed with pt re: continuing NPO until further evaluation is completed. Recommend further evaluation with modified barium swallow study. Pt would also benefit from evaluation for use of Passy Cat[place] Valve. Recommendations d/w hospitalist and orders were received. Will complete these evaluations today  as schedule allows. ADMISSION DATE: 2022  ADMITTING DIAGNOSIS: has Atrial fibrillation ([facility] CAD (coronary artery disease);  Morbid obesity ([facility] COPD (chronic obstructive pulmonary disease) ([facility] [address] Sleep apnea; Type 2 diabetes mellitus ([facility] Thyroid disease; Hyperlipidemia; Congestive heart failure, unspecified HF chronicity, unspecified heart failure type ([address] Coronary artery disease involving native coronary artery of native heart with angina pectoris ([facility] [address] Chronic renal disease, stage III ([facility] [address] [131086]; Hypertension; CAD, multiple vessel; Dyspnea; and Moderate malnutrition ([address] on their problem list.  ONSET DATE: this admission    Recent Chest Xray/CT of Chest: see chart    Date of Eval: 12/14/2022  Evaluating Therapist: ROLANDO Thompson    Current Diet level:  Current Diet : NPO      Primary Complaint  Patient Complaint: requested to drink/eat    Pain:  Pain Assessment  Pain Assessment: None - Denies Pain    Reason for Referral  Marcy Aldridge was referred for a bedside swallow evaluation to assess the efficiency of his swallow function, identify signs and symptoms of aspiration and make recommendations regarding safe dietary consistencies, effective compensatory strategies, and safe eating environment. Impression  Dysphagia Diagnosis: Suspected needs further assessment        Treatment Plan  Requires SLP Intervention: Yes  Duration of Treatment: TBD pending further evaluation  D/C Recommendations: Ongoing speech therapy is recommended during this hospitalization; To be determined       Recommended Diet and Intervention           Recommendations: Modified barium swallow study; Other (comments) (PMV evaluation)  Therapeutic Interventions: Other (comment); Patient/Family education; Therapeutic PO trials with SLP (further evaluation for PMV, MBS for dysphagia)    Compensatory Swallowing Strategies       Treatment/Goals  Short-term Goals  Timeframe for Short-term Goals: length of admission  Goal 1: Pt will participate in modified barium swallow study to further evaluate dysphagia and determine appropriate PO diet consistencies. Goal 2: Pt will participate in Passy-[place] Valve evaluation.   Goal 3: Pt/caregivers will indicate understanding of all education/recommendations. General  Chart Reviewed: Yes  Behavior/Cognition: Alert; Cooperative  Respiratory Status: Trach uncuffed  O2 Device: Trach mask  Communication Observation: Functional (dysphonia 2/2 tracheostomy)  Follows Directions: Simple  Dentition: Adequate  Patient Positioning: Upright in bed  Baseline Vocal Quality: Dysphonic (reduced vocal intensity 2/2 tracheostomy)  Volitional Cough: Strong  Prior Dysphagia History: yes; prior FEES at [facility] [employer] 11/2 recommended NPO/ice chips only  Consistencies Administered: Ice Chips           Vision/Hearing  Vision  Vision: Impaired  Vision Exceptions: Wears glasses for reading  Hearing  Hearing: Within functional limits    Oral Motor Deficits  Oral/Motor  Oral Hygiene: Moist;Clean    Oral Phase Dysfunction  Oral Phase  Oral Phase: WFL     Indicators of Pharyngeal Phase Dysfunction        Prognosis  Individuals consulted  Consulted and agree with results and recommendations: Patient;RN;Physician    Education  Patient Education: results, recommendations, plan  Patient Education Response: Demonstrated understanding;Needs reinforcement  Safety Devices in place: Yes  Type of devices: All fall risk precautions in place; Left in bed       Therapy Time  SLP Individual Minutes  Time In: 5315  Time Out: 6441  Minutes: [address] [place], [place]  12/14/2022 11:18 AM

## 2022-12-14 NOTE — PROGRESS NOTES
V2.0  [facility] Hospitalist Progress Note      Name:  Santa Freire /Age/Sex: 1950  (71 y.o. male)   MRN & CSN:  [id number] & [id number] Encounter Date/Time: 2022 7:56 AM EST    Location:  [address] [place], [place] [id number] PCP: Dheeraj Patricio 15 Day: 4    Assessment and Plan:   Santa Freire is a 71 y.o. male with  who presents with dyspnea. Patient was discharged from this hospital in September after he presented for planned CABG on 2022 after which she was transferred to [address] [place] [address] for further management on 2022. While on admission at [address] [place] [address] tracheostomy was placed on 10/12/2022 and PEG tube placed on 10/19/2022. Tunneled hemodialysis catheter was placed 10/20/2022. #Acute on chronic respiratory failure s/p trach  #Cough with yellow secretions  #Concern for HCAP  #Sepsis present on admission   -presented with SOB for 1 day associated with nausea, mild chest and back pain, cough  - Initially with , RR 23  -trach at [address] [place] [address] 10/12/2022  -On home 5L O2 on trach collar, and now back to baseline.   - Initially CXR: bibasilar atelectasis  -BNP elevated to >10 000  -Procal 0.319 -> 0.266  - Cough improved further compared to yesterday  - MRSA nares -ve  - Blood cultures  -ve so far  - On cefepime and vanc  - Viral panel -ve  - Respiratory culture -ve  - Pulm consulted by nephro    Plan:  Continue nebs  Continue aggressive pulm toilet  Continue abx for a total of 7 days.   D/C vanc   F/U pulm recs    #WINIFRED likely prerenal in the setting of shock from cardiac surgery  -HD T, TH, S  -Was started on inpatient hemodialysis during recent admission at [facility] on board    Plan:  F/U nephro recs    #CAD s/p CABG  #Troponin elevation possibly in the setting of demand ischemia and WINIFRED  -22 CABG x3 LIMA to LAD; SVG sequential to PDA RCA  -2D echo done in 2022 show EF of 50 to 55% with grade 3 diastolic dysfunction    -Recent echo at [address] [place] [address] show EF  60 to 65 %  -Troponins on presentation 0.249  -EKG without acute ischemic changes  -Pt without chest pain    Plan:  F/U Cardiology recs. #Macrocytic anemia 2/2 likely anemia of chronic disease  -Hb 8.2 .5 yesterday   - Ferritin 778 with low iron and TIBC. Normal folate and vitamin B12    Plan:  Continue to monitor   Defer EPO to nephro    #Elevated Alk phos   -Alk phos: 228 on presentation       #constipation  -no BM for 5 days on presentation   - No complaints today but still no bowel movement   - On miralax and ernesto-abhijeet    #Moderate Protein Malnutrition    Chronic medical problems:  #Chronic persistent atrial fibrillation s/p MAZE 9/22/22  Continue amiodarone 200 mg daily and Coreg 6.25 mg twice daily. Hold on mornings of hemodialysis  Continue warfarin    #DVT (non-occlusive) mid R femoral vein  -Dx on duplex RLE 9/12/22   -Continue warfarin - INR 1.32 today      #Diabetes mellitus  -SSI  -Hypoglycemic protocol     #Hypertension  -Continue losartan     #Hypothyroidism  -On levothyroxine 75 mcg     #s/p PEG tube placement on 10/19/2022  -Secondary to esophageal dysphagia  -Nutrition consult for tube feeding and management  - SLP eval done and pt. To be kept NPO but ok for ice chips. Diet Diet NPO Exceptions are: Other (Specify); Specify Other Exceptions: Tube Feed/PEG  ADULT TUBE FEEDING; PEG; Renal Formula; Continuous; 20; Yes; 10; Q 4 hours; 60; 30; Q 4 hours   DVT Prophylaxis [] Lovenox, []  Heparin, [] SCDs, [] Ambulation,  [] Eliquis, [] Xarelto  [x] Coumadin   Code Status Full Code   Disposition From: NH  Expected Disposition: NH  Estimated Date of Discharge: 1-2 days  Patient requires continued admission for IV abx for 1-2 more days. Surrogate Decision Maker/ DEVI Teague     Subjective:     Chief Complaint: Shortness of Breath       Patient states he feels better than yesterday. States his congestion is worse in the AM but improves as the day goes on. He was working with SLP when seen. His cough has improved as well. Review of Systems:    General: No fever, no chills  Heart: No chest pain, no palpitations, no PND, no orthopnea  Lungs: No shortness of breath, + cough  Abdomen: No abdominal pain, no nausea, no vomiting, +constipation, no diarrhea  : No frequency, no dysuria, no urgency, no decreased urination  MSK: No lower extremity swelling  Neuro: No confusion, no weakness  Skin: No rashes      Objective: Intake/Output Summary (Last 24 hours) at 12/14/2022 1523  Last data filed at 12/14/2022 1342  Gross per 24 hour   Intake 891.42 ml   Output 150 ml   Net 741.42 ml          Vitals:   Vitals:    12/14/22 1515   BP: 106/62   Pulse: 95   Resp: 16   Temp:    SpO2: 98%       Physical Exam:     General: NAD, AAO x3  Eyes: EOMI  HENT: trach in place with trach collar - 5L/min  CVS: Normal S1 and S2, no murmurs, RRR. Occasional PVC  Respiratory: Normal breath sounds, clear to auscultation bilaterally, no respiratory distress, Minimal secretions coming out from trach today as well  GI: Normal bowel sounds, soft, nondistended, nontender. PEG in situ  MSK:  no lower extremity edema  Skin: Intact, dry, warm, no rashes.  Stasis dermatitis B/L LE  Neuro: CN II to XII grossly intact  Psych: Normal mood    Medications:   Medications:    vancomycin  1,500 mg IntraVENous Once    warfarin  3 mg Oral Daily    midodrine  5 mg Oral TID WC    sodium chloride flush  5-40 mL IntraVENous 2 times per day    aspirin  81 mg Per NG tube Daily    albuterol sulfate HFA  2 puff Inhalation 4x daily    amiodarone  200 mg Per G Tube Daily    atorvastatin  40 mg PEG Tube Nightly    Nephronex  5 mL PEG Tube Daily    traZODone  100 mg PEG Tube Nightly    levothyroxine  75 mcg Oral Daily    insulin lispro  0-8 Units SubCUTAneous TID WC    insulin lispro  0-4 Units SubCUTAneous Nightly    sevelamer  1.6 g Per G Tube TID WC    carvedilol  6.25 mg PEG Tube BID WC    cefepime  1,000 mg IntraVENous Q24H    vancomycin (VANCOCIN) intermittent dosing (placeholder) Other RX Placeholder    polyethylene glycol  17 g Oral BID      Infusions:    sodium chloride 5 mL/hr (12/13/22 1811)     PRN Meds: sodium chloride flush, 5-40 mL, PRN  sodium chloride, , PRN  ondansetron, 4 mg, Q8H PRN   Or  ondansetron, 4 mg, Q6H PRN  polyethylene glycol, 17 g, Daily PRN  acetaminophen, 650 mg, Q6H PRN   Or  acetaminophen, 650 mg, Q6H PRN      Labs           Imaging/Diagnostics Last 24 Hours   XR CHEST PORTABLE    Result Date: 12/11/2022  EXAMINATION: ONE XRAY VIEW OF THE CHEST 12/11/2022 9:40 pm COMPARISON: Chest radiograph 09/24/2022. HISTORY: ORDERING SYSTEM PROVIDED HISTORY: chest pain TECHNOLOGIST PROVIDED HISTORY: Reason for exam:->chest pain Reason for Exam: respiratory distress Additional signs and symptoms: chest pain, sob, FINDINGS: A tracheostomy tube is in place. A right chest tunneled dialysis catheter terminates in the right atrium. Status post median sternotomy. The cardiac silhouette is enlarged but stable. Shallow inspiration. Mild bibasilar opacities. No pneumothorax, vascular congestion, consolidation, or pleural effusion is identified. No acute osseous abnormality. Shallow inspiration with mild bibasilar opacities compatible with atelectasis.        Electronically signed by Fany Broussard MD on 12/14/2022 at 3:23 PM

## 2022-12-14 NOTE — PROGRESS NOTES
Occupational Therapy    [facility] ACUTE CARE OCCUPATIONAL THERAPY EVALUATION  Enma Sanchez, 1950, [id number]-Q, 12/14/2022    History  [place]:  The primary encounter diagnosis was Acute congestive heart failure, unspecified heart failure type ([address] Diagnoses of WINIFRED (acute kidney injury) ([address] and Elevated troponin were also pertinent to this visit. 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, COPD (chronic obstructive pulmonary disease) ([facility] Depression, Diabetes mellitus ([facility] Dupuytren's contracture of hand, Family history of cardiovascular disease, GERD (gastroesophageal reflux disease), H/O cardiac catheterization, H/O cardiac catheterization, H/O cardiovascular stress test, H/O cardiovascular stress test, H/O cardiovascular stress test, H/O cardiovascular stress test, H/O Doppler ultrasound, H/O echocardiogram, H/O echocardiogram, H/O echocardiogram, H/O hiatal hernia, Hx of Venous Doppler, Hyperlipidemia, Hypertension, Obesity, S/P PTCA (percutaneous transluminal coronary angioplasty), Sleep apnea, and Thyroid disease. Patient  has a past surgical history that includes Coronary angioplasty with stent (03/21/2005); Cardiac catheterization (6/12/08); Cardiac catheterization (3/07); Cardiac catheterization (3/05); Endoscopy, colon, diagnostic (2014); Colonoscopy (2011); Colonoscopy (5/18/16); Hand surgery (Right, 1997); pr open rx ankle dislocatn+fixatn (Right, 6/3/2019); Sternum Debridement (N/A, 9/24/2022); and Coronary artery bypass graft (N/A, 9/22/2022). Subjective:  Patient states:  \"I don't  know if I can stand\".     Pain:  No.    Communication with other providers:  Handoff to RN  Restrictions: General Precautions, Fall Risk    Home Setup/Prior level of function   Occupational profile (relevant social history and personal factors): Social/Functional History  Lives With: Alone  Type of Home: House  Home Layout: One level  Home Access: Stairs to enter without rails(holds onto door molding )  Entrance Stairs - Number of Steps: 2  Bathroom Shower/Tub: Tub/Shower unit  Bathroom Toilet: Standard  Bathroom Equipment: Grab bars in shower, Shower chair  Home Equipment: Rolling walker(can borrow equipment from W); Wheelchair  ADL Assistance: Independent  Homemaking Assistance: Independent  Homemaking Responsibilities: Yes  Ambulation Assistance: Independent  Transfer Assistance: Independent  Active : Yes  Occupation: Retired  Additional Comments: Brother lives near by and is retired. Pt had trach done in September and has been in rehab since    Examination of body systems (includes body structures/functions, activity/participation limitations):  Observation:  Supine in bed upon arrival, agreeable to therapy   Vision:  Glasses  Hearing:  ProPlan  Cardiopulmonary:  6L of 02; 02 saturation remained [facility]      Body Systems and functions:  ROM R/L:  WFL. Strength R/L:  4-/5,   Sensation: Numbness in bilateral toes  Tone: Normal  Coordination: WFL  Perception: WNL    Activities of Daily Living (ADLs):  Feeding: Independent  Grooming: SBA (washing hands/face w/ warm washcloth)  UB bathing: SBA  LB bathing: maxA  UB dressing: SBA  LB dressing: maxA (donning socks)  Toileting: maxA    Cognitive and Psychosocial Functioning:  Overall cognitive status: WNL  Affect: Normal        Mobility:  Supine to sit:  maxA  Transfers: DNT due to poor sitting tolerance  Sitting balance:  Marcio ~1 minute before fatiguing due to dizziness sitting EOB. Dizziness decreased supine in bed. Standing balance:  DNT.   Functional Mobility DNT  Toilet/Shower Transfers: DNT  Sit to supine: maxA  Scooting to HOB: maxA             AM-PAC Daily Activity Inpatient   How much help for putting on and taking off regular lower body clothing?: Total  How much help for Bathing?: A Lot  How much help for Toileting?: Total  How much help for putting on and taking off regular upper body clothing?: A Little  How much help for taking care of personal grooming?: A Little  How much help for eating meals?: None  AM-[facility] Inpatient Daily Activity Raw Score: 14  AM-PAC Inpatient ADL T-Scale Score : 33.39  ADL Inpatient CMS 0-100% Score: 59.67  ADL Inpatient CMS G-Code Modifier : CK    Treatment:  Self Care Training:   Cues were given for safety, sequence, UE/LE placement, visual cues, and balance. Activities performed today included grooming, LB dressing     Safety: patient left in bed with bed alarm, call light within reach, RN notified, gait belt used. Assessment:  Pt is a 72 yo male admitted from home for dyspnea. Pt currently presents w/ deficits in ADL and high level IADL independence, functional activity tolerance, dynamic sitting and standing balance and tolerance and functional transfers, BUE strength and OOB activity. Pt would benefit from continued acute care OT services w/ discharge to SNF  Complexity: Moderate  Prognosis: Good, no significant barriers to participation at this time. Occupational Therapy Plan  Times Per Week: 3x+  Times Per Day:  Once a day  Current Treatment Recommendations: Strengthening, ROM, Balance training, Functional mobility training, Pain management, Patient/Caregiver education & training, Safety education & training, Equipment evaluation, education, & procurement, Self-Care / ADL, Home management training, Positioning     Equipment: defer    Goals:  Pt goal: go home  Time Frame for STGs: discharge  Goal 1: Pt will perform UE ADLs Independent  Goal 2: Pt will perform LE ADLs Gwendolyn w/ AD  Goal 3: Pt will perform toileting Gwendolyn  Goal 4: Pt will perform functional transfer w/ AD Gwendolyn  Goal 5: Pt will perform functional mobility w/ AD  Goal 6: Pt will perform therex/theract in order to increase functional activity tolerance and dynamic sitting balance    Treatment plan:  Pt

## 2022-12-14 NOTE — PROGRESS NOTES
Patient tolerated 3 hour hemodialysis treatment well today. 3L of fluid was removed via HD CVC in right subclavian. Retacrit was administered during HD as per order. At the end of HD blood was rinsed back to pt successfully. CVC lines were flushed and locked with saline, then capped. HD txt overseen by KORNI Ellington RN                Patient Name: Lamine Bailey  Patient : 1950  MRN: [id number]     Acct: [de-identified]  Date of Admission: 2022  Room[facility]Bed: [facility]/3121-A  Code Status:  Full Code  Allergies: No Known Allergies  Diagnosis:    Patient Active Problem List   Diagnosis    Atrial fibrillation ([address]    CAD (coronary artery disease)    Morbid obesity ([address]    COPD (chronic obstructive pulmonary disease) ([address]    Sleep apnea    Type 2 diabetes mellitus ([address]    Thyroid disease    Hyperlipidemia    Congestive heart failure, unspecified HF chronicity, unspecified heart failure type ([address]    Coronary artery disease involving native coronary artery of native heart with angina pectoris ([address]    Chronic renal disease, stage III ([address] [[id number]]    Hypertension    CAD, multiple vessel    Dyspnea    Moderate malnutrition ([address]         Treatment:  Hemodilaysis 2:1  Priority: Routine  Location: Acute Room    Diabetic: Yes  NPO: Yes  Isolation Precautions: None     Consent for Treatment Verified: Yes  Blood Consent Verified: Not Applicable     Safety Verified: Identify (I), Consent (C), Equipment (E), HepB Status (B), Orders Complete (O), Access Verified (A), and Timeliness (T)  Time out performed prior to access at 1400 hours. Report Received from Primary RN at 0741 hours.   Primary RN (First Initial, Last Name, Title): Scot Gresham RN  Incapacitated Nurse Education Completed: Not Applicable     HBsAg ONLY:  Date Drawn: 2022       Results: Negative  HBsAb:  Date Drawn:  2022       Results: Susceptible <10    Order  Dialysis Bath  K+ (Potassium): 2  Ca+ (Calcium): 2.5  Na+ (Sodium): 138  HCO3 (Bicarb): 35  Bicarbonate Concentrate Lot No.: [id number]  Acid Concentrate Lot No.: [id number]     Na+ Modeling: Not Applicable  Dialyzer: H211  Dialysate Temperature (C):  35  Blood Flow Rate (BFR):  300   Dialysate Flow Rate (DFR):   600        Access to be Utilized   Access: Tunneled Catheter  Location: Subclavian  Side: Right   Needle gauge:  Not Applicable  + Bruit/Thrill: Not Applicable    First Use X-ray Verified: Yes  OK to use line order: Yes    Site Assessment:  Signs and Symptoms of Infection/Inflammation: None  If yes: Not Applicable  Dressing: Dry and Intact  Site Prep: Medical Aseptic Technique  Dressing Changed this Treatment: No  If yes, by whom: NA - not changed today  Date of Last Dressing Change: December 12, 2022  Antimicrobial Patch in place?: Yes  Red Alcohol Caps in place?: Yes  Gauze Dressing?: No  Non Dialysis Use?: No  Comment:    Flows: Good, Patent  If access problem, who was notified:     Pre and Post-Assessment  Patient Vitals for the past 8 hrs:   Level of Consciousness Oriented X Heart Rhythm Respiratory Quality/Effort Bilateral Breath Sounds Skin Color Skin Condition/Temp Appetite Abdomen Inspection Bowel Sounds (All Quadrants) Edema Comments Pain Level   12/14/22 1110 0 -- -- -- -- -- -- -- -- -- -- -- --   12/14/22 1113 0 -- -- Unlabored -- -- -- -- Ostomy tube -- -- -- --   12/14/22 1400 0 4 Regular Unlabored Diminished;[facility] Other (Comment) Ostomy tube Active None pt presents with no distress, denies cest pain and is alert and oriented.  pt is on 8L od o2 via trach, pt denies pain 0     Labs  Recent Labs     12/11/22 2128 12/12/22  0957 12/13/22  0650   WBC 7.9 6.9 7.4   HGB 8.7* 8.6* 8.2*   HCT 25.0* 27.4* 26.6*    302 282                                                                  Recent Labs     12/12/22  0957 12/13/22  0650 12/14/22  0455   * 137 136   K 4.5 4.2 4.0   CL 93* 95* 95*   CO2 24 27 25   BUN 75* 41* 58*   CREATININE 6.7* 4.9* 5.6* GLUCOSE 160* 165* 240*     IV Drips and Rate/Dose   sodium chloride 5 mL/hr (12/13/22 1811)      Safety - Before each treatment:   Dialysis Machine No.: [id number]  RO Machine Number: [id number]  Dialyzer Lot No.: [id number]  RO Machine Log Sheet Completed: Yes  Machine Alarm Self Test: Completed; Passed (12/14/22 1400)     Air Foam Detector: Tested, Proper Function  Extracorporeal Circuit Tested for Integrity: Yes  Machine Conductivity: 14  Manual Conductivity: 14     Bicarbonate Concentrate Lot No.: [id number]  Acid Concentrate Lot No.: [id number]  Manual Ph: 7.4  Bleach Test (Neg): Yes  Bath Temperature: 95 °F (35 °C)  Tubing Lot#: [id number]  Conductivity Meter Serial #: [id number]  All Connections Secure?: Yes  Venous Parameters Set?: Yes  Arterial Parameters Set?: Yes  Saline Line Double Clamped?: Yes  Air Foam Detector Engaged?: Yes  Machine Functioning Alarm Free? Yes  Prime Given: 200ml    Chlorine Testing - Before each treatment and every 4 hours:   Treatment  Treatment Number: 1  Time On: 5924  Time Off: 6144  Treatment Goal: 3  Weight: 259 lb 4.2 oz (117.6 kg) (12/14/22 1706)  1st check: less than 0.1 ppm at: 1300 hours  2nd check: less than 0.1 ppm at: 1641 hours  3rd check: Not Applicable  (if greater than 0.1 ppm, then check every 30 minutes from secondary)    Access Flows and Pressures  Patient Vitals for the past 8 hrs:   Blood Flow Rate (ml/min) Ultrafiltration Rate (ml/hr) Arterial Pressure (mmHg) Venous Pressure (mmHg) TMP DFR Comments Access Visible   12/14/22 1405 300 ml/min 1170 ml/hr -130 mmHg 120 70 600 tx started;  Yes   12/14/22 1415 300 ml/min 1170 ml/hr -120 mmHg 110 50 600 lines secure Yes   12/14/22 1430 300 ml/min 1170 ml/hr -120 mmHg 110 50 600 resting with eyes closed Yes   12/14/22 1445 300 ml/min 1170 ml/hr -140 mmHg 120 50 600 reatcrit given Yes   12/14/22 1500 300 ml/min 1170 ml/hr -140 mmHg 150 60 600 gave warm blanket Yes   12/14/22 1515 300 ml/min 1170 ml/hr -140 mmHg 130 50 600 no distress Yes   12/14/22 1530 300 ml/min 1170 ml/hr -140 mmHg 130 60 600 denies needs Yes   12/14/22 1545 300 ml/min 1170 ml/hr -130 mmHg 130 60 600 no change Yes   12/14/22 1600 300 ml/min 1170 ml/hr -140 mmHg 130 60 600 resting quietly Yes   12/14/22 1615 300 ml/min 1170 ml/hr -140 mmHg 140 60 600 no change Yes   12/14/22 1630 300 ml/min 1170 ml/hr -140 mmHg 140 60 600 no distress Yes   12/14/22 1645 300 ml/min 1170 ml/hr -160 mmHg 130 60 600 c/o discomofrt in back, HOB put Yes   12/14/22 1700 300 ml/min 1170 ml/hr -160 mmHg 150 60 600 gave warm blnket Yes   12/14/22 1706 300 ml/min -- -- -- -- -- txt completed --     Vital Signs  Patient Vitals for the past 8 hrs:   BP Temp Pulse Resp SpO2 Height Weight Weight Method Percent Weight Change   12/14/22 1110 127/61 97.9 °F (36.6 °C) 83 16 97 % -- -- -- --   12/14/22 1400 123/67 97.6 °F (36.4 °C) 94 20 97 % 6' 2\" (1.88 m) 266 lb 8.6 oz (120.9 kg) -- 2.98   12/14/22 1415 123/65 -- 92 -- -- -- -- -- --   12/14/22 1430 (!) 114/46 -- 96 18 94 % -- -- -- --   12/14/22 1445 (!) 110/54 -- 90 19 97 % -- -- -- --   12/14/22 1500 111/76 -- 89 17 96 % -- -- -- --   12/14/22 1515 106/62 -- 95 16 98 % -- -- -- --   12/14/22 1530 (!) 102/55 -- (!) 101 15 97 % -- -- -- --   12/14/22 1545 (!) 105/56 -- 99 17 97 % -- -- -- --   12/14/22 1600 (!) 104/55 -- (!) 103 22 98 % -- -- -- --   12/14/22 1615 (!) 92/57 -- 99 16 98 % -- -- -- --   12/14/22 1630 (!) 100/53 -- (!) 102 16 99 % -- -- -- --   12/14/22 1645 (!) 99/47 -- (!) 109 18 98 % -- -- -- --   12/14/22 1700 (!) 98/52 -- (!) 101 15 99 % -- -- -- --   12/14/22 1706 (!) 99/59 (!) 96.5 °F (35.8 °C) (!) 112 16 100 % -- 259 lb 4.2 oz (117.6 kg) Bed scale -2.73     Post-Dialysis  Arterial Catheter Locking Solution:  saline  Venous Catheter Locking Solution:  saline  Post-Treatment Procedures: Blood returned, Catheter Capped, clamped with Saline x2 ports  Machine Disinfection Process: Acid/Vinegar Clean, Heat Disinfect, Exterior Machine

## 2022-12-14 NOTE — PROGRESS NOTES
PHARMACY ANTICOAGULATION MONITORING SERVICE    Larry Nielsen is a 71 y.o. male on warfarin therapy for atrial fibrillation and history of DVT. Pharmacy consulted by Dr. Srinivasan Lezama for monitoring and adjustment of treatment. Indication for anticoagulation: A-Fib, Hx of DVT  INR goal: 2-3  Warfarin dose prior to admission: 2 mg daily    Pertinent Laboratory Values   Recent Labs     12/11/22 2128 12/12/22 0316 12/12/22  0957 12/13/22  0650 12/14/22  0455   INR  --    < >  --  1.26 1.32   HGB 8.7*  --  8.6* 8.2*  --    HCT 25.0*  --  27.4* 26.6*  --      --  302 282  --     < > = values in this interval not displayed. INR MONITORING  Recent Labs     12/12/22 0316 12/13/22  0650 12/14/22  0455   INR 1.21 1.26 1.32       Assessment/Plan:  Drug Interactions:   Aspirin, amiodarone, levothyroxine, atorvastatin, trazodone (home meds)  Pt started on continues cefepime which may increase warfarin effects. INR sub-therapeutic on admission, remains subtherapeutic today @1.32; slowly trending upwards  Received 3mg dose for past 2 days. Will continue on Warfarin 3mg daily and will adjust based on INR trends. Note: HGB/HCT considerably low (HD patient)  Pharmacy will continue to monitor and adjust warfarin therapy as indicated    Thank you for the consult.   DIOGO Billingsley [facility]  12/14/2022 2:47 PM

## 2022-12-14 NOTE — PROCEDURES
INSTRUMENTAL SWALLOW REPORT  MODIFIED BARIUM SWALLOW    NAME: Mili Gay   : 1950  MRN: [id number]       Date of Eval: 2022     Ordering Physician: Dr. Deisy Latham  Radiologist: Dr. 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, COPD (chronic obstructive pulmonary disease) ([facility] [address] Depression, Diabetes mellitus ([facility] [address] Dupuytren's contracture of hand, Family history of cardiovascular disease, GERD (gastroesophageal reflux disease), H/O cardiac catheterization, H/O cardiac catheterization, H/O cardiovascular stress test, H/O cardiovascular stress test, H/O cardiovascular stress test, H/O cardiovascular stress test, H/O Doppler ultrasound, H/O echocardiogram, H/O echocardiogram, H/O echocardiogram, H/O hiatal hernia, Hx of Venous Doppler, Hyperlipidemia, Hypertension, Obesity, S/P PTCA (percutaneous transluminal coronary angioplasty), Sleep apnea, and Thyroid disease. Past Surgical History:  has a past surgical history that includes Coronary angioplasty with stent (2005); Cardiac catheterization (08); Cardiac catheterization (3/07); Cardiac catheterization (3/05); Endoscopy, colon, diagnostic (); Colonoscopy (); Colonoscopy (16); Hand surgery (Right, ); pr open rx ankle dislocatn+fixatn (Right, 6/3/2019); Sternum Debridement (N/A, 2022); and Coronary artery bypass graft (N/A, 2022).                Type of Study: Initial MBS       Recent CXR/CT of Chest: see chart    Patient Complaints/Reason for Referral:  Mili Gay was referred for a MBS to assess the efficiency of his/her swallow function, assess for aspiration, and to make recommendations regarding safe dietary consistencies, effective compensatory strategies, and safe eating environment. Patient complaints: requests a drink of water    Onset of problem:   Prior to admission            Behavior/Cognition/Vision/Hearing:  Behavior/Cognition: Alert; Cooperative  Vision: Impaired  Vision Exceptions: Wears glasses for reading  Hearing: Within functional limits    Impressions: Rupali Hunt was seen for inpatient modified barium swallow study. He was alert and cooperative, and self-fed for majority of assessment. He was seen for study seated upright in chair, filmed in lateral view. He was presented with PO trials of thin liquids via tsp/cup, nectar thick liquids via cup, honey thick liquids via cup, and pureed consistency. Oral stage mildly impaired with intact labial seal, reduced lingual control for bolus hold, minimal lingual residue for given consistencies. AP transit intact. Pharyngeal stage is severely impaired, characterized by adequate swallow initiation and palatal elevation, reduced tongue base retraction/epiglottic inversion/laryngeal elevation/anterior hyoid excursion/posterior pharyngeal stripping wave/laryngeal vestibule closure/UES distention. Pharyngeal deficits appear to be related to significant posterior protrusion from C4 and C5. Significant residue from all consistencies remains in valleculae, pyriform sinuses, and along posterior pharyngeal wall. A chin tuck and swallow was attempted to clear residue; this reduced but did not eliminate residue. Pt produced repetitive spontaneous swallows throughout that were not effective to improve pharyngeal clearance. Across all trials, majority of bolus remained within the pharynx following the swallow. Laryngeal penetration and aspiration was identified with thin, and nectar thick liquids. Aspiration was inconsistently silent, inconsistently followed by delayed cough. Laryngeal penetration identified with honey thick liquids to the vocal folds without definite aspiration.  Minimal laryngeal penetration identified with pureed consistency midway to the vocal folds without definite aspiration. Recommendations: Continue NPO with allowance for ice chips only d/t nonfunctional swallow. Results and images discussed with pt following the study; he verbalized understanding but will benefit from further education/reinforcement. SLP will follow. Treatment Dx and ICD 10: Dysphagia R13.10   Patient Position: Lateral and Upright    Consistencies Administered: Pureed; Thin teaspoon; Thin cup;Mildly Thick cup;Moderately Thick cup        Dysphagia Outcome Severity Scale: Level 1: Severe dysphagia- NPO. Unable to tolerate any PO safely  Penetration-Aspiration Scale (PAS): 8 - Material Enters the airway, passes below the vocal folds, and no effort is made to eject    Recommended Diet:  Solid consistency: NPO  Liquid consistency: NPO;Other (comments) (ice chips only)       Medication administration: Via NG/PEG    Safe Swallow Protocol:     Compensatory Swallowing Strategies : Upright as possible for all oral intake              Recommendations/Treatment  Requires SLP Intervention: Yes        D/C Recommendations: Ongoing speech therapy is recommended during this hospitalization; To be determined         Recommended Exercises:    Therapeutic Interventions: Patient/Family education; Therapeutic PO trials with SLP         Education: Images and recommendations were reviewed with Abdon Ellsworth following this exam.   Patient Education: results, recommendations, plan  Patient Education Response: Demonstrated understanding;Needs reinforcement    Duration/Frequency of Treatment  Duration of Treatment: LOS  Frequency of Treatment: 1-2x/week for PMV  Safety Devices  Safety Devices in place: Yes  Type of devices: All fall risk precautions in place; Left in bed  Restraints Initially in Place: No      Goals:       Short Term:  Time Frame for Short Term Goals: length of admission  Goal 1: Pt/caregivers will demonstrate understanding of pleasure feeding protocol (oral care, positioning, ice chips only)  Goal 2: Pt will participate in Passy-Rojas Valve evaluation. Goal 3: Pt/caregivers will indicate understanding of all education/recommendations.                     Oral Preparation / Oral Phase   Impaired      Pharyngeal Phase   Impaired      Esophageal Phase  Esophageal Screen: Impaired  Upper Esophageal Screen- Major Contributing Deficits  Major Contributing Deficits: Reduced Cricopharyngeal Opening        Pain   Reports ongoing pain, improved with repositioning         Therapy Time:   Individual Concurrent Group Co-treatment   Time In 1200         Time Out 1240         Minutes [place], [place] CCC-SLP, 12/14/2022, 2:02 PM

## 2022-12-14 NOTE — PROGRESS NOTES
Nephrology Progress Note  12/14/2022 4:37 PM  Subjective: Interval History: Kandy Rutherford is a 71 y.o. male appears less congested more awake wants to go to a different nursing home dialysis today      Data:   Scheduled Meds:   warfarin  3 mg Oral Daily    midodrine  5 mg Oral TID     sodium chloride flush  5-40 mL IntraVENous 2 times per day    aspirin  81 mg Per NG tube Daily    albuterol sulfate HFA  2 puff Inhalation 4x daily    amiodarone  200 mg Per G Tube Daily    atorvastatin  40 mg PEG Tube Nightly    Nephronex  5 mL PEG Tube Daily    traZODone  100 mg PEG Tube Nightly    levothyroxine  75 mcg Oral Daily    insulin lispro  0-8 Units SubCUTAneous TID WC    insulin lispro  0-4 Units SubCUTAneous Nightly    sevelamer  1.6 g Per G Tube TID WC    carvedilol  6.25 mg PEG Tube BID WC    cefepime  1,000 mg IntraVENous Q24H    polyethylene glycol  17 g Oral BID     Continuous Infusions:   sodium chloride 5 mL/hr (12/13/22 1811)         CBC   Recent Labs     12/11/22 2128 12/12/22  0957 12/13/22  0650   WBC 7.9 6.9 7.4   HGB 8.7* 8.6* 8.2*   HCT 25.0* 27.4* 26.6*    302 282      BMP   Recent Labs     12/12/22  0957 12/13/22  0650 12/14/22  0455   * 137 136   K 4.5 4.2 4.0   CL 93* 95* 95*   CO2 24 27 25   PHOS  --   --  3.4   BUN 75* 41* 58*   CREATININE 6.7* 4.9* 5.6*     Hepatic:   Recent Labs     12/11/22 2128   AST 16   ALT 10   BILITOT 0.2   ALKPHOS 228*     Troponin: No results for input(s): TROPONINI in the last 72 hours. BNP: No results for input(s): BNP in the last 72 hours. Lipids: No results for input(s): CHOL, HDL in the last 72 hours.     Invalid input(s): LDLCALCU  ABGs:   Lab Results   Component Value Date/Time    PO2ART 70.3 09/24/2022 11:49 AM    VQJ8GHK 35.5 09/24/2022 11:49 AM     INR:   Recent Labs     12/12/22  0316 12/13/22  0650 12/14/22  0455   INR 1.21 1.26 1.32     Renal Labs  Albumin:    Lab Results   Component Value Date/Time    LABALBU 2.8 12/14/2022 04:55 AM Calcium:    Lab Results   Component Value Date/Time    CALCIUM 9.2 12/14/2022 04:55 AM     Phosphorus:    Lab Results   Component Value Date/Time    PHOS 3.4 12/14/2022 04:55 AM     U/A:    Lab Results   Component Value Date/Time    NITRU NEGATIVE 09/17/2022 06:21 PM    COLORU YELLOW 09/17/2022 06:21 PM    PHUR 6.5 06/30/2021 02:11 PM    WBCUA NONE SEEN 09/17/2022 06:21 PM    RBCUA NONE SEEN 09/17/2022 06:21 PM    MUCUS RARE 09/17/2022 06:21 PM    TRICHOMONAS NONE SEEN 09/17/2022 06:21 PM    BACTERIA NEGATIVE 09/17/2022 06:21 PM    CLARITYU CLEAR 09/17/2022 06:21 PM    SPECGRAV 1.015 09/17/2022 06:21 PM    UROBILINOGEN 4.0 09/17/2022 06:21 PM    BILIRUBINUR NEGATIVE 09/17/2022 06:21 PM    BILIRUBINUR small 06/30/2021 02:11 PM    BLOODU NEGATIVE 09/17/2022 06:21 PM    GLUCOSEU neg 06/30/2021 02:11 PM    KETUA TRACE 09/17/2022 06:21 PM     ABG:    Lab Results   Component Value Date/Time    IEX0INN 35.5 09/24/2022 11:49 AM    PO2ART 70.3 09/24/2022 11:49 AM    QRC8EIF 21.2 09/24/2022 11:49 AM     HgBA1c:    Lab Results   Component Value Date/Time    LABA1C 7.5 09/15/2022 12:55 PM     Microalbumen/Creatinine ratio:  No components found for: RUCREAT  TSH:    Lab Results   Component Value Date/Time    TSH 1.66 04/03/2018 11:33 AM     IRON:    Lab Results   Component Value Date/Time    IRON 54 12/13/2022 06:50 AM     Iron Saturation:  No components found for: PERCENTFE  TIBC:    Lab Results   Component Value Date/Time    TIBC 217 12/13/2022 06:50 AM     FERRITIN:    Lab Results   Component Value Date/Time    FERRITIN 778 12/13/2022 06:50 AM     RPR:  No results found for: RPR  YAYA:  No results found for: ANATITER, YAYA  24 Hour Urine for Creatinine Clearance:  No components found for: CREAT4, UHRS10, UTV10      Objective:   I/O: 12/13 0701 - 12/14 0700  In: 891.4 [I.V.:14.6]  Out: -   I/O last 3 completed shifts:   In: 1138.4 [I.V.:14.6; NG/GT:1034; IV Piggyback:89.8]  Out: 300 [Urine:300]  I/O this shift:  In: -   Out: 150 [Urine:150]  Vitals: BP (!) 100/53   Pulse (!) 102   Temp 97.6 °F (36.4 °C)   Resp 16   Ht 6' 2\" (1.88 m)   Wt 266 lb 8.6 oz (120.9 kg)   SpO2 99%   BMI 34.22 kg/m²  {  General appearance: awake weak  HEENT: Head: Normal, normocephalic, atraumatic.   Neck: Positive trach  Lungs: diminished breath sounds bilaterally positive congestion  Heart: S1, S2 normal  Abdomen: abnormal findings:  soft nt  Extremities: edema trace  Neurologic: Mental status: alertness: Awake       Assessment and Plan:      IMP:   #1 end-stage renal disease on dialysis Monday Wednesday Friday   #2 status post CABG   #3 respiratory status post trach   #4  moderate protein malnutrition with PEG tube   #5 shortness of breath   #6 anemia   #7 hypertension    Plan     #1 doing dialysis today maintain current schedule  #2 cardiac status monitor  #3 trach care follow-up pulmonary  #4 maintain tube feeding follow-up swallow eval  #4 monitor oxygenation post diuresis with dialysis  #5 maintain EPO for anemia  #6 adjust blood pressure medicines drop blood pressure with dialysis  Will follow supportive care try to get [facility] view for ECF             Spike Chacon MD, MD

## 2022-12-14 NOTE — PROGRESS NOTES
Speech Language Pathology  Facility/Department: [facility]  Initial [place] Valve Assessment    NAME: Ora Cardoso  : 1950   MRN: [id number]  ADMISSION DATE: 2022  ADMITTING DIAGNOSIS: has Atrial fibrillation ([address] CAD (coronary artery disease); Morbid obesity ([address] COPD (chronic obstructive pulmonary disease) ([address] Sleep apnea; Type 2 diabetes mellitus ([address] Thyroid disease; Hyperlipidemia; Congestive heart failure, unspecified HF chronicity, unspecified heart failure type ([address] Coronary artery disease involving native coronary artery of native heart with angina pectoris ([address] Chronic renal disease, stage III ([address] [[id number]]; Hypertension; CAD, multiple vessel; Dyspnea; and Moderate malnutrition ([address] on their problem list.  DATE ONSET: this admission    IMPRESSIONS AND RECOMMENDATIONS: Ora Cardoso was seen for inpatient [place] Valve evaluation following admission to [facility] with acute on chronic respiratory failure, WINIFRED. Medical hx significant for recent CABG with postop complications resulting in tracheostomy and PEG placement at [facility]. Pt reports he was evaluated for use of PMV at nursing home; no notes available in chart and no PMV found in pt room. Pt seen for evaluation seated partially reclined in bed. He was alert and cooperative throughout. He continues on 5L O2 via trach mask with cuffless trach. He is noted to voice over the trach with reduced volume/breath support. Volume improves somewhat with finger occlusion, however, with duckbill inner cannula complete occlusion is not possible. Vitals noted prior to PMV placement: HR 88, O2 95%, RR 23. The PMV was placed and no increase in secretions, coughing, or anxiety was noted. Pt maintained stable vitals throughout (HR 93, O2 97%, RR 20). He produced words and sentences, and participated in conversation with >90% intelligibility given min cues.  Intelligibility is most impacted by reduced breath support and inconsistent speaking volume, but improves with verbal cues for louder speech. Attempted to train pt to don/doff PMV but tray table did not have attached mirror. Reviewed protocol for wearing PMV including removal for all naps/sleep and in the event of respiratory decompensation. Pt indicated understanding and comfort with continuing to wear PMV. PMV left in place at end of evaluation following 30+ minutes of consecutive wear. Recommend pt continue to wear PMV for all waking hours. Please remove for sleep/all naps and in the event of respiratory decompensation. SLP will continue to follow. Recommendations/plan d/w pt who verbalized understanding/agreement. Date of Eval: 12/14/2022   Evaluating Therapist: ROLANDO Raymundo    RECENT RESULTS  CT OF HEAD/MRI: see chart    Primary Complaint: dysphonia 2/2 tracheostomy    Pain:  Pain Assessment  Pain Assessment: None - Denies Pain    Vision/ Hearing  Vision  Vision: Impaired  Vision Exceptions: Wears glasses for reading  Hearing  Hearing: Within functional limits    Assessment:  Speech Diagnosis: Pt presents from dysphonia 2/2 tracheostomy. Recommendations:  Recommendations  Requires SLP Intervention: Yes  Recommendations: Modified barium swallow study; Other (comments) (PMV evaluation)  Patient Education: results, recommendations, plan  Patient Education Response: Demonstrated understanding;Needs reinforcement  Duration of Treatment: LOS  D/C Recommendations: Ongoing speech therapy is recommended during this hospitalization; To be determined       Plan:   Individuals consulted  Consulted and agree with results and recommendations: Patient  Safety Devices  Safety Devices in place: Yes  Type of devices: All fall risk precautions in place; Left in bed  Restraints Initially in Place: No    Goals:  Short Term Goals  Time Frame for Short Term Goals: length of admission  Goal 1: Pt will tolerate PMV for waking hours without respiratory decompensation.   Goal 2: Pt will participate in conversation with 90% speech intelligibility given min cues. Goal 3: Pt will don/doff PMV x5 independently. Goal 4: Pt/caregivers will indicate understanding of education/recommendations. Patient/family involved in developing goals and treatment plan: yes    Subjective:   Previous level of function and limitations: independent with speech prior to tracheostomy        Vision  Vision: Impaired  Vision Exceptions: Wears glasses for reading  Hearing  Hearing: Within functional limits           Objective:       Oral Motor   Labial: No impairment  Dentition: Natural  Oral Hygiene: Moist;Clean  Lingual: No impairment  Velum: No Impairment  Mandible: No impairment    Motor Speech  Apraxic Characteristics: None  Dysarthric Characteristics: None    Auditory Comprehension  Comprehension: Within Functional Limits         Expression  Primary Mode of Expression: Verbal    Verbal Expression  Verbal Expression: Within functional limits              Pragmatics/Social Functioning  Pragmatics: Within functional limits    Cognition:   DNT       Additional Assessments:  Tracheostomy  Trach Size: 6 (7.5mm outer cannula)  Trach Status: Cuffless  Passy Rojas Valve Pre-Placement Assessment  Trach Size: 6 (7.5mm outer cannula)  PMV Vitals (Before)  SpO2: 95 %  Resp: 23  Pulse: 88  PMV Vitals (During)  SpO2: 97 %  Resp: 20  Pulse: 93  [place] Speaking Valve  Passy-Rojas Speaking Valve Type: PMV-2001 (Purple)  Coughing: No  Secretions: No  Anxiety: No  Vocal Intensity: 4  Vocal Quality: 4  [place] Valve Tolerance - Minutes: 30  PMV Placement Recommendations: Speech/RN/Trained family member & Staff          Prognosis:  Individuals consulted  Consulted and agree with results and recommendations: Patient    Education:  Patient Education: results, recommendations, plan  Patient Education Response: Demonstrated understanding;Needs reinforcement  Safety Devices in place: Yes  Type of devices:  All fall risk precautions in place; Left in bed    Therapy Time:   Individual Concurrent Group Co-treatment   Time In 0900         Time Out 1030         Minutes 35                 Electronically signed by Tegan Palma MA CCC-SLP on 12/14/2022 at 11:42 AM

## 2022-12-15 LAB
ANION GAP SERPL CALCULATED.3IONS-SCNC: 18 MMOL/L (ref 4–16)
ATYPICAL LYMPHOCYTE ABSOLUTE COUNT: ABNORMAL
BANDED NEUTROPHILS ABSOLUTE COUNT: 0.1 K/CU MM
BANDED NEUTROPHILS RELATIVE PERCENT: 1 % (ref 5–11)
BASOPHILS ABSOLUTE: 0.2 K/CU MM
BASOPHILS RELATIVE PERCENT: 2 % (ref 0–1)
BUN BLDV-MCNC: 43 MG/DL (ref 6–23)
CALCIUM SERPL-MCNC: 9.4 MG/DL (ref 8.3–10.6)
CHLORIDE BLD-SCNC: 95 MMOL/L (ref 99–110)
CO2: 22 MMOL/L (ref 21–32)
CREAT SERPL-MCNC: 4.4 MG/DL (ref 0.9–1.3)
DIFFERENTIAL TYPE: ABNORMAL
EOSINOPHILS ABSOLUTE: 0.2 K/CU MM
EOSINOPHILS RELATIVE PERCENT: 2 % (ref 0–3)
GFR SERPL CREATININE-BSD FRML MDRD: 14 ML/MIN/1.73M2
GLUCOSE BLD-MCNC: 241 MG/DL (ref 70–99)
GLUCOSE BLD-MCNC: 255 MG/DL (ref 70–99)
GLUCOSE BLD-MCNC: 279 MG/DL (ref 70–99)
GLUCOSE BLD-MCNC: 287 MG/DL (ref 70–99)
GLUCOSE BLD-MCNC: 293 MG/DL (ref 70–99)
HCT VFR BLD CALC: 28 % (ref 42–52)
HEMOGLOBIN: 9.4 GM/DL (ref 13.5–18)
INR BLD: 1.83 INDEX
LYMPHOCYTES ABSOLUTE: 3.1 K/CU MM
LYMPHOCYTES RELATIVE PERCENT: 32 % (ref 24–44)
MAGNESIUM: 2.4 MG/DL (ref 1.8–2.4)
MCH RBC QN AUTO: 34.1 PG (ref 27–31)
MCHC RBC AUTO-ENTMCNC: 33.6 % (ref 32–36)
MCV RBC AUTO: 101.4 FL (ref 78–100)
MONOCYTES ABSOLUTE: 1.2 K/CU MM
MONOCYTES RELATIVE PERCENT: 12 % (ref 0–4)
PDW BLD-RTO: 19.9 % (ref 11.7–14.9)
PHOSPHORUS: 2.1 MG/DL (ref 2.5–4.9)
PLATELET # BLD: 327 K/CU MM (ref 140–440)
PLT MORPHOLOGY: ABNORMAL
PMV BLD AUTO: 8.8 FL (ref 7.5–11.1)
POTASSIUM SERPL-SCNC: 4.3 MMOL/L (ref 3.5–5.1)
PROTHROMBIN TIME: 23.7 SECONDS (ref 11.7–14.5)
RBC # BLD: 2.76 M/CU MM (ref 4.6–6.2)
SEGMENTED NEUTROPHILS ABSOLUTE COUNT: 5 K/CU MM
SEGMENTED NEUTROPHILS RELATIVE PERCENT: 51 % (ref 36–66)
SODIUM BLD-SCNC: 135 MMOL/L (ref 135–145)
WBC # BLD: 9.8 K/CU MM (ref 4–10.5)

## 2022-12-15 PROCEDURE — 94761 N-INVAS EAR/PLS OXIMETRY MLT: CPT

## 2022-12-15 PROCEDURE — 6370000000 HC RX 637 (ALT 250 FOR IP): Performed by: INTERNAL MEDICINE

## 2022-12-15 PROCEDURE — 6370000000 HC RX 637 (ALT 250 FOR IP): Performed by: STUDENT IN AN ORGANIZED HEALTH CARE EDUCATION/TRAINING PROGRAM

## 2022-12-15 PROCEDURE — 85007 BL SMEAR W/DIFF WBC COUNT: CPT

## 2022-12-15 PROCEDURE — 89220 SPUTUM SPECIMEN COLLECTION: CPT

## 2022-12-15 PROCEDURE — 2580000003 HC RX 258: Performed by: INTERNAL MEDICINE

## 2022-12-15 PROCEDURE — 82962 GLUCOSE BLOOD TEST: CPT

## 2022-12-15 PROCEDURE — 2140000000 HC CCU INTERMEDIATE R&B

## 2022-12-15 PROCEDURE — 36415 COLL VENOUS BLD VENIPUNCTURE: CPT

## 2022-12-15 PROCEDURE — 85610 PROTHROMBIN TIME: CPT

## 2022-12-15 PROCEDURE — 83735 ASSAY OF MAGNESIUM: CPT

## 2022-12-15 PROCEDURE — 6360000002 HC RX W HCPCS: Performed by: INTERNAL MEDICINE

## 2022-12-15 PROCEDURE — 2700000000 HC OXYGEN THERAPY PER DAY

## 2022-12-15 PROCEDURE — 99232 SBSQ HOSP IP/OBS MODERATE 35: CPT | Performed by: INTERNAL MEDICINE

## 2022-12-15 PROCEDURE — 99233 SBSQ HOSP IP/OBS HIGH 50: CPT | Performed by: INTERNAL MEDICINE

## 2022-12-15 PROCEDURE — 90935 HEMODIALYSIS ONE EVALUATION: CPT

## 2022-12-15 PROCEDURE — 2580000003 HC RX 258: Performed by: STUDENT IN AN ORGANIZED HEALTH CARE EDUCATION/TRAINING PROGRAM

## 2022-12-15 PROCEDURE — 84100 ASSAY OF PHOSPHORUS: CPT

## 2022-12-15 PROCEDURE — 80048 BASIC METABOLIC PNL TOTAL CA: CPT

## 2022-12-15 PROCEDURE — 94640 AIRWAY INHALATION TREATMENT: CPT

## 2022-12-15 PROCEDURE — 85027 COMPLETE CBC AUTOMATED: CPT

## 2022-12-15 RX ORDER — INSULIN GLARGINE 100 [IU]/ML
5 INJECTION, SOLUTION SUBCUTANEOUS NIGHTLY
Qty: 5 ADJUSTABLE DOSE PRE-FILLED PEN SYRINGE | Refills: 0
Start: 2022-12-15

## 2022-12-15 RX ORDER — WARFARIN SODIUM 3 MG/1
3 TABLET ORAL DAILY
Qty: 30 TABLET | Refills: 0
Start: 2022-12-15 | End: 2022-12-16 | Stop reason: HOSPADM

## 2022-12-15 RX ORDER — IPRATROPIUM BROMIDE AND ALBUTEROL SULFATE 2.5; .5 MG/3ML; MG/3ML
1 SOLUTION RESPIRATORY (INHALATION) 4 TIMES DAILY
Qty: 360 ML | Refills: 0
Start: 2022-12-15

## 2022-12-15 RX ORDER — LEVOTHYROXINE SODIUM 0.07 MG/1
75 TABLET ORAL DAILY
Qty: 30 TABLET | Refills: 0
Start: 2022-12-16

## 2022-12-15 RX ORDER — WARFARIN SODIUM 2 MG/1
2 TABLET ORAL DAILY
Status: DISCONTINUED | OUTPATIENT
Start: 2022-12-15 | End: 2022-12-16 | Stop reason: HOSPADM

## 2022-12-15 RX ORDER — MIDODRINE HYDROCHLORIDE 10 MG/1
10 TABLET ORAL
Qty: 90 TABLET | Refills: 0
Start: 2022-12-15

## 2022-12-15 RX ORDER — IPRATROPIUM BROMIDE AND ALBUTEROL SULFATE 2.5; .5 MG/3ML; MG/3ML
1 SOLUTION RESPIRATORY (INHALATION) 4 TIMES DAILY
Status: DISCONTINUED | OUTPATIENT
Start: 2022-12-15 | End: 2022-12-16 | Stop reason: HOSPADM

## 2022-12-15 RX ORDER — CEFDINIR 300 MG/1
300 CAPSULE ORAL DAILY
Qty: 3 CAPSULE | Refills: 0
Start: 2022-12-15 | End: 2022-12-18

## 2022-12-15 RX ORDER — SODIUM CHLORIDE FOR INHALATION 3 %
4 VIAL, NEBULIZER (ML) INHALATION PRN
Status: DISCONTINUED | OUTPATIENT
Start: 2022-12-15 | End: 2022-12-16 | Stop reason: HOSPADM

## 2022-12-15 RX ORDER — CARVEDILOL 6.25 MG/1
6.25 TABLET ORAL 2 TIMES DAILY WITH MEALS
Qty: 60 TABLET | Refills: 0
Start: 2022-12-15

## 2022-12-15 RX ORDER — SENNA AND DOCUSATE SODIUM 50; 8.6 MG/1; MG/1
2 TABLET, FILM COATED ORAL DAILY PRN
Qty: 60 TABLET | Refills: 0
Start: 2022-12-15

## 2022-12-15 RX ORDER — INSULIN GLARGINE 100 [IU]/ML
5 INJECTION, SOLUTION SUBCUTANEOUS NIGHTLY
Status: DISCONTINUED | OUTPATIENT
Start: 2022-12-15 | End: 2022-12-16 | Stop reason: HOSPADM

## 2022-12-15 RX ADMIN — POLYETHYLENE GLYCOL (3350) 17 G: 17 POWDER, FOR SOLUTION ORAL at 21:37

## 2022-12-15 RX ADMIN — INSULIN GLARGINE 5 UNITS: 100 INJECTION, SOLUTION SUBCUTANEOUS at 21:37

## 2022-12-15 RX ADMIN — ALBUTEROL SULFATE 2 PUFF: 90 AEROSOL, METERED RESPIRATORY (INHALATION) at 11:44

## 2022-12-15 RX ADMIN — TRAZODONE HYDROCHLORIDE 100 MG: 50 TABLET ORAL at 21:36

## 2022-12-15 RX ADMIN — WARFARIN SODIUM 2 MG: 2 TABLET ORAL at 19:53

## 2022-12-15 RX ADMIN — MIDODRINE HYDROCHLORIDE 10 MG: 5 TABLET ORAL at 11:52

## 2022-12-15 RX ADMIN — CEFEPIME HYDROCHLORIDE 1000 MG: 1 INJECTION, POWDER, FOR SOLUTION INTRAMUSCULAR; INTRAVENOUS at 19:57

## 2022-12-15 RX ADMIN — ALBUTEROL SULFATE 2 PUFF: 90 AEROSOL, METERED RESPIRATORY (INHALATION) at 08:11

## 2022-12-15 RX ADMIN — CARVEDILOL 6.25 MG: 6.25 TABLET, FILM COATED ORAL at 11:52

## 2022-12-15 RX ADMIN — LEVOTHYROXINE SODIUM 75 MCG: 0.07 TABLET ORAL at 11:52

## 2022-12-15 RX ADMIN — ASPIRIN 81 MG: 81 TABLET, CHEWABLE ORAL at 11:53

## 2022-12-15 RX ADMIN — INSULIN LISPRO 4 UNITS: 100 INJECTION, SOLUTION INTRAVENOUS; SUBCUTANEOUS at 11:52

## 2022-12-15 RX ADMIN — ATORVASTATIN CALCIUM 40 MG: 40 TABLET, FILM COATED ORAL at 21:36

## 2022-12-15 RX ADMIN — SODIUM CHLORIDE, PRESERVATIVE FREE 10 ML: 5 INJECTION INTRAVENOUS at 21:49

## 2022-12-15 RX ADMIN — IPRATROPIUM BROMIDE AND ALBUTEROL SULFATE 1 AMPULE: .5; 2.5 SOLUTION RESPIRATORY (INHALATION) at 20:33

## 2022-12-15 RX ADMIN — SODIUM CHLORIDE, PRESERVATIVE FREE 10 ML: 5 INJECTION INTRAVENOUS at 12:07

## 2022-12-15 RX ADMIN — IPRATROPIUM BROMIDE AND ALBUTEROL SULFATE 1 AMPULE: .5; 2.5 SOLUTION RESPIRATORY (INHALATION) at 15:34

## 2022-12-15 RX ADMIN — POLYETHYLENE GLYCOL (3350) 17 G: 17 POWDER, FOR SOLUTION ORAL at 11:52

## 2022-12-15 RX ADMIN — AMIODARONE HYDROCHLORIDE 200 MG: 200 TABLET ORAL at 11:53

## 2022-12-15 RX ADMIN — MIDODRINE HYDROCHLORIDE 10 MG: 5 TABLET ORAL at 19:54

## 2022-12-15 ASSESSMENT — PAIN SCALES - GENERAL
PAINLEVEL_OUTOF10: 0
PAINLEVEL_OUTOF10: 0

## 2022-12-15 NOTE — PROGRESS NOTES
PHARMACY ANTICOAGULATION MONITORING SERVICE    Rosalina Lau is a 71 y.o. male on warfarin therapy for atrial fibrillation and history of DVT. Pharmacy consulted by Dr. Alondra Gonzalez for monitoring and adjustment of treatment. Indication for anticoagulation: A-Fib, Hx of DVT  INR goal: 2-3  Warfarin dose prior to admission: 2 mg daily    Pertinent Laboratory Values   Recent Labs     12/13/22  0650 12/14/22  0455 12/15/22  0515   INR 1.26   < > 1.83   HGB 8.2*  --  9.4*   HCT 26.6*  --  28.0*     --  327    < > = values in this interval not displayed. INR MONITORING  Recent Labs     12/13/22  0650 12/14/22  0455 12/15/22  0515   INR 1.26 1.32 1.83       Assessment/Plan:  Drug Interactions:   Aspirin, amiodarone, levothyroxine, atorvastatin, trazodone (home meds)  Pt started on continues cefepime which may increase warfarin effects. INR sub-therapeutic on admission, remains subtherapeutic today @1.83 trending upwards  Significant increase in INR over yesterday; will adjust back to home dose of Warfarin 2mg daily, then per INR trends. Note: HGB/HCT considerably low (HD patient)  Pharmacy will continue to monitor and adjust warfarin therapy as indicated    Thank you for the consult.   Dorie Ely, [address] [place] [address]  12/15/2022 3:34 PM

## 2022-12-15 NOTE — PROGRESS NOTES
Pt continually suctioned per pt request, repositioned, and cleaned up. Pt needs met and questions answered. VSS. Will continue to monitor and round frequently. no discoloration/no tingling/warm/no numbness

## 2022-12-15 NOTE — PROGRESS NOTES
PT heard banging call light against side rail. This nurse went in and explained to pt how to hit the button on the call light to get the staff. This nurse had the pt demonstrate the button push. Pt agreeable to plan. Pt again repositioned and suctioned. Pt comfortable, clean, and oxygenating well. VSS. Will continue to monitor closely.

## 2022-12-15 NOTE — PROGRESS NOTES
Patient tolerated 2hour hemodialysis treatment fair today. D/t hypotension, UF goal was not reached-1L of fluid was removed via HD CVC in right subclavian. Pt had 1 BM on bedpan while on HD. No other complaints noted from pt. At the end of txt, blood was rinsed back to pt successfully. CVC lines were flushed and locked with saline, then capped. Pt showed no s/s of distress or discomfort from hypotension. VS WNL after rinse back. Reported off to Primary RN. .      HD txt overseen by ANTHONY Doherty RN        Patient Name: Kandy Rutherford  Patient : 1950  MRN: [id number]     Acct: [de-identified]  Date of Admission: 2022  Room/Bed: 3121/3121-A  Code Status:  Full Code  Allergies: No Known Allergies  Diagnosis:    Patient Active Problem List   Diagnosis    Atrial fibrillation ([address]    CAD (coronary artery disease)    Morbid obesity ([address]    COPD (chronic obstructive pulmonary disease) ([address]    Sleep apnea    Type 2 diabetes mellitus ([address]    Thyroid disease    Hyperlipidemia    Congestive heart failure, unspecified HF chronicity, unspecified heart failure type ([address]    Coronary artery disease involving native coronary artery of native heart with angina pectoris (HCC)    Chronic renal disease, stage III ([address] [[id number]]    Hypertension    CAD, multiple vessel    Dyspnea    Moderate malnutrition ([address]         Treatment:  Hemodilaysis 2:1  Priority: Routine  Location: Acute Room    Diabetic: Yes  NPO: Yes  Isolation Precautions: None     Consent for Treatment Verified: Yes  Blood Consent Verified: Not Applicable     Safety Verified: Identify (I), Consent (C), Equipment (E), HepB Status (B), Orders Complete (O), Access Verified (A), and Timeliness (T)  Time out performed prior to access at 0845 hours. Report Received from Primary RN at 0810 hours.   Primary RN (First Initial, Last Name, Title): Briana Beckwith RN  Incapacitated Nurse Education Completed: Not Applicable     HBsAg ONLY:  Date Drawn: 2022 Results: Negative  HBsAb:  Date Drawn:  December 12, 2022       Results: Susceptible <10    Order  Dialysis Bath  K+ (Potassium): 3  Ca+ (Calcium): 2.5  Na+ (Sodium): 138  HCO3 (Bicarb): 35  Bicarbonate Concentrate Lot No.: [id number]  Acid Concentrate Lot No.: [id number]     Na+ Modeling: Not Applicable  Dialyzer: [id number]  Dialysate Temperature (C):  35  Blood Flow Rate (BFR):  250   Dialysate Flow Rate (DFR):   500        Access to be Utilized   Access: Tunneled Catheter  Location: Subclavian  Side: Right   Needle gauge:  Not Applicable  + Bruit/Thrill: Not Applicable    First Use X-ray Verified: Yes  OK to use line order: Yes    Site Assessment:  Signs and Symptoms of Infection/Inflammation: None  If yes: Not Applicable  Dressing: Dry and Intact  Site Prep: Medical Aseptic Technique  Dressing Changed this Treatment: No  If yes, by whom: NA - not changed today  Date of Last Dressing Change: December 12, 2022  Antimicrobial Patch in place?: Yes  Red Alcohol Caps in place?: Yes  Gauze Dressing?: No  Non Dialysis Use?: No  Comment:    Flows: Good, Patent  If access problem, who was notified:     Pre and Post-Assessment  Patient Vitals for the past 8 hrs:   Level of Consciousness Respiratory Quality/Effort O2 Device Skin Color Skin Condition/Temp Abdomen Inspection Pain Level   12/15/22 0400 0 Unlabored -- -- -- Ostomy tube 0   12/15/22 0500 -- -- Hortencia Edmar mask -- -- -- --   12/15/22 0811 -- -- Trach mask -- -- -- --   12/15/22 0850 0 Unlabored -- Bronze;Gray Dry; Warm Other (Comment) --     Labs  Recent Labs     12/13/22  0650 12/15/22  0515   WBC 7.4 9.8   HGB 8.2* 9.4*   HCT 26.6* 28.0*    327                                                                  Recent Labs     12/13/22  0650 12/14/22  0455 12/15/22  0515    136 135   K 4.2 4.0 4.3   CL 95* 95* 95*   CO2 27 25 22   BUN 41* 58* 43*   CREATININE 4.9* 5.6* 4.4*   GLUCOSE 165* 240* 293*     IV Drips and Rate/Dose   sodium chloride 5 mL/hr (12/14/22 4464) Safety - Before each treatment:   Dialysis Machine No.: [id number]  RO Machine Number: [id number]  Dialyzer Lot No.: [id number]  RO Machine Log Sheet Completed: Yes  Machine Alarm Self Test: Completed; Passed (12/15/22 0845)     Air Foam Detector: Proper Function, Tested  Extracorporeal Circuit Tested for Integrity: Yes  Machine Conductivity: 13.9  Manual Conductivity: 13.9     Bicarbonate Concentrate Lot No.: [id number]  Acid Concentrate Lot No.: [id number]  Manual Ph: 7.4  Bleach Test (Neg): Yes  Bath Temperature: 95 °F (35 °C)  Tubing Lot#: [id number]  Conductivity Meter Serial #: [id number]  All Connections Secure?: Yes  Venous Parameters Set?: Yes  Arterial Parameters Set?: Yes  Saline Line Double Clamped?: Yes  Air Foam Detector Engaged?: Yes  Machine Functioning Alarm Free?  Yes  Prime Given: 200ml    Chlorine Testing - Before each treatment and every 4 hours:   Treatment  Treatment Number: 1  Time On: 8637  Time Off: 1055  Treatment Goal: 2-2.5  Weight: 258 lb 9.6 oz (117.3 kg) (12/15/22 1055)  1st check: less than 0.1 ppm at: 0700 hours  2nd check: less than 0.1 ppm at: 1025 hours  3rd check: Not Applicable  (if greater than 0.1 ppm, then check every 30 minutes from secondary)    Access Flows and Pressures  Patient Vitals for the past 8 hrs:   Blood Flow Rate (ml/min) Ultrafiltration Rate (ml/hr) Arterial Pressure (mmHg) Venous Pressure (mmHg) TMP DFR Comments Access Visible   12/15/22 0850 250 ml/min 1250 ml/hr -140 mmHg 120 90 500 txt started Yes   12/15/22 0915 250 ml/min 1250 ml/hr -150 mmHg 110 90 500 moved up in bed Yes   12/15/22 0930 250 ml/min 1250 ml/hr -160 mmHg 120 90 500 no distress Yes   12/15/22 0945 250 ml/min 1250 ml/hr -140 mmHg 120 90 500 pt awake Yes   12/15/22 1000 250 ml/min 1250 ml/hr -140 mmHg 130 100 500 pt c/o nausea, UF stopped Yes   12/15/22 1015 250 ml/min 1250 ml/hr -140 mmHg 130 40 500 pt awake Yes   12/15/22 1030 250 ml/min 1250 ml/hr -160 mmHg 110 70 500 pt on bedpan Yes   12/15/22 1045 250 ml/min 1250 ml/hr -160 mmHg 110 70 500 BM bedpan Yes   12/15/22 1055 250 ml/min -- -- -- -- -- txt completed --     Vital Signs  Patient Vitals for the past 8 hrs:   BP Temp Pulse Resp SpO2 Weight Weight Method Percent Weight Change   12/15/22 0400 -- -- 82 23 97 % -- -- --   12/15/22 0452 (!) 117/58 97.6 °F (36.4 °C) 93 14 97 % -- -- --   12/15/22 0500 (!) 117/58 -- 98 24 97 % -- -- --   12/15/22 0600 -- -- 100 18 95 % -- -- --   12/15/22 0803 119/78 -- 97 20 98 % -- -- --   12/15/22 0850 (!) 114/57 98 °F (36.7 °C) 92 21 99 % 259 lb 4.2 oz (117.6 kg) Bed scale -0.51   12/15/22 0903 (!) 120/52 -- 93 18 100 % -- -- --   12/15/22 0915 (!) 98/59 -- 99 18 100 % -- -- --   12/15/22 0930 (!) 82/46 -- 98 17 99 % -- -- --   12/15/22 0945 93/64 -- 98 14 100 % -- -- --   12/15/22 1000 (!) 81/32 -- 100 19 100 % -- -- --   12/15/22 1015 (!) 108/47 -- (!) 112 23 99 % -- -- --   12/15/22 1030 (!) 93/49 -- (!) 104 19 (!) 81 % -- -- --   12/15/22 1045 92/70 -- (!) 109 21 -- -- -- --   12/15/22 1055 (!) 97/56 97.7 °F (36.5 °C) 98 19 99 % 258 lb 9.6 oz (117.3 kg) Bed scale -0.26     Post-Dialysis  Arterial Catheter Locking Solution:  saline  Venous Catheter Locking Solution:  saline  Post-Treatment Procedures: Blood returned, Catheter Capped, clamped with Saline x2 ports  Machine Disinfection Process: Acid/Vinegar Clean, Bleach, Exterior Machine Disinfection  Rinseback Volume (ml): 300 ml  Blood Volume Processed (Liters): 28.2 l/min  Dialyzer Clearance: Moderately streaked  Duration of Treatment (minutes): 120 minutes     Hemodialysis Intake (ml): 500 ml  Hemodialysis Output (ml): 1583 ml     Tolerated Treatment: Fair  Patient Response to Treatment: fair  Physician Notified: No       Provider Notification        Handoff complete and report given to Primary RN at 1055 hours.   Primary RN (First Initial, Last Name, Title):  Rakesh Page RN     Education  Person Educated: Patient   Knowledge Base: Substantial  Barriers to Learning?: None  Preferred method of Learning: Oral  Topic(s): Access Care, Signs and Symptoms of Infection, and Fluid Management   Teaching Tools: Explanation   Response to Education: Verbalized Understanding     Electronically signed by Ninfa Thompson LPN on 13/72/1083 at 11:22 AM

## 2022-12-15 NOTE — PROGRESS NOTES
Pulmonary and Critical Care  Progress Note      VITALS:  /63   Pulse 98   Temp 97.8 °F (36.6 °C) (Oral)   Resp 19   Ht 6' 2\" (1.88 m)   Wt 258 lb 9.6 oz (117.3 kg)   SpO2 99%   BMI 33.20 kg/m²     Subjective:   CHIEF COMPLAINT :SOB     HPI:                The patient is a 71 y.o. male is lying in thew bed with the trach collar. He has little secretions. He has PEG tube feeding going on. He is not in acute resp distress    Objective:   PHYSICAL EXAM:    LUNGS:Occasional basal crackles  Abd-soft, BS+,NT  Ext- no pedal edema  CVS-s1s2, no murmurs      DATA:    CBC:  Recent Labs     12/13/22  0650 12/15/22  0515   WBC 7.4 9.8   RBC 2.57* 2.76*   HGB 8.2* 9.4*   HCT 26.6* 28.0*    327   .5* 101.4*   MCH 31.9* 34.1*   MCHC 30.8* 33.6   RDW 19.4* 19.9*   NRBC 1  --    SEGSPCT 54.0 51.0   BANDSPCT 1* 1*      BMP:  Recent Labs     12/13/22  0650 12/14/22  0455 12/15/22  0515    136 135   K 4.2 4.0 4.3   CL 95* 95* 95*   CO2 27 25 22   BUN 41* 58* 43*   CREATININE 4.9* 5.6* 4.4*   CALCIUM 8.6 9.2 9.4   GLUCOSE 165* 240* 293*      ABG:  No results for input(s): PH, PO2ART, ZEF9WVR, HCO3, BEART, O2SAT in the last 72 hours.   BNP  Lab Results   Component Value Date    BNP 21 10/22/2013      D-Dimer:  Lab Results   Component Value Date    DDIMER REQUEST CREDITED 09/23/2022      Radiology: none       Assessment/Plan     Patient Active Problem List    Diagnosis Date Noted    Dyspnea 12/12/2022     Priority: Medium    Moderate malnutrition ([address] 12/12/2022     Priority: Medium    CAD, multiple vessel 09/15/2022     Priority: Medium    Hypertension      Priority: Medium    Congestive heart failure, unspecified HF chronicity, unspecified heart failure type ([address] 05/31/2022     Priority: Medium    Coronary artery disease involving native coronary artery of native heart with angina pectoris ([facility] 05/31/2022     Priority: Medium    Chronic renal disease, stage III [facility]) [775007] 05/31/2022     Priority: Medium    Thyroid disease      Overview Note:     hypothyroid      Hyperlipidemia     Type 2 diabetes mellitus ([facility] 11/09/2015    Atrial fibrillation (HCC)     Morbid obesity (HCC)     COPD (chronic obstructive pulmonary disease) (HCC)     Sleep apnea     CAD (coronary artery disease) 03/21/2005     Overview Note:     s/p PTCA with 3.5 X 16 TAXUS stent to LAD     Ac on ch resp failure,s/p trach September 2022  Pneumonia HCA  Sepsis  Copd  S/p PEG       Trach collar  PEG tube feeding  Abx  F/u C&S  PMV as tolerated  Keep sats > 92%  PT/OT  DVT and GI Prophylaxis  C/w present management    Electronically signed by Alice Eldridge MD on 12/15/2022 at 12:24 PM

## 2022-12-15 NOTE — PROGRESS NOTES
Physical Therapy  Refusal Note    PT hugo attempted this AM/PM. AM - pt off floor to dialysis, PM - pt refusing participation 2/2 \"nsg coming to do some work on me\" AKA bathe him. Educated on need to take more active role in his pt care and need for inc mobility w/ primary problems on this admission ; this agitates pt, who wants to be cared for at this time. Will reattempt as pt disposition allows.      Madonna Flores PT, DPT

## 2022-12-15 NOTE — PROGRESS NOTES
Comprehensive Nutrition Assessment    Type and Reason for Visit:  Reassess    Nutrition Recommendations/Plan:   Continue EN as ordered  Recommend to consider insulin increase for coverage if medically able   Monitor fluids, po intakes, GI status, labs and POC      Malnutrition Assessment:  Malnutrition Status: Moderate malnutrition (12/12/22 1007)    Context:  Acute Illness       Nutrition Assessment:    SLP recommends NPO d/t nonfunctional swallow. EN continues at goal rate. Pt off of unit at visit, receiving dialysis. Some weight loss s/s fluids. Follow as high nutrition risk. Nutrition Related Findings:    POCT 255, GFR 14, BUN 43, Cr 4.4 Wound Type: Pressure Injury, Surgical Incision       Current Nutrition Intake & Therapies:    Average Meal Intake: NPO  Average Supplements Intake: NPO  Diet NPO Exceptions are: Other (Specify); Specify Other Exceptions: Tube Feed/PEG  ADULT TUBE FEEDING; PEG; Renal Formula; Continuous; 20; Yes; 10; Q 4 hours; 60; 30; Q 4 hours  Current Tube Feeding (TF) Orders:  Feeding Route: PEG  Formula: Renal Formula  Schedule: Continuous  Feeding Regimen: 60 ml/hr  Additives/Modulars: None  Water Flushes: 30 Q 4 H, adjustments per nephrology  Current TF & Flush Orders Provides: 2592 kcals, 117 gm protein  Goal TF & Flush Orders Provides: Continue at this goal rate, Meeting 100% of estimated needs    Anthropometric Measures:  Height: 6' 2\" (188 cm)  Ideal Body Weight (IBW): 190 lbs (86 kg)    Admission Body Weight: 268 lb 15.4 oz (122 kg)  Current Body Weight: 258 lb 9.6 oz (117.3 kg), 136.1 % IBW. Weight Source: Bed Scale  Current BMI (kg/m2): 33.2  Usual Body Weight: 324 lb 1 oz (147 kg) (9/16/22)  % Weight Change (Calculated): -17                    BMI Categories: Obese Class 1 (BMI 30.0-34. 9)    Estimated Daily Nutrient Needs:  Energy Requirements Based On: Formula  Weight Used for Energy Requirements: Admission  Energy (kcal/day): 4720-4799 ([address])  Weight Used for Protein Requirements: Ideal  Protein (g/day): 103-145 (1.2-1.3 g/kg IBW)  Method Used for Fluid Requirements: Standard Renal  Fluid (ml/day): per Nephrology    Nutrition Diagnosis:   Moderate malnutrition, In context of acute illness or injury related to inadequate protein-energy intake as evidenced by poor intake prior to admission, weight loss 7.5% in 3 months, mild muscle loss, mild loss of subcutaneous fat  Predicted inadequate energy intake related to increase demand for energy/nutrients as evidenced by wounds, dialysis, weight loss    Nutrition Interventions:   Food and/or Nutrient Delivery: Continue Current Tube Feeding  Nutrition Education/Counseling: No recommendation at this time  Coordination of Nutrition Care: Continue to monitor while inpatient  Plan of Care discussed with: Patient    Goals:  Previous Goal Met: Progressing toward Goal(s)  Goals: Meet at least 75% of estimated needs, Initiate nutrition support, Tolerate nutrition support at goal rate       Nutrition Monitoring and Evaluation:   Behavioral-Environmental Outcomes: None Identified  Food/Nutrient Intake Outcomes: Enteral Nutrition Intake/Tolerance, IVF Intake  Physical Signs/Symptoms Outcomes: Biochemical Data, Chewing or Swallowing, GI Status, Nausea or Vomiting, Fluid Status or Edema, Nutrition Focused Physical Findings, Skin, Weight    Discharge Planning:    Enteral Nutrition     Nicolás King, [address] [place] [address],   Contact: [phone]

## 2022-12-15 NOTE — PROGRESS NOTES
V2.0  [facility] [place] [facility]      Name:  Rupali Hunt /Age/Sex: 1950  (71 y.o. male)   MRN & CSN:  [id number] & [id number] Encounter Date/Time: 12/15/2022 7:56 AM EST    Location:  [address] [place], [place] [address]P PCP: Rayna Schwartz [address] [facility] Day: 5    Assessment and Plan:   Rupali Hunt is a 71 y.o. male with  who presents with dyspnea. Patient was discharged from this hospital in September after he presented for planned CABG on 2022 after which she was transferred to [facility] for further management on 2022. While on admission at [facility] tracheostomy was placed on 10/12/2022 and PEG tube placed on 10/19/2022. Tunneled hemodialysis catheter was placed 10/20/2022. #Acute on chronic respiratory failure s/p trach  #Cough with yellow secretions  #Concern for HCAP  #Sepsis present on admission   -presented with SOB for 1 day associated with nausea, mild chest and back pain, cough  - Initially with , RR 23  -trach at [facility] 10/12/2022  -On home 5L O2 on trach collar, and now back to baseline.   - Initially CXR: bibasilar atelectasis  -BNP elevated to >10 000  -Procal 0.319 -> 0.266  - Cough improved further compared to yesterday  - MRSA nares -ve  - Blood cultures  -ve so far  - On cefepime and off vanc  - Viral panel -ve  - Respiratory culture -ve  - Pulm on board    Plan:  Continue nebs  Continue aggressive pulm toilet  3% NaCl nebulization   Duoneb nebulization   Continue abx for a total of 7 days.   F/U pulm recs    #WINIFRED likely prerenal in the setting of shock from cardiac surgery  -HD T, , S  -Was started on inpatient hemodialysis during recent admission at [address] [place] [address] on board    Plan:  F/U nephro recs    #CAD s/p CABG  #Troponin elevation possibly in the setting of demand ischemia and WINIFRED  -22 CABG x3 LIMA to LAD; SVG sequential to PDA RCA  -2D echo done in 2022 show EF of 50 to 55% with grade 3 diastolic dysfunction    -Recent echo at [facility] show EF  60 to 65 %  -Troponins on presentation 0.249  -EKG without acute ischemic changes  -Pt without chest pain    Plan:  F/U Cardiology recs. #Macrocytic anemia 2/2 likely anemia of chronic disease  -Hb 9.4 .4   - Ferritin 778 with low iron and TIBC. Normal folate and vitamin B12    Plan:  Continue to monitor   Defer EPO to nephro    #Elevated Alk phos   -Alk phos: 228 on presentation       #constipation  -no BM for 5 days on presentation   - On miralax and ernesto-abhijeet    #Moderate Protein Malnutrition    Chronic medical problems:  #Chronic persistent atrial fibrillation s/p MAZE 9/22/22  Continue amiodarone 200 mg daily and Coreg 6.25 mg twice daily. Hold on mornings of hemodialysis  Continue warfarin    #DVT (non-occlusive) mid R femoral vein  -Dx on duplex RLE 9/12/22   -Continue warfarin - INR 1.83 today      #Diabetes mellitus  -Start Lantus 5U HS and continue LDSS  -Hypoglycemic protocol     #Hypertension  -Off losartan  - On coreg 6.25 mg BID with parameters      #Hypotension  - On midodrine 10mg daily     #Hypothyroidism  -On levothyroxine 75 mcg     #s/p PEG tube placement on 10/19/2022  -Secondary to esophageal dysphagia  -Nutrition consult for tube feeding and management  - SLP eval done and pt. To be kept NPO but ok for ice chips. Diet Diet NPO Exceptions are: Other (Specify); Specify Other Exceptions: Tube Feed/PEG  ADULT TUBE FEEDING; PEG; Renal Formula; Continuous; 20; Yes; 10; Q 4 hours; 60; 30; Q 4 hours   DVT Prophylaxis [] Lovenox, []  Heparin, [] SCDs, [] Ambulation,  [] Eliquis, [] Xarelto  [x] Coumadin   Code Status Full Code   Disposition From: NH  Expected Disposition: [facility]  Estimated Date of Discharge: Ready for discharge to nursing home. Pending approval   Surrogate Decision Maker/ DEVI Teague     Subjective:     Chief Complaint: Shortness of Breath       Patient states he feels about the same as yesterday. I saw him after HD and he was a bit tachycardic.  He states his congestion is stable and he feels ok overall. Improved cough today. See ROS      Review of Systems:    General: No fever, no chills  Heart: No chest pain, no palpitations, no PND, no orthopnea  Lungs: No shortness of breath, + cough  Abdomen: No abdominal pain, no nausea, no vomiting, +constipation, no diarrhea  : No frequency, no dysuria, no urgency, no decreased urination  MSK: No lower extremity swelling  Neuro: No confusion, no weakness  Skin: No rashes      Objective: Intake/Output Summary (Last 24 hours) at 12/15/2022 1520  Last data filed at 12/15/2022 1055  Gross per 24 hour   Intake 2200 ml   Output 5083 ml   Net -2883 ml          Vitals:   Vitals:    12/15/22 1203   BP: 100/63   Pulse: (!) 104   Resp: 26   Temp: 97.8 °F (36.6 °C)   SpO2: 99%       Physical Exam:     General: NAD, AAO x3  Eyes: EOMI  HENT: trach in place with trach collar - 5L/min. CVS: Normal S1 and S2, no murmurs, RRR. Occasional PVC  Respiratory: Normal breath sounds, clear to auscultation bilaterally, no respiratory distress, Minimal secretions coming out from trach today as well  GI: Normal bowel sounds, soft, nondistended, nontender. PEG in situ and receiving TF  MSK:  no lower extremity edema  Skin: Intact, dry, warm, no rashes.  Stasis dermatitis B/L LE  Neuro: CN II to XII grossly intact  Psych: Normal mood    Medications:   Medications:    insulin glargine  5 Units SubCUTAneous Nightly    ipratropium-albuterol  1 ampule Inhalation 4x daily    warfarin  3 mg Oral Daily    midodrine  10 mg Oral TID     sodium chloride flush  5-40 mL IntraVENous 2 times per day    aspirin  81 mg Per NG tube Daily    amiodarone  200 mg Per G Tube Daily    atorvastatin  40 mg PEG Tube Nightly    Nephronex  5 mL PEG Tube Daily    traZODone  100 mg PEG Tube Nightly    levothyroxine  75 mcg Oral Daily    insulin lispro  0-8 Units SubCUTAneous TID     insulin lispro  0-4 Units SubCUTAneous Nightly    carvedilol  6.25 mg PEG Tube BID     cefepime 1,000 mg IntraVENous Q24H    polyethylene glycol  17 g Oral BID      Infusions:    sodium chloride 5 mL/hr (12/14/22 1820)     PRN Meds: sodium chloride (Inhalant), 4 mL, PRN  sennosides-docusate sodium, 2 tablet, Daily PRN  sodium chloride flush, 5-40 mL, PRN  sodium chloride, , PRN  ondansetron, 4 mg, Q8H PRN   Or  ondansetron, 4 mg, Q6H PRN  polyethylene glycol, 17 g, Daily PRN  acetaminophen, 650 mg, Q6H PRN   Or  acetaminophen, 650 mg, Q6H PRN      Labs           Imaging/Diagnostics Last 24 Hours   XR CHEST PORTABLE    Result Date: 12/11/2022  EXAMINATION: ONE XRAY VIEW OF THE CHEST 12/11/2022 9:40 pm COMPARISON: Chest radiograph 09/24/2022. HISTORY: ORDERING SYSTEM PROVIDED HISTORY: chest pain TECHNOLOGIST PROVIDED HISTORY: Reason for exam:->chest pain Reason for Exam: respiratory distress Additional signs and symptoms: chest pain, sob, FINDINGS: A tracheostomy tube is in place. A right chest tunneled dialysis catheter terminates in the right atrium. Status post median sternotomy. The cardiac silhouette is enlarged but stable. Shallow inspiration. Mild bibasilar opacities. No pneumothorax, vascular congestion, consolidation, or pleural effusion is identified. No acute osseous abnormality. Shallow inspiration with mild bibasilar opacities compatible with atelectasis.        Electronically signed by Neville Hartmann MD on 12/15/2022 at 3:20 PM

## 2022-12-15 NOTE — PROGRESS NOTES
Nephrology Progress Note  12/15/2022 2:22 PM  Subjective: Interval History: Aristeo Nogueira is a 71 y.o. male  more awake lucid interactive doing extra dialysis for volume removal and work on new nursing home for discharge      Data:   Scheduled Meds:   insulin glargine  5 Units SubCUTAneous Nightly    ipratropium-albuterol  1 ampule Inhalation 4x daily    warfarin  3 mg Oral Daily    midodrine  10 mg Oral TID WC    sodium chloride flush  5-40 mL IntraVENous 2 times per day    aspirin  81 mg Per NG tube Daily    amiodarone  200 mg Per G Tube Daily    atorvastatin  40 mg PEG Tube Nightly    Nephronex  5 mL PEG Tube Daily    traZODone  100 mg PEG Tube Nightly    levothyroxine  75 mcg Oral Daily    insulin lispro  0-8 Units SubCUTAneous TID WC    insulin lispro  0-4 Units SubCUTAneous Nightly    sevelamer  1.6 g Per G Tube TID WC    carvedilol  6.25 mg PEG Tube BID WC    cefepime  1,000 mg IntraVENous Q24H    polyethylene glycol  17 g Oral BID     Continuous Infusions:   sodium chloride 5 mL/hr (12/14/22 1820)         CBC   Recent Labs     12/13/22  0650 12/15/22  0515   WBC 7.4 9.8   HGB 8.2* 9.4*   HCT 26.6* 28.0*    327      BMP   Recent Labs     12/13/22  0650 12/14/22  0455 12/15/22  0515    136 135   K 4.2 4.0 4.3   CL 95* 95* 95*   CO2 27 25 22   PHOS  --  3.4 2.1*   BUN 41* 58* 43*   CREATININE 4.9* 5.6* 4.4*     Hepatic:   No results for input(s): AST, ALT, ALB, BILITOT, ALKPHOS in the last 72 hours. Troponin: No results for input(s): TROPONINI in the last 72 hours. BNP: No results for input(s): BNP in the last 72 hours. Lipids: No results for input(s): CHOL, HDL in the last 72 hours.     Invalid input(s): LDLCALCU  ABGs:   Lab Results   Component Value Date/Time    PO2ART 70.3 09/24/2022 11:49 AM    XQU5RAT 35.5 09/24/2022 11:49 AM     INR:   Recent Labs     12/13/22  0650 12/14/22  0455 12/15/22  0515   INR 1.26 1.32 1.83     Renal Labs  Albumin:    Lab Results   Component Value Date/Time    LABALBU 2.8 12/14/2022 04:55 AM     Calcium:    Lab Results   Component Value Date/Time    CALCIUM 9.4 12/15/2022 05:15 AM     Phosphorus:    Lab Results   Component Value Date/Time    PHOS 2.1 12/15/2022 05:15 AM     U/A:    Lab Results   Component Value Date/Time    NITRU NEGATIVE 09/17/2022 06:21 PM    COLORU YELLOW 09/17/2022 06:21 PM    PHUR 6.5 06/30/2021 02:11 PM    WBCUA NONE SEEN 09/17/2022 06:21 PM    RBCUA NONE SEEN 09/17/2022 06:21 PM    MUCUS RARE 09/17/2022 06:21 PM    TRICHOMONAS NONE SEEN 09/17/2022 06:21 PM    BACTERIA NEGATIVE 09/17/2022 06:21 PM    CLARITYU CLEAR 09/17/2022 06:21 PM    SPECGRAV 1.015 09/17/2022 06:21 PM    UROBILINOGEN 4.0 09/17/2022 06:21 PM    BILIRUBINUR NEGATIVE 09/17/2022 06:21 PM    BILIRUBINUR small 06/30/2021 02:11 PM    BLOODU NEGATIVE 09/17/2022 06:21 PM    GLUCOSEU neg 06/30/2021 02:11 PM    KETUA TRACE 09/17/2022 06:21 PM     ABG:    Lab Results   Component Value Date/Time    NIM5SHO 35.5 09/24/2022 11:49 AM    PO2ART 70.3 09/24/2022 11:49 AM    SVT0NBE 21.2 09/24/2022 11:49 AM     HgBA1c:    Lab Results   Component Value Date/Time    LABA1C 7.5 09/15/2022 12:55 PM     Microalbumen/Creatinine ratio:  No components found for: RUCREAT  TSH:    Lab Results   Component Value Date/Time    TSH 1.66 04/03/2018 11:33 AM     IRON:    Lab Results   Component Value Date/Time    IRON 54 12/13/2022 06:50 AM     Iron Saturation:  No components found for: PERCENTFE  TIBC:    Lab Results   Component Value Date/Time    TIBC 217 12/13/2022 06:50 AM     FERRITIN:    Lab Results   Component Value Date/Time    FERRITIN 778 12/13/2022 06:50 AM     RPR:  No results found for: RPR  YAYA:  No results found for: ANATITER, YAYA  24 Hour Urine for Creatinine Clearance:  No components found for: CREAT4, UHRS10, UTV10      Objective:   I/O: 12/14 0701 - 12/15 0700  In: 1700   Out: 3650 [Urine:150]  I/O last 3 completed shifts:   In: 2591.4 [I.V.:14.6; NG/GT:1987; IV Piggyback:89.8]  Out: 5300 [facility] this shift: In: 500   Out: 1583   Vitals: /63   Pulse (!) 104   Temp 97.8 °F (36.6 °C) (Oral)   Resp 26   Ht 6' 2\" (1.88 m)   Wt 258 lb 9.6 oz (117.3 kg)   SpO2 99%   BMI 33.20 kg/m²  {  General appearance: awake weak  HEENT: Head: Normal, normocephalic, atraumatic.   Neck: Positive trach  Lungs: diminished breath sounds bilaterally positive congestion  Heart: S1, S2 normal  Abdomen: abnormal findings:  soft nt  Extremities: edema trace  Neurologic: Mental status: alertness: Awake       Assessment and Plan:      IMP:   #1 end-stage renal disease on dialysis Monday Wednesday Friday   #2 status post CABG   #3 respiratory status post trach   #4  moderate protein malnutrition with PEG tube   #5 shortness of breath   #6 anemia   #7 hypertension with idiopathic hypotension    Plan      #1 do next dialysis Friday did 2 hours of extra dialysis today for volume removal   #2 cardiac stable monitor   #3 pulmonary following trach care as well   #4 follow-up swallow eval maintain with PEG tube   #5 oxygenation slowly better   #6 hemoglobin low stable   #7 blood pressure low stable monitor     supportive care work on  [place] for discharge planning   phosphorus low stop renvela powder for now             Abdiel Goode MD, MD

## 2022-12-15 NOTE — DISCHARGE INSTR - COC
Continuity of Care Form    Patient Name: Ivelisse Gonzalez   :  1950  MRN:  [id number]    Admit date:  2022  Discharge date:  2022    Code Status Order: Full Code   Advance Directives:     Admitting Physician:  No admitting provider for patient encounter. PCP: ANKITA Dangelo CNP    Discharging Nurse: Ananda Coyne [facility] Unit/Room#: 6805/0269-G  Discharging Unit Phone Number: [phone]    Emergency Contact:   Extended Emergency Contact Information  Primary Emergency Contact:  Paul Brown  Address[address] [place] [address] Phone: [phone]  Mobile Phone: [phone]  Relation: Brother/Sister  Secondary Emergency Contact: [address] [facility] Phone: [phone]  Relation: Unknown    Past Surgical History:  Past Surgical History:   Procedure Laterality Date    CARDIAC CATHETERIZATION  08    mild disease mid RCA,  stent to LAD patent,    CARDIAC CATHETERIZATION  3/07    Stent to LAD patent, Diagonal 40-50%, RCA 40-50%    CARDIAC CATHETERIZATION  3/05    COLONOSCOPY      COLONOSCOPY  16    1 polyp removed, diverticulosis and hemorrhoids noted    CORONARY ANGIOPLASTY WITH STENT PLACEMENT  2005    PTCA with 3.5 x 16 TAXUS stent to LAD    CORONARY ARTERY BYPASS GRAFT N/A 2022    CABG CORONARY ARTERY BYPASS x3 (LIMA TO LAD, SVG TO PDA-RCA SEQUENTIAL) , INTRAOPERATIVE [place], ENDOVEIN HARVEST OF LEFT SAPHENOUS VEIN, MODIFIED MAZE PROCEDURE, LEFT ATRIAL APPENDAGE LIGATION, INTERCOSTAL NERVE BLOCK, INTRA-AORTIC BALLOON PUMP INSERTION performed by Isis Platt MD at [address] [place] [address] Entrance, COLON, DIAGNOSTIC      egd    HAND SURGERY Right [address] DISLOCATN+FIXATN Right 6/3/2019    RIGHT OPEN REDUCTION INTERNAL FIXATION OF DISTAL TIBIA  AND FIBULA performed by Dariusz Cameron MD at [address] [place] [address] N/A 2022    STERNUM WASHOUT performed by Isis Platt MD at [place] [address] Signs: BP (!) 76/52   Pulse 100   Temp 96.9 °F (36.1 °C) (Oral)   Resp 22   Ht 6' 2\" (1.88 m)   Wt 258 lb 9.6 oz (117.3 kg)   SpO2 97%   BMI 33.20 kg/m²     Last documented pain score (0-10 scale): Pain Level: 0  Last Weight:   Wt Readings from Last 1 Encounters:   12/16/22 258 lb 9.6 oz (117.3 kg)     Mental Status:  oriented, alert, coherent, logical, and thought processes intact    IV Access:  - None    Nursing Mobility/ADLs:  Walking   Dependent  Transfer  Dependent  Bathing  Assisted  Dressing  Assisted  Toileting  Assisted  Feeding  Independent  Med Admin  Dependent  Med Delivery    pegtube    Wound Care Documentation and Therapy:  Wound 12/12/22 Pretibial Left (Active)   Number of days: 4       Wound 12/12/22 Buttocks Left (Active)   Dressing Status Clean;Dry; Intact 12/15/22 0850   Wound Cleansed Soap and water 12/15/22 0850   Drainage Amount None 12/15/22 2030   Number of days: 4       Incision 09/22/22 Knee Anterior; Left (Active)   Number of days: 85       Incision 09/22/22 Sternum (Active)   Number of days: 85        Elimination:  Continence: Bowel: Yes  Bladder: Yes  Urinary Catheter: None   Colostomy/Ileostomy/Ileal Conduit: No       Date of Last BM: 12/16/2022    Intake/Output Summary (Last 24 hours) at 12/16/2022 1256  Last data filed at 12/16/2022 1242  Gross per 24 hour   Intake 3818 ml   Output --   Net 3818 ml     I/O last 3 completed shifts: In: 9213 [NG/GT:3387]  Out: 1583     Safety Concerns:      At Risk for Falls    Impairments/Disabilities:      None      Patient's personal belongings (please select all that are sent with patient):  None    RN SIGNATURE:  Electronically signed by Martha Chacon RN on 12/16/22 at 3:50 PM EST    CASE MANAGEMENT/SOCIAL WORK SECTION    Inpatient Status Date: ***    Readmission Risk Assessment Score:  Readmission Risk              Risk of Unplanned Readmission:  28           Discharging to Facility/ Agency   Name: Annalee Byrne  Address: [address]  Phone: [phone]  Fax: [phone]    Dialysis Facility (if applicable)   Name:  Address:  Dialysis Schedule:  Phone:  Fax:        PHYSICIAN SECTION    Prognosis: Fair    Condition at Discharge: Stable    Nutrition Therapy:  Current Nutrition Therapy:   - Tube Feedings:  Renal at 60ml/hr with free water flushes- 30 ml every 4 hours    Routes of Feeding: Gastrostomy Tube  Liquids: No Liquids  Daily Fluid Restriction: no  Last Modified Barium Swallow with Video (Video Swallowing Test): done on 12/15/2022    Treatments at the Time of Hospital Discharge:   Respiratory Treatments: duoneb 4 times a day, 3% NaCl nebulization twice a day for secretions, Trach suction regularly   Oxygen Therapy:  is on oxygen at 5 L/min per trach mask  Ventilator:    - No ventilator support    Rehab Therapies: Physical Therapy, Occupational Therapy, and Speech/Language Therapy  Weight Bearing Status/Restrictions: No weight bearing restrictions  Other Medical Equipment (for information only, NOT a DME order):  per PT/OT      Rehab Potential (if transferring to Rehab): Fair    Recommended Labs or Other Treatments After Discharge:   F/U with pulmonology and nephrology - to call to schedule appointment      MD Simón Chin MD  Nephrology [phone] [place] [address]     Next Steps: Schedule an appointment as soon as possible for a visit  Appointment:   Instructions: Hospital Follow up    [facility], MD [facility], MD  Pulmonary Disease [phone] [address] [place] [address]     Next Steps: Schedule an appointment as soon as possible for a visit  Appointment:   Instructions: Hospital Follow up         CBC and BMP in a few days. POCT glucose checks every 6 hours. INR daily for 1 week, if INR between 2-3, then every 2-3 days for 2 weeks - if INR still between 2-3 then weekly for 4 weeks, if still between 2-3 then monthly.      Dialysis M/W/F    Physician Certification: I certify the above information and transfer of Jono Vega  is necessary for the continuing treatment of the diagnosis listed and that [facility]. Update Admission H&P: Changes in H&P as follows - patient did not grow anything on his blood cultures. His respiratory culture was normal as well. Underwent modified barium swallow with video - recommend continuing NPO. His A1c was 7.1% - he is being sent with 5U nightly Lantus.      PHYSICIAN SIGNATURE:  Electronically signed by Beverley Palmer MD on 12/15/22 at 1:08 PM EST

## 2022-12-16 VITALS
WEIGHT: 258.6 LBS | TEMPERATURE: 96.9 F | BODY MASS INDEX: 33.19 KG/M2 | DIASTOLIC BLOOD PRESSURE: 56 MMHG | OXYGEN SATURATION: 98 % | RESPIRATION RATE: 19 BRPM | SYSTOLIC BLOOD PRESSURE: 91 MMHG | HEART RATE: 98 BPM | HEIGHT: 74 IN

## 2022-12-16 LAB
ANION GAP SERPL CALCULATED.3IONS-SCNC: 18 MMOL/L (ref 4–16)
ANISOCYTOSIS: ABNORMAL
BUN BLDV-MCNC: 50 MG/DL (ref 6–23)
CALCIUM SERPL-MCNC: 9.9 MG/DL (ref 8.3–10.6)
CHLORIDE BLD-SCNC: 92 MMOL/L (ref 99–110)
CO2: 24 MMOL/L (ref 21–32)
CREAT SERPL-MCNC: 4.8 MG/DL (ref 0.9–1.3)
CULTURE: NORMAL
CULTURE: NORMAL
DIFFERENTIAL TYPE: ABNORMAL
EOSINOPHILS ABSOLUTE: 0.1 K/CU MM
EOSINOPHILS RELATIVE PERCENT: 1 % (ref 0–3)
GFR SERPL CREATININE-BSD FRML MDRD: 12 ML/MIN/1.73M2
GLUCOSE BLD-MCNC: 225 MG/DL (ref 70–99)
GLUCOSE BLD-MCNC: 288 MG/DL (ref 70–99)
GLUCOSE BLD-MCNC: 340 MG/DL (ref 70–99)
GLUCOSE BLD-MCNC: 382 MG/DL (ref 70–99)
HCT VFR BLD CALC: 30.3 % (ref 42–52)
HEMOGLOBIN: 9.4 GM/DL (ref 13.5–18)
INR BLD: 2.11 INDEX
LYMPHOCYTES ABSOLUTE: 2.6 K/CU MM
LYMPHOCYTES RELATIVE PERCENT: 23 % (ref 24–44)
Lab: NORMAL
Lab: NORMAL
MAGNESIUM: 2.7 MG/DL (ref 1.8–2.4)
MCH RBC QN AUTO: 31.8 PG (ref 27–31)
MCHC RBC AUTO-ENTMCNC: 31 % (ref 32–36)
MCV RBC AUTO: 102.4 FL (ref 78–100)
METAMYELOCYTES ABSOLUTE COUNT: 0.12 K/CU MM
METAMYELOCYTES PERCENT: 1 %
MONOCYTES ABSOLUTE: 0.9 K/CU MM
MONOCYTES RELATIVE PERCENT: 8 % (ref 0–4)
MYELOCYTE PERCENT: 3 %
MYELOCYTES ABSOLUTE COUNT: 0.35 K/CU MM
NUCLEATED RED BLOOD CELLS: 3
PDW BLD-RTO: 20 % (ref 11.7–14.9)
PHOSPHORUS: 3 MG/DL (ref 2.5–4.9)
PLATELET # BLD: 359 K/CU MM (ref 140–440)
PMV BLD AUTO: 8.9 FL (ref 7.5–11.1)
POTASSIUM SERPL-SCNC: 4.1 MMOL/L (ref 3.5–5.1)
PROTHROMBIN TIME: 27.4 SECONDS (ref 11.7–14.5)
RBC # BLD: 2.96 M/CU MM (ref 4.6–6.2)
SARS-COV-2, NAAT: NOT DETECTED
SEGMENTED NEUTROPHILS ABSOLUTE COUNT: 7.4 K/CU MM
SEGMENTED NEUTROPHILS RELATIVE PERCENT: 64 % (ref 36–66)
SODIUM BLD-SCNC: 134 MMOL/L (ref 135–145)
SOURCE: NORMAL
SPECIMEN: NORMAL
SPECIMEN: NORMAL
WBC # BLD: 11.5 K/CU MM (ref 4–10.5)

## 2022-12-16 PROCEDURE — 6370000000 HC RX 637 (ALT 250 FOR IP): Performed by: INTERNAL MEDICINE

## 2022-12-16 PROCEDURE — 97163 PT EVAL HIGH COMPLEX 45 MIN: CPT

## 2022-12-16 PROCEDURE — 6360000002 HC RX W HCPCS: Performed by: INTERNAL MEDICINE

## 2022-12-16 PROCEDURE — 80048 BASIC METABOLIC PNL TOTAL CA: CPT

## 2022-12-16 PROCEDURE — 83735 ASSAY OF MAGNESIUM: CPT

## 2022-12-16 PROCEDURE — 85007 BL SMEAR W/DIFF WBC COUNT: CPT

## 2022-12-16 PROCEDURE — 85610 PROTHROMBIN TIME: CPT

## 2022-12-16 PROCEDURE — 2580000003 HC RX 258: Performed by: STUDENT IN AN ORGANIZED HEALTH CARE EDUCATION/TRAINING PROGRAM

## 2022-12-16 PROCEDURE — 85027 COMPLETE CBC AUTOMATED: CPT

## 2022-12-16 PROCEDURE — 87635 SARS-COV-2 COVID-19 AMP PRB: CPT

## 2022-12-16 PROCEDURE — 36415 COLL VENOUS BLD VENIPUNCTURE: CPT

## 2022-12-16 PROCEDURE — 90935 HEMODIALYSIS ONE EVALUATION: CPT

## 2022-12-16 PROCEDURE — 97530 THERAPEUTIC ACTIVITIES: CPT

## 2022-12-16 PROCEDURE — 82962 GLUCOSE BLOOD TEST: CPT

## 2022-12-16 PROCEDURE — 94761 N-INVAS EAR/PLS OXIMETRY MLT: CPT

## 2022-12-16 PROCEDURE — 2700000000 HC OXYGEN THERAPY PER DAY

## 2022-12-16 PROCEDURE — 84100 ASSAY OF PHOSPHORUS: CPT

## 2022-12-16 PROCEDURE — 6370000000 HC RX 637 (ALT 250 FOR IP): Performed by: STUDENT IN AN ORGANIZED HEALTH CARE EDUCATION/TRAINING PROGRAM

## 2022-12-16 PROCEDURE — 94640 AIRWAY INHALATION TREATMENT: CPT

## 2022-12-16 RX ORDER — WARFARIN SODIUM 2 MG/1
TABLET ORAL
Qty: 30 TABLET | Refills: 0
Start: 2022-12-16

## 2022-12-16 RX ADMIN — INSULIN LISPRO 8 UNITS: 100 INJECTION, SOLUTION INTRAVENOUS; SUBCUTANEOUS at 09:02

## 2022-12-16 RX ADMIN — ASPIRIN 81 MG: 81 TABLET, CHEWABLE ORAL at 09:03

## 2022-12-16 RX ADMIN — IPRATROPIUM BROMIDE AND ALBUTEROL SULFATE 1 AMPULE: .5; 2.5 SOLUTION RESPIRATORY (INHALATION) at 08:51

## 2022-12-16 RX ADMIN — INSULIN LISPRO 4 UNITS: 100 INJECTION, SOLUTION INTRAVENOUS; SUBCUTANEOUS at 16:54

## 2022-12-16 RX ADMIN — LEVOTHYROXINE SODIUM 75 MCG: 0.07 TABLET ORAL at 06:20

## 2022-12-16 RX ADMIN — IPRATROPIUM BROMIDE AND ALBUTEROL SULFATE 1 AMPULE: .5; 2.5 SOLUTION RESPIRATORY (INHALATION) at 16:17

## 2022-12-16 RX ADMIN — EPOETIN ALFA-EPBX 8000 UNITS: 4000 INJECTION, SOLUTION INTRAVENOUS; SUBCUTANEOUS at 11:48

## 2022-12-16 RX ADMIN — SODIUM CHLORIDE, PRESERVATIVE FREE 10 ML: 5 INJECTION INTRAVENOUS at 09:04

## 2022-12-16 RX ADMIN — MIDODRINE HYDROCHLORIDE 10 MG: 5 TABLET ORAL at 16:55

## 2022-12-16 RX ADMIN — AMIODARONE HYDROCHLORIDE 200 MG: 200 TABLET ORAL at 09:03

## 2022-12-16 RX ADMIN — MIDODRINE HYDROCHLORIDE 10 MG: 5 TABLET ORAL at 12:26

## 2022-12-16 RX ADMIN — MIDODRINE HYDROCHLORIDE 10 MG: 5 TABLET ORAL at 09:03

## 2022-12-16 RX ADMIN — INSULIN LISPRO 2 UNITS: 100 INJECTION, SOLUTION INTRAVENOUS; SUBCUTANEOUS at 12:26

## 2022-12-16 ASSESSMENT — PAIN SCALES - GENERAL
PAINLEVEL_OUTOF10: 0
PAINLEVEL_OUTOF10: 0

## 2022-12-16 NOTE — CARE COORDINATION
Dr Thang Flores notified CM that pt told him that he does not want to go to [facility]. This CM attempted to meet with pt to verify. Pt is not in room at this time. CM will see pt when he returns to room.   TE

## 2022-12-16 NOTE — PROGRESS NOTES
Pt discharged, was taken by [facility] along with belongings and oxygen support, PEG tube secured.

## 2022-12-16 NOTE — CARE COORDINATION
Pt returned from dialysis. CM met with pt to verify that he is still agreeable to go to [address]. Pt states that he is agreeable to go to [address]. CM called [facility] and left her a VM to notify CM if they are able to accept pt today or not. Rapid Covid ordered. D/c instructions are on front of packet.   TE

## 2022-12-16 NOTE — DISCHARGE SUMMARY
Discharge Summary    Name:  Teresa Stanford /Age/Sex: 1950  (71 y.o. male)   MRN & CSN:  [id number] & [id number] Admission Date/Time: 2022  9:10 PM   Attending:  Carlito Hernadez MD Discharging Physician: Carlito Hernadez MD       Admission Diagnosis:   Acute on chronic respiratory failure   WINIFRED secondary to shock from cardiac surgery   CAD status post CABG  Chronic persistent atrial fibrillation s/p MAZE 22  DVT (non-occlusive) mid R femoral vein  Diabetes mellitus  HTN  Hypothyroidism   History of PEG tube placement on 10/19/2022    Discharge Diagnosis:  Acute on chronic respiratory failure s/p trach  Cough with yellow secretions  Concern for HCAP  Sepsis present on admission   WINIFRED likely prerenal in the setting of shock from cardiac surgery on HD  CAD s/p CABG  Troponin elevation possibly in the setting of demand ischemia and WINIFRED  Macrocytic anemia 2/2 likely anemia of chronic disease  Elevated Alk phos   constipation  Moderate Protein Malnutrition  Chronic persistent atrial fibrillation s/p MAZE 22  DVT (non-occlusive) mid R femoral vein  Diabetes mellitus  Hypertension  Hypotension  Hypothyroidism  s/p PEG tube placement on 10/19/2022      Discharge Exam  BP 89/61   Pulse 94   Temp 97.2 °F (36.2 °C)   Resp 20   Ht 6' 2\" (1.88 m)   Wt 258 lb 9.6 oz (117.3 kg)   SpO2 96%   BMI 33.20 kg/m²     General: NAD, AAO x3  Eyes: EOMI  HENT: trach in place with trach collar - 5L/min. CVS: Normal S1 and S2, no murmurs, Occasionally tachycardic. Occasional PVC  Respiratory: Normal breath sounds, clear to auscultation bilaterally, no respiratory distress, Minimal secretions coming out from trach today as well  GI: Normal bowel sounds, soft, nondistended, nontender. PEG in situ and receiving TF  MSK:  no lower extremity edema  Skin: Intact, dry, warm, no rashes.  Stasis dermatitis B/L LE  Neuro: CN II to XII grossly intact  Psych: Normal mood      Hospital Course:   Teresa Stanford is a 71 y.o.  male  who presents with Dyspnea    Patient admitted on 12/12/2022    Per H+P:   Mili Gay is a 71 y.o. male with pmh of CAD status post CABG, permanent A. fib, hypertension, hyperlipidemia and COPD who presents with shortness of breath. Patient presented from a nursing home and states that he has been having difficulty in breathing since yesterday. Patient reports feeling sick with some nausea, mild chest and back pain and cough. Denies any vomiting, fever, chills or diarrhea. Work-up in the ED with labs showed creatinine of 6.6, troponins of 0.249, BNP of 10,000. Chest x-ray showed bibasilar atelectasis. EKG showed atrial fibrillation. Patient was discharged from this hospital in September after he presented for planned CABG on 9/22/2022 after which she was transferred to [facility] for further management on 9/24/2022. While was on admission at [facility] tracheostomy was placed on 10/12/2022 and PEG tube placed on 10/19/2022. Tunneled hemodialysis catheter was placed 10/20/2022. Was on 5 L of oxygen via trach mask while on admission at [facility], currently on [address] [place] [address] Course, assessment and plan:  #Acute on chronic respiratory failure s/p trach  #Cough with yellow secretions  #Concern for HCAP  #Sepsis present on admission   -presented with SOB for 1 day associated with nausea, mild chest and back pain, cough  - Initially with , RR 23  -trach at [facility] 10/12/2022  -On home 5L O2 on trach collar, and now back to baseline.   - Initially CXR: bibasilar atelectasis  -BNP elevated to >10 000  -Procal 0.319 -> 0.266  - Cough improved further compared to yesterday  - MRSA nares -ve  - Blood cultures  -ve so far  - On cefepime and off vanc  - Viral panel -ve  - Respiratory culture -ve  - Pulm on board     Plan:  Continue nebs  Continue aggressive pulm toilet  Continue 3% NaCl nebulization   Duoneb nebulization   Continue abx for a total of 7 days.   F/U pulm recs     #WINIFRED likely prerenal in the setting of shock from cardiac surgery  -HD T, TH, S  -Was started on inpatient hemodialysis during recent admission at [facility] on board     Plan:  F/U nephro recs     #CAD s/p CABG  #Troponin elevation possibly in the setting of demand ischemia and WINIFRED  -9/22/22 CABG x3 LIMA to LAD; SVG sequential to PDA RCA  -2D echo done in September 2022 show EF of 50 to 55% with grade 3 diastolic dysfunction    -Recent echo at [facility] show EF  60 to 65 %  -Troponins on presentation 0.249  -EKG without acute ischemic changes  -Pt without chest pain     Plan:  F/U Cardiology recs. #Macrocytic anemia 2/2 likely anemia of chronic disease  -Hb 9.4 .4   - Ferritin 778 with low iron and TIBC. Normal folate and vitamin B12     Plan:  Continue to monitor   Defer EPO to nephro     #Elevated Alk phos   -Alk phos: 228 on presentation         #constipation  -no BM for 5 days on presentation   - On miralax and ernesto-abhijeet     #Moderate Protein Malnutrition     Chronic medical problems:  #Chronic persistent atrial fibrillation s/p MAZE 9/22/22  Continue amiodarone 200 mg daily and Coreg 6.25 mg twice daily. Hold on mornings of hemodialysis  Continue warfarin     #DVT (non-occlusive) mid R femoral vein  -Dx on duplex RLE 9/12/22   -Continue warfarin - INR 2.11 today      #Diabetes mellitus  -Continue Lantus 5U HS and continue MDSS while inpatient   -Hypoglycemic protocol     #Hypertension  -Off losartan  - On coreg 6.25 mg BID with parameters      #Hypotension  - On midodrine 10mg daily      #Hypothyroidism  -On levothyroxine 75 mcg     #s/p PEG tube placement on 10/19/2022  -Secondary to esophageal dysphagia  -Nutrition consult for tube feeding and management  - SLP eval done and pt. To be kept NPO but ok for ice chips. Patient deemed stable enough to be discharged to [facility]. The patient expressed appropriate understanding of and agreement with the discharge recommendations, medications, and plan.      Consults this admission:  ELIJAH CONSULT TO NEPHROLOGY  IP CONSULT TO CARDIOLOGY  IP CONSULT TO PHARMACY  IP CONSULT TO DIETITIAN  IP CONSULT TO IV TEAM  PHARMACY TO DOSE VANCOMYCIN  IP CONSULT TO PULMONOLOGY      Discharge Instruction:   Handoff to PCP:     Follow up appointments: With PCP, Nephro and pulm  Primary care physician: Discharge instructions as given to patient:       CBC and BMP in a few days. POCT glucose checks every 6 hours. INR daily for 1 week, if INR between 2-3, then every 2-3 days for 2 weeks - if INR still between 2-3 then weekly for 4 weeks, if still between 2-3 then monthly. Dialysis M/W/F    Diet:    Nutrition Therapy:   - Tube Feedings:  Renal at 60ml/hr with free water flushes- 30 ml every 4 hours    Routes of Feeding: Gastrostomy Tube  Liquids: No Liquids  Daily Fluid Restriction: no  Last Modified Barium Swallow with Video (Video Swallowing Test): done on 12/15/2022    Activity: activity as tolerated  Disposition: Discharged to:   []Home, []HHC, [x]SNF, []Acute Rehab, []Hospice   Condition on discharge: Stable    Discharge Medications:        Medication List        START taking these medications      carvedilol 6.25 MG tablet  Commonly known as: COREG  1 tablet by PEG Tube route 2 times daily (with meals)     cefdinir 300 MG capsule  Commonly known as: OMNICEF  Take 1 capsule by mouth daily for 3 days     ipratropium-albuterol 0.5-2.5 (3) MG/3ML Soln nebulizer solution  Commonly known as: DUONEB  Inhale 3 mLs into the lungs 4 times daily     Lantus SoloStar 100 UNIT/ML injection pen  Generic drug: insulin glargine  Inject 5 Units into the skin nightly     midodrine 10 MG tablet  Commonly known as: PROAMATINE  Take 1 tablet by mouth 3 times daily (with meals)     sennosides-docusate sodium 8.6-50 MG tablet  Commonly known as: SENOKOT-S  Take 2 tablets by mouth daily as needed for Constipation     warfarin 2 MG tablet  Commonly known as: COUMADIN  Take daily.  Goal INR between 2-3            CHANGE how you take these medications      levothyroxine 75 MCG tablet  Commonly known as: SYNTHROID  Take 1 tablet by mouth Daily  What changed:   medication strength  how much to take            CONTINUE taking these medications      amiodarone 200 MG tablet  Commonly known as: CORDARONE     aspirin 81 MG chewable tablet  1 tablet by Per NG tube route daily     atorvastatin 40 MG tablet  Commonly known as: LIPITOR     blood glucose monitor kit and supplies  Dispense sufficient amount for indicated testing frequency plus additional to accommodate PRN testing needs. Dispense all needed supplies to include: monitor, strips, lancing device, lancets, control solutions, alcohol swabs. Lancets Misc  by D3EA route three times a day. melatonin 3 MG Tabs tablet     Nephronex 0.9 MG/5ML Liqd     * Nepro/CarbSteady Liqd     * ProSource TF Liqd     pantoprazole 4 mg/mL in sodium chloride (PF) injection  Infuse 10 mLs intravenously daily     polyethylene glycol 17 GM/SCOOP powder  Commonly known as: GLYCOLAX  Take 17 g by mouth daily     QUEtiapine 50 MG tablet  Commonly known as: SEROQUEL     sevelamer 0.8 g Pack packet  Commonly known as: RENVELA     traZODone 100 MG tablet  Commonly known as: DESYREL     Ventolin  (90 Base) MCG/ACT inhaler  Generic drug: albuterol sulfate HFA           * This list has 2 medication(s) that are the same as other medications prescribed for you. Read the directions carefully, and ask your doctor or other care provider to review them with you.                 STOP taking these medications      dilTIAZem 120 MG tablet  Commonly known as: CARDIZEM     losartan 50 MG tablet  Commonly known as: COZAAR     nitroGLYCERIN 200-5 MCG/ML-% infusion               Where to Get Your Medications        Information about where to get these medications is not yet available    Ask your nurse or doctor about these medications  carvedilol 6.25 MG tablet  cefdinir 300 MG capsule  ipratropium-albuterol 0.5-2.5 (3) MG/3ML Soln nebulizer solution  Lantus SoloStar 100 UNIT/ML injection pen  levothyroxine 75 MCG tablet  midodrine 10 MG tablet  sennosides-docusate sodium 8.6-50 MG tablet  warfarin 2 MG tablet         Objective Findings at Discharge:       BMP/CBC  Recent Labs     12/14/22  0455 12/15/22  0515 12/16/22  0802    135 134*   K 4.0 4.3 4.1   CL 95* 95* 92*   CO2 25 22 24   BUN 58* 43* 50*   CREATININE 5.6* 4.4* 4.8*   WBC  --  9.8 11.5*   HCT  --  28.0* 30.3*   PLT  --  327 359       IMAGING:  Barium video swallow 12/14/2022 - recommend NPO    CXR 12/11/2022   Shallow inspiration with mild bibasilar opacities compatible with atelectasis. ECHO 12/12/2022   Left ventricular systolic function is normal.   Ejection fraction is visually estimated at 55-60%   No significant valvular disease noted. No evidence of any pericardial effusion. Additional Information: Patient seen and examined day of discharge.  For more information regarding patient's care please contact [facility] records [phone]    Discharge Time of 45 minutes    Electronically signed by Marcello Mike MD on 12/16/2022 at 12:18 PM

## 2022-12-16 NOTE — PROGRESS NOTES
V2.0  [facility] Hospitalist Progress Note      Name:  Miriam Casiano /Age/Sex: 1950  (71 y.o. male)   MRN & CSN:  [id number] & [id number] Encounter Date/Time: 2022 7:56 AM EST    Location:  [address] [place], [place] [id number] PCP: Dickson Chanel, [address] [facility] Day: 6    Assessment and Plan:   Miriam Casiano is a 71 y.o. male with  who presents with dyspnea. Patient was discharged from this hospital in September after he presented for planned CABG on 2022 after which she was transferred to [facility] for further management on 2022. While on admission at [facility] tracheostomy was placed on 10/12/2022 and PEG tube placed on 10/19/2022. Tunneled hemodialysis catheter was placed 10/20/2022. #Acute on chronic respiratory failure s/p trach  #Cough with yellow secretions  #Concern for HCAP  #Sepsis present on admission   -presented with SOB for 1 day associated with nausea, mild chest and back pain, cough  - Initially with , RR 23  -trach at [facility] 10/12/2022  -On home 5L O2 on trach collar, and now back to baseline.   - Initially CXR: bibasilar atelectasis  -BNP elevated to >10 000  -Procal 0.319 -> 0.266  - Cough improved further compared to yesterday  - MRSA nares -ve  - Blood cultures  -ve so far  - On cefepime and off vanc  - Viral panel -ve  - Respiratory culture -ve  - Pulm on board    Plan:  Continue nebs  Continue aggressive pulm toilet  Continue 3% NaCl nebulization   Duoneb nebulization   Continue abx for a total of 7 days.   F/U pulm recs    #WINIFRED likely prerenal in the setting of shock from cardiac surgery  -HD T, , S  -Was started on inpatient hemodialysis during recent admission at [facility] on board    Plan:  F/U nephro recs    #CAD s/p CABG  #Troponin elevation possibly in the setting of demand ischemia and WINIFRED  -22 CABG x3 LIMA to LAD; SVG sequential to PDA RCA  -2D echo done in 2022 show EF of 50 to 55% with grade 3 diastolic dysfunction    -Recent echo at [facility] show EF  60 to 65 %  -Troponins on presentation 0.249  -EKG without acute ischemic changes  -Pt without chest pain    Plan:  F/U Cardiology recs. #Macrocytic anemia 2/2 likely anemia of chronic disease  -Hb 9.4 .4   - Ferritin 778 with low iron and TIBC. Normal folate and vitamin B12    Plan:  Continue to monitor   Defer EPO to nephro    #Elevated Alk phos   -Alk phos: 228 on presentation       #constipation  -no BM for 5 days on presentation   - On miralax and ernesto-abhijeet    #Moderate Protein Malnutrition    Chronic medical problems:  #Chronic persistent atrial fibrillation s/p MAZE 9/22/22  Continue amiodarone 200 mg daily and Coreg 6.25 mg twice daily. Hold on mornings of hemodialysis  Continue warfarin    #DVT (non-occlusive) mid R femoral vein  -Dx on duplex RLE 9/12/22   -Continue warfarin - INR 2.11 today      #Diabetes mellitus  -Continue Lantus 5U HS and continue MDSS while inpatient   -Hypoglycemic protocol     #Hypertension  -Off losartan  - On coreg 6.25 mg BID with parameters      #Hypotension  - On midodrine 10mg daily     #Hypothyroidism  -On levothyroxine 75 mcg     #s/p PEG tube placement on 10/19/2022  -Secondary to esophageal dysphagia  -Nutrition consult for tube feeding and management  - SLP eval done and pt. To be kept NPO but ok for ice chips. Diet Diet NPO Exceptions are: Other (Specify); Specify Other Exceptions: Tube Feed/PEG  ADULT TUBE FEEDING; PEG; Renal Formula; Continuous; 20; Yes; 10; Q 4 hours; 60; 30; Q 4 hours   DVT Prophylaxis [] Lovenox, []  Heparin, [] SCDs, [] Ambulation,  [] Eliquis, [] Xarelto  [x] Coumadin   Code Status Full Code   Disposition From: NH  Expected Disposition: [facility]  Estimated Date of Discharge: Ready for discharge to nursing home. Pending approval   Surrogate Decision Maker/ DEVI Cortez     Subjective:     Chief Complaint: Shortness of Breath       Patient states he feels about the same as yesterday. I saw him during HD.  HR was slightly elevated when seen. Cough has improved. No congestion today. See ROS      Review of Systems:    General: No fever, no chills  Heart: No chest pain, no palpitations, no PND, no orthopnea  Lungs: No shortness of breath, + cough  Abdomen: No abdominal pain, no nausea, no vomiting, -constipation, no diarrhea  : No frequency, no dysuria, no urgency, no decreased urination  MSK: No lower extremity swelling  Neuro: No confusion, no weakness  Skin: No rashes      Objective: Intake/Output Summary (Last 24 hours) at 12/16/2022 1515  Last data filed at 12/16/2022 1242  Gross per 24 hour   Intake 4318 ml   Output 1977 ml   Net 2341 ml        Vitals:   Vitals:    12/16/22 1245   BP: (!) 76/52   Pulse: 100   Resp:    Temp:    SpO2:        Physical Exam:     General: NAD, AAO x3  Eyes: EOMI  HENT: trach in place with trach collar - 5L/min. CVS: Normal S1 and S2, no murmurs, Occasionally tachycardic. Occasional PVC  Respiratory: Normal breath sounds, clear to auscultation bilaterally, no respiratory distress, Minimal secretions coming out from trach today as well  GI: Normal bowel sounds, soft, nondistended, nontender. PEG in situ and receiving TF  MSK:  no lower extremity edema  Skin: Intact, dry, warm, no rashes.  Stasis dermatitis B/L LE  Neuro: CN II to XII grossly intact  Psych: Normal mood    Medications:   Medications:    insulin glargine  5 Units SubCUTAneous Nightly    ipratropium-albuterol  1 ampule Inhalation 4x daily    warfarin  2 mg Oral Daily    midodrine  10 mg Oral TID     sodium chloride flush  5-40 mL IntraVENous 2 times per day    aspirin  81 mg Per NG tube Daily    amiodarone  200 mg Per G Tube Daily    atorvastatin  40 mg PEG Tube Nightly    Nephronex  5 mL PEG Tube Daily    traZODone  100 mg PEG Tube Nightly    levothyroxine  75 mcg Oral Daily    insulin lispro  0-8 Units SubCUTAneous TID WC    insulin lispro  0-4 Units SubCUTAneous Nightly    carvedilol  6.25 mg PEG Tube BID WC    cefepime  1,000 mg IntraVENous Q24H    polyethylene glycol  17 g Oral BID      Infusions:    sodium chloride 5 mL/hr (12/15/22 1956)     PRN Meds: sodium chloride (Inhalant), 4 mL, PRN  sennosides-docusate sodium, 2 tablet, Daily PRN  sodium chloride flush, 5-40 mL, PRN  sodium chloride, , PRN  ondansetron, 4 mg, Q8H PRN   Or  ondansetron, 4 mg, Q6H PRN  polyethylene glycol, 17 g, Daily PRN  acetaminophen, 650 mg, Q6H PRN   Or  acetaminophen, 650 mg, Q6H PRN      Labs      Recent Labs     12/16/22  0802 12/15/22  0515 12/13/22  0650   WBC 11.5* 9.8 7.4   HGB 9.4* 9.4* 8.2*   HCT 30.3* 28.0* 26.6*   .4* 101.4* 103.5*    327 282      Lab Results   Component Value Date/Time     12/16/2022 08:02 AM    K 4.1 12/16/2022 08:02 AM    CL 92 12/16/2022 08:02 AM    CO2 24 12/16/2022 08:02 AM    BUN 50 12/16/2022 08:02 AM    CREATININE 4.8 12/16/2022 08:02 AM    GLUCOSE 340 12/16/2022 08:02 AM    CALCIUM 9.9 12/16/2022 08:02 AM      Lab Results   Component Value Date/Time    MG 2.7 12/16/2022 08:02 AM     Lab Results   Component Value Date    CALCIUM 9.9 12/16/2022    PHOS 3.0 12/16/2022       Lab Results   Component Value Date    INR 2.11 12/16/2022    INR 1.83 12/15/2022    INR 1.32 12/14/2022    PROTIME 27.4 (H) 12/16/2022    PROTIME 23.7 (H) 12/15/2022    PROTIME 17.1 (H) 12/14/2022            Imaging/Diagnostics Last 24 Hours   XR CHEST PORTABLE    Result Date: 12/11/2022  EXAMINATION: ONE XRAY VIEW OF THE CHEST 12/11/2022 9:40 pm COMPARISON: Chest radiograph 09/24/2022. HISTORY: ORDERING SYSTEM PROVIDED HISTORY: chest pain TECHNOLOGIST PROVIDED HISTORY: Reason for exam:->chest pain Reason for Exam: respiratory distress Additional signs and symptoms: chest pain, sob, FINDINGS: A tracheostomy tube is in place. A right chest tunneled dialysis catheter terminates in the right atrium. Status post median sternotomy. The cardiac silhouette is enlarged but stable. Shallow inspiration. Mild bibasilar opacities.   No pneumothorax, vascular congestion, consolidation, or pleural effusion is identified. No acute osseous abnormality. Shallow inspiration with mild bibasilar opacities compatible with atelectasis.        Electronically signed by Charlene Petit MD on 12/16/2022 at 3:15 PM

## 2022-12-16 NOTE — PROGRESS NOTES
Nephrology Progress Note  12/16/2022 3:37 PM        Subjective:   Admit Date: 12/11/2022  PCP: ANKITA Maria - CNP    Interval History: Patient seen in early morning, this is a late entry  Mr. Vargas Pressley was alert awake and oriented    Diet: Tube feed per PEG    ROS: He is almost anuric  No overt shortness of breath  No fever and low blood pressure      Data:     Current meds:    insulin glargine  5 Units SubCUTAneous Nightly    ipratropium-albuterol  1 ampule Inhalation 4x daily    warfarin  2 mg Oral Daily    midodrine  10 mg Oral TID WC    sodium chloride flush  5-40 mL IntraVENous 2 times per day    aspirin  81 mg Per NG tube Daily    amiodarone  200 mg Per G Tube Daily    atorvastatin  40 mg PEG Tube Nightly    Nephronex  5 mL PEG Tube Daily    traZODone  100 mg PEG Tube Nightly    levothyroxine  75 mcg Oral Daily    insulin lispro  0-8 Units SubCUTAneous TID WC    insulin lispro  0-4 Units SubCUTAneous Nightly    carvedilol  6.25 mg PEG Tube BID WC    cefepime  1,000 mg IntraVENous Q24H    polyethylene glycol  17 g Oral BID      sodium chloride 5 mL/hr (12/15/22 1956)         I/O last 3 completed shifts:   In: 9519 [NG/GT:[id number]]  Out: 1583     CBC:   Recent Labs     12/15/22  0515 12/16/22  0802   WBC 9.8 11.5*   HGB 9.4* 9.4*    359          Recent Labs     12/14/22  0455 12/15/22  0515 12/16/22  0802    135 134*   K 4.0 4.3 4.1   CL 95* 95* 92*   CO2 25 22 24   BUN 58* 43* 50*   CREATININE 5.6* 4.4* 4.8*   GLUCOSE 240* 293* 340*       Lab Results   Component Value Date    CALCIUM 9.9 12/16/2022    PHOS 3.0 12/16/2022       Objective:     Vitals: BP (!) 76/52   Pulse 100   Temp 96.9 °F (36.1 °C) (Oral)   Resp 22   Ht 6' 2\" (1.88 m)   Wt 258 lb 9.6 oz (117.3 kg)   SpO2 97%   BMI 33.20 kg/m² ,    General appearance: Alert, awake and oriented  HEENT: Positive conjunctival pallor  Neck: Tracheostomy-right IJ tunneled dialysis catheter  Lungs: Positive admission breath sound  Heart: Irregular-well-healed incision from previous sternotomy  Abdomen: Soft-has PEG  Extremities: No gross edema but hyperpigmentation of both legs      Problem List :         Impression :     End-stage kidney disease on maintenance dialysis  Recent respiratory failure partly from fluid overload-  Atherosclerotic cardiovascular disease status post CABG-also sepsis was suspected  Underlying diabetes, history of hypertension and thyroid disease  Aftermath of prolonged hospitalization and several episodes of acute illness    Recommendation/Plan  :     Hemodialysis and ultrafiltration today  Good glycemic control and watch for iatrogenic nosocomial complication  All still have to review his prior records-from his description and available data unlikely he can recover his kidney function-but we will keep our eyes open  Follow clinically-plan is to send him to [facility] will be where he can get his dialysis-I should be able to follow him up      Tessa Stanley MD MD

## 2022-12-16 NOTE — CARE COORDINATION
Notified Ally/CORRINE that the PT notes are in. She will notify CM when she has the approval from [facility], which she states will be today.   TE

## 2022-12-16 NOTE — PROGRESS NOTES
Pulmonary and Critical Care  Progress Note      VITALS:  BP (!) 76/52   Pulse 100   Temp 96.9 °F (36.1 °C) (Oral)   Resp 22   Ht 6' 2\" (1.88 m)   Wt 258 lb 9.6 oz (117.3 kg)   SpO2 97%   BMI 33.20 kg/m²     Subjective:   CHIEF COMPLAINT :SOB     HPI:                The patient is a 71 y.o. male is lying in the bed. He had his HD today. He is hemodynamically stable. He is not in acute resp distress    Objective:   PHYSICAL EXAM:    LUNGS:Occasional basal crackles  Abd-soft, BS+,NT  Ext- no pedal edema  CVS-s1s2, no murmurs      DATA:    CBC:  Recent Labs     12/15/22  0515 12/16/22  0802   WBC 9.8 11.5*   RBC 2.76* 2.96*   HGB 9.4* 9.4*   HCT 28.0* 30.3*    359   .4* 102.4*   MCH 34.1* 31.8*   MCHC 33.6 31.0*   RDW 19.9* 20.0*   NRBC  --  3   SEGSPCT 51.0 64.0   BANDSPCT 1*  --       BMP:  Recent Labs     12/14/22  0455 12/15/22  0515 12/16/22  0802    135 134*   K 4.0 4.3 4.1   CL 95* 95* 92*   CO2 25 22 24   BUN 58* 43* 50*   CREATININE 5.6* 4.4* 4.8*   CALCIUM 9.2 9.4 9.9   GLUCOSE 240* 293* 340*      ABG:  No results for input(s): PH, PO2ART, RBL5CQY, HCO3, BEART, O2SAT in the last 72 hours.   BNP  Lab Results   Component Value Date    BNP 21 10/22/2013      D-Dimer:  Lab Results   Component Value Date    DDIMER REQUEST CREDITED 09/23/2022      Radiology: None      Assessment/Plan     Patient Active Problem List    Diagnosis Date Noted    Dyspnea 12/12/2022     Priority: Medium    Moderate malnutrition ([facility] 12/12/2022     Priority: Medium    CAD, multiple vessel 09/15/2022     Priority: Medium    Hypertension      Priority: Medium    Acute congestive heart failure ([address] 05/31/2022     Priority: Medium    Coronary artery disease involving native coronary artery of native heart with angina pectoris ([facility] 05/31/2022     Priority: Medium    Chronic renal disease, stage III [facility]) [[id number]] 05/31/2022     Priority: Medium    Thyroid disease      Overview Note:     hypothyroid Hyperlipidemia     Type 2 diabetes mellitus ([facility] 11/09/2015    Atrial fibrillation (HCC)     Morbid obesity (HCC)     COPD (chronic obstructive pulmonary disease) (HCC)     Sleep apnea     CAD (coronary artery disease) 03/21/2005     Overview Note:     s/p PTCA with 3.5 X 16 TAXUS stent to LAD     Ac on ch resp failure,s/p trach September 2022  Pneumonia HCA  Sepsis  Copd  S/p PEG       PEG feeding   Trach collar  PMV trials  PT/OT  Inhalers  Keep sats > 92%  Await placement  CJose Martinw present management    Electronically signed by Juanis Owen MD on 12/16/2022 at 1:02 PM

## 2022-12-16 NOTE — CONSULTS
[address] [facility] ACUTE CARE PHYSICAL THERAPY EVALUATION  Aristeo Nogueira, 1950, [id number], 12/16/2022  History  Campo:  The primary encounter diagnosis was Acute congestive heart failure, unspecified heart failure type ([facility] Diagnoses of WINIFRED (acute kidney injury) ([address] and Elevated troponin were also pertinent to this visit. 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, COPD (chronic obstructive pulmonary disease) ([facility] [facility] Depression, Diabetes mellitus ([facility] [facility] Dupuytren's contracture of hand, Family history of cardiovascular disease, GERD (gastroesophageal reflux disease), H/O cardiac catheterization, H/O cardiac catheterization, H/O cardiovascular stress test, H/O cardiovascular stress test, H/O cardiovascular stress test, H/O cardiovascular stress test, H/O Doppler ultrasound, H/O echocardiogram, H/O echocardiogram, H/O echocardiogram, H/O hiatal hernia, Hx of Venous Doppler, Hyperlipidemia, Hypertension, Obesity, S/P PTCA (percutaneous transluminal coronary angioplasty), Sleep apnea, and Thyroid disease. Patient  has a past surgical history that includes Coronary angioplasty with stent (03/21/2005); Cardiac catheterization (6/12/08); Cardiac catheterization (3/07); Cardiac catheterization (3/05); Endoscopy, colon, diagnostic (2014); Colonoscopy (2011); Colonoscopy (5/18/16); Hand surgery (Right, 1997); pr open rx ankle dislocatn+fixatn (Right, 6/3/2019); Sternum Debridement (N/A, 9/24/2022); and Coronary artery bypass graft (N/A, 9/22/2022). Therapy Hx and additional comorbidities:  see above. Trach. Dialysis, notes indicate ending dialysis early today.     Restrictions:  none           Communication with other providers:  cleared for tx and discussed with case management staff  Subjective:  Patient states:  confirms OT eval content, see OT eval.  Not home since trach in Sept d/t cardiac condition/illness/needs. Pain:  none at rest.  Patient goal:  drive a car again  Occupational profile (relevant social history and personal factors):    see OT eval     Examination of body systems (includes body structures/functions, activity/participation limitations):  Vision and Hearing:  [facility] vision and [facility] hearing  Cognition and Participation:  alert, oriented, pleasant and participatory, engaged in service, and follows commands appropriately. Cardiac and Pulmonary Tolerance:  cardiac activity intolerance, tachycardia, AFIB noted on telemetry, hypotensive per telemetry, short of breath upon exertion, and recovers quickly with rest period. HR range  this visit. BP consistently 75/55. RR ideal in teens range, ideal spO2 97%-99% with O2 at [facility]. Mobility skills and general balance:    Bed Mobility: maximal assist.    Sup-sit:  maximal assist.    Sit-static balance: maximal assist.    Sit-stand:  unable. SMobility/Transfer Skills:  Pattern/Sequence:  impaired d/t weakness and difficulty self-positioning. Efficiency:  impaired. Neuro screen:  impaired light touch, impaired proprioception  Musculoskeletal screen:  impaired hip/shoulder AROM. Impaired end range knee flx/ext bilaterally.   Atrophy of hand muscles  AMPAC 6 Clicks Inpatient Mobility:   (Amb in room is defined as 10-30 feet.)  AM-[facility] Mobility Inpatient   How much difficulty turning over in bed?: A Lot  How much difficulty sitting down on / standing up from a chair with arms?: Unable  How much difficulty moving from lying on back to sitting on side of bed?: A Lot  How much help from another person moving to and from a bed to a chair?: Total  How much help from another person needed to walk in hospital room?: Total  How much help from another person for climbing 3-5 steps with a railing?: Total  AM-PAC Inpatient Mobility Raw Score : 8  AM-PAC Inpatient T-Scale Score : 28.52  Mobility Inpatient CMS 0-100% Score: 86.62  Mobility Inpatient CMS G-Code Modifier : CM  Treatment today:    Therapeutic Activity Training:   Therapeutic activity training was instructed today. Cues were given for safety, sequence, UE/LE placement, awareness, and balance. Activities performed today included bed mobility training, sup-sit. Assessment:  Patient admitted for acute on chronic resp failure, cough, sepsis, WINIFRED, CAD s/p CABG with unstable/unpredictable medical features and evolving rehabilitative features. Patient with significant activity INtolerance today related to medical issues and dialysis earlier today. Understandably, became intolerant of sitting with dizziness and weakness upon sitting. Profound mobility impairments compared with indep baseline. Body Structures, Functions, Activity Limitations Requiring Skilled Therapeutic Intervention: Decreased functional mobility , Decreased tolerance to work activity, Decreased coordination, Decreased endurance, Decreased strength, Decreased ADL status, Decreased ROM, Decreased body mechanics, Decreased safe awareness, Decreased balance, Decreased high-level IADLs, Decreased posture. Skilled physical therapy services are appropriate today. There are no significant educational impairments or barriers to learning which prevent participation with service. Prognosis: guarded. Clinical decision making:  high complexity:  hx 3+ personal factors/comorbidities, exam tests and measures for 3+ elements of body/structures/functions/activity limitation/participation restriction, unstable/unpredictable presentation  Discharge recommendations:  moderate post-acute physical therapy service needs and residential facility for post-acute rehabilitation, anticipate 1-2 hours per day and 5 days per week. .      Plan:  Patient is placed on hold for acute care physical therapy.     If patient does not discharge from acute care hospital within a few days, follow-up assessment may become appropriate. Patient mobility with nursing staff is an important component of care during this admission. Mobility homework for patient and nurse:  change position frequently and sit at edge of bed 4-6 times daily  Patient requires maximal assist for mobility with nursing. Max A x1 needed    Time in:  1402  Time out:  1441  Timed treatment minutes:  24  Total treatment time:  44  Electronically signed by:   Yon Cloud, PT  12/16/2022, 2:48 PM

## 2022-12-16 NOTE — CARE COORDINATION
Ally/Patricia notified CM that pt cannot come until he has an updated PT note to verify that he has a skilled need. Notified  and requested PT to see him asap via wb.   TE

## 2022-12-16 NOTE — PROCEDURES
Patient requested to be taken off after 2 hr of HD treatment. Blood pressure low, normal saline bolus given. Retacrit given as ordered. Fluid removed 1477 ml.  HD catheter accessed without problem. Flushed each lumen with normal saline and alcohol caps applied post treatment. Returned to room. Patient Name: Ivelisse Gonzalez  Patient : 1950  MRN: [id number]     Acct: [de-identified]  Date of Admission: 2022  [facility]  Code Status:  Full Code  Allergies: No Known Allergies  Diagnosis:    Patient Active Problem List   Diagnosis    Atrial fibrillation ([facility]    CAD (coronary artery disease)    Morbid obesity ([facility]    COPD (chronic obstructive pulmonary disease) ([facility]    Sleep apnea    Type 2 diabetes mellitus ([facility]    Thyroid disease    Hyperlipidemia    Acute congestive heart failure ([facility]    Coronary artery disease involving native coronary artery of native heart with angina pectoris ([facility]    Chronic renal disease, stage III ([facility] [399221]    Hypertension    CAD, multiple vessel    Dyspnea    Moderate malnutrition ([facility]         Treatment:  Hemodilaysis 2:1  Priority: Routine  Location: Acute Room    Diabetic: Yes  NPO: No  Isolation Precautions: None     Consent for Treatment Verified: Yes  Blood Consent Verified: Not Applicable     Safety Verified: Identify (I), Consent (C), Equipment (E), HepB Status (B), Orders Complete (O), Access Verified (A), and Timeliness (T)  Time out performed prior to access at 0925 hours. Report Received from Primary RN at 0830 hours. Primary RN (First Initial, Last Name, Title):  Lester Okeefe RN  Incapacitated Nurse Education Completed: Not Applicable     HBsAg ONLY:  Date Drawn: 2022       Results: Negative  HBsAb:  Date Drawn:  2022       Results: Susceptible <10    Order  Dialysis Bath  K+ (Potassium): 2  Ca+ (Calcium): 2.5  Na+ (Sodium): 138  HCO3 (Bicarb): 35  Bicarbonate Concentrate Lot No.: [id number]  Acid Concentrate Lot No.: Refill sent to cover the patient until her CPX in 2 weeks   [id number]     Na+ Modeling: Not Applicable  Dialyzer: [id number]  Dialysate Temperature (C):  35  Blood Flow Rate (BFR):  300   Dialysate Flow Rate (DFR):   600        Access to be Utilized   Access: Tunneled Catheter  Location: Subclavian  Side: Right   Needle gauge:  Not Applicable  + Bruit/Thrill: Not Applicable    First Use X-ray Verified: Yes  OK to use line order: Yes    Site Assessment:  Signs and Symptoms of Infection/Inflammation: None  If yes: Not Applicable  Dressing: Dry and Intact  Site Prep: Medical Aseptic Technique  Dressing Changed this Treatment: No  If yes, by whom: NA - not changed today  Date of Last Dressing Change: December 13, 2022  Antimicrobial Patch in place?: Yes  Red Alcohol Caps in place?: Yes  Gauze Dressing?: No  Non Dialysis Use?: No  Comment:    Flows: Good, Patent  If access problem, who was notified:     Pre and Post-Assessment  Patient Vitals for the past 8 hrs:   Level of Consciousness Oriented X Heart Rhythm Respiratory Quality/Effort O2 Device Bilateral Breath Sounds Skin Color Skin Condition/Temp Appetite Abdomen Inspection Bowel Sounds (All Quadrants) Edema RLE Edema LLE Edema Pain Level   12/16/22 0830 0 -- -- Unlabored -- -- Peter;Pink Warm;Dry -- Soft -- Right lower extremity; Left lower extremity Trace;+1 Trace;+1 --   12/16/22 0849 0 4 Regular Unlabored Trach mask Rhonchi Peter;Pink Warm;Dry -- Soft Audible Right lower extremity +1 +1 --   12/16/22 0855 -- -- -- -- Trach mask -- -- -- -- -- -- -- -- -- --   12/16/22 0900 0 -- -- -- Trach mask -- -- -- -- -- -- -- -- -- 0   12/16/22 0928 0 4 Regular Unlabored Trach mask Rhonchi Peter;Pink Warm;Dry Other (Comment) Soft Audible Right lower extremity +1 +1 --   12/16/22 1132 0 -- -- Unlabored Trach mask Rhonchi -- -- Other (Comment) Soft Audible Right lower extremity +1 +1 --   12/16/22 1243 0 -- -- -- Trach mask -- -- -- -- -- -- -- -- -- 0     Labs  Recent Labs     12/15/22  0515 12/16/22  0802   WBC 9.8 11.5*   HGB 9.4* 9.4* HCT 28.0* 30.3*    359                                                                  Recent Labs     12/14/22  0455 12/15/22  0515 12/16/22  0802    135 134*   K 4.0 4.3 4.1   CL 95* 95* 92*   CO2 25 22 24   BUN 58* 43* 50*   CREATININE 5.6* 4.4* 4.8*   GLUCOSE 240* 293* 340*     IV Drips and Rate/Dose   sodium chloride 5 mL/hr (12/15/22 1956)      Safety - Before each treatment:   Dialysis Machine No.: [id number]  RO Machine Number: [id number]  Dialyzer Lot No.: [id number]  RO Machine Log Sheet Completed: Yes  Machine Alarm Self Test: Completed; Passed (12/16/22 0849)     Air Foam Detector: Tested, Proper Function, pH Reading  Extracorporeal Circuit Tested for Integrity: Yes  Machine Conductivity: 13.9  Manual Conductivity: 13.6     Bicarbonate Concentrate Lot No.: [id number]  Acid Concentrate Lot No.: [id number]  Manual Ph: 7.4  Bleach Test (Neg): Yes  Bath Temperature: 95 °F (35 °C)  Tubing Lot#: [id number]  Conductivity Meter Serial #: [id number]  All Connections Secure?: Yes  Venous Parameters Set?: Yes  Arterial Parameters Set?: Yes  Saline Line Double Clamped?: Yes  Air Foam Detector Engaged?: Yes  Machine Functioning Alarm Free?  Yes  Prime Given: 200ml    Chlorine Testing - Before each treatment and every 4 hours:   Treatment  Treatment Number: 1  Time On: 0930  Time Off: 1132  Treatment Goal: 3000  Weight: 258 lb 9.6 oz (117.3 kg) (12/16/22 0928)  1st check: less than 0.1 ppm at: 0700 hours  2nd check: less than 0.1 ppm at: 1045 hours  3rd check: Not Applicable  (if greater than 0.1 ppm, then check every 30 minutes from secondary)    Access Flows and Pressures  Patient Vitals for the past 8 hrs:   Blood Flow Rate (ml/min) Ultrafiltration Rate (ml/hr) Arterial Pressure (mmHg) Venous Pressure (mmHg) TMP DFR Comments Access Visible   12/16/22 0930 300 ml/min 1170 ml/hr -140 mmHg 120 80 600 tx started Yes   12/16/22 0945 300 ml/min 1170 ml/hr -140 mmHg 120 80 600 procuctive cough Yes   12/16/22 1000 300 ml/min 1170 ml/hr -220 mmHg 130 60 600 resting eyes closed Yes   12/16/22 1015 300 ml/min 1170 ml/hr -200 mmHg 140 60 600 eyes closed, resting Yes   12/16/22 1030 300 ml/min 1170 ml/hr -190 mmHg 140 60 600 resting, no change Yes   12/16/22 1045 300 ml/min 1170 ml/hr -190 mmHg 140 60 600 -- Yes   12/16/22 1100 300 ml/min 1170 ml/hr -180 mmHg 130 60 600 -- Yes   12/16/22 1115 300 ml/min 1170 ml/hr -190 mmHg 130 60 600 -- Yes   12/16/22 1130 300 ml/min 0 ml/hr -180 mmHg 130 60 600 -- Yes   12/16/22 1132 300 ml/min 0 ml/hr -190 mmHg 130 60 600 -- Yes     Vital Signs  Patient Vitals for the past 8 hrs:   BP Temp Pulse Resp SpO2 Height Weight Weight Method Percent Weight Change   12/16/22 0830 -- -- (!) 104 -- -- -- -- -- --   12/16/22 0849 -- -- 98 23 96 % -- -- -- --   12/16/22 0853 -- -- 96 19 96 % -- -- -- --   12/16/22 0855 -- -- -- -- 96 % -- -- -- --   12/16/22 0900 (!) 108/50 97 °F (36.1 °C) 93 19 97 % -- -- -- --   12/16/22 0915 (!) 108/50 -- 84 19 98 % -- -- -- --   12/16/22 0928 112/64 97.2 °F (36.2 °C) 80 20 96 % 6' 2\" (1.88 m) 258 lb 9.6 oz (117.3 kg) Bed scale 0   12/16/22 0943 (!) 134/96 -- -- -- -- -- -- -- --   12/16/22 0945 (!) 134/96 -- -- -- -- -- -- -- --   12/16/22 1000 117/63 -- -- -- -- -- -- -- --   12/16/22 1015 104/63 -- 93 -- -- -- -- -- --   12/16/22 1030 89/61 -- 94 -- -- -- -- -- --   12/16/22 1045 (!) 83/54 -- 100 -- -- -- -- -- --   12/16/22 1100 (!) 84/59 -- 99 -- -- -- -- -- --   12/16/22 1115 (!) 137/112 -- (!) 103 -- -- -- -- -- --   12/16/22 1130 (!) 66/50 -- (!) 112 -- -- -- -- -- --   12/16/22 1132 (!) 56/45 -- 99 -- -- -- -- -- --   12/16/22 1145 (!) 77/46 97.1 °F (36.2 °C) (!) 104 -- -- -- -- -- --   12/16/22 1200 -- -- 91 -- -- -- -- -- --   12/16/22 1215 -- -- 94 -- -- -- -- -- --   12/16/22 1230 (!) 71/55 -- 90 19 97 % -- -- -- --   12/16/22 1243 (!) 76/52 96.9 °F (36.1 °C) (!) 109 22 97 % -- -- -- --   12/16/22 1245 (!) 76/52 -- 100 -- -- -- -- -- --     Post-Dialysis  Arterial Catheter Locking Solution: Not Applicable  Venous Catheter Locking Solution: Not Applicable  Post-Treatment Procedures: Blood returned, Catheter Capped, clamped with Saline x2 ports  Machine Disinfection Process: Acid/Vinegar Clean, Heat Disinfect, Exterior Machine Disinfection  Rinseback Volume (ml): 300 ml  Blood Volume Processed (Liters): 31.5 l/min  Dialyzer Clearance: Moderately streaked  Duration of Treatment (minutes): 120 minutes     Hemodialysis Intake (ml): 500 ml  Hemodialysis Output (ml): 1977 ml   Fludi removed : 1477 ml  Tolerated Treatment: Fair  Patient Response to Treatment: fair  Physician Notified: Yes       Provider Notification        Handoff complete and report given to Primary RN at 1145 hours. Primary RN (First Initial, Last Name, Title):  Bam Whitfield RN     Education  Person Educated: Patient   Knowledge Base: Substantial  Barriers to Learning?: None  Preferred method of Learning: Visual  Topic(s): Access Care, Signs and Symptoms of Infection, Fluid Management, Albumin, Procedural, Medications, Treatment Options, Potassium, Diet, and Transplant   Teaching Tools: Demonstration and Explanation   Response to Education: Verbalized Understanding and Requires Follow-up     Electronically signed by Poonam Pavon RN on 12/16/2022 at 1:46 PM

## 2022-12-16 NOTE — PROGRESS NOTES
PHARMACY ANTICOAGULATION MONITORING SERVICE    Don Heart is a 71 y.o. male on warfarin therapy for atrial fibrillation and history of DVT. Pharmacy consulted by Dr. Garrett Wilson for monitoring and adjustment of treatment. Indication for anticoagulation: A-Fib, Hx of DVT  INR goal: 2-3  Warfarin dose prior to admission: 2 mg daily    Pertinent Laboratory Values   Recent Labs     12/15/22  0515 12/16/22  0802   INR 1.83 2.11   HGB 9.4* 9.4*   HCT 28.0* 30.3*    359     INR MONITORING  Recent Labs     12/14/22  0455 12/15/22  0515 12/16/22  0802   INR 1.32 1.83 2.11       Assessment/Plan:  Drug Interactions:   Aspirin, amiodarone, levothyroxine, atorvastatin, trazodone (home meds)  Pt started on continues cefepime which may increase warfarin effects. INR sub-therapeutic on admission, therapeutic today @ 2.11  Continue on home dose of Warfarin 2mg daily, then per INR trends. Note: HGB/HCT considerably low (HD patient)  Pharmacy will continue to monitor and adjust warfarin therapy as indicated    Thank you for the consult.   Anabel Chen, [facility]  12/16/2022 12:22 PM

## 2022-12-17 LAB
CULTURE: NORMAL
Lab: NORMAL
SPECIMEN: NORMAL

## 2022-12-19 ENCOUNTER — TELEPHONE (OUTPATIENT)
Dept: INTERNAL MEDICINE CLINIC | Age: 72
End: 2022-12-19

## 2022-12-29 ENCOUNTER — APPOINTMENT (OUTPATIENT)
Dept: GENERAL RADIOLOGY | Age: 72
DRG: 193 | End: 2022-12-29
Payer: COMMERCIAL

## 2022-12-29 ENCOUNTER — HOSPITAL ENCOUNTER (INPATIENT)
Age: 72
LOS: 7 days | Discharge: NURSING, REHAB OR LONG-TERM CARE | DRG: 193 | End: 2023-01-05
Attending: STUDENT IN AN ORGANIZED HEALTH CARE EDUCATION/TRAINING PROGRAM | Admitting: STUDENT IN AN ORGANIZED HEALTH CARE EDUCATION/TRAINING PROGRAM
Payer: COMMERCIAL

## 2022-12-29 DIAGNOSIS — J18.9 PNEUMONIA OF BOTH LUNGS DUE TO INFECTIOUS ORGANISM, UNSPECIFIED PART OF LUNG: Primary | ICD-10-CM

## 2022-12-29 DIAGNOSIS — N19 RENAL FAILURE, UNSPECIFIED CHRONICITY: ICD-10-CM

## 2022-12-29 DIAGNOSIS — E87.5 HYPERKALEMIA: ICD-10-CM

## 2022-12-29 LAB
ALBUMIN SERPL-MCNC: 3.6 GM/DL (ref 3.4–5)
ALP BLD-CCNC: 140 IU/L (ref 40–128)
ALT SERPL-CCNC: 14 U/L (ref 10–40)
ANION GAP SERPL CALCULATED.3IONS-SCNC: 14 MMOL/L (ref 4–16)
AST SERPL-CCNC: 20 IU/L (ref 15–37)
BASE EXCESS MIXED: 1.2 (ref 0–1.2)
BASOPHILS ABSOLUTE: 0.1 K/CU MM
BASOPHILS RELATIVE PERCENT: 0.9 % (ref 0–1)
BILIRUB SERPL-MCNC: 0.4 MG/DL (ref 0–1)
BUN BLDV-MCNC: 59 MG/DL (ref 6–23)
CALCIUM SERPL-MCNC: 9.3 MG/DL (ref 8.3–10.6)
CHLORIDE BLD-SCNC: 94 MMOL/L (ref 99–110)
CO2: 25 MMOL/L (ref 21–32)
COMMENT: ABNORMAL
CREAT SERPL-MCNC: 5 MG/DL (ref 0.9–1.3)
DIFFERENTIAL TYPE: ABNORMAL
EKG ATRIAL RATE: 105 BPM
EKG DIAGNOSIS: NORMAL
EKG Q-T INTERVAL: 332 MS
EKG QRS DURATION: 168 MS
EKG QTC CALCULATION (BAZETT): 432 MS
EKG R AXIS: -81 DEGREES
EKG T AXIS: 43 DEGREES
EKG VENTRICULAR RATE: 102 BPM
EOSINOPHILS ABSOLUTE: 0.1 K/CU MM
EOSINOPHILS RELATIVE PERCENT: 1.4 % (ref 0–3)
GFR SERPL CREATININE-BSD FRML MDRD: 12 ML/MIN/1.73M2
GLUCOSE BLD-MCNC: 160 MG/DL (ref 70–99)
GLUCOSE BLD-MCNC: 175 MG/DL (ref 70–99)
HCO3 VENOUS: 27.6 MMOL/L (ref 19–25)
HCT VFR BLD CALC: 26.7 % (ref 42–52)
HEMOGLOBIN: 9 GM/DL (ref 13.5–18)
IMMATURE NEUTROPHIL %: 2.1 % (ref 0–0.43)
INR BLD: 2.02 INDEX
LYMPHOCYTES ABSOLUTE: 1.8 K/CU MM
LYMPHOCYTES RELATIVE PERCENT: 17.3 % (ref 24–44)
MAGNESIUM: 2.7 MG/DL (ref 1.8–2.4)
MCH RBC QN AUTO: 35.6 PG (ref 27–31)
MCHC RBC AUTO-ENTMCNC: 33.7 % (ref 32–36)
MCV RBC AUTO: 105.5 FL (ref 78–100)
MONOCYTES ABSOLUTE: 1.1 K/CU MM
MONOCYTES RELATIVE PERCENT: 10.4 % (ref 0–4)
NUCLEATED RBC %: 0.7 %
O2 SAT, VEN: 73.2 % (ref 50–70)
PCO2, VEN: 50 MMHG (ref 38–52)
PDW BLD-RTO: 22.6 % (ref 11.7–14.9)
PH VENOUS: 7.35 (ref 7.32–7.42)
PLATELET # BLD: 269 K/CU MM (ref 140–440)
PMV BLD AUTO: 8.9 FL (ref 7.5–11.1)
PO2, VEN: 40 MMHG (ref 28–48)
POTASSIUM SERPL-SCNC: 5.5 MMOL/L (ref 3.5–5.1)
PRO-BNP: 7880 PG/ML
PROTHROMBIN TIME: 26.3 SECONDS (ref 11.7–14.5)
RAPID INFLUENZA  B AGN: NEGATIVE
RAPID INFLUENZA A AGN: NEGATIVE
RBC # BLD: 2.53 M/CU MM (ref 4.6–6.2)
SARS-COV-2, NAAT: NOT DETECTED
SEGMENTED NEUTROPHILS ABSOLUTE COUNT: 7 K/CU MM
SEGMENTED NEUTROPHILS RELATIVE PERCENT: 67.9 % (ref 36–66)
SODIUM BLD-SCNC: 133 MMOL/L (ref 135–145)
SOURCE: NORMAL
TOTAL IMMATURE NEUTOROPHIL: 0.22 K/CU MM
TOTAL NUCLEATED RBC: 0.1 K/CU MM
TOTAL PROTEIN: 7 GM/DL (ref 6.4–8.2)
TROPONIN T: 0.27 NG/ML
WBC # BLD: 10.3 K/CU MM (ref 4–10.5)

## 2022-12-29 PROCEDURE — 85610 PROTHROMBIN TIME: CPT

## 2022-12-29 PROCEDURE — 83880 ASSAY OF NATRIURETIC PEPTIDE: CPT

## 2022-12-29 PROCEDURE — 6360000002 HC RX W HCPCS: Performed by: STUDENT IN AN ORGANIZED HEALTH CARE EDUCATION/TRAINING PROGRAM

## 2022-12-29 PROCEDURE — 2580000003 HC RX 258: Performed by: HOSPITALIST

## 2022-12-29 PROCEDURE — 87804 INFLUENZA ASSAY W/OPTIC: CPT

## 2022-12-29 PROCEDURE — 6370000000 HC RX 637 (ALT 250 FOR IP): Performed by: HOSPITALIST

## 2022-12-29 PROCEDURE — 87635 SARS-COV-2 COVID-19 AMP PRB: CPT

## 2022-12-29 PROCEDURE — 2700000000 HC OXYGEN THERAPY PER DAY

## 2022-12-29 PROCEDURE — 87081 CULTURE SCREEN ONLY: CPT

## 2022-12-29 PROCEDURE — 83735 ASSAY OF MAGNESIUM: CPT

## 2022-12-29 PROCEDURE — 85025 COMPLETE CBC W/AUTO DIFF WBC: CPT

## 2022-12-29 PROCEDURE — 71045 X-RAY EXAM CHEST 1 VIEW: CPT

## 2022-12-29 PROCEDURE — 84484 ASSAY OF TROPONIN QUANT: CPT

## 2022-12-29 PROCEDURE — 82805 BLOOD GASES W/O2 SATURATION: CPT

## 2022-12-29 PROCEDURE — 93005 ELECTROCARDIOGRAM TRACING: CPT | Performed by: STUDENT IN AN ORGANIZED HEALTH CARE EDUCATION/TRAINING PROGRAM

## 2022-12-29 PROCEDURE — 1200000000 HC SEMI PRIVATE

## 2022-12-29 PROCEDURE — 51798 US URINE CAPACITY MEASURE: CPT

## 2022-12-29 PROCEDURE — 82962 GLUCOSE BLOOD TEST: CPT

## 2022-12-29 PROCEDURE — 87040 BLOOD CULTURE FOR BACTERIA: CPT

## 2022-12-29 PROCEDURE — 80053 COMPREHEN METABOLIC PANEL: CPT

## 2022-12-29 PROCEDURE — C9113 INJ PANTOPRAZOLE SODIUM, VIA: HCPCS | Performed by: HOSPITALIST

## 2022-12-29 PROCEDURE — 93010 ELECTROCARDIOGRAM REPORT: CPT | Performed by: INTERNAL MEDICINE

## 2022-12-29 PROCEDURE — 99285 EMERGENCY DEPT VISIT HI MDM: CPT

## 2022-12-29 PROCEDURE — 6360000002 HC RX W HCPCS: Performed by: HOSPITALIST

## 2022-12-29 PROCEDURE — 2580000003 HC RX 258: Performed by: STUDENT IN AN ORGANIZED HEALTH CARE EDUCATION/TRAINING PROGRAM

## 2022-12-29 PROCEDURE — 96374 THER/PROPH/DIAG INJ IV PUSH: CPT

## 2022-12-29 PROCEDURE — 89220 SPUTUM SPECIMEN COLLECTION: CPT

## 2022-12-29 RX ORDER — AMIODARONE HYDROCHLORIDE 200 MG/1
200 TABLET ORAL DAILY
Status: DISCONTINUED | OUTPATIENT
Start: 2022-12-29 | End: 2023-01-05 | Stop reason: HOSPADM

## 2022-12-29 RX ORDER — POLYETHYLENE GLYCOL 3350 17 G/17G
17 POWDER, FOR SOLUTION ORAL DAILY
Status: DISCONTINUED | OUTPATIENT
Start: 2022-12-29 | End: 2022-12-29 | Stop reason: CLARIF

## 2022-12-29 RX ORDER — SENNA AND DOCUSATE SODIUM 50; 8.6 MG/1; MG/1
2 TABLET, FILM COATED ORAL DAILY PRN
Status: DISCONTINUED | OUTPATIENT
Start: 2022-12-29 | End: 2023-01-05 | Stop reason: HOSPADM

## 2022-12-29 RX ORDER — ONDANSETRON 2 MG/ML
4 INJECTION INTRAMUSCULAR; INTRAVENOUS EVERY 6 HOURS PRN
Status: DISCONTINUED | OUTPATIENT
Start: 2022-12-29 | End: 2023-01-05 | Stop reason: HOSPADM

## 2022-12-29 RX ORDER — SODIUM CHLORIDE 0.9 % (FLUSH) 0.9 %
5-40 SYRINGE (ML) INJECTION EVERY 12 HOURS SCHEDULED
Status: DISCONTINUED | OUTPATIENT
Start: 2022-12-29 | End: 2023-01-05 | Stop reason: HOSPADM

## 2022-12-29 RX ORDER — ATORVASTATIN CALCIUM 40 MG/1
40 TABLET, FILM COATED ORAL NIGHTLY
Status: DISCONTINUED | OUTPATIENT
Start: 2022-12-29 | End: 2023-01-05 | Stop reason: HOSPADM

## 2022-12-29 RX ORDER — INSULIN GLARGINE 100 [IU]/ML
0.15 INJECTION, SOLUTION SUBCUTANEOUS NIGHTLY
Status: DISCONTINUED | OUTPATIENT
Start: 2022-12-29 | End: 2022-12-29

## 2022-12-29 RX ORDER — QUETIAPINE FUMARATE 25 MG/1
50 TABLET, FILM COATED ORAL NIGHTLY
Status: DISCONTINUED | OUTPATIENT
Start: 2022-12-29 | End: 2023-01-05 | Stop reason: HOSPADM

## 2022-12-29 RX ORDER — INSULIN LISPRO 100 [IU]/ML
0-4 INJECTION, SOLUTION INTRAVENOUS; SUBCUTANEOUS NIGHTLY
Status: DISCONTINUED | OUTPATIENT
Start: 2022-12-29 | End: 2022-12-29

## 2022-12-29 RX ORDER — CARVEDILOL 6.25 MG/1
6.25 TABLET ORAL 2 TIMES DAILY WITH MEALS
Status: DISCONTINUED | OUTPATIENT
Start: 2022-12-29 | End: 2023-01-01

## 2022-12-29 RX ORDER — LANOLIN ALCOHOL/MO/W.PET/CERES
9 CREAM (GRAM) TOPICAL NIGHTLY PRN
Status: DISCONTINUED | OUTPATIENT
Start: 2022-12-29 | End: 2023-01-05 | Stop reason: HOSPADM

## 2022-12-29 RX ORDER — DEXTROSE MONOHYDRATE 100 MG/ML
INJECTION, SOLUTION INTRAVENOUS CONTINUOUS PRN
Status: DISCONTINUED | OUTPATIENT
Start: 2022-12-29 | End: 2023-01-05 | Stop reason: HOSPADM

## 2022-12-29 RX ORDER — B COMPLEX C NO.10/FOLIC ACID 900MCG/5ML
5 LIQUID (ML) ORAL DAILY
Status: DISCONTINUED | OUTPATIENT
Start: 2022-12-29 | End: 2023-01-05 | Stop reason: HOSPADM

## 2022-12-29 RX ORDER — ONDANSETRON 4 MG/1
4 TABLET, ORALLY DISINTEGRATING ORAL EVERY 8 HOURS PRN
Status: DISCONTINUED | OUTPATIENT
Start: 2022-12-29 | End: 2023-01-05 | Stop reason: HOSPADM

## 2022-12-29 RX ORDER — SEVELAMER CARBONATE 800 MG/1
1600 TABLET, FILM COATED ORAL EVERY 8 HOURS
Status: DISCONTINUED | OUTPATIENT
Start: 2022-12-29 | End: 2023-01-05 | Stop reason: HOSPADM

## 2022-12-29 RX ORDER — ONDANSETRON 2 MG/ML
4 INJECTION INTRAMUSCULAR; INTRAVENOUS ONCE
Status: COMPLETED | OUTPATIENT
Start: 2022-12-29 | End: 2022-12-29

## 2022-12-29 RX ORDER — POLYETHYLENE GLYCOL 3350 17 G/17G
17 POWDER, FOR SOLUTION ORAL DAILY
Status: DISCONTINUED | OUTPATIENT
Start: 2022-12-30 | End: 2023-01-05 | Stop reason: HOSPADM

## 2022-12-29 RX ORDER — ASPIRIN 81 MG/1
81 TABLET, CHEWABLE ORAL DAILY
Status: DISCONTINUED | OUTPATIENT
Start: 2022-12-29 | End: 2023-01-05 | Stop reason: HOSPADM

## 2022-12-29 RX ORDER — MIDODRINE HYDROCHLORIDE 5 MG/1
10 TABLET ORAL
Status: DISCONTINUED | OUTPATIENT
Start: 2022-12-30 | End: 2023-01-05 | Stop reason: HOSPADM

## 2022-12-29 RX ORDER — ACETAMINOPHEN 325 MG/1
650 TABLET ORAL EVERY 6 HOURS PRN
Status: DISCONTINUED | OUTPATIENT
Start: 2022-12-29 | End: 2023-01-05 | Stop reason: HOSPADM

## 2022-12-29 RX ORDER — LEVOTHYROXINE SODIUM 0.07 MG/1
75 TABLET ORAL DAILY
Status: DISCONTINUED | OUTPATIENT
Start: 2022-12-30 | End: 2023-01-02

## 2022-12-29 RX ORDER — IPRATROPIUM BROMIDE AND ALBUTEROL SULFATE 2.5; .5 MG/3ML; MG/3ML
1 SOLUTION RESPIRATORY (INHALATION) 4 TIMES DAILY
Status: DISCONTINUED | OUTPATIENT
Start: 2022-12-29 | End: 2023-01-01

## 2022-12-29 RX ORDER — INSULIN GLARGINE 100 [IU]/ML
10 INJECTION, SOLUTION SUBCUTANEOUS NIGHTLY
Status: DISCONTINUED | OUTPATIENT
Start: 2022-12-29 | End: 2023-01-05 | Stop reason: HOSPADM

## 2022-12-29 RX ORDER — INSULIN LISPRO 100 [IU]/ML
0-4 INJECTION, SOLUTION INTRAVENOUS; SUBCUTANEOUS
Status: DISCONTINUED | OUTPATIENT
Start: 2022-12-30 | End: 2022-12-30

## 2022-12-29 RX ORDER — SODIUM CHLORIDE 0.9 % (FLUSH) 0.9 %
5-40 SYRINGE (ML) INJECTION PRN
Status: DISCONTINUED | OUTPATIENT
Start: 2022-12-29 | End: 2023-01-05 | Stop reason: HOSPADM

## 2022-12-29 RX ORDER — SODIUM CHLORIDE 9 MG/ML
INJECTION, SOLUTION INTRAVENOUS PRN
Status: DISCONTINUED | OUTPATIENT
Start: 2022-12-29 | End: 2023-01-05 | Stop reason: HOSPADM

## 2022-12-29 RX ORDER — ALBUTEROL SULFATE 90 UG/1
1 AEROSOL, METERED RESPIRATORY (INHALATION) 4 TIMES DAILY
Status: DISCONTINUED | OUTPATIENT
Start: 2022-12-29 | End: 2022-12-30

## 2022-12-29 RX ORDER — WARFARIN SODIUM 2 MG/1
2 TABLET ORAL DAILY
Status: DISCONTINUED | OUTPATIENT
Start: 2022-12-29 | End: 2022-12-31 | Stop reason: DRUGHIGH

## 2022-12-29 RX ORDER — ACETAMINOPHEN 650 MG/1
650 SUPPOSITORY RECTAL EVERY 6 HOURS PRN
Status: DISCONTINUED | OUTPATIENT
Start: 2022-12-29 | End: 2023-01-05 | Stop reason: HOSPADM

## 2022-12-29 RX ORDER — TRAZODONE HYDROCHLORIDE 50 MG/1
100 TABLET ORAL NIGHTLY PRN
Status: DISCONTINUED | OUTPATIENT
Start: 2022-12-29 | End: 2023-01-05 | Stop reason: HOSPADM

## 2022-12-29 RX ORDER — POLYETHYLENE GLYCOL 3350 17 G/17G
17 POWDER, FOR SOLUTION ORAL DAILY PRN
Status: DISCONTINUED | OUTPATIENT
Start: 2022-12-29 | End: 2023-01-05 | Stop reason: HOSPADM

## 2022-12-29 RX ORDER — INSULIN LISPRO 100 [IU]/ML
0-4 INJECTION, SOLUTION INTRAVENOUS; SUBCUTANEOUS
Status: DISCONTINUED | OUTPATIENT
Start: 2022-12-29 | End: 2022-12-30

## 2022-12-29 RX ADMIN — SODIUM CHLORIDE, PRESERVATIVE FREE 10 ML: 5 INJECTION INTRAVENOUS at 22:17

## 2022-12-29 RX ADMIN — CARVEDILOL 6.25 MG: 6.25 TABLET, FILM COATED ORAL at 22:58

## 2022-12-29 RX ADMIN — ONDANSETRON 4 MG: 2 INJECTION INTRAMUSCULAR; INTRAVENOUS at 16:23

## 2022-12-29 RX ADMIN — VANCOMYCIN HYDROCHLORIDE 2000 MG: 1 INJECTION, POWDER, LYOPHILIZED, FOR SOLUTION INTRAVENOUS at 22:56

## 2022-12-29 RX ADMIN — AMIODARONE HYDROCHLORIDE 200 MG: 200 TABLET ORAL at 22:58

## 2022-12-29 RX ADMIN — SODIUM CHLORIDE 25 ML: 9 INJECTION, SOLUTION INTRAVENOUS at 22:56

## 2022-12-29 RX ADMIN — QUETIAPINE FUMARATE 50 MG: 25 TABLET ORAL at 23:02

## 2022-12-29 RX ADMIN — CEFEPIME HYDROCHLORIDE 2000 MG: 2 INJECTION, POWDER, FOR SOLUTION INTRAVENOUS at 17:46

## 2022-12-29 RX ADMIN — ONDANSETRON 4 MG: 2 INJECTION INTRAMUSCULAR; INTRAVENOUS at 22:06

## 2022-12-29 RX ADMIN — SODIUM CHLORIDE, PRESERVATIVE FREE 40 MG: 5 INJECTION INTRAVENOUS at 23:04

## 2022-12-29 ASSESSMENT — PAIN SCALES - GENERAL
PAINLEVEL_OUTOF10: 7
PAINLEVEL_OUTOF10: 7

## 2022-12-29 ASSESSMENT — LIFESTYLE VARIABLES
HOW OFTEN DO YOU HAVE A DRINK CONTAINING ALCOHOL: NEVER
HOW MANY STANDARD DRINKS CONTAINING ALCOHOL DO YOU HAVE ON A TYPICAL DAY: PATIENT DOES NOT DRINK

## 2022-12-29 ASSESSMENT — PAIN DESCRIPTION - LOCATION: LOCATION: THROAT

## 2022-12-29 NOTE — CONSULTS
Nephrology Service Consultation    Patient:  Keturah Trevino  MRN: [id number]  Consulting physician:  [facility],[address],   Reason for Consult: End-stage renal disease on dialysis Monday Wednesday Friday    History Obtained From:  patient, electronic medical record  PCP: ANKITA Rousseau - [facility]    HISTORY OF PRESENT ILLNESS:   The patient is a 71 y.o. male who presents with weakness congestion shortness of breath with productive sputum from [facility]. I just have patient had ECF dialysis on Wednesday. Apparently got worse in last 24 hours with increased congestion but COVID test and flu test were negative. He has overall been doing better he is underlying diabetes and hypertension atherosclerotic cardiovascular disease with prior CABG and hypertension. Has been tolerating dialysis on a current schedule Monday Wednesday Friday at this time and [facility] has been stable. Not having productive sputum concern for possible pneumonia being admitted for work-up and treatment. Potassium is also elevated but getting [facility] today. Past Medical History:        Diagnosis Date    Allergic rhinitis     Anticoagulated on Coumadin     1/6/17-**[facility] to follow pt's PT/INRs, along w/prescribing his Coumadin. **    Anxiety     Arthritis     Atrial fibrillation (HCC)     On Coumadin.     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 09/21/2022    positive at 15C, negative at 18C    COPD (chronic obstructive pulmonary disease) (HCC)     Depression     Diabetes mellitus (HCC)     NIDDM- dx over 10 yrs ago- follows with Dr Randa Pinon's contracture of hand     Right hand    Family history of cardiovascular disease     Father-MI    GERD (gastroesophageal reflux disease)     H/O cardiac catheterization 03/2007    cardiac cath- stent LAD patent, Hwgmswlo-14-04%, RCA 40-50%    H/O cardiac catheterization 06/12/2008    cardiac cath- mild disease mid RCA    H/O cardiovascular stress test 04/10/2014    cardiolite- normal, no ischemia, EF63%    H/O cardiovascular stress test 09/21/2016    EF59% normal study  pt in atrial fib    H/O cardiovascular stress test 09/20/2017    EF 60%   afib    H/O cardiovascular stress test 11/07/2018    EF60% normal study    H/O Doppler ultrasound     1/11/2010-CAROTID_Intimal thickening but no significant atherosclerotic plaque noted in LAUREN. Doppler flow velocities within the LAUREN are WNL. Intimal thickening but no significant atherosclerotic plaque noted in LICA. Doppler flow velociities within the LICA are WNL. H/O echocardiogram 04/10/2014    EF 60%, normal LV systolic function, mild mitral and tricuspid insufficiencies, no pericardial effusion.     H/O echocardiogram 09/21/2016    EF60% normal study    H/O echocardiogram 11/07/2018    EF55-60% no significant valular disease    H/O hiatal hernia     Hx of Venous Doppler 03/14/2019    Significant reflux in Left Deep System, No reflux in right lower extremity, No DVT or SVT    Hyperlipidemia     Hypertension     Obesity     S/P PTCA (percutaneous transluminal coronary angioplasty) 03/21/2005    s/p PTCA with 3.5 X 16 TAXUS stent to LAD- follows with Dr Mohan Abel    Sleep apnea     had sleep study done 10+ yrs ago- has cpap but does not use it    Thyroid disease     hypothyroid       Past Surgical History:        Procedure Laterality Date    CARDIAC CATHETERIZATION  6/12/08    mild disease mid RCA,  stent to LAD patent,    CARDIAC CATHETERIZATION  3/07    Stent to LAD patent, Diagonal 40-50%, RCA 40-50%    CARDIAC CATHETERIZATION  3/05    COLONOSCOPY  2011    COLONOSCOPY  5/18/16    1 polyp removed, diverticulosis and hemorrhoids noted    CORONARY ANGIOPLASTY WITH STENT PLACEMENT  03/21/2005    PTCA with 3.5 x 16 TAXUS stent to LAD    CORONARY ARTERY BYPASS GRAFT N/A 9/22/2022    CABG CORONARY ARTERY BYPASS x3 (LIMA TO LAD, SVG TO PDA-RCA SEQUENTIAL) , INTRAOPERATIVE [place], ENDOVEIN HARVEST OF LEFT SAPHENOUS VEIN, MODIFIED MAZE PROCEDURE, LEFT ATRIAL APPENDAGE LIGATION, INTERCOSTAL NERVE BLOCK, INTRA-AORTIC BALLOON PUMP INSERTION performed by Eusebio Weiner MD at [address], COLON, DIAGNOSTIC  2014    egd    HAND SURGERY Right [address] DISLOCATN+FIXATN Right 6/3/2019    RIGHT OPEN REDUCTION INTERNAL FIXATION OF DISTAL TIBIA  AND FIBULA performed by Ki Valenzuela MD at [address] [place] [address] N/A 9/24/2022    STERNUM WASHOUT performed by Eusebio Weiner MD at [facility] OR       Medications:   Scheduled Meds:   cefepime  2,000 mg IntraVENous Once    vancomycin  2,000 mg IntraVENous Once    sodium zirconium cyclosilicate  10 g Oral Once     Continuous Infusions:  PRN Meds:. Allergies:  Patient has no known allergies. Social History:   TOBACCO:   reports that he quit smoking about 47 years ago. His smoking use included cigarettes. He has a 10.00 pack-year smoking history. He has never used smokeless tobacco.  ETOH:   reports that he does not currently use alcohol after a past usage of about 6.0 standard drinks per week. OCCUPATION:      Family History:       Problem Relation Age of Onset    Cancer Mother         breast ca    Coronary Art Dis Father          MI    Heart Disease Father     Rheum Arthritis Other     Rheum Arthritis Sister     No Known Problems Brother     Rheum Arthritis Sister        REVIEW OF SYSTEMS:  Negative except for weak tired congested productive sputum. Physical Exam:    I/O: No intake/output data recorded. Vitals: /66   Pulse (!) 104   Temp 98.4 °F (36.9 °C) (Oral)   Resp 15   Wt 258 lb 9.6 oz (117.3 kg)   SpO2 98%   BMI 33.20 kg/m²   General appearance: awake weak  HEENT: Head: Normal, normocephalic, atraumatic.   Neck: Positive trach with productive sputum  Lungs: diminished breath sounds bilaterally  Heart: S1, S2 normal  Abdomen: abnormal findings:  soft NT  Extremities: edema trace HD catheter  Neurologic: Mental status: alertness: alert      CBC:   Recent Labs     12/29/22  1509   WBC 10.3   HGB 9.0*        BMP:    Recent Labs     12/29/22  1509   *   K 5.5*   CL 94*   CO2 25   BUN 59*   CREATININE 5.0*   GLUCOSE 175*     Hepatic:   Recent Labs     12/29/22  1509   AST 20   ALT 14   BILITOT 0.4   ALKPHOS 140*     Troponin: No results for input(s): TROPONINI in the last 72 hours. BNP: No results for input(s): BNP in the last 72 hours. Lipids: No results for input(s): CHOL, HDL in the last 72 hours. Invalid input(s): LDLCALCU  ABGs:   Lab Results   Component Value Date/Time    PO2ART 70.3 09/24/2022 11:49 AM    TOF9HIW 35.5 09/24/2022 11:49 AM     INR: No results for input(s): INR in the last 72 hours.   Renal Labs  Albumin:    Lab Results   Component Value Date/Time    LABALBU 3.6 12/29/2022 03:09 PM     Calcium:    Lab Results   Component Value Date/Time    CALCIUM 9.3 12/29/2022 03:09 PM     Phosphorus:    Lab Results   Component Value Date/Time    PHOS 3.0 12/16/2022 08:02 AM     U/A:    Lab Results   Component Value Date/Time    NITRU NEGATIVE 09/17/2022 06:21 PM    COLORU YELLOW 09/17/2022 06:21 PM    PHUR 6.5 06/30/2021 02:11 PM    WBCUA NONE SEEN 09/17/2022 06:21 PM    RBCUA NONE SEEN 09/17/2022 06:21 PM    MUCUS RARE 09/17/2022 06:21 PM    TRICHOMONAS NONE SEEN 09/17/2022 06:21 PM    BACTERIA NEGATIVE 09/17/2022 06:21 PM    CLARITYU CLEAR 09/17/2022 06:21 PM    SPECGRAV 1.015 09/17/2022 06:21 PM    UROBILINOGEN 4.0 09/17/2022 06:21 PM    BILIRUBINUR NEGATIVE 09/17/2022 06:21 PM    BILIRUBINUR small 06/30/2021 02:11 PM    BLOODU NEGATIVE 09/17/2022 06:21 PM    GLUCOSEU neg 06/30/2021 02:11 PM    KETUA TRACE 09/17/2022 06:21 PM     ABG:    Lab Results   Component Value Date/Time    HRU9KZW 35.5 09/24/2022 11:49 AM    PO2ART 70.3 09/24/2022 11:49 AM    NPW3BDF 21.2 09/24/2022 11:49 AM     HgBA1c:    Lab Results   Component Value Date/Time    LABA1C 7.5 09/15/2022 12:55 PM     Microalbumen/Creatinine ratio:  No components found for: RUCREAT  TSH:    Lab Results   Component Value Date/Time    TSH 1.66 04/03/2018 11:33 AM     IRON:    Lab Results   Component Value Date/Time    IRON 54 12/13/2022 06:50 AM     Iron Saturation:  No components found for: PERCENTFE  TIBC:    Lab Results   Component Value Date/Time    TIBC 217 12/13/2022 06:50 AM     FERRITIN:    Lab Results   Component Value Date/Time    FERRITIN 807 12/13/2022 06:50 AM     RPR:  No results found for: RPR  YAYA:  No results found for: ANATITER, YAYA  24 Hour Urine for Creatinine Clearance:  No components found for: CREAT4, UHRS10, UTV10  -----------------------------------------------------------------      Assessment and Recommendations     Patient Active Problem List   Diagnosis Code    Atrial fibrillation (HCC) I48.91    CAD (coronary artery disease) I25.10    Morbid obesity ([facility] E66.01    COPD (chronic obstructive pulmonary disease) [facility] J44.9    Sleep apnea G47.30    Type 2 diabetes mellitus ([facility] E11.9    Thyroid disease E07.9    Hyperlipidemia E78.5    Acute congestive heart failure ([facility] I50.9    Coronary artery disease involving native coronary artery of native heart with angina pectoris ([facility] I25.119    Chronic renal disease, stage III ([facility] [901258] N18.30    Hypertension I10    CAD, multiple vessel I25.10    Dyspnea R06.00    Moderate malnutrition ([facility] E44.0     Impression plan  #1 end-stage renal disease on dialysis Monday Wednesday Friday  #2 hyperkalemia  #3 trach with positive productive sputum  #4 hypertension  #5 type 2 diabetes    Plan  #1 gets routine [facility] Wednesday Friday we will do next dialysis here Friday in the hospital  #2 correct potassium with Becca Xavier today and correct with dialysis in the morning  #3 panculture start antibiotic therapy  #4 monitor blood pressure with dialysis and home medications  #5 monitor glucose control  Patient be admitted supportive care will follow closely    Electronically signed by Getachew Neff MD on 12/29/2022 at 4:37 PM

## 2022-12-29 NOTE — ED NOTES
Dr. Kiana Hinson notified of potassium of 5.5 and troponin of [address] [place] [address]  12/29/22 2176

## 2022-12-29 NOTE — H&P
History and Physical      Name:  Jean Bianchi /Age/Sex: 1950  (71 y.o. male)   MRN & CSN:  [id number] & [id number] Admission Date/Time: 2022  2:03 PM   Location:  [facility] PCP: Jony Stroud 112 Day: 1    Assessment and Plan:   Jean Bianchi is a 71 y.o.  male  who presents with Healthcare-associated pneumonia    Healthcare associated pneumonia with tracheitis. -X-ray chest shows small right pleural effusion with bibasilar opacities. -Tracheal aspirate is purulent. -IV vancomycin and cefepime for pharmacy to dose.  -Await blood cultures and sputum culture.  -Patient probably needs a trach change and will consult pulmonary. End-stage renal disease  -Patient gets dialysis .  -Blood urea 59 creatinine 5.0, GFR 12.  -Consulting nephrology for continuation of dialysis. Hyperkalemia  -Mild with potassium of 5.5.  -Patient already administered Lokelma by ER after consultation with nephrology.  -Twelve-lead ECG does not show any hyperkalemic changes. History of atrial fibrillation  -Twelve-lead ECG shows A. fib.  -Continue amiodarone, carvedilol and warfarin.  -Have requested ER to obtain INR stat. Hypothyroidism  -Continue levothyroxine as before. History of diabetes mellitus  -Patient placed on Lantus and sliding scale insulin as per protocol.  -Consulting endocrinology. Increased troponins  -Troponin T's 0.269.  -Patient does not endorse any chest pain. -Probably secondary to chronic leak and also not interpretable secondary to chronic kidney disease.  -If patient complains of chest pain will order further troponins    Dysphagia  -Patient has a PEG tube.   -Consulting dietitian to start Otelia Rise source at 65 mL/h as before        Diet No diet orders on file   DVT Prophylaxis [] Lovenox, []  Heparin, [] SCDs, [] Ambulation, patient is on Coumadin   GI Prophylaxis [] PPI,  [] H2 Blocker,  [] Carafate,  [x] Diet/Tube Feeds   Code Status Prior   Disposition Patient requires continued admission due to healthcare associated pneumonia   MDM [] Low, [] Moderate,[x]  High  Patient's risk as above due to healthcare associated pneumonia     History of Present Illness:     Chief Complaint: Healthcare-associated pneumonia  Eura Leyden is a 71 y.o.  male  who presents with increased shortness of breath and increased secretions from his chronic trach. Patient is a resident of a skilled nursing facility [facility]. He has a chronic trach and PEG. He is also dependent on hemodialysis. For the past few days he has been having increased secretions from his trach and has been requiring 2 to 3 L of nasal cannula oxygen. He is not sure if he is running any fever. Denies any chills or rigors. Denies any rash. Denies any chest pain or abdominal pain. He denies any hematuria or dysuria melena or hematochezia. He seems to be pretty debilitated. In the ER his serum chemistries revealed sodium 133 potassium 5.5 chloride 94 bicarb 25 blood urea 59 creatinine 5 anion gap 14 GFR 12 magnesium 2.7 glucose 175 calcium 9.3 total protein 7 proBNP 7880. Troponin T 0.269. Liver function test shows albumin 3.6 alkaline phosphatase 140 ALT 14 AST 20 bilirubin 0.4 total protein 7. Random glucose 75. WBC 10.3 hemoglobin 9 hematocrit 26.7 platelet count of 130. INR has resulted PT is 26.3 and INR is 2.2. Patient tested negative for influenza A and B. He is also negative for SARS COVID. His VBG showed pH of 7.35 PCO2 50 PO2 40 bicarb of 27. 6. X-ray chest shows small right pleural effusion with bibasilar opacities suspicious for pneumonia. Patient will be admitted to the hospitalist service. Continue with vancomycin and cefepime empirically for tracheitis and healthcare associated pneumonia. Await sputum and blood cultures. And de-escalate therapy as possible. Consulting pulmonary for trach change.   Consulting nephrology for continuation of hemodialysis. Continue home meds as appropriate. Follow-up consultant recommendations. Further therapy would depend on the hospital course the patient overall prognosis remains very very poor       Ten point ROS reviewed negative, unless as noted above    Objective:   No intake or output data in the 24 hours ending 12/29/22 1713   Vitals:   Vitals:    12/29/22 1633   BP: 125/75   Pulse: 100   Resp: 18   Temp: 98.4 °F (36.9 °C)   SpO2: 100%     Physical Exam:   GEN Awake male, sitting upright in bed in no apparent distress. Appears given age. EYES Pupils are equally round. No scleral erythema, discharge, or conjunctivitis. HENT Mucous membranes are moist. Oral pharynx without exudates, no evidence of thrush. Chronic trach in situ  NECK Supple, no apparent thyromegaly or masses. Chronic trach in situ  RESP Clear to auscultation, no wheezes, rales or rhonchi. Symmetric chest movement while on room air. CARDIO/VASC S1/S2 auscultated. Regular rate without appreciable murmurs, rubs, or gallops. No JVD or carotid bruits. Peripheral pulses equal bilaterally and palpable. No peripheral edema. GI Abdomen is soft without significant tenderness, masses, or guarding. Bowel sounds are normoactive. Rectal exam deferred. PEG noted   No costovertebral angle tenderness. Normal appearing external genitalia. Gonzalez catheter is not present. HEME/LYMPH No palpable cervical lymphadenopathy and no hepatosplenomegaly. No petechiae or ecchymoses. MSK No gross joint deformities. SKIN Normal coloration, warm, dry. Venous hemostasis with lichenification on the lower limbs noted. NEURO Cranial nerves appear grossly intact, normal speech, no lateralizing weakness. PSYCH Awake, alert, oriented x 4. Affect appropriate. Past Medical History:      Past Medical History:   Diagnosis Date    Allergic rhinitis     Anticoagulated on Coumadin     1/6/17-**[facility] to follow pt's PT/INRs, along w/prescribing his Coumadin. **    Anxiety     Arthritis     Atrial fibrillation (HCC)     On Coumadin. 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 09/21/2022    positive at 15C, negative at 18C    COPD (chronic obstructive pulmonary disease) (HCC)     Depression     Diabetes mellitus (HCC)     NIDDM- dx over 10 yrs ago- follows with Dr Sara Barnesr    Dupuytren's contracture of hand     Right hand    Family history of cardiovascular disease     Father-MI    GERD (gastroesophageal reflux disease)     H/O cardiac catheterization 03/2007    cardiac cath- stent LAD patent, Vpiguitd-35-89%, RCA 40-50%    H/O cardiac catheterization 06/12/2008    cardiac cath- mild disease mid RCA    H/O cardiovascular stress test 04/10/2014    cardiolite- normal, no ischemia, EF63%    H/O cardiovascular stress test 09/21/2016    EF59% normal study  pt in atrial fib    H/O cardiovascular stress test 09/20/2017    EF 60%   afib    H/O cardiovascular stress test 11/07/2018    EF60% normal study    H/O Doppler ultrasound     1/11/2010-CAROTID_Intimal thickening but no significant atherosclerotic plaque noted in LAUREN. Doppler flow velocities within the LAUREN are WNL. Intimal thickening but no significant atherosclerotic plaque noted in LICA. Doppler flow velociities within the LICA are WNL. H/O echocardiogram 04/10/2014    EF 60%, normal LV systolic function, mild mitral and tricuspid insufficiencies, no pericardial effusion.     H/O echocardiogram 09/21/2016    EF60% normal study    H/O echocardiogram 11/07/2018    EF55-60% no significant valular disease    H/O hiatal hernia     Hx of Venous Doppler 03/14/2019    Significant reflux in Left Deep System, No reflux in right lower extremity, No DVT or SVT    Hyperlipidemia     Hypertension     Obesity     S/P PTCA (percutaneous transluminal coronary angioplasty) 03/21/2005    s/p PTCA with 3.5 X 16 TAXUS stent to LAD- follows with Dr Anoop Nash    Sleep apnea     had sleep study done 10+ yrs ago- has cpap but does not use it    Thyroid disease     hypothyroid     PSHX:  has a past surgical history that includes Coronary angioplasty with stent (2005); Cardiac catheterization (08); Cardiac catheterization (3/07); Cardiac catheterization (3/05); Endoscopy, colon, diagnostic (); Colonoscopy (); Colonoscopy (16); Hand surgery (Right, ); pr optx ankle dislocation w/repair/int/xtrnl fixj (Right, 6/3/2019); Sternum Debridement (N/A, 2022); and Coronary artery bypass graft (N/A, 2022). Allergies: No Known Allergies    FAM HX: family history includes Cancer in his mother; Coronary Art Dis in his father; Heart Disease in his father; No Known Problems in his brother; Rheum Arthritis in his sister, sister and another family member. Soc HX:   Social History     Socioeconomic History    Marital status:       Spouse name: None    Number of children: 1    Years of education: None    Highest education level: None   Occupational History     Comment: RETIRED   Tobacco Use    Smoking status: Former     Packs/day: 1.00     Years: 10.00     Pack years: 10.00     Types: Cigarettes     Quit date: 1976     Years since quittin.0    Smokeless tobacco: Never   Vaping Use    Vaping Use: Never used   Substance and Sexual Activity    Alcohol use: Not Currently     Alcohol/week: 6.0 standard drinks     Types: 6 Cans of beer per week     Comment: caffeine 2 cups of tea a day     Drug use: No    Sexual activity: Never     Social Determinants of Health     Financial Resource Strain: Low Risk     Difficulty of Paying Living Expenses: Not hard at all   Food Insecurity: No Food Insecurity    Worried About Running Out of Food in the Last Year: Never true    Ran Out of Food in the Last Year: Never true   Physical Activity: Sufficiently Active    Days of Exercise per Week: 7 days    Minutes of Exercise per Session: 30 min       Data:   CBC with Differential:    Lab Results   Component Value Date/Time    WBC 10.3 12/29/2022 03:09 PM    RBC 2.53 12/29/2022 03:09 PM    HGB 9.0 12/29/2022 03:09 PM    HCT 26.7 12/29/2022 03:09 PM     12/29/2022 03:09 PM    .5 12/29/2022 03:09 PM    MCH 35.6 12/29/2022 03:09 PM    MCHC 33.7 12/29/2022 03:09 PM    RDW 22.6 12/29/2022 03:09 PM    NRBC 3 12/16/2022 08:02 AM    SEGSPCT 67.9 12/29/2022 03:09 PM    BANDSPCT 1 12/15/2022 05:15 AM    LYMPHOPCT 17.3 12/29/2022 03:09 PM    MONOPCT 10.4 12/29/2022 03:09 PM    MYELOPCT 3 12/16/2022 08:02 AM    BASOPCT 0.9 12/29/2022 03:09 PM    MONOSABS 1.1 12/29/2022 03:09 PM    LYMPHSABS 1.8 12/29/2022 03:09 PM    EOSABS 0.1 12/29/2022 03:09 PM    BASOSABS 0.1 12/29/2022 03:09 PM    DIFFTYPE AUTOMATED DIFFERENTIAL 12/29/2022 03:09 PM       CMP:     Lab Results   Component Value Date/Time     12/29/2022 03:09 PM    K 5.5 12/29/2022 03:09 PM    CL 94 12/29/2022 03:09 PM    CO2 25 12/29/2022 03:09 PM    BUN 59 12/29/2022 03:09 PM    CREATININE 5.0 12/29/2022 03:09 PM    GFRAA >60 09/24/2022 12:08 PM    AGRATIO 1.1 02/15/2022 03:35 PM    LABGLOM 12 12/29/2022 03:09 PM    GLUCOSE 175 12/29/2022 03:09 PM    PROT 7.0 12/29/2022 03:09 PM    LABALBU 3.6 12/29/2022 03:09 PM    CALCIUM 9.3 12/29/2022 03:09 PM    BILITOT 0.4 12/29/2022 03:09 PM    ALKPHOS 140 12/29/2022 03:09 PM    AST 20 12/29/2022 03:09 PM    ALT 14 12/29/2022 03:09 PM       Troponin:  Lab Results   Component Value Date/Time    TROPONINT 0.269 12/29/2022 03:09 PM       U/A:    Lab Results   Component Value Date/Time    NITRITE neg 06/30/2021 02:11 PM    COLORU YELLOW 09/17/2022 06:21 PM    PROTEINU 30 09/17/2022 06:21 PM    PHUR 6.5 06/30/2021 02:11 PM    WBCUA NONE SEEN 09/17/2022 06:21 PM    RBCUA NONE SEEN 09/17/2022 06:21 PM    MUCUS RARE 09/17/2022 06:21 PM    TRICHOMONAS NONE SEEN 09/17/2022 06:21 PM    BACTERIA NEGATIVE 09/17/2022 06:21 PM    CLARITYU CLEAR 09/17/2022 06:21 PM    SPECGRAV 1.015 09/17/2022 06:21 PM    LEUKOCYTESUR NEGATIVE 09/17/2022 06:21 PM    UROBILINOGEN 4.0 09/17/2022 06:21 PM    BILIRUBINUR NEGATIVE 09/17/2022 06:21 PM    BILIRUBINUR small 06/30/2021 02:11 PM    BLOODU NEGATIVE 09/17/2022 06:21 PM    GLUCOSEU neg 06/30/2021 02:11 PM       Urine Culture:  No components found for: CURINE    Radiology results:  XR CHEST PORTABLE   Final Result   Small right pleural effusion with bibasilar airspace opacities.   This could   represent atelectasis or pneumonia in the appropriate clinical setting               Medications:   Medications:    cefepime  2,000 mg IntraVENous Once    vancomycin  2,000 mg IntraVENous Once    sodium zirconium cyclosilicate  10 g Oral Once      Infusions:   PRN Meds:        Electronically signed by Justin Khan MD on 12/29/2022 at 5:13 PM

## 2022-12-29 NOTE — ED PROVIDER NOTES
Emergency Department Encounter    Patient: Zeus Carrasquillo  MRN: [id number]  : 1950  Date of Evaluation: 2022  ED Provider:  Shanae Welsh DO    Triage Chief Complaint:   Shortness of Breath    Inaja:  Zeus Carrasquillo is a 71 y.o. male nursing home resident with a past medical history of COPD, CAD, hypertension, hyperlipidemia, atrial fibrillation, respiratory failure, on trach who presented to the ED with a history of shortness of breath and increased secretion from the tracheostomy since today  Patient also endorses a history of cough. No history of fever, cough, ill contacts, orthopnea, dyspnea on exertion, recent URI, cancer, trauma or surgery. ROS - see HPI, below listed is current ROS at time of my eval:  ROS is an in history; otherwise unremarkable    Past Medical History:   Diagnosis Date    Allergic rhinitis     Anticoagulated on Coumadin     17-**[facility] to follow pt's PT/INRs, along w/prescribing his Coumadin. **    Anxiety     Arthritis     Atrial fibrillation (HCC)     On Coumadin.     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    positive at 15C, negative at 18C    COPD (chronic obstructive pulmonary disease) (HCC)     Depression     Diabetes mellitus (HCC)     NIDDM- dx over 10 yrs ago- follows with Dr Dayanara Pinon's contracture of hand     Right hand    Family history of cardiovascular disease     Father-MI    GERD (gastroesophageal reflux disease)     H/O cardiac catheterization 2007    cardiac cath- stent LAD patent, [id number]%, RCA 40-50%    H/O cardiac catheterization 2008    cardiac cath- mild disease mid RCA    H/O cardiovascular stress test 04/10/2014    cardiolite- normal, no ischemia, EF63%    H/O cardiovascular stress test 2016    EF59% normal study  pt in atrial fib    H/O cardiovascular stress test 2017    EF 60%   afib    H/O cardiovascular stress test 2018    EF60% normal study    H/O Doppler ultrasound     1/11/2010-CAROTID_Intimal thickening but no significant atherosclerotic plaque noted in LAUREN. Doppler flow velocities within the LAUREN are WNL. Intimal thickening but no significant atherosclerotic plaque noted in LICA. Doppler flow velociities within the LICA are WNL. H/O echocardiogram 04/10/2014    EF 60%, normal LV systolic function, mild mitral and tricuspid insufficiencies, no pericardial effusion.     H/O echocardiogram 09/21/2016    EF60% normal study    H/O echocardiogram 11/07/2018    EF55-60% no significant valular disease    H/O hiatal hernia     Hx of Venous Doppler 03/14/2019    Significant reflux in Left Deep System, No reflux in right lower extremity, No DVT or SVT    Hyperlipidemia     Hypertension     Obesity     S/P PTCA (percutaneous transluminal coronary angioplasty) 03/21/2005    s/p PTCA with 3.5 X 16 TAXUS stent to LAD- follows with Dr Saul Arnold    Sleep apnea     had sleep study done 10+ yrs ago- has cpap but does not use it    Thyroid disease     hypothyroid     Past Surgical History:   Procedure Laterality Date    CARDIAC CATHETERIZATION  6/12/08    mild disease mid RCA,  stent to LAD patent,    CARDIAC CATHETERIZATION  3/07    Stent to LAD patent, Diagonal 40-50%, RCA 40-50%    CARDIAC CATHETERIZATION  3/05    COLONOSCOPY  2011    COLONOSCOPY  5/18/16    1 polyp removed, diverticulosis and hemorrhoids noted    CORONARY ANGIOPLASTY WITH STENT PLACEMENT  03/21/2005    PTCA with 3.5 x 16 TAXUS stent to LAD    CORONARY ARTERY BYPASS GRAFT N/A 9/22/2022    CABG CORONARY ARTERY BYPASS x3 (LIMA TO LAD, SVG TO PDA-RCA SEQUENTIAL) , INTRAOPERATIVE MILTON, ENDOVEIN HARVEST OF LEFT SAPHENOUS VEIN, MODIFIED MAZE PROCEDURE, LEFT ATRIAL APPENDAGE LIGATION, INTERCOSTAL NERVE BLOCK, INTRA-AORTIC BALLOON PUMP INSERTION performed by Steffen Perry MD at [address] [place] [address] Entrance, COLON, DIAGNOSTIC  2014    egd    HAND SURGERY Right [address] DISLOCATN+FIXATN Right 6/3/2019    RIGHT OPEN REDUCTION INTERNAL FIXATION OF DISTAL TIBIA  AND FIBULA performed by Ba Severino MD at [address] [place] [address] N/A 2022    STERNUM WASHOUT performed by Bertha Trammell MD at [facility] OR     Family History   Problem Relation Age of Onset    Cancer Mother         breast ca    Coronary Art Dis Father          MI    Heart Disease Father     Rheum Arthritis Other     Rheum Arthritis Sister     No Known Problems Brother     Rheum Arthritis Sister      Social History     Socioeconomic History    Marital status:       Spouse name: Not on file    Number of children: 1    Years of education: Not on file    Highest education level: Not on file   Occupational History     Comment: RETIRED   Tobacco Use    Smoking status: Former     Packs/day: 1.00     Years: 10.00     Pack years: 10.00     Types: Cigarettes     Quit date: 1976     Years since quittin.0    Smokeless tobacco: Never   Vaping Use    Vaping Use: Never used   Substance and Sexual Activity    Alcohol use: Not Currently     Alcohol/week: 6.0 standard drinks     Types: 6 Cans of beer per week     Comment: caffeine 2 cups of tea a day     Drug use: No    Sexual activity: Never   Other Topics Concern    Not on file   Social History Narrative    Not on file     Social Determinants of Health     Financial Resource Strain: Low Risk     Difficulty of Paying Living Expenses: Not hard at all   Food Insecurity: No Food Insecurity    Worried About Running Out of Food in the Last Year: Never true    Ran Out of Food in the Last Year: Never true   Transportation Needs: Not on file   Physical Activity: Sufficiently Active    Days of Exercise per Week: 7 days    Minutes of Exercise per Session: 30 min   Stress: Not on file   Social Connections: Not on file   Intimate Partner Violence: Not on file   Housing Stability: Not on file     Current Facility-Administered Medications   Medication Dose Route Frequency Provider Last Rate Last Admin cefepime (MAXIPIME) 2000 mg IVPB minibag  2,000 mg IntraVENous Once Carolynn Pinon, DO        vancomycin (VANCOCIN) 2,000 mg in dextrose 5 % 500 mL IVPB  2,000 mg IntraVENous Once Carolynn Pinon, DO        sodium zirconium cyclosilicate (LOKELMA) oral suspension 10 g  10 g Oral Once Carolynn Pinon, DO         Current Outpatient Medications   Medication Sig Dispense Refill    warfarin (COUMADIN) 2 MG tablet Take daily. Goal INR between 2-3 30 tablet 0    carvedilol (COREG) 6.25 MG tablet 1 tablet by PEG Tube route 2 times daily (with meals) 60 tablet 0    sennosides-docusate sodium (SENOKOT-S) 8.6-50 MG tablet Take 2 tablets by mouth daily as needed for Constipation 60 tablet 0    midodrine (PROAMATINE) 10 MG tablet Take 1 tablet by mouth 3 times daily (with meals) 90 tablet 0    levothyroxine (SYNTHROID) 75 MCG tablet Take 1 tablet by mouth Daily 30 tablet 0    ipratropium-albuterol (DUONEB) 0.5-2.5 (3) MG/3ML SOLN nebulizer solution Inhale 3 mLs into the lungs 4 times daily 360 mL 0    insulin glargine (LANTUS SOLOSTAR) 100 UNIT/ML injection pen Inject 5 Units into the skin nightly 5 Adjustable Dose Pre-filled Pen Syringe 0    albuterol sulfate HFA (VENTOLIN HFA) 108 (90 Base) MCG/ACT inhaler Inhalation      amiodarone (CORDARONE) 200 MG tablet 200 mg daily      atorvastatin (LIPITOR) 40 MG tablet 40 mg nightly      B Complex-C-Folic Acid (NEPHRONEX) 0.9 MG/5ML LIQD 5 mLs daily      melatonin 3 MG TABS tablet 9 mg nightly      Nutritional Supplements (NEPRO/CARBSTEADY) LIQD 50 mL/hr continuous      Nutritional Supplements (PROSOURCE TF) LIQD 1 Package 3 times daily      traZODone (DESYREL) 100 MG tablet 100 mg nightly      sevelamer (RENVELA) 0.8 g PACK packet 1.6 g  in the morning and 1.6 g at noon and 1.6 g in the evening.       QUEtiapine (SEROQUEL) 50 MG tablet 50 mg nightly      aspirin 81 MG chewable tablet 1 tablet by Per NG tube route daily 30 tablet 3    pantoprazole 4 mg/mL in sodium chloride (PF) injection Infuse 10 mLs intravenously daily 3 applicator 1    blood glucose monitor kit and supplies Dispense sufficient amount for indicated testing frequency plus additional to accommodate PRN testing needs. Dispense all needed supplies to include: monitor, strips, lancing device, lancets, control solutions, alcohol swabs. 1 kit 0    Lancets MISC by D3EA route three times a day. 100 each 3    polyethylene glycol (GLYCOLAX) 17 GM/SCOOP powder Take 17 g by mouth daily 225 g 2     No Known Allergies    Nursing Notes Reviewed    Physical Exam:  Triage VS:    ED Triage Vitals   Enc Vitals Group      BP       Pulse       Resp       Temp       Temp src       SpO2       Weight       Height       Head Circumference       Peak Flow       Pain Score       Pain Loc       Pain Edu? Excl. in [address]? My pulse ox interpretation is - normal    General appearance:  respiratory distress, yellow mucoid discharge from trach   Skin:  Warm. Dry. Eye:  Extraocular movements intact. Ears, nose, mouth and throat:  Oral mucosa moist   Neck:  Trachea midline. Extremity:  No obvious deformity, erythema, or warmth. No swelling. Normal ROM. Distal pulses intact. Heart:  Regular rate and rhythm, normal S1 & S2, no extra heart sounds. Perfusion:  intact  Respiratory: Respiratory distress, coarse crackles on lung auscultation. Abdominal:  Normal bowel sounds. Soft. Nontender. Non distended. Back:  No CVA tenderness to palpation     Neurological:  Alert and oriented times 3. No focal neuro deficits.              Psychiatric:  Appropriate    I have reviewed and interpreted all of the currently available lab results from this visit (if applicable):     Results for orders placed or performed during the hospital encounter of 12/29/22   COVID-19, Rapid    Specimen: Nasopharyngeal   Result Value Ref Range    Source NARES     SARS-CoV-2, NAAT NOT DETECTED NOT DETECTED   Rapid Flu Swab    Specimen: Nasopharyngeal   Result Value Ref Range    Rapid Influenza A Ag NEGATIVE NEGATIVE    Rapid Influenza B Ag NEGATIVE NEGATIVE   CBC with Auto Differential   Result Value Ref Range    WBC 10.3 4.0 - 10.5 K/CU MM    RBC 2.53 (L) 4.6 - 6.2 M/CU MM    Hemoglobin 9.0 (L) 13.5 - 18.0 GM/DL    Hematocrit 26.7 (L) 42 - 52 %    .5 (H) 78 - 100 FL    MCH 35.6 (H) 27 - 31 PG    MCHC 33.7 32.0 - 36.0 %    RDW 22.6 (H) 11.7 - 14.9 %    Platelets 095 670 - 319 K/CU MM    MPV 8.9 7.5 - 11.1 FL    Differential Type AUTOMATED DIFFERENTIAL     Segs Relative 67.9 (H) 36 - 66 %    Lymphocytes % 17.3 (L) 24 - 44 %    Monocytes % 10.4 (H) 0 - 4 %    Eosinophils % 1.4 0 - 3 %    Basophils % 0.9 0 - 1 %    Segs Absolute 7.0 K/CU MM    Lymphocytes Absolute 1.8 K/CU MM    Monocytes Absolute 1.1 K/CU MM    Eosinophils Absolute 0.1 K/CU MM    Basophils Absolute 0.1 K/CU MM    Nucleated RBC % 0.7 %    Total Nucleated RBC 0.1 K/CU MM    Total Immature Neutrophil 0.22 K/CU MM    Immature Neutrophil % 2.1 (H) 0 - 0.43 %   Comprehensive Metabolic Panel   Result Value Ref Range    Sodium 133 (L) 135 - 145 MMOL/L    Potassium 5.5 (HH) 3.5 - 5.1 MMOL/L    Chloride 94 (L) 99 - 110 mMol/L    CO2 25 21 - 32 MMOL/L    BUN 59 (H) 6 - 23 MG/DL    Creatinine 5.0 (H) 0.9 - 1.3 MG/DL    Est, Glom Filt Rate 12 (L) >60 mL/min/1.73m2    Glucose 175 (H) 70 - 99 MG/DL    Calcium 9.3 8.3 - 10.6 MG/DL    Albumin 3.6 3.4 - 5.0 GM/DL    Total Protein 7.0 6.4 - 8.2 GM/DL    Total Bilirubin 0.4 0.0 - 1.0 MG/DL    ALT 14 10 - 40 U/L    AST 20 15 - 37 IU/L    Alkaline Phosphatase 140 (H) 40 - 128 IU/L    Anion Gap 14 4 - 16   Magnesium   Result Value Ref Range    Magnesium 2.7 (H) 1.8 - 2.4 mg/dl   Troponin   Result Value Ref Range    Troponin T 0.269 (HH) <0.01 NG/ML   Brain Natriuretic Peptide   Result Value Ref Range    Pro-BNP 7,880 (H) <300 PG/ML   Blood Gas, Venous   Result Value Ref Range    pH, Paolo 7.35 7.32 - 7.42    pCO2, Paolo 50 38 - 52 mmHG    pO2, Paolo 40 28 - 48 mmHG    Base Exc, Mixed 1.2 0 - 1.2    HCO3, Venous 27.6 (H) 19 - 25 MMOL/L    O2 Sat, Paolo 73.2 (H) 50 - 70 %    Comment VBG    EKG 12 Lead   Result Value Ref Range    Ventricular Rate 102 BPM    Atrial Rate 105 BPM    QRS Duration 168 ms    Q-T Interval 332 ms    QTc Calculation (Bazett) 432 ms    R Axis -81 degrees    T Axis 43 degrees    Diagnosis       Atrial fibrillation with rapid ventricular response  Right bundle branch block  Left anterior fascicular block  Bifascicular block   Abnormal ECG  When compared with ECG of 11-DEC-2022 22:08,  QT has shortened  Confirmed by Madelyn Vaughn ([id number]) on 12/29/2022 3:28:51 PM       Radiographs (if obtained):  Radiologist's Report Reviewed:  XR CHEST PORTABLE   Final Result   Small right pleural effusion with bibasilar airspace opacities. This could   represent atelectasis or pneumonia in the appropriate clinical setting           EKG (if obtained): (All EKG's are interpreted by myself in the absence of a cardiologist)  Atrial fibrillation with a rate of 102,  Bifascicular block; LAFB, RBBB  No STEMI  Similar to previous EKG from 12/11/2022  No STEMI    CRITICAL CARE NOTE:  There was a high probability of clinically significant life-threatening deterioration of the patient's condition requiring my urgent intervention due to SOB. Oxygen and close monitoring, suctioning of the trach was performed to address this. Total critical care time is AT LEAST 45 minutes. This includes vital sign monitoring, pulse oximetry monitoring, telemetry monitoring, clinical response to the IV medications, reviewing the nursing notes, consultation time, dictation/documentation time, and interpretation of the lab work. This time excludes time spent performing procedures and separately billable procedures and family discussion time.   MDM:  History is from patient record and EMS staff  69-year-old male nursing home resident with a past medical history of COPD, CAD, hypertension, hyperlipidemia, atrial fibrillation, respiratory failure, on trach who presented to the ED with a history of shortness of breath and increased secretion from the tracheostomy since today  Patient also endorses a history of cough. No history of fever, cough, ill contacts, orthopnea, dyspnea on exertion, recent URI, cancer, trauma or surgery. Patient physical examination is significant for respiratory distress with yellow secretion from the tracheostomy. 4:15 PM  Patient's laboratory evaluation is significant for the following:  Unremarkable COVID and flu test.  CBC is unremarkable  Electrolytes significant for potassium of 5.5 and magnesium of 2.7.  - Nephrologist aware not recommending any treatment at this time other than Lokelma. Patient has elevated BNP 7880 which is no different from his previous BNP. Last time he had a BNP of 10,348. Patient troponin is 0.269 which is also within his baseline. Of note patient has a history of renal failure and is on dialysis. Patient has BNP and troponin that elevated is not unexpected based on the renal failure. Blood gases are also unremarkable. Patient had  pH of 7.35/PCO2 of 50/PO2 of 40 and a bicarb level of 27.6, VBG    CXR significant for infection. Patient given antibiotics in the ED. Case is also discussed with nephrology who recommended lokelma ffor hyperkalemia. Case discussed with hospitalist for admission    Clinical Impression:  1. Pneumonia of both lungs due to infectious organism, unspecified part of lung    2. Hyperkalemia    3. Renal failure, unspecified chronicity      Disposition referral (if applicable):  No follow-up provider specified.   Disposition medications (if applicable):  New Prescriptions    No medications on file     ED Provider Disposition Time  DISPOSITION Decision To Admit 12/29/2022 04:17:55 PM      Comment: Please note this report has been produced using speech recognition software and may contain errors related to that system including errors in grammar, punctuation, and spelling, as well as words and phrases that may be inappropriate. Efforts were made to edit the dictations.                Chantel Mae DO  12/30/22 1605

## 2022-12-29 NOTE — ED NOTES
ED TO INPATIENT SBAR HANDOFF    Patient Name: Jacquie Najera   :  1950  71 y.o. MRN:  [id number]  Preferred Name  Sagar Ma  ED Room #:  ED25/ED-25  Family/Caregiver Present no   Restraints no   Sitter no   Sepsis Risk Score Sepsis Risk Score: 2.05    Situation  Code Status: Prior No additional code details. Allergies: Patient has no known allergies. Weight: Patient Vitals for the past 96 hrs (Last 3 readings):   Weight   22 1620 258 lb 9.6 oz (117.3 kg)     Arrived from: nursing home  Chief Complaint:   Chief Complaint   Patient presents with    Shortness of [place] Problem/Diagnosis:  Principal Problem:    Healthcare-associated pneumonia  Resolved Problems:    * No resolved hospital problems. *    Imaging:   XR CHEST PORTABLE   Final Result   Small right pleural effusion with bibasilar airspace opacities.   This could   represent atelectasis or pneumonia in the appropriate clinical setting           Abnormal labs:   Abnormal Labs Reviewed   CBC WITH AUTO DIFFERENTIAL - Abnormal; Notable for the following components:       Result Value    RBC 2.53 (*)     Hemoglobin 9.0 (*)     Hematocrit 26.7 (*)     .5 (*)     MCH 35.6 (*)     RDW 22.6 (*)     Segs Relative 67.9 (*)     Lymphocytes % 17.3 (*)     Monocytes % 10.4 (*)     Immature Neutrophil % 2.1 (*)     All other components within normal limits   COMPREHENSIVE METABOLIC PANEL - Abnormal; Notable for the following components:    Sodium 133 (*)     Potassium 5.5 (*)     Chloride 94 (*)     BUN 59 (*)     Creatinine 5.0 (*)     Est, Glom Filt Rate 12 (*)     Glucose 175 (*)     Alkaline Phosphatase 140 (*)     All other components within normal limits   MAGNESIUM - Abnormal; Notable for the following components:    Magnesium 2.7 (*)     All other components within normal limits   TROPONIN - Abnormal; Notable for the following components:    Troponin T 0.269 (*)     All other components within normal limits   BRAIN NATRIURETIC PEPTIDE - Abnormal; Notable for the following components:    Pro-BNP 7,880 (*)     All other components within normal limits   BLOOD GAS, VENOUS - Abnormal; Notable for the following components:    HCO3, Venous 27.6 (*)     O2 Sat, Paolo 73.2 (*)     All other components within normal limits   PROTIME-INR - Abnormal; Notable for the following components:    Protime 26.3 (*)     All other components within normal limits     Critical values: yes     Abnormal Assessment Findings: Pt has diminished breathe sounds. Veleria Shine is in place. Pt tolerates 2L O2 NC. Productive cough. Thick, tan sputum. Background  History:   Past Medical History:   Diagnosis Date    Allergic rhinitis     Anticoagulated on Coumadin     1/6/17-**[facility] to follow pt's PT/INRs, along w/prescribing his Coumadin. **    Anxiety     Arthritis     Atrial fibrillation (HCC)     On Coumadin. 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 positive 09/21/2022    positive at 15C, negative at 18C    COPD (chronic obstructive pulmonary disease) (HCC)     Depression     Diabetes mellitus (HCC)     NIDDM- dx over 10 yrs ago- follows with Dr Roslyn Pinon's contracture of hand     Right hand    Family history of cardiovascular disease     Father-MI    GERD (gastroesophageal reflux disease)     H/O cardiac catheterization 03/2007    cardiac cath- stent LAD patent, [id number]%, RCA 40-50%    H/O cardiac catheterization 06/12/2008    cardiac cath- mild disease mid RCA    H/O cardiovascular stress test 04/10/2014    cardiolite- normal, no ischemia, EF63%    H/O cardiovascular stress test 09/21/2016    EF59% normal study  pt in atrial fib    H/O cardiovascular stress test 09/20/2017    EF 60%   afib    H/O cardiovascular stress test 11/07/2018    EF60% normal study    H/O Doppler ultrasound     1/11/2010-CAROTID_Intimal thickening but no significant atherosclerotic plaque noted in LAUREN.  Doppler flow velocities within the LAUREN are WNL. Intimal thickening but no significant atherosclerotic plaque noted in LICA. Doppler flow velociities within the LICA are WNL.  H/O echocardiogram 04/10/2014    EF 60%, normal LV systolic function, mild mitral and tricuspid insufficiencies, no pericardial effusion.  H/O echocardiogram 09/21/2016    EF60% normal study    H/O echocardiogram 11/07/2018    EF55-60% no significant valular disease    H/O hiatal hernia     Hx of Venous Doppler 03/14/2019    Significant reflux in Left Deep System, No reflux in right lower extremity, No DVT or SVT    Hyperlipidemia     Hypertension     Obesity     S/P PTCA (percutaneous transluminal coronary angioplasty) 03/21/2005    s/p PTCA with 3.5 X 16 TAXUS stent to LAD- follows with Dr Alondra Landis Sleep apnea     had sleep study done 10+ yrs ago- has cpap but does not use it    Thyroid disease     hypothyroid       Assessment    Vitals/MEWS: MEWS Score: 1  Level of Consciousness: Alert (0)   Vitals:    12/29/22 1442 12/29/22 1444 12/29/22 1620 12/29/22 1633   BP:    125/75   Pulse: (!) 104   100   Resp: 15   18   Temp:  98.4 °F (36.9 °C)  98.4 °F (36.9 °C)   TempSrc:  Oral     SpO2: 98%   100%   Weight:   258 lb 9.6 oz (117.3 kg)      FiO2 (%): nasal cannula for respiratory distress  O2 Flow Rate: O2 Device: Nasal cannula O2 Flow Rate (L/min): 2 L/min  Cardiac Rhythm:    Pain Assessment:  [x] Verbal [] Merilynn Salazar Scale  Pain Scale:    Last documented pain score (0-10 scale)    Last documented pain medication administered:   Mental Status: oriented and alert  NIH Score:    C-SSRS: Risk of Suicide: No Risk  Bedside swallow:    Harvey Coma Scale (GCS): [place] Coma Scale  Eye Opening: Spontaneous  Best Verbal Response: Oriented  Best Motor Response: Obeys commands  Harvey Coma Scale Score: 15  Active LDA's:   Peripheral IV 12/29/22 Right Antecubital (Active)     PO Status: Pt has PEG tube. Takes feeds and pills by the tubes. Uses Novasource feeds. Pertinent or High Risk Medications/Drips: no   o If Yes, please provide details:   Pending Blood Product Administration: no     You may also review the ED PT Care Timeline found under the Summary Nursing Index tab. Recommendation    Pending orders   Plan for Discharge (if known): Additional Comments: Pt is from [address] [place] [address]. Pt has speech valve with him. Pt does require suctioning.     If any further questions, please call Sending RN at [phone]    Electronically signed by: Electronically signed by Francois Vinson RN on 12/29/2022 at 4:49 PM     Francois Vinson [facility]  12/29/22 0877

## 2022-12-29 NOTE — CARE COORDINATION
Pt noted for possible readmission, pt was recently admitted 12/11-12/16 for acute on chronic respiratory failure. Per notes pt was at [facility] skilled and was d/c to [facility]. Pt has PCP and insurance. DME includes cane, walker and shower chair. Pt completes dialysis M-W-F. Pt also has a trach. Pt returns today from [facility] with complaints of SOB. Pt is to be admitted for further treatment.

## 2022-12-30 PROBLEM — E43 SEVERE MALNUTRITION (HCC): Status: ACTIVE | Noted: 2022-12-30

## 2022-12-30 LAB
ALBUMIN SERPL-MCNC: 3.1 GM/DL (ref 3.4–5)
ALP BLD-CCNC: 103 IU/L (ref 40–128)
ALT SERPL-CCNC: 11 U/L (ref 10–40)
ANION GAP SERPL CALCULATED.3IONS-SCNC: 14 MMOL/L (ref 4–16)
APTT: 35.5 SECONDS (ref 25.1–37.1)
AST SERPL-CCNC: 16 IU/L (ref 15–37)
BASOPHILS ABSOLUTE: 0.1 K/CU MM
BASOPHILS RELATIVE PERCENT: 1.1 % (ref 0–1)
BILIRUB SERPL-MCNC: 0.4 MG/DL (ref 0–1)
BUN BLDV-MCNC: 69 MG/DL (ref 6–23)
CALCIUM SERPL-MCNC: 8.4 MG/DL (ref 8.3–10.6)
CHLORIDE BLD-SCNC: 93 MMOL/L (ref 99–110)
CO2: 23 MMOL/L (ref 21–32)
CREAT SERPL-MCNC: 5.5 MG/DL (ref 0.9–1.3)
DIFFERENTIAL TYPE: ABNORMAL
EOSINOPHILS ABSOLUTE: 0.3 K/CU MM
EOSINOPHILS RELATIVE PERCENT: 3.2 % (ref 0–3)
ESTIMATED AVERAGE GLUCOSE: 154 MG/DL
GFR SERPL CREATININE-BSD FRML MDRD: 10 ML/MIN/1.73M2
GLUCOSE BLD-MCNC: 106 MG/DL (ref 70–99)
GLUCOSE BLD-MCNC: 120 MG/DL (ref 70–99)
GLUCOSE BLD-MCNC: 124 MG/DL (ref 70–99)
GLUCOSE BLD-MCNC: 126 MG/DL (ref 70–99)
GLUCOSE BLD-MCNC: 135 MG/DL (ref 70–99)
GLUCOSE BLD-MCNC: 211 MG/DL (ref 70–99)
HBA1C MFR BLD: 7 % (ref 4.2–6.3)
HCT VFR BLD CALC: 23.9 % (ref 42–52)
HEMOGLOBIN: 8.3 GM/DL (ref 13.5–18)
IMMATURE NEUTROPHIL %: 2.4 % (ref 0–0.43)
INR BLD: 1.92 INDEX
LACTATE: 1.7 MMOL/L (ref 0.5–1.9)
LYMPHOCYTES ABSOLUTE: 2.7 K/CU MM
LYMPHOCYTES RELATIVE PERCENT: 31.6 % (ref 24–44)
MCH RBC QN AUTO: 36.7 PG (ref 27–31)
MCHC RBC AUTO-ENTMCNC: 34.7 % (ref 32–36)
MCV RBC AUTO: 105.8 FL (ref 78–100)
MONOCYTES ABSOLUTE: 1.2 K/CU MM
MONOCYTES RELATIVE PERCENT: 13.7 % (ref 0–4)
NUCLEATED RBC %: 0.8 %
PDW BLD-RTO: 22.7 % (ref 11.7–14.9)
PLATELET # BLD: 226 K/CU MM (ref 140–440)
PMV BLD AUTO: 8.8 FL (ref 7.5–11.1)
POTASSIUM SERPL-SCNC: 5 MMOL/L (ref 3.5–5.1)
PROTHROMBIN TIME: 24.9 SECONDS (ref 11.7–14.5)
RBC # BLD: 2.26 M/CU MM (ref 4.6–6.2)
SEGMENTED NEUTROPHILS ABSOLUTE COUNT: 4.1 K/CU MM
SEGMENTED NEUTROPHILS RELATIVE PERCENT: 48 % (ref 36–66)
SODIUM BLD-SCNC: 130 MMOL/L (ref 135–145)
TOTAL IMMATURE NEUTOROPHIL: 0.2 K/CU MM
TOTAL NUCLEATED RBC: 0.1 K/CU MM
TOTAL PROTEIN: 6.1 GM/DL (ref 6.4–8.2)
WBC # BLD: 8.5 K/CU MM (ref 4–10.5)

## 2022-12-30 PROCEDURE — 2580000003 HC RX 258: Performed by: HOSPITALIST

## 2022-12-30 PROCEDURE — 85610 PROTHROMBIN TIME: CPT

## 2022-12-30 PROCEDURE — 80053 COMPREHEN METABOLIC PANEL: CPT

## 2022-12-30 PROCEDURE — 85025 COMPLETE CBC W/AUTO DIFF WBC: CPT

## 2022-12-30 PROCEDURE — 85730 THROMBOPLASTIN TIME PARTIAL: CPT

## 2022-12-30 PROCEDURE — 94640 AIRWAY INHALATION TREATMENT: CPT

## 2022-12-30 PROCEDURE — 90935 HEMODIALYSIS ONE EVALUATION: CPT

## 2022-12-30 PROCEDURE — 99211 OFF/OP EST MAY X REQ PHY/QHP: CPT

## 2022-12-30 PROCEDURE — 2700000000 HC OXYGEN THERAPY PER DAY

## 2022-12-30 PROCEDURE — 6370000000 HC RX 637 (ALT 250 FOR IP): Performed by: HOSPITALIST

## 2022-12-30 PROCEDURE — C9113 INJ PANTOPRAZOLE SODIUM, VIA: HCPCS | Performed by: HOSPITALIST

## 2022-12-30 PROCEDURE — 5A1D70Z PERFORMANCE OF URINARY FILTRATION, INTERMITTENT, LESS THAN 6 HOURS PER DAY: ICD-10-PCS

## 2022-12-30 PROCEDURE — 36415 COLL VENOUS BLD VENIPUNCTURE: CPT

## 2022-12-30 PROCEDURE — 89220 SPUTUM SPECIMEN COLLECTION: CPT

## 2022-12-30 PROCEDURE — 94761 N-INVAS EAR/PLS OXIMETRY MLT: CPT

## 2022-12-30 PROCEDURE — 6370000000 HC RX 637 (ALT 250 FOR IP): Performed by: INTERNAL MEDICINE

## 2022-12-30 PROCEDURE — 1200000000 HC SEMI PRIVATE

## 2022-12-30 PROCEDURE — 83036 HEMOGLOBIN GLYCOSYLATED A1C: CPT

## 2022-12-30 PROCEDURE — A4216 STERILE WATER/SALINE, 10 ML: HCPCS | Performed by: HOSPITALIST

## 2022-12-30 PROCEDURE — 83605 ASSAY OF LACTIC ACID: CPT

## 2022-12-30 PROCEDURE — 5A1D70Z PERFORMANCE OF URINARY FILTRATION, INTERMITTENT, LESS THAN 6 HOURS PER DAY: ICD-10-PCS | Performed by: INTERNAL MEDICINE

## 2022-12-30 PROCEDURE — 2500000003 HC RX 250 WO HCPCS: Performed by: INTERNAL MEDICINE

## 2022-12-30 PROCEDURE — 82962 GLUCOSE BLOOD TEST: CPT

## 2022-12-30 PROCEDURE — 87070 CULTURE OTHR SPECIMN AEROBIC: CPT

## 2022-12-30 PROCEDURE — 6360000002 HC RX W HCPCS: Performed by: HOSPITALIST

## 2022-12-30 PROCEDURE — 87205 SMEAR GRAM STAIN: CPT

## 2022-12-30 RX ORDER — ALBUTEROL SULFATE 90 UG/1
2 AEROSOL, METERED RESPIRATORY (INHALATION) EVERY 6 HOURS PRN
Status: DISCONTINUED | OUTPATIENT
Start: 2022-12-30 | End: 2023-01-05 | Stop reason: HOSPADM

## 2022-12-30 RX ORDER — INSULIN LISPRO 100 [IU]/ML
0-4 INJECTION, SOLUTION INTRAVENOUS; SUBCUTANEOUS EVERY 6 HOURS SCHEDULED
Status: DISCONTINUED | OUTPATIENT
Start: 2022-12-30 | End: 2023-01-05 | Stop reason: HOSPADM

## 2022-12-30 RX ORDER — GUAIFENESIN 600 MG/1
600 TABLET, EXTENDED RELEASE ORAL 2 TIMES DAILY
Status: DISCONTINUED | OUTPATIENT
Start: 2022-12-30 | End: 2022-12-31

## 2022-12-30 RX ADMIN — ACETAMINOPHEN 650 MG: 325 TABLET ORAL at 21:29

## 2022-12-30 RX ADMIN — WARFARIN SODIUM 2 MG: 2 TABLET ORAL at 18:00

## 2022-12-30 RX ADMIN — IPRATROPIUM BROMIDE AND ALBUTEROL SULFATE 3 ML: .5; 2.5 SOLUTION RESPIRATORY (INHALATION) at 16:21

## 2022-12-30 RX ADMIN — GUAIFENESIN 600 MG: 600 TABLET, EXTENDED RELEASE ORAL at 13:41

## 2022-12-30 RX ADMIN — AMIODARONE HYDROCHLORIDE 200 MG: 200 TABLET ORAL at 07:19

## 2022-12-30 RX ADMIN — GUAIFENESIN 600 MG: 600 TABLET, EXTENDED RELEASE ORAL at 21:29

## 2022-12-30 RX ADMIN — SEVELAMER CARBONATE 1600 MG: 800 TABLET, FILM COATED ORAL at 07:19

## 2022-12-30 RX ADMIN — IPRATROPIUM BROMIDE AND ALBUTEROL SULFATE 3 ML: .5; 2.5 SOLUTION RESPIRATORY (INHALATION) at 12:53

## 2022-12-30 RX ADMIN — SODIUM CHLORIDE, PRESERVATIVE FREE 40 MG: 5 INJECTION INTRAVENOUS at 07:19

## 2022-12-30 RX ADMIN — TRAZODONE HYDROCHLORIDE 100 MG: 50 TABLET ORAL at 21:29

## 2022-12-30 RX ADMIN — LEVOTHYROXINE SODIUM 75 MCG: 0.07 TABLET ORAL at 07:21

## 2022-12-30 RX ADMIN — MIDODRINE HYDROCHLORIDE 10 MG: 5 TABLET ORAL at 18:00

## 2022-12-30 RX ADMIN — SODIUM CHLORIDE, PRESERVATIVE FREE 10 ML: 5 INJECTION INTRAVENOUS at 21:37

## 2022-12-30 RX ADMIN — SODIUM CHLORIDE, PRESERVATIVE FREE 10 ML: 5 INJECTION INTRAVENOUS at 07:25

## 2022-12-30 RX ADMIN — MICONAZOLE NITRATE: 2 POWDER TOPICAL at 21:29

## 2022-12-30 RX ADMIN — SEVELAMER CARBONATE 1600 MG: 800 TABLET, FILM COATED ORAL at 13:40

## 2022-12-30 RX ADMIN — CARVEDILOL 6.25 MG: 6.25 TABLET, FILM COATED ORAL at 07:19

## 2022-12-30 RX ADMIN — ATORVASTATIN CALCIUM 40 MG: 40 TABLET, FILM COATED ORAL at 21:29

## 2022-12-30 RX ADMIN — ASPIRIN 81 MG CHEWABLE TABLET 81 MG: 81 TABLET CHEWABLE at 07:19

## 2022-12-30 RX ADMIN — QUETIAPINE FUMARATE 50 MG: 25 TABLET ORAL at 21:29

## 2022-12-30 RX ADMIN — POLYETHYLENE GLYCOL (3350) 17 G: 17 POWDER, FOR SOLUTION ORAL at 07:19

## 2022-12-30 RX ADMIN — SEVELAMER CARBONATE 1600 MG: 800 TABLET, FILM COATED ORAL at 21:29

## 2022-12-30 RX ADMIN — MIDODRINE HYDROCHLORIDE 10 MG: 5 TABLET ORAL at 07:19

## 2022-12-30 RX ADMIN — CEFEPIME HYDROCHLORIDE 1000 MG: 1 INJECTION, POWDER, FOR SOLUTION INTRAMUSCULAR; INTRAVENOUS at 17:59

## 2022-12-30 RX ADMIN — Medication 9 MG: at 21:29

## 2022-12-30 RX ADMIN — MIDODRINE HYDROCHLORIDE 10 MG: 5 TABLET ORAL at 13:40

## 2022-12-30 ASSESSMENT — PAIN DESCRIPTION - DESCRIPTORS: DESCRIPTORS: ACHING;CRAMPING

## 2022-12-30 ASSESSMENT — PAIN DESCRIPTION - ORIENTATION: ORIENTATION: RIGHT;LEFT

## 2022-12-30 ASSESSMENT — PAIN DESCRIPTION - LOCATION
LOCATION: LEG
LOCATION: LEG

## 2022-12-30 ASSESSMENT — PAIN SCALES - GENERAL
PAINLEVEL_OUTOF10: 2
PAINLEVEL_OUTOF10: 7

## 2022-12-30 NOTE — CARE COORDINATION
CM met with pt to initiate discharge planning. Role of CM explained. Pt has insurance and is able to afford medication. Pt has PCP. Pt came to the hospital from [address]. Stated that he had had surgery and ended up at other hospitals then [facility]. He stated that he has not been home in four months. Pt unsure if he wants to return to [facility] and definitely doesn't want to go to [facility]. Pt agreeable for CM to check with [facility] as it is near his home. Pt stated that he will discuss this with his brother. CM team available as needs arise. CM called [facility] in [facility]. They are unable to take the pt as they do not have transportation to take pt to & from dialysis. CM called [facility] and left a VM.

## 2022-12-30 NOTE — PROGRESS NOTES
In-Patient Progress Note    Patient:  Vincenzo Sherman 71 y.o. male MRN: [id number]     Date of Service: 12/30/2022    Hospital Day: 2      Chief complaint: had concerns including Shortness of Breath. Subjective   Patient seen and examined in the morning. Patient states he feels much better today. His SOB has improving. He still has a cough but now the sputum is clear. He states it was yellow before. He was undergoing HD when seen. See ROS    I have reviewed all pertinent PMHx, PSHx, FamHx, SocialHx, medications, and allergies and updated history as appropriate. I have reviewed images as appropriate. Assessment and Plan   Vincenzo Sherman, a 71 y.o. male, with a history of CAD status post CABG, permanent A. fib, hypertension, hyperlipidemia and COPD was admitted on 12/29/2022 with complaints of had concerns including Shortness of Breath. Assessment and Plan:  Concern for Healthcare associated pneumonia with tracheitis  Acute on Chronic Respiratory failure s/p trach  Rt. Pleural effusion   Possible Sepsis - Present on admission   - Initial , RR 28.   - LA not sent initially apparently  -Trach at [facility] 10/12/2022  -On home 5L O2 on trach collar - currently on 6L/min  - Initially required up to 10L/min via trach collar   -X-ray chest shows small right pleural effusion with bibasilar opacities. -Tracheal aspirate was apparently purulent and patient had cough with yellow sputum initially. -IV vancomycin and cefepime for pharmacy to dose. - Pending pan culture  - Pulm on board  - May need thoracentesis   - Send LA    End-stage renal disease   - Has required HD since his CABG (9/22/2022). -Blood urea 59 creatinine 5.0, GFR 12 on presentation.  - Nephro on board     Hyperkalemia  -Mild with potassium of 5.5 initially  -Patient already administered Lokelma by ER after consultation with nephrology and now 5 prior to HD      A.  Fib with RVR in the setting of Chronic persistent atrial fibrillation s/p MAZE 9/22/22  -Twelve-lead ECG shows A. Fib with   -Continue amiodarone, carvedilol and warfarin.  - INR therapeutic   - Pharmacy to dose    Hypothyroidism  -Continue levothyroxine as before. History of diabetes mellitus  -Patient placed on Lantus and sliding scale insulin as per protocol.  - Endocrinology on board  - A1c 7%    Increased troponins 2/2 likely chornic 2/2 ESRD  -Troponin T's 0.269.  -Patient does not endorse any chest pain. -Probably secondary to chronic leak and also not interpretable secondary to chronic kidney disease. Macrocytic anemia 2/2 likely anemia of chronic disease  - Hb 8.3 today. - Last admission earlier this month: Ferritin 778 with low iron and TIBC. Normal folate and vitamin B12    Moderate Protein Malnutrition    H/O DVT (non-occlusive) mid R femoral vein  -Dx on duplex RLE 9/12/22   -Continue warfarin     H/O HTN with Soft blood pressures  - On midodrine   - On Coreg    s/p PEG tube placement on 10/19/2022  -Patient has a PEG tube. - Dietitian on board    Class I Obesity  - BMI 33.13      # Peptic ulcer prophylaxis: Pantoprazole   # DVT Prophylaxis: Warfarin  #CODE STATUS: Full      Current living situation: SNF  Expected Disposition: SNF  Estimated discharge date: TBD pending clinical course. On IV abx and work up on going. Review of System     General: No fever, no chills  Heart: No chest pain, no palpitations, no PND, no orthopnea  Lungs: SOB has improved, + cough with clear sputum now  Abdomen: No abdominal pain, no nausea, no vomiting, no constipation, no diarrhea  : No frequency, no dysuria, no urgency, no decreased urination  MSK: No lower extremity swelling  Neuro: No confusion, no weakness  Skin: No rashes    I have reviewed all pertinent PMHx, PSHx, FamHx, SocialHx, medications, and allergies and updated history as appropriate.     Physical Exam   VITAL SIGNS:  BP (!) 97/52   Pulse 93   Temp 97.9 °F (36.6 °C) (Tympanic)   Resp 14   Ht 6' 2\" (1.88 m)   Wt 258 lb (117 kg)   SpO2 97%   BMI 33.13 kg/m²   Tmax over 24 hours:  Temp (24hrs), Av.2 °F (36.8 °C), Min:97.9 °F (36.6 °C), Max:98.4 °F (36.9 °C)      Patient Vitals for the past 6 hrs:   BP Temp Temp src Pulse Resp SpO2 Height   22 1309 -- -- -- -- -- 97 % --   22 1151 (!) 97 97.9 °F (36.6 °C) Tympanic 93 14 -- --   22 1130 (!) 75/42 -- -- 94 12 -- --   22 1115 (!) 86/43 -- -- 97 22 -- --   22 1100 (!) 82/43 -- -- 93 15 -- --   22 1059 -- -- -- -- -- -- 6' 2\" (1.88 m)   22 1045 89/61 -- -- 70 14 -- --   22 1030 (!) 97/48 -- -- 77 14 -- --   22 1015 (!) 96/52 -- -- 87 14 -- --   22 1000 (!) 109/56 -- -- 87 19 -- --   22 0945 (!) 95/54 -- -- 83 15 -- --   22 0930 (!) 86/45 -- -- 87 15 -- --   22 0915 (!) 103/58 -- -- 90 15 -- --   22 0845 (!) 86/53 98.2 °F (36.8 °C) -- 76 22 98 % --   22 0833 -- -- -- -- -- 98 % --         Intake/Output Summary (Last 24 hours) at 2022 1319  Last data filed at 2022 1151  Gross per 24 hour   Intake 500 ml   Output 3000 ml   Net -2500 ml     Wt Readings from Last 2 Encounters:   22 258 lb (117 kg)   22 258 lb 9.6 oz (117.3 kg)     Body mass index is 33.13 kg/m². General: NAD, AAO x3. Obese. Eyes: EOMI  HENT: trach in place with trach collar - 6L/min. CVS: Normal S1 and S2, no murmurs. Normal rate, irregularly irregular rhythm  Respiratory: Conducted sounds B/L with B/L decreased air entry, no respiratory distress, No secretions noted at trach site today. GI: Normal bowel sounds, soft, nondistended, nontender. PEG in situ  MSK:  no lower extremity edema  Skin: Intact, dry, warm, no rashes.  Stasis dermatitis B/L LE  Neuro: CN II to XII grossly intact  Psych: Normal mood      Current Medications      guaiFENesin  600 mg Oral BID    sodium zirconium cyclosilicate  10 g Oral Once    aspirin  81 mg Per NG tube Daily    pantoprazole (PROTONIX) 40 mg injection  40 mg IntraVENous Daily    amiodarone  200 mg PEG Tube Daily    atorvastatin  40 mg PEG Tube Nightly    Nephronex  5 mL Feeding Tube Daily    sevelamer  1,600 mg Per G Tube Q8H    QUEtiapine  50 mg Per G Tube Nightly    carvedilol  6.25 mg PEG Tube BID WC    midodrine  10 mg Oral TID     levothyroxine  75 mcg Oral Daily    ipratropium-albuterol  1 vial Inhalation 4x Daily    warfarin  2 mg PEG Tube Daily    sodium chloride flush  5-40 mL IntraVENous 2 times per day    insulin lispro  0-4 Units SubCUTAneous TID     insulin glargine  10 Units SubCUTAneous Nightly    insulin lispro  0-4 Units SubCUTAneous 2 times per day    cefepime  1,000 mg IntraVENous Q24H    polyethylene glycol  17 g Oral Daily         Labs and Imaging Studies   Laboratory findings:  XR CHEST PORTABLE    Result Date: 12/29/2022  EXAMINATION: ONE XRAY VIEW OF THE CHEST 12/29/2022 2:34 pm COMPARISON: Chest x-ray dated 11 December 2022 HISTORY: ORDERING SYSTEM PROVIDED HISTORY: Shortness of Breath TECHNOLOGIST PROVIDED HISTORY: Reason for exam:->Shortness of Breath Reason for Exam: Shortness of Breath FINDINGS: Small right pleural effusion. Bibasilar airspace opacities. No pneumothorax. Tracheostomy tube in place. Right-sided central venous catheter with the tip at the SVC/atrial junction. Small right pleural effusion with bibasilar airspace opacities.   This could represent atelectasis or pneumonia in the appropriate clinical setting            Recent Results (from the past 24 hour(s))   EKG 12 Lead    Collection Time: 12/29/22  2:15 PM   Result Value Ref Range    Ventricular Rate 102 BPM    Atrial Rate 105 BPM    QRS Duration 168 ms    Q-T Interval 332 ms    QTc Calculation (Bazett) 432 ms    R Axis -81 degrees    T Axis 43 degrees    Diagnosis       Atrial fibrillation with rapid ventricular response  Right bundle branch block  Left anterior fascicular block   Bifascicular block   Abnormal ECG  When compared with ECG of 11-DEC-2022 22:08,  QT has shortened  Confirmed by Usman De La Cruz ([id number]) on 12/29/2022 3:28:51 PM     Blood Gas, Venous    Collection Time: 12/29/22  2:30 PM   Result Value Ref Range    pH, Paolo 7.35 7.32 - 7.42    pCO2, Paolo 50 38 - 52 mmHG    pO2, Paolo 40 28 - 48 mmHG    Base Exc, Mixed 1.2 0 - 1.2    HCO3, Venous 27.6 (H) 19 - 25 MMOL/L    O2 Sat, Paolo 73.2 (H) 50 - 70 %    Comment VBG    COVID-19, Rapid    Collection Time: 12/29/22  2:49 PM    Specimen: Nasopharyngeal   Result Value Ref Range    Source NARES     SARS-CoV-2, NAAT NOT DETECTED NOT DETECTED   Rapid Flu Swab    Collection Time: 12/29/22  2:49 PM    Specimen: Nasopharyngeal   Result Value Ref Range    Rapid Influenza A Ag NEGATIVE NEGATIVE    Rapid Influenza B Ag NEGATIVE NEGATIVE   Protime-INR    Collection Time: 12/29/22  3:08 PM   Result Value Ref Range    Protime 26.3 (H) 11.7 - 14.5 SECONDS    INR 2.02 INDEX   CBC with Auto Differential    Collection Time: 12/29/22  3:09 PM   Result Value Ref Range    WBC 10.3 4.0 - 10.5 K/CU MM    RBC 2.53 (L) 4.6 - 6.2 M/CU MM    Hemoglobin 9.0 (L) 13.5 - 18.0 GM/DL    Hematocrit 26.7 (L) 42 - 52 %    .5 (H) 78 - 100 FL    MCH 35.6 (H) 27 - 31 PG    MCHC 33.7 32.0 - 36.0 %    RDW 22.6 (H) 11.7 - 14.9 %    Platelets 922 999 - 351 K/CU MM    MPV 8.9 7.5 - 11.1 FL    Differential Type AUTOMATED DIFFERENTIAL     Segs Relative 67.9 (H) 36 - 66 %    Lymphocytes % 17.3 (L) 24 - 44 %    Monocytes % 10.4 (H) 0 - 4 %    Eosinophils % 1.4 0 - 3 %    Basophils % 0.9 0 - 1 %    Segs Absolute 7.0 K/CU MM    Lymphocytes Absolute 1.8 K/CU MM    Monocytes Absolute 1.1 K/CU MM    Eosinophils Absolute 0.1 K/CU MM    Basophils Absolute 0.1 K/CU MM    Nucleated RBC % 0.7 %    Total Nucleated RBC 0.1 K/CU MM    Total Immature Neutrophil 0.22 K/CU MM    Immature Neutrophil % 2.1 (H) 0 - 0.43 %   Comprehensive Metabolic Panel    Collection Time: 12/29/22  3:09 PM   Result Value Ref Range    Sodium 133 (L) 135 - 145 MMOL/L Potassium 5.5 (HH) 3.5 - 5.1 MMOL/L    Chloride 94 (L) 99 - 110 mMol/L    CO2 25 21 - 32 MMOL/L    BUN 59 (H) 6 - 23 MG/DL    Creatinine 5.0 (H) 0.9 - 1.3 MG/DL    Est, Glom Filt Rate 12 (L) >60 mL/min/1.73m2    Glucose 175 (H) 70 - 99 MG/DL    Calcium 9.3 8.3 - 10.6 MG/DL    Albumin 3.6 3.4 - 5.0 GM/DL    Total Protein 7.0 6.4 - 8.2 GM/DL    Total Bilirubin 0.4 0.0 - 1.0 MG/DL    ALT 14 10 - 40 U/L    AST 20 15 - 37 IU/L    Alkaline Phosphatase 140 (H) 40 - 128 IU/L    Anion Gap 14 4 - 16   Magnesium    Collection Time: 12/29/22  3:09 PM   Result Value Ref Range    Magnesium 2.7 (H) 1.8 - 2.4 mg/dl   Troponin    Collection Time: 12/29/22  3:09 PM   Result Value Ref Range    Troponin T 0.269 (HH) <0.01 NG/ML   Brain Natriuretic Peptide    Collection Time: 12/29/22  3:09 PM   Result Value Ref Range    Pro-BNP 7,880 (H) <300 PG/ML   POCT Glucose    Collection Time: 12/29/22  9:30 PM   Result Value Ref Range    POC Glucose 160 (H) 70 - 99 MG/DL   POCT Glucose    Collection Time: 12/30/22 12:53 AM   Result Value Ref Range    POC Glucose 211 (H) 70 - 99 MG/DL   Hemoglobin A1c    Collection Time: 12/30/22  2:15 AM   Result Value Ref Range    Hemoglobin A1C 7.0 (H) 4.2 - 6.3 %    eAG 154 mg/dL   Comprehensive Metabolic Panel w/ Reflex to MG    Collection Time: 12/30/22  6:04 AM   Result Value Ref Range    Sodium 130 (L) 135 - 145 MMOL/L    Potassium 5.0 3.5 - 5.1 MMOL/L    Chloride 93 (L) 99 - 110 mMol/L    CO2 23 21 - 32 MMOL/L    BUN 69 (H) 6 - 23 MG/DL    Creatinine 5.5 (H) 0.9 - 1.3 MG/DL    Est, Glom Filt Rate 10 (L) >60 mL/min/1.73m2    Glucose 106 (H) 70 - 99 MG/DL    Calcium 8.4 8.3 - 10.6 MG/DL    Albumin 3.1 (L) 3.4 - 5.0 GM/DL    Total Protein 6.1 (L) 6.4 - 8.2 GM/DL    Total Bilirubin 0.4 0.0 - 1.0 MG/DL    ALT 11 10 - 40 U/L    AST 16 15 - 37 IU/L    Alkaline Phosphatase 103 40 - 128 IU/L    Anion Gap 14 4 - 16   CBC with Auto Differential    Collection Time: 12/30/22  6:04 AM   Result Value Ref Range    WBC 8.5 4.0 - 10.5 K/CU MM    RBC 2.26 (L) 4.6 - 6.2 M/CU MM    Hemoglobin 8.3 (L) 13.5 - 18.0 GM/DL    Hematocrit 23.9 (L) 42 - 52 %    .8 (H) 78 - 100 FL    MCH 36.7 (H) 27 - 31 PG    MCHC 34.7 32.0 - 36.0 %    RDW 22.7 (H) 11.7 - 14.9 %    Platelets 924 011 - 462 K/CU MM    MPV 8.8 7.5 - 11.1 FL    Differential Type AUTOMATED DIFFERENTIAL     Segs Relative 48.0 36 - 66 %    Lymphocytes % 31.6 24 - 44 %    Monocytes % 13.7 (H) 0 - 4 %    Eosinophils % 3.2 (H) 0 - 3 %    Basophils % 1.1 (H) 0 - 1 %    Segs Absolute 4.1 K/CU MM    Lymphocytes Absolute 2.7 K/CU MM    Monocytes Absolute 1.2 K/CU MM    Eosinophils Absolute 0.3 K/CU MM    Basophils Absolute 0.1 K/CU MM    Nucleated RBC % 0.8 %    Total Nucleated RBC 0.1 K/CU MM    Total Immature Neutrophil 0.20 K/CU MM    Immature Neutrophil % 2.4 (H) 0 - 0.43 %   Protime/INR & PTT    Collection Time: 12/30/22  6:04 AM   Result Value Ref Range    Protime 24.9 (H) 11.7 - 14.5 SECONDS    INR 1.92 INDEX    aPTT 35.5 25.1 - 37.1 SECONDS   POCT Glucose    Collection Time: 12/30/22  8:03 AM   Result Value Ref Range    POC Glucose 120 (H) 70 - 99 MG/DL           Electronically signed by Megan Ramirez MD on 12/30/2022 at 1:19 PM

## 2022-12-30 NOTE — FLOWSHEET NOTE
Called [facility] and no answer. Pharmacy needs Warfarin dose verified tired to call three times no answer.

## 2022-12-30 NOTE — PROGRESS NOTES
Nephrology Progress Note  12/30/2022 12:59 PM  Subjective: Interval History: Richard Conn is a 71 y.o. male with  seen on dialysis doing better still some congestion        Data:   Scheduled Meds:   guaiFENesin  600 mg Oral BID    sodium zirconium cyclosilicate  10 g Oral Once    aspirin  81 mg Per NG tube Daily    pantoprazole (PROTONIX) 40 mg injection  40 mg IntraVENous Daily    amiodarone  200 mg PEG Tube Daily    atorvastatin  40 mg PEG Tube Nightly    Nephronex  5 mL Feeding Tube Daily    sevelamer  1,600 mg Per G Tube Q8H    QUEtiapine  50 mg Per G Tube Nightly    carvedilol  6.25 mg PEG Tube BID WC    midodrine  10 mg Oral TID WC    levothyroxine  75 mcg Oral Daily    ipratropium-albuterol  1 vial Inhalation 4x Daily    warfarin  2 mg PEG Tube Daily    sodium chloride flush  5-40 mL IntraVENous 2 times per day    insulin lispro  0-4 Units SubCUTAneous TID WC    insulin glargine  10 Units SubCUTAneous Nightly    insulin lispro  0-4 Units SubCUTAneous 2 times per day    cefepime  1,000 mg IntraVENous Q24H    polyethylene glycol  17 g Oral Daily     Continuous Infusions:   sodium chloride 25 mL (12/29/22 2256)    dextrose           CBC   Recent Labs     12/29/22  1509 12/30/22  0604   WBC 10.3 8.5   HGB 9.0* 8.3*   HCT 26.7* 23.9*    226      BMP   Recent Labs     12/29/22  1509 12/30/22  0604   * 130*   K 5.5* 5.0   CL 94* 93*   CO2 25 23   BUN 59* 69*   CREATININE 5.0* 5.5*     Hepatic:   Recent Labs     12/29/22  1509 12/30/22  0604   AST 20 16   ALT 14 11   BILITOT 0.4 0.4   ALKPHOS 140* 103     Troponin: No results for input(s): TROPONINI in the last 72 hours. BNP: No results for input(s): BNP in the last 72 hours. Lipids: No results for input(s): CHOL, HDL in the last 72 hours.     Invalid input(s): LDLCALCU  ABGs:   Lab Results   Component Value Date/Time    PO2ART 70.3 09/24/2022 11:49 AM    RUH8ZTW 35.5 09/24/2022 11:49 AM     INR:   Recent Labs     12/29/22  1508 12/30/22  0604 INR 2.02 1.92     Renal Labs  Albumin:    Lab Results   Component Value Date/Time    LABALBU 3.1 12/30/2022 06:04 AM     Calcium:    Lab Results   Component Value Date/Time    CALCIUM 8.4 12/30/2022 06:04 AM     Phosphorus:    Lab Results   Component Value Date/Time    PHOS 3.0 12/16/2022 08:02 AM     U/A:    Lab Results   Component Value Date/Time    NITRU NEGATIVE 09/17/2022 06:21 PM    COLORU YELLOW 09/17/2022 06:21 PM    PHUR 6.5 06/30/2021 02:11 PM    WBCUA NONE SEEN 09/17/2022 06:21 PM    RBCUA NONE SEEN 09/17/2022 06:21 PM    MUCUS RARE 09/17/2022 06:21 PM    TRICHOMONAS NONE SEEN 09/17/2022 06:21 PM    BACTERIA NEGATIVE 09/17/2022 06:21 PM    CLARITYU CLEAR 09/17/2022 06:21 PM    SPECGRAV 1.015 09/17/2022 06:21 PM    UROBILINOGEN 4.0 09/17/2022 06:21 PM    BILIRUBINUR NEGATIVE 09/17/2022 06:21 PM    BILIRUBINUR small 06/30/2021 02:11 PM    BLOODU NEGATIVE 09/17/2022 06:21 PM    GLUCOSEU neg 06/30/2021 02:11 PM    KETUA TRACE 09/17/2022 06:21 PM     ABG:    Lab Results   Component Value Date/Time    NLM1ADO 35.5 09/24/2022 11:49 AM    PO2ART 70.3 09/24/2022 11:49 AM    VRH9PEX 21.2 09/24/2022 11:49 AM     HgBA1c:    Lab Results   Component Value Date/Time    LABA1C 7.0 12/30/2022 02:15 AM     Microalbumen/Creatinine ratio:  No components found for: RUCREAT  TSH:    Lab Results   Component Value Date/Time    TSH 1.66 04/03/2018 11:33 AM     IRON:    Lab Results   Component Value Date/Time    IRON 54 12/13/2022 06:50 AM     Iron Saturation:  No components found for: PERCENTFE  TIBC:    Lab Results   Component Value Date/Time    TIBC 217 12/13/2022 06:50 AM     FERRITIN:    Lab Results   Component Value Date/Time    FERRITIN 778 12/13/2022 06:50 AM     RPR:  No results found for: RPR  YAYA:  No results found for: ANATITER, YAYA  24 Hour Urine for Creatinine Clearance:  No components found for: CREAT4, UHRS10, UTV10      Objective:   I/O: No intake/output data recorded. No intake/output data recorded.   I/O this shift: In: 500   Out: 3000   Vitals: BP (!) 97/52   Pulse 93   Temp 97.9 °F (36.6 °C) (Tympanic)   Resp 14   Ht 6' 2\" (1.88 m)   Wt 258 lb (117 kg)   SpO2 98%   BMI 33.13 kg/m²  {  General appearance: awake weak  HEENT: Head: Normal, normocephalic, atraumatic.   Neck:  positive trach  Lungs: diminished breath sounds bilaterally  Heart: S1, S2 normal  Abdomen: abnormal findings:  soft nt positive PEG  Extremities: edema trace  Neurologic: Mental status: alertness: alert        Assessment and Plan:      IMP:   #1 end-stage renal disease on dialysis   #2 shortness of breath with trach and congestion   #3 hyponatremia with hyperkalemia   #4  low blood pressure    Plan      #1 do dialysis Monday Wednesday Friday at the skilled nursing facility with dialysis today and tolerating   #2 follow-up cultures trach care monitor congestion May need thoracentesis if oxygenation  not stable with worsening effusion   #3 sodium potassium correct with dialysis   #4 blood pressure low stable monitor   supportive care continue current treat           Lisset Vazquez MD, MD

## 2022-12-30 NOTE — PROGRESS NOTES
Patient has a new Shiley trach size 7.5 that is in good condition. Airway can be suctioned with no difficulty. Speech is assessing patient for swallow evaluation. Patient wears a Passy Dresser valve with no complications.

## 2022-12-30 NOTE — CONSULTS
Endocrinology   Consult Note  12/29/2022  2:03 PM     Primary Care provider: ANKITA Traore - CNP     Referring physician:  Barney Shone, MD     Dear Doctor Sharri Dick for the Consult     Pt. Was Admitted for : Healthcare associated pneumonia    Reason for Consult: Better control of blood glucose      History Obtained From:  Patient/ EMR       HISTORY OF PRESENT ILLNESS:                The patient is a 71 y.o. male with significant past medical history of diabetes mellitus, end-stage kidney disease on hemodialysis, hyperkalemia, history of atrial fibrillation, CAD, PTCA, CABG, hypothyroidism, COPD dysphagia but patient is on PEG tube  feeding as tracheotomy to have increased secretions from trachea and requiring high amount of oxygen patient was admitted to hospitalist is concerned that patient might have pneumonia. Chest x-ray showed a possible pneumonia I was  consulted for better control of blood glucose. ROS:   Pt's ROS done in detail. Abnormal ROS are noted in Medical and Surgical History Section below: Other Medical History:        Diagnosis Date    Allergic rhinitis     Anticoagulated on Coumadin     1/6/17-**[facility] to follow pt's PT/INRs, along w/prescribing his Coumadin. **    Anxiety     Arthritis     Atrial fibrillation (HCC)     On Coumadin.     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 09/21/2022    positive at 15C, negative at 18C    COPD (chronic obstructive pulmonary disease) (HCC)     Depression     Diabetes mellitus (HCC)     NIDDM- dx over 10 yrs ago- follows with Dr Niles Pinon's contracture of hand     Right hand    Family history of cardiovascular disease     Father-MI    GERD (gastroesophageal reflux disease)     H/O cardiac catheterization 03/2007    cardiac cath- stent LAD patent, [id number]-56-59%, RCA 40-50%    H/O cardiac catheterization 06/12/2008    cardiac cath- mild disease mid RCA    H/O cardiovascular stress test 04/10/2014    cardiolite- normal, no ischemia, EF63%    H/O cardiovascular stress test 09/21/2016    EF59% normal study  pt in atrial fib    H/O cardiovascular stress test 09/20/2017    EF 60%   afib    H/O cardiovascular stress test 11/07/2018    EF60% normal study    H/O Doppler ultrasound     1/11/2010-CAROTID_Intimal thickening but no significant atherosclerotic plaque noted in LAUREN. Doppler flow velocities within the LAUREN are WNL. Intimal thickening but no significant atherosclerotic plaque noted in LICA. Doppler flow velociities within the LICA are WNL. H/O echocardiogram 04/10/2014    EF 60%, normal LV systolic function, mild mitral and tricuspid insufficiencies, no pericardial effusion.     H/O echocardiogram 09/21/2016    EF60% normal study    H/O echocardiogram 11/07/2018    EF55-60% no significant valular disease    H/O hiatal hernia     Hx of Venous Doppler 03/14/2019    Significant reflux in Left Deep System, No reflux in right lower extremity, No DVT or SVT    Hyperlipidemia     Hypertension     Obesity     S/P PTCA (percutaneous transluminal coronary angioplasty) 03/21/2005    s/p PTCA with 3.5 X 16 TAXUS stent to LAD- follows with Dr Camelia Roberts    Sleep apnea     had sleep study done 10+ yrs ago- has cpap but does not use it    Thyroid disease     hypothyroid     Surgical History:        Procedure Laterality Date    CARDIAC CATHETERIZATION  6/12/08    mild disease mid RCA,  stent to LAD patent,    CARDIAC CATHETERIZATION  3/07    Stent to LAD patent, Diagonal 40-50%, RCA 40-50%    CARDIAC CATHETERIZATION  3/05    COLONOSCOPY  2011    COLONOSCOPY  5/18/16    1 polyp removed, diverticulosis and hemorrhoids noted    CORONARY ANGIOPLASTY WITH STENT PLACEMENT  03/21/2005    PTCA with 3.5 x 16 TAXUS stent to LAD    CORONARY ARTERY BYPASS GRAFT N/A 9/22/2022    CABG CORONARY ARTERY BYPASS x3 (LIMA TO LAD, SVG TO PDA-RCA SEQUENTIAL) , INTRAOPERATIVE MILTON, ENDOVEIN HARVEST OF LEFT SAPHENOUS VEIN, MODIFIED MAZE PROCEDURE, LEFT ATRIAL APPENDAGE LIGATION, INTERCOSTAL NERVE BLOCK, INTRA-AORTIC BALLOON PUMP INSERTION performed by Familia Hayward MD at [address], COLON, DIAGNOSTIC  2014    egd    HAND SURGERY Right [address] DISLOCATN+FIXATN Right 6/3/2019    RIGHT OPEN REDUCTION INTERNAL FIXATION OF DISTAL TIBIA  AND FIBULA performed by Jose Elias Genao MD at [facility] N/A 9/24/2022    STERNUM WASHOUT performed by Familia Hayward MD at [address]:  Patient has no known allergies. Family History:       Problem Relation Age of Onset    Cancer Mother         breast ca    Coronary Art Dis Father          MI    Heart Disease Father     Rheum Arthritis Other     Rheum Arthritis Sister     No Known Problems Brother     Rheum Arthritis Sister      REVIEW OF SYSTEMS:  Review of System Done as noted above     PHYSICAL EXAM:      Vitals:    BP (!) 152/89   Pulse 100   Temp 98.4 °F (36.9 °C)   Resp 28   Ht 6' 2\" (1.88 m)   Wt 258 lb (117 kg)   SpO2 92%   BMI 33.13 kg/m²     CONSTITUTIONAL:  awake, alert, cooperative, appears stated age   EYES:  vision intact Fundoscopic Exam not performed as diabetic retinopathy  ENT:Normal  NECK:  Supple, No JVD. Tracheotomy  Thyroid Exam:Normal   LUNGS:  Has Vesicular Breath Sounds,   CARDIOVASCULAR:  Normal apical impulse, regular rate and rhythm, normal S1 and S2, no S3 or S4, and has no  murmur   ABDOMEN:  No scars, normal bowel sounds, soft, non-distended, non-tender, no masses palpated, no hepatolienomegaly PEG tube feeding  Musculoskeletal: Normal  Extremities: Normal, peripheral pulses normal, , has no edema   NEUROLOGIC:  Awake, alert, oriented to name, place and time. Cranial nerves II-XII are grossly intact. Motor is  intact. Sensory is intact.  ,  and gait is abnormal mostly bed ridden     DATA:    CBC:   Recent Labs     12/29/22  1509   WBC 10.3   HGB 9.0*       CMP:  Recent Labs     12/29/22  1509   *   K 5.5*   CL 94*   CO2 25   BUN 59*   CREATININE 5.0*   CALCIUM 9.3   PROT 7.0   LABALBU 3.6   BILITOT 0.4   ALKPHOS 140*   AST 20   ALT 14     Lipids:   Lab Results   Component Value Date/Time    CHOL 152 04/14/2021 10:02 AM    CHOL 139 07/23/2018 08:01 AM    HDL 43 08/26/2022 10:39 AM    TRIG 212 04/14/2021 10:02 AM     Glucose: No results for input(s): POCGLU in the last 72 hours. Hemoglobin A1C:   Lab Results   Component Value Date/Time    LABA1C 7.5 09/15/2022 12:55 PM     Free T4:   Lab Results   Component Value Date/Time    T4FREE 1.14 09/17/2022 12:26 PM     Free T3: No results found for: FT3  TSH High Sensitivity:   Lab Results   Component Value Date/Time    TSHHS 3.930 09/17/2022 12:26 PM       XR CHEST PORTABLE   Final Result   Small right pleural effusion with bibasilar airspace opacities.   This could   represent atelectasis or pneumonia in the appropriate clinical setting                Scheduled Medicines   Medications:    vancomycin  2,000 mg IntraVENous Once    sodium zirconium cyclosilicate  10 g Oral Once    polyethylene glycol  17 g Oral Daily    aspirin  81 mg Per NG tube Daily    pantoprazole (PROTONIX) 40 mg injection  40 mg IntraVENous Daily    albuterol sulfate HFA  1 puff Inhalation 4x daily    amiodarone  200 mg PEG Tube Daily    atorvastatin  40 mg PEG Tube Nightly    Nephronex  5 mL Feeding Tube Daily    sevelamer  1,600 mg Per G Tube Q8H    QUEtiapine  50 mg Per G Tube Nightly    carvedilol  6.25 mg PEG Tube BID     [START ON 12/30/2022] midodrine  10 mg Oral TID     [START ON 12/30/2022] levothyroxine  75 mcg Oral Daily    ipratropium-albuterol  1 vial Inhalation 4x Daily    warfarin  5 mg PEG Tube Daily    sodium chloride flush  5-40 mL IntraVENous 2 times per day    [START ON 12/30/2022] insulin lispro  0-4 Units SubCUTAneous TID     insulin glargine  10 Units SubCUTAneous Nightly    insulin lispro  0-4 Units SubCUTAneous 2 times per day    [START ON 12/30/2022] cefepime  1,000 mg IntraVENous Q24H      Infusions:    sodium chloride      dextrose           IMPRESSION    Patient Active Problem List   Diagnosis    Atrial fibrillation (HCC)    CAD (coronary artery disease)    Morbid obesity (HCC)    COPD (chronic obstructive pulmonary disease) (HCC)    Sleep apnea    Type 2 diabetes mellitus (HCC)    Thyroid disease    Hyperlipidemia    Acute congestive heart failure ([facility]    Coronary artery disease involving native coronary artery of native heart with angina pectoris (HCC)    Chronic renal disease, stage III ([facility] [address] [221238]    Hypertension    CAD, multiple vessel    Dyspnea    Moderate malnutrition (HCC)    Healthcare-associated pneumonia         RECOMMENDATIONS:      Reviewed POC blood glucose . Labs and X ray results   Reviewed Home and Current Medicines   Will Start On Correction bolus Humalog/ Lantus Insulin regime   Monitor Blood glucose frequently   Modify  the dose of Insulin as needed        Will follow with you  Again thank you for sharing pt's care with me.      Truly yours,       Braseltonciaran Molina MD

## 2022-12-30 NOTE — PROGRESS NOTES
Dr Pipe Delgado paged via [address] [place] [address] for new consult for Tracheitis, needs trach change.

## 2022-12-30 NOTE — DIALYSIS
Pt tolerated tx with 2.6 uf removal, both cvc lines flushed with NS, and capped. Pt bp droppedduring tx, 150ns given,pt improved with a uf rate in min for the last 15min of tx. Patient Name: Richard Conn  Patient : 1950  MRN: [id number]     Acct: [de-identified]  Date of Admission: 2022  Room/Bed: -A  Code Status:  Full Code  Allergies: No Known Allergies  Diagnosis:    Patient Active Problem List   Diagnosis    Atrial fibrillation ([facility] [address]    CAD (coronary artery disease)    Morbid obesity ([facility] [address]    COPD (chronic obstructive pulmonary disease) ([facility] [address]    Sleep apnea    Type 2 diabetes mellitus ([facility] [address]    Thyroid disease    Hyperlipidemia    Acute congestive heart failure ([facility] [address]    Coronary artery disease involving native coronary artery of native heart with angina pectoris ([facility] [address]    Chronic renal disease, stage III ([facility] [address] [239068]    Hypertension    CAD, multiple vessel    Dyspnea    Moderate malnutrition ([facility] [address]    Healthcare-associated pneumonia    Severe malnutrition ([facility] [address]         Treatment:  Hemodilaysis 2:1  Priority: Routine  Location: acute room    Diabetic: Yes  NPO: No  Isolation Precautions: Dialysis     Consent for Treatment Verified: Yes  Blood Consent Verified: Not Applicable     Safety Verified: Identify (I), Consent (C), Equipment (E), HepB Status (B), Orders Complete (O), Access Verified (A), and Timeliness (T)  Time out performed prior to access at 0830 hours. Report Received from Primary RN at 0740 hours. Primary RN (First Initial, Last Name, Title): thaddeus marquez  Incapacitated Nurse Education Completed: Not Applicable     HBsAg ONLY:  Date Drawn: 2022       Results: Negative  HBsAb:  Date Drawn:  2022       Results: Susceptible <10    Order        Na+ Modeling: Not Applicable  Dialyzer: [id number]  Dialysate Temperature (C):  35  Blood Flow Rate (BFR):  350   Dialysate Flow Rate (DFR):   600        Access to be Utilized   Access:  Tunneled Catheter  Location: Subclavian  Side: Right     Site Assessment:  Signs and Symptoms of Infection/Inflammation: None  If yes: Not Applicable  Dressing: Dry and Intact  Site Prep: Medical Aseptic Technique  Dressing Changed this Treatment: no  If yes, by whom: NA - not changed today  Date of Last Dressing Change: December 29, 2022  Antimicrobial Patch in place?: Yes  Red Alcohol Caps in place?: Yes  Gauze Dressing?: No  Non Dialysis Use?: No  Comment:    Flows: Good, Patent  If access problem, who was notified:     Pre and Post-Assessment  Patient Vitals for the past 8 hrs:   Level of Consciousness Oriented X Heart Rhythm Respiratory Quality/Effort O2 Device Bilateral Breath Sounds Skin Color Skin Condition/Temp Abdomen Inspection Bowel Sounds (All Quadrants) Edema RLE Edema LLE Edema Comments   12/30/22 0833 -- -- -- -- Trach mask -- -- -- -- -- -- -- -- --   12/30/22 0845 0 4 Regular Unlabored Trach mask Rhonchi Gray;Ashen;Dusky Dry;Flaky; Peeling Distended;Obese;Ostomy tube Audible None +1 +1 pt on trach collar, denies cp denies sob,   12/30/22 1309 -- -- -- -- Trach mask -- -- -- -- -- -- -- -- --     Labs  Recent Labs     12/29/22  1509 12/30/22  0604   WBC 10.3 8.5   HGB 9.0* 8.3*   HCT 26.7* 23.9*    226                                                                  Recent Labs     12/29/22  1509 12/30/22  0604   * 130*   K 5.5* 5.0   CL 94* 93*   CO2 25 23   BUN 59* 69*   CREATININE 5.0* 5.5*   GLUCOSE 175* 106*     IV Drips and Rate/Dose   sodium chloride 25 mL (12/29/22 2256)    dextrose        Safety - Before each treatment:   Dialysis Machine No.: [id number]   Machine Number: [id number]  Dialyzer Lot No.: [id number]  RO Machine Log Sheet Completed: Yes  Machine Alarm Self Test: Passed; Completed (12/30/22 0845)     Air Foam Detector: Tested, Proper Function, pH Reading  Extracorporeal Circuit Tested for Integrity: Yes  Machine Conductivity: 14  Manual Conductivity: 14           Manual Ph: 7.3  Bleach Test (Neg): Yes     nt and every 4 hours:   Treatment  Time On: 0852  Treatment Goal: 3  Weight: 258 lb (117 kg) (12/29/22 1927)  1st check: less than 0.1 ppm at: 0605 hours  2nd check: less than 0.1 ppm at: 0955 hours  3rd check: Not Applicable  (if greater than 0.1 ppm, then check every 30 minutes from secondary)    Access Flows and Pressures  Patient Vitals for the past 8 hrs:   Blood Flow Rate (ml/min) Ultrafiltration Rate (ml/hr) Arterial Pressure (mmHg) Venous Pressure (mmHg) TMP DFR Comments Access Visible   12/30/22 0853 300 ml/min -- -130 mmHg 150 40 600 tx started, 200ns given, denies cp and sob, --   12/30/22 0915 300 ml/min 1000 ml/hr -90 mmHg 80 80 600 gave warm blanket Yes   12/30/22 0930 300 ml/min 1000 ml/hr -90 mmHg 80 80 600 no distress Yes   12/30/22 0945 300 ml/min 1000 ml/hr -100 mmHg 80 80 600 MD bedside visit Yes   12/30/22 1000 300 ml/min 1000 ml/hr -100 mmHg 90 80 600 resting Yes   12/30/22 1015 300 ml/min 1000 ml/hr -100 mmHg 90 90 600 denies needs Yes   12/30/22 1030 300 ml/min 1000 ml/hr -120 mmHg 70 60 600 denies needs Yes   12/30/22 1045 300 ml/min 1000 ml/hr -130 mmHg 50 60 600 pt resting, 100ns given dt drop in bp Yes   12/30/22 1100 300 ml/min 1000 ml/hr -110 mmHg 90 90 600 no distress Yes   12/30/22 1115 300 ml/min 1000 ml/hr -110 mmHg 100 90 600 no change Yes   12/30/22 1130 300 ml/min 1000 ml/hr -110 mmHg 90 90 600 uf turned off low BP Yes   12/30/22 1151 300 ml/min 1000 ml/hr -130 mmHg 120 50 600 tx ended, blood retuend both lines flushed ns and capped and clamped Yes     Vital Signs  Patient Vitals for the past 8 hrs:   BP Temp Pulse Resp SpO2 Height   12/30/22 0719 111/64 -- 95 -- -- --   12/30/22 0833 -- -- -- -- 98 % --   12/30/22 0845 (!) 86/53 98.2 °F (36.8 °C) 76 22 98 % --   12/30/22 0915 (!) 103/58 -- 90 15 -- --   12/30/22 0930 (!) 86/45 -- 87 15 -- --   12/30/22 0945 (!) 95/54 -- 83 15 -- --   12/30/22 1000 (!) 109/56 -- 87 19 -- --   12/30/22 1015 (!) 96/52 -- 87 14 -- -- 12/30/22 1030 (!) 97/48 -- 77 14 -- --   12/30/22 1045 89/61 -- 70 14 -- --   12/30/22 1059 -- -- -- -- -- 6' 2\" (1.88 m)   12/30/22 1100 (!) 82/43 -- 93 15 -- --   12/30/22 1115 (!) 86/43 -- 97 22 -- --   12/30/22 1130 (!) 75/42 -- 94 12 -- --   12/30/22 1151 (!) 97/52 97.9 °F (36.6 °C) 93 14 -- --   12/30/22 1309 -- -- -- -- 97 % --     Post-Dialysis  Arterial Catheter Locking Solution:  saline  Venous Catheter Locking Solution:  saline  Post-Treatment Procedures: Blood returned, Catheter Capped, clamped with Saline x2 ports  Machine Disinfection Process: Acid/Vinegar Clean, Heat Disinfect, Exterior Machine Disinfection  Rinseback Volume (ml): 300 ml  Blood Volume Processed (Liters): 51.2 l/min  Dialyzer Clearance:  Moderately streaked  Duration of Treatment (minutes): 180 minutes     Hemodialysis Intake (ml): 500 ml  Hemodialysis Output (ml): 3000 ml     Tolerated Treatment: Good           Education  Person Educated: Patient   Knowledge Base: Substantial  Barriers to Learning?: None  Preferred method of Learning: Hands-on  Topic(s): Access Care, Signs and Symptoms of Infection, Fluid Management, Albumin, Procedural, Medications, Treatment Options, Potassium, Diet, and Transplant   Teaching Tools: Video, Handout, Demonstration, and Explanation   Response to Education: Verbalized Understanding, Return Demonstration, Teach Back, and Requires Follow-up     Electronically signed by Brandon Hairston RN on 12/30/2022 at 2:11 PM

## 2022-12-30 NOTE — PROGRESS NOTES
4 Eyes Skin Assessment     NAME:  Efrain Guardado  YOB: 1950  MEDICAL RECORD NUMBER:  [id number]    The patient is being assessed for  Admission    I agree that One RN have performed a thorough Head to Toe Skin Assessment on the patient. ALL assessment sites listed below have been assessed. Areas assessed by both nurses:    Head, Face, Ears, Shoulders, Back, Chest, Arms, Elbows, Hands, Sacrum. Buttock, Coccyx, Ischium, and Legs. Feet and Heels        Does the Patient have a Wound? Yes wound(s) were present on assessment.  LDA wound assessment was Initiated and completed by RN       Jeremy Prevention initiated by RN: Yes   Wound Care Orders initiated by RN: Yes    Pressure Injury (Stage 3,4, Unstageable, DTI, NWPT, and Complex wounds) if present place referral order by RN under : Yes    New and Established Ostomies, if present place, referral order under : Yes      Nurse 1 eSignature: Electronically signed by Joshua Jhaveri RN on 12/29/22 at 10:40 PM EST    **SHARE this note so that the co-signing nurse is able to place an eSignature**    Nurse 2 eSignature: Electronically signed by Lavona Duane, LPN on 91/55/02 at 99:35 PM EST

## 2022-12-30 NOTE — PROGRESS NOTES
[facility]  consulted by Dr. Lay Bang for monitoring and adjustment. Indication for treatment: Vancomycin indication: HAP  Goal trough: Trough Goal: 15-20 mcg/mL  AUC/MT: 400-600    Risk Factors for MRSA Identified:   Patient on hemodialysis    Pertinent Laboratory Values:   Temp Readings from Last 3 Encounters:   12/30/22 97.9 °F (36.6 °C) (Tympanic)   12/16/22 96.9 °F (36.1 °C) (Oral)   09/24/22 97.2 °F (36.2 °C) (Core)     Recent Labs     12/29/22  1509 12/30/22  0604   WBC 10.3 8.5       Recent Labs     12/29/22  1509 12/30/22  0604   BUN 59* 69*   CREATININE 5.0* 5.5*       Estimated Creatinine Clearance: 17 mL/min (A) (based on SCr of 5.5 mg/dL (H)). Intake/Output Summary (Last 24 hours) at 12/30/2022 1249  Last data filed at 12/30/2022 1151  Gross per 24 hour   Intake 500 ml   Output 3000 ml   Net -2500 ml       Pertinent Cultures:   Date    Source    Results  12/29   Blood    pending  12/29   Sputum/Respiratory  pending  12/29   MRSA Nasal   pending      Vancomycin level:   TROUGH:  No results for input(s): VANCOTROUGH in the last 72 hours. RANDOM:  No results for input(s): VANCORANDOM in the last 72 hours. Assessment:  HPI: Resident of skilled nursing facility who presents with increased shortness of breath and increased secretions from chronic trach. SCr, BUN, and urine output: dialysis patient  Day(s) of therapy: Day 2  Vancomycin concentration: pending    Plan:  Vancomycin 2000mg IVPB x 1. Appears as though patient will receive HD today, however awaiting nephro note and plans. Will order vanco level for tomorrow AM (post dialysis level) to ensure adequate  levels until next HD Monday. Pharmacy will continue to monitor patient and adjust therapy as indicated    Tana [id number] 12/31 @ 0600    Thank you for the consult.   Shankar Concepcion, [address] [place] [address]  12/30/2022 12:49 PM

## 2022-12-30 NOTE — PROGRESS NOTES
Pt presents to floor with copious amounts of secretion from trach, trach suctioning provide even more purulent copious secretions.

## 2022-12-30 NOTE — PROGRESS NOTES
RENAL DOSE ADJUSTMENT MADE PER P/T PROTOCOL    PREVIOUS ORDER:  Cefepime 2gm Q12h    Estimated Creatinine Clearance: 18 mL/min (A) (based on SCr of 5 mg/dL (H)).   Recent Labs     12/29/22  1508 12/29/22  1509   BUN  --  59*   CREATININE  --  5.0*   PLT  --  269   INR 2.02  --      NEW RENALLY ADJUSTED ORDER:  Cefepime 1gm Q24h per [facility] P&T protocol for renal dialysis patients (given after dialysis on dialysis days)    Moon Acharya, [facility]  12/29/2022 8:43 PM

## 2022-12-30 NOTE — CONSULTS
PHARMACY ANTICOAGULATION MONITORING SERVICE    Sveta Meier is a 71 y.o. male on warfarin therapy for Persistent A. Fib, H/O non-occlusive Rt. Fem DVT. Pharmacy consulted by Dr. Laura Laguerre for monitoring and adjustment of treatment. Indication for anticoagulation: Persistent A. Fib, H/O non-occlusive Rt. Fem DVT  INR goal: 2-3  Warfarin dose prior to admission: 2mg daily    Pertinent Laboratory Values   Recent Labs     12/29/22  1509 12/30/22  0604   INR  --  1.92   HGB 9.0* 8.3*   HCT 26.7* 23.9*    226       Assessment/Plan:  Drug Interactions: home amiodarone  INR 1.92 today  Patient was started on home warfarin 2mg daily last night. Will continue current therapy. Pharmacy will continue to monitor and adjust warfarin therapy as indicated    Thank you for the consult.   Ayla Rogers [facility], PharmD  12/30/2022 12:41 PM

## 2022-12-30 NOTE — CONSULTS
[facility], [place], operated by [facility] Continence Nurse  Consult Note       Artist Halle  AGE: 71 y.o. GENDER: male  : 1950  TODAY'S DATE:  2022    Subjective:     Reason for CWOCN Evaluation and Assessment: wound assessment      Artist Halle is a 71 y.o. male referred by:   [x] Physician  [] Nursing  [] Other:     Wound Identification:  Wound Type: pressure, MASD with possible candidiasis  Contributing Factors: venous stasis, diabetes, chronic pressure, decreased mobility, obesity, malnutrition, incontinence of stool, and ESRD        PAST MEDICAL HISTORY        Diagnosis Date    Allergic rhinitis     Anticoagulated on Coumadin     17-**[facility] to follow pt's PT/INRs, along w/prescribing his Coumadin. **    Anxiety     Arthritis     Atrial fibrillation (HCC)     On Coumadin. 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    positive at 15C, negative at 18C    COPD (chronic obstructive pulmonary disease) (HCC)     Depression     Diabetes mellitus (HCC)     NIDDM- dx over 10 yrs ago- follows with Dr Ashok Valladaresuytrekatie's contracture of hand     Right hand    Family history of cardiovascular disease     Father-MI    GERD (gastroesophageal reflux disease)     H/O cardiac catheterization 2007    cardiac cath- stent LAD patent, [id number]%, RCA 40-50%    H/O cardiac catheterization 2008    cardiac cath- mild disease mid RCA    H/O cardiovascular stress test 04/10/2014    cardiolite- normal, no ischemia, EF63%    H/O cardiovascular stress test 2016    EF59% normal study  pt in atrial fib    H/O cardiovascular stress test 2017    EF 60%   afib    H/O cardiovascular stress test 2018    EF60% normal study    H/O Doppler ultrasound     2010-CAROTID_Intimal thickening but no significant atherosclerotic plaque noted in LAUREN. Doppler flow velocities within the LAUREN are WNL.   Intimal thickening but no significant atherosclerotic plaque noted in LICA. Doppler flow velociities within the LICA are WNL. H/O echocardiogram 04/10/2014    EF 60%, normal LV systolic function, mild mitral and tricuspid insufficiencies, no pericardial effusion.     H/O echocardiogram 09/21/2016    EF60% normal study    H/O echocardiogram 11/07/2018    EF55-60% no significant valular disease    H/O hiatal hernia     Hx of Venous Doppler 03/14/2019    Significant reflux in Left Deep System, No reflux in right lower extremity, No DVT or SVT    Hyperlipidemia     Hypertension     Obesity     S/P PTCA (percutaneous transluminal coronary angioplasty) 03/21/2005    s/p PTCA with 3.5 X 16 TAXUS stent to LAD- follows with Dr Linsey Greenberg    Sleep apnea     had sleep study done 10+ yrs ago- has cpap but does not use it    Thyroid disease     hypothyroid       PAST SURGICAL HISTORY    Past Surgical History:   Procedure Laterality Date    CARDIAC CATHETERIZATION  6/12/08    mild disease mid RCA,  stent to LAD patent,    CARDIAC CATHETERIZATION  3/07    Stent to LAD patent, Diagonal 40-50%, RCA 40-50%    CARDIAC CATHETERIZATION  3/05    COLONOSCOPY  2011    COLONOSCOPY  5/18/16    1 polyp removed, diverticulosis and hemorrhoids noted    CORONARY ANGIOPLASTY WITH STENT PLACEMENT  03/21/2005    PTCA with 3.5 x 16 TAXUS stent to LAD    CORONARY ARTERY BYPASS GRAFT N/A 9/22/2022    CABG CORONARY ARTERY BYPASS x3 (LIMA TO LAD, SVG TO PDA-RCA SEQUENTIAL) , INTRAOPERATIVE MILTON, ENDOVEIN HARVEST OF LEFT SAPHENOUS VEIN, MODIFIED MAZE PROCEDURE, LEFT ATRIAL APPENDAGE LIGATION, INTERCOSTAL NERVE BLOCK, INTRA-AORTIC BALLOON PUMP INSERTION performed by Lucy Almaguer MD at [address] [place] [address], [address] [place] [address], DIAGNOSTIC  2014    egd    HAND SURGERY Right [address] DISLOCATN+FIXATN Right 6/3/2019    RIGHT OPEN REDUCTION INTERNAL FIXATION OF DISTAL TIBIA  AND FIBULA performed by Roland Smith MD at [address] [place] [address] N/A 9/24/2022    STERNUM WASHOUT performed by Lucy Almaguer MD at [place] [address] FAMILY HISTORY    Family History   Problem Relation Age of Onset    Cancer Mother         breast ca    Coronary Art Dis Father          MI    Heart Disease Father     Rheum Arthritis Other     Rheum Arthritis Sister     No Known Problems Brother     Rheum Arthritis Sister        SOCIAL HISTORY    Social History     Tobacco Use    Smoking status: Former     Packs/day: 1.00     Years: 10.00     Pack years: 10.00     Types: Cigarettes     Quit date: 1976     Years since quittin.0    Smokeless tobacco: Never   Vaping Use    Vaping Use: Never used   Substance Use Topics    Alcohol use: Not Currently     Alcohol/week: 6.0 standard drinks     Types: 6 Cans of beer per week     Comment: caffeine 2 cups of tea a day     Drug use: No       ALLERGIES    No Known Allergies    MEDICATIONS    No current facility-administered medications on file prior to encounter. Current Outpatient Medications on File Prior to Encounter   Medication Sig Dispense Refill    warfarin (COUMADIN) 2 MG tablet Take daily.  Goal INR between 2-3 30 tablet 0    carvedilol (COREG) 6.25 MG tablet 1 tablet by PEG Tube route 2 times daily (with meals) 60 tablet 0    sennosides-docusate sodium (SENOKOT-S) 8.6-50 MG tablet Take 2 tablets by mouth daily as needed for Constipation 60 tablet 0    midodrine (PROAMATINE) 10 MG tablet Take 1 tablet by mouth 3 times daily (with meals) 90 tablet 0    levothyroxine (SYNTHROID) 75 MCG tablet Take 1 tablet by mouth Daily 30 tablet 0    ipratropium-albuterol (DUONEB) 0.5-2.5 (3) MG/3ML SOLN nebulizer solution Inhale 3 mLs into the lungs 4 times daily 360 mL 0    insulin glargine (LANTUS SOLOSTAR) 100 UNIT/ML injection pen Inject 5 Units into the skin nightly 5 Adjustable Dose Pre-filled Pen Syringe 0    albuterol sulfate HFA (VENTOLIN HFA) 108 (90 Base) MCG/ACT inhaler Inhalation      amiodarone (CORDARONE) 200 MG tablet 200 mg daily      atorvastatin (LIPITOR) 40 MG tablet 40 mg nightly      B Complex-C-Folic Acid (NEPHRONEX) 0.9 MG/5ML LIQD 5 mLs daily      melatonin 3 MG TABS tablet 9 mg nightly      Nutritional Supplements (NEPRO/CARBSTEADY) LIQD 50 mL/hr continuous      Nutritional Supplements (PROSOURCE TF) LIQD 1 Package 3 times daily      traZODone (DESYREL) 100 MG tablet 100 mg nightly      sevelamer (RENVELA) 0.8 g PACK packet 1.6 g  in the morning and 1.6 g at noon and 1.6 g in the evening. QUEtiapine (SEROQUEL) 50 MG tablet 50 mg nightly      aspirin 81 MG chewable tablet 1 tablet by Per NG tube route daily 30 tablet 3    pantoprazole 4 mg/mL in sodium chloride (PF) injection Infuse 10 mLs intravenously daily 3 applicator 1    blood glucose monitor kit and supplies Dispense sufficient amount for indicated testing frequency plus additional to accommodate PRN testing needs. Dispense all needed supplies to include: monitor, strips, lancing device, lancets, control solutions, alcohol swabs. 1 kit 0    Lancets MISC by D3EA route three times a day.  100 each 3    polyethylene glycol (GLYCOLAX) 17 GM/SCOOP powder Take 17 g by mouth daily 225 g 2         Objective:      BP (!) 97/52   Pulse 93   Temp 97.9 °F (36.6 °C) (Tympanic)   Resp 14   Ht 6' 2\" (1.88 m)   Wt 258 lb (117 kg)   SpO2 97%   BMI 33.13 kg/m²   Jeremy Risk Score: Jeremy Scale Score: 14    LABS    CBC:   Lab Results   Component Value Date/Time    WBC 8.5 12/30/2022 06:04 AM    RBC 2.26 12/30/2022 06:04 AM    HGB 8.3 12/30/2022 06:04 AM    HCT 23.9 12/30/2022 06:04 AM    .8 12/30/2022 06:04 AM    MCH 36.7 12/30/2022 06:04 AM    MCHC 34.7 12/30/2022 06:04 AM    RDW 22.7 12/30/2022 06:04 AM     12/30/2022 06:04 AM    MPV 8.8 12/30/2022 06:04 AM     CMP:    Lab Results   Component Value Date/Time     12/30/2022 06:04 AM    K 5.0 12/30/2022 06:04 AM    CL 93 12/30/2022 06:04 AM    CO2 23 12/30/2022 06:04 AM    BUN 69 12/30/2022 06:04 AM    CREATININE 5.5 12/30/2022 06:04 AM    GFRAA >60 09/24/2022 12:08 PM AGRATIO 1.1 02/15/2022 03:35 PM    LABGLOM 10 12/30/2022 06:04 AM    GLUCOSE 106 12/30/2022 06:04 AM    PROT 6.1 12/30/2022 06:04 AM    LABALBU 3.1 12/30/2022 06:04 AM    CALCIUM 8.4 12/30/2022 06:04 AM    BILITOT 0.4 12/30/2022 06:04 AM    ALKPHOS 103 12/30/2022 06:04 AM    AST 16 12/30/2022 06:04 AM    ALT 11 12/30/2022 06:04 AM     Albumin:    Lab Results   Component Value Date/Time    LABALBU 3.1 12/30/2022 06:04 AM     PT/INR:    Lab Results   Component Value Date/Time    PROTIME 24.9 12/30/2022 06:04 AM    PROTIME 27.3 09/09/2022 03:22 PM    INR 1.92 12/30/2022 06:04 AM     HgBA1c:    Lab Results   Component Value Date/Time    LABA1C 7.0 12/30/2022 02:15 AM         Assessment:     Patient Active Problem List   Diagnosis    Atrial fibrillation (HCC)    CAD (coronary artery disease)    Morbid obesity (HCC)    COPD (chronic obstructive pulmonary disease) (HCC)    Sleep apnea    Type 2 diabetes mellitus (HCC)    Thyroid disease    Hyperlipidemia    Acute congestive heart failure ([facility]    Coronary artery disease involving native coronary artery of native heart with angina pectoris (HCC)    Chronic renal disease, stage III (HCC) [385956]    Hypertension    CAD, multiple vessel    Dyspnea    Moderate malnutrition (HCC)    Healthcare-associated pneumonia    Severe malnutrition (HCC)       Measurements:  Wound 12/12/22 Buttocks Left (Active)   Wound Image   12/30/22 1315   Wound Etiology Deep tissue/Injury 12/30/22 1315   Dressing Status New dressing applied 12/30/22 1315   Wound Cleansed Cleansed with saline 12/30/22 1315   Dressing/Treatment Silicone border 12/31/64 1315   Wound Length (cm) 2 cm 12/30/22 1315   Wound Width (cm) 1 cm 12/30/22 1315   Wound Depth (cm) 0 cm 12/30/22 1315   Wound Surface Area (cm^2) 2 cm^2 12/30/22 1315   Wound Volume (cm^3) 0 cm^3 12/30/22 1315   Distance Tunneling (cm) 0 cm 12/30/22 1315   Tunneling Position ___ O'Clock 0 12/30/22 1315   Undermining Starts ___ O'Clock 0 12/30/22 1315 Undermining Ends___ O'Clock 0 12/30/22 1315   Undermining Maxium Distance (cm) 0 12/30/22 1315   Wound Assessment Non-blanchable erythema;Purple/maroon;Dry 12/30/22 1315   Drainage Amount None 12/30/22 1315   Odor None 12/30/22 1315   Cat-wound Assessment Blanchable erythema 12/30/22 1315   Margins Attached edges 12/30/22 1315   Number of days: 18       Response to treatment:  Well tolerated by patient. Pain Assessment:  Severity:  none  Quality of pain: na  Wound Pain Timing/Severity: na  Premedicated: no    Plan:     Plan of Care: Wound 12/12/22 Buttocks Left-Dressing/Treatment: Silicone border    Pt in bed. Nurse assist with turning. Bilateral lower legs with hemosiderin staining. Dry and scaly but intact. Recommend bath oil wash daily. Heels intact. Groins pink and macerated with macular papular lesions appearing to be MASD with possible candidiasis. Recommend Micotin powder. Left buttock with non blanchable maroon area appearing to be DTI. Blanchable erythema to right buttock. Picture and measurements taken. Silicone foam border recommended and applied. Positioned to left side. Pt is a moderate risk for skin breakdown AEB Jeremy. Follow Jeremy orders. Specialty Bed Required : yes  [x] Low Air Loss   [x] Pressure Redistribution  [] Fluid Immersion  [] Bariatric  [] Total Pressure Relief  [] Other:     Discharge Plan:  Placement for patient upon discharge: tbd  Hospice Care: no  Patient appropriate for Outpatient [address]: [facility]    Patient/Caregiver Teaching:  Level of patient/caregiver understanding able to:   Needs reinforcement.         Electronically signed by Winnie Quijano RN, Wen Lennon on 12/30/2022 at 1:55 PM

## 2022-12-30 NOTE — PROGRESS NOTES
Speech Language Pathology  Layla Rojas  1950  [id number]      Re-attempted to see Layla Rojas for a bedside swallowing evaluation 12/30/22. Deferred assessment- pt asked that SLP come back at another time. Noted passy felipe valve was placed by RT prior to SLP entering room and pt was tolerating placement at this time. He did confirm that he has remained NPO since most recent hospital admission. SLP will re-attempt.     Mone [address], [address] [facility], 12/30/2022

## 2022-12-30 NOTE — CONSULTS
Comprehensive Nutrition Assessment    Type and Reason for Visit:  Initial, Consult (Tube Feeding Order and Management)    Nutrition Recommendations/Plan:   Will EN order: Renal Formula at goal rate of 65 ml/hr to provide ~2761 kcals, 126 gm protein      Malnutrition Assessment:  Malnutrition Status:  Severe malnutrition (12/30/22 1058)    Context:  Acute Illness     Findings of the 6 clinical characteristics of malnutrition:  Energy Intake:  50% or less of estimated energy requirements for 5 or more days  Weight Loss:  Greater than 7.5% over 3 months (20%)     Body Fat Loss:  Unable to assess     Muscle Mass Loss:  Unable to assess    Fluid Accumulation:  Unable to assess     Strength:  Not Performed    Nutrition Assessment:    Admitted with healthcare-associated PNA. Hx of recent status post CABG with dysphagia and PEG/Trach placement, DMII, ESRD on HD, HTN, HLD, A FIB, COPD. Currently NPO, SLP unable to access due to pt in dialysis. Will plan on restarting EN for main source of nutrition. Significant 20% weight loss x 3 months. Pt requiring EN due to poor oral intake s/s dysphagia. Meets criteria for malnutrition. Follow up as high nutrition risk. Nutrition Related Findings:    Glu 120, GFR 10,BUN 69, Cr 5.5 Wound Type: Skin Tears       Current Nutrition Intake & Therapies:    Average Meal Intake: NPO  Average Supplements Intake: NPO  Diet NPO    Anthropometric Measures:  Height: 6' 2\" (188 cm)  Ideal Body Weight (IBW): 190 lbs (86 kg)    Admission Body Weight: 257 lb 15 oz (117 kg) (stated)  Current Body Weight: 257 lb 15 oz (117 kg), 135.8 % IBW. Weight Source: Stated  Current BMI (kg/m2): 33.1  Usual Body Weight: 324 lb 1 oz (147 kg) (9/2022)  % Weight Change (Calculated): -20.4  Weight Adjustment For: No Adjustment                 BMI Categories: Obese Class 1 (BMI 30.0-34. 9)    Estimated Daily Nutrient Needs:  Energy Requirements Based On: Kcal/kg  Weight Used for Energy Requirements: Ideal  Energy (kcal/day): 6249-2020 (30-35 kcals/kg IBW)  Weight Used for Protein Requirements: Ideal  Protein (g/day):  (1-13 g/kg)  Method Used for Fluid Requirements: Standard Renal  Fluid (ml/day): fluids per nephrology    Nutrition Diagnosis: In context of acute illness or injury, Severe malnutrition related to increase demand for energy/nutrients, acute injury/trauma, swallowing difficulty as evidenced by poor intake prior to admission, nutrition support - enteral nutrition, weight loss (poor intake over 3 montns, weight loss greater than 7.5% in 3 months)    Nutrition Interventions:   Food and/or Nutrient Delivery: Continue NPO, Start Tube Feeding  Nutrition Education/Counseling: No recommendation at this time  Coordination of Nutrition Care: Continue to monitor while inpatient, Coordination of Care       Goals:     Goals: Initiate nutrition support       Nutrition Monitoring and Evaluation:   Behavioral-Environmental Outcomes: None Identified  Food/Nutrient Intake Outcomes: IVF Intake, Enteral Nutrition Intake/Tolerance  Physical Signs/Symptoms Outcomes: Biochemical Data, Chewing or Swallowing, Hemodynamic Status, GI Status, Skin, Weight, Nausea or Vomiting    Discharge Planning:     Too soon to determine     Lainey Bailey RD, LD  Contact: [phone]

## 2022-12-30 NOTE — PROGRESS NOTES
Progress Note( Dr. Latisha Clement)  12/30/2022  Subjective:   Admit Date: 12/29/2022  PCP: ANKITA Galarza CNP    Admitted For :Healthcare associated pneumonia    Consulted For: Better control of blood glucose    Interval History: Patient seem to be somewhat better the patient in the hemodialysis unit can alert  Has tracheotomy tube and PEG tube    Denies any chest pains,   Some SOB . Denies nausea or vomiting. Vomiting last night but not not this morning  No new bowel or bladder symptoms. Intake/Output Summary (Last 24 hours) at 12/30/2022 1741  Last data filed at 12/30/2022 1151  Gross per 24 hour   Intake 500 ml   Output 3000 ml   Net -2500 ml       DATA    CBC:   Recent Labs     12/29/22  1509 12/30/22  0604   WBC 10.3 8.5   HGB 9.0* 8.3*    226    CMP:  Recent Labs     12/29/22  1509 12/30/22  0604   * 130*   K 5.5* 5.0   CL 94* 93*   CO2 25 23   BUN 59* 69*   CREATININE 5.0* 5.5*   CALCIUM 9.3 8.4   PROT 7.0 6.1*   LABALBU 3.6 3.1*   BILITOT 0.4 0.4   ALKPHOS 140* 103   AST 20 16   ALT 14 11     Lipids:   Lab Results   Component Value Date/Time    CHOL 152 04/14/2021 10:02 AM    CHOL 139 07/23/2018 08:01 AM    HDL 43 08/26/2022 10:39 AM    TRIG 212 04/14/2021 10:02 AM     Glucose:  Recent Labs     12/30/22  0803 12/30/22  1312 12/30/22  1606   POCGLU 120* 135* 124*     [id number]:  Lab Results   Component Value Date/Time    LABA1C 7.0 12/30/2022 02:15 AM     High Sensitivity TSH:   Lab Results   Component Value Date/Time    TSHHS 3.930 09/17/2022 12:26 PM     Free T3: No results found for: FT3  Free T4:  Lab Results   Component Value Date/Time    T4FREE 1.14 09/17/2022 12:26 PM       XR CHEST PORTABLE   Final Result   Small right pleural effusion with bibasilar airspace opacities.   This could   represent atelectasis or pneumonia in the appropriate clinical setting              Scheduled Medicines   Medications:    guaiFENesin  600 mg Oral BID    miconazole   Topical BID    sodium zirconium cyclosilicate  10 g Oral Once    aspirin  81 mg Per NG tube Daily    pantoprazole (PROTONIX) 40 mg injection  40 mg IntraVENous Daily    amiodarone  200 mg PEG Tube Daily    atorvastatin  40 mg PEG Tube Nightly    Nephronex  5 mL Feeding Tube Daily    sevelamer  1,600 mg Per G Tube Q8H    QUEtiapine  50 mg Per G Tube Nightly    carvedilol  6.25 mg PEG Tube BID WC    midodrine  10 mg Oral TID WC    levothyroxine  75 mcg Oral Daily    ipratropium-albuterol  1 vial Inhalation 4x Daily    warfarin  2 mg PEG Tube Daily    sodium chloride flush  5-40 mL IntraVENous 2 times per day    insulin lispro  0-4 Units SubCUTAneous TID WC    insulin glargine  10 Units SubCUTAneous Nightly    insulin lispro  0-4 Units SubCUTAneous 2 times per day    cefepime  1,000 mg IntraVENous Q24H    polyethylene glycol  17 g Oral Daily      Infusions:    sodium chloride 25 mL (12/29/22 0102)    dextrose           Objective:   Vitals: BP (!) 97/52   Pulse 93   Temp 97.9 °F (36.6 °C) (Tympanic)   Resp 14   Ht 6' 2\" (1.88 m)   Wt 258 lb (117 kg)   SpO2 91%   BMI 33.13 kg/m²   General appearance: alert and cooperative with exam  Neck: no JVD or bruit  Thyroid : Normal lobes   Lungs: Has Vesicular Breath sounds   Heart:  regular rate and rhythm  Abdomen: soft, non-tender; bowel sounds normal; no masses,  no organomegaly  Musculoskeletal: Normal  Extremities: extremities normal, , no edema  Neurologic:  Awake, alert, oriented to name, place and time. Cranial nerves II-XII are grossly intact. Motor is  intact. Sensory is intact. ,  and gait is normal.    Assessment:     Patient Active Problem List:     Atrial fibrillation ([address]     CAD (coronary artery disease)     Morbid obesity (HCC)     COPD (chronic obstructive pulmonary disease) ([address]     Sleep apnea     Type 2 diabetes mellitus ([address]     Thyroid disease     Hyperlipidemia     Acute congestive heart failure ([address]     Coronary artery disease involving native coronary artery of native heart with angina pectoris (HCC)     Chronic renal disease, stage III ([facility] [address] [910408]     Hypertension     CAD, multiple vessel     Dyspnea     Moderate malnutrition (HCC)     Healthcare-associated pneumonia     Severe malnutrition ([facility]      Plan:     Reviewed POC blood glucose . Labs and X ray results   Reviewed Current Medicines   Patient will be  started on tube feeding with renal formula  On Correction bolus Humalog/ Basal Lantus Insulin regime /  Monitor Blood glucose frequently   Modified  the dose of Insulin/ other medicines as needed   Will follow     .      Srikanth Lopez MD, MD

## 2022-12-30 NOTE — PROGRESS NOTES
2130 Concerned for pt in Afib and hitting 1teens notified NP Marian Hitchcock requesting to eval d/t pt only having PO cardiac meds and pt has been vomiting. NP up to assess pt, was decided that we try zofran and then PO meds and if pt continues into 1teens had has pain pt can have a ONE TIME DOSE OF 5MG ROXICODONE. Pt currently denies pain, VORB relayed to nurse who is continuing care for this pt.

## 2022-12-31 LAB
ALBUMIN SERPL-MCNC: 3.5 GM/DL (ref 3.4–5)
ALP BLD-CCNC: 103 IU/L (ref 40–128)
ALT SERPL-CCNC: 14 U/L (ref 10–40)
ANION GAP SERPL CALCULATED.3IONS-SCNC: 15 MMOL/L (ref 4–16)
APTT: 34.2 SECONDS (ref 25.1–37.1)
AST SERPL-CCNC: 22 IU/L (ref 15–37)
BASOPHILS ABSOLUTE: 0.1 K/CU MM
BASOPHILS RELATIVE PERCENT: 1.5 % (ref 0–1)
BILIRUB SERPL-MCNC: 0.4 MG/DL (ref 0–1)
BUN BLDV-MCNC: 43 MG/DL (ref 6–23)
CALCIUM SERPL-MCNC: 8.5 MG/DL (ref 8.3–10.6)
CHLORIDE BLD-SCNC: 95 MMOL/L (ref 99–110)
CO2: 23 MMOL/L (ref 21–32)
CREAT SERPL-MCNC: 4.7 MG/DL (ref 0.9–1.3)
CULTURE: NORMAL
DIFFERENTIAL TYPE: ABNORMAL
DOSE AMOUNT: NORMAL
DOSE TIME: NORMAL
EOSINOPHILS ABSOLUTE: 0.4 K/CU MM
EOSINOPHILS RELATIVE PERCENT: 4.6 % (ref 0–3)
GFR SERPL CREATININE-BSD FRML MDRD: 13 ML/MIN/1.73M2
GLUCOSE BLD-MCNC: 101 MG/DL (ref 70–99)
GLUCOSE BLD-MCNC: 112 MG/DL (ref 70–99)
GLUCOSE BLD-MCNC: 112 MG/DL (ref 70–99)
GLUCOSE BLD-MCNC: 115 MG/DL (ref 70–99)
GLUCOSE BLD-MCNC: 127 MG/DL (ref 70–99)
GLUCOSE BLD-MCNC: 133 MG/DL (ref 70–99)
GLUCOSE BLD-MCNC: 135 MG/DL (ref 70–99)
HCT VFR BLD CALC: 27.8 % (ref 42–52)
HEMOGLOBIN: 8.6 GM/DL (ref 13.5–18)
IMMATURE NEUTROPHIL %: 1.7 % (ref 0–0.43)
INR BLD: 2.28 INDEX
LYMPHOCYTES ABSOLUTE: 2.4 K/CU MM
LYMPHOCYTES RELATIVE PERCENT: 28.9 % (ref 24–44)
Lab: NORMAL
MAGNESIUM: 2.5 MG/DL (ref 1.8–2.4)
MCH RBC QN AUTO: 33.3 PG (ref 27–31)
MCHC RBC AUTO-ENTMCNC: 30.9 % (ref 32–36)
MCV RBC AUTO: 107.8 FL (ref 78–100)
MONOCYTES ABSOLUTE: 1 K/CU MM
MONOCYTES RELATIVE PERCENT: 12.2 % (ref 0–4)
NUCLEATED RBC %: 0.8 %
PDW BLD-RTO: 23.2 % (ref 11.7–14.9)
PHOSPHORUS: 3.9 MG/DL (ref 2.5–4.9)
PLATELET # BLD: 232 K/CU MM (ref 140–440)
PMV BLD AUTO: 8.7 FL (ref 7.5–11.1)
POTASSIUM SERPL-SCNC: 4.9 MMOL/L (ref 3.5–5.1)
PRO-BNP: 7643 PG/ML
PROCALCITONIN: 0.35
PROTHROMBIN TIME: 29.6 SECONDS (ref 11.7–14.5)
RBC # BLD: 2.58 M/CU MM (ref 4.6–6.2)
SEGMENTED NEUTROPHILS ABSOLUTE COUNT: 4.2 K/CU MM
SEGMENTED NEUTROPHILS RELATIVE PERCENT: 51.1 % (ref 36–66)
SODIUM BLD-SCNC: 133 MMOL/L (ref 135–145)
SPECIMEN: NORMAL
TOTAL IMMATURE NEUTOROPHIL: 0.14 K/CU MM
TOTAL NUCLEATED RBC: 0.1 K/CU MM
TOTAL PROTEIN: 6.3 GM/DL (ref 6.4–8.2)
VANCOMYCIN RANDOM: 16.4 UG/ML
WBC # BLD: 8.3 K/CU MM (ref 4–10.5)

## 2022-12-31 PROCEDURE — C9113 INJ PANTOPRAZOLE SODIUM, VIA: HCPCS | Performed by: HOSPITALIST

## 2022-12-31 PROCEDURE — 82962 GLUCOSE BLOOD TEST: CPT

## 2022-12-31 PROCEDURE — A4216 STERILE WATER/SALINE, 10 ML: HCPCS | Performed by: HOSPITALIST

## 2022-12-31 PROCEDURE — 80053 COMPREHEN METABOLIC PANEL: CPT

## 2022-12-31 PROCEDURE — 6370000000 HC RX 637 (ALT 250 FOR IP): Performed by: INTERNAL MEDICINE

## 2022-12-31 PROCEDURE — 92610 EVALUATE SWALLOWING FUNCTION: CPT

## 2022-12-31 PROCEDURE — 6360000002 HC RX W HCPCS: Performed by: INTERNAL MEDICINE

## 2022-12-31 PROCEDURE — 84100 ASSAY OF PHOSPHORUS: CPT

## 2022-12-31 PROCEDURE — 83880 ASSAY OF NATRIURETIC PEPTIDE: CPT

## 2022-12-31 PROCEDURE — 6360000002 HC RX W HCPCS: Performed by: HOSPITALIST

## 2022-12-31 PROCEDURE — 80202 ASSAY OF VANCOMYCIN: CPT

## 2022-12-31 PROCEDURE — 6370000000 HC RX 637 (ALT 250 FOR IP): Performed by: HOSPITALIST

## 2022-12-31 PROCEDURE — 2580000003 HC RX 258: Performed by: HOSPITALIST

## 2022-12-31 PROCEDURE — 84145 PROCALCITONIN (PCT): CPT

## 2022-12-31 PROCEDURE — 36415 COLL VENOUS BLD VENIPUNCTURE: CPT

## 2022-12-31 PROCEDURE — 1200000000 HC SEMI PRIVATE

## 2022-12-31 PROCEDURE — 85730 THROMBOPLASTIN TIME PARTIAL: CPT

## 2022-12-31 PROCEDURE — 2500000003 HC RX 250 WO HCPCS: Performed by: INTERNAL MEDICINE

## 2022-12-31 PROCEDURE — 80048 BASIC METABOLIC PNL TOTAL CA: CPT

## 2022-12-31 PROCEDURE — 85025 COMPLETE CBC W/AUTO DIFF WBC: CPT

## 2022-12-31 PROCEDURE — 83735 ASSAY OF MAGNESIUM: CPT

## 2022-12-31 PROCEDURE — 85610 PROTHROMBIN TIME: CPT

## 2022-12-31 PROCEDURE — 94640 AIRWAY INHALATION TREATMENT: CPT

## 2022-12-31 RX ORDER — ACETYLCYSTEINE 100 MG/ML
4 SOLUTION ORAL; RESPIRATORY (INHALATION) 3 TIMES DAILY
Status: DISCONTINUED | OUTPATIENT
Start: 2022-12-31 | End: 2023-01-05 | Stop reason: HOSPADM

## 2022-12-31 RX ORDER — GUAIFENESIN 200 MG/10ML
300 LIQUID ORAL EVERY 6 HOURS
Status: DISCONTINUED | OUTPATIENT
Start: 2023-01-01 | End: 2023-01-05 | Stop reason: HOSPADM

## 2022-12-31 RX ORDER — WARFARIN SODIUM 1 MG/1
1 TABLET ORAL
Status: COMPLETED | OUTPATIENT
Start: 2022-12-31 | End: 2022-12-31

## 2022-12-31 RX ADMIN — WARFARIN SODIUM 1 MG: 1 TABLET ORAL at 18:00

## 2022-12-31 RX ADMIN — SEVELAMER CARBONATE 1600 MG: 800 TABLET, FILM COATED ORAL at 05:28

## 2022-12-31 RX ADMIN — MIDODRINE HYDROCHLORIDE 10 MG: 5 TABLET ORAL at 11:15

## 2022-12-31 RX ADMIN — MICONAZOLE NITRATE: 2 POWDER TOPICAL at 11:32

## 2022-12-31 RX ADMIN — SODIUM CHLORIDE, PRESERVATIVE FREE 10 ML: 5 INJECTION INTRAVENOUS at 11:33

## 2022-12-31 RX ADMIN — SODIUM CHLORIDE, PRESERVATIVE FREE 40 MG: 5 INJECTION INTRAVENOUS at 11:16

## 2022-12-31 RX ADMIN — MIDODRINE HYDROCHLORIDE 10 MG: 5 TABLET ORAL at 18:00

## 2022-12-31 RX ADMIN — IPRATROPIUM BROMIDE AND ALBUTEROL SULFATE 3 ML: .5; 2.5 SOLUTION RESPIRATORY (INHALATION) at 08:44

## 2022-12-31 RX ADMIN — ATORVASTATIN CALCIUM 40 MG: 40 TABLET, FILM COATED ORAL at 21:48

## 2022-12-31 RX ADMIN — AMIODARONE HYDROCHLORIDE 200 MG: 200 TABLET ORAL at 11:15

## 2022-12-31 RX ADMIN — CEFEPIME HYDROCHLORIDE 1000 MG: 1 INJECTION, POWDER, FOR SOLUTION INTRAMUSCULAR; INTRAVENOUS at 17:59

## 2022-12-31 RX ADMIN — QUETIAPINE FUMARATE 50 MG: 25 TABLET ORAL at 21:48

## 2022-12-31 RX ADMIN — ACETYLCYSTEINE 400 MG: 100 SOLUTION ORAL; RESPIRATORY (INHALATION) at 20:04

## 2022-12-31 RX ADMIN — CARVEDILOL 6.25 MG: 6.25 TABLET, FILM COATED ORAL at 11:15

## 2022-12-31 RX ADMIN — CARVEDILOL 6.25 MG: 6.25 TABLET, FILM COATED ORAL at 18:00

## 2022-12-31 RX ADMIN — SEVELAMER CARBONATE 1600 MG: 800 TABLET, FILM COATED ORAL at 21:48

## 2022-12-31 RX ADMIN — IPRATROPIUM BROMIDE AND ALBUTEROL SULFATE 3 ML: .5; 2.5 SOLUTION RESPIRATORY (INHALATION) at 16:42

## 2022-12-31 RX ADMIN — ASPIRIN 81 MG CHEWABLE TABLET 81 MG: 81 TABLET CHEWABLE at 11:15

## 2022-12-31 RX ADMIN — SODIUM CHLORIDE, PRESERVATIVE FREE 10 ML: 5 INJECTION INTRAVENOUS at 21:52

## 2022-12-31 RX ADMIN — GUAIFENESIN 600 MG: 600 TABLET, EXTENDED RELEASE ORAL at 11:16

## 2022-12-31 RX ADMIN — LEVOTHYROXINE SODIUM 75 MCG: 0.07 TABLET ORAL at 05:28

## 2022-12-31 RX ADMIN — MICONAZOLE NITRATE: 2 POWDER TOPICAL at 21:51

## 2022-12-31 RX ADMIN — IPRATROPIUM BROMIDE AND ALBUTEROL SULFATE 3 ML: .5; 2.5 SOLUTION RESPIRATORY (INHALATION) at 20:03

## 2022-12-31 RX ADMIN — GUAIFENESIN 300 MG: 100 SOLUTION ORAL at 23:36

## 2022-12-31 ASSESSMENT — PAIN SCALES - GENERAL
PAINLEVEL_OUTOF10: 0
PAINLEVEL_OUTOF10: 0

## 2022-12-31 NOTE — CONSULTS
PHARMACY ANTICOAGULATION MONITORING SERVICE    Sveta Meier is a 71 y.o. male on warfarin therapy for Persistent A. Fib, H/O non-occlusive Rt. Fem DVT. Pharmacy consulted by Dr. Laura Laguerre for monitoring and adjustment of treatment. Indication for anticoagulation: Persistent A. Fib, H/O non-occlusive Rt. Fem DVT  INR goal: 2-3  Warfarin dose prior to admission: 2mg daily    Pertinent Laboratory Values   Recent Labs     12/29/22  1509 12/30/22  0604 12/31/22  0528   INR  --  1.92 2.28   HGB 9.0* 8.3* 8.6*   HCT 26.7* 23.9* 27.8*    226 232       Assessment/Plan:  Drug Interactions: home amiodarone, trazodone, aspirin, levothyroxine  INR 2.28 today  Patient was started on home warfarin 2mg daily. INR increased by 0.36 after only 1 dose of warfarin. [facility]  Pharmacy will continue to monitor and adjust warfarin therapy as indicated    Thank you for the consult.   Reba Tiwari [facility], PharmD  12/31/2022 3:51 PM

## 2022-12-31 NOTE — PROGRESS NOTES
Speech Language Pathology  Facility/Department: [facility] ICU STEPDOWN   CLINICAL BEDSIDE SWALLOW EVALUATION    NAME: Irais Arizmendi  : 1950  MRN: [id number]    ADMISSION DATE: 2022  ADMITTING DIAGNOSIS: has Atrial fibrillation ([address] CAD (coronary artery disease); Morbid obesity ([address] COPD (chronic obstructive pulmonary disease) ([address] Sleep apnea; Type 2 diabetes mellitus ([address] Thyroid disease; Hyperlipidemia; Acute congestive heart failure ([address] Coronary artery disease involving native coronary artery of native heart with angina pectoris ([facility] Chronic renal disease, stage III ([facility] [825315]; Hypertension; CAD, multiple vessel; Dyspnea; Moderate malnutrition ([address] Healthcare-associated pneumonia; and Severe malnutrition ([address] on their problem list.    Impressions: Irais Arizmendi was seen for a bedside swallowing evaluation after being admitted to [facility] with pneumonia. Pt was alert and cooperative throughout assessment. Relevant medical hx includes CABG with postop complications resulting in tracheostomy and PEG tube, CAD, hypertension and thyroid disease. Pt had a recent modified barium swallow study completed 22 which indicated severe pharyngeal dysphagia with silent aspiration of thin and nectar thick liquids and penetration of nectar thick liquids with significant pharyngeal residue. Pharyngeal deficits appeared to be related to suspected osteophytes at the C4 and C5. It was recommended that pt remain NPO at that time d/t nonfunctional swallow. For today's assessment pt was positioned upright in bed and was voicing over trach with reduced breath support. Passy felipe valve was at bedside, which SLP placed prior to PO trials being given. Pt presented with a clear vocal quality and strong volitional cough with PMV in place. O2 sats remained between 96-99 throughout assessment. Limited PO trials of ice chips were given. Adequate oral manipulation was observed with ice chips trials. Pharyngeal swallow appeared delayed with significantly reduced laryngeal elevation. Repetitive swallows (4-5 per ice chip) and an immediate cough was observed with all trials given. Recommend continue NPO status with nutrition and medication administration through PEG-tube. Continue PMV use for all waking hours. Please remove PMV during sleep and in the event of respiratory decompensation. No further acute SLP needs were identified at this time as pt's swallowing status appears to be at baseline from previous hospital admission. ONSET DATE: this admission Date of Eval: 12/31/2022  Evaluating Therapist: ROLANDO West    Current Diet level:  Current Diet : NPO      Primary Complaint  Patient Complaint: uncomfortable with upright positioning    Pain:  Pain Assessment  Pain Assessment: None - Denies Pain  Pain Level: 0  Patient's Stated Pain Goal: 0 - No pain  Pain Location: Leg  Pain Orientation: Right, Left  Pain Descriptors: Aching, Cramping    Reason for Referral  Eura Leyden was referred for a bedside swallow evaluation to assess the efficiency of his swallow function, identify signs and symptoms of aspiration and make recommendations regarding safe dietary consistencies, effective compensatory strategies, and safe eating environment. Impression  Dysphagia Diagnosis: Severe pharyngeal stage dysphagia  Dysphagia Outcome Severity Scale: Level 1: Severe dysphagia- NPO. Unable to tolerate any PO safely     Treatment Plan  Requires SLP Intervention: No  Duration of Treatment: n/a  D/C Recommendations: To be determined       Recommended Diet and Intervention        Recommended Form of Meds: Via alternative means of nutrition          Compensatory Swallowing Strategies       Treatment/Goals  Short-term Goals  Timeframe for Short-term Goals: n/a    General  Chart Reviewed: Yes  Behavior/Cognition: Alert; Cooperative;Pleasant mood  Respiratory Status: Trach uncuffed  O2 Device: [employer] mask  Communication Observation: Functional  Follows Directions: Complex  Dentition: Adequate  Patient Positioning: Upright in bed  Baseline Vocal Quality: Normal  Volitional Cough: Strong  Volitional Swallow: Delayed  Prior Dysphagia History: hx of dysphagia with peg tube  Consistencies Administered: Ice Chips           Vision/Hearing  Vision  Vision: Within Functional Limits  Hearing  Hearing: Within functional limits    Oral Motor Deficits       Oral Phase Dysfunction  Oral Phase  Oral Phase: Exceptions     Indicators of Pharyngeal Phase Dysfunction        Prognosis  Individuals consulted  Consulted and agree with results and recommendations: RN    Education  Patient Education: recommendations/POC  Patient Education Response: Verbalizes understanding  Safety Devices in place: Yes  Type of devices:  All fall risk precautions in place       Therapy Time  SLP Individual Minutes  Time In: 1330  Time Out: 1400  Minutes: [address], [id number] [facility], [place], operated by [facility], 12/31/2022

## 2022-12-31 NOTE — PROGRESS NOTES
In-Patient Progress Note    Patient:  Ramya Morejon 71 y.o. male MRN: [id number]     Date of Service: 12/31/2022    Hospital Day: 3      Chief complaint: had concerns including Shortness of Breath. Subjective   Patient seen and examined in the morning. Patient is resting comfortably. He is on trach mask. No issues overnight. Labs reviewed and are acceptable. Discussed with pulmonary. Await culture results. Continue empiric IV antibiotics. See ROS    I have reviewed all pertinent PMHx, PSHx, FamHx, SocialHx, medications, and allergies and updated history as appropriate. I have reviewed images as appropriate. Assessment and Plan   Ramya Morejon, a 71 y.o. male, with a history of CAD status post CABG, permanent A. fib, hypertension, hyperlipidemia and COPD was admitted on 12/29/2022 with complaints of had concerns including Shortness of Breath. Assessment and Plan:  Concern for Healthcare associated pneumonia with tracheitis  Acute on Chronic Respiratory failure s/p trach  Rt. Pleural effusion   Possible Sepsis - Present on admission   - Initial , RR 28.   - LA not sent initially apparently  -Trach at [facility] 10/12/2022  -On home 5L O2 on trach collar - currently on 6L/min  - Initially required up to 10L/min via trach collar   -X-ray chest shows small right pleural effusion with bibasilar opacities. -Tracheal aspirate was apparently purulent and patient had cough with yellow sputum initially. -IV vancomycin and cefepime for pharmacy to dose. - Pending pan culture  - Pulm on board  - May need thoracentesis   - Send LA    End-stage renal disease   - Has required HD since his CABG (9/22/2022). -Blood urea 59 creatinine 5.0, GFR 12 on presentation.  - Nephro on board     Hyperkalemia  -Mild with potassium of 5.5 initially  -Patient already administered Lokelma by ER after consultation with nephrology and now 5 prior to HD      A.  Fib with RVR in the setting of Chronic persistent atrial fibrillation s/p MAZE 9/22/22  -Twelve-lead ECG shows A. Fib with   -Continue amiodarone, carvedilol and warfarin.  - INR therapeutic   - Pharmacy to dose    Hypothyroidism  -Continue levothyroxine as before. History of diabetes mellitus  -Patient placed on Lantus and sliding scale insulin as per protocol.  - Endocrinology on board  - A1c 7%    Increased troponins 2/2 likely chornic 2/2 ESRD  -Troponin T's 0.269.  -Patient does not endorse any chest pain. -Probably secondary to chronic leak and also not interpretable secondary to chronic kidney disease. Macrocytic anemia 2/2 likely anemia of chronic disease  - Hb 8.6 today. - Last admission earlier this month: Ferritin 778 with low iron and TIBC. Normal folate and vitamin B12    Moderate Protein Malnutrition    H/O DVT (non-occlusive) mid R femoral vein  -Dx on duplex RLE 9/12/22   -Continue warfarin     H/O HTN with Soft blood pressures  - On midodrine   - On Coreg    s/p PEG tube placement on 10/19/2022  -Patient has a PEG tube. - Dietitian on board    Class I Obesity  - BMI 33.13      # Peptic ulcer prophylaxis: Pantoprazole   # DVT Prophylaxis: Warfarin  #CODE STATUS: Full      Current living situation: SNF  Expected Disposition: SNF  Estimated discharge date: TBD pending clinical course. On IV abx and work up on going. Review of System     General: No fever, no chills  Heart: No chest pain, no palpitations, no PND, no orthopnea  Lungs: SOB has improved, + cough with clear sputum now  Abdomen: No abdominal pain, no nausea, no vomiting, no constipation, no diarrhea  : No frequency, no dysuria, no urgency, no decreased urination  MSK: No lower extremity swelling  Neuro: No confusion, no weakness  Skin: No rashes    I have reviewed all pertinent PMHx, PSHx, FamHx, SocialHx, medications, and allergies and updated history as appropriate.     Physical Exam   VITAL SIGNS:  BP (!) 99/53   Pulse 97   Temp 97.7 °F (36.5 °C) (Oral)   Resp 20   Ht 6' 2\" (1.88 m)   Wt 258 lb (117 kg)   SpO2 96%   BMI 33.13 kg/m²   Tmax over 24 hours:  Temp (24hrs), Av °F (36.7 °C), Min:97.7 °F (36.5 °C), Max:98.3 °F (36.8 °C)      No data found. Intake/Output Summary (Last 24 hours) at 2022 0804  Last data filed at 2022 2137  Gross per 24 hour   Intake 510 ml   Output 3000 ml   Net -2490 ml       Wt Readings from Last 2 Encounters:   22 258 lb (117 kg)   22 258 lb 9.6 oz (117.3 kg)     Body mass index is 33.13 kg/m². General: NAD, AAO x3. Obese. Eyes: EOMI  HENT: trach in place with trach collar - 6L/min. CVS: Normal S1 and S2, no murmurs. Normal rate, irregularly irregular rhythm  Respiratory: Conducted sounds B/L with B/L decreased air entry, no respiratory distress, No secretions noted at trach site today. GI: Normal bowel sounds, soft, nondistended, nontender. PEG in situ  MSK:  no lower extremity edema  Skin: Intact, dry, warm, no rashes.  Stasis dermatitis B/L LE  Neuro: CN II to XII grossly intact  Psych: Normal mood      Current Medications      guaiFENesin  600 mg Oral BID    miconazole   Topical BID    insulin lispro  0-4 Units SubCUTAneous 4 times per day    sodium zirconium cyclosilicate  10 g Oral Once    aspirin  81 mg Per NG tube Daily    pantoprazole (PROTONIX) 40 mg injection  40 mg IntraVENous Daily    amiodarone  200 mg PEG Tube Daily    atorvastatin  40 mg PEG Tube Nightly    Nephronex  5 mL Feeding Tube Daily    sevelamer  1,600 mg Per G Tube Q8H    QUEtiapine  50 mg Per G Tube Nightly    carvedilol  6.25 mg PEG Tube BID WC    midodrine  10 mg Oral TID WC    levothyroxine  75 mcg Oral Daily    ipratropium-albuterol  1 vial Inhalation 4x Daily    warfarin  2 mg PEG Tube Daily    sodium chloride flush  5-40 mL IntraVENous 2 times per day    insulin glargine  10 Units SubCUTAneous Nightly    cefepime  1,000 mg IntraVENous Q24H    polyethylene glycol  17 g Oral Daily         Labs and Imaging Studies Laboratory findings:  XR CHEST PORTABLE    Result Date: 12/29/2022  EXAMINATION: ONE XRAY VIEW OF THE CHEST 12/29/2022 2:34 pm COMPARISON: Chest x-ray dated 11 December 2022 HISTORY: ORDERING SYSTEM PROVIDED HISTORY: Shortness of Breath TECHNOLOGIST PROVIDED HISTORY: Reason for exam:->Shortness of Breath Reason for Exam: Shortness of Breath FINDINGS: Small right pleural effusion. Bibasilar airspace opacities. No pneumothorax. Tracheostomy tube in place. Right-sided central venous catheter with the tip at the SVC/atrial junction. Small right pleural effusion with bibasilar airspace opacities.   This could represent atelectasis or pneumonia in the appropriate clinical setting            Recent Results (from the past 24 hour(s))   EKG 12 Lead    Collection Time: 12/29/22  2:15 PM   Result Value Ref Range    Ventricular Rate 102 BPM    Atrial Rate 105 BPM    QRS Duration 168 ms    Q-T Interval 332 ms    QTc Calculation (Bazett) 432 ms    R Axis -81 degrees    T Axis 43 degrees    Diagnosis       Atrial fibrillation with rapid ventricular response  Right bundle branch block  Left anterior fascicular block   Bifascicular block   Abnormal ECG  When compared with ECG of 11-DEC-2022 22:08,  QT has shortened  [id number] on 12/29/2022 3:28:51 PM     Blood Gas, Venous    Collection Time: 12/29/22  2:30 PM   Result Value Ref Range    pH, Paolo 7.35 7.32 - 7.42    pCO2, Paolo 50 38 - 52 mmHG    pO2, Paolo 40 28 - 48 mmHG    Base Exc, Mixed 1.2 0 - 1.2    HCO3, Venous 27.6 (H) 19 - 25 MMOL/L    O2 Sat, Paolo 73.2 (H) 50 - 70 %    Comment VBG    COVID-19, Rapid    Collection Time: 12/29/22  2:49 PM    Specimen: Nasopharyngeal   Result Value Ref Range    Source NARES     SARS-CoV-2, NAAT NOT DETECTED NOT DETECTED   Rapid Flu Swab    Collection Time: 12/29/22  2:49 PM    Specimen: Nasopharyngeal   Result Value Ref Range    Rapid Influenza A Ag NEGATIVE NEGATIVE    Rapid Influenza B Ag NEGATIVE NEGATIVE Protime-INR    Collection Time: 12/29/22  3:08 PM   Result Value Ref Range    Protime 26.3 (H) 11.7 - 14.5 SECONDS    INR 2.02 INDEX   CBC with Auto Differential    Collection Time: 12/29/22  3:09 PM   Result Value Ref Range    WBC 10.3 4.0 - 10.5 K/CU MM    RBC 2.53 (L) 4.6 - 6.2 M/CU MM    Hemoglobin 9.0 (L) 13.5 - 18.0 GM/DL    Hematocrit 26.7 (L) 42 - 52 %    .5 (H) 78 - 100 FL    MCH 35.6 (H) 27 - 31 PG    MCHC 33.7 32.0 - 36.0 %    RDW 22.6 (H) 11.7 - 14.9 %    Platelets 016 609 - 619 K/CU MM    MPV 8.9 7.5 - 11.1 FL    Differential Type AUTOMATED DIFFERENTIAL     Segs Relative 67.9 (H) 36 - 66 %    Lymphocytes % 17.3 (L) 24 - 44 %    Monocytes % 10.4 (H) 0 - 4 %    Eosinophils % 1.4 0 - 3 %    Basophils % 0.9 0 - 1 %    Segs Absolute 7.0 K/CU MM    Lymphocytes Absolute 1.8 K/CU MM    Monocytes Absolute 1.1 K/CU MM    Eosinophils Absolute 0.1 K/CU MM    Basophils Absolute 0.1 K/CU MM    Nucleated RBC % 0.7 %    Total Nucleated RBC 0.1 K/CU MM    Total Immature Neutrophil 0.22 K/CU MM    Immature Neutrophil % 2.1 (H) 0 - 0.43 %   Comprehensive Metabolic Panel    Collection Time: 12/29/22  3:09 PM   Result Value Ref Range    Sodium 133 (L) 135 - 145 MMOL/L    Potassium 5.5 (HH) 3.5 - 5.1 MMOL/L    Chloride 94 (L) 99 - 110 mMol/L    CO2 25 21 - 32 MMOL/L    BUN 59 (H) 6 - 23 MG/DL    Creatinine 5.0 (H) 0.9 - 1.3 MG/DL    Est, Glom Filt Rate 12 (L) >60 mL/min/1.73m2    Glucose 175 (H) 70 - 99 MG/DL    Calcium 9.3 8.3 - 10.6 MG/DL    Albumin 3.6 3.4 - 5.0 GM/DL    Total Protein 7.0 6.4 - 8.2 GM/DL    Total Bilirubin 0.4 0.0 - 1.0 MG/DL    ALT 14 10 - 40 U/L    AST 20 15 - 37 IU/L    Alkaline Phosphatase 140 (H) 40 - 128 IU/L    Anion Gap 14 4 - 16   Magnesium    Collection Time: 12/29/22  3:09 PM   Result Value Ref Range    Magnesium 2.7 (H) 1.8 - 2.4 mg/dl   Troponin    Collection Time: 12/29/22  3:09 PM   Result Value Ref Range    Troponin T 0.269 (HH) <0.01 NG/ML   Brain Natriuretic Peptide Collection Time: 12/29/22  3:09 PM   Result Value Ref Range    Pro-BNP 7,880 (H) <300 PG/ML   POCT Glucose    Collection Time: 12/29/22  9:30 PM   Result Value Ref Range    POC Glucose 160 (H) 70 - 99 MG/DL   POCT Glucose    Collection Time: 12/30/22 12:53 AM   Result Value Ref Range    POC Glucose 211 (H) 70 - 99 MG/DL   Hemoglobin A1c    Collection Time: 12/30/22  2:15 AM   Result Value Ref Range    Hemoglobin A1C 7.0 (H) 4.2 - 6.3 %    eAG 154 mg/dL   Comprehensive Metabolic Panel w/ Reflex to MG    Collection Time: 12/30/22  6:04 AM   Result Value Ref Range    Sodium 130 (L) 135 - 145 MMOL/L    Potassium 5.0 3.5 - 5.1 MMOL/L    Chloride 93 (L) 99 - 110 mMol/L    CO2 23 21 - 32 MMOL/L    BUN 69 (H) 6 - 23 MG/DL    Creatinine 5.5 (H) 0.9 - 1.3 MG/DL    Est, Glom Filt Rate 10 (L) >60 mL/min/1.73m2    Glucose 106 (H) 70 - 99 MG/DL    Calcium 8.4 8.3 - 10.6 MG/DL    Albumin 3.1 (L) 3.4 - 5.0 GM/DL    Total Protein 6.1 (L) 6.4 - 8.2 GM/DL    Total Bilirubin 0.4 0.0 - 1.0 MG/DL    ALT 11 10 - 40 U/L    AST 16 15 - 37 IU/L    Alkaline Phosphatase 103 40 - 128 IU/L    Anion Gap 14 4 - 16   CBC with Auto Differential    Collection Time: 12/30/22  6:04 AM   Result Value Ref Range    WBC 8.5 4.0 - 10.5 K/CU MM    RBC 2.26 (L) 4.6 - 6.2 M/CU MM    Hemoglobin 8.3 (L) 13.5 - 18.0 GM/DL    Hematocrit 23.9 (L) 42 - 52 %    .8 (H) 78 - 100 FL    MCH 36.7 (H) 27 - 31 PG    MCHC 34.7 32.0 - 36.0 %    RDW 22.7 (H) 11.7 - 14.9 %    Platelets 481 585 - 654 K/CU MM    MPV 8.8 7.5 - 11.1 FL    Differential Type AUTOMATED DIFFERENTIAL     Segs Relative 48.0 36 - 66 %    Lymphocytes % 31.6 24 - 44 %    Monocytes % 13.7 (H) 0 - 4 %    Eosinophils % 3.2 (H) 0 - 3 %    Basophils % 1.1 (H) 0 - 1 %    Segs Absolute 4.1 K/CU MM    Lymphocytes Absolute 2.7 K/CU MM    Monocytes Absolute 1.2 K/CU MM    Eosinophils Absolute 0.3 K/CU MM    Basophils Absolute 0.1 K/CU MM    Nucleated RBC % 0.8 %    Total Nucleated RBC 0.1 K/CU MM    Total Immature Neutrophil 0.20 K/CU MM    Immature Neutrophil % 2.4 (H) 0 - 0.43 %   Protime/INR & PTT    Collection Time: 12/30/22  6:04 AM   Result Value Ref Range    Protime 24.9 (H) 11.7 - 14.5 SECONDS    INR 1.92 INDEX    aPTT 35.5 25.1 - 37.1 SECONDS   POCT Glucose    Collection Time: 12/30/22  8:03 AM   Result Value Ref Range    POC Glucose 120 (H) 70 - 99 MG/DL           Electronically signed by Margret Goldmann, MD on 12/31/2022 at 8:04 AM

## 2022-12-31 NOTE — PROGRESS NOTES
RRT suctioned pt for small white/clear secretions. Cleaned out suction tubing. Placed water bottle and suction cathers in the room.       Pt tolerated well

## 2022-12-31 NOTE — CONSULTS
[address] [place] [address], [address] [facility]                                  CONSULTATION    PATIENT NAME: Cande Mansfield                    :        1950  MED REC NO:   [id number]                          ROOM:         ACCOUNT NO:   [de-identified]                           ADMIT DATE: 2022  PROVIDER:     Emilia Louis MD    CONSULT DATE:  2022    HISTORY OF PRESENT ILLNESS:  The patient is a 71-year-old gentleman with  multiple medical problems including COPD, coronary artery disease,  hypertension, hyperlipidemia, obstructive sleep apnea, status post  tracheostomy, who was a resident of the nursing home and was admitted  through the emergency room with complaints of increasing shortness of  breath and cough productive of yellowish phlegm. He denied any fever or  chills. He denied any nausea or vomiting. He had a chest x-ray which  showed bibasilar infiltrate and possible right effusion. There was no  history of hemoptysis or hematemesis. The patient was subsequently  admitted to the hospital.  He was started on broad-spectrum antibiotic  coverage and breathing treatments and the patient has improved. He is  getting dialysis today. PAST MEDICAL HISTORY:  Significant for coronary artery disease, COPD,  hypertension, hyperlipidemia, chronic renal failure, on dialysis,  diabetes, and depression. PAST SURGICAL HISTORY:  Remarkable for cardiac catheterization,  colonoscopy, coronary angioplasty with stent placement, and hand  surgery. FAMILY HISTORY:  Reveals that his mother had cancer and father had  coronary artery disease. SOCIAL HISTORY:  Reveals that he quit smoking in , but used to smoke  a pack per day for 10 years prior to that. No history of alcohol or  drug abuse. MEDICATIONS:  Reviewed. ALLERGIES:  He has no known allergies.     REVIEW OF SYSTEMS:  A 10 to 14-point review of systems was reviewed and  is negative except for what is mentioned in the history of present  illness. PHYSICAL EXAMINATION:  GENERAL:  The patient is awake and responsive, answering questions  appropriately, having dialysis. VITAL SIGNS:  His blood pressure is 86/43 mmHg, pulse of 97 per minute  and respiratory rate of 22 per minute. He is afebrile. His saturation  is 98% on trach mask 6 liters. HEENT:  Reveals presence of tracheostomy. No JVD. NECK:  Supple. LUNGS:  Revealed diminished breath sounds and occasional basilar  crackles. HEART:  Showed normal S1 and S2.  ABDOMEN:  Benign. PEG tube in place. NEUROLOGIC:  Reveals that he is awake and responsive, answering  questions appropriately. LABORATORY DATA:  His CBC showed a white count of 8.5, hemoglobin 8.3,  hematocrit 23.9. His electrolytes showed a sodium 130, potassium 5.0,  chloride 93, carbon dioxide 23, BUN 69, and creatinine 5.5. IMPRESSION:  1.  Bibasilar pneumonia. 2.  Chronic respiratory failure, on home oxygen. 3.  Status post tracheostomy. 4.  Chronic renal failure. 5.  History of obstructive sleep apnea. PLAN:  1. Continue broad-spectrum antibiotic coverage. 2.  DuoNeb q.4h. while awake. 3.  Sputum for culture and sensitivity. 4.  Check procalcitonin levels. 5.  Legionella urinary antigen. 6.  Strep pneumo antigen. 7.  As per orders.         Thiago Aiken MD    D: 12/30/2022 11:36:26       T: 12/30/2022 11:39:23     [email or web address]  Job#: [id number]     Doc#: [id number]    CC:

## 2022-12-31 NOTE — PROGRESS NOTES
Nephrology Progress Note  12/31/2022 10:30 AM  Subjective: Interval History: Eura Leyden is a 71 y.o. male still some congestion with trach in place otherwise appears stable      Data:   Scheduled Meds:   guaiFENesin  600 mg Oral BID    miconazole   Topical BID    insulin lispro  0-4 Units SubCUTAneous 4 times per day    sodium zirconium cyclosilicate  10 g Oral Once    aspirin  81 mg Per NG tube Daily    pantoprazole (PROTONIX) 40 mg injection  40 mg IntraVENous Daily    amiodarone  200 mg PEG Tube Daily    atorvastatin  40 mg PEG Tube Nightly    Nephronex  5 mL Feeding Tube Daily    sevelamer  1,600 mg Per G Tube Q8H    QUEtiapine  50 mg Per G Tube Nightly    carvedilol  6.25 mg PEG Tube BID WC    midodrine  10 mg Oral TID WC    levothyroxine  75 mcg Oral Daily    ipratropium-albuterol  1 vial Inhalation 4x Daily    warfarin  2 mg PEG Tube Daily    sodium chloride flush  5-40 mL IntraVENous 2 times per day    insulin glargine  10 Units SubCUTAneous Nightly    cefepime  1,000 mg IntraVENous Q24H    polyethylene glycol  17 g Oral Daily     Continuous Infusions:   sodium chloride 25 mL (12/29/22 4785)    dextrose           CBC   Recent Labs     12/29/22  1509 12/30/22  0604 12/31/22  0528   WBC 10.3 8.5 8.3   HGB 9.0* 8.3* 8.6*   HCT 26.7* 23.9* 27.8*    226 232      BMP   Recent Labs     12/29/22  1509 12/30/22  0604 12/31/22  0528   * 130* 133*   K 5.5* 5.0 4.9   CL 94* 93* 95*   CO2 25 23 23   PHOS  --   --  3.9   BUN 59* 69* 43*   CREATININE 5.0* 5.5* 4.7*     Hepatic:   Recent Labs     12/29/22  1509 12/30/22  0604 12/31/22  0528   AST 20 16 22   ALT 14 11 14   BILITOT 0.4 0.4 0.4   ALKPHOS 140* 103 103     Troponin: No results for input(s): TROPONINI in the last 72 hours. BNP: No results for input(s): BNP in the last 72 hours. Lipids: No results for input(s): CHOL, HDL in the last 72 hours.     Invalid input(s): LDLCALCU  ABGs:   Lab Results   Component Value Date/Time    PO2ART 70.3 09/24/2022 11:49 AM    TQU6NLU 35.5 09/24/2022 11:49 AM     INR:   Recent Labs     12/29/22  1508 12/30/22  0604 12/31/22  0528   INR 2.02 1.92 2.28     Renal Labs  Albumin:    Lab Results   Component Value Date/Time    LABALBU 3.5 12/31/2022 05:28 AM     Calcium:    Lab Results   Component Value Date/Time    CALCIUM 8.5 12/31/2022 05:28 AM     Phosphorus:    Lab Results   Component Value Date/Time    PHOS 3.9 12/31/2022 05:28 AM     U/A:    Lab Results   Component Value Date/Time    NITRU NEGATIVE 09/17/2022 06:21 PM    COLORU YELLOW 09/17/2022 06:21 PM    PHUR 6.5 06/30/2021 02:11 PM    WBCUA NONE SEEN 09/17/2022 06:21 PM    RBCUA NONE SEEN 09/17/2022 06:21 PM    MUCUS RARE 09/17/2022 06:21 PM    TRICHOMONAS NONE SEEN 09/17/2022 06:21 PM    BACTERIA NEGATIVE 09/17/2022 06:21 PM    CLARITYU CLEAR 09/17/2022 06:21 PM    SPECGRAV 1.015 09/17/2022 06:21 PM    UROBILINOGEN 4.0 09/17/2022 06:21 PM    BILIRUBINUR NEGATIVE 09/17/2022 06:21 PM    BILIRUBINUR small 06/30/2021 02:11 PM    BLOODU NEGATIVE 09/17/2022 06:21 PM    GLUCOSEU neg 06/30/2021 02:11 PM    KETUA TRACE 09/17/2022 06:21 PM     ABG:    Lab Results   Component Value Date/Time    ZWW3QEL 35.5 09/24/2022 11:49 AM    PO2ART 70.3 09/24/2022 11:49 AM    XVU9MEX 21.2 09/24/2022 11:49 AM     HgBA1c:    Lab Results   Component Value Date/Time    LABA1C 7.0 12/30/2022 02:15 AM     Microalbumen/Creatinine ratio:  No components found for: RUCREAT  TSH:    Lab Results   Component Value Date/Time    TSH 1.66 04/03/2018 11:33 AM     IRON:    Lab Results   Component Value Date/Time    IRON 54 12/13/2022 06:50 AM     Iron Saturation:  No components found for: PERCENTFE  TIBC:    Lab Results   Component Value Date/Time    TIBC 217 12/13/2022 06:50 AM     FERRITIN:    Lab Results   Component Value Date/Time    FERRITIN 778 12/13/2022 06:50 AM     RPR:  No results found for: RPR  YAYA:  No results found for: ANATITER, YAYA  24 Hour Urine for Creatinine Clearance:  No components found for: CREAT4, UHRS10, UTV10      Objective:   I/O: 12/30 0701 - 12/31 0700  In: 510 [I.V.:10]  Out: 3000   I/O last 3 completed shifts: In: 510 [I.V.:10]  Out: 3000   No intake/output data recorded. Vitals: BP (!) 99/53   Pulse 97   Temp 97.7 °F (36.5 °C) (Oral)   Resp 20   Ht 6' 2\" (1.88 m)   Wt 258 lb (117 kg)   SpO2 96%   BMI 33.13 kg/m²  {  General appearance: awake weak  HEENT: Head: Normal, normocephalic, atraumatic.   Neck:  positive trach  Lungs: diminished breath sounds bilaterally  Heart: S1, S2 normal  Abdomen: abnormal findings:  soft nt positive PEG  Extremities: edema trace  Neurologic: Mental status: alertness: alert        Assessment and Plan:      IMP:   #1 end-stage renal disease on dialysis   #2 shortness of breath with trach and congestion   #3 hyponatremia with hyperkalemia   #4  low blood pressure    Plan     #1 Next dialysis Monday at [facility] view if not discharged  #2 trach care respiratory therapy for trach decongestion and cultures so far negative maintain antibiotics  #3 sodium potassium improved with dialysis  #4 blood pressure low stable  Supportive care will monitor for now when decongested can work on discharge Michele Velazquez MD, MD

## 2022-12-31 NOTE — FLOWSHEET NOTE
This said nurse suctioned PT inner cannula and mucous plug noted. Inner cannula changed and PT tolerated well. Oral care completed and suctioned as well. Antifungal powder placed to bilat groin and PT tolerated well.

## 2022-12-31 NOTE — PLAN OF CARE
Problem: Discharge Planning  Goal: Discharge to home or other facility with appropriate resources  Outcome: Progressing  Flowsheets (Taken 12/30/2022 2000 by Fredo Pinedo LPN)  Discharge to home or other facility with appropriate resources: Identify barriers to discharge with patient and caregiver     Problem: Skin/Tissue Integrity  Goal: Absence of new skin breakdown  Description: 1. Monitor for areas of redness and/or skin breakdown  2. Assess vascular access sites hourly  3. Every 4-6 hours minimum:  Change oxygen saturation probe site  4. Every 4-6 hours:  If on nasal continuous positive airway pressure, respiratory therapy assess nares and determine need for appliance change or resting period.   Outcome: Progressing     Problem: Safety - Adult  Goal: Free from fall injury  Outcome: Progressing     Problem: Pain  Goal: Verbalizes/displays adequate comfort level or baseline comfort level  Outcome: Progressing  Flowsheets (Taken 12/30/2022 2100 by Fredo Pinedo LPN)  Verbalizes/displays adequate comfort level or baseline comfort level: Encourage patient to monitor pain and request assistance     Problem: Chronic Conditions and Co-morbidities  Goal: Patient's chronic conditions and co-morbidity symptoms are monitored and maintained or improved  Outcome: Progressing  Flowsheets (Taken 12/30/2022 2000 by Fredo Pinedo LPN)  Care Plan - Patient's Chronic Conditions and Co-Morbidity Symptoms are Monitored and Maintained or Improved: Monitor and assess patient's chronic conditions and comorbid symptoms for stability, deterioration, or improvement

## 2022-12-31 NOTE — PROGRESS NOTES
Pulmonary and Critical Care  Progress Note    Subjective: The patient is comfortable in bed. Having thick secretions. Trach checked, clean. 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, COPD (chronic obstructive pulmonary disease) ([facility] [address] Depression, Diabetes mellitus ([facility] [address] Dupuytren's contracture of hand, Family history of cardiovascular disease, GERD (gastroesophageal reflux disease), H/O cardiac catheterization, H/O cardiac catheterization, H/O cardiovascular stress test, H/O cardiovascular stress test, H/O cardiovascular stress test, H/O cardiovascular stress test, H/O Doppler ultrasound, H/O echocardiogram, H/O echocardiogram, H/O echocardiogram, H/O hiatal hernia, Hx of Venous Doppler, Hyperlipidemia, Hypertension, Obesity, S/P PTCA (percutaneous transluminal coronary angioplasty), Sleep apnea, and Thyroid disease. has a past surgical history that includes Coronary angioplasty with stent (03/21/2005); Cardiac catheterization (6/12/08); Cardiac catheterization (3/07); Cardiac catheterization (3/05); Endoscopy, colon, diagnostic (2014); Colonoscopy (2011); Colonoscopy (5/18/16); Hand surgery (Right, 1997); pr optx ankle dislocation w/repair/int/xtrnl fixj (Right, 6/3/2019); Sternum Debridement (N/A, 9/24/2022); and Coronary artery bypass graft (N/A, 9/22/2022). reports that he quit smoking about 47 years ago. His smoking use included cigarettes. He has a 10.00 pack-year smoking history. He has never used smokeless tobacco. He reports that he does not currently use alcohol after a past usage of about 6.0 standard drinks per week.  He reports that he does not use drugs. Family history:  family history includes Cancer in his mother; Coronary Art Dis in his father; Heart Disease in his father; No Known Problems in his brother; Rheum Arthritis in his sister, sister and another family member. No Known Allergies  Social History:    Reviewed; no changes    Objective:   PHYSICAL EXAM:        VITALS:  BP (!) 99/53   Pulse 97   Temp 97.7 °F (36.5 °C) (Oral)   Resp 20   Ht 6' 2\" (1.88 m)   Wt 258 lb (117 kg)   SpO2 96%   BMI 33.13 kg/m²     24HR INTAKE/OUTPUT:    Intake/Output Summary (Last 24 hours) at 12/31/2022 1132  Last data filed at 12/30/2022 2137  Gross per 24 hour   Intake 510 ml   Output 3000 ml   Net -2490 ml       CONSTITUTIONAL:  awake. LUNGS:  decreased breath sounds, occ rhonchii. CARDIOVASCULAR:  normal S1 and S2 and negative JVD  ABD:Abdomen soft, non-tender. BS normal. No masses,  No organomegaly  NEURO:Alert. DATA:    CBC:  Recent Labs     12/29/22  1509 12/30/22  0604 12/31/22  0528   WBC 10.3 8.5 8.3   RBC 2.53* 2.26* 2.58*   HGB 9.0* 8.3* 8.6*   HCT 26.7* 23.9* 27.8*    226 232   .5* 105.8* 107.8*   MCH 35.6* 36.7* 33.3*   MCHC 33.7 34.7 30.9*   RDW 22.6* 22.7* 23.2*   SEGSPCT 67.9* 48.0 51.1      BMP:  Recent Labs     12/29/22  1509 12/30/22  0604 12/31/22  0528   * 130* 133*   K 5.5* 5.0 4.9   CL 94* 93* 95*   CO2 25 23 23   BUN 59* 69* 43*   CREATININE 5.0* 5.5* 4.7*   CALCIUM 9.3 8.4 8.5   GLUCOSE 175* 106* 101*      ABG:  No results for input(s): PH, PO2ART, MFH4BCL, HCO3, BEART, O2SAT in the last 72 hours. Lab Results   Component Value Date    PROBNP 7,643 (H) 12/31/2022    PROBNP 8,736 (H) 12/29/2022    PROBNP 10,348 (H) 12/11/2022     No results found for: [facility]    Radiology Review:  Pertinent images / reports were reviewed as a part of this visit.     Assessment:     Patient Active Problem List   Diagnosis    Atrial fibrillation (HCC)    CAD (coronary artery disease)    Morbid obesity ([address]    COPD (chronic obstructive pulmonary disease) (HCC)    Sleep apnea    Type 2 diabetes mellitus (HCC)    Thyroid disease    Hyperlipidemia    Acute congestive heart failure (HCC)    Coronary artery disease involving native coronary artery of native heart with angina pectoris (HCC)    Chronic renal disease, stage III ([facility] [address] [147846]    Hypertension    CAD, multiple vessel    Dyspnea    Moderate malnutrition (HCC)    Healthcare-associated pneumonia    Severe malnutrition ([facility] [address]       Plan:   1. Overall the patient has slightly improved. 2. Add Mucomyst.  3. Check cultures.     Padmini Loya MD   12/31/2022  11:32 AM

## 2023-01-01 ENCOUNTER — APPOINTMENT (OUTPATIENT)
Dept: CT IMAGING | Age: 73
DRG: 193 | End: 2023-01-01
Payer: COMMERCIAL

## 2023-01-01 LAB
ANION GAP SERPL CALCULATED.3IONS-SCNC: 16 MMOL/L (ref 4–16)
APTT: 39.3 SECONDS (ref 25.1–37.1)
BASOPHILS ABSOLUTE: 0.1 K/CU MM
BASOPHILS RELATIVE PERCENT: 1.5 % (ref 0–1)
BUN BLDV-MCNC: 62 MG/DL (ref 6–23)
CALCIUM SERPL-MCNC: 8.7 MG/DL (ref 8.3–10.6)
CHLORIDE BLD-SCNC: 95 MMOL/L (ref 99–110)
CO2: 21 MMOL/L (ref 21–32)
CREAT SERPL-MCNC: 6.4 MG/DL (ref 0.9–1.3)
CULTURE: NORMAL
DIFFERENTIAL TYPE: ABNORMAL
EOSINOPHILS ABSOLUTE: 0.3 K/CU MM
EOSINOPHILS RELATIVE PERCENT: 4.3 % (ref 0–3)
GFR SERPL CREATININE-BSD FRML MDRD: 9 ML/MIN/1.73M2
GLUCOSE BLD-MCNC: 137 MG/DL (ref 70–99)
GLUCOSE BLD-MCNC: 137 MG/DL (ref 70–99)
GLUCOSE BLD-MCNC: 141 MG/DL (ref 70–99)
GLUCOSE BLD-MCNC: 164 MG/DL (ref 70–99)
GLUCOSE BLD-MCNC: 176 MG/DL (ref 70–99)
HCT VFR BLD CALC: 25 % (ref 42–52)
HEMOGLOBIN: 8.4 GM/DL (ref 13.5–18)
IMMATURE NEUTROPHIL %: 1.4 % (ref 0–0.43)
INR BLD: 2.97 INDEX
LYMPHOCYTES ABSOLUTE: 2.4 K/CU MM
LYMPHOCYTES RELATIVE PERCENT: 30.7 % (ref 24–44)
Lab: NORMAL
MAGNESIUM: 2.6 MG/DL (ref 1.8–2.4)
MCH RBC QN AUTO: 36.2 PG (ref 27–31)
MCHC RBC AUTO-ENTMCNC: 33.6 % (ref 32–36)
MCV RBC AUTO: 107.8 FL (ref 78–100)
MONOCYTES ABSOLUTE: 0.8 K/CU MM
MONOCYTES RELATIVE PERCENT: 10.7 % (ref 0–4)
NUCLEATED RBC %: 0.8 %
PDW BLD-RTO: 22.9 % (ref 11.7–14.9)
PHOSPHORUS: 4.4 MG/DL (ref 2.5–4.9)
PLATELET # BLD: 207 K/CU MM (ref 140–440)
PMV BLD AUTO: 8.7 FL (ref 7.5–11.1)
POTASSIUM SERPL-SCNC: 4.8 MMOL/L (ref 3.5–5.1)
PROTHROMBIN TIME: 38.8 SECONDS (ref 11.7–14.5)
RBC # BLD: 2.32 M/CU MM (ref 4.6–6.2)
SEGMENTED NEUTROPHILS ABSOLUTE COUNT: 4 K/CU MM
SEGMENTED NEUTROPHILS RELATIVE PERCENT: 51.4 % (ref 36–66)
SODIUM BLD-SCNC: 132 MMOL/L (ref 135–145)
SPECIMEN: NORMAL
TOTAL IMMATURE NEUTOROPHIL: 0.11 K/CU MM
TOTAL NUCLEATED RBC: 0.1 K/CU MM
WBC # BLD: 7.9 K/CU MM (ref 4–10.5)

## 2023-01-01 PROCEDURE — 6370000000 HC RX 637 (ALT 250 FOR IP): Performed by: HOSPITALIST

## 2023-01-01 PROCEDURE — 31720 CLEARANCE OF AIRWAYS: CPT

## 2023-01-01 PROCEDURE — 2700000000 HC OXYGEN THERAPY PER DAY

## 2023-01-01 PROCEDURE — 84100 ASSAY OF PHOSPHORUS: CPT

## 2023-01-01 PROCEDURE — 83735 ASSAY OF MAGNESIUM: CPT

## 2023-01-01 PROCEDURE — 85610 PROTHROMBIN TIME: CPT

## 2023-01-01 PROCEDURE — 80048 BASIC METABOLIC PNL TOTAL CA: CPT

## 2023-01-01 PROCEDURE — 6370000000 HC RX 637 (ALT 250 FOR IP): Performed by: STUDENT IN AN ORGANIZED HEALTH CARE EDUCATION/TRAINING PROGRAM

## 2023-01-01 PROCEDURE — 6360000002 HC RX W HCPCS: Performed by: HOSPITALIST

## 2023-01-01 PROCEDURE — 89220 SPUTUM SPECIMEN COLLECTION: CPT

## 2023-01-01 PROCEDURE — 71250 CT THORAX DX C-: CPT

## 2023-01-01 PROCEDURE — C9113 INJ PANTOPRAZOLE SODIUM, VIA: HCPCS | Performed by: HOSPITALIST

## 2023-01-01 PROCEDURE — 94640 AIRWAY INHALATION TREATMENT: CPT

## 2023-01-01 PROCEDURE — 36415 COLL VENOUS BLD VENIPUNCTURE: CPT

## 2023-01-01 PROCEDURE — 2580000003 HC RX 258: Performed by: HOSPITALIST

## 2023-01-01 PROCEDURE — 82962 GLUCOSE BLOOD TEST: CPT

## 2023-01-01 PROCEDURE — 2500000003 HC RX 250 WO HCPCS: Performed by: INTERNAL MEDICINE

## 2023-01-01 PROCEDURE — 1200000000 HC SEMI PRIVATE

## 2023-01-01 PROCEDURE — 85730 THROMBOPLASTIN TIME PARTIAL: CPT

## 2023-01-01 PROCEDURE — A4216 STERILE WATER/SALINE, 10 ML: HCPCS | Performed by: HOSPITALIST

## 2023-01-01 PROCEDURE — 6360000002 HC RX W HCPCS: Performed by: INTERNAL MEDICINE

## 2023-01-01 PROCEDURE — 85025 COMPLETE CBC W/AUTO DIFF WBC: CPT

## 2023-01-01 RX ORDER — IPRATROPIUM BROMIDE AND ALBUTEROL SULFATE 2.5; .5 MG/3ML; MG/3ML
1 SOLUTION RESPIRATORY (INHALATION) EVERY 4 HOURS PRN
Status: DISCONTINUED | OUTPATIENT
Start: 2023-01-01 | End: 2023-01-05 | Stop reason: HOSPADM

## 2023-01-01 RX ADMIN — SODIUM CHLORIDE, PRESERVATIVE FREE 10 ML: 5 INJECTION INTRAVENOUS at 08:30

## 2023-01-01 RX ADMIN — SEVELAMER CARBONATE 1600 MG: 800 TABLET, FILM COATED ORAL at 15:49

## 2023-01-01 RX ADMIN — METOPROLOL TARTRATE 12.5 MG: 25 TABLET, FILM COATED ORAL at 12:42

## 2023-01-01 RX ADMIN — MICONAZOLE NITRATE: 2 POWDER TOPICAL at 08:27

## 2023-01-01 RX ADMIN — MIDODRINE HYDROCHLORIDE 10 MG: 5 TABLET ORAL at 12:31

## 2023-01-01 RX ADMIN — ASPIRIN 81 MG CHEWABLE TABLET 81 MG: 81 TABLET CHEWABLE at 08:27

## 2023-01-01 RX ADMIN — GUAIFENESIN 300 MG: 100 SOLUTION ORAL at 18:49

## 2023-01-01 RX ADMIN — TRAZODONE HYDROCHLORIDE 100 MG: 50 TABLET ORAL at 21:51

## 2023-01-01 RX ADMIN — SODIUM CHLORIDE, PRESERVATIVE FREE 10 ML: 5 INJECTION INTRAVENOUS at 21:56

## 2023-01-01 RX ADMIN — Medication 9 MG: at 21:51

## 2023-01-01 RX ADMIN — POLYETHYLENE GLYCOL (3350) 17 G: 17 POWDER, FOR SOLUTION ORAL at 08:27

## 2023-01-01 RX ADMIN — MIDODRINE HYDROCHLORIDE 10 MG: 5 TABLET ORAL at 18:49

## 2023-01-01 RX ADMIN — ATORVASTATIN CALCIUM 40 MG: 40 TABLET, FILM COATED ORAL at 21:51

## 2023-01-01 RX ADMIN — MICONAZOLE NITRATE: 2 POWDER TOPICAL at 21:56

## 2023-01-01 RX ADMIN — QUETIAPINE FUMARATE 50 MG: 25 TABLET ORAL at 21:52

## 2023-01-01 RX ADMIN — GUAIFENESIN 300 MG: 100 SOLUTION ORAL at 05:30

## 2023-01-01 RX ADMIN — SEVELAMER CARBONATE 1600 MG: 800 TABLET, FILM COATED ORAL at 21:52

## 2023-01-01 RX ADMIN — CEFEPIME HYDROCHLORIDE 1000 MG: 1 INJECTION, POWDER, FOR SOLUTION INTRAMUSCULAR; INTRAVENOUS at 18:57

## 2023-01-01 RX ADMIN — LEVOTHYROXINE SODIUM 75 MCG: 0.07 TABLET ORAL at 05:30

## 2023-01-01 RX ADMIN — IPRATROPIUM BROMIDE AND ALBUTEROL SULFATE 3 ML: .5; 2.5 SOLUTION RESPIRATORY (INHALATION) at 20:12

## 2023-01-01 RX ADMIN — AMIODARONE HYDROCHLORIDE 200 MG: 200 TABLET ORAL at 08:28

## 2023-01-01 RX ADMIN — MIDODRINE HYDROCHLORIDE 10 MG: 5 TABLET ORAL at 08:27

## 2023-01-01 RX ADMIN — SEVELAMER CARBONATE 1600 MG: 800 TABLET, FILM COATED ORAL at 05:29

## 2023-01-01 RX ADMIN — GUAIFENESIN 300 MG: 100 SOLUTION ORAL at 12:31

## 2023-01-01 RX ADMIN — ACETYLCYSTEINE 400 MG: 100 SOLUTION ORAL; RESPIRATORY (INHALATION) at 08:37

## 2023-01-01 RX ADMIN — IPRATROPIUM BROMIDE AND ALBUTEROL SULFATE 3 ML: .5; 2.5 SOLUTION RESPIRATORY (INHALATION) at 08:37

## 2023-01-01 RX ADMIN — SENNOSIDES AND DOCUSATE SODIUM 2 TABLET: 50; 8.6 TABLET ORAL at 15:49

## 2023-01-01 RX ADMIN — TRAZODONE HYDROCHLORIDE 100 MG: 50 TABLET ORAL at 02:41

## 2023-01-01 RX ADMIN — SODIUM CHLORIDE, PRESERVATIVE FREE 40 MG: 5 INJECTION INTRAVENOUS at 08:27

## 2023-01-01 ASSESSMENT — PAIN SCALES - GENERAL
PAINLEVEL_OUTOF10: 0

## 2023-01-01 NOTE — PLAN OF CARE
Problem: Discharge Planning  Goal: Discharge to home or other facility with appropriate resources  1/1/2023 1505 by Pilar Aguila RN  Outcome: Progressing  Flowsheets (Taken 1/1/2023 0800)  Discharge to home or other facility with appropriate resources:   Identify barriers to discharge with patient and caregiver   Arrange for needed discharge resources and transportation as appropriate   Identify discharge learning needs (meds, wound care, etc)   Arrange for interpreters to assist at discharge as needed   Refer to discharge planning if patient needs post-hospital services based on physician order or complex needs related to functional status, cognitive ability or social support system  1/1/2023 0200 by Lillie Ballard RN  Outcome: Progressing     Problem: Skin/Tissue Integrity  Goal: Absence of new skin breakdown  Description: 1. Monitor for areas of redness and/or skin breakdown  2. Assess vascular access sites hourly  3. Every 4-6 hours minimum:  Change oxygen saturation probe site  4. Every 4-6 hours:  If on nasal continuous positive airway pressure, respiratory therapy assess nares and determine need for appliance change or resting period.   1/1/2023 1505 by Pilar Aguila RN  Outcome: Progressing  1/1/2023 0200 by Lillie Ballard RN  Outcome: Progressing     Problem: Safety - Adult  Goal: Free from fall injury  1/1/2023 1505 by Pilar Aguila RN  Outcome: Progressing  1/1/2023 0200 by Lillie Ballard RN  Outcome: Progressing     Problem: Pain  Goal: Verbalizes/displays adequate comfort level or baseline comfort level  1/1/2023 1505 by Pilar Aguila RN  Outcome: Progressing  1/1/2023 0200 by Lillie Ballard RN  Outcome: Progressing     Problem: Chronic Conditions and Co-morbidities  Goal: Patient's chronic conditions and co-morbidity symptoms are monitored and maintained or improved  1/1/2023 1505 by Pilar Aguila RN  Outcome: Progressing  Flowsheets (Taken 1/1/2023 0800)  Care Plan - Patient's Chronic Conditions and Co-Morbidity Symptoms are Monitored and Maintained or Improved:   Monitor and assess patient's chronic conditions and comorbid symptoms for stability, deterioration, or improvement   Collaborate with multidisciplinary team to address chronic and comorbid conditions and prevent exacerbation or deterioration   Update acute care plan with appropriate goals if chronic or comorbid symptoms are exacerbated and prevent overall improvement and discharge  1/1/2023 0200 by Danya Brown RN  Outcome: Progressing

## 2023-01-01 NOTE — PROGRESS NOTES
Nephrology Progress Note  1/1/2023 10:59 AM  Subjective: Interval History: Rigoberto Tavera is a 71 y.o. male still some congestion but improved today      Data:   Scheduled Meds:   metoprolol tartrate  12.5 mg Oral BID    acetylcysteine  4 mL Inhalation TID    warfarin placeholder: dosing by pharmacy   Other RX Placeholder    guaiFENesin  300 mg Per G Tube Q6H    miconazole   Topical BID    insulin lispro  0-4 Units SubCUTAneous 4 times per day    sodium zirconium cyclosilicate  10 g Oral Once    aspirin  81 mg Per NG tube Daily    pantoprazole (PROTONIX) 40 mg injection  40 mg IntraVENous Daily    amiodarone  200 mg PEG Tube Daily    atorvastatin  40 mg PEG Tube Nightly    Nephronex  5 mL Feeding Tube Daily    sevelamer  1,600 mg Per G Tube Q8H    QUEtiapine  50 mg Per G Tube Nightly    midodrine  10 mg Oral TID WC    levothyroxine  75 mcg Oral Daily    sodium chloride flush  5-40 mL IntraVENous 2 times per day    insulin glargine  10 Units SubCUTAneous Nightly    cefepime  1,000 mg IntraVENous Q24H    polyethylene glycol  17 g Oral Daily     Continuous Infusions:   sodium chloride 25 mL (12/29/22 2256)    dextrose           CBC   Recent Labs     12/30/22  0604 12/31/22  0528 01/01/23  0714   WBC 8.5 8.3 7.9   HGB 8.3* 8.6* 8.4*   HCT 23.9* 27.8* 25.0*    232 207      BMP   Recent Labs     12/30/22  0604 12/31/22  0528 01/01/23  0714   * 133* 132*   K 5.0 4.9 4.8   CL 93* 95* 95*   CO2 23 23 21   PHOS  --  3.9 4.4   BUN 69* 43* 62*   CREATININE 5.5* 4.7* 6.4*     Hepatic:   Recent Labs     12/29/22  1509 12/30/22  0604 12/31/22  0528   AST 20 16 22   ALT 14 11 14   BILITOT 0.4 0.4 0.4   ALKPHOS 140* 103 103     Troponin: No results for input(s): TROPONINI in the last 72 hours. BNP: No results for input(s): BNP in the last 72 hours. Lipids: No results for input(s): CHOL, HDL in the last 72 hours.     Invalid input(s): LDLCALCU  ABGs:   Lab Results   Component Value Date/Time    PO2ART 70.3 09/24/2022 11:49 AM    COL9FSH 35.5 09/24/2022 11:49 AM     INR:   Recent Labs     12/30/22  0604 12/31/22  0528 01/01/23  0714   INR 1.92 2.28 2.97     Renal Labs  Albumin:    Lab Results   Component Value Date/Time    LABALBU 3.5 12/31/2022 05:28 AM     Calcium:    Lab Results   Component Value Date/Time    CALCIUM 8.7 01/01/2023 07:14 AM     Phosphorus:    Lab Results   Component Value Date/Time    PHOS 4.4 01/01/2023 07:14 AM     U/A:    Lab Results   Component Value Date/Time    NITRU NEGATIVE 09/17/2022 06:21 PM    COLORU YELLOW 09/17/2022 06:21 PM    PHUR 6.5 06/30/2021 02:11 PM    WBCUA NONE SEEN 09/17/2022 06:21 PM    RBCUA NONE SEEN 09/17/2022 06:21 PM    MUCUS RARE 09/17/2022 06:21 PM    TRICHOMONAS NONE SEEN 09/17/2022 06:21 PM    BACTERIA NEGATIVE 09/17/2022 06:21 PM    CLARITYU CLEAR 09/17/2022 06:21 PM    SPECGRAV 1.015 09/17/2022 06:21 PM    UROBILINOGEN 4.0 09/17/2022 06:21 PM    BILIRUBINUR NEGATIVE 09/17/2022 06:21 PM    BILIRUBINUR small 06/30/2021 02:11 PM    BLOODU NEGATIVE 09/17/2022 06:21 PM    GLUCOSEU neg 06/30/2021 02:11 PM    KETUA TRACE 09/17/2022 06:21 PM     ABG:    Lab Results   Component Value Date/Time    WYI0YJC 35.5 09/24/2022 11:49 AM    PO2ART 70.3 09/24/2022 11:49 AM    OMC4EKK 21.2 09/24/2022 11:49 AM     HgBA1c:    Lab Results   Component Value Date/Time    LABA1C 7.0 12/30/2022 02:15 AM     Microalbumen/Creatinine ratio:  No components found for: RUCREAT  TSH:    Lab Results   Component Value Date/Time    TSH 1.66 04/03/2018 11:33 AM     IRON:    Lab Results   Component Value Date/Time    IRON 54 12/13/2022 06:50 AM     Iron Saturation:  No components found for: PERCENTFE  TIBC:    Lab Results   Component Value Date/Time    TIBC 217 12/13/2022 06:50 AM     FERRITIN:    Lab Results   Component Value Date/Time    FERRITIN 778 12/13/2022 06:50 AM     RPR:  No results found for: RPR  YAYA:  No results found for: ANATITER, YAYA  24 Hour Urine for Creatinine Clearance:  No components found for: CREAT4, UHRS10, UTV10      Objective:   I/O: 12/31 0701 - 01/01 0700  In: -   Out: 125 [Urine:125]  I/O last 3 completed shifts: In: 10 [I.V.:10]  Out: 125 [Urine:125]  No intake/output data recorded. Vitals: BP (!) 99/54   Pulse 82   Temp 97.7 °F (36.5 °C) (Oral)   Resp 13   Ht 6' 2\" (1.88 m)   Wt 258 lb (117 kg)   SpO2 96%   BMI 33.13 kg/m²  {  General appearance: awake weak  HEENT: Head: Normal, normocephalic, atraumatic.   Neck:  positive trach  Lungs: diminished breath sounds bilaterally  Heart: S1, S2 normal  Abdomen: abnormal findings:  soft nt positive PEG  Extremities: edema trace  Neurologic: Mental status: alertness: alert        Assessment and Plan:      IMP:   #1 end-stage renal disease on dialysis   #2 shortness of breath with trach and congestion   #3 hyponatremia with hyperkalemia   #4  low blood pressure    Plan     #1 doing dialysis in the morning maintain Monday Wednesday Friday schedule  #2 treat for congestion culture so far no positive results no fever maintain trach wound care  #3 electrolytes correct with dialysis  #4 blood pressure low stable  Supportive care no acute changes             Kia Anand MD, MD

## 2023-01-01 NOTE — PROGRESS NOTES
Pulmonary and Critical Care  Progress Note    Subjective: The patient is better. Shortness of breath has improved. 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, COPD (chronic obstructive pulmonary disease) ([facility] [address] Depression, Diabetes mellitus ([facility] [address] Dupuytren's contracture of hand, Family history of cardiovascular disease, GERD (gastroesophageal reflux disease), H/O cardiac catheterization, H/O cardiac catheterization, H/O cardiovascular stress test, H/O cardiovascular stress test, H/O cardiovascular stress test, H/O cardiovascular stress test, H/O Doppler ultrasound, H/O echocardiogram, H/O echocardiogram, H/O echocardiogram, H/O hiatal hernia, Hx of Venous Doppler, Hyperlipidemia, Hypertension, Obesity, S/P PTCA (percutaneous transluminal coronary angioplasty), Sleep apnea, and Thyroid disease. has a past surgical history that includes Coronary angioplasty with stent (03/21/2005); Cardiac catheterization (6/12/08); Cardiac catheterization (3/07); Cardiac catheterization (3/05); Endoscopy, colon, diagnostic (2014); Colonoscopy (2011); Colonoscopy (5/18/16); Hand surgery (Right, 1997); pr optx ankle dislocation w/repair/int/xtrnl fixj (Right, 6/3/2019); Sternum Debridement (N/A, 9/24/2022); and Coronary artery bypass graft (N/A, 9/22/2022). reports that he quit smoking about 47 years ago. His smoking use included cigarettes. He has a 10.00 pack-year smoking history. He has never used smokeless tobacco. He reports that he does not currently use alcohol after a past usage of about 6.0 standard drinks per week. He reports that he does not use drugs.   Family history:  family history includes Cancer in his mother; Coronary Art Dis in his father; Heart Disease in his father; No Known Problems in his brother; Rheum Arthritis in his sister, sister and another family member. No Known Allergies  Social History:    Reviewed; no changes    Objective:   PHYSICAL EXAM:        VITALS:  BP (!) 99/54   Pulse 82   Temp 97.7 °F (36.5 °C) (Oral)   Resp 13   Ht 6' 2\" (1.88 m)   Wt 258 lb (117 kg)   SpO2 96%   BMI 33.13 kg/m²     24HR INTAKE/OUTPUT:    Intake/Output Summary (Last 24 hours) at 1/1/2023 1143  Last data filed at 12/31/2022 1730  Gross per 24 hour   Intake --   Output 125 ml   Net -125 ml       CONSTITUTIONAL:  awake. LUNGS:  decreased breath sounds, occ rhonchii. CARDIOVASCULAR:  normal S1 and S2 and negative JVD  ABD:Abdomen soft, non-tender. BS normal. No masses,  No organomegaly  NEURO:Alert. DATA:    CBC:  Recent Labs     12/30/22  0604 12/31/22 0528 01/01/23  0714   WBC 8.5 8.3 7.9   RBC 2.26* 2.58* 2.32*   HGB 8.3* 8.6* 8.4*   HCT 23.9* 27.8* 25.0*    232 207   .8* 107.8* 107.8*   MCH 36.7* 33.3* 36.2*   MCHC 34.7 30.9* 33.6   RDW 22.7* 23.2* 22.9*   SEGSPCT 48.0 51.1 51.4      BMP:  Recent Labs     12/30/22  0604 12/31/22 0528 01/01/23  0714   * 133* 132*   K 5.0 4.9 4.8   CL 93* 95* 95*   CO2 23 23 21   BUN 69* 43* 62*   CREATININE 5.5* 4.7* 6.4*   CALCIUM 8.4 8.5 8.7   GLUCOSE 106* 101* 137*      ABG:  No results for input(s): PH, PO2ART, TXF2KME, HCO3, BEART, O2SAT in the last 72 hours. Lab Results   Component Value Date    PROBNP 7,643 (H) 12/31/2022    PROBNP 2,837 (H) 12/29/2022    PROBNP 10,348 (H) 12/11/2022     No results found for: [facility]    Radiology Review:  Pertinent images / reports were reviewed as a part of this visit.     Assessment:     Patient Active Problem List   Diagnosis    Atrial fibrillation (HCC)    CAD (coronary artery disease)    Morbid obesity ([address]    COPD (chronic obstructive pulmonary disease) ([facility] [address]    Sleep apnea    Type 2 diabetes mellitus ([facility] [address]    Thyroid disease    Hyperlipidemia    Acute congestive heart failure (HCC)    Coronary artery disease involving native coronary artery of native heart with angina pectoris (HCC)    Chronic renal disease, stage III ([facility] [address] [202396]    Hypertension    CAD, multiple vessel    Dyspnea    Moderate malnutrition (HCC)    Healthcare-associated pneumonia    Severe malnutrition ([facility] [address]       Plan:   1. Overall the patient has slightly improved. 2. Frequent suctioning. 3. Check cultures.     Caden Garnica MD   1/1/2023  11:43 AM

## 2023-01-01 NOTE — CONSULTS
PHARMACY ANTICOAGULATION MONITORING SERVICE    Ulises Foreman is a 71 y.o. male on warfarin therapy for Persistent A. Fib, H/O non-occlusive Rt. Fem DVT. Pharmacy consulted by Dr. Kami Salas for monitoring and adjustment of treatment. Indication for anticoagulation: Persistent A. Fib, H/O non-occlusive Rt. Fem DVT  INR goal: 2-3  Warfarin dose prior to admission: 2mg daily    Pertinent Laboratory Values   Recent Labs     12/30/22  0604 12/31/22  0528 01/01/23  0714   INR 1.92 2.28 2.97   HGB 8.3* 8.6* 8.4*   HCT 23.9* 27.8* 25.0*    232 207         Assessment/Plan:  Drug Interactions: home amiodarone, trazodone, aspirin, levothyroxine  Patient INR with significant increased from 2.28 on 12/31 to 2.97 on 1/1  Will hold warfarin this evening and continue to monitor   Pharmacy will continue to monitor and adjust warfarin therapy as indicated    Thank you for the consult.   Danny Su [facility], PharmD  1/1/2023 10:24 AM

## 2023-01-01 NOTE — PROGRESS NOTES
V2.0  [facility] [place]      Name:  Richard Conn /Age/Sex: 1950  (71 y.o. male)   MRN & CSN:  [id number] & [id number] Encounter Date/Time: 2023 10:55 PM EST    Location:  7470/2278-V PCP: Mehnaz Kay, APRN - [address] [facility] [place] Day: 4    Assessment and Plan:   Richard Conn is a 71 y.o. male with past medical history of coronary artery disease status post three-vessel CABG, permanent atrial fibrillation, hypertension, hyperlipidemia, COPD, tracheostomy and PEG tube status, who presents with progressively worsening shortness of breath on 2022. Acute on chronic respiratory failure   Sepsis present on admission secondary to HCAP  Tracheostomy dependence  On 4 L trach collar oxygen at nursing home, currently requiring 6 to 7 L  CT chest 2023, personally reviewed-concerning for right lower lobe consolidation with air bronchograms and minimal right-sided pleural effusion. Respiratory cultures negative  Blood cultures negative  COVID and rapid flu negative  Will discontinue IV vancomycin today given negative MRSA screen and continue cefepime to complete 7 days.   Pulmonology following, appreciate recommendations    End-stage renal disease  Hypermagnesemia  On hemodialysis   Started on HD in 2022 after three-vessel CABG  Nephrology has been consulted for inpatient management of HD    Permanent atrial fibrillation  Long-term anticoagulation  Status post maze in 2022  Continue amiodarone  Discontinued Coreg and initiated Lopressor because of low normal blood pressure trend  Continue Coumadin, pharmacy to dose    Coronary artery disease status post three-vessel CABG in 2022  Postprocedure complications include mediastinal infection requiring multiple washouts with IABP placement and transfer to [facility] for further management where he underwent washout and closure and removal of IABP  Tracheostomy dependence since CABG  Continue aspirin and statin    Hypothyroidism  Continue home regimen of Synthroid    Hypertension  Low normal blood pressure trend  Most recent echocardiogram with preserved ejection fraction  Discontinue Coreg and switch to Lopressor for heart rate control  Thyroid function panel and cortisol ordered for a.m. Continue midodrine    Chronic macrocytic anemia  Baseline creatinine around 9-10  Anemia panel including a peripheral smear ordered for a.m. No active source of bleeding identified  Monitor CBC    Insulin-dependent diabetes mellitus type 2  HbA1c 12/29/22-7%  Continue Lantus and sliding scale insulin  Currently on renal formula of tube feeds    History of nonocclusive DVT  Diagnosed in September 2022  Continue Coumadin    Moderate protein calorie malnutrition  Dietitian following    Obesity, BMI 33.13    Diet Diet NPO  ADULT TUBE FEEDING; PEG; Renal Formula; Continuous; 10; Yes; 15; Q 4 hours; 65; 30; Q 4 hours   DVT Prophylaxis [] Lovenox, []  Heparin, [] SCDs, [] Ambulation,  [] Eliquis, [] Xarelto  [x] Coumadin   Code Status Full Code   Disposition From: Nursing home  Expected Disposition: Nursing home  Estimated Date of Discharge: 2-3 days  Patient requires continued admission due to work-up for hypotension, labs for anemia, pulmonology clearance   Surrogate Decision Maker/ DEVI Miller     Subjective:     Chief Complaint: Shortness of Breath       Ethan Cohen is a 71 y.o. male who presents with progressively worsening shortness of breath. Patient was seen and evaluated at bedside earlier this morning. During my first attempt patient was on a bedpan and requested me to come back later. Upon revisiting patient stated that his breathing has improved, but reports multiple bouts of coughing. Alert oriented x3, low normal blood pressure trend. Objective:      Intake/Output Summary (Last 24 hours) at 1/1/2023 2313  Last data filed at 1/1/2023 1927  Gross per 24 hour   Intake 785 ml   Output -- Net 785 ml        Vitals:   Vitals:    01/01/23 2012   BP:    Pulse: 81   Resp: 20   Temp:    SpO2: 95%       Physical Exam:   Physical Exam  Vitals reviewed. Constitutional:       Appearance: Normal appearance. He is obese. He is ill-appearing. HENT:      Head: Normocephalic and atraumatic. Nose: Nose normal.      Mouth/Throat:      Mouth: Mucous membranes are dry. Pharynx: Oropharynx is clear. Comments: Tracheostomy site clean dry and intact  Eyes:      General: No scleral icterus. Conjunctiva/sclera: Conjunctivae normal.   Cardiovascular:      Rate and Rhythm: Normal rate. Rhythm irregular. Pulses: Normal pulses. Heart sounds: Murmur heard. Pulmonary:      Effort: Pulmonary effort is normal.      Breath sounds: Rales present. No wheezing or rhonchi. Abdominal:      General: Bowel sounds are normal. There is no distension. Palpations: Abdomen is soft. Tenderness: There is no abdominal tenderness. Comments: PEG tube in place with site clean dry and intact   Musculoskeletal:         General: No deformity. Cervical back: Neck supple. No rigidity. Right lower leg: Edema present. Left lower leg: Edema present. Skin:     Coloration: Skin is not jaundiced or pale. Comments: Stasis dermatitis changes   Neurological:      General: No focal deficit present. Mental Status: He is alert and oriented to person, place, and time. Mental status is at baseline.           Medications:   Medications:    metoprolol tartrate  12.5 mg Oral BID    acetylcysteine  4 mL Inhalation TID    warfarin placeholder: dosing by pharmacy   Other RX Placeholder    guaiFENesin  300 mg Per G Tube Q6H    miconazole   Topical BID    insulin lispro  0-4 Units SubCUTAneous 4 times per day    aspirin  81 mg Per NG tube Daily    pantoprazole (PROTONIX) 40 mg injection  40 mg IntraVENous Daily    amiodarone  200 mg PEG Tube Daily    atorvastatin  40 mg PEG Tube Nightly Nephronex  5 mL Feeding Tube Daily    sevelamer  1,600 mg Per G Tube Q8H    QUEtiapine  50 mg Per G Tube Nightly    midodrine  10 mg Oral TID WC    levothyroxine  75 mcg Oral Daily    sodium chloride flush  5-40 mL IntraVENous 2 times per day    insulin glargine  10 Units SubCUTAneous Nightly    cefepime  1,000 mg IntraVENous Q24H    polyethylene glycol  17 g Oral Daily      Infusions:    sodium chloride 25 mL (12/29/22 0106)    dextrose       PRN Meds: ipratropium-albuterol, 1 vial, Q4H PRN  albuterol sulfate HFA, 2 puff, Q6H PRN  melatonin, 9 mg, Nightly PRN  traZODone, 100 mg, Nightly PRN  sennosides-docusate sodium, 2 tablet, Daily PRN  sodium chloride flush, 5-40 mL, PRN  sodium chloride, , PRN  ondansetron, 4 mg, Q8H PRN   Or  ondansetron, 4 mg, Q6H PRN  polyethylene glycol, 17 g, Daily PRN  acetaminophen, 650 mg, Q6H PRN   Or  acetaminophen, 650 mg, Q6H PRN  glucose, 4 tablet, PRN  dextrose bolus, 125 mL, PRN   Or  dextrose bolus, 250 mL, PRN  glucagon (rDNA), 1 mg, PRN  dextrose, , Continuous PRN      Labs      Recent Results (from the past 24 hour(s))   POCT Glucose    Collection Time: 12/31/22 11:34 PM   Result Value Ref Range    POC Glucose 135 (H) 70 - 99 MG/DL   POCT Glucose    Collection Time: 01/01/23  5:41 AM   Result Value Ref Range    POC Glucose 141 (H) 70 - 99 MG/DL   CBC with Auto Differential    Collection Time: 01/01/23  7:14 AM   Result Value Ref Range    WBC 7.9 4.0 - 10.5 K/CU MM    RBC 2.32 (L) 4.6 - 6.2 M/CU MM    Hemoglobin 8.4 (L) 13.5 - 18.0 GM/DL    Hematocrit 25.0 (L) 42 - 52 %    .8 (H) 78 - 100 FL    MCH 36.2 (H) 27 - 31 PG    MCHC 33.6 32.0 - 36.0 %    RDW 22.9 (H) 11.7 - 14.9 %    Platelets 177 977 - 315 K/CU MM    MPV 8.7 7.5 - 11.1 FL    Differential Type AUTOMATED DIFFERENTIAL     Segs Relative 51.4 36 - 66 %    Lymphocytes % 30.7 24 - 44 %    Monocytes % 10.7 (H) 0 - 4 %    Eosinophils % 4.3 (H) 0 - 3 %    Basophils % 1.5 (H) 0 - 1 %    Segs Absolute 4.0 K/CU MM Lymphocytes Absolute 2.4 K/CU MM    Monocytes Absolute 0.8 K/CU MM    Eosinophils Absolute 0.3 K/CU MM    Basophils Absolute 0.1 K/CU MM    Nucleated RBC % 0.8 %    Total Nucleated RBC 0.1 K/CU MM    Total Immature Neutrophil 0.11 K/CU MM    Immature Neutrophil % 1.4 (H) 0 - 0.43 %   Basic Metabolic Panel    Collection Time: 01/01/23  7:14 AM   Result Value Ref Range    Sodium 132 (L) 135 - 145 MMOL/L    Potassium 4.8 3.5 - 5.1 MMOL/L    Chloride 95 (L) 99 - 110 mMol/L    CO2 21 21 - 32 MMOL/L    Anion Gap 16 4 - 16    BUN 62 (H) 6 - 23 MG/DL    Creatinine 6.4 (H) 0.9 - 1.3 MG/DL    Est, Glom Filt Rate 9 (L) >60 mL/min/1.73m2    Glucose 137 (H) 70 - 99 MG/DL    Calcium 8.7 8.3 - 10.6 MG/DL   Magnesium    Collection Time: 01/01/23  7:14 AM   Result Value Ref Range    Magnesium 2.6 (H) 1.8 - 2.4 mg/dl   Phosphorus    Collection Time: 01/01/23  7:14 AM   Result Value Ref Range    Phosphorus 4.4 2.5 - 4.9 MG/DL   Protime/INR & PTT    Collection Time: 01/01/23  7:14 AM   Result Value Ref Range    Protime 38.8 (H) 11.7 - 14.5 SECONDS    INR 2.97 INDEX    aPTT 39.3 (H) 25.1 - 37.1 SECONDS   POCT Glucose    Collection Time: 01/01/23 11:44 AM   Result Value Ref Range    POC Glucose 176 (H) 70 - 99 MG/DL   POCT Glucose    Collection Time: 01/01/23  6:47 PM   Result Value Ref Range    POC Glucose 137 (H) 70 - 99 MG/DL   POCT Glucose    Collection Time: 01/01/23  9:38 PM   Result Value Ref Range    POC Glucose 164 (H) 70 - 99 MG/DL        Imaging/Diagnostics Last 24 Hours   CT CHEST WO CONTRAST    Result Date: 1/1/2023  EXAMINATION: CT OF THE CHEST WITHOUT CONTRAST 1/1/2023 4:35 pm TECHNIQUE: CT of the chest was performed without the administration of intravenous contrast. Multiplanar reformatted images are provided for review. Automated exposure control, iterative reconstruction, and/or weight based adjustment of the mA/kV was utilized to reduce the radiation dose to as low as reasonably achievable.  COMPARISON: 09/16/2022, 04/18/2016 HISTORY: ORDERING SYSTEM PROVIDED HISTORY: shortness of breath TECHNOLOGIST PROVIDED HISTORY: Reason for exam:->shortness of breath Reason for Exam: shortness of breath FINDINGS: Mediastinum: Status post CABG. Mild cardiomegaly. No pericardial effusion. No lymphadenopathy. Tracheostomy tip is at the thoracic inlet. Lungs/pleura: Trace bilateral pleural effusions. Right centrilobular nodularity with complete opacification of the right lower lobe with minimal volume loss. Upper Abdomen: Unremarkable. Soft Tissues/Bones: No acute bone or soft tissue abnormality. Right infectious/inflammatory airways process with suspected right lower lobe pneumonia.        Electronically signed by Kamron Briseno MD on 1/1/2023 at 11:13 PM

## 2023-01-02 LAB
ANION GAP SERPL CALCULATED.3IONS-SCNC: 17 MMOL/L (ref 4–16)
APTT: 38.6 SECONDS (ref 25.1–37.1)
BUN BLDV-MCNC: 74 MG/DL (ref 6–23)
C-REACTIVE PROTEIN, HIGH SENSITIVITY: 17 MG/L
CALCIUM SERPL-MCNC: 8.7 MG/DL (ref 8.3–10.6)
CHLORIDE BLD-SCNC: 94 MMOL/L (ref 99–110)
CO2: 21 MMOL/L (ref 21–32)
CORTISOL, PLASMA: 16.93
CREAT SERPL-MCNC: 7.4 MG/DL (ref 0.9–1.3)
GFR SERPL CREATININE-BSD FRML MDRD: 7 ML/MIN/1.73M2
GLUCOSE BLD-MCNC: 107 MG/DL (ref 70–99)
GLUCOSE BLD-MCNC: 124 MG/DL (ref 70–99)
GLUCOSE BLD-MCNC: 127 MG/DL (ref 70–99)
GLUCOSE BLD-MCNC: 129 MG/DL (ref 70–99)
GLUCOSE BLD-MCNC: 171 MG/DL (ref 70–99)
GLUCOSE BLD-MCNC: 175 MG/DL (ref 70–99)
HBV SURFACE AB TITR SER: <3.5 {TITER}
HEPATITIS B SURFACE ANTIGEN: NON REACTIVE
INR BLD: 2.91 INDEX
MAGNESIUM: 2.9 MG/DL (ref 1.8–2.4)
PHOSPHORUS: 5.5 MG/DL (ref 2.5–4.9)
POTASSIUM SERPL-SCNC: 5.1 MMOL/L (ref 3.5–5.1)
PROTHROMBIN TIME: 38 SECONDS (ref 11.7–14.5)
RETICULOCYTE COUNT PCT: 3.2 % (ref 0.2–2.2)
SODIUM BLD-SCNC: 132 MMOL/L (ref 135–145)
T4 FREE: 0.8 NG/DL (ref 0.9–1.8)
TSH HIGH SENSITIVITY: 11.28 UIU/ML (ref 0.27–4.2)

## 2023-01-02 PROCEDURE — 6360000002 HC RX W HCPCS: Performed by: HOSPITALIST

## 2023-01-02 PROCEDURE — 82533 TOTAL CORTISOL: CPT

## 2023-01-02 PROCEDURE — 84100 ASSAY OF PHOSPHORUS: CPT

## 2023-01-02 PROCEDURE — 94761 N-INVAS EAR/PLS OXIMETRY MLT: CPT

## 2023-01-02 PROCEDURE — 6370000000 HC RX 637 (ALT 250 FOR IP): Performed by: HOSPITALIST

## 2023-01-02 PROCEDURE — 80048 BASIC METABOLIC PNL TOTAL CA: CPT

## 2023-01-02 PROCEDURE — 84439 ASSAY OF FREE THYROXINE: CPT

## 2023-01-02 PROCEDURE — 2700000000 HC OXYGEN THERAPY PER DAY

## 2023-01-02 PROCEDURE — 85730 THROMBOPLASTIN TIME PARTIAL: CPT

## 2023-01-02 PROCEDURE — 94640 AIRWAY INHALATION TREATMENT: CPT

## 2023-01-02 PROCEDURE — 89220 SPUTUM SPECIMEN COLLECTION: CPT

## 2023-01-02 PROCEDURE — 86140 C-REACTIVE PROTEIN: CPT

## 2023-01-02 PROCEDURE — 6370000000 HC RX 637 (ALT 250 FOR IP): Performed by: STUDENT IN AN ORGANIZED HEALTH CARE EDUCATION/TRAINING PROGRAM

## 2023-01-02 PROCEDURE — 86706 HEP B SURFACE ANTIBODY: CPT

## 2023-01-02 PROCEDURE — 83735 ASSAY OF MAGNESIUM: CPT

## 2023-01-02 PROCEDURE — C9113 INJ PANTOPRAZOLE SODIUM, VIA: HCPCS | Performed by: HOSPITALIST

## 2023-01-02 PROCEDURE — 90935 HEMODIALYSIS ONE EVALUATION: CPT

## 2023-01-02 PROCEDURE — 1200000000 HC SEMI PRIVATE

## 2023-01-02 PROCEDURE — 2580000003 HC RX 258: Performed by: HOSPITALIST

## 2023-01-02 PROCEDURE — A4216 STERILE WATER/SALINE, 10 ML: HCPCS | Performed by: HOSPITALIST

## 2023-01-02 PROCEDURE — 36415 COLL VENOUS BLD VENIPUNCTURE: CPT

## 2023-01-02 PROCEDURE — 2500000003 HC RX 250 WO HCPCS: Performed by: INTERNAL MEDICINE

## 2023-01-02 PROCEDURE — 6360000002 HC RX W HCPCS: Performed by: INTERNAL MEDICINE

## 2023-01-02 PROCEDURE — 82962 GLUCOSE BLOOD TEST: CPT

## 2023-01-02 PROCEDURE — 87340 HEPATITIS B SURFACE AG IA: CPT

## 2023-01-02 PROCEDURE — 94667 MNPJ CHEST WALL 1ST: CPT

## 2023-01-02 PROCEDURE — 84443 ASSAY THYROID STIM HORMONE: CPT

## 2023-01-02 PROCEDURE — 85045 AUTOMATED RETICULOCYTE COUNT: CPT

## 2023-01-02 PROCEDURE — 85610 PROTHROMBIN TIME: CPT

## 2023-01-02 PROCEDURE — 6370000000 HC RX 637 (ALT 250 FOR IP): Performed by: INTERNAL MEDICINE

## 2023-01-02 PROCEDURE — 86704 HEP B CORE ANTIBODY TOTAL: CPT

## 2023-01-02 RX ORDER — LEVOTHYROXINE SODIUM 0.1 MG/1
100 TABLET ORAL DAILY
Status: DISCONTINUED | OUTPATIENT
Start: 2023-01-03 | End: 2023-01-05 | Stop reason: HOSPADM

## 2023-01-02 RX ORDER — WARFARIN SODIUM 1 MG/1
0.5 TABLET ORAL
Status: COMPLETED | OUTPATIENT
Start: 2023-01-02 | End: 2023-01-02

## 2023-01-02 RX ADMIN — ACETYLCYSTEINE 400 MG: 100 SOLUTION ORAL; RESPIRATORY (INHALATION) at 20:30

## 2023-01-02 RX ADMIN — MIDODRINE HYDROCHLORIDE 10 MG: 5 TABLET ORAL at 18:27

## 2023-01-02 RX ADMIN — GUAIFENESIN 300 MG: 100 SOLUTION ORAL at 00:48

## 2023-01-02 RX ADMIN — MICONAZOLE NITRATE: 2 POWDER TOPICAL at 20:23

## 2023-01-02 RX ADMIN — AMIODARONE HYDROCHLORIDE 200 MG: 200 TABLET ORAL at 14:43

## 2023-01-02 RX ADMIN — ATORVASTATIN CALCIUM 40 MG: 40 TABLET, FILM COATED ORAL at 20:22

## 2023-01-02 RX ADMIN — GUAIFENESIN 300 MG: 100 SOLUTION ORAL at 06:07

## 2023-01-02 RX ADMIN — QUETIAPINE FUMARATE 50 MG: 25 TABLET ORAL at 20:21

## 2023-01-02 RX ADMIN — SODIUM CHLORIDE: 9 INJECTION, SOLUTION INTRAVENOUS at 18:29

## 2023-01-02 RX ADMIN — SEVELAMER CARBONATE 1600 MG: 800 TABLET, FILM COATED ORAL at 06:06

## 2023-01-02 RX ADMIN — WARFARIN SODIUM 0.5 MG: 1 TABLET ORAL at 18:27

## 2023-01-02 RX ADMIN — SEVELAMER CARBONATE 1600 MG: 800 TABLET, FILM COATED ORAL at 20:21

## 2023-01-02 RX ADMIN — GUAIFENESIN 300 MG: 100 SOLUTION ORAL at 14:42

## 2023-01-02 RX ADMIN — CEFEPIME HYDROCHLORIDE 1000 MG: 1 INJECTION, POWDER, FOR SOLUTION INTRAMUSCULAR; INTRAVENOUS at 18:32

## 2023-01-02 RX ADMIN — SODIUM CHLORIDE, PRESERVATIVE FREE 40 MG: 5 INJECTION INTRAVENOUS at 08:39

## 2023-01-02 RX ADMIN — ACETYLCYSTEINE 400 MG: 100 SOLUTION ORAL; RESPIRATORY (INHALATION) at 08:43

## 2023-01-02 RX ADMIN — SODIUM CHLORIDE, PRESERVATIVE FREE 10 ML: 5 INJECTION INTRAVENOUS at 08:39

## 2023-01-02 RX ADMIN — ACETYLCYSTEINE 400 MG: 100 SOLUTION ORAL; RESPIRATORY (INHALATION) at 16:30

## 2023-01-02 RX ADMIN — IPRATROPIUM BROMIDE AND ALBUTEROL SULFATE 3 ML: .5; 2.5 SOLUTION RESPIRATORY (INHALATION) at 20:25

## 2023-01-02 RX ADMIN — IPRATROPIUM BROMIDE AND ALBUTEROL SULFATE 3 ML: .5; 2.5 SOLUTION RESPIRATORY (INHALATION) at 16:30

## 2023-01-02 RX ADMIN — LEVOTHYROXINE SODIUM 75 MCG: 0.07 TABLET ORAL at 06:07

## 2023-01-02 RX ADMIN — SEVELAMER CARBONATE 1600 MG: 800 TABLET, FILM COATED ORAL at 14:43

## 2023-01-02 RX ADMIN — MIDODRINE HYDROCHLORIDE 10 MG: 5 TABLET ORAL at 14:43

## 2023-01-02 RX ADMIN — ASPIRIN 81 MG CHEWABLE TABLET 81 MG: 81 TABLET CHEWABLE at 14:42

## 2023-01-02 RX ADMIN — MIDODRINE HYDROCHLORIDE 10 MG: 5 TABLET ORAL at 08:40

## 2023-01-02 RX ADMIN — SODIUM CHLORIDE, PRESERVATIVE FREE 10 ML: 5 INJECTION INTRAVENOUS at 20:24

## 2023-01-02 RX ADMIN — MICONAZOLE NITRATE: 2 POWDER TOPICAL at 14:52

## 2023-01-02 RX ADMIN — IPRATROPIUM BROMIDE AND ALBUTEROL SULFATE 3 ML: .5; 2.5 SOLUTION RESPIRATORY (INHALATION) at 08:43

## 2023-01-02 RX ADMIN — POLYETHYLENE GLYCOL (3350) 17 G: 17 POWDER, FOR SOLUTION ORAL at 14:42

## 2023-01-02 RX ADMIN — EPOETIN ALFA-EPBX 8000 UNITS: 4000 INJECTION, SOLUTION INTRAVENOUS; SUBCUTANEOUS at 11:21

## 2023-01-02 RX ADMIN — METOPROLOL TARTRATE 12.5 MG: 25 TABLET, FILM COATED ORAL at 14:43

## 2023-01-02 ASSESSMENT — ENCOUNTER SYMPTOMS
ABDOMINAL PAIN: 0
COUGH: 0
SORE THROAT: 0
SHORTNESS OF BREATH: 1
WHEEZING: 0
DIARRHEA: 0
SINUS PAIN: 0
CONSTIPATION: 0
CHEST TIGHTNESS: 0
BACK PAIN: 0
COLOR CHANGE: 0
NAUSEA: 0
SINUS PRESSURE: 0
TROUBLE SWALLOWING: 0
VOICE CHANGE: 0
VOMITING: 0

## 2023-01-02 ASSESSMENT — PAIN DESCRIPTION - LOCATION: LOCATION: BACK

## 2023-01-02 ASSESSMENT — PAIN DESCRIPTION - DESCRIPTORS: DESCRIPTORS: ACHING

## 2023-01-02 ASSESSMENT — PAIN SCALES - GENERAL: PAINLEVEL_OUTOF10: 6

## 2023-01-02 ASSESSMENT — PAIN DESCRIPTION - ORIENTATION: ORIENTATION: MID;LOWER

## 2023-01-02 NOTE — PROGRESS NOTES
Patient seen and examined on dialysis tolerating well try to help with congestion with fluid removal.  Also noted hypothyroid and adjust the dose of Synthroid recommended  When otherwise stable and congestion improved can return back to skilled nursing for outpatient dialysis there full note to follow.

## 2023-01-02 NOTE — PROGRESS NOTES
Removed 2 L. Patient Name: Rigoberto Tavera  Patient : 1950  MRN: [id number]     Acct: [de-identified]  Date of Admission: 2022  Room/Bed: -A  Code Status:  Full Code  Allergies: No Known Allergies  Diagnosis:    Patient Active Problem List   Diagnosis    Atrial fibrillation ([facility]    CAD (coronary artery disease)    Morbid obesity ([facility]    COPD (chronic obstructive pulmonary disease) ([facility]    Sleep apnea    Type 2 diabetes mellitus ([facility]    Thyroid disease    Hyperlipidemia    Acute congestive heart failure ([facility]    Coronary artery disease involving native coronary artery of native heart with angina pectoris ([facility]    Chronic renal disease, stage III ([facility] [[id number]]    Hypertension    CAD, multiple vessel    Dyspnea    Moderate malnutrition ([facility]    Healthcare-associated pneumonia    Severe malnutrition ([facility]         Treatment:  Hemodilaysis 2:1  Priority: Routine  Location: Acute Room    Diabetic: Yes  NPO: No  Isolation Precautions: None     Consent for Treatment Verified: Yes  Blood Consent Verified: Not Applicable     Safety Verified: Identify (I), Consent (C), Equipment (E), HepB Status (B), Orders Complete (O), Access Verified (A), and Timeliness (T)  Time out performed prior to access at 0920 hours. Report Received from Primary RN at 0730 hours. Primary RN (First Initial, Last Name, Title): D. Milderd Kanner RN  Incapacitated Nurse Education Completed: Not Applicable     HBsAg ONLY:  Date Drawn: 2022       Results: Negative  HBsAb:  Date Drawn:  2022       Results: Immune >10    Order  Dialysis Bath  K+ (Potassium): 2  Ca+ (Calcium): 2.5  Na+ (Sodium): 138  HCO3 (Bicarb): 35  Bicarbonate Concentrate Lot No.: [id number]  Acid Concentrate Lot No.: [id number]     Na+ Modeling: Not Applicable  Dialyzer: [id number]  Dialysate Temperature (C):  35  Blood Flow Rate (BFR):  300   Dialysate Flow Rate (DFR):   600        Access to be Utilized   Access:  Tunneled Catheter  Location: Internal Jugular  Side: Right   Needle gauge:  Not Applicable  + Bruit/Thrill: Not Applicable    First Use X-ray Verified: Not Applicable  OK to use line order: Not Applicable    Site Assessment:  Signs and Symptoms of Infection/Inflammation: None  If yes: Not Applicable  Dressing: Dry and Intact  Site Prep: Medical Aseptic Technique  Dressing Changed this Treatment: No  If yes, by whom: NA - not changed today  Date of Last Dressing Change: December 29, 2022  Antimicrobial Patch in place?: Yes  Red Alcohol Caps in place?: Yes  Gauze Dressing?: No  Non Dialysis Use?: No  Comment:    Flows: Good, Patent  If access problem, who was notified:     Pre and Post-Assessment  Patient Vitals for the past 8 hrs:   Level of Consciousness Oriented X Heart Rhythm Respiratory Quality/Effort O2 Device Bilateral Breath Sounds Skin Condition/Temp Abdomen Inspection Bowel Sounds (All Quadrants) Edema RLE Edema LLE Edema   01/02/23 0845 0 -- -- Unlabored -- -- Dry;Warm Obese -- -- Trace Trace   01/02/23 0847 -- -- -- -- Trach collar -- -- -- -- -- -- --   01/02/23 0909 -- -- -- -- Trach collar -- -- -- -- -- -- --   01/02/23 0915 0 4 Regular Unlabored Trach collar Rhonchi Dry;Warm Obese Active None -- --   01/02/23 1245 0 4 Regular -- Trach collar Rhonchi Dry;Warm Obese Active None Trace --     Labs  Recent Labs     12/31/22  0528 01/01/23  0714   WBC 8.3 7.9   HGB 8.6* 8.4*   HCT 27.8* 25.0*    207                                                                  Recent Labs     12/31/22  0528 01/01/23  0714 01/02/23  0525   * 132* 132*   K 4.9 4.8 5.1   CL 95* 95* 94*   CO2 23 21 21   BUN 43* 62* 74*   CREATININE 4.7* 6.4* 7.4*   GLUCOSE 101* 137* 107*     IV Drips and Rate/Dose   sodium chloride 25 mL (12/29/22 2256)    dextrose        Safety - Before each treatment:   Dialysis Machine No.: [id number]   Machine Number: [id number]  Dialyzer Lot No.: [id number]  RO Machine Log Sheet Completed: Yes  Machine Alarm Self Test: Completed; Passed (01/02/23 0915)     Air Foam Detector: Tested, Proper Function, pH Reading  Extracorporeal Circuit Tested for Integrity: Yes  Machine Conductivity: 13.8  Manual Conductivity: 13.8     Bicarbonate Concentrate Lot No.: [id number]  Acid Concentrate Lot No.: [id number]  Manual Ph: 7.4  Bleach Test (Neg): Yes  Bath Temperature: 95 °F (35 °C)  Tubing Lot#: [id number]  Conductivity Meter Serial #: [id number]  All Connections Secure?: Yes  Venous Parameters Set?: Yes  Arterial Parameters Set?: Yes  Saline Line Double Clamped?: Yes  Air Foam Detector Engaged?: Yes  Machine Functioning Alarm Free?  Yes  Prime Given: 200ml    Chlorine Testing - Before each treatment and every 4 hours:   Treatment  Time On: 0930  Time Off: 1235  Treatment Goal: 3  Weight: 258 lb (117 kg) (12/29/22 1927)  1st check: less than 0.1 ppm at: 0700 hours  2nd check: less than 0.1 ppm at: 1020 hours  3rd check: Not Applicable  (if greater than 0.1 ppm, then check every 30 minutes from secondary)    Access Flows and Pressures  Patient Vitals for the past 8 hrs:   Blood Flow Rate (ml/min) Ultrafiltration Rate (ml/hr) Arterial Pressure (mmHg) Venous Pressure (mmHg) TMP DFR Comments Access Visible   01/02/23 0930 300 ml/min 830 ml/hr -60 mmHg 90 70 600 tx initiated Yes   01/02/23 0945 300 ml/min 830 ml/hr -80 mmHg 100 80 600 no distress Yes   01/02/23 1000 300 ml/min 830 ml/hr -80 mmHg 100 80 600 denies needs Yes   01/02/23 1015 300 ml/min 830 ml/hr -80 mmHg 100 80 600 MD bedside visit Yes   01/02/23 1030 300 ml/min 830 ml/hr -80 mmHg 100 80 600 resting quietly Yes   01/02/23 1045 300 ml/min 830 ml/hr -90 mmHg 100 80 600 resting Yes   01/02/23 1100 300 ml/min 830 ml/hr -90 mmHg 100 80 600 no distress Yes   01/02/23 1115 300 ml/min 830 ml/hr -90 mmHg 100 80 600 no change Yes   01/02/23 1130 300 ml/min 830 ml/hr -90 mmHg 110 80 600 restign queitly Yes   01/02/23 1145 300 ml/min 830 ml/hr -90 mmHg 110 80 600 no changes Yes   01/02/23 1200 300 ml/min 830 ml/hr -80 mmHg 110 80 600 denies needs Yes   01/02/23 1215 300 ml/min 830 ml/hr -80 mmHg 110 80 600 no complaints Yes   01/02/23 1235 -- -- -- -- -- -- tx ended Yes     Vital Signs  Patient Vitals for the past 8 hrs:   BP Temp Pulse Resp SpO2   01/02/23 0609 -- -- 74 19 --   01/02/23 0847 -- -- 85 17 98 %   01/02/23 0848 -- -- 88 23 97 %   01/02/23 0909 (!) 90/52 98 °F (36.7 °C) 86 18 98 %   01/02/23 0915 (!) 106/57 98 °F (36.7 °C) 83 20 98 %   01/02/23 0930 110/61 -- 83 19 99 %   01/02/23 0945 (!) 107/52 -- 79 19 99 %   01/02/23 1000 (!) 96/38 -- 77 17 94 %   01/02/23 1015 (!) 97/50 -- 84 18 98 %   01/02/23 1030 (!) 98/40 -- 91 17 96 %   01/02/23 1045 (!) 95/53 -- 78 23 97 %   01/02/23 1100 (!) 94/46 -- 84 19 97 %   01/02/23 1115 (!) 96/43 -- 86 19 96 %   01/02/23 1130 (!) 98/50 -- 86 18 98 %   01/02/23 1145 (!) 94/41 -- 87 14 98 %   01/02/23 1200 (!) 99/55 -- 91 17 97 %   01/02/23 1215 (!) 97/46 -- 100 19 98 %   01/02/23 1235 (!) 81/47 -- 93 21 --   01/02/23 1245 (!) 92/52 97.9 °F (36.6 °C) 100 22 99 %     Post-Dialysis  Arterial Catheter Locking Solution:  NS  Venous Catheter Locking Solution:  NS  Post-Treatment Procedures: Blood returned, Catheter Capped, clamped with Saline x2 ports  Machine Disinfection Process: Acid/Vinegar Clean, Heat Disinfect, Exterior Machine Disinfection  Rinseback Volume (ml): 300 ml  Blood Volume Processed (Liters): 53.5 l/min  Dialyzer Clearance: Lightly streaked  Duration of Treatment (minutes): 180 minutes     Hemodialysis Intake (ml): 500 ml  Hemodialysis Output (ml): 2500 ml     Tolerated Treatment: Good  Patient Response to Treatment: well          Provider Notification        Handoff complete and report given to Primary RN at 1250 hours.   Primary RN (First Initial, Last Name, Title):  Laquita Foster     Education  Person Educated: Patient   Knowledge Base: Substantial  Barriers to Learning?: None  Preferred method of Learning: Oral  Topic(s): Access Care, Signs and Symptoms of Infection, and Procedural   Teaching Tools: Explanation   Response to Education: Verbalized Understanding     Electronically signed by Zen Almanza RN on 1/2/2023 at 12:50 PM

## 2023-01-02 NOTE — PROGRESS NOTES
Pulmonary and Critical Care  Progress Note    Subjective: The patient is better. Having dialysis. CT chest reviewed. Shortness of breath has improved. 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 [address] Depression, Diabetes mellitus ([facility] [address] Dupuytren's contracture of hand, Family history of cardiovascular disease, GERD (gastroesophageal reflux disease), H/O cardiac catheterization, H/O cardiac catheterization, H/O cardiovascular stress test, H/O cardiovascular stress test, H/O cardiovascular stress test, H/O cardiovascular stress test, H/O Doppler ultrasound, H/O echocardiogram, H/O echocardiogram, H/O echocardiogram, H/O hiatal hernia, Hx of Venous Doppler, Hyperlipidemia, Hypertension, Obesity, S/P PTCA (percutaneous transluminal coronary angioplasty), Sleep apnea, and Thyroid disease. has a past surgical history that includes Coronary angioplasty with stent (03/21/2005); Cardiac catheterization (6/12/08); Cardiac catheterization (3/07); Cardiac catheterization (3/05); Endoscopy, colon, diagnostic (2014); Colonoscopy (2011); Colonoscopy (5/18/16); Hand surgery (Right, 1997); pr optx ankle dislocation w/repair/int/xtrnl fixj (Right, 6/3/2019); Sternum Debridement (N/A, 9/24/2022); and Coronary artery bypass graft (N/A, 9/22/2022). reports that he quit smoking about 47 years ago. His smoking use included cigarettes. He has a 10.00 pack-year smoking history. He has never used smokeless tobacco. He reports that he does not currently use alcohol after a past usage of about 6.0 standard drinks per week. He reports that he does not use drugs.   Family history:  family history includes Cancer in his mother; Coronary Art Dis in his father; Heart Disease in his father; No Known Problems in his brother; Rheum Arthritis in his sister, sister and another family member. No Known Allergies  Social History:    Reviewed; no changes    Objective:   PHYSICAL EXAM:        VITALS:  BP (!) 94/46   Pulse 84   Temp 98 °F (36.7 °C)   Resp 19   Ht 6' 2\" (1.88 m)   Wt 258 lb (117 kg)   SpO2 97%   BMI 33.13 kg/m²     24HR INTAKE/OUTPUT:    Intake/Output Summary (Last 24 hours) at 1/2/2023 1108  Last data filed at 1/2/2023 0845  Gross per 24 hour   Intake 2043 ml   Output --   Net 2043 ml       CONSTITUTIONAL:  awake. LUNGS:  decreased breath sounds R base, basilar crackles. CARDIOVASCULAR:  normal S1 and S2 and negative JVD  ABD:Abdomen soft, non-tender. BS normal. No masses,  No organomegaly  NEURO:Alert. DATA:    CBC:  Recent Labs     12/31/22  0528 01/01/23  0714   WBC 8.3 7.9   RBC 2.58* 2.32*   HGB 8.6* 8.4*   HCT 27.8* 25.0*    207   .8* 107.8*   MCH 33.3* 36.2*   MCHC 30.9* 33.6   RDW 23.2* 22.9*   SEGSPCT 51.1 51.4      BMP:  Recent Labs     12/31/22  0528 01/01/23  0714 01/02/23  0525   * 132* 132*   K 4.9 4.8 5.1   CL 95* 95* 94*   CO2 23 21 21   BUN 43* 62* 74*   CREATININE 4.7* 6.4* 7.4*   CALCIUM 8.5 8.7 8.7   GLUCOSE 101* 137* 107*      ABG:  No results for input(s): PH, PO2ART, SMK9WHZ, HCO3, BEART, O2SAT in the last 72 hours. Lab Results   Component Value Date    PROBNP 7,643 (H) 12/31/2022    PROBNP 9,463 (H) 12/29/2022    PROBNP 10,348 (H) 12/11/2022     No results found for: [facility]    Radiology Review:  Pertinent images / reports were reviewed as a part of this visit.     Assessment:     Patient Active Problem List   Diagnosis    Atrial fibrillation ([facility] [address]    CAD (coronary artery disease)    Morbid obesity ([address]    COPD (chronic obstructive pulmonary disease) ([address]    Sleep apnea    Type 2 diabetes mellitus ([address]    Thyroid disease Hyperlipidemia    Acute congestive heart failure (HCC)    Coronary artery disease involving native coronary artery of native heart with angina pectoris (HCC)    Chronic renal disease, stage III ([facility] [address] [134464]    Hypertension    CAD, multiple vessel    Dyspnea    Moderate malnutrition (HCC)    Healthcare-associated pneumonia    Severe malnutrition ([facility]       Plan:   1. Overall the patient is better. 2. Frequent suctioning. 3. Start Acapella therapy.     Silvina May MD   1/2/2023  11:08 AM

## 2023-01-02 NOTE — PROGRESS NOTES
Nephrology Progress Note  1/2/2023 2:44 PM  Subjective: Interval History: Jean Bianchi is a 71 y.o. male  still some congestion but overall improving with dialysis. For the low thyroid    Data:   Scheduled Meds:   [START ON 1/3/2023] levothyroxine  100 mcg Oral Daily    warfarin  0.5 mg PEG Tube Once    metoprolol tartrate  12.5 mg Oral BID    acetylcysteine  4 mL Inhalation TID    warfarin placeholder: dosing by pharmacy   Other RX Placeholder    guaiFENesin  300 mg Per G Tube Q6H    miconazole   Topical BID    insulin lispro  0-4 Units SubCUTAneous 4 times per day    aspirin  81 mg Per NG tube Daily    pantoprazole (PROTONIX) 40 mg injection  40 mg IntraVENous Daily    amiodarone  200 mg PEG Tube Daily    atorvastatin  40 mg PEG Tube Nightly    Nephronex  5 mL Feeding Tube Daily    sevelamer  1,600 mg Per G Tube Q8H    QUEtiapine  50 mg Per G Tube Nightly    midodrine  10 mg Oral TID WC    sodium chloride flush  5-40 mL IntraVENous 2 times per day    insulin glargine  10 Units SubCUTAneous Nightly    cefepime  1,000 mg IntraVENous Q24H    polyethylene glycol  17 g Oral Daily     Continuous Infusions:   sodium chloride 25 mL (12/29/22 2256)    dextrose           CBC   Recent Labs     12/31/22  0528 01/01/23  0714   WBC 8.3 7.9   HGB 8.6* 8.4*   HCT 27.8* 25.0*    207      BMP   Recent Labs     12/31/22  0528 01/01/23  0714 01/02/23  0525   * 132* 132*   K 4.9 4.8 5.1   CL 95* 95* 94*   CO2 23 21 21   PHOS 3.9 4.4 5.5*   BUN 43* 62* 74*   CREATININE 4.7* 6.4* 7.4*     Hepatic:   Recent Labs     12/31/22  0528   AST 22   ALT 14   BILITOT 0.4   ALKPHOS 103     Troponin: No results for input(s): TROPONINI in the last 72 hours. BNP: No results for input(s): BNP in the last 72 hours. Lipids: No results for input(s): CHOL, HDL in the last 72 hours.     Invalid input(s): LDLCALCU  ABGs:   Lab Results   Component Value Date/Time    PO2ART 70.3 09/24/2022 11:49 AM    AWO5PMI 35.5 09/24/2022 11:49 AM INR:   Recent Labs     12/31/22  0528 01/01/23  0714 01/02/23  0525   INR 2.28 2.97 2.91     Renal Labs  Albumin:    Lab Results   Component Value Date/Time    LABALBU 3.5 12/31/2022 05:28 AM     Calcium:    Lab Results   Component Value Date/Time    CALCIUM 8.7 01/02/2023 05:25 AM     Phosphorus:    Lab Results   Component Value Date/Time    PHOS 5.5 01/02/2023 05:25 AM     U/A:    Lab Results   Component Value Date/Time    NITRU NEGATIVE 09/17/2022 06:21 PM    COLORU YELLOW 09/17/2022 06:21 PM    PHUR 6.5 06/30/2021 02:11 PM    WBCUA NONE SEEN 09/17/2022 06:21 PM    RBCUA NONE SEEN 09/17/2022 06:21 PM    MUCUS RARE 09/17/2022 06:21 PM    TRICHOMONAS NONE SEEN 09/17/2022 06:21 PM    BACTERIA NEGATIVE 09/17/2022 06:21 PM    CLARITYU CLEAR 09/17/2022 06:21 PM    SPECGRAV 1.015 09/17/2022 06:21 PM    UROBILINOGEN 4.0 09/17/2022 06:21 PM    BILIRUBINUR NEGATIVE 09/17/2022 06:21 PM    BILIRUBINUR small 06/30/2021 02:11 PM    BLOODU NEGATIVE 09/17/2022 06:21 PM    GLUCOSEU neg 06/30/2021 02:11 PM    KETUA TRACE 09/17/2022 06:21 PM     ABG:    Lab Results   Component Value Date/Time    DYM0ACS 35.5 09/24/2022 11:49 AM    PO2ART 70.3 09/24/2022 11:49 AM    YFW5YRH 21.2 09/24/2022 11:49 AM     HgBA1c:    Lab Results   Component Value Date/Time    LABA1C 7.0 12/30/2022 02:15 AM     Microalbumen/Creatinine ratio:  No components found for: RUCREAT  TSH:    Lab Results   Component Value Date/Time    TSH 1.66 04/03/2018 11:33 AM     IRON:    Lab Results   Component Value Date/Time    IRON 54 12/13/2022 06:50 AM     Iron Saturation:  No components found for: PERCENTFE  TIBC:    Lab Results   Component Value Date/Time    TIBC 217 12/13/2022 06:50 AM     FERRITIN:    Lab Results   Component Value Date/Time    FERRITIN 778 12/13/2022 06:50 AM     RPR:  No results found for: RPR  YAYA:  No results found for: ANATITER, YAYA  24 Hour Urine for Creatinine Clearance:  No components found for: CREAT4, UHRS10, UTV10      Objective: I/O: 01/01 0701 - 01/02 0700  In: 1983   Out: -   I/O last 3 completed shifts: In: 2077 [IA/LV:9227; IV Piggyback:50]  Out: -   I/O this shift: In: 560 [NG/GT:60]  Out: 2500   Vitals: BP (!) 92/52   Pulse 100   Temp 97.9 °F (36.6 °C)   Resp 22   Ht 6' 2\" (1.88 m)   Wt 258 lb (117 kg)   SpO2 99%   BMI 33.13 kg/m²  {  General appearance: awake weak  HEENT: Head: Normal, normocephalic, atraumatic.   Neck:  positive trach  Lungs: diminished breath sounds bilaterally  Heart: S1, S2 normal  Abdomen: abnormal findings:  soft nt positive PEG  Extremities: edema trace  Neurologic: Mental status: alertness: alert        Assessment and Plan:      IMP:   #1 end-stage renal disease on dialysis   #2 shortness of breath with trach and congestion   #3 hyponatremia with hyperkalemia   #4  low blood pressure    Plan      #1 maintain current dialysis schedule Monday Wednesday Friday we will do that at the dialysis facility ECF as well   #2 no active infection noted treated with congestion and fluid removal with dialysis as blood pressure allows   #3 treatment correct electrolytes with dialysis   #4 maintain midodrine monitor blood pressure   supportive care no other acute changes work on discharge planning to skilled nursing from renal standpoint   unfortunate this is his baseline status   adjust the dose of Synthroid increased           Jorge Craven MD, MD

## 2023-01-02 NOTE — PROGRESS NOTES
V2.0  [facility] Hospitalist Progress Note      Name:  Jean Bianchi /Age/Sex: 1950  (71 y.o. male)   MRN & CSN:  [id number] & [id number] Encounter Date/Time: 2023 1:18 PM EST    Location:  [id number] PCP: Rosio Vee, APRN - [facility] Day: 5    Assessment and Plan:   Jean Bianchi is a 71 y.o. male with past medical history of coronary artery disease status post three-vessel CABG, permanent atrial fibrillation, hypertension, hyperlipidemia, COPD, tracheostomy and PEG tube status, who presents with progressively worsening shortness of breath on 2022. Acute on chronic respiratory failure   Sepsis present on admission secondary to HCAP  Tracheostomy dependence  On 4 L trach collar oxygen at nursing home, currently requiring 6 to 7 L  CT chest 2023, personally reviewed-concerning for right lower lobe consolidation with air bronchograms and minimal right-sided pleural effusion. Respiratory cultures negative, Blood cultures negative, COVID and rapid flu negative, MRSA screen negative  Continue cefepime to complete 7 days. Pulmonology following, appreciate recommendations     End-stage renal disease  Hypermagnesemia  On hemodialysis   Started on HD in 2022 after three-vessel CABG  Nephrology has been consulted for inpatient management of HD     Permanent atrial fibrillation  Long-term anticoagulation  Status post maze in 2022  Continue amiodarone  Lopressor  Continue Coumadin, pharmacy to dose     Coronary artery disease status post three-vessel CABG in 2022  Postprocedure complications include mediastinal infection requiring multiple washouts with IABP placement and transfer to [facility] for further management where he underwent washout and closure and removal of IABP  Tracheostomy dependence since CABG  Continue aspirin and statin     Hypothyroidism  TSH 11.28 and T4 0.8.   Increase Synthroid to 100 mcg [was on 75].  Recommend checking TSH again in a few weeks as an outpatient. Hypertension  Low normal blood pressure trend  Most recent echocardiogram with preserved ejection fraction  Has been changed to Lopressor admission Lopressor for heart rate control  Continue midodrine     Chronic macrocytic anemia  Baseline creatinine around 9-10  Anemia panel including a peripheral smear ordered for a.m. No active source of bleeding identified  Monitor CBC     Insulin-dependent diabetes mellitus type 2  HbA1c 12/29/22-7%  Continue Lantus and sliding scale insulin  Currently on renal formula of tube feeds     History of nonocclusive DVT  Diagnosed in September 2022  Continue Coumadin     Moderate protein calorie malnutrition  Dietitian following     Obesity, BMI 33.13    Diet Diet NPO  ADULT TUBE FEEDING; PEG; Renal Formula; Continuous; 10; Yes; 15; Q 4 hours; 65; 30; Q 4 hours   DVT Prophylaxis [] Lovenox, []  Heparin, [] SCDs, [] Ambulation,    [] Eliquis, [] Xarelto  [x] Coumadin [] other   Code Status Full Code   [facility]position From: [facility]  Expected Disposition: SNF  Estimated Date of Discharge: 1 to 3 days  Patient requires continued admission due to pneumonia   Surrogate Decision Maker/ POA      Subjective:     Chief Complaint: Shortness of Breath     Patient does continue to make improvements. Tolerating dialysis well. Of note his TSH was elevated to 1.2 will increase his Synthroid dose. Overall, patient is heading in the right direction. Review of Systems:    Review of Systems   Constitutional:  Positive for fatigue. Negative for activity change, appetite change, chills and fever. HENT:  Negative for congestion, hearing loss, sinus pressure, sinus pain, sore throat, trouble swallowing and voice change. Eyes:  Negative for visual disturbance. Respiratory:  Positive for shortness of breath. Negative for cough, chest tightness and wheezing. Cardiovascular:  Negative for chest pain, palpitations and leg swelling. Gastrointestinal:  Negative for abdominal pain, constipation, diarrhea, nausea and vomiting. Endocrine: Negative for cold intolerance, heat intolerance and polyuria. Genitourinary:  Negative for dysuria. Musculoskeletal:  Negative for arthralgias, back pain and gait problem. Skin:  Negative for color change. Neurological:  Negative for dizziness, weakness and headaches. Psychiatric/Behavioral:  Negative for agitation and confusion. The patient is not nervous/anxious. Objective: Intake/Output Summary (Last 24 hours) at 1/2/2023 1318  Last data filed at 1/2/2023 1235  Gross per 24 hour   Intake 2513 ml   Output 2500 ml   Net 13 ml        Vitals:   Vitals:    01/02/23 1245   BP: (!) 92/52   Pulse: 100   Resp: 22   Temp: 97.9 °F (36.6 °C)   SpO2: 99%       Physical Exam:     Physical Exam  Constitutional:       General: He is not in acute distress. Appearance: Normal appearance. He is ill-appearing. HENT:      Head: Normocephalic and atraumatic. Nose: Nose normal.      Mouth/Throat:      Mouth: Mucous membranes are moist.      Pharynx: Oropharynx is clear. Eyes:      Extraocular Movements: Extraocular movements intact. Conjunctiva/sclera: Conjunctivae normal.      Pupils: Pupils are equal, round, and reactive to light. Cardiovascular:      Rate and Rhythm: Normal rate. Rhythm irregular. Pulses: Normal pulses. Heart sounds: Normal heart sounds. Pulmonary:      Effort: Pulmonary effort is normal.      Breath sounds: Rales present. Abdominal:      General: Abdomen is flat. Bowel sounds are normal.      Palpations: Abdomen is soft. Musculoskeletal:         General: Normal range of motion. Cervical back: Normal range of motion and neck supple. Skin:     General: Skin is warm and dry. Neurological:      General: No focal deficit present. Mental Status: He is alert and oriented to person, place, and time. Mental status is at baseline.    Psychiatric: Mood and Affect: Mood normal.         Behavior: Behavior normal.         Thought Content:  Thought content normal.       Medications:   Medications:    [START ON 1/3/2023] levothyroxine  100 mcg Oral Daily    warfarin  0.5 mg PEG Tube Once    metoprolol tartrate  12.5 mg Oral BID    acetylcysteine  4 mL Inhalation TID    warfarin placeholder: dosing by pharmacy   Other RX Placeholder    guaiFENesin  300 mg Per G Tube Q6H    miconazole   Topical BID    insulin lispro  0-4 Units SubCUTAneous 4 times per day    aspirin  81 mg Per NG tube Daily    pantoprazole (PROTONIX) 40 mg injection  40 mg IntraVENous Daily    amiodarone  200 mg PEG Tube Daily    atorvastatin  40 mg PEG Tube Nightly    Nephronex  5 mL Feeding Tube Daily    sevelamer  1,600 mg Per G Tube Q8H    QUEtiapine  50 mg Per G Tube Nightly    midodrine  10 mg Oral TID WC    sodium chloride flush  5-40 mL IntraVENous 2 times per day    insulin glargine  10 Units SubCUTAneous Nightly    cefepime  1,000 mg IntraVENous Q24H    polyethylene glycol  17 g Oral Daily      Infusions:    sodium chloride 25 mL (12/29/22 7650)    dextrose       PRN Meds: ipratropium-albuterol, 1 vial, Q4H PRN  albuterol sulfate HFA, 2 puff, Q6H PRN  melatonin, 9 mg, Nightly PRN  traZODone, 100 mg, Nightly PRN  sennosides-docusate sodium, 2 tablet, Daily PRN  sodium chloride flush, 5-40 mL, PRN  sodium chloride, , PRN  ondansetron, 4 mg, Q8H PRN   Or  ondansetron, 4 mg, Q6H PRN  polyethylene glycol, 17 g, Daily PRN  acetaminophen, 650 mg, Q6H PRN   Or  acetaminophen, 650 mg, Q6H PRN  glucose, 4 tablet, PRN  dextrose bolus, 125 mL, PRN   Or  dextrose bolus, 250 mL, PRN  glucagon (rDNA), 1 mg, PRN  dextrose, , Continuous PRN        Labs      Recent Results (from the past 24 hour(s))   POCT Glucose    Collection Time: 01/01/23  6:47 PM   Result Value Ref Range    POC Glucose 137 (H) 70 - 99 MG/DL   POCT Glucose    Collection Time: 01/01/23  9:38 PM   Result Value Ref Range    POC Glucose 164 (H) 70 - 99 MG/DL   POCT Glucose    Collection Time: 01/02/23 12:36 AM   Result Value Ref Range    POC Glucose 127 (H) 70 - 99 MG/DL   POCT Glucose    Collection Time: 01/02/23  2:47 AM   Result Value Ref Range    POC Glucose 129 (H) 70 - 99 MG/DL   Magnesium    Collection Time: 01/02/23  5:25 AM   Result Value Ref Range    Magnesium 2.9 (H) 1.8 - 2.4 mg/dl   Phosphorus    Collection Time: 01/02/23  5:25 AM   Result Value Ref Range    Phosphorus 5.5 (H) 2.5 - 4.9 MG/DL   Protime/INR & PTT    Collection Time: 01/02/23  5:25 AM   Result Value Ref Range    Protime 38.0 (H) 11.7 - 14.5 SECONDS    INR 2.91 INDEX    aPTT 38.6 (H) 25.1 - 37.1 SECONDS   Reticulocytes    Collection Time: 01/02/23  5:25 AM   Result Value Ref Range    Retic Ct Pct 3.2 (H) 0.2 - 2.20 %   Basic Metabolic Panel w/ Reflex to MG    Collection Time: 01/02/23  5:25 AM   Result Value Ref Range    Sodium 132 (L) 135 - 145 MMOL/L    Potassium 5.1 3.5 - 5.1 MMOL/L    Chloride 94 (L) 99 - 110 mMol/L    CO2 21 21 - 32 MMOL/L    Anion Gap 17 (H) 4 - 16    BUN 74 (H) 6 - 23 MG/DL    Creatinine 7.4 (H) 0.9 - 1.3 MG/DL    Est, Glom Filt Rate 7 (L) >60 mL/min/1.73m2    Glucose 107 (H) 70 - 99 MG/DL    Calcium 8.7 8.3 - 10.6 MG/DL   TSH    Collection Time: 01/02/23  5:25 AM   Result Value Ref Range    TSH, High Sensitivity 11.280 (H) 0.270 - 4.20 uIu/ml   T4, Free    Collection Time: 01/02/23  5:25 AM   Result Value Ref Range    T4 Free 0.80 (L) 0.9 - 1.8 NG/DL   Cortisol Total    Collection Time: 01/02/23  5:25 AM   Result Value Ref Range    Cortisol, Plasma 16.93    C-Reactive Protein    Collection Time: 01/02/23  5:25 AM   Result Value Ref Range    CRP High Sensitivity 17.0 (H) <5.0 mg/L   POCT Glucose    Collection Time: 01/02/23  6:04 AM   Result Value Ref Range    POC Glucose 124 (H) 70 - 99 MG/DL        Imaging/Diagnostics Last 24 Hours   CT CHEST WO CONTRAST    Result Date: 1/1/2023  EXAMINATION: CT OF THE CHEST WITHOUT CONTRAST 1/1/2023 4:35 pm TECHNIQUE: CT of the chest was performed without the administration of intravenous contrast. Multiplanar reformatted images are provided for review. Automated exposure control, iterative reconstruction, and/or weight based adjustment of the mA/kV was utilized to reduce the radiation dose to as low as reasonably achievable. COMPARISON: 09/16/2022, 04/18/2016 HISTORY: ORDERING SYSTEM PROVIDED HISTORY: shortness of breath TECHNOLOGIST PROVIDED HISTORY: Reason for exam:->shortness of breath Reason for Exam: shortness of breath FINDINGS: Mediastinum: Status post CABG. Mild cardiomegaly. No pericardial effusion. No lymphadenopathy. Tracheostomy tip is at the thoracic inlet. Lungs/pleura: Trace bilateral pleural effusions. Right centrilobular nodularity with complete opacification of the right lower lobe with minimal volume loss. Upper Abdomen: Unremarkable. Soft Tissues/Bones: No acute bone or soft tissue abnormality. Right infectious/inflammatory airways process with suspected right lower lobe pneumonia. Comment: Please note this report has been produced using speech recognition software and may contain errors related to that system including errors in grammar, punctuation, and spelling, as well as words and phrases that may be inappropriate. If there are any questions or concerns please feel free to contact the dictating provider for clarification.      Electronically signed by Bebo Orlando MD on 1/2/2023 at 1:18 PM

## 2023-01-02 NOTE — PROGRESS NOTES
Progress Note( Dr. Hayley Amezcua)  1/2/2023  Subjective:   Admit Date: 12/29/2022  PCP: ANKITA Leonard CNP    Admitted For :Healthcare associated pneumonia    Consulted For: Better control of blood glucose    Interval History: Patient seem to be somewhat better the patient in the hemodialysis unit can alert  Has tracheotomy tube and PEG tube    Denies any chest pains,   Some SOB . Denies nausea or vomiting. On tube feeding    No new bowel or bladder symptoms. Intake/Output Summary (Last 24 hours) at 1/2/2023 1047  Last data filed at 1/2/2023 0845  Gross per 24 hour   Intake 2043 ml   Output --   Net 2043 ml         DATA    CBC:   Recent Labs     12/31/22  0528 01/01/23  0714   WBC 8.3 7.9   HGB 8.6* 8.4*    207      CMP:  Recent Labs     12/31/22  0528 01/01/23  0714 01/02/23  0525   * 132* 132*   K 4.9 4.8 5.1   CL 95* 95* 94*   CO2 23 21 21   BUN 43* 62* 74*   CREATININE 4.7* 6.4* 7.4*   CALCIUM 8.5 8.7 8.7   PROT 6.3*  --   --    LABALBU 3.5  --   --    BILITOT 0.4  --   --    ALKPHOS 103  --   --    AST 22  --   --    ALT 14  --   --        Lipids:   Lab Results   Component Value Date/Time    CHOL 152 04/14/2021 10:02 AM    CHOL 139 07/23/2018 08:01 AM    HDL 43 08/26/2022 10:39 AM    TRIG 212 04/14/2021 10:02 AM     Glucose:  Recent Labs     01/02/23  0036 01/02/23  0247 01/02/23  0604   POCGLU 127* 129* 124*       GfuhnoscpsE4I:  Lab Results   Component Value Date/Time    LABA1C 7.0 12/30/2022 02:15 AM     High Sensitivity TSH:   Lab Results   Component Value Date/Time    TSHHS 11.280 01/02/2023 05:25 AM     Free T3: No results found for: FT3  Free T4:  Lab Results   Component Value Date/Time    T4FREE 0.80 01/02/2023 05:25 AM       CT CHEST WO CONTRAST   Final Result   Right infectious/inflammatory airways process with suspected right lower lobe   pneumonia. XR CHEST PORTABLE   Final Result   Small right pleural effusion with bibasilar airspace opacities.   This could represent atelectasis or pneumonia in the appropriate clinical setting              Scheduled Medicines   Medications:    epoetin jessenia-epbx  8,000 Units IntraVENous Once in dialysis    [START ON 1/3/2023] levothyroxine  100 mcg Oral Daily    metoprolol tartrate  12.5 mg Oral BID    acetylcysteine  4 mL Inhalation TID    warfarin placeholder: dosing by pharmacy   Other RX Placeholder    guaiFENesin  300 mg Per G Tube Q6H    miconazole   Topical BID    insulin lispro  0-4 Units SubCUTAneous 4 times per day    aspirin  81 mg Per NG tube Daily    pantoprazole (PROTONIX) 40 mg injection  40 mg IntraVENous Daily    amiodarone  200 mg PEG Tube Daily    atorvastatin  40 mg PEG Tube Nightly    Nephronex  5 mL Feeding Tube Daily    sevelamer  1,600 mg Per G Tube Q8H    QUEtiapine  50 mg Per G Tube Nightly    midodrine  10 mg Oral TID WC    sodium chloride flush  5-40 mL IntraVENous 2 times per day    insulin glargine  10 Units SubCUTAneous Nightly    cefepime  1,000 mg IntraVENous Q24H    polyethylene glycol  17 g Oral Daily      Infusions:    sodium chloride 25 mL (12/29/22 0441)    dextrose           Objective:   Vitals: BP (!) 98/40   Pulse 91   Temp 98 °F (36.7 °C)   Resp 17   Ht 6' 2\" (1.88 m)   Wt 258 lb (117 kg)   SpO2 96%   BMI 33.13 kg/m²   General appearance: alert and cooperative with exam  Neck: no JVD or bruit  Thyroid : Normal lobes   Lungs: Has Vesicular Breath sounds   Heart:  regular rate and rhythm  Abdomen: soft, non-tender; bowel sounds normal; no masses,  no organomegaly  Musculoskeletal: Normal  Extremities: extremities normal, , no edema  Neurologic:  Awake, alert, oriented to name, place and time. Cranial nerves II-XII are grossly intact. Motor is  intact. Sensory is intact. ,  and gait is normal.    Assessment:     Patient Active Problem List:     Atrial fibrillation ([address]     CAD (coronary artery disease)     Morbid obesity ([address]     COPD (chronic obstructive pulmonary disease) ([address] Sleep apnea     Type 2 diabetes mellitus (HCC)     Thyroid disease     Hyperlipidemia     Acute congestive heart failure (HCC)     Coronary artery disease involving native coronary artery of native heart with angina pectoris (HCC)     Chronic [facility], [facility] ([facility] [[id number]]     Hypertension     CAD, multiple vessel     Dyspnea     Moderate malnutrition (HCC)     Healthcare-associated pneumonia     Severe malnutrition ([facility]      Plan:     Reviewed POC blood glucose . Labs and X ray results   Reviewed Current Medicines   started on tube feeding with renal formula  On Correction bolus Humalog/ Basal Lantus Insulin regime /  Monitor Blood glucose frequently   Modified  the dose of Insulin/ other medicines as needed   Will follow     .      Ashley García MD, MD

## 2023-01-02 NOTE — PROGRESS NOTES
Speech Language Pathology      Order for swallowing evaluation discontinued at this time. Pt is known to Speech pathology team from previous admissions. Pt seen by SLP for MBSS 12/14/22 with recommendation for NPO due to non-functional swallow. See full report for details. Pt most recently seen 12/31/22 with the following recommendations:  Recommend continue NPO status with nutrition and medication administration through PEG-tube. Continue PMV use for all waking hours. Please remove PMV during sleep and in the event of respiratory decompensation. No further acute SLP needs were identified at this time as pt's swallowing status appears to be at baseline from previous hospital admission.     Minerva Bay MA [facility] SLP  Speech Language Pathologist

## 2023-01-02 NOTE — CONSULTS
PHARMACY ANTICOAGULATION MONITORING SERVICE    Anh Gonzalez is a 71 y.o. male on warfarin therapy for Persistent A. Fib, H/O non-occlusive Rt. Fem DVT. Pharmacy consulted by Dr. Jolie Sy for monitoring and adjustment of treatment. Indication for anticoagulation: Persistent A. Fib, H/O non-occlusive Rt. Fem DVT  INR goal: 2-3  Warfarin dose prior to admission: 2mg daily    Pertinent Laboratory Values   Recent Labs     12/31/22  0528 01/01/23  0714 01/02/23  0525   INR 2.28 2.97 2.91   HGB 8.6* 8.4*  --    HCT 27.8* 25.0*  --     207  --        Assessment/Plan:  Drug Interactions: home amiodarone, trazodone, aspirin, levothyroxine  Patient INR with significant increase from 2.28 on 12/31 to 2.97 on 1/1. No warfarin dose given last night, INR has now leveled off around 2.91 today. Give warfarin 0.5mg dose tonight and follow INR trends tomorrow. Pharmacy will continue to monitor and adjust warfarin therapy as indicated    Thank you for the consult. Winnie Connolly [facility]  1/2/2023 10:45 AM

## 2023-01-02 NOTE — PROGRESS NOTES
Progress Note( Dr. Romina Hogue)  1/1/2023  Subjective:   Admit Date: 12/29/2022  PCP: ANKITA Catalan CNP    Admitted For :Healthcare associated pneumonia    Consulted For: Better control of blood glucose    Interval History: Patient seem to be somewhat better the patient in the hemodialysis unit can alert  Has tracheotomy tube and PEG tube    Denies any chest pains,   Some SOB . Denies nausea or vomiting. Vomiting last night but not not this morning  No new bowel or bladder symptoms. Intake/Output Summary (Last 24 hours) at 1/1/2023 2028  Last data filed at 1/1/2023 1927  Gross per 24 hour   Intake 785 ml   Output --   Net 785 ml         DATA    CBC:   Recent Labs     12/30/22  0604 12/31/22  0528 01/01/23  0714   WBC 8.5 8.3 7.9   HGB 8.3* 8.6* 8.4*    232 207      CMP:  Recent Labs     12/30/22  0604 12/31/22  0528 01/01/23  0714   * 133* 132*   K 5.0 4.9 4.8   CL 93* 95* 95*   CO2 23 23 21   BUN 69* 43* 62*   CREATININE 5.5* 4.7* 6.4*   CALCIUM 8.4 8.5 8.7   PROT 6.1* 6.3*  --    LABALBU 3.1* 3.5  --    BILITOT 0.4 0.4  --    ALKPHOS 103 103  --    AST 16 22  --    ALT 11 14  --        Lipids:   Lab Results   Component Value Date/Time    CHOL 152 04/14/2021 10:02 AM    CHOL 139 07/23/2018 08:01 AM    HDL 43 08/26/2022 10:39 AM    TRIG 212 04/14/2021 10:02 AM     Glucose:  Recent Labs     01/01/23  0541 01/01/23  1144 01/01/23  1847   POCGLU 141* 176* 137*       CvtrlcqnoiF5G:  Lab Results   Component Value Date/Time    LABA1C 7.0 12/30/2022 02:15 AM     High Sensitivity TSH:   Lab Results   Component Value Date/Time    TSHHS 3.930 09/17/2022 12:26 PM     Free T3: No results found for: FT3  Free T4:  Lab Results   Component Value Date/Time    T4FREE 1.14 09/17/2022 12:26 PM       CT CHEST WO CONTRAST   Final Result   Right infectious/inflammatory airways process with suspected right lower lobe   pneumonia.          XR CHEST PORTABLE   Final Result   Small right pleural effusion with bibasilar airspace opacities. This could   represent atelectasis or pneumonia in the appropriate clinical setting              Scheduled Medicines   Medications:    metoprolol tartrate  12.5 mg Oral BID    acetylcysteine  4 mL Inhalation TID    warfarin placeholder: dosing by pharmacy   Other RX Placeholder    guaiFENesin  300 mg Per G Tube Q6H    miconazole   Topical BID    insulin lispro  0-4 Units SubCUTAneous 4 times per day    aspirin  81 mg Per NG tube Daily    pantoprazole (PROTONIX) 40 mg injection  40 mg IntraVENous Daily    amiodarone  200 mg PEG Tube Daily    atorvastatin  40 mg PEG Tube Nightly    Nephronex  5 mL Feeding Tube Daily    sevelamer  1,600 mg Per G Tube Q8H    QUEtiapine  50 mg Per G Tube Nightly    midodrine  10 mg Oral TID WC    levothyroxine  75 mcg Oral Daily    sodium chloride flush  5-40 mL IntraVENous 2 times per day    insulin glargine  10 Units SubCUTAneous Nightly    cefepime  1,000 mg IntraVENous Q24H    polyethylene glycol  17 g Oral Daily      Infusions:    sodium chloride 25 mL (12/29/22 7623)    dextrose           Objective:   Vitals: BP (!) 106/45   Pulse 70   Temp 98.4 °F (36.9 °C) (Oral)   Resp 17   Ht 6' 2\" (1.88 m)   Wt 258 lb (117 kg)   SpO2 98%   BMI 33.13 kg/m²   General appearance: alert and cooperative with exam  Neck: no JVD or bruit  Thyroid : Normal lobes   Lungs: Has Vesicular Breath sounds   Heart:  regular rate and rhythm  Abdomen: soft, non-tender; bowel sounds normal; no masses,  no organomegaly  Musculoskeletal: Normal  Extremities: extremities normal, , no edema  Neurologic:  Awake, alert, oriented to name, place and time. Cranial nerves II-XII are grossly intact. Motor is  intact. Sensory is intact. ,  and gait is normal.    Assessment:     Patient Active Problem List:     Atrial fibrillation ([address]     CAD (coronary artery disease)     Morbid obesity ([address]     COPD (chronic obstructive pulmonary disease) ([address]     Sleep apnea     Type 2 diabetes mellitus ([facility]     Thyroid disease     Hyperlipidemia     Acute congestive heart failure (HCC)     Coronary artery disease involving native coronary artery of native heart with angina pectoris (HCC)     Chronic renal disease, stage III ([facility] [[id number]]     Hypertension     CAD, multiple vessel     Dyspnea     Moderate malnutrition (HCC)     Healthcare-associated pneumonia     Severe malnutrition ([facility]      Plan:     Reviewed POC blood glucose . Labs and X ray results   Reviewed Current Medicines   Patient will be  started on tube feeding with renal formula  On Correction bolus Humalog/ Basal Lantus Insulin regime /  Monitor Blood glucose frequently   Modified  the dose of Insulin/ other medicines as needed   Will follow     .      Ashley García MD, MD

## 2023-01-03 PROBLEM — N18.6 ESRD ON DIALYSIS (HCC): Status: ACTIVE | Noted: 2023-01-03

## 2023-01-03 PROBLEM — J69.0 ASPIRATION PNEUMONIA OF RIGHT LOWER LOBE (HCC): Status: ACTIVE | Noted: 2023-01-03

## 2023-01-03 PROBLEM — J96.11 CHRONIC RESPIRATORY FAILURE WITH HYPOXIA (HCC): Status: ACTIVE | Noted: 2023-01-03

## 2023-01-03 PROBLEM — Z99.2 ESRD ON DIALYSIS (HCC): Status: ACTIVE | Noted: 2023-01-03

## 2023-01-03 LAB
APTT: 35.2 SECONDS (ref 25.1–37.1)
CULTURE: NORMAL
CULTURE: NORMAL
GLUCOSE BLD-MCNC: 127 MG/DL (ref 70–99)
GLUCOSE BLD-MCNC: 136 MG/DL (ref 70–99)
GLUCOSE BLD-MCNC: 141 MG/DL (ref 70–99)
GLUCOSE BLD-MCNC: 145 MG/DL (ref 70–99)
GLUCOSE BLD-MCNC: 155 MG/DL (ref 70–99)
GLUCOSE BLD-MCNC: 160 MG/DL (ref 70–99)
INR BLD: 2.21 INDEX
Lab: NORMAL
Lab: NORMAL
PROTHROMBIN TIME: 28.7 SECONDS (ref 11.7–14.5)
SPECIMEN: NORMAL
SPECIMEN: NORMAL

## 2023-01-03 PROCEDURE — A4216 STERILE WATER/SALINE, 10 ML: HCPCS | Performed by: HOSPITALIST

## 2023-01-03 PROCEDURE — 6370000000 HC RX 637 (ALT 250 FOR IP): Performed by: STUDENT IN AN ORGANIZED HEALTH CARE EDUCATION/TRAINING PROGRAM

## 2023-01-03 PROCEDURE — 94640 AIRWAY INHALATION TREATMENT: CPT

## 2023-01-03 PROCEDURE — 85730 THROMBOPLASTIN TIME PARTIAL: CPT

## 2023-01-03 PROCEDURE — 85610 PROTHROMBIN TIME: CPT

## 2023-01-03 PROCEDURE — 99223 1ST HOSP IP/OBS HIGH 75: CPT | Performed by: INTERNAL MEDICINE

## 2023-01-03 PROCEDURE — 6360000002 HC RX W HCPCS: Performed by: HOSPITALIST

## 2023-01-03 PROCEDURE — 36415 COLL VENOUS BLD VENIPUNCTURE: CPT

## 2023-01-03 PROCEDURE — 2580000003 HC RX 258: Performed by: HOSPITALIST

## 2023-01-03 PROCEDURE — APPSS60 APP SPLIT SHARED TIME 46-60 MINUTES: Performed by: NURSE PRACTITIONER

## 2023-01-03 PROCEDURE — 94761 N-INVAS EAR/PLS OXIMETRY MLT: CPT

## 2023-01-03 PROCEDURE — 6370000000 HC RX 637 (ALT 250 FOR IP): Performed by: INTERNAL MEDICINE

## 2023-01-03 PROCEDURE — 6370000000 HC RX 637 (ALT 250 FOR IP): Performed by: NURSE PRACTITIONER

## 2023-01-03 PROCEDURE — 2500000003 HC RX 250 WO HCPCS: Performed by: INTERNAL MEDICINE

## 2023-01-03 PROCEDURE — C9113 INJ PANTOPRAZOLE SODIUM, VIA: HCPCS | Performed by: HOSPITALIST

## 2023-01-03 PROCEDURE — 82962 GLUCOSE BLOOD TEST: CPT

## 2023-01-03 PROCEDURE — 1200000000 HC SEMI PRIVATE

## 2023-01-03 PROCEDURE — 6370000000 HC RX 637 (ALT 250 FOR IP): Performed by: HOSPITALIST

## 2023-01-03 RX ORDER — WARFARIN SODIUM 1 MG/1
0.5 TABLET ORAL DAILY
Status: DISCONTINUED | OUTPATIENT
Start: 2023-01-03 | End: 2023-01-05

## 2023-01-03 RX ORDER — AMOXICILLIN AND CLAVULANATE POTASSIUM 875; 125 MG/1; MG/1
1 TABLET, FILM COATED ORAL
Status: DISCONTINUED | OUTPATIENT
Start: 2023-01-03 | End: 2023-01-03 | Stop reason: DRUGHIGH

## 2023-01-03 RX ORDER — IPRATROPIUM BROMIDE AND ALBUTEROL SULFATE 2.5; .5 MG/3ML; MG/3ML
1 SOLUTION RESPIRATORY (INHALATION) 3 TIMES DAILY
Status: DISCONTINUED | OUTPATIENT
Start: 2023-01-03 | End: 2023-01-05 | Stop reason: HOSPADM

## 2023-01-03 RX ORDER — TRAMADOL HYDROCHLORIDE 50 MG/1
25 TABLET ORAL ONCE
Status: DISCONTINUED | OUTPATIENT
Start: 2023-01-03 | End: 2023-01-05

## 2023-01-03 RX ORDER — AMOXICILLIN AND CLAVULANATE POTASSIUM 500; 125 MG/1; MG/1
1 TABLET, FILM COATED ORAL DAILY
Status: DISCONTINUED | OUTPATIENT
Start: 2023-01-03 | End: 2023-01-05 | Stop reason: HOSPADM

## 2023-01-03 RX ADMIN — ASPIRIN 81 MG CHEWABLE TABLET 81 MG: 81 TABLET CHEWABLE at 08:26

## 2023-01-03 RX ADMIN — SEVELAMER CARBONATE 1600 MG: 800 TABLET, FILM COATED ORAL at 05:44

## 2023-01-03 RX ADMIN — AMOXICILLIN AND CLAVULANATE POTASSIUM 1 TABLET: 500; 125 TABLET, FILM COATED ORAL at 18:58

## 2023-01-03 RX ADMIN — GUAIFENESIN 300 MG: 100 SOLUTION ORAL at 23:26

## 2023-01-03 RX ADMIN — GUAIFENESIN 300 MG: 100 SOLUTION ORAL at 12:30

## 2023-01-03 RX ADMIN — MICONAZOLE NITRATE: 2 POWDER TOPICAL at 08:28

## 2023-01-03 RX ADMIN — AMIODARONE HYDROCHLORIDE 200 MG: 200 TABLET ORAL at 08:26

## 2023-01-03 RX ADMIN — GUAIFENESIN 300 MG: 100 SOLUTION ORAL at 00:17

## 2023-01-03 RX ADMIN — GUAIFENESIN 300 MG: 100 SOLUTION ORAL at 19:02

## 2023-01-03 RX ADMIN — QUETIAPINE FUMARATE 50 MG: 25 TABLET ORAL at 21:42

## 2023-01-03 RX ADMIN — LEVOTHYROXINE SODIUM 100 MCG: 0.1 TABLET ORAL at 08:26

## 2023-01-03 RX ADMIN — IPRATROPIUM BROMIDE AND ALBUTEROL SULFATE 1 AMPULE: 2.5; .5 SOLUTION RESPIRATORY (INHALATION) at 19:06

## 2023-01-03 RX ADMIN — SEVELAMER CARBONATE 1600 MG: 800 TABLET, FILM COATED ORAL at 21:42

## 2023-01-03 RX ADMIN — MICONAZOLE NITRATE: 2 POWDER TOPICAL at 21:43

## 2023-01-03 RX ADMIN — MIDODRINE HYDROCHLORIDE 10 MG: 5 TABLET ORAL at 12:30

## 2023-01-03 RX ADMIN — ONDANSETRON 4 MG: 2 INJECTION INTRAMUSCULAR; INTRAVENOUS at 05:46

## 2023-01-03 RX ADMIN — SODIUM CHLORIDE, PRESERVATIVE FREE 10 ML: 5 INJECTION INTRAVENOUS at 08:29

## 2023-01-03 RX ADMIN — SODIUM CHLORIDE, PRESERVATIVE FREE 10 ML: 5 INJECTION INTRAVENOUS at 21:42

## 2023-01-03 RX ADMIN — ACETAMINOPHEN 650 MG: 325 TABLET ORAL at 00:17

## 2023-01-03 RX ADMIN — GUAIFENESIN 300 MG: 100 SOLUTION ORAL at 05:44

## 2023-01-03 RX ADMIN — POLYETHYLENE GLYCOL (3350) 17 G: 17 POWDER, FOR SOLUTION ORAL at 08:26

## 2023-01-03 RX ADMIN — MIDODRINE HYDROCHLORIDE 10 MG: 5 TABLET ORAL at 08:26

## 2023-01-03 RX ADMIN — WARFARIN SODIUM 0.5 MG: 1 TABLET ORAL at 18:58

## 2023-01-03 RX ADMIN — SODIUM CHLORIDE, PRESERVATIVE FREE 40 MG: 5 INJECTION INTRAVENOUS at 08:27

## 2023-01-03 RX ADMIN — METOPROLOL TARTRATE 12.5 MG: 25 TABLET, FILM COATED ORAL at 08:26

## 2023-01-03 RX ADMIN — MIDODRINE HYDROCHLORIDE 10 MG: 5 TABLET ORAL at 18:58

## 2023-01-03 RX ADMIN — ATORVASTATIN CALCIUM 40 MG: 40 TABLET, FILM COATED ORAL at 21:43

## 2023-01-03 ASSESSMENT — ENCOUNTER SYMPTOMS
DIARRHEA: 0
VOMITING: 0
TROUBLE SWALLOWING: 0
SORE THROAT: 0
NAUSEA: 0
SINUS PAIN: 0
COLOR CHANGE: 0
WHEEZING: 0
SINUS PRESSURE: 0
CHEST TIGHTNESS: 0
ABDOMINAL PAIN: 0
BACK PAIN: 0
CONSTIPATION: 0
VOICE CHANGE: 0
SHORTNESS OF BREATH: 1
COUGH: 0

## 2023-01-03 ASSESSMENT — PAIN SCALES - GENERAL
PAINLEVEL_OUTOF10: 5
PAINLEVEL_OUTOF10: 0

## 2023-01-03 NOTE — PROGRESS NOTES
Pharmacy Note - Renal dose adjustment made per P/T protocol    Original order:  Augmentin 875 mg Q24H x 10 D    Estimated Creatinine Clearance: 12 mL/min (A) (based on SCr of 7.4 mg/dL (H)). Recent Labs     01/01/23  0714 01/02/23  0525 01/03/23  0524   BUN 62* 74*  --    CREATININE 6.4* 7.4*  --      --   --    INR 2.97 2.91 2.21       Renally adjusted order:  Augmentin 500 mg po Q24H x 10 days for dialysis pt. Please call pharmacy with any questions.     Thank you,  Crystal Duval, [facility]  1/3/2023 5:26 PM

## 2023-01-03 NOTE — PROGRESS NOTES
Nephrology Progress Note  1/3/2023 1:26 PM  Subjective: Interval History: Eri Pascal is a 71 y.o. male appears weak but less congested  Data:   Scheduled Meds:   traMADol  25 mg Oral Once    ipratropium-albuterol  1 ampule Inhalation TID    warfarin  0.5 mg Oral Daily    levothyroxine  100 mcg Oral Daily    metoprolol tartrate  12.5 mg Oral BID    acetylcysteine  4 mL Inhalation TID    guaiFENesin  300 mg Per G Tube Q6H    miconazole   Topical BID    insulin lispro  0-4 Units SubCUTAneous 4 times per day    aspirin  81 mg Per NG tube Daily    pantoprazole (PROTONIX) 40 mg injection  40 mg IntraVENous Daily    amiodarone  200 mg PEG Tube Daily    atorvastatin  40 mg PEG Tube Nightly    Nephronex  5 mL Feeding Tube Daily    sevelamer  1,600 mg Per G Tube Q8H    QUEtiapine  50 mg Per G Tube Nightly    midodrine  10 mg Oral TID WC    sodium chloride flush  5-40 mL IntraVENous 2 times per day    insulin glargine  10 Units SubCUTAneous Nightly    cefepime  1,000 mg IntraVENous Q24H    polyethylene glycol  17 g Oral Daily     Continuous Infusions:   sodium chloride 20 mL/hr at 01/02/23 1829    dextrose           CBC   Recent Labs     01/01/23  0714   WBC 7.9   HGB 8.4*   HCT 25.0*         BMP   Recent Labs     01/01/23  0714 01/02/23  0525   * 132*   K 4.8 5.1   CL 95* 94*   CO2 21 21   PHOS 4.4 5.5*   BUN 62* 74*   CREATININE 6.4* 7.4*     Hepatic:   No results for input(s): AST, ALT, ALB, BILITOT, ALKPHOS in the last 72 hours. Troponin: No results for input(s): TROPONINI in the last 72 hours. BNP: No results for input(s): BNP in the last 72 hours. Lipids: No results for input(s): CHOL, HDL in the last 72 hours.     Invalid input(s): LDLCALCU  ABGs:   Lab Results   Component Value Date/Time    PO2ART 70.3 09/24/2022 11:49 AM    IRH7EEE 35.5 09/24/2022 11:49 AM     INR:   Recent Labs     01/01/23  0714 01/02/23  0525 01/03/23  0524   INR 2.97 2.91 2.21     Renal Labs  Albumin:    Lab Results Component Value Date/Time    LABALBU 3.5 12/31/2022 05:28 AM     Calcium:    Lab Results   Component Value Date/Time    CALCIUM 8.7 01/02/2023 05:25 AM     Phosphorus:    Lab Results   Component Value Date/Time    PHOS 5.5 01/02/2023 05:25 AM     U/A:    Lab Results   Component Value Date/Time    NITRU NEGATIVE 09/17/2022 06:21 PM    COLORU YELLOW 09/17/2022 06:21 PM    PHUR 6.5 06/30/2021 02:11 PM    WBCUA NONE SEEN 09/17/2022 06:21 PM    RBCUA NONE SEEN 09/17/2022 06:21 PM    MUCUS RARE 09/17/2022 06:21 PM    TRICHOMONAS NONE SEEN 09/17/2022 06:21 PM    BACTERIA NEGATIVE 09/17/2022 06:21 PM    CLARITYU CLEAR 09/17/2022 06:21 PM    SPECGRAV 1.015 09/17/2022 06:21 PM    UROBILINOGEN 4.0 09/17/2022 06:21 PM    BILIRUBINUR NEGATIVE 09/17/2022 06:21 PM    BILIRUBINUR small 06/30/2021 02:11 PM    BLOODU NEGATIVE 09/17/2022 06:21 PM    GLUCOSEU neg 06/30/2021 02:11 PM    KETUA TRACE 09/17/2022 06:21 PM     ABG:    Lab Results   Component Value Date/Time    DNM7JOK 35.5 09/24/2022 11:49 AM    PO2ART 70.3 09/24/2022 11:49 AM    DHK4RYN 21.2 09/24/2022 11:49 AM     HgBA1c:    Lab Results   Component Value Date/Time    LABA1C 7.0 12/30/2022 02:15 AM     Microalbumen/Creatinine ratio:  No components found for: RUCREAT  TSH:    Lab Results   Component Value Date/Time    TSH 1.66 04/03/2018 11:33 AM     IRON:    Lab Results   Component Value Date/Time    IRON 54 12/13/2022 06:50 AM     Iron Saturation:  No components found for: PERCENTFE  TIBC:    Lab Results   Component Value Date/Time    TIBC 217 12/13/2022 06:50 AM     FERRITIN:    Lab Results   Component Value Date/Time    FERRITIN 778 12/13/2022 06:50 AM     RPR:  No results found for: RPR  YAYA:  No results found for: ANATITER, YAYA  24 Hour Urine for Creatinine Clearance:  No components found for: CREAT4, UHRS10, UTV10      Objective:   I/O: 01/02 0701 - 01/03 0700  In: 1591   Out: 2600 [Urine:100]  I/O last 3 completed shifts:   In: 2839 [NG/GT:2289; IV Piggyback:50]  Out: 1271 [Urine:100]  No intake/output data recorded. Vitals: /62   Pulse 99   Temp 98.2 °F (36.8 °C)   Resp 18   Ht 6' 2\" (1.88 m)   Wt 258 lb (117 kg)   SpO2 100%   BMI 33.13 kg/m²  {  General appearance: awake weak  HEENT: Head: Normal, normocephalic, atraumatic.   Neck:  positive trach  Lungs: diminished breath sounds bilaterally  Heart: S1, S2 normal  Abdomen: abnormal findings:  soft nt positive PEG  Extremities: edema trace  Neurologic: Mental status: alertness: alert        Assessment and Plan:      IMP:   #1 end-stage renal disease on dialysis   #2 shortness of breath with trach and congestion   #3 hyponatremia with hyperkalemia   #4  low blood pressure    Plan     #1 doing dialysis next on Wednesday  #2 trach care try to help with congestion  #3 sodium potassium holding stable  #4 blood pressure low stable  Supportive care will monitor           Kia Anand MD, MD

## 2023-01-03 NOTE — PROGRESS NOTES
PHARMACY ANTICOAGULATION MONITORING SERVICE    Richard Conn is a 71 y.o. male on warfarin therapy for Persistent A. Fib, H/O non-occlusive Rt. Fem DVT. Pharmacy consulted by Dr. Ramone Salas for monitoring and adjustment of treatment. Indication for anticoagulation: Persistent A. Fib, H/O non-occlusive Rt. Fem DVT  INR goal: 2-3  Warfarin dose prior to admission: 2mg daily    Pertinent Laboratory Values   Recent Labs     01/01/23  0714 01/02/23  0525 01/03/23  0524   INR 2.97   < > 2.21   HGB 8.4*  --   --    HCT 25.0*  --   --      --   --     < > = values in this interval not displayed. Assessment/Plan:  Drug Interactions: home amiodarone, trazodone, aspirin, levothyroxine  Patient INR with significant increase from 2.28 on 12/31 to 2.97 on 1/1. Give warfarin 0.5mg dose daily and follow INR trends tomorrow. Pharmacy will continue to monitor and adjust warfarin therapy as indicated    Thank you for the consult.   Madelyn Hernandez, [facility]  1/3/2023 12:38 PM

## 2023-01-03 NOTE — PROGRESS NOTES
Pulmonary and Critical Care  Progress Note    Subjective: The patient is comfortable in bed. Shortness of breath has improved. 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, COPD (chronic obstructive pulmonary disease) ([facility] Depression, Diabetes mellitus ([facility] Dupuytren's contracture of hand, Family history of cardiovascular disease, GERD (gastroesophageal reflux disease), H/O cardiac catheterization, H/O cardiac catheterization, H/O cardiovascular stress test, H/O cardiovascular stress test, H/O cardiovascular stress test, H/O cardiovascular stress test, H/O Doppler ultrasound, H/O echocardiogram, H/O echocardiogram, H/O echocardiogram, H/O hiatal hernia, Hx of Venous Doppler, Hyperlipidemia, Hypertension, Obesity, S/P PTCA (percutaneous transluminal coronary angioplasty), Sleep apnea, and Thyroid disease. has a past surgical history that includes Coronary angioplasty with stent (03/21/2005); Cardiac catheterization (6/12/08); Cardiac catheterization (3/07); Cardiac catheterization (3/05); Endoscopy, colon, diagnostic (2014); Colonoscopy (2011); Colonoscopy (5/18/16); Hand surgery (Right, 1997); pr optx ankle dislocation w/repair/int/xtrnl fixj (Right, 6/3/2019); Sternum Debridement (N/A, 9/24/2022); and Coronary artery bypass graft (N/A, 9/22/2022). reports that he quit smoking about 47 years ago. His smoking use included cigarettes. He has a 10.00 pack-year smoking history. He has never used smokeless tobacco. He reports that he does not currently use alcohol after a past usage of about 6.0 standard drinks per week. He reports that he does not use drugs.   Family history:  family history includes Cancer in his mother; Coronary Art Dis in his father; Heart Disease in his father; No Known Problems in his brother; Rheum Arthritis in his sister, sister and another family member. No Known Allergies  Social History:    Reviewed; no changes    Objective:   PHYSICAL EXAM:        VITALS:  BP (!) 97/54   Pulse 99   Temp 98.2 °F (36.8 °C)   Resp 18   Ht 6' 2\" (1.88 m)   Wt 258 lb (117 kg)   SpO2 100%   BMI 33.13 kg/m²     24HR INTAKE/OUTPUT:    Intake/Output Summary (Last 24 hours) at 1/3/2023 1103  Last data filed at 1/2/2023 2230  Gross per 24 hour   Intake 1531 ml   Output 2600 ml   Net -1069 ml       CONSTITUTIONAL:  awake. LUNGS:  decreased breath sounds R base, basilar crackles. CARDIOVASCULAR:  normal S1 and S2 and negative JVD  ABD:Abdomen soft, non-tender. BS normal. No masses,  No organomegaly  NEURO:Alert. DATA:    CBC:  Recent Labs     01/01/23  0714   WBC 7.9   RBC 2.32*   HGB 8.4*   HCT 25.0*      .8*   MCH 36.2*   MCHC 33.6   RDW 22.9*   SEGSPCT 51.4      BMP:  Recent Labs     01/01/23  0714 01/02/23  0525   * 132*   K 4.8 5.1   CL 95* 94*   CO2 21 21   BUN 62* 74*   CREATININE 6.4* 7.4*   CALCIUM 8.7 8.7   GLUCOSE 137* 107*      ABG:  No results for input(s): PH, PO2ART, WLH4YUK, HCO3, BEART, O2SAT in the last 72 hours. Lab Results   Component Value Date    PROBNP 7,643 (H) 12/31/2022    PROBNP 6,229 (H) 12/29/2022    PROBNP 10,348 (H) 12/11/2022     No results found for: [facility]    Radiology Review:  Pertinent images / reports were reviewed as a part of this visit.     Assessment:     Patient Active Problem List   Diagnosis    Atrial fibrillation (HCC)    CAD (coronary artery disease)    Morbid obesity (HCC)    COPD (chronic obstructive pulmonary disease) ([facility]    Sleep apnea    Type 2 diabetes mellitus ([facility]    Thyroid disease    Hyperlipidemia    Acute congestive heart failure ([facility]    Coronary artery disease involving native coronary artery of native heart with angina pectoris (HCC)    Chronic renal disease, stage III ([facility] [[id number]]    Hypertension    CAD, multiple vessel    Dyspnea    Moderate malnutrition (HCC)    Healthcare-associated pneumonia    Severe malnutrition ([facility]       Plan:   1. Overall the patient is better. 2. Wean FiO2.  3. Cont aggressive tracheobronchial toilet.     [facility], MD   1/3/2023  11:03 AM

## 2023-01-03 NOTE — PROGRESS NOTES
Comprehensive Nutrition Assessment    Type and Reason for Visit:  Reassess    Nutrition Recommendations/Plan:   Restart EN as ordered   Recommend Renal @ goal of 65 ml/hr   Adjust FWF to 65 q 4 H to provide at least ~1500 ml per day from TF and FWF. May adjust FWF per nephrology recommendations   Diet advancement per speech      Malnutrition Assessment:  Malnutrition Status:  Severe malnutrition (12/30/22 1058)    Context:  Acute Illness     Findings of the 6 clinical characteristics of malnutrition:  Energy Intake:  50% or less of estimated energy requirements for 5 or more days  Weight Loss:  Greater than 7.5% over 3 months (20%)     Body Fat Loss:  Unable to assess     Muscle Mass Loss:  Unable to assess    Fluid Accumulation:  Unable to assess     Strength:  Not Performed    Nutrition Assessment:    Pt transferred to . Awaiting to restart EN, spoke with RN, nutrition concern address, will restart today. SLP seen pt 12/14 and 12/31, noted \"Pharyngeal deficits appeared to be related to suspected osteophytes at the C4 and C5. Recommend NPO. \" Continue EN via PEG. Follow as high nutrition risk. Nutrition Related Findings:    Hx of PEG/Trach placement in September 2022 per pt S/p CABG.  POCT 145, Phos 5.5, Mag 2.9, GFR 7 Wound Type: Skin Tears       Current Nutrition Intake & Therapies:    Average Meal Intake: NPO  Average Supplements Intake: NPO  Diet NPO  ADULT TUBE FEEDING; PEG; Renal Formula; Continuous; 10; Yes; 15; Q 4 hours; 65; 30; Q 4 hours  Current Tube Feeding (TF) Orders:  Feeding Route: PEG  Formula: Renal Formula  Schedule: Continuous  Feeding Regimen: Nothing running at visit  Additives/Modulars: None  Water Flushes: Ordered 30 Q 4 H (180)  Current TF & Flush Orders Provides: RN about to restart, off during transfer  Goal TF & Flush Orders Provides: Renal @ 65 provides 2761 kcals, 126 gm protein, 1134 ml from TF    Anthropometric Measures:  Height: 6' 2\" (188 cm)  Ideal Body Weight (IBW): 190 lbs (86 kg)    Admission Body Weight: 257 lb 15 oz (117 kg) (stated)  Current Body Weight: 258 lb (117 kg), 135.8 % IBW. Weight Source: Stated  Current BMI (kg/m2): 33.1  Usual Body Weight: 324 lb 1 oz (147 kg) (9/2022)  % Weight Change (Calculated): -20.4  Weight Adjustment For: No Adjustment                 BMI Categories: Obese Class 1 (BMI 30.0-34. 9)    Estimated Daily Nutrient Needs:  Energy Requirements Based On: Kcal/kg  Weight Used for Energy Requirements: Ideal  Energy (kcal/day): 4234-5771 (30-35 kcals/kg IBW)  Weight Used for Protein Requirements: Ideal  Protein (g/day):  (1-13 g/kg)  Method Used for Fluid Requirements: Standard Renal  Fluid (ml/day): fluids per nephrology    Nutrition Diagnosis: In context of acute illness or injury, Severe malnutrition related to increase demand for energy/nutrients, acute injury/trauma, swallowing difficulty as evidenced by poor intake prior to admission, nutrition support - enteral nutrition, weight loss (poor intake over 3 montns, weight loss greater than 7.5% in 3 months)    Nutrition Interventions:   Food and/or Nutrient Delivery: Continue NPO, Start Tube Feeding  Nutrition Education/Counseling: No recommendation at this time  Coordination of Nutrition Care: Continue to monitor while inpatient, Coordination of Care       Goals:     Goals: Initiate nutrition support       Nutrition Monitoring and Evaluation:   Behavioral-Environmental Outcomes: None Identified  Food/Nutrient Intake Outcomes: IVF Intake, Enteral Nutrition Intake/Tolerance  Physical Signs/Symptoms Outcomes: Biochemical Data, Chewing or Swallowing, Hemodynamic Status, GI Status, Skin, Weight, Nausea or Vomiting    Discharge Planning:     Too soon to determine     Yaz Guzman RD, LD  Contact: [phone]

## 2023-01-03 NOTE — PROGRESS NOTES
V2.0  [facility] Hospitalist Progress Note      Name:  Lis Chaudhry /Age/Sex: 1950  (71 y.o. male)   MRN & CSN:  [id number] & [id number] Encounter Date/Time: 1/3/2023 1:18 PM EST    Location:  7876/2114-L PCP: Andres Garcia [address] Day: 6    Assessment and Plan:   Lis Chaudhry is a 71 y.o. male with past medical history of coronary artery disease status post three-vessel CABG, permanent atrial fibrillation, hypertension, hyperlipidemia, COPD, tracheostomy and PEG tube status, who presents with progressively worsening shortness of breath on 2022. Acute on chronic respiratory failure   Sepsis present on admission secondary to HCAP  Tracheostomy dependence  On 4 L trach collar oxygen at nursing home, currently requiring 6 to 7 L  CT chest 2023, personally reviewed-concerning for right lower lobe consolidation with air bronchograms and minimal right-sided pleural effusion. Respiratory cultures negative, Blood cultures negative, COVID and rapid flu negative, MRSA screen negative  Continue cefepime to complete 7 days. Pulmonology following, appreciate recommendations  Infectious disease consulted     End-stage renal disease  Hypermagnesemia  On hemodialysis   Started on HD in 2022 after three-vessel CABG  Nephrology has been consulted for inpatient management of HD     Permanent atrial fibrillation  Long-term anticoagulation  Status post maze in 2022  Continue amiodarone  Lopressor  Continue Coumadin, pharmacy to dose     Coronary artery disease status post three-vessel CABG in 2022  Postprocedure complications include mediastinal infection requiring multiple washouts with IABP placement and transfer to [facility] for further management where he underwent washout and closure and removal of IABP  Tracheostomy dependence since CABG  Continue aspirin and statin     Hypothyroidism  TSH 11.28 and T4 0.8.   Increase Synthroid to 100 mcg [was on 75]. Recommend checking TSH again in a few weeks as an outpatient. Hypertension  Low normal blood pressure trend  Most recent echocardiogram with preserved ejection fraction  Has been changed to Lopressor admission Lopressor for heart rate control  Continue midodrine     Chronic macrocytic anemia  Baseline creatinine around 9-10  Anemia panel including a peripheral smear ordered for a.m. No active source of bleeding identified  Monitor CBC     Insulin-dependent diabetes mellitus type 2  HbA1c 12/29/22-7%  Continue Lantus and sliding scale insulin  Currently on renal formula of tube feeds     History of nonocclusive DVT  Diagnosed in September 2022  Continue Coumadin     Moderate protein calorie malnutrition  Dietitian following     Obesity, BMI 33.13    Diet Diet NPO  ADULT TUBE FEEDING; PEG; Renal Formula; Continuous; 10; Yes; 15; Q 4 hours; 65; 65; Q 4 hours   DVT Prophylaxis [] Lovenox, []  Heparin, [] SCDs, [] Ambulation,    [] Eliquis, [] Xarelto  [x] Coumadin [] other   Code Status Full Code   Disposition From: SNF  Expected Disposition: SNF  Estimated Date of Discharge: 1 to 3 days  Patient requires continued admission due to pneumonia   Surrogate Decision Maker/ POA      Subjective:     Chief Complaint: Shortness of Breath     Patient with continued cough and some shortness of breath. Patient states that entirely love for his tracheostomy to be removed and reversed. He is also interested in going to a different skilled nursing facility and will require new arrangements made    Review of Systems:    Review of Systems   Constitutional:  Positive for fatigue. Negative for activity change, appetite change, chills and fever. HENT:  Negative for congestion, hearing loss, sinus pressure, sinus pain, sore throat, trouble swallowing and voice change. Eyes:  Negative for visual disturbance. Respiratory:  Positive for shortness of breath.  Negative for cough, chest tightness and wheezing. Cardiovascular:  Negative for chest pain, palpitations and leg swelling. Gastrointestinal:  Negative for abdominal pain, constipation, diarrhea, nausea and vomiting. Endocrine: Negative for cold intolerance, heat intolerance and polyuria. Genitourinary:  Negative for dysuria. Musculoskeletal:  Negative for arthralgias, back pain and gait problem. Skin:  Negative for color change. Neurological:  Negative for dizziness, weakness and headaches. Psychiatric/Behavioral:  Negative for agitation and confusion. The patient is not nervous/anxious. Objective: Intake/Output Summary (Last 24 hours) at 1/3/2023 1529  Last data filed at 1/2/2023 2230  Gross per 24 hour   Intake 1031 ml   Output 100 ml   Net 931 ml          Vitals:   Vitals:    01/03/23 1215   BP: 105/62   Pulse:    Resp:    Temp:    SpO2:        Physical Exam:     Physical Exam  Constitutional:       General: He is not in acute distress. Appearance: Normal appearance. He is ill-appearing. HENT:      Head: Normocephalic and atraumatic. Nose: Nose normal.      Mouth/Throat:      Mouth: Mucous membranes are moist.      Pharynx: Oropharynx is clear. Eyes:      Extraocular Movements: Extraocular movements intact. Conjunctiva/sclera: Conjunctivae normal.      Pupils: Pupils are equal, round, and reactive to light. Cardiovascular:      Rate and Rhythm: Normal rate. Rhythm irregular. Pulses: Normal pulses. Heart sounds: Normal heart sounds. Pulmonary:      Effort: Pulmonary effort is normal.      Breath sounds: Rales present. Abdominal:      General: Abdomen is flat. Bowel sounds are normal.      Palpations: Abdomen is soft. Musculoskeletal:         General: Normal range of motion. Cervical back: Normal range of motion and neck supple. Skin:     General: Skin is warm and dry. Neurological:      General: No focal deficit present.       Mental Status: He is alert and oriented to person, place, and time. Mental status is at baseline. Psychiatric:         Mood and Affect: Mood normal.         Behavior: Behavior normal.         Thought Content:  Thought content normal.       Medications:   Medications:    traMADol  25 mg Oral Once    ipratropium-albuterol  1 ampule Inhalation TID    warfarin  0.5 mg Oral Daily    levothyroxine  100 mcg Oral Daily    metoprolol tartrate  12.5 mg Oral BID    acetylcysteine  4 mL Inhalation TID    guaiFENesin  300 mg Per G Tube Q6H    miconazole   Topical BID    insulin lispro  0-4 Units SubCUTAneous 4 times per day    aspirin  81 mg Per NG tube Daily    pantoprazole (PROTONIX) 40 mg injection  40 mg IntraVENous Daily    amiodarone  200 mg PEG Tube Daily    atorvastatin  40 mg PEG Tube Nightly    Nephronex  5 mL Feeding Tube Daily    sevelamer  1,600 mg Per G Tube Q8H    QUEtiapine  50 mg Per G Tube Nightly    midodrine  10 mg Oral TID WC    sodium chloride flush  5-40 mL IntraVENous 2 times per day    insulin glargine  10 Units SubCUTAneous Nightly    cefepime  1,000 mg IntraVENous Q24H    polyethylene glycol  17 g Oral Daily      Infusions:    sodium chloride 20 mL/hr at 01/02/23 1829    dextrose       PRN Meds: ipratropium-albuterol, 1 vial, Q4H PRN  albuterol sulfate HFA, 2 puff, Q6H PRN  melatonin, 9 mg, Nightly PRN  traZODone, 100 mg, Nightly PRN  sennosides-docusate sodium, 2 tablet, Daily PRN  sodium chloride flush, 5-40 mL, PRN  sodium chloride, , PRN  ondansetron, 4 mg, Q8H PRN   Or  ondansetron, 4 mg, Q6H PRN  polyethylene glycol, 17 g, Daily PRN  acetaminophen, 650 mg, Q6H PRN   Or  acetaminophen, 650 mg, Q6H PRN  glucose, 4 tablet, PRN  dextrose bolus, 125 mL, PRN   Or  dextrose bolus, 250 mL, PRN  glucagon (rDNA), 1 mg, PRN  dextrose, , Continuous PRN      Labs      Recent Results (from the past 24 hour(s))   POCT Glucose    Collection Time: 01/02/23  7:52 PM   Result Value Ref Range    POC Glucose 175 (H) 70 - 99 MG/DL   POCT Glucose    Collection Time: 01/03/23 12:09 AM   Result Value Ref Range    POC Glucose 160 (H) 70 - 99 MG/DL   Protime/INR & PTT    Collection Time: 01/03/23  5:24 AM   Result Value Ref Range    Protime 28.7 (H) 11.7 - 14.5 SECONDS    INR 2.21 INDEX    aPTT 35.2 25.1 - 37.1 SECONDS   POCT Glucose    Collection Time: 01/03/23  6:35 AM   Result Value Ref Range    POC Glucose 155 (H) 70 - 99 MG/DL   POCT Glucose    Collection Time: 01/03/23 11:04 AM   Result Value Ref Range    POC Glucose 145 (H) 70 - 99 MG/DL        Imaging/Diagnostics Last 24 Hours   CT CHEST WO CONTRAST    Result Date: 1/1/2023  EXAMINATION: CT OF THE CHEST WITHOUT CONTRAST 1/1/2023 4:35 pm TECHNIQUE: CT of the chest was performed without the administration of intravenous contrast. Multiplanar reformatted images are provided for review. Automated exposure control, iterative reconstruction, and/or weight based adjustment of the mA/kV was utilized to reduce the radiation dose to as low as reasonably achievable. COMPARISON: 09/16/2022, 04/18/2016 HISTORY: ORDERING SYSTEM PROVIDED HISTORY: shortness of breath TECHNOLOGIST PROVIDED HISTORY: Reason for exam:->shortness of breath Reason for Exam: shortness of breath FINDINGS: Mediastinum: Status post CABG. Mild cardiomegaly. No pericardial effusion. No lymphadenopathy. Tracheostomy tip is at the thoracic inlet. Lungs/pleura: Trace bilateral pleural effusions. Right centrilobular nodularity with complete opacification of the right lower lobe with minimal volume loss. Upper Abdomen: Unremarkable. Soft Tissues/Bones: No acute bone or soft tissue abnormality. Right infectious/inflammatory airways process with suspected right lower lobe pneumonia. Comment: Please note this report has been produced using speech recognition software and may contain errors related to that system including errors in grammar, punctuation, and spelling, as well as words and phrases that may be inappropriate.  If there are any questions or concerns please feel free to contact the dictating provider for clarification.      Electronically signed by Veronica Chang MD on 1/3/2023 at 3:29 PM

## 2023-01-03 NOTE — CARE COORDINATION
This RN CM to bedside to speak with patient regarding discharge plan. Patient is from [facility]. Patient states he does not want to return to [facility]. Patient does not want to go to [facility]. Patient would like referral sent to [facility]. I have sent a PS to Dr. Vandana Gomes requesting therapy orders as patient will need a precert for referral to SNF. This RN will send referral to [facility] once therapy recommendations are in.

## 2023-01-03 NOTE — CONSULTS
Infectious Disease Consult Note  1/3/2023   Patient Name: Jovany Morgan : 1950   Impression  Suspected RLL Trachestomy Associated Pneumonia:  Afebrile, no leukocytosis  -Rapid Flu A/B, COVID-19 Negative  -MRSA/MssA screen negative  Past -Resp culture: normal amy  -Resp culture: normal amy  -BC 0/2-NGTD  -CXR: Small right pleural effusion with bibasilar airspace opacities. 2023-CT Chest WO Contrast: Right infectious/inflammatory airways process with suspected RLL pneumonia  DMII/ Thyroid Disease:  CAD s/p PCI/ PAF on Coumadin:  ESRD on HD:  Chronic Trach/PEG:  KHUSHBU:  Obesity:  BMI:  33.13  Multi-morbidity: per PMHx  Plan:  DC IV vancomycin and cefepime  Start po Augmentin 500/125 mg q24h (HD dosing) cumulative x 14 days (end date 2023)   Trend CRP and Pct, ordered    Thank you for allowing me to consult in the care of this patient.  ------------------------  REASON FOR CONSULT: Infective syndrome \"recurrent tracheostomy associated pneumonia\"  Requested by: Dr. Villavicencio Refugio is a 71 y.o.  male with a history of PAF on Coumadin, anxiety/depression, CAD s/p PCI, COPD, ESRD on HD, chronic Trach/PEG (placed 2022), DMII, Thyroid disease, Dupuytren's contracture of the right hand, GERD, HTN, HLD, KHUSHBU and obesity who was admitted 2022 for further evaluation and management of increased SOB and secretions from his chronic trach. He also has been requiring oxygen at 2-3 L which is increased from his baseline of capped trach. His CXR was suspicious for pneumonia revealing a small right pleural effusion with bibasilar airspace opacities. He was started on IV empiric ABX therapy of cefepime and vancomycin. The vancomycin was [facility] after the MRSA screen was negative. Respiratory culture from 2022 showed normal respiratory amy. He has improved and was placed on room air on 1/3/2023. ?   Infectious diseases service was consulted to evaluate the pt, and recommend further investigative and therapeutic measures. ROS: Other systems reviewed Including eyes, ENT, respiratory, cardiovascular, GI, , dermatologic, neurologic, psych, hem/lymphatic, musculoskeletal and endocrine were negative other than what is mentioned above. Patient Active Problem List    Diagnosis Date Noted    Severe malnutrition ([address] 12/30/2022    Healthcare-associated pneumonia 12/29/2022    Dyspnea 12/12/2022    Moderate malnutrition ([address] 12/12/2022    CAD, multiple vessel 09/15/2022    Hypertension     Acute congestive heart failure ([address] 05/31/2022    Coronary artery disease involving native coronary artery of native heart with angina pectoris ([facility] 05/31/2022    Chronic renal disease, stage III [facility]) [[id number]] 05/31/2022    Thyroid disease     Hyperlipidemia     Type 2 diabetes mellitus ([address] 11/09/2015    Atrial fibrillation ([facility]     Morbid obesity (HCC)     COPD (chronic obstructive pulmonary disease) ([facility]     Sleep apnea     CAD (coronary artery disease) 03/21/2005     Past Medical History:   Diagnosis Date    Allergic rhinitis     Anticoagulated on Coumadin     1/6/17-**[facility] to follow pt's PT/INRs, along w/prescribing his Coumadin. **    Anxiety     Arthritis     Atrial fibrillation (HCC)     On Coumadin.     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 09/21/2022    positive at 15C, negative at 18C    COPD (chronic obstructive pulmonary disease) (HCC)     Depression     Diabetes mellitus (HCC)     NIDDM- dx over 10 yrs ago- follows with Dr Sindy Pinon's contracture of hand     Right hand    Family history of cardiovascular disease     Father-MI    GERD (gastroesophageal reflux disease)     H/O cardiac catheterization 03/2007    cardiac cath- stent LAD patent, Ijexzuvd-63-72%, RCA 40-50%    H/O cardiac catheterization 06/12/2008    cardiac cath- mild disease mid RCA    H/O cardiovascular stress test 04/10/2014 cardiolite- normal, no ischemia, EF63%    H/O cardiovascular stress test 09/21/2016    EF59% normal study  pt in atrial fib    H/O cardiovascular stress test 09/20/2017    EF 60%   afib    H/O cardiovascular stress test 11/07/2018    EF60% normal study    H/O Doppler ultrasound     1/11/2010-CAROTID_Intimal thickening but no significant atherosclerotic plaque noted in LAUREN. Doppler flow velocities within the LAUREN are WNL. Intimal thickening but no significant atherosclerotic plaque noted in LICA. Doppler flow velociities within the LICA are WNL. H/O echocardiogram 04/10/2014    EF 60%, normal LV systolic function, mild mitral and tricuspid insufficiencies, no pericardial effusion.     H/O echocardiogram 09/21/2016    EF60% normal study    H/O echocardiogram 11/07/2018    EF55-60% no significant valular disease    H/O hiatal hernia     Hx of Venous Doppler 03/14/2019    Significant reflux in Left Deep System, No reflux in right lower extremity, No DVT or SVT    Hyperlipidemia     Hypertension     Obesity     S/P PTCA (percutaneous transluminal coronary angioplasty) 03/21/2005    s/p PTCA with 3.5 X 16 TAXUS stent to LAD- follows with Dr Phylicia Chambers    Sleep apnea     had sleep study done 10+ yrs ago- has cpap but does not use it    Thyroid disease     hypothyroid      Past Surgical History:   Procedure Laterality Date    CARDIAC CATHETERIZATION  6/12/08    mild disease mid RCA,  stent to LAD patent,    CARDIAC CATHETERIZATION  3/07    Stent to LAD patent, Diagonal 40-50%, RCA 40-50%    CARDIAC CATHETERIZATION  3/05    COLONOSCOPY  2011    COLONOSCOPY  5/18/16    1 polyp removed, diverticulosis and hemorrhoids noted    CORONARY ANGIOPLASTY WITH STENT PLACEMENT  03/21/2005    PTCA with 3.5 x 16 TAXUS stent to LAD    CORONARY ARTERY BYPASS GRAFT N/A 9/22/2022    CABG CORONARY ARTERY BYPASS x3 (LIMA TO LAD, SVG TO PDA-RCA SEQUENTIAL) , INTRAOPERATIVE MILTON, ENDOVEIN HARVEST OF LEFT SAPHENOUS VEIN, MODIFIED MAZE PROCEDURE, LEFT ATRIAL APPENDAGE LIGATION, INTERCOSTAL NERVE BLOCK, INTRA-AORTIC BALLOON PUMP INSERTION performed by Sandro Collado MD at [address] [place] [address] Entrance, COLON, DIAGNOSTIC      egd    HAND SURGERY Right [id number] DISLOCATN+FIXATN Right 6/3/2019    RIGHT OPEN REDUCTION INTERNAL FIXATION OF DISTAL TIBIA  AND FIBULA performed by Lesia Crigler, MD at [address] [facility] N/A 2022    STERNUM WASHOUT performed by Sandro Collado MD at [facility] OR      Family History   Problem Relation Age of Onset    Cancer Mother         breast ca    Coronary Art Dis Father          MI    Heart Disease Father     Rheum Arthritis Other     Rheum Arthritis Sister     No Known Problems Brother     Rheum Arthritis Sister       Infectious disease related family history - not contibutory. SOCIAL HISTORY  Social History     Tobacco Use    Smoking status: Former     Packs/day: 1.00     Years: 10.00     Pack years: 10.00     Types: Cigarettes     Quit date: 1976     Years since quittin.0    Smokeless tobacco: Never   Substance Use Topics    Alcohol use: Not Currently     Alcohol/week: 6.0 standard drinks     Types: 6 Cans of beer per week     Comment: caffeine 2 cups of tea a day       Born:[place]  Lives: [place], [place] prior to [place], now [address] from a [employer]  No recent travel of significance. No recent unusual exposures. NO pets   ? ALLERGIES  No Known Allergies   MEDICATIONS  Reviewed and are per the chart/EMR. ?   Antibiotics:   Present:  Augmentin 1/3-  Past:  Cefepime -1/3  Vancomycin ?  -------------------------------------------------------------------------------------------------------------------    Vital Signs:  Vitals:    23 1215   BP: 105/62   Pulse:    Resp:    Temp:    SpO2:          Exam:    VS: noted; wt 258 lb (117 kg) Height   Gen: alert and oriented X3, no distress  Skin: no stigmata of endocarditis  Wounds: C/D/I   HEMT: AT/NC Oropharynx pink, moist, and without lesions or exudates; dentition in good state of repair  Eyes: PERRLA, EOMI, conjunctiva pink, sclera anicteric. Neck: Supple. Trachea midline. No LAD. Trach intact midline capped off, room air. Chest: no distress and CTA. Good air movement. Room air with trach capped. Heart: RRR and no MRG. Abd: soft, non-distended, no tenderness, no hepatomegaly. Normoactive bowel sounds. Ext: no clubbing, cyanosis, or edema  Neuro: Mental status intact. CN 2-12 intact and no focal sensory or motor deficits    ? Diagnostic Studies: reviewed  12/29/2022 XR Chest Portable:  Impression   Small right pleural effusion with bibasilar airspace opacities. This could   represent atelectasis or pneumonia in the appropriate clinical setting         1/1/2023 CT Chest WO Contrast:  Impression   Right infectious/inflammatory airways process with suspected right lower lobe   pneumonia. ??  I have examined this patient and available medical records on this date and have made the above observations, conclusions and recommendations. Electronically signed by: Electronically signed by Karlie Dumas.  ANKITA Johnston CNP on 1/3/2023 at 2:00 PM

## 2023-01-04 LAB
ANION GAP SERPL CALCULATED.3IONS-SCNC: 18 MMOL/L (ref 4–16)
APTT: 34.5 SECONDS (ref 25.1–37.1)
BASOPHILS ABSOLUTE: 0.1 K/CU MM
BASOPHILS RELATIVE PERCENT: 1.3 % (ref 0–1)
BUN BLDV-MCNC: 56 MG/DL (ref 6–23)
C-REACTIVE PROTEIN, HIGH SENSITIVITY: 14.1 MG/L
CALCIUM SERPL-MCNC: 8.9 MG/DL (ref 8.3–10.6)
CHLORIDE BLD-SCNC: 92 MMOL/L (ref 99–110)
CO2: 22 MMOL/L (ref 21–32)
CREAT SERPL-MCNC: 6.2 MG/DL (ref 0.9–1.3)
DIFFERENTIAL TYPE: ABNORMAL
EOSINOPHILS ABSOLUTE: 0.4 K/CU MM
EOSINOPHILS RELATIVE PERCENT: 3.8 % (ref 0–3)
GFR SERPL CREATININE-BSD FRML MDRD: 9 ML/MIN/1.73M2
GLUCOSE BLD-MCNC: 105 MG/DL (ref 70–99)
GLUCOSE BLD-MCNC: 125 MG/DL (ref 70–99)
GLUCOSE BLD-MCNC: 133 MG/DL (ref 70–99)
GLUCOSE BLD-MCNC: 134 MG/DL (ref 70–99)
GLUCOSE BLD-MCNC: 138 MG/DL (ref 70–99)
HCT VFR BLD CALC: 26.6 % (ref 42–52)
HEMOGLOBIN: 9.1 GM/DL (ref 13.5–18)
IMMATURE NEUTROPHIL %: 1.8 % (ref 0–0.43)
INR BLD: 2.03 INDEX
LYMPHOCYTES ABSOLUTE: 3.2 K/CU MM
LYMPHOCYTES RELATIVE PERCENT: 31.9 % (ref 24–44)
MCH RBC QN AUTO: 37.1 PG (ref 27–31)
MCHC RBC AUTO-ENTMCNC: 34.2 % (ref 32–36)
MCV RBC AUTO: 108.6 FL (ref 78–100)
MONOCYTES ABSOLUTE: 0.9 K/CU MM
MONOCYTES RELATIVE PERCENT: 8.9 % (ref 0–4)
NUCLEATED RBC %: 0.6 %
PDW BLD-RTO: 23.9 % (ref 11.7–14.9)
PLATELET # BLD: 179 K/CU MM (ref 140–440)
PMV BLD AUTO: 8.4 FL (ref 7.5–11.1)
POTASSIUM SERPL-SCNC: 4.2 MMOL/L (ref 3.5–5.1)
PROCALCITONIN: 0.53
PROTHROMBIN TIME: 26.4 SECONDS (ref 11.7–14.5)
RBC # BLD: 2.45 M/CU MM (ref 4.6–6.2)
SEGMENTED NEUTROPHILS ABSOLUTE COUNT: 5.2 K/CU MM
SEGMENTED NEUTROPHILS RELATIVE PERCENT: 52.3 % (ref 36–66)
SMEAR REVIEW: NORMAL
SODIUM BLD-SCNC: 132 MMOL/L (ref 135–145)
TOTAL IMMATURE NEUTOROPHIL: 0.18 K/CU MM
TOTAL NUCLEATED RBC: 0.1 K/CU MM
WBC # BLD: 10 K/CU MM (ref 4–10.5)

## 2023-01-04 PROCEDURE — 1200000000 HC SEMI PRIVATE

## 2023-01-04 PROCEDURE — 2500000003 HC RX 250 WO HCPCS: Performed by: INTERNAL MEDICINE

## 2023-01-04 PROCEDURE — 6370000000 HC RX 637 (ALT 250 FOR IP): Performed by: NURSE PRACTITIONER

## 2023-01-04 PROCEDURE — 86140 C-REACTIVE PROTEIN: CPT

## 2023-01-04 PROCEDURE — 6360000002 HC RX W HCPCS: Performed by: INTERNAL MEDICINE

## 2023-01-04 PROCEDURE — 90935 HEMODIALYSIS ONE EVALUATION: CPT

## 2023-01-04 PROCEDURE — 94640 AIRWAY INHALATION TREATMENT: CPT

## 2023-01-04 PROCEDURE — 6370000000 HC RX 637 (ALT 250 FOR IP): Performed by: INTERNAL MEDICINE

## 2023-01-04 PROCEDURE — 6370000000 HC RX 637 (ALT 250 FOR IP): Performed by: HOSPITALIST

## 2023-01-04 PROCEDURE — 85610 PROTHROMBIN TIME: CPT

## 2023-01-04 PROCEDURE — 6370000000 HC RX 637 (ALT 250 FOR IP): Performed by: STUDENT IN AN ORGANIZED HEALTH CARE EDUCATION/TRAINING PROGRAM

## 2023-01-04 PROCEDURE — C9113 INJ PANTOPRAZOLE SODIUM, VIA: HCPCS | Performed by: HOSPITALIST

## 2023-01-04 PROCEDURE — 85730 THROMBOPLASTIN TIME PARTIAL: CPT

## 2023-01-04 PROCEDURE — 80048 BASIC METABOLIC PNL TOTAL CA: CPT

## 2023-01-04 PROCEDURE — 84145 PROCALCITONIN (PCT): CPT

## 2023-01-04 PROCEDURE — 6360000002 HC RX W HCPCS: Performed by: HOSPITALIST

## 2023-01-04 PROCEDURE — 85025 COMPLETE CBC W/AUTO DIFF WBC: CPT

## 2023-01-04 PROCEDURE — 36415 COLL VENOUS BLD VENIPUNCTURE: CPT

## 2023-01-04 PROCEDURE — 94761 N-INVAS EAR/PLS OXIMETRY MLT: CPT

## 2023-01-04 PROCEDURE — 82962 GLUCOSE BLOOD TEST: CPT

## 2023-01-04 PROCEDURE — A4216 STERILE WATER/SALINE, 10 ML: HCPCS | Performed by: HOSPITALIST

## 2023-01-04 PROCEDURE — 2580000003 HC RX 258: Performed by: HOSPITALIST

## 2023-01-04 RX ORDER — TRAMADOL HYDROCHLORIDE 50 MG/1
50 TABLET ORAL ONCE
Status: COMPLETED | OUTPATIENT
Start: 2023-01-04 | End: 2023-01-04

## 2023-01-04 RX ADMIN — Medication 9 MG: at 22:30

## 2023-01-04 RX ADMIN — POLYETHYLENE GLYCOL (3350) 17 G: 17 POWDER, FOR SOLUTION ORAL at 15:54

## 2023-01-04 RX ADMIN — TRAMADOL HYDROCHLORIDE 50 MG: 50 TABLET, COATED ORAL at 01:00

## 2023-01-04 RX ADMIN — ACETYLCYSTEINE 400 MG: 100 SOLUTION ORAL; RESPIRATORY (INHALATION) at 22:18

## 2023-01-04 RX ADMIN — IPRATROPIUM BROMIDE AND ALBUTEROL SULFATE 1 AMPULE: 2.5; .5 SOLUTION RESPIRATORY (INHALATION) at 22:12

## 2023-01-04 RX ADMIN — SEVELAMER CARBONATE 1600 MG: 800 TABLET, FILM COATED ORAL at 06:41

## 2023-01-04 RX ADMIN — GUAIFENESIN 300 MG: 100 SOLUTION ORAL at 06:41

## 2023-01-04 RX ADMIN — ACETYLCYSTEINE 400 MG: 100 SOLUTION ORAL; RESPIRATORY (INHALATION) at 07:57

## 2023-01-04 RX ADMIN — MICONAZOLE NITRATE: 2 POWDER TOPICAL at 22:29

## 2023-01-04 RX ADMIN — IPRATROPIUM BROMIDE AND ALBUTEROL SULFATE 1 AMPULE: 2.5; .5 SOLUTION RESPIRATORY (INHALATION) at 07:57

## 2023-01-04 RX ADMIN — ATORVASTATIN CALCIUM 40 MG: 40 TABLET, FILM COATED ORAL at 22:30

## 2023-01-04 RX ADMIN — QUETIAPINE FUMARATE 50 MG: 25 TABLET ORAL at 22:30

## 2023-01-04 RX ADMIN — SEVELAMER CARBONATE 1600 MG: 800 TABLET, FILM COATED ORAL at 16:02

## 2023-01-04 RX ADMIN — SODIUM CHLORIDE, PRESERVATIVE FREE 40 MG: 5 INJECTION INTRAVENOUS at 15:58

## 2023-01-04 RX ADMIN — SEVELAMER CARBONATE 1600 MG: 800 TABLET, FILM COATED ORAL at 22:30

## 2023-01-04 RX ADMIN — WARFARIN SODIUM 0.5 MG: 1 TABLET ORAL at 20:21

## 2023-01-04 RX ADMIN — ASPIRIN 81 MG CHEWABLE TABLET 81 MG: 81 TABLET CHEWABLE at 15:58

## 2023-01-04 RX ADMIN — AMIODARONE HYDROCHLORIDE 200 MG: 200 TABLET ORAL at 16:04

## 2023-01-04 RX ADMIN — LEVOTHYROXINE SODIUM 100 MCG: 0.1 TABLET ORAL at 06:41

## 2023-01-04 RX ADMIN — MIDODRINE HYDROCHLORIDE 10 MG: 5 TABLET ORAL at 15:57

## 2023-01-04 RX ADMIN — SODIUM CHLORIDE, PRESERVATIVE FREE 10 ML: 5 INJECTION INTRAVENOUS at 22:30

## 2023-01-04 RX ADMIN — GUAIFENESIN 300 MG: 100 SOLUTION ORAL at 20:21

## 2023-01-04 RX ADMIN — Medication 9 MG: at 02:04

## 2023-01-04 RX ADMIN — EPOETIN ALFA-EPBX 8000 UNITS: 4000 INJECTION, SOLUTION INTRAVENOUS; SUBCUTANEOUS at 10:16

## 2023-01-04 RX ADMIN — GUAIFENESIN 300 MG: 100 SOLUTION ORAL at 23:24

## 2023-01-04 RX ADMIN — AMOXICILLIN AND CLAVULANATE POTASSIUM 1 TABLET: 500; 125 TABLET, FILM COATED ORAL at 15:58

## 2023-01-04 RX ADMIN — MIDODRINE HYDROCHLORIDE 10 MG: 5 TABLET ORAL at 09:58

## 2023-01-04 ASSESSMENT — ENCOUNTER SYMPTOMS
VOMITING: 0
COLOR CHANGE: 0
CHEST TIGHTNESS: 0
SHORTNESS OF BREATH: 1
NAUSEA: 0
SORE THROAT: 0
SINUS PAIN: 0
SINUS PRESSURE: 0
COUGH: 0
TROUBLE SWALLOWING: 0
CONSTIPATION: 0
DIARRHEA: 0
ABDOMINAL PAIN: 0
WHEEZING: 0
BACK PAIN: 0
VOICE CHANGE: 0

## 2023-01-04 ASSESSMENT — PAIN DESCRIPTION - DESCRIPTORS: DESCRIPTORS: ACHING

## 2023-01-04 ASSESSMENT — PAIN DESCRIPTION - LOCATION: LOCATION: BACK

## 2023-01-04 ASSESSMENT — PAIN SCALES - GENERAL: PAINLEVEL_OUTOF10: 4

## 2023-01-04 NOTE — PROGRESS NOTES
Physician Progress Note      PATIENT:               Janes Dorado  CSN #:                  [id number]  :                       1950  ADMIT DATE:       2022 2:03 PM  [address] [place] DATE:  RESPONDING  PROVIDER #:        Essence PERAZA          QUERY TEXT:    Patient admitted with Pneumonia. Noted documentation of sepsis in 23   progress note. In order to support the diagnosis of sepsis, please include   additional clinical indicators in your documentation. Or please document if   the diagnosis of sepsis has been ruled out after further study    The medical record reflects the following:  Risk Factors: PNA, trach dependent  Clinical Indicators: 1/2 PN \"Sepsis present on admission secondary to HCAP\",   WBC 10.3 - 7.9, lactate 1.7, T- max 98.7. Treatment: CBC, lactate, IV antibiotics. Thank you,  Franki Hernandez BSN, RN, CDS  [phone]  Options provided:  -- Sepsis present as evidenced by, Please document evidence. -- Sepsis was ruled out after study  -- Other - I will add my own diagnosis  -- Disagree - Not applicable / Not valid  -- Disagree - Clinically unable to determine / Unknown  -- Refer to Clinical Documentation Reviewer    PROVIDER RESPONSE TEXT:    Sepsis was ruled out after study.     Query created by: Mojgan Evans on 1/3/2023 3:22 PM      Electronically signed by:  Annel Carney 2023 2:27 PM

## 2023-01-04 NOTE — PROGRESS NOTES
V2.0  [facility] [place] Hospitalist Progress Note      Name:  Bert Tong /Age/Sex: 1950  (71 y.o. male)   MRN & CSN:  [id number] & [id number] Encounter Date/Time: 2023 1:18 PM EST    Location:  8331/5914-V PCP: Noah Rosado, APRN - [facility] Day: 7    Assessment and Plan:   Bert Tong is a 71 y.o. male with past medical history of coronary artery disease status post three-vessel CABG, permanent atrial fibrillation, hypertension, hyperlipidemia, COPD, tracheostomy and PEG tube status, who presents with progressively worsening shortness of breath on 2022. Acute on chronic respiratory failure   Sepsis present on admission secondary to HCAP  Tracheostomy dependence  On 4 L trach collar oxygen at nursing home, currently requiring 6 to 7 L. CT chest 2023, personally reviewed-concerning for right lower lobe consolidation with air bronchograms and minimal right-sided pleural effusion. Respiratory cultures negative, Blood cultures negative, COVID and rapid flu negative, MRSA screen negative  Continue cefepime to complete 7 days. Pulmonology following, appreciate recommendations  Infectious disease consulted     End-stage renal disease  Hypermagnesemia  On hemodialysis   Started on HD in 2022 after three-vessel CABG  Nephrology has been consulted for inpatient management of HD     Permanent atrial fibrillation  Long-term anticoagulation  Status post maze in 2022  Continue amiodarone  Lopressor  Continue Coumadin, pharmacy to dose     Coronary artery disease status post three-vessel CABG in 2022  Postprocedure complications include mediastinal infection requiring multiple washouts with IABP placement and transfer to [facility] for further management where he underwent washout and closure and removal of IABP  Tracheostomy dependence since CABG  Continue aspirin and statin     Hypothyroidism  TSH 11.28 and T4 0.8.   Increase Synthroid to 100 mcg [was on 75]. Recommend checking TSH again in a few weeks as an outpatient. Hypertension  Low normal blood pressure trend  Most recent echocardiogram with preserved ejection fraction  Has been changed to Lopressor admission Lopressor for heart rate control  Continue midodrine     Chronic macrocytic anemia  Baseline creatinine around 9-10  Anemia panel including a peripheral smear ordered for a.m. No active source of bleeding identified  Monitor CBC     Insulin-dependent diabetes mellitus type 2  HbA1c 12/29/22-7%  Continue Lantus and sliding scale insulin  Currently on renal formula of tube feeds     History of nonocclusive DVT  Diagnosed in September 2022  Continue Coumadin     Moderate protein calorie malnutrition  Dietitian following     Obesity, BMI 33.13    Diet Diet NPO  ADULT TUBE FEEDING; PEG; Renal Formula; Continuous; 10; Yes; 15; Q 4 hours; 65; 65; Q 4 hours   DVT Prophylaxis [] Lovenox, []  Heparin, [] SCDs, [] Ambulation,    [] Eliquis, [] Xarelto  [x] Coumadin [] other   Code Status Full Code   Disposition From: SNF  Expected Disposition: SNF  Estimated Date of Discharge: 1 to 3 days  Patient requires continued admission due to pneumonia   Surrogate Decision Maker/ POA      Subjective:     Chief Complaint: Shortness of Breath     Patient does have some cough and shortness of breath. Has been transitioned to oral antibiotics. Is medically now ready for discharge when arrangements are needed for SNF placement and dialysis at the time of discharge. Review of Systems:    Review of Systems   Constitutional:  Positive for fatigue. Negative for activity change, appetite change, chills and fever. HENT:  Negative for congestion, hearing loss, sinus pressure, sinus pain, sore throat, trouble swallowing and voice change. Eyes:  Negative for visual disturbance. Respiratory:  Positive for shortness of breath. Negative for cough, chest tightness and wheezing.     Cardiovascular: Negative for chest pain, palpitations and leg swelling. Gastrointestinal:  Negative for abdominal pain, constipation, diarrhea, nausea and vomiting. Endocrine: Negative for cold intolerance, heat intolerance and polyuria. Genitourinary:  Negative for dysuria. Musculoskeletal:  Negative for arthralgias, back pain and gait problem. Skin:  Negative for color change. Neurological:  Negative for dizziness, weakness and headaches. Psychiatric/Behavioral:  Negative for agitation and confusion. The patient is not nervous/anxious. Objective: Intake/Output Summary (Last 24 hours) at 1/4/2023 1330  Last data filed at 1/4/2023 1156  Gross per 24 hour   Intake 500 ml   Output 1500 ml   Net -1000 ml          Vitals:   Vitals:    01/04/23 1210   BP: (!) 94/55   Pulse: 87   Resp: 18   Temp: 97.3 °F (36.3 °C)   SpO2: 92%       Physical Exam:     Physical Exam  Constitutional:       General: He is not in acute distress. Appearance: Normal appearance. He is ill-appearing. HENT:      Head: Normocephalic and atraumatic. Nose: Nose normal.      Mouth/Throat:      Mouth: Mucous membranes are moist.      Pharynx: Oropharynx is clear. Eyes:      Extraocular Movements: Extraocular movements intact. Conjunctiva/sclera: Conjunctivae normal.      Pupils: Pupils are equal, round, and reactive to light. Cardiovascular:      Rate and Rhythm: Normal rate. Rhythm irregular. Pulses: Normal pulses. Heart sounds: Normal heart sounds. Pulmonary:      Effort: Pulmonary effort is normal.      Breath sounds: Rales present. Abdominal:      General: Abdomen is flat. Bowel sounds are normal.      Palpations: Abdomen is soft. Musculoskeletal:         General: Normal range of motion. Cervical back: Normal range of motion and neck supple. Skin:     General: Skin is warm and dry. Neurological:      General: No focal deficit present.       Mental Status: He is alert and oriented to person, place, and time. Mental status is at baseline. Psychiatric:         Mood and Affect: Mood normal.         Behavior: Behavior normal.         Thought Content:  Thought content normal.       Medications:   Medications:    traMADol  25 mg Oral Once    ipratropium-albuterol  1 ampule Inhalation TID    warfarin  0.5 mg Oral Daily    amoxicillin-clavulanate  1 tablet Oral Daily    levothyroxine  100 mcg Oral Daily    metoprolol tartrate  12.5 mg Oral BID    acetylcysteine  4 mL Inhalation TID    guaiFENesin  300 mg Per G Tube Q6H    miconazole   Topical BID    insulin lispro  0-4 Units SubCUTAneous 4 times per day    aspirin  81 mg Per NG tube Daily    pantoprazole (PROTONIX) 40 mg injection  40 mg IntraVENous Daily    amiodarone  200 mg PEG Tube Daily    atorvastatin  40 mg PEG Tube Nightly    Nephronex  5 mL Feeding Tube Daily    sevelamer  1,600 mg Per G Tube Q8H    QUEtiapine  50 mg Per G Tube Nightly    midodrine  10 mg Oral TID WC    sodium chloride flush  5-40 mL IntraVENous 2 times per day    insulin glargine  10 Units SubCUTAneous Nightly    polyethylene glycol  17 g Oral Daily      Infusions:    sodium chloride 20 mL/hr at 01/02/23 1829    dextrose       PRN Meds: ipratropium-albuterol, 1 vial, Q4H PRN  albuterol sulfate HFA, 2 puff, Q6H PRN  melatonin, 9 mg, Nightly PRN  traZODone, 100 mg, Nightly PRN  sennosides-docusate sodium, 2 tablet, Daily PRN  sodium chloride flush, 5-40 mL, PRN  sodium chloride, , PRN  ondansetron, 4 mg, Q8H PRN   Or  ondansetron, 4 mg, Q6H PRN  polyethylene glycol, 17 g, Daily PRN  acetaminophen, 650 mg, Q6H PRN   Or  acetaminophen, 650 mg, Q6H PRN  glucose, 4 tablet, PRN  dextrose bolus, 125 mL, PRN   Or  dextrose bolus, 250 mL, PRN  glucagon (rDNA), 1 mg, PRN  dextrose, , Continuous PRN      Labs      Recent Results (from the past 24 hour(s))   POCT Glucose    Collection Time: 01/03/23  4:16 PM   Result Value Ref Range    POC Glucose 136 (H) 70 - 99 MG/DL   POCT Glucose    Collection Time: 01/03/23  9:33 PM   Result Value Ref Range    POC Glucose 141 (H) 70 - 99 MG/DL   POCT Glucose    Collection Time: 01/03/23 11:23 PM   Result Value Ref Range    POC Glucose 127 (H) 70 - 99 MG/DL   Protime/INR & PTT    Collection Time: 01/04/23  5:14 AM   Result Value Ref Range    Protime 26.4 (H) 11.7 - 14.5 SECONDS    INR 2.03 INDEX    aPTT 34.5 25.1 - 37.1 SECONDS   Basic Metabolic Panel w/ Reflex to MG    Collection Time: 01/04/23  5:14 AM   Result Value Ref Range    Sodium 132 (L) 135 - 145 MMOL/L    Potassium 4.2 3.5 - 5.1 MMOL/L    Chloride 92 (L) 99 - 110 mMol/L    CO2 22 21 - 32 MMOL/L    Anion Gap 18 (H) 4 - 16    BUN 56 (H) 6 - 23 MG/DL    Creatinine 6.2 (H) 0.9 - 1.3 MG/DL    Est, Glom Filt Rate 9 (L) >60 mL/min/1.73m2    Glucose 105 (H) 70 - 99 MG/DL    Calcium 8.9 8.3 - 10.6 MG/DL   CBC with Auto Differential    Collection Time: 01/04/23  5:14 AM   Result Value Ref Range    WBC 10.0 4.0 - 10.5 K/CU MM    RBC 2.45 (L) 4.6 - 6.2 M/CU MM    Hemoglobin 9.1 (L) 13.5 - 18.0 GM/DL    Hematocrit 26.6 (L) 42 - 52 %    .6 (H) 78 - 100 FL    MCH 37.1 (H) 27 - 31 PG    MCHC 34.2 32.0 - 36.0 %    RDW 23.9 (H) 11.7 - 14.9 %    Platelets 646 593 - 685 K/CU MM    MPV 8.4 7.5 - 11.1 FL    Differential Type AUTOMATED DIFFERENTIAL     Segs Relative 52.3 36 - 66 %    Lymphocytes % 31.9 24 - 44 %    Monocytes % 8.9 (H) 0 - 4 %    Eosinophils % 3.8 (H) 0 - 3 %    Basophils % 1.3 (H) 0 - 1 %    Segs Absolute 5.2 K/CU MM    Lymphocytes Absolute 3.2 K/CU MM    Monocytes Absolute 0.9 K/CU MM    Eosinophils Absolute 0.4 K/CU MM    Basophils Absolute 0.1 K/CU MM    Nucleated RBC % 0.6 %    Total Nucleated RBC 0.1 K/CU MM    Total Immature Neutrophil 0.18 K/CU MM    Immature Neutrophil % 1.8 (H) 0 - 0.43 %   C-Reactive Protein    Collection Time: 01/04/23  5:14 AM   Result Value Ref Range    CRP High Sensitivity 14.1 (H) <5.0 mg/L   Procalcitonin    Collection Time: 01/04/23  5:14 AM   Result Value Ref Range    Procalcitonin 0.529    POCT Glucose    Collection Time: 01/04/23  5:38 AM   Result Value Ref Range    POC Glucose 125 (H) 70 - 99 MG/DL   POCT Glucose    Collection Time: 01/04/23 12:51 PM   Result Value Ref Range    POC Glucose 138 (H) 70 - 99 MG/DL        Imaging/Diagnostics Last 24 Hours   CT CHEST WO CONTRAST    Result Date: 1/1/2023  EXAMINATION: CT OF THE CHEST WITHOUT CONTRAST 1/1/2023 4:35 pm TECHNIQUE: CT of the chest was performed without the administration of intravenous contrast. Multiplanar reformatted images are provided for review. Automated exposure control, iterative reconstruction, and/or weight based adjustment of the mA/kV was utilized to reduce the radiation dose to as low as reasonably achievable. COMPARISON: 09/16/2022, 04/18/2016 HISTORY: ORDERING SYSTEM PROVIDED HISTORY: shortness of breath TECHNOLOGIST PROVIDED HISTORY: Reason for exam:->shortness of breath Reason for Exam: shortness of breath FINDINGS: Mediastinum: Status post CABG. Mild cardiomegaly. No pericardial effusion. No lymphadenopathy. Tracheostomy tip is at the thoracic inlet. Lungs/pleura: Trace bilateral pleural effusions. Right centrilobular nodularity with complete opacification of the right lower lobe with minimal volume loss. Upper Abdomen: Unremarkable. Soft Tissues/Bones: No acute bone or soft tissue abnormality. Right infectious/inflammatory airways process with suspected right lower lobe pneumonia. Comment: Please note this report has been produced using speech recognition software and may contain errors related to that system including errors in grammar, punctuation, and spelling, as well as words and phrases that may be inappropriate. If there are any questions or concerns please feel free to contact the dictating provider for clarification.      Electronically signed by Dell Em MD on 1/4/2023 at 1:30 PM

## 2023-01-04 NOTE — PROGRESS NOTES
Glenroy Drake, 1950, [id number]/5115-F, 1/4/2023    Attempted OT/PT evals this AM, pt LO for dialysis. Will re-attempt as able and appropriate.     Zulema Amin, OTR/L  1/4/2023, 11:23 AM

## 2023-01-04 NOTE — PROGRESS NOTES
PHARMACY ANTICOAGULATION MONITORING SERVICE    Sveta Meier is a 71 y.o. male on warfarin therapy for Persistent A. Fib, H/O non-occlusive Rt. Fem DVT. Pharmacy consulted by Dr. Laura Laguerre for monitoring and adjustment of treatment. Indication for anticoagulation: Persistent A. Fib, H/O non-occlusive Rt. Fem DVT  INR goal: 2-3  Warfarin dose prior to admission: 2mg daily    Pertinent Laboratory Values   Recent Labs     01/04/23  0514   INR 2.03   HGB 9.1*   HCT 26.6*          Assessment/Plan:  Drug Interactions: home amiodarone, trazodone, aspirin, levothyroxine  Patient INR with significant increase from 2.28 on 12/31 to 2.97 on 1/1. Give warfarin 0.5mg dose daily and follow INR trends tomorrow. Pharmacy will continue to monitor and adjust warfarin therapy as indicated    Thank you for the consult.   Sophie Encinas, [facility]  1/4/2023 9:33 AM

## 2023-01-04 NOTE — PROGRESS NOTES
Removed 1 L fluid      Patient Name: Alli Craven  Patient : 1950  MRN: [id number]     Acct: [de-identified]  Date of Admission: 2022  Room/Bed: 9441/1535-J  Code Status:  Full Code  Allergies: No Known Allergies  Diagnosis:    Patient Active Problem List   Diagnosis    Atrial fibrillation ([facility]    CAD (coronary artery disease)    Morbid obesity ([facility]    COPD (chronic obstructive pulmonary disease) ([facility]    Sleep apnea    Type 2 diabetes mellitus ([facility]    Thyroid disease    Hyperlipidemia    Acute congestive heart failure ([facility]    Coronary artery disease involving native coronary artery of native heart with angina pectoris ([facility]    Chronic renal disease, stage III ([facility] [222628]    Hypertension    CAD, multiple vessel    Dyspnea    Moderate malnutrition ([facility]    Pneumonia of both lungs due to infectious organism    Severe malnutrition (HCC)    Chronic respiratory failure with hypoxia ([facility]    Aspiration pneumonia of right lower lobe (HCC)    ESRD on dialysis ([facility]         Treatment:  Hemodilaysis 2:1  Priority: Routine  Location: Acute Room    Diabetic: Yes  NPO: No  Isolation Precautions: Dialysis     Consent for Treatment Verified: Yes  Blood Consent Verified: Not Applicable     Safety Verified: Identify (I), Consent (C), Equipment (E), HepB Status (B), Orders Complete (O), Access Verified (A), and Timeliness (T)  Time out performed prior to access at 0845 hours. Report Received from Primary RN at 0730 hours. Primary RN (First Initial, Last Name, Title):  Norman FINLEY  Incapacitated Nurse Education Completed: Not Applicable     HBsAg ONLY:  Date Drawn: 2022       Results: Negative  HBsAb:  Date Drawn:  2022       Results: Immune >10    Order  Dialysis Bath  K+ (Potassium): 2  Ca+ (Calcium): 2.5  Na+ (Sodium): 138  HCO3 (Bicarb): 35  Bicarbonate Concentrate Lot No.: [id number]  Acid Concentrate Lot No.: [id number]     Na+ Modeling: Not Applicable  Dialyzer: Q781  Dialysate Temperature (C):  35  Blood Flow Rate (BFR):  300   Dialysate Flow Rate (DFR):   600        Access to be Utilized   Access: Tunneled Catheter  Location: Internal Jugular  Side: Right   Needle gauge:  Not Applicable  + Bruit/Thrill: Not Applicable    First Use X-ray Verified: Not Applicable  OK to use line order: Not Applicable    Site Assessment:  Signs and Symptoms of Infection/Inflammation: None  If yes: Not Applicable  Dressing: Dry and Intact  Site Prep: Medical Aseptic Technique  Dressing Changed this Treatment: No  If yes, by whom: NA - not changed today  Date of Last Dressing Change: December 29, 2022  Antimicrobial Patch in place?: Yes  Red Alcohol Caps in place?: Yes  Gauze Dressing?: No  Non Dialysis Use?: No  Comment:    Flows: Good, Patent  If access problem, who was notified:     Pre and Post-Assessment  Patient Vitals for the past 8 hrs:   Level of Consciousness Oriented X Heart Rhythm Respiratory Quality/Effort O2 Device Bilateral Breath Sounds Skin Color Skin Condition/Temp Abdomen Inspection Bowel Sounds (All Quadrants) RLE Edema LLE Edema   01/04/23 0848 0 4 Regular Unlabored Trach mask Diminished Pale Cool;Dry Obese Active Trace Trace   01/04/23 1210 0 4 Regular Unlabored Trach mask Diminished Pale Cool;Dry Obese Active Trace Trace     Labs  Recent Labs     01/04/23  0514   WBC 10.0   HGB 9.1*   HCT 26.6*                                                                     Recent Labs     01/02/23  0525 01/04/23  0514   * 132*   K 5.1 4.2   CL 94* 92*   CO2 21 22   BUN 74* 56*   CREATININE 7.4* 6.2*   GLUCOSE 107* 105*     IV Drips and Rate/Dose   sodium chloride 20 mL/hr at 01/02/23 1829    dextrose        Safety - Before each treatment:   Dialysis Machine No.: [id number]   Machine Number: [id number]  Dialyzer Lot No.: [id number]  RO Machine Log Sheet Completed: Yes  Machine Alarm Self Test: Completed; Passed (01/04/23 0848)     Air Foam Detector: Tested, Proper Function, pH Reading  Extracorporeal Circuit Tested for Integrity: Yes  Machine Conductivity: 13.8  Manual Conductivity: 13.8     Bicarbonate Concentrate Lot No.: [id number]  Acid Concentrate Lot No.: [id number]  Manual Ph: 7.4  Bleach Test (Neg): Yes  Bath Temperature: 95 °F (35 °C)  Tubing Lot#: [id number]   Conductivity Meter Serial #: [id number]  All Connections Secure?: Yes  Venous Parameters Set?: Yes  Arterial Parameters Set?: Yes  Saline Line Double Clamped?: Yes  Air Foam Detector Engaged?: Yes  Machine Functioning Alarm Free?  Yes  Prime Given: 200ml    Chlorine Testing - Before each treatment and every 4 hours:   Treatment  Time On: 0856  Time Off: 1156  Treatment Goal: 3  Weight: 267 lb 10.2 oz (121.4 kg) (01/04/23 1210)  1st check: less than 0.1 ppm at: 0645 hours  2nd check: less than 0.1 ppm at: 1025 hours  3rd check: Not Applicable  (if greater than 0.1 ppm, then check every 30 minutes from secondary)    Access Flows and Pressures  Patient Vitals for the past 8 hrs:   Blood Flow Rate (ml/min) Ultrafiltration Rate (ml/hr) Arterial Pressure (mmHg) Venous Pressure (mmHg) TMP DFR Comments Access Visible   01/04/23 0856 200 ml/min 1000 ml/hr -30 mmHg 40 90 600 TX INITIATED Yes   01/04/23 0915 300 ml/min 1000 ml/hr -80 mmHg 80 80 600 lines secure Yes   01/04/23 0930 300 ml/min 0 ml/hr -80 mmHg 80 70 600 no distress, uf paused Yes   01/04/23 0945 300 ml/min 0 ml/hr -80 mmHg 80 70 600 resting Yes   01/04/23 1000 300 ml/min 0 ml/hr -90 mmHg 80 60 600 sleeping' Yes   01/04/23 1015 300 ml/min 1 ml/hr -90 mmHg 80 80 600 denies needs Yes   01/04/23 1030 300 ml/min 81 ml/hr -80 mmHg 80 60 600 no complaints Yes   01/04/23 1045 300 ml/min 810 ml/hr -90 mmHg 80 80 600 no changes Yes   01/04/23 1100 300 ml/min 810 ml/hr -90 mmHg 80 80 600 resting quielty Yes   01/04/23 1115 300 ml/min 810 ml/hr -100 mmHg 90 90 600 vss stable Yes   01/04/23 1130 300 ml/min 810 ml/hr -100 mmHg 90 90 600 no distress Yes   01/04/23 1145 300 ml/min 60 ml/hr -100 mmHg 90 70 600 resting Yes   01/04/23 1156 -- -- -- -- -- -- tx ended Yes     Vital Signs  Patient Vitals for the past 8 hrs:   BP Temp Pulse Resp SpO2 Weight Percent Weight Change   01/04/23 0800 -- -- -- -- 92 % -- --   01/04/23 0848 (!) 100/57 97.8 °F (36.6 °C) 88 18 93 % 270 lb 4.5 oz (122.6 kg) 4.76   01/04/23 0856 (!) 111/56 -- 73 -- -- -- --   01/04/23 0915 (!) 87/50 -- 76 -- -- -- --   01/04/23 0930 (!) 83/49 -- 77 -- -- -- --   01/04/23 0945 (!) 79/43 -- 82 -- -- -- --   01/04/23 1000 (!) 96/48 -- 81 -- -- -- --   01/04/23 1015 (!) 96/47 -- 81 -- -- -- --   01/04/23 1030 (!) 97/54 -- 85 -- -- -- --   01/04/23 1045 (!) 93/44 -- 77 -- -- -- --   01/04/23 1100 (!) 86/44 -- 82 -- -- -- --   01/04/23 1115 (!) 101/54 -- 86 -- -- -- --   01/04/23 1130 (!) 83/51 -- 81 -- -- -- --   01/04/23 1145 (!) 88/51 -- 86 -- -- -- --   01/04/23 1156 (!) 100/51 -- 85 -- -- -- --   01/04/23 1210 (!) 94/55 97.3 °F (36.3 °C) 87 18 92 % 267 lb 10.2 oz (121.4 kg) -0.98     Post-Dialysis  Arterial Catheter Locking Solution:  NS  Venous Catheter Locking Solution:  NS  Post-Treatment Procedures: Blood returned, Catheter Capped, clamped with Saline x2 ports  Machine Disinfection Process: Acid/Vinegar Clean, Heat Disinfect, Exterior Machine Disinfection  Rinseback Volume (ml): 300 ml  Blood Volume Processed (Liters): 51.5 l/min  Dialyzer Clearance: Lightly streaked  Duration of Treatment (minutes): 180 minutes     Hemodialysis Intake (ml): 500 ml  Hemodialysis Output (ml): 1500 ml     Tolerated Treatment: Fair  Patient Response to Treatment: fair          Provider Notification        Handoff complete and report given to Primary RN at 1220 hours. Primary RN (First Initial, Last Name, Title):   Norman RN     Education  Person Educated: Patient   Knowledge Base: Substantial  Barriers to Learning?: None  Preferred method of Learning: Oral  Topic(s): Access Care, Signs and Symptoms of Infection, and Procedural   Teaching Tools: Explanation   Response to Education: Verbalized Understanding     Electronically signed by Adeel Kelley RN on 1/4/2023 at 12:24 PM

## 2023-01-04 NOTE — PROGRESS NOTES
Glenroy Adamesgladys, 1950, 7324/[id number], 1/4/2023    Re-attempted PT/OT evals this PM as pt back on floor. Pt adamantly refuses session today, states he does not want IP therapy services or intervention whatsoever. When asked about pt currently at SNF for rehab and plan for discharge back to SNF, pt mildly agitated and defers questioning. Note left for CM, plan to re-attempt eval and edu tomorrow 1/4 as schedule allows and appropriate.     Jennifer Primrose, OTR/L  1/4/2023, 2:44 PM

## 2023-01-04 NOTE — PROGRESS NOTES
Pt declined suctioning and to be put on oxygen via his trach tube. Have administered Robitussin as scheduled for the expectorant.

## 2023-01-04 NOTE — PROGRESS NOTES
Nephrology Progress Note  1/4/2023 3:49 PM  Subjective: Interval History: Treva Doran is a 71 y.o. male weak fatigue poor energy today  Data:   Scheduled Meds:   traMADol  25 mg Oral Once    ipratropium-albuterol  1 ampule Inhalation TID    warfarin  0.5 mg Oral Daily    amoxicillin-clavulanate  1 tablet Oral Daily    levothyroxine  100 mcg Oral Daily    metoprolol tartrate  12.5 mg Oral BID    acetylcysteine  4 mL Inhalation TID    guaiFENesin  300 mg Per G Tube Q6H    miconazole   Topical BID    insulin lispro  0-4 Units SubCUTAneous 4 times per day    aspirin  81 mg Per NG tube Daily    pantoprazole (PROTONIX) 40 mg injection  40 mg IntraVENous Daily    amiodarone  200 mg PEG Tube Daily    atorvastatin  40 mg PEG Tube Nightly    Nephronex  5 mL Feeding Tube Daily    sevelamer  1,600 mg Per G Tube Q8H    QUEtiapine  50 mg Per G Tube Nightly    midodrine  10 mg Oral TID WC    sodium chloride flush  5-40 mL IntraVENous 2 times per day    insulin glargine  10 Units SubCUTAneous Nightly    polyethylene glycol  17 g Oral Daily     Continuous Infusions:   sodium chloride 20 mL/hr at 01/02/23 1829    dextrose           CBC   Recent Labs     01/04/23  0514   WBC 10.0   HGB 9.1*   HCT 26.6*         BMP   Recent Labs     01/02/23  0525 01/04/23  0514   * 132*   K 5.1 4.2   CL 94* 92*   CO2 21 22   PHOS 5.5*  --    BUN 74* 56*   CREATININE 7.4* 6.2*     Hepatic:   No results for input(s): AST, ALT, ALB, BILITOT, ALKPHOS in the last 72 hours. Troponin: No results for input(s): TROPONINI in the last 72 hours. BNP: No results for input(s): BNP in the last 72 hours. Lipids: No results for input(s): CHOL, HDL in the last 72 hours.     Invalid input(s): LDLCALCU  ABGs:   Lab Results   Component Value Date/Time    PO2ART 70.3 09/24/2022 11:49 AM    IOU7VEB 35.5 09/24/2022 11:49 AM     INR:   Recent Labs     01/02/23  0525 01/03/23  0524 01/04/23  0514   INR 2.91 2.21 2.03     Renal Labs  Albumin:    Lab Results   Component Value Date/Time    LABALBU 3.5 12/31/2022 05:28 AM     Calcium:    Lab Results   Component Value Date/Time    CALCIUM 8.9 01/04/2023 05:14 AM     Phosphorus:    Lab Results   Component Value Date/Time    PHOS 5.5 01/02/2023 05:25 AM     U/A:    Lab Results   Component Value Date/Time    NITRU NEGATIVE 09/17/2022 06:21 PM    COLORU YELLOW 09/17/2022 06:21 PM    PHUR 6.5 06/30/2021 02:11 PM    WBCUA NONE SEEN 09/17/2022 06:21 PM    RBCUA NONE SEEN 09/17/2022 06:21 PM    MUCUS RARE 09/17/2022 06:21 PM    TRICHOMONAS NONE SEEN 09/17/2022 06:21 PM    BACTERIA NEGATIVE 09/17/2022 06:21 PM    CLARITYU CLEAR 09/17/2022 06:21 PM    SPECGRAV 1.015 09/17/2022 06:21 PM    UROBILINOGEN 4.0 09/17/2022 06:21 PM    BILIRUBINUR NEGATIVE 09/17/2022 06:21 PM    BILIRUBINUR small 06/30/2021 02:11 PM    BLOODU NEGATIVE 09/17/2022 06:21 PM    GLUCOSEU neg 06/30/2021 02:11 PM    KETUA TRACE 09/17/2022 06:21 PM     ABG:    Lab Results   Component Value Date/Time    TMF3LRR 35.5 09/24/2022 11:49 AM    PO2ART 70.3 09/24/2022 11:49 AM    LST3VPD 21.2 09/24/2022 11:49 AM     HgBA1c:    Lab Results   Component Value Date/Time    LABA1C 7.0 12/30/2022 02:15 AM     Microalbumen/Creatinine ratio:  No components found for: RUCREAT  TSH:    Lab Results   Component Value Date/Time    TSH 1.66 04/03/2018 11:33 AM     IRON:    Lab Results   Component Value Date/Time    IRON 54 12/13/2022 06:50 AM     Iron Saturation:  No components found for: PERCENTFE  TIBC:    Lab Results   Component Value Date/Time    TIBC 217 12/13/2022 06:50 AM     FERRITIN:    Lab Results   Component Value Date/Time    FERRITIN 778 12/13/2022 06:50 AM     RPR:  No results found for: RPR  YAYA:  No results found for: ANATITER, YAYA  24 Hour Urine for Creatinine Clearance:  No components found for: CREAT4, UHRS10, UTV10      Objective:   I/O: No intake/output data recorded. I/O last 3 completed shifts:   In: 226 [NG/GT:226]  Out: 100 [Urine:100]  I/O this shift: In: 500   Out: 1500   Vitals: BP (!) 94/55   Pulse 87   Temp 97.3 °F (36.3 °C)   Resp 18   Ht 6' 2\" (1.88 m)   Wt 267 lb 10.2 oz (121.4 kg)   SpO2 92%   BMI 34.36 kg/m²  {  General appearance: awake weak  HEENT: Head: Normal, normocephalic, atraumatic.   Neck:  positive trach  Lungs: diminished breath sounds bilaterally  Heart: S1, S2 normal  Abdomen: abnormal findings:  soft nt positive PEG  Extremities: edema trace  Neurologic: Mental status: alertness: Weak tired        Assessment and Plan:      IMP:   #1 end-stage renal disease on dialysis Monday Wednesday Friday   #2 shortness of breath with trach and congestion   #3 hyponatremia with hyperkalemia   #4  low blood pressure    Plan     #1 doing dialysis today routine  #2 trach care as able  #3 electrolytes stable dialysis   #4 blood pressure low stable    Pressure patient is confused at times expectations and hard to meet he is very fatigued and tired gets [facility] care and skilled nursing facility the patient appears to not like the options of the facility partly because he is other people that help take care of him when he had does not have his independence it appears for him and also had a roommate as well  If able to remove the barriers can work on discharge back to skilled Shelly Bassett MD, MD

## 2023-01-04 NOTE — PROGRESS NOTES
Progress Note( Dr. Maria Elena Laughlin)  1/3/2023  Subjective:   Admit Date: 12/29/2022  PCP: ANKITA Pacheco CNP    Admitted For :Healthcare associated pneumonia    Consulted For: Better control of blood glucose    Interval History: Patient seem to be somewhat better the patient in the hemodialysis unit can alert  Has tracheotomy tube and PEG tube tube feeding was discontinued discontinued last night to be restarted again later today     Denies any chest pains,   Some SOB . Denies nausea or vomiting. On tube feeding    No new bowel or bladder symptoms. Intake/Output Summary (Last 24 hours) at 1/3/2023 2204  Last data filed at 1/2/2023 2230  Gross per 24 hour   Intake 226 ml   Output 100 ml   Net 126 ml         DATA    CBC:   Recent Labs     01/01/23  0714   WBC 7.9   HGB 8.4*         CMP:  Recent Labs     01/01/23  0714 01/02/23  0525   * 132*   K 4.8 5.1   CL 95* 94*   CO2 21 21   BUN 62* 74*   CREATININE 6.4* 7.4*   CALCIUM 8.7 8.7       Lipids:   Lab Results   Component Value Date/Time    CHOL 152 04/14/2021 10:02 AM    CHOL 139 07/23/2018 08:01 AM    HDL 43 08/26/2022 10:39 AM    TRIG 212 04/14/2021 10:02 AM     Glucose:  Recent Labs     01/03/23  1104 01/03/23  1616 01/03/23  2133   POCGLU 145* 136* 141*       HjippusxeyY5G:  Lab Results   Component Value Date/Time    LABA1C 7.0 12/30/2022 02:15 AM     High Sensitivity TSH:   Lab Results   Component Value Date/Time    TSHHS 11.280 01/02/2023 05:25 AM     Free T3: No results found for: FT3  Free T4:  Lab Results   Component Value Date/Time    T4FREE 0.80 01/02/2023 05:25 AM       CT CHEST WO CONTRAST   Final Result   Right infectious/inflammatory airways process with suspected right lower lobe   pneumonia. XR CHEST PORTABLE   Final Result   Small right pleural effusion with bibasilar airspace opacities.   This could   represent atelectasis or pneumonia in the appropriate clinical setting              Scheduled Medicines   Medications: traMADol  25 mg Oral Once    ipratropium-albuterol  1 ampule Inhalation TID    warfarin  0.5 mg Oral Daily    amoxicillin-clavulanate  1 tablet Oral Daily    levothyroxine  100 mcg Oral Daily    metoprolol tartrate  12.5 mg Oral BID    acetylcysteine  4 mL Inhalation TID    guaiFENesin  300 mg Per G Tube Q6H    miconazole   Topical BID    insulin lispro  0-4 Units SubCUTAneous 4 times per day    aspirin  81 mg Per NG tube Daily    pantoprazole (PROTONIX) 40 mg injection  40 mg IntraVENous Daily    amiodarone  200 mg PEG Tube Daily    atorvastatin  40 mg PEG Tube Nightly    Nephronex  5 mL Feeding Tube Daily    sevelamer  1,600 mg Per G Tube Q8H    QUEtiapine  50 mg Per G Tube Nightly    midodrine  10 mg Oral TID WC    sodium chloride flush  5-40 mL IntraVENous 2 times per day    insulin glargine  10 Units SubCUTAneous Nightly    polyethylene glycol  17 g Oral Daily      Infusions:    sodium chloride 20 mL/hr at 01/02/23 1829    dextrose           Objective:   Vitals: BP (!) 101/51   Pulse 85   Temp 98.4 °F (36.9 °C) (Oral)   Resp 18   Ht 6' 2\" (1.88 m)   Wt 258 lb (117 kg)   SpO2 93%   BMI 33.13 kg/m²   General appearance: alert and cooperative with exam  Neck: no JVD or bruit  Thyroid : Normal lobes   Lungs: Has Vesicular Breath sounds   Heart:  regular rate and rhythm  Abdomen: soft, non-tender; bowel sounds normal; no masses,  no organomegaly  Musculoskeletal: Normal  Extremities: extremities normal, , no edema  Neurologic:  Awake, alert, oriented to name, place and time. Cranial nerves II-XII are grossly intact. Motor is  intact. Sensory is intact. ,  and gait is normal.    Assessment:     Patient Active Problem List:     Atrial fibrillation ([address]     CAD (coronary artery disease)     Morbid obesity (HCC)     COPD (chronic obstructive pulmonary disease) ([address]     Sleep apnea     Type 2 diabetes mellitus ([address]     Thyroid disease     Hyperlipidemia     Acute congestive heart failure ([address]     Coronary artery disease involving native coronary artery of native heart with angina pectoris (HCC)     Chronic renal disease, stage III ([facility] [[id number]]     Hypertension     CAD, multiple vessel     Dyspnea     Moderate malnutrition (HCC)     Healthcare-associated pneumonia     Severe malnutrition ([facility]      Plan:     Reviewed POC blood glucose . Labs and X ray results   Reviewed Current Medicines   started on tube feeding with renal formula  On Correction bolus Humalog/ Basal Lantus Insulin regime /  Monitor Blood glucose frequently   Modified  the dose of Insulin/ other medicines as needed   Will follow     .      David Franco MD, MD

## 2023-01-05 VITALS
HEIGHT: 74 IN | BODY MASS INDEX: 34.35 KG/M2 | DIASTOLIC BLOOD PRESSURE: 49 MMHG | TEMPERATURE: 98 F | HEART RATE: 82 BPM | RESPIRATION RATE: 18 BRPM | OXYGEN SATURATION: 99 % | SYSTOLIC BLOOD PRESSURE: 88 MMHG | WEIGHT: 267.64 LBS

## 2023-01-05 LAB
ANION GAP SERPL CALCULATED.3IONS-SCNC: 14 MMOL/L (ref 4–16)
APTT: 32.9 SECONDS (ref 25.1–37.1)
BASOPHILS ABSOLUTE: 0.1 K/CU MM
BASOPHILS RELATIVE PERCENT: 1.1 % (ref 0–1)
BUN BLDV-MCNC: 35 MG/DL (ref 6–23)
C-REACTIVE PROTEIN, HIGH SENSITIVITY: 13.1 MG/L
CALCIUM SERPL-MCNC: 8.8 MG/DL (ref 8.3–10.6)
CHLORIDE BLD-SCNC: 97 MMOL/L (ref 99–110)
CO2: 26 MMOL/L (ref 21–32)
CREAT SERPL-MCNC: 4.5 MG/DL (ref 0.9–1.3)
DIFFERENTIAL TYPE: ABNORMAL
EOSINOPHILS ABSOLUTE: 0.3 K/CU MM
EOSINOPHILS RELATIVE PERCENT: 2.7 % (ref 0–3)
GFR SERPL CREATININE-BSD FRML MDRD: 13 ML/MIN/1.73M2
GLUCOSE BLD-MCNC: 156 MG/DL (ref 70–99)
GLUCOSE BLD-MCNC: 162 MG/DL (ref 70–99)
GLUCOSE BLD-MCNC: 166 MG/DL (ref 70–99)
GLUCOSE BLD-MCNC: 189 MG/DL (ref 70–99)
HCT VFR BLD CALC: 28.8 % (ref 42–52)
HEMOGLOBIN: 9.1 GM/DL (ref 13.5–18)
HEPATITIS B CORE TOTAL ANTIBODY: NEGATIVE
IMMATURE NEUTROPHIL %: 1.8 % (ref 0–0.43)
INR BLD: 1.92 INDEX
LYMPHOCYTES ABSOLUTE: 2.7 K/CU MM
LYMPHOCYTES RELATIVE PERCENT: 26.8 % (ref 24–44)
MCH RBC QN AUTO: 34.1 PG (ref 27–31)
MCHC RBC AUTO-ENTMCNC: 31.6 % (ref 32–36)
MCV RBC AUTO: 107.9 FL (ref 78–100)
MONOCYTES ABSOLUTE: 0.9 K/CU MM
MONOCYTES RELATIVE PERCENT: 8.7 % (ref 0–4)
NUCLEATED RBC %: 1.3 %
PDW BLD-RTO: 23.9 % (ref 11.7–14.9)
PLATELET # BLD: 168 K/CU MM (ref 140–440)
PMV BLD AUTO: 8.4 FL (ref 7.5–11.1)
POTASSIUM SERPL-SCNC: 3.8 MMOL/L (ref 3.5–5.1)
PROTHROMBIN TIME: 24.9 SECONDS (ref 11.7–14.5)
RBC # BLD: 2.67 M/CU MM (ref 4.6–6.2)
SARS-COV-2, NAAT: NOT DETECTED
SEGMENTED NEUTROPHILS ABSOLUTE COUNT: 5.9 K/CU MM
SEGMENTED NEUTROPHILS RELATIVE PERCENT: 58.9 % (ref 36–66)
SODIUM BLD-SCNC: 137 MMOL/L (ref 135–145)
SOURCE: NORMAL
TOTAL IMMATURE NEUTOROPHIL: 0.18 K/CU MM
TOTAL NUCLEATED RBC: 0.1 K/CU MM
WBC # BLD: 10.1 K/CU MM (ref 4–10.5)

## 2023-01-05 PROCEDURE — 6370000000 HC RX 637 (ALT 250 FOR IP): Performed by: STUDENT IN AN ORGANIZED HEALTH CARE EDUCATION/TRAINING PROGRAM

## 2023-01-05 PROCEDURE — 97166 OT EVAL MOD COMPLEX 45 MIN: CPT

## 2023-01-05 PROCEDURE — 6370000000 HC RX 637 (ALT 250 FOR IP): Performed by: INTERNAL MEDICINE

## 2023-01-05 PROCEDURE — 99231 SBSQ HOSP IP/OBS SF/LOW 25: CPT | Performed by: NURSE PRACTITIONER

## 2023-01-05 PROCEDURE — 6370000000 HC RX 637 (ALT 250 FOR IP): Performed by: HOSPITALIST

## 2023-01-05 PROCEDURE — A4216 STERILE WATER/SALINE, 10 ML: HCPCS | Performed by: HOSPITALIST

## 2023-01-05 PROCEDURE — 94640 AIRWAY INHALATION TREATMENT: CPT

## 2023-01-05 PROCEDURE — 85610 PROTHROMBIN TIME: CPT

## 2023-01-05 PROCEDURE — 85025 COMPLETE CBC W/AUTO DIFF WBC: CPT

## 2023-01-05 PROCEDURE — 6370000000 HC RX 637 (ALT 250 FOR IP): Performed by: NURSE PRACTITIONER

## 2023-01-05 PROCEDURE — 2500000003 HC RX 250 WO HCPCS: Performed by: INTERNAL MEDICINE

## 2023-01-05 PROCEDURE — C9113 INJ PANTOPRAZOLE SODIUM, VIA: HCPCS | Performed by: HOSPITALIST

## 2023-01-05 PROCEDURE — 97162 PT EVAL MOD COMPLEX 30 MIN: CPT

## 2023-01-05 PROCEDURE — 87635 SARS-COV-2 COVID-19 AMP PRB: CPT

## 2023-01-05 PROCEDURE — 2580000003 HC RX 258: Performed by: HOSPITALIST

## 2023-01-05 PROCEDURE — 99213 OFFICE O/P EST LOW 20 MIN: CPT

## 2023-01-05 PROCEDURE — 6360000002 HC RX W HCPCS: Performed by: HOSPITALIST

## 2023-01-05 PROCEDURE — 82962 GLUCOSE BLOOD TEST: CPT

## 2023-01-05 PROCEDURE — 97535 SELF CARE MNGMENT TRAINING: CPT

## 2023-01-05 PROCEDURE — 80048 BASIC METABOLIC PNL TOTAL CA: CPT

## 2023-01-05 PROCEDURE — 97530 THERAPEUTIC ACTIVITIES: CPT

## 2023-01-05 PROCEDURE — 36415 COLL VENOUS BLD VENIPUNCTURE: CPT

## 2023-01-05 PROCEDURE — 86140 C-REACTIVE PROTEIN: CPT

## 2023-01-05 PROCEDURE — 94761 N-INVAS EAR/PLS OXIMETRY MLT: CPT

## 2023-01-05 PROCEDURE — 85730 THROMBOPLASTIN TIME PARTIAL: CPT

## 2023-01-05 RX ORDER — WARFARIN SODIUM 1 MG/1
1 TABLET ORAL DAILY
Status: DISCONTINUED | OUTPATIENT
Start: 2023-01-05 | End: 2023-01-05 | Stop reason: HOSPADM

## 2023-01-05 RX ORDER — AMOXICILLIN AND CLAVULANATE POTASSIUM 500; 125 MG/1; MG/1
1 TABLET, FILM COATED ORAL DAILY
Qty: 7 TABLET | Refills: 0 | Status: SHIPPED | OUTPATIENT
Start: 2023-01-06 | End: 2023-01-13

## 2023-01-05 RX ADMIN — MIDODRINE HYDROCHLORIDE 10 MG: 5 TABLET ORAL at 14:34

## 2023-01-05 RX ADMIN — SODIUM CHLORIDE, PRESERVATIVE FREE 40 MG: 5 INJECTION INTRAVENOUS at 10:39

## 2023-01-05 RX ADMIN — MICONAZOLE NITRATE: 2 POWDER TOPICAL at 10:37

## 2023-01-05 RX ADMIN — AMOXICILLIN AND CLAVULANATE POTASSIUM 1 TABLET: 500; 125 TABLET, FILM COATED ORAL at 10:21

## 2023-01-05 RX ADMIN — ASPIRIN 81 MG CHEWABLE TABLET 81 MG: 81 TABLET CHEWABLE at 10:21

## 2023-01-05 RX ADMIN — SEVELAMER CARBONATE 1600 MG: 800 TABLET, FILM COATED ORAL at 06:06

## 2023-01-05 RX ADMIN — LEVOTHYROXINE SODIUM 100 MCG: 0.1 TABLET ORAL at 06:06

## 2023-01-05 RX ADMIN — GUAIFENESIN 300 MG: 100 SOLUTION ORAL at 14:34

## 2023-01-05 RX ADMIN — WARFARIN SODIUM 1 MG: 1 TABLET ORAL at 17:29

## 2023-01-05 RX ADMIN — POLYETHYLENE GLYCOL (3350) 17 G: 17 POWDER, FOR SOLUTION ORAL at 10:41

## 2023-01-05 RX ADMIN — SEVELAMER CARBONATE 1600 MG: 800 TABLET, FILM COATED ORAL at 14:34

## 2023-01-05 RX ADMIN — MIDODRINE HYDROCHLORIDE 10 MG: 5 TABLET ORAL at 17:29

## 2023-01-05 RX ADMIN — IPRATROPIUM BROMIDE AND ALBUTEROL SULFATE 1 AMPULE: 2.5; .5 SOLUTION RESPIRATORY (INHALATION) at 15:18

## 2023-01-05 RX ADMIN — GUAIFENESIN 300 MG: 100 SOLUTION ORAL at 06:06

## 2023-01-05 RX ADMIN — MIDODRINE HYDROCHLORIDE 10 MG: 5 TABLET ORAL at 10:21

## 2023-01-05 RX ADMIN — SODIUM CHLORIDE, PRESERVATIVE FREE 10 ML: 5 INJECTION INTRAVENOUS at 10:44

## 2023-01-05 NOTE — PROGRESS NOTES
PHARMACY ANTICOAGULATION MONITORING SERVICE    Vincenzo Sherman is a 71 y.o. male on warfarin therapy for Persistent A. Fib, H/O non-occlusive Rt. Fem DVT. Pharmacy consulted by Dr. Atanacio Ormond for monitoring and adjustment of treatment. Indication for anticoagulation: Persistent A. Fib, H/O non-occlusive Rt. Fem DVT  INR goal: 2-3  Warfarin dose prior to admission: 2mg daily    Pertinent Laboratory Values   Recent Labs     01/04/23  0514 01/05/23  0338   INR 2.03 1.92   HGB 9.1* 9.1*   HCT 26.6* 28.8*    168       Assessment/Plan:  Drug Interactions: home amiodarone, trazodone, aspirin, levothyroxine  Patient INR with significant increase from 2.28 on 12/31 to 2.97 on 1/1. Dose held x 1 day, then resumed with 0.5mg daily  INR today @ 1.92, borderline therapeutic  Changed dose to Warfarin 1mg daily and follow INR trends tomorrow. Pharmacy will continue to monitor and adjust warfarin therapy as indicated    Thank you for the consult.   Nikita Plaza, [address] [facility]  1/5/2023 2:12 PM

## 2023-01-05 NOTE — DISCHARGE INSTR - COC
Continuity of Care Form    Patient Name: Ulises Foreman   :  1950  MRN:  [id number]    Admit date:  2022  Discharge date:  23    Code Status Order: Full Code   Advance Directives:     Admitting Physician:  Juan R Tran MD  PCP: Yanely Hodges, APRN - CNP    Discharging Nurse: Luisito Monzon RN  [address] Unit/Room#: 5331/2279-Q  Discharging Unit Phone Number: [phone]    Emergency Contact:   Extended Emergency Contact Information  Primary Emergency Contact:  Paul Brown (POA)  Address: [address] [place] [address], [address] [place] [address] Street [address] Phone: [phone]  Mobile Phone: [phone]  Relation: Brother/Sister  Secondary Emergency Contact: [address] [address] Phone: [phone]  Relation: Unknown    Past Surgical History:  Past Surgical History:   Procedure Laterality Date    CARDIAC CATHETERIZATION  08    mild disease mid RCA,  stent to LAD patent,    CARDIAC CATHETERIZATION  3/07    Stent to LAD patent, Diagonal 40-50%, RCA 40-50%    CARDIAC CATHETERIZATION  3/05    COLONOSCOPY      COLONOSCOPY  16    1 polyp removed, diverticulosis and hemorrhoids noted    CORONARY ANGIOPLASTY WITH STENT PLACEMENT  2005    PTCA with 3.5 x 16 TAXUS stent to LAD    CORONARY ARTERY BYPASS GRAFT N/A 2022    CABG CORONARY ARTERY BYPASS x3 (LIMA TO LAD, SVG TO PDA-RCA SEQUENTIAL) , INTRAOPERATIVE [place], ENDOVEIN HARVEST OF LEFT SAPHENOUS VEIN, MODIFIED MAZE PROCEDURE, LEFT ATRIAL APPENDAGE LIGATION, INTERCOSTAL NERVE BLOCK, INTRA-AORTIC BALLOON PUMP INSERTION performed by Khadar Chau MD at [address] [place] [address] Entrance, COLON, DIAGNOSTIC      egd    HAND SURGERY Right [address] DISLOCATN+FIXATN Right 6/3/2019    RIGHT OPEN REDUCTION INTERNAL FIXATION OF DISTAL TIBIA  AND FIBULA performed by Jhoan Guo MD at [address] [place] [address] N/A 2022    STERNUM WASHOUT performed by Khadar Chau MD at [place] [address] History:   Immunization History   Administered Date(s) Administered    COVID-19, PFIZER PURPLE top, DILUTE for use, (age 15 y+), 30mcg/0.3mL 03/09/2021, 03/30/2021, 11/17/2021    Influenza Vaccine, unspecified formulation 11/01/2017    Influenza Virus Vaccine 10/01/2018    Influenza, FLUAD, (age 72 y+), Adjuvanted, 0.5mL 10/27/2021    Influenza, Triv, 3 Years and older, IM (Afluria (5 yrs and older) 10/01/2011, 10/15/2014    Pneumococcal Conjugate 13-valent ([id number]) 06/28/2017    Pneumococcal Polysaccharide ([id number]) 01/01/2008, 05/18/2013, 04/01/2019    Tdap (Boostrix, Adacel) 12/17/2011       Active Problems:  Patient Active Problem List   Diagnosis Code    Atrial fibrillation ([facility] I48.91    CAD (coronary artery disease) I25.10    Morbid obesity ([facility] E66.01    COPD (chronic obstructive pulmonary disease) ([address] J44.9    Sleep apnea G47.30    Type 2 diabetes mellitus ([facility] E11.9    Thyroid disease E07.9    Hyperlipidemia E78.5    Acute congestive heart failure (HCC) I50.9    Coronary artery disease involving native coronary artery of native heart with angina pectoris (HCC) I25.119    Chronic renal disease, stage III ([address] [[id number]] N18.30    Hypertension I10    CAD, multiple vessel I25.10    Dyspnea R06.00    Moderate malnutrition (HCC) E44.0    Pneumonia of both lungs due to infectious organism J18.9    Severe malnutrition (HCC) E43    Chronic respiratory failure with hypoxia (HCC) J96.11    Aspiration pneumonia of right lower lobe ([address] J69.0    ESRD on dialysis ([address] N18.6, Z99.2       Isolation/Infection:   Isolation            No Isolation          Patient Infection Status       Infection Onset Added Last Indicated Last Indicated By Review Planned Expiration Resolved Resolved By    None active    Resolved    COVID-19 (Rule Out) 12/29/22 12/29/22 12/29/22 COVID-19, Rapid (Ordered)   12/29/22 Rule-Out Test Resulted    COVID-19 (Rule Out) 12/11/22 12/11/22 12/11/22 Respiratory Panel, Molecular, with COVID-19 (Restricted: peds pts or suitable admitted adults) (Ordered)   22 Rule-Out Test Resulted    COVID-19 20 Covid-19 Ambulatory   20     COVID-19 (Rule Out) 20 Covid-19 Ambulatory (Ordered)   20 Rule-Out Test Resulted            Nurse Assessment:  Last Vital Signs: BP (!) 102/50   Pulse 82   Temp 98 °F (36.7 °C) (Oral)   Resp 18   Ht 6' 2\" (1.88 m)   Wt 267 lb 10.2 oz (121.4 kg)   SpO2 99%   BMI 34.36 kg/m²     Last documented pain score (0-10 scale): Pain Level: 4  Last Weight:   Wt Readings from Last 1 Encounters:   23 267 lb 10.2 oz (121.4 kg)     Mental Status:  oriented, alert, coherent, and able to concentrate and follow conversation    IV Access:  - vascath Right subclavian    Nursing Mobility/ADLs:  Walking   Dependent  Transfer  Dependent  Bathing  Dependent  Dressing  Dependent  Toileting  Dependent  Feeding  Dependent  Med Admin  Dependent  Med Delivery    peg tube    Wound Care Documentation and Therapy:  Wound 22 Buttocks cluster (Active)   Wound Image   23 0850   Wound Etiology Pressure Stage 2 23 0850   Dressing Status Clean;Dry; Intact 23 1000   Wound Cleansed Cleansed with saline 23 0850   Dressing/Treatment Silicone border 27 0850   Dressing Change Due 23 1400   Wound Length (cm) 3 cm 23 0850   Wound Width (cm) 3.8 cm 23 0850   Wound Depth (cm) 0.1 cm 23 0850   Wound Surface Area (cm^2) 11.4 cm^2 23 0850   Change in Wound Size % (l*w) -470 23 0850   Wound Volume (cm^3) 1.14 cm^3 23 0850   Distance Tunneling (cm) 0 cm 23 0850   Tunneling Position ___ O'Clock 0 23 0850   Undermining Starts ___ O'Clock 0 23 0850   Undermining Ends___ O'Clock 0 23 0850   Undermining Maxium Distance (cm) 0 23 0850   Wound Assessment Pink/red 23 0850   Drainage Amount Small 23 0850   Drainage Description Serosanguinous 01/05/23 0850   Odor None 01/05/23 0850   Cat-wound Assessment Blanchable erythema 01/05/23 0850   Margins Defined edges 01/05/23 0850   Wound Thickness Description not for Pressure Injury Partial thickness 01/05/23 0850   Number of days: 24       Wound 01/05/23 Left;Plantar (Active)   Wound Image   01/05/23 0850   Wound Etiology Other 01/05/23 0850   Dressing Status New dressing applied 01/05/23 0850   Wound Cleansed Cleansed with saline 01/05/23 0850   Dressing/Treatment Roll gauze 01/05/23 0850   Wound Length (cm) 4 cm 01/05/23 0850   Wound Width (cm) 5 cm 01/05/23 0850   Wound Depth (cm) 0.1 cm 01/05/23 0850   Wound Surface Area (cm^2) 20 cm^2 01/05/23 0850   Wound Volume (cm^3) 2 cm^3 01/05/23 0850   Distance Tunneling (cm) 0 cm 01/05/23 0850   Tunneling Position ___ O'Clock 0 01/05/23 0850   Undermining Starts ___ O'Clock 0 01/05/23 0850   Undermining Ends___ O'Clock 0 01/05/23 0850   Undermining Maxium Distance (cm) 0 01/05/23 0850   Wound Assessment Pink/red 01/05/23 0850   Drainage Amount Moderate 01/05/23 0850   Drainage Description Serosanguinous 01/05/23 0850   Odor None 01/05/23 0850   Cat-wound Assessment Intact 01/05/23 0850   Margins Defined edges 01/05/23 0850   Wound Thickness Description not for Pressure Injury Partial thickness 01/05/23 0850   Number of days: 0       Incision 09/22/22 Knee Anterior; Left (Active)   Number of days: 105       Incision 09/22/22 Sternum (Active)   Number of days: 105        Elimination:  Continence: Bowel: No  Bladder: No  Urinary Catheter: None   Colostomy/Ileostomy/Ileal Conduit: No       Date of Last BM: 1/5/23  No intake or output data in the 24 hours ending 01/05/23 1611  I/O last 3 completed shifts: In: 500   Out: 1500     Safety Concerns:      At Risk for Falls and Aspiration Risk    Impairments/Disabilities:      None    Nutrition Therapy:  Current Nutrition Therapy:   - Oral Diet:  General    Routes of Feeding: Oral  Liquids: No Restrictions  Daily Fluid Restriction: no  Last Modified Barium Swallow with Video (Video Swallowing Test): not done    Treatments at the Time of Hospital Discharge:   Respiratory Treatments: Supplemental O2, trach collar  Oxygen Therapy:  is on oxygen at 4 L/min per nasal cannula. Ventilator:    - No ventilator support    Rehab Therapies: Physical Therapy, Occupational Therapy, and Speech/Language Therapy  Weight Bearing Status/Restrictions: No weight bearing restrictions  Other Medical Equipment (for information only, NOT a DME order):  wheelchair, walker, and hospital bed  Other Treatments:     Patient's personal belongings (please select all that are sent with patient):  None    RN SIGNATURE:     CASE MANAGEMENT/SOCIAL WORK SECTION    Inpatient Status Date: ***    Readmission Risk Assessment Score:  Readmission Risk              Risk of Unplanned Readmission:  38           Discharging to Facility/ Agency   Name:   Address:  Phone:  Fax:    Dialysis Facility (if applicable)   Name:  Address:  Dialysis Schedule:  Phone:  Fax:    / signature: {Esignature:[id number]}    PHYSICIAN SECTION    Prognosis: Good    Condition at Discharge: Stable    Rehab Potential (if transferring to Rehab): Good    Recommended Labs or Other Treatments After Discharge:  - HD  - Oral antibiotics    Physician Certification: I certify the above information and transfer of Vincenzo Sherman  is necessary for the continuing treatment of the diagnosis listed and that he requires [facility] for greater 30 days.      Update Admission H&P: No change in H&P    PHYSICIAN SIGNATURE:  Electronically signed by Jones Rothman MD on 1/5/23 at 4:11 PM EST

## 2023-01-05 NOTE — DISCHARGE SUMMARY
V2.0  Discharge Summary    Name:  Jean Tay /Age/Sex: 1950 (17 y.o. male)   Admit Date: 2022  Discharge Date: 23    MRN & CSN:  [id number] & [id number] Encounter Date and Time 23 4:05 PM EST    Attending:  Zan Sanchez, * Discharging Provider: Zan Sanchez MD       Hospital Course:     Jean Tay is a 71 y.o. male with past medical history of coronary artery disease status post three-vessel CABG, permanent atrial fibrillation, hypertension, hyperlipidemia, COPD, tracheostomy and PEG tube status, who presents with progressively worsening shortness of breath on 2022. Brief Problem Based Course:     Acute on chronic respiratory failure, 2/2 Sepsis present on admission secondary to HCAP  On 4 L trach collar oxygen at nursing home, currently requiring 6 to 7 L. CT chest 2023, personally reviewed-concerning for right lower lobe consolidation with air bronchograms and minimal right-sided pleural effusion. Respiratory cultures negative, Blood cultures negative, COVID and rapid flu negative, MRSA screen negative. Patient seen by ID and has to be treated with a course of antibiotics. End-stage renal disease  Hypermagnesemia  On hemodialysis . Continued on HD in house and will continue as an outpatient. Permanent atrial fibrillation  Long-term anticoagulation  Status post maze in 2022. Continue amiodarone, Lopressor, and continue Coumadin     Coronary artery disease status post three-vessel CABG in 2022  Postprocedure complications include mediastinal infection requiring multiple washouts with IABP placement and transfer to [facility] for further management where he underwent washout and closure and removal of IABP. Tracheostomy dependence since CABG     Hypothyroidism  TSH 11.28 and T4 0.8. Increase Synthroid to 100 mcg [was on 75].  Recommend checking TSH again in a few weeks as an outpatient. Hypertension  Low normal blood pressure trend. Most recent echocardiogram with preserved ejection fraction     Chronic macrocytic anemia  Baseline creatinine around 9-10. Anemia panel including a peripheral smear ordered for a.m. Insulin-dependent diabetes mellitus type 2  HbA1c 12/29/22-7%. Continue Lantus and sliding scale insulin. Currently on renal formula of tube feeds     History of nonocclusive DVT  Diagnosed in September 2022. Continue Coumadin     Moderate protein calorie malnutrition  Dietitian following        The patient expressed appropriate understanding of, and agreement with the discharge recommendations, medications, and plan.      Consults this admission:  IP CONSULT TO NEPHROLOGY  IP CONSULT TO PULMONOLOGY  IP CONSULT TO NEPHROLOGY  IP CONSULT TO DIETITIAN  IP CONSULT TO ENDOCRINOLOGY  PHARMACY TO DOSE WARFARIN  IP CONSULT TO DIETITIAN  IP CONSULT TO INFECTIOUS DISEASES    Discharge Diagnosis:     Pneumonia of both lungs due to infectious organism      Discharge Instruction:   Follow up appointments: JULIETA Aranda  Primary care physician: ANKITA Stroud CNP within 2 weeks  Diet: diabetic diet   Activity: activity as tolerated  Disposition: Discharged to:   []Home, []C, [x]SNF, []Acute Rehab, []Hospice  Condition on discharge: Stable  Labs and Tests to be Followed up as an outpatient by PCP or Specialist:     Discharge Medications:        Medication List        START taking these medications      amoxicillin-clavulanate 500-125 MG per tablet  Commonly known as: AUGMENTIN  Take 1 tablet by mouth daily for 7 doses  Start taking on: January 6, 2023     metoprolol tartrate 25 MG tablet  Commonly known as: LOPRESSOR  Take 0.5 tablets by mouth 2 times daily            CONTINUE taking these medications      amiodarone 200 MG tablet  Commonly known as: CORDARONE     aspirin 81 MG chewable tablet  1 tablet by Per NG tube route daily     atorvastatin 40 MG tablet  Commonly known as: LIPITOR     blood glucose monitor kit and supplies  Dispense sufficient amount for indicated testing frequency plus additional to accommodate PRN testing needs. Dispense all needed supplies to include: monitor, strips, lancing device, lancets, control solutions, alcohol swabs. ipratropium-albuterol 0.5-2.5 (3) MG/3ML Soln nebulizer solution  Commonly known as: DUONEB  Inhale 3 mLs into the lungs 4 times daily     Lancets Misc  by D3EA route three times a day. Lantus SoloStar 100 UNIT/ML injection pen  Generic drug: insulin glargine  Inject 5 Units into the skin nightly     levothyroxine 75 MCG tablet  Commonly known as: SYNTHROID  Take 1 tablet by mouth Daily     melatonin 3 MG Tabs tablet     midodrine 10 MG tablet  Commonly known as: PROAMATINE  Take 1 tablet by mouth 3 times daily (with meals)     Nephronex 0.9 MG/5ML Liqd     * Nepro/CarbSteady Liqd     * ProSource TF Liqd     pantoprazole 4 mg/mL in sodium chloride (PF) injection  Infuse 10 mLs intravenously daily     polyethylene glycol 17 GM/SCOOP powder  Commonly known as: GLYCOLAX  Take 17 g by mouth daily     QUEtiapine 50 MG tablet  Commonly known as: SEROQUEL     sennosides-docusate sodium 8.6-50 MG tablet  Commonly known as: SENOKOT-S  Take 2 tablets by mouth daily as needed for Constipation     sevelamer 0.8 g Pack packet  Commonly known as: RENVELA     traZODone 100 MG tablet  Commonly known as: DESYREL     Ventolin  (90 Base) MCG/ACT inhaler  Generic drug: albuterol sulfate HFA     warfarin 2 MG tablet  Commonly known as: COUMADIN  Take daily. Goal INR between 2-3           * This list has 2 medication(s) that are the same as other medications prescribed for you. Read the directions carefully, and ask your doctor or other care provider to review them with you.                 STOP taking these medications      carvedilol 6.25 MG tablet  Commonly known as: COREG               Where to Get Your Medications        These medications were sent to [address] [place] [address], [address] [place] [address] Stephanie Jones [phone]  [address] [place] [address]      Phone: [phone]   amoxicillin-clavulanate 500-125 MG per tablet  metoprolol tartrate 25 MG tablet        Objective Findings at Discharge:   BP (!) 102/50   Pulse 82   Temp 98 °F (36.7 °C) (Oral)   Resp 18   Ht 6' 2\" (1.88 m)   Wt 267 lb 10.2 oz (121.4 kg)   SpO2 99%   BMI 34.36 kg/m²       Physical Exam:     Physical Exam  Constitutional:       General: He is not in acute distress. Appearance: Normal appearance. HENT:      Head: Normocephalic and atraumatic. Nose: Nose normal.      Mouth/Throat:      Mouth: Mucous membranes are moist.      Pharynx: Oropharynx is clear. Eyes:      Extraocular Movements: Extraocular movements intact. Conjunctiva/sclera: Conjunctivae normal.      Pupils: Pupils are equal, round, and reactive to light. Cardiovascular:      Rate and Rhythm: Normal rate and regular rhythm. Pulses: Normal pulses. Heart sounds: Normal heart sounds. Pulmonary:      Effort: Pulmonary effort is normal.      Comments: Trahcestomy dependent  Abdominal:      General: Abdomen is flat. Bowel sounds are normal.      Palpations: Abdomen is soft. Musculoskeletal:         General: Normal range of motion. Cervical back: Normal range of motion and neck supple. Skin:     General: Skin is warm and dry. Neurological:      General: No focal deficit present. Mental Status: He is alert and oriented to person, place, and time. Mental status is at baseline. Psychiatric:         Mood and Affect: Mood normal.         Behavior: Behavior normal.         Thought Content:  Thought content normal.              Labs and Imaging   CT CHEST WO CONTRAST    Result Date: 1/1/2023  EXAMINATION: CT OF THE CHEST WITHOUT CONTRAST 1/1/2023 4:35 pm TECHNIQUE: CT of the chest was performed without the administration of intravenous contrast. Multiplanar reformatted images are provided for review. Automated exposure control, iterative reconstruction, and/or weight based adjustment of the mA/kV was utilized to reduce the radiation dose to as low as reasonably achievable. COMPARISON: 09/16/2022, 04/18/2016 HISTORY: ORDERING SYSTEM PROVIDED HISTORY: shortness of breath TECHNOLOGIST PROVIDED HISTORY: Reason for exam:->shortness of breath Reason for Exam: shortness of breath FINDINGS: Mediastinum: Status post CABG. Mild cardiomegaly. No pericardial effusion. No lymphadenopathy. Tracheostomy tip is at the thoracic inlet. Lungs/pleura: Trace bilateral pleural effusions. Right centrilobular nodularity with complete opacification of the right lower lobe with minimal volume loss. Upper Abdomen: Unremarkable. Soft Tissues/Bones: No acute bone or soft tissue abnormality. Right infectious/inflammatory airways process with suspected right lower lobe pneumonia. CBC:   Recent Labs     01/04/23  0514 01/05/23  0338   WBC 10.0 10.1   HGB 9.1* 9.1*    168     BMP:    Recent Labs     01/04/23  0514 01/05/23  0338   * 137   K 4.2 3.8   CL 92* 97*   CO2 22 26   BUN 56* 35*   CREATININE 6.2* 4.5*   GLUCOSE 105* 156*     Hepatic: No results for input(s): AST, ALT, ALB, BILITOT, ALKPHOS in the last 72 hours. Lipids:   Lab Results   Component Value Date/Time    CHOL 152 04/14/2021 10:02 AM    CHOL 139 07/23/2018 08:01 AM    HDL 43 08/26/2022 10:39 AM    TRIG 212 04/14/2021 10:02 AM     Hemoglobin A1C:   Lab Results   Component Value Date/Time    LABA1C 7.0 12/30/2022 02:15 AM     TSH:   Lab Results   Component Value Date/Time    TSH 1.66 04/03/2018 11:33 AM     Troponin:   Lab Results   Component Value Date/Time    TROPONINT 0.269 12/29/2022 03:09 PM    TROPONINT 0.245 12/13/2022 09:42 AM    TROPONINT 0.293 12/13/2022 06:50 AM     Lactic Acid: No results for input(s): LACTA in the last 72 hours.   BNP: No results for input(s): PROBNP in the last 72 hours. UA:  Lab Results   Component Value Date/Time    NITRU NEGATIVE 09/17/2022 06:21 PM    COLORU YELLOW 09/17/2022 06:21 PM    PHUR 6.5 06/30/2021 02:11 PM    WBCUA NONE SEEN 09/17/2022 06:21 PM    RBCUA NONE SEEN 09/17/2022 06:21 PM    MUCUS RARE 09/17/2022 06:21 PM    TRICHOMONAS NONE SEEN 09/17/2022 06:21 PM    BACTERIA NEGATIVE 09/17/2022 06:21 PM    CLARITYU CLEAR 09/17/2022 06:21 PM    SPECGRAV 1.015 09/17/2022 06:21 PM    LEUKOCYTESUR NEGATIVE 09/17/2022 06:21 PM    UROBILINOGEN 4.0 09/17/2022 06:21 PM    BILIRUBINUR NEGATIVE 09/17/2022 06:21 PM    BILIRUBINUR small 06/30/2021 02:11 PM    BLOODU NEGATIVE 09/17/2022 06:21 PM    GLUCOSEU neg 06/30/2021 02:11 PM    KETUA TRACE 09/17/2022 06:21 PM     Urine Cultures: No results found for: LABURIN  Blood Cultures: No results found for: BC  No results found for: BLOODCULT2  Organism: No results found for: ORG    Comment: Please note this report has been produced using speech recognition software and may contain errors related to that system including errors in grammar, punctuation, and spelling, as well as words and phrases that may be inappropriate. If there are any questions or concerns please feel free to contact the dictating provider for clarification.      Time Spent Discharging patient 40 minutes    Electronically signed by Zan Sanchez MD on 1/5/2023 at 4:16 PM

## 2023-01-05 NOTE — PROGRESS NOTES
Pulmonary and Critical Care  Progress Note    Subjective: The patient is C/O nausea. Shortness of breath better. 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, COPD (chronic obstructive pulmonary disease) ([facility] Depression, Diabetes mellitus ([facility] [address] [facility] Dupuytren's contracture of hand, Family history of cardiovascular disease, GERD (gastroesophageal reflux disease), H/O cardiac catheterization, H/O cardiac catheterization, H/O cardiovascular stress test, H/O cardiovascular stress test, H/O cardiovascular stress test, H/O cardiovascular stress test, H/O Doppler ultrasound, H/O echocardiogram, H/O echocardiogram, H/O echocardiogram, H/O hiatal hernia, Hx of Venous Doppler, Hyperlipidemia, Hypertension, Obesity, S/P PTCA (percutaneous transluminal coronary angioplasty), Sleep apnea, and Thyroid disease. has a past surgical history that includes Coronary angioplasty with stent (03/21/2005); Cardiac catheterization (6/12/08); Cardiac catheterization (3/07); Cardiac catheterization (3/05); Endoscopy, colon, diagnostic (2014); Colonoscopy (2011); Colonoscopy (5/18/16); Hand surgery (Right, 1997); pr optx ankle dislocation w/repair/int/xtrnl fixj (Right, 6/3/2019); Sternum Debridement (N/A, 9/24/2022); and Coronary artery bypass graft (N/A, 9/22/2022). reports that he quit smoking about 47 years ago. His smoking use included cigarettes. He has a 10.00 pack-year smoking history. He has never used smokeless tobacco. He reports that he does not currently use alcohol after a past usage of about 6.0 standard drinks per week. He reports that he does not use drugs.   Family history:  family history includes Cancer in his mother; Coronary Art Dis in his father; Heart Disease in his father; No Known Problems in his brother; Rheum Arthritis in his sister, sister and another family member. No Known Allergies  Social History:    Reviewed; no changes    Objective:   PHYSICAL EXAM:        VITALS:  BP 95/67   Pulse 91   Temp 98 °F (36.7 °C) (Oral)   Resp (!) 5   Ht 6' 2\" (1.88 m)   Wt 267 lb 10.2 oz (121.4 kg)   SpO2 94%   BMI 34.36 kg/m²     24HR INTAKE/OUTPUT:    Intake/Output Summary (Last 24 hours) at 1/5/2023 1007  Last data filed at 1/4/2023 1156  Gross per 24 hour   Intake 500 ml   Output 1500 ml   Net -1000 ml       CONSTITUTIONAL:  awake. LUNGS:  decreased breath sounds R base, basilar crackles. CARDIOVASCULAR:  normal S1 and S2 and negative JVD. [place]. DATA:    CBC:  Recent Labs     01/04/23  0514 01/05/23  0338   WBC 10.0 10.1   RBC 2.45* 2.67*   HGB 9.1* 9.1*   HCT 26.6* 28.8*    168   .6* 107.9*   MCH 37.1* 34.1*   MCHC 34.2 31.6*   RDW 23.9* 23.9*   SEGSPCT 52.3 58.9      BMP:  Recent Labs     01/04/23  0514 01/05/23 0338   * 137   K 4.2 3.8   CL 92* 97*   CO2 22 26   BUN 56* 35*   CREATININE 6.2* 4.5*   CALCIUM 8.9 8.8   GLUCOSE 105* 156*      ABG:  No results for input(s): PH, PO2ART, QUO0FJN, HCO3, BEART, O2SAT in the last 72 hours. Lab Results   Component Value Date    PROBNP 7,643 (H) 12/31/2022    PROBNP 7,236 (H) 12/29/2022    PROBNP 10,348 (H) 12/11/2022     No results found for: [facility]    Radiology Review:  Pertinent images / reports were reviewed as a part of this visit.     Assessment:     Patient Active Problem List   Diagnosis    Atrial fibrillation (HCC)    CAD (coronary artery disease)    Morbid obesity (HCC)    COPD (chronic obstructive pulmonary disease) ([facility] [address]    Sleep apnea    [facility]    Thyroid disease    Hyperlipidemia    Acute congestive heart failure ([facility]    Coronary artery disease involving native coronary artery of native heart with angina pectoris (HCC)    Chronic renal disease, stage III ([facility] [address] [facility] [[id number]]    Hypertension    CAD, multiple vessel    Dyspnea    Moderate malnutrition (HCC)    Pneumonia of both lungs due to infectious organism    Severe malnutrition (HCC)    Chronic respiratory failure with hypoxia (HCC)    Aspiration pneumonia of right lower lobe (HCC)    [facility]       Plan:   1. Overall the patient is better. 2. Frequent suctioning.     Kelley Babb MD   1/5/2023  10:07 AM

## 2023-01-05 NOTE — CONSULTS
Vas Cath dressing changed per protocol. Patient tolerated well. Please consult IV/PICC Team if patient's needs change.

## 2023-01-05 NOTE — CARE COORDINATION
Reviewed chart for continued discharge planning. Pt did not work with therapy yesterday, pt states he was exhausted from having dialysis, had a sleeping pill that took awhile to get out of his system and he was nervous about therapy expectations for his abilities as they brought in a walker for the assessment. Pt states he has not really walked in a month. I explained that therapy always brings in a walker in the event it is needed but they will not push him beyond his capabilities. I also explained without therapy evals insurance will not cover his rehab as that is what he would be in SNF for. CM discussed his goals of care (LTC at [facility] vs trying to return home). Pt states prior to his open heart surgery he was 100% independent and had none of his current medically issues (on tube feed/ trach/ dialysis). Pt states after his open heart he was transferred to [facility] and then to SNF. Pt states he owns his own home, it is single story and he lives alone. Pt's goal is to rehab and be able to return home. Referral sent to [facility] but per Chuck Connors they are out of network for [employer]. At this point [facility] or [facility] are pt's only options as they do in house dialysis and pt would require stretcher for transport to dialysis which other SNF would not provide. (Cost of pt's transport would be more than the SNF get reimbursed for his daily care). Met c pt to discuss current barriers in changing SNF. Pt states he received good care at [facility], his only concern was that he was not in a private room and his current roommate plays music too loud at times. I explained to pt that insurance does not pay for private room and options to keep room private would be to privately pay for an upgrade in room or pay for room and board of bed next to him, pt states he could not afford that. I offered to see if pt could get another roommate and pt declined stating he does like the man he rooms with.      RAFAEL to Elizabeth Joyce at [facility] and they will take pt back upon discharge. At this time plan remains for pt to return to [facility] upon discharge.

## 2023-01-05 NOTE — PROGRESS NOTES
Nephrology Progress Note  1/5/2023 2:23 PM  Subjective: Interval History: Keturah Trevino is a 71 y.o. male  week resting in bed no distress  Data:   Scheduled Meds:   warfarin  1 mg Oral Daily    ipratropium-albuterol  1 ampule Inhalation TID    amoxicillin-clavulanate  1 tablet Oral Daily    levothyroxine  100 mcg Oral Daily    metoprolol tartrate  12.5 mg Oral BID    acetylcysteine  4 mL Inhalation TID    guaiFENesin  300 mg Per G Tube Q6H    miconazole   Topical BID    insulin lispro  0-4 Units SubCUTAneous 4 times per day    aspirin  81 mg Per NG tube Daily    pantoprazole (PROTONIX) 40 mg injection  40 mg IntraVENous Daily    amiodarone  200 mg PEG Tube Daily    atorvastatin  40 mg PEG Tube Nightly    Nephronex  5 mL Feeding Tube Daily    sevelamer  1,600 mg Per G Tube Q8H    QUEtiapine  50 mg Per G Tube Nightly    midodrine  10 mg Oral TID WC    sodium chloride flush  5-40 mL IntraVENous 2 times per day    insulin glargine  10 Units SubCUTAneous Nightly    polyethylene glycol  17 g Oral Daily     Continuous Infusions:   sodium chloride 20 mL/hr at 01/02/23 1829    dextrose           CBC   Recent Labs     01/04/23  0514 01/05/23  0338   WBC 10.0 10.1   HGB 9.1* 9.1*   HCT 26.6* 28.8*    168      BMP   Recent Labs     01/04/23  0514 01/05/23  0338   * 137   K 4.2 3.8   CL 92* 97*   CO2 22 26   BUN 56* 35*   CREATININE 6.2* 4.5*     Hepatic:   No results for input(s): AST, ALT, ALB, BILITOT, ALKPHOS in the last 72 hours. Troponin: No results for input(s): TROPONINI in the last 72 hours. BNP: No results for input(s): BNP in the last 72 hours. Lipids: No results for input(s): CHOL, HDL in the last 72 hours.     Invalid input(s): LDLCALCU  ABGs:   Lab Results   Component Value Date/Time    PO2ART 70.3 09/24/2022 11:49 AM    NBY4BBN 35.5 09/24/2022 11:49 AM     INR:   Recent Labs     01/03/23  0524 01/04/23  0514 01/05/23  0338   INR 2.21 2.03 1.92     Renal Labs  Albumin:    Lab Results Component Value Date/Time    LABALBU 3.5 12/31/2022 05:28 AM     Calcium:    Lab Results   Component Value Date/Time    CALCIUM 8.8 01/05/2023 03:38 AM     Phosphorus:    Lab Results   Component Value Date/Time    PHOS 5.5 01/02/2023 05:25 AM     U/A:    Lab Results   Component Value Date/Time    NITRU NEGATIVE 09/17/2022 06:21 PM    COLORU YELLOW 09/17/2022 06:21 PM    PHUR 6.5 06/30/2021 02:11 PM    WBCUA NONE SEEN 09/17/2022 06:21 PM    RBCUA NONE SEEN 09/17/2022 06:21 PM    MUCUS RARE 09/17/2022 06:21 PM    TRICHOMONAS NONE SEEN 09/17/2022 06:21 PM    BACTERIA NEGATIVE 09/17/2022 06:21 PM    CLARITYU CLEAR 09/17/2022 06:21 PM    SPECGRAV 1.015 09/17/2022 06:21 PM    UROBILINOGEN 4.0 09/17/2022 06:21 PM    BILIRUBINUR NEGATIVE 09/17/2022 06:21 PM    BILIRUBINUR small 06/30/2021 02:11 PM    BLOODU NEGATIVE 09/17/2022 06:21 PM    GLUCOSEU neg 06/30/2021 02:11 PM    KETUA TRACE 09/17/2022 06:21 PM     ABG:    Lab Results   Component Value Date/Time    GYQ5UVN 35.5 09/24/2022 11:49 AM    PO2ART 70.3 09/24/2022 11:49 AM    PGF0XWV 21.2 09/24/2022 11:49 AM     HgBA1c:    Lab Results   Component Value Date/Time    LABA1C 7.0 12/30/2022 02:15 AM     Microalbumen/Creatinine ratio:  No components found for: RUCREAT  TSH:    Lab Results   Component Value Date/Time    TSH 1.66 04/03/2018 11:33 AM     IRON:    Lab Results   Component Value Date/Time    IRON 54 12/13/2022 06:50 AM     Iron Saturation:  No components found for: PERCENTFE  TIBC:    Lab Results   Component Value Date/Time    TIBC 217 12/13/2022 06:50 AM     FERRITIN:    Lab Results   Component Value Date/Time    FERRITIN 778 12/13/2022 06:50 AM     RPR:  No results found for: RPR  YAYA:  No results found for: ANATITER, YAYA  24 Hour Urine for Creatinine Clearance:  No components found for: CREAT4, UHRS10, UTV10      Objective:   I/O: 01/04 0701 - 01/05 0700  In: 500   Out: 1500   I/O last 3 completed shifts:   In: 500   Out: 1500   No intake/output data recorded. Vitals: BP (!) 99/47   Pulse 91   Temp 98 °F (36.7 °C) (Oral)   Resp (!) 5   Ht 6' 2\" (1.88 m)   Wt 267 lb 10.2 oz (121.4 kg)   SpO2 94%   BMI 34.36 kg/m²  {  General appearance: awake weak  HEENT: Head: Normal, normocephalic, atraumatic.   Neck:  positive trach  Lungs: diminished breath sounds bilaterally  Heart: S1, S2 normal  Abdomen: abnormal findings:  soft nt positive PEG  Extremities: edema trace  Neurologic: Mental status: alertness: Weak tired        Assessment and Plan:      IMP:   #1 end-stage renal disease on dialysis Monday Wednesday Friday   #2 shortness of breath with trach and congestion   #3 hyponatremia with hyperkalemia   #4  low blood pressure    Plan      #1 Next dialysis Friday   #2 oxygenation appears stable treat with respiratory therapy   #3 sodium potassium stable   #4 blood pressure low stable   plan to return to  [facility] at this time             Jaskaran Kaur MD, MD

## 2023-01-05 NOTE — CONSULTS
[address] [address] Continence Nurse  Consult Note       Ramya Morejon  AGE: 71 y.o. GENDER: male  : 1950  TODAY'S DATE:  2023    Subjective:     Reason for  Evaluation and Assessment: wound care reassessment. Ramya Morejon is a 71 y.o. male referred by:   [x] Physician  [] Nursing  [] Other:     Wound Identification:  Wound Type: pressure  Contributing Factors: venous stasis, chronic pressure, and decreased mobility        PAST MEDICAL HISTORY        Diagnosis Date    Allergic rhinitis     Anticoagulated on Coumadin     17-**[facility] to follow pt's PT/INRs, along w/prescribing his Coumadin. **    Anxiety     Arthritis     Atrial fibrillation (HCC)     On Coumadin. 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    positive at 15C, negative at 18C    COPD (chronic obstructive pulmonary disease) (HCC)     Depression     Diabetes mellitus (HCC)     NIDDM- dx over 10 yrs ago- follows with Dr Maria Elena Pinon's contracture of hand     Right hand    Family history of cardiovascular disease     Father-MI    GERD (gastroesophageal reflux disease)     H/O cardiac catheterization 2007    cardiac cath- stent LAD patent, [place]-33-94%, RCA 40-50%    H/O cardiac catheterization 2008    cardiac cath- mild disease mid RCA    H/O cardiovascular stress test 04/10/2014    cardiolite- normal, no ischemia, EF63%    H/O cardiovascular stress test 2016    EF59% normal study  pt in atrial fib    H/O cardiovascular stress test 2017    EF 60%   afib    H/O cardiovascular stress test 2018    EF60% normal study    H/O Doppler ultrasound     2010-CAROTID_Intimal thickening but no significant atherosclerotic plaque noted in LAUREN. Doppler flow velocities within the LAUREN are WNL. Intimal thickening but no significant atherosclerotic plaque noted in LICA. Doppler flow velociities within the LICA are WNL.      H/O echocardiogram 04/10/2014 EF 60%, normal LV systolic function, mild mitral and tricuspid insufficiencies, no pericardial effusion.     H/O echocardiogram 09/21/2016    EF60% normal study    H/O echocardiogram 11/07/2018    EF55-60% no significant valular disease    H/O hiatal hernia     Hx of Venous Doppler 03/14/2019    Significant reflux in Left Deep System, No reflux in right lower extremity, No DVT or SVT    Hyperlipidemia     Hypertension     Obesity     S/P PTCA (percutaneous transluminal coronary angioplasty) 03/21/2005    s/p PTCA with 3.5 X 16 TAXUS stent to LAD- follows with Dr Rubén Yanez    Sleep apnea     had sleep study done 10+ yrs ago- has cpap but does not use it    Thyroid disease     hypothyroid       PAST SURGICAL HISTORY    Past Surgical History:   Procedure Laterality Date    CARDIAC CATHETERIZATION  6/12/08    mild disease mid RCA,  stent to LAD patent,    CARDIAC CATHETERIZATION  3/07    Stent to LAD patent, Diagonal 40-50%, RCA 40-50%    CARDIAC CATHETERIZATION  3/05    COLONOSCOPY  2011    COLONOSCOPY  5/18/16    1 polyp removed, diverticulosis and hemorrhoids noted    CORONARY ANGIOPLASTY WITH STENT PLACEMENT  03/21/2005    PTCA with 3.5 x 16 TAXUS stent to LAD    CORONARY ARTERY BYPASS GRAFT N/A 9/22/2022    CABG CORONARY ARTERY BYPASS x3 (LIMA TO LAD, SVG TO PDA-RCA SEQUENTIAL) , INTRAOPERATIVE MILTON, ENDOVEIN HARVEST OF LEFT SAPHENOUS VEIN, MODIFIED MAZE PROCEDURE, LEFT ATRIAL APPENDAGE LIGATION, INTERCOSTAL NERVE BLOCK, INTRA-AORTIC BALLOON PUMP INSERTION performed by Daniele Guzman MD at [address] [place] [address], [address] [place] [address], [facility]  2014    egd    HAND SURGERY Right [id number] DISLOCATN+FIXATN Right 6/3/2019    RIGHT OPEN REDUCTION INTERNAL FIXATION OF DISTAL TIBIA  AND FIBULA performed by Isaac Sommer MD at [address] [facility] N/A 9/24/2022    STERNUM WASHOUT performed by Daniele Guzman MD at [address] [facility]    Family History   Problem Relation Age of Onset    Cancer Mother breast ca    Coronary Art Dis Father          MI    Heart Disease Father     Rheum Arthritis Other     Rheum Arthritis Sister     No Known Problems Brother     Rheum Arthritis Sister        SOCIAL HISTORY    Social History     Tobacco Use    Smoking status: Former     Packs/day: 1.00     Years: 10.00     Pack years: 10.00     Types: Cigarettes     Quit date: 1976     Years since quittin.0    Smokeless tobacco: Never   Vaping Use    Vaping Use: Never used   Substance Use Topics    Alcohol use: Not Currently     Alcohol/week: 6.0 standard drinks     Types: 6 Cans of beer per week     Comment: caffeine 2 cups of tea a day     Drug use: No       ALLERGIES    No Known Allergies    MEDICATIONS    No current facility-administered medications on file prior to encounter. Current Outpatient Medications on File Prior to Encounter   Medication Sig Dispense Refill    warfarin (COUMADIN) 2 MG tablet Take daily.  Goal INR between 2-3 30 tablet 0    carvedilol (COREG) 6.25 MG tablet 1 tablet by PEG Tube route 2 times daily (with meals) 60 tablet 0    sennosides-docusate sodium (SENOKOT-S) 8.6-50 MG tablet Take 2 tablets by mouth daily as needed for Constipation 60 tablet 0    midodrine (PROAMATINE) 10 MG tablet Take 1 tablet by mouth 3 times daily (with meals) 90 tablet 0    levothyroxine (SYNTHROID) 75 MCG tablet Take 1 tablet by mouth Daily 30 tablet 0    ipratropium-albuterol (DUONEB) 0.5-2.5 (3) MG/3ML SOLN nebulizer solution Inhale 3 mLs into the lungs 4 times daily 360 mL 0    insulin glargine (LANTUS SOLOSTAR) 100 UNIT/ML injection pen Inject 5 Units into the skin nightly 5 Adjustable Dose Pre-filled Pen Syringe 0    albuterol sulfate HFA (VENTOLIN HFA) 108 (90 Base) MCG/ACT inhaler Inhalation      amiodarone (CORDARONE) 200 MG tablet 200 mg daily      atorvastatin (LIPITOR) 40 MG tablet 40 mg nightly      B Complex-C-Folic Acid (NEPHRONEX) 0.9 MG/5ML LIQD 5 mLs daily      melatonin 3 MG TABS tablet 9 mg nightly Nutritional Supplements (NEPRO/CARBSTEADY) LIQD 50 mL/hr continuous      Nutritional Supplements (PROSOURCE TF) LIQD 1 Package 3 times daily      traZODone (DESYREL) 100 MG tablet 100 mg nightly      sevelamer (RENVELA) 0.8 g PACK packet 1.6 g  in the morning and 1.6 g at noon and 1.6 g in the evening. QUEtiapine (SEROQUEL) 50 MG tablet 50 mg nightly      aspirin 81 MG chewable tablet 1 tablet by Per NG tube route daily 30 tablet 3    pantoprazole 4 mg/mL in sodium chloride (PF) injection Infuse 10 mLs intravenously daily 3 applicator 1    blood glucose monitor kit and supplies Dispense sufficient amount for indicated testing frequency plus additional to accommodate PRN testing needs. Dispense all needed supplies to include: monitor, strips, lancing device, lancets, control solutions, alcohol swabs. 1 kit 0    Lancets MISC by D3EA route three times a day.  100 each 3    polyethylene glycol (GLYCOLAX) 17 GM/SCOOP powder Take 17 g by mouth daily 225 g 2         Objective:      BP 95/67   Pulse 91   Temp 98 °F (36.7 °C) (Oral)   Resp (!) 5   Ht 6' 2\" (1.88 m)   Wt 267 lb 10.2 oz (121.4 kg)   SpO2 94%   BMI 34.36 kg/m²   Jeremy Risk Score: Jeremy Scale Score: 15    LABS    CBC:   Lab Results   Component Value Date/Time    WBC 10.1 01/05/2023 03:38 AM    RBC 2.67 01/05/2023 03:38 AM    HGB 9.1 01/05/2023 03:38 AM    HCT 28.8 01/05/2023 03:38 AM    .9 01/05/2023 03:38 AM    MCH 34.1 01/05/2023 03:38 AM    MCHC 31.6 01/05/2023 03:38 AM    RDW 23.9 01/05/2023 03:38 AM     01/05/2023 03:38 AM    MPV 8.4 01/05/2023 03:38 AM     CMP:    Lab Results   Component Value Date/Time     01/05/2023 03:38 AM    K 3.8 01/05/2023 03:38 AM    CL 97 01/05/2023 03:38 AM    CO2 26 01/05/2023 03:38 AM    BUN 35 01/05/2023 03:38 AM    CREATININE 4.5 01/05/2023 03:38 AM    GFRAA >60 09/24/2022 12:08 PM    AGRATIO 1.1 02/15/2022 03:35 PM    LABGLOM 13 01/05/2023 03:38 AM    GLUCOSE 156 01/05/2023 03:38 AM PROT 6.3 12/31/2022 05:28 AM    LABALBU 3.5 12/31/2022 05:28 AM    CALCIUM 8.8 01/05/2023 03:38 AM    BILITOT 0.4 12/31/2022 05:28 AM    ALKPHOS 103 12/31/2022 05:28 AM    AST 22 12/31/2022 05:28 AM    ALT 14 12/31/2022 05:28 AM     Albumin:    Lab Results   Component Value Date/Time    LABALBU 3.5 12/31/2022 05:28 AM     PT/INR:    Lab Results   Component Value Date/Time    PROTIME 24.9 01/05/2023 03:38 AM    PROTIME 27.3 09/09/2022 03:22 PM    INR 1.92 01/05/2023 03:38 AM     HgBA1c:    Lab Results   Component Value Date/Time    LABA1C 7.0 12/30/2022 02:15 AM         Assessment:     Patient Active Problem List   Diagnosis    Atrial fibrillation (HCC)    CAD (coronary artery disease)    Morbid obesity (HCC)    COPD (chronic obstructive pulmonary disease) (HCC)    Sleep apnea    Type 2 diabetes mellitus (HCC)    Thyroid disease    Hyperlipidemia    Acute congestive heart failure ([address]    Coronary artery disease involving native coronary artery of native heart with angina pectoris (HCC)    Chronic renal disease, stage III ([address] [711204]    Hypertension    CAD, multiple vessel    Dyspnea    Moderate malnutrition ([address]    Pneumonia of both lungs due to infectious organism    Severe malnutrition ([address]    Chronic respiratory failure with hypoxia (HCC)    Aspiration pneumonia of right lower lobe ([address]    ESRD on dialysis ([address]       Measurements:  Wound 12/12/22 Buttocks cluster (Active)   Wound Image   01/05/23 0850   Wound Etiology Pressure Stage 2 01/05/23 0850   Dressing Status New dressing applied 01/05/23 0850   Wound Cleansed Cleansed with saline 01/05/23 0850   Dressing/Treatment Silicone border 34/52/27 0850   Dressing Change Due 01/02/23 01/01/23 1400   Wound Length (cm) 3 cm 01/05/23 0850   Wound Width (cm) 3.8 cm 01/05/23 0850   Wound Depth (cm) 0.1 cm 01/05/23 0850   Wound Surface Area (cm^2) 11.4 cm^2 01/05/23 0850   Change in Wound Size % (l*w) -470 01/05/23 0850   Wound Volume (cm^3) 1.14 cm^3 01/05/23 0850   Distance Tunneling (cm) 0 cm 01/05/23 0850   Tunneling Position ___ O'Clock 0 01/05/23 0850   Undermining Starts ___ O'Clock 0 01/05/23 0850   Undermining Ends___ O'Clock 0 01/05/23 0850   Undermining Maxium Distance (cm) 0 01/05/23 0850   Wound Assessment Pink/red 01/05/23 0850   Drainage Amount Small 01/05/23 0850   Drainage Description Serosanguinous 01/05/23 0850   Odor None 01/05/23 0850   Cat-wound Assessment Blanchable erythema 01/05/23 0850   Margins Defined edges 01/05/23 0850   Wound Thickness Description not for Pressure Injury Partial thickness 01/05/23 0850   Number of days: 24       Wound 01/05/23 Left;Plantar (Active)   Wound Image   01/05/23 0850   Wound Etiology Other 01/05/23 0850   Dressing Status New dressing applied 01/05/23 0850   Wound Cleansed Cleansed with saline 01/05/23 0850   Dressing/Treatment Roll gauze 01/05/23 0850   Wound Length (cm) 4 cm 01/05/23 0850   Wound Width (cm) 5 cm 01/05/23 0850   Wound Depth (cm) 0.1 cm 01/05/23 0850   Wound Surface Area (cm^2) 20 cm^2 01/05/23 0850   Wound Volume (cm^3) 2 cm^3 01/05/23 0850   Distance Tunneling (cm) 0 cm 01/05/23 0850   Tunneling Position ___ O'Clock 0 01/05/23 0850   Undermining Starts ___ O'Clock 0 01/05/23 0850   Undermining Ends___ O'Clock 0 01/05/23 0850   Undermining Maxium Distance (cm) 0 01/05/23 0850   Wound Assessment Pink/red 01/05/23 0850   Drainage Amount Moderate 01/05/23 0850   Drainage Description Serosanguinous 01/05/23 0850   Odor None 01/05/23 0850   Cat-wound Assessment Intact 01/05/23 0850   Margins Defined edges 01/05/23 0850   Wound Thickness Description not for Pressure Injury Partial thickness 01/05/23 0850   Number of days: 0       Response to treatment:  Well tolerated by patient.      Pain Assessment:  Severity:  none  Quality of pain:   Wound Pain Timing/Severity:   Premedicated: no    Plan:     Plan of Care: Wound 12/12/22 Buttocks cluster-Dressing/Treatment: Silicone border  Wound 01/05/23 Left;Plantar-Dressing/Treatment: Roll gauze (optifoam ag)    Patient in bed agreeable to wound care reassessment. Pt has lower leg vascular discoloration with dry skin. Feet were bleeding from touching the end of the bed. Washed legs and feet with bath oil. Left foot measured and pictured. Applied optifoam ag and kerlix. Heels intact. Groins with redness/better continue with micotin powder. Buttocks cluster wound pressure stage 2/moisture associated. Wiped with wipes. Measured and pictured applied foam border. Pt turned to rt side with pillow support. Heels floated. Informed nurse of above and pt needs a bed extender. Atmos air pump on the bed. Pt is at moderate risk for skin breakdown AEB josue. Follow josue orders. Specialty Bed Required : yes  [] Low Air Loss   [x] Pressure Redistribution  [] Fluid Immersion  [] Bariatric  [] Total Pressure Relief  [] Other:     Discharge Plan:  Placement for patient upon discharge: tbd  Hospice Care: no  Patient appropriate for Outpatient [address]: no    Patient/Caregiver Teaching:  Level of patient/caregiver understanding able to: pt voiced understanding. Electronically signed by Pio Rodríguez.  TUSHAR Rock,  on 1/5/2023 at 10:26 AM

## 2023-01-05 NOTE — PROGRESS NOTES
Physician Progress Note      PATIENT:               Xavier Esparza  CSN #:                  [id number]  :                       1950  ADMIT DATE:       2022 2:03 PM  [address] [place] DATE:  RESPONDING  PROVIDER #:        Abraham PERAZA          QUERY TEXT:    Pt admitted with PNA. Noted documentation of etiology of PNA is likely d/t   aspiration on 1/3 ID consult note. If possible, please document in progress   notes and discharge summary:    The medical record reflects the following:  Risk Factors: Trach with increased secretions, Peg tube  Clinical Indicators: ID consult note 1/3 \"Right lower lobe pneumonia. ... with   tracheostomy and PEG tube. Etiology of pneumonia is likely due to   aspiration. ......... Vitaly Mckeon Respiratory culture shows normal respiratory amy\"  Treatment: ID consult, DC cefepime and started on Augmentin, respiratory Cx. Thank you,  JOSE ZazuetaN, RN, CDS  [phone]  Options provided:  -- Aspiration pneumonia confirmed present on admission  -- Other - I will add my own diagnosis  -- Disagree - Not applicable / Not valid  -- Disagree - Clinically unable to determine / Unknown  -- Refer to Clinical Documentation Reviewer    PROVIDER RESPONSE TEXT:    Provider disagreed with this query.     Query created by: Daniel Saini on 2023 8:38 AM      Electronically signed by:  Adan Arenas 2023 8:45 AM

## 2023-01-05 NOTE — PROGRESS NOTES
Infectious Disease Progress Note  2023   Patient Name: Rigoberto Tavera : 1950   Impression  Suspected RLL Trachestomy Associated Pneumonia:  Afebrile, no leukocytosis  -Rapid Flu A/B, COVID-19 Negative  -MRSA/MssA screen negative  Past -Resp culture: normal amy  -Resp culture: normal amy  -BC 0/2-NGTD  -CXR: Small right pleural effusion with bibasilar airspace opacities. 2023-CT Chest WO Contrast: Right infectious/inflammatory airways process with suspected RLL pneumonia  DMII/ Thyroid Disease:  CAD s/p PCI/ PAF on Coumadin:  ESRD on HD:  Chronic Trach/PEG:  KHUSHBU:  Obesity:  BMI:  33.13  Multi-morbidity: per PMHx  Plan:  Continue po Augmentin 500/125 mg q24h (HD dosing) cumulative x 14 days (end date 2023)   Trend CRP and Pct, ordered in am  Follow up with PCP  OK from ID standpoint to DC when ready    Ongoing Antimicrobial Therapy  Augmentin 1/3-? Completed Antimicrobial Therapy  Cefepime -3  Vancomycin ?? History:? Interval history noted. Chief complaint: RLL pneumonia. Denies n/v/d/f or untoward effects of antibiotics. States is feeling better, less SOB. Physical Exam:  Vital Signs: BP (!) 102/50   Pulse 82   Temp 98 °F (36.7 °C) (Oral)   Resp 18   Ht 6' 2\" (1.88 m)   Wt 267 lb 10.2 oz (121.4 kg)   SpO2 99%   BMI 34.36 kg/m²     Gen: Remains A&O x 4, no distress  Neck: Supple. Trachea midline. No LAD. Trach intact midline capped off, room air. Chest: no distress and CTA. Good air movement. Room air with trach capped. Heart: RRR and no MRG. Abd: soft, non-distended, no tenderness, no hepatomegaly. Normoactive bowel sounds. Ext: no clubbing, cyanosis, or edema  Neuro: Mental status intact. CN 2-12 intact and no focal sensory or motor deficits     Radiologic / Imaging / TESTING  2022 XR Chest Portable:  Impression   Small right pleural effusion with bibasilar airspace opacities.   This could   represent atelectasis or pneumonia in the appropriate clinical setting          1/1/2023 CT Chest WO Contrast:  Impression   Right infectious/inflammatory airways process with suspected right lower lobe   pneumonia.         Labs:    Recent Results (from the past 24 hour(s))   POCT Glucose    Collection Time: 01/04/23  8:56 PM   Result Value Ref Range    POC Glucose 134 (H) 70 - 99 MG/DL   Protime/INR & PTT    Collection Time: 01/05/23  3:38 AM   Result Value Ref Range    Protime 24.9 (H) 11.7 - 14.5 SECONDS    INR 1.92 INDEX    aPTT 32.9 25.1 - 37.1 SECONDS   Basic Metabolic Panel w/ Reflex to MG    Collection Time: 01/05/23  3:38 AM   Result Value Ref Range    Sodium 137 135 - 145 MMOL/L    Potassium 3.8 3.5 - 5.1 MMOL/L    Chloride 97 (L) 99 - 110 mMol/L    CO2 26 21 - 32 MMOL/L    Anion Gap 14 4 - 16    BUN 35 (H) 6 - 23 MG/DL    Creatinine 4.5 (H) 0.9 - 1.3 MG/DL    Est, Glom Filt Rate 13 (L) >60 mL/min/1.73m2    Glucose 156 (H) 70 - 99 MG/DL    Calcium 8.8 8.3 - 10.6 MG/DL   CBC with Auto Differential    Collection Time: 01/05/23  3:38 AM   Result Value Ref Range    WBC 10.1 4.0 - 10.5 K/CU MM    RBC 2.67 (L) 4.6 - 6.2 M/CU MM    Hemoglobin 9.1 (L) 13.5 - 18.0 GM/DL    Hematocrit 28.8 (L) 42 - 52 %    .9 (H) 78 - 100 FL    MCH 34.1 (H) 27 - 31 PG    MCHC 31.6 (L) 32.0 - 36.0 %    RDW 23.9 (H) 11.7 - 14.9 %    Platelets 753 744 - 733 K/CU MM    MPV 8.4 7.5 - 11.1 FL    Differential Type AUTOMATED DIFFERENTIAL     Segs Relative 58.9 36 - 66 %    Lymphocytes % 26.8 24 - 44 %    Monocytes % 8.7 (H) 0 - 4 %    Eosinophils % 2.7 0 - 3 %    Basophils % 1.1 (H) 0 - 1 %    Segs Absolute 5.9 K/CU MM    Lymphocytes Absolute 2.7 K/CU MM    Monocytes Absolute 0.9 K/CU MM    Eosinophils Absolute 0.3 K/CU MM    Basophils Absolute 0.1 K/CU MM    Nucleated RBC % 1.3 %    Total Nucleated RBC 0.1 K/CU MM    Total Immature Neutrophil 0.18 K/CU MM    Immature Neutrophil % 1.8 (H) 0 - 0.43 %   C-Reactive Protein    Collection Time: 01/05/23  3:38 AM Result Value Ref Range    CRP High Sensitivity 13.1 (H) <5.0 mg/L   POCT Glucose    Collection Time: 01/05/23  6:01 AM   Result Value Ref Range    POC Glucose 166 (H) 70 - 99 MG/DL   POCT Glucose    Collection Time: 01/05/23  1:02 PM   Result Value Ref Range    POC Glucose 189 (H) 70 - 99 MG/DL     CULTURE results: Invalid input(s): BLOOD CULTURE,  URINE CULTURE, SURGICAL CULTURE    Diagnosis:  Patient Active Problem List   Diagnosis    Atrial fibrillation (HCC)    CAD (coronary artery disease)    Morbid obesity (HCC)    COPD (chronic obstructive pulmonary disease) (HCC)    Sleep apnea    Type 2 diabetes mellitus ([address]    Thyroid disease    Hyperlipidemia    Acute congestive heart failure ([address]    Coronary artery disease involving native coronary artery of native heart with angina pectoris (HCC)    Chronic renal disease, stage III ([address] [777507]    Hypertension    CAD, multiple vessel    Dyspnea    Moderate malnutrition (HCC)    Pneumonia of both lungs due to infectious organism    Severe malnutrition (HCC)    Chronic respiratory failure with hypoxia (HCC)    Aspiration pneumonia of right lower lobe (HCC)    ESRD on dialysis ([address]       Active Problems  Principal Problem:    Pneumonia of both lungs due to infectious organism  Active Problems:    Severe malnutrition (HCC)    Chronic respiratory failure with hypoxia (HCC)    Aspiration pneumonia of right lower lobe (HCC)    ESRD on dialysis ([address]  Resolved Problems:    * No resolved hospital problems. *    Electronically signed by: Electronically signed by Martinez Spaulding.  ANKITA Johnston CNP on 1/5/2023 at 4:15 PM

## 2023-01-05 NOTE — PROGRESS NOTES
Progress Note( Dr. Naik Senior)  1/4/2023  Subjective:   Admit Date: 12/29/2022  PCP: ANKITA Flor CNP    Admitted For :Healthcare associated pneumonia    Consulted For: Better control of blood glucose    Interval History: Patient seem to be somewhat better the patient in the hemodialysis unit can alert  Has tracheotomy tube and PEG tube tube feeding was discontinued discontinued last night to be restarted again later today     Denies any chest pains,   Some SOB . Denies nausea or vomiting. On tube feeding    No new bowel or bladder symptoms. Intake/Output Summary (Last 24 hours) at 1/4/2023 2308  Last data filed at 1/4/2023 1156  Gross per 24 hour   Intake 500 ml   Output 1500 ml   Net -1000 ml         DATA    CBC:   Recent Labs     01/04/23  0514   WBC 10.0   HGB 9.1*         CMP:  Recent Labs     01/02/23  0525 01/04/23  0514   * 132*   K 5.1 4.2   CL 94* 92*   CO2 21 22   BUN 74* 56*   CREATININE 7.4* 6.2*   CALCIUM 8.7 8.9       Lipids:   Lab Results   Component Value Date/Time    CHOL 152 04/14/2021 10:02 AM    CHOL 139 07/23/2018 08:01 AM    HDL 43 08/26/2022 10:39 AM    TRIG 212 04/14/2021 10:02 AM     Glucose:  Recent Labs     01/04/23  1251 01/04/23  1602 01/04/23 2056   POCGLU 138* 133* 134*       [id number]:  Lab Results   Component Value Date/Time    LABA1C 7.0 12/30/2022 02:15 AM     High Sensitivity TSH:   Lab Results   Component Value Date/Time    TSHHS 11.280 01/02/2023 05:25 AM     Free T3: No results found for: FT3  Free T4:  Lab Results   Component Value Date/Time    T4FREE 0.80 01/02/2023 05:25 AM       CT CHEST WO CONTRAST   Final Result   Right infectious/inflammatory airways process with suspected right lower lobe   pneumonia. XR CHEST PORTABLE   Final Result   Small right pleural effusion with bibasilar airspace opacities.   This could   represent atelectasis or pneumonia in the appropriate clinical setting              Scheduled Medicines Medications:    traMADol  25 mg Oral Once    ipratropium-albuterol  1 ampule Inhalation TID    warfarin  0.5 mg Oral Daily    amoxicillin-clavulanate  1 tablet Oral Daily    levothyroxine  100 mcg Oral Daily    metoprolol tartrate  12.5 mg Oral BID    acetylcysteine  4 mL Inhalation TID    guaiFENesin  300 mg Per G Tube Q6H    miconazole   Topical BID    insulin lispro  0-4 Units SubCUTAneous 4 times per day    aspirin  81 mg Per NG tube Daily    pantoprazole (PROTONIX) 40 mg injection  40 mg IntraVENous Daily    amiodarone  200 mg PEG Tube Daily    atorvastatin  40 mg PEG Tube Nightly    Nephronex  5 mL Feeding Tube Daily    sevelamer  1,600 mg Per G Tube Q8H    QUEtiapine  50 mg Per G Tube Nightly    midodrine  10 mg Oral TID WC    sodium chloride flush  5-40 mL IntraVENous 2 times per day    insulin glargine  10 Units SubCUTAneous Nightly    polyethylene glycol  17 g Oral Daily      Infusions:    sodium chloride 20 mL/hr at 01/02/23 1829    dextrose           Objective:   Vitals: /62   Pulse 93   Temp 97.4 °F (36.3 °C)   Resp 18   Ht 6' 2\" (1.88 m)   Wt 267 lb 10.2 oz (121.4 kg)   SpO2 90%   BMI 34.36 kg/m²   General appearance: alert and cooperative with exam  Neck: no JVD or bruit  Thyroid : Normal lobes   Lungs: Has Vesicular Breath sounds   Heart:  regular rate and rhythm  Abdomen: soft, non-tender; bowel sounds normal; no masses,  no organomegaly has PEG tube for feeding  Musculoskeletal: Normal  Extremities: extremities normal, , no edema  Neurologic:  Awake, alert, oriented to name, place and time. Cranial nerves II-XII are grossly intact. Motor is  intact. Sensory is intact. ,  and gait is abnormal.  And mostly bedridden    Assessment:     Patient Active Problem List:     Atrial fibrillation ([facility]     CAD (coronary artery disease)     Morbid obesity ([facility]     COPD (chronic obstructive pulmonary disease) ([facility]     Sleep apnea     Type 2 diabetes mellitus ([facility]     Thyroid disease Hyperlipidemia     Acute congestive heart failure (HCC)     Coronary artery disease involving native coronary artery of native heart with angina pectoris (HCC)     Chronic renal disease, stage III ([facility] [409933]     Hypertension     CAD, multiple vessel     Dyspnea     Moderate malnutrition (HCC)     Healthcare-associated pneumonia     Severe malnutrition ([facility]      Plan:     Reviewed POC blood glucose . Labs and X ray results   Reviewed Current Medicines   started on tube feeding with renal formula  On Correction bolus Humalog/ Basal Lantus Insulin regime /  Monitor Blood glucose frequently   Modified  the dose of Insulin/ other medicines as needed   Will follow     .      Roni Heard MD, MD

## 2023-01-05 NOTE — PROGRESS NOTES
Occupational Therapy    [facility] ACUTE CARE OCCUPATIONAL THERAPY EVALUATION  Alli Craven, 1950, [id number], 1/5/2023    History  [place]:  The primary encounter diagnosis was Pneumonia of both lungs due to infectious organism, unspecified part of lung. Diagnoses of Hyperkalemia and Renal failure, unspecified chronicity were also pertinent to this visit. 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, COPD (chronic obstructive pulmonary disease) ([facility] Depression, Diabetes mellitus ([facility] Dupuytren's contracture of hand, Family history of cardiovascular disease, GERD (gastroesophageal reflux disease), H/O cardiac catheterization, H/O cardiac catheterization, H/O cardiovascular stress test, H/O cardiovascular stress test, H/O cardiovascular stress test, H/O cardiovascular stress test, H/O Doppler ultrasound, H/O echocardiogram, H/O echocardiogram, H/O echocardiogram, H/O hiatal hernia, Hx of Venous Doppler, Hyperlipidemia, Hypertension, Obesity, S/P PTCA (percutaneous transluminal coronary angioplasty), Sleep apnea, and Thyroid disease. Patient  has a past surgical history that includes Coronary angioplasty with stent (03/21/2005); Cardiac catheterization (6/12/08); Cardiac catheterization (3/07); Cardiac catheterization (3/05); Endoscopy, colon, diagnostic (2014); Colonoscopy (2011); Colonoscopy (5/18/16); Hand surgery (Right, 1997); pr optx ankle dislocation w/repair/int/xtrnl fixj (Right, 6/3/2019); Sternum Debridement (N/A, 9/24/2022); and Coronary artery bypass graft (N/A, 9/22/2022). Subjective:  Patient states:  \"I need help to get cleaned up\".     Pain:  Denies pain, reported nausea at end of session  Communication with other providers:  Handoff to RN  Restrictions: General Precautions, Fall Risk    Home Setup/Prior level of function Occupational profile (relevant social history and personal factors):    Social/Functional History  Lives With: Alone  Type of Home: House  Home Layout: One level  Home Access: Stairs to enter without rails(holds onto door molding )  Entrance Stairs - Number of Steps: 2  Bathroom Shower/Tub: Tub/Shower unit  Bathroom Toilet: Standard  Bathroom Equipment: Grab bars in shower, Shower chair  Home Equipment: Rolling walker(can borrow equipment from W); Wheelchair  ADL Assistance: Independent  Homemaking Assistance: Independent  Homemaking Responsibilities: Yes  Ambulation Assistance: Independent  Transfer Assistance: Independent  Active : Yes  Occupation: Retired  Additional Comments: Brother lives near by and is retired. Pt had trach done in September and has been in rehab since    Pt verifies above information stating he has not been home in a while due to frequent hospitalizations/rehab    Examination of body systems (includes body structures/functions, activity/participation limitations):  Observation:  Supine in bed upon arrival, agreeable to therapy, feet w/ some sores, placed pillow under legs to lift heels off of bed and blanket between feet and HOB after pt had been pulled up all the way to [facility] to prevent feet from touching footboard  Vision:  Glasses  Hearing:  [place]ist [place]  Cardiopulmonary:  On 02; 02 saturation remained WFL throughout session      Body Systems and functions:  ROM R/L:  WFL.     Strength R/L:  4-/5,   Sensation: Nueropathy in bilateral feet  Tone: Normal  Coordination: WFL  Perception: WNL    Activities of Daily Living (ADLs):  Feeding: Gwendolyn  Grooming: SBA (washing hands/face w/ warm washcloth)  UB bathing: SBA  LB bathing: maxA (washing kylah area/buttocks rolling L/R)  UB dressing: SBA  LB dressing: maxA (doffing soiled depends, donning fresh depends rolling L/R)  Toileting: maxA (See LB bathing/dressing for details)    Cognitive and Psychosocial Functioning:  Overall cognitive status: WFL  Affect: Normal        Mobility:  Supine to sit:  DNT as pt was naseous after LB bathing and bed mobility. Notified nursing  Transfers:  DNT  Sitting balance:  DNT. Standing balance:  DNT. Functional Mobility DNT  Toilet/Shower Transfers: DNT  Rolling L/R: Marcio ~ 4 rolls to each side for LB ADLs  Scooting to HOB: modA w/ instruction in use of BUE/BLE             AM-PAC Daily Activity Inpatient   How much help for putting on and taking off regular lower body clothing?: Total  How much help for Bathing?: A Lot  How much help for Toileting?: Total  How much help for putting on and taking off regular upper body clothing?: A Little  How much help for taking care of personal grooming?: A Little  How much help for eating meals?: None  AM-[facility] Inpatient Daily Activity Raw Score: 14  AM-PAC Inpatient ADL T-Scale Score : 33.39  ADL Inpatient CMS 0-100% Score: 59.67  ADL Inpatient CMS G-Code Modifier : CK    Treatment:  Self Care Training:   Cues were given for safety, sequence, UE/LE placement, visual cues, and balance. Activities performed today included grooming LB bathing/dressing, toileting    Therapeutic Activity Training:   Therapeutic activity training was instructed today. Cues were given for safety, sequence, UE/LE placement, awareness, and balance. Activities performed today included bed mobility training, amara gL/R< scooting to [facility]     Safety: patient left in bed with bed alarm, call light within reach, RN notified, gait belt used. Assessment:  Pt is a 70 yo male admitted from home for pneumonia. Pt at baseline is Independent for ADLs Independent for high level IADLs and Independent for functional transfers/mobility w/ AD. Pt currently presents w/ deficits in ADL and high level IADL independence, functional activity tolerance, dynamic sitting and standing balance and tolerance and functional transfers, BUE strength, OOB activity.  Pt would benefit from continued acute care OT services w/ discharge to SNF  Complexity: Moderate  Prognosis: Good, no significant barriers to participation at this time. Occupational Therapy Plan  Times Per Week: 3x+  Times Per Day:  Once a day  Current Treatment Recommendations: Strengthening, ROM, Balance training, Functional mobility training, Endurance training, Patient/Caregiver education & training, Self-Care / ADL, Return to work related activity, Pain management, Equipment evaluation, education, & procurement, Positioning, Home management training, Safety education & training     Equipment: defer    Goals:  Pt goal: go home  Time Frame for STGs: discharge  Goal 1: Pt will perform UE ADLs Independent  Goal 2: Pt will perform LE ADLs Ramy w/ AD  Goal 3: Pt will perform toileting Ramy  Goal 4: Pt will perform functional transfer w/ AD Ramy  Goal 5: Pt will perform functional mobility w/ AD ramy  Goal 6: Pt will perform therex/theract in order to increase functional activity tolerance and dynamic standing balance    Treatment plan:  Pt will perform functional task in sit reaching in all 3 planes in order to increase dynamic sitting balance and functional activity tolerance    Recommendations for NURSING activity: Amberly BOWSER    Time:   Time in: 0759  Time out: 0828  Timed treatment minutes: 24 minutes  Total time: 29 minutes    Electronically signed by:    Alla SMITH/L [id number]  10:33 AM,1/5/2023

## 2023-01-05 NOTE — PROGRESS NOTES
Physical Therapy  [facility] ACUTE CARE PHYSICAL THERAPY EVALUATION  Sandy Alonzo, 1950, [id number], 1/5/2023    History  [place]:  The primary encounter diagnosis was Pneumonia of both lungs due to infectious organism, unspecified part of lung. Diagnoses of Hyperkalemia and Renal failure, unspecified chronicity were also pertinent to this visit. 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, COPD (chronic obstructive pulmonary disease) ([facility] Depression, Diabetes mellitus ([facility] Dupuytren's contracture of hand, Family history of cardiovascular disease, GERD (gastroesophageal reflux disease), H/O cardiac catheterization, H/O cardiac catheterization, H/O cardiovascular stress test, H/O cardiovascular stress test, H/O cardiovascular stress test, H/O cardiovascular stress test, H/O Doppler ultrasound, H/O echocardiogram, H/O echocardiogram, H/O echocardiogram, H/O hiatal hernia, Hx of Venous Doppler, Hyperlipidemia, Hypertension, Obesity, S/P PTCA (percutaneous transluminal coronary angioplasty), Sleep apnea, and Thyroid disease. Patient  has a past surgical history that includes Coronary angioplasty with stent (03/21/2005); Cardiac catheterization (6/12/08); Cardiac catheterization (3/07); Cardiac catheterization (3/05); Endoscopy, colon, diagnostic (2014); Colonoscopy (2011); Colonoscopy (5/18/16); Hand surgery (Right, 1997); pr optx ankle dislocation w/repair/int/xtrnl fixj (Right, 6/3/2019); Sternum Debridement (N/A, 9/24/2022); and Coronary artery bypass graft (N/A, 9/22/2022). Assessment:  Pt presents 7 days s/p admission for SOB 2/2 PNA. Pt is from SNF, total care, nonambulatory.  Pt is primarily limited by endurance and strength, additional deficits include self-limiting tendencies, balance, functional mobility, safety awareness, ROM, pain, sensation. Light assist for bed level tasks, unwilling to participate in EOB activities despite max education, remains limited by deconditioning and is unable to care for himself at current level of function. Recommending SNF w/ transition to LTC. Complexity: Moderate  Prognosis: Fair ; self-limiting, pain, comorbid conditions, positional tolerance  Plan 2+/week, 1 week  Equipment: pend to next LOC    Recommendations for NURSING mobility: MAGUE for OOB    Subjective:  Patient states:  \"I don't know If I can, I'm nauseous w/ any movement at all. \"    Pain:  endorses sacral pain d/t chronic ulcer ; does not rate. Communication with other providers:  Handoff to RN  Restrictions: fall risk; general precautions; PEG    Home Setup/Prior level of function  From SNF, total care at SNF, nonambulatory  Pt remains confused on plan, will need CM assist    Examination of body systems (includes body structures/functions, activity/participation limitations):  Observation:  Supine, awake, alert, agreeable to limited participation. Unwilling to attempt even EOB activities d/t nausea ; self limiting t/o.  Bed alarm, R bolsters, PEG in place upon arrival  Posture: fair ; fwd shoulders and head posture  Vision:  glasses  Hearing:  WFL  Cardiopulmonary:  WFL  Cognition: A&O x 4 ; alert, follows commands w/ repetition and max cues for encouragement, distractible to pain, memory appears intact, fair - safety awareness, self-limiting tendencies, mod cues needed for sequencing/setup, mod cues needed for initiation    Musculoskeletal  ROM R/L:  ankle DF ~0, PF WFL, hip flexion ~90*, L knee flx ~75*, R knee flx ~100     Strength R/L:  grossly 3-/5, as observed in function  Sensation: partial sensation deficits to light touch in BL LE  Tone: normotonic  Coordination: WFL  Proprioception: impaired use of BL UE requiring light hand over hand and min cues intermittently    Mobility:  Rolling L/R:  min A for initiation  Boost: max A  Supine to sit:  unwilling to attempt d/t nausea  Transfers: unwilling to attempt d/t nausea  Sitting balance:  unwilling to attempt d/t nausea. Standing balance:  unwilling to attempt d/t nausea. Gait: has been nonambulatory since recent functional decline  Wheelchair: unwilling to attempt OOB on this date    [facility] 6 Clicks Inpatient Mobility:  AM-PAC Inpatient Mobility Raw Score : 10    Goals:  Pt Goals: maintain comfort  Short Term Goals  Time Frame for Short Term Goals: 1 week  Short Term Goal 1: pt will complete bed mobility at [facility]  Short Term Goal 2: pt will demonstrate fair + seated balance for participation in EOB activities  Short Term Goal 3: pt will demonstrate 3+/5 LE strength to promote return to inc activities       Treatment plan:  Bed mobility, functional mobility, transfers, balance, gait, TA, TX, strength activities, neuro    Treatment:  Therapeutic Activity Training:   Therapeutic activity training was instructed today. Cues were given for safety, sequence, UE/LE placement, awareness, and balance. Activities performed today included bed mobility training, sup-sit, sit-stand, SPT.   Assist in repositioning, pt care task w/ pt direction, bolserting ; requiring in time and effort 2/2 nausea    Positioned for comfort and pressure relief  Safety: patient left in bed, call light within reach, RN notified, gait belt used, all needs met    Time:   Time in: 1056  Time out: 1125  Timed treatment minutes: 19  Total time + eval: 29    Electronically signed by:    Nusrat Calderon PT, DPT  1/5/2023, 11:53 AM

## 2023-01-05 NOTE — CARE COORDINATION
Received update from ORL[facility]ANS at [facility] and pt is approved to admit. Sent PS to Dr. Konstantin Ron to update. Pt on discharge, set up with [facility] for 6:30pm.  Updated pt's nurse and [facility] at [facility].  Faxed AVS to [phone]

## 2023-01-06 NOTE — PROGRESS NOTES
Progress Note( Dr. Consuelo Garza)  1/5/2023  Subjective:   Admit Date: 12/29/2022  PCP: ANKITA Francois CNP    Admitted For :Healthcare associated pneumonia    Consulted For: Better control of blood glucose    Interval History: Patient seem to be somewhat better the patient in the hemodialysis unit can alert  Has tracheotomy tube and PEG tube tube feeding     Denies any chest pains,   Some SOB . Denies nausea or vomiting. On tube feeding    No new bowel or bladder symptoms. No intake or output data in the 24 hours ending 01/05/23 2105      DATA    CBC:   Recent Labs     01/04/23 0514 01/05/23 0338   WBC 10.0 10.1   HGB 9.1* 9.1*    168      CMP:  Recent Labs     01/04/23 0514 01/05/23 0338   * 137   K 4.2 3.8   CL 92* 97*   CO2 22 26   BUN 56* 35*   CREATININE 6.2* 4.5*   CALCIUM 8.9 8.8       Lipids:   Lab Results   Component Value Date/Time    CHOL 152 04/14/2021 10:02 AM    CHOL 139 07/23/2018 08:01 AM    HDL 43 08/26/2022 10:39 AM    TRIG 212 04/14/2021 10:02 AM     Glucose:  Recent Labs     01/05/23  0601 01/05/23  1302 01/05/23  1703   POCGLU 166* 189* 162*       [id number]:  Lab Results   Component Value Date/Time    LABA1C 7.0 12/30/2022 02:15 AM     High Sensitivity TSH:   Lab Results   Component Value Date/Time    TSHHS 11.280 01/02/2023 05:25 AM     Free T3: No results found for: FT3  Free T4:  Lab Results   Component Value Date/Time    T4FREE 0.80 01/02/2023 05:25 AM       CT CHEST WO CONTRAST   Final Result   Right infectious/inflammatory airways process with suspected right lower lobe   pneumonia. XR CHEST PORTABLE   Final Result   Small right pleural effusion with bibasilar airspace opacities.   This could   represent atelectasis or pneumonia in the appropriate clinical setting              Scheduled Medicines   Medications:   REM     Infusions:   REM        Objective:   Vitals: BP (!) 88/49   Pulse 82   Temp 98 °F (36.7 °C) (Oral)   Resp 18   Ht 6' 2\" (1.88 m) Wt 267 lb 10.2 oz (121.4 kg)   SpO2 99%   BMI 34.36 kg/m²   General appearance: alert and cooperative with exam  Neck: no JVD or bruit  Thyroid : Normal lobes   Lungs: Has Vesicular Breath sounds   Heart:  regular rate and rhythm  Abdomen: soft, non-tender; bowel sounds normal; no masses,  no organomegaly has PEG tube for feeding  Musculoskeletal: Normal  Extremities: extremities normal, , no edema  Neurologic:  Awake, alert, oriented to name, place and time. Cranial nerves II-XII are grossly intact. Motor is  intact. Sensory is intact. ,  and gait is abnormal.  And mostly bedridden    Assessment:     Patient Active Problem List:     Atrial fibrillation (HCC)     CAD (coronary artery disease)     Morbid obesity ([address]     COPD (chronic obstructive pulmonary disease) ([address]     Sleep apnea     Type 2 diabetes mellitus ([address]     Thyroid disease     Hyperlipidemia     Acute congestive heart failure ([address]     Coronary artery disease involving native coronary artery of native heart with angina pectoris (HCC)     Chronic renal disease, stage III ([address] [[id number]]     Hypertension     CAD, multiple vessel     Dyspnea     Moderate malnutrition (HCC)     Healthcare-associated pneumonia     Severe malnutrition ([address]      Plan:     Reviewed POC blood glucose . Labs and X ray results   Reviewed Current Medicines   started on tube feeding with renal formula  On Correction bolus Humalog/ Basal Lantus Insulin regime /  Monitor Blood glucose frequently   Modified  the dose of Insulin/ other medicines as needed   Will follow     .      Linus Sterling MD, MD

## 2023-01-18 NOTE — PROGRESS NOTES
Physician Progress Note      PATIENT:               Claritza Orona  CSN #:                  [id number]  :                       1950  ADMIT DATE:       2022 2:03 PM  Reta Lorenzana DATE:        2023 6:53 PM  RESPONDING  PROVIDER #:        Esteban PERAZA          QUERY TEXT:    Pt admitted with pneumonia. Pt noted to have trach, peg, resident of nursing   home and be mostly bed ridden. If possible, please document in the progress   notes and discharge summary if you are evaluating and/or treating any of the   following:    Note: CAP and HCAP indicate where the pneumonia was acquired, not a specific   type. The medical record reflects the following:  Risk Factors: Nursing home resident, mostly bedridden, Peg tube, trach  Clinical Indicators:  PN \"HCAP Tracheostomy dependence. .... [facility] Respiratory   cultures negative, Blood cultures negative, COVID and rapid flu negative, MRSA   screen negative. \",  ID consult note on 1/3/23 \"Etiology of pneumonia is   likely due to aspiration. \"  Treatment: IV cefepime, Augmentin, ID and pulmonologist consult. Thank you,  JOSE BlackN, RN, [facility]  [phone]  Options provided:  -- Gram negative pneumonia  -- Gram positive pneumonia  -- Aspiration pneumonia  -- Hypostatic pneumonia  -- Other - I will add my own diagnosis  -- Disagree - Not applicable / Not valid  -- Disagree - Clinically unable to determine / Unknown  -- Refer to Clinical Documentation Reviewer    PROVIDER RESPONSE TEXT:    This patient has gram negative pneumonia.     Query created by: Krystin Shook on 2023 11:20 AM      Electronically signed by:  Farhan Evangelista 2023 2:29 PM

## 2023-01-24 ENCOUNTER — OFFICE VISIT (OUTPATIENT)
Dept: CARDIOTHORACIC SURGERY | Age: 73
End: 2023-01-24
Payer: COMMERCIAL

## 2023-01-24 VITALS
SYSTOLIC BLOOD PRESSURE: 104 MMHG | BODY MASS INDEX: 33.37 KG/M2 | DIASTOLIC BLOOD PRESSURE: 62 MMHG | HEART RATE: 110 BPM | HEIGHT: 74 IN | WEIGHT: 260 LBS | OXYGEN SATURATION: 95 %

## 2023-01-24 DIAGNOSIS — K59.00 CONSTIPATION, UNSPECIFIED CONSTIPATION TYPE: ICD-10-CM

## 2023-01-24 DIAGNOSIS — Z95.1 S/P CABG (CORONARY ARTERY BYPASS GRAFT): ICD-10-CM

## 2023-01-24 DIAGNOSIS — Z09 POSTOPERATIVE FOLLOW-UP: ICD-10-CM

## 2023-01-24 DIAGNOSIS — R13.10 DYSPHAGIA, UNSPECIFIED TYPE: Primary | ICD-10-CM

## 2023-01-24 PROCEDURE — 99215 OFFICE O/P EST HI 40 MIN: CPT | Performed by: THORACIC SURGERY (CARDIOTHORACIC VASCULAR SURGERY)

## 2023-01-24 PROCEDURE — 3078F DIAST BP <80 MM HG: CPT | Performed by: THORACIC SURGERY (CARDIOTHORACIC VASCULAR SURGERY)

## 2023-01-24 PROCEDURE — 1123F ACP DISCUSS/DSCN MKR DOCD: CPT | Performed by: THORACIC SURGERY (CARDIOTHORACIC VASCULAR SURGERY)

## 2023-01-24 PROCEDURE — 3074F SYST BP LT 130 MM HG: CPT | Performed by: THORACIC SURGERY (CARDIOTHORACIC VASCULAR SURGERY)

## 2023-01-24 RX ORDER — OMEPRAZOLE 20 MG/1
20 CAPSULE, DELAYED RELEASE ORAL DAILY
COMMUNITY

## 2023-01-24 RX ORDER — ICOSAPENT ETHYL 1000 MG/1
2 CAPSULE ORAL 2 TIMES DAILY
COMMUNITY

## 2023-01-24 RX ORDER — POLYETHYLENE GLYCOL 3350 17 G/17G
17 POWDER, FOR SOLUTION ORAL 2 TIMES DAILY
Qty: 1 EACH | Refills: 1 | Status: SHIPPED | OUTPATIENT
Start: 2023-01-24 | End: 2023-02-23

## 2023-01-24 RX ORDER — GUAIFENESIN 600 MG/1
1200 TABLET, EXTENDED RELEASE ORAL 2 TIMES DAILY
COMMUNITY

## 2023-01-24 NOTE — PATIENT INSTRUCTIONS
Please be informed that if you contact our office outside of normal business hours the physician on call cannot help with any scheduling or rescheduling issues, procedure instruction questions or any type of medication issue. We advise you for any urgent/emergency that you go to the nearest emergency room! PLEASE CALL OUR OFFICE DURING NORMAL BUSINESS HOURS    Monday - Friday   8 am to 5 pm    [facility]: [phone]    [place]:  [phone]    **It is YOUR responsibilty to bring medication bottles and/or updated medication list to [address] [place] [address]. This will allow us to better serve you and all your healthcare needs**    Thank you for allowing us to care for you today! We want to ensure we can follow your treatment plan and we strive to give you the best outcomes and experience possible. If you ever have a life threatening emergency and call 911 - for an ambulance (EMS)   Our providers can only care for you at:   [facility] or [facility]. Even if you have someone take you or you drive yourself we can only care for you in a [facility] facility. Our providers are not setup at the other healthcare locations!

## 2023-01-27 ENCOUNTER — HOSPITAL ENCOUNTER (OUTPATIENT)
Dept: CT IMAGING | Age: 73
Discharge: HOME | End: 2023-01-27
Payer: COMMERCIAL

## 2023-01-27 DIAGNOSIS — Z95.1 S/P CABG (CORONARY ARTERY BYPASS GRAFT): ICD-10-CM

## 2023-01-27 DIAGNOSIS — R13.10 DYSPHAGIA, UNSPECIFIED TYPE: ICD-10-CM

## 2023-01-27 PROCEDURE — 6360000004 HC RX CONTRAST MEDICATION: Performed by: INTERNAL MEDICINE

## 2023-01-27 PROCEDURE — 2580000003 HC RX 258: Performed by: NURSE PRACTITIONER

## 2023-01-27 PROCEDURE — 71275 CT ANGIOGRAPHY CHEST: CPT

## 2023-01-27 PROCEDURE — 6360000004 HC RX CONTRAST MEDICATION: Performed by: NURSE PRACTITIONER

## 2023-01-27 PROCEDURE — 70498 CT ANGIOGRAPHY NECK: CPT

## 2023-01-27 RX ORDER — 0.9 % SODIUM CHLORIDE 0.9 %
10 VIAL (ML) INJECTION
Status: COMPLETED | OUTPATIENT
Start: 2023-01-27 | End: 2023-01-27

## 2023-01-27 RX ADMIN — IOPAMIDOL 75 ML: 755 INJECTION, SOLUTION INTRAVENOUS at 12:01

## 2023-01-27 RX ADMIN — IOPAMIDOL 75 ML: 755 INJECTION, SOLUTION INTRAVENOUS at 12:03

## 2023-01-27 RX ADMIN — Medication 10 ML: at 12:01

## 2023-01-30 ENCOUNTER — TELEPHONE (OUTPATIENT)
Dept: GASTROENTEROLOGY | Age: 73
End: 2023-01-30

## 2023-02-02 NOTE — PROGRESS NOTES
Cardiothoracic Surgery     Cardiologist:     CC:    HISTORY OF PRESENT ILLNESS:  Fabrice Gonzales is a 71 y.o. male who presents today for:  1. Postop followup s/p CABGx3 and MAZE procedure with Dr Samantha Bowens in October 2022- complex post operative course including transfer to [facility], trach/PEG, LTACH placement  2. Patient is now residing in an [facility]. He does still have trach and PEG. Tolerating speaking valve. Currently NPO due to dysphagia. PHYSICAL EXAM:   VITALS:  /62 (Site: Left Upper Arm, Position: Sitting, Cuff Size: Large Adult)   Pulse (!) 110   Ht 6' 2\" (1.88 m)   Wt 260 lb (117.9 kg)   SpO2 95%   BMI 33.38 kg/m²      Physical Exam:  Gen: AOx3, NAD, in a wheelchair, appears chronically ill, obese, muscular atrophy noted. HEENT: trach in place, site clean and dry  Chest: Sternal Incision CDI/JAVY  Lung: CTA-B  Heart: RRR  Extremities: No edema and LE incision CDI/JAVY     ROS:   See pertinent ROS noted in HPI    Radiological and other results:  CXR: not completed    Assessment&Plan:  Pt continues progress with therapy at SNF. Refer to GI for PEG tube eval and new swallow evaluation. Possible EGD. Follow up with Cardiology  CTA head/neck/chest/abd/pelvis  RTO in 4 weeks.

## 2023-02-06 ENCOUNTER — TELEPHONE (OUTPATIENT)
Dept: GASTROENTEROLOGY | Age: 73
End: 2023-02-06

## 2023-02-08 ENCOUNTER — OFFICE VISIT (OUTPATIENT)
Dept: CARDIOLOGY CLINIC | Age: 73
End: 2023-02-08
Payer: COMMERCIAL

## 2023-02-08 VITALS
BODY MASS INDEX: 33.37 KG/M2 | HEART RATE: 92 BPM | HEIGHT: 74 IN | WEIGHT: 260 LBS | SYSTOLIC BLOOD PRESSURE: 110 MMHG | DIASTOLIC BLOOD PRESSURE: 70 MMHG

## 2023-02-08 DIAGNOSIS — I25.10 CAD, MULTIPLE VESSEL: Primary | ICD-10-CM

## 2023-02-08 PROCEDURE — 3078F DIAST BP <80 MM HG: CPT | Performed by: INTERNAL MEDICINE

## 2023-02-08 PROCEDURE — 99214 OFFICE O/P EST MOD 30 MIN: CPT | Performed by: INTERNAL MEDICINE

## 2023-02-08 PROCEDURE — 1123F ACP DISCUSS/DSCN MKR DOCD: CPT | Performed by: INTERNAL MEDICINE

## 2023-02-08 PROCEDURE — 3074F SYST BP LT 130 MM HG: CPT | Performed by: INTERNAL MEDICINE

## 2023-02-08 NOTE — PROGRESS NOTES
Red Carrasquillo  is a  Established patient  ,71 y.o.   male here for evaluation of the following chief complaint(s):    cad    Patient is here post CABG    SUBJECTIVE/OBJECTIVE:  HPI : h/o  Cad, htn, hyperlipidimea, DM now here at catheter November and had CABG done had a rough course was transferred to [address] [place] [address] however he is still recovering and is in a bed    Review of Systems    neg    Vitals:    02/08/23 1343   BP: 110/70   Site: Left Lower Arm   Position: Sitting   Cuff Size: Medium Adult   Pulse: 92   Weight: 260 lb (117.9 kg)   Height: 6' 2\" (1.88 m)     /70 (Site: Left Lower Arm, Position: Sitting, Cuff Size: Medium Adult)   Pulse 92   Ht 6' 2\" (1.88 m)   Wt 260 lb (117.9 kg)   BMI 33.38 kg/m²   No flowsheet data found. Wt Readings from Last 3 Encounters:   02/08/23 260 lb (117.9 kg)   01/24/23 260 lb (117.9 kg)   01/04/23 267 lb 10.2 oz (121.4 kg)     Body mass index is 33.38 kg/m². Physical Exam     Neck: JVD      Lungs : clear    Cardio : Si and S2 audilble      Ext: edema      All pertinent data reviewed      Meds : reviewed         Tests ordered        ASSESSMENT/PLAN:               -     CORONARY ARTERY DISEASE:  asymptomatic     All available  tests in chart reviewed. Management discussed . Testing ordered  no                 On metoprolol and aspirin we will continue        Pt cant walk has lost muscle strength          -  Hypertension: Patients blood pressure is normal. Patient is advised about low sodium diet. Present medical regimen will not be changed. On metoprolol blood pressure is well controlled we will continue    - on HD now      -   DIABETES MELLITUS: Available pertinent lab data reviewed   and  patient was given dietary advice . Advised to check blood glucose level on a regular basis. -   Changes  in medicines made: No        On insulin compliant we will check the hgb a1c     - Atrial fibrillation, pt is not compliant with meds.  Patient does not have symptoms from atrial fibrillation on amiodarone and Coumadin  Although the patient gets his pro time and INR check but it is unclear whether he is taking Coumadin or not we will check with the nursing home      Hypothyroidism on Synthroid will continue           -  LIPID MANAGEMENT:  Importance of lipid levels discussed with patient   and patient was given dietary advice. NCEP- ATP III guidelines reviewed with patient.    -   Changes  in medicines made: No     On Lipitor and Vascepa we will check the labs                    Mortality from the morbid obesity is very high:     Body mass index is 33.38 kg/m².             An electronic signature was used to authenticate this note.    --Benji Hubbard MD

## 2023-02-12 ENCOUNTER — HOSPITAL ENCOUNTER (INPATIENT)
Age: 73
LOS: 5 days | Discharge: NURSING, REHAB OR LONG-TERM CARE | End: 2023-02-17
Attending: INTERNAL MEDICINE
Payer: COMMERCIAL

## 2023-02-12 PROBLEM — J69.0 ASPIRATION PNEUMONIA, UNSPECIFIED ASPIRATION PNEUMONIA TYPE, UNSPECIFIED LATERALITY, UNSPECIFIED PART OF LUNG (HCC): Status: ACTIVE | Noted: 2023-02-12

## 2023-02-12 PROBLEM — J69.0 ACUTE ASPIRATION PNEUMONIA (HCC): Status: ACTIVE | Noted: 2023-02-12

## 2023-02-12 LAB
GLUCOSE BLD-MCNC: 247 MG/DL (ref 70–99)
INR BLD: 1.25 INDEX
LACTIC ACID, SEPSIS: 1.6 MMOL/L (ref 0.5–1.9)
PROTHROMBIN TIME: 16.2 SECONDS (ref 11.7–14.5)

## 2023-02-12 PROCEDURE — 2060000000 HC ICU INTERMEDIATE R&B

## 2023-02-12 PROCEDURE — 94761 N-INVAS EAR/PLS OXIMETRY MLT: CPT

## 2023-02-12 PROCEDURE — 82962 GLUCOSE BLOOD TEST: CPT

## 2023-02-12 PROCEDURE — 2700000000 HC OXYGEN THERAPY PER DAY

## 2023-02-12 PROCEDURE — 6360000002 HC RX W HCPCS: Performed by: STUDENT IN AN ORGANIZED HEALTH CARE EDUCATION/TRAINING PROGRAM

## 2023-02-12 PROCEDURE — 87040 BLOOD CULTURE FOR BACTERIA: CPT

## 2023-02-12 PROCEDURE — 84481 FREE ASSAY (FT-3): CPT

## 2023-02-12 PROCEDURE — 83036 HEMOGLOBIN GLYCOSYLATED A1C: CPT

## 2023-02-12 PROCEDURE — 36415 COLL VENOUS BLD VENIPUNCTURE: CPT

## 2023-02-12 PROCEDURE — 2580000003 HC RX 258: Performed by: STUDENT IN AN ORGANIZED HEALTH CARE EDUCATION/TRAINING PROGRAM

## 2023-02-12 PROCEDURE — 85610 PROTHROMBIN TIME: CPT

## 2023-02-12 PROCEDURE — 83605 ASSAY OF LACTIC ACID: CPT

## 2023-02-12 PROCEDURE — 84439 ASSAY OF FREE THYROXINE: CPT

## 2023-02-12 PROCEDURE — 6370000000 HC RX 637 (ALT 250 FOR IP): Performed by: STUDENT IN AN ORGANIZED HEALTH CARE EDUCATION/TRAINING PROGRAM

## 2023-02-12 RX ORDER — INSULIN LISPRO 100 [IU]/ML
0-4 INJECTION, SOLUTION INTRAVENOUS; SUBCUTANEOUS
Status: DISCONTINUED | OUTPATIENT
Start: 2023-02-13 | End: 2023-02-13

## 2023-02-12 RX ORDER — QUETIAPINE FUMARATE 25 MG/1
50 TABLET, FILM COATED ORAL NIGHTLY
Status: DISCONTINUED | OUTPATIENT
Start: 2023-02-12 | End: 2023-02-17 | Stop reason: HOSPADM

## 2023-02-12 RX ORDER — POLYETHYLENE GLYCOL 3350 17 G/17G
17 POWDER, FOR SOLUTION ORAL DAILY PRN
Status: DISCONTINUED | OUTPATIENT
Start: 2023-02-12 | End: 2023-02-17 | Stop reason: HOSPADM

## 2023-02-12 RX ORDER — ATORVASTATIN CALCIUM 40 MG/1
40 TABLET, FILM COATED ORAL NIGHTLY
Status: DISCONTINUED | OUTPATIENT
Start: 2023-02-12 | End: 2023-02-17 | Stop reason: HOSPADM

## 2023-02-12 RX ORDER — POLYETHYLENE GLYCOL 3350 17 G/17G
17 POWDER, FOR SOLUTION ORAL DAILY PRN
Status: DISCONTINUED | OUTPATIENT
Start: 2023-02-12 | End: 2023-02-12

## 2023-02-12 RX ORDER — LEVOTHYROXINE SODIUM 0.07 MG/1
75 TABLET ORAL DAILY
Status: DISCONTINUED | OUTPATIENT
Start: 2023-02-13 | End: 2023-02-13

## 2023-02-12 RX ORDER — LABETALOL HYDROCHLORIDE 5 MG/ML
10 INJECTION, SOLUTION INTRAVENOUS EVERY 6 HOURS PRN
Status: DISCONTINUED | OUTPATIENT
Start: 2023-02-12 | End: 2023-02-17 | Stop reason: HOSPADM

## 2023-02-12 RX ORDER — SODIUM CHLORIDE 0.9 % (FLUSH) 0.9 %
5-40 SYRINGE (ML) INJECTION PRN
Status: DISCONTINUED | OUTPATIENT
Start: 2023-02-12 | End: 2023-02-17 | Stop reason: HOSPADM

## 2023-02-12 RX ORDER — ACETAMINOPHEN 650 MG/1
650 SUPPOSITORY RECTAL EVERY 6 HOURS PRN
Status: DISCONTINUED | OUTPATIENT
Start: 2023-02-12 | End: 2023-02-12

## 2023-02-12 RX ORDER — AMIODARONE HYDROCHLORIDE 200 MG/1
200 TABLET ORAL DAILY
Status: DISCONTINUED | OUTPATIENT
Start: 2023-02-13 | End: 2023-02-17 | Stop reason: HOSPADM

## 2023-02-12 RX ORDER — INSULIN LISPRO 100 [IU]/ML
0-4 INJECTION, SOLUTION INTRAVENOUS; SUBCUTANEOUS NIGHTLY
Status: DISCONTINUED | OUTPATIENT
Start: 2023-02-12 | End: 2023-02-13

## 2023-02-12 RX ORDER — SODIUM CHLORIDE, SODIUM LACTATE, POTASSIUM CHLORIDE, CALCIUM CHLORIDE 600; 310; 30; 20 MG/100ML; MG/100ML; MG/100ML; MG/100ML
INJECTION, SOLUTION INTRAVENOUS CONTINUOUS
Status: DISCONTINUED | OUTPATIENT
Start: 2023-02-12 | End: 2023-02-13

## 2023-02-12 RX ORDER — ACETAMINOPHEN 325 MG/1
650 TABLET ORAL EVERY 6 HOURS PRN
Status: DISCONTINUED | OUTPATIENT
Start: 2023-02-12 | End: 2023-02-17 | Stop reason: HOSPADM

## 2023-02-12 RX ORDER — SODIUM CHLORIDE 0.9 % (FLUSH) 0.9 %
5-40 SYRINGE (ML) INJECTION EVERY 12 HOURS SCHEDULED
Status: DISCONTINUED | OUTPATIENT
Start: 2023-02-12 | End: 2023-02-17 | Stop reason: HOSPADM

## 2023-02-12 RX ORDER — IPRATROPIUM BROMIDE AND ALBUTEROL SULFATE 2.5; .5 MG/3ML; MG/3ML
1 SOLUTION RESPIRATORY (INHALATION) 4 TIMES DAILY
Status: DISCONTINUED | OUTPATIENT
Start: 2023-02-12 | End: 2023-02-17 | Stop reason: HOSPADM

## 2023-02-12 RX ORDER — SODIUM CHLORIDE 9 MG/ML
INJECTION, SOLUTION INTRAVENOUS PRN
Status: DISCONTINUED | OUTPATIENT
Start: 2023-02-12 | End: 2023-02-17 | Stop reason: HOSPADM

## 2023-02-12 RX ORDER — SEVELAMER CARBONATE FOR ORAL SUSPENSION 800 MG/1
1.6 POWDER, FOR SUSPENSION ORAL EVERY 8 HOURS
Status: DISCONTINUED | OUTPATIENT
Start: 2023-02-13 | End: 2023-02-14

## 2023-02-12 RX ORDER — ACETAMINOPHEN 325 MG/1
650 TABLET ORAL EVERY 6 HOURS PRN
Status: DISCONTINUED | OUTPATIENT
Start: 2023-02-12 | End: 2023-02-12

## 2023-02-12 RX ORDER — ONDANSETRON 2 MG/ML
4 INJECTION INTRAMUSCULAR; INTRAVENOUS EVERY 6 HOURS PRN
Status: DISCONTINUED | OUTPATIENT
Start: 2023-02-12 | End: 2023-02-17 | Stop reason: HOSPADM

## 2023-02-12 RX ORDER — INSULIN GLARGINE 100 [IU]/ML
5 INJECTION, SOLUTION SUBCUTANEOUS NIGHTLY
Status: DISCONTINUED | OUTPATIENT
Start: 2023-02-12 | End: 2023-02-16

## 2023-02-12 RX ORDER — ACETAMINOPHEN 650 MG/1
650 SUPPOSITORY RECTAL EVERY 6 HOURS PRN
Status: DISCONTINUED | OUTPATIENT
Start: 2023-02-12 | End: 2023-02-17 | Stop reason: HOSPADM

## 2023-02-12 RX ORDER — ASPIRIN 81 MG/1
81 TABLET, CHEWABLE ORAL DAILY
Status: DISCONTINUED | OUTPATIENT
Start: 2023-02-13 | End: 2023-02-17 | Stop reason: HOSPADM

## 2023-02-12 RX ORDER — TRAZODONE HYDROCHLORIDE 50 MG/1
100 TABLET ORAL NIGHTLY
Status: DISCONTINUED | OUTPATIENT
Start: 2023-02-12 | End: 2023-02-17 | Stop reason: HOSPADM

## 2023-02-12 RX ORDER — ONDANSETRON 4 MG/1
4 TABLET, ORALLY DISINTEGRATING ORAL EVERY 8 HOURS PRN
Status: DISCONTINUED | OUTPATIENT
Start: 2023-02-12 | End: 2023-02-17 | Stop reason: HOSPADM

## 2023-02-12 RX ORDER — AMLODIPINE BESYLATE 5 MG/1
5 TABLET ORAL DAILY
Status: DISCONTINUED | OUTPATIENT
Start: 2023-02-13 | End: 2023-02-13

## 2023-02-12 RX ORDER — DEXTROSE MONOHYDRATE 100 MG/ML
INJECTION, SOLUTION INTRAVENOUS CONTINUOUS PRN
Status: DISCONTINUED | OUTPATIENT
Start: 2023-02-12 | End: 2023-02-17 | Stop reason: HOSPADM

## 2023-02-12 RX ADMIN — SODIUM CHLORIDE, POTASSIUM CHLORIDE, SODIUM LACTATE AND CALCIUM CHLORIDE: 600; 310; 30; 20 INJECTION, SOLUTION INTRAVENOUS at 23:14

## 2023-02-12 RX ADMIN — INSULIN GLARGINE 5 UNITS: 100 INJECTION, SOLUTION SUBCUTANEOUS at 23:40

## 2023-02-12 RX ADMIN — ATORVASTATIN CALCIUM 40 MG: 40 TABLET, FILM COATED ORAL at 23:13

## 2023-02-12 RX ADMIN — PIPERACILLIN AND TAZOBACTAM 3375 MG: 3; .375 INJECTION, POWDER, LYOPHILIZED, FOR SOLUTION INTRAVENOUS at 23:14

## 2023-02-12 RX ADMIN — QUETIAPINE FUMARATE 50 MG: 25 TABLET ORAL at 23:13

## 2023-02-12 RX ADMIN — SODIUM CHLORIDE, PRESERVATIVE FREE 10 ML: 5 INJECTION INTRAVENOUS at 23:13

## 2023-02-12 RX ADMIN — TRAZODONE HYDROCHLORIDE 100 MG: 50 TABLET ORAL at 23:13

## 2023-02-13 ENCOUNTER — APPOINTMENT (OUTPATIENT)
Dept: GENERAL RADIOLOGY | Age: 73
End: 2023-02-13
Attending: INTERNAL MEDICINE
Payer: COMMERCIAL

## 2023-02-13 LAB
AMMONIA: 44 UMOL/L (ref 16–60)
ANION GAP SERPL CALCULATED.3IONS-SCNC: 13 MMOL/L (ref 4–16)
ANION GAP SERPL CALCULATED.3IONS-SCNC: 14 MMOL/L (ref 4–16)
B PARAP IS1001 DNA NPH QL NAA+NON-PROBE: NOT DETECTED
B PERT.PT PRMT NPH QL NAA+NON-PROBE: NOT DETECTED
BACTERIA: NEGATIVE /HPF
BASE EXCESS: 3 (ref 0–3.3)
BILIRUBIN URINE: NORMAL
BLOOD, URINE: NORMAL
BUN SERPL-MCNC: 44 MG/DL (ref 6–23)
BUN SERPL-MCNC: 84 MG/DL (ref 6–23)
C PNEUM DNA NPH QL NAA+NON-PROBE: NOT DETECTED
CALCIUM SERPL-MCNC: 8.4 MG/DL (ref 8.3–10.6)
CALCIUM SERPL-MCNC: 8.7 MG/DL (ref 8.3–10.6)
CHLORIDE BLD-SCNC: 90 MMOL/L (ref 99–110)
CHLORIDE BLD-SCNC: 94 MMOL/L (ref 99–110)
CLARITY: CLEAR
CO2: 23 MMOL/L (ref 21–32)
CO2: 23 MMOL/L (ref 21–32)
COLOR: YELLOW
COMMENT: ABNORMAL
CREAT SERPL-MCNC: 3.2 MG/DL (ref 0.9–1.3)
CREAT SERPL-MCNC: 5.4 MG/DL (ref 0.9–1.3)
EKG DIAGNOSIS: NORMAL
EKG Q-T INTERVAL: 398 MS
EKG QRS DURATION: 178 MS
EKG QTC CALCULATION (BAZETT): 476 MS
EKG R AXIS: -67 DEGREES
EKG T AXIS: 10 DEGREES
EKG VENTRICULAR RATE: 86 BPM
ESTIMATED AVERAGE GLUCOSE: 154 MG/DL
FERRITIN: 1063 NG/ML (ref 30–400)
FLUAV H1 2009 PAN RNA NPH NAA+NON-PROBE: NOT DETECTED
FLUAV H1 RNA NPH QL NAA+NON-PROBE: NOT DETECTED
FLUAV H3 RNA NPH QL NAA+NON-PROBE: NOT DETECTED
FLUAV RNA NPH QL NAA+NON-PROBE: NOT DETECTED
FLUBV RNA NPH QL NAA+NON-PROBE: NOT DETECTED
FOLATE SERPL-MCNC: >20 NG/ML (ref 3.1–17.5)
GFR SERPL CREATININE-BSD FRML MDRD: 11 ML/MIN/1.73M2
GFR SERPL CREATININE-BSD FRML MDRD: 20 ML/MIN/1.73M2
GLUCOSE BLD-MCNC: 171 MG/DL (ref 70–99)
GLUCOSE BLD-MCNC: 173 MG/DL (ref 70–99)
GLUCOSE BLD-MCNC: 195 MG/DL (ref 70–99)
GLUCOSE BLD-MCNC: 202 MG/DL (ref 70–99)
GLUCOSE SERPL-MCNC: 141 MG/DL (ref 70–99)
GLUCOSE SERPL-MCNC: 187 MG/DL (ref 70–99)
GLUCOSE, URINE: NEGATIVE MG/DL
HADV DNA NPH QL NAA+NON-PROBE: NOT DETECTED
HBA1C MFR BLD: 7 % (ref 4.2–6.3)
HBV SURFACE AB SERPL IA-ACNC: <3.5 M[IU]/ML
HBV SURFACE AG SERPL QL IA: NON REACTIVE
HCO3 VENOUS: 23.2 MMOL/L (ref 19–25)
HCOV 229E RNA NPH QL NAA+NON-PROBE: NOT DETECTED
HCOV HKU1 RNA NPH QL NAA+NON-PROBE: NOT DETECTED
HCOV NL63 RNA NPH QL NAA+NON-PROBE: NOT DETECTED
HCOV OC43 RNA NPH QL NAA+NON-PROBE: NOT DETECTED
HCT VFR BLD CALC: 25.8 % (ref 42–52)
HEMOGLOBIN: 8 GM/DL (ref 13.5–18)
HMPV RNA NPH QL NAA+NON-PROBE: NOT DETECTED
HPIV1 RNA NPH QL NAA+NON-PROBE: NOT DETECTED
HPIV2 RNA NPH QL NAA+NON-PROBE: NOT DETECTED
HPIV3 RNA NPH QL NAA+NON-PROBE: NOT DETECTED
HPIV4 RNA NPH QL NAA+NON-PROBE: NOT DETECTED
HYALINE CASTS: 2 /LPF
INR BLD: 1.29 INDEX
IRON: 60 UG/DL (ref 59–158)
KETONES, URINE: NORMAL MG/DL
LACTIC ACID, SEPSIS: 1.4 MMOL/L (ref 0.5–1.9)
LEUKOCYTE ESTERASE, URINE: NORMAL
M PNEUMO DNA NPH QL NAA+NON-PROBE: NOT DETECTED
MAGNESIUM: 3 MG/DL (ref 1.8–2.4)
MCH RBC QN AUTO: 34.5 PG (ref 27–31)
MCHC RBC AUTO-ENTMCNC: 31 % (ref 32–36)
MCV RBC AUTO: 111.2 FL (ref 78–100)
NITRITE URINE, QUANTITATIVE: NEGATIVE
O2 SAT, VEN: 91.1 % (ref 50–70)
PCO2, VEN: 44 MMHG (ref 38–52)
PCT TRANSFERRIN: 26 % (ref 10–44)
PDW BLD-RTO: 19.1 % (ref 11.7–14.9)
PH VENOUS: 7.33 (ref 7.32–7.42)
PH, URINE: 5
PHOSPHORUS: 3.5 MG/DL (ref 2.5–4.9)
PLATELET # BLD: 253 K/CU MM (ref 140–440)
PMV BLD AUTO: 8.6 FL (ref 7.5–11.1)
PO2, VEN: 159 MMHG (ref 28–48)
POTASSIUM SERPL-SCNC: 4.6 MMOL/L (ref 3.5–5.1)
POTASSIUM SERPL-SCNC: 5.5 MMOL/L (ref 3.5–5.1)
PRO-BNP: 5949 PG/ML
PROTEIN UA: 100 MG/DL
PROTHROMBIN TIME: 16.7 SECONDS (ref 11.7–14.5)
RBC # BLD: 2.32 M/CU MM (ref 4.6–6.2)
RBC URINE: 37 /HPF
RSV RNA NPH QL NAA+NON-PROBE: NOT DETECTED
RV+EV RNA NPH QL NAA+NON-PROBE: NOT DETECTED
SARS-COV-2 RNA NPH QL NAA+NON-PROBE: NOT DETECTED
SODIUM BLD-SCNC: 126 MMOL/L (ref 135–145)
SODIUM BLD-SCNC: 131 MMOL/L (ref 135–145)
SPECIFIC GRAVITY UA: 1.02
SQUAMOUS EPITHELIAL: 1 /HPF
T3 FREE: 1.6 PG/ML (ref 2.3–4.2)
T4 FREE SERPL-MCNC: 0.78 NG/DL (ref 0.9–1.8)
TOTAL IRON BINDING CAPACITY: 229 UG/DL (ref 250–450)
TRICHOMONAS: NORMAL /HPF
TSH SERPL DL<=0.005 MIU/L-ACNC: 6.19 UIU/ML (ref 0.27–4.2)
UNSATURATED IRON BINDING CAPACITY: 169 UG/DL (ref 110–370)
UROBILINOGEN, URINE: 0.2 MG/DL
VITAMIN B-12: >2000 PG/ML (ref 211–911)
WBC # BLD: 7.1 K/CU MM (ref 4–10.5)
WBC UA: 67 /HPF

## 2023-02-13 PROCEDURE — 6360000002 HC RX W HCPCS: Performed by: STUDENT IN AN ORGANIZED HEALTH CARE EDUCATION/TRAINING PROGRAM

## 2023-02-13 PROCEDURE — 82746 ASSAY OF FOLIC ACID SERUM: CPT

## 2023-02-13 PROCEDURE — 93005 ELECTROCARDIOGRAM TRACING: CPT | Performed by: STUDENT IN AN ORGANIZED HEALTH CARE EDUCATION/TRAINING PROGRAM

## 2023-02-13 PROCEDURE — 94761 N-INVAS EAR/PLS OXIMETRY MLT: CPT

## 2023-02-13 PROCEDURE — 86376 MICROSOMAL ANTIBODY EACH: CPT

## 2023-02-13 PROCEDURE — 83605 ASSAY OF LACTIC ACID: CPT

## 2023-02-13 PROCEDURE — 6370000000 HC RX 637 (ALT 250 FOR IP): Performed by: STUDENT IN AN ORGANIZED HEALTH CARE EDUCATION/TRAINING PROGRAM

## 2023-02-13 PROCEDURE — 81001 URINALYSIS AUTO W/SCOPE: CPT

## 2023-02-13 PROCEDURE — 2500000003 HC RX 250 WO HCPCS: Performed by: STUDENT IN AN ORGANIZED HEALTH CARE EDUCATION/TRAINING PROGRAM

## 2023-02-13 PROCEDURE — 2580000003 HC RX 258

## 2023-02-13 PROCEDURE — 86706 HEP B SURFACE ANTIBODY: CPT

## 2023-02-13 PROCEDURE — C9113 INJ PANTOPRAZOLE SODIUM, VIA: HCPCS | Performed by: NURSE PRACTITIONER

## 2023-02-13 PROCEDURE — 83540 ASSAY OF IRON: CPT

## 2023-02-13 PROCEDURE — 84100 ASSAY OF PHOSPHORUS: CPT

## 2023-02-13 PROCEDURE — 80048 BASIC METABOLIC PNL TOTAL CA: CPT

## 2023-02-13 PROCEDURE — 0202U NFCT DS 22 TRGT SARS-COV-2: CPT

## 2023-02-13 PROCEDURE — 94640 AIRWAY INHALATION TREATMENT: CPT

## 2023-02-13 PROCEDURE — 83880 ASSAY OF NATRIURETIC PEPTIDE: CPT

## 2023-02-13 PROCEDURE — 85027 COMPLETE CBC AUTOMATED: CPT

## 2023-02-13 PROCEDURE — 82607 VITAMIN B-12: CPT

## 2023-02-13 PROCEDURE — 89220 SPUTUM SPECIMEN COLLECTION: CPT

## 2023-02-13 PROCEDURE — 83550 IRON BINDING TEST: CPT

## 2023-02-13 PROCEDURE — 84443 ASSAY THYROID STIM HORMONE: CPT

## 2023-02-13 PROCEDURE — 82962 GLUCOSE BLOOD TEST: CPT

## 2023-02-13 PROCEDURE — 82805 BLOOD GASES W/O2 SATURATION: CPT

## 2023-02-13 PROCEDURE — 99222 1ST HOSP IP/OBS MODERATE 55: CPT | Performed by: INTERNAL MEDICINE

## 2023-02-13 PROCEDURE — 36415 COLL VENOUS BLD VENIPUNCTURE: CPT

## 2023-02-13 PROCEDURE — 6360000002 HC RX W HCPCS: Performed by: NURSE PRACTITIONER

## 2023-02-13 PROCEDURE — 82728 ASSAY OF FERRITIN: CPT

## 2023-02-13 PROCEDURE — 85610 PROTHROMBIN TIME: CPT

## 2023-02-13 PROCEDURE — 6360000002 HC RX W HCPCS: Performed by: INTERNAL MEDICINE

## 2023-02-13 PROCEDURE — 2700000000 HC OXYGEN THERAPY PER DAY

## 2023-02-13 PROCEDURE — 2580000003 HC RX 258: Performed by: STUDENT IN AN ORGANIZED HEALTH CARE EDUCATION/TRAINING PROGRAM

## 2023-02-13 PROCEDURE — 2060000000 HC ICU INTERMEDIATE R&B

## 2023-02-13 PROCEDURE — 90935 HEMODIALYSIS ONE EVALUATION: CPT

## 2023-02-13 PROCEDURE — 82140 ASSAY OF AMMONIA: CPT

## 2023-02-13 PROCEDURE — 87086 URINE CULTURE/COLONY COUNT: CPT

## 2023-02-13 PROCEDURE — 87340 HEPATITIS B SURFACE AG IA: CPT

## 2023-02-13 PROCEDURE — 83735 ASSAY OF MAGNESIUM: CPT

## 2023-02-13 PROCEDURE — 71045 X-RAY EXAM CHEST 1 VIEW: CPT

## 2023-02-13 PROCEDURE — 86800 THYROGLOBULIN ANTIBODY: CPT

## 2023-02-13 PROCEDURE — 93010 ELECTROCARDIOGRAM REPORT: CPT | Performed by: INTERNAL MEDICINE

## 2023-02-13 RX ORDER — PANTOPRAZOLE SODIUM 40 MG/10ML
40 INJECTION, POWDER, LYOPHILIZED, FOR SOLUTION INTRAVENOUS 2 TIMES DAILY
Status: DISCONTINUED | OUTPATIENT
Start: 2023-02-13 | End: 2023-02-14

## 2023-02-13 RX ORDER — WARFARIN SODIUM 1 MG/1
1 TABLET ORAL DAILY
Status: DISCONTINUED | OUTPATIENT
Start: 2023-02-13 | End: 2023-02-13

## 2023-02-13 RX ORDER — WARFARIN SODIUM 1 MG/1
1 TABLET ORAL DAILY
Status: DISCONTINUED | OUTPATIENT
Start: 2023-02-13 | End: 2023-02-14

## 2023-02-13 RX ORDER — CALCIUM GLUCONATE 20 MG/ML
1000 INJECTION, SOLUTION INTRAVENOUS ONCE
Status: COMPLETED | OUTPATIENT
Start: 2023-02-13 | End: 2023-02-13

## 2023-02-13 RX ORDER — LEVOTHYROXINE SODIUM 0.1 MG/1
100 TABLET ORAL DAILY
Status: DISCONTINUED | OUTPATIENT
Start: 2023-02-14 | End: 2023-02-17 | Stop reason: HOSPADM

## 2023-02-13 RX ORDER — INSULIN LISPRO 100 [IU]/ML
0-4 INJECTION, SOLUTION INTRAVENOUS; SUBCUTANEOUS EVERY 4 HOURS
Status: DISCONTINUED | OUTPATIENT
Start: 2023-02-13 | End: 2023-02-14

## 2023-02-13 RX ORDER — 0.9 % SODIUM CHLORIDE 0.9 %
250 INTRAVENOUS SOLUTION INTRAVENOUS ONCE
Status: COMPLETED | OUTPATIENT
Start: 2023-02-13 | End: 2023-02-13

## 2023-02-13 RX ADMIN — TRAZODONE HYDROCHLORIDE 100 MG: 50 TABLET ORAL at 21:56

## 2023-02-13 RX ADMIN — IPRATROPIUM BROMIDE AND ALBUTEROL SULFATE 3 ML: 2.5; .5 SOLUTION RESPIRATORY (INHALATION) at 17:19

## 2023-02-13 RX ADMIN — IPRATROPIUM BROMIDE AND ALBUTEROL SULFATE 3 ML: 2.5; .5 SOLUTION RESPIRATORY (INHALATION) at 19:14

## 2023-02-13 RX ADMIN — LEVOTHYROXINE SODIUM 75 MCG: 0.07 TABLET ORAL at 05:51

## 2023-02-13 RX ADMIN — PANTOPRAZOLE SODIUM 40 MG: 40 INJECTION, POWDER, LYOPHILIZED, FOR SOLUTION INTRAVENOUS at 21:56

## 2023-02-13 RX ADMIN — PIPERACILLIN AND TAZOBACTAM 3375 MG: 3; .375 INJECTION, POWDER, LYOPHILIZED, FOR SOLUTION INTRAVENOUS at 12:28

## 2023-02-13 RX ADMIN — SODIUM CHLORIDE, PRESERVATIVE FREE 10 ML: 5 INJECTION INTRAVENOUS at 21:55

## 2023-02-13 RX ADMIN — IPRATROPIUM BROMIDE AND ALBUTEROL SULFATE 3 ML: 2.5; .5 SOLUTION RESPIRATORY (INHALATION) at 10:54

## 2023-02-13 RX ADMIN — ASPIRIN 81 MG CHEWABLE TABLET 81 MG: 81 TABLET CHEWABLE at 09:57

## 2023-02-13 RX ADMIN — SODIUM CHLORIDE, PRESERVATIVE FREE 10 ML: 5 INJECTION INTRAVENOUS at 09:58

## 2023-02-13 RX ADMIN — SODIUM CHLORIDE 250 ML: 9 INJECTION, SOLUTION INTRAVENOUS at 04:52

## 2023-02-13 RX ADMIN — WARFARIN SODIUM 1 MG: 1 TABLET ORAL at 17:09

## 2023-02-13 RX ADMIN — SODIUM CHLORIDE, POTASSIUM CHLORIDE, SODIUM LACTATE AND CALCIUM CHLORIDE: 600; 310; 30; 20 INJECTION, SOLUTION INTRAVENOUS at 09:54

## 2023-02-13 RX ADMIN — AMIODARONE HYDROCHLORIDE 200 MG: 200 TABLET ORAL at 09:57

## 2023-02-13 RX ADMIN — SODIUM ZIRCONIUM CYCLOSILICATE 10 G: 10 POWDER, FOR SUSPENSION ORAL at 09:57

## 2023-02-13 RX ADMIN — EPOETIN ALFA-EPBX 8000 UNITS: 4000 INJECTION, SOLUTION INTRAVENOUS; SUBCUTANEOUS at 15:12

## 2023-02-13 RX ADMIN — ATORVASTATIN CALCIUM 40 MG: 40 TABLET, FILM COATED ORAL at 21:56

## 2023-02-13 RX ADMIN — ACETAMINOPHEN 650 MG: 325 TABLET ORAL at 22:06

## 2023-02-13 RX ADMIN — PANTOPRAZOLE SODIUM 40 MG: 40 INJECTION, POWDER, LYOPHILIZED, FOR SOLUTION INTRAVENOUS at 09:57

## 2023-02-13 RX ADMIN — CALCIUM GLUCONATE 1000 MG: 20 INJECTION, SOLUTION INTRAVENOUS at 10:01

## 2023-02-13 RX ADMIN — IPRATROPIUM BROMIDE AND ALBUTEROL SULFATE 3 ML: 2.5; .5 SOLUTION RESPIRATORY (INHALATION) at 07:27

## 2023-02-13 RX ADMIN — QUETIAPINE FUMARATE 50 MG: 25 TABLET ORAL at 21:56

## 2023-02-13 NOTE — PLAN OF CARE
Problem: Discharge Planning  Goal: Discharge to home or other facility with appropriate resources  2/13/2023 1046 by Robel Villalobos RN  Outcome: Progressing  2/13/2023 0002 by Beatriz Jiang RN  Outcome: Progressing     Problem: Skin/Tissue Integrity  Goal: Absence of new skin breakdown  Description: 1. Monitor for areas of redness and/or skin breakdown  2. Assess vascular access sites hourly  3. Every 4-6 hours minimum:  Change oxygen saturation probe site  4. Every 4-6 hours:  If on nasal continuous positive airway pressure, respiratory therapy assess nares and determine need for appliance change or resting period.   2/13/2023 1046 by Robel Villalobos RN  Outcome: Progressing  2/13/2023 0002 by Beatriz Jiang RN  Outcome: Progressing     Problem: Safety - Adult  Goal: Free from fall injury  2/13/2023 1046 by Robel Villalobos RN  Outcome: Progressing  2/13/2023 0002 by Beatriz Jiang RN  Outcome: Progressing     Problem: Pain  Goal: Verbalizes/displays adequate comfort level or baseline comfort level  2/13/2023 1046 by Robel Villalobos RN  Outcome: Progressing  2/13/2023 0002 by Beatriz Jiang RN  Outcome: Progressing     Problem: Respiratory - Adult  Goal: Achieves optimal ventilation and oxygenation  Outcome: Progressing     Problem: Cardiovascular - Adult  Goal: Maintains optimal cardiac output and hemodynamic stability  Outcome: Progressing  Goal: Absence of cardiac dysrhythmias or at baseline  Outcome: Progressing     Problem: Gastrointestinal - Adult  Goal: Maintains or returns to baseline bowel function  Outcome: Progressing  Goal: Maintains adequate nutritional intake  Outcome: Progressing     Problem: Metabolic/Fluid and Electrolytes - Adult  Goal: Electrolytes maintained within normal limits  Outcome: Progressing  Goal: Hemodynamic stability and optimal renal function maintained  Outcome: Progressing  Goal: Glucose maintained within prescribed range  Outcome: Progressing     Problem: Hematologic - Adult  Goal: Maintains hematologic stability  Outcome: Progressing

## 2023-02-13 NOTE — PROGRESS NOTES
RRT set-up Aerolsol for trach pt. RRT placed pt on 28% CATC. Pt tolerated well.     Pt resting in bed

## 2023-02-13 NOTE — CONSULTS
Subjective:   CHIEF COMPLAINT / HPI:  71year old male with chronic trach and peg admitted with worsening sob and off and on wheeze. He has copious amount of secretions coming out of his trach. his o2 requirement has increased. He is found to have pneumonia      Past Medical History:  Past Medical History:   Diagnosis Date    Allergic rhinitis     Anticoagulated on Coumadin     1/6/17-**[facility] to follow pt's PT/INRs, along w/prescribing his Coumadin. **    Anxiety     Arthritis     Atrial fibrillation (HCC)     On Coumadin. 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 09/21/2022    positive at 15C, negative at 18C    COPD (chronic obstructive pulmonary disease) (HCC)     Depression     Diabetes mellitus ([facility])     NIDDM- dx over 10 yrs ago- follows with Dr Maria Elena Pinon's contracture of hand     Right hand    Family history of cardiovascular disease     Father-MI    GERD (gastroesophageal reflux disease)     H/O cardiac catheterization 03/2007    cardiac cath- stent LAD patent, [id number]%, RCA 40-50%    H/O cardiac catheterization 06/12/2008    cardiac cath- mild disease mid RCA    H/O cardiovascular stress test 04/10/2014    cardiolite- normal, no ischemia, EF63%    H/O cardiovascular stress test 09/21/2016    EF59% normal study  pt in atrial fib    H/O cardiovascular stress test 09/20/2017    EF 60%   afib    H/O cardiovascular stress test 11/07/2018    EF60% normal study    H/O Doppler ultrasound     1/11/2010-CAROTID_Intimal thickening but no significant atherosclerotic plaque noted in LAUREN. Doppler flow velocities within the LAUREN are WNL. Intimal thickening but no significant atherosclerotic plaque noted in LICA. Doppler flow velociities within the LICA are WNL. H/O echocardiogram 04/10/2014    EF 60%, normal LV systolic function, mild mitral and tricuspid insufficiencies, no pericardial effusion.     H/O echocardiogram 09/21/2016    EF60% normal study    H/O echocardiogram 11/07/2018    EF55-60% no significant valular disease    H/O hiatal hernia     Hx of Venous Doppler 03/14/2019    Significant reflux in Left Deep System, No reflux in right lower extremity, No DVT or SVT    Hyperlipidemia     Hypertension     Obesity     S/P PTCA (percutaneous transluminal coronary angioplasty) 03/21/2005    s/p PTCA with 3.5 X 16 TAXUS stent to LAD- follows with Dr Gina Carrillo    Sleep apnea     had sleep study done 10+ yrs ago- has cpap but does not use it    Thyroid disease     hypothyroid       Past Surgical History:        Procedure Laterality Date    CARDIAC CATHETERIZATION  6/12/08    mild disease mid RCA,  stent to LAD patent,    CARDIAC CATHETERIZATION  3/07    Stent to LAD patent, Diagonal 40-50%, RCA 40-50%    CARDIAC CATHETERIZATION  3/05    COLONOSCOPY  2011    COLONOSCOPY  5/18/16    1 polyp removed, diverticulosis and hemorrhoids noted    CORONARY ANGIOPLASTY WITH STENT PLACEMENT  03/21/2005    PTCA with 3.5 x 16 TAXUS stent to LAD    CORONARY ARTERY BYPASS GRAFT N/A 9/22/2022    CABG CORONARY ARTERY BYPASS x3 (LIMA TO LAD, SVG TO PDA-RCA SEQUENTIAL) , INTRAOPERATIVE MILTON, ENDOVEIN HARVEST OF LEFT SAPHENOUS VEIN, MODIFIED MAZE PROCEDURE, LEFT ATRIAL APPENDAGE LIGATION, INTERCOSTAL NERVE BLOCK, INTRA-AORTIC BALLOON PUMP INSERTION performed by Savage [facility] MD at [address] [place] [address], [address] [place] [address]  2014    egd    HAND SURGERY Right 1997    [place] [address]/REPAIR/INT/XTRNL FIXJ Right 6/3/2019    RIGHT OPEN REDUCTION INTERNAL FIXATION OF DISTAL TIBIA  AND FIBULA performed by Marly Delcid MD at [address] [place] [address] N/A 9/24/2022    STERNUM WASHOUT performed by Savage [facility] MD at [facility] OR       Current Medications:    Current Facility-Administered Medications: warfarin (COUMADIN) tablet 1 mg, 1 mg, Per G Tube, Daily  calcium gluconate 1,000 mg in sodium chloride 50 mL, 1,000 mg, IntraVENous, Once  epoetin jessenia-epbx (RETACRIT) injection 8,000 Units, 8,000 Units, IntraVENous, Once in dialysis  sodium chloride flush 0.9 % injection 5-40 mL, 5-40 mL, IntraVENous, 2 times per day  sodium chloride flush 0.9 % injection 5-40 mL, 5-40 mL, IntraVENous, PRN  0.9 % sodium chloride infusion, , IntraVENous, PRN  ondansetron (ZOFRAN-ODT) disintegrating tablet 4 mg, 4 mg, Per G Tube, Q8H PRN **OR** ondansetron (ZOFRAN) injection 4 mg, 4 mg, IntraVENous, Q6H PRN  lactated ringers IV soln infusion, , IntraVENous, Continuous  piperacillin-tazobactam (ZOSYN) 3,375 mg in sodium chloride 0.9 % 50 mL IVPB (mini-bag), 3,375 mg, IntraVENous, Q12H  amiodarone (CORDARONE) tablet 200 mg, 200 mg, Per G Tube, Daily  aspirin chewable tablet 81 mg, 81 mg, Per G Tube, Daily  atorvastatin (LIPITOR) tablet 40 mg, 40 mg, Per G Tube, Nightly  glucose chewable tablet 16 g, 4 tablet, Oral, PRN  dextrose bolus 10% 125 mL, 125 mL, IntraVENous, PRN **OR** dextrose bolus 10% 250 mL, 250 mL, IntraVENous, PRN  glucagon (rDNA) injection 1 mg, 1 mg, SubCUTAneous, PRN  dextrose 10 % infusion, , IntraVENous, Continuous PRN  insulin glargine (LANTUS) injection vial 5 Units, 5 Units, SubCUTAneous, Nightly  insulin lispro (HUMALOG) injection vial 0-4 Units, 0-4 Units, SubCUTAneous, TID WC  insulin lispro (HUMALOG) injection vial 0-4 Units, 0-4 Units, SubCUTAneous, Nightly  ipratropium-albuterol (DUONEB) nebulizer solution 3 mL, 1 vial, Inhalation, 4x Daily  levothyroxine (SYNTHROID) tablet 75 mcg, 75 mcg, Per G Tube, Daily  metoprolol tartrate (LOPRESSOR) tablet 12.5 mg, 12.5 mg, Per G Tube, BID  QUEtiapine (SEROQUEL) tablet 50 mg, 50 mg, Per G Tube, Nightly  sevelamer (RENVELA) packet 1.6 g  ++NON FORMULARY++ (Patient Supplied), 1.6 g, Per G Tube, q8h  traZODone (DESYREL) tablet 100 mg, 100 mg, Per G Tube, Nightly  [Held by provider] amLODIPine (NORVASC) tablet 5 mg, 5 mg, Per G Tube, Daily  labetalol (NORMODYNE;TRANDATE) injection 10 mg, 10 mg, IntraVENous, Q6H PRN  acetaminophen (TYLENOL) tablet 650 mg, 650 mg, Per G Tube, Q6H PRN **OR** acetaminophen (TYLENOL) suppository 650 mg, 650 mg, Rectal, Q6H PRN  polyethylene glycol (GLYCOLAX) packet 17 g, 17 g, Per G Tube, Daily PRN    No Known Allergies    Social History:    Social History     Socioeconomic History    Marital status:      Number of children: 1   Occupational History     Comment: RETIRED   Tobacco Use    Smoking status: Former     Packs/day: 1.00     Years: 10.00     Pack years: 10.00     Types: Cigarettes     Quit date: 1976     Years since quittin.1    Smokeless tobacco: Never   Vaping Use    Vaping Use: Never used   Substance and Sexual Activity    Alcohol use: Not Currently     Alcohol/week: 6.0 standard drinks     Types: 6 Cans of beer per week     Comment: caffeine 2 cups of tea a day     Drug use: No    Sexual activity: Never     Social Determinants of Health     Financial Resource Strain: Low Risk     Difficulty of Paying Living Expenses: Not hard at all   Food Insecurity: No Food Insecurity    Worried About Running Out of Food in the Last Year: Never true    Ran Out of Food in the Last Year: Never true   Physical Activity: Sufficiently Active    Days of Exercise per Week: 7 days    Minutes of Exercise per Session: 30 min       Family History:   Family History   Problem Relation Age of Onset    Cancer Mother         breast ca    Coronary Art Dis Father          MI    Heart Disease Father     Rheum Arthritis Other     Rheum Arthritis Sister     No Known Problems Brother     Rheum Arthritis Sister        Immunization:  Immunization History   Administered Date(s) Administered    COVID-19, PFIZER PURPLE top, DILUTE for use, (age 15 y+), 30mcg/0.3mL 2021, 2021, 2021    Influenza Vaccine, unspecified formulation 2017    Influenza Virus Vaccine 10/01/2018    Influenza, FLUAD, (age 72 y+), Adjuvanted, 0.5mL 10/27/2021    Influenza, Triv, 3 Years and older, IM ([facility] [address] (5 yrs and older) 10/01/2011, 10/15/2014 Pneumococcal Conjugate 13-valent ([id number]) 06/28/2017    Pneumococcal Polysaccharide ([id number]) 01/01/2008, 05/18/2013, 04/01/2019    Tdap (Boostrix, Adacel) 12/17/2011         REVIEW OF SYSTEMS:    CONSTITUTIONAL:  negative for fevers, chills, diaphoresis, activity change, appetite change, fatigue, night sweats and unexpected weight change. EYES:  negative for blurred vision, eye discharge, visual disturbance and icterus  HEENT:  negative for hearing loss, tinnitus, ear drainage, sinus pressure, nasal congestion, epistaxis and snoring  RESPIRATORY:  See HPI  CARDIOVASCULAR:  negative for chest pain, palpitations, exertional chest pressure/discomfort, edema, syncope  GASTROINTESTINAL:  negative for nausea, vomiting, diarrhea, constipation, blood in stool and abdominal pain  GENITOURINARY:  negative for frequency, dysuria and hematuria  HEMATOLOGIC/LYMPHATIC:  negative for easy bruising, bleeding and lymphadenopathy  ALLERGIC/IMMUNOLOGIC:  negative for recurrent infections, angioedema, anaphylaxis and drug reactions  ENDOCRINE:  negative for weight changes and diabetic symptoms including polyuria, polydipsia and polyphagia    MUSCULOSKELETAL:  negative for  pain, joint swelling, decreased range of motion and muscle weakness  NEUROLOGICAL:  negative for headaches, slurred speech, unilateral weakness  PSYCHIATRIC/BEHAVIORAL: negative for hallucinations, behavioral problems, confusion and agitation.      Objective:   PHYSICAL EXAM:      VITALS:    Vitals:    02/13/23 0300 02/13/23 0400 02/13/23 0500 02/13/23 0600   BP: (!) 85/47 (!) 89/45 (!) 84/45 (!) 105/47   Pulse: 91 97 93 97   Resp: 17 14 13 20   Temp:       TempSrc:       SpO2: 91% 94% 90% 93%   Weight:       Height:             CONSTITUTIONAL:  awake, alert, cooperative, no apparent distress, and appears stated age  NECK:  Supple, symmetrical, trachea midline, no adenopathy, thyroid symmetric, not enlarged and no tenderness  CHEST: Chest expansion equal and symmetrical, no intercostal retraction. LUNGS:  no increased work of breathing, has expiratory wheezes both lungs, no crackles. CARDIOVASCULAR: S1 and S2, no edema and no JVD  ABDOMEN:  normal bowel sounds, non-distended and no masses palpated, and no tenderness to palpation. No hepatospleenomegaly  LYMPHADENOPATHY:  no axillary or supraclavicular adenopathy. No cervical adnenopathy  PSYCHIATRIC: Oriented to person place and time. No obvious depression or anxiety. MUSCULOSKELETAL: No obvious misalignment or effusion of the joints. No clubbing, cyanosis of the digits. RIGHT AND LEFT LOWER EXTREMITIES: No edema, no inflammation, no tenderness. SKIN:  normal skin color, texture, turgor and no redness, warmth, or swelling. No palpable nodules    DATA:                        Assessment:      Ac hypoxic resp failure  Chronic trach and peg  Asp pneumonia  Ac copd          Plan:     Adequate o2 adm  Trach care  Bd rx  Check cxr  Agree with zosyn  Thanks will follow

## 2023-02-13 NOTE — H&P
V2.0  History and Physical      Name:  Ulises Foreman /Age/Sex: 1950  (71 y.o. male)   MRN & CSN:  [id number] & [id number] Encounter Date/Time: 2023 11:15 PM EST   Location:  -A PCP: Yanely Hodges [address] [facility] Day: 1    Assessment and Plan:   Ulises Foreman is a 71 y.o. male with a pmh of chronic trach and PEG, atrial fibrillation on Coumadin, coronary artery disease, COPD, non-insulin-dependent diabetes mellitus type 2, chronic GERD, CAD s/p PTCA with HENRY to LAD, hypothyroidism, KHUSHBU who presents with Aspiration pneumonia, unspecified aspiration pneumonia type, unspecified laterality, unspecified part of lung [facility])    Hospital Problems             Last Modified POA    * (Principal) Aspiration pneumonia, unspecified aspiration pneumonia type, unspecified laterality, unspecified part of lung ([facility] [address] 2023 Yes    Acute aspiration pneumonia ([facility] [address] 2023 Yes       Acute hypoxic respiratory failure in the setting of aspiration pneumonia: High risk for frequent aspirations because of underlying chronic trach and PEG. Pulmonology has been consulted started on Zosyn sepsis protocol initiated LR at 100 mill per hour lactate every 2 hours per sepsis protocol. Pancultures ordered. Based on the culture results de-escalate antibiotics. Appreciate pulmonology recommendations. De-escalate oxygen requirements. PT OT came from [facility] will be going back to [facility]  History of chronic trach and PEG: Needs frequent suctioning pulmonology on board  Mixed respiratory and metabolic acidosis of chronic nature: Underlying CKD  Macrocytic anemia, underlying anemia of chronic disease hemoglobin of 8.7. Ordered B12 folate and iron studies.  with creatinine 5.5. Suspected GI bleed consider consultation for GI.   Goal is to keep the hemoglobin above 7 transfuse if needed  Elevated troponin likely demand ischemia  Insulin-dependent diabetes mellitus type 2 takes Lantus of 5 with sliding scale insulin we will continue that A1c is pending  Acute on chronic kidney disease stage IIIb: Most recent creatinine of 4.54 with a BUN of 80. Baseline creatinine of 1.4 in September 2022. BUN at baseline around 34. Avoid nephrotoxic agents consult nephrology  Paroxysmal atrial fibrillation on Coumadin pharmacy to dose Coumadin has been ordered INR on a daily basis continue telemetry. Also on amiodarone  Coronary artery disease status post PTCA with HENRY to LAD  Hypothyroidism on levothyroxine most recent TSH was found to be 9.12. T3 and T4 has been ordered. Resume all medications through G-tube. Repeat TSH in the morning. Consider endocrinology consultation. Heart rate is normal.  KHUSHBU  COPD underlying chronic trach and PEG  Chronic GERD    Disposition:   Current Living situation: [facility]  Expected Disposition: [facility]  Estimated D/C: To be declared    Diet Diet NPO   DVT Prophylaxis [] Lovenox, []  Heparin, [] SCDs, [] Ambulation,  [] Eliquis, [] Xarelto   Code Status Full Code   Surrogate Decision Maker/ POA      History from:     patient  And EMR, patient is having waxing and waning mentation  History of Present Illness:     Chief Complaint: Aspiration pneumonia, unspecified aspiration pneumonia type, unspecified laterality, unspecified part of lung ([facility] [address]  Patient actually presented from [facility] because of worsening mentation along with worsening cough that is productive in nature with streaks of blood around the phlegm associated with worsening shortness of breath even at rest.  Patient also has worsening fatigue generalized malaise and myalgias. Patient is a poor historian and is confused right now not at baseline. Patient denies any chest pain leg swelling leg pain nausea vomiting diarrhea and constipation. Patient was transferred from [facility] to be admitted to the hospital for acute hypoxic respiratory failure in the setting of aspiration pneumonia management.      Review of Systems: Need 10 Elements   Review of Systems  Review of system as above, could not be completed as the patient is confused and not at baseline  Objective:   No intake or output data in the 24 hours ending 02/12/23 2315   Vitals:   Vitals:    02/12/23 2158 02/12/23 2200   BP: 81/60 81/60   Pulse: (!) 101 (!) 106   Resp:  23   Temp:  (!) 96.1 °F (35.6 °C)   TempSrc:  Oral   SpO2:  90%   Weight:  275 lb 12.7 oz (125.1 kg)   Height:  6' 2\" (1.88 m)       Medications Prior to Admission     Prior to Admission medications    Medication Sig Start Date End Date Taking? Authorizing Provider   Icosapent Ethyl (VASCEPA) 1 g CAPS capsule Take 2 capsules by mouth 2 times daily    Historical Provider, MD   omeprazole (PRILOSEC) 20 MG delayed release capsule Take 20 mg by mouth daily    Historical Provider, MD   guaiFENesin (MUCINEX) 600 MG extended release tablet Take 1,200 mg by mouth 2 times daily    Historical Provider, MD   polyethylene glycol (GLYCOLAX) 17 GM/SCOOP powder Take 17 g by mouth 2 times daily 1/24/23 2/23/23  Floyd Canela PA-C   metoprolol tartrate (LOPRESSOR) 25 MG tablet Take 0.5 tablets by mouth 2 times daily 1/5/23   Marcos Garcia MD   warfarin (COUMADIN) 2 MG tablet Take daily.  Goal INR between 2-3 12/16/22   Jessa Hinton MD   sennosides-docusate sodium (SENOKOT-S) 8.6-50 MG tablet Take 2 tablets by mouth daily as needed for Constipation 12/15/22   Jessa Hinton MD   midodrine (PROAMATINE) 10 MG tablet Take 1 tablet by mouth 3 times daily (with meals) 12/15/22   Yared Connelly MD   levothyroxine (SYNTHROID) 75 MCG tablet Take 1 tablet by mouth Daily 12/16/22   Jessa Hinton MD   ipratropium-albuterol (DUONEB) 0.5-2.5 (3) MG/3ML SOLN nebulizer solution Inhale 3 mLs into the lungs 4 times daily 12/15/22   Jessa Hinton MD   insulin glargine (LANTUS SOLOSTAR) 100 UNIT/ML injection pen Inject 5 Units into the skin nightly 12/15/22   Jessa Hinton MD   albuterol sulfate HFA (PROVENTIL;VENTOLIN;PROAIR) 108 (90 Base) MCG/ACT inhaler Inhalation 10/5/18   Historical Provider, MD   amiodarone (CORDARONE) 200 MG tablet 200 mg daily 11/29/22   Historical Provider, MD   atorvastatin (LIPITOR) 40 MG tablet 40 mg nightly 11/28/22   Historical Provider, MD PERLA Complex-C-Folic Acid (NEPHRONEX) 0.9 MG/5ML LIQD 5 mLs daily 11/29/22   Historical Provider, MD   melatonin 3 MG TABS tablet 9 mg nightly 11/28/22   Historical Provider, MD   Nutritional Supplements (NEPRO/CARBSTEADY) LIQD 50 mL/hr continuous 11/28/22   Historical Provider, MD   Nutritional Supplements (PROSOURCE TF) LIQD 1 Package 3 times daily 11/28/22   Historical Provider, MD   traZODone (DESYREL) 100 MG tablet 100 mg nightly 11/28/22   Historical Provider, MD   sevelamer (RENVELA) 0.8 g PACK packet 1.6 g  in the morning and 1.6 g at noon and 1.6 g in the evening. 11/28/22   Historical Provider, MD   QUEtiapine (SEROQUEL) 50 MG tablet 50 mg nightly 11/28/22   Historical Provider, MD   aspirin 81 MG chewable tablet 1 tablet by Per NG tube route daily 9/25/22   Brain Chawla MD   pantoprazole 4 mg/mL in sodium chloride (PF) injection Infuse 10 mLs intravenously daily 9/24/22   Brain Chawla MD   blood glucose monitor kit and supplies Dispense sufficient amount for indicated testing frequency plus additional to accommodate PRN testing needs. Dispense all needed supplies to include: monitor, strips, lancing device, lancets, control solutions, alcohol swabs. 7/26/22   ANKITA Hi CNP   Lancets MISC by D3EA route three times a day. 7/25/22   ANKITA Hi CNP       Physical Exam: Need 8 Elements   Physical Exam  Constitutional:       Appearance: He is ill-appearing (Chronic trach and PEG).   HENT:      Head: Normocephalic and atraumatic.      Mouth/Throat:      Mouth: Mucous membranes are dry.   Eyes:      Extraocular Movements: Extraocular movements intact.      Pupils: Pupils are equal, round, and  reactive to light. Cardiovascular:      Rate and Rhythm: Tachycardia present. Rhythm irregular. Pulses: Normal pulses. Heart sounds: Normal heart sounds. Pulmonary:      Effort: Respiratory distress present. Breath sounds: Rales present. Abdominal:      General: Abdomen is flat. There is no distension. Palpations: Abdomen is soft. Tenderness: There is no abdominal tenderness. Musculoskeletal:         General: Normal range of motion. Cervical back: Normal range of motion and neck supple. Skin:     General: Skin is warm and dry. Coloration: Skin is pale. Findings: Bruising present. Neurological:      Mental Status: He is disoriented. Motor: Weakness present. Psychiatric:         Mood and Affect: Mood normal.          Past History:   PMHx   Past Medical History:   Diagnosis Date    Allergic rhinitis     Anticoagulated on Coumadin     1/6/17-**[facility] to follow pt's PT/INRs, along w/prescribing his Coumadin. **    Anxiety     Arthritis     Atrial fibrillation (HCC)     On Coumadin.     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 09/21/2022    positive at 15C, negative at 18C    COPD (chronic obstructive pulmonary disease) (HCC)     Depression     Diabetes mellitus (HCC)     NIDDM- dx over 10 yrs ago- follows with Dr Ginger Pedersen    Dupuytren's contracture of hand     Right hand    Family history of cardiovascular disease     Father-MI    GERD (gastroesophageal reflux disease)     H/O cardiac catheterization 03/2007    cardiac cath- stent LAD patent, Uvddxeig-17-36%, RCA 40-50%    H/O cardiac catheterization 06/12/2008    cardiac cath- mild disease mid RCA    H/O cardiovascular stress test 04/10/2014    cardiolite- normal, no ischemia, EF63%    H/O cardiovascular stress test 09/21/2016    EF59% normal study  pt in atrial fib    H/O cardiovascular stress test 09/20/2017    EF 60%   afib    H/O cardiovascular stress test 11/07/2018 EF60% normal study    H/O Doppler ultrasound     1/11/2010-CAROTID_Intimal thickening but no significant atherosclerotic plaque noted in LAUREN. Doppler flow velocities within the LAUREN are WNL. Intimal thickening but no significant atherosclerotic plaque noted in LICA. Doppler flow velociities within the LICA are WNL. H/O echocardiogram 04/10/2014    EF 60%, normal LV systolic function, mild mitral and tricuspid insufficiencies, no pericardial effusion. H/O echocardiogram 09/21/2016    EF60% normal study    H/O echocardiogram 11/07/2018    EF55-60% no significant valular disease    H/O hiatal hernia     Hx of Venous Doppler 03/14/2019    Significant reflux in Left Deep System, No reflux in right lower extremity, No DVT or SVT    Hyperlipidemia     Hypertension     Obesity     S/P PTCA (percutaneous transluminal coronary angioplasty) 03/21/2005    s/p PTCA with 3.5 X 16 TAXUS stent to LAD- follows with Dr Cristy Sanchez    Sleep apnea     had sleep study done 10+ yrs ago- has cpap but does not use it    Thyroid disease     hypothyroid        PSHX:  has a past surgical history that includes Coronary angioplasty with stent (03/21/2005); Cardiac catheterization (6/12/08); Cardiac catheterization (3/07); Cardiac catheterization (3/05); Endoscopy, colon, diagnostic (2014); Colonoscopy (2011); Colonoscopy (5/18/16); Hand surgery (Right, 1997); pr optx ankle dislocation w/repair/int/xtrnl fixj (Right, 6/3/2019); Sternum Debridement (N/A, 9/24/2022); and Coronary artery bypass graft (N/A, 9/22/2022). Fam HX: family history includes Cancer in his mother; Coronary Art Dis in his father; Heart Disease in his father; No Known Problems in his brother; Rheum Arthritis in his sister, sister and another family member. Soc HX:   Social History     Socioeconomic History    Marital status:      Number of children: 1   Occupational History     Comment: RETIRED   Tobacco Use    Smoking status: Former     Packs/day: 1.00 Years: 10.00     Pack years: 10.00     Types: Cigarettes     Quit date: 1976     Years since quittin.1    Smokeless tobacco: Never   Vaping Use    Vaping Use: Never used   Substance and Sexual Activity    Alcohol use: Not Currently     Alcohol/week: 6.0 standard drinks     Types: 6 Cans of beer per week     Comment: caffeine 2 cups of tea a day     Drug use: No    Sexual activity: Never     Social Determinants of Health     Financial Resource Strain: Low Risk     Difficulty of Paying Living Expenses: Not hard at all   Food Insecurity: No Food Insecurity    Worried About Running Out of Food in the Last Year: Never true    Ran Out of Food in the Last Year: Never true   Physical Activity: Sufficiently Active    Days of Exercise per Week: 7 days    Minutes of Exercise per Session: 30 min       Allergies:    Allergies: No Known Allergies    Medications:   Medications:    sodium chloride flush  5-40 mL IntraVENous 2 times per day    [START ON 2023] piperacillin-tazobactam  3,375 mg IntraVENous Q12H    [START ON 2023] amiodarone  200 mg Per G Tube Daily    [START ON 2023] aspirin  81 mg Per G Tube Daily    atorvastatin  40 mg Per G Tube Nightly    insulin glargine  5 Units SubCUTAneous Nightly    [START ON 2023] insulin lispro  0-4 Units SubCUTAneous TID WC    insulin lispro  0-4 Units SubCUTAneous Nightly    ipratropium-albuterol  1 vial Inhalation 4x Daily    [START ON 2023] levothyroxine  75 mcg Per G Tube Daily    metoprolol tartrate  12.5 mg Per G Tube BID    QUEtiapine  50 mg Per G Tube Nightly    [START ON 2023] sevelamer  1.6 g Per G Tube q8h    traZODone  100 mg Per G Tube Nightly    [START ON 2023] amLODIPine  5 mg Per G Tube Daily      Infusions:    sodium chloride      lactated ringers IV soln 100 mL/hr at 23 2314    dextrose       PRN Meds: sodium chloride flush, 5-40 mL, PRN  sodium chloride, , PRN  ondansetron, 4 mg, Q8H PRN   Or  ondansetron, 4 mg, Q6H PRN  glucose, 4 tablet, PRN  dextrose bolus, 125 mL, PRN   Or  dextrose bolus, 250 mL, PRN  glucagon (rDNA), 1 mg, PRN  dextrose, , Continuous PRN  labetalol, 10 mg, Q6H PRN  acetaminophen, 650 mg, Q6H PRN   Or  acetaminophen, 650 mg, Q6H PRN  polyethylene glycol, 17 g, Daily PRN        Labs      CBC: No results for input(s): WBC, HGB, PLT in the last 72 hours. BMP:  No results for input(s): NA, K, CL, CO2, BUN, CREATININE, GLUCOSE in the last 72 hours. Hepatic: No results for input(s): AST, ALT, ALB, BILITOT, ALKPHOS in the last 72 hours. Lipids:   Lab Results   Component Value Date/Time    CHOL 152 04/14/2021 10:02 AM    CHOL 139 07/23/2018 08:01 AM    HDL 43 08/26/2022 10:39 AM    TRIG 212 04/14/2021 10:02 AM     Hemoglobin A1C:   Lab Results   Component Value Date/Time    LABA1C 7.0 12/30/2022 02:15 AM     TSH:   Lab Results   Component Value Date/Time    TSH 1.66 04/03/2018 11:33 AM     Troponin:   Lab Results   Component Value Date/Time    TROPONINT 0.269 12/29/2022 03:09 PM    TROPONINT 0.245 12/13/2022 09:42 AM    TROPONINT 0.293 12/13/2022 06:50 AM     Lactic Acid: No results for input(s): LACTA in the last 72 hours. BNP: No results for input(s): PROBNP in the last 72 hours.   UA:  Lab Results   Component Value Date/Time    NITRU NEGATIVE 09/17/2022 06:21 PM    COLORU YELLOW 09/17/2022 06:21 PM    PHUR 6.5 06/30/2021 02:11 PM    WBCUA NONE SEEN 09/17/2022 06:21 PM    RBCUA NONE SEEN 09/17/2022 06:21 PM    MUCUS RARE 09/17/2022 06:21 PM    TRICHOMONAS NONE SEEN 09/17/2022 06:21 PM    BACTERIA NEGATIVE 09/17/2022 06:21 PM    CLARITYU CLEAR 09/17/2022 06:21 PM    SPECGRAV 1.015 09/17/2022 06:21 PM    LEUKOCYTESUR NEGATIVE 09/17/2022 06:21 PM    UROBILINOGEN 4.0 09/17/2022 06:21 PM    BILIRUBINUR NEGATIVE 09/17/2022 06:21 PM    BILIRUBINUR small 06/30/2021 02:11 PM    BLOODU NEGATIVE 09/17/2022 06:21 PM    GLUCOSEU neg 06/30/2021 02:11 PM    KETUA TRACE 09/17/2022 06:21 PM     Urine Cultures: No results found for: Thacker Angel Luis  Blood Cultures: No results found for: BC  No results found for: BLOODCULT2  Organism: No results found for: ORG    Imaging/Diagnostics Last 24 Hours   No results found.     Personally reviewed Lab Studies, Imaging    Electronically signed by Thang Matias MD, MD on 2/12/2023 at 11:15 PM

## 2023-02-13 NOTE — PROGRESS NOTES
Comprehensive Nutrition Assessment    Type and Reason for Visit:  Initial, Consult (EN order and management)    Nutrition Recommendations/Plan:   Will order Nepro (Renal Formula) with goal rate 60 mL/hr to provide 1440 mL volume, 2592 kcal, 116 grams protein and 1046 mL free water daily. Free water 60 mL Q4H, or per MD/nephrology      Malnutrition Assessment:  Malnutrition Status: At risk for malnutrition (Comment) (02/13/23 1107)    Context:  Chronic Illness       Nutrition Assessment:    Pt admitted with increased secretions and oxygen requirements. Pt with trach and peg. At [facility] on 1300 ml novasource daily with 65 mL water flush X20 hours/day. We do not have novasource at this facility and in the past, pt has tolerated renal formula (nepro) with goal rate 65 mL/hr. Nutrition Related Findings:    Glucose 202, A1C 7 (avg 154), Mag 3, K+ 5.5. HD today, ? possible infection noted? Very little UOP recorded   Wound Type: None       Current Nutrition Intake & Therapies:    Average Meal Intake: NPO  Average Supplements Intake: NPO  Diet NPO  Current Tube Feeding (TF) Orders:  Feeding Route: PEG    Anthropometric Measures:  Height: 6' 2\" (188 cm)  Ideal Body Weight (IBW): 190 lbs (86 kg)    Admission Body Weight: 275 lb 12.7 oz (125.1 kg)  Current Body Weight: 277 lb 12.5 oz (126 kg), 146.2 % IBW.  Weight Source: Bed Scale  Current BMI (kg/m2): 35.6  Usual Body Weight: 270 lb 4.5 oz (122.6 kg) (Jan 4, 2023)  % Weight Change (Calculated): 2.8  Weight Adjustment For: No Adjustment                 BMI Categories: Obese Class 2 (BMI 35.0 -39.9)    Estimated Daily Nutrient Needs:  Energy Requirements Based On: Kcal/kg  Weight Used for Energy Requirements: Current  Energy (kcal/day): 9859-7036  Weight Used for Protein Requirements: Ideal  Protein (g/day): 103-111  Method Used for Fluid Requirements: Standard Renal  Fluid (ml/day): UOP + 1,000 ml    Nutrition Diagnosis:   Inadequate oral intake related to swallowing difficulty as evidenced by NPO or clear liquid status due to medical condition, nutrition support - enteral nutrition    Nutrition Interventions:   Food and/or Nutrient Delivery: Continue NPO, Start Tube Feeding  Nutrition Education/Counseling: No recommendation at this time  Coordination of Nutrition Care: Continue to monitor while inpatient       Goals:     Goals:  Tolerate nutrition support at goal rate, within 2 days       Nutrition Monitoring and Evaluation:   Behavioral-Environmental Outcomes: None Identified  Food/Nutrient Intake Outcomes: Enteral Nutrition Intake/Tolerance, IVF Intake  Physical Signs/Symptoms Outcomes: Biochemical Data, Weight, Skin, Nutrition Focused Physical Findings    Discharge Planning:    Enteral Nutrition     Brigid Cantrell RD, LD  Contact: [phone]

## 2023-02-13 NOTE — PROGRESS NOTES
PHARMACY ANTICOAGULATION MONITORING SERVICE    Bert Tong is a 71 y.o. male on warfarin therapy for PAF. Pharmacy consulted by Dr. Satinder Bell for monitoring and adjustment of treatment. Indication for anticoagulation: PAF  INR goal: 2 - 3  Warfarin dose prior to admission: 1 mg po daily (dose used during recent admission)    Pertinent Laboratory Values   Recent Labs     02/12/23  2253   INR 1.25       Assessment/Plan:  Drug Interactions:   Aspirin (Home med)  Amiodarone (Home med)  Levothyroxine (Home med)  Trazodone (Home med)  Acetaminophen (PRN)  INR subtherapeutic on admission at 1.25 (No bridge therapy in place)  Will continue warfarin 1 mg po daily (produced therapeutic INR during previous admission). Will trend INR daily. Pharmacy will continue to monitor and adjust warfarin therapy as indicated    Thank you for the consult.   [address] [facility]  2/13/2023 4:41 AM

## 2023-02-13 NOTE — PROGRESS NOTES
V2.0  [facility] Hospitalist Progress Note      Name:  Shanique Thomas /Age/Sex: 1950  (71 y.o. male)   MRN & CSN:  [id number] & [id number] Encounter Date/Time: 2023 7:11 AM EST    Location:  -A PCP: Luis Sanon APRN - [id number] Day: 2    Assessment and Plan:   Shanique Thomas is a 71 y.o. male with pmh of chronic trach and PEG, atrial fibrillation on Coumadin, coronary artery disease, COPD, non-insulin-dependent diabetes mellitus type 2, chronic GERD, CAD s/p PTCA with HENRY to LAD, hypothyroidism, KHUSHBU who presents from [facility] because of worsening mentation along with worsening cough that is productive in nature with streaks of blood around the phlegm associated with worsening shortness of breath even at rest      Plan:    Acute hypoxic respiratory failure in the setting of aspiration pneumonia: Presented with sepsis. Managed as per sepsis protocol. CT scans reviewed from care every where and s/o Rt lobe pneumonia. Pulmonology has been consulted started on Zosyn. Pancultures ordered. Will check CRP and Pro-travis for. Based on the culture results de-escalate antibiotics. wean off o2 as tolerated   Hyperkalemia - Plan for HD today. Nephrology consulted. Monitor K.   UTI - Dirty Urine. Culture pending. Abx as above  Metabolic and septic Encephalopathy - CT Head - negative for acute process. Management as above. Q 4 neuro check. B12 / folate / ammonia / blood gas looks in acceptable range  ESRD:  Nephrology consulted. HD as per nephrology. History of chronic trach and PEG: Needs frequent suctioning pulmonology on board. Nutritionist consult placed for Tube feeding management. Mixed respiratory and metabolic acidosis of chronic nature: Underlying ESRD  Macrocytic anemia, underlying anemia of chronic disease hemoglobin of 8.7. Ordered B12 folate and iron studies. Goal hb > 7. Will monitor H/H for now. GI following. On protonix 40 IV BID.    Elevated troponin likely demand ischemia  Insulin-dependent diabetes mellitus type 2 takes Lantus of 5 with sliding scale insulin we will continue that A1c is pending  Paroxysmal atrial fibrillation on Coumadin pharmacy to dose Coumadin has been ordered INR on a daily basis continue telemetry. Also on amiodarone  Coronary artery disease status post PTCA with HENRY to LAD  Hypothyroidism on levothyroxine most recent TSH was found to be 9.12. T3 and T4 has been ordered. Resume all medications through G-tube. On levothyroxine. KHUSHBU   COPD underlying chronic trach and PEG  Chronic GERD     Diet Diet NPO   DVT Prophylaxis [] Lovenox, []  Heparin, [] SCDs, [] Ambulation,  [] Eliquis, [] Xarelto  [x] Coumadin   Code Status Full Code   Disposition From: [facility] o[facility]  Expected Disposition: [facility]  Estimated Date of Discharge: 2-3 Days  Patient requires continued admission due to Increased oxygen demand and IV abx   Surrogate Decision Maker/ POA      Subjective:     Chief Complaint: No chief complaint on file. Ramya Morejon is a 71 y.o. male who presents with sob     Patient looked calm and did not look in respiratory distress. Unable to speak with the trach. Does follow commands however. Not sure how much he is able to comprehend. Review of Systems:    Review of Systems    Unable to obtain    Objective: Intake/Output Summary (Last 24 hours) at 2/13/2023 0711  Last data filed at 2/13/2023 0600  Gross per 24 hour   Intake 120 ml   Output 110 ml   Net 10 ml        Vitals:   Vitals:    02/13/23 0600   BP: (!) 105/47   Pulse: 97   Resp: 20   Temp:    SpO2: 93%       Physical Exam:     General: NAD  Eyes: EOMI  ENT: neck supple  Cardiovascular: Regular rate. Respiratory: Clear to auscultation  Gastrointestinal: Soft, non tender  Genitourinary: no suprapubic tenderness  Musculoskeletal: No edema  Skin: warm, dry  Neuro: Alert. Psych: Mood appropriate.      Medications:   Medications:    warfarin  1 mg Per G Tube Daily    calcium gluconate-NaCl  1,000 mg IntraVENous Once    epoetin jessenia-epbx  8,000 Units IntraVENous Once in dialysis    sodium chloride flush  5-40 mL IntraVENous 2 times per day    piperacillin-tazobactam  3,375 mg IntraVENous Q12H    amiodarone  200 mg Per G Tube Daily    aspirin  81 mg Per G Tube Daily    atorvastatin  40 mg Per G Tube Nightly    insulin glargine  5 Units SubCUTAneous Nightly    insulin lispro  0-4 Units SubCUTAneous TID WC    insulin lispro  0-4 Units SubCUTAneous Nightly    ipratropium-albuterol  1 vial Inhalation 4x Daily    levothyroxine  75 mcg Per G Tube Daily    metoprolol tartrate  12.5 mg Per G Tube BID    QUEtiapine  50 mg Per G Tube Nightly    sevelamer  1.6 g Per G Tube q8h    traZODone  100 mg Per G Tube Nightly    amLODIPine  5 mg Per G Tube Daily      Infusions:    sodium chloride      lactated ringers IV soln 100 mL/hr at 02/12/23 2314    dextrose       PRN Meds: sodium chloride flush, 5-40 mL, PRN  sodium chloride, , PRN  ondansetron, 4 mg, Q8H PRN   Or  ondansetron, 4 mg, Q6H PRN  glucose, 4 tablet, PRN  dextrose bolus, 125 mL, PRN   Or  dextrose bolus, 250 mL, PRN  glucagon (rDNA), 1 mg, PRN  dextrose, , Continuous PRN  labetalol, 10 mg, Q6H PRN  acetaminophen, 650 mg, Q6H PRN   Or  acetaminophen, 650 mg, Q6H PRN  polyethylene glycol, 17 g, Daily PRN        Labs      Recent Results (from the past 24 hour(s))   POCT Glucose    Collection Time: 02/12/23 10:50 PM   Result Value Ref Range    POC Glucose 247 (H) 70 - 99 MG/DL   Lactate, Sepsis    Collection Time: 02/12/23 10:53 PM   Result Value Ref Range    Lactic Acid, Sepsis 1.6 0.5 - 1.9 mMOL/L   Hemoglobin A1c    Collection Time: 02/12/23 10:53 PM   Result Value Ref Range    Hemoglobin A1C 7.0 (H) 4.2 - 6.3 %    eAG 154 mg/dL   Protime-INR    Collection Time: 02/12/23 10:53 PM   Result Value Ref Range    Protime 16.2 (H) 11.7 - 14.5 SECONDS    INR 1.25 INDEX   T3, Free    Collection Time: 02/12/23 10:53 PM   Result Value Ref Range    T3, Free 1.6 (L) 2.3 - 4.2 PG/ML   T4, Free    Collection Time: 02/12/23 10:53 PM   Result Value Ref Range    T4 Free 0.78 (L) 0.9 - 1.8 NG/DL   Urinalysis    Collection Time: 02/13/23 12:15 AM   Result Value Ref Range    Color, UA YELLOW     Clarity, UA CLEAR     Glucose, Urine NEGATIVE MG/DL    Bilirubin Urine SMALL NUMBER OR AMOUNT OBSERVED     Ketones, Urine TRACE MG/DL    Specific Gravity, UA 1.025     Blood, Urine MODERATE NUMBER OR AMOUNT OBSERVED     pH, Urine 5.0     Protein,  MG/DL    Urobilinogen, Urine 0.2 MG/DL    Nitrite Urine, Quantitative NEGATIVE     Leukocyte Esterase, Urine SMALL NUMBER OR AMOUNT OBSERVED    Microscopic Urinalysis    Collection Time: 02/13/23 12:15 AM   Result Value Ref Range    RBC, UA 37 /HPF    WBC, UA 67 /HPF    Bacteria, UA NEGATIVE /HPF    Squam Epithel, UA 1 /HPF    Trichomonas, UA NONE SEEN /HPF    Hyaline Casts, UA 2 /LPF   Lactate, Sepsis    Collection Time: 02/13/23 12:59 AM   Result Value Ref Range    Lactic Acid, Sepsis 1.4 0.5 - 1.9 mMOL/L   Respiratory Panel, Molecular, with COVID-19 (Restricted: peds pts or suitable admitted adults)    Collection Time: 02/13/23  4:10 AM    Specimen: Nasopharyngeal   Result Value Ref Range    Adenovirus Detection by PCR NOT DETECTED NOT DETECTED    Coronavirus 229E PCR NOT DETECTED NOT DETECTED    Coronavirus HKU1 PCR NOT DETECTED NOT DETECTED    Coronavirus NL63 PCR NOT DETECTED NOT DETECTED    Coronavirus OC43 PCR NOT DETECTED NOT DETECTED    SARS-CoV-2 NOT DETECTED NOT DETECTED    Human Metapneumovirus PCR NOT DETECTED NOT DETECTED    Rhinovirus Enterovirus PCR NOT DETECTED NOT DETECTED    Influenza A by PCR NOT DETECTED NOT DETECTED    Influenza A H1 Pandemic PCR NOT DETECTED NOT DETECTED    Influenza A H1 (2009) PCR NOT DETECTED NOT DETECTED    Influenza A H3 PCR NOT DETECTED NOT DETECTED    Influenza B by PCR NOT DETECTED NOT DETECTED    Parainfluenza 1 PCR NOT DETECTED NOT DETECTED    Parainfluenza 2 PCR NOT DETECTED NOT DETECTED    Parainfluenza 3 PCR NOT DETECTED NOT DETECTED    Parainfluenza 4 PCR NOT DETECTED NOT DETECTED    RSV PCR NOT DETECTED NOT DETECTED    Bordetella parapertussis by PCR NOT DETECTED NOT DETECTED    B Pertussis by PCR NOT DETECTED NOT DETECTED    Chlamydophila Pneumonia PCR NOT DETECTED NOT DETECTED    Mycoplasma pneumo by PCR NOT DETECTED NOT DETECTED   CBC    Collection Time: 02/13/23  4:54 AM   Result Value Ref Range    WBC 7.1 4.0 - 10.5 K/CU MM    RBC 2.32 (L) 4.6 - 6.2 M/CU MM    Hemoglobin 8.0 (L) 13.5 - 18.0 GM/DL    Hematocrit 25.8 (L) 42 - 52 %    .2 (H) 78 - 100 FL    MCH 34.5 (H) 27 - 31 PG    MCHC 31.0 (L) 32.0 - 36.0 %    RDW 19.1 (H) 11.7 - 14.9 %    Platelets 635 763 - 354 K/CU MM    MPV 8.6 7.5 - 11.1 FL   Basic Metabolic Panel    Collection Time: 02/13/23  4:54 AM   Result Value Ref Range    Sodium 126 (L) 135 - 145 MMOL/L    Potassium 5.5 (HH) 3.5 - 5.1 MMOL/L    Chloride 90 (L) 99 - 110 mMol/L    CO2 23 21 - 32 MMOL/L    Anion Gap 13 4 - 16    BUN 84 (H) 6 - 23 MG/DL    Creatinine 5.4 (H) 0.9 - 1.3 MG/DL    Est, Glom Filt Rate 11 (L) >60 mL/min/1.73m2    Glucose 187 (H) 70 - 99 MG/DL    Calcium 8.7 8.3 - 10.6 MG/DL   Magnesium    Collection Time: 02/13/23  4:54 AM   Result Value Ref Range    Magnesium 3.0 (H) 1.8 - 2.4 mg/dl   Phosphorus    Collection Time: 02/13/23  4:54 AM   Result Value Ref Range    Phosphorus 3.5 2.5 - 4.9 MG/DL   TSH    Collection Time: 02/13/23  4:54 AM   Result Value Ref Range    TSH, High Sensitivity 6.190 (H) 0.270 - 4.20 uIu/ml   Protime-INR    Collection Time: 02/13/23  4:54 AM   Result Value Ref Range    Protime 16.7 (H) 11.7 - 14.5 SECONDS    INR 1.29 INDEX        Imaging/Diagnostics Last 24 Hours   No results found.     Electronically signed by Aletha Sepulveda MD on 2/13/2023 at 7:11 AM

## 2023-02-13 NOTE — CARE COORDINATION
CM spoke with Andrez Sams with [facility]. Pt was at [facility] he may be out of days. Andrez Sams will check on that and get back with CM. If pt is able to return he will need therapy notes for the pre-cert. Pt has pcp and insurance. Was living at home alone prior to open heart surgery at [facility]. After being discharged from [facility] he went to [facility] and has been there since.

## 2023-02-13 NOTE — PROGRESS NOTES
Removed 1.5L      Patient Name: Bert Tong  Patient : 1950  MRN: [id number]     Acct: [de-identified]  Date of Admission: 2023  Room/Bed: -A  Code Status:  Full Code  Allergies: No Known Allergies  Diagnosis:    Patient Active Problem List   Diagnosis    Atrial fibrillation ([facility]    CAD (coronary artery disease)    Morbid obesity ([facility]    COPD (chronic obstructive pulmonary disease) ([facility]    Sleep apnea    Type 2 diabetes mellitus ([facility]    Thyroid disease    Hyperlipidemia    Acute congestive heart failure ([facility]    Coronary artery disease involving native coronary artery of native heart with angina pectoris (HCC)    Chronic renal disease, stage III ([facility] [facility] [816144]    Hypertension    CAD, multiple vessel    Dyspnea    Moderate malnutrition ([facility] [facility]    Pneumonia of both lungs due to infectious organism    Severe malnutrition (HCC)    Chronic respiratory failure with hypoxia (HCC)    Aspiration pneumonia of right lower lobe (HCC)    ESRD on dialysis ([facility]    Aspiration pneumonia, unspecified aspiration pneumonia type, unspecified laterality, unspecified part of lung ([facility] [facility]    Acute aspiration pneumonia ([facility] [facility]         Treatment:  Hemodialysis 1:1  Priority: Routine  Location: Acute Room    Diabetic: Yes  NPO: No  Isolation Precautions: Dialysis     Consent for Treatment Verified: Yes  Blood Consent Verified: Not Applicable     Safety Verified: Identify (I), Consent (C), Equipment (E), HepB Status (B), Orders Complete (O), Access Verified (A), and Timeliness (T)  Time out performed prior to access at 1315 hours. Report Received from Primary RN at 1230 hours.   Primary RN (First Initial, Last Name, Title): Evans Concepcion RN  Incapacitated Nurse Education Completed: Not Applicable     HBsAg ONLY:  Date Drawn: 2023       Results: Negative  HBsAb:  Date Drawn:  2023       Results: Susceptible <10    Order  Dialysis Bath  K+ (Potassium): 2  Ca+ (Calcium): 2.5  Na+ (Sodium): 138  HCO3 (Bicarb): 35  Bicarbonate Concentrate Lot No.: [id number]  Acid Concentrate Lot No.: [id number]     Na+ Modeling: Not Applicable  Dialyzer: [id number]  Dialysate Temperature (C):  35  Blood Flow Rate (BFR):  300   Dialysate Flow Rate (DFR):   600        Access to be Utilized   Access: Tunneled Catheter  Location: Internal Jugular  Side: Right   Needle gauge:  Not Applicable  + Bruit/Thrill: Not Applicable    First Use X-ray Verified: Not Applicable  OK to use line order: Not Applicable    Site Assessment:  Signs and Symptoms of Infection/Inflammation: None  If yes: Not Applicable  Dressing: Dry and Intact  Site Prep: Medical Aseptic Technique  Dressing Changed this Treatment: No  If yes, by whom: NA - not changed today  Date of Last Dressing Change: February 12, 2023  Antimicrobial Patch in place?: Yes  Red Alcohol Caps in place?: Yes  Gauze Dressing?: No  Non Dialysis Use?: No  Comment:    Flows: Good, Patent  If access problem, who was notified:     Pre and Post-Assessment  Patient Vitals for the past 8 hrs:   Level of Consciousness Heart Rhythm Respiratory Quality/Effort O2 Device Bilateral Breath Sounds Skin Color Skin Condition/Temp Abdomen Inspection Bowel Sounds (All Quadrants) Edema Edema Generalized   02/13/23 0729 -- -- -- Trach collar -- -- -- -- -- -- --   02/13/23 0800 1 -- Unlabored Trach collar -- Pale Cool;Dry Ostomy tube; Soft -- Generalized Non-pitting   02/13/23 1056 -- -- -- Trach collar -- -- -- -- -- -- --   02/13/23 1226 0 -- -- Trach collar -- -- -- -- -- -- --   02/13/23 1315 0 Regular Unlabored Trach collar Diminished Pale Cool;Dry Ostomy tube; Soft Active Generalized Non-pitting     Labs  Recent Labs     02/13/23  0454   WBC 7.1   HGB 8.0*   HCT 25.8*                                                                     Recent Labs     02/13/23  0454   *   K 5.5*   CL 90*   CO2 23   BUN 84*   CREATININE 5.4*   GLUCOSE 187*     IV Drips and Rate/Dose   sodium chloride      dextrose Safety - Before each treatment:   Dialysis Machine No.: [id number]   Machine Number: [id number]  Dialyzer Lot No.: [id number]  RO Machine Log Sheet Completed: Yes  Machine Alarm Self Test: Completed; Passed (02/13/23 1315)     Air Foam Detector: Tested, Proper Function, pH Reading  Extracorporeal Circuit Tested for Integrity: Yes  Machine Conductivity: 13.8  Manual Conductivity: 13.8     Bicarbonate Concentrate Lot No.: [id number]  Acid Concentrate Lot No.: [id number]  Manual Ph: 7.4  Bleach Test (Neg): Yes  Bath Temperature: 95 °F (35 °C)  Tubing Lot#: [id number]   Conductivity Meter Serial #: [id number]  All Connections Secure?: Yes  Venous Parameters Set?: Yes  Arterial Parameters Set?: Yes  Saline Line Double Clamped?: Yes  Air Foam Detector Engaged?: Yes  Machine Functioning Alarm Free?  Yes  Prime Given: 200ml    Chlorine Testing - Before each treatment and every 4 hours:   Treatment  Time On: 1324  Weight: 277 lb 12.5 oz (126 kg) (02/13/23 0200)  1st check: less than 0.1 ppm at: 1105 hours  2nd check: less than 0.1 ppm at: 1505 hours  3rd check: Not Applicable  (if greater than 0.1 ppm, then check every 30 minutes from secondary)    Access Flows and Pressures  Patient Vitals for the past 8 hrs:   Blood Flow Rate (ml/min) Ultrafiltration Rate (ml/hr) Arterial Pressure (mmHg) Venous Pressure (mmHg) TMP DFR Comments Access Visible   02/13/23 1324 300 ml/min 660 ml/hr -80 mmHg 100 40 600 tx initiated Yes   02/13/23 1330 300 ml/min 660 ml/hr -80 mmHg 100 40 600 lines secure Yes   02/13/23 1345 300 ml/min 660 ml/hr -80 mmHg 100 40 600 no complaints Yes     Vital Signs  Patient Vitals for the past 8 hrs:   BP Temp Pulse Resp SpO2 Height   02/13/23 0600 (!) 105/47 -- 97 20 93 % --   02/13/23 0800 (!) 96/52 97.1 °F (36.2 °C) 96 13 95 % --   02/13/23 0956 -- -- 91 22 97 % --   02/13/23 0957 (!) 95/55 -- -- -- -- --   02/13/23 1054 -- -- -- -- -- 6' 2\" (1.88 m)   02/13/23 1056 -- -- 89 17 92 % --   02/13/23 1226 (!) 97/49 97.2 °F (36.2 °C) 87 23 94 % --   02/13/23 1315 97/73 97.2 °F (36.2 °C) (!) 101 22 -- --   02/13/23 1324 (!) 103/55 -- (!) 102 22 -- --   02/13/23 1330 (!) 127/108 -- 99 23 -- --   02/13/23 1345 (!) 111/94 -- (!) 111 21 -- --     Post-Dialysis  Arterial Catheter Locking Solution:  NS  Venous Catheter Locking Solution:  NS                                              Provider Notification        Handoff complete and report given to Primary RN at 1650 hours.   Primary RN (First Initial, Last Name, Title):  Dung Fang RN     Education  Person Educated: Patient   Knowledge Base: Substantial  Barriers to Learning?: None  Preferred method of Learning: Oral  Topic(s): Access Care, Signs and Symptoms of Infection, and Procedural   Teaching Tools: Explanation   Response to Education: Verbalized Understanding     Electronically signed by August Massey RN on 2/13/2023 at 1:54 PM

## 2023-02-13 NOTE — PROGRESS NOTES
RRT suctioned pt for small yellow thick scretions.     Pt tolerated well    Pt on 50% venti with a trach mask

## 2023-02-13 NOTE — CONSULTS
Endocrinology   Consult Note  2/12/2023  9:37 PM     Primary Care provider: ANKITA Catalan - CNP     Referring physician:  Herminio Ghotra MD     Dear Doctor Mouna Santacruz for the Consult     Pt. Was Admitted for : Altered mental state and worsening of the cough and shortness of breath    Reason for Consult:  hyothyroid / DM       History Obtained From:  Patient/ EMR       HISTORY OF PRESENT ILLNESS:                The patient is a 71 y.o. male with significant past medical history of atrial fibrillation, CAD, PTCA, had CABG ,COPD, diabetes mellitus, Dupuytren's contracture of the hand GERD, end-stage kidney disease on hemodialysis hypertension, hyperlipidemia, hypothyroidism has been on the PEG tube for tube feeding I brought in from nursing home for shortness of breath and altered mental state. Patient lives suspected to have aspiration pneumonia. I was  consulted elevated his thyroid function and still abdomen dose and also review his blood glucose . patient is using tube feeding formula better control of blood glucose. ROS:   Pt's ROS done in detail. Abnormal ROS are noted in Medical and Surgical History Section below: Other Medical History:        Diagnosis Date    Allergic rhinitis     Anticoagulated on Coumadin     1/6/17-**[facility] to follow pt's PT/INRs, along w/prescribing his Coumadin. **    Anxiety     Arthritis     Atrial fibrillation (HCC)     On Coumadin.     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 09/21/2022    positive at 15C, negative at 18C    COPD (chronic obstructive pulmonary disease) (HCC)     Depression     Diabetes mellitus (HCC)     NIDDM- dx over 10 yrs ago- follows with Dr Romina Duongtrekatie's contracture of hand     Right hand    Family history of cardiovascular disease     Father-MI    GERD (gastroesophageal reflux disease)     H/O cardiac catheterization 03/2007    cardiac cath- stent LAD patent, [id number]-10-20%, RCA 40-50% H/O cardiac catheterization 06/12/2008    cardiac cath- mild disease mid RCA    H/O cardiovascular stress test 04/10/2014    cardiolite- normal, no ischemia, EF63%    H/O cardiovascular stress test 09/21/2016    EF59% normal study  pt in atrial fib    H/O cardiovascular stress test 09/20/2017    EF 60%   afib    H/O cardiovascular stress test 11/07/2018    EF60% normal study    H/O Doppler ultrasound     1/11/2010-CAROTID_Intimal thickening but no significant atherosclerotic plaque noted in LAUREN. Doppler flow velocities within the LAUREN are WNL. Intimal thickening but no significant atherosclerotic plaque noted in LICA. Doppler flow velociities within the LICA are WNL. H/O echocardiogram 04/10/2014    EF 60%, normal LV systolic function, mild mitral and tricuspid insufficiencies, no pericardial effusion.     H/O echocardiogram 09/21/2016    EF60% normal study    H/O echocardiogram 11/07/2018    EF55-60% no significant valular disease    H/O hiatal hernia     Hx of Venous Doppler 03/14/2019    Significant reflux in Left Deep System, No reflux in right lower extremity, No DVT or SVT    Hyperlipidemia     Hypertension     Obesity     S/P PTCA (percutaneous transluminal coronary angioplasty) 03/21/2005    s/p PTCA with 3.5 X 16 TAXUS stent to LAD- follows with Dr Nora Langston    Sleep apnea     had sleep study done 10+ yrs ago- has cpap but does not use it    Thyroid disease     hypothyroid     Surgical History:        Procedure Laterality Date    CARDIAC CATHETERIZATION  6/12/08    mild disease mid RCA,  stent to LAD patent,    CARDIAC CATHETERIZATION  3/07    Stent to LAD patent, Diagonal 40-50%, RCA 40-50%    CARDIAC CATHETERIZATION  3/05    COLONOSCOPY  2011    COLONOSCOPY  5/18/16    1 polyp removed, diverticulosis and hemorrhoids noted    CORONARY ANGIOPLASTY WITH STENT PLACEMENT  03/21/2005    PTCA with 3.5 x 16 TAXUS stent to LAD    CORONARY ARTERY BYPASS GRAFT N/A 9/22/2022    CABG CORONARY ARTERY BYPASS x3 (LIMA TO LAD, SVG TO PDA-RCA SEQUENTIAL) , INTRAOPERATIVE MILTON, ENDOVEIN HARVEST OF LEFT SAPHENOUS VEIN, MODIFIED MAZE PROCEDURE, LEFT ATRIAL APPENDAGE LIGATION, INTERCOSTAL NERVE BLOCK, INTRA-AORTIC BALLOON PUMP INSERTION performed by Wyvonne Mortimer, MD at [address], COLON, DIAGNOSTIC  2014    egd    HAND SURGERY Right 1997    AL OPTX ANKLE DISLOCATION W/REPAIR/INT/XTRNL FIXJ Right 6/3/2019    RIGHT OPEN REDUCTION INTERNAL FIXATION OF DISTAL TIBIA  AND FIBULA performed by Ina Kang MD at [address] [place] [address] N/A 9/24/2022    STERNUM WASHOUT performed by Wyvonne Mortimer, MD at [address]:  Patient has no known allergies. Family History:       Problem Relation Age of Onset    Cancer Mother         breast ca    Coronary Art Dis Father          MI    Heart Disease Father     Rheum Arthritis Other     Rheum Arthritis Sister     No Known Problems Brother     Rheum Arthritis Sister      REVIEW OF SYSTEMS:  Review of System Done as noted above     PHYSICAL EXAM:      Vitals:    BP (!) 84/45   Pulse 93   Temp 97.2 °F (36.2 °C) (Axillary)   Resp 13   Ht 6' 2\" (1.88 m)   Wt 277 lb 12.5 oz (126 kg)   SpO2 90%   BMI 35.66 kg/m²     CONSTITUTIONAL:  awake, drowsy cooperative, appears stated age   EYES:  vision intact Fundoscopic Exam not performed   ENT:Normal  NECK:  Supple, No JVD. Thyroid Exam:Normal   LUNGS:  Has Vesicular Breath Sounds, diminished breath sounds he is on Vapotherm  CARDIOVASCULAR: Atrial fibrillation  ABDOMEN:  No scars, normal bowel sounds, soft, non-distended, non-tender, no masses palpated, no hepatolienomegaly PEG tube feeding  Musculoskeletal: Normal  Extremities: Normal, peripheral pulses normal, , has no edema   NEUROLOGIC:  Awake, drowsy, not oriented to name, place and time. Cranial nerves II-XII are grossly intact. Motor weakness sensory is intact.  ,  and gait is abnormal.  This to be bed ridden    DATA:    CBC:   Recent Labs     02/13/23  0454   WBC 7.1 HGB 8.0*       CMP:  Recent Labs     02/13/23  0454   *   K 5.5*   CL 90*   CO2 23   BUN 84*   CREATININE 5.4*   CALCIUM 8.7     Lipids:   Lab Results   Component Value Date/Time    CHOL 152 04/14/2021 10:02 AM    CHOL 139 07/23/2018 08:01 AM    HDL 43 08/26/2022 10:39 AM    TRIG 212 04/14/2021 10:02 AM     Glucose:   Recent Labs     02/12/23  2250   POCGLU 247*     Hemoglobin A1C:   Lab Results   Component Value Date/Time    LABA1C 7.0 02/12/2023 10:53 PM     Free T4:   Lab Results   Component Value Date/Time    T4FREE 0.78 02/12/2023 10:53 PM     Free T3:   Lab Results   Component Value Date/Time    FT3 1.6 02/12/2023 10:53 PM     TSH High Sensitivity:   Lab Results   Component Value Date/Time    TSHHS 6.190 02/13/2023 04:54 AM       US HEAD NECK SOFT TISSUE THYROID    (Results Pending)          Scheduled Medicines   Medications:    warfarin  1 mg Per G Tube Daily    sodium chloride flush  5-40 mL IntraVENous 2 times per day    piperacillin-tazobactam  3,375 mg IntraVENous Q12H    amiodarone  200 mg Per G Tube Daily    aspirin  81 mg Per G Tube Daily    atorvastatin  40 mg Per G Tube Nightly    insulin glargine  5 Units SubCUTAneous Nightly    insulin lispro  0-4 Units SubCUTAneous TID WC    insulin lispro  0-4 Units SubCUTAneous Nightly    ipratropium-albuterol  1 vial Inhalation 4x Daily    levothyroxine  75 mcg Per G Tube Daily    metoprolol tartrate  12.5 mg Per G Tube BID    QUEtiapine  50 mg Per G Tube Nightly    sevelamer  1.6 g Per G Tube q8h    traZODone  100 mg Per G Tube Nightly    amLODIPine  5 mg Per G Tube Daily      Infusions:    sodium chloride      lactated ringers IV soln 100 mL/hr at 02/12/23 2314    dextrose           IMPRESSION    Patient Active Problem List   Diagnosis    Atrial fibrillation (HCC)    CAD (coronary artery disease)    Morbid obesity (HCC)    COPD (chronic obstructive pulmonary disease) (HCC)    Sleep apnea    Type 2 diabetes mellitus ([facility]    Thyroid disease Hyperlipidemia    Acute congestive heart failure (HCC)    Coronary artery disease involving native coronary artery of native heart with angina pectoris (HCC)    Chronic renal disease, stage III ([facility] [address] [[id number]]    Hypertension    CAD, multiple vessel    Dyspnea    Moderate malnutrition (HCC)    Pneumonia of both lungs due to infectious organism    Severe malnutrition (HCC)    Chronic respiratory failure with hypoxia (HCC)    Aspiration pneumonia of right lower lobe (HCC)    ESRD on dialysis ([address]    Aspiration pneumonia, unspecified aspiration pneumonia type, unspecified laterality, unspecified part of lung ([address]    Acute aspiration pneumonia ([facility] [address]         RECOMMENDATIONS:      Reviewed POC blood glucose . Labs and X ray results   Reviewed Home and Current Medicines   Will Start On  Correction bolus Humalog Insulin regime /  Increase Synthroid dose  We will order antithyroid antibodies and attempt to do ultrasound of thyroid gland with possible  Monitor Blood glucose frequently   Modify  the dose of Insulin  as needed        Will follow with you  Again thank you for sharing pt's care with me.      Truly yours,       Srikanth Lopez MD

## 2023-02-13 NOTE — CONSULTS
Nephrology Service Consultation    Patient:  Luiz Madison  MRN: [id number]  Consulting physician:  Jacinta King, *  Reason for Consult: End-stage renal disease    History Obtained From:  patient, electronic medical record  PCP: ANKITA Francois CNP    HISTORY OF PRESENT ILLNESS:   The patient is a 71 y.o. male who presents with weakness increased congestion has a chronic trach in place with a feeding tube presents from [facility] with increased respiratory congestion possible aspiration. Patient with history of atrial fibrillation on anticoagulation coronary disease COPD type 2 diabetes history of coronary disease with HENRY to LAD who despite all aggressive therapy continues to have high levels of pulmonary secretions from the trach. Last week some of the secretions did come in the area of the dialysis catheter where there are some redness wanting to follow-up cultures make sure no other active infection noted we will need to remove and change the dialysis catheter as well. I tried to call the patient's brother left him a message will need his routine dialysis done today disease or otherwise Monday Wednesday Friday. Monitor the above setting and work-up for pneumonia patient is arousable but not getting much details today    Past Medical History:        Diagnosis Date    Allergic rhinitis     Anticoagulated on Coumadin     1/6/17-**[facility] to follow pt's PT/INRs, along w/prescribing his Coumadin. **    Anxiety     Arthritis     Atrial fibrillation (HCC)     On Coumadin.     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 09/21/2022    positive at 15C, negative at 18C    COPD (chronic obstructive pulmonary disease) (HCC)     Depression     Diabetes mellitus (HCC)     NIDDM- dx over 10 yrs ago- follows with Dr Consuelo Pinon's contracture of hand     Right hand    Family history of cardiovascular disease     Father-MI    GERD (gastroesophageal reflux disease)     H/O cardiac catheterization 03/2007    cardiac cath- stent LAD patent, Objmhzjd-85-66%, RCA 40-50%    H/O cardiac catheterization 06/12/2008    cardiac cath- mild disease mid RCA    H/O cardiovascular stress test 04/10/2014    cardiolite- normal, no ischemia, EF63%    H/O cardiovascular stress test 09/21/2016    EF59% normal study  pt in atrial fib    H/O cardiovascular stress test 09/20/2017    EF 60%   afib    H/O cardiovascular stress test 11/07/2018    EF60% normal study    H/O Doppler ultrasound     1/11/2010-CAROTID_Intimal thickening but no significant atherosclerotic plaque noted in LAUREN. Doppler flow velocities within the LAUREN are WNL. Intimal thickening but no significant atherosclerotic plaque noted in LICA. Doppler flow velociities within the LICA are WNL. H/O echocardiogram 04/10/2014    EF 60%, normal LV systolic function, mild mitral and tricuspid insufficiencies, no pericardial effusion.     H/O echocardiogram 09/21/2016    EF60% normal study    H/O echocardiogram 11/07/2018    EF55-60% no significant valular disease    H/O hiatal hernia     Hx of Venous Doppler 03/14/2019    Significant reflux in Left Deep System, No reflux in right lower extremity, No DVT or SVT    Hyperlipidemia     Hypertension     Obesity     S/P PTCA (percutaneous transluminal coronary angioplasty) 03/21/2005    s/p PTCA with 3.5 X 16 TAXUS stent to LAD- follows with Dr Ranulfo Cruz    Sleep apnea     had sleep study done 10+ yrs ago- has cpap but does not use it    Thyroid disease     hypothyroid       Past Surgical History:        Procedure Laterality Date    CARDIAC CATHETERIZATION  6/12/08    mild disease mid RCA,  stent to LAD patent,    CARDIAC CATHETERIZATION  3/07    Stent to LAD patent, Diagonal 40-50%, RCA 40-50%    CARDIAC CATHETERIZATION  3/05    COLONOSCOPY  2011    COLONOSCOPY  5/18/16    1 polyp removed, diverticulosis and hemorrhoids noted    CORONARY ANGIOPLASTY WITH STENT PLACEMENT  03/21/2005    PTCA with 3.5 x 16 TAXUS stent to LAD    CORONARY ARTERY BYPASS GRAFT N/A 9/22/2022    CABG CORONARY ARTERY BYPASS x3 (LIMA TO LAD, SVG TO PDA-RCA SEQUENTIAL) , INTRAOPERATIVE MILTON, ENDOVEIN HARVEST OF LEFT SAPHENOUS VEIN, MODIFIED MAZE PROCEDURE, LEFT ATRIAL APPENDAGE LIGATION, INTERCOSTAL NERVE BLOCK, INTRA-AORTIC BALLOON PUMP INSERTION performed by Charlene Duncan MD at [address] [facility], COLON, DIAGNOSTIC  2014    egd    HAND SURGERY Right 1997    [place] [address] W/REPAIR/INT/XTRNL FIXJ Right 6/3/2019    RIGHT OPEN REDUCTION INTERNAL FIXATION OF DISTAL TIBIA  AND FIBULA performed by Cecilio Manzano MD at [address] [place] [address] N/A 9/24/2022    STERNUM WASHOUT performed by Charlene Duncan MD at [facility] OR       Medications:   Scheduled Meds:   warfarin  1 mg Per G Tube Daily    calcium gluconate-NaCl  1,000 mg IntraVENous Once    epoetin jessenia-epbx  8,000 Units IntraVENous Once in dialysis    sodium zirconium cyclosilicate  10 g Oral TID    pantoprazole  40 mg IntraVENous BID    sodium chloride flush  5-40 mL IntraVENous 2 times per day    piperacillin-tazobactam  3,375 mg IntraVENous Q12H    amiodarone  200 mg Per G Tube Daily    aspirin  81 mg Per G Tube Daily    atorvastatin  40 mg Per G Tube Nightly    insulin glargine  5 Units SubCUTAneous Nightly    insulin lispro  0-4 Units SubCUTAneous TID WC    insulin lispro  0-4 Units SubCUTAneous Nightly    ipratropium-albuterol  1 vial Inhalation 4x Daily    levothyroxine  75 mcg Per G Tube Daily    metoprolol tartrate  12.5 mg Per G Tube BID    QUEtiapine  50 mg Per G Tube Nightly    sevelamer  1.6 g Per G Tube q8h    traZODone  100 mg Per G Tube Nightly    [Held by provider] amLODIPine  5 mg Per G Tube Daily     Continuous Infusions:   sodium chloride      lactated ringers IV soln 100 mL/hr at 02/13/23 0954    dextrose       PRN Meds:.sodium chloride flush, sodium chloride, ondansetron **OR** ondansetron, glucose, dextrose bolus **OR** dextrose bolus, glucagon (rDNA), dextrose, labetalol, acetaminophen **OR** acetaminophen, polyethylene glycol    Allergies:  Patient has no known allergies. Social History:   TOBACCO:   reports that he quit smoking about 47 years ago. His smoking use included cigarettes. He has a 10.00 pack-year smoking history. He has never used smokeless tobacco.  ETOH:   reports that he does not currently use alcohol after a past usage of about 6.0 standard drinks per week. OCCUPATION:      Family History:       Problem Relation Age of Onset    Cancer Mother         breast ca    Coronary Art Dis Father          MI    Heart Disease Father     Rheum Arthritis Other     Rheum Arthritis Sister     No Known Problems Brother     Rheum Arthritis Sister        REVIEW OF SYSTEMS:  Negative except for weak tired short of breath possible aspiration pneumonia. Physical Exam:    I/O: 02/12 0701 - 02/13 0700  In: 120   Out: 110 [Urine:110]    Vitals: BP (!) 95/55   Pulse 91   Temp 97.1 °F (36.2 °C) (Axillary)   Resp 22   Ht 6' 2\" (1.88 m)   Wt 277 lb 12.5 oz (126 kg)   SpO2 97%   BMI 35.66 kg/m²   General appearance: awake weak  HEENT: Head: Normal, normocephalic, atraumatic. Neck: Positive trach positive HD catheter  Lungs: diminished breath sounds bilaterally  Heart: S1, S2 normal  Abdomen: abnormal findings:  soft NT  Extremities: edema trace  Neurologic: Mental status: alertness: alert      CBC:   Recent Labs     02/13/23  0454   WBC 7.1   HGB 8.0*        BMP:    Recent Labs     02/13/23  0454   *   K 5.5*   CL 90*   CO2 23   BUN 84*   CREATININE 5.4*   GLUCOSE 187*     Hepatic: No results for input(s): AST, ALT, ALB, BILITOT, ALKPHOS in the last 72 hours. Troponin: No results for input(s): TROPONINI in the last 72 hours. BNP: No results for input(s): BNP in the last 72 hours. Lipids: No results for input(s): CHOL, HDL in the last 72 hours.     Invalid input(s): LDLCALCU  ABGs:   Lab Results   Component Value Date/Time    PO2ART 70.3 09/24/2022 11:49 AM    BUE4QZI 35.5 09/24/2022 11:49 AM     INR:   Recent Labs     02/12/23  2253 02/13/23  0454   INR 1.25 1.29     Renal Labs  Albumin:    Lab Results   Component Value Date/Time    LABALBU 3.5 12/31/2022 05:28 AM     Calcium:    Lab Results   Component Value Date/Time    CALCIUM 8.7 02/13/2023 04:54 AM     Phosphorus:    Lab Results   Component Value Date/Time    PHOS 3.5 02/13/2023 04:54 AM     U/A:    Lab Results   Component Value Date/Time    NITRU NEGATIVE 02/13/2023 12:15 AM    COLORU YELLOW 02/13/2023 12:15 AM    PHUR 6.5 06/30/2021 02:11 PM    WBCUA 67 02/13/2023 12:15 AM    RBCUA 37 02/13/2023 12:15 AM    MUCUS RARE 09/17/2022 06:21 PM    TRICHOMONAS NONE SEEN 02/13/2023 12:15 AM    BACTERIA NEGATIVE 02/13/2023 12:15 AM    CLARITYU CLEAR 02/13/2023 12:15 AM    SPECGRAV 1.025 02/13/2023 12:15 AM    UROBILINOGEN 0.2 02/13/2023 12:15 AM    BILIRUBINUR SMALL NUMBER OR AMOUNT OBSERVED 02/13/2023 12:15 AM    BILIRUBINUR small 06/30/2021 02:11 PM    BLOODU MODERATE NUMBER OR AMOUNT OBSERVED 02/13/2023 12:15 AM    GLUCOSEU neg 06/30/2021 02:11 PM    KETUA TRACE 02/13/2023 12:15 AM     ABG:    Lab Results   Component Value Date/Time    GZZ2TYI 35.5 09/24/2022 11:49 AM    PO2ART 70.3 09/24/2022 11:49 AM    ETC6YQC 21.2 09/24/2022 11:49 AM     HgBA1c:    Lab Results   Component Value Date/Time    LABA1C 7.0 02/12/2023 10:53 PM     Microalbumen/Creatinine ratio:  No components found for: RUCREAT  TSH:    Lab Results   Component Value Date/Time    TSH 1.66 04/03/2018 11:33 AM     IRON:    Lab Results   Component Value Date/Time    IRON 54 12/13/2022 06:50 AM     Iron Saturation:  No components found for: PERCENTFE  TIBC:    Lab Results   Component Value Date/Time    TIBC 217 12/13/2022 06:50 AM     FERRITIN:    Lab Results   Component Value Date/Time    FERRITIN 469 12/13/2022 06:50 AM     RPR:  No results found for: RPR  YAYA:  No results found for: ANATITER, YAYA  24 Hour Urine for Creatinine Clearance:  No components found for: CREAT4, UHRS10, UTV10  -----------------------------------------------------------------      Assessment and Recommendations     Patient Active Problem List   Diagnosis Code    Atrial fibrillation ([facility]) I48.91    CAD (coronary artery disease) I25.10    Morbid obesity ([facility]) E66.01    COPD (chronic obstructive pulmonary disease) ([facility]) J44.9    Sleep apnea G47.30    Type 2 diabetes mellitus ([facility]) E11.9    Thyroid disease E07.9    Hyperlipidemia E78.5    Acute congestive heart failure ([facility]) I50.9    Coronary artery disease involving native coronary artery of native heart with angina pectoris ([facility]) I25.119    Chronic renal disease, stage III ([facility] [address] [[id number]] N18.30    Hypertension I10    CAD, multiple vessel I25.10    Dyspnea R06.00    Moderate malnutrition ([facility]) E44.0    Pneumonia of both lungs due to infectious organism J18.9    Severe malnutrition ([facility]) E43    Chronic respiratory failure with hypoxia ([facility]) J96.11    Aspiration pneumonia of right lower lobe ([facility]) J69.0    ESRD on dialysis ([facility] [address] N18.6, Z99.2    Aspiration pneumonia, unspecified aspiration pneumonia type, unspecified laterality, unspecified part of lung ([facility] [address] J69.0    Acute aspiration pneumonia ([facility] [address] J69.0     Impression plan  #1 positive trach with possible aspiration pneumonia with increased secretion  #2 protein malnutrition feeding tube  #3 hyperkalemia with end-stage renal disease Monday Wednesday Friday  #4 type 2 diabetes  #5 paroxysmal A-fib on Coumadin    Plan  #1 panculture follow-up pulmonary recommendations to help with trach care and secretions  #2 maintain on tube feeding if able monitor for aspiration  #3 correct potassium with dialysis to hemodialysis today and use tunneled dialysis catheter but if positive bacteremia or increased redness or erythema may need to remove and change to  #4 monitor glucose in above setting  #5 maintain anticoagulation for now monitor hemoglobin maintain Reinaldogen    We will follow in the above setting supportive care left message for patient's brother    I dw his brother and prior cervical displacement needing trach post cabg 4months ago and was at [facility] and will get fu ct scan cervical spine. Brother lives [place] but on vacation [place] 2 week, and aware.     Electronically signed by Wilbert Stevens MD on 2/13/2023 at 10:19 AM

## 2023-02-13 NOTE — CONSULTS
[address] [facility] Gastroenterology and Hepatology             MD Kayla Correia MD Geoffrey Lager, APRN-CNP             [address] [facility] [address] [place], [address] [facility]             [phone] fax [phone]     Department of Internal Medicine  Gastroenterology Consult Note      Reason for Consult:  Anemia, GERD, peg tube    Primary Care Physician:  Ashanti Mejia, ANKITA - CNP    History Obtained From:  electronic medical record    HISTORY OF PRESENT ILLNESS:              The patient is a 71 y.o.  male 89 [address] [facility] resident who was direct admission from [facility] emergency room for mentation changes and acute hypoxic respiratory failure. GI was consulted due to concern for GI bleeding. He has a past medical history of chronic trach, Peg tube, atrial fibrillation on Coumadin, CAD, CABG x3, COPD, diabetes type II, fatty liver, GERD, obesity, KHUSHBU, aspiration pneumonia. His last EGD was done by Dr. Jason Clarke on 7- showing erosive distal gastritis. His GE junction biopsies showed normal tissue with no evidence of Rock's esophagus or dysplasia. Shanel test negative for H. Pylori. His last colonoscopy was done by Dr. Jason Clarke on 4-3-2013 at [address] [place] [address],[address] center showing diverticular openings in sigmoid colon and diminutive ascending colon polyp that was removed. His lab work showed PT 16.7 and INR 1.29. WBC 7.1, RBC 2.32, Hgb 8, Hct 25.8, .2, and Platelets 551. Sodium 126, Potassium 5.5, Chloride 90, BUN 84, Creatinine 5.4. Per chart review his Hgb baseline 7.9 to 8. Per nursing he had a small brown stool last evening around 10 PM.  No active rectal bleeding or melena noted. He is receiving medication through his peg tube. He has chronic GERD he has been on Prilosec. He appears well nourished. No nausea or vomiting. No abdominal distention or bloating.         Past Medical History:        Diagnosis Date    Allergic rhinitis     Anticoagulated on Coumadin     1/6/17-**[facility] to follow pt's PT/INRs, along w/prescribing his Coumadin. **    Anxiety     Arthritis     Atrial fibrillation (HCC)     On Coumadin. 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 09/21/2022    positive at 15C, negative at 18C    COPD (chronic obstructive pulmonary disease) (HCC)     Depression     Diabetes mellitus (HCC)     NIDDM- dx over 10 yrs ago- follows with Dr Serrano Artist    Zi's contracture of hand     Right hand    Family history of cardiovascular disease     Father-MI    GERD (gastroesophageal reflux disease)     H/O cardiac catheterization 03/2007    cardiac cath- stent LAD patent, Axjllhda-09-69%, RCA 40-50%    H/O cardiac catheterization 06/12/2008    cardiac cath- mild disease mid RCA    H/O cardiovascular stress test 04/10/2014    cardiolite- normal, no ischemia, EF63%    H/O cardiovascular stress test 09/21/2016    EF59% normal study  pt in atrial fib    H/O cardiovascular stress test 09/20/2017    EF 60%   afib    H/O cardiovascular stress test 11/07/2018    EF60% normal study    H/O Doppler ultrasound     1/11/2010-CAROTID_Intimal thickening but no significant atherosclerotic plaque noted in LAUREN. Doppler flow velocities within the LAUREN are WNL. Intimal thickening but no significant atherosclerotic plaque noted in LICA. Doppler flow velociities within the LICA are WNL. H/O echocardiogram 04/10/2014    EF 60%, normal LV systolic function, mild mitral and tricuspid insufficiencies, no pericardial effusion.     H/O echocardiogram 09/21/2016    EF60% normal study    H/O echocardiogram 11/07/2018    EF55-60% no significant valular disease    H/O hiatal hernia     Hx of Venous Doppler 03/14/2019    Significant reflux in Left Deep System, No reflux in right lower extremity, No DVT or SVT    Hyperlipidemia     Hypertension     Obesity     S/P PTCA (percutaneous transluminal coronary angioplasty) 03/21/2005    s/p PTCA with 3.5 X 16 TAXUS stent to LAD- follows with Dr Pulliam More    Sleep apnea     had sleep study done 10+ yrs ago- has cpap but does not use it    Thyroid disease     hypothyroid       Past Surgical History:        Procedure Laterality Date    CARDIAC CATHETERIZATION  6/12/08    mild disease mid RCA,  stent to LAD patent,    CARDIAC CATHETERIZATION  3/07    Stent to LAD patent, Diagonal 40-50%, RCA 40-50%    CARDIAC CATHETERIZATION  3/05    COLONOSCOPY  2011    COLONOSCOPY  5/18/16    1 polyp removed, diverticulosis and hemorrhoids noted    CORONARY ANGIOPLASTY WITH STENT PLACEMENT  03/21/2005    PTCA with 3.5 x 16 TAXUS stent to LAD    CORONARY ARTERY BYPASS GRAFT N/A 9/22/2022    CABG CORONARY ARTERY BYPASS x3 (LIMA TO LAD, SVG TO PDA-RCA SEQUENTIAL) , INTRAOPERATIVE MILTON, ENDOVEIN HARVEST OF LEFT SAPHENOUS VEIN, MODIFIED MAZE PROCEDURE, LEFT ATRIAL APPENDAGE LIGATION, INTERCOSTAL NERVE BLOCK, INTRA-AORTIC BALLOON PUMP INSERTION performed by Eric Chand MD at [address] [place] [address], COLON, DIAGNOSTIC  2014    egd    HAND SURGERY Right [address] [facility] W/REPAIR/INT/XTRNL FIXJ Right 6/3/2019    RIGHT OPEN REDUCTION INTERNAL FIXATION OF DISTAL TIBIA  AND FIBULA performed by Jackelyn García MD at [address] [facility] N/A 9/24/2022    STERNUM WASHOUT performed by Eric Chand MD at [facility] [address]       Medications Prior to Admission:    Prior to Admission medications    Medication Sig Start Date End Date Taking?  Authorizing Provider   Icosapent Ethyl (VASCEPA) 1 g CAPS capsule Take 2 capsules by mouth 2 times daily    Historical Provider, MD   omeprazole (PRILOSEC) 20 MG delayed release capsule Take 20 mg by mouth daily    Historical Provider, MD   guaiFENesin (MUCINEX) 600 MG extended release tablet Take 1,200 mg by mouth 2 times daily    Historical Provider, MD   polyethylene glycol (GLYCOLAX) 17 GM/SCOOP powder Take 17 g by mouth 2 times daily 1/24/23 2/23/23  Sukumar Diggs PA-C   metoprolol tartrate (LOPRESSOR) 25 MG tablet Take 0.5 tablets by mouth 2 times daily 1/5/23   Lamar Soares MD   warfarin (COUMADIN) 2 MG tablet Take daily. Goal INR between 2-3 12/16/22   Yared De MD   sennosides-docusate sodium (SENOKOT-S) 8.6-50 MG tablet Take 2 tablets by mouth daily as needed for Constipation 12/15/22   Meka Green MD   midodrine (PROAMATINE) 10 MG tablet Take 1 tablet by mouth 3 times daily (with meals) 12/15/22   Yared De MD   levothyroxine (SYNTHROID) 75 MCG tablet Take 1 tablet by mouth Daily 12/16/22   Yared De MD   ipratropium-albuterol (DUONEB) 0.5-2.5 (3) MG/3ML SOLN nebulizer solution Inhale 3 mLs into the lungs 4 times daily 12/15/22   Yared De MD   insulin glargine (LANTUS SOLOSTAR) 100 UNIT/ML injection pen Inject 5 Units into the skin nightly 12/15/22   Yared De MD   albuterol sulfate HFA (PROVENTIL;VENTOLIN;PROAIR) 108 (90 Base) MCG/ACT inhaler Inhalation 10/5/18   Historical Provider, MD   amiodarone (CORDARONE) 200 MG tablet 200 mg daily 11/29/22   Historical Provider, MD   atorvastatin (LIPITOR) 40 MG tablet 40 mg nightly 11/28/22   Historical Provider, MD   B Complex-C-Folic Acid (NEPHRONEX) 0.9 MG/5ML LIQD 5 mLs daily 11/29/22   Historical Provider, MD   melatonin 3 MG TABS tablet 9 mg nightly 11/28/22   Historical Provider, MD   Nutritional Supplements (NEPRO/CARBSTEADY) LIQD 50 mL/hr continuous 11/28/22   Historical Provider, MD   Nutritional Supplements (PROSOURCE TF) LIQD 1 Package 3 times daily 11/28/22   Historical Provider, MD   traZODone (DESYREL) 100 MG tablet 100 mg nightly 11/28/22   Historical Provider, MD   sevelamer (RENVELA) 0.8 g PACK packet 1.6 g  in the morning and 1.6 g at noon and 1.6 g in the evening.  11/28/22   Historical Provider, MD   QUEtiapine (SEROQUEL) 50 MG tablet 50 mg nightly 11/28/22   Historical Provider, MD   aspirin 81 MG chewable tablet 1 tablet by Per NG tube route daily 9/25/22   Daniele Guzman MD pantoprazole 4 mg/mL in sodium chloride (PF) injection Infuse 10 mLs intravenously daily 9/24/22   Savage Lima MD   blood glucose monitor kit and supplies Dispense sufficient amount for indicated testing frequency plus additional to accommodate PRN testing needs. Dispense all needed supplies to include: monitor, strips, lancing device, lancets, control solutions, alcohol swabs. 7/26/22   Mima Divers, APRN - CNP   Lancets MISC by D3EA route three times a day. 7/25/22   Mima Divers, APRN - CNP       Allergies:  No Known Allergies. Social History:    TOBACCO:  No  ETOH:  No    Family History:   Family History   Problem Relation Age of Onset    Cancer Mother         breast ca    Coronary Art Dis Father          MI    Heart Disease Father     Rheum Arthritis Other     Rheum Arthritis Sister     No Known Problems Brother     Rheum Arthritis Sister        REVIEW OF SYSTEMS: (POSITIVES WILL BE UNDERLINED)  CONSTITUTIONAL:    Weight change,fatigue, fever, chills  EYES:  Diplopia, change in vision  EARS:  hearing loss, tinnitus, vertigo  NOSE:   epistaxis  MOUTH/THROAT:     hoarseness, sore throat. RESPIRATORY:  SOB,  cough, +sputum from trach, hemoptysis  CARDIOVASCULAR : chest pain,palpitations, dyspnea exertion, orthopnea, paroxysmal nocturnal dyspnea, pedal edema. GASTROINTESTINAL:  See HPI  GENITOURINARY:   dysuria, hematuria, . HEMATOLOGIC/LYMPHATIC:   +Anemia, bleeding tendencies.   MUSCULOSKELETAL:  myalgias,  joint pain  NEUROLOGICAL:   Loss of Consciousness, paresthesias, anesthesias, focal weakness  SKIN :   History of dermatitis, rashes  PSYCHIATRIC:  depression, anxiety past psychosis  ENDOCRINE:  History of diabetes, thyroid disease  ALL/IMM : allergies, rashes    PHYSICAL EXAM:    Vitals:  BP (!) 105/47   Pulse 97   Temp 97.2 °F (36.2 °C) (Axillary)   Resp 20   Ht 6' 2\" (1.88 m)   Wt 277 lb 12.5 oz (126 kg)   SpO2 93%   BMI 35.66 kg/m²   CONSTITUTIONAL: arousable,cooperative, no apparent distress,   EYES:  pupils equal, round and reactive to light and sclera clear  ENT:  normocepalic, without obvious abnormality  NECK:  Trach in place, supple, symmetrical, trachea midline  LUNGS:  rhonchi noted upper fields  CARDIOVASCULAR:  regular rate and rhythm and no murmur noted  ABDOMEN:  Soft, non-tender with normal bowel sounds. Peg tube in place no redness or swelling. No organomegaly or masses  EXTREMITIES: no clubbing, cyanosis, or edema    IMPRESSION:  1) Anemia most likely of chronic disease vs. Coagulopathy  2) Increase in BUN/creatinine concern for GI bleeding- last noted bowel movement normal brown- per nursing Nephrology considering dialysis today (electrolyte imbalance)  3) GERD that is long term- last EGD no Rock's or dysplasia and no evidence of H. Pylori infection  4) Peg tube- well functioning    RECOMMENDATIONS:  1)  Continue to monitor bowel movements for for GI bleeding. 2)  Continue to monitor H&H and transfuse as indicated Hgb <7.    3) Will order Protonix 40 mg IV bid for treatment of GERD- please on discharge send home on Prevacid 30 mg instead of Prilosec (less likely to clog peg tube). 4) Continue with peg tube care and supportive treatment with IV fluids and dialysis as indicated  5)  Will continue to monitor and call if any questions or concerns. 6) No plans for endoscopy at the moment.

## 2023-02-13 NOTE — PROGRESS NOTES
Physician Progress Note      PATIENT:               Cleopatra Ruiz  CSN #:                  [id number]  :                       1950  ADMIT DATE:       2023 9:37 PM  [address] DATE:  RESPONDING  PROVIDER #:        Juliet Simpson MD          QUERY TEXT:    Hospitalists,    Pt admitted with resp failure due to aspiration PNA and  noted to have SIRS. If possible, please document in the progress notes and discharge summary if   you are evaluating and /or treating any of the following: The medical record reflects the following:  Risk Factors: PNA  Clinical Indicators: +SIRS temp 96.1, RR >20 up to 28, HR>100, hypotension SBP   in 80's resp failure,  Treatment: labs, imaging, IVF, IV atb,    Thank you,  Camelia Brnik RN CDS  Options provided:  -- Sepsis, present on admission  -- Sepsis, present on admission, now resolved  -- Sepsis, developed following admission  -- Sepsis was ruled out  -- Other - I will add my own diagnosis  -- Disagree - Not applicable / Not valid  -- Disagree - Clinically unable to determine / Unknown  -- Refer to Clinical Documentation Reviewer    PROVIDER RESPONSE TEXT:    This patient has sepsis which was present on admission.     Query created by: Wayne Cade on 2023 1:57 PM      Electronically signed by:  Juliet Simpson MD 2023 2:34 PM

## 2023-02-14 ENCOUNTER — APPOINTMENT (OUTPATIENT)
Dept: INTERVENTIONAL RADIOLOGY/VASCULAR | Age: 73
End: 2023-02-14
Attending: INTERNAL MEDICINE
Payer: COMMERCIAL

## 2023-02-14 ENCOUNTER — APPOINTMENT (OUTPATIENT)
Dept: CT IMAGING | Age: 73
End: 2023-02-14
Attending: INTERNAL MEDICINE
Payer: COMMERCIAL

## 2023-02-14 LAB
ANION GAP SERPL CALCULATED.3IONS-SCNC: 13 MMOL/L (ref 4–16)
BUN SERPL-MCNC: 51 MG/DL (ref 6–23)
CALCIUM SERPL-MCNC: 8.6 MG/DL (ref 8.3–10.6)
CHLORIDE BLD-SCNC: 94 MMOL/L (ref 99–110)
CO2: 24 MMOL/L (ref 21–32)
CREAT SERPL-MCNC: 3.8 MG/DL (ref 0.9–1.3)
CULTURE: NORMAL
GFR SERPL CREATININE-BSD FRML MDRD: 16 ML/MIN/1.73M2
GLUCOSE BLD-MCNC: 189 MG/DL (ref 70–99)
GLUCOSE BLD-MCNC: 194 MG/DL (ref 70–99)
GLUCOSE BLD-MCNC: 221 MG/DL (ref 70–99)
GLUCOSE BLD-MCNC: 228 MG/DL (ref 70–99)
GLUCOSE BLD-MCNC: 251 MG/DL (ref 70–99)
GLUCOSE SERPL-MCNC: 189 MG/DL (ref 70–99)
HCT VFR BLD CALC: 24.1 % (ref 42–52)
HEMOGLOBIN: 8.2 GM/DL (ref 13.5–18)
INR BLD: 1.51 INDEX
Lab: NORMAL
MAGNESIUM: 2.7 MG/DL (ref 1.8–2.4)
MCH RBC QN AUTO: 37.8 PG (ref 27–31)
MCHC RBC AUTO-ENTMCNC: 34 % (ref 32–36)
MCV RBC AUTO: 111.1 FL (ref 78–100)
PDW BLD-RTO: 19.2 % (ref 11.7–14.9)
PHOSPHORUS: 2.1 MG/DL (ref 2.5–4.9)
PLATELET # BLD: 281 K/CU MM (ref 140–440)
PMV BLD AUTO: 8.8 FL (ref 7.5–11.1)
POTASSIUM SERPL-SCNC: 4.3 MMOL/L (ref 3.5–5.1)
PROTHROMBIN TIME: 19.6 SECONDS (ref 11.7–14.5)
RBC # BLD: 2.17 M/CU MM (ref 4.6–6.2)
SODIUM BLD-SCNC: 131 MMOL/L (ref 135–145)
SPECIMEN: NORMAL
THYROPEROXIDASE AB SERPL-ACNC: 0.5 IU/ML (ref 0–9)
WBC # BLD: 6.2 K/CU MM (ref 4–10.5)

## 2023-02-14 PROCEDURE — 82962 GLUCOSE BLOOD TEST: CPT

## 2023-02-14 PROCEDURE — 87071 CULTURE AEROBIC QUANT OTHER: CPT

## 2023-02-14 PROCEDURE — 72125 CT NECK SPINE W/O DYE: CPT

## 2023-02-14 PROCEDURE — 6370000000 HC RX 637 (ALT 250 FOR IP): Performed by: STUDENT IN AN ORGANIZED HEALTH CARE EDUCATION/TRAINING PROGRAM

## 2023-02-14 PROCEDURE — 6370000000 HC RX 637 (ALT 250 FOR IP): Performed by: NURSE PRACTITIONER

## 2023-02-14 PROCEDURE — 2700000000 HC OXYGEN THERAPY PER DAY

## 2023-02-14 PROCEDURE — 36558 INSERT TUNNELED CV CATH: CPT

## 2023-02-14 PROCEDURE — 6360000002 HC RX W HCPCS

## 2023-02-14 PROCEDURE — 36589 REMOVAL TUNNELED CV CATH: CPT

## 2023-02-14 PROCEDURE — 6360000002 HC RX W HCPCS: Performed by: STUDENT IN AN ORGANIZED HEALTH CARE EDUCATION/TRAINING PROGRAM

## 2023-02-14 PROCEDURE — 84100 ASSAY OF PHOSPHORUS: CPT

## 2023-02-14 PROCEDURE — 2580000003 HC RX 258: Performed by: STUDENT IN AN ORGANIZED HEALTH CARE EDUCATION/TRAINING PROGRAM

## 2023-02-14 PROCEDURE — 77001 FLUOROGUIDE FOR VEIN DEVICE: CPT

## 2023-02-14 PROCEDURE — 36415 COLL VENOUS BLD VENIPUNCTURE: CPT

## 2023-02-14 PROCEDURE — 85610 PROTHROMBIN TIME: CPT

## 2023-02-14 PROCEDURE — 1200000000 HC SEMI PRIVATE

## 2023-02-14 PROCEDURE — 76937 US GUIDE VASCULAR ACCESS: CPT

## 2023-02-14 PROCEDURE — 0JH63XZ INSERTION OF TUNNELED VASCULAR ACCESS DEVICE INTO CHEST SUBCUTANEOUS TISSUE AND FASCIA, PERCUTANEOUS APPROACH: ICD-10-PCS | Performed by: STUDENT IN AN ORGANIZED HEALTH CARE EDUCATION/TRAINING PROGRAM

## 2023-02-14 PROCEDURE — 80048 BASIC METABOLIC PNL TOTAL CA: CPT

## 2023-02-14 PROCEDURE — 6370000000 HC RX 637 (ALT 250 FOR IP): Performed by: INTERNAL MEDICINE

## 2023-02-14 PROCEDURE — 94640 AIRWAY INHALATION TREATMENT: CPT

## 2023-02-14 PROCEDURE — 85027 COMPLETE CBC AUTOMATED: CPT

## 2023-02-14 PROCEDURE — C1894 INTRO/SHEATH, NON-LASER: HCPCS

## 2023-02-14 PROCEDURE — 83735 ASSAY OF MAGNESIUM: CPT

## 2023-02-14 PROCEDURE — 94761 N-INVAS EAR/PLS OXIMETRY MLT: CPT

## 2023-02-14 PROCEDURE — 0JPT3XZ REMOVAL OF TUNNELED VASCULAR ACCESS DEVICE FROM TRUNK SUBCUTANEOUS TISSUE AND FASCIA, PERCUTANEOUS APPROACH: ICD-10-PCS | Performed by: STUDENT IN AN ORGANIZED HEALTH CARE EDUCATION/TRAINING PROGRAM

## 2023-02-14 RX ORDER — LANSOPRAZOLE
30 KIT
Status: DISCONTINUED | OUTPATIENT
Start: 2023-02-14 | End: 2023-02-17 | Stop reason: HOSPADM

## 2023-02-14 RX ORDER — LANSOPRAZOLE 30 MG/1
30 CAPSULE, DELAYED RELEASE ORAL
Status: DISCONTINUED | OUTPATIENT
Start: 2023-02-14 | End: 2023-02-14 | Stop reason: CLARIF

## 2023-02-14 RX ORDER — MIDODRINE HYDROCHLORIDE 5 MG/1
10 TABLET ORAL ONCE
Status: COMPLETED | OUTPATIENT
Start: 2023-02-14 | End: 2023-02-14

## 2023-02-14 RX ORDER — INSULIN LISPRO 100 [IU]/ML
0-8 INJECTION, SOLUTION INTRAVENOUS; SUBCUTANEOUS EVERY 4 HOURS
Status: DISCONTINUED | OUTPATIENT
Start: 2023-02-14 | End: 2023-02-16

## 2023-02-14 RX ADMIN — IPRATROPIUM BROMIDE AND ALBUTEROL SULFATE 3 ML: 2.5; .5 SOLUTION RESPIRATORY (INHALATION) at 19:54

## 2023-02-14 RX ADMIN — QUETIAPINE FUMARATE 50 MG: 25 TABLET ORAL at 20:03

## 2023-02-14 RX ADMIN — METOPROLOL TARTRATE 12.5 MG: 25 TABLET, FILM COATED ORAL at 09:21

## 2023-02-14 RX ADMIN — IPRATROPIUM BROMIDE AND ALBUTEROL SULFATE 3 ML: 2.5; .5 SOLUTION RESPIRATORY (INHALATION) at 14:53

## 2023-02-14 RX ADMIN — PIPERACILLIN AND TAZOBACTAM 3375 MG: 3; .375 INJECTION, POWDER, LYOPHILIZED, FOR SOLUTION INTRAVENOUS at 00:28

## 2023-02-14 RX ADMIN — MIDODRINE HYDROCHLORIDE 10 MG: 5 TABLET ORAL at 23:21

## 2023-02-14 RX ADMIN — SODIUM CHLORIDE, PRESERVATIVE FREE 10 ML: 5 INJECTION INTRAVENOUS at 09:23

## 2023-02-14 RX ADMIN — INSULIN LISPRO 4 UNITS: 100 INJECTION, SOLUTION INTRAVENOUS; SUBCUTANEOUS at 17:33

## 2023-02-14 RX ADMIN — AMIODARONE HYDROCHLORIDE 200 MG: 200 TABLET ORAL at 09:22

## 2023-02-14 RX ADMIN — LEVOTHYROXINE SODIUM 100 MCG: 0.1 TABLET ORAL at 06:04

## 2023-02-14 RX ADMIN — ASPIRIN 81 MG CHEWABLE TABLET 81 MG: 81 TABLET CHEWABLE at 09:21

## 2023-02-14 RX ADMIN — INSULIN LISPRO 1 UNITS: 100 INJECTION, SOLUTION INTRAVENOUS; SUBCUTANEOUS at 04:53

## 2023-02-14 RX ADMIN — IPRATROPIUM BROMIDE AND ALBUTEROL SULFATE 3 ML: 2.5; .5 SOLUTION RESPIRATORY (INHALATION) at 07:09

## 2023-02-14 RX ADMIN — SODIUM CHLORIDE, PRESERVATIVE FREE 10 ML: 5 INJECTION INTRAVENOUS at 20:03

## 2023-02-14 RX ADMIN — INSULIN LISPRO 2 UNITS: 100 INJECTION, SOLUTION INTRAVENOUS; SUBCUTANEOUS at 20:03

## 2023-02-14 RX ADMIN — ATORVASTATIN CALCIUM 40 MG: 40 TABLET, FILM COATED ORAL at 20:03

## 2023-02-14 RX ADMIN — PIPERACILLIN AND TAZOBACTAM 3375 MG: 3; .375 INJECTION, POWDER, LYOPHILIZED, FOR SOLUTION INTRAVENOUS at 17:32

## 2023-02-14 RX ADMIN — IPRATROPIUM BROMIDE AND ALBUTEROL SULFATE 3 ML: 2.5; .5 SOLUTION RESPIRATORY (INHALATION) at 11:11

## 2023-02-14 ASSESSMENT — PAIN SCALES - WONG BAKER
WONGBAKER_NUMERICALRESPONSE: 0

## 2023-02-14 NOTE — PROGRESS NOTES
Nephrology Progress Note  2/14/2023 8:17 AM  Subjective: Interval History: Keturah Trevino is a 68 y.o. male with still persisting congestion some erythema at the dialysis catheter site I discussed with patient's brother yesterday and texted discussion with hospitalist and pulmonology about options including bronchoscopy. Data:   Scheduled Meds:   insulin lispro  0-8 Units SubCUTAneous Q4H    pantoprazole  40 mg IntraVENous BID    levothyroxine  100 mcg Per G Tube Daily    sodium chloride flush  5-40 mL IntraVENous 2 times per day    piperacillin-tazobactam  3,375 mg IntraVENous Q12H    amiodarone  200 mg Per G Tube Daily    aspirin  81 mg Per G Tube Daily    atorvastatin  40 mg Per G Tube Nightly    [Held by provider] insulin glargine  5 Units SubCUTAneous Nightly    ipratropium-albuterol  1 vial Inhalation 4x Daily    metoprolol tartrate  12.5 mg Per G Tube BID    QUEtiapine  50 mg Per G Tube Nightly    sevelamer  1.6 g Per G Tube q8h    traZODone  100 mg Per G Tube Nightly     Continuous Infusions:   sodium chloride      dextrose           CBC   Recent Labs     02/13/23  0454 02/14/23  0610   WBC 7.1 6.2   HGB 8.0* 8.2*   HCT 25.8* 24.1*    281      BMP   Recent Labs     02/13/23  0454 02/13/23  1821 02/14/23  0610   * 131* 131*   K 5.5* 4.6 4.3   CL 90* 94* 94*   CO2 23 23 24   PHOS 3.5  --  2.1*   BUN 84* 44* 51*   CREATININE 5.4* 3.2* 3.8*     Hepatic: No results for input(s): AST, ALT, ALB, BILITOT, ALKPHOS in the last 72 hours. Troponin: No results for input(s): TROPONINI in the last 72 hours. BNP: No results for input(s): BNP in the last 72 hours. Lipids: No results for input(s): CHOL, HDL in the last 72 hours.     Invalid input(s): LDLCALCU  ABGs:   Lab Results   Component Value Date/Time    PO2ART 70.3 09/24/2022 11:49 AM    GPN0ZQY 35.5 09/24/2022 11:49 AM     INR:   Recent Labs     02/12/23  6903 02/13/23  0454 02/14/23  0610   INR 1.25 1.29 1.51     Renal Labs  Albumin: Lab Results   Component Value Date/Time    LABALBU 3.5 12/31/2022 05:28 AM     Calcium:    Lab Results   Component Value Date/Time    CALCIUM 8.6 02/14/2023 06:10 AM     Phosphorus:    Lab Results   Component Value Date/Time    PHOS 2.1 02/14/2023 06:10 AM     U/A:    Lab Results   Component Value Date/Time    NITRU NEGATIVE 02/13/2023 12:15 AM    COLORU YELLOW 02/13/2023 12:15 AM    PHUR 6.5 06/30/2021 02:11 PM    WBCUA 67 02/13/2023 12:15 AM    RBCUA 37 02/13/2023 12:15 AM    MUCUS RARE 09/17/2022 06:21 PM    TRICHOMONAS NONE SEEN 02/13/2023 12:15 AM    BACTERIA NEGATIVE 02/13/2023 12:15 AM    CLARITYU CLEAR 02/13/2023 12:15 AM    SPECGRAV 1.025 02/13/2023 12:15 AM    UROBILINOGEN 0.2 02/13/2023 12:15 AM    BILIRUBINUR SMALL NUMBER OR AMOUNT OBSERVED 02/13/2023 12:15 AM    BILIRUBINUR small 06/30/2021 02:11 PM    BLOODU MODERATE NUMBER OR AMOUNT OBSERVED 02/13/2023 12:15 AM    GLUCOSEU neg 06/30/2021 02:11 PM    KETUA TRACE 02/13/2023 12:15 AM     ABG:    Lab Results   Component Value Date/Time    TYR4NXG 35.5 09/24/2022 11:49 AM    PO2ART 70.3 09/24/2022 11:49 AM    ORH7KNE 21.2 09/24/2022 11:49 AM     HgBA1c:    Lab Results   Component Value Date/Time    LABA1C 7.0 02/12/2023 10:53 PM     Microalbumen/Creatinine ratio:  No components found for: RUCREAT  TSH:    Lab Results   Component Value Date/Time    TSH 1.66 04/03/2018 11:33 AM     IRON:    Lab Results   Component Value Date/Time    IRON 60 02/13/2023 10:39 AM     Iron Saturation:  No components found for: PERCENTFE  TIBC:    Lab Results   Component Value Date/Time    TIBC 229 02/13/2023 10:39 AM     FERRITIN:    Lab Results   Component Value Date/Time    FERRITIN 1,063 02/13/2023 10:39 AM     RPR:  No results found for: RPR  YAYA:  No results found for: ANATITER, YAYA  24 Hour Urine for Creatinine Clearance:  No components found for: CREAT4, UHRS10, UTV10      Objective:   I/O: 02/13 0701 - 02/14 0700  In: 670   Out: 2040 [Urine:40]  I/O last 3 completed shifts: In: 790 [NG/GT:290]  Out: 2150 [Urine:150]  No intake/output data recorded. Vitals: BP (!) 103/52   Pulse (!) 102   Temp 99.2 °F (37.3 °C) (Oral)   Resp 19   Ht 6' 2\" (1.88 m)   Wt 277 lb 9 oz (125.9 kg)   SpO2 94%   BMI 35.64 kg/m²  {  General appearance: awake weak  HEENT: Head: Normal, normocephalic, atraumatic.   Neck: Positive trach  Lungs: diminished breath sounds bilaterally  Heart: S1, S2 normal  Abdomen: abnormal findings:  soft nt positive PEG tube  Extremities: edema trace positive HD line right-sided chest with erythema  Neurologic: Mental status: alertness: alert        Assessment and Plan:      IMP:  #1 positive trach with possible aspiration pneumonia with increased secretion  #2 protein malnutrition feeding tube  #3 hyperkalemia with end-stage renal disease Monday Wednesday Friday  #4 type 2 diabetes  #5 paroxysmal A-fib on Coumadin    Plan     #1 follow-up peripheral cultures discussed with pulmonology may benefit from a bronchoscopy although recurrent chronic issue may not be persistently helpful  #2 check CT scan cervical spine make sure no other acute pathology at this time  #3 maintain on tube feeds monitor for aspiration  #4 potassium improved do next dialysis Wednesday remove extra volume as able  #5 monitor glucose control adjust insulin  #6 we will hold the Coumadin for right now recheck INR will change HD catheter to a new site is concerned of erythema redness and had respiratory secretions following the HD catheter site over the week at the nursing home  We will follow and monitor in the above setting appears more awake and interactive today  We will stop Renvela for phosphorus got lower         Christina Cohen MD, MD

## 2023-02-14 NOTE — PROGRESS NOTES
V2.0  [facility] [place] Hospitalist Progress Note      Name:  Jacquie Najera /Age/Sex: 1950  (68 y.o. male)   MRN & CSN:  [id number] & [id number] Encounter Date/Time: 2023 7:11 AM EST    Location:  -A PCP: Jere Romo, APRN - [facility] Day: 3    Assessment and Plan:   Jacquie Najera is a 68 y.o. male with pmh of chronic trach and PEG, atrial fibrillation on Coumadin, COPD, insulin-dependent diabetes mellitus type 2, chronic GERD, CAD s/p PTCA with HENRY to LAD, hypothyroidism, KHUSHBU who presents from [facility] because of worsening mentation along with worsening cough that is productive in nature with streaks of blood around the phlegm associated with worsening shortness of breath even at rest    # Acute hypoxic respiratory failure in the setting of suspected aspiration pneumonia: Presented with sepsis. Managed as per sepsis protocol. CT scans reviewed from care every where and s/o Rt lobe pneumonia. Pulmonology has been consulted started on Zosyn. Pancultures ordered. Blood and urine cultures no growth till date, respiratory cultures pending, respiratory panel and COVID-negative, continue antibiotics. wean off o2 as tolerated, pulmonology following, plan to do a bronc to clear the secretions. # Hyperkalemia resolved after dialysis, nephrology following. # UTI - Dirty Urine. Culture NGTD. Abx as above. # Metabolic and septic Encephalopathy - CT Head - negative for acute process. Management as above. Q 4 neuro check. B12 / folate / ammonia / blood gas looks in acceptable range. # ESRD on HD:  Nephrology consulted. HD as per nephrology. Nephrology recommend HD catheter exchange    # History of chronic trach and PEG: Needs frequent suctioning pulmonology on board. Nutritionist consult placed for Tube feeding management. # Macrocytic anemia, underlying anemia of chronic disease hemoglobin of 8.7. Ordered B12 folate and iron studies. Goal hb > 7. Will monitor H/H for now.  GI following. On protonix 40 IV BID. # Elevated troponin likely demand ischemia. # Insulin-dependent diabetes mellitus type 2: HbA1c 7.0, endocrinology following, continue with sliding scale insulin, to keep blood sugar between 140-180, hypoglycemic protocol and diabetic diet. #  Paroxysmal atrial fibrillation on Coumadin continue telemetry, continue amiodarone, held Coumadin for possible HD catheter change. # Coronary artery disease status post PTCA with HENRY to LAD. # Hypothyroidism on levothyroxine most recent TSH was found to be 9.12. T3 and T4 has been ordered. Resume all medications through G-tube. On levothyroxine. # KHUSHBU   # COPD underlying chronic trach and PEG  # Chronic GERD   # Stasis dermatitis for skin care. Diet Diet NPO  ADULT TUBE FEEDING; PEG; Renal Formula; Continuous; 30; Yes; 15; Q 4 hours; 60; 60; Q 4 hours   DVT Prophylaxis [] Lovenox, []  Heparin, [] SCDs, [] Ambulation,  [] Eliquis, [] Xarelto  [] Coumadin   Code Status Full Code   Disposition From: [place] view  Expected Disposition: [facility] [place] view  Estimated Date of Discharge: 2-3 Days  Patient requires continued admission due to cute hypoxic respiratory failure, and IV abx   Surrogate Decision Maker/ POA Brother     Subjective:     Chief Complaint: No chief complaint on file. Patient seen and examined at bedside. Patient on 5 L of O2 through trach no distress. Review of Systems:    Review of Systems    Unable to obtain    Objective: Intake/Output Summary (Last 24 hours) at 2/14/2023 1341  Last data filed at 2/13/2023 2200  Gross per 24 hour   Intake 620 ml   Output 2040 ml   Net -1420 ml          Vitals:   Vitals:    02/14/23 1326   BP: (!) 114/52   Pulse: (!) 107   Resp: 12   Temp:    SpO2: 95%       Physical Exam:     General: Afebrile, no distress on 5 L of O2 through trach  Eyes: EOMI  ENT: neck supple, trach in place  Cardiovascular: S1-S2 normal tachycardic  Respiratory:  Air entry decreased bilaterally  Gastrointestinal: Soft, non tender, PEG tube in place  Genitourinary: no suprapubic tenderness  Musculoskeletal: Both lower extremities hyperpigmented  Skin: warm, dry hyperpigmented skin of both lower extremities  Neuro: Alert. Psych: Mood appropriate.      Medications:   Medications:    insulin lispro  0-8 Units SubCUTAneous Q4H    lansoprazole  30 mg Oral QAM AC    levothyroxine  100 mcg Per G Tube Daily    sodium chloride flush  5-40 mL IntraVENous 2 times per day    piperacillin-tazobactam  3,375 mg IntraVENous Q12H    amiodarone  200 mg Per G Tube Daily    aspirin  81 mg Per G Tube Daily    atorvastatin  40 mg Per G Tube Nightly    [Held by provider] insulin glargine  5 Units SubCUTAneous Nightly    ipratropium-albuterol  1 vial Inhalation 4x Daily    metoprolol tartrate  12.5 mg Per G Tube BID    QUEtiapine  50 mg Per G Tube Nightly    traZODone  100 mg Per G Tube Nightly      Infusions:    sodium chloride      dextrose       PRN Meds: sodium chloride flush, 5-40 mL, PRN  sodium chloride, , PRN  ondansetron, 4 mg, Q8H PRN   Or  ondansetron, 4 mg, Q6H PRN  glucose, 4 tablet, PRN  dextrose bolus, 125 mL, PRN   Or  dextrose bolus, 250 mL, PRN  glucagon (rDNA), 1 mg, PRN  dextrose, , Continuous PRN  labetalol, 10 mg, Q6H PRN  acetaminophen, 650 mg, Q6H PRN   Or  acetaminophen, 650 mg, Q6H PRN  polyethylene glycol, 17 g, Daily PRN      Labs      Recent Results (from the past 24 hour(s))   POCT Glucose    Collection Time: 02/13/23  4:49 PM   Result Value Ref Range    POC Glucose 171 (H) 70 - 99 MG/DL   Basic Metabolic Panel    Collection Time: 02/13/23  6:21 PM   Result Value Ref Range    Sodium 131 (L) 135 - 145 MMOL/L    Potassium 4.6 3.5 - 5.1 MMOL/L    Chloride 94 (L) 99 - 110 mMol/L    CO2 23 21 - 32 MMOL/L    Anion Gap 14 4 - 16    BUN 44 (H) 6 - 23 MG/DL    Creatinine 3.2 (H) 0.9 - 1.3 MG/DL    Est, Glom Filt Rate 20 (L) >60 mL/min/1.73m2    Glucose 141 (H) 70 - 99 MG/DL    Calcium 8.4 8.3 - 10.6 MG/DL   POCT Glucose    Collection Time: 02/13/23  9:49 PM   Result Value Ref Range    POC Glucose 195 (H) 70 - 99 MG/DL   POCT Glucose    Collection Time: 02/14/23 12:29 AM   Result Value Ref Range    POC Glucose 194 (H) 70 - 99 MG/DL   POCT Glucose    Collection Time: 02/14/23  4:49 AM   Result Value Ref Range    POC Glucose 221 (H) 70 - 99 MG/DL   CBC    Collection Time: 02/14/23  6:10 AM   Result Value Ref Range    WBC 6.2 4.0 - 10.5 K/CU MM    RBC 2.17 (L) 4.6 - 6.2 M/CU MM    Hemoglobin 8.2 (L) 13.5 - 18.0 GM/DL    Hematocrit 24.1 (L) 42 - 52 %    .1 (H) 78 - 100 FL    MCH 37.8 (H) 27 - 31 PG    MCHC 34.0 32.0 - 36.0 %    RDW 19.2 (H) 11.7 - 14.9 %    Platelets 721 586 - 344 K/CU MM    MPV 8.8 7.5 - 11.1 FL   Basic Metabolic Panel    Collection Time: 02/14/23  6:10 AM   Result Value Ref Range    Sodium 131 (L) 135 - 145 MMOL/L    Potassium 4.3 3.5 - 5.1 MMOL/L    Chloride 94 (L) 99 - 110 mMol/L    CO2 24 21 - 32 MMOL/L    Anion Gap 13 4 - 16    BUN 51 (H) 6 - 23 MG/DL    Creatinine 3.8 (H) 0.9 - 1.3 MG/DL    Est, Glom Filt Rate 16 (L) >60 mL/min/1.73m2    Glucose 189 (H) 70 - 99 MG/DL    Calcium 8.6 8.3 - 10.6 MG/DL   Magnesium    Collection Time: 02/14/23  6:10 AM   Result Value Ref Range    Magnesium 2.7 (H) 1.8 - 2.4 mg/dl   Phosphorus    Collection Time: 02/14/23  6:10 AM   Result Value Ref Range    Phosphorus 2.1 (L) 2.5 - 4.9 MG/DL   Protime-INR    Collection Time: 02/14/23  6:10 AM   Result Value Ref Range    Protime 19.6 (H) 11.7 - 14.5 SECONDS    INR 1.51 INDEX   POCT Glucose    Collection Time: 02/14/23  6:54 AM   Result Value Ref Range    POC Glucose 189 (H) 70 - 99 MG/DL        Imaging/Diagnostics Last 24 Hours   No results found.     Electronically signed by Tam Farias MD on 2/14/2023 at 1:41 PM

## 2023-02-14 NOTE — PROGRESS NOTES
02/14/23 0630   Encounter Summary   Encounter Overview/Reason  Initial Encounter   Service Provided For: Patient   Referral/Consult From: [address] [place] [address] Significant other;Family members   Last Encounter  02/14/23  (Patient has limited speach but welcomed prayer and spiritual support.)   Complexity of Encounter Low   Begin Time 0620   End Time  0632   Total Time Calculated 12 min   Encounter    Type Initial Screen/Assessment   Spiritual/Emotional needs   Type Spiritual Support   Assessment/Intervention/Outcome   Assessment Coping;Peaceful   Intervention Active listening;Discussed belief system/Spiritism practices/refugio;Discussed illness injury and its impact;Prayer (assurance of)/[place];Sustaining Presence/Ministry of presence   Outcome Engaged in conversation;Expressed Gratitude   Plan and Referrals   Plan/Referrals Continue to visit, (comment)

## 2023-02-14 NOTE — OR NURSING
PROCEDURE PERFORMED: Removal of Tunneled HD catheter with culture Tip , replacement with new tunneled HD catheter RT IJ    PRIMARY INDICATION FOR PROCEDURE: possible infection of previous HD catheter    INFORMED CONSENT:  Obtained prior to procedure. Consent placed in chart. PT TRANSPORTED FROM:  2002                TO THE IR ROOM: large room                       ARRIVED TO ROOM: 1243    ASSESSMENT: A&O x4 with VSS. Consent obtained from patient and his MPOA as patient's mentation is reported to \"wax and Vlad\" per bedside RN [address]:               Pt transferred to the table and positioned supine for comfort. Warm blankets given. Pt placed on Cardiac Monitor. Pt prepped and draped in a sterile fashion with chlorhexadine. TIME OUT:  1255    PROCEDURE STARTED: 1255    PAIN/LOCAL ANESTHESIA/SEDATION MANAGEMENT:           Local: Lidocaine 1% given by Shavon Thomason @ 1257          INTRAOPERATIVE:           ACCESS TIME: @ 3431 with Micro puncture           US/FLUORO: US images x 4          WIRE USED: Glide Wire 0.035\" 80 cm     Catheter removed @1304                CATHETER USED: Palindrome chronic duall lumen catheter 14.5 fr x 23 cm . Heparin locked lumens             FINAL IMAGE TAKEN TO CONFIRM PLACEMENT OF: done in IR           CONTRAST/CC:     STERILE DRESSINGS: gauze and tegaderm on old site , bandaid, Sutures, bio patch,tegaderm to new site    SPECIMENS:HD catheter tip cultured and sent to the lab     EBL:      >5cc    FOLLOW- UP X-RAY: Shavon Thomason confirmed in IR       COMPLICATIONS/ OUTCOME:Pt tolerated procedure without issues.          LEFT THE ROOM: 4743    REPORT CALLED TO: Vasquez RN , pt going to CT post procedure

## 2023-02-14 NOTE — PROGRESS NOTES
4 Eyes Skin Assessment     NAME:  Yazmin Delong  YOB: 1950  MEDICAL RECORD NUMBER:  [id number]    The patient is being assessed for  Admission    I agree that One RN has performed a thorough Head to Toe Skin Assessment on the patient. ALL assessment sites listed below have been assessed. Areas assessed by both nurses:    Head, Face, Ears, Shoulders, Back, Chest, Arms, Elbows, Hands, Sacrum. Buttock, Coccyx, Ischium, and Legs. Feet and Heels        Does the Patient have a Wound? Yes wound(s) were present on assessment.  LDA wound assessment was Initiated and completed by RN       Jeremy Prevention initiated by RN: Yes   Wound Care Orders initiated by RN: Yes    Pressure Injury (Stage 3,4, Unstageable, DTI, NWPT, and Complex wounds) if present, place referral order by RN under : NA    New and Established Ostomies, if present place, referral order under : Yes      Nurse 1 eSignature: Electronically signed by Maria Dolores Fulton RN on 2/13/23 at 11:58 PM EST    **SHARE this note so that the co-signing nurse can place an eSignature**    Nurse 2 eSignature: {Esignature:[id number]}

## 2023-02-14 NOTE — PROGRESS NOTES
[address] [facility] Gastroenterology and Hepatology             MD Deja Noriega MD Renette Engels, APRN-CNP             [address] [facility] [place], [address] [facility]             [phone] fax [phone]      Gastroenterology Progress note . 2/15/2023  Reason for consult:   Anemia, GERD, peg tube          Interval H/P  Hemoglobin appears to be stable at 8.2. No episodes of overt GI bleeding. Noted concerns for aspiration pneumonia. Wife at bedside     Physical Exam  Blood pressure 113/61, pulse (!) 111, temperature 97.9 °F (36.6 °C), temperature source Axillary, resp. rate 22, height 6' 2\" (1.88 m), weight 273 lb (123.8 kg), SpO2 90 %. Constitutional: Patient is in no distress. Eyes: Pupils equal, round. No icterus. HENT: Oral mucosa is dry. Tracheostomy noted  Pulmonary: No accessory muscle use. No localizing pulmonary findings. Cardiovascular: Heart has a regular rate and rhythm, no JVD. Gastrointestinal: Bowel sounds are present in all four quadrants. Abdomen is soft, nontender, nondistended. Rectal examination deferred. Skin: No jaundice, spider angiomas, or palmar erythema. No purpura. Neuro: Awake,alert, and oriented x3. No gross focal neurologic deficits. Chart and labs reviewed. IMPRESSION:  1) acute on chronic microcytic anemia. Chronic anemia secondary to his ESRD status. Nephrology is on board. 2) Increase in BUN/creatinine - Unreliable as ESRD on Dialysis. 3) GERD that is long term- last EGD no Rock's or dysplasia and no evidence of H. Pylori infection  4) Peg tube- well functioning     RECOMMENDATIONS:  1)  Continue to monitor bowel movements for for GI bleeding. 2)  Continue to monitor H&H and transfuse as indicated Hgb <7.    3) switch to Prevacid 30 mg instead of Prilosec (less likely to clog peg tube).   4) iron studies reviewed, will defer to nephrology if need for EPO  5)  Will continue to monitor and call if any questions or concerns. 6) No plans for endoscopy at the moment. Patient's history, exam, and plan of care were discussed. Thank you for allowing us to be involved in the management of this patient. We will sign off. Please feel free to call with any questions.       Zander Alcaraz MD  Gastroenterology   2/15/2023   4:54 PM       Recent Labs     02/13/23  1039   AMMONIA 44

## 2023-02-14 NOTE — PLAN OF CARE
Problem: Discharge Planning  Goal: Discharge to home or other facility with appropriate resources  2/13/2023 2035 by Sera Gonzalez RN  Outcome: Progressing  2/13/2023 1046 by Neva Pearce RN  Outcome: Progressing     Problem: Skin/Tissue Integrity  Goal: Absence of new skin breakdown  Description: 1. Monitor for areas of redness and/or skin breakdown  2. Assess vascular access sites hourly  3. Every 4-6 hours minimum:  Change oxygen saturation probe site  4. Every 4-6 hours:  If on nasal continuous positive airway pressure, respiratory therapy assess nares and determine need for appliance change or resting period.   2/13/2023 2035 by Sera Gonzalez RN  Outcome: Progressing  2/13/2023 1046 by Neva Pearce RN  Outcome: Progressing     Problem: Safety - Adult  Goal: Free from fall injury  2/13/2023 2035 by Sera Gonzalez RN  Outcome: Progressing  2/13/2023 1046 by Neva Pearce RN  Outcome: Progressing     Problem: Pain  Goal: Verbalizes/displays adequate comfort level or baseline comfort level  2/13/2023 2035 by Sera Gonzalez RN  Outcome: Progressing  2/13/2023 1046 by Neva Pearce RN  Outcome: Progressing     Problem: Respiratory - Adult  Goal: Achieves optimal ventilation and oxygenation  2/13/2023 2035 by Sera Gonzalez RN  Outcome: Progressing  2/13/2023 1046 by Neva Pearce RN  Outcome: Progressing     Problem: Cardiovascular - Adult  Goal: Maintains optimal cardiac output and hemodynamic stability  2/13/2023 2035 by Sera Gonzalez RN  Outcome: Progressing  2/13/2023 1046 by Neva Pearce RN  Outcome: Progressing  Goal: Absence of cardiac dysrhythmias or at baseline  2/13/2023 2035 by Sera Gonzalez RN  Outcome: Progressing  2/13/2023 1046 by Neva Pearce RN  Outcome: Progressing     Problem: Gastrointestinal - Adult  Goal: Maintains or returns to baseline bowel function  2/13/2023 2035 by Sera Gonzalez RN  Outcome: Progressing  2/13/2023 1046 by Neva Pearce RN  Outcome: Progressing  Goal: Maintains adequate nutritional intake  2/13/2023 2035 by Nandini Berkowitz RN  Outcome: Progressing  2/13/2023 1046 by Shaggy Olson RN  Outcome: Progressing     Problem: Metabolic/Fluid and Electrolytes - Adult  Goal: Electrolytes maintained within normal limits  2/13/2023 2035 by Nandini Berkowitz RN  Outcome: Progressing  2/13/2023 1046 by Shaggy Olson RN  Outcome: Progressing  Goal: Hemodynamic stability and optimal renal function maintained  2/13/2023 2035 by Nandini Berkowitz RN  Outcome: Progressing  2/13/2023 1046 by Shaggy Olson RN  Outcome: Progressing  Goal: Glucose maintained within prescribed range  2/13/2023 2035 by Nandini Berkowitz RN  Outcome: Progressing  2/13/2023 1046 by Shaggy Olson RN  Outcome: Progressing     Problem: Hematologic - Adult  Goal: Maintains hematologic stability  2/13/2023 2035 by Nandini Berkowitz RN  Outcome: Progressing  2/13/2023 1046 by Shaggy Olson RN  Outcome: Progressing     Problem: Chronic Conditions and Co-morbidities  Goal: Patient's chronic conditions and co-morbidity symptoms are monitored and maintained or improved  Outcome: Progressing     Problem: Nutrition Deficit:  Goal: Optimize nutritional status  2/13/2023 2035 by Nandini Berkowitz RN  Outcome: Progressing  2/13/2023 1108 by Trevor Huntley RD, LD  Outcome: Progressing  Flowsheets (Taken 2/13/2023 1108)  Nutrient intake appropriate for improving, restoring, or maintaining nutritional needs: Recommend, monitor, and adjust tube feedings and TPN/PPN based on assessed needs

## 2023-02-14 NOTE — PROGRESS NOTES
pulmonary      SUBJECTIVE:  remains on o2     OBJECTIVE    VITALS:  BP (!) 102/43   Pulse (!) 106   Temp 99.1 °F (37.3 °C) (Oral)   Resp 19   Ht 6' 2\" (1.88 m)   Wt 277 lb 9 oz (125.9 kg)   SpO2 95%   BMI 35.64 kg/m²   HEAD AND FACE EXAM:  No throat injection, no active exudate,no thrush  NECK EXAM;No JVD, no masses, symmetrical  CHEST EXAM; Expansion equal and symmetrical, no masses  LUNG EXAM; Good breath sounds bilaterally. There are expiratory wheezes both lungs, there are crackles at both lung bases  CARDIOVASCULAR EXAM: Positive S1 and S2, no S3 or S4, no clicks ,no murmurs  RIGHT AND LEFT LOWER EXTRIMITY EXAM: No edema, no swelling, no inflamation  CNS EXAM: Alert and oriented X3          LABS   Lab Results   Component Value Date    WBC 6.2 02/14/2023    HGB 8.2 (L) 02/14/2023    HCT 24.1 (L) 02/14/2023    .1 (H) 02/14/2023     02/14/2023     Lab Results   Component Value Date    CREATININE 3.8 (H) 02/14/2023    BUN 51 (H) 02/14/2023     (L) 02/14/2023    K 4.3 02/14/2023    CL 94 (L) 02/14/2023    CO2 24 02/14/2023     Lab Results   Component Value Date    INR 1.51 02/14/2023    PROTIME 19.6 (H) 02/14/2023          Lab Results   Component Value Date/Time    PHOS 2.1 02/14/2023 06:10 AM    PHOS 3.5 02/13/2023 04:54 AM    PHOS 5.5 01/02/2023 05:25 AM      No results for input(s): PH, PO2ART, LWG9MNQ, HCO3, BEART, O2SAT in the last 72 hours. Wt Readings from Last 3 Encounters:   02/14/23 277 lb 9 oz (125.9 kg)   02/08/23 260 lb (117.9 kg)   02/08/23 260 lb (117.9 kg)               ASSESMENT  Ac on ch resp failur asp pneumonia  Copd  Mucus plugging right lung        PLAN  Antibx  O2 adm  Bd rx  D/w dr Pradeep Stover. I agree that doing bnronch with cleaning out mucus plugging may benefit this pt. Called significant other and she is agreeable for bronch.  Also called brother Nadiya Cueto who is POA and left A MESSAGE.    2/14/2023  Kati Lubin MD, M.D.

## 2023-02-15 ENCOUNTER — APPOINTMENT (OUTPATIENT)
Dept: GENERAL RADIOLOGY | Age: 73
End: 2023-02-15
Attending: INTERNAL MEDICINE
Payer: COMMERCIAL

## 2023-02-15 LAB
ANION GAP SERPL CALCULATED.3IONS-SCNC: 13 MMOL/L (ref 4–16)
BUN SERPL-MCNC: 64 MG/DL (ref 6–23)
CALCIUM SERPL-MCNC: 8.7 MG/DL (ref 8.3–10.6)
CHLORIDE BLD-SCNC: 94 MMOL/L (ref 99–110)
CO2: 24 MMOL/L (ref 21–32)
CREAT SERPL-MCNC: 4.6 MG/DL (ref 0.9–1.3)
CRP SERPL HS-MCNC: 65.7 MG/L
GFR SERPL CREATININE-BSD FRML MDRD: 13 ML/MIN/1.73M2
GLUCOSE BLD-MCNC: 211 MG/DL (ref 70–99)
GLUCOSE BLD-MCNC: 234 MG/DL (ref 70–99)
GLUCOSE BLD-MCNC: 250 MG/DL (ref 70–99)
GLUCOSE BLD-MCNC: 256 MG/DL (ref 70–99)
GLUCOSE BLD-MCNC: 264 MG/DL (ref 70–99)
GLUCOSE SERPL-MCNC: 224 MG/DL (ref 70–99)
HCT VFR BLD CALC: 24.7 % (ref 42–52)
HEMOGLOBIN: 7.9 GM/DL (ref 13.5–18)
INR BLD: 1.3 INDEX
MAGNESIUM: 2.9 MG/DL (ref 1.8–2.4)
MCH RBC QN AUTO: 35.7 PG (ref 27–31)
MCHC RBC AUTO-ENTMCNC: 32 % (ref 32–36)
MCV RBC AUTO: 111.8 FL (ref 78–100)
PDW BLD-RTO: 19.4 % (ref 11.7–14.9)
PHOSPHORUS: 3.7 MG/DL (ref 2.5–4.9)
PLATELET # BLD: 264 K/CU MM (ref 140–440)
PMV BLD AUTO: 8.8 FL (ref 7.5–11.1)
POTASSIUM SERPL-SCNC: 4.2 MMOL/L (ref 3.5–5.1)
PROCALCITONIN SERPL-MCNC: 0.67 NG/ML
PROTHROMBIN TIME: 16.8 SECONDS (ref 11.7–14.5)
RBC # BLD: 2.21 M/CU MM (ref 4.6–6.2)
SODIUM BLD-SCNC: 131 MMOL/L (ref 135–145)
THYROGLOB AB SERPL-ACNC: <0.9 IU/ML (ref 0–4)
WBC # BLD: 6.8 K/CU MM (ref 4–10.5)

## 2023-02-15 PROCEDURE — 6370000000 HC RX 637 (ALT 250 FOR IP): Performed by: INTERNAL MEDICINE

## 2023-02-15 PROCEDURE — 6360000002 HC RX W HCPCS: Performed by: STUDENT IN AN ORGANIZED HEALTH CARE EDUCATION/TRAINING PROGRAM

## 2023-02-15 PROCEDURE — 85610 PROTHROMBIN TIME: CPT

## 2023-02-15 PROCEDURE — 94761 N-INVAS EAR/PLS OXIMETRY MLT: CPT

## 2023-02-15 PROCEDURE — 6370000000 HC RX 637 (ALT 250 FOR IP): Performed by: STUDENT IN AN ORGANIZED HEALTH CARE EDUCATION/TRAINING PROGRAM

## 2023-02-15 PROCEDURE — 2700000000 HC OXYGEN THERAPY PER DAY

## 2023-02-15 PROCEDURE — 2060000000 HC ICU INTERMEDIATE R&B

## 2023-02-15 PROCEDURE — 6370000000 HC RX 637 (ALT 250 FOR IP): Performed by: NURSE PRACTITIONER

## 2023-02-15 PROCEDURE — 84145 PROCALCITONIN (PCT): CPT

## 2023-02-15 PROCEDURE — 94640 AIRWAY INHALATION TREATMENT: CPT

## 2023-02-15 PROCEDURE — 99232 SBSQ HOSP IP/OBS MODERATE 35: CPT | Performed by: INTERNAL MEDICINE

## 2023-02-15 PROCEDURE — 6360000002 HC RX W HCPCS

## 2023-02-15 PROCEDURE — 80048 BASIC METABOLIC PNL TOTAL CA: CPT

## 2023-02-15 PROCEDURE — 90935 HEMODIALYSIS ONE EVALUATION: CPT

## 2023-02-15 PROCEDURE — 82962 GLUCOSE BLOOD TEST: CPT

## 2023-02-15 PROCEDURE — 85027 COMPLETE CBC AUTOMATED: CPT

## 2023-02-15 PROCEDURE — 6360000002 HC RX W HCPCS: Performed by: INTERNAL MEDICINE

## 2023-02-15 PROCEDURE — 71045 X-RAY EXAM CHEST 1 VIEW: CPT

## 2023-02-15 PROCEDURE — 89220 SPUTUM SPECIMEN COLLECTION: CPT

## 2023-02-15 PROCEDURE — 83735 ASSAY OF MAGNESIUM: CPT

## 2023-02-15 PROCEDURE — 84100 ASSAY OF PHOSPHORUS: CPT

## 2023-02-15 PROCEDURE — 2580000003 HC RX 258: Performed by: STUDENT IN AN ORGANIZED HEALTH CARE EDUCATION/TRAINING PROGRAM

## 2023-02-15 PROCEDURE — 86140 C-REACTIVE PROTEIN: CPT

## 2023-02-15 PROCEDURE — P9047 ALBUMIN (HUMAN), 25%, 50ML: HCPCS

## 2023-02-15 RX ORDER — MIDODRINE HYDROCHLORIDE 5 MG/1
5 TABLET ORAL
Status: DISCONTINUED | OUTPATIENT
Start: 2023-02-15 | End: 2023-02-17 | Stop reason: HOSPADM

## 2023-02-15 RX ORDER — ALBUMIN (HUMAN) 12.5 G/50ML
12.5 SOLUTION INTRAVENOUS
Status: COMPLETED | OUTPATIENT
Start: 2023-02-15 | End: 2023-02-15

## 2023-02-15 RX ORDER — POLYVINYL ALCOHOL 14 MG/ML
2 SOLUTION/ DROPS OPHTHALMIC 4 TIMES DAILY PRN
Status: DISCONTINUED | OUTPATIENT
Start: 2023-02-15 | End: 2023-02-17 | Stop reason: HOSPADM

## 2023-02-15 RX ORDER — ALBUMIN (HUMAN) 12.5 G/50ML
SOLUTION INTRAVENOUS
Status: COMPLETED
Start: 2023-02-15 | End: 2023-02-15

## 2023-02-15 RX ADMIN — TRAZODONE HYDROCHLORIDE 100 MG: 50 TABLET ORAL at 21:38

## 2023-02-15 RX ADMIN — IPRATROPIUM BROMIDE AND ALBUTEROL SULFATE 3 ML: 2.5; .5 SOLUTION RESPIRATORY (INHALATION) at 19:30

## 2023-02-15 RX ADMIN — ATORVASTATIN CALCIUM 40 MG: 40 TABLET, FILM COATED ORAL at 21:37

## 2023-02-15 RX ADMIN — LEVOTHYROXINE SODIUM 100 MCG: 0.1 TABLET ORAL at 06:34

## 2023-02-15 RX ADMIN — INSULIN LISPRO 2 UNITS: 100 INJECTION, SOLUTION INTRAVENOUS; SUBCUTANEOUS at 21:40

## 2023-02-15 RX ADMIN — MIDODRINE HYDROCHLORIDE 5 MG: 5 TABLET ORAL at 16:14

## 2023-02-15 RX ADMIN — POLYVINYL ALCOHOL 2 DROP: 14 SOLUTION/ DROPS OPHTHALMIC at 22:14

## 2023-02-15 RX ADMIN — INSULIN LISPRO 4 UNITS: 100 INJECTION, SOLUTION INTRAVENOUS; SUBCUTANEOUS at 16:29

## 2023-02-15 RX ADMIN — IPRATROPIUM BROMIDE AND ALBUTEROL SULFATE 3 ML: 2.5; .5 SOLUTION RESPIRATORY (INHALATION) at 14:31

## 2023-02-15 RX ADMIN — SODIUM CHLORIDE, PRESERVATIVE FREE 10 ML: 5 INJECTION INTRAVENOUS at 21:39

## 2023-02-15 RX ADMIN — INSULIN LISPRO 2 UNITS: 100 INJECTION, SOLUTION INTRAVENOUS; SUBCUTANEOUS at 05:49

## 2023-02-15 RX ADMIN — ALBUMIN (HUMAN) 12.5 G: 0.25 INJECTION, SOLUTION INTRAVENOUS at 09:12

## 2023-02-15 RX ADMIN — IPRATROPIUM BROMIDE AND ALBUTEROL SULFATE 3 ML: 2.5; .5 SOLUTION RESPIRATORY (INHALATION) at 08:00

## 2023-02-15 RX ADMIN — QUETIAPINE FUMARATE 50 MG: 25 TABLET ORAL at 21:38

## 2023-02-15 RX ADMIN — INSULIN LISPRO 4 UNITS: 100 INJECTION, SOLUTION INTRAVENOUS; SUBCUTANEOUS at 08:16

## 2023-02-15 RX ADMIN — INSULIN LISPRO 2 UNITS: 100 INJECTION, SOLUTION INTRAVENOUS; SUBCUTANEOUS at 13:52

## 2023-02-15 RX ADMIN — PIPERACILLIN AND TAZOBACTAM 3375 MG: 3; .375 INJECTION, POWDER, LYOPHILIZED, FOR SOLUTION INTRAVENOUS at 16:14

## 2023-02-15 RX ADMIN — INSULIN GLARGINE 5 UNITS: 100 INJECTION, SOLUTION SUBCUTANEOUS at 21:39

## 2023-02-15 RX ADMIN — METOPROLOL TARTRATE 12.5 MG: 25 TABLET, FILM COATED ORAL at 21:38

## 2023-02-15 RX ADMIN — EPOETIN ALFA-EPBX 8000 UNITS: 4000 INJECTION, SOLUTION INTRAVENOUS; SUBCUTANEOUS at 10:46

## 2023-02-15 RX ADMIN — LANSOPRAZOLE 30 MG: KIT at 06:33

## 2023-02-15 RX ADMIN — PIPERACILLIN AND TAZOBACTAM 3375 MG: 3; .375 INJECTION, POWDER, LYOPHILIZED, FOR SOLUTION INTRAVENOUS at 06:00

## 2023-02-15 RX ADMIN — ALBUMIN (HUMAN) 12.5 G: 12.5 SOLUTION INTRAVENOUS at 09:12

## 2023-02-15 ASSESSMENT — PAIN SCALES - WONG BAKER
WONGBAKER_NUMERICALRESPONSE: 0

## 2023-02-15 NOTE — PROGRESS NOTES
No fluid removed. Patient Name: Ramya Morejon  Patient : 1950  MRN: [id number]     Acct: [de-identified]  Date of Admission: 2023  Room/Bed: -A  Code Status:  Full Code  Allergies: No Known Allergies  Diagnosis:    Patient Active Problem List   Diagnosis    Atrial fibrillation ([facility]    CAD (coronary artery disease)    Morbid obesity ([facility]    COPD (chronic obstructive pulmonary disease) ([facility]    Sleep apnea    Type 2 diabetes mellitus ([facility]    Thyroid disease    Hyperlipidemia    Acute congestive heart failure ([facility]    Coronary artery disease involving native coronary artery of native heart with angina pectoris (HCC)    Chronic renal disease, stage III ([facility] [[id number]]    Hypertension    CAD, multiple vessel    Dyspnea    Moderate malnutrition (HCC)    Pneumonia of both lungs due to infectious organism    Severe malnutrition (HCC)    Chronic respiratory failure with hypoxia (HCC)    Aspiration pneumonia of right lower lobe (HCC)    ESRD on dialysis ([facility]    Aspiration pneumonia, unspecified aspiration pneumonia type, unspecified laterality, unspecified part of lung ([facility]    Acute aspiration pneumonia ([facility]         Treatment:  Hemodilaysis 2:1  Priority: Routine  Location: Acute Room    Diabetic: Yes  NPO: No  Isolation Precautions: Dialysis     Consent for Treatment Verified: Yes  Blood Consent Verified: Not Applicable     Safety Verified: Identify (I), Consent (C), Equipment (E), HepB Status (B), Orders Complete (O), Access Verified (A), and Timeliness (T)  Time out performed prior to access at 0849 hours. Report Received from Primary RN at 0730 hours.   Primary RN (First Initial, Last Name, Title): Lcaey Vo RN  Incapacitated Nurse Education Completed: Not Applicable     HBsAg ONLY:  Date Drawn: 2023       Results: Negative  HBsAb:  Date Drawn:  2023       Results: Susceptible <10    Order  Dialysis Bath  K+ (Potassium): 2  Ca+ (Calcium): 2.5  Na+ (Sodium): 138  HCO3 (Bicarb): 35  Bicarbonate Concentrate Lot No.: [id number]  Acid Concentrate Lot No.: [id number]     Na+ Modeling: Not Applicable  Dialyzer: [id number]  Dialysate Temperature (C):  35  Blood Flow Rate (BFR):  250   Dialysate Flow Rate (DFR):   600        Access to be Utilized   Access:  Tunneled Catheter  Location: Internal Jugular  Side: Right   Needle gauge:  Not Applicable  + Bruit/Thrill: Not Applicable    First Use X-ray Verified: Not Applicable  OK to use line order: Yes    Site Assessment:  Signs and Symptoms of Infection/Inflammation: None  If yes: Not Applicable  Dressing: Dry and Intact  Site Prep: Medical Aseptic Technique  Dressing Changed this Treatment: Yes  If yes, by whom: Chanel Mcgraw RN  Date of Last Dressing Change: February 14, 2023  Antimicrobial Patch in place?: Yes  Red Alcohol Caps in place?: Yes  Gauze Dressing?: No  Non Dialysis Use?: No  Comment:    Flows: Good, Patent  If access problem, who was notified:     Pre and Post-Assessment  Patient Vitals for the past 8 hrs:   Level of Consciousness Heart Rhythm Respiratory Quality/Effort O2 Device Bilateral Breath Sounds Skin Color Skin Condition/Temp Abdomen Inspection Bowel Sounds (All Quadrants) Edema Edema Generalized   02/15/23 0800 -- -- -- Trach collar -- -- -- -- -- -- --   02/15/23 0812 0 -- -- -- -- -- -- -- -- -- --   02/15/23 0820 0 -- Unlabored -- -- Other (comment) Cool;Dry Ostomy tube;Flat;Surgical scar -- Generalized Non-pitting   02/15/23 0845 0 Regular Unlabored Trach collar Diminished -- Cool;Dry Ostomy tube;Flat;Surgical scar Active Generalized Non-pitting   02/15/23 1215 0 Regular Unlabored Trach collar Diminished -- Cool;Dry Ostomy tube;Flat;Surgical scar Active Generalized Non-pitting     Labs  Recent Labs     02/13/23  0454 02/14/23  0610 02/15/23  0315   WBC 7.1 6.2 6.8   HGB 8.0* 8.2* 7.9*   HCT 25.8* 24.1* 24.7*    281 264                                                                  Recent Labs     02/13/23  1821 02/14/23  0610 02/15/23  0315   * 131* 131*   K 4.6 4.3 4.2   CL 94* 94* 94*   CO2 23 24 24   BUN 44* 51* 64*   CREATININE 3.2* 3.8* 4.6*   GLUCOSE 141* 189* 224*     IV Drips and Rate/Dose   sodium chloride      dextrose        Safety - Before each treatment:   Dialysis Machine No.: [id number]   Machine Number: [id number]  Dialyzer Lot No.: [id number]  RO Machine Log Sheet Completed: Yes  Machine Alarm Self Test: Completed; Passed (02/15/23 0845)     Air Foam Detector: Tested, Proper Function, pH Reading  Extracorporeal Circuit Tested for Integrity: Yes  Machine Conductivity: 13.8  Manual Conductivity: 13.8     Bicarbonate Concentrate Lot No.: [id number]  Acid Concentrate Lot No.: [id number]  Manual Ph: 7.4  Bleach Test (Neg): Yes  Bath Temperature: 95 °F (35 °C)  Tubing Lot#: [id number]   Conductivity Meter Serial #: [id number]  All Connections Secure?: Yes  Venous Parameters Set?: Yes  Arterial Parameters Set?: Yes  Saline Line Double Clamped?: Yes  Air Foam Detector Engaged?: Yes  Machine Functioning Alarm Free?  Yes  Prime Given: 200ml    Chlorine Testing - Before each treatment and every 4 hours:   Treatment  Time On: 0859  Time Off: 1202  Weight: 273 lb (123.8 kg) (02/15/23 0859)  1st check: less than 0.1 ppm at: 0715 hours  2nd check: less than 0.1 ppm at: 1030 hours  3rd check: Not Applicable  (if greater than 0.1 ppm, then check every 30 minutes from secondary)    Access Flows and Pressures  Patient Vitals for the past 8 hrs:   Blood Flow Rate (ml/min) Ultrafiltration Rate (ml/hr) Arterial Pressure (mmHg) Venous Pressure (mmHg) TMP DFR Comments Access Visible   02/15/23 0859 250 ml/min 170 ml/hr -60 mmHg 70 80 600 txt started Yes   02/15/23 0915 250 ml/min 170 ml/hr -60 mmHg 80 70 600 albumin started Yes   02/15/23 0930 250 ml/min 170 ml/hr -60 mmHg 80 70 600 no distress Yes   02/15/23 0945 250 ml/min 170 ml/hr -60 mmHg 80 70 600 resting Yes   02/15/23 1000 250 ml/min 280 ml/hr -60 mmHg 80 70 600 no complaints Yes 02/15/23 1015 250 ml/min 170 ml/hr -60 mmHg 80 80 600 resting quietly Yes   02/15/23 1030 250 ml/min 170 ml/hr -70 mmHg 80 70 600 no distress Yes   02/15/23 1045 250 ml/min 170 ml/hr -60 mmHg 80 80 600 resting Yes   02/15/23 1100 250 ml/min 160 ml/hr -70 mmHg 80 80 600 new bicarb Yes   02/15/23 1115 250 ml/min 160 ml/hr -70 mmHg 80 70 600 resting Yes   02/15/23 1130 250 ml/min 160 ml/hr -70 mmHg 80 70 600 no changes Yes   02/15/23 1145 250 ml/min 160 ml/hr -60 mmHg 80 70 600 no distress Yes   02/15/23 1202 -- 160 ml/hr -- -- -- -- txt completed Yes     Vital Signs  Patient Vitals for the past 8 hrs:   BP Temp Pulse Resp SpO2 Weight Weight Method Percent Weight Change   02/15/23 0812 113/66 97.1 °F (36.2 °C) -- -- -- -- -- --   02/15/23 0845 (!) 89/45 97.9 °F (36.6 °C) (!) 103 15 94 % 273 lb 13 oz (124.2 kg) -- -1.35   02/15/23 0859 (!) 89/55 97.9 °F (36.6 °C) -- 15 -- 273 lb (123.8 kg) Bed scale -0.3   02/15/23 0915 (!) 83/50 -- 88 -- -- -- -- --   02/15/23 0930 (!) 87/42 -- 88 -- -- -- -- --   02/15/23 0945 (!) 83/45 -- 87 -- -- -- -- --   02/15/23 1000 (!) 97/53 -- 89 -- -- -- -- --   02/15/23 1015 (!) 89/47 -- 89 -- -- -- -- --   02/15/23 1030 (!) 88/46 -- 83 -- -- -- -- --   02/15/23 1045 (!) 91/48 -- 86 -- -- -- -- --   02/15/23 1100 (!) 93/52 -- 93 -- -- -- -- --   02/15/23 1115 (!) 90/47 -- 92 -- -- -- -- --   02/15/23 1130 (!) 85/44 -- 92 -- -- -- -- --   02/15/23 1145 (!) 87/47 -- 90 -- -- -- -- --   02/15/23 1202 (!) 90/51 97.3 °F (36.3 °C) 89 16 -- -- -- --   02/15/23 1215 (!) 89/49 -- 96 -- -- -- -- --     Post-Dialysis  Arterial Catheter Locking Solution:  NS  Venous Catheter Locking Solution:  NS  Post-Treatment Procedures: Blood returned, Catheter Capped, clamped with Saline x2 ports  Machine Disinfection Process: Acid/Vinegar Clean, Heat Disinfect, Exterior Machine Disinfection  Rinseback Volume (ml): 300 ml  Blood Volume Processed (Liters): 44.4 l/min  Dialyzer Clearance: Lightly streaked  Duration of Treatment (minutes): 180 minutes     Hemodialysis Intake (ml): 500 ml  Hemodialysis Output (ml): 501 ml     Tolerated Treatment: Good  Patient Response to Treatment: good  Physician Notified: No       Provider Notification  Provider Notification  Reason for Communication: Evaluate (02/15/23 0845)  Provider Name: Cesario Pablo (02/15/23 0845)  Provider Notification: Physician (02/15/23 0845)  Method of Communication: Call (02/15/23 0845)  Response: See orders (02/15/23 0845)  Notification Time: [id number] (02/15/23 0845)     Handoff complete and report given to Primary RN at 1230 hours.   Primary RN (First Initial, Last Name, Title):  Kaylynn Tay RN      Education  Person Educated: Patient   Knowledge Base: Substantial  Barriers to Learning?: None  Preferred method of Learning: Oral  Topic(s): Access Care, Signs and Symptoms of Infection, and Procedural   Teaching Tools: Explanation   Response to Education: Verbalized Understanding     Electronically signed by Ibrahima Prieto RN on 2/15/2023 at 12:57 PM

## 2023-02-15 NOTE — PLAN OF CARE
Problem: Discharge Planning  Goal: Discharge to home or other facility with appropriate resources  Outcome: Progressing     Problem: Skin/Tissue Integrity  Goal: Absence of new skin breakdown  Description: 1. Monitor for areas of redness and/or skin breakdown  2. Assess vascular access sites hourly  3. Every 4-6 hours minimum:  Change oxygen saturation probe site  4. Every 4-6 hours:  If on nasal continuous positive airway pressure, respiratory therapy assess nares and determine need for appliance change or resting period.   Outcome: Progressing     Problem: Safety - Adult  Goal: Free from fall injury  Outcome: Progressing     Problem: Pain  Goal: Verbalizes/displays adequate comfort level or baseline comfort level  Outcome: Progressing     Problem: Respiratory - Adult  Goal: Achieves optimal ventilation and oxygenation  Outcome: Progressing     Problem: Cardiovascular - Adult  Goal: Maintains optimal cardiac output and hemodynamic stability  Outcome: Progressing  Goal: Absence of cardiac dysrhythmias or at baseline  Outcome: Progressing     Problem: Gastrointestinal - Adult  Goal: Maintains or returns to baseline bowel function  Outcome: Progressing  Goal: Maintains adequate nutritional intake  Outcome: Progressing     Problem: Metabolic/Fluid and Electrolytes - Adult  Goal: Electrolytes maintained within normal limits  Outcome: Progressing  Goal: Hemodynamic stability and optimal renal function maintained  Outcome: Progressing  Goal: Glucose maintained within prescribed range  Outcome: Progressing     Problem: Hematologic - Adult  Goal: Maintains hematologic stability  Outcome: Progressing     Problem: Chronic Conditions and Co-morbidities  Goal: Patient's chronic conditions and co-morbidity symptoms are monitored and maintained or improved  Outcome: Progressing     Problem: Nutrition Deficit:  Goal: Optimize nutritional status  Outcome: Progressing

## 2023-02-15 NOTE — PROGRESS NOTES
Pt not able to handle being placed in trendelenburg. BP not 113/55, 1X order of midodrine ordered.  Current /55  Yefri Aguilar RN

## 2023-02-15 NOTE — PROGRESS NOTES
pulmonary      SUBJECTIVE:  has thick secretion thru trach     OBJECTIVE    VITALS:  BP (!) 82/47   Pulse 81   Temp 98.5 °F (36.9 °C)   Resp 14   Ht 6' 2\" (1.88 m)   Wt 277 lb 9 oz (125.9 kg)   SpO2 97%   BMI 35.64 kg/m²   HEAD AND FACE EXAM:  No throat injection, no active exudate,no thrush  NECK EXAM;No JVD, no masses, symmetrical  CHEST EXAM; Expansion equal and symmetrical, no masses  LUNG EXAM; Good breath sounds bilaterally. There are expiratory wheezes both lungs, there are crackles at both lung bases  CARDIOVASCULAR EXAM: Positive S1 and S2, no S3 or S4, no clicks ,no murmurs  RIGHT AND LEFT LOWER EXTRIMITY EXAM: No edema, no swelling,           LABS   Lab Results   Component Value Date    WBC 6.8 02/15/2023    HGB 7.9 (L) 02/15/2023    HCT 24.7 (L) 02/15/2023    .8 (H) 02/15/2023     02/15/2023     Lab Results   Component Value Date    CREATININE 4.6 (H) 02/15/2023    BUN 64 (H) 02/15/2023     (L) 02/15/2023    K 4.2 02/15/2023    CL 94 (L) 02/15/2023    CO2 24 02/15/2023     Lab Results   Component Value Date    INR 1.30 02/15/2023    PROTIME 16.8 (H) 02/15/2023          Lab Results   Component Value Date/Time    PHOS 3.7 02/15/2023 03:15 AM    PHOS 2.1 02/14/2023 06:10 AM    PHOS 3.5 02/13/2023 04:54 AM      No results for input(s): PH, PO2ART, WIW0WPW, HCO3, BEART, O2SAT in the last 72 hours. Wt Readings from Last 3 Encounters:   02/14/23 277 lb 9 oz (125.9 kg)   02/08/23 260 lb (117.9 kg)   02/08/23 260 lb (117.9 kg)               ASSESMENT  Ac on ch resp failure  Asp pneumonia  Copd  Mucus plugging right        PLAN  Cpm  Cont o2  Hypotension needs to be better before bronch can be done  Needs consent signed by brother POA.   Check cxr,if everything looks ok will schedule for tomorrow    2/15/2023  Dayami Robertson MD, MCHRISTINA.

## 2023-02-15 NOTE — PROGRESS NOTES
Nephrology Progress Note  2/15/2023 2:28 PM  Subjective: Interval History: Miley Ramires is a 68 y.o. male  seen on dialysis blood pressure low stable  Remove excess volume. Appears alert interactive    Data:   Scheduled Meds:   insulin lispro  0-8 Units SubCUTAneous Q4H    lansoprazole  30 mg Oral QAM AC    levothyroxine  100 mcg Per G Tube Daily    sodium chloride flush  5-40 mL IntraVENous 2 times per day    piperacillin-tazobactam  3,375 mg IntraVENous Q12H    amiodarone  200 mg Per G Tube Daily    aspirin  81 mg Per G Tube Daily    atorvastatin  40 mg Per G Tube Nightly    insulin glargine  5 Units SubCUTAneous Nightly    ipratropium-albuterol  1 vial Inhalation 4x Daily    metoprolol tartrate  12.5 mg Per G Tube BID    QUEtiapine  50 mg Per G Tube Nightly    traZODone  100 mg Per G Tube Nightly     Continuous Infusions:   sodium chloride      dextrose           CBC   Recent Labs     02/13/23  0454 02/14/23  0610 02/15/23  0315   WBC 7.1 6.2 6.8   HGB 8.0* 8.2* 7.9*   HCT 25.8* 24.1* 24.7*    281 264      BMP   Recent Labs     02/13/23  0454 02/13/23  1821 02/14/23  0610 02/15/23  0315   * 131* 131* 131*   K 5.5* 4.6 4.3 4.2   CL 90* 94* 94* 94*   CO2 23 23 24 24   PHOS 3.5  --  2.1* 3.7   BUN 84* 44* 51* 64*   CREATININE 5.4* 3.2* 3.8* 4.6*     Hepatic: No results for input(s): AST, ALT, ALB, BILITOT, ALKPHOS in the last 72 hours. Troponin: No results for input(s): TROPONINI in the last 72 hours. BNP: No results for input(s): BNP in the last 72 hours. Lipids: No results for input(s): CHOL, HDL in the last 72 hours.     Invalid input(s): LDLCALCU  ABGs:   Lab Results   Component Value Date/Time    PO2ART 70.3 09/24/2022 11:49 AM    NDA1HJK 35.5 09/24/2022 11:49 AM     INR:   Recent Labs     02/13/23  0454 02/14/23  0610 02/15/23  0315   INR 1.29 1.51 1.30     Renal Labs  Albumin:    Lab Results   Component Value Date/Time    LABALBU 3.5 12/31/2022 05:28 AM     Calcium:    Lab Results Component Value Date/Time    CALCIUM 8.7 02/15/2023 03:15 AM     Phosphorus:    Lab Results   Component Value Date/Time    PHOS 3.7 02/15/2023 03:15 AM     U/A:    Lab Results   Component Value Date/Time    NITRU NEGATIVE 02/13/2023 12:15 AM    COLORU YELLOW 02/13/2023 12:15 AM    PHUR 6.5 06/30/2021 02:11 PM    WBCUA 67 02/13/2023 12:15 AM    RBCUA 37 02/13/2023 12:15 AM    MUCUS RARE 09/17/2022 06:21 PM    TRICHOMONAS NONE SEEN 02/13/2023 12:15 AM    BACTERIA NEGATIVE 02/13/2023 12:15 AM    CLARITYU CLEAR 02/13/2023 12:15 AM    SPECGRAV 1.025 02/13/2023 12:15 AM    UROBILINOGEN 0.2 02/13/2023 12:15 AM    BILIRUBINUR SMALL NUMBER OR AMOUNT OBSERVED 02/13/2023 12:15 AM    BILIRUBINUR small 06/30/2021 02:11 PM    BLOODU MODERATE NUMBER OR AMOUNT OBSERVED 02/13/2023 12:15 AM    GLUCOSEU neg 06/30/2021 02:11 PM    KETUA TRACE 02/13/2023 12:15 AM     ABG:    Lab Results   Component Value Date/Time    UHK6YBL 35.5 09/24/2022 11:49 AM    PO2ART 70.3 09/24/2022 11:49 AM    CKD0WTR 21.2 09/24/2022 11:49 AM     HgBA1c:    Lab Results   Component Value Date/Time    LABA1C 7.0 02/12/2023 10:53 PM     Microalbumen/Creatinine ratio:  No components found for: RUCREAT  TSH:    Lab Results   Component Value Date/Time    TSH 1.66 04/03/2018 11:33 AM     IRON:    Lab Results   Component Value Date/Time    IRON 60 02/13/2023 10:39 AM     Iron Saturation:  No components found for: PERCENTFE  TIBC:    Lab Results   Component Value Date/Time    TIBC 229 02/13/2023 10:39 AM     FERRITIN:    Lab Results   Component Value Date/Time    FERRITIN 1,063 02/13/2023 10:39 AM     RPR:  No results found for: RPR  YAYA:  No results found for: ANATITER, YAYA  24 Hour Urine for Creatinine Clearance:  No components found for: CREAT4, UHRS10, UTV10      Objective:   I/O: 02/14 0701 - 02/15 0700  In: -   Out: 150 [Urine:150]  I/O last 3 completed shifts: In: 60 [NG/GT:60]  Out: 150 [Urine:150]  I/O this shift:   In: 500   Out: 501   Vitals: BP (!) 89/49 Pulse 96   Temp 97.3 °F (36.3 °C)   Resp 16   Ht 6' 2\" (1.88 m)   Wt 273 lb (123.8 kg)   SpO2 94%   BMI 35.05 kg/m²  {  General appearance: awake weak  HEENT: Head: Normal, normocephalic, atraumatic. Neck: Positive trach  Lungs: diminished breath sounds bilaterally  Heart: S1, S2 normal  Abdomen: abnormal findings:  soft nt positive PEG tube  Extremities: edema trace positive HD line right-sided chest with erythema  Neurologic: Mental status: alertness: alert     Impression:       No change from prior examination. Prominent anterior osteophytes from C3-C4   to the C6-C7 discs. These are sufficient size to cause swallowing   difficulties. Assessment and Plan:      IMP:  #1 positive trach with possible aspiration pneumonia with increased secretion  #2 protein malnutrition feeding tube  #3 hyperkalemia with end-stage renal disease Monday Wednesday Friday  #4 type 2 diabetes  #5 paroxysmal A-fib   6.   Anterior osteophytes C3-C4 to C6-C7   #7 anemia   #8 hypotension      Plan      #1 plan on bronchoscopy in the morning   #2 work on nutrition using tube feeding   #3 potassium stable dialysis maintain supportive care with dialysis unable to remove excess volume due to drop in blood pressure   #4 monitor glucose control   #5 hold anticoagulation for now   #6 may be beneficial at some point to get neurosurgery to evaluate osteophytes for therapy options if able   #7 give epo plan PRBC if hemoglobin less than 7   #8 maintain on midodrine           Abimael Toscano MD, MD

## 2023-02-15 NOTE — PROGRESS NOTES
Progress Note( Dr. Latisha Clement)  2/15/2023  Subjective:   Admit Date: 2/12/2023  PCP: ANKITA Galarza CNP    Admitted For : Altered mental state and worsening of the cough and shortness of breath       Consulted For:  hyothyroid / DM     Interval History: Patient more awake and alert this morning    Denies any chest pains,   Denies SOB . Chest tracheotomy due to oxygen  Denies nausea or vomiting. PEG tube tube feeding is going on  No new bowel or bladder symptoms. Intake/Output Summary (Last 24 hours) at 2/15/2023 0607  Last data filed at 2/15/2023 0600  Gross per 24 hour   Intake --   Output 150 ml   Net -150 ml       DATA    CBC:   Recent Labs     02/13/23  0454 02/14/23  0610 02/15/23  0315   WBC 7.1 6.2 6.8   HGB 8.0* 8.2* 7.9*    281 264    CMP:  Recent Labs     02/13/23  1821 02/14/23  0610 02/15/23  0315   * 131* 131*   K 4.6 4.3 4.2   CL 94* 94* 94*   CO2 23 24 24   BUN 44* 51* 64*   CREATININE 3.2* 3.8* 4.6*   CALCIUM 8.4 8.6 8.7     Lipids:   Lab Results   Component Value Date/Time    CHOL 152 04/14/2021 10:02 AM    CHOL 139 07/23/2018 08:01 AM    HDL 43 08/26/2022 10:39 AM    TRIG 212 04/14/2021 10:02 AM     Glucose:  Recent Labs     02/14/23  0654 02/14/23  1706 02/14/23 1956   POCGLU 189* 251* 228*     DylczcumvlB7Y:  Lab Results   Component Value Date/Time    LABA1C 7.0 02/12/2023 10:53 PM     High Sensitivity TSH:   Lab Results   Component Value Date/Time    TSHHS 6.190 02/13/2023 04:54 AM     Free T3:   Lab Results   Component Value Date/Time    FT3 1.6 02/12/2023 10:53 PM     Free T4:  Lab Results   Component Value Date/Time    T4FREE 0.78 02/12/2023 10:53 PM       CT CERVICAL SPINE WO CONTRAST   Final Result   No change from prior examination. Prominent anterior osteophytes from C3-C4   to the C6-C7 discs. These are sufficient size to cause swallowing   difficulties.          IR TUNNELED CVC PLACE WO SQ PORT/PUMP > 5 YEARS   Final Result   Removal of previously placed tunneled dialysis access catheter with tip sent   for culture per prior order. Successful ultrasound and fluoroscopy guided new Port-A-Cath/tunneled   dialysis access catheter placement via right external jugular venous approach   with tip in the mid right atrium. XR CHEST PORTABLE   Final Result   Unchanged large right consolidation and interval presence of left airspace   disease. Possible bilateral pleural effusions. Scheduled Medicines   Medications:    insulin lispro  0-8 Units SubCUTAneous Q4H    lansoprazole  30 mg Oral QAM AC    levothyroxine  100 mcg Per G Tube Daily    sodium chloride flush  5-40 mL IntraVENous 2 times per day    piperacillin-tazobactam  3,375 mg IntraVENous Q12H    amiodarone  200 mg Per G Tube Daily    aspirin  81 mg Per G Tube Daily    atorvastatin  40 mg Per G Tube Nightly    [Held by provider] insulin glargine  5 Units SubCUTAneous Nightly    ipratropium-albuterol  1 vial Inhalation 4x Daily    metoprolol tartrate  12.5 mg Per G Tube BID    QUEtiapine  50 mg Per G Tube Nightly    traZODone  100 mg Per G Tube Nightly      Infusions:    sodium chloride      dextrose           Objective:   Vitals: BP (!) 82/47   Pulse 81   Temp 98.5 °F (36.9 °C)   Resp 14   Ht 6' 2\" (1.88 m)   Wt 277 lb 9 oz (125.9 kg)   SpO2 97%   BMI 35.64 kg/m²   General appearance: alert and cooperative with exam  Neck: no JVD or bruit  Thyroid : Normal lobes   Lungs: Has Vesicular Breath sounds has tracheotomy with oxygen  Heart:  Atrial fibrillation   Abdomen: soft, non-tender; bowel sounds normal; no masses,  no organomegaly has PEG tube for feeding  musculoskeletal: Normal  Extremities: extremities normal, , no edema  Neurologic:  Awake, alert, oriented to name, place and time. Cranial nerves II-XII are grossly intact. Motor weakness sensory is intact. ,  and gait is abnormal.  Mostly bed ridden    Assessment:     Patient Active Problem List:     Atrial fibrillation ([facility] CAD (coronary artery disease)     Morbid obesity (HCC)     COPD (chronic obstructive pulmonary disease) (HCC)     Sleep apnea     Type 2 diabetes mellitus (HCC)     Thyroid disease     Hyperlipidemia     Acute congestive heart failure (HCC)     Coronary artery disease involving native coronary artery of native heart with angina pectoris (HCC)     Chronic renal disease, stage III ([facility] [[id number]]     Hypertension     CAD, multiple vessel     Dyspnea     Moderate malnutrition (HCC)     Pneumonia of both lungs due to infectious organism     Severe malnutrition (HCC)     Chronic respiratory failure with hypoxia (HCC)     Aspiration pneumonia of right lower lobe (HCC)     ESRD on dialysis (HCC)     Aspiration pneumonia, unspecified aspiration pneumonia type, unspecified laterality, unspecified part of lung ([facility]     Acute aspiration pneumonia ([facility]      Plan:     Reviewed POC blood glucose . Labs and X ray results   Reviewed Current Medicines   On Correction bolus Humalog/ Basal Lantus Insulin regime /  Monitor Blood glucose frequently   Thyroid function test order antithyroid antibodies also ordered ultrasound but because of tracheotomy ultrasound could not be done  I have increased his dose of Synthroid  Modified  the dose of Insulin/ other medicines as needed   Will follow     .      Burak Gambino MD, MD

## 2023-02-15 NOTE — PROGRESS NOTES
Couple of soft BP readings noted. Tube feed turned off and pt placed in trendelenburg position. APN notified via perfect serve.    Arby Denver, RN

## 2023-02-15 NOTE — PROGRESS NOTES
V2.0  [facility] [place]      Name:  Marilu Finch /Age/Sex: 1950  (68 y.o. male)   MRN & CSN:  [id number] & [id number] Encounter Date/Time: 2/15/2023 7:11 AM EST    Location:  -A PCP: ANKITA Escobedo - [facility] Day: 4    Assessment and Plan:   Marilu Finch is a 68 y.o. male with pmh of chronic trach and PEG, atrial fibrillation on Coumadin, COPD, insulin-dependent diabetes mellitus type 2, chronic GERD, CAD s/p PTCA with HENRY to LAD, hypothyroidism, KHUSHBU who presents from [facility] because of worsening mentation along with worsening cough that is productive in nature with streaks of blood around the phlegm associated with worsening shortness of breath even at rest    # Acute hypoxic respiratory failure in the setting of suspected aspiration pneumonia: Presented with sepsis. Managed as per sepsis protocol. CT scans reviewed from care every where and s/o Rt lobe pneumonia. Pulmonology has been consulted started on Zosyn. Pancultures ordered. Blood and urine cultures no growth till date, respiratory cultures pending, respiratory panel and COVID-negative, continue Zosyn. wean off o2 as tolerated, pulmonology following, plan to do a bronc on  if his blood pressure improves. Obtain chest x-ray. # Hypotension received albumin continue to monitor      # ESRD on HD:  Nephrology consulted. HD as per nephrology. Nephrology recommend HD catheter exchange which was done on  with catheter tip sent for culture. Follow-up on results    # Hyperkalemia resolved after dialysis, nephrology following. # UTI - Dirty Urine. Culture NGTD. Abx as above. # Metabolic and septic Encephalopathy - CT Head - negative for acute process. Management as above. Q 4 neuro check. B12 / folate / ammonia / blood gas looks in acceptable range. # History of chronic trach and PEG: Needs frequent suctioning pulmonology on board.  Nutritionist consult placed for Tube feeding management. # Macrocytic anemia, underlying anemia of chronic disease hemoglobin of 8.7. Ordered B12 folate and iron studies. Goal hb > 7. Will monitor H/H for now. GI following. On protonix 40 IV BID. # Elevated troponin likely demand ischemia. # Insulin-dependent diabetes mellitus type 2: HbA1c 7.0, endocrinology following, continue with sliding scale insulin, to keep blood sugar between 140-180, hypoglycemic protocol and diabetic diet. #  Paroxysmal atrial fibrillation on Coumadin continue telemetry, continue amiodarone, held Coumadin for possible HD catheter change. # Coronary artery disease status post PTCA with HENRY to LAD. # Hypothyroidism on levothyroxine most recent TSH was found to be 9.12. T3 and T4 has been ordered. Resume all medications through G-tube. On levothyroxine. # KHUSHBU   # COPD underlying chronic trach and PEG  # Chronic GERD   # Stasis dermatitis for skin care. Diet Diet NPO  ADULT TUBE FEEDING; PEG; Renal Formula; Continuous; 30; Yes; 15; Q 4 hours; 60; 60; Q 4 hours   DVT Prophylaxis [] Lovenox, []  Heparin, [] SCDs, [] Ambulation,  [] Eliquis, [] Xarelto  [] Coumadin   Code Status Full Code   Disposition From: Nelli owen  Expected Disposition: Nelli owen  Estimated Date of Discharge: 2-3 Days  Patient requires continued admission due to cute hypoxic respiratory failure, and IV abx   Surrogate Decision Maker/ POA Brother     Subjective:     Chief Complaint: No chief complaint on file. Patient seen and examined at bedside. Patient had dialysis this morning, patient on 9 L of O2 through trach no distress. Review of Systems:    Review of Systems    Unable to obtain    Objective:      Intake/Output Summary (Last 24 hours) at 2/15/2023 1323  Last data filed at 2/15/2023 1202  Gross per 24 hour   Intake 500 ml   Output 651 ml   Net -151 ml          Vitals:   Vitals:    02/15/23 1215   BP: (!) 89/49   Pulse: 96   Resp:    Temp:    SpO2:        Physical Exam: General: Afebrile, no distress on 5 L of O2 through trach  Eyes: EOMI  ENT: neck supple, trach in place  Cardiovascular: S1-S2 normal tachycardic  Respiratory: Air entry decreased bilaterally  Gastrointestinal: Soft, non tender, PEG tube in place  Genitourinary: no suprapubic tenderness  Musculoskeletal: Both lower extremities hyperpigmented  Skin: warm, dry hyperpigmented skin of both lower extremities  Neuro: Alert. Psych: Mood appropriate.      Medications:   Medications:    insulin lispro  0-8 Units SubCUTAneous Q4H    lansoprazole  30 mg Oral QAM AC    levothyroxine  100 mcg Per G Tube Daily    sodium chloride flush  5-40 mL IntraVENous 2 times per day    piperacillin-tazobactam  3,375 mg IntraVENous Q12H    amiodarone  200 mg Per G Tube Daily    aspirin  81 mg Per G Tube Daily    atorvastatin  40 mg Per G Tube Nightly    insulin glargine  5 Units SubCUTAneous Nightly    ipratropium-albuterol  1 vial Inhalation 4x Daily    metoprolol tartrate  12.5 mg Per G Tube BID    QUEtiapine  50 mg Per G Tube Nightly    traZODone  100 mg Per G Tube Nightly      Infusions:    sodium chloride      dextrose       PRN Meds: sodium chloride flush, 5-40 mL, PRN  sodium chloride, , PRN  ondansetron, 4 mg, Q8H PRN   Or  ondansetron, 4 mg, Q6H PRN  glucose, 4 tablet, PRN  dextrose bolus, 125 mL, PRN   Or  dextrose bolus, 250 mL, PRN  glucagon (rDNA), 1 mg, PRN  dextrose, , Continuous PRN  labetalol, 10 mg, Q6H PRN  acetaminophen, 650 mg, Q6H PRN   Or  acetaminophen, 650 mg, Q6H PRN  polyethylene glycol, 17 g, Daily PRN      Labs      Recent Results (from the past 24 hour(s))   POCT Glucose    Collection Time: 02/14/23  5:06 PM   Result Value Ref Range    POC Glucose 251 (H) 70 - 99 MG/DL   POCT Glucose    Collection Time: 02/14/23  7:56 PM   Result Value Ref Range    POC Glucose 228 (H) 70 - 99 MG/DL   Protime-INR    Collection Time: 02/15/23  3:15 AM   Result Value Ref Range    Protime 16.8 (H) 11.7 - 14.5 SECONDS    INR 1.30 INDEX   Procalcitonin    Collection Time: 02/15/23  3:15 AM   Result Value Ref Range    Procalcitonin 4.366    Basic Metabolic Panel    Collection Time: 02/15/23  3:15 AM   Result Value Ref Range    Sodium 131 (L) 135 - 145 MMOL/L    Potassium 4.2 3.5 - 5.1 MMOL/L    Chloride 94 (L) 99 - 110 mMol/L    CO2 24 21 - 32 MMOL/L    Anion Gap 13 4 - 16    BUN 64 (H) 6 - 23 MG/DL    Creatinine 4.6 (H) 0.9 - 1.3 MG/DL    Est, Glom Filt Rate 13 (L) >60 mL/min/1.73m2    Glucose 224 (H) 70 - 99 MG/DL    Calcium 8.7 8.3 - 10.6 MG/DL   C-Reactive Protein    Collection Time: 02/15/23  3:15 AM   Result Value Ref Range    CRP High Sensitivity 65.7 (H) <5.0 mg/L   CBC    Collection Time: 02/15/23  3:15 AM   Result Value Ref Range    WBC 6.8 4.0 - 10.5 K/CU MM    RBC 2.21 (L) 4.6 - 6.2 M/CU MM    Hemoglobin 7.9 (L) 13.5 - 18.0 GM/DL    Hematocrit 24.7 (L) 42 - 52 %    .8 (H) 78 - 100 FL    MCH 35.7 (H) 27 - 31 PG    MCHC 32.0 32.0 - 36.0 %    RDW 19.4 (H) 11.7 - 14.9 %    Platelets 835 097 - 659 K/CU MM    MPV 8.8 7.5 - 11.1 FL   Magnesium    Collection Time: 02/15/23  3:15 AM   Result Value Ref Range    Magnesium 2.9 (H) 1.8 - 2.4 mg/dl   Phosphorus    Collection Time: 02/15/23  3:15 AM   Result Value Ref Range    Phosphorus 3.7 2.5 - 4.9 MG/DL   POCT Glucose    Collection Time: 02/15/23  6:28 AM   Result Value Ref Range    POC Glucose 264 (H) 70 - 99 MG/DL   POCT Glucose    Collection Time: 02/15/23  8:09 AM   Result Value Ref Range    POC Glucose 250 (H) 70 - 99 MG/DL        Imaging/Diagnostics Last 24 Hours   No results found.     Electronically signed by Fidencio Bauer MD on 2/15/2023 at 1:23 PM

## 2023-02-15 NOTE — CARE COORDINATION
Contacted Radha Dorado; spoke to Yolie Muniz. Awaiting determination from [facility] on remaining days.  Maria T Iglesias RN

## 2023-02-16 LAB
ANION GAP SERPL CALCULATED.3IONS-SCNC: 12 MMOL/L (ref 4–16)
BASOPHILS ABSOLUTE: 0.1 K/CU MM
BASOPHILS RELATIVE PERCENT: 0.9 % (ref 0–1)
BUN SERPL-MCNC: 42 MG/DL (ref 6–23)
CALCIUM SERPL-MCNC: 8.8 MG/DL (ref 8.3–10.6)
CHLORIDE BLD-SCNC: 96 MMOL/L (ref 99–110)
CO2: 28 MMOL/L (ref 21–32)
CREAT SERPL-MCNC: 3.6 MG/DL (ref 0.9–1.3)
DIFFERENTIAL TYPE: ABNORMAL
EOSINOPHILS ABSOLUTE: 0.3 K/CU MM
EOSINOPHILS RELATIVE PERCENT: 3.7 % (ref 0–3)
GFR SERPL CREATININE-BSD FRML MDRD: 17 ML/MIN/1.73M2
GLUCOSE BLD-MCNC: 227 MG/DL (ref 70–99)
GLUCOSE BLD-MCNC: 228 MG/DL (ref 70–99)
GLUCOSE BLD-MCNC: 235 MG/DL (ref 70–99)
GLUCOSE BLD-MCNC: 235 MG/DL (ref 70–99)
GLUCOSE BLD-MCNC: 253 MG/DL (ref 70–99)
GLUCOSE SERPL-MCNC: 223 MG/DL (ref 70–99)
HCT VFR BLD CALC: 25.8 % (ref 42–52)
HEMOGLOBIN: 8.1 GM/DL (ref 13.5–18)
IMMATURE NEUTROPHIL %: 4 % (ref 0–0.43)
INR BLD: 1.4 INDEX
LYMPHOCYTES ABSOLUTE: 1.8 K/CU MM
LYMPHOCYTES RELATIVE PERCENT: 23.2 % (ref 24–44)
MCH RBC QN AUTO: 35.2 PG (ref 27–31)
MCHC RBC AUTO-ENTMCNC: 31.4 % (ref 32–36)
MCV RBC AUTO: 112.2 FL (ref 78–100)
MONOCYTES ABSOLUTE: 1 K/CU MM
MONOCYTES RELATIVE PERCENT: 12.2 % (ref 0–4)
NUCLEATED RBC %: 2.2 %
PDW BLD-RTO: 19.5 % (ref 11.7–14.9)
PLATELET # BLD: 263 K/CU MM (ref 140–440)
PMV BLD AUTO: 8.6 FL (ref 7.5–11.1)
POTASSIUM SERPL-SCNC: 3.8 MMOL/L (ref 3.5–5.1)
PROTHROMBIN TIME: 18.1 SECONDS (ref 11.7–14.5)
RBC # BLD: 2.3 M/CU MM (ref 4.6–6.2)
SEGMENTED NEUTROPHILS ABSOLUTE COUNT: 4.3 K/CU MM
SEGMENTED NEUTROPHILS RELATIVE PERCENT: 56 % (ref 36–66)
SODIUM BLD-SCNC: 136 MMOL/L (ref 135–145)
TOTAL IMMATURE NEUTOROPHIL: 0.31 K/CU MM
TOTAL NUCLEATED RBC: 0.2 K/CU MM
WBC # BLD: 7.8 K/CU MM (ref 4–10.5)

## 2023-02-16 PROCEDURE — 94640 AIRWAY INHALATION TREATMENT: CPT

## 2023-02-16 PROCEDURE — 6370000000 HC RX 637 (ALT 250 FOR IP): Performed by: INTERNAL MEDICINE

## 2023-02-16 PROCEDURE — 85025 COMPLETE CBC W/AUTO DIFF WBC: CPT

## 2023-02-16 PROCEDURE — 6360000002 HC RX W HCPCS: Performed by: STUDENT IN AN ORGANIZED HEALTH CARE EDUCATION/TRAINING PROGRAM

## 2023-02-16 PROCEDURE — 82962 GLUCOSE BLOOD TEST: CPT

## 2023-02-16 PROCEDURE — 85610 PROTHROMBIN TIME: CPT

## 2023-02-16 PROCEDURE — 94761 N-INVAS EAR/PLS OXIMETRY MLT: CPT

## 2023-02-16 PROCEDURE — 6370000000 HC RX 637 (ALT 250 FOR IP): Performed by: STUDENT IN AN ORGANIZED HEALTH CARE EDUCATION/TRAINING PROGRAM

## 2023-02-16 PROCEDURE — 2060000000 HC ICU INTERMEDIATE R&B

## 2023-02-16 PROCEDURE — 89220 SPUTUM SPECIMEN COLLECTION: CPT

## 2023-02-16 PROCEDURE — 80048 BASIC METABOLIC PNL TOTAL CA: CPT

## 2023-02-16 PROCEDURE — 2700000000 HC OXYGEN THERAPY PER DAY

## 2023-02-16 PROCEDURE — 36415 COLL VENOUS BLD VENIPUNCTURE: CPT

## 2023-02-16 PROCEDURE — 2580000003 HC RX 258: Performed by: STUDENT IN AN ORGANIZED HEALTH CARE EDUCATION/TRAINING PROGRAM

## 2023-02-16 RX ORDER — INSULIN GLARGINE 100 [IU]/ML
20 INJECTION, SOLUTION SUBCUTANEOUS NIGHTLY
Status: DISCONTINUED | OUTPATIENT
Start: 2023-02-16 | End: 2023-02-17

## 2023-02-16 RX ORDER — INSULIN LISPRO 100 [IU]/ML
0-16 INJECTION, SOLUTION INTRAVENOUS; SUBCUTANEOUS EVERY 4 HOURS
Status: DISCONTINUED | OUTPATIENT
Start: 2023-02-16 | End: 2023-02-17

## 2023-02-16 RX ADMIN — INSULIN LISPRO 4 UNITS: 100 INJECTION, SOLUTION INTRAVENOUS; SUBCUTANEOUS at 12:49

## 2023-02-16 RX ADMIN — INSULIN LISPRO 2 UNITS: 100 INJECTION, SOLUTION INTRAVENOUS; SUBCUTANEOUS at 01:56

## 2023-02-16 RX ADMIN — TRAZODONE HYDROCHLORIDE 100 MG: 50 TABLET ORAL at 20:40

## 2023-02-16 RX ADMIN — IPRATROPIUM BROMIDE AND ALBUTEROL SULFATE 3 ML: 2.5; .5 SOLUTION RESPIRATORY (INHALATION) at 07:12

## 2023-02-16 RX ADMIN — SODIUM CHLORIDE, PRESERVATIVE FREE 10 ML: 5 INJECTION INTRAVENOUS at 20:59

## 2023-02-16 RX ADMIN — INSULIN LISPRO 2 UNITS: 100 INJECTION, SOLUTION INTRAVENOUS; SUBCUTANEOUS at 05:51

## 2023-02-16 RX ADMIN — INSULIN LISPRO 9 UNITS: 100 INJECTION, SOLUTION INTRAVENOUS; SUBCUTANEOUS at 18:06

## 2023-02-16 RX ADMIN — LEVOTHYROXINE SODIUM 100 MCG: 0.1 TABLET ORAL at 05:45

## 2023-02-16 RX ADMIN — IPRATROPIUM BROMIDE AND ALBUTEROL SULFATE 3 ML: 2.5; .5 SOLUTION RESPIRATORY (INHALATION) at 20:33

## 2023-02-16 RX ADMIN — IPRATROPIUM BROMIDE AND ALBUTEROL SULFATE 3 ML: 2.5; .5 SOLUTION RESPIRATORY (INHALATION) at 15:09

## 2023-02-16 RX ADMIN — SODIUM CHLORIDE 25 ML: 9 INJECTION, SOLUTION INTRAVENOUS at 05:49

## 2023-02-16 RX ADMIN — PIPERACILLIN AND TAZOBACTAM 3375 MG: 3; .375 INJECTION, POWDER, LYOPHILIZED, FOR SOLUTION INTRAVENOUS at 05:50

## 2023-02-16 RX ADMIN — QUETIAPINE FUMARATE 50 MG: 25 TABLET ORAL at 20:40

## 2023-02-16 RX ADMIN — PIPERACILLIN AND TAZOBACTAM 3375 MG: 3; .375 INJECTION, POWDER, LYOPHILIZED, FOR SOLUTION INTRAVENOUS at 18:11

## 2023-02-16 RX ADMIN — INSULIN LISPRO 4 UNITS: 100 INJECTION, SOLUTION INTRAVENOUS; SUBCUTANEOUS at 20:41

## 2023-02-16 RX ADMIN — METOPROLOL TARTRATE 12.5 MG: 25 TABLET, FILM COATED ORAL at 20:40

## 2023-02-16 RX ADMIN — INSULIN GLARGINE 20 UNITS: 100 INJECTION, SOLUTION SUBCUTANEOUS at 20:41

## 2023-02-16 RX ADMIN — LANSOPRAZOLE 30 MG: KIT at 05:45

## 2023-02-16 RX ADMIN — ATORVASTATIN CALCIUM 40 MG: 40 TABLET, FILM COATED ORAL at 20:40

## 2023-02-16 NOTE — PROGRESS NOTES
Progress Note( Dr. Ginger Pedersen)  2/16/2023  Subjective:   Admit Date: 2/12/2023  PCP: ANKITA Monte CNP    Admitted For : Altered mental state and worsening of the cough and shortness of breath       Consulted For:  hyothyroid / DM     Interval History: Patient more awake and alert this morning    Denies any chest pains,   Denies SOB . Chest tracheotomy due to oxygen  Denies nausea or vomiting. PEG tube tube feeding is going on  No new bowel or bladder symptoms. Intake/Output Summary (Last 24 hours) at 2/16/2023 0840  Last data filed at 2/16/2023 0524  Gross per 24 hour   Intake 2115 ml   Output 576 ml   Net 1539 ml         DATA    CBC:   Recent Labs     02/14/23  0610 02/15/23  0315 02/16/23  0432   WBC 6.2 6.8 7.8   HGB 8.2* 7.9* 8.1*    264 263      CMP:  Recent Labs     02/14/23  0610 02/15/23  0315 02/16/23  0432   * 131* 136   K 4.3 4.2 3.8   CL 94* 94* 96*   CO2 24 24 28   BUN 51* 64* 42*   CREATININE 3.8* 4.6* 3.6*   CALCIUM 8.6 8.7 8.8       Lipids:   Lab Results   Component Value Date/Time    CHOL 152 04/14/2021 10:02 AM    CHOL 139 07/23/2018 08:01 AM    HDL 43 08/26/2022 10:39 AM    TRIG 212 04/14/2021 10:02 AM     Glucose:  Recent Labs     02/15/23  2014 02/16/23  0153 02/16/23  0527   POCGLU 234* 227* 228*       [id number]:  Lab Results   Component Value Date/Time    LABA1C 7.0 02/12/2023 10:53 PM     High Sensitivity TSH:   Lab Results   Component Value Date/Time    TSHHS 6.190 02/13/2023 04:54 AM     Free T3:   Lab Results   Component Value Date/Time    FT3 1.6 02/12/2023 10:53 PM     Free T4:  Lab Results   Component Value Date/Time    T4FREE 0.78 02/12/2023 10:53 PM       XR CHEST PORTABLE   Preliminary Result   Decreasing bilateral pleural effusions with improving mild central pulmonary   edema. Stable cardiomegaly. Stable lines and tubes. CT CERVICAL SPINE WO CONTRAST   Final Result   No change from prior examination.   Prominent anterior osteophytes from C3-C4   to the C6-C7 discs. These are sufficient size to cause swallowing   difficulties. IR TUNNELED CVC PLACE WO SQ PORT/PUMP > 5 YEARS   Final Result   Removal of previously placed tunneled dialysis access catheter with tip sent   for culture per prior order. Successful ultrasound and fluoroscopy guided new Port-A-Cath/tunneled   dialysis access catheter placement via right external jugular venous approach   with tip in the mid right atrium. XR CHEST PORTABLE   Final Result   Unchanged large right consolidation and interval presence of left airspace   disease. Possible bilateral pleural effusions.               Scheduled Medicines   Medications:    insulin NPH  10 Units SubCUTAneous Once    insulin glargine  20 Units SubCUTAneous Nightly    insulin lispro  0-16 Units SubCUTAneous Q4H    midodrine  5 mg Oral TID WC    lansoprazole  30 mg Oral QAM AC    levothyroxine  100 mcg Per G Tube Daily    sodium chloride flush  5-40 mL IntraVENous 2 times per day    piperacillin-tazobactam  3,375 mg IntraVENous Q12H    amiodarone  200 mg Per G Tube Daily    aspirin  81 mg Per G Tube Daily    atorvastatin  40 mg Per G Tube Nightly    ipratropium-albuterol  1 vial Inhalation 4x Daily    metoprolol tartrate  12.5 mg Per G Tube BID    QUEtiapine  50 mg Per G Tube Nightly    traZODone  100 mg Per G Tube Nightly      Infusions:    sodium chloride 25 mL (02/16/23 0549)    dextrose           Objective:   Vitals: /65   Pulse 98   Temp 97.4 °F (36.3 °C) (Oral)   Resp 17   Ht 6' 2\" (1.88 m)   Wt 278 lb 7.1 oz (126.3 kg)   SpO2 95%   BMI 35.75 kg/m²   General appearance: alert and cooperative with exam  Neck: no JVD or bruit  Thyroid : Normal lobes   Lungs: Has Vesicular Breath sounds has tracheotomy with oxygen  Heart:  Atrial fibrillation   Abdomen: soft, non-tender; bowel sounds normal; no masses,  no organomegaly has PEG tube for feeding  musculoskeletal: Normal  Extremities: extremities normal, , no edema  Neurologic:  Awake, alert, oriented to name, place and time. Cranial nerves II-XII are grossly intact. Motor weakness sensory is intact. ,  and gait is abnormal.  Mostly bed ridden    Assessment:     Patient Active Problem List:     Atrial fibrillation ([facility]     CAD (coronary artery disease)     Morbid obesity (HCC)     COPD (chronic obstructive pulmonary disease) ([facility]     Sleep apnea     Type 2 diabetes mellitus ([facility]     Thyroid disease     Hyperlipidemia     Acute congestive heart failure ([facility]     Coronary artery disease involving native coronary artery of native heart with angina pectoris (HCC)     Chronic renal disease, stage III ([facility] [[id number]]     Hypertension     CAD, multiple vessel     Dyspnea     Moderate malnutrition (HCC)     Pneumonia of both lungs due to infectious organism     Severe malnutrition (HCC)     Chronic respiratory failure with hypoxia (HCC)     Aspiration pneumonia of right lower lobe (HCC)     ESRD on dialysis ([facility]     Aspiration pneumonia, unspecified aspiration pneumonia type, unspecified laterality, unspecified part of lung ([facility]     Acute aspiration pneumonia ([facility]      Plan:     Reviewed POC blood glucose . Labs and X ray results   Reviewed Current Medicines   On Correction bolus Humalog/ Basal Lantus Insulin regime /  Monitor Blood glucose frequently   Thyroid function test order antithyroid antibodies also ordered ultrasound but because of tracheotomy ultrasound could not be done  I have increased his dose of Synthroid  Modified  the dose of Insulin/ other medicines as needed   Will follow     .      Jossie Andujar MD, MD

## 2023-02-16 NOTE — PROGRESS NOTES
Nephrology Progress Note  2/16/2023 8:58 AM  Subjective: Interval History: Ulises Foreman is a 68 y.o. male  doing better today and bronch today which he states he ready had but not see noted that yet. Dialysis tomorrow    Data:   Scheduled Meds:   insulin NPH  10 Units SubCUTAneous Once    insulin glargine  20 Units SubCUTAneous Nightly    insulin lispro  0-16 Units SubCUTAneous Q4H    midodrine  5 mg Oral TID WC    lansoprazole  30 mg Oral QAM AC    levothyroxine  100 mcg Per G Tube Daily    sodium chloride flush  5-40 mL IntraVENous 2 times per day    piperacillin-tazobactam  3,375 mg IntraVENous Q12H    amiodarone  200 mg Per G Tube Daily    aspirin  81 mg Per G Tube Daily    atorvastatin  40 mg Per G Tube Nightly    ipratropium-albuterol  1 vial Inhalation 4x Daily    metoprolol tartrate  12.5 mg Per G Tube BID    QUEtiapine  50 mg Per G Tube Nightly    traZODone  100 mg Per G Tube Nightly     Continuous Infusions:   sodium chloride 25 mL (02/16/23 0549)    dextrose           CBC   Recent Labs     02/14/23  0610 02/15/23  0315 02/16/23  0432   WBC 6.2 6.8 7.8   HGB 8.2* 7.9* 8.1*   HCT 24.1* 24.7* 25.8*    264 263      BMP   Recent Labs     02/14/23  0610 02/15/23  0315 02/16/23  0432   * 131* 136   K 4.3 4.2 3.8   CL 94* 94* 96*   CO2 24 24 28   PHOS 2.1* 3.7  --    BUN 51* 64* 42*   CREATININE 3.8* 4.6* 3.6*     Hepatic: No results for input(s): AST, ALT, ALB, BILITOT, ALKPHOS in the last 72 hours. Troponin: No results for input(s): TROPONINI in the last 72 hours. BNP: No results for input(s): BNP in the last 72 hours. Lipids: No results for input(s): CHOL, HDL in the last 72 hours.     Invalid input(s): LDLCALCU  ABGs:   Lab Results   Component Value Date/Time    PO2ART 70.3 09/24/2022 11:49 AM    RXD0GWP 35.5 09/24/2022 11:49 AM     INR:   Recent Labs     02/14/23  0610 02/15/23  0315 02/16/23  0432   INR 1.51 1.30 1.40     Renal Labs  Albumin:    Lab Results   Component Value Date/Time    LABALBU 3.5 12/31/2022 05:28 AM     Calcium:    Lab Results   Component Value Date/Time    CALCIUM 8.8 02/16/2023 04:32 AM     Phosphorus:    Lab Results   Component Value Date/Time    PHOS 3.7 02/15/2023 03:15 AM     U/A:    Lab Results   Component Value Date/Time    NITRU NEGATIVE 02/13/2023 12:15 AM    COLORU YELLOW 02/13/2023 12:15 AM    PHUR 6.5 06/30/2021 02:11 PM    WBCUA 67 02/13/2023 12:15 AM    RBCUA 37 02/13/2023 12:15 AM    MUCUS RARE 09/17/2022 06:21 PM    TRICHOMONAS NONE SEEN 02/13/2023 12:15 AM    BACTERIA NEGATIVE 02/13/2023 12:15 AM    CLARITYU CLEAR 02/13/2023 12:15 AM    SPECGRAV 1.025 02/13/2023 12:15 AM    UROBILINOGEN 0.2 02/13/2023 12:15 AM    BILIRUBINUR SMALL NUMBER OR AMOUNT OBSERVED 02/13/2023 12:15 AM    BILIRUBINUR small 06/30/2021 02:11 PM    BLOODU MODERATE NUMBER OR AMOUNT OBSERVED 02/13/2023 12:15 AM    GLUCOSEU neg 06/30/2021 02:11 PM    KETUA TRACE 02/13/2023 12:15 AM     ABG:    Lab Results   Component Value Date/Time    UYK8BYV 35.5 09/24/2022 11:49 AM    PO2ART 70.3 09/24/2022 11:49 AM    FPQ8FQP 21.2 09/24/2022 11:49 AM     HgBA1c:    Lab Results   Component Value Date/Time    LABA1C 7.0 02/12/2023 10:53 PM     Microalbumen/Creatinine ratio:  No components found for: RUCREAT  TSH:    Lab Results   Component Value Date/Time    TSH 1.66 04/03/2018 11:33 AM     IRON:    Lab Results   Component Value Date/Time    IRON 60 02/13/2023 10:39 AM     Iron Saturation:  No components found for: PERCENTFE  TIBC:    Lab Results   Component Value Date/Time    TIBC 229 02/13/2023 10:39 AM     FERRITIN:    Lab Results   Component Value Date/Time    FERRITIN 1,063 02/13/2023 10:39 AM     RPR:  No results found for: RPR  YAYA:  No results found for: ANATITER, YAYA  24 Hour Urine for Creatinine Clearance:  No components found for: CREAT4, UHRS10, UTV10      Objective:   I/O: 02/15 0701 - 02/16 0700  In: 2115   Out: [address] [place] [address] [Urine:75]  I/O last 3 completed shifts:   In: 2115 [NG/GT:1615]  Out: 726 [Urine:225]  No intake/output data recorded. Vitals: /65   Pulse 98   Temp 97.4 °F (36.3 °C) (Oral)   Resp 17   Ht 6' 2\" (1.88 m)   Wt 278 lb 7.1 oz (126.3 kg)   SpO2 95%   BMI 35.75 kg/m²  {  General appearance: awake weak  HEENT: Head: Normal, normocephalic, atraumatic. Neck: Positive trach  Lungs: diminished breath sounds bilaterally  Heart: S1, S2 normal  Abdomen: abnormal findings:  soft nt positive PEG tube  Extremities: edema trace positive HD line right-sided chest   Neurologic: Mental status: alertness: alert     Impression:       No change from prior examination. Prominent anterior osteophytes from C3-C4   to the C6-C7 discs. These are sufficient size to cause swallowing   difficulties. Assessment and Plan:      IMP:  #1 positive trach with possible aspiration pneumonia with increased secretion  #2 protein malnutrition feeding tube  #3 hyperkalemia with end-stage renal disease Monday Wednesday Friday  #4 type 2 diabetes  #5 paroxysmal A-fib   6.   Anterior osteophytes C3-C4 to C6-C7   #7 anemia   #8 hypotension      Plan      #1 plan on bronc today monitor secretions   #2 maintain on tube feeds   #3  next dialysis Friday has new tunneled HD catheter prior catheter site was negative for infection potassium stable dialysis   #4 monitor glucose control   #5 heart rate stable   #6 May need to follow-up with neurosurgery as outpatient for options related to osteophytes   #7 maintain on EPO PRBC if hemoglobin drops less than 7   #8 blood pressure improved with midodrine   monitor with supportive care we will follow         Arsh Rao MD, MD

## 2023-02-16 NOTE — PROGRESS NOTES
Progress Note( Dr. Ginger Pedersen)    Subjective:   Admit Date: 2/12/2023    PCP: ANKITA Monte CNP    Saw the patient on 2/14/2023 but the made the note later    Admitted For : Altered mental state and worsening of the cough and shortness of breath       Consulted For:  hyothyroid / DM     Interval History: Patient more awake and alert this morning    Denies any chest pains,   Denies SOB . Chest tracheotomy due to oxygen  Denies nausea or vomiting. PEG tube tube feeding is going on  No new bowel or bladder symptoms. Intake/Output Summary (Last 24 hours) at 2/15/2023 2031  Last data filed at 2/15/2023 1202  Gross per 24 hour   Intake 500 ml   Output 651 ml   Net -151 ml         DATA    CBC:   Recent Labs     02/13/23  0454 02/14/23  0610 02/15/23  0315   WBC 7.1 6.2 6.8   HGB 8.0* 8.2* 7.9*    281 264      CMP:  Recent Labs     02/13/23  1821 02/14/23  0610 02/15/23  0315   * 131* 131*   K 4.6 4.3 4.2   CL 94* 94* 94*   CO2 23 24 24   BUN 44* 51* 64*   CREATININE 3.2* 3.8* 4.6*   CALCIUM 8.4 8.6 8.7       Lipids:   Lab Results   Component Value Date/Time    CHOL 152 04/14/2021 10:02 AM    CHOL 139 07/23/2018 08:01 AM    HDL 43 08/26/2022 10:39 AM    TRIG 212 04/14/2021 10:02 AM     Glucose:  Recent Labs     02/15/23  1339 02/15/23  1611 02/15/23  2014   POCGLU 211* 256* 234*       [id number]:  Lab Results   Component Value Date/Time    LABA1C 7.0 02/12/2023 10:53 PM     High Sensitivity TSH:   Lab Results   Component Value Date/Time    TSHHS 6.190 02/13/2023 04:54 AM     Free T3:   Lab Results   Component Value Date/Time    FT3 1.6 02/12/2023 10:53 PM     Free T4:  Lab Results   Component Value Date/Time    T4FREE 0.78 02/12/2023 10:53 PM       XR CHEST PORTABLE   Preliminary Result   Decreasing bilateral pleural effusions with improving mild central pulmonary   edema. Stable cardiomegaly. Stable lines and tubes.          CT CERVICAL SPINE WO CONTRAST   Final Result   No change from prior examination. Prominent anterior osteophytes from C3-C4   to the C6-C7 discs. These are sufficient size to cause swallowing   difficulties. IR TUNNELED CVC PLACE WO SQ PORT/PUMP > 5 YEARS   Final Result   Removal of previously placed tunneled dialysis access catheter with tip sent   for culture per prior order. Successful ultrasound and fluoroscopy guided new Port-A-Cath/tunneled   dialysis access catheter placement via right external jugular venous approach   with tip in the mid right atrium. XR CHEST PORTABLE   Final Result   Unchanged large right consolidation and interval presence of left airspace   disease. Possible bilateral pleural effusions.               Scheduled Medicines   Medications:    midodrine  5 mg Oral TID WC    insulin lispro  0-8 Units SubCUTAneous Q4H    lansoprazole  30 mg Oral QAM AC    levothyroxine  100 mcg Per G Tube Daily    sodium chloride flush  5-40 mL IntraVENous 2 times per day    piperacillin-tazobactam  3,375 mg IntraVENous Q12H    amiodarone  200 mg Per G Tube Daily    aspirin  81 mg Per G Tube Daily    atorvastatin  40 mg Per G Tube Nightly    insulin glargine  5 Units SubCUTAneous Nightly    ipratropium-albuterol  1 vial Inhalation 4x Daily    metoprolol tartrate  12.5 mg Per G Tube BID    QUEtiapine  50 mg Per G Tube Nightly    traZODone  100 mg Per G Tube Nightly      Infusions:    sodium chloride      dextrose           Objective:   Vitals: BP (!) 108/51   Pulse 94   Temp 97.8 °F (36.6 °C) (Oral)   Resp 18   Ht 6' 2\" (1.88 m)   Wt 273 lb (123.8 kg)   SpO2 94%   BMI 35.05 kg/m²   General appearance: alert and cooperative with exam  Neck: no JVD or bruit  Thyroid : Normal lobes   Lungs: Has Vesicular Breath sounds has tracheotomy with oxygen  Heart:  Atrial fibrillation   Abdomen: soft, non-tender; bowel sounds normal; no masses,  no organomegaly has PEG tube for feeding  musculoskeletal: Normal  Extremities: extremities normal, , no edema  Neurologic:  Awake, alert, oriented to name, place and time. Cranial nerves II-XII are grossly intact. Motor weakness sensory is intact. ,  and gait is abnormal.  Mostly bed ridden    Assessment:     Patient Active Problem List:     Atrial fibrillation ([facility]     CAD (coronary artery disease)     Morbid obesity (HCC)     COPD (chronic obstructive pulmonary disease) ([facility]     Sleep apnea     Type 2 diabetes mellitus ([facility]     Thyroid disease     Hyperlipidemia     Acute congestive heart failure ([facility]     Coronary artery disease involving native coronary artery of native heart with angina pectoris (HCC)     Chronic renal disease, stage III ([facility] [838697]     Hypertension     CAD, multiple vessel     Dyspnea     Moderate malnutrition (HCC)     Pneumonia of both lungs due to infectious organism     Severe malnutrition (HCC)     Chronic respiratory failure with hypoxia (HCC)     Aspiration pneumonia of right lower lobe (HCC)     ESRD on dialysis ([facility]     Aspiration pneumonia, unspecified aspiration pneumonia type, unspecified laterality, unspecified part of lung ([facility]     Acute aspiration pneumonia ([facility]      Plan:     Reviewed POC blood glucose . Labs and X ray results   Reviewed Current Medicines   On Correction bolus Humalog/ Basal Lantus Insulin regime /  Monitor Blood glucose frequently   Modified  the dose of Insulin/ other medicines as needed   Thyroid function test order antithyroid antibodies also ordered ultrasound but because of tracheotomy ultrasound could not be done  I have increased his dose of Synthroid  Will follow     .      Eduard Ye MD, MD

## 2023-02-16 NOTE — PROGRESS NOTES
V2.0  [facility] Hospitalist Progress Note      Name:  Sveta Meier /Age/Sex: 1950  (68 y.o. male)   MRN & CSN:  [id number] & [id number] Encounter Date/Time: 2023 7:11 AM EST    Location:  -A PCP: Judy Roe, APRN - [id number] Day: 5    Assessment and Plan:   Sveta Meier is a 68 y.o. male with pmh of chronic trach and PEG, atrial fibrillation on Coumadin, COPD, insulin-dependent diabetes mellitus type 2, chronic GERD, CAD s/p PTCA with HENRY to LAD, hypothyroidism, KHUSHBU who presents from [facility] because of worsening mentation along with worsening cough that is productive in nature with streaks of blood around the phlegm associated with worsening shortness of breath even at rest    # Acute hypoxic respiratory failure in the setting of suspected aspiration pneumonia: Presented with sepsis. Managed as per sepsis protocol. CT scans reviewed from care every where and s/o Rt lobe pneumonia. Pulmonology has been consulted started on Zosyn. Pancultures ordered. Blood and urine cultures no growth till date, respiratory cultures pending, respiratory panel and COVID-negative, continue Zosyn. wean off o2 as tolerated, pulmonology following, plan to do a bronc on . But patient refused to do bronc per pulm, patient still requiring 6 L of O2, called patient's brother Wan Dodge to update about patient's condition and also about refusal of bronc at [phone] and left a voicemail. Will try again later. # Hypotension resolved continue to monitor      # ESRD on HD:  Nephrology consulted. HD as per nephrology. Nephrology recommend HD catheter exchange which was done on  with catheter tip sent for culture,  No growth at 36 hours. # Hyperkalemia resolved after dialysis, nephrology following. # UTI - Dirty Urine. Culture NGTD. Abx as above. # Metabolic and septic Encephalopathy - CT Head - negative for acute process. Management as above. Q 4 neuro check.   B12 / folate / ammonia / blood gas looks in acceptable range. # History of chronic trach and PEG: Needs frequent suctioning pulmonology on board. Nutritionist following. # Macrocytic anemia, underlying anemia of chronic disease hemoglobin of 8.7. Ordered B12 folate and iron studies. Goal hb > 7. Will monitor H/H for now. GI following. On protonix 40 IV BID. # Elevated troponin likely demand ischemia. # Insulin-dependent diabetes mellitus type 2: HbA1c 7.0, endocrinology following, increase Lantus to 20 units, NPH 10 units diet, continue with sliding scale insulin, to keep blood sugar between 140-180, hypoglycemic protocol and diabetic diet. #  Paroxysmal atrial fibrillation on Coumadin continue telemetry, continue amiodarone, held Coumadin for possible HD catheter change. # Coronary artery disease status post PTCA with HENRY to LAD. # Hypothyroidism on levothyroxine most recent TSH was found to be 9.12. T3 and T4 has been ordered. Resume all medications through G-tube. On levothyroxine. # KHUSHBU   # COPD underlying chronic trach and PEG  # Chronic GERD   # Stasis dermatitis for skin care. Diet Diet NPO  ADULT TUBE FEEDING; PEG; Renal Formula; Continuous; 30; Yes; 15; Q 4 hours; 60; 60; Q 4 hours   DVT Prophylaxis [] Lovenox, []  Heparin, [] SCDs, [] Ambulation,  [] Eliquis, [] Xarelto  [] Coumadin   Code Status Full Code   Disposition From: [facility] view  Expected Disposition: [facility] view  Estimated Date of Discharge: TBD  Patient requires continued admission due to cute hypoxic respiratory failure, and IV abx bronchoscopy on 2/16   Surrogate Decision Maker/ POA Brother     Subjective:     Chief Complaint: No chief complaint on file. Patient seen and examined at bedside. Lying down in bed in no discomfort states she is okay requiring 6 L of O2 by trach mask looks more alert today           Review of Systems:    Review of Systems    Unable to obtain    Objective:      Intake/Output Summary (Last 24 hours) at 2/16/2023 1018  Last data filed at 2/16/2023 0524  Gross per 24 hour   Intake 2115 ml   Output 576 ml   Net 1539 ml          Vitals:   Vitals:    02/16/23 0907   BP: (!) 149/86   Pulse: (!) 107   Resp: 17   Temp: 97 °F (36.1 °C)   SpO2: 98%       Physical Exam:     General: Afebrile, no distress on 6 L of O2 through trach  Eyes: EOMI  ENT: neck supple, trach in place  Cardiovascular: S1-S2 normal tachycardic  Respiratory: Air entry decreased bilaterally  Gastrointestinal: Soft, non tender, PEG tube in place  Genitourinary: no suprapubic tenderness  Musculoskeletal: Both lower extremities hyperpigmented  Skin: warm, dry hyperpigmented skin of both lower extremities  Neuro: Alert. Psych: Mood appropriate.      Medications:   Medications:    insulin NPH  10 Units SubCUTAneous Once    insulin glargine  20 Units SubCUTAneous Nightly    insulin lispro  0-16 Units SubCUTAneous Q4H    midodrine  5 mg Oral TID WC    lansoprazole  30 mg Oral QAM AC    levothyroxine  100 mcg Per G Tube Daily    sodium chloride flush  5-40 mL IntraVENous 2 times per day    piperacillin-tazobactam  3,375 mg IntraVENous Q12H    amiodarone  200 mg Per G Tube Daily    aspirin  81 mg Per G Tube Daily    atorvastatin  40 mg Per G Tube Nightly    ipratropium-albuterol  1 vial Inhalation 4x Daily    metoprolol tartrate  12.5 mg Per G Tube BID    QUEtiapine  50 mg Per G Tube Nightly    traZODone  100 mg Per G Tube Nightly      Infusions:    sodium chloride 25 mL (02/16/23 0549)    dextrose       PRN Meds: polyvinyl alcohol, 2 drop, 4x Daily PRN  sodium chloride flush, 5-40 mL, PRN  sodium chloride, , PRN  ondansetron, 4 mg, Q8H PRN   Or  ondansetron, 4 mg, Q6H PRN  glucose, 4 tablet, PRN  dextrose bolus, 125 mL, PRN   Or  dextrose bolus, 250 mL, PRN  glucagon (rDNA), 1 mg, PRN  dextrose, , Continuous PRN  labetalol, 10 mg, Q6H PRN  acetaminophen, 650 mg, Q6H PRN   Or  acetaminophen, 650 mg, Q6H PRN  polyethylene glycol, 17 g, Daily PRN      Labs Recent Results (from the past 24 hour(s))   POCT Glucose    Collection Time: 02/15/23  1:39 PM   Result Value Ref Range    POC Glucose 211 (H) 70 - 99 MG/DL   POCT Glucose    Collection Time: 02/15/23  4:11 PM   Result Value Ref Range    POC Glucose 256 (H) 70 - 99 MG/DL   POCT Glucose    Collection Time: 02/15/23  8:14 PM   Result Value Ref Range    POC Glucose 234 (H) 70 - 99 MG/DL   POCT Glucose    Collection Time: 02/16/23  1:53 AM   Result Value Ref Range    POC Glucose 227 (H) 70 - 99 MG/DL   Protime-INR    Collection Time: 02/16/23  4:32 AM   Result Value Ref Range    Protime 18.1 (H) 11.7 - 14.5 SECONDS    INR 1.40 INDEX   CBC with Auto Differential    Collection Time: 02/16/23  4:32 AM   Result Value Ref Range    WBC 7.8 4.0 - 10.5 K/CU MM    RBC 2.30 (L) 4.6 - 6.2 M/CU MM    Hemoglobin 8.1 (L) 13.5 - 18.0 GM/DL    Hematocrit 25.8 (L) 42 - 52 %    .2 (H) 78 - 100 FL    MCH 35.2 (H) 27 - 31 PG    MCHC 31.4 (L) 32.0 - 36.0 %    RDW 19.5 (H) 11.7 - 14.9 %    Platelets 387 193 - 531 K/CU MM    MPV 8.6 7.5 - 11.1 FL    Differential Type AUTOMATED DIFFERENTIAL     Segs Relative 56.0 36 - 66 %    Lymphocytes % 23.2 (L) 24 - 44 %    Monocytes % 12.2 (H) 0 - 4 %    Eosinophils % 3.7 (H) 0 - 3 %    Basophils % 0.9 0 - 1 %    Segs Absolute 4.3 K/CU MM    Lymphocytes Absolute 1.8 K/CU MM    Monocytes Absolute 1.0 K/CU MM    Eosinophils Absolute 0.3 K/CU MM    Basophils Absolute 0.1 K/CU MM    Nucleated RBC % 2.2 %    Total Nucleated RBC 0.2 K/CU MM    Total Immature Neutrophil 0.31 K/CU MM    Immature Neutrophil % 4.0 (H) 0 - 0.43 %   Basic Metabolic Panel w/ Reflex to MG    Collection Time: 02/16/23  4:32 AM   Result Value Ref Range    Sodium 136 135 - 145 MMOL/L    Potassium 3.8 3.5 - 5.1 MMOL/L    Chloride 96 (L) 99 - 110 mMol/L    CO2 28 21 - 32 MMOL/L    Anion Gap 12 4 - 16    BUN 42 (H) 6 - 23 MG/DL    Creatinine 3.6 (H) 0.9 - 1.3 MG/DL    Est, Glom Filt Rate 17 (L) >60 mL/min/1.73m2    Glucose 223 (H) 70 - 99 MG/DL    Calcium 8.8 8.3 - 10.6 MG/DL   POCT Glucose    Collection Time: 02/16/23  5:27 AM   Result Value Ref Range    POC Glucose 228 (H) 70 - 99 MG/DL        Imaging/Diagnostics Last 24 Hours   No results found.     Electronically signed by Jojo Olea MD on 2/16/2023 at 10:18 AM

## 2023-02-16 NOTE — PROGRESS NOTES
Dr Fercho Holman notified: Just spoke with pt, he is now stating he doesn't want bronch because he is afraid he will throw up during procedure. Explained to pt that he will be sedated during procedure but he still insisted he doesn't want procedure because he \"might throw up. \"  Pt has lots of secretions and gurgling, but refusing suction.

## 2023-02-16 NOTE — PROGRESS NOTES
pulmonary      SUBJECTIVE:  remains on o2     OBJECTIVE    VITALS:  BP (!) 149/86   Pulse (!) 107   Temp 97 °F (36.1 °C) (Oral)   Resp 17   Ht 6' 2\" (1.88 m)   Wt 278 lb 7.1 oz (126.3 kg)   SpO2 98%   BMI 35.75 kg/m²   HEAD AND FACE EXAM:  No throat injection, no active exudate,no thrush  NECK EXAM;No JVD, no masses, symmetrical  CHEST EXAM; Expansion equal and symmetrical, no masses  LUNG EXAM; Good breath sounds bilaterally. There are expiratory wheezes both lungs, there are crackles at both lung bases  CARDIOVASCULAR EXAM: Positive S1 and S2, no S3 or S4, no clicks ,no murmurs  RIGHT AND LEFT LOWER EXTRIMITY EXAM: No edema, no swelling, no inflamation  CNS EXAM: Alert and oriented X3          LABS   Lab Results   Component Value Date    WBC 7.8 02/16/2023    HGB 8.1 (L) 02/16/2023    HCT 25.8 (L) 02/16/2023    .2 (H) 02/16/2023     02/16/2023     Lab Results   Component Value Date    CREATININE 3.6 (H) 02/16/2023    BUN 42 (H) 02/16/2023     02/16/2023    K 3.8 02/16/2023    CL 96 (L) 02/16/2023    CO2 28 02/16/2023     Lab Results   Component Value Date    INR 1.40 02/16/2023    PROTIME 18.1 (H) 02/16/2023          Lab Results   Component Value Date/Time    PHOS 3.7 02/15/2023 03:15 AM    PHOS 2.1 02/14/2023 06:10 AM    PHOS 3.5 02/13/2023 04:54 AM      No results for input(s): PH, PO2ART, [id number], HCO3, BEART, O2SAT in the last 72 hours. Wt Readings from Last 3 Encounters:   02/16/23 278 lb 7.1 oz (126.3 kg)   02/08/23 260 lb (117.9 kg)   02/08/23 260 lb (117.9 kg)        EXAMINATION:   ONE XRAY VIEW OF THE CHEST       2/15/2023 6:05 pm       COMPARISON:   02/13/2023       HISTORY:   ORDERING SYSTEM PROVIDED HISTORY: fu pneumonia   TECHNOLOGIST PROVIDED HISTORY:   Reason for exam:->fu pneumonia   Reason for Exam: fu pneumonia   Additional signs and symptoms: na   Relevant Medical/Surgical History: CAD, COPD, hypertension       FINDINGS:   Tracheostomy tube is stable.   Central line is stable. Stable cardiomegaly   with median sternotomy wires. Decreased size of the bilateral pleural effusions. No pneumothorax. No new   airspace disease. Mild central pulmonary edema. Impression   Decreasing bilateral pleural effusions with improving mild central pulmonary   edema. Stable cardiomegaly. Stable lines and tubes. ASSESMENT  Ac on ch resp failure  Asp pneumonia  copd        PLAN  Antibx  Bd rx  O2 adm  Bronch was planned for 1130 today but pt refused.  D/w pt in detail why we need to do it but he declined    2/16/2023  Sally Nickerson MD, M.D.

## 2023-02-16 NOTE — PROGRESS NOTES
Comprehensive Nutrition Assessment    Type and Reason for Visit:  Reassess    Nutrition Recommendations/Plan:   Continue current EN as ordered     Malnutrition Assessment:  Malnutrition Status: At risk for malnutrition (Comment) (02/13/23 1107)    Context:  Chronic Illness       Nutrition Assessment:    Pt has been on nepro at 65 mL/hr which meets the pt estimated needs. Pt is at moderate nutritional risk at this time    Nutrition Related Findings:    . Wound Type: None       Current Nutrition Intake & Therapies:    Average Meal Intake: NPO  Average Supplements Intake: NPO  Current Tube Feeding (TF) Orders:  Feeding Route: PEG  Formula: Renal Formula  Schedule: Continuous  Feeding Regimen: 60 mL/hr  Additives/Modulars: None  Current TF & Flush Orders Provides: 2592 kcal and 95 g of protein per day    Anthropometric Measures:  Height: 6' 2\" (188 cm)  Ideal Body Weight (IBW): 190 lbs (86 kg)    Admission Body Weight: 275 lb 12.7 oz (125.1 kg)  Current Body Weight: 277 lb 12.5 oz (126 kg), 146.2 % IBW.  Weight Source: Bed Scale  Current BMI (kg/m2): 35.6  Usual Body Weight: 270 lb 4.5 oz (122.6 kg) (Jan 4, 2023)  % Weight Change (Calculated): 2.8  Weight Adjustment For: No Adjustment                 BMI Categories: Obese Class 2 (BMI 35.0 -39.9)    Estimated Daily Nutrient Needs:  Energy Requirements Based On: Kcal/kg  Weight Used for Energy Requirements: Current  Energy (kcal/day): 2205-9349  Weight Used for Protein Requirements: Ideal  Protein (g/day): 103-111  Method Used for Fluid Requirements: Standard Renal  Fluid (ml/day): UOP + 1,000 ml    Nutrition Diagnosis:   Inadequate oral intake related to swallowing difficulty as evidenced by NPO or clear liquid status due to medical condition, nutrition support - enteral nutrition    Nutrition Interventions:   Food and/or Nutrient Delivery: Continue Current Tube Feeding  Nutrition Education/Counseling: No recommendation at this time  Coordination of Nutrition Care: Continue to monitor while inpatient       Goals:     Goals:  Tolerate nutrition support at goal rate, within 2 days       Nutrition Monitoring and Evaluation:   Behavioral-Environmental Outcomes: None Identified  Food/Nutrient Intake Outcomes: Enteral Nutrition Intake/Tolerance, IVF Intake  Physical Signs/Symptoms Outcomes: Biochemical Data, Weight, Skin, Nutrition Focused Physical Findings    Discharge Planning:    Enteral Nutrition     Etomar Orantes RD, LD  Contact: [phone]

## 2023-02-17 VITALS
HEART RATE: 105 BPM | TEMPERATURE: 97.5 F | OXYGEN SATURATION: 100 % | HEIGHT: 74 IN | BODY MASS INDEX: 37.12 KG/M2 | WEIGHT: 289.24 LBS | DIASTOLIC BLOOD PRESSURE: 60 MMHG | SYSTOLIC BLOOD PRESSURE: 110 MMHG | RESPIRATION RATE: 16 BRPM

## 2023-02-17 LAB
ANION GAP SERPL CALCULATED.3IONS-SCNC: 15 MMOL/L (ref 4–16)
BUN SERPL-MCNC: 53 MG/DL (ref 6–23)
CALCIUM SERPL-MCNC: 8.8 MG/DL (ref 8.3–10.6)
CHLORIDE BLD-SCNC: 94 MMOL/L (ref 99–110)
CO2: 25 MMOL/L (ref 21–32)
CREAT SERPL-MCNC: 4.4 MG/DL (ref 0.9–1.3)
CULTURE: NORMAL
CULTURE: NORMAL
GFR SERPL CREATININE-BSD FRML MDRD: 14 ML/MIN/1.73M2
GLUCOSE BLD-MCNC: 137 MG/DL (ref 70–99)
GLUCOSE BLD-MCNC: 161 MG/DL (ref 70–99)
GLUCOSE BLD-MCNC: 199 MG/DL (ref 70–99)
GLUCOSE BLD-MCNC: 250 MG/DL (ref 70–99)
GLUCOSE SERPL-MCNC: 216 MG/DL (ref 70–99)
INR BLD: 1.21 INDEX
Lab: NORMAL
Lab: NORMAL
POTASSIUM SERPL-SCNC: 3.8 MMOL/L (ref 3.5–5.1)
PROTHROMBIN TIME: 15.7 SECONDS (ref 11.7–14.5)
SODIUM BLD-SCNC: 134 MMOL/L (ref 135–145)
SPECIMEN: NORMAL
SPECIMEN: NORMAL

## 2023-02-17 PROCEDURE — 97112 NEUROMUSCULAR REEDUCATION: CPT

## 2023-02-17 PROCEDURE — 85610 PROTHROMBIN TIME: CPT

## 2023-02-17 PROCEDURE — 6370000000 HC RX 637 (ALT 250 FOR IP): Performed by: STUDENT IN AN ORGANIZED HEALTH CARE EDUCATION/TRAINING PROGRAM

## 2023-02-17 PROCEDURE — 6360000002 HC RX W HCPCS: Performed by: STUDENT IN AN ORGANIZED HEALTH CARE EDUCATION/TRAINING PROGRAM

## 2023-02-17 PROCEDURE — 94761 N-INVAS EAR/PLS OXIMETRY MLT: CPT

## 2023-02-17 PROCEDURE — 97167 OT EVAL HIGH COMPLEX 60 MIN: CPT

## 2023-02-17 PROCEDURE — 6370000000 HC RX 637 (ALT 250 FOR IP): Performed by: INTERNAL MEDICINE

## 2023-02-17 PROCEDURE — 97163 PT EVAL HIGH COMPLEX 45 MIN: CPT

## 2023-02-17 PROCEDURE — 2580000003 HC RX 258: Performed by: STUDENT IN AN ORGANIZED HEALTH CARE EDUCATION/TRAINING PROGRAM

## 2023-02-17 PROCEDURE — 94640 AIRWAY INHALATION TREATMENT: CPT

## 2023-02-17 PROCEDURE — 80048 BASIC METABOLIC PNL TOTAL CA: CPT

## 2023-02-17 PROCEDURE — 5A1D70Z PERFORMANCE OF URINARY FILTRATION, INTERMITTENT, LESS THAN 6 HOURS PER DAY: ICD-10-PCS | Performed by: INTERNAL MEDICINE

## 2023-02-17 PROCEDURE — 82962 GLUCOSE BLOOD TEST: CPT

## 2023-02-17 PROCEDURE — 97530 THERAPEUTIC ACTIVITIES: CPT

## 2023-02-17 PROCEDURE — 2700000000 HC OXYGEN THERAPY PER DAY

## 2023-02-17 PROCEDURE — 36415 COLL VENOUS BLD VENIPUNCTURE: CPT

## 2023-02-17 RX ORDER — LANSOPRAZOLE
30 KIT
Qty: 300 ML | Refills: 0 | Status: SHIPPED | OUTPATIENT
Start: 2023-02-18 | End: 2023-03-20

## 2023-02-17 RX ORDER — INSULIN LISPRO 100 [IU]/ML
0-24 INJECTION, SOLUTION INTRAVENOUS; SUBCUTANEOUS EVERY 4 HOURS
Status: DISCONTINUED | OUTPATIENT
Start: 2023-02-17 | End: 2023-02-17 | Stop reason: HOSPADM

## 2023-02-17 RX ORDER — INSULIN GLARGINE 100 [IU]/ML
25 INJECTION, SOLUTION SUBCUTANEOUS NIGHTLY
Status: DISCONTINUED | OUTPATIENT
Start: 2023-02-17 | End: 2023-02-17 | Stop reason: HOSPADM

## 2023-02-17 RX ORDER — AMOXICILLIN AND CLAVULANATE POTASSIUM 875; 125 MG/1; MG/1
1 TABLET, FILM COATED ORAL 2 TIMES DAILY
Qty: 4 TABLET | Refills: 0 | Status: SHIPPED | OUTPATIENT
Start: 2023-02-17 | End: 2023-02-24 | Stop reason: HOSPADM

## 2023-02-17 RX ORDER — INSULIN GLARGINE 100 [IU]/ML
25 INJECTION, SOLUTION SUBCUTANEOUS NIGHTLY
Qty: 10 ML | Refills: 3 | Status: SHIPPED | OUTPATIENT
Start: 2023-02-17

## 2023-02-17 RX ORDER — POLYVINYL ALCOHOL 14 MG/ML
2 SOLUTION/ DROPS OPHTHALMIC 4 TIMES DAILY PRN
Qty: 1 EACH | Refills: 4 | Status: SHIPPED | OUTPATIENT
Start: 2023-02-17 | End: 2023-03-19

## 2023-02-17 RX ORDER — LEVOTHYROXINE SODIUM 0.1 MG/1
100 TABLET ORAL DAILY
Qty: 30 TABLET | Refills: 3 | Status: SHIPPED | OUTPATIENT
Start: 2023-02-18

## 2023-02-17 RX ORDER — MIDODRINE HYDROCHLORIDE 5 MG/1
5 TABLET ORAL
Qty: 90 TABLET | Refills: 3 | Status: SHIPPED | OUTPATIENT
Start: 2023-02-17

## 2023-02-17 RX ADMIN — IPRATROPIUM BROMIDE AND ALBUTEROL SULFATE 3 ML: 2.5; .5 SOLUTION RESPIRATORY (INHALATION) at 15:40

## 2023-02-17 RX ADMIN — IPRATROPIUM BROMIDE AND ALBUTEROL SULFATE 3 ML: 2.5; .5 SOLUTION RESPIRATORY (INHALATION) at 07:00

## 2023-02-17 RX ADMIN — INSULIN LISPRO 9 UNITS: 100 INJECTION, SOLUTION INTRAVENOUS; SUBCUTANEOUS at 06:00

## 2023-02-17 RX ADMIN — SODIUM CHLORIDE, PRESERVATIVE FREE 10 ML: 5 INJECTION INTRAVENOUS at 13:11

## 2023-02-17 RX ADMIN — PIPERACILLIN AND TAZOBACTAM 3375 MG: 3; .375 INJECTION, POWDER, LYOPHILIZED, FOR SOLUTION INTRAVENOUS at 06:08

## 2023-02-17 RX ADMIN — AMIODARONE HYDROCHLORIDE 200 MG: 200 TABLET ORAL at 13:10

## 2023-02-17 RX ADMIN — ASPIRIN 81 MG CHEWABLE TABLET 81 MG: 81 TABLET CHEWABLE at 13:10

## 2023-02-17 RX ADMIN — LANSOPRAZOLE 30 MG: KIT at 06:01

## 2023-02-17 RX ADMIN — MIDODRINE HYDROCHLORIDE 5 MG: 5 TABLET ORAL at 13:10

## 2023-02-17 RX ADMIN — IPRATROPIUM BROMIDE AND ALBUTEROL SULFATE 3 ML: 2.5; .5 SOLUTION RESPIRATORY (INHALATION) at 11:01

## 2023-02-17 RX ADMIN — LEVOTHYROXINE SODIUM 100 MCG: 0.1 TABLET ORAL at 06:01

## 2023-02-17 NOTE — CARE COORDINATION
CRIS spoke with Joey Hathaway from [facility]. They can accept the pt as long as PT/OT notes are in. CRIS sent PS to Dr Tata Cedillo asking for an order for OT/PT. CRIS placed WB and spoke with Isabel Goode.  CRIS called Ally to inform her that therapy notes are in the chart. Left VM asking for a return call. 16:11 Called [facility] and left VM asking if pt can admit to [facility] today. 16:42 Received call from Joey Hathaway who stated that pt is able to be admitted there today. CRIS sent PS to Dr Tata Cedillo who agreed that pt is medically ready. Ca Jeter RN informed. Please call report to [phone]    Complete & sign DESMOND and fax to [phone]. Put AVS & any scripts in the envelope that is in the soft chart. This is to go with the pt to the facility. [place]   at 18:30    Call family.

## 2023-02-17 NOTE — PROGRESS NOTES
Nephrology Progress Note  2/17/2023 9:12 AM  Subjective: Interval History: Lis Chaudhry is a 68 y.o. male  . Doing okay overall some congestion still did not want a bronchoscopy done yesterday    Data:   Scheduled Meds:   insulin glargine  25 Units SubCUTAneous Nightly    insulin NPH  10 Units SubCUTAneous QAM    insulin lispro  0-24 Units SubCUTAneous Q4H    epoetin jessenia-epbx  8,000 Units IntraVENous Once    midodrine  5 mg Oral TID WC    lansoprazole  30 mg Oral QAM AC    levothyroxine  100 mcg Per G Tube Daily    sodium chloride flush  5-40 mL IntraVENous 2 times per day    piperacillin-tazobactam  3,375 mg IntraVENous Q12H    amiodarone  200 mg Per G Tube Daily    aspirin  81 mg Per G Tube Daily    atorvastatin  40 mg Per G Tube Nightly    ipratropium-albuterol  1 vial Inhalation 4x Daily    metoprolol tartrate  12.5 mg Per G Tube BID    QUEtiapine  50 mg Per G Tube Nightly    traZODone  100 mg Per G Tube Nightly     Continuous Infusions:   sodium chloride Stopped (02/17/23 0608)    dextrose           CBC   Recent Labs     02/15/23  0315 02/16/23  0432   WBC 6.8 7.8   HGB 7.9* 8.1*   HCT 24.7* 25.8*    263      BMP   Recent Labs     02/15/23  0315 02/16/23  0432 02/17/23  0411   * 136 134*   K 4.2 3.8 3.8   CL 94* 96* 94*   CO2 24 28 25   PHOS 3.7  --   --    BUN 64* 42* 53*   CREATININE 4.6* 3.6* 4.4*     Hepatic: No results for input(s): AST, ALT, ALB, BILITOT, ALKPHOS in the last 72 hours. Troponin: No results for input(s): TROPONINI in the last 72 hours. BNP: No results for input(s): BNP in the last 72 hours. Lipids: No results for input(s): CHOL, HDL in the last 72 hours.     Invalid input(s): LDLCALCU  ABGs:   Lab Results   Component Value Date/Time    PO2ART 70.3 09/24/2022 11:49 AM    EBY0TFH 35.5 09/24/2022 11:49 AM     INR:   Recent Labs     02/15/23  0315 02/16/23  0432 02/17/23  0411   INR 1.30 1.40 1.21     Renal Labs  Albumin:    Lab Results   Component Value Date/Time LABALBU 3.5 12/31/2022 05:28 AM     Calcium:    Lab Results   Component Value Date/Time    CALCIUM 8.8 02/17/2023 04:11 AM     Phosphorus:    Lab Results   Component Value Date/Time    PHOS 3.7 02/15/2023 03:15 AM     U/A:    Lab Results   Component Value Date/Time    NITRU NEGATIVE 02/13/2023 12:15 AM    COLORU YELLOW 02/13/2023 12:15 AM    PHUR 6.5 06/30/2021 02:11 PM    WBCUA 67 02/13/2023 12:15 AM    RBCUA 37 02/13/2023 12:15 AM    MUCUS RARE 09/17/2022 06:21 PM    TRICHOMONAS NONE SEEN 02/13/2023 12:15 AM    BACTERIA NEGATIVE 02/13/2023 12:15 AM    CLARITYU CLEAR 02/13/2023 12:15 AM    SPECGRAV 1.025 02/13/2023 12:15 AM    UROBILINOGEN 0.2 02/13/2023 12:15 AM    BILIRUBINUR SMALL NUMBER OR AMOUNT OBSERVED 02/13/2023 12:15 AM    BILIRUBINUR small 06/30/2021 02:11 PM    BLOODU MODERATE NUMBER OR AMOUNT OBSERVED 02/13/2023 12:15 AM    GLUCOSEU neg 06/30/2021 02:11 PM    KETUA TRACE 02/13/2023 12:15 AM     ABG:    Lab Results   Component Value Date/Time    EII4RWX 35.5 09/24/2022 11:49 AM    PO2ART 70.3 09/24/2022 11:49 AM    JLS3EQH 21.2 09/24/2022 11:49 AM     HgBA1c:    Lab Results   Component Value Date/Time    LABA1C 7.0 02/12/2023 10:53 PM     Microalbumen/Creatinine ratio:  No components found for: RUCREAT  TSH:    Lab Results   Component Value Date/Time    TSH 1.66 04/03/2018 11:33 AM     IRON:    Lab Results   Component Value Date/Time    IRON 60 02/13/2023 10:39 AM     Iron Saturation:  No components found for: PERCENTFE  TIBC:    Lab Results   Component Value Date/Time    TIBC 229 02/13/2023 10:39 AM     FERRITIN:    Lab Results   Component Value Date/Time    FERRITIN 1,063 02/13/2023 10:39 AM     RPR:  No results found for: RPR  YAYA:  No results found for: ANATITER, YAYA  24 Hour Urine for Creatinine Clearance:  No components found for: CREAT4, UHRS10, UTV10      Objective:   I/O: 02/16 0701 - 02/17 0700  In: 2092.8 [I.V.:44.2]  Out: 60 [Urine:60]  I/O last 3 completed shifts:   In: 3707.8 [I.V.:44.2; NG/GT:3209; IV Piggyback:454.6]  Out: 135 [Urine:135]  No intake/output data recorded. Vitals: BP (!) 84/45   Pulse 88   Temp 97.6 °F (36.4 °C)   Resp 15   Ht 6' 2\" (1.88 m)   Wt 289 lb 3.9 oz (131.2 kg)   SpO2 94%   BMI 37.14 kg/m²  {  General appearance: awake weak  HEENT: Head: Normal, normocephalic, atraumatic. Neck: Positive trach  Lungs: diminished breath sounds bilaterally  Heart: S1, S2 normal  Abdomen: abnormal findings:  soft nt positive PEG tube  Extremities: edema trace positive HD line right-sided chest   Neurologic: Mental status: alertness: alert     Impression:       No change from prior examination. Prominent anterior osteophytes from C3-C4   to the C6-C7 discs. These are sufficient size to cause swallowing   difficulties. Assessment and Plan:      IMP:  #1 positive trach with possible aspiration pneumonia with increased secretion  #2 protein malnutrition feeding tube  #3 hyperkalemia with end-stage renal disease Monday Wednesday Friday  #4 type 2 diabetes  #5 paroxysmal A-fib   6. Anterior osteophytes C3-C4 to C6-C7   #7 anemia   #8 hypotension      Plan       #1 monitor with trach care and suctioning. I discussed with patient about refusing the bronchoscopy yesterday states his main concern is his brother is out of town. He would probably reconsider that may be in a few weeks time when his brother is back in town next week and we can probably have him see pulmonology as an outpatient to discuss.     #2 maintain nutrition with tube feeding will need to follow-up with neurosurgery at some point outpatient for options related to cervical neck   #3 potassium stable dialysis and dialysis to be done today   #4 monitor glucose and adjust insulin   No. 5 monitor to control heart rate   #6 maintain on EPO PRBC if hemoglobin less than 7    #7 blood pressure low stable holding okay with midodrine     we will follow supportive care trach care with suctioning no active infections noted if no other acute pathology can likely go back to nursing home after dialysis today from renal standpoint         Christina Cohen MD, MD

## 2023-02-17 NOTE — PROCEDURES
Treatment ended no issues or complaints  Patient Name: Severo Calamity  Patient : 1950  MRN: [id number]     Acct: [de-identified]  Date of Admission: 2023  Room/Bed: -A  Code Status:  Full Code  Allergies: No Known Allergies  Diagnosis:    Patient Active Problem List   Diagnosis    Atrial fibrillation ([facility]    CAD (coronary artery disease)    Morbid obesity ([facility]    COPD (chronic obstructive pulmonary disease) ([facility]    Sleep apnea    Type 2 diabetes mellitus ([facility]    Thyroid disease    Hyperlipidemia    Acute congestive heart failure ([facility]    Coronary artery disease involving native coronary artery of native heart with angina pectoris (HCC)    Chronic renal disease, stage III ([facility] [502014]    Hypertension    CAD, multiple vessel    Dyspnea    Moderate malnutrition (HCC)    Pneumonia of both lungs due to infectious organism    Severe malnutrition (HCC)    Chronic respiratory failure with hypoxia (HCC)    Aspiration pneumonia of right lower lobe (HCC)    ESRD on dialysis ([facility]    Aspiration pneumonia, unspecified aspiration pneumonia type, unspecified laterality, unspecified part of lung ([facility]    Acute aspiration pneumonia ([facility]         Treatment:  Hemodilaysis 2:1  Priority: Routine  Location: Acute Room    Diabetic: Yes  NPO: Yes  Isolation Precautions: Dialysis     Consent for Treatment Verified: Yes  Blood Consent Verified: Not Applicable     Safety Verified: Identify (I), Consent (C), Equipment (E), HepB Status (B), Orders Complete (O), Access Verified (A), and Timeliness (T)  Time out performed prior to access at 0750 hours. Report Received from Primary RN at 0717 hours.   Primary RN (First Initial, Last Name, Title): Nam Garces RN  Incapacitated Nurse Education Completed: Yes     HBsAg ONLY:  Date Drawn: 2023       Results: Negative  HBsAb:  Date Drawn:  2023       Results: Susceptible <10    Order  Dialysis Bath  K+ (Potassium): 2  Ca+ (Calcium): 2.5  Na+ (Sodium): 138  HCO3 (Bicarb): 35  Bicarbonate Concentrate Lot No.: [id number]  Acid Concentrate Lot No.: [id number]     Na+ Modeling: Not Applicable  Dialyzer: [id number]  Dialysate Temperature (C):  35  Blood Flow Rate (BFR):  300   Dialysate Flow Rate (DFR):   600        Access to be Utilized   Access: Tunneled Catheter  Location: Internal Jugular  Side: Right       First Use X-ray Verified: Not Applicable  OK to use line order: Not Applicable    Site Assessment:  Signs and Symptoms of Infection/Inflammation: None  If yes: Not Applicable  Dressing: Dry and Intact  Site Prep: Medical Aseptic Technique  Dressing Changed this Treatment: No  If yes, by whom: NA - not changed today  Date of Last Dressing Change: February 16, 2023  Antimicrobial Patch in place?: Yes  BLUE Caps in place?: Yes  Gauze Dressing?: No  Non Dialysis Use?: No  Comment:    Flows: Good, Patent  If access problem, who was notified:     Pre and Post-Assessment  Patient Vitals for the past 8 hrs:   Level of Consciousness O2 Device   02/17/23 0703 -- Trach mask   02/17/23 1157 -- Trach mask   02/17/23 1200 0 Trach mask     Labs  Recent Labs     02/15/23  0315 02/16/23  0432   WBC 6.8 7.8   HGB 7.9* 8.1*   HCT 24.7* 25.8*    263                                                                  Recent Labs     02/15/23  0315 02/16/23  0432 02/17/23  0411   * 136 134*   K 4.2 3.8 3.8   CL 94* 96* 94*   CO2 24 28 25   BUN 64* 42* 53*   CREATININE 4.6* 3.6* 4.4*   GLUCOSE 224* 223* 216*     IV Drips and Rate/Dose   sodium chloride Stopped (02/17/23 9419)    dextrose        Safety - Before each treatment:   Dialysis Machine No.: [id number]   Machine Number: [id number]  Dialyzer Lot No.: [id number]  RO Machine Log Sheet Completed: Yes  Machine Alarm Self Test: Completed; Passed (02/17/23 8960)     Air Foam Detector: Tested, Proper Function  Extracorporeal Circuit Tested for Integrity: Yes  Machine Conductivity: 13.3  Manual Conductivity: 13.6     Bicarbonate Concentrate Lot No.: [id number]  Acid Concentrate Lot No.: [id number]  Manual Ph: 7.4  Bleach Test (Neg): Yes  Bath Temperature: 95 °F (35 °C)  Tubing Lot#: [id number]  Conductivity Meter Serial #: [id number]  All Connections Secure?: Yes  Venous Parameters Set?: Yes  Arterial Parameters Set?: Yes  Saline Line Double Clamped?: Yes  Air Foam Detector Engaged?: Yes  Machine Functioning Alarm Free? Yes  Prime Given: 200ml    Chlorine Testing - Before each treatment and every 4 hours:   Treatment  Time On: 0755  Time Off: 1053  Treatment Goal: 3 HOUR, 2.5L  Weight: 289 lb 3.9 oz (131.2 kg) (02/17/23 0750)  1st check: less than 0.1 ppm at: 0700 hours  2nd check: less than 0.1 ppm at: 1045 hours  3rd check: Not Applicable  (if greater than 0.1 ppm, then check every 30 minutes from secondary)    Access Flows and Pressures  Patient Vitals for the past 8 hrs:   Blood Flow Rate (ml/min) Ultrafiltration Rate (ml/hr) Arterial Pressure (mmHg) Venous Pressure (mmHg) TMP DFR Comments Access Visible   02/17/23 0755 300 ml/min 820 ml/hr -90 mmHg 110 40 600 TX STARTED, PRIME GIVEN, LINES SECURE AND CALL LIGHT WITHIN REACH Yes   02/17/23 0800 300 ml/min 820 ml/hr -90 mmHg 110 40 600 RESTING WITHE YES CLOSED Yes   02/17/23 0815 300 ml/min 0 ml/hr -90 mmHg 110 30 600 MD bedside visit, UF turned off low BP Yes   02/17/23 0830 300 ml/min 0 ml/hr -90 mmHg 110 30 600 UF REMAINS OFF AT THIS TIME DUE TO HYPOTENSION, PT IS ASYMPTOMATIC, RN AWARE Yes   02/17/23 0845 300 ml/min 0 ml/hr -90 mmHg 110 30 600 RESTING WITH EYES CLOSED Yes   02/17/23 0900 300 ml/min 0 ml/hr -100 mmHg 110 30 600 no distresss Yes   02/17/23 0915 300 ml/min 0 ml/hr -90 mmHg 110 30 600 RESTING WITH EYES CLOSED Yes   02/17/23 0930 300 ml/min 0 ml/hr -100 mmHg 110 40 600 UF TURNED BACK ON AT THIS TIME, UF GOAL DECREASED TO MIN.  Yes   02/17/23 0945 300 ml/min 0 ml/hr -100 mmHg 110 40 600 AWAKE AND LAERT Yes   02/17/23 1000 300 ml/min 300 ml/hr -100 mmHg 130 40 600 SLEEPING Yes 02/17/23 1015 300 ml/min 300 ml/hr -100 mmHg 110 30 600 NO COMPLAINTS Yes   02/17/23 1030 300 ml/min 300 ml/hr -90 mmHg 100 40 600 sleeping Yes   02/17/23 1045 300 ml/min 300 ml/hr -90 mmHg 110 40 600 no problems Yes   02/17/23 1053 300 ml/min 300 ml/hr -90 mmHg 110 40 600 no needs Yes     Vital Signs  Patient Vitals for the past 8 hrs:   BP Temp Pulse Resp SpO2 Weight Weight Method Percent Weight Change   02/17/23 0523 123/62 -- 99 18 97 % 287 lb 11.2 oz (130.5 kg) -- 0   02/17/23 0600 (!) 104/58 -- 100 15 96 % -- -- --   02/17/23 0703 -- -- -- -- 95 % -- -- --   02/17/23 0750 92/60 97.6 °F (36.4 °C) 96 22 94 % 289 lb 3.9 oz (131.2 kg) Bed scale 0.54   02/17/23 0755 92/60 -- 99 21 -- -- -- --   02/17/23 0800 (!) 109/57 -- 94 24 -- -- -- --   02/17/23 0815 (!) 86/50 -- 89 18 -- -- -- --   02/17/23 0830 (!) 89/59 -- (!) 101 14 -- -- -- --   02/17/23 0845 (!) 89/48 -- 89 15 -- -- -- --   02/17/23 0900 (!) 84/45 -- 88 15 -- -- -- --   02/17/23 0915 (!) 92/51 -- 87 13 -- -- -- --   02/17/23 0930 (!) 84/40 -- 97 14 -- -- -- --   02/17/23 0945 (!) 104/49 -- 94 21 -- -- -- --   02/17/23 1000 (!) 115/49 -- (!) 102 23 -- -- -- --   02/17/23 1015 108/89 -- (!) 103 21 -- -- -- --   02/17/23 1030 (!) 104/52 -- (!) 106 18 -- -- -- --   02/17/23 1045 (!) 100/57 -- (!) 106 14 -- -- -- --   02/17/23 1053 -- -- (!) 106 15 -- -- -- --   02/17/23 1100 115/64 -- (!) 102 16 -- -- -- --   02/17/23 1157 -- -- -- -- 95 % -- -- --   02/17/23 1200 (!) 98/51 (!) 96.3 °F (35.7 °C) (!) 108 16 96 % -- -- --   02/17/23 1201 (!) 119/46 97 °F (36.1 °C) (!) 109 17 95 % -- -- --     Post-Dialysis  Arterial Catheter Locking Solution:  1.9 ML NS  Venous Catheter Locking Solution:  1.9 ML NS  Post-Treatment Procedures: Blood returned, Catheter Capped, clamped with Saline x2 ports  Machine Disinfection Process: Acid/Vinegar Clean, Exterior Machine Disinfection  Rinseback Volume (ml): 300 ml  Blood Volume Processed (Liters): 51.2 l/min  Dialyzer Clearance: Lightly streaked  Duration of Treatment (minutes): 180 minutes     Hemodialysis Intake (ml): 500 ml  Hemodialysis Output (ml): 765 ml   Fluid removed: 265  Tolerated Treatment: Good  Patient Response to Treatment: good  Physician Notified: No       Provider Notification  Provider Notification  Reason for Communication: Evaluate (02/15/23 0845)  Provider Name: Luis Folds (02/15/23 0845)  Provider Notification: Physician (02/15/23 0845)  Method of Communication: Call (02/15/23 0845)  Response: See orders (02/15/23 0845)  Notification Time: [id number] (02/15/23 0845)     Handoff complete and report given to Primary RN at 1110 hours.   Primary RN (First Initial, Last Name, Title):  Dwayne Schulz RN     Education  Person Educated: Patient   Knowledge Base: Substantial  Barriers to Learning?: None  Preferred method of Learning: Oral  Topic(s): Procedural   Teaching Tools: Explanation   Response to Education: Verbalized Understanding     Electronically signed by Lillian Nugent RN on 2/17/2023 at 12:30 PM

## 2023-02-17 NOTE — PROGRESS NOTES
Physical Therapy  [facility] ACUTE CARE PHYSICAL THERAPY EVALUATION  Bert Tong, 1950, 2002/2002-A, 2/17/2023    History  [place]:  There were no encounter diagnoses. 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, COPD (chronic obstructive pulmonary disease) ([address] Depression, Diabetes mellitus ([address] Dupuytren's contracture of hand, Family history of cardiovascular disease, GERD (gastroesophageal reflux disease), H/O cardiac catheterization, H/O cardiac catheterization, H/O cardiovascular stress test, H/O cardiovascular stress test, H/O cardiovascular stress test, H/O cardiovascular stress test, H/O Doppler ultrasound, H/O echocardiogram, H/O echocardiogram, H/O echocardiogram, H/O hiatal hernia, Hx of Venous Doppler, Hyperlipidemia, Hypertension, Obesity, S/P PTCA (percutaneous transluminal coronary angioplasty), Sleep apnea, and Thyroid disease. Patient  has a past surgical history that includes Coronary angioplasty with stent (03/21/2005); Cardiac catheterization (6/12/08); Cardiac catheterization (3/07); Cardiac catheterization (3/05); Endoscopy, colon, diagnostic (2014); Colonoscopy (2011); Colonoscopy (5/18/16); Hand surgery (Right, 1997); pr optx ankle dislocation w/repair/int/xtrnl fixj (Right, 6/3/2019); Sternum Debridement (N/A, 9/24/2022); Coronary artery bypass graft (N/A, 9/22/2022); and IR TUNNELED CVC PLACE WO SQ PORT/PUMP > 5 YEARS (2/14/2023). Assessment:  Pt presents 5 days s/p admission for aspiration PNA. He was at [facility], home environment is seen below, has not been home in months. Pt is primarily limited by balance, strength, endurance, additional deficits include pain, functional mobility, coordination, posture, ROM, safety awareness. He is unable to walk/transfer, has PEG/trach, unable to sit unsupported. Recommending SNF. Complexity: High  Prognosis: Fair ; comorbid conditions, pain, balance, positional tolerance, endurance  Plan 2-3+/week, 1 week  Equipment: pend to next LOC    Recommendations for NURSING mobility: MAGUE for OOB    Subjective:  Patient states:  \"I would like to get back to my place before. \"    Pain:  pain in butt that she does not rate. Communication with other providers:  Handoff to RN, co-eval with OT ALEX Link  Restrictions: fall risk; general precautions    Home Setup/Prior level of function  Social/Functional History  Lives With: Alone  Type of Home: House  Home Layout: One level  Home Access: Stairs to enter without rails (holds onto door molding)  Entrance Stairs - Number of Steps: 2  Bathroom Shower/Tub: Tub/Shower unit  Bathroom Toilet: Standard  Bathroom Equipment: Grab bars in shower, Shower chair  Home Equipment: Rolling walker, Wheelchair  ADL Assistance: Independent  Homemaking Assistance: Independent  Homemaking Responsibilities: Yes  Ambulation Assistance: Independent (used no AD)  Transfer Assistance: Independent  Active : Yes  Occupation: Retired  Additional Comments: Above information is for prior to pt's tracheostomy placement in September, 2022. Pt has been at [facility] for rehab for several months and has had a large functional decline. Examination of body systems (includes body structures/functions, activity/participation limitations):  Observation:  Supine, awake, delayed, agreeable. He is participatory, but endurance and capacity to complete greater than EOB is lacking.  Tele, BP cuff, pulse ox, bed alarm, trach, PEG feeding tube, price in place upon arrival  Posture: poor ; fwd head/shoulders, poor postural control  Vision:  glasses  Hearing:  WFL  Cardiopulmonary:  WFL  Cognition: delayed, follows commands w/ repetition, attention intact, memory appears intact, fair safety awareness, mod cues needed for sequencing/setup, mod cues needed for initiation, good insight into deficits    Musculoskeletal  ROM R/L: AROM WFL. Strength R/L:   R: 3+/5  L: 4-/5  Sensation: partial dec sensation R > L   Tone: inc tonicity on RUE/LE  Coordination: impaired fine motor and dexterity in BL UE 2/2 weakness ; great difference R > L 2/2 CVA deficits  Proprioception: impaired on R UE 2/2 stroke deficits     Mobility:  Rolling L/R:  mod A   Supine to sit:  max A x 2  Transfers: NT ; has not been out of bed recently, insufficient strength to promote safe OOB performance  Sitting balance:  fair (for very short durations) to poor (consistent max A w/ a single UE/LE support is moved). Standing balance:  NT ; has not been out of bed recently, insufficient strength to promote safe OOB performance. Gait: NT ; has not been out of bed recently, insufficient strength to promote safe OOB performance    [facility] 6 Clicks Inpatient Mobility:  AM-PAC Inpatient Mobility Raw Score : 8    Goals:  Pt Goals: return to inc ind  Short Term Goals  Time Frame for Short Term Goals: 1 week  Short Term Goal 1: pt will complete bed mobility at mod A x2  Short Term Goal 2: pt will demonstrate fair seated balance w/ intermittent min A  Short Term Goal 3: pt will demonstrate 4/5 BLE strength to facilitate return to inc functional activities  Short Term Goal 4: pt will demonstrate seated tolerance at EOB for ~15 mins       Treatment plan:  Bed mobility, functional mobility, transfers, balance, gait, TA, TX, strength activities, neuro    Treatment:  Neuro-Muscular re-education:  Cues were given for position, posture, kinesthetic sense, safety, recruitment, and rationale. Cues were verbal and/or tactile.     Positioned for comfort and pressure relief  Safety: patient left in bed, call light within reach, RN notified, gait belt used, all needs met    Time:   Time in: 1320  Time out: 1352  Timed treatment minutes: 18  Total time + eval: 32    Electronically signed by:    Shane Cummings, PT, DPT  2/17/2023, 3:16 PM

## 2023-02-17 NOTE — DISCHARGE SUMMARY
V2.0  Discharge Summary    Name:  Ramya Morejon /Age/Sex: 1950 (89 y.o. male)   Admit Date: 2023  Discharge Date: 23    MRN & CSN:  [id number] & [id number] Encounter Date and Time 23 4:44 PM EST    Attending:  Garrison Howard MD Discharging Provider: Garrison Howard MD       Hospital Course:     Brief HPI: Ramya Morejon is a 68 y.o. male with pmh of chronic trach and PEG, atrial fibrillation on Coumadin, COPD, insulin-dependent diabetes mellitus type 2, chronic GERD, CAD s/p PTCA with HENRY to LAD, hypothyroidism, KHUSHBU who presents from [facility] because of worsening mentation along with worsening cough that is productive in nature with streaks of blood around the phlegm associated with worsening shortness of breath even at rest    Brief Problem Based Course:   # Acute hypoxic respiratory failure in the setting of suspected aspiration pneumonia: Presented with sepsis. Managed as per sepsis protocol. CT scans reviewed from care every where and s/o Rt lobe pneumonia. Pulmonology has been consulted started on Zosyn. Pancultures ordered. Blood and urine cultures no growth till date, respiratory cultures not done respiratory panel and COVID-negative, continued Zosyn. wean off o2 as tolerated, consulted pulmonology, recommended bronchoscopy scheduled on  but patient declined. Patient continued to be on 6 L via trach patient after discussing with pulm recommended to continue antibiotic and to continue O2 discharged in a stable condition to follow-up with PCP and pulmonology to schedule for bronc if needed. # ESRD on HD:  Nephrology consulted. HD as per nephrology. Nephrology recommend HD catheter exchange which was done on  with catheter tip sent for culture,  No growth at 36 hours. # Hyperkalemia resolved after dialysis, nephrology following. # UTI - Dirty Urine. Culture NGTD. Abx as above.      # Metabolic and septic Encephalopathy - CT Head - negative for acute process. Management as above. Q 4 neuro check. B12 / folate / ammonia / blood gas looks in acceptable range. # History of chronic trach and PEG: Needs frequent suctioning pulmonology on board. Nutritionist following. # Macrocytic anemia, underlying anemia of chronic disease hemoglobin of 8.7. Ordered B12 folate and iron studies. Goal hb > 7. Will monitor H/H for now. GI following. On protonix 40 IV BID. # Elevated troponin likely demand ischemia. # Insulin-dependent diabetes mellitus type 2: HbA1c 7.0, endocrinology followed adjusted insulin accordingly. #  Paroxysmal atrial fibrillation on Coumadin continue telemetry, continue amiodarone, held Coumadin for possible HD catheter change. # Coronary artery disease status post PTCA with HENRY to LAD. # Hypothyroidism on levothyroxine most recent TSH was found to be 9.12. T3 and T4 has been ordered. Resume all medications through G-tube. On levothyroxine. # KHUSHBU   # COPD underlying chronic trach and PEG  # Chronic GERD   # Stasis dermatitis for skin care. Discharge patient in stable condition to follow-up with PCP, pulmonology, endocrinology, nephrology and GI      The patient expressed appropriate understanding of, and agreement with the discharge recommendations, medications, and plan.      Consults this admission:  PHARMACY TO DOSE WARFARIN  IP CONSULT TO PULMONOLOGY  IP CONSULT TO NEPHROLOGY  IP CONSULT TO DIETITIAN  IP CONSULT TO GI  IP CONSULT TO ENDOCRINOLOGY  IP CONSULT TO DIETITIAN  IP CONSULT TO INTERVENTIONAL RADIOLOGY    Discharge Diagnosis:   Aspiration pneumonia, unspecified aspiration pneumonia type, unspecified laterality, unspecified part of lung ([address]  ESRD on HD  Hyperkalemia  UTI  Metabolic's encephalopathy  Microcytic anemia paroxysmal atrial fibrillation    Discharge Instruction:   Follow up appointments: PCP,pulmonology, endocrinology, nephrology and GI  Primary care physician: ANKITA Montalvo - [facility] within 2 weeks  Diet: regular diet, cardiac diet, diabetic diet, and renal diet   Activity: activity as tolerated  Disposition: Discharged to:   []Home, []HHC, [x]SNF, []Acute Rehab, []Hospice   Condition on discharge: Stable  Labs and Tests to be Followed up as an outpatient by PCP or Specialist:     Discharge Medications:        Medication List        START taking these medications      amoxicillin-clavulanate 875-125 MG per tablet  Commonly known as: AUGMENTIN  1 tablet by PEG Tube route 2 times daily for 2 days     insulin glargine 100 UNIT/ML injection vial  Commonly known as: LANTUS  Inject 25 Units into the skin nightly  Replaces: Lantus SoloStar 100 UNIT/ML injection pen     lansoprazole 3 MG/ML Susp  Take 10 mLs by mouth every morning (before breakfast)  Start taking on: February 18, 2023     polyvinyl alcohol 1.4 % ophthalmic solution  Commonly known as: LIQUIFILM TEARS  Place 2 drops into both eyes 4 times daily as needed for Dry Eyes            CHANGE how you take these medications      levothyroxine 100 MCG tablet  Commonly known as: SYNTHROID  1 tablet by Per G Tube route Daily  Start taking on: February 18, 2023  What changed:   medication strength  how much to take  how to take this     metoprolol tartrate 25 MG tablet  Commonly known as: LOPRESSOR  0.5 tablets by Per G Tube route 2 times daily  What changed: how to take this     midodrine 5 MG tablet  Commonly known as: PROAMATINE  Take 1 tablet by mouth 3 times daily (with meals)  What changed:   medication strength  how much to take            CONTINUE taking these medications      amiodarone 200 MG tablet  Commonly known as: CORDARONE     aspirin 81 MG chewable tablet  1 tablet by Per NG tube route daily     atorvastatin 40 MG tablet  Commonly known as: LIPITOR     blood glucose monitor kit and supplies  Dispense sufficient amount for indicated testing frequency plus additional to accommodate PRN testing needs.  Dispense all needed supplies to include: monitor, strips, lancing device, lancets, control solutions, alcohol swabs. ipratropium-albuterol 0.5-2.5 (3) MG/3ML Soln nebulizer solution  Commonly known as: DUONEB  Inhale 3 mLs into the lungs 4 times daily     Lancets Misc  by D3EA route three times a day. melatonin 3 MG Tabs tablet     * Nepro/CarbSteady Liqd     * ProSource TF Liqd     polyethylene glycol 17 GM/SCOOP powder  Commonly known as: GLYCOLAX  Take 17 g by mouth 2 times daily     QUEtiapine 50 MG tablet  Commonly known as: SEROQUEL     sennosides-docusate sodium 8.6-50 MG tablet  Commonly known as: SENOKOT-S  Take 2 tablets by mouth daily as needed for Constipation     traZODone 100 MG tablet  Commonly known as: DESYREL           * This list has 2 medication(s) that are the same as other medications prescribed for you. Read the directions carefully, and ask your doctor or other care provider to review them with you.                 STOP taking these medications      albuterol sulfate  (90 Base) MCG/ACT inhaler  Commonly known as: PROVENTIL;VENTOLIN;PROAIR     guaiFENesin 600 MG extended release tablet  Commonly known as: MUCINEX     Icosapent Ethyl 1 g Caps capsule  Commonly known as: VASCEPA     Lantus SoloStar 100 UNIT/ML injection pen  Generic drug: insulin glargine  Replaced by: insulin glargine 100 UNIT/ML injection vial     Nephronex 0.9 MG/5ML Liqd     omeprazole 20 MG delayed release capsule  Commonly known as: PRILOSEC     pantoprazole 4 mg/mL in sodium chloride (PF) injection     sevelamer 0.8 g Pack packet  Commonly known as: RENVELA     warfarin 2 MG tablet  Commonly known as: COUMADIN               Where to Get Your Medications        These medications were sent to [address] [place] [address]      Phone: [phone]   amoxicillin-clavulanate 875-125 MG per tablet  insulin glargine 100 UNIT/ML injection vial  lansoprazole 3 MG/ML Susp  levothyroxine 100 MCG tablet  metoprolol tartrate 25 MG tablet  midodrine 5 MG tablet  polyvinyl alcohol 1.4 % ophthalmic solution        Objective Findings at Discharge:   BP (!) 97/53   Pulse 99   Temp 97 °F (36.1 °C) (Oral)   Resp 18   Ht 6' 2\" (1.88 m)   Wt 289 lb 3.9 oz (131.2 kg)   SpO2 92%   BMI 37.14 kg/m²       Physical Exam:      General: Afebrile, no distress on 6 L of O2 through trach  Eyes: EOMI  ENT: neck supple, trach in place  Cardiovascular: S1-S2 normal tachycardic  Respiratory: Air entry decreased bilaterally  Gastrointestinal: Soft, non tender, PEG tube in place  Genitourinary: no suprapubic tenderness  Musculoskeletal: Both lower extremities hyperpigmented  Skin: warm, dry hyperpigmented skin of both lower extremities  Neuro: Alert. Psych: Mood appropriate. Labs and Imaging   CT CERVICAL SPINE WO CONTRAST    Result Date: 2/14/2023  EXAMINATION: CT OF THE CERVICAL SPINE WITHOUT CONTRAST 2/14/2023 2:00 pm TECHNIQUE: CT of the cervical spine was performed without the administration of intravenous contrast. Multiplanar reformatted images are provided for review. Automated exposure control, iterative reconstruction, and/or weight based adjustment of the mA/kV was utilized to reduce the radiation dose to as low as reasonably achievable. COMPARISON: 02/12/2023 HISTORY: ORDERING SYSTEM PROVIDED HISTORY: fu prior cervical displacement prior and if causing issue with swallowing TECHNOLOGIST PROVIDED HISTORY: Reason for exam:->fu prior cervical displacement prior and if causing issue with swallowing Reason for Exam: fu prior cervical displacement prior and if causing issue with swallowing FINDINGS: BONES/ALIGNMENT: There is no acute fracture or traumatic malalignment. There are no bony lytic or blastic lesions. DEGENERATIVE CHANGES: Prominent anterior osteophytes are noted at C3-C4, C4-C5, C5-C6 and C6-C7 discs.   These osteophytes bridge the C5-C6 and C6-C7 discs.  These osteophytes are of sufficient size to cause swallowing difficulty. There are no fractures noted. There are no lytic or blastic lesions. The facet joints appear unremarkable. SOFT TISSUES: There is a tracheostomy tube in place. No change from prior examination. Prominent anterior osteophytes from C3-C4 to the C6-C7 discs. These are sufficient size to cause swallowing difficulties. XR CHEST PORTABLE    Result Date: 2/17/2023  EXAMINATION: ONE XRAY VIEW OF THE CHEST 2/15/2023 6:05 pm COMPARISON: 02/13/2023 HISTORY: ORDERING SYSTEM PROVIDED HISTORY: fu pneumonia TECHNOLOGIST PROVIDED HISTORY: Reason for exam:->fu pneumonia Reason for Exam: fu pneumonia Additional signs and symptoms: na Relevant Medical/Surgical History: CAD, COPD, hypertension FINDINGS: Tracheostomy tube is stable. Central line is stable. Stable cardiomegaly with median sternotomy wires. Decreased size of the bilateral pleural effusions. No pneumothorax. No new airspace disease. Mild central pulmonary edema. Decreasing bilateral pleural effusions with improving mild central pulmonary edema. Stable cardiomegaly. Stable lines and tubes. XR CHEST PORTABLE    Result Date: 2/13/2023  EXAMINATION: ONE XRAY VIEW OF THE CHEST 2/13/2023 8:32 am COMPARISON: 02/12/2023 HISTORY: ORDERING SYSTEM PROVIDED HISTORY: fu pneumonia TECHNOLOGIST PROVIDED HISTORY: Reason for exam:->fu pneumonia Reason for Exam: fu pneumonia FINDINGS: Right subclavian catheter projects over the right atrium. Tracheostomy tube is unchanged. No significant change in right lower lobe airspace disease, and questionable right effusion. Increased left lower lobe airspace disease and questionable effusion. Probable cardiomegaly. No pneumothorax. Unchanged large right consolidation and interval presence of left airspace disease. Possible bilateral pleural effusions.      IR TUNNELED CVC PLACE WO SQ PORT/PUMP > 5 YEARS    Result Date: 2/14/2023  PROCEDURE: Removal of previously placed tunneled dialysis access catheter with tip sent for culture. ULTRASOUND GUIDED VASCULAR ACCESS. FLUOROSCOPY GUIDED PLACEMENT OF A SUBCUTANEOUS PORT-A-CATH. 2/14/2023. HISTORY: ORDERING SYSTEM PROVIDED HISTORY: remove tunnel hd catheter and culture cath tip and place new tunnel hd catheter other side if able, had resp secretion that site and red and better to change the access site (held coumadin fu inr today) TECHNOLOGIST PROVIDED HISTORY: remove tunnel hd catheter and culture cath tip and place new tunnel hd catheter other side if able Reason for exam:->remove tunnel hd catheter and culture cath tip and place new tunnel hd catheter other side if able, had resp secretion that site and red and better to change the access site (held coumadin fu inr today) How many lumens are being requested?->2 What side should this line be placed? ->Either What site is the preferred site? ->Internal Jugular SEDATION: None FLUOROSCOPY DOSE AND TYPE OR TIME AND EXPOSURES: 1.6 minute fluoroscopy time Air kerma: 21.7 mGy TECHNIQUE: Maximum sterile barrier technique including hand hygiene, skin prep and sterile ultrasound technique utilized for procedure. Sterile ultrasound technique also utilized for procedure Ultrasound guidance required for procedure to confirm target vessel patency, puncture site selection, real-time intra procedural guidance. Images made for patient's medical file. Informed consent was obtained after a detailed explanation of the procedure including risks, benefits, and alternatives. All aspects of maximum sterile barrier technique were used including washing hands with conventional soap and water or with alcohol-based hand rubs (ABHR), skin preparation, cap, mask, sterile gown, sterile gloves, and sterile full body drape. Local anesthesia was achieved with lidocaine.  A micropuncture needle was used to access the right external jugular vein using ultrasound guidance an 0.018 inch guidewire was advanced. Previously placed dialysis access catheter was loosened within subcutaneous tunnel and removed. Catheter tip was sent for culture and sensitivity per prior order. 018 guidewire via right external jugular venous approach was exchanged for a 0.035 inch guidewire to place a peel-away sheath. Subcutaneous tunnel was created from the right clavicular region back to the external jugular access site through which dialysis access catheter was placed. Catheter was advanced to the peel-away sheath. Peel-away sheath removed. Catheter ports were aspirated, flushed, capped and affixed to the patient's skin. Dressing applied. Postprocedure image made. Patient tolerated procedure well. FINDINGS: Pre and intraprocedural sonographic images demonstrate patent right external jugular vein with access needle seen within it. Fluoroscopic image demonstrates removal of previously placed tunneled dialysis access catheter via right internal jugular venous approach and placement of new tunneled dialysis access catheter via right external jugular venous approach the tip in the mid right atrium. No complication suggested. Removal of previously placed tunneled dialysis access catheter with tip sent for culture per prior order. Successful ultrasound and fluoroscopy guided new Port-A-Cath/tunneled dialysis access catheter placement via right external jugular venous approach with tip in the mid right atrium. CBC:   Recent Labs     02/15/23  0315 02/16/23  0432   WBC 6.8 7.8   HGB 7.9* 8.1*    263     BMP:    Recent Labs     02/15/23  0315 02/16/23  0432 02/17/23  0411   * 136 134*   K 4.2 3.8 3.8   CL 94* 96* 94*   CO2 24 28 25   BUN 64* 42* 53*   CREATININE 4.6* 3.6* 4.4*   GLUCOSE 224* 223* 216*     Hepatic: No results for input(s): AST, ALT, ALB, BILITOT, ALKPHOS in the last 72 hours.   Lipids:   Lab Results   Component Value Date/Time    CHOL 152 04/14/2021 10:02 AM    CHOL 139 07/23/2018 08:01 AM    HDL 43 08/26/2022 10:39 AM    TRIG 212 04/14/2021 10:02 AM     Hemoglobin A1C:   Lab Results   Component Value Date/Time    LABA1C 7.0 02/12/2023 10:53 PM     TSH:   Lab Results   Component Value Date/Time    TSH 1.66 04/03/2018 11:33 AM     Troponin:   Lab Results   Component Value Date/Time    TROPONINT 0.269 12/29/2022 03:09 PM    TROPONINT 0.245 12/13/2022 09:42 AM    TROPONINT 0.293 12/13/2022 06:50 AM     Lactic Acid: No results for input(s): LACTA in the last 72 hours. BNP: No results for input(s): PROBNP in the last 72 hours.   UA:  Lab Results   Component Value Date/Time    NITRU NEGATIVE 02/13/2023 12:15 AM    COLORU YELLOW 02/13/2023 12:15 AM    PHUR 6.5 06/30/2021 02:11 PM    WBCUA 67 02/13/2023 12:15 AM    RBCUA 37 02/13/2023 12:15 AM    MUCUS RARE 09/17/2022 06:21 PM    TRICHOMONAS NONE SEEN 02/13/2023 12:15 AM    BACTERIA NEGATIVE 02/13/2023 12:15 AM    CLARITYU CLEAR 02/13/2023 12:15 AM    SPECGRAV 1.025 02/13/2023 12:15 AM    LEUKOCYTESUR SMALL NUMBER OR AMOUNT OBSERVED 02/13/2023 12:15 AM    UROBILINOGEN 0.2 02/13/2023 12:15 AM    BILIRUBINUR SMALL NUMBER OR AMOUNT OBSERVED 02/13/2023 12:15 AM    BILIRUBINUR small 06/30/2021 02:11 PM    BLOODU MODERATE NUMBER OR AMOUNT OBSERVED 02/13/2023 12:15 AM    GLUCOSEU neg 06/30/2021 02:11 PM    KETUA TRACE 02/13/2023 12:15 AM     Urine Cultures: No results found for: LABURIN  Blood Cultures: No results found for: BC  No results found for: BLOODCULT2  Organism: No results found for: ORG    Time Spent Discharging patient 33 minutes    Electronically signed by Diana Polanco MD on 2/17/2023 at 4:44 PM

## 2023-02-17 NOTE — PROGRESS NOTES
Pt continuing to refuse suctioning. Pt educated on need for suctioning due to heavy secretions and gurgling as well as admission for aspiration pneumonia. Pt still continuing to refuse. Family updated on pt condition as well as refusal of suctioning and bronchoscopy. Family states they will try to convince pt to allow suctioning and bronchoscopy.

## 2023-02-17 NOTE — PLAN OF CARE
Problem: Discharge Planning  Goal: Discharge to home or other facility with appropriate resources  Outcome: Progressing  Flowsheets (Taken 2/17/2023 0716)  Discharge to home or other facility with appropriate resources:   Identify barriers to discharge with patient and caregiver   Arrange for needed discharge resources and transportation as appropriate   Identify discharge learning needs (meds, wound care, etc)   Refer to discharge planning if patient needs post-hospital services based on physician order or complex needs related to functional status, cognitive ability or social support system     Problem: Skin/Tissue Integrity  Goal: Absence of new skin breakdown  Description: 1. Monitor for areas of redness and/or skin breakdown  2. Assess vascular access sites hourly  3. Every 4-6 hours minimum:  Change oxygen saturation probe site  4. Every 4-6 hours:  If on nasal continuous positive airway pressure, respiratory therapy assess nares and determine need for appliance change or resting period.   Outcome: Progressing     Problem: Safety - Adult  Goal: Free from fall injury  Outcome: Progressing     Problem: Pain  Goal: Verbalizes/displays adequate comfort level or baseline comfort level  Outcome: Progressing     Problem: Respiratory - Adult  Goal: Achieves optimal ventilation and oxygenation  Outcome: Progressing  Flowsheets (Taken 2/17/2023 0716)  Achieves optimal ventilation and oxygenation:   Assess for changes in respiratory status   Assess for changes in mentation and behavior   Position to facilitate oxygenation and minimize respiratory effort   Oxygen supplementation based on oxygen saturation or arterial blood gases   Initiate smoking cessation protocol as indicated   Encourage broncho-pulmonary hygiene including cough, deep breathe, incentive spirometry   Assess the need for suctioning and aspirate as needed   Assess and instruct to report shortness of breath or any respiratory difficulty   Respiratory therapy support as indicated     Problem: Cardiovascular - Adult  Goal: Maintains optimal cardiac output and hemodynamic stability  Outcome: Progressing  Flowsheets (Taken 2/17/2023 0716)  Maintains optimal cardiac output and hemodynamic stability:   Monitor blood pressure and heart rate   Monitor urine output and notify Licensed Independent Practitioner for values outside of normal range   Assess for signs of decreased cardiac output   Administer fluid and/or volume expanders as ordered   Administer vasoactive medications as ordered  Goal: Absence of cardiac dysrhythmias or at baseline  Outcome: Progressing  Flowsheets (Taken 2/17/2023 0716)  Absence of cardiac dysrhythmias or at baseline:   Administer antiarrhythmia medication and electrolyte replacement as ordered   Monitor cardiac rate and rhythm   Assess for signs of decreased cardiac output     Problem: Gastrointestinal - Adult  Goal: Maintains or returns to baseline bowel function  Outcome: Progressing  Flowsheets (Taken 2/17/2023 0716)  Maintains or returns to baseline bowel function:   Assess bowel function   Encourage oral fluids to ensure adequate hydration   Administer IV fluids as ordered to ensure adequate hydration   Administer ordered medications as needed   Encourage mobilization and activity   Nutrition consult to assist patient with appropriate food choices  Goal: Maintains adequate nutritional intake  Outcome: Progressing  Flowsheets (Taken 2/17/2023 0716)  Maintains adequate nutritional intake:   Monitor percentage of each meal consumed   Identify factors contributing to decreased intake, treat as appropriate   Assist with meals as needed   Monitor intake and output, weight and lab values   Obtain nutritional consult as needed     Problem: Metabolic/Fluid and Electrolytes - Adult  Goal: Electrolytes maintained within normal limits  Outcome: Progressing  Flowsheets (Taken 2/17/2023 0716)  Electrolytes maintained within normal limits:   Monitor labs and assess patient for signs and symptoms of electrolyte imbalances   Administer electrolyte replacement as ordered   Monitor response to electrolyte replacements, including repeat lab results as appropriate   Fluid restriction as ordered   Instruct patient on fluid and nutrition restrictions as appropriate  Goal: Hemodynamic stability and optimal renal function maintained  Outcome: Progressing  Flowsheets (Taken 2/17/2023 0716)  Hemodynamic stability and optimal renal function maintained:   Monitor labs and assess for signs and symptoms of volume excess or deficit   Monitor intake, output and patient weight   Monitor urine specific gravity, serum osmolarity and serum sodium as indicated or ordered   Monitor response to interventions for patient's volume status, including labs, urine output, blood pressure (other measures as available)   Encourage oral intake as appropriate   Instruct patient on fluid and nutrition restrictions as appropriate  Goal: Glucose maintained within prescribed range  Outcome: Progressing  Flowsheets (Taken 2/17/2023 0716)  Glucose maintained within prescribed range:   Monitor blood glucose as ordered   Assess for signs and symptoms of hyperglycemia and hypoglycemia   Administer ordered medications to maintain glucose within target range   Assess barriers to adequate nutritional intake and initiate nutrition consult as needed   Instruct patient on self management of diabetes and initiate consult as needed     Problem: Hematologic - Adult  Goal: Maintains hematologic stability  Outcome: Progressing  Flowsheets (Taken 2/17/2023 0716)  Maintains hematologic stability:   Assess for signs and symptoms of bleeding or hemorrhage   Monitor labs for bleeding or clotting disorders   Administer blood products/factors as ordered     Problem: Chronic Conditions and Co-morbidities  Goal: Patient's chronic conditions and co-morbidity symptoms are monitored and maintained or improved  Outcome:  Progressing  Flowsheets (Taken 2/17/2023 3082)  Care Plan - Patient's Chronic Conditions and Co-Morbidity Symptoms are Monitored and Maintained or Improved:   Monitor and assess patient's chronic conditions and comorbid symptoms for stability, deterioration, or improvement   Collaborate with multidisciplinary team to address chronic and comorbid conditions and prevent exacerbation or deterioration   Update acute care plan with appropriate goals if chronic or comorbid symptoms are exacerbated and prevent overall improvement and discharge     Problem: Nutrition Deficit:  Goal: Optimize nutritional status  Outcome: Progressing

## 2023-02-17 NOTE — PROGRESS NOTES
Progress Note( Dr. Travis Oakes)  2/17/2023  Subjective:   Admit Date: 2/12/2023  PCP: ANKITA Escobedo CNP    Admitted For : Altered mental state and worsening of the cough and shortness of breath       Consulted For:  hyothyroid / DM     Interval History: Patient more awake and alert this morning    Denies any chest pains,   Denies SOB . Chest tracheotomy due to oxygen  Denies nausea or vomiting. PEG tube tube feeding is going on  No new bowel or bladder symptoms. Intake/Output Summary (Last 24 hours) at 2/17/2023 0843  Last data filed at 2/17/2023 0645  Gross per 24 hour   Intake 2092.8 ml   Output 60 ml   Net 2032.8 ml         DATA    CBC:   Recent Labs     02/15/23  0315 02/16/23  0432   WBC 6.8 7.8   HGB 7.9* 8.1*    263      CMP:  Recent Labs     02/15/23  0315 02/16/23  0432 02/17/23  0411   * 136 134*   K 4.2 3.8 3.8   CL 94* 96* 94*   CO2 24 28 25   BUN 64* 42* 53*   CREATININE 4.6* 3.6* 4.4*   CALCIUM 8.7 8.8 8.8       Lipids:   Lab Results   Component Value Date/Time    CHOL 152 04/14/2021 10:02 AM    CHOL 139 07/23/2018 08:01 AM    HDL 43 08/26/2022 10:39 AM    TRIG 212 04/14/2021 10:02 AM     Glucose:  Recent Labs     02/16/23 2016 02/17/23  0009 02/17/23  0524   POCGLU 235* 161* 250*       [id number]:  Lab Results   Component Value Date/Time    LABA1C 7.0 02/12/2023 10:53 PM     High Sensitivity TSH:   Lab Results   Component Value Date/Time    TSHHS 6.190 02/13/2023 04:54 AM     Free T3:   Lab Results   Component Value Date/Time    FT3 1.6 02/12/2023 10:53 PM     Free T4:  Lab Results   Component Value Date/Time    T4FREE 0.78 02/12/2023 10:53 PM       XR CHEST PORTABLE   Preliminary Result   Decreasing bilateral pleural effusions with improving mild central pulmonary   edema. Stable cardiomegaly. Stable lines and tubes. CT CERVICAL SPINE WO CONTRAST   Final Result   No change from prior examination.   Prominent anterior osteophytes from C3-C4   to the C6-C7 discs.  These are sufficient size to cause swallowing   difficulties. IR TUNNELED CVC PLACE WO SQ PORT/PUMP > 5 YEARS   Final Result   Removal of previously placed tunneled dialysis access catheter with tip sent   for culture per prior order. Successful ultrasound and fluoroscopy guided new Port-A-Cath/tunneled   dialysis access catheter placement via right external jugular venous approach   with tip in the mid right atrium. XR CHEST PORTABLE   Final Result   Unchanged large right consolidation and interval presence of left airspace   disease. Possible bilateral pleural effusions.               Scheduled Medicines   Medications:    insulin glargine  25 Units SubCUTAneous Nightly    insulin NPH  10 Units SubCUTAneous QAM    insulin lispro  0-24 Units SubCUTAneous Q4H    epoetin jessenia-epbx  8,000 Units IntraVENous Once    midodrine  5 mg Oral TID WC    lansoprazole  30 mg Oral QAM AC    levothyroxine  100 mcg Per G Tube Daily    sodium chloride flush  5-40 mL IntraVENous 2 times per day    piperacillin-tazobactam  3,375 mg IntraVENous Q12H    amiodarone  200 mg Per G Tube Daily    aspirin  81 mg Per G Tube Daily    atorvastatin  40 mg Per G Tube Nightly    ipratropium-albuterol  1 vial Inhalation 4x Daily    metoprolol tartrate  12.5 mg Per G Tube BID    QUEtiapine  50 mg Per G Tube Nightly    traZODone  100 mg Per G Tube Nightly      Infusions:    sodium chloride Stopped (02/17/23 0608)    dextrose           Objective:   Vitals: BP (!) 89/59   Pulse (!) 101   Temp 97.6 °F (36.4 °C)   Resp 14   Ht 6' 2\" (1.88 m)   Wt 289 lb 3.9 oz (131.2 kg)   SpO2 94%   BMI 37.14 kg/m²   General appearance: alert and cooperative with exam  Neck: no JVD or bruit  Thyroid : Normal lobes   Lungs: Has Vesicular Breath sounds has tracheotomy with oxygen  Heart:  Atrial fibrillation   Abdomen: soft, non-tender; bowel sounds normal; no masses,  no organomegaly has PEG tube for feeding  musculoskeletal: Normal  Extremities: extremities normal, , no edema  Neurologic:  Awake, alert, oriented to name, place and time. Cranial nerves II-XII are grossly intact. Motor weakness sensory is intact. ,  and gait is abnormal.  Mostly bed ridden    Assessment:     Patient Active Problem List:     Atrial fibrillation ([facility]     CAD (coronary artery disease)     Morbid obesity (HCC)     COPD (chronic obstructive pulmonary disease) ([facility]     Sleep apnea     Type 2 diabetes mellitus ([address]     Thyroid disease     Hyperlipidemia     Acute congestive heart failure ([facility]     Coronary artery disease involving native coronary artery of native heart with angina pectoris (HCC)     Chronic renal disease, stage III ([address] [893557]     Hypertension     CAD, multiple vessel     Dyspnea     Moderate malnutrition (HCC)     Pneumonia of both lungs due to infectious organism     Severe malnutrition (HCC)     Chronic respiratory failure with hypoxia (HCC)     Aspiration pneumonia of right lower lobe (HCC)     ESRD on dialysis ([address]     Aspiration pneumonia, unspecified aspiration pneumonia type, unspecified laterality, unspecified part of lung ([address]     Acute aspiration pneumonia ([facility]      Plan:     Reviewed POC blood glucose . Labs and X ray results   Reviewed Current Medicines   On Correction bolus Humalog/ Basal Lantus Insulin regime /  Monitor Blood glucose frequently   Thyroid function test order antithyroid antibodies also ordered ultrasound but because of tracheotomy ultrasound could not be done  I have increased his dose of Synthroid  Modified  the dose of Insulin/ other medicines as needed   Will follow     .      David Franco MD, MD

## 2023-02-17 NOTE — DISCHARGE INSTR - COC
Continuity of Care Form    Patient Name: Marilu Finch   :  1950  MRN:  [id number]    Admit date:  2023  Discharge date:  23    Code Status Order: Full Code   Advance Directives:     Admitting Physician:  No admitting provider for patient encounter. PCP: Kemi Stewart, APRN - CNP    Discharging Nurse: Samuel Bradshaw RN  [address] Unit/Room#: 1943/7942-T  Discharging Unit Phone Number: [phone]    Emergency Contact:   Extended Emergency Contact Information  Primary Emergency Contact: Kevin(Jamin  Address: [address] [place] [address], [address] Phone: [phone]  Mobile Phone: [phone]  Relation: Brother/Sister  Secondary Emergency Contact: [address] Phone: [phone]  Relation: Domestic Partner  Preferred language: English   needed?  No    Past Surgical History:  Past Surgical History:   Procedure Laterality Date    CARDIAC CATHETERIZATION  08    mild disease mid RCA,  stent to LAD patent,    CARDIAC CATHETERIZATION  3/07    Stent to LAD patent, Diagonal 40-50%, RCA 40-50%    CARDIAC CATHETERIZATION  3/05    COLONOSCOPY      COLONOSCOPY  16    1 polyp removed, diverticulosis and hemorrhoids noted    CORONARY ANGIOPLASTY WITH STENT PLACEMENT  2005    PTCA with 3.5 x 16 TAXUS stent to LAD    CORONARY ARTERY BYPASS GRAFT N/A 2022    CABG CORONARY ARTERY BYPASS x3 (LIMA TO LAD, SVG TO PDA-RCA SEQUENTIAL) , INTRAOPERATIVE MILTON, ENDOVEIN HARVEST OF LEFT SAPHENOUS VEIN, MODIFIED MAZE PROCEDURE, LEFT ATRIAL APPENDAGE LIGATION, INTERCOSTAL NERVE BLOCK, INTRA-AORTIC BALLOON PUMP INSERTION performed by Dorothy La MD at [address] [place] [address] Entrance, COLON, DIAGNOSTIC      egd    HAND SURGERY Right     IR TUNNELED CATHETER PLACEMENT GREATER THAN 5 YEARS  2023    IR TUNNELED CATHETER PLACEMENT GREATER THAN 5 YEARS 2023 Jose D Case DO [facility] SPECIAL PROCEDURES    SD OPTX ANKLE DISLOCATION W/REPAIR/INT/XTRNL FIXJ Right 6/3/2019    RIGHT OPEN REDUCTION INTERNAL FIXATION OF DISTAL TIBIA  AND FIBULA performed by Divina Chau MD at [address] [place] [address] N/A 9/24/2022    STERNUM WASHOUT performed by Vincent Armendariz MD at [facility] OR       Immunization History:   Immunization History   Administered Date(s) Administered    COVID-19, PFIZER PURPLE top, DILUTE for use, (age 15 y+), 30mcg/0.3mL 03/09/2021, 03/30/2021, 11/17/2021    Influenza Vaccine, unspecified formulation 11/01/2017    Influenza Virus Vaccine 10/01/2018    Influenza, FLUAD, (age 72 y+), Adjuvanted, 0.5mL 10/27/2021    Influenza, Triv, 3 Years and older, IM (Afluria (5 yrs and older) 10/01/2011, 10/15/2014    Pneumococcal Conjugate 13-valent ([id number]) 06/28/2017    Pneumococcal Polysaccharide ([id number]) 01/01/2008, 05/18/2013, 04/01/2019    Tdap (Boostrix, Adacel) 12/17/2011       Active Problems:  Patient Active Problem List   Diagnosis Code    Atrial fibrillation ([facility]) I48.91    CAD (coronary artery disease) I25.10    Morbid obesity ([facility]) E66.01    COPD (chronic obstructive pulmonary disease) ([facility] J44.9    Sleep apnea G47.30    Type 2 diabetes mellitus ([facility] E11.9    Thyroid disease E07.9    Hyperlipidemia E78.5    Acute congestive heart failure ([facility]) I50.9    Coronary artery disease involving native coronary artery of native heart with angina pectoris ([facility]) I25.119    Chronic renal disease, stage III ([facility] [[id number]] N18.30    Hypertension I10    CAD, multiple vessel I25.10    Dyspnea R06.00    Moderate malnutrition ([facility]) E44.0    Pneumonia of both lungs due to infectious organism J18.9    Severe malnutrition ([facility]) E43    Chronic respiratory failure with hypoxia ([facility]) J96.11    Aspiration pneumonia of right lower lobe ([facility]) J69.0    ESRD on dialysis ([facility] N18.6, Z99.2    Aspiration pneumonia, unspecified aspiration pneumonia type, unspecified laterality, unspecified part of lung ([facility] J69.0    Acute aspiration pneumonia ([facility] J69.0       Isolation/Infection:   Isolation            No Isolation          Patient Infection Status       Infection Onset Added Last Indicated Last Indicated By Review Planned Expiration Resolved Resolved By    None active    Resolved    COVID-19 (Rule Out) 23 Respiratory Panel, Molecular, with COVID-19 (Restricted: peds pts or suitable admitted adults) (Ordered)   23 Rule-Out Test Resulted    COVID-19 (Rule Out) 22 COVID-19, Rapid (Ordered)   22 Rule-Out Test Resulted    COVID-19 (Rule Out) 22 Respiratory Panel, Molecular, with COVID-19 (Restricted: peds pts or suitable admitted adults) (Ordered)   22 Rule-Out Test Resulted    COVID-19 20 Covid-19 Ambulatory   20     COVID-19 (Rule Out) 20 Covid-19 Ambulatory (Ordered)   20 Rule-Out Test Resulted            Nurse Assessment:  Last Vital Signs: BP (!) 97/53   Pulse 99   Temp 97 °F (36.1 °C) (Oral)   Resp 18   Ht 6' 2\" (1.88 m)   Wt 289 lb 3.9 oz (131.2 kg)   SpO2 92%   BMI 37.14 kg/m²     Last documented pain score (0-10 scale):    Last Weight:   Wt Readings from Last 1 Encounters:   23 289 lb 3.9 oz (131.2 kg)     Mental Status:  oriented and alert    IV Access:  - Dialysis Catheter  - site  right, insertion date: ***    Nursing Mobility/ADLs:  Walking   Dependent  Transfer  Dependent  Bathing  Dependent  Dressing  Dependent  Toileting  Dependent  Feeding  Dependent  Med Admin  Dependent  Med Delivery   crushed and via PEG tube    Wound Care Documentation and Therapy:  Wound 22 Buttocks cluster (Active)   Number of days: 67       Wound 23 Left;Plantar (Active)   Number of days: 43       Incision 22 Knee Anterior; Left (Active)   Number of days: 148       Incision 22 Sternum (Active)   Number of days: 148        Elimination:  Continence:    Bowel: No  Bladder: No and Urinary Catheter: Removal Date 2/17/23    Colostomy/Ileostomy/Ileal Conduit: No       Date of Last BM: 2/17/23      Intake/Output Summary (Last 24 hours) at 2/17/2023 1638  Last data filed at 2/17/2023 1100  Gross per 24 hour   Intake 2592.8 ml   Output 825 ml   Net 1767.8 ml     I/O last 3 completed shifts: In: 3707.8 [I.V.:44.2; NG/GT:3209; IV Piggyback:454.6]  Out: 135 [Urine:135]    Safety Concerns: At Risk for Falls and Aspiration Risk    Impairments/Disabilities:      Speech    Patient's personal belongings (please select all that are sent with patient):  Glasses    RN SIGNATURE:  Electronically signed by Mirian Campbell RN on 2/17/23 at 6:01 PM EST    CASE MANAGEMENT/SOCIAL WORK SECTION    Inpatient Status Date: 2/12/2023    Readmission Risk Assessment Score:  Readmission Risk              Risk of Unplanned Readmission:  38           Discharging to Facility/ Agency   Name:   Address:  Phone:  Fax:    Dialysis Facility (if applicable)   Name:  Address:  Dialysis Schedule:  Phone:  Fax:    / signature: Electronically signed by Antwan Julian RN on 2/17/23 at 4:40 PM EST    PHYSICIAN SECTION    Prognosis: Fair    Condition at Discharge: Stable    Rehab Potential (if transferring to Rehab): Good    Nutrition Therapy:  Current Nutrition Therapy:       Routes of Feeding: PEG Tube  Liquids:   Daily Fluid Restriction: yes - amount 1800ml  Last Modified Barium Swallow with Video (Video Swallowing Test): not done    Treatments at the Time of Hospital Discharge:   Respiratory Treatments: O2 via tracheostomy  Oxygen Therapy:  is on oxygen at 6 L/min per nasal cannula.   Ventilator:    - No ventilator support    Rehab Therapies: Physical Therapy and Occupational Therapy  Weight Bearing Status/Restrictions: No weight bearing restrictions  Other Medical Equipment (for information only, NOT a DME order):  wheelchair, cane, and walker  Other Treatments:     Recommended Labs or Other Treatments After Discharge: Monitoring O2 as patient has thick secretions and need suctioning    Physician Certification: I certify the above information and transfer of Vincenzo Sherman  is necessary for the continuing treatment of the diagnosis listed and that he requires [facility] for less 30 days.      Update Admission H&P: No change in H&P    PHYSICIAN SIGNATURE:  Electronically signed by Roselia Figueroa MD on 2/17/23 at 6:11 PM EST

## 2023-02-17 NOTE — PROGRESS NOTES
pulmonary      SUBJECTIVE:  stable     OBJECTIVE    VITALS:  /64   Pulse (!) 102   Temp 97.6 °F (36.4 °C)   Resp 16   Ht 6' 2\" (1.88 m)   Wt 289 lb 3.9 oz (131.2 kg)   SpO2 94%   BMI 37.14 kg/m²   HEAD AND FACE EXAM:  No throat injection, no active exudate,no thrush  NECK EXAM;No JVD, no masses, symmetrical  CHEST EXAM; Expansion equal and symmetrical, no masses  LUNG EXAM; Good breath sounds bilaterally. There are expiratory wheezes both lungs, there are crackles at both lung bases  CARDIOVASCULAR EXAM: Positive S1 and S2, no S3 or S4, no clicks ,no murmurs  RIGHT AND LEFT LOWER EXTRIMITY EXAM: No edema, no swelling, no inflamation  CNS EXAM: Alert and oriented X3          LABS   Lab Results   Component Value Date    WBC 7.8 02/16/2023    HGB 8.1 (L) 02/16/2023    HCT 25.8 (L) 02/16/2023    .2 (H) 02/16/2023     02/16/2023     Lab Results   Component Value Date    CREATININE 4.4 (H) 02/17/2023    BUN 53 (H) 02/17/2023     (L) 02/17/2023    K 3.8 02/17/2023    CL 94 (L) 02/17/2023    CO2 25 02/17/2023     Lab Results   Component Value Date    INR 1.21 02/17/2023    PROTIME 15.7 (H) 02/17/2023          Lab Results   Component Value Date/Time    PHOS 3.7 02/15/2023 03:15 AM    PHOS 2.1 02/14/2023 06:10 AM    PHOS 3.5 02/13/2023 04:54 AM      No results for input(s): PH, PO2ART, INF4RZR, HCO3, BEART, O2SAT in the last 72 hours.       Wt Readings from Last 3 Encounters:   02/17/23 289 lb 3.9 oz (131.2 kg)   02/08/23 260 lb (117.9 kg)   02/08/23 260 lb (117.9 kg)               ASSESMENT  Ac on ch resp failure   pneumonia  copd        PLAN  Bd rx  Antibx  O2 adm    2/17/2023  Johana Mora MD, MCHRISTINA.

## 2023-02-17 NOTE — PROGRESS NOTES
Occupational Therapy    [facility] ED ACUTE CARE OCCUPATIONAL THERAPY EVALUATION    Severo Calamity, 1950, 2002/2002-A, 2/17/2023    Discharge Recommendation: Marvin Chapman    History:  [place]:  There were no encounter diagnoses. Subjective:  Patient states: \"I haven't needed to do too much for myself lately. \"  Pain: Pt denied pain this date  Communication with other providers: PT Jp, RN Claudean Coder, RN CRIS Dow  Restrictions: General Precautions, High Fall Risk, Tracheostomy, PEG tube, Gonzalez, Telemetry, Pulse Ox, BP cuff, Bed exit alarm    Home Setup/Prior level of function:  Social/Functional History  Lives With: Alone  Type of Home: House  Home Layout: One level  Home Access: Stairs to enter without rails (holds onto door molding)  Entrance Stairs - Number of Steps: 2  Bathroom Shower/Tub: Tub/Shower unit  Bathroom Toilet: Standard  Bathroom Equipment: Grab bars in shower, Shower chair  Home Equipment: Rolling walker, Wheelchair  ADL Assistance: Independent  Homemaking Assistance: Independent  Homemaking Responsibilities: Yes  Ambulation Assistance: Independent (used no AD)  Transfer Assistance: Independent  Active : Yes  Occupation: Retired  Additional Comments: Above information is for prior to pt's tracheostomy placement in September, 2022. Pt [facility] at [facility] for [facility] for several months and has had a large functional decline. Examination:  Observation: Supine in bed upon arrival. Agreeable to evaluation with encouragement and education. Significant other present throughout evaluation.   Vision: WFL (Glasses PRN)  Hearing: WFL  Vitals: Stable vitals throughout session    Body Systems and functions:  ROM: Active shoulder flexion grossly 0-90' in BL UEs, WFL distally in BL UEs  Strength: 4/5 MMT all major muscle groups BL UEs (no focal weakness or asymmetries noted)  Sensation: WFL in BL UEs (See PT evaluation for LE assessment)  Tone: Normal  Coordination: Decreased speed and dexterity in BL hands    Activities of Daily Living (ADLs):  Feeding: NPO (PEG tube)  Grooming: Min A (for thoroughness with facial hygiene task in High-Pedroza's position)  UB bathing: Max A   LB bathing: Dependent   UB dressing: Dependent (donning clean hospital gown)  LB dressing: Dependent (donning BL socks)  Toileting: Dependent (Gonzalez in place; total assist required for a bowel movement at this time)    Cognitive and Psychosocial Functioning:  Overall cognitive status: Mildly impaired (pt with delayed thought processing, decreased memory and recall of recent events, limited overall insight/safety awareness)  Affect: Normal     Balance:   Sitting: Initially fluctuated significantly from mod A to brief periods of CGA, regressed to max A with heavy Rt sided lean and retropulsion  Standing: Unsafe/unable to attempt this date    Functional Mobility:  Bed Mobility: Max A x 2 supine to sitting EOB, Max A x 2 sitting EOB to supine, Dependent x 2 for scooting hips up in bed  Transfers: Unsafe/unable to attempt this date  Ambulation: See above      AM-PAC 6 click short form for inpatient daily activity:   How much help from another person does the patient currently need... Unable  Dep A Lot  Max A A Lot   Mod A A Little  Min A A Little   CGA  SBA None   Mod I  Indep  Sup   1.  Putting on and taking off regular lower body clothing? [x] 1    [] 2   [] 2   [] 3   [] 3   [] 4      2. Bathing (including washing, rinsing, drying)? [x] 1   [] 2   [] 2 [] 3 [] 3 [] 4   3. Toileting, which includes using toilet, bedpan, or urinal? [x] 1    [] 2   [] 2   [] 3   [] 3   [] 4     4. Putting on and taking off regular upper body clothing? [x] 1   [] 2   [] 2   [] 3   [] 3    [] 4      5. Taking care of personal grooming such as brushing teeth? [] 1   [] 2    [] 2 [x] 3    [] 3   [] 4      6. Eating meals?   [x] 1   [] 2   [] 2   [] 3   [] 3   [] 4      Raw Score:  8   [24=0% impaired(CH), 23=1-19%(CI), 20-22=20-39%(CJ),  15-19=40-59%(CK), 10-14=60-79%(CL), 7-9=80-99%(CM), 6=100%(CN)]     Treatment:  Therapeutic Activity Training:   Therapeutic activity training was instructed today. Cues were given for safety, sequence, UE/LE placement, awareness, and balance. 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, COPD (chronic obstructive pulmonary disease) ([facility] Depression, Diabetes mellitus ([facility] Dupuytren's contracture of hand, Family history of cardiovascular disease, GERD (gastroesophageal reflux disease), H/O cardiac catheterization, H/O cardiac catheterization, H/O cardiovascular stress test, H/O cardiovascular stress test, H/O cardiovascular stress test, H/O cardiovascular stress test, H/O Doppler ultrasound, H/O echocardiogram, H/O echocardiogram, H/O echocardiogram, H/O hiatal hernia, Hx of Venous Doppler, Hyperlipidemia, Hypertension, Obesity, S/P PTCA (percutaneous transluminal coronary angioplasty), Sleep apnea, and Thyroid disease. Pt admitted with acute respiratory failure due to aspiration pneumonia. Pt with history of chronic tracheostomy and PEG tube. Pt has recently been at [facility] for rehab and needing significant assist with ADLs and mobility. Pt currently presents with the above impairments. Recommend continued OT services in SNF at discharge. Complexity: High  Prognosis: Fair  Plan: 2+x/week    Goals:  1. Pt will complete all aspects of bed mobility for EOB/OOB ADLs mod A x 2  2. Pt will complete UB/LB bathing max A with setup using long handled sponge PRN  3.  Pt will complete all aspects of LB dressing max A with setup using AE PRN  4. Pt will complete all functional transfers to and from bed, chair, and BSC max A x 2  5. Pt will complete all aspects of toileting task on BSC max A   6. Pt will complete oral hygiene/grooming routine in unsupported sitting EOB CGA with setup  7.  Pt will complete ther ex/ther act with focus on UE strengthening and static/dynamic sitting balance for improved performance with EOB ADLs    Time:   Time in: 1320  Time out: 1350  Timed treatment minutes: 15  Total time: 30    Electronically signed by:    SARAH Gil/L, [address] [facility], .[id number]

## 2023-02-18 LAB
CULTURE: NORMAL
Lab: NORMAL
SPECIMEN: NORMAL

## 2023-02-18 NOTE — PLAN OF CARE
Problem: Discharge Planning  Goal: Discharge to home or other facility with appropriate resources  2/17/2023 1958 by Kimberly Sofia RN  Outcome: Completed  2/17/2023 1428 by Kimberly Sofia RN  Outcome: Progressing  Flowsheets (Taken 2/17/2023 3234)  Discharge to home or other facility with appropriate resources:   Identify barriers to discharge with patient and caregiver   Arrange for needed discharge resources and transportation as appropriate   Identify discharge learning needs (meds, wound care, etc)   Refer to discharge planning if patient needs post-hospital services based on physician order or complex needs related to functional status, cognitive ability or social support system     Problem: Skin/Tissue Integrity  Goal: Absence of new skin breakdown  Description: 1. Monitor for areas of redness and/or skin breakdown  2. Assess vascular access sites hourly  3. Every 4-6 hours minimum:  Change oxygen saturation probe site  4. Every 4-6 hours:  If on nasal continuous positive airway pressure, respiratory therapy assess nares and determine need for appliance change or resting period.   2/17/2023 1958 by Kimberly Sofia RN  Outcome: Completed  2/17/2023 1428 by Kimberly Sofia RN  Outcome: Progressing     Problem: Safety - Adult  Goal: Free from fall injury  2/17/2023 1958 by Kimberly Sofia RN  Outcome: Completed  2/17/2023 1428 by Kimberly Sofia RN  Outcome: Progressing     Problem: Pain  Goal: Verbalizes/displays adequate comfort level or baseline comfort level  2/17/2023 1958 by Kimberly Sofia RN  Outcome: Completed  2/17/2023 1428 by Kimberly Sofia RN  Outcome: Progressing     Problem: Respiratory - Adult  Goal: Achieves optimal ventilation and oxygenation  2/17/2023 1958 by Kimberly Sofia RN  Outcome: Completed  2/17/2023 1428 by Kimberly Sofia RN  Outcome: Progressing  Flowsheets (Taken 2/17/2023 1716)  Achieves optimal ventilation and oxygenation:   Assess for changes in respiratory status   Assess for changes in mentation and behavior   Position to facilitate oxygenation and minimize respiratory effort   Oxygen supplementation based on oxygen saturation or arterial blood gases   Initiate smoking cessation protocol as indicated   Encourage broncho-pulmonary hygiene including cough, deep breathe, incentive spirometry   Assess the need for suctioning and aspirate as needed   Assess and instruct to report shortness of breath or any respiratory difficulty   Respiratory therapy support as indicated     Problem: Cardiovascular - Adult  Goal: Maintains optimal cardiac output and hemodynamic stability  2/17/2023 1958 by Rocío Heath RN  Outcome: Completed  2/17/2023 1428 by Rocío Heath RN  Outcome: Progressing  Flowsheets (Taken 2/17/2023 0716)  Maintains optimal cardiac output and hemodynamic stability:   Monitor blood pressure and heart rate   Monitor urine output and notify Licensed Independent Practitioner for values outside of normal range   Assess for signs of decreased cardiac output   Administer fluid and/or volume expanders as ordered   Administer vasoactive medications as ordered  Goal: Absence of cardiac dysrhythmias or at baseline  2/17/2023 1958 by Rocío Heath RN  Outcome: Completed  2/17/2023 1428 by Rocío Heath RN  Outcome: Progressing  Flowsheets (Taken 2/17/2023 0716)  Absence of cardiac dysrhythmias or at baseline:   Administer antiarrhythmia medication and electrolyte replacement as ordered   Monitor cardiac rate and rhythm   Assess for signs of decreased cardiac output     Problem: Gastrointestinal - Adult  Goal: Maintains or returns to baseline bowel function  2/17/2023 1958 by Rocío Heath RN  Outcome: Completed  2/17/2023 1428 by Rocío Heath RN  Outcome: Progressing  Flowsheets (Taken 2/17/2023 0716)  Maintains or returns to baseline bowel function:   Assess bowel function   Encourage oral fluids to ensure adequate hydration Administer IV fluids as ordered to ensure adequate hydration   Administer ordered medications as needed   Encourage mobilization and activity   Nutrition consult to assist patient with appropriate food choices  Goal: Maintains adequate nutritional intake  2/17/2023 1958 by Lorene Manzano RN  Outcome: Completed  2/17/2023 1428 by Lorene Manzano RN  Outcome: Progressing  Flowsheets (Taken 2/17/2023 0716)  Maintains adequate nutritional intake:   Monitor percentage of each meal consumed   Identify factors contributing to decreased intake, treat as appropriate   Assist with meals as needed   Monitor intake and output, weight and lab values   Obtain nutritional consult as needed     Problem: Metabolic/Fluid and Electrolytes - Adult  Goal: Electrolytes maintained within normal limits  2/17/2023 1958 by Lorene Manzano RN  Outcome: Completed  2/17/2023 1428 by Lorene Manzano RN  Outcome: Progressing  Flowsheets (Taken 2/17/2023 0716)  Electrolytes maintained within normal limits:   Monitor labs and assess patient for signs and symptoms of electrolyte imbalances   Administer electrolyte replacement as ordered   Monitor response to electrolyte replacements, including repeat lab results as appropriate   Fluid restriction as ordered   Instruct patient on fluid and nutrition restrictions as appropriate  Goal: Hemodynamic stability and optimal renal function maintained  2/17/2023 1958 by Lorene Manzano RN  Outcome: Completed  2/17/2023 1428 by Lorene Manzano RN  Outcome: Progressing  Flowsheets (Taken 2/17/2023 0716)  Hemodynamic stability and optimal renal function maintained:   Monitor labs and assess for signs and symptoms of volume excess or deficit   Monitor intake, output and patient weight   Monitor urine specific gravity, serum osmolarity and serum sodium as indicated or ordered   Monitor response to interventions for patient's volume status, including labs, urine output, blood pressure (other measures as available)   Encourage oral intake as appropriate   Instruct patient on fluid and nutrition restrictions as appropriate  Goal: Glucose maintained within prescribed range  2/17/2023 1958 by Jessee Tidwell RN  Outcome: Completed  2/17/2023 1428 by Jessee Tidwell RN  Outcome: Progressing  Flowsheets (Taken 2/17/2023 0716)  Glucose maintained within prescribed range:   Monitor blood glucose as ordered   Assess for signs and symptoms of hyperglycemia and hypoglycemia   Administer ordered medications to maintain glucose within target range   Assess barriers to adequate nutritional intake and initiate nutrition consult as needed   Instruct patient on self management of diabetes and initiate consult as needed     Problem: Hematologic - Adult  Goal: Maintains hematologic stability  2/17/2023 1958 by Jessee Tidwell RN  Outcome: Completed  2/17/2023 1428 by Jessee Tidwell RN  Outcome: Progressing  Flowsheets (Taken 2/17/2023 0716)  Maintains hematologic stability:   Assess for signs and symptoms of bleeding or hemorrhage   Monitor labs for bleeding or clotting disorders   Administer blood products/factors as ordered     Problem: Chronic Conditions and Co-morbidities  Goal: Patient's chronic conditions and co-morbidity symptoms are monitored and maintained or improved  2/17/2023 1958 by Jessee Tidwell RN  Outcome: Completed  2/17/2023 1428 by Jessee Tidwell RN  Outcome: Progressing  Flowsheets (Taken 2/17/2023 0716)  Care Plan - Patient's Chronic Conditions and Co-Morbidity Symptoms are Monitored and Maintained or Improved:   Monitor and assess patient's chronic conditions and comorbid symptoms for stability, deterioration, or improvement   Collaborate with multidisciplinary team to address chronic and comorbid conditions and prevent exacerbation or deterioration   Update acute care plan with appropriate goals if chronic or comorbid symptoms are exacerbated and prevent overall improvement and discharge Problem: Nutrition Deficit:  Goal: Optimize nutritional status  2/17/2023 1958 by Jose Ramon Mendieta RN  Outcome: Completed  2/17/2023 1428 by Jose Ramon Mendieta RN  Outcome: Progressing

## 2023-02-18 NOTE — PROGRESS NOTES
Pt transported by ambulance to [address] [place] [address] with all belongings except balloons which he declined to take. Maria Gomez faxed to [address] [place] [address] at [phone]. Attempted to call report twice to [address] [place] [address] at [phone], phone rang to nurses station for several minutes with no answer either time.

## 2023-02-19 ENCOUNTER — APPOINTMENT (OUTPATIENT)
Dept: CT IMAGING | Age: 73
End: 2023-02-19
Payer: COMMERCIAL

## 2023-02-19 ENCOUNTER — APPOINTMENT (OUTPATIENT)
Dept: GENERAL RADIOLOGY | Age: 73
End: 2023-02-19
Payer: COMMERCIAL

## 2023-02-19 ENCOUNTER — HOSPITAL ENCOUNTER (INPATIENT)
Age: 73
LOS: 5 days | Discharge: NURSING, REHAB OR LONG-TERM CARE | End: 2023-02-24
Attending: INTERNAL MEDICINE | Admitting: INTERNAL MEDICINE
Payer: COMMERCIAL

## 2023-02-19 DIAGNOSIS — R09.89 PULMONARY VASCULAR CONGESTION: ICD-10-CM

## 2023-02-19 DIAGNOSIS — R07.9 CHEST PAIN, UNSPECIFIED TYPE: Primary | ICD-10-CM

## 2023-02-19 DIAGNOSIS — J96.21 ACUTE ON CHRONIC RESPIRATORY FAILURE WITH HYPOXIA (HCC): ICD-10-CM

## 2023-02-19 DIAGNOSIS — R77.8 ELEVATED TROPONIN: ICD-10-CM

## 2023-02-19 DIAGNOSIS — R09.1: ICD-10-CM

## 2023-02-19 LAB
ALBUMIN SERPL-MCNC: 3.6 GM/DL (ref 3.4–5)
ALP BLD-CCNC: 213 IU/L (ref 40–129)
ALT SERPL-CCNC: 11 U/L (ref 10–40)
ANION GAP SERPL CALCULATED.3IONS-SCNC: 12 MMOL/L (ref 4–16)
AST SERPL-CCNC: 14 IU/L (ref 15–37)
BASE EXCESS MIXED: 2 (ref 0–1.2)
BASOPHILS ABSOLUTE: 0.1 K/CU MM
BASOPHILS RELATIVE PERCENT: 1.2 % (ref 0–1)
BILIRUB SERPL-MCNC: 0.3 MG/DL (ref 0–1)
BUN SERPL-MCNC: 53 MG/DL (ref 6–23)
CALCIUM SERPL-MCNC: 10.5 MG/DL (ref 8.3–10.6)
CHLORIDE BLD-SCNC: 90 MMOL/L (ref 99–110)
CO2: 27 MMOL/L (ref 21–32)
COMMENT: ABNORMAL
CREAT SERPL-MCNC: 4.6 MG/DL (ref 0.9–1.3)
DIFFERENTIAL TYPE: ABNORMAL
EOSINOPHILS ABSOLUTE: 0.3 K/CU MM
EOSINOPHILS RELATIVE PERCENT: 3.6 % (ref 0–3)
GFR SERPL CREATININE-BSD FRML MDRD: 13 ML/MIN/1.73M2
GLUCOSE SERPL-MCNC: 239 MG/DL (ref 70–99)
HCO3 VENOUS: 29.7 MMOL/L (ref 19–25)
HCT VFR BLD CALC: 29.7 % (ref 42–52)
HEMOGLOBIN: 9.4 GM/DL (ref 13.5–18)
IMMATURE NEUTROPHIL %: 3.8 % (ref 0–0.43)
LACTIC ACID, SEPSIS: 1.3 MMOL/L (ref 0.5–1.9)
LIPASE: 18 IU/L (ref 13–60)
LYMPHOCYTES ABSOLUTE: 1.6 K/CU MM
LYMPHOCYTES RELATIVE PERCENT: 18.8 % (ref 24–44)
MCH RBC QN AUTO: 35.1 PG (ref 27–31)
MCHC RBC AUTO-ENTMCNC: 31.6 % (ref 32–36)
MCV RBC AUTO: 110.8 FL (ref 78–100)
MONOCYTES ABSOLUTE: 0.7 K/CU MM
MONOCYTES RELATIVE PERCENT: 8.5 % (ref 0–4)
NUCLEATED RBC %: 0.4 %
O2 SAT, VEN: 77.1 % (ref 50–70)
PCO2, VEN: 59 MMHG (ref 38–52)
PDW BLD-RTO: 19.6 % (ref 11.7–14.9)
PH VENOUS: 7.31 (ref 7.32–7.42)
PLATELET # BLD: 288 K/CU MM (ref 140–440)
PMV BLD AUTO: 8.8 FL (ref 7.5–11.1)
PO2, VEN: 45 MMHG (ref 28–48)
POTASSIUM SERPL-SCNC: 3.9 MMOL/L (ref 3.5–5.1)
PRO-BNP: 9145 PG/ML
PROCALCITONIN SERPL-MCNC: 0.34 NG/ML
RBC # BLD: 2.68 M/CU MM (ref 4.6–6.2)
SEGMENTED NEUTROPHILS ABSOLUTE COUNT: 5.4 K/CU MM
SEGMENTED NEUTROPHILS RELATIVE PERCENT: 64.1 % (ref 36–66)
SODIUM BLD-SCNC: 129 MMOL/L (ref 135–145)
TOTAL IMMATURE NEUTOROPHIL: 0.32 K/CU MM
TOTAL NUCLEATED RBC: 0 K/CU MM
TOTAL PROTEIN: 7 GM/DL (ref 6.4–8.2)
TROPONIN T: 0.22 NG/ML
WBC # BLD: 8.4 K/CU MM (ref 4–10.5)

## 2023-02-19 PROCEDURE — 84145 PROCALCITONIN (PCT): CPT

## 2023-02-19 PROCEDURE — 71250 CT THORAX DX C-: CPT

## 2023-02-19 PROCEDURE — 83880 ASSAY OF NATRIURETIC PEPTIDE: CPT

## 2023-02-19 PROCEDURE — 96374 THER/PROPH/DIAG INJ IV PUSH: CPT

## 2023-02-19 PROCEDURE — 2700000000 HC OXYGEN THERAPY PER DAY

## 2023-02-19 PROCEDURE — 89220 SPUTUM SPECIMEN COLLECTION: CPT

## 2023-02-19 PROCEDURE — 6360000002 HC RX W HCPCS: Performed by: INTERNAL MEDICINE

## 2023-02-19 PROCEDURE — 87070 CULTURE OTHR SPECIMN AEROBIC: CPT

## 2023-02-19 PROCEDURE — 2060000000 HC ICU INTERMEDIATE R&B

## 2023-02-19 PROCEDURE — 6370000000 HC RX 637 (ALT 250 FOR IP): Performed by: INTERNAL MEDICINE

## 2023-02-19 PROCEDURE — 6370000000 HC RX 637 (ALT 250 FOR IP): Performed by: PHYSICIAN ASSISTANT

## 2023-02-19 PROCEDURE — 84484 ASSAY OF TROPONIN QUANT: CPT

## 2023-02-19 PROCEDURE — 94640 AIRWAY INHALATION TREATMENT: CPT

## 2023-02-19 PROCEDURE — 93005 ELECTROCARDIOGRAM TRACING: CPT | Performed by: PHYSICIAN ASSISTANT

## 2023-02-19 PROCEDURE — 87205 SMEAR GRAM STAIN: CPT

## 2023-02-19 PROCEDURE — 2580000003 HC RX 258: Performed by: INTERNAL MEDICINE

## 2023-02-19 PROCEDURE — 80053 COMPREHEN METABOLIC PANEL: CPT

## 2023-02-19 PROCEDURE — 83690 ASSAY OF LIPASE: CPT

## 2023-02-19 PROCEDURE — 85025 COMPLETE CBC W/AUTO DIFF WBC: CPT

## 2023-02-19 PROCEDURE — 99285 EMERGENCY DEPT VISIT HI MDM: CPT

## 2023-02-19 PROCEDURE — 6360000002 HC RX W HCPCS: Performed by: PHYSICIAN ASSISTANT

## 2023-02-19 PROCEDURE — 71045 X-RAY EXAM CHEST 1 VIEW: CPT

## 2023-02-19 PROCEDURE — 83605 ASSAY OF LACTIC ACID: CPT

## 2023-02-19 PROCEDURE — 82805 BLOOD GASES W/O2 SATURATION: CPT

## 2023-02-19 RX ORDER — FENTANYL CITRATE 50 UG/ML
50 INJECTION, SOLUTION INTRAMUSCULAR; INTRAVENOUS ONCE
Status: COMPLETED | OUTPATIENT
Start: 2023-02-19 | End: 2023-02-19

## 2023-02-19 RX ORDER — FUROSEMIDE 10 MG/ML
60 INJECTION INTRAMUSCULAR; INTRAVENOUS ONCE
Status: COMPLETED | OUTPATIENT
Start: 2023-02-19 | End: 2023-02-20

## 2023-02-19 RX ORDER — ASPIRIN 81 MG/1
324 TABLET, CHEWABLE ORAL ONCE
Status: COMPLETED | OUTPATIENT
Start: 2023-02-19 | End: 2023-02-19

## 2023-02-19 RX ORDER — IPRATROPIUM BROMIDE AND ALBUTEROL SULFATE 2.5; .5 MG/3ML; MG/3ML
1 SOLUTION RESPIRATORY (INHALATION)
Status: DISCONTINUED | OUTPATIENT
Start: 2023-02-19 | End: 2023-02-24 | Stop reason: HOSPADM

## 2023-02-19 RX ORDER — ACETYLCYSTEINE 100 MG/ML
4 SOLUTION ORAL; RESPIRATORY (INHALATION) ONCE
Status: COMPLETED | OUTPATIENT
Start: 2023-02-19 | End: 2023-02-19

## 2023-02-19 RX ORDER — FUROSEMIDE 10 MG/ML
40 INJECTION INTRAMUSCULAR; INTRAVENOUS ONCE
Status: DISCONTINUED | OUTPATIENT
Start: 2023-02-19 | End: 2023-02-19

## 2023-02-19 RX ADMIN — IPRATROPIUM BROMIDE AND ALBUTEROL SULFATE 1 AMPULE: 2.5; .5 SOLUTION RESPIRATORY (INHALATION) at 19:54

## 2023-02-19 RX ADMIN — FENTANYL CITRATE 50 MCG: 50 INJECTION INTRAMUSCULAR; INTRAVENOUS at 18:55

## 2023-02-19 RX ADMIN — VANCOMYCIN HYDROCHLORIDE 2000 MG: 1 INJECTION, POWDER, LYOPHILIZED, FOR SOLUTION INTRAVENOUS at 22:39

## 2023-02-19 RX ADMIN — CEFEPIME HYDROCHLORIDE 2000 MG: 2 INJECTION, POWDER, FOR SOLUTION INTRAVENOUS at 21:44

## 2023-02-19 RX ADMIN — ACETYLCYSTEINE 400 MG: 100 INHALANT RESPIRATORY (INHALATION) at 19:54

## 2023-02-19 RX ADMIN — ASPIRIN 324 MG: 81 TABLET, CHEWABLE ORAL at 19:28

## 2023-02-19 ASSESSMENT — PAIN DESCRIPTION - LOCATION: LOCATION: CHEST

## 2023-02-19 ASSESSMENT — PAIN SCALES - GENERAL: PAINLEVEL_OUTOF10: 6

## 2023-02-19 ASSESSMENT — PAIN DESCRIPTION - DESCRIPTORS: DESCRIPTORS: ACHING

## 2023-02-19 NOTE — ED PROVIDER NOTES
I personally saw Cassidy Trevino and performed a substantive portion of the visit including all aspects of the medical decision making. In brief, 69 yo M who presents with complaint of chest pressure which has been present intermittently for the past week. Focused exam revealed the patient appears chronically ill. Mucous membranes are moist. Speech is clear. Breathing is intermittently labored. Copious secretion from trach. Skin is dry. Mental status is normal. The patient moves all extremities and is without facial droop. EKG shows atrial fibrillation with ventricular response rate of 101. Right bundle branch block noted. Overall appears similar to prior tracing. ED course:  I became involved in the patient's care when he developed intermittent hypoxia and increased work of breathing. Require multiple, aggressive in line suctioning per RT and RN with gradual improvement. Initial workup concerning for volume overload, given known ESRD, elevated BNP, and findings on CXR. Lasix initiated in the ED. RT was able to administer mucinex and place the patient on humidified oxygen with resolution of these episodes. Dr. Ledy Patton also at bedside and aware of these episodes. CRITICAL CARE:  I personally saw the patient and independently provided 39 minutes of non-concurrent critical care out of the total shared critical care time provided. This does not include time spent on separately reported billable procedures. Critical care time provided for respiratory distress that required close evaluation and/or intervention with concern for patient decompensation. Final Impression:  1. Chest pain, unspecified type    2. Acute on chronic respiratory failure with hypoxia (HCC)    3. Acute pleurisy without pleural effusion    4. Pulmonary vascular congestion    5.  Elevated troponin      DISPOSITION Admitted 02/19/2023 07:27:26 PM      All diagnostic, treatment, and disposition decisions were made by myself in conjunction with the advanced practice provider. For all further details of the patient's emergency department visit, please see the advanced practice provider's documentation. Comment: Please note this report has been produced using speech recognition software and may contain errors related to that system including errors in grammar, punctuation, and spelling, as well as words and phrases that may be inappropriate. If there are any questions or concerns please feel free to contact the dictating provider for clarification.      [address] [place] [address],   02/22/23 2154

## 2023-02-19 NOTE — ED PROVIDER NOTES
Triage Chief Complaint:   Chest Pain (Pressure all over ) and Shortness of Breath (Newly placed on 3L of oxygen )    [place]:  Today in the ED I had the pleasure of caring for Ashley Santacruz who is a 68 y.o. male that presents today to the emergency department complaining of chest pressure. Ongoing intermittently x1 week. Patient does have a long story medical history including ESRD, chronic respiratory failure, tracheostomy, CABG, multiple cardiac stents. Pressure is \"all over his chest\". Nonradiating. He also endorses increased shortness of breath. No fevers or chills. No nausea vomiting. No abdominal pain flank pain back pain. No generalized weakness or fatigue per patient. ROS:  REVIEW OF SYSTEMS    At least 10 systems reviewed      All other review of systems are negative  See HPI and nursing notes for additional information       Past Medical History:   Diagnosis Date    Allergic rhinitis     Anticoagulated on Coumadin     1/6/17-**[facility] to follow pt's PT/INRs, along w/prescribing his Coumadin. **    Anxiety     Arthritis     Atrial fibrillation (HCC)     On Coumadin.     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 09/21/2022    positive at 15C, negative at 18C    COPD (chronic obstructive pulmonary disease) (HCC)     Depression     Diabetes mellitus (HCC)     NIDDM- dx over 10 yrs ago- follows with Dr Charlie Mcnair    Dupuysuresh's contracture of hand     Right hand    Family history of cardiovascular disease     Father-MI    GERD (gastroesophageal reflux disease)     H/O cardiac catheterization 03/2007    cardiac cath- stent LAD patent, [id number]%, RCA 40-50%    H/O cardiac catheterization 06/12/2008    cardiac cath- mild disease mid RCA    H/O cardiovascular stress test 04/10/2014    cardiolite- normal, no ischemia, EF63%    H/O cardiovascular stress test 09/21/2016    EF59% normal study  pt in atrial fib    H/O cardiovascular stress test 09/20/2017    EF 60%   afib H/O cardiovascular stress test 11/07/2018    EF60% normal study    H/O Doppler ultrasound     1/11/2010-CAROTID_Intimal thickening but no significant atherosclerotic plaque noted in LAUREN. Doppler flow velocities within the LAUREN are WNL. Intimal thickening but no significant atherosclerotic plaque noted in LICA. Doppler flow velociities within the LICA are WNL. H/O echocardiogram 04/10/2014    EF 60%, normal LV systolic function, mild mitral and tricuspid insufficiencies, no pericardial effusion.     H/O echocardiogram 09/21/2016    EF60% normal study    H/O echocardiogram 11/07/2018    EF55-60% no significant valular disease    H/O hiatal hernia     Hx of Venous Doppler 03/14/2019    Significant reflux in Left Deep System, No reflux in right lower extremity, No DVT or SVT    Hyperlipidemia     Hypertension     Obesity     S/P PTCA (percutaneous transluminal coronary angioplasty) 03/21/2005    s/p PTCA with 3.5 X 16 TAXUS stent to LAD- follows with Dr Claudette Nguyen    Sleep apnea     had sleep study done 10+ yrs ago- has cpap but does not use it    Thyroid disease     hypothyroid     Past Surgical History:   Procedure Laterality Date    CARDIAC CATHETERIZATION  6/12/08    mild disease mid RCA,  stent to LAD patent,    CARDIAC CATHETERIZATION  3/07    Stent to LAD patent, Diagonal 40-50%, RCA 40-50%    CARDIAC CATHETERIZATION  3/05    COLONOSCOPY  2011    COLONOSCOPY  5/18/16    1 polyp removed, diverticulosis and hemorrhoids noted    CORONARY ANGIOPLASTY WITH STENT PLACEMENT  03/21/2005    PTCA with 3.5 x 16 TAXUS stent to LAD    CORONARY ARTERY BYPASS GRAFT N/A 9/22/2022    CABG CORONARY ARTERY BYPASS x3 (LIMA TO LAD, SVG TO PDA-RCA SEQUENTIAL) , INTRAOPERATIVE MILTON, ENDOVEIN HARVEST OF LEFT SAPHENOUS VEIN, MODIFIED MAZE PROCEDURE, LEFT ATRIAL APPENDAGE LIGATION, INTERCOSTAL NERVE BLOCK, INTRA-AORTIC BALLOON PUMP INSERTION performed by Christopher Griffith MD at [address] [place] [address], [address], DIAGNOSTIC  2014    egd    HAND SURGERY Right     IR TUNNELED CATHETER PLACEMENT GREATER THAN 5 YEARS  2023    IR TUNNELED CATHETER PLACEMENT GREATER THAN 5 YEARS 2023 Néstor Sanchez DO [address] [facility] SPECIAL PROCEDURES    WI OPTX ANKLE DISLOCATION W/REPAIR/INT/XTRNL FIXJ Right 6/3/2019    RIGHT OPEN REDUCTION INTERNAL FIXATION OF DISTAL TIBIA  AND FIBULA performed by Rosa M Botello MD at [address] [place] [address] N/A 2022    STERNUM WASHOUT performed by Melo Houston MD at [address] [facility] OR     Family History   Problem Relation Age of Onset    Cancer Mother         breast ca    Coronary Art Dis Father          MI    Heart Disease Father     Rheum Arthritis Other     Rheum Arthritis Sister     No Known Problems Brother     Rheum Arthritis Sister      Social History     Socioeconomic History    Marital status:       Spouse name: Not on file    Number of children: 1    Years of education: Not on file    Highest education level: Not on file   Occupational History     Comment: RETIRED   Tobacco Use    Smoking status: Former     Packs/day: 1.00     Years: 10.00     Pack years: 10.00     Types: Cigarettes     Quit date: 1976     Years since quittin.1    Smokeless tobacco: Never   Vaping Use    Vaping Use: Never used   Substance and Sexual Activity    Alcohol use: Not Currently     Alcohol/week: 6.0 standard drinks     Types: 6 Cans of beer per week     Comment: caffeine 2 cups of tea a day     Drug use: No    Sexual activity: Never   Other Topics Concern    Not on file   Social History Narrative    Not on file     Social Determinants of Health     Financial Resource Strain: Low Risk     Difficulty of Paying Living Expenses: Not hard at all   Food Insecurity: No Food Insecurity    Worried About Running Out of Food in the Last Year: Never true    Ran Out of Food in the Last Year: Never true   Transportation Needs: Not on file   Physical Activity: Sufficiently Active    Days of Exercise per Week: 7 days    Minutes of Exercise per Session: 30 min   Stress: Not on file   Social Connections: Not on file   Intimate Partner Violence: Not on file   Housing Stability: Not on file     Current Facility-Administered Medications   Medication Dose Route Frequency Provider Last Rate Last Admin    aspirin chewable tablet 324 mg  324 mg Oral Once [employer], PA-C        furosemide (LASIX) injection 60 mg  60 mg IntraVENous Once [employer], PA-C         Current Outpatient Medications   Medication Sig Dispense Refill    insulin glargine (LANTUS) 100 UNIT/ML injection vial Inject 25 Units into the skin nightly 10 mL 3    metoprolol tartrate (LOPRESSOR) 25 MG tablet 0.5 tablets by Per G Tube route 2 times daily 60 tablet 3    polyvinyl alcohol (LIQUIFILM TEARS) 1.4 % ophthalmic solution Place 2 drops into both eyes 4 times daily as needed for Dry Eyes 1 each 4    amoxicillin-clavulanate (AUGMENTIN) 875-125 MG per tablet 1 tablet by PEG Tube route 2 times daily for 2 days 4 tablet 0    midodrine (PROAMATINE) 5 MG tablet Take 1 tablet by mouth 3 times daily (with meals) 90 tablet 3    levothyroxine (SYNTHROID) 100 MCG tablet 1 tablet by Per G Tube route Daily 30 tablet 3    lansoprazole 3 MG/ML SUSP Take 10 mLs by mouth every morning (before breakfast) 300 mL 0    polyethylene glycol (GLYCOLAX) 17 GM/SCOOP powder Take 17 g by mouth 2 times daily 1 each 1    sennosides-docusate sodium (SENOKOT-S) 8.6-50 MG tablet Take 2 tablets by mouth daily as needed for Constipation 60 tablet 0    ipratropium-albuterol (DUONEB) 0.5-2.5 (3) MG/3ML SOLN nebulizer solution Inhale 3 mLs into the lungs 4 times daily 360 mL 0    amiodarone (CORDARONE) 200 MG tablet 200 mg daily      atorvastatin (LIPITOR) 40 MG tablet 40 mg nightly      melatonin 3 MG TABS tablet 9 mg nightly      Nutritional Supplements (NEPRO/CARBSTEADY) LIQD 50 mL/hr continuous      Nutritional Supplements (PROSOURCE TF) LIQD 1 Package 3 times daily      traZODone (DESYREL) 100 MG tablet 100 mg nightly      QUEtiapine (SEROQUEL) 50 MG tablet 50 mg nightly      aspirin 81 MG chewable tablet 1 tablet by Per NG tube route daily 30 tablet 3    blood glucose monitor kit and supplies Dispense sufficient amount for indicated testing frequency plus additional to accommodate PRN testing needs. Dispense all needed supplies to include: monitor, strips, lancing device, lancets, control solutions, alcohol swabs. 1 kit 0    Lancets MISC by D3EA route three times a day. 100 each 3     No Known Allergies    Nursing Notes Reviewed    Physical Exam:  ED Triage Vitals   Enc Vitals Group      BP 02/19/23 1703 124/79      Heart Rate 02/19/23 1703 92      Resp 02/19/23 1710 16      Temp --       Temp src --       SpO2 02/19/23 1703 94 %      Weight --       Height --       Head Circumference --       Peak Flow --       Pain Score --       Pain Loc --       Pain Edu? --       Excl. in [address]? --      General :Patient is awake alert oriented person place and time no acute distress. Chronically ill and deconditioned elderly male. Appears stated age  [de-identified]: Pupils are equally round and reactive to light extraocular motors are intact conjunctivae clear sclerae white there is no injection no icterus. Nose without any rhinorrhea or epistaxis. Oral mucosa is moist no exudate buccal mucosa shows no ulcerations. Uvula is midline    Neck: Neck is supple full range of motion trachea midline thyroid nonpalpable. Tracheostomy in place. Clear mucus noted within the tube no surrounding erythema heat or tenderness. Cardiac: Heart regular rate rhythm no murmurs rubs clicks or gallops  Lungs: Lungs are clear to auscultation there is no wheezing rhonchi or rales. There is no use of accessory muscles no nasal flaring identified. Chest wall: There is no tenderness to palpation over the chest wall or over ribs  Abdomen: Abdomen is soft nontender nondistended.  There is no firm or pulsatile masses no rebound rigidity or guarding negative Sanz's negative McBurney, no peritoneal signs  Suprapubic:  there is no tenderness to palpation over the external bladder   Musculoskeletal: 5 out of 5 strength in all 4 extremities full flexion extension abduction and adduction supination pronation of all extremities and all digits. No obvious muscle atrophy is noted. No focal muscle deficits are appreciated  Dermatology: Skin is warm and dry there is no obvious abscesses lacerations or lesions noted  Psych: Mentation is grossly normal cognition is grossly normal. Affect is appropriate  Neuro: Motor intact sensory intact cranial nerves II through XII are intact level of consciousness is normal cerebellar function is normal reflexes are grossly normal. No evidence of incontinence or loss of bowel or bladder no saddle anesthesia noted Lymphatic: There is no submandibular or cervical adenopathy appreciated.         I have reviewed and interpreted all of the currently available lab results from this visit (if applicable):  Results for orders placed or performed during the hospital encounter of 02/19/23   CBC with Auto Differential   Result Value Ref Range    WBC 8.4 4.0 - 10.5 K/CU MM    RBC 2.68 (L) 4.6 - 6.2 M/CU MM    Hemoglobin 9.4 (L) 13.5 - 18.0 GM/DL    Hematocrit 29.7 (L) 42 - 52 %    .8 (H) 78 - 100 FL    MCH 35.1 (H) 27 - 31 PG    MCHC 31.6 (L) 32.0 - 36.0 %    RDW 19.6 (H) 11.7 - 14.9 %    Platelets 613 452 - 433 K/CU MM    MPV 8.8 7.5 - 11.1 FL    Differential Type AUTOMATED DIFFERENTIAL     Segs Relative 64.1 36 - 66 %    Lymphocytes % 18.8 (L) 24 - 44 %    Monocytes % 8.5 (H) 0 - 4 %    Eosinophils % 3.6 (H) 0 - 3 %    Basophils % 1.2 (H) 0 - 1 %    Segs Absolute 5.4 K/CU MM    Lymphocytes Absolute 1.6 K/CU MM    Monocytes Absolute 0.7 K/CU MM    Eosinophils Absolute 0.3 K/CU MM    Basophils Absolute 0.1 K/CU MM    Nucleated RBC % 0.4 %    Total Nucleated RBC 0.0 K/CU MM    Total Immature Neutrophil 0.32 K/CU MM    Immature Neutrophil % 3.8 (H) 0 - 0.43 % Comprehensive Metabolic Panel   Result Value Ref Range    Sodium 129 (L) 135 - 145 MMOL/L    Potassium 3.9 3.5 - 5.1 MMOL/L    Chloride 90 (L) 99 - 110 mMol/L    CO2 27 21 - 32 MMOL/L    BUN 53 (H) 6 - 23 MG/DL    Creatinine 4.6 (H) 0.9 - 1.3 MG/DL    Est, Glom Filt Rate 13 (L) >60 mL/min/1.73m2    Glucose 239 (H) 70 - 99 MG/DL    Calcium 10.5 8.3 - 10.6 MG/DL    Albumin 3.6 3.4 - 5.0 GM/DL    Total Protein 7.0 6.4 - 8.2 GM/DL    Total Bilirubin 0.3 0.0 - 1.0 MG/DL    ALT 11 10 - 40 U/L    AST 14 (L) 15 - 37 IU/L    Alkaline Phosphatase 213 (H) 40 - 129 IU/L    Anion Gap 12 4 - 16   Troponin   Result Value Ref Range    Troponin T 0.224 (HH) <0.01 NG/ML   Lipase   Result Value Ref Range    Lipase 18 13 - 60 IU/L   Brain Natriuretic Peptide   Result Value Ref Range    Pro-BNP 9,145 (H) <300 PG/ML   Blood Gas, Venous   Result Value Ref Range    pH, Paolo 7.31 (L) 7.32 - 7.42    pCO2, Paolo 59 (H) 38 - 52 mmHG    pO2, Paolo 45 28 - 48 mmHG    Base Exc, Mixed 2 (H) 0 - 1.2    HCO3, Venous 29.7 (H) 19 - 25 MMOL/L    O2 Sat, Paolo 77.1 (H) 50 - 70 %    Comment VBG       Radiographs (if obtained):  [] The following radiograph was interpreted by myself in the absence of a radiologist:   [] Radiologist's Report Reviewed:  XR CHEST PORTABLE   Final Result   Worsening pulmonary vascular congestion and bilateral effusions. EKG (if obtained):   Please See Note of attending physician for EKG interpretation. Chart review shows recent radiograph(s):  CT CERVICAL SPINE WO CONTRAST    Result Date: 2/14/2023  EXAMINATION: CT OF THE CERVICAL SPINE WITHOUT CONTRAST 2/14/2023 2:00 pm TECHNIQUE: CT of the cervical spine was performed without the administration of intravenous contrast. Multiplanar reformatted images are provided for review. Automated exposure control, iterative reconstruction, and/or weight based adjustment of the mA/kV was utilized to reduce the radiation dose to as low as reasonably achievable.  COMPARISON: 02/12/2023 HISTORY: ORDERING SYSTEM PROVIDED HISTORY: fu prior cervical displacement prior and if causing issue with swallowing TECHNOLOGIST PROVIDED HISTORY: Reason for exam:->fu prior cervical displacement prior and if causing issue with swallowing Reason for Exam: fu prior cervical displacement prior and if causing issue with swallowing FINDINGS: BONES/ALIGNMENT: There is no acute fracture or traumatic malalignment. There are no bony lytic or blastic lesions. DEGENERATIVE CHANGES: Prominent anterior osteophytes are noted at C3-C4, C4-C5, C5-C6 and C6-C7 discs. These osteophytes bridge the C5-C6 and C6-C7 discs. These osteophytes are of sufficient size to cause swallowing difficulty. There are no fractures noted. There are no lytic or blastic lesions. The facet joints appear unremarkable. SOFT TISSUES: There is a tracheostomy tube in place. No change from prior examination. Prominent anterior osteophytes from C3-C4 to the C6-C7 discs. These are sufficient size to cause swallowing difficulties. XR CHEST PORTABLE    Result Date: 2/17/2023  EXAMINATION: ONE XRAY VIEW OF THE CHEST 2/15/2023 6:05 pm COMPARISON: 02/13/2023 HISTORY: ORDERING SYSTEM PROVIDED HISTORY: fu pneumonia TECHNOLOGIST PROVIDED HISTORY: Reason for exam:->fu pneumonia Reason for Exam: fu pneumonia Additional signs and symptoms: na Relevant Medical/Surgical History: CAD, COPD, hypertension FINDINGS: Tracheostomy tube is stable. Central line is stable. Stable cardiomegaly with median sternotomy wires. Decreased size of the bilateral pleural effusions. No pneumothorax. No new airspace disease. Mild central pulmonary edema. Decreasing bilateral pleural effusions with improving mild central pulmonary edema. Stable cardiomegaly. Stable lines and tubes.      XR CHEST PORTABLE    Result Date: 2/13/2023  EXAMINATION: ONE XRAY VIEW OF THE CHEST 2/13/2023 8:32 am COMPARISON: 02/12/2023 HISTORY: ORDERING SYSTEM PROVIDED HISTORY: fu pneumonia TECHNOLOGIST PROVIDED HISTORY: Reason for exam:->fu pneumonia Reason for Exam: fu pneumonia FINDINGS: Right subclavian catheter projects over the right atrium. Tracheostomy tube is unchanged. No significant change in right lower lobe airspace disease, and questionable right effusion. Increased left lower lobe airspace disease and questionable effusion. Probable cardiomegaly. No pneumothorax. Unchanged large right consolidation and interval presence of left airspace disease. Possible bilateral pleural effusions. IR TUNNELED CVC PLACE WO SQ PORT/PUMP > 5 YEARS    Result Date: 2/14/2023  PROCEDURE: Removal of previously placed tunneled dialysis access catheter with tip sent for culture. ULTRASOUND GUIDED VASCULAR ACCESS. FLUOROSCOPY GUIDED PLACEMENT OF A SUBCUTANEOUS PORT-A-CATH. 2/14/2023. HISTORY: ORDERING SYSTEM PROVIDED HISTORY: remove tunnel hd catheter and culture cath tip and place new tunnel hd catheter other side if able, had resp secretion that site and red and better to change the access site (held coumadin fu inr today) TECHNOLOGIST PROVIDED HISTORY: remove tunnel hd catheter and culture cath tip and place new tunnel hd catheter other side if able Reason for exam:->remove tunnel hd catheter and culture cath tip and place new tunnel hd catheter other side if able, had resp secretion that site and red and better to change the access site (held coumadin fu inr today) How many lumens are being requested?->2 What side should this line be placed? ->Either What site is the preferred site? ->Internal Jugular SEDATION: None FLUOROSCOPY DOSE AND TYPE OR TIME AND EXPOSURES: 1.6 minute fluoroscopy time Air kerma: 21.7 mGy TECHNIQUE: Maximum sterile barrier technique including hand hygiene, skin prep and sterile ultrasound technique utilized for procedure.  Sterile ultrasound technique also utilized for procedure Ultrasound guidance required for procedure to confirm target vessel patency, puncture site selection, real-time intra procedural guidance. Images made for patient's medical file. Informed consent was obtained after a detailed explanation of the procedure including risks, benefits, and alternatives. All aspects of maximum sterile barrier technique were used including washing hands with conventional soap and water or with alcohol-based hand rubs (ABHR), skin preparation, cap, mask, sterile gown, sterile gloves, and sterile full body drape. Local anesthesia was achieved with lidocaine. A micropuncture needle was used to access the right external jugular vein using ultrasound guidance an 0.018 inch guidewire was advanced. Previously placed dialysis access catheter was loosened within subcutaneous tunnel and removed. Catheter tip was sent for culture and sensitivity per prior order. 018 guidewire via right external jugular venous approach was exchanged for a 0.035 inch guidewire to place a peel-away sheath. Subcutaneous tunnel was created from the right clavicular region back to the external jugular access site through which dialysis access catheter was placed. Catheter was advanced to the peel-away sheath. Peel-away sheath removed. Catheter ports were aspirated, flushed, capped and affixed to the patient's skin. Dressing applied. Postprocedure image made. Patient tolerated procedure well. FINDINGS: Pre and intraprocedural sonographic images demonstrate patent right external jugular vein with access needle seen within it. Fluoroscopic image demonstrates removal of previously placed tunneled dialysis access catheter via right internal jugular venous approach and placement of new tunneled dialysis access catheter via right external jugular venous approach the tip in the mid right atrium. No complication suggested. Removal of previously placed tunneled dialysis access catheter with tip sent for culture per prior order.  Successful ultrasound and fluoroscopy guided new Port-A-Cath/tunneled dialysis access catheter placement via right external jugular venous approach with tip in the mid right atrium. CTA CHEST ABDOMEN PELVIS W CONTRAST    Result Date: 1/27/2023  EXAMINATION: CTA OF THE CHEST, ABDOMEN AND PELVIS WITH CONTRAST 1/27/2023 10:41 am: TECHNIQUE: CTA of the chest, abdomen and pelvis was performed after the administration of intravenous contrast.  Multiplanar reformatted images are provided for review. MIP images are provided for review. Automated exposure control, iterative reconstruction, and/or weight based adjustment of the mA/kV was utilized to reduce the radiation dose to as low as reasonably achievable. COMPARISON: CT chest without contrast 01/01/2023 HISTORY: ORDERING SYSTEM PROVIDED HISTORY: S/P CABG (coronary artery bypass graft) TECHNOLOGIST PROVIDED HISTORY: STAT Creatinine as needed:->Yes Reason for Exam: S/P CABG (coronary artery bypass graft) Additional signs and symptoms: none Relevant Medical/Surgical History: 75ml isovue 370/ dialysis patient FINDINGS: CTA CHEST: Thoracic aorta: Thoracic aorta is normal in caliber. No significant atherosclerotic disease. No dissection. Proximal great vessels are widely patent. Mediastinum: Mild cardiomegaly. No pericardial effusion. Status post tracheostomy. Tunneled dialysis catheter terminates in the right atrium in satisfactory position. No adenopathy. Pulmonary trunk approximately 2.9 cm in diameter. Left atrial appendage clip noted. Status post CABG. Lungs/Pleura: Significant atelectasis and consolidation of the right lower lobe. Multifocal mucous plugging in the right lower lobe. Mild dependent atelectasis of the left lower lobe. Trace bilateral pleural effusions. No pneumothorax. Soft Tissues/Bones: No acute soft tissue abnormality identified. Right gynecomastia. CTA ABDOMEN: Abdominal aorta/Branches: Abdominal aorta is normal in caliber. No dissection. Mild atherosclerotic disease.   Celiac and superior mesenteric arteries appear within normal limits. Bilateral renal arteries are widely patent. The ANITA is patent. Organs: Liver, gallbladder, spleen, pancreas, kidneys and adrenals demonstrate no acute abnormality. Small nonobstructing right intrarenal stone. GI/Bowel: No abnormal dilatation or appreciable wall thickening. Normal appendix. Colonic diverticulosis. Gastrostomy tube appears in satisfactory position. Peritoneum/Retroperitoneum: No free fluid. No retroperitoneal adenopathy. Bones/Soft Tissues: No acute soft tissue abnormality identified. No aggressive osseous lesions. CTA PELVIS: Aorta/Iliacs: No aneurysmal dilatation or dissection. No significant stenosis. Other: Urinary bladder unremarkable. Bones/Soft Tissues: No acute soft tissue abnormality identified. No aggressive osseous lesions. No acute aortic pathology, aneurysm or dissection. Near complete atelectasis and consolidation of the right lower lobe with multifocal mucous plugging. No significant interval change. Trace bilateral pleural effusions. Dilated pulmonary trunk suggesting elevated pulmonary artery pressures. Small nonobstructing right intrarenal stone. Colonic diverticulosis without evidence of acute diverticulitis. CTA NECK W CONTRAST    Result Date: 1/27/2023  EXAMINATION: CTA OF THE NECK 1/27/2023 10:41 am TECHNIQUE: CTA of the neck was performed with the administration of intravenous contrast. Multiplanar reformatted images are provided for review. MIP images are provided for review. Stenosis of the internal carotid arteries measured using NASCET criteria. Automated exposure control, iterative reconstruction, and/or weight based adjustment of the mA/kV was utilized to reduce the radiation dose to as low as reasonably achievable. COMPARISON: Correlation with 09/25/2018 CT neck.  HISTORY: ORDERING SYSTEM PROVIDED HISTORY: Dysphagia, unspecified type TECHNOLOGIST PROVIDED HISTORY: STAT Creatinine as needed:->Yes Reason for Exam: Dysphagia, unspecified type Additional signs and symptoms: none Relevant Medical/Surgical History: 75ml isovue 370/ dialysis patient FINDINGS: AORTIC ARCH/ARCH VESSELS: No dissection or arterial injury. No significant stenosis of the brachiocephalic or subclavian arteries. CAROTID ARTERIES: No dissection, arterial injury, or hemodynamically significant stenosis by NASCET criteria. VERTEBRAL ARTERIES: No dissection, arterial injury, or significant stenosis. SOFT TISSUES: Tracheostomy present appears appropriately positioned and widely patent. No cervical lymphadenopathy. The larynx and pharynx are unremarkable. No acute abnormality of the salivary and thyroid glands. Mucosal thickening of the bilateral maxillary sinus floors. Regarding thoracic findings, please refer to separate concurrent chest CT report. BONES: No acute osseous abnormality. Diffuse idiopathic skeletal hyperostosis (DISH). Right upper 1st molar dental cavity. No evidence of large vessel occlusion or significant stenosis in major arteries of the neck. MDM:     Problems Addressed:  1. Chest pain, unspecified type    2. Acute on chronic respiratory failure with hypoxia (HCC)    3. Acute pleurisy without pleural effusion    4. Pulmonary vascular congestion    5. Elevated troponin        Interventions given this visit:   Orders Placed This Encounter   Medications    aspirin chewable tablet 324 mg    fentaNYL (SUBLIMAZE) injection 50 mcg    DISCONTD: furosemide (LASIX) injection 40 mg    furosemide (LASIX) injection 60 mg      Interventions listed are used to treat Problem list above    Images independently interpreted by me:  X-ray shows pulmonary vascular congestion and pleural effusions. No identifiable consolidation    Patient presents today to the emergency department with worsening shortness of breath. Has a history of acute on coronary artery disease, chronic renal failure, chronic respiratory failure.   During his stay here in the emergency department he has continued chest pain. Aspirin p.o. fentanyl IV are provided for pain. Chest x-ray does reveal increased pulmonary vascular congestion and pleural effusions. IV Lasix is initiated. Labs show ESRD chronic. Elevated troponin chronic. Patient did have an episode of hypoxia will drop down into the 70 percentile oxygenation. He required prompt suctioning. After suctioning. Patient oxygenation did return currently at 96%. Given patient's significant coronary artery disease history significant respiratory history significant history of ESRD patient will require admission to the hospital.  Will require dialysis in the morning he is Monday for Wednesday Friday dialysis patient. Total critical care time today provided was at least  50 minutes. This excludes seperately billable procedure. Critical care time provided for acute respiratory distress and hypoxia  that required close evaluation and/or intervention with concern for patient decompensation. I independently managed patient today in the ED. /75   Pulse 96   Resp 16   SpO2 91%       Clinical Impression:  1. Chest pain, unspecified type    2. Acute on chronic respiratory failure with hypoxia (HCC)    3. Acute pleurisy without pleural effusion    4. Pulmonary vascular congestion    5. Elevated troponin        Disposition referral (if applicable):  No follow-up provider specified. Disposition medications (if applicable):  New Prescriptions    No medications on file         Comment: Please note this report has been produced using speech recognition software and may contain errors related to that system including errors in grammar, punctuation, and spelling, as well as words and phrases that may be inappropriate. If there are any questions or concerns please feel free to contact the dictating provider for clarification. Please note Images are personally interpreted by this Provider ([place]rijesh. )However final disposition is made with deference to Radiologist interpretation of said images.        Sukhjinder Ny, [address], [place]  02/19/23 [id number]

## 2023-02-19 NOTE — Clinical Note
Discharge Plan[de-identified] Other/[facility])   Telemetry/Cardiac Monitoring Required?: Yes

## 2023-02-19 NOTE — ED TRIAGE NOTES
Pt arrived via ems from [facility] with c/o sob and chest pressure. Pt states it started last week and he as admitted for it here. Pt states the chest pressure is generalized in his chest. Pt denies taking any aspirin or nitro. Pt states it is a 3/10 pain. Pt does have a trache with 3L of oxygen currently. Pt is a/o at this time. RT at bedside suctioning pt.

## 2023-02-19 NOTE — CARE COORDINATION
CM review of pt chart for readmission risk, last admission 2/12-17/23 dx aspiration PNE, pt discharged to [facility]. Pt returns to ER today with c/o CP x 1 wk. Pt will be admitted due to respiratory issues.  QUINNRN/CM

## 2023-02-20 LAB
ANION GAP SERPL CALCULATED.3IONS-SCNC: 12 MMOL/L (ref 4–16)
BASOPHILS ABSOLUTE: 0.1 K/CU MM
BASOPHILS RELATIVE PERCENT: 1 % (ref 0–1)
BUN SERPL-MCNC: 58 MG/DL (ref 6–23)
CALCIUM SERPL-MCNC: 9.8 MG/DL (ref 8.3–10.6)
CHLORIDE BLD-SCNC: 95 MMOL/L (ref 99–110)
CO2: 25 MMOL/L (ref 21–32)
CREAT SERPL-MCNC: 5.3 MG/DL (ref 0.9–1.3)
DIFFERENTIAL TYPE: ABNORMAL
DOSE AMOUNT: NORMAL
DOSE TIME: NORMAL
EKG ATRIAL RATE: 102 BPM
EKG DIAGNOSIS: NORMAL
EKG Q-T INTERVAL: 388 MS
EKG QRS DURATION: 164 MS
EKG QTC CALCULATION (BAZETT): 503 MS
EKG R AXIS: -66 DEGREES
EKG T AXIS: 65 DEGREES
EKG VENTRICULAR RATE: 101 BPM
EOSINOPHILS ABSOLUTE: 0.3 K/CU MM
EOSINOPHILS RELATIVE PERCENT: 3.7 % (ref 0–3)
GFR SERPL CREATININE-BSD FRML MDRD: 11 ML/MIN/1.73M2
GLUCOSE BLD-MCNC: 136 MG/DL (ref 70–99)
GLUCOSE BLD-MCNC: 160 MG/DL (ref 70–99)
GLUCOSE BLD-MCNC: 164 MG/DL (ref 70–99)
GLUCOSE BLD-MCNC: 196 MG/DL (ref 70–99)
GLUCOSE SERPL-MCNC: 181 MG/DL (ref 70–99)
HCT VFR BLD CALC: 27.4 % (ref 42–52)
HEMOGLOBIN: 8.4 GM/DL (ref 13.5–18)
IMMATURE NEUTROPHIL %: 2.8 % (ref 0–0.43)
LYMPHOCYTES ABSOLUTE: 1.8 K/CU MM
LYMPHOCYTES RELATIVE PERCENT: 22.2 % (ref 24–44)
MAGNESIUM: 3.4 MG/DL (ref 1.8–2.4)
MCH RBC QN AUTO: 34.6 PG (ref 27–31)
MCHC RBC AUTO-ENTMCNC: 30.7 % (ref 32–36)
MCV RBC AUTO: 112.8 FL (ref 78–100)
MONOCYTES ABSOLUTE: 0.7 K/CU MM
MONOCYTES RELATIVE PERCENT: 8.7 % (ref 0–4)
NUCLEATED RBC %: 0.3 %
PDW BLD-RTO: 19.1 % (ref 11.7–14.9)
PLATELET # BLD: 228 K/CU MM (ref 140–440)
PMV BLD AUTO: 8.9 FL (ref 7.5–11.1)
POTASSIUM SERPL-SCNC: 4 MMOL/L (ref 3.5–5.1)
RBC # BLD: 2.43 M/CU MM (ref 4.6–6.2)
SEGMENTED NEUTROPHILS ABSOLUTE COUNT: 4.9 K/CU MM
SEGMENTED NEUTROPHILS RELATIVE PERCENT: 61.6 % (ref 36–66)
SODIUM BLD-SCNC: 132 MMOL/L (ref 135–145)
TOTAL IMMATURE NEUTOROPHIL: 0.22 K/CU MM
TOTAL NUCLEATED RBC: 0 K/CU MM
TROPONIN T: 0.24 NG/ML
VANCOMYCIN RANDOM: 17 UG/ML
WBC # BLD: 7.9 K/CU MM (ref 4–10.5)

## 2023-02-20 PROCEDURE — 6360000002 HC RX W HCPCS: Performed by: PHYSICIAN ASSISTANT

## 2023-02-20 PROCEDURE — 83735 ASSAY OF MAGNESIUM: CPT

## 2023-02-20 PROCEDURE — 36415 COLL VENOUS BLD VENIPUNCTURE: CPT

## 2023-02-20 PROCEDURE — 6370000000 HC RX 637 (ALT 250 FOR IP): Performed by: INTERNAL MEDICINE

## 2023-02-20 PROCEDURE — 94640 AIRWAY INHALATION TREATMENT: CPT

## 2023-02-20 PROCEDURE — 97162 PT EVAL MOD COMPLEX 30 MIN: CPT

## 2023-02-20 PROCEDURE — 82962 GLUCOSE BLOOD TEST: CPT

## 2023-02-20 PROCEDURE — 97530 THERAPEUTIC ACTIVITIES: CPT

## 2023-02-20 PROCEDURE — 94761 N-INVAS EAR/PLS OXIMETRY MLT: CPT

## 2023-02-20 PROCEDURE — 97167 OT EVAL HIGH COMPLEX 60 MIN: CPT

## 2023-02-20 PROCEDURE — 94664 DEMO&/EVAL PT USE INHALER: CPT

## 2023-02-20 PROCEDURE — 89220 SPUTUM SPECIMEN COLLECTION: CPT

## 2023-02-20 PROCEDURE — 90935 HEMODIALYSIS ONE EVALUATION: CPT

## 2023-02-20 PROCEDURE — 93010 ELECTROCARDIOGRAM REPORT: CPT | Performed by: INTERNAL MEDICINE

## 2023-02-20 PROCEDURE — 85025 COMPLETE CBC W/AUTO DIFF WBC: CPT

## 2023-02-20 PROCEDURE — 80202 ASSAY OF VANCOMYCIN: CPT

## 2023-02-20 PROCEDURE — 6360000002 HC RX W HCPCS: Performed by: INTERNAL MEDICINE

## 2023-02-20 PROCEDURE — 2580000003 HC RX 258: Performed by: INTERNAL MEDICINE

## 2023-02-20 PROCEDURE — 80048 BASIC METABOLIC PNL TOTAL CA: CPT

## 2023-02-20 PROCEDURE — 84484 ASSAY OF TROPONIN QUANT: CPT

## 2023-02-20 PROCEDURE — 2060000000 HC ICU INTERMEDIATE R&B

## 2023-02-20 PROCEDURE — 2700000000 HC OXYGEN THERAPY PER DAY

## 2023-02-20 PROCEDURE — 5A1D70Z PERFORMANCE OF URINARY FILTRATION, INTERMITTENT, LESS THAN 6 HOURS PER DAY: ICD-10-PCS | Performed by: INTERNAL MEDICINE

## 2023-02-20 RX ORDER — INSULIN GLARGINE 100 [IU]/ML
15 INJECTION, SOLUTION SUBCUTANEOUS NIGHTLY
Status: DISCONTINUED | OUTPATIENT
Start: 2023-02-20 | End: 2023-02-23

## 2023-02-20 RX ORDER — ACETAMINOPHEN 650 MG/1
650 SUPPOSITORY RECTAL EVERY 6 HOURS PRN
Status: DISCONTINUED | OUTPATIENT
Start: 2023-02-20 | End: 2023-02-24 | Stop reason: HOSPADM

## 2023-02-20 RX ORDER — ONDANSETRON 2 MG/ML
4 INJECTION INTRAMUSCULAR; INTRAVENOUS EVERY 6 HOURS PRN
Status: DISCONTINUED | OUTPATIENT
Start: 2023-02-20 | End: 2023-02-24 | Stop reason: HOSPADM

## 2023-02-20 RX ORDER — MIDODRINE HYDROCHLORIDE 5 MG/1
5 TABLET ORAL
Status: DISCONTINUED | OUTPATIENT
Start: 2023-02-20 | End: 2023-02-24 | Stop reason: HOSPADM

## 2023-02-20 RX ORDER — POLYETHYLENE GLYCOL 3350 17 G/17G
17 POWDER, FOR SOLUTION ORAL DAILY PRN
Status: DISCONTINUED | OUTPATIENT
Start: 2023-02-20 | End: 2023-02-24 | Stop reason: HOSPADM

## 2023-02-20 RX ORDER — INSULIN LISPRO 100 [IU]/ML
0-8 INJECTION, SOLUTION INTRAVENOUS; SUBCUTANEOUS
Status: DISCONTINUED | OUTPATIENT
Start: 2023-02-20 | End: 2023-02-23

## 2023-02-20 RX ORDER — POLYVINYL ALCOHOL 14 MG/ML
2 SOLUTION/ DROPS OPHTHALMIC 4 TIMES DAILY PRN
Status: DISCONTINUED | OUTPATIENT
Start: 2023-02-20 | End: 2023-02-24 | Stop reason: HOSPADM

## 2023-02-20 RX ORDER — ASPIRIN 81 MG/1
81 TABLET, CHEWABLE ORAL DAILY
Status: DISCONTINUED | OUTPATIENT
Start: 2023-02-20 | End: 2023-02-24 | Stop reason: HOSPADM

## 2023-02-20 RX ORDER — HEPARIN SODIUM 5000 [USP'U]/ML
5000 INJECTION, SOLUTION INTRAVENOUS; SUBCUTANEOUS EVERY 8 HOURS SCHEDULED
Status: DISCONTINUED | OUTPATIENT
Start: 2023-02-20 | End: 2023-02-24 | Stop reason: HOSPADM

## 2023-02-20 RX ORDER — ACETYLCYSTEINE 100 MG/ML
4 SOLUTION ORAL; RESPIRATORY (INHALATION) 3 TIMES DAILY
Status: DISPENSED | OUTPATIENT
Start: 2023-02-20 | End: 2023-02-21

## 2023-02-20 RX ORDER — POLYETHYLENE GLYCOL 3350 17 G/17G
17 POWDER, FOR SOLUTION ORAL 2 TIMES DAILY
Status: DISCONTINUED | OUTPATIENT
Start: 2023-02-20 | End: 2023-02-24 | Stop reason: HOSPADM

## 2023-02-20 RX ORDER — ONDANSETRON 4 MG/1
4 TABLET, ORALLY DISINTEGRATING ORAL EVERY 8 HOURS PRN
Status: DISCONTINUED | OUTPATIENT
Start: 2023-02-20 | End: 2023-02-24 | Stop reason: HOSPADM

## 2023-02-20 RX ORDER — DEXTROSE MONOHYDRATE 100 MG/ML
INJECTION, SOLUTION INTRAVENOUS CONTINUOUS PRN
Status: DISCONTINUED | OUTPATIENT
Start: 2023-02-20 | End: 2023-02-24 | Stop reason: HOSPADM

## 2023-02-20 RX ORDER — SODIUM CHLORIDE 0.9 % (FLUSH) 0.9 %
5-40 SYRINGE (ML) INJECTION EVERY 12 HOURS SCHEDULED
Status: DISCONTINUED | OUTPATIENT
Start: 2023-02-20 | End: 2023-02-24 | Stop reason: HOSPADM

## 2023-02-20 RX ORDER — SODIUM CHLORIDE 0.9 % (FLUSH) 0.9 %
5-40 SYRINGE (ML) INJECTION PRN
Status: DISCONTINUED | OUTPATIENT
Start: 2023-02-20 | End: 2023-02-24 | Stop reason: HOSPADM

## 2023-02-20 RX ORDER — QUETIAPINE FUMARATE 25 MG/1
50 TABLET, FILM COATED ORAL NIGHTLY
Status: DISCONTINUED | OUTPATIENT
Start: 2023-02-20 | End: 2023-02-24 | Stop reason: HOSPADM

## 2023-02-20 RX ORDER — SODIUM CHLORIDE 9 MG/ML
INJECTION, SOLUTION INTRAVENOUS PRN
Status: DISCONTINUED | OUTPATIENT
Start: 2023-02-20 | End: 2023-02-24 | Stop reason: HOSPADM

## 2023-02-20 RX ORDER — ATORVASTATIN CALCIUM 40 MG/1
40 TABLET, FILM COATED ORAL NIGHTLY
Status: DISCONTINUED | OUTPATIENT
Start: 2023-02-20 | End: 2023-02-24 | Stop reason: HOSPADM

## 2023-02-20 RX ORDER — AMIODARONE HYDROCHLORIDE 200 MG/1
200 TABLET ORAL DAILY
Status: DISCONTINUED | OUTPATIENT
Start: 2023-02-20 | End: 2023-02-21

## 2023-02-20 RX ORDER — ACETAMINOPHEN 325 MG/1
650 TABLET ORAL EVERY 6 HOURS PRN
Status: DISCONTINUED | OUTPATIENT
Start: 2023-02-20 | End: 2023-02-24 | Stop reason: HOSPADM

## 2023-02-20 RX ORDER — INSULIN LISPRO 100 [IU]/ML
0-4 INJECTION, SOLUTION INTRAVENOUS; SUBCUTANEOUS NIGHTLY
Status: DISCONTINUED | OUTPATIENT
Start: 2023-02-20 | End: 2023-02-23

## 2023-02-20 RX ORDER — SENNA AND DOCUSATE SODIUM 50; 8.6 MG/1; MG/1
2 TABLET, FILM COATED ORAL DAILY PRN
Status: DISCONTINUED | OUTPATIENT
Start: 2023-02-20 | End: 2023-02-24 | Stop reason: HOSPADM

## 2023-02-20 RX ORDER — LANSOPRAZOLE
30 KIT
Status: DISCONTINUED | OUTPATIENT
Start: 2023-02-20 | End: 2023-02-24 | Stop reason: HOSPADM

## 2023-02-20 RX ORDER — TRAZODONE HYDROCHLORIDE 50 MG/1
100 TABLET ORAL NIGHTLY PRN
Status: DISCONTINUED | OUTPATIENT
Start: 2023-02-20 | End: 2023-02-24 | Stop reason: HOSPADM

## 2023-02-20 RX ORDER — ENOXAPARIN SODIUM 100 MG/ML
30 INJECTION SUBCUTANEOUS DAILY
Status: DISCONTINUED | OUTPATIENT
Start: 2023-02-20 | End: 2023-02-20

## 2023-02-20 RX ORDER — LEVOTHYROXINE SODIUM 0.1 MG/1
100 TABLET ORAL DAILY
Status: DISCONTINUED | OUTPATIENT
Start: 2023-02-20 | End: 2023-02-24 | Stop reason: HOSPADM

## 2023-02-20 RX ADMIN — LANSOPRAZOLE 30 MG: KIT at 08:22

## 2023-02-20 RX ADMIN — MIDODRINE HYDROCHLORIDE 5 MG: 5 TABLET ORAL at 08:22

## 2023-02-20 RX ADMIN — POLYETHYLENE GLYCOL 3350 17 G: 17 POWDER, FOR SOLUTION ORAL at 21:12

## 2023-02-20 RX ADMIN — METOPROLOL TARTRATE 12.5 MG: 25 TABLET, FILM COATED ORAL at 03:05

## 2023-02-20 RX ADMIN — LEVOTHYROXINE SODIUM 100 MCG: 0.1 TABLET ORAL at 06:46

## 2023-02-20 RX ADMIN — AMIODARONE HYDROCHLORIDE 200 MG: 200 TABLET ORAL at 08:23

## 2023-02-20 RX ADMIN — IPRATROPIUM BROMIDE AND ALBUTEROL SULFATE 1 AMPULE: 2.5; .5 SOLUTION RESPIRATORY (INHALATION) at 07:58

## 2023-02-20 RX ADMIN — IPRATROPIUM BROMIDE AND ALBUTEROL SULFATE 1 AMPULE: 2.5; .5 SOLUTION RESPIRATORY (INHALATION) at 16:10

## 2023-02-20 RX ADMIN — MIDODRINE HYDROCHLORIDE 5 MG: 5 TABLET ORAL at 12:43

## 2023-02-20 RX ADMIN — ATORVASTATIN CALCIUM 40 MG: 40 TABLET, FILM COATED ORAL at 03:06

## 2023-02-20 RX ADMIN — POLYETHYLENE GLYCOL 3350 17 G: 17 POWDER, FOR SOLUTION ORAL at 03:05

## 2023-02-20 RX ADMIN — QUETIAPINE FUMARATE 50 MG: 25 TABLET ORAL at 21:07

## 2023-02-20 RX ADMIN — IPRATROPIUM BROMIDE AND ALBUTEROL SULFATE 1 AMPULE: 2.5; .5 SOLUTION RESPIRATORY (INHALATION) at 19:48

## 2023-02-20 RX ADMIN — ATORVASTATIN CALCIUM 40 MG: 40 TABLET, FILM COATED ORAL at 21:07

## 2023-02-20 RX ADMIN — HEPARIN SODIUM 5000 UNITS: 5000 INJECTION INTRAVENOUS; SUBCUTANEOUS at 21:23

## 2023-02-20 RX ADMIN — HEPARIN SODIUM 5000 UNITS: 5000 INJECTION INTRAVENOUS; SUBCUTANEOUS at 06:46

## 2023-02-20 RX ADMIN — INSULIN GLARGINE 15 UNITS: 100 INJECTION, SOLUTION SUBCUTANEOUS at 21:07

## 2023-02-20 RX ADMIN — METOPROLOL TARTRATE 12.5 MG: 25 TABLET, FILM COATED ORAL at 21:07

## 2023-02-20 RX ADMIN — SODIUM CHLORIDE, PRESERVATIVE FREE 10 ML: 5 INJECTION INTRAVENOUS at 08:23

## 2023-02-20 RX ADMIN — CEFEPIME HYDROCHLORIDE 1000 MG: 1 INJECTION, POWDER, FOR SOLUTION INTRAMUSCULAR; INTRAVENOUS at 21:12

## 2023-02-20 RX ADMIN — FUROSEMIDE 60 MG: 10 INJECTION, SOLUTION INTRAVENOUS at 04:19

## 2023-02-20 RX ADMIN — QUETIAPINE FUMARATE 50 MG: 25 TABLET ORAL at 03:06

## 2023-02-20 RX ADMIN — POLYVINYL ALCOHOL 2 DROP: 14 SOLUTION/ DROPS OPHTHALMIC at 08:22

## 2023-02-20 RX ADMIN — ACETYLCYSTEINE 400 MG: 100 INHALANT RESPIRATORY (INHALATION) at 16:17

## 2023-02-20 RX ADMIN — VANCOMYCIN HYDROCHLORIDE 1250 MG: 1.25 INJECTION, POWDER, LYOPHILIZED, FOR SOLUTION INTRAVENOUS at 12:29

## 2023-02-20 RX ADMIN — POLYETHYLENE GLYCOL 3350 17 G: 17 POWDER, FOR SOLUTION ORAL at 08:22

## 2023-02-20 RX ADMIN — MIDODRINE HYDROCHLORIDE 5 MG: 5 TABLET ORAL at 17:22

## 2023-02-20 RX ADMIN — METOPROLOL TARTRATE 12.5 MG: 25 TABLET, FILM COATED ORAL at 08:22

## 2023-02-20 RX ADMIN — HEPARIN SODIUM 5000 UNITS: 5000 INJECTION INTRAVENOUS; SUBCUTANEOUS at 12:43

## 2023-02-20 RX ADMIN — ASPIRIN 81 MG 81 MG: 81 TABLET ORAL at 08:23

## 2023-02-20 RX ADMIN — SODIUM CHLORIDE, PRESERVATIVE FREE 10 ML: 5 INJECTION INTRAVENOUS at 21:13

## 2023-02-20 ASSESSMENT — PAIN SCALES - WONG BAKER
WONGBAKER_NUMERICALRESPONSE: 0

## 2023-02-20 ASSESSMENT — PAIN SCALES - GENERAL: PAINLEVEL_OUTOF10: 0

## 2023-02-20 NOTE — CONSULTS
Nephrology Service Consultation    Patient:  Lamine Bailey  MRN: [id number]  Consulting physician:  Serg Salazar MD  Reason for Consult: End-stage renal disease    History Obtained From:  patient, electronic medical record  PCP: ANKITA Pat - CNP    HISTORY OF PRESENT ILLNESS:   The patient is a 68 y.o. male who presents with weakness shortness of breath dyspnea on exertion who I saw on dialysis this morning. Currently awake interactive tolerating dialysis fairly well. Patient with history of coronary disease prior CABG history of failure status post tracheostomy feeding tube who apparently after open heart surgery and had a respiratory cardiac event at [facility] require transfer to [facility] has osteophytes in the cervical spine makes at high risk for recurrent aspiration has a feeding tube for nutrition. Currently his best option care is trach care with increased secretions. He was recommended last admission to have a bronchoscopy but patient refuses brother is out of town. I have tried to call the brother who is currently unavailable and I left a voicemail. Ideally the best plan will be getting a bronchoscopy done while he is here this admission as there is a risk that this will continue to recur. While I was dictating his brother did call me back he is agreeing that patient needs a bronchoscopy and if I can help convince him to get it done while he is here that would be the best his patient's brother is not back and down till next Monday. In the above setting patient admitted with respiratory distress congestion possible fluid overload versus atelectasis concern for possible pneumonia as well and admitted for work-up and therapy. Past Medical History:        Diagnosis Date    Allergic rhinitis     Anticoagulated on Coumadin     1/6/17-**[facility] to follow pt's PT/INRs, along w/prescribing his Coumadin. **    Anxiety     Arthritis     Atrial fibrillation ([address]     On Coumadin. 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 09/21/2022    positive at 15C, negative at 18C    COPD (chronic obstructive pulmonary disease) ([facility])     Depression     Diabetes mellitus ([facility])     NIDDM- dx over 10 yrs ago- follows with Dr Lorena Pinon's contracture of hand     Right hand    Family history of cardiovascular disease     Father-MI    GERD (gastroesophageal reflux disease)     H/O cardiac catheterization 03/2007    cardiac cath- stent LAD patent, [id number]-11-67%, RCA 40-50%    H/O cardiac catheterization 06/12/2008    cardiac cath- mild disease mid RCA    H/O cardiovascular stress test 04/10/2014    cardiolite- normal, no ischemia, EF63%    H/O cardiovascular stress test 09/21/2016    EF59% normal study  pt in atrial fib    H/O cardiovascular stress test 09/20/2017    EF 60%   afib    H/O cardiovascular stress test 11/07/2018    EF60% normal study    H/O Doppler ultrasound     1/11/2010-CAROTID_Intimal thickening but no significant atherosclerotic plaque noted in LAUREN. Doppler flow velocities within the LAUREN are WNL. Intimal thickening but no significant atherosclerotic plaque noted in LICA. Doppler flow velociities within the LICA are WNL. H/O echocardiogram 04/10/2014    EF 60%, normal LV systolic function, mild mitral and tricuspid insufficiencies, no pericardial effusion.     H/O echocardiogram 09/21/2016    EF60% normal study    H/O echocardiogram 11/07/2018    EF55-60% no significant valular disease    H/O hiatal hernia     Hx of Venous Doppler 03/14/2019    Significant reflux in Left Deep System, No reflux in right lower extremity, No DVT or SVT    Hyperlipidemia     Hypertension     Obesity     S/P PTCA (percutaneous transluminal coronary angioplasty) 03/21/2005    s/p PTCA with 3.5 X 16 TAXUS stent to LAD- follows with Dr Kaitlin Carvajal    Sleep apnea     had sleep study done 10+ yrs ago- has cpap but does not use it    Thyroid disease hypothyroid       Past Surgical History:        Procedure Laterality Date    CARDIAC CATHETERIZATION  6/12/08    mild disease mid RCA,  stent to LAD patent,    CARDIAC CATHETERIZATION  3/07    Stent to LAD patent, Diagonal 40-50%, RCA 40-50%    CARDIAC CATHETERIZATION  3/05    COLONOSCOPY  2011    COLONOSCOPY  5/18/16    1 polyp removed, diverticulosis and hemorrhoids noted    CORONARY ANGIOPLASTY WITH STENT PLACEMENT  03/21/2005    PTCA with 3.5 x 16 TAXUS stent to LAD    CORONARY ARTERY BYPASS GRAFT N/A 9/22/2022    CABG CORONARY ARTERY BYPASS x3 (LIMA TO LAD, SVG TO PDA-RCA SEQUENTIAL) , INTRAOPERATIVE MILTON, ENDOVEIN HARVEST OF LEFT SAPHENOUS VEIN, MODIFIED MAZE PROCEDURE, LEFT ATRIAL APPENDAGE LIGATION, INTERCOSTAL NERVE BLOCK, INTRA-AORTIC BALLOON PUMP INSERTION performed by Ivory Encarnacion MD at [address] [place] [address], COLON, DIAGNOSTIC  2014    egd    HAND SURGERY Right 1997    IR TUNNELED [address] 5 YEARS  2/14/2023    IR TUNNELED CATHETER PLACEMENT GREATER THAN 5 YEARS 2/14/2023 Sharri Crew, [facility] SPECIAL PROCEDURES    HI OPTX ANKLE DISLOCATION W/REPAIR/INT/XTRNL FIXJ Right 6/3/2019    RIGHT OPEN REDUCTION INTERNAL FIXATION OF DISTAL TIBIA  AND FIBULA performed by Lalo Sam MD at [address] [place] [address] N/A 9/24/2022    STERNUM WASHOUT performed by Ivory Encarnacion MD at [facility] OR       Medications:   Scheduled Meds:   sodium chloride flush  5-40 mL IntraVENous 2 times per day    amiodarone  200 mg PEG Tube Daily    aspirin  81 mg PEG Tube Daily    atorvastatin  40 mg PEG Tube Nightly    insulin glargine  15 Units SubCUTAneous Nightly    lansoprazole  30 mg Per G Tube QAM AC    levothyroxine  100 mcg Per G Tube Daily    metoprolol tartrate  12.5 mg Per G Tube BID    midodrine  5 mg PEG Tube TID WC    polyethylene glycol  17 g Oral BID    QUEtiapine  50 mg Per G Tube Nightly    insulin lispro  0-8 Units SubCUTAneous TID WC    insulin lispro  0-4 Units SubCUTAneous Nightly heparin (porcine)  5,000 Units SubCUTAneous 3 times per day    acetylcysteine  4 mL Inhalation TID    ipratropium-albuterol  1 ampule Inhalation Q4H WA    cefepime  1,000 mg IntraVENous Q24H    vancomycin (VANCOCIN) intermittent dosing (placeholder)   Other RX Placeholder     Continuous Infusions:   sodium chloride      dextrose       PRN Meds:.sodium chloride flush, sodium chloride, ondansetron **OR** ondansetron, polyethylene glycol, acetaminophen **OR** acetaminophen, polyvinyl alcohol, sennosides-docusate sodium, traZODone, glucose, dextrose bolus **OR** dextrose bolus, glucagon (rDNA), dextrose    Allergies:  Patient has no known allergies. Social History:   TOBACCO:   reports that he quit smoking about 47 years ago. His smoking use included cigarettes. He has a 10.00 pack-year smoking history. He has never used smokeless tobacco.  ETOH:   reports that he does not currently use alcohol after a past usage of about 6.0 standard drinks per week. OCCUPATION:      Family History:       Problem Relation Age of Onset    Cancer Mother         breast ca    Coronary Art Dis Father          MI    Heart Disease Father     Rheum Arthritis Other     Rheum Arthritis Sister     No Known Problems Brother     Rheum Arthritis Sister        REVIEW OF SYSTEMS:  Negative except for weak tired respiratory distress positive trach positive feeding tube. Physical Exam:    I/O: No intake/output data recorded. Vitals: BP (!) 127/58   Pulse 67   Temp 96.9 °F (36.1 °C)   Resp 11   Ht 6' 2\" (1.88 m)   Wt 272 lb 14.9 oz (123.8 kg)   SpO2 97%   BMI 35.04 kg/m²   General appearance: awake weak  HEENT: Head: Normal, normocephalic, atraumatic.   Neck: Positive trach  Lungs: diminished breath sounds bilaterally  Heart: S1, S2 normal  Abdomen: abnormal findings:  soft NT positive PEG  Extremities: edema trace  Neurologic: Mental status: alertness: alert interactive      CBC:   Recent Labs     02/19/23  1756 02/20/23  0613   WBC 8.4 7.9   HGB 9.4* 8.4*    228     BMP:    Recent Labs     02/19/23  1756 02/20/23  0613   * 132*   K 3.9 4.0   CL 90* 95*   CO2 27 25   BUN 53* 58*   CREATININE 4.6* 5.3*   GLUCOSE 239* 181*     Hepatic:   Recent Labs     02/19/23 1756   AST 14*   ALT 11   BILITOT 0.3   ALKPHOS 213*     Troponin: No results for input(s): TROPONINI in the last 72 hours. BNP: No results for input(s): BNP in the last 72 hours. Lipids: No results for input(s): CHOL, HDL in the last 72 hours. Invalid input(s): LDLCALCU  ABGs:   Lab Results   Component Value Date/Time    PO2ART 70.3 09/24/2022 11:49 AM    YSM6QPQ 35.5 09/24/2022 11:49 AM     INR: No results for input(s): INR in the last 72 hours.   Renal Labs  Albumin:    Lab Results   Component Value Date/Time    LABALBU 3.6 02/19/2023 05:56 PM     Calcium:    Lab Results   Component Value Date/Time    CALCIUM 9.8 02/20/2023 06:13 AM     Phosphorus:    Lab Results   Component Value Date/Time    PHOS 3.7 02/15/2023 03:15 AM     U/A:    Lab Results   Component Value Date/Time    NITRU NEGATIVE 02/13/2023 12:15 AM    COLORU YELLOW 02/13/2023 12:15 AM    PHUR 6.5 06/30/2021 02:11 PM    WBCUA 67 02/13/2023 12:15 AM    RBCUA 37 02/13/2023 12:15 AM    MUCUS RARE 09/17/2022 06:21 PM    TRICHOMONAS NONE SEEN 02/13/2023 12:15 AM    BACTERIA NEGATIVE 02/13/2023 12:15 AM    CLARITYU CLEAR 02/13/2023 12:15 AM    SPECGRAV 1.025 02/13/2023 12:15 AM    UROBILINOGEN 0.2 02/13/2023 12:15 AM    BILIRUBINUR SMALL NUMBER OR AMOUNT OBSERVED 02/13/2023 12:15 AM    BILIRUBINUR small 06/30/2021 02:11 PM    BLOODU MODERATE NUMBER OR AMOUNT OBSERVED 02/13/2023 12:15 AM    GLUCOSEU neg 06/30/2021 02:11 PM    KETUA TRACE 02/13/2023 12:15 AM     ABG:    Lab Results   Component Value Date/Time    YYM7QHT 35.5 09/24/2022 11:49 AM    PO2ART 70.3 09/24/2022 11:49 AM    ZJM3PLL 21.2 09/24/2022 11:49 AM     HgBA1c:    Lab Results   Component Value Date/Time    LABA1C 7.0 02/12/2023 10:53 PM Microalbumen/Creatinine ratio:  No components found for: RUCREAT  TSH:    Lab Results   Component Value Date/Time    TSH 1.66 04/03/2018 11:33 AM     IRON:    Lab Results   Component Value Date/Time    IRON 60 02/13/2023 10:39 AM     Iron Saturation:  No components found for: PERCENTFE  TIBC:    Lab Results   Component Value Date/Time    TIBC 229 02/13/2023 10:39 AM     FERRITIN:    Lab Results   Component Value Date/Time    FERRITIN 1,063 02/13/2023 10:39 AM     RPR:  No results found for: RPR  YAAY:  No results found for: ANATITER, YAYA  24 Hour Urine for Creatinine Clearance:  No components found for: CREAT4, UHRS10, UTV10  -----------------------------------------------------------------      Assessment and Recommendations     Patient Active Problem List   Diagnosis Code    Atrial fibrillation ([facility]) I48.91    CAD (coronary artery disease) I25.10    Morbid obesity ([facility] E66.01    COPD (chronic obstructive pulmonary disease) ([facility] J44.9    Sleep apnea G47.30    Type 2 diabetes mellitus ([facility] E11.9    Thyroid disease E07.9    Hyperlipidemia E78.5    Acute congestive heart failure (HCC) I50.9    Coronary artery disease involving native coronary artery of native heart with angina pectoris ([facility]) I25.119    Chronic renal disease, stage III ([facility] [113253] N18.30    Hypertension I10    CAD, multiple vessel I25.10    Dyspnea R06.00    Moderate malnutrition ([facility]) E44.0    Pneumonia of both lungs due to infectious organism J18.9    Severe malnutrition (HCC) E43    Chronic respiratory failure with hypoxia ([facility]) J96.11    Aspiration pneumonia of right lower lobe (HCC) J69.0    ESRD on dialysis ([facility] N18.6, Z99.2    Aspiration pneumonia, unspecified aspiration pneumonia type, unspecified laterality, unspecified part of lung ([facility] J69.0    Acute aspiration pneumonia ([facility] J69.0    Acute on chronic respiratory failure with hypoxia ([facility] J96.21     Impression plan  #1 end-stage renal disease on dialysis Monday Wednesday Friday  #2 tracheostomy with recurrent congestion and secretion  #3 possible aspiration pneumonia  4 protein malnutrition  #5 hyponatremia  #6 increased troponin  #7 anemia    Plan  #1 doing dialysis today reevaluate if needed again tomorrow versus Wednesday Wednesday  #2 follow-up pulmonology recommendations I discussed with patient's brother as well if patient is able to be agreeable he is fine with proceeding with bronchoscopy this admission as patient's brother is not back in town till Monday and I will try to talk to the patient.   #3 follow-up cultures treat as aspiration  #4 maintain on tube feeds monitor for high residuals  #5 sodium improving will monitor  #6 trend troponins follow-up cardiology if worsens  #7 give EPO with dialysis monitor if needs PRBC if hemoglobin less than 7 or possibly 8 in the setting of coronary disease  Renal to follow in the above setting    Electronically signed by Wesley Schaefer MD on 2/20/2023 at 9:43 AM

## 2023-02-20 NOTE — ED NOTES
ED TO INPATIENT SBAR HANDOFF    Patient Name: Rupali Hunt   :  1950  68 y.o. MRN:  [id number]  Preferred Name    ED Room #:  ED27/ED-27  Family/Caregiver Present no   Restraints no   Sitter no   Sepsis Risk Score Sepsis Risk Score: 2.68    Situation  Code Status: Prior No additional code details. Allergies: Patient has no known allergies. Weight: No data found. Arrived from: nursing home, [address]  Chief Complaint:   Chief Complaint   Patient presents with    Chest Pain     Pressure all over     Shortness of Breath     Newly placed on 3L of oxygen      Hospital Problem/Diagnosis:  Principal Problem:    Acute on chronic respiratory failure with hypoxia ([address]  Resolved Problems:    * No resolved hospital problems. *    Imaging:   CT CHEST WO CONTRAST   Preliminary Result   1. Moderate layering bilateral pleural effusions have increased. 2. Worsening areas of atelectasis. Complete collapse of the right lower lobe   with moderate right upper lobe and right middle lobe atelectasis. Moderate   left lower lobe and mild left upper lobe atelectasis. No central mucous plug   identified. XR CHEST PORTABLE   Final Result   Worsening pulmonary vascular congestion and bilateral effusions.            Abnormal labs:   Abnormal Labs Reviewed   CBC WITH AUTO DIFFERENTIAL - Abnormal; Notable for the following components:       Result Value    RBC 2.68 (*)     Hemoglobin 9.4 (*)     Hematocrit 29.7 (*)     .8 (*)     MCH 35.1 (*)     MCHC 31.6 (*)     RDW 19.6 (*)     Lymphocytes % 18.8 (*)     Monocytes % 8.5 (*)     Eosinophils % 3.6 (*)     Basophils % 1.2 (*)     Immature Neutrophil % 3.8 (*)     All other components within normal limits   COMPREHENSIVE METABOLIC PANEL - Abnormal; Notable for the following components:    Sodium 129 (*)     Chloride 90 (*)     BUN 53 (*)     Creatinine 4.6 (*)     Est, Glom Filt Rate 13 (*)     Glucose 239 (*)     AST 14 (*)     Alkaline Phosphatase 213 (*) All other components within normal limits   TROPONIN - Abnormal; Notable for the following components:    Troponin T 0.224 (*)     All other components within normal limits   BRAIN NATRIURETIC PEPTIDE - Abnormal; Notable for the following components:    Pro-BNP 9,145 (*)     All other components within normal limits   BLOOD GAS, VENOUS - Abnormal; Notable for the following components:    pH, Paolo 7.31 (*)     pCO2, Paolo 59 (*)     Base Exc, Mixed 2 (*)     HCO3, Venous 29.7 (*)     O2 Sat, Paolo 77.1 (*)     All other components within normal limits     Critical values: yes , Troponin 0.224, BNP 9,145, Na+ 129, Hgb 9.4    Abnormal Assessment Findings: trach, gtube, scattered bruising     Background  History:   Past Medical History:   Diagnosis Date    Allergic rhinitis     Anticoagulated on Coumadin     1/6/17-**[facility] to follow pt's PT/INRs, along w/prescribing his Coumadin. **    Anxiety     Arthritis     Atrial fibrillation (HCC)     On Coumadin.    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 positive 09/21/2022    positive at 15C, negative at 18C    COPD (chronic obstructive pulmonary disease) (HCC)     Depression     Diabetes mellitus (HCC)     NIDDM- dx over 10 yrs ago- follows with Dr Richie Pinon's contracture of hand     Right hand    Family history of cardiovascular disease     Father-MI    GERD (gastroesophageal reflux disease)     H/O cardiac catheterization 03/2007    cardiac cath- stent LAD patent, [id number]-34-28%, RCA 40-50%    H/O cardiac catheterization 06/12/2008    cardiac cath- mild disease mid RCA    H/O cardiovascular stress test 04/10/2014    cardiolite- normal, no ischemia, EF63%    H/O cardiovascular stress test 09/21/2016    EF59% normal study  pt in atrial fib    H/O cardiovascular stress test 09/20/2017    EF 60%   afib    H/O cardiovascular stress test 11/07/2018    EF60% normal study    H/O Doppler ultrasound 1/11/2010-CAROTID_Intimal thickening but no significant atherosclerotic plaque noted in LAUREN. Doppler flow velocities within the LAUREN are WNL. Intimal thickening but no significant atherosclerotic plaque noted in LICA. Doppler flow velociities within the LICA are WNL.  H/O echocardiogram 04/10/2014    EF 60%, normal LV systolic function, mild mitral and tricuspid insufficiencies, no pericardial effusion.     H/O echocardiogram 09/21/2016    EF60% normal study    H/O echocardiogram 11/07/2018    EF55-60% no significant valular disease    H/O hiatal hernia     Hx of Venous Doppler 03/14/2019    Significant reflux in Left Deep System, No reflux in right lower extremity, No DVT or SVT    Hyperlipidemia     Hypertension     Obesity     S/P PTCA (percutaneous transluminal coronary angioplasty) 03/21/2005    s/p PTCA with 3.5 X 16 TAXUS stent to LAD- follows with Dr Olivia Posey Sleep apnea     had sleep study done 10+ yrs ago- has cpap but does not use it    Thyroid disease     hypothyroid       Assessment    Vitals/MEWS: MEWS Score: 1  Level of Consciousness: Alert (0)   Vitals:    02/19/23 1930 02/19/23 1955 02/19/23 2000 02/19/23 2230   BP: (!) 109/43   115/60   Pulse: 96 90 96 92   Resp: 20   20   Temp: 98.2 °F (36.8 °C)   98.4 °F (36.9 °C)   TempSrc: Axillary      SpO2: 99% 97% 100% 98%     FiO2 (%):   O2 Flow Rate: O2 Device: T-Piece O2 Flow Rate (L/min): 8 L/min  Cardiac Rhythm:   Pain Assessment:  [x] Verbal [x] Sophie Gallery Scale  Pain Scale: Pain Assessment  Pain Level: 6  Patient's Stated Pain Goal: 0 - No pain  Pain Location: Chest  Pain Descriptors: Aching  Last documented pain score (0-10 scale) Pain Level: 6  Last documented pain medication administered: see MAR   Mental Status: oriented  NIH Score: NIH     C-SSRS:    Bedside swallow:    Harvey Coma Scale (GCS): Harvey Coma Scale  Eye Opening: Spontaneous  Best Verbal Response: Oriented  Best Motor Response: Obeys commands  [place] Coma Scale Score: 15  Active LDA's:   Peripheral IV 02/19/23 Left Antecubital (Active)     PO Status: Non-Oral Feeding Method  Pertinent or High Risk Medications/Drips: no   o If Yes, please provide details:   Pending Blood Product Administration: no     You may also review the ED PT Care Timeline found under the Summary Nursing Index tab. Recommendation    Pending orders   Plan for Discharge (if known):    Additional Comments:    If any further questions, please call Sending RN at [phone]    Electronically signed by: Electronically signed by Helene Hill RN on 2/19/2023 at 10:30 PM       Carlitos Rock RN  02/19/23 [address] [place] [address], RN  02/19/23 8056

## 2023-02-20 NOTE — PROGRESS NOTES
[address]  consulted by Dr. Marcos James for monitoring and adjustment. Indication for treatment: Vancomycin indication: HAP  Goal trough: Trough Goal: 15-20 mcg/mL  Goal pre-HD: 21-24 mcg/mL    Risk Factors for MRSA Identified:   Hospitalization within the past 90 days, Received IV antibiotics within the past 90 days, Patient on hemodialysis    Pertinent Laboratory Values:   Temp Readings from Last 3 Encounters:   02/20/23 98.6 °F (37 °C) (Oral)   02/17/23 97.5 °F (36.4 °C) (Oral)   01/05/23 98 °F (36.7 °C) (Oral)     Recent Labs     02/19/23  1756   WBC 8.4     Recent Labs     02/17/23  0411 02/19/23  1756   BUN 53* 53*   CREATININE 4.4* 4.6*     Estimated Creatinine Clearance: 20 mL/min (A) (based on SCr of 4.6 mg/dL (H)). No intake or output data in the 24 hours ending 02/20/23 0150    Pertinent Cultures:   Date    Source    Results  2/19               Sputum/Respiratory  In process  2/20               MRSA Nasal   Ordered    Vancomycin level:   TROUGH:  No results for input(s): VANCOTROUGH in the last 72 hours. RANDOM:  No results for input(s): VANCORANDOM in the last 72 hours. Assessment:  HPI: 68 y.o male with pmh of ESRD on HD, atrial fibrillation, diabetes, COPD, and GERD who presents with worsening shortness of breath and chest pain. Patient reported he has been having intermittent chest pain and shortness of breath for the past week. At presentation, patient's chest x-ray revealed pulmonary vascular congestion and pleural effusion. He also had an episode of hypoxia, where his oxygen level dropped to 70%, but it did return to 96 %.   SCr, BUN, and urine output: ESRD on HD  Dialyzes on Mondays, Wednesdays and Fridays  Day(s) of therapy: 1 of 7  Vancomycin concentration: Pending    Plan:  Vancomycin 2000 mg x 1  Intermittent dosing based on levels due to ESRD/HD  Pharmacy will continue to monitor patient and adjust therapy as indicated    VANCOMYCIN CONCENTRATION SCHEDULED FOR 2/20 @0600    Thank you for the consult.   Sheree Magana [facility]  2/20/2023 1:50 AM

## 2023-02-20 NOTE — PROGRESS NOTES
Patient seen and examined on dialysis tolerating well with HD catheter is dialysis line maintain current supportive care with dialysis Monday Wednesday Friday and gets that current outpatient dialysis at nursing home at [facility] we will monitor and follow with supportive care and ultrafiltration

## 2023-02-20 NOTE — CONSULTS
Subjective:   CHIEF COMPLAINT / HPI:  68year old male readmitted with worsening resp failure      Past Medical History:  Past Medical History:   Diagnosis Date    Allergic rhinitis     Anticoagulated on Coumadin     1/6/17-**[facility] to follow pt's PT/INRs, along w/prescribing his Coumadin. **    Anxiety     Arthritis     Atrial fibrillation (HCC)     On Coumadin. 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 09/21/2022    positive at 15C, negative at 18C    COPD (chronic obstructive pulmonary disease) (HCC)     Depression     Diabetes mellitus (HCC)     NIDDM- dx over 10 yrs ago- follows with Dr Donnell Pinon's contracture of hand     Right hand    Family history of cardiovascular disease     Father-MI    GERD (gastroesophageal reflux disease)     H/O cardiac catheterization 03/2007    cardiac cath- stent LAD patent, [id number]%, RCA 40-50%    H/O cardiac catheterization 06/12/2008    cardiac cath- mild disease mid RCA    H/O cardiovascular stress test 04/10/2014    cardiolite- normal, no ischemia, EF63%    H/O cardiovascular stress test 09/21/2016    EF59% normal study  pt in atrial fib    H/O cardiovascular stress test 09/20/2017    EF 60%   afib    H/O cardiovascular stress test 11/07/2018    EF60% normal study    H/O Doppler ultrasound     1/11/2010-CAROTID_Intimal thickening but no significant atherosclerotic plaque noted in LAUREN. Doppler flow velocities within the LAUREN are WNL. Intimal thickening but no significant atherosclerotic plaque noted in LICA. Doppler flow velociities within the LICA are WNL. H/O echocardiogram 04/10/2014    EF 60%, normal LV systolic function, mild mitral and tricuspid insufficiencies, no pericardial effusion.     H/O echocardiogram 09/21/2016    EF60% normal study    H/O echocardiogram 11/07/2018    EF55-60% no significant valular disease    H/O hiatal hernia     Hx of Venous Doppler 03/14/2019    Significant reflux in Left Deep System, No reflux in right lower extremity, No DVT or SVT    Hyperlipidemia     Hypertension     Obesity     S/P PTCA (percutaneous transluminal coronary angioplasty) 03/21/2005    s/p PTCA with 3.5 X 16 TAXUS stent to LAD- follows with Dr Billy Durham apnea     had sleep study done 10+ yrs ago- has cpap but does not use it    Thyroid disease     hypothyroid       Past Surgical History:        Procedure Laterality Date    CARDIAC CATHETERIZATION  6/12/08    mild disease mid RCA,  stent to LAD patent,    CARDIAC CATHETERIZATION  3/07    Stent to LAD patent, Diagonal 40-50%, RCA 40-50%    CARDIAC CATHETERIZATION  3/05    COLONOSCOPY  2011    COLONOSCOPY  5/18/16    1 polyp removed, diverticulosis and hemorrhoids noted    CORONARY ANGIOPLASTY WITH STENT PLACEMENT  03/21/2005    PTCA with 3.5 x 16 TAXUS stent to LAD    CORONARY ARTERY BYPASS GRAFT N/A 9/22/2022    CABG CORONARY ARTERY BYPASS x3 (LIMA TO LAD, SVG TO PDA-RCA SEQUENTIAL) , INTRAOPERATIVE MILTON, ENDOVEIN HARVEST OF LEFT SAPHENOUS VEIN, MODIFIED MAZE PROCEDURE, LEFT ATRIAL APPENDAGE LIGATION, INTERCOSTAL NERVE BLOCK, INTRA-AORTIC BALLOON PUMP INSERTION performed by Fredi Soto MD at [address] [place] [address], [address] [place] [address] St, DIAGNOSTIC  2014    egd    HAND SURGERY Right 1997    IR TUNNELED [address] 5 YEARS  2/14/2023    IR TUNNELED CATHETER PLACEMENT GREATER THAN 5 YEARS 2/14/2023 Lusukhdevan Grade, DO [address] [facility] SPECIAL PROCEDURES    HI OPTX ANKLE DISLOCATION W/REPAIR/INT/XTRNL FIXJ Right 6/3/2019    RIGHT OPEN REDUCTION INTERNAL FIXATION OF DISTAL TIBIA  AND FIBULA performed by Luis Hodges MD at [address] [place] [address] N/A 9/24/2022    STERNUM WASHOUT performed by Fredi Soto MD at [address] [place] [address] OR       Current Medications:    Current Facility-Administered Medications: sodium chloride flush 0.9 % injection 5-40 mL, 5-40 mL, IntraVENous, 2 times per day  sodium chloride flush 0.9 % injection 5-40 mL, 5-40 mL, IntraVENous, PRN  0.9 % sodium chloride infusion, , IntraVENous, PRN  ondansetron (ZOFRAN-ODT) disintegrating tablet 4 mg, 4 mg, Oral, Q8H PRN **OR** ondansetron (ZOFRAN) injection 4 mg, 4 mg, IntraVENous, Q6H PRN  polyethylene glycol (GLYCOLAX) packet 17 g, 17 g, Oral, Daily PRN  acetaminophen (TYLENOL) tablet 650 mg, 650 mg, Oral, Q6H PRN **OR** acetaminophen (TYLENOL) suppository 650 mg, 650 mg, Rectal, Q6H PRN  amiodarone (CORDARONE) tablet 200 mg, 200 mg, PEG Tube, Daily  aspirin chewable tablet 81 mg, 81 mg, PEG Tube, Daily  atorvastatin (LIPITOR) tablet 40 mg, 40 mg, PEG Tube, Nightly  insulin glargine (LANTUS) injection vial 15 Units, 15 Units, SubCUTAneous, Nightly  lansoprazole suspension SUSP 30 mg, 30 mg, Per G Tube, QAM AC  levothyroxine (SYNTHROID) tablet 100 mcg, 100 mcg, Per G Tube, Daily  metoprolol tartrate (LOPRESSOR) tablet 12.5 mg, 12.5 mg, Per G Tube, BID  midodrine (PROAMATINE) tablet 5 mg, 5 mg, PEG Tube, TID WC  polyethylene glycol (GLYCOLAX) packet 17 g, 17 g, Oral, BID  polyvinyl alcohol (LIQUIFILM TEARS) 1.4 % ophthalmic solution 2 drop, 2 drop, Both Eyes, 4x Daily PRN  QUEtiapine (SEROQUEL) tablet 50 mg, 50 mg, Per G Tube, Nightly  sennosides-docusate sodium (SENOKOT-S) 8.6-50 MG tablet 2 tablet, 2 tablet, PEG Tube, Daily PRN  traZODone (DESYREL) tablet 100 mg, 100 mg, PEG Tube, Nightly PRN  insulin lispro (HUMALOG) injection vial 0-8 Units, 0-8 Units, SubCUTAneous, TID WC  insulin lispro (HUMALOG) injection vial 0-4 Units, 0-4 Units, SubCUTAneous, Nightly  glucose chewable tablet 16 g, 4 tablet, Oral, PRN  dextrose bolus 10% 125 mL, 125 mL, IntraVENous, PRN **OR** dextrose bolus 10% 250 mL, 250 mL, IntraVENous, PRN  glucagon (rDNA) injection 1 mg, 1 mg, SubCUTAneous, PRN  dextrose 10 % infusion, , IntraVENous, Continuous PRN  heparin (porcine) injection 5,000 Units, 5,000 Units, SubCUTAneous, 3 times per day  acetylcysteine (MUCOMYST) 10 % solution 400 mg, 4 mL, Inhalation, TID  vancomycin (VANCOCIN) 1,250 mg in sodium chloride 0.9 % 250 mL IVPB ([id number]), 1,250 mg, IntraVENous, Once in dialysis  ipratropium-albuterol (DUONEB) nebulizer solution 1 ampule, 1 ampule, Inhalation, Q4H WA  [COMPLETED] cefepime (MAXIPIME) 2,000 mg in sodium chloride 0.9 % 50 mL IVPB (mini-bag), 2,000 mg, IntraVENous, Once **FOLLOWED BY** cefepime (MAXIPIME) 1,000 mg in sodium chloride 0.9 % 50 mL IVPB (mini-bag), 1,000 mg, IntraVENous, Q24H  vancomycin (VANCOCIN) intermittent dosing (placeholder), , Other, RX Placeholder    No Known Allergies    Social History:    Social History     Socioeconomic History    Marital status:       Spouse name: None    Number of children: 1    Years of education: None    Highest education level: None   Occupational History     Comment: RETIRED   Tobacco Use    Smoking status: Former     Packs/day: 1.00     Years: 10.00     Pack years: 10.00     Types: Cigarettes     Quit date: 1976     Years since quittin.1    Smokeless tobacco: Never   Vaping Use    Vaping Use: Never used   Substance and Sexual Activity    Alcohol use: Not Currently     Alcohol/week: 6.0 standard drinks     Types: 6 Cans of beer per week     Comment: caffeine 2 cups of tea a day     Drug use: No    Sexual activity: Never     Social Determinants of Health     Financial Resource Strain: Low Risk     Difficulty of Paying Living Expenses: Not hard at all   Food Insecurity: No Food Insecurity    Worried About Running Out of Food in the Last Year: Never true    Ran Out of Food in the Last Year: Never true   Physical Activity: Sufficiently Active    Days of Exercise per Week: 7 days    Minutes of Exercise per Session: 30 min       Family History:   Family History   Problem Relation Age of Onset    Cancer Mother         breast ca    Coronary Art Dis Father          MI    Heart Disease Father     Rheum Arthritis Other     Rheum Arthritis Sister     No Known Problems Brother     Rheum Arthritis Sister        Immunization:  Immunization History Administered Date(s) Administered    COVID-19, PFIZER PURPLE top, DILUTE for use, (age 15 y+), 30mcg/0.3mL 03/09/2021, 03/30/2021, 11/17/2021    Influenza Vaccine, unspecified formulation 11/01/2017    Influenza Virus Vaccine 10/01/2018    Influenza, FLUAD, (age 72 y+), Adjuvanted, 0.5mL 10/27/2021    Influenza, Triv, 3 Years and older, IM (Afluria (5 yrs and older) 10/01/2011, 10/15/2014    Pneumococcal Conjugate 13-valent ([id number]) 06/28/2017    Pneumococcal Polysaccharide ([id number]) 01/01/2008, 05/18/2013, 04/01/2019    Tdap (Boostrix, Adacel) 12/17/2011         REVIEW OF SYSTEMS:    CONSTITUTIONAL:  negative for fevers, chills, diaphoresis, activity change, appetite change, fatigue, night sweats and unexpected weight change. EYES:  negative for blurred vision, eye discharge, visual disturbance and icterus  HEENT:  negative for hearing loss, tinnitus, ear drainage, sinus pressure, nasal congestion, epistaxis and snoring  RESPIRATORY:  See HPI  CARDIOVASCULAR:  negative for chest pain, palpitations, exertional chest pressure/discomfort, edema, syncope  GASTROINTESTINAL:  negative for nausea, vomiting, diarrhea, constipation, blood in stool and abdominal pain  GENITOURINARY:  negative for frequency, dysuria and hematuria  HEMATOLOGIC/LYMPHATIC:  negative for easy bruising, bleeding and lymphadenopathy  ALLERGIC/IMMUNOLOGIC:  negative for recurrent infections, angioedema, anaphylaxis and drug reactions  ENDOCRINE:  negative for weight changes and diabetic symptoms including polyuria, polydipsia and polyphagia    MUSCULOSKELETAL:  negative for  pain, joint swelling, decreased range of motion and muscle weakness  NEUROLOGICAL:  negative for headaches, slurred speech, unilateral weakness  PSYCHIATRIC/BEHAVIORAL: negative for hallucinations, behavioral problems, confusion and agitation.      Objective:   PHYSICAL EXAM:      VITALS:    Vitals:    02/20/23 1115 02/20/23 1130 02/20/23 1145 02/20/23 1155   BP: (!) 86/51 (!) 82/52 108/60 127/74   Pulse: 79 78 83 88   Resp: 15 12 14 14   Temp:    97.4 °F (36.3 °C)   TempSrc:       SpO2: 99% 99% 100% 100%   Weight:    268 lb 8.3 oz (121.8 kg)   Height:             CONSTITUTIONAL:  awake, alert, cooperative, no apparent distress, and appears stated age  NECK:  Supple, symmetrical, trachea midline, no adenopathy, thyroid symmetric, not enlarged and no tenderness  CHEST: Chest expansion equal and symmetrical, no intercostal retraction. LUNGS:  no increased work of breathing, has expiratory wheezes both lungs, no crackles. CARDIOVASCULAR: S1 and S2, no edema and no JVD  ABDOMEN:  normal bowel sounds, non-distended and no masses palpated, and no tenderness to palpation. No hepatospleenomegaly  LYMPHADENOPATHY:  no axillary or supraclavicular adenopathy. No cervical adnenopathy  PSYCHIATRIC: Oriented to person place and time. No obvious depression or anxiety. MUSCULOSKELETAL: No obvious misalignment or effusion of the joints. No clubbing, cyanosis of the digits. RIGHT AND LEFT LOWER EXTREMITIES: No edema, no inflammation, no tenderness. SKIN:  normal skin color, texture, turgor and no redness, warmth, or swelling. No palpable nodules    DATA:                   EXAMINATION:   CT OF THE CHEST WITHOUT CONTRAST 2/19/2023 8:44 pm       TECHNIQUE:   CT of the chest was performed without the administration of intravenous   contrast. Multiplanar reformatted images are provided for review. Automated   exposure control, iterative reconstruction, and/or weight based adjustment of   the mA/kV was utilized to reduce the radiation dose to as low as reasonably   achievable. COMPARISON:   02/12/2023       HISTORY:   ORDERING SYSTEM PROVIDED HISTORY: hypoxia   TECHNOLOGIST PROVIDED HISTORY:   Reason for exam:->hypoxia   Reason for Exam: hypoxia       FINDINGS:   Mediastinum: Moderate cardiomegaly. Status post median sternotomy and CABG.    Right subclavian catheter with tip at the right atrium. No pericardial   effusion. No thoracic adenopathy. Lungs/pleura: Tracheostomy terminates 4 cm above the natalia. Moderate   low-attenuation layering bilateral pleural effusions. Complete collapse of   the right lower lobe. Moderate dependent atelectasis in the right upper lobe   and right middle lobe. Moderate-severe left lower lobe atelectasis. Mild dependent atelectasis in   the left upper lobe. No central endobronchial lesion or mucous plug. Upper abdomen:  Limited images of the upper abdomen demonstrate no   significant abnormality. Soft Tissues/Bones:  No acute abnormality of the visualized osseous   structures. Impression   1. Moderate layering bilateral pleural effusions have increased. 2. Worsening areas of atelectasis. Complete collapse of the right lower lobe   with moderate right upper lobe and right middle lobe atelectasis. Moderate   left lower lobe and mild left upper lobe atelectasis. No central mucous plug   identified. Assessment: Ac on ch resp failure hypoxemic  Ac copd  Georgi pulm atelectasis sec to mucus plugging  Pneumonia HA  Georgi pl effusion          Plan:     Adequate o2 adm  Bd rx  Frequent suctioning  Cx  Antibx  Needs bronch and will talk to pt again as her refused having bronch done last week.  Currently undergoing dialysis  Thanks will follow

## 2023-02-20 NOTE — PROGRESS NOTES
V2.0  [facility] [place]      Name:  Wayne Evangelista /Age/Sex: 1950  (68 y.o. male)   MRN & CSN:  [id number] & [id number] Encounter Date/Time: 2023 7:40 AM EST    Location:  -A PCP: Aida Good, APRN - [facility] Day: 2    Assessment and Plan:   Wayne Evangelista is a 68 y.o. male with pmh of  coronary artery disease s/p CABG complicated by cardiogenic shock, respiratory failure, s/p tracheostomy, PEG tube placement, recurrent admissions for respiratory failure since CABG, chronic hypotension, diabetes mellitus type 2, on long-term insulin, hypothyroidism, GERD, ESRD on HD who presents with Acute on chronic respiratory failure with hypoxia ([facility] [address]      Plan:  #. Acute on chronic respiratory failure with hypoxia  -Patient was recently discharged on 6 L through trach.    -Patient's oxygen saturation dropped to 70% while in the ER. Patient required aggressive suctioning in the ED.  -Patient was given acetylcysteine inhalation  -Continue bronchodilator therapy, acetylcysteine x3  -CT chest-moderate layering bilateral pleural effusions, worsening area of atelectasis, complete collapse of the right lower lobe with moderate right upper lobe and right middle lobe atelectasis. Moderate left lower lobe and mid left upper lobe atelectasis. -Consult pulmonology for possible bronchoscopy     #. Probable HAP  -Patient has copious amount of secretions from his trach  -Sputum culture ordered  -Continue broad-spectrum antibiotics until cultures are reported. #.  Chest pressure  -Chronically elevated troponin, EKG with no acute ST-T wave changes.  -Serial troponins, slightly uptrending     #. Patient has recurrent admissions secondary to respiratory failure. -Recent admission -2023     #.   Coronary artery disease s/p CABG-2022  -History of PTCA with HENRY to LAD  -Patient was transferred to [facility] after CABG for cardiogenic shock.  -Patient is on metoprolol tartrate, atorvastatin     #. Patient had tracheostomy (10/12/2022) and PEG tube placement 10/19/2022) at [facility]  -Currently at [facility] for rehab. #.  ESRD on HD  -Consult nephrology HD on M-W-F     #.  COPD  -On DuoNeb 4 times daily     #. Persistent atrial fibrillation s/p maze procedure-9/2022  -Patient is on amiodarone, Coumadin on hold will continue to hold if patient needs bronchoscopy     #. Chronic hypotension-currently on midodrine. #.  Diabetes mellitus type 2, on long-term insulin  -Patient is on glargine 25 units nightly     #. Hypothyroidism patient on levothyroxine 100 mcg     #. GERD-on lansoprazole     #. History of DVT-right femoral vein  -Patient on anticoagulation     #. Insomnia-on quetiapine, trazodone     #. History of right ankle fracture s/p ORIF-6/2019     #. Morbid obesity with BMI 34.87. Diet Diet NPO   DVT Prophylaxis [] Lovenox, []  Heparin, [x] SCDs, [] Ambulation,  [] Eliquis, [] Xarelto  [] Coumadin   Code Status Full Code   Disposition From: LTAC  Expected Disposition: Same  Estimated Date of Discharge: 3 days  Patient requires continued admission due to respiratory failure   Surrogate Decision Maker/ POA      Subjective:     Chief Complaint: Chest Pain (Pressure all over ) and Shortness of Breath (Newly placed on 3L of oxygen )       Ashlyn Zeng is a 68 y.o. male who presents with worsening breathing difficulties. .  Seen and examined at bedside this morning, drowsy opens eyes on verbal commands feels okay. Review of Systems:    Review of Systems unable to obtain extensive ROS due to mentation      Objective:   No intake or output data in the 24 hours ending 02/20/23 0740     Vitals:   Vitals:    02/20/23 0500   BP: 114/67   Pulse:    Resp:    Temp:    SpO2:        Physical Exam:   Physical Exam GEN  -Awake, alert. EYES   -PERRL. HENT  -MM are, trach collar with greenish secretions. RESP  -congested, rhonchi. Symmetric chest movement.  C/V  -S1/S2 auscultated, tachycardia without appreciable M/R/G. No JVD or carotid bruits. Peripheral pulses equal bilaterally and palpable. No peripheral edema. No reproducible chest wall tenderness. GI  -Abdomen is soft, non-distended, no significant tenderness. No masses or guarding. + BS in all quadrants. Rectal exam deferred.   -No CVA tenderness. Gonzalez catheter is not present. MS  -B/L extremities - No gross joint deformities, venous stasis changes noted in bilateral lower extremities no swelling, intact sensation symmetrical.   SKIN  -Normal coloration, warm, dry. NEURO  -awake, alert, following commands, answering questions.       Medications:   Medications:    sodium chloride flush  5-40 mL IntraVENous 2 times per day    amiodarone  200 mg PEG Tube Daily    aspirin  81 mg PEG Tube Daily    atorvastatin  40 mg PEG Tube Nightly    insulin glargine  15 Units SubCUTAneous Nightly    lansoprazole  30 mg Per G Tube QAM AC    levothyroxine  100 mcg Per G Tube Daily    metoprolol tartrate  12.5 mg Per G Tube BID    midodrine  5 mg PEG Tube TID WC    polyethylene glycol  17 g Oral BID    QUEtiapine  50 mg Per G Tube Nightly    insulin lispro  0-8 Units SubCUTAneous TID WC    insulin lispro  0-4 Units SubCUTAneous Nightly    heparin (porcine)  5,000 Units SubCUTAneous 3 times per day    acetylcysteine  4 mL Inhalation TID    ipratropium-albuterol  1 ampule Inhalation Q4H WA    cefepime  1,000 mg IntraVENous Q24H    vancomycin (VANCOCIN) intermittent dosing (placeholder)   Other RX Placeholder      Infusions:    sodium chloride      dextrose       PRN Meds: sodium chloride flush, 5-40 mL, PRN  sodium chloride, , PRN  ondansetron, 4 mg, Q8H PRN   Or  ondansetron, 4 mg, Q6H PRN  polyethylene glycol, 17 g, Daily PRN  acetaminophen, 650 mg, Q6H PRN   Or  acetaminophen, 650 mg, Q6H PRN  polyvinyl alcohol, 2 drop, 4x Daily PRN  sennosides-docusate sodium, 2 tablet, Daily PRN  traZODone, 100 mg, Nightly PRN  glucose, 4 tablet, PRN  dextrose bolus, 125 mL, PRN   Or  dextrose bolus, 250 mL, PRN  glucagon (rDNA), 1 mg, PRN  dextrose, , Continuous PRN        Labs      Recent Results (from the past 24 hour(s))   Blood Gas, Venous    Collection Time: 02/19/23  5:30 PM   Result Value Ref Range    pH, Paolo 7.31 (L) 7.32 - 7.42    pCO2, Paolo 59 (H) 38 - 52 mmHG    pO2, Paolo 45 28 - 48 mmHG    Base Exc, Mixed 2 (H) 0 - 1.2    HCO3, Venous 29.7 (H) 19 - 25 MMOL/L    O2 Sat, Paolo 77.1 (H) 50 - 70 %    Comment VBG    CBC with Auto Differential    Collection Time: 02/19/23  5:56 PM   Result Value Ref Range    WBC 8.4 4.0 - 10.5 K/CU MM    RBC 2.68 (L) 4.6 - 6.2 M/CU MM    Hemoglobin 9.4 (L) 13.5 - 18.0 GM/DL    Hematocrit 29.7 (L) 42 - 52 %    .8 (H) 78 - 100 FL    MCH 35.1 (H) 27 - 31 PG    MCHC 31.6 (L) 32.0 - 36.0 %    RDW 19.6 (H) 11.7 - 14.9 %    Platelets 851 248 - 244 K/CU MM    MPV 8.8 7.5 - 11.1 FL    Differential Type AUTOMATED DIFFERENTIAL     Segs Relative 64.1 36 - 66 %    Lymphocytes % 18.8 (L) 24 - 44 %    Monocytes % 8.5 (H) 0 - 4 %    Eosinophils % 3.6 (H) 0 - 3 %    Basophils % 1.2 (H) 0 - 1 %    Segs Absolute 5.4 K/CU MM    Lymphocytes Absolute 1.6 K/CU MM    Monocytes Absolute 0.7 K/CU MM    Eosinophils Absolute 0.3 K/CU MM    Basophils Absolute 0.1 K/CU MM    Nucleated RBC % 0.4 %    Total Nucleated RBC 0.0 K/CU MM    Total Immature Neutrophil 0.32 K/CU MM    Immature Neutrophil % 3.8 (H) 0 - 0.43 %   Comprehensive Metabolic Panel    Collection Time: 02/19/23  5:56 PM   Result Value Ref Range    Sodium 129 (L) 135 - 145 MMOL/L    Potassium 3.9 3.5 - 5.1 MMOL/L    Chloride 90 (L) 99 - 110 mMol/L    CO2 27 21 - 32 MMOL/L    BUN 53 (H) 6 - 23 MG/DL    Creatinine 4.6 (H) 0.9 - 1.3 MG/DL    Est, Glom Filt Rate 13 (L) >60 mL/min/1.73m2    Glucose 239 (H) 70 - 99 MG/DL    Calcium 10.5 8.3 - 10.6 MG/DL    Albumin 3.6 3.4 - 5.0 GM/DL    Total Protein 7.0 6.4 - 8.2 GM/DL    Total Bilirubin 0.3 0.0 - 1.0 MG/DL    ALT 11 10 - 40 U/L    AST 14 (L) 15 - 37 IU/L    Alkaline Phosphatase 213 (H) 40 - 129 IU/L    Anion Gap 12 4 - 16   Troponin    Collection Time: 02/19/23  5:56 PM   Result Value Ref Range    Troponin T 0.224 (HH) <0.01 NG/ML   Lipase    Collection Time: 02/19/23  5:56 PM   Result Value Ref Range    Lipase 18 13 - 60 IU/L   Brain Natriuretic Peptide    Collection Time: 02/19/23  5:56 PM   Result Value Ref Range    Pro-BNP 9,145 (H) <300 PG/ML   Procalcitonin    Collection Time: 02/19/23  5:56 PM   Result Value Ref Range    Procalcitonin 0.341    Lactate, Sepsis    Collection Time: 02/19/23 10:40 PM   Result Value Ref Range    Lactic Acid, Sepsis 1.3 0.5 - 1.9 mMOL/L   POCT Glucose    Collection Time: 02/20/23  1:54 AM   Result Value Ref Range    POC Glucose 196 (H) 70 - 99 MG/DL   Basic Metabolic Panel    Collection Time: 02/20/23  6:13 AM   Result Value Ref Range    Sodium 132 (L) 135 - 145 MMOL/L    Potassium 4.0 3.5 - 5.1 MMOL/L    Chloride 95 (L) 99 - 110 mMol/L    CO2 25 21 - 32 MMOL/L    Anion Gap 12 4 - 16    BUN 58 (H) 6 - 23 MG/DL    Creatinine 5.3 (H) 0.9 - 1.3 MG/DL    Est, Glom Filt Rate 11 (L) >60 mL/min/1.73m2    Glucose 181 (H) 70 - 99 MG/DL    Calcium 9.8 8.3 - 10.6 MG/DL   Magnesium    Collection Time: 02/20/23  6:13 AM   Result Value Ref Range    Magnesium 3.4 (H) 1.8 - 2.4 mg/dl   CBC with Auto Differential    Collection Time: 02/20/23  6:13 AM   Result Value Ref Range    WBC 7.9 4.0 - 10.5 K/CU MM    RBC 2.43 (L) 4.6 - 6.2 M/CU MM    Hemoglobin 8.4 (L) 13.5 - 18.0 GM/DL    Hematocrit 27.4 (L) 42 - 52 %    .8 (H) 78 - 100 FL    MCH 34.6 (H) 27 - 31 PG    MCHC 30.7 (L) 32.0 - 36.0 %    RDW 19.1 (H) 11.7 - 14.9 %    Platelets 133 149 - 654 K/CU MM    MPV 8.9 7.5 - 11.1 FL    Differential Type AUTOMATED DIFFERENTIAL     Segs Relative 61.6 36 - 66 %    Lymphocytes % 22.2 (L) 24 - 44 %    Monocytes % 8.7 (H) 0 - 4 %    Eosinophils % 3.7 (H) 0 - 3 %    Basophils % 1.0 0 - 1 %    Segs Absolute 4.9 K/CU MM    Lymphocytes Absolute 1.8 K/CU MM    Monocytes Absolute 0.7 K/CU MM    Eosinophils Absolute 0.3 K/CU MM    Basophils Absolute 0.1 K/CU MM    Nucleated RBC % 0.3 %    Total Nucleated RBC 0.0 K/CU MM    Total Immature Neutrophil 0.22 K/CU MM    Immature Neutrophil % 2.8 (H) 0 - 0.43 %   Vancomycin Level, Random    Collection Time: 02/20/23  6:13 AM   Result Value Ref Range    Vancomycin Rm 17.0 UG/ML    DOSE AMOUNT DOSE AMT. GIVEN - 2000 mg     DOSE TIME DOSE TIME GIVEN - .    Troponin    Collection Time: 02/20/23  6:13 AM   Result Value Ref Range    Troponin T 0.239 (HH) <0.01 NG/ML        Imaging/Diagnostics Last 24 Hours   CT CHEST WO CONTRAST    Result Date: 2/19/2023  EXAMINATION: CT OF THE CHEST WITHOUT CONTRAST 2/19/2023 8:44 pm TECHNIQUE: CT of the chest was performed without the administration of intravenous contrast. Multiplanar reformatted images are provided for review. Automated exposure control, iterative reconstruction, and/or weight based adjustment of the mA/kV was utilized to reduce the radiation dose to as low as reasonably achievable. COMPARISON: 02/12/2023 HISTORY: ORDERING SYSTEM PROVIDED HISTORY: hypoxia TECHNOLOGIST PROVIDED HISTORY: Reason for exam:->hypoxia Reason for Exam: hypoxia FINDINGS: Mediastinum: Moderate cardiomegaly.  Status post median sternotomy and CABG. Right subclavian catheter with tip at the right atrium.  No pericardial effusion.  No thoracic adenopathy. Lungs/pleura: Tracheostomy terminates 4 cm above the natalia.  Moderate low-attenuation layering bilateral pleural effusions.  Complete collapse of the right lower lobe.  Moderate dependent atelectasis in the right upper lobe and right middle lobe. Moderate-severe left lower lobe atelectasis.  Mild dependent atelectasis in the left upper lobe.  No central endobronchial lesion or mucous plug. Upper abdomen:  Limited images of the upper abdomen demonstrate no significant abnormality. Soft Tissues/Bones:  No acute abnormality of the  visualized osseous structures. 1. Moderate layering bilateral pleural effusions have increased. 2. Worsening areas of atelectasis. Complete collapse of the right lower lobe with moderate right upper lobe and right middle lobe atelectasis. Moderate left lower lobe and mild left upper lobe atelectasis. No central mucous plug identified. XR CHEST PORTABLE    Result Date: 2/19/2023  EXAMINATION: ONE XRAY VIEW OF THE CHEST 2/19/2023 5:39 pm COMPARISON: February 15, 2023 HISTORY: ORDERING SYSTEM PROVIDED HISTORY: chest pain TECHNOLOGIST PROVIDED HISTORY: Reason for exam:->chest pain Reason for Exam:  chest pain Chest pain FINDINGS: There is pulmonary vascular congestion with moderate to severe bilateral pleural effusions. There is cardiomegaly. The patient is status post CABG and left atrial appendage clipping. A right-sided the central line is present with its tip in the SVC. A tracheostomy tube is noted. Worsening pulmonary vascular congestion and bilateral effusions.        Electronically signed by Terryl Koyanagi, MD on 2/20/2023 at 7:40 AM

## 2023-02-20 NOTE — CARE COORDINATION
Case Management Assessment  Initial Evaluation    Date/Time of Evaluation: 2/20/2023 3:21 PM  Assessment Completed by: Marilyn Aguirre RN    If patient is discharged prior to next notation, then this note serves as note for discharge by case management. Patient Name: Prosper Sellers                   YOB: 1950  Diagnosis: Elevated troponin [R77.8]  Pulmonary vascular congestion [R09.89]  Acute on chronic respiratory failure with hypoxia ([address] [J96.21]  Chest pain, unspecified type [R07.9]  Acute pleurisy without pleural effusion [R09.1]                   Date / Time: 2/19/2023  4:55 PM    Patient Admission Status: Inpatient   Readmission Risk (Low < 19, Mod (19-27), High > 27): Readmission Risk Score: 29.7    Current PCP: ANKITA Hassan CNP  PCP verified by CM? Yes    Chart Reviewed: Yes      History Provided by: Patient  Patient Orientation:      Patient Cognition:      Hospitalization in the last 30 days (Readmission):  Yes    If yes, Readmission Assessment in  Navigator will be completed. Advance Directives:      Code Status: Full Code   Patient's Primary Decision Maker is:      Primary Decision Maker: Kevin(DEVIElpidio - Brother/Sister - [phone]    Discharge Planning:    Patient lives with: Other (Comment) Type of Home: [place]  Primary Care Giver: Other (Comment) (Enzo Carbone)  Patient Support Systems include: Family Members   Current Financial resources: Medicare  Current community resources: ECF/Home Care  Current services prior to admission: [facility]            Current DME:              Type of Home Care services:  None    ADLS  Prior functional level: Assistance with the following:, Bathing, Dressing  Current functional level: Assistance with the following:, Bathing, Dressing    PT AM-PAC:   /24  OT AM-PAC: 14 /24    Family can provide assistance at DC:  Other (comment) (N/A)  Would you like Case Management to discuss the discharge plan with any other family members/significant others, and if so, who? Plans to Return to Present Housing: Yes [facility])  Other Identified Issues/Barriers to RETURNING to current housing: Yes, [facility]  Potential Assistance needed at discharge: Marvin Chapman            Potential DME:    Patient expects to discharge to: [facility]  for transportation at discharge:      Financial    Payor: Bailey Alexis / Plan: Bailey Alexis / Product Type: *No Product type* /     Does insurance require precert for SNF: Yes    Potential assistance Purchasing Medications:    Meds-to-Beds request:        [address] [facility] [place] - [address] [phone] Bam Wilson [phone]  [address] [address] [address] [address]  Phone: [phone] Fax: [phone]    [facility] [address] [facility], [address] [place] [address] A [phone] Bam Wilson [phone]  [facility] [address]  Phone: [phone] Fax: [phone] - [address] [place], [place] - [address] [facility] [phone] Bam Wilson [phone] [address] [facility] [address]  Phone: [phone] Fax: [phone]      Notes:    Factors facilitating achievement of predicted outcomes: Family support, Pleasant, Good insight into deficits, and Knowledge about rehab    Barriers to discharge: None    Additional Case Management Notes:   CRIS spoke with Attila Weiss with [facility]. Pt is there skilled but may be transferring to LTC. Pt is able to return he will need therapy notes for the pre-cert. Pt has pcp and insurance. Was living at home alone prior to open heart surgery at [address] [place] [address]. After being discharged from [address] [place] [address] he went to [facility] and has been there since. OT/PT stopped by CM desk. They felt like pt needs hospice. CM sent PS to attending physician asking his opinion.     The Plan for Transition of Care is related to the following treatment goals of Elevated troponin [R77.8]  Pulmonary vascular congestion [R09.89]  Acute on chronic respiratory failure with hypoxia (HCC) [J96.21]  Chest pain, unspecified type [R07.9]  Acute pleurisy without pleural effusion [O80.2]    IF APPLICABLE: The Patient and/or patient representative John Phillip and his family were provided with a choice of provider and agrees with the discharge plan. Freedom of choice list with basic dialogue that supports the patient's individualized plan of care/goals and shares the quality data associated with the providers was provided to:     Patient Representative Name:       The Patient and/or Patient Representative Agree with the Discharge Plan?       Bijal Ortega RN  Case Management Department  HW:[phone]   Fax: [phone]

## 2023-02-20 NOTE — CARE COORDINATION
MCG criteria for respiratory failure reviewed at this time, criteria supports Inpatient Admission.  QUINN,RN/CM

## 2023-02-20 NOTE — H&P
History and Physical      Name:  Abdon Ellsworth /Age/Sex: 1950  (68 y.o. male)   MRN & CSN:  [id number] & [id number] Encounter Date/Time: 2023 7:20 PM EST   Location:  ED27/ED-27 PCP: Neel Fox, [address] [facility] Day: 1    Assessment and Plan:     #. Acute on chronic respiratory failure with hypoxia  -Patient was recently discharged on 6 L through trach.    -Patient's oxygen saturation dropped to 70% while in the ER. Patient required aggressive suctioning in the ED.  -Patient was given acetylcysteine inhalation  -Continue bronchodilator therapy, acetylcysteine x3  -CT chest-moderate layering bilateral pleural effusions, worsening area of atelectasis, complete collapse of the right lower lobe with moderate right upper lobe and right middle lobe atelectasis. Moderate left lower lobe and mid left upper lobe atelectasis. -Consult pulmonology. #.  Probable HAP  -Patient has copious amount of secretions from his trach  -Sputum culture ordered  -Continue broad-spectrum antibiotics until cultures are reported. #.  Chest pressure  -Chronically elevated troponin, EKG with no acute ST-T wave changes.  -Serial troponins, repeat EKG    #. Patient has recurrent admissions secondary to respiratory failure. -Recent admission -2023    #. Coronary artery disease s/p CABG-2022  -History of PTCA with HENRY to LAD  -Patient was transferred to [address] [place] [address] after CABG for cardiogenic shock.  -Patient is on metoprolol tartrate, atorvastatin    #. Patient had tracheostomy (10/12/2022) and PEG tube placement 10/19/2022) at [address] [place] [address]  -Currently at [facility] for rehab. #.  ESRD on HD  -Consult nephrology. #.  COPD  -On DuoNeb 4 times daily    #. Persistent atrial fibrillation s/p maze procedure-2022  -Patient is on amiodarone    #. Chronic hypotension-currently on midodrine. #.  Diabetes mellitus type 2, on long-term insulin  -Patient is on glargine 25 units nightly    #.   Hypothyroidism patient on levothyroxine 100 mcg    #. GERD-on lansoprazole    #. History of DVT-right femoral vein  -Patient on anticoagulation    #. Insomnia-on quetiapine, trazodone    #. History of right ankle fracture s/p ORIF-6/2019    #. Morbid obesity with BMI 34.87. Disposition:   Current Living situation: [facility] rehab    Diet N.p.o., tube feeding   DVT Prophylaxis [] Lovenox, [x]  Heparin, [] SCDs, [] Ambulation,  [] Eliquis, [] Xarelto   Code Status FULL   Surrogate Decision Maker/ POA      History from:   EMR, patient. History of Present Illness:     Chief Complaint: Acute on chronic respiratory failure with hypoxia ([facility] [address]  Cassidy Trevino is a 68 y.o. male with coronary artery disease s/p CABG complicated by cardiogenic shock, respiratory failure, s/p tracheostomy, PEG tube placement, recurrent admissions for respiratory failure since CABG, chronic hypotension, diabetes mellitus type 2, on long-term insulin, hypothyroidism, GERD, ESRD on HD presented to ED with complaints of chest pressure. Patient admitted to having chest pressure, intermittent, denied any nausea, vomiting. No fever or chills. Patient denied any abdominal pain, still makes urine. Denied any urinary complaints. While in the ED patient was noted to be hypoxic with oxygen saturation dropping to 70s. Oxygen saturation improved after suctioning. At presentation patient was noted to have /79, HR 92, RR 16, temp 98.2, saturating 94% through trach mask. Work was significant for sodium 129, chloride 90, BUN 53, creatinine 4.6, lactic acid 1.3, random glucose 239, procalcitonin 0.341, troponin 0.224, proBNP 9145, hemoglobin 9.4. VBG-pH 7.31, PCO2 59, PO2 45, HCO3 29.7. Chest x-ray-worsening pulmonary vascular congestion and bilateral effusions. CT chest without contrast-worsening atelectasis, moderate layering bilateral pleural effusions. Patient received acetylcysteine inhalation in ED. Ordered vancomycin, cefepime.   Review of Systems: Need 10 Elements   10 point review of systems conducted and pertinent positives and negatives as per HPI. Objective:   No intake or output data in the 24 hours ending 02/19/23 1920   Vitals:   Vitals:    02/19/23 1710 02/19/23 1713 02/19/23 1714 02/19/23 1715   BP:   134/75 134/75   Pulse:  94 89 96   Resp: 16      SpO2:  93% 92% 91%       Medications Prior to Admission   Reviewed medications as per documentation sent from nursing home. Prior to Admission medications    Medication Sig Start Date End Date Taking?  Authorizing Provider   insulin glargine (LANTUS) 100 UNIT/ML injection vial Inject 25 Units into the skin nightly 2/17/23   Schuyler Gibson MD   metoprolol tartrate (LOPRESSOR) 25 MG tablet 0.5 tablets by Per G Tube route 2 times daily 2/17/23   Schuyler Gibson MD   polyvinyl alcohol (LIQUIFILM TEARS) 1.4 % ophthalmic solution Place 2 drops into both eyes 4 times daily as needed for Dry Eyes 2/17/23 3/19/23  Schuyler Gibson MD   amoxicillin-clavulanate (AUGMENTIN) 875-125 MG per tablet 1 tablet by PEG Tube route 2 times daily for 2 days 2/17/23 2/19/23  Schuyler Gibson MD   midodrine (PROAMATINE) 5 MG tablet Take 1 tablet by mouth 3 times daily (with meals) 2/17/23   Schuyler Gibson MD   levothyroxine (SYNTHROID) 100 MCG tablet 1 tablet by Per G Tube route Daily 2/18/23   Schuyler Gibson MD   lansoprazole 3 MG/ML SUSP Take 10 mLs by mouth every morning (before breakfast) 2/18/23 3/20/23  Schuyler Gibson MD   polyethylene glycol (GLYCOLAX) 17 GM/SCOOP powder Take 17 g by mouth 2 times daily 1/24/23 2/23/23  Natasha Espinosa PA-C   sennosides-docusate sodium (SENOKOT-S) 8.6-50 MG tablet Take 2 tablets by mouth daily as needed for Constipation 12/15/22   Yared Mishra MD   ipratropium-albuterol (DUONEB) 0.5-2.5 (3) MG/3ML SOLN nebulizer solution Inhale 3 mLs into the lungs 4 times daily 12/15/22   Steve Gomes MD   amiodarone (CORDARONE) 200 MG tablet 200 mg daily 11/29/22   Historical Provider, MD   atorvastatin (LIPITOR) 40 MG tablet 40 mg nightly 11/28/22   Historical Provider, MD   melatonin 3 MG TABS tablet 9 mg nightly 11/28/22   Historical Provider, MD   Nutritional Supplements (NEPRO/CARBSTEADY) LIQD 50 mL/hr continuous 11/28/22   Historical Provider, MD   Nutritional Supplements (PROSOURCE TF) LIQD 1 Package 3 times daily 11/28/22   Historical Provider, MD   traZODone (DESYREL) 100 MG tablet 100 mg nightly 11/28/22   Historical Provider, MD   QUEtiapine (SEROQUEL) 50 MG tablet 50 mg nightly 11/28/22   Historical Provider, MD   aspirin 81 MG chewable tablet 1 tablet by Per NG tube route daily 9/25/22   Bianca Bowers MD   blood glucose monitor kit and supplies Dispense sufficient amount for indicated testing frequency plus additional to accommodate PRN testing needs. Dispense all needed supplies to include: monitor, strips, lancing device, lancets, control solutions, alcohol swabs. 7/26/22   ANKITA Swenson CNP   Lancets MISC by D3EA route three times a day. 7/25/22   ANKITA Swenson CNP       Physical Exam: Need 8 Elements   Physical Exam     GEN  -Awake, alert. EYES   -PERRL. HENT  -MM are, trach collar with greenish secretions. RESP  -congested, rhonchi. Symmetric chest movement. C/V  -S1/S2 auscultated, tachycardia without appreciable M/R/G. No JVD or carotid bruits. Peripheral pulses equal bilaterally and palpable. No peripheral edema. No reproducible chest wall tenderness. GI  -Abdomen is soft, non-distended, no significant tenderness. No masses or guarding. + BS in all quadrants. Rectal exam deferred.   -No CVA tenderness. Gonzalez catheter is not present. MS  -B/L extremities - No gross joint deformities, venous stasis changes. No swelling, intact sensation symmetrical.   SKIN  -Normal coloration, warm, dry. NEURO  -awake, alert, following commands, answering questions.       Past Medical History:   Reviewed patient's past medical, surgical, social, family history and allergies. PMHx   Past Medical History:   Diagnosis Date    Allergic rhinitis     Anticoagulated on Coumadin     1/6/17-**[facility] to follow pt's PT/INRs, along w/prescribing his Coumadin. **    Anxiety     Arthritis     Atrial fibrillation (HCC)     On Coumadin. 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 09/21/2022    positive at 15C, negative at 18C    COPD (chronic obstructive pulmonary disease) (HCC)     Depression     Diabetes mellitus (HCC)     NIDDM- dx over 10 yrs ago- follows with Dr Katelin Pinon's contracture of hand     Right hand    Family history of cardiovascular disease     Father-MI    GERD (gastroesophageal reflux disease)     H/O cardiac catheterization 03/2007    cardiac cath- stent LAD patent, Wwyjsopn-17-30%, RCA 40-50%    H/O cardiac catheterization 06/12/2008    cardiac cath- mild disease mid RCA    H/O cardiovascular stress test 04/10/2014    cardiolite- normal, no ischemia, EF63%    H/O cardiovascular stress test 09/21/2016    EF59% normal study  pt in atrial fib    H/O cardiovascular stress test 09/20/2017    EF 60%   afib    H/O cardiovascular stress test 11/07/2018    EF60% normal study    H/O Doppler ultrasound     1/11/2010-CAROTID_Intimal thickening but no significant atherosclerotic plaque noted in LAUREN. Doppler flow velocities within the LAUREN are WNL. Intimal thickening but no significant atherosclerotic plaque noted in LICA. Doppler flow velociities within the LICA are WNL. H/O echocardiogram 04/10/2014    EF 60%, normal LV systolic function, mild mitral and tricuspid insufficiencies, no pericardial effusion.     H/O echocardiogram 09/21/2016    EF60% normal study    H/O echocardiogram 11/07/2018    EF55-60% no significant valular disease    H/O hiatal hernia     Hx of Venous Doppler 03/14/2019    Significant reflux in Left Deep System, No reflux in right lower extremity, No DVT or SVT    Hyperlipidemia     Hypertension     Obesity     S/P PTCA (percutaneous transluminal coronary angioplasty) 2005    s/p PTCA with 3.5 X 16 TAXUS stent to LAD- follows with Dr eFlipe Hatfield    Sleep apnea     had sleep study done 10+ yrs ago- has cpap but does not use it    Thyroid disease     hypothyroid     PSHX:  has a past surgical history that includes Coronary angioplasty with stent (2005); Cardiac catheterization (08); Cardiac catheterization (3/07); Cardiac catheterization (3/05); Endoscopy, colon, diagnostic (); Colonoscopy (); Colonoscopy (16); Hand surgery (Right, ); pr optx ankle dislocation w/repair/int/xtrnl fixj (Right, 6/3/2019); Sternum Debridement (N/A, 2022); Coronary artery bypass graft (N/A, 2022); and IR TUNNELED CVC PLACE WO SQ PORT/PUMP > 5 YEARS (2023). Allergies: No Known Allergies  Fam HX: family history includes Cancer in his mother; Coronary Art Dis in his father; Heart Disease in his father; No Known Problems in his brother; Rheum Arthritis in his sister, sister and another family member. Soc HX:   Social History     Socioeconomic History    Marital status:      Number of children: 1   Occupational History     Comment: RETIRED   Tobacco Use    Smoking status: Former     Packs/day: 1.00     Years: 10.00     Pack years: 10.00     Types: Cigarettes     Quit date: 1976     Years since quittin.1    Smokeless tobacco: Never   Vaping Use    Vaping Use: Never used   Substance and Sexual Activity    Alcohol use: Not Currently     Alcohol/week: 6.0 standard drinks     Types: 6 Cans of beer per week     Comment: caffeine 2 cups of tea a day     Drug use: No    Sexual activity: Never     Social Determinants of Health     Financial Resource Strain: Low Risk     Difficulty of Paying Living Expenses: Not hard at all   Food Insecurity: No Food Insecurity    Worried About Running Out of Food in the Last Year: Never true    Ran Out of Food in the Last Year: Never true   Physical Activity: Sufficiently Active    Days of Exercise per Week: 7 days    Minutes of Exercise per Session: 30 min       Medications:   Medications:    aspirin  324 mg Oral Once    furosemide  60 mg IntraVENous Once      Infusions:   PRN Meds:      Labs      CBC:   Recent Labs     02/19/23  1756   WBC 8.4   HGB 9.4*        BMP:    Recent Labs     02/17/23  0411 02/19/23  1756   * 129*   K 3.8 3.9   CL 94* 90*   CO2 25 27   BUN 53* 53*   CREATININE 4.4* 4.6*   GLUCOSE 216* 239*     Hepatic:   Recent Labs     02/19/23  1756   AST 14*   ALT 11   BILITOT 0.3   ALKPHOS 213*     Lipids:   Lab Results   Component Value Date/Time    CHOL 152 04/14/2021 10:02 AM    CHOL 139 07/23/2018 08:01 AM    HDL 43 08/26/2022 10:39 AM    TRIG 212 04/14/2021 10:02 AM     Hemoglobin A1C:   Lab Results   Component Value Date/Time    LABA1C 7.0 02/12/2023 10:53 PM     TSH:   Lab Results   Component Value Date/Time    TSH 1.66 04/03/2018 11:33 AM     Troponin:   Lab Results   Component Value Date/Time    TROPONINT 0.224 02/19/2023 05:56 PM    TROPONINT 0.269 12/29/2022 03:09 PM    TROPONINT 0.245 12/13/2022 09:42 AM     Lactic Acid: No results for input(s): LACTA in the last 72 hours.   BNP:   Recent Labs     02/19/23  1756   PROBNP 9,145*     UA:  Lab Results   Component Value Date/Time    NITRU NEGATIVE 02/13/2023 12:15 AM    COLORU YELLOW 02/13/2023 12:15 AM    PHUR 6.5 06/30/2021 02:11 PM    WBCUA 67 02/13/2023 12:15 AM    RBCUA 37 02/13/2023 12:15 AM    MUCUS RARE 09/17/2022 06:21 PM    TRICHOMONAS NONE SEEN 02/13/2023 12:15 AM    BACTERIA NEGATIVE 02/13/2023 12:15 AM    CLARITYU CLEAR 02/13/2023 12:15 AM    SPECGRAV 1.025 02/13/2023 12:15 AM    LEUKOCYTESUR SMALL NUMBER OR AMOUNT OBSERVED 02/13/2023 12:15 AM    UROBILINOGEN 0.2 02/13/2023 12:15 AM    BILIRUBINUR SMALL NUMBER OR AMOUNT OBSERVED 02/13/2023 12:15 AM    BILIRUBINUR small 06/30/2021 02:11 PM    BLOODU MODERATE NUMBER OR AMOUNT OBSERVED 02/13/2023 12:15 AM    GLUCOSEU neg 06/30/2021 02:11 PM    KETUA TRACE 02/13/2023 12:15 AM     Urine Cultures: No results found for: Lesli Taylor  Blood Cultures: No results found for: BC  No results found for: BLOODCULT2  Organism: No results found for: ORG    Imaging/Diagnostics Last 24 Hours   CT CERVICAL SPINE WO CONTRAST    Result Date: 2/14/2023  EXAMINATION: CT OF THE CERVICAL SPINE WITHOUT CONTRAST 2/14/2023 2:00 pm TECHNIQUE: CT of the cervical spine was performed without the administration of intravenous contrast. Multiplanar reformatted images are provided for review. Automated exposure control, iterative reconstruction, and/or weight based adjustment of the mA/kV was utilized to reduce the radiation dose to as low as reasonably achievable. COMPARISON: 02/12/2023 HISTORY: ORDERING SYSTEM PROVIDED HISTORY: fu prior cervical displacement prior and if causing issue with swallowing TECHNOLOGIST PROVIDED HISTORY: Reason for exam:->fu prior cervical displacement prior and if causing issue with swallowing Reason for Exam: fu prior cervical displacement prior and if causing issue with swallowing FINDINGS: BONES/ALIGNMENT: There is no acute fracture or traumatic malalignment. There are no bony lytic or blastic lesions. DEGENERATIVE CHANGES: Prominent anterior osteophytes are noted at C3-C4, C4-C5, C5-C6 and C6-C7 discs. These osteophytes bridge the C5-C6 and C6-C7 discs. These osteophytes are of sufficient size to cause swallowing difficulty. There are no fractures noted. There are no lytic or blastic lesions. The facet joints appear unremarkable. SOFT TISSUES: There is a tracheostomy tube in place. No change from prior examination. Prominent anterior osteophytes from C3-C4 to the C6-C7 discs. These are sufficient size to cause swallowing difficulties.      XR CHEST PORTABLE    Result Date: 2/19/2023  EXAMINATION: ONE XRAY VIEW OF THE CHEST 2/19/2023 5:39 pm COMPARISON: February 15, 2023 HISTORY: ORDERING SYSTEM PROVIDED HISTORY: chest pain TECHNOLOGIST PROVIDED HISTORY: Reason for exam:->chest pain Reason for Exam:  chest pain Chest pain FINDINGS: There is pulmonary vascular congestion with moderate to severe bilateral pleural effusions. There is cardiomegaly. The patient is status post CABG and left atrial appendage clipping. A right-sided the central line is present with its tip in the SVC. A tracheostomy tube is noted. Worsening pulmonary vascular congestion and bilateral effusions. XR CHEST PORTABLE    Result Date: 2/17/2023  EXAMINATION: ONE XRAY VIEW OF THE CHEST 2/15/2023 6:05 pm COMPARISON: 02/13/2023 HISTORY: ORDERING SYSTEM PROVIDED HISTORY: fu pneumonia TECHNOLOGIST PROVIDED HISTORY: Reason for exam:->fu pneumonia Reason for Exam: fu pneumonia Additional signs and symptoms: na Relevant Medical/Surgical History: CAD, COPD, hypertension FINDINGS: Tracheostomy tube is stable. Central line is stable. Stable cardiomegaly with median sternotomy wires. Decreased size of the bilateral pleural effusions. No pneumothorax. No new airspace disease. Mild central pulmonary edema. Decreasing bilateral pleural effusions with improving mild central pulmonary edema. Stable cardiomegaly. Stable lines and tubes. XR CHEST PORTABLE    Result Date: 2/13/2023  EXAMINATION: ONE XRAY VIEW OF THE CHEST 2/13/2023 8:32 am COMPARISON: 02/12/2023 HISTORY: ORDERING SYSTEM PROVIDED HISTORY: fu pneumonia TECHNOLOGIST PROVIDED HISTORY: Reason for exam:->fu pneumonia Reason for Exam: fu pneumonia FINDINGS: Right subclavian catheter projects over the right atrium. Tracheostomy tube is unchanged. No significant change in right lower lobe airspace disease, and questionable right effusion. Increased left lower lobe airspace disease and questionable effusion. Probable cardiomegaly. No pneumothorax. Unchanged large right consolidation and interval presence of left airspace disease.   Possible bilateral pleural effusions. IR TUNNELED CVC PLACE WO SQ PORT/PUMP > 5 YEARS    Result Date: 2/14/2023  PROCEDURE: Removal of previously placed tunneled dialysis access catheter with tip sent for culture. ULTRASOUND GUIDED VASCULAR ACCESS. FLUOROSCOPY GUIDED PLACEMENT OF A SUBCUTANEOUS PORT-A-CATH. 2/14/2023. HISTORY: ORDERING SYSTEM PROVIDED HISTORY: remove tunnel hd catheter and culture cath tip and place new tunnel hd catheter other side if able, had resp secretion that site and red and better to change the access site (held coumadin fu inr today) TECHNOLOGIST PROVIDED HISTORY: remove tunnel hd catheter and culture cath tip and place new tunnel hd catheter other side if able Reason for exam:->remove tunnel hd catheter and culture cath tip and place new tunnel hd catheter other side if able, had resp secretion that site and red and better to change the access site (held coumadin fu inr today) How many lumens are being requested?->2 What side should this line be placed? ->Either What site is the preferred site? ->Internal Jugular SEDATION: None FLUOROSCOPY DOSE AND TYPE OR TIME AND EXPOSURES: 1.6 minute fluoroscopy time Air kerma: 21.7 mGy TECHNIQUE: Maximum sterile barrier technique including hand hygiene, skin prep and sterile ultrasound technique utilized for procedure. Sterile ultrasound technique also utilized for procedure Ultrasound guidance required for procedure to confirm target vessel patency, puncture site selection, real-time intra procedural guidance. Images made for patient's medical file. Informed consent was obtained after a detailed explanation of the procedure including risks, benefits, and alternatives. All aspects of maximum sterile barrier technique were used including washing hands with conventional soap and water or with alcohol-based hand rubs (ABHR), skin preparation, cap, mask, sterile gown, sterile gloves, and sterile full body drape. Local anesthesia was achieved with lidocaine.  A micropuncture needle was used to access the right external jugular vein using ultrasound guidance an 0.018 inch guidewire was advanced. Previously placed dialysis access catheter was loosened within subcutaneous tunnel and removed. Catheter tip was sent for culture and sensitivity per prior order. 018 guidewire via right external jugular venous approach was exchanged for a 0.035 inch guidewire to place a peel-away sheath. Subcutaneous tunnel was created from the right clavicular region back to the external jugular access site through which dialysis access catheter was placed. Catheter was advanced to the peel-away sheath. Peel-away sheath removed. Catheter ports were aspirated, flushed, capped and affixed to the patient's skin. Dressing applied. Postprocedure image made. Patient tolerated procedure well. FINDINGS: Pre and intraprocedural sonographic images demonstrate patent right external jugular vein with access needle seen within it. Fluoroscopic image demonstrates removal of previously placed tunneled dialysis access catheter via right internal jugular venous approach and placement of new tunneled dialysis access catheter via right external jugular venous approach the tip in the mid right atrium. No complication suggested. Removal of previously placed tunneled dialysis access catheter with tip sent for culture per prior order. Successful ultrasound and fluoroscopy guided new Port-A-Cath/tunneled dialysis access catheter placement via right external jugular venous approach with tip in the mid right atrium. Personally reviewed Lab Studies, Imaging, and discussed case with ED provider.     Electronically signed by Yasmine Feldman MD on 2/19/2023 at 7:20 PM

## 2023-02-20 NOTE — PLAN OF CARE
Problem: Discharge Planning  Goal: Discharge to home or other facility with appropriate resources  Outcome: Progressing     Problem: Skin/Tissue Integrity  Goal: Absence of new skin breakdown  Description: 1. Monitor for areas of redness and/or skin breakdown  2. Assess vascular access sites hourly  3. Every 4-6 hours minimum:  Change oxygen saturation probe site  4. Every 4-6 hours:  If on nasal continuous positive airway pressure, respiratory therapy assess nares and determine need for appliance change or resting period.   Outcome: Progressing     Problem: Safety - Adult  Goal: Free from fall injury  Outcome: Progressing   Vania Curry RN

## 2023-02-20 NOTE — PROCEDURES
PATIENT COMPLETED A 3 HOUR HD TREATMENT, 1.5L OF FLUID WAS REMOVED. RIGHT TUNNELED CC WAS USED FOR ACCESS WITH NO COMPLICATIONS. POST TREATMENT LUMENS WERE FLUSHED AND CAPPED X2.  NO MEDS GIVE WITH TREATMENT. TREATMENT OVERSEEN BY:  Allie Ovalles RN    Patient Name: Bishnu Amin  Patient : 1950  MRN: [id number]     Acct: [de-identified]  Date of Admission: 2023  Room/Bed: -A  Code Status:  Full Code  Allergies: No Known Allergies  Diagnosis:    Patient Active Problem List   Diagnosis    Atrial fibrillation ([address]    CAD (coronary artery disease)    Morbid obesity ([address]    COPD (chronic obstructive pulmonary disease) ([address]    Sleep apnea    Type 2 diabetes mellitus ([address]    Thyroid disease    Hyperlipidemia    Acute congestive heart failure ([address]    Coronary artery disease involving native coronary artery of native heart with angina pectoris (HCC)    Chronic renal disease, stage III ([address] [[id number]]    Hypertension    CAD, multiple vessel    Dyspnea    Moderate malnutrition ([address]    Pneumonia of both lungs due to infectious organism    Severe malnutrition (HCC)    Chronic respiratory failure with hypoxia (HCC)    Aspiration pneumonia of right lower lobe (HCC)    ESRD on dialysis ([address]    Aspiration pneumonia, unspecified aspiration pneumonia type, unspecified laterality, unspecified part of lung ([address]    Acute aspiration pneumonia ([address]    Acute on chronic respiratory failure with hypoxia ([address]         Treatment:  Hemodilaysis 2:1  Priority: Routine  Location: Acute Room    Diabetic: Yes  NPO: Yes  Isolation Precautions: Dialysis     Consent for Treatment Verified: Yes  Blood Consent Verified: Not Applicable     Safety Verified: Identify (I), Consent (C), Equipment (E), HepB Status (B), Orders Complete (O), Access Verified (A), and Timeliness (T)  Time out performed prior to access at 0847 hours. Report Received from Primary RN at 0722 hours. Primary RN (First Initial, Last Name, Title):  Joe Contreras  RN  Incapacitated Nurse Education Completed: Yes     HBsAg ONLY:  Date Drawn: February 13, 2023       Results: Negative  HBsAb:  Date Drawn:  February 13, 2023       Results: Susceptible <10    Order  Dialysis Bath  K+ (Potassium): 3  Ca+ (Calcium): 2.5  Na+ (Sodium): 138  HCO3 (Bicarb): 30  Bicarbonate Concentrate Lot No.: [id number]  Acid Concentrate Lot No.: [id number]     Na+ Modeling: Not Applicable  Dialyzer: N115  Dialysate Temperature (C):  35  Blood Flow Rate (BFR):  350   Dialysate Flow Rate (DFR):   700        Access to be Utilized   Access:  Tunneled Catheter  Location: Internal Jugular  Side: Right     First Use X-ray Verified: Not Applicable  OK to use line order: Not Applicable    Site Assessment:  Signs and Symptoms of Infection/Inflammation: None  If yes: Not Applicable  Dressing: Dry and Intact  Site Prep: Medical Aseptic Technique  Dressing Changed this Treatment: No  If yes, by whom: NA - not changed today  Date of Last Dressing Change: February 16, 2023  Antimicrobial Patch in place?: Yes  BLUE Caps in place?: Yes  Gauze Dressing?: No  Non Dialysis Use?: No  Comment:    Flows: Good, Patent  If access problem, who was notified:     Pre and Post-Assessment  Patient Vitals for the past 8 hrs:   Level of Consciousness Respiratory Quality/Effort O2 Device Abdomen Inspection Edema Edema Generalized   02/20/23 0416 -- -- T-Piece -- -- --   02/20/23 0830 0 -- T-Piece -- -- --   02/20/23 0839 0 Unlabored -- Surgical scar;Ostomy tube Generalized Non-pitting;+1     Labs  Recent Labs     02/19/23  1756 02/20/23  0613   WBC 8.4 7.9   HGB 9.4* 8.4*   HCT 29.7* 27.4*    228                                                                  Recent Labs     02/19/23  1756 02/20/23  0613   * 132*   K 3.9 4.0   CL 90* 95*   CO2 27 25   BUN 53* 58*   CREATININE 4.6* 5.3*   GLUCOSE 239* 181*     IV Drips and Rate/Dose   sodium chloride      dextrose        Safety - Before each treatment:   Dialysis Machine No.: [id number]   Machine Number: [id number]  Dialyzer Lot No.: [id number]   Machine Log Sheet Completed: Yes  Machine Alarm Self Test: Completed; Passed (02/20/23 0847)     Air Foam Detector: Tested, Proper Function  Extracorporeal Circuit Tested for Integrity: Yes  Machine Conductivity: 14.1  Manual Conductivity: 14     Bicarbonate Concentrate Lot No.: [id number]  Acid Concentrate Lot No.: [id number]  Manual Ph: 7.4  Bleach Test (Neg): Yes  Bath Temperature: 95 °F (35 °C)  Tubing Lot#: [id number]  Conductivity Meter Serial #: [id number]  All Connections Secure?: Yes  Venous Parameters Set?: Yes  Arterial Parameters Set?: Yes  Saline Line Double Clamped?: Yes  Air Foam Detector Engaged?: Yes  Machine Functioning Alarm Free?  Yes  Prime Given: 200ml    Chlorine Testing - Before each treatment and every 4 hours:   Treatment  Treatment Number: 1  Time On: 0538  Time Off: 1155  Treatment Goal: 3 HOUR, 2.5L  Weight: 268 lb 8.3 oz (121.8 kg) (02/20/23 1155)  1st check: less than 0.1 ppm at: 0700 hours  2nd check: less than 0.1 ppm at: 1040 hours  3rd check: Not Applicable  (if greater than 0.1 ppm, then check every 30 minutes from secondary)    Access Flows and Pressures  Patient Vitals for the past 8 hrs:   Blood Flow Rate (ml/min) Ultrafiltration Rate (ml/hr) Arterial Pressure (mmHg) Venous Pressure (mmHg) TMP DFR Comments Access Visible   02/20/23 0854 350 ml/min 830 ml/hr -80 mmHg 120 40 700 TX STARTED, PRIME GIVEN, LINES SECURE AND CALL LIGHT WITHIN REACH Yes   02/20/23 0900 350 ml/min 830 ml/hr -110 mmHg 140 30 700 AWAKE AND ALERT Yes   02/20/23 0915 350 ml/min 830 ml/hr -120 mmHg 140 40 700 RESTING WITH EYES CLOSED Y Yes   02/20/23 0945 350 ml/min 830 ml/hr -130 mmHg 150 40 700 RESTING WITH EYES CLOSED Yes   02/20/23 1000 350 ml/min 830 ml/hr -130 mmHg 150 40 700 NO CHANGE, RESTING Yes   02/20/23 1015 350 ml/min 830 ml/hr -130 mmHg 160 30 700 RESTING WITH EYES CLOSED Yes   02/20/23 1030 350 ml/min 830 ml/hr -130 mmHg 150 40 700 BP RECHECKED, PT IS ASYMPTOMATIC, RN AWARE Yes   02/20/23 1045 350 ml/min 430 ml/hr -130 mmHg 150 30 700 UF GOAL DECREASED TO 2.0L DUE TO HYPOTENSION, RN AWARE , CONT. TO MONITOR Yes   02/20/23 1100 350 ml/min 430 ml/hr -120 mmHg 140 30 700 BP IMPROVING, UF GOAL REMAINS LOWERED Yes   02/20/23 1115 350 ml/min 430 ml/hr -120 mmHg 140 30 700 BICARB JUG CHANGED Yes   02/20/23 1130 350 ml/min 430 ml/hr -130 mmHg 140 30 700 PT IS ASYMPTOMATIC, RN AWARE, CONT.  TO MONITOR BP Yes   02/20/23 1145 350 ml/min 430 ml/hr -110 mmHg 160 30 700 BP IMPROVING Yes   02/20/23 1155 200 ml/min 0 ml/hr -50 mmHg 20 20 700 TX ENDED, RINSEBACK GIVEN Yes     Vital Signs  Patient Vitals for the past 8 hrs:   BP Temp Pulse Resp SpO2 Weight Weight Method Percent Weight Change   02/20/23 0416 -- -- -- -- 92 % -- -- --   02/20/23 0500 114/67 -- -- -- -- -- -- --   02/20/23 0830 (!) 150/70 98 °F (36.7 °C) 91 12 99 % -- -- --   02/20/23 0847 139/71 96.9 °F (36.1 °C) 78 16 95 % 272 lb 14.9 oz (123.8 kg) Bed scale 0.49   02/20/23 0854 (!) 150/76 -- 80 12 98 % -- -- --   02/20/23 0900 (!) 148/73 -- 71 13 97 % -- -- --   02/20/23 0915 (!) 127/58 -- 67 11 97 % -- -- --   02/20/23 0945 (!) 92/50 -- 72 12 90 % -- -- --   02/20/23 1000 (!) 93/54 -- 68 10 99 % -- -- --   02/20/23 1015 (!) 101/52 -- 77 11 98 % -- -- --   02/20/23 1030 (!) 81/52 -- 71 13 97 % -- -- --   02/20/23 1045 (!) 79/53 -- 71 19 95 % -- -- --   02/20/23 1100 (!) 90/54 -- 76 17 99 % -- -- --   02/20/23 1115 (!) 86/51 -- 79 15 99 % -- -- --   02/20/23 1130 (!) 82/52 -- 78 12 99 % -- -- --   02/20/23 1145 108/60 -- 83 14 100 % -- -- --   02/20/23 1155 127/74 97.4 °F (36.3 °C) 88 14 100 % 268 lb 8.3 oz (121.8 kg) Bed scale -1.62     Post-Dialysis  Arterial Catheter Locking Solution:  1.9 ML NS  Venous Catheter Locking Solution:  1.9 ML NS  Post-Treatment Procedures: Blood returned, Catheter Capped, clamped with Saline x2 ports  Machine Disinfection Process: Acid/Vinegar Clean, Heat Disinfect, Exterior Machine Disinfection  Rinseback Volume (ml): 300 ml  Blood Volume Processed (Liters): 59.9 l/min  Dialyzer Clearance: Lightly streaked  Duration of Treatment (minutes): 180 minutes     Hemodialysis Intake (ml): 500 ml  Hemodialysis Output (ml): 2000 ml     Tolerated Treatment: Good  Patient Response to Treatment: [address]  Physician Notified: No       Provider Notification        Handoff complete and report given to Primary RN at 1201 hours.   Primary RN (First Initial, Last Name, Title):  Laura Westfall RN     Education  Person Educated: Patient   Knowledge Base: Substantial  Barriers to Learning?: None  Preferred method of Learning: Oral  Topic(s): Procedural   Teaching Tools: Explanation   Response to Education: Verbalized Understanding     Electronically signed by Chintan Chaudhary on 2/20/2023 at 12:15 PM

## 2023-02-20 NOTE — PROGRESS NOTES
Occupational Therapy    [facility] ACUTE CARE OCCUPATIONAL THERAPY EVALUATION  Teresa Stanford, 1950, 2011/2011-A, 2/20/2023    History  [place]:  The primary encounter diagnosis was Chest pain, unspecified type. Diagnoses of Acute on chronic respiratory failure with hypoxia (HCC), Acute pleurisy without pleural effusion, Pulmonary vascular congestion, and Elevated troponin were also pertinent to this visit. 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, COPD (chronic obstructive pulmonary disease) ([facility] Depression, Diabetes mellitus ([facility] Dupuytren's contracture of hand, Family history of cardiovascular disease, GERD (gastroesophageal reflux disease), H/O cardiac catheterization, H/O cardiac catheterization, H/O cardiovascular stress test, H/O cardiovascular stress test, H/O cardiovascular stress test, H/O cardiovascular stress test, H/O Doppler ultrasound, H/O echocardiogram, H/O echocardiogram, H/O echocardiogram, H/O hiatal hernia, Hx of Venous Doppler, Hyperlipidemia, Hypertension, Obesity, S/P PTCA (percutaneous transluminal coronary angioplasty), Sleep apnea, and Thyroid disease. Patient  has a past surgical history that includes Coronary angioplasty with stent (03/21/2005); Cardiac catheterization (6/12/08); Cardiac catheterization (3/07); Cardiac catheterization (3/05); Endoscopy, colon, diagnostic (2014); Colonoscopy (2011); Colonoscopy (5/18/16); Hand surgery (Right, 1997); pr optx ankle dislocation w/repair/int/xtrnl fixj (Right, 6/3/2019); Sternum Debridement (N/A, 9/24/2022); Coronary artery bypass graft (N/A, 9/22/2022); and IR TUNNELED CVC PLACE WO SQ PORT/PUMP > 5 YEARS (2/14/2023). Subjective:  Patient states: \"Today was not a good day for therapy\"\". Pain:  Denies at rest, obvious pain symptoms w/ bed mobility. Communication with other providers:  Handoff to RN, co-eval with PT Rebeca Cantu  Restrictions: General Precautions, Fall Risk    Home Setup/Prior level of function   Per discussion w/ case management pt is most likely LTC from [facility]    Examination of body systems (includes body structures/functions, activity/participation limitations):  Observation:  Supine in bed upon arrival, agreeable to therapy   Vision:  Glasses Readers  Hearing:  [employer] [place]  Cardiopulmonary: On trachea, 02 saturation decreased ~83% during scooting to [facility] w/ HOB flat, increased ~90% w/ 1 minute of supine rest break and pursed lip breathing      Body Systems and functions:  ROM R/L:  WFL. Strength R/L:  4/5,   Sensation: Numbness in bilateral feet  Tone: Normal  Coordination: WFL  Perception: WNL    Activities of Daily Living (ADLs):  Feeding: Gwendolyn  Grooming: SBA (washing hands/face w/ warm washcloth)  UB bathing: SBA  LB bathing: maxA  UB dressing: Marcio  LB dressing: maxA (donning socks supine in bed)  Toileting: maxA    Cognitive and Psychosocial Functioning:  Overall cognitive status: AxO x 4, WFL  Affect: Painful       Mobility:  Supine to sit:  DNT pt too poainful during bed mobility  Transfers: DNT  Sitting balance:  DNT. Standing balance:  DNT.   Functional Mobility DNT  Toilet/Shower Transfers: DNT  Rolling L/R: maxA x 2  Scooting to HOB: maxA x 2 w/ 2 trials             AM-PAC Daily Activity - Inpatient   How much help is needed for putting on and taking off regular lower body clothing?: Total  How much help is needed for bathing (which includes washing, rinsing, drying)?: A Lot  How much help is needed for toileting (which includes using toilet, bedpan, or urinal)?: Total  How much help is needed for putting on and taking off regular upper body clothing?: A Little  How much help is needed for taking care of personal grooming?: A Little  How much help for eating meals?: None  AM-PAC Inpatient Daily Activity Raw Score: 14  AM-PAC Inpatient ADL T-Scale Score : 33.39  ADL Inpatient CMS 0-100% Score: 59.67  ADL Inpatient CMS G-Code Modifier : CK    Treatment:    Safety: patient left in bed with bed alarm, call light within reach, RN notified, gait belt used. Assessment:  Pt is a 67 yo male admitted from home for acute on chronic respiratory failure. Pt currently presents w/ deficits in ADL and high level IADL independence, functional activity tolerance, dynamic sitting and standing balance and tolerance and functional transfers, BUE strength and OOB activities. Pt would benefit from continued acute care OT services w/ discharge to LTC w/ OT  Complexity: High  Prognosis: Fair due to medical complexity  Occupational Therapy Plan  Times Per Week: 1x+  Times Per Day:  Once a day  Current Treatment Recommendations: Strengthening, ROM, Balance training, Functional mobility training, Endurance training, Patient/Caregiver education & training, Self-Care / ADL, Safety education & training, Pain management, Equipment evaluation, education, & procurement, Positioning     Equipment: defer    Goals:  Pt goal: go home  Time Frame for STGs: discharge  Goal 1: Pt will perform UE ADLs Supervision  Goal 2: Pt will perform LE ADLs modA w/ AD  Goal 3: Pt will perform toileting modA  Goal 4: Pt will perform all aspects of bed mobility Marcio  Goal 6: Pt will perform therex/theract in order to increase functional activity tolerance and BUE strength    Treatment plan:  Pt will perform therex/theract in order to increase functional activity tolerance and BUE strength    Recommendations for NURSING activity: Turn schedule    Time:   Time in: 1350  Time out: 1411  Timed treatment minutes: 0 minutes  Total time: 21 minutes    Electronically signed by:    Bill SMITH/L [id number]  2:36 PM,2/20/2023

## 2023-02-20 NOTE — PROGRESS NOTES
LOVENOX PROPHYLAXIS EVALUATION    Wt Readings from Last 3 Encounters:   02/20/23 271 lb 9.7 oz (123.2 kg)   02/17/23 289 lb 3.9 oz (131.2 kg)   02/08/23 260 lb (117.9 kg)       Estimated Creatinine Clearance: 20 mL/min (A) (based on SCr of 4.6 mg/dL (H)).   Recent Labs     02/17/23  0411 02/19/23  1756   BUN 53* 53*   CREATININE 4.4* 4.6*   PLT  --  288   HGB  --  9.4*   HCT  --  29.7*   INR 1.21  --        Weight Range: 101-150.9 kg    CRCL <15 or dialysis    50.9 kg   and below     .9  kg   101-150.9 kg   151-174.9  kg   175 kg  or greater     Heparin 5,000 units  subq BID     Heparin 5,000 units  subq TID       Heparin 5,000 units  subq TID   Heparin 5,000 units  subq TID   Heparin 7,500 units  subq TID       Per P/T protocol for appropriate subq anticoagulation by weight and CRCL change to:    Heparin 5,000 units subq TID      Zana Valdez RPH  1:38 AM  02/20/23

## 2023-02-20 NOTE — CONSULTS
Comprehensive Nutrition Assessment    Type and Reason for Visit:  Positive Nutrition Screen, Consult    Nutrition Recommendations/Plan:   Recommend the following EN Regimen:  Nepro @ 60 ml/hr which will provide ~2592 kcal and 117 g protein  Will closely monitor for ability to initiate nutrition, nutrition status, poc     Malnutrition Assessment:  Malnutrition Status: At risk for malnutrition (Comment) (02/20/23 1404)    Context:  Chronic Illness       Nutrition Assessment:    Pt admitted for acute on chronic respiratory failure, pMH: HTN, HLD, DM, COPD, CAD, +PEG, +HD, noted pt was on Nepro @ 60 ml/hr during previous admission, perfectserved attending requesting dietitian consult for tube feed order and manage, pt NPO, weight loss in the past 6 months per chart review,  will continue to follow at high nutrition risk    Nutrition Related Findings:    BUN 58, Cr 5.3, Mag 3.4, Glucose 181 Wound Type: None       Current Nutrition Intake & Therapies:    Average Meal Intake: NPO  Average Supplements Intake: NPO  Diet NPO    Anthropometric Measures:  Height: 6' 2.02\" (188 cm)  Ideal Body Weight (IBW): 190 lbs (86 kg)       Current Body Weight: 268 lb 8.3 oz (121.8 kg), 141.3 % IBW. Weight Source: Bed Scale  Current BMI (kg/m2): 34.5  Usual Body Weight: 343 lb 4.1 oz (155.7 kg) (9/23/22 per chart review)  % Weight Change (Calculated): -21.8  Weight Adjustment For: No Adjustment                 BMI Categories: Obese Class 1 (BMI 30.0-34. 9)    Estimated Daily Nutrient Needs:  Energy Requirements Based On: Kcal/kg  Weight Used for Energy Requirements: Ideal  Energy (kcal/day): 5610-1627 (30-35 kcal/kg)  Weight Used for Protein Requirements: Ideal  Protein (g/day): 103-120 (1.2-1.4 g/kg)  Method Used for Fluid Requirements: 1 ml/kcal  Fluid (ml/day):      Nutrition Diagnosis:   Inadequate oral intake related to acute injury/trauma as evidenced by NPO or clear liquid status due to medical condition    Nutrition Interventions:   Food and/or Nutrient Delivery: Start Tube Feeding  Nutrition Education/Counseling: No recommendation at this time  Coordination of Nutrition Care: Continue to monitor while inpatient  Plan of Care discussed with: perfectserved attending regarding TF order/manage consult    Goals:     Goals: Initiate nutrition support, within 2 days       Nutrition Monitoring and Evaluation:   Behavioral-Environmental Outcomes: None Identified  Food/Nutrient Intake Outcomes: Enteral Nutrition Intake/Tolerance  Physical Signs/Symptoms Outcomes: Biochemical Data, GI Status, Hemodynamic Status, Fluid Status or Edema, Weight, Skin    Discharge Planning:    Too soon to determine     Pauly Vásquez MS, RD, LD  Contact: [phone]

## 2023-02-20 NOTE — PROGRESS NOTES
RENAL DOSE ADJUSTMENT MADE PER P/T PROTOCOL    PREVIOUS ORDER:  Cefepime 2 g q24h    Estimated Creatinine Clearance: 21 mL/min (A) (based on SCr of 4.6 mg/dL (H)).   Recent Labs     02/17/23  0411 02/19/23  1756   BUN 53* 53*   CREATININE 4.4* 4.6*   PLT  --  288   INR 1.21  --      NEW RENALLY ADJUSTED ORDER:  Cefepime 2 g x 1, followed by 1 g q24h    Leona Guerrero, [facility]  2/19/2023 8:49 PM

## 2023-02-20 NOTE — PROGRESS NOTES
[id number]  consulted by Dr. Jen Amor for monitoring and adjustment. Indication for treatment: Vancomycin indication: HAP  Goal trough: Trough Goal: 15-20 mcg/mL  Goal pre-HD: 21-24 mcg/mL    Risk Factors for MRSA Identified:   Hospitalization within the past 90 days, Received IV antibiotics within the past 90 days, Patient on hemodialysis    Pertinent Laboratory Values:   Temp Readings from Last 3 Encounters:   02/20/23 96.9 °F (36.1 °C)   02/17/23 97.5 °F (36.4 °C) (Oral)   01/05/23 98 °F (36.7 °C) (Oral)     Recent Labs     02/19/23  1756 02/20/23  0613   WBC 8.4 7.9       Recent Labs     02/19/23  1756 02/20/23  0613   BUN 53* 58*   CREATININE 4.6* 5.3*       Estimated Creatinine Clearance: 18 mL/min (A) (based on SCr of 5.3 mg/dL (H)). No intake or output data in the 24 hours ending 02/20/23 1027    Pertinent Cultures:   Date    Source    Results  2/19               Sputum/Respiratory  Pending  2/20               MRSA Nasal   Pending    Vancomycin level:   TROUGH:  No results for input(s): VANCOTROUGH in the last 72 hours. RANDOM:    Recent Labs     02/20/23  0613   VANCORANDOM 17.0       Assessment:  HPI: 68 y.o male with pmh of ESRD on HD, atrial fibrillation, diabetes, COPD, and GERD who presents with worsening shortness of breath and chest pain. Patient reported he has been having intermittent chest pain and shortness of breath for the past week. At presentation, patient's chest x-ray revealed pulmonary vascular congestion and pleural effusion. He also had an episode of hypoxia, where his oxygen level dropped to 70%, but it did return to 96 %.   SCr, BUN, and urine output: ESRD on HD  Dialyzes on Mondays, Wednesdays and Fridays  Day(s) of therapy: 2 of 7  Vancomycin concentration:  2/20 - 17, pre-HD, appropriate to re-dose post-HD    Plan:  Intermittent dosing based on levels due to ESRD/HD  Based on pre-HD level, re-dose with 1250 mg IVPB x1 after HD  Repeat next level on Wednesday AM, prior to HD  Pharmacy will continue to monitor patient and adjust therapy as indicated    [place] 3 2/22 @0600    Thank you for the consult,  John Allen [facility], PharmD  2/20/2023 10:27 AM

## 2023-02-20 NOTE — CONSULTS
[facility] PHYSICAL THERAPY EVALUATION  Jessica Levy, 1950, 2011/2011-A, 2/20/2023    History  [place]:  The primary encounter diagnosis was Chest pain, unspecified type. Diagnoses of Acute on chronic respiratory failure with hypoxia (HCC), Acute pleurisy without pleural effusion, Pulmonary vascular congestion, and Elevated troponin were also pertinent to this visit. 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, COPD (chronic obstructive pulmonary disease) ([facility] Depression, Diabetes mellitus ([facility] Dupuytren's contracture of hand, Family history of cardiovascular disease, GERD (gastroesophageal reflux disease), H/O cardiac catheterization, H/O cardiac catheterization, H/O cardiovascular stress test, H/O cardiovascular stress test, H/O cardiovascular stress test, H/O cardiovascular stress test, H/O Doppler ultrasound, H/O echocardiogram, H/O echocardiogram, H/O echocardiogram, H/O hiatal hernia, Hx of Venous Doppler, Hyperlipidemia, Hypertension, Obesity, S/P PTCA (percutaneous transluminal coronary angioplasty), Sleep apnea, and Thyroid disease. Patient  has a past surgical history that includes Coronary angioplasty with stent (03/21/2005); Cardiac catheterization (6/12/08); Cardiac catheterization (3/07); Cardiac catheterization (3/05); Endoscopy, colon, diagnostic (2014); Colonoscopy (2011); Colonoscopy (5/18/16); Hand surgery (Right, 1997); pr optx ankle dislocation w/repair/int/xtrnl fixj (Right, 6/3/2019); Sternum Debridement (N/A, 9/24/2022); Coronary artery bypass graft (N/A, 9/22/2022); and IR TUNNELED CVC PLACE WO SQ PORT/PUMP > 5 YEARS (2/14/2023). Subjective:  Patient states:  \"I wasn't ready today\". Pain:  Pt reports generalized pain, c/o buttock pain r/t wound.     Communication with other providers:  Handoff to RN, co-eval with OTMari for safety  Restrictions: Trach+suction, IV, tele, PEG tube, on 02, fall risk, sacral wound, general    Home Setup/Prior level of function  Social/Functional History  Lives With: Other (comment) ([place] [place] [facility])  ADL Assistance: Needs assistance  Active : No  Occupation: Retired  Per pt, pt is from [facility]. Pt requires assist for all bed mobility, pt reports has not been OOB for extended time. Pt unable to identify last time pt was ambulatory. Examination of body systems (includes body structures/functions, activity/participation limitations):  Observation:  Pt is resting in semi-fowlers upon arrival, T-Piece in place with supplemental 02  Vision:  VUELOGIC  Hearing:  VUELOGIC  Cardiopulmonary: On 6L 02 via trach, Sp02 ranges 85-93%, pt with intermittent coughing  Cognition: Appears A&O x3-4, mild agitation d/t pain, see OT/SLP note for further evaluation. Musculoskeletal  ROM R/L:  B LE decreased end range knee flexion. Mod A required to achieve ~60* flexion B. Strength R/L:  Quads 3+/5, HS 4-/5, hip flexion 3+/5, decreased in function and endurance. Neuro:  Pt reports intact LE sensation    Gait pattern: N/a  Integumentary: Notable sacral region breakdown    Mobility:  Rolling L/R:  Max x2  Supine to sit:  deferred d/t pain and fatigue  Transfers: Dep  Sitting balance:  deferred d/t pain and fatigue. Standing balance:  Dep. Gait: N/A    [facility] 6 Clicks Inpatient Mobility:  AM-PAC Inpatient Mobility Raw Score : 9    Treatment:  Bed mobility: PT requested trial of Sup>sit today, pt refuses trial d/t fatigue despite education and offers to accommodate pt needs. Noted pt poor positioning in bed, LE up against foot board, x2 trials of scooting to [facility] required d/t difficult advancement and pt poor tolerance of HOB flat. Max x2. Rolling was performed this session. X2 rolling R/L required with Max x2 for placement of pillows under B hips for off weighting and skin health. Pt cries out and reported significant pain with rolling.   PT assessed LE AROM and strength in high-fowlers position.   Positioning of BLE provided. Min A for partial SLR R/L for placement of pillows, heels floated.   Additional time needed for partial rolls Max x2 in attempt to re-adjust chucks pad under buttock per pt request.   Education: PT discussed role of therapy, discussed pt goals and  confirmed pt does intend to work with therapy services. Discussed POC.     Safety: patient left in semi-fowlers with exit alarm, call light within reach, RN notified, gait belt used.    Assessment:    Pt is a 74 y/o male admitted 2/19 with c/o  Acute on chronic respiratory failure with hypoxia.  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  Per pt report pt has been performing ADLs/IADLs with significant assist from SNF staff, pt states pt remains in bed, is non-ambulatory. At this time pt appears to be functioning below baseline.   Pt is now presenting with impairments in LE strength, functional endurance, safety awareness, balance, LE ROM, respiratory endurance, skin integrity, trunk strength, mobility. Pt would benefit from skilled PT services in order to address impairments and promote return to PLOF. PT to recommend d/c to LTC with PT.    Complexity: Moderate  Prognosis: Good, no significant barriers to participation at this time.   Plan General Plan: 2-3 times per week/week, 1 week,   Discharge Recommendations: Long Term Care with PT  Equipment: defer    Goals  Short Term Goals  Time Frame for Short Term Goals: 1 week  Short Term Goal 1: Pt will  tolerate rolling with Max x2 to side-lying x2 min for skin health.   Short Term Goal 2: Pt will tolerate sup>sit with Max A x2 from [facility] raised  Short Term Goal 3: Pt will tolerate seated EOB with Max A x5'  Short Term Goal 4: Pt will complete LE TherEx in seated x15 ea with Min A       Treatment plan:  Bed mobility, transfers, balance, gait, TA, TX, taylor    Recommendations for NURSING mobility: Max x2 bed mobility, Q-4 turns    Time:   Time in: 13:51  Time out: 14:12  Timed treatment minutes: 10  Total time: 21    Electronically signed by:    Osmani Florence, PT  0/18/5900, 6:35 PM  PT Lic #: [id number]

## 2023-02-20 NOTE — DISCHARGE INSTR - COC
Continuity of Care Form    Patient Name: Miriam Casiano   :  1950  MRN:  [id number]    Admit date:  2023  Discharge date:  2023      Code Status Order: Full Code   Advance Directives:     Admitting Physician:  Cedric Scanlon MD  PCP: ANKITA Mejía - CNP    Discharging Nurse: [facility] Unit/Room#: -A  Discharging Unit Phone Number: [phone]    Emergency Contact:   Extended Emergency Contact Information  Primary Emergency Contact: Paul Brown (POA)  Address: [address] [place] [address] [address] Phone: [phone]  Mobile Phone: [phone]  Relation: Brother/Sister  Secondary Emergency Contact: [address] Phone: [phone]  Relation: Domestic Partner  Preferred language: English   needed?  No    Past Surgical History:  Past Surgical History:   Procedure Laterality Date    CARDIAC CATHETERIZATION  08    mild disease mid RCA,  stent to LAD patent,    CARDIAC CATHETERIZATION  3/07    Stent to LAD patent, Diagonal 40-50%, RCA 40-50%    CARDIAC CATHETERIZATION  3/05    COLONOSCOPY      COLONOSCOPY  16    1 polyp removed, diverticulosis and hemorrhoids noted    CORONARY ANGIOPLASTY WITH STENT PLACEMENT  2005    PTCA with 3.5 x 16 TAXUS stent to LAD    CORONARY ARTERY BYPASS GRAFT N/A 2022    CABG CORONARY ARTERY BYPASS x3 (LIMA TO LAD, SVG TO PDA-RCA SEQUENTIAL) , INTRAOPERATIVE MILTON, ENDOVEIN HARVEST OF LEFT SAPHENOUS VEIN, MODIFIED MAZE PROCEDURE, LEFT ATRIAL APPENDAGE LIGATION, INTERCOSTAL NERVE BLOCK, INTRA-AORTIC BALLOON PUMP INSERTION performed by Flor Gandhi MD at [address] [place] [address] Entrance, COLON, DIAGNOSTIC      egd    HAND SURGERY Right     IR TUNNELED CATHETER PLACEMENT GREATER THAN 5 YEARS  2023    IR TUNNELED CATHETER PLACEMENT GREATER THAN 5 YEARS 2023 [facility], DO [facility] SPECIAL PROCEDURES    ID OPTX ANKLE DISLOCATION W/REPAIR/INT/XTRNL FIXJ Right 6/3/2019    RIGHT OPEN REDUCTION INTERNAL FIXATION OF DISTAL TIBIA  AND FIBULA performed by Jacinta Holt MD at [address] [place] [address] N/A 9/24/2022    STERNUM WASHOUT performed by Pratima Steve MD at [facility] OR       Immunization History:   Immunization History   Administered Date(s) Administered    COVID-19, PFIZER PURPLE top, DILUTE for use, (age 15 y+), 30mcg/0.3mL 03/09/2021, 03/30/2021, 11/17/2021    Influenza Vaccine, unspecified formulation 11/01/2017    Influenza Virus Vaccine 10/01/2018    Influenza, FLUAD, (age 72 y+), Adjuvanted, 0.5mL 10/27/2021    Influenza, Triv, 3 Years and older, IM (Afluria (5 yrs and older) 10/01/2011, 10/15/2014    Pneumococcal Conjugate 13-valent (Czxznex10) 06/28/2017    Pneumococcal Polysaccharide ([id number]) 01/01/2008, 05/18/2013, 04/01/2019    Tdap (Boostrix, Adacel) 12/17/2011       Active Problems:  Patient Active Problem List   Diagnosis Code    Atrial fibrillation ([facility]) I48.91    CAD (coronary artery disease) I25.10    Morbid obesity ([facility]) E66.01    COPD (chronic obstructive pulmonary disease) ([facility] J44.9    Sleep apnea G47.30    Type 2 diabetes mellitus ([facility] E11.9    Thyroid disease E07.9    Hyperlipidemia E78.5    Acute congestive heart failure ([facility]) I50.9    Coronary artery disease involving native coronary artery of native heart with angina pectoris ([facility]) I25.119    Chronic renal disease, stage III ([facility] [[id number]] N18.30    Hypertension I10    CAD, multiple vessel I25.10    Dyspnea R06.00    Moderate malnutrition ([facility]) E44.0    Pneumonia of both lungs due to infectious organism J18.9    Severe malnutrition ([facility]) E43    Chronic respiratory failure with hypoxia ([facility]) J96.11    Aspiration pneumonia of right lower lobe ([facility]) J69.0    ESRD on dialysis ([facility] N18.6, Z99.2    Aspiration pneumonia, unspecified aspiration pneumonia type, unspecified laterality, unspecified part of lung ([facility] J69.0    Acute aspiration pneumonia ([facility] J69.0 Acute on chronic respiratory failure with hypoxia (HCC) J96.21       Isolation/Infection:   Isolation            No Isolation          Patient Infection Status       Infection Onset Added Last Indicated Last Indicated By Review Planned Expiration Resolved Resolved By    None active    Resolved    COVID-19 (Rule Out) 23 Respiratory Panel, Molecular, with COVID-19 (Restricted: peds pts or suitable admitted adults) (Ordered)   23 Rule-Out Test Resulted    COVID-19 (Rule Out) 22 COVID-19, Rapid (Ordered)   22 Rule-Out Test Resulted    COVID-19 (Rule Out) 22 Respiratory Panel, Molecular, with COVID-19 (Restricted: peds pts or suitable admitted adults) (Ordered)   22 Rule-Out Test Resulted    COVID-19 20 Covid-19 Ambulatory   20     COVID-19 (Rule Out) 20 Covid-19 Ambulatory (Ordered)   20 Rule-Out Test Resulted            Nurse Assessment:  Last Vital Signs: /74   Pulse 88   Temp 97.4 °F (36.3 °C)   Resp 14   Ht 6' 2.02\" (1.88 m)   Wt 268 lb 8.3 oz (121.8 kg)   SpO2 100%   BMI 34.46 kg/m²     Last documented pain score (0-10 scale): Pain Level: 0  Last Weight:   Wt Readings from Last 1 Encounters:   23 268 lb 8.3 oz (121.8 kg)     Mental Status:  oriented, alert, coherent, logical, thought processes intact, and able to concentrate and follow conversation    IV Access:  Dialysis    Nursing Mobility/ADLs:  Walking   Dependent  Transfer  Dependent  Bathing  Dependent  Dressing  Dependent  Toileting  Dependent  Feeding  Assisted  Med Admin  Assisted  Med Delivery    crushed in peg    Wound Care Documentation and Therapy:  Wound 22 Buttocks cluster (Active)   Number of days: 70       Wound 23 Left;Plantar (Active)   Number of days: 46       Incision 22 Knee Anterior; Left (Active)   Number of days: 151       Incision 22 Sternum (Active)   Number of days: 151        Elimination:  Continence: Bowel: No  Bladder: No  Urinary Catheter: None   Colostomy/Ileostomy/Ileal Conduit: No       Date of Last BM: 02/24/2023    Intake/Output Summary (Last 24 hours) at 2/20/2023 1515  Last data filed at 2/20/2023 1155  Gross per 24 hour   Intake 500 ml   Output 2000 ml   Net -1500 ml     No intake/output data recorded. Safety Concerns: At Risk for Falls and Aspiration Risk    Impairments/Disabilities:      bedbound    Patient's personal belongings (please select all that are sent with patient):  None    RN SIGNATURE:  Electronically signed by Scotty Rodriguez RN on 2/24/23 at 4:02 PM EST    CASE MANAGEMENT/SOCIAL WORK SECTION    Inpatient Status Date: 2/19/2023    Readmission Risk Assessment Score:  Readmission Risk              Risk of Unplanned Readmission:  40           Discharging to Facility/ Agency   Name:   Address:  Phone:  Fax:    Dialysis Facility (if applicable)   Name:  Address:  Dialysis Schedule:  Phone:  Fax:    / signature: Electronically signed by Ronal Montoya RN on 2/24/23 at 4:05 PM EST    PHYSICIAN SECTION    Prognosis: Fair    Condition at Discharge: Stable    Rehab Potential (if transferring to Rehab): Guarded    Nutrition Therapy:  Current Nutrition Therapy:   - Tube Feedings:  Renal    Routes of Feeding: Gastrostomy Tube  Liquids: No Liquids  Daily Fluid Restriction: no  Last Modified Barium Swallow with Video (Video Swallowing Test): not done    Treatments at the Time of Hospital Discharge:   Respiratory Treatments:   Oxygen Therapy:  is on oxygen at 6 L/min per tracheostomy  Ventilator:    - No ventilator support    Rehab Therapies: Physical Therapy and Occupational Therapy  Weight Bearing Status/Restrictions: Other Medical Equipment (for information only, NOT a DME order):     Other Treatments:     Recommended Labs or Other Treatments After Discharge:     Physician Certification: I certify the above information and transfer of Opal Wagner  is necessary for the continuing treatment of the diagnosis listed and that he requires LTAC for greater 30 days.      Update Admission H&P: No change in H&P    PHYSICIAN SIGNATURE:  Electronically signed by Darinel Mar MD on 2/24/23 at 1:49 PM EST

## 2023-02-21 ENCOUNTER — ANESTHESIA EVENT (OUTPATIENT)
Dept: ENDOSCOPY | Age: 73
End: 2023-02-21
Payer: COMMERCIAL

## 2023-02-21 ENCOUNTER — APPOINTMENT (OUTPATIENT)
Dept: INTERVENTIONAL RADIOLOGY/VASCULAR | Age: 73
End: 2023-02-21
Payer: COMMERCIAL

## 2023-02-21 ENCOUNTER — APPOINTMENT (OUTPATIENT)
Dept: GENERAL RADIOLOGY | Age: 73
End: 2023-02-21
Payer: COMMERCIAL

## 2023-02-21 ENCOUNTER — ANESTHESIA (OUTPATIENT)
Dept: ENDOSCOPY | Age: 73
End: 2023-02-21
Payer: COMMERCIAL

## 2023-02-21 LAB
GLUCOSE BLD-MCNC: 123 MG/DL (ref 70–99)
GLUCOSE BLD-MCNC: 134 MG/DL (ref 70–99)
GLUCOSE BLD-MCNC: 139 MG/DL (ref 70–99)
GLUCOSE BLD-MCNC: 143 MG/DL (ref 70–99)
GLUCOSE BLD-MCNC: 150 MG/DL (ref 70–99)

## 2023-02-21 PROCEDURE — 3700000001 HC ADD 15 MINUTES (ANESTHESIA): Performed by: INTERNAL MEDICINE

## 2023-02-21 PROCEDURE — 6360000002 HC RX W HCPCS: Performed by: INTERNAL MEDICINE

## 2023-02-21 PROCEDURE — 7100000000 HC PACU RECOVERY - FIRST 15 MIN

## 2023-02-21 PROCEDURE — 2580000003 HC RX 258: Performed by: NURSE ANESTHETIST, CERTIFIED REGISTERED

## 2023-02-21 PROCEDURE — 2709999900 HC NON-CHARGEABLE SUPPLY: Performed by: INTERNAL MEDICINE

## 2023-02-21 PROCEDURE — 94761 N-INVAS EAR/PLS OXIMETRY MLT: CPT

## 2023-02-21 PROCEDURE — 0B9L8ZZ DRAINAGE OF LEFT LUNG, VIA NATURAL OR ARTIFICIAL OPENING ENDOSCOPIC: ICD-10-PCS | Performed by: INTERNAL MEDICINE

## 2023-02-21 PROCEDURE — 88305 TISSUE EXAM BY PATHOLOGIST: CPT

## 2023-02-21 PROCEDURE — 82945 GLUCOSE OTHER FLUID: CPT

## 2023-02-21 PROCEDURE — 99223 1ST HOSP IP/OBS HIGH 75: CPT | Performed by: INTERNAL MEDICINE

## 2023-02-21 PROCEDURE — 6370000000 HC RX 637 (ALT 250 FOR IP): Performed by: INTERNAL MEDICINE

## 2023-02-21 PROCEDURE — 31622 DX BRONCHOSCOPE/WASH: CPT

## 2023-02-21 PROCEDURE — 87116 MYCOBACTERIA CULTURE: CPT

## 2023-02-21 PROCEDURE — 84157 ASSAY OF PROTEIN OTHER: CPT

## 2023-02-21 PROCEDURE — 2580000003 HC RX 258: Performed by: INTERNAL MEDICINE

## 2023-02-21 PROCEDURE — 31645 BRNCHSC W/THER ASPIR 1ST: CPT

## 2023-02-21 PROCEDURE — 6370000000 HC RX 637 (ALT 250 FOR IP): Performed by: NURSE PRACTITIONER

## 2023-02-21 PROCEDURE — 2060000000 HC ICU INTERMEDIATE R&B

## 2023-02-21 PROCEDURE — 31624 DX BRONCHOSCOPE/LAVAGE: CPT

## 2023-02-21 PROCEDURE — 0BC38ZZ EXTIRPATION OF MATTER FROM RIGHT MAIN BRONCHUS, VIA NATURAL OR ARTIFICIAL OPENING ENDOSCOPIC: ICD-10-PCS | Performed by: INTERNAL MEDICINE

## 2023-02-21 PROCEDURE — 7100000001 HC PACU RECOVERY - ADDTL 15 MIN

## 2023-02-21 PROCEDURE — 0B9M8ZX DRAINAGE OF BILATERAL LUNGS, VIA NATURAL OR ARTIFICIAL OPENING ENDOSCOPIC, DIAGNOSTIC: ICD-10-PCS | Performed by: INTERNAL MEDICINE

## 2023-02-21 PROCEDURE — 89051 BODY FLUID CELL COUNT: CPT

## 2023-02-21 PROCEDURE — 94640 AIRWAY INHALATION TREATMENT: CPT

## 2023-02-21 PROCEDURE — 83615 LACTATE (LD) (LDH) ENZYME: CPT

## 2023-02-21 PROCEDURE — 6360000002 HC RX W HCPCS: Performed by: NURSE ANESTHETIST, CERTIFIED REGISTERED

## 2023-02-21 PROCEDURE — 3700000000 HC ANESTHESIA ATTENDED CARE: Performed by: INTERNAL MEDICINE

## 2023-02-21 PROCEDURE — 88112 CYTOPATH CELL ENHANCE TECH: CPT

## 2023-02-21 PROCEDURE — 82962 GLUCOSE BLOOD TEST: CPT

## 2023-02-21 PROCEDURE — 2700000000 HC OXYGEN THERAPY PER DAY

## 2023-02-21 RX ORDER — LIDOCAINE HYDROCHLORIDE 20 MG/ML
INJECTION, SOLUTION INTRAVENOUS PRN
Status: DISCONTINUED | OUTPATIENT
Start: 2023-02-21 | End: 2023-02-21 | Stop reason: SDUPTHER

## 2023-02-21 RX ORDER — PHENYLEPHRINE HYDROCHLORIDE 10 MG/ML
INJECTION INTRAVENOUS PRN
Status: DISCONTINUED | OUTPATIENT
Start: 2023-02-21 | End: 2023-02-21 | Stop reason: SDUPTHER

## 2023-02-21 RX ORDER — PROPOFOL 10 MG/ML
INJECTION, EMULSION INTRAVENOUS PRN
Status: DISCONTINUED | OUTPATIENT
Start: 2023-02-21 | End: 2023-02-21 | Stop reason: SDUPTHER

## 2023-02-21 RX ORDER — IPRATROPIUM BROMIDE AND ALBUTEROL SULFATE 2.5; .5 MG/3ML; MG/3ML
1 SOLUTION RESPIRATORY (INHALATION)
Status: DISCONTINUED | OUTPATIENT
Start: 2023-02-21 | End: 2023-02-21

## 2023-02-21 RX ORDER — FENTANYL CITRATE 50 UG/ML
50 INJECTION, SOLUTION INTRAMUSCULAR; INTRAVENOUS EVERY 5 MIN PRN
Status: DISCONTINUED | OUTPATIENT
Start: 2023-02-21 | End: 2023-02-24 | Stop reason: HOSPADM

## 2023-02-21 RX ORDER — HALOPERIDOL 5 MG/ML
1 INJECTION INTRAMUSCULAR
Status: ACTIVE | OUTPATIENT
Start: 2023-02-21 | End: 2023-02-22

## 2023-02-21 RX ORDER — SODIUM CHLORIDE 9 MG/ML
INJECTION, SOLUTION INTRAVENOUS CONTINUOUS PRN
Status: DISCONTINUED | OUTPATIENT
Start: 2023-02-21 | End: 2023-02-21 | Stop reason: SDUPTHER

## 2023-02-21 RX ORDER — OXYCODONE HYDROCHLORIDE 5 MG/1
5 TABLET ORAL
Status: ACTIVE | OUTPATIENT
Start: 2023-02-21 | End: 2023-02-22

## 2023-02-21 RX ORDER — METHYLPREDNISOLONE SODIUM SUCCINATE 40 MG/ML
40 INJECTION, POWDER, LYOPHILIZED, FOR SOLUTION INTRAMUSCULAR; INTRAVENOUS EVERY 12 HOURS
Status: DISCONTINUED | OUTPATIENT
Start: 2023-02-21 | End: 2023-02-23

## 2023-02-21 RX ORDER — DIPHENHYDRAMINE HYDROCHLORIDE 50 MG/ML
12.5 INJECTION INTRAMUSCULAR; INTRAVENOUS
Status: ACTIVE | OUTPATIENT
Start: 2023-02-21 | End: 2023-02-22

## 2023-02-21 RX ORDER — PROCHLORPERAZINE EDISYLATE 5 MG/ML
5 INJECTION INTRAMUSCULAR; INTRAVENOUS
Status: ACTIVE | OUTPATIENT
Start: 2023-02-21 | End: 2023-02-22

## 2023-02-21 RX ORDER — LABETALOL HYDROCHLORIDE 5 MG/ML
10 INJECTION, SOLUTION INTRAVENOUS
Status: DISCONTINUED | OUTPATIENT
Start: 2023-02-21 | End: 2023-02-24 | Stop reason: HOSPADM

## 2023-02-21 RX ORDER — MIDAZOLAM HYDROCHLORIDE 2 MG/2ML
2 INJECTION, SOLUTION INTRAMUSCULAR; INTRAVENOUS
Status: ACTIVE | OUTPATIENT
Start: 2023-02-21 | End: 2023-02-22

## 2023-02-21 RX ORDER — HYDRALAZINE HYDROCHLORIDE 20 MG/ML
10 INJECTION INTRAMUSCULAR; INTRAVENOUS
Status: DISCONTINUED | OUTPATIENT
Start: 2023-02-21 | End: 2023-02-24 | Stop reason: HOSPADM

## 2023-02-21 RX ORDER — TRAMADOL HYDROCHLORIDE 50 MG/1
50 TABLET ORAL ONCE
Status: COMPLETED | OUTPATIENT
Start: 2023-02-21 | End: 2023-02-21

## 2023-02-21 RX ADMIN — HEPARIN SODIUM 5000 UNITS: 5000 INJECTION INTRAVENOUS; SUBCUTANEOUS at 21:30

## 2023-02-21 RX ADMIN — METHYLPREDNISOLONE SODIUM SUCCINATE 40 MG: 40 INJECTION, POWDER, FOR SOLUTION INTRAMUSCULAR; INTRAVENOUS at 16:49

## 2023-02-21 RX ADMIN — TRAMADOL HYDROCHLORIDE 50 MG: 50 TABLET, COATED ORAL at 01:26

## 2023-02-21 RX ADMIN — TRAZODONE HYDROCHLORIDE 100 MG: 50 TABLET ORAL at 01:26

## 2023-02-21 RX ADMIN — POLYETHYLENE GLYCOL 3350 17 G: 17 POWDER, FOR SOLUTION ORAL at 21:20

## 2023-02-21 RX ADMIN — PHENYLEPHRINE HYDROCHLORIDE 200 MCG: 10 INJECTION INTRAVENOUS at 13:20

## 2023-02-21 RX ADMIN — IPRATROPIUM BROMIDE AND ALBUTEROL SULFATE 1 AMPULE: 2.5; .5 SOLUTION RESPIRATORY (INHALATION) at 19:40

## 2023-02-21 RX ADMIN — SODIUM CHLORIDE: 9 INJECTION, SOLUTION INTRAVENOUS at 12:58

## 2023-02-21 RX ADMIN — HEPARIN SODIUM 5000 UNITS: 5000 INJECTION INTRAVENOUS; SUBCUTANEOUS at 05:35

## 2023-02-21 RX ADMIN — SODIUM CHLORIDE, PRESERVATIVE FREE 10 ML: 5 INJECTION INTRAVENOUS at 09:22

## 2023-02-21 RX ADMIN — PHENYLEPHRINE HYDROCHLORIDE 300 MCG: 10 INJECTION INTRAVENOUS at 13:16

## 2023-02-21 RX ADMIN — LEVOTHYROXINE SODIUM 100 MCG: 0.1 TABLET ORAL at 05:35

## 2023-02-21 RX ADMIN — INSULIN GLARGINE 15 UNITS: 100 INJECTION, SOLUTION SUBCUTANEOUS at 21:21

## 2023-02-21 RX ADMIN — IPRATROPIUM BROMIDE AND ALBUTEROL SULFATE 1 AMPULE: 2.5; .5 SOLUTION RESPIRATORY (INHALATION) at 12:14

## 2023-02-21 RX ADMIN — MIDODRINE HYDROCHLORIDE 5 MG: 5 TABLET ORAL at 16:49

## 2023-02-21 RX ADMIN — CEFEPIME HYDROCHLORIDE 1000 MG: 1 INJECTION, POWDER, FOR SOLUTION INTRAMUSCULAR; INTRAVENOUS at 21:20

## 2023-02-21 RX ADMIN — LIDOCAINE HYDROCHLORIDE 120 MG: 20 INJECTION, SOLUTION INTRAVENOUS at 13:10

## 2023-02-21 RX ADMIN — POLYETHYLENE GLYCOL 3350 17 G: 17 POWDER, FOR SOLUTION ORAL at 09:22

## 2023-02-21 RX ADMIN — METOPROLOL TARTRATE 25 MG: 25 TABLET, FILM COATED ORAL at 09:22

## 2023-02-21 RX ADMIN — MIDODRINE HYDROCHLORIDE 5 MG: 5 TABLET ORAL at 09:22

## 2023-02-21 RX ADMIN — ASPIRIN 81 MG 81 MG: 81 TABLET ORAL at 09:22

## 2023-02-21 RX ADMIN — PHENYLEPHRINE HYDROCHLORIDE 200 MCG: 10 INJECTION INTRAVENOUS at 13:12

## 2023-02-21 RX ADMIN — IPRATROPIUM BROMIDE AND ALBUTEROL SULFATE 1 AMPULE: 2.5; .5 SOLUTION RESPIRATORY (INHALATION) at 08:37

## 2023-02-21 RX ADMIN — QUETIAPINE FUMARATE 50 MG: 25 TABLET ORAL at 21:21

## 2023-02-21 RX ADMIN — SODIUM CHLORIDE, PRESERVATIVE FREE 10 ML: 5 INJECTION INTRAVENOUS at 21:32

## 2023-02-21 RX ADMIN — METOPROLOL TARTRATE 25 MG: 25 TABLET, FILM COATED ORAL at 21:21

## 2023-02-21 RX ADMIN — IPRATROPIUM BROMIDE AND ALBUTEROL SULFATE 1 AMPULE: 2.5; .5 SOLUTION RESPIRATORY (INHALATION) at 16:01

## 2023-02-21 RX ADMIN — PROPOFOL 210 MG: 10 INJECTION, EMULSION INTRAVENOUS at 13:10

## 2023-02-21 RX ADMIN — ATORVASTATIN CALCIUM 40 MG: 40 TABLET, FILM COATED ORAL at 21:21

## 2023-02-21 RX ADMIN — LANSOPRAZOLE 30 MG: KIT at 05:35

## 2023-02-21 ASSESSMENT — PAIN DESCRIPTION - LOCATION: LOCATION: COCCYX

## 2023-02-21 ASSESSMENT — PAIN DESCRIPTION - ORIENTATION: ORIENTATION: LOWER

## 2023-02-21 ASSESSMENT — ENCOUNTER SYMPTOMS: SHORTNESS OF BREATH: 1

## 2023-02-21 ASSESSMENT — PAIN - FUNCTIONAL ASSESSMENT: PAIN_FUNCTIONAL_ASSESSMENT: 0-10

## 2023-02-21 ASSESSMENT — PAIN SCALES - GENERAL: PAINLEVEL_OUTOF10: 8

## 2023-02-21 ASSESSMENT — PAIN DESCRIPTION - DESCRIPTORS: DESCRIPTORS: DISCOMFORT;ACHING

## 2023-02-21 NOTE — PROGRESS NOTES
Nephrology Progress Note  2/21/2023 7:53 AM  Subjective: Interval History: Opal Wagner is a 68 y.o. male with slightly less congested resting in bed agrees to bronchoscopy at this time         Data:   Scheduled Meds:   sodium chloride flush  5-40 mL IntraVENous 2 times per day    amiodarone  200 mg PEG Tube Daily    aspirin  81 mg PEG Tube Daily    atorvastatin  40 mg PEG Tube Nightly    insulin glargine  15 Units SubCUTAneous Nightly    lansoprazole  30 mg Per G Tube QAM AC    levothyroxine  100 mcg Per G Tube Daily    metoprolol tartrate  12.5 mg Per G Tube BID    midodrine  5 mg PEG Tube TID WC    polyethylene glycol  17 g Oral BID    QUEtiapine  50 mg Per G Tube Nightly    insulin lispro  0-8 Units SubCUTAneous TID WC    insulin lispro  0-4 Units SubCUTAneous Nightly    heparin (porcine)  5,000 Units SubCUTAneous 3 times per day    acetylcysteine  4 mL Inhalation TID    ipratropium-albuterol  1 ampule Inhalation Q4H WA    cefepime  1,000 mg IntraVENous Q24H    vancomycin (VANCOCIN) intermittent dosing (placeholder)   Other RX Placeholder     Continuous Infusions:   sodium chloride      dextrose           CBC   Recent Labs     02/19/23 1756 02/20/23  0613   WBC 8.4 7.9   HGB 9.4* 8.4*   HCT 29.7* 27.4*    228      BMP   Recent Labs     02/19/23 1756 02/20/23  0613   * 132*   K 3.9 4.0   CL 90* 95*   CO2 27 25   BUN 53* 58*   CREATININE 4.6* 5.3*     Hepatic:   Recent Labs     02/19/23 1756   AST 14*   ALT 11   BILITOT 0.3   ALKPHOS 213*     Troponin: No results for input(s): TROPONINI in the last 72 hours. BNP: No results for input(s): BNP in the last 72 hours. Lipids: No results for input(s): CHOL, HDL in the last 72 hours. Invalid input(s): LDLCALCU  ABGs:   Lab Results   Component Value Date/Time    PO2ART 70.3 09/24/2022 11:49 AM    WWI8XGH 35.5 09/24/2022 11:49 AM     INR: No results for input(s): INR in the last 72 hours.   Renal Labs  Albumin:    Lab Results   Component Value Date/Time    LABALBU 3.6 02/19/2023 05:56 PM     Calcium:    Lab Results   Component Value Date/Time    CALCIUM 9.8 02/20/2023 06:13 AM     Phosphorus:    Lab Results   Component Value Date/Time    PHOS 3.7 02/15/2023 03:15 AM     U/A:    Lab Results   Component Value Date/Time    NITRU NEGATIVE 02/13/2023 12:15 AM    COLORU YELLOW 02/13/2023 12:15 AM    PHUR 6.5 06/30/2021 02:11 PM    WBCUA 67 02/13/2023 12:15 AM    RBCUA 37 02/13/2023 12:15 AM    MUCUS RARE 09/17/2022 06:21 PM    TRICHOMONAS NONE SEEN 02/13/2023 12:15 AM    BACTERIA NEGATIVE 02/13/2023 12:15 AM    CLARITYU CLEAR 02/13/2023 12:15 AM    SPECGRAV 1.025 02/13/2023 12:15 AM    UROBILINOGEN 0.2 02/13/2023 12:15 AM    BILIRUBINUR SMALL NUMBER OR AMOUNT OBSERVED 02/13/2023 12:15 AM    BILIRUBINUR small 06/30/2021 02:11 PM    BLOODU MODERATE NUMBER OR AMOUNT OBSERVED 02/13/2023 12:15 AM    GLUCOSEU neg 06/30/2021 02:11 PM    KETUA TRACE 02/13/2023 12:15 AM     ABG:    Lab Results   Component Value Date/Time    OKB7XFB 35.5 09/24/2022 11:49 AM    PO2ART 70.3 09/24/2022 11:49 AM    IKL0EWP 21.2 09/24/2022 11:49 AM     HgBA1c:    Lab Results   Component Value Date/Time    LABA1C 7.0 02/12/2023 10:53 PM     Microalbumen/Creatinine ratio:  No components found for: RUCREAT  TSH:    Lab Results   Component Value Date/Time    TSH 1.66 04/03/2018 11:33 AM     IRON:    Lab Results   Component Value Date/Time    IRON 60 02/13/2023 10:39 AM     Iron Saturation:  No components found for: PERCENTFE  TIBC:    Lab Results   Component Value Date/Time    TIBC 229 02/13/2023 10:39 AM     FERRITIN:    Lab Results   Component Value Date/Time    FERRITIN 1,063 02/13/2023 10:39 AM     RPR:  No results found for: RPR  YAYA:  No results found for: ANATITER, YAYA  24 Hour Urine for Creatinine Clearance:  No components found for: CREAT4, UHRS10, UTV10      Objective:   I/O: 02/20 0701 - 02/21 0700  In: 500   Out: 2000   I/O last 3 completed shifts:   In: 500   Out: 2000   No intake/output data recorded. Vitals: /62   Pulse 85   Temp 97.7 °F (36.5 °C) (Oral)   Resp 14   Ht 6' 2.02\" (1.88 m)   Wt 275 lb 2.2 oz (124.8 kg)   SpO2 96%   BMI 35.31 kg/m²  {  General appearance: awake weak  HEENT: Head: Normal, normocephalic, atraumatic.   Neck: supple, symmetrical, trachea midline  Lungs: diminished breath sounds bilaterally  Heart: S1, S2 normal  Abdomen: abnormal findings:  soft nt  Extremities: edema trace  Neurologic: Mental status: alertness: alert        Assessment and Plan:      IMP:  #1 end-stage renal disease on dialysis Monday Wednesday Friday  #2 tracheostomy with recurrent congestion and secretion  #3 possible aspiration pneumonia  4 protein malnutrition  #5 hyponatremia  #6 increased troponin  #7 anemia    Plan     #1 do dialysis next on Wednesday  #2 discussed with patient's brother as well as patient as well as pulmonology and plan on bronchoscopy this admission  #3 maintain on tube feeds  #4 monitor electrolytes in setting of dialysis  #5 monitor cardiac status with telemetry  #6 maintain EPO for anemia  Supportive care will follow           Ernestina Rodriges MD, MD

## 2023-02-21 NOTE — PROGRESS NOTES
[address] [facility]  consulted by Dr. Marlow Babinski for monitoring and adjustment. Indication for treatment: Vancomycin indication: HAP  Goal trough: Trough Goal: 15-20 mcg/mL  Goal pre-HD: 21-24 mcg/mL    Risk Factors for MRSA Identified:   Hospitalization within the past 90 days, Received IV antibiotics within the past 90 days, Patient on hemodialysis    Pertinent Laboratory Values:   Temp Readings from Last 3 Encounters:   02/21/23 98.2 °F (36.8 °C) (Oral)   02/17/23 97.5 °F (36.4 °C) (Oral)   01/05/23 98 °F (36.7 °C) (Oral)     Recent Labs     02/19/23  1756 02/20/23  0613   WBC 8.4 7.9       Recent Labs     02/19/23  1756 02/20/23  0613   BUN 53* 58*   CREATININE 4.6* 5.3*       Estimated Creatinine Clearance: 18 mL/min (A) (based on SCr of 5.3 mg/dL (H)). Intake/Output Summary (Last 24 hours) at 2/21/2023 1234  Last data filed at 2/21/2023 5564  Gross per 24 hour   Intake 60 ml   Output --   Net 60 ml       Pertinent Cultures:   Date    Source    Results  2/19               Sputum/Respiratory  Pending  2/20               MRSA Nasal   Pending    Vancomycin level:   TROUGH:  No results for input(s): VANCOTROUGH in the last 72 hours. RANDOM:    Recent Labs     02/20/23 0613   VANCORANDOM 17.0         Assessment:  HPI: 68 y.o male with pmh of ESRD on HD, atrial fibrillation, diabetes, COPD, and GERD who presents with worsening shortness of breath and chest pain. Patient reported he has been having intermittent chest pain and shortness of breath for the past week. At presentation, patient's chest x-ray revealed pulmonary vascular congestion and pleural effusion. He also had an episode of hypoxia, where his oxygen level dropped to 70%, but it did return to 96%. Sputum culture and MRSA nares are ordered and pending.   SCr, BUN, and urine output: ESRD on HD  Dialyzes on Mondays, Wednesdays and Fridays  Day(s) of therapy: 3 of 7  Vancomycin concentration:  2/20 - 17, pre-HD, appropriate to re-dose post-HD    Plan:  Intermittent dosing based on levels due to ESRD/HD  Based on pre-HD level, re-dose with 1250 mg IVPB x1 after HD  Repeat next level on Wednesday AM, prior to HD  Pharmacy will continue to monitor patient and adjust therapy as indicated    Kedar 3 2/22 @0600    Thank you for the consult,  Anant Molina [facility], PharmD  2/21/2023 12:34 PM

## 2023-02-21 NOTE — PROGRESS NOTES
V2.0  [facility] Hospitalist Progress Note      Name:  Manuelito Quiroz /Age/Sex: 1950  (68 y.o. male)   MRN & CSN:  [id number] & [id number] Encounter Date/Time: 2023 7:40 AM EST    Location:  -A PCP: Sergo Mcginnis APRN - [facility] Day: 3    Assessment and Plan:   Manuelito Quiroz is a 68 y.o. male with pmh of  coronary artery disease s/p CABG complicated by cardiogenic shock, respiratory failure, s/p tracheostomy, PEG tube placement, recurrent admissions for respiratory failure since CABG, chronic hypotension, diabetes mellitus type 2, on long-term insulin, hypothyroidism, GERD, ESRD on HD who presents with Acute on chronic respiratory failure with hypoxia ([facility] [address]      Plan:  #. Acute on chronic respiratory failure with hypoxia  -Patient was recently discharged on 6 L through trach.    -Patient's oxygen saturation dropped to 70% while in the ER. Patient required aggressive suctioning in the ED.  -Continue bronchodilator therapy, acetylcysteine x3  -CT chest-moderate layering bilateral pleural effusions, worsening area of atelectasis, complete collapse of the right lower lobe with moderate right upper lobe and right middle lobe atelectasis. Moderate left lower lobe and mid left upper lobe atelectasis. -Bronchoscopy possibly planned for today, brother gave consent. #.  Probable HAP  -Patient has copious amount of secretions from his trach  -Sputum culture pending  -Continue broad-spectrum antibiotics until cultures are reported. #.  Patient has recurrent admissions secondary to respiratory failure. -Recent admission -2023     #. Coronary artery disease s/p CABG-2022  -History of PTCA with HENRY to LAD  -Patient was transferred to [facility] after CABG for cardiogenic shock.  -Patient is on metoprolol tartrate, atorvastatin     #. Patient had tracheostomy (10/12/2022) and PEG tube placement 10/19/2022) at [facility]  -Currently at [facility] for rehab.      #.  ESRD on HD  -Consult nephrology HD on M-W-F     #.  COPD  -On DuoNeb 4 times daily     #. Persistent atrial fibrillation s/p maze procedure-9/2022  -Patient is on amiodarone, Coumadin on hold will continue to hold if patient needs bronchoscopy     #. Chronic hypotension-currently on midodrine. #.  Diabetes mellitus type 2, on long-term insulin  -Patient is on glargine 25 units nightly     #. Hypothyroidism patient on levothyroxine 100 mcg     #. GERD-on lansoprazole     #. History of DVT-right femoral vein  -Patient on anticoagulation     #. Insomnia-on quetiapine, trazodone     #. History of right ankle fracture s/p ORIF-6/2019     #. Morbid obesity with BMI 34.87. Diet Diet NPO   DVT Prophylaxis [] Lovenox, []  Heparin, [x] SCDs, [] Ambulation,  [] Eliquis, [] Xarelto  [] Coumadin   Code Status Full Code   Disposition From: LTAC  Expected Disposition: Same  Estimated Date of Discharge: 3 days  Patient requires continued admission due to respiratory failure   Surrogate Decision Maker/ POA      Subjective:     Chief Complaint: Chest Pain (Pressure all over ) and Shortness of Breath (Newly placed on 3L of oxygen )       Catherine Hall is a 68 y.o. male who presents with worsening breathing difficulties. .  Seen and examined at bedside he is awake and alert today complains of some headache. Asked me if his voice is not going to be affected by bronchoscopy and I explained him that it will not affect the voice. Review of Systems:    Review of Systems unable to obtain extensive ROS due to mentation      Objective: Intake/Output Summary (Last 24 hours) at 2/21/2023 1159  Last data filed at 2/21/2023 0923  Gross per 24 hour   Intake 60 ml   Output --   Net 60 ml          Vitals:   Vitals:    02/21/23 1114   BP: (!) 113/54   Pulse: 81   Resp: 22   Temp: 98.2 °F (36.8 °C)   SpO2: 99%       Physical Exam:   Physical Exam GEN  -Awake, alert. EYES   -PERRL.    HENT  -MM are, trach collar in place, right IJ TDC in place  RESP  -congested, rhonchi. Symmetric chest movement. C/V  -S1/S2 auscultated, tachycardia without appreciable M/R/G. No JVD or carotid bruits. Peripheral pulses equal bilaterally and palpable. No peripheral edema. No reproducible chest wall tenderness. GI  -Abdomen is soft, non-distended, no significant tenderness. No masses or guarding. + BS in all quadrants. Rectal exam deferred.   -No CVA tenderness. Gonzalez catheter is not present. MS  -B/L extremities - No gross joint deformities, venous stasis changes noted in bilateral lower extremities no swelling, intact sensation symmetrical.   SKIN  -Normal coloration, warm, dry. NEURO  -awake, alert, following commands, answering questions.       Medications:   Medications:    metoprolol tartrate  25 mg Per G Tube BID    sodium chloride flush  5-40 mL IntraVENous 2 times per day    aspirin  81 mg PEG Tube Daily    atorvastatin  40 mg PEG Tube Nightly    insulin glargine  15 Units SubCUTAneous Nightly    lansoprazole  30 mg Per G Tube QAM AC    levothyroxine  100 mcg Per G Tube Daily    midodrine  5 mg PEG Tube TID WC    polyethylene glycol  17 g Oral BID    QUEtiapine  50 mg Per G Tube Nightly    insulin lispro  0-8 Units SubCUTAneous TID WC    insulin lispro  0-4 Units SubCUTAneous Nightly    heparin (porcine)  5,000 Units SubCUTAneous 3 times per day    ipratropium-albuterol  1 ampule Inhalation Q4H WA    cefepime  1,000 mg IntraVENous Q24H    vancomycin (VANCOCIN) intermittent dosing (placeholder)   Other RX Placeholder      Infusions:    sodium chloride      dextrose       PRN Meds: sodium chloride flush, 5-40 mL, PRN  sodium chloride, , PRN  ondansetron, 4 mg, Q8H PRN   Or  ondansetron, 4 mg, Q6H PRN  polyethylene glycol, 17 g, Daily PRN  acetaminophen, 650 mg, Q6H PRN   Or  acetaminophen, 650 mg, Q6H PRN  polyvinyl alcohol, 2 drop, 4x Daily PRN  sennosides-docusate sodium, 2 tablet, Daily PRN  traZODone, 100 mg, Nightly PRN  glucose, 4 tablet, PRN  dextrose bolus, 125 mL, PRN   Or  dextrose bolus, 250 mL, PRN  glucagon (rDNA), 1 mg, PRN  dextrose, , Continuous PRN      Labs      Recent Results (from the past 24 hour(s))   POCT Glucose    Collection Time: 02/20/23 12:42 PM   Result Value Ref Range    POC Glucose 136 (H) 70 - 99 MG/DL   POCT Glucose    Collection Time: 02/20/23  5:16 PM   Result Value Ref Range    POC Glucose 164 (H) 70 - 99 MG/DL   POCT Glucose    Collection Time: 02/20/23  8:23 PM   Result Value Ref Range    POC Glucose 160 (H) 70 - 99 MG/DL   POCT Glucose    Collection Time: 02/21/23  9:25 AM   Result Value Ref Range    POC Glucose 143 (H) 70 - 99 MG/DL   POCT Glucose    Collection Time: 02/21/23 11:11 AM   Result Value Ref Range    POC Glucose 139 (H) 70 - 99 MG/DL        Imaging/Diagnostics Last 24 Hours   CT CHEST WO CONTRAST    Result Date: 2/19/2023  EXAMINATION: CT OF THE CHEST WITHOUT CONTRAST 2/19/2023 8:44 pm TECHNIQUE: CT of the chest was performed without the administration of intravenous contrast. Multiplanar reformatted images are provided for review. Automated exposure control, iterative reconstruction, and/or weight based adjustment of the mA/kV was utilized to reduce the radiation dose to as low as reasonably achievable. COMPARISON: 02/12/2023 HISTORY: ORDERING SYSTEM PROVIDED HISTORY: hypoxia TECHNOLOGIST PROVIDED HISTORY: Reason for exam:->hypoxia Reason for Exam: hypoxia FINDINGS: Mediastinum: Moderate cardiomegaly. Status post median sternotomy and CABG. Right subclavian catheter with tip at the right atrium. No pericardial effusion. No thoracic adenopathy. Lungs/pleura: Tracheostomy terminates 4 cm above the natalia. Moderate low-attenuation layering bilateral pleural effusions. Complete collapse of the right lower lobe. Moderate dependent atelectasis in the right upper lobe and right middle lobe. Moderate-severe left lower lobe atelectasis. Mild dependent atelectasis in the left upper lobe.   No central endobronchial lesion or mucous plug. Upper abdomen:  Limited images of the upper abdomen demonstrate no significant abnormality. Soft Tissues/Bones:  No acute abnormality of the visualized osseous structures. 1. Moderate layering bilateral pleural effusions have increased. 2. Worsening areas of atelectasis. Complete collapse of the right lower lobe with moderate right upper lobe and right middle lobe atelectasis. Moderate left lower lobe and mild left upper lobe atelectasis. No central mucous plug identified. XR CHEST PORTABLE    Result Date: 2/19/2023  EXAMINATION: ONE XRAY VIEW OF THE CHEST 2/19/2023 5:39 pm COMPARISON: February 15, 2023 HISTORY: ORDERING SYSTEM PROVIDED HISTORY: chest pain TECHNOLOGIST PROVIDED HISTORY: Reason for exam:->chest pain Reason for Exam:  chest pain Chest pain FINDINGS: There is pulmonary vascular congestion with moderate to severe bilateral pleural effusions. There is cardiomegaly. The patient is status post CABG and left atrial appendage clipping. A right-sided the central line is present with its tip in the SVC. A tracheostomy tube is noted. Worsening pulmonary vascular congestion and bilateral effusions.        Electronically signed by Deni Hernandez MD on 2/21/2023 at 11:59 AM

## 2023-02-21 NOTE — CONSULTS
Nutrition Note    Received consult for tube feed order/management. Will place order for Nepro @ 60 ml/hr which will provide ~2592 kcal and 117 g protein.     Electronically signed by Julius Pittman RD on 2/21/23 at 2:55 PM EST    Contact: [phone]

## 2023-02-21 NOTE — PROGRESS NOTES
Procedure dictated  Thorough pulm toilet of both lungs and sent for culture. There is lot of inflammation and swelling in the lung. I have added solumedrol to pt regimen for now.  I have also requested IR to do koby pleural taps in case there is enough fluid

## 2023-02-21 NOTE — PROGRESS NOTES
pulmonary      SUBJECTIVE:  more comfortable breathing     OBJECTIVE    VITALS:  /62   Pulse 85   Temp 97.7 °F (36.5 °C) (Oral)   Resp 14   Ht 6' 2.02\" (1.88 m)   Wt 275 lb 2.2 oz (124.8 kg)   SpO2 96%   BMI 35.31 kg/m²   HEAD AND FACE EXAM:  No throat injection, no active exudate,no thrush  NECK EXAM;No JVD, no masses, symmetrical  CHEST EXAM; Expansion equal and symmetrical, no masses  LUNG EXAM; Good breath sounds bilaterally. There are expiratory wheezes both lungs, there are crackles at both lung bases  CARDIOVASCULAR EXAM: Positive S1 and S2, no S3 or S4, no clicks ,no murmurs  RIGHT AND LEFT LOWER EXTRIMITY EXAM: No edema, no swelling, no inflamation  CNS EXAM: Alert and oriented X3          LABS   Lab Results   Component Value Date    WBC 7.9 02/20/2023    HGB 8.4 (L) 02/20/2023    HCT 27.4 (L) 02/20/2023    .8 (H) 02/20/2023     02/20/2023     Lab Results   Component Value Date    CREATININE 5.3 (H) 02/20/2023    BUN 58 (H) 02/20/2023     (L) 02/20/2023    K 4.0 02/20/2023    CL 95 (L) 02/20/2023    CO2 25 02/20/2023     Lab Results   Component Value Date    INR 1.21 02/17/2023    PROTIME 15.7 (H) 02/17/2023          Lab Results   Component Value Date/Time    PHOS 3.7 02/15/2023 03:15 AM    PHOS 2.1 02/14/2023 06:10 AM    PHOS 3.5 02/13/2023 04:54 AM      No results for input(s): PH, PO2ART, [id number], HCO3, BEART, O2SAT in the last 72 hours. Wt Readings from Last 3 Encounters:   02/21/23 275 lb 2.2 oz (124.8 kg)   02/17/23 289 lb 3.9 oz (131.2 kg)   02/08/23 260 lb (117.9 kg)               ASSESMENT  Ac on ch resp failure  Ac copd  Pulm atelectasis georgi  Pneumonia  Georgi pl effusion        PLAN  Cpm  Cont o2 adm  D/w pt about bronch and he is agreeable this time.  Will schedule it for today or tomorrow depending on endoscopy availability    2/21/2023  Sheng Middleton MD, MCHRISTINA.

## 2023-02-21 NOTE — ANESTHESIA POSTPROCEDURE EVALUATION
Department of Anesthesiology  Postprocedure Note    Patient: Savanah Garcia  MRN: [id number]  YOB: 1950  Date of evaluation: 2/21/2023      Procedure Summary     Date: 02/21/23 Room / Location: [address] [place] [address]    Anesthesia Start: 1258 Anesthesia Stop: 8821    Procedure: BRONCHOSCOPY WITH LAVAGE Diagnosis:       Pulmonary atelectasis      (Pulmonary atelectasis [J98.11])    Surgeons: Austen Fuentes MD Responsible Provider: Irasema Shi DO    Anesthesia Type: MAC ASA Status: 4 - Emergent          Anesthesia Type: MAC    Jay Phase I: Jay Score: 10    Jay Phase II:        Anesthesia Post Evaluation    Patient location during evaluation: ICU  Patient participation: complete - patient participated  Level of consciousness: awake  Pain score: 0  Airway patency: patent  Nausea & Vomiting: no nausea and no vomiting  Complications: no  Cardiovascular status: blood pressure returned to baseline and hemodynamically stable  Respiratory status: acceptable and T-piece (over trach )  Hydration status: stable

## 2023-02-21 NOTE — CONSULTS
INPATIENT CARDIOLOGY CONSULT NOTE         Reason for consultation:  CAD, CP, shortness of breath    Referring physician:  Leon Zavala MD     Primary care physician: Carmen Campoverde, APRN - CNP      Dear Leon Zavala MD Thank you for the consult    Chief Complaint   Patient presents with    Chest Pain     Pressure all over     Shortness of Breath     Newly placed on 3L of oxygen        History of present illness:Ji is a 68 y. o.year old who  presents with   Chief Complaint   Patient presents with    Chest Pain     Pressure all over     Shortness of Breath     Newly placed on 3L of oxygen        Patient is a 67-year-old gentleman with prior medical history significant for recent coronary disease s/p CABG, protracted hospitalization requiring tracheostomy, end-stage renal disease on dialysis, atrial fibrillation, s/p tracheostomy presents to the hospital with chief complaint of shortness of breath. Cardiology is consulted for evaluation  Patient is known to have chronically elevated troponin,  Limited history could be obtained by EMR given underlying tracheostomy. Patient has chronic chest pains and pressure all across chest and abdomen. Patient denies any nausea vomiting. In the ER patient was noted to be hypoxic with oxygen saturation dropping into the 70s. After suctioning through the tracheostomy site oxygen saturations did improve. He was also noted to have lactic acid of 1.3 sodium 129 creatinine of 4.6  As mentioned in cardiac troponin was elevated at 0.2 and it is range bound chronically. Patient had undergone CABG surgery in September 2022 with maze procedure.       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, COPD (chronic obstructive pulmonary disease) ([facility] [address] Depression, Diabetes mellitus ([facility] [address] Dupuytren's contracture of hand, Family history of cardiovascular disease, GERD (gastroesophageal reflux disease), H/O cardiac catheterization, H/O cardiac catheterization, H/O cardiovascular stress test, H/O cardiovascular stress test, H/O cardiovascular stress test, H/O cardiovascular stress test, H/O Doppler ultrasound, H/O echocardiogram, H/O echocardiogram, H/O echocardiogram, H/O hiatal hernia, Hx of Venous Doppler, Hyperlipidemia, Hypertension, Obesity, S/P PTCA (percutaneous transluminal coronary angioplasty), Sleep apnea, and Thyroid disease. Past surgical history:   has a past surgical history that includes Coronary angioplasty with stent (03/21/2005); Cardiac catheterization (6/12/08); Cardiac catheterization (3/07); Cardiac catheterization (3/05); Endoscopy, colon, diagnostic (2014); Colonoscopy (2011); Colonoscopy (5/18/16); Hand surgery (Right, 1997); pr optx ankle dislocation w/repair/int/xtrnl fixj (Right, 6/3/2019); Sternum Debridement (N/A, 9/24/2022); Coronary artery bypass graft (N/A, 9/22/2022); and IR TUNNELED CVC PLACE WO SQ PORT/PUMP > 5 YEARS (2/14/2023). Social History:   reports that he quit smoking about 47 years ago. His smoking use included cigarettes. He has a 10.00 pack-year smoking history. He has never used smokeless tobacco. He reports that he does not currently use alcohol after a past usage of about 6.0 standard drinks per week. He reports that he does not use drugs.   Family history:   no family history of CAD, STROKE of DM    No Known Allergies    sodium chloride flush 0.9 % injection 5-40 mL, 2 times per day  sodium chloride flush 0.9 % injection 5-40 mL, PRN  0.9 % sodium chloride infusion, PRN  ondansetron (ZOFRAN-ODT) disintegrating tablet 4 mg, Q8H PRN   Or  ondansetron (ZOFRAN) injection 4 mg, Q6H PRN  polyethylene glycol (GLYCOLAX) packet 17 g, Daily PRN  acetaminophen (TYLENOL) tablet 650 mg, Q6H PRN   Or  acetaminophen (TYLENOL) suppository 650 mg, Q6H PRN  amiodarone (CORDARONE) tablet 200 mg, Daily  aspirin chewable tablet 81 mg, Daily  atorvastatin (LIPITOR) tablet 40 mg, Nightly  insulin glargine (LANTUS) injection vial 15 Units, Nightly  lansoprazole suspension SUSP 30 mg, QAM AC  levothyroxine (SYNTHROID) tablet 100 mcg, Daily  metoprolol tartrate (LOPRESSOR) tablet 12.5 mg, BID  midodrine (PROAMATINE) tablet 5 mg, TID WC  polyethylene glycol (GLYCOLAX) packet 17 g, BID  polyvinyl alcohol (LIQUIFILM TEARS) 1.4 % ophthalmic solution 2 drop, 4x Daily PRN  QUEtiapine (SEROQUEL) tablet 50 mg, Nightly  sennosides-docusate sodium (SENOKOT-S) 8.6-50 MG tablet 2 tablet, Daily PRN  traZODone (DESYREL) tablet 100 mg, Nightly PRN  insulin lispro (HUMALOG) injection vial 0-8 Units, TID WC  insulin lispro (HUMALOG) injection vial 0-4 Units, Nightly  glucose chewable tablet 16 g, PRN  dextrose bolus 10% 125 mL, PRN   Or  dextrose bolus 10% 250 mL, PRN  glucagon (rDNA) injection 1 mg, PRN  dextrose 10 % infusion, Continuous PRN  heparin (porcine) injection 5,000 Units, 3 times per day  acetylcysteine (MUCOMYST) 10 % solution 400 mg, TID  ipratropium-albuterol (DUONEB) nebulizer solution 1 ampule, Q4H WA  cefepime (MAXIPIME) 1,000 mg in sodium chloride 0.9 % 50 mL IVPB (mini-bag), Q24H  vancomycin (VANCOCIN) intermittent dosing (placeholder), RX Placeholder      Current Facility-Administered Medications   Medication Dose Route Frequency Provider Last Rate Last Admin    sodium chloride flush 0.9 % injection 5-40 mL  5-40 mL IntraVENous 2 times per day Eugene Villalobos MD   10 mL at 02/20/23 2113    sodium chloride flush 0.9 % injection 5-40 mL  5-40 mL IntraVENous PRN Eugene Villalobos MD        0.9 % sodium chloride infusion   IntraVENous PRN Eugene Villalobos MD        ondansetron (ZOFRAN-ODT) disintegrating tablet 4 mg  4 mg Oral Q8H PRN Eugene Villalobos MD        Or    ondansetron (ZOFRAN) injection 4 mg  4 mg IntraVENous Q6H PRN Eugene Villalobos MD        polyethylene glycol (GLYCOLAX) packet 17 g  17 g Oral Daily PRN Lyndsay Espinosa MD        acetaminophen (TYLENOL) tablet 650 mg  650 mg Oral Q6H PRN Lyndsay Espinosa MD        Or    acetaminophen (TYLENOL) suppository 650 mg  650 mg Rectal Q6H PRN Lyndsay Espinosa MD        amiodarone (CORDARONE) tablet 200 mg  200 mg PEG Tube Daily Lyndsay Espinosa MD   200 mg at 02/20/23 3453    aspirin chewable tablet 81 mg  81 mg PEG Tube Daily Lyndsay Espinosa MD   81 mg at 02/20/23 0823    atorvastatin (LIPITOR) tablet 40 mg  40 mg PEG Tube Nightly Lyndsay Espinosa MD   40 mg at 02/20/23 2107    insulin glargine (LANTUS) injection vial 15 Units  15 Units SubCUTAneous Nightly Lyndsay Espinosa MD   15 Units at 02/20/23 2107    lansoprazole suspension SUSP 30 mg  30 mg Per G Tube QAM AC Lyndsay Espinosa MD   30 mg at 02/21/23 0535    levothyroxine (SYNTHROID) tablet 100 mcg  100 mcg Per G Tube Daily Lyndsay Espinosa MD   100 mcg at 02/21/23 0535    metoprolol tartrate (LOPRESSOR) tablet 12.5 mg  12.5 mg Per G Tube BID Lyndsay Espinosa MD   12.5 mg at 02/20/23 2107    midodrine (PROAMATINE) tablet 5 mg  5 mg PEG Tube TID  Shahriar Smith MD   5 mg at 02/20/23 1722    polyethylene glycol (GLYCOLAX) packet 17 g  17 g Oral BID Lyndsay Espinosa MD   17 g at 02/20/23 2112    polyvinyl alcohol (LIQUIFILM TEARS) 1.4 % ophthalmic solution 2 drop  2 drop Both Eyes 4x Daily PRN Lyndsay Espinosa MD   2 drop at 02/20/23 2245    QUEtiapine (SEROQUEL) tablet 50 mg  50 mg Per G Tube Nightly Lyndsay Espinosa MD   50 mg at 02/20/23 2107    sennosides-docusate sodium (SENOKOT-S) 8.6-50 MG tablet 2 tablet  2 tablet PEG Tube Daily PRN Lyndsay Espinosa MD        traZODone (DESYREL) tablet 100 mg  100 mg PEG Tube Nightly PRN Lyndsay Espinosa MD   100 mg at 02/21/23 0126    insulin lispro (HUMALOG) injection vial 0-8 Units  0-8 Units SubCUTAneous TID  Lyndsay Espinosa MD        insulin lispro (HUMALOG) injection vial 0-4 Units  0-4 Units SubCUTAneous Nightly Juanita Rodríguez MD        glucose chewable tablet 16 g  4 tablet Oral PRN Juanita Rodríguez MD        dextrose bolus 10% 125 mL  125 mL IntraVENous PRN Juanita Rodríguez MD        Or    dextrose bolus 10% 250 mL  250 mL IntraVENous PRN Juanita Rodríguez MD        glucagon (rDNA) injection 1 mg  1 mg SubCUTAneous PRN Juanita Rodríguez MD        dextrose 10 % infusion   IntraVENous Continuous PRN Juanita Rodríguez MD        heparin (porcine) injection 5,000 Units  5,000 Units SubCUTAneous 3 times per day Juanita Rodríguez MD   5,000 Units at 02/21/23 0535    acetylcysteine (MUCOMYST) 10 % solution 400 mg  4 mL Inhalation TID Juanita Rodríguez MD   400 mg at 02/20/23 1617    ipratropium-albuterol (DUONEB) nebulizer solution 1 ampule  1 ampule Inhalation Q4H WA Juanita Rodríguez MD   1 ampule at 02/20/23 1948    cefepime (MAXIPIME) 1,000 mg in sodium chloride 0.9 % 50 mL IVPB (mini-bag)  1,000 mg IntraVENous Q24H Juanita Rodríguez MD   Stopped at 02/21/23 0108    vancomycin (VANCOCIN) intermittent dosing (placeholder)   Other RX Placeholder Juanita Rodríguez MD             Review of Systems:      Unable to fully assess review of system as patient is somnolent s/p tracheostomy and has limited insight      Physical Examination:      Vitals:    02/21/23 0409   BP:    Pulse: 85   Resp: 14   Temp:    SpO2: 96%      Wt Readings from Last 3 Encounters:   02/21/23 275 lb 2.2 oz (124.8 kg)   02/17/23 289 lb 3.9 oz (131.2 kg)   02/08/23 260 lb (117.9 kg)     Body mass index is 35.31 kg/m². General Appearance:  No distress,   comfortable. S/p tracheostomy  Constitutional:  Well developed, Well nourished  HEENT:  Normocephalic, Atraumatic, Oropharynx moist   Nose normal. Neck Supple Carotid: no carotid bruit  Eyes:  Conjunctiva normal, No discharge.    Respiratory:    S/p tracheostomy, chest is clear to auscultation otherwise  No chest Tenderness  Cardiovascular: S1-S2 IR no murmurs auscultated. No rubs, thrills or gallops. pedal pulses are normal. No pedal edema  GI:  Soft Non tender, non distended. Musculoskeletal:   No tenderness, No cyanosis, No clubbing. Integument:  Warm, Dry, No erythema, No rash. Lymphatic:  No lymphadenopathy noted. Lab Review     Recent Labs     02/20/23  0613   WBC 7.9   HGB 8.4*   HCT 27.4*         Recent Labs     02/20/23  0613   *   K 4.0   CL 95*   CO2 25   BUN 58*   CREATININE 5.3*     Recent Labs     02/19/23  1756   AST 14*   ALT 11   BILITOT 0.3   ALKPHOS 213*     No results for input(s): TROPONINI in the last 72 hours. Lab Results   Component Value Date    BNP 21 10/22/2013     Lab Results   Component Value Date    INR 1.21 02/17/2023    PROTIME 15.7 (H) 02/17/2023         All labs, images, EKGs were personally reviewed      Assessment: 68 y. 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, COPD (chronic obstructive pulmonary disease) ([address] Depression, Diabetes mellitus ([address] Dupuytren's contracture of hand, Family history of cardiovascular disease, GERD (gastroesophageal reflux disease), H/O cardiac catheterization, H/O cardiac catheterization, H/O cardiovascular stress test, H/O cardiovascular stress test, H/O cardiovascular stress test, H/O cardiovascular stress test, H/O Doppler ultrasound, H/O echocardiogram, H/O echocardiogram, H/O echocardiogram, H/O hiatal hernia, Hx of Venous Doppler, Hyperlipidemia, Hypertension, Obesity, S/P PTCA (percutaneous transluminal coronary angioplasty), Sleep apnea, and Thyroid disease. Medical Decision Making :       S/p trach         Coronary disease s/p CABG x3, LIMA LAD, SVG PDA and RCA.   Post modified maze procedure 9/15/2022  Elevated cardiac troponin non-ACS trend. Chronically elevated troponin. Patient without any significant chest pain. EKG shows A. fib with right bundle branch block. No ischemic changes noted. No ischemic work-up planned at this point. Last echocardiogram in December 2022 showed preserved LV ejection fraction no significant valvular heart disease no signs of pericardial effusion     Continue with aspirin 81 mg daily  Continue with Lipitor 40 mg daily  Currently on metoprolol, will increase the dose as below     Elevated proBNP: Chronically elevated in the setting of recent CABG/history of atrial fibrillation. Volume management with dialysis as per nephrology. Atrial fibrillation: Persistent. S/p maze procedure. Patient also had left atrial appendage clipping. off oral anticoagulation. Continue with aspirin. We will discontinue amiodarone. Currently on metoprolol 12.5 twice daily via PEG tube for rate control, will increase dose to 25 twice daily    Protracted illness post CABG requiring tracheostomy  End-stage renal disease on hemodialysis: Nephrology follow-up on hemodialysis  Acute respiratory failure, hypoxia, s/p tracheostomy. Being followed by pulmonology    No cardiac work-up planned at this time please call us with any questions.     Thank you for the consult    Dr. Diomedes Thorne  2/21/2023 7:48 AM

## 2023-02-21 NOTE — ANESTHESIA PRE PROCEDURE
Department of Anesthesiology  Preprocedure Note       Name:  Ashley Santacruz   Age:  68 y.o.  :  1950                                          MRN:  [id number]         Date:  2023      Surgeon: Tanna Phipps):  Liane Oneill MD    Procedure: Procedure(s):  BRONCHOSCOPY    Medications prior to admission:   Prior to Admission medications    Medication Sig Start Date End Date Taking?  Authorizing Provider   insulin glargine (LANTUS) 100 UNIT/ML injection vial Inject 25 Units into the skin nightly 23   Nishi Gabriel MD   metoprolol tartrate (LOPRESSOR) 25 MG tablet 0.5 tablets by Per G Tube route 2 times daily 23   Nishi Gabriel MD   polyvinyl alcohol (LIQUIFILM TEARS) 1.4 % ophthalmic solution Place 2 drops into both eyes 4 times daily as needed for Dry Eyes 2/17/23 3/19/23  Nishi Gabriel MD   midodrine (PROAMATINE) 5 MG tablet Take 1 tablet by mouth 3 times daily (with meals) 23   Nishi Gabriel MD   levothyroxine (SYNTHROID) 100 MCG tablet 1 tablet by Per G Tube route Daily 23   Nishi Gabriel MD   lansoprazole 3 MG/ML SUSP Take 10 mLs by mouth every morning (before breakfast) 2/18/23 3/20/23  Nishi Gabriel MD   polyethylene glycol (GLYCOLAX) 17 GM/SCOOP powder Take 17 g by mouth 2 times daily 23  Meryl Gómez PA-C   sennosides-docusate sodium (SENOKOT-S) 8.6-50 MG tablet Take 2 tablets by mouth daily as needed for Constipation 12/15/22   Yared Coleman MD   ipratropium-albuterol (DUONEB) 0.5-2.5 (3) MG/3ML SOLN nebulizer solution Inhale 3 mLs into the lungs 4 times daily 12/15/22   Yared Coleman MD   amiodarone (CORDARONE) 200 MG tablet 200 mg daily 22   Historical Provider, MD   atorvastatin (LIPITOR) 40 MG tablet 40 mg nightly 22   Historical Provider, MD   melatonin 3 MG TABS tablet 9 mg nightly 11/28/22   Historical Provider, MD   Nutritional Supplements (NEPRO/CARBSTEADY) LIQD 50 mL/hr continuous 11/28/22   Historical Provider, MD   Nutritional Supplements (PROSOURCE TF) LIQD 1 Package 3 times daily 11/28/22   Historical Provider, MD   traZODone (DESYREL) 100 MG tablet 100 mg nightly 11/28/22   Historical Provider, MD   QUEtiapine (SEROQUEL) 50 MG tablet 50 mg nightly 11/28/22   Historical Provider, MD   aspirin 81 MG chewable tablet 1 tablet by Per NG tube route daily 9/25/22   Marilyn Jones MD   blood glucose monitor kit and supplies Dispense sufficient amount for indicated testing frequency plus additional to accommodate PRN testing needs. Dispense all needed supplies to include: monitor, strips, lancing device, lancets, control solutions, alcohol swabs. 7/26/22   ANKITA Harris CNP   Lancets MISC by D3EA route three times a day.  7/25/22   ANKITA Harris CNP       Current medications:    Current Facility-Administered Medications   Medication Dose Route Frequency Provider Last Rate Last Admin    metoprolol tartrate (LOPRESSOR) tablet 25 mg  25 mg Per G Tube BID Nora Hernandez MD   25 mg at 02/21/23 8832    sodium chloride flush 0.9 % injection 5-40 mL  5-40 mL IntraVENous 2 times per day Mayelin Neumann MD   10 mL at 02/21/23 3212    sodium chloride flush 0.9 % injection 5-40 mL  5-40 mL IntraVENous PRN Mayelin Neumann MD        0.9 % sodium chloride infusion   IntraVENous PRN Mayelin Neumann MD        ondansetron (ZOFRAN-ODT) disintegrating tablet 4 mg  4 mg Oral Q8H PRN Mayelin Neumann MD        Or    ondansetron (ZOFRAN) injection 4 mg  4 mg IntraVENous Q6H PRN Mayelin Neumann MD        polyethylene glycol (GLYCOLAX) packet 17 g  17 g Oral Daily PRN Mayelin Neumann MD        acetaminophen (TYLENOL) tablet 650 mg  650 mg Oral Q6H PRN Mayelin Neumann MD        Or   Jake Warner acetaminophen (TYLENOL) suppository 650 mg  650 mg Rectal Q6H PRN aMyelin Neumann MD        aspirin chewable tablet 81 mg  81 mg PEG Tube Daily Mayelin Neumann MD   81 mg at 02/21/23 0922    atorvastatin (LIPITOR) tablet 40 mg  40 mg PEG Tube Nightly Cedric Scanlon MD   40 mg at 02/20/23 2107    insulin glargine (LANTUS) injection vial 15 Units  15 Units SubCUTAneous Nightly Cedric Scanlon MD   15 Units at 02/20/23 2107    lansoprazole suspension SUSP 30 mg  30 mg Per G Tube QAM AC Cedric Scanlon MD   30 mg at 02/21/23 0535    levothyroxine (SYNTHROID) tablet 100 mcg  100 mcg Per G Tube Daily Cedric Scanlon MD   100 mcg at 02/21/23 0535    midodrine (PROAMATINE) tablet 5 mg  5 mg PEG Tube TID  Cedric Scanlon MD   5 mg at 02/21/23 7416    polyethylene glycol (GLYCOLAX) packet 17 g  17 g Oral BID Cedric Scanlon MD   17 g at 02/21/23 0360    polyvinyl alcohol (LIQUIFILM TEARS) 1.4 % ophthalmic solution 2 drop  2 drop Both Eyes 4x Daily PRN Cedric Scanlon MD   2 drop at 02/20/23 4871    QUEtiapine (SEROQUEL) tablet 50 mg  50 mg Per G Tube Nightly Cedric Scanlon MD   50 mg at 02/20/23 2107    sennosides-docusate sodium (SENOKOT-S) 8.6-50 MG tablet 2 tablet  2 tablet PEG Tube Daily PRN Cedric Scanlon MD        traZODone (DESYREL) tablet 100 mg  100 mg PEG Tube Nightly PRN Cedric Scanlon MD   100 mg at 02/21/23 0126    insulin lispro (HUMALOG) injection vial 0-8 Units  0-8 Units SubCUTAneous TID  Cedric Scanlon MD        insulin lispro (HUMALOG) injection vial 0-4 Units  0-4 Units SubCUTAneous Nightly Cedric Scanlon MD        glucose chewable tablet 16 g  4 tablet Oral PRN Cedric Scanlon MD        dextrose bolus 10% 125 mL  125 mL IntraVENous PRN Cedric Scanlon MD        Or    dextrose bolus 10% 250 mL  250 mL IntraVENous PRN Cerdic Scanlon MD        glucagon (rDNA) injection 1 mg  1 mg SubCUTAneous PRN Cedric Scanlon MD        dextrose 10 % infusion   IntraVENous Continuous PRN Cedric Scanlon MD        heparin (porcine) injection 5,000 Units  5,000 Units SubCUTAneous 3 times per day Heather Owens MD   5,000 Units at 02/21/23 0535    ipratropium-albuterol (DUONEB) nebulizer solution 1 ampule  1 ampule Inhalation Q4H WA Heather Owens MD   1 ampule at 02/21/23 2429    cefepime (MAXIPIME) 1,000 mg in sodium chloride 0.9 % 50 mL IVPB (mini-bag)  1,000 mg IntraVENous Q24H Heather Owens MD   Stopped at 02/21/23 0108    vancomycin (VANCOCIN) intermittent dosing (placeholder)   Other RX Placeholder Heather Owens MD           Allergies:  No Known Allergies    Problem List:    Patient Active Problem List   Diagnosis Code    Atrial fibrillation (HCC) I48.91    CAD (coronary artery disease) I25.10    Morbid obesity ([facility] E66.01    COPD (chronic obstructive pulmonary disease) ([facility] J44.9    Sleep apnea G47.30    Type 2 diabetes mellitus (HCC) E11.9    Thyroid disease E07.9    Hyperlipidemia E78.5    Acute congestive heart failure (HCC) I50.9    Coronary artery disease involving native coronary artery of native heart with angina pectoris ([facility] I25.119    Chronic renal disease, stage III ([facility] [[id number]] N18.30    Hypertension I10    CAD, multiple vessel I25.10    Dyspnea R06.00    Moderate malnutrition (HCC) E44.0    Pneumonia of both lungs due to infectious organism J18.9    Severe malnutrition ([facility] E43    Chronic respiratory failure with hypoxia (HCC) J96.11    Aspiration pneumonia of right lower lobe (HCC) J69.0    ESRD on dialysis ([address] N18.6, Z99.2    Aspiration pneumonia, unspecified aspiration pneumonia type, unspecified laterality, unspecified part of lung ([address] J69.0    Acute aspiration pneumonia ([facility] J69.0    Acute on chronic respiratory failure with hypoxia ([facility] J96.21       Past Medical History:        Diagnosis Date    Allergic rhinitis     Anticoagulated on Coumadin     1/6/17-**[place] to follow pt's PT/INRs, along w/prescribing his Coumadin. **    Anxiety     Arthritis     [facility] fibrillation ([facility]     On Coumadin. 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 positive 09/21/2022    positive at 15C, negative at 18C    COPD (chronic obstructive pulmonary disease) ([facility])     Depression     Diabetes mellitus ([facility])     NIDDM- dx over 10 yrs ago- follows with Dr Zoe Pinon's contracture of hand     Right hand    Family history of cardiovascular disease     Father-MI    GERD (gastroesophageal reflux disease)     H/O cardiac catheterization 03/2007    cardiac cath- stent LAD patent, [id number]%, RCA 40-50%    H/O cardiac catheterization 06/12/2008    cardiac cath- mild disease mid RCA    H/O cardiovascular stress test 04/10/2014    cardiolite- normal, no ischemia, EF63%    H/O cardiovascular stress test 09/21/2016    EF59% normal study  pt in atrial fib    H/O cardiovascular stress test 09/20/2017    EF 60%   afib    H/O cardiovascular stress test 11/07/2018    EF60% normal study    H/O Doppler ultrasound     1/11/2010-CAROTID_Intimal thickening but no significant atherosclerotic plaque noted in LAUREN. Doppler flow velocities within the LAUREN are WNL. Intimal thickening but no significant atherosclerotic plaque noted in LICA. Doppler flow velociities within the LICA are WNL.  H/O echocardiogram 04/10/2014    EF 60%, normal LV systolic function, mild mitral and tricuspid insufficiencies, no pericardial effusion.     H/O echocardiogram 09/21/2016    EF60% normal study    H/O echocardiogram 11/07/2018    EF55-60% no significant valular disease    H/O hiatal hernia     Hx of Venous Doppler 03/14/2019    Significant reflux in Left Deep System, No reflux in right lower extremity, No DVT or SVT    Hyperlipidemia     Hypertension     Obesity     S/P PTCA (percutaneous transluminal coronary angioplasty) 03/21/2005    s/p PTCA with 3.5 X 16 TAXUS stent to LAD- follows with Dr Estrella Napoles Sleep apnea     had sleep study done 10+ yrs ago- has cpap but does not use it    Thyroid disease hypothyroid       Past Surgical History:        Procedure Laterality Date    CARDIAC CATHETERIZATION  08    mild disease mid RCA,  stent to LAD patent,    CARDIAC CATHETERIZATION  3/07    Stent to LAD patent, Diagonal 40-50%, RCA 40-50%    CARDIAC CATHETERIZATION  3/05    COLONOSCOPY      COLONOSCOPY  16    1 polyp removed, diverticulosis and hemorrhoids noted    CORONARY ANGIOPLASTY WITH STENT PLACEMENT  2005    PTCA with 3.5 x 16 TAXUS stent to LAD    CORONARY ARTERY BYPASS GRAFT N/A 2022    CABG CORONARY ARTERY BYPASS x3 (LIMA TO LAD, SVG TO PDA-RCA SEQUENTIAL) , INTRAOPERATIVE MILTON, ENDOVEIN HARVEST OF LEFT SAPHENOUS VEIN, MODIFIED MAZE PROCEDURE, LEFT ATRIAL APPENDAGE LIGATION, INTERCOSTAL NERVE BLOCK, INTRA-AORTIC BALLOON PUMP INSERTION performed by Terrance Reilly MD at [facility]      egd    HAND SURGERY Right     IR TUNNELED [address] 5 YEARS  2023    IR TUNNELED CATHETER PLACEMENT GREATER THAN 5 YEARS 2023 Monica Cuenca, DO [facility] SPECIAL PROCEDURES    WA OPTX ANKLE DISLOCATION W/REPAIR/INT/XTRNL FIXJ Right 6/3/2019    RIGHT OPEN REDUCTION INTERNAL FIXATION OF DISTAL TIBIA  AND FIBULA performed by Angel Fletcher MD at [address] N/A 2022    STERNUM WASHOUT performed by Terrance Reilly MD at [address] [place] [address] History:    Social History     Tobacco Use    Smoking status: Former     Packs/day: 1.00     Years: 10.00     Pack years: 10.00     Types: Cigarettes     Quit date: 1976     Years since quittin.1    Smokeless tobacco: Never   Substance Use Topics    Alcohol use: Not Currently     Alcohol/week: 6.0 standard drinks     Types: 6 Cans of beer per week     Comment: caffeine 2 cups of tea a day                                 Counseling given: Not Answered      Vital Signs (Current):   Vitals:    23 4893 23 0745 23 0839 23 0916   BP:  115/61 Pulse:  89     Resp:  12     Temp:    97.7 °F (36.5 °C)   TempSrc:    Oral   SpO2:  99% 98%    Weight: 275 lb 2.2 oz (124.8 kg)      Height:                                                  BP Readings from Last 3 Encounters:   02/21/23 115/61   02/17/23 110/60   02/08/23 110/70       NPO Status:                                                                                 BMI:   Wt Readings from Last 3 Encounters:   02/21/23 275 lb 2.2 oz (124.8 kg)   02/17/23 289 lb 3.9 oz (131.2 kg)   02/08/23 260 lb (117.9 kg)     Body mass index is 35.31 kg/m². CBC:   Lab Results   Component Value Date/Time    WBC 7.9 02/20/2023 06:13 AM    RBC 2.43 02/20/2023 06:13 AM    HGB 8.4 02/20/2023 06:13 AM    HCT 27.4 02/20/2023 06:13 AM    .8 02/20/2023 06:13 AM    RDW 19.1 02/20/2023 06:13 AM     02/20/2023 06:13 AM       CMP:   Lab Results   Component Value Date/Time     02/20/2023 06:13 AM    K 4.0 02/20/2023 06:13 AM    CL 95 02/20/2023 06:13 AM    CO2 25 02/20/2023 06:13 AM    BUN 58 02/20/2023 06:13 AM    CREATININE 5.3 02/20/2023 06:13 AM    GFRAA >60 09/24/2022 12:08 PM    AGRATIO 1.1 02/15/2022 03:35 PM    LABGLOM 11 02/20/2023 06:13 AM    GLUCOSE 181 02/20/2023 06:13 AM    PROT 7.0 02/19/2023 05:56 PM    CALCIUM 9.8 02/20/2023 06:13 AM    BILITOT 0.3 02/19/2023 05:56 PM    ALKPHOS 213 02/19/2023 05:56 PM    AST 14 02/19/2023 05:56 PM    ALT 11 02/19/2023 05:56 PM       POC Tests:   Recent Labs     02/20/23 2023   POCGLU 160*       Coags:   Lab Results   Component Value Date/Time    PROTIME 15.7 02/17/2023 04:11 AM    PROTIME 27.3 09/09/2022 03:22 PM    INR 1.21 02/17/2023 04:11 AM    APTT 32.9 01/05/2023 03:38 AM       HCG (If Applicable): No results found for: PREGTESTUR, PREGSERUM, HCG, HCGQUANT     ABGs:   Lab Results   Component Value Date/Time    PO2ART 70.3 09/24/2022 11:49 AM    SOM0VUT 35.5 09/24/2022 11:49 AM    NVP3HDP 21.2 09/24/2022 11:49 AM        Type & Screen (If Applicable):   No results found for: Arnaldo Shook    Drug/Infectious Status (If Applicable):  No results found for: HIV, HEPCAB    COVID-19 Screening (If Applicable):   Lab Results   Component Value Date/Time    COVID19 NOT DETECTED 02/13/2023 04:10 AM           Anesthesia Evaluation  Patient summary reviewed no history of anesthetic complications:   Airway: Mallampati: Unable to assess / NA         Tracheostomy present Dental:          Pulmonary:   (+) pneumonia:  COPD:  shortness of breath: chronic,  sleep apnea:                            ROS comment: Former smoker   Cardiovascular:  Exercise tolerance: poor (<4 METS),   (+) hypertension:, angina:, CAD:, CABG/stent:, dysrhythmias: atrial fibrillation, CHF:, hyperlipidemia         Beta Blocker:  Not on Beta Blocker         Neuro/Psych:   (+) depression/anxiety             GI/Hepatic/Renal:   (+) GERD:,           Endo/Other:    (+) DiabetesType II DM, , hypothyroidism: arthritis: OA., .                 Abdominal:             Vascular: negative vascular ROS. Other Findings:           Anesthesia Plan      MAC     ASA 4 - emergent       Induction: intravenous. Anesthetic plan and risks discussed with patient. Plan discussed with CRNA. Pre Anesthesia Evaluation complete. Anesthesia plan, risks, benefits, alternatives, and personnel involved discussed with patient. Patients and/or legal guardian verbalized an understanding and agreed to proceed.   Guille Lopez DO  2/21/2023

## 2023-02-21 NOTE — PROGRESS NOTES
Bronch:  Assisted Dr. Beba Lowe          with transbronchial bronchoscopy. Scope used #            . Prepared patient for bronchoscopy. See Physicians note for details.

## 2023-02-22 ENCOUNTER — APPOINTMENT (OUTPATIENT)
Dept: GENERAL RADIOLOGY | Age: 73
End: 2023-02-22
Payer: COMMERCIAL

## 2023-02-22 ENCOUNTER — APPOINTMENT (OUTPATIENT)
Dept: INTERVENTIONAL RADIOLOGY/VASCULAR | Age: 73
End: 2023-02-22
Payer: COMMERCIAL

## 2023-02-22 LAB
AFB SMEAR: NORMAL
ANION GAP SERPL CALCULATED.3IONS-SCNC: 17 MMOL/L (ref 4–16)
BANDED NEUTROPHILS ABSOLUTE COUNT: 0.18 K/CU MM
BANDED NEUTROPHILS RELATIVE PERCENT: 3 % (ref 5–11)
BASOPHILS ABSOLUTE: 0.1 K/CU MM
BASOPHILS RELATIVE PERCENT: 1 % (ref 0–1)
BUN SERPL-MCNC: 50 MG/DL (ref 6–23)
CALCIUM SERPL-MCNC: 8.7 MG/DL (ref 8.3–10.6)
CHLORIDE BLD-SCNC: 97 MMOL/L (ref 99–110)
CO2: 20 MMOL/L (ref 21–32)
CREAT SERPL-MCNC: 4.4 MG/DL (ref 0.9–1.3)
CULTURE: NORMAL
DIFFERENTIAL TYPE: ABNORMAL
DOSE AMOUNT: NORMAL
DOSE TIME: NORMAL
FLUID TYPE: NORMAL INDEX
FLUID TYPE: NORMAL INDEX
GFR SERPL CREATININE-BSD FRML MDRD: 14 ML/MIN/1.73M2
GLUCOSE BLD-MCNC: 296 MG/DL (ref 70–99)
GLUCOSE BLD-MCNC: 299 MG/DL (ref 70–99)
GLUCOSE BLD-MCNC: 320 MG/DL (ref 70–99)
GLUCOSE BLD-MCNC: 409 MG/DL (ref 70–99)
GLUCOSE SERPL-MCNC: 387 MG/DL (ref 70–99)
GLUCOSE, FLUID: 322 MG/DL
GLUCOSE, FLUID: 332 MG/DL
GRAM SMEAR: NORMAL
HCT VFR BLD CALC: 26.1 % (ref 42–52)
HEMOGLOBIN: 8.2 GM/DL (ref 13.5–18)
LACTATE DEHYDROGENASE, FLUID: 100 IU/L
LACTATE DEHYDROGENASE, FLUID: 56 IU/L
LYMPHOCYTES ABSOLUTE: 1 K/CU MM
LYMPHOCYTES RELATIVE PERCENT: 16 % (ref 24–44)
LYMPHOCYTES, BODY FLUID: 29 %
LYMPHOCYTES, BODY FLUID: 62 %
Lab: NORMAL
Lab: NORMAL
MCH RBC QN AUTO: 35.8 PG (ref 27–31)
MCHC RBC AUTO-ENTMCNC: 31.4 % (ref 32–36)
MCV RBC AUTO: 114 FL (ref 78–100)
MESOTHELIAL FLUID: 1 /100 WBC
MESOTHELIAL FLUID: 5 /100 WBC
METAMYELOCYTES ABSOLUTE COUNT: 0.12 K/CU MM
METAMYELOCYTES PERCENT: 2 %
MONOCYTE, FLUID: 16 %
MONOCYTE, FLUID: 46 %
MONOCYTES ABSOLUTE: 0.2 K/CU MM
MONOCYTES RELATIVE PERCENT: 3 % (ref 0–4)
NEUTROPHIL, FLUID: 20 %
NEUTROPHIL, FLUID: 23 %
OTHER CELLS FLUID: 2
OTHER CELLS FLUID: 2
PDW BLD-RTO: 18.8 % (ref 11.7–14.9)
PLATELET # BLD: 272 K/CU MM (ref 140–440)
PMV BLD AUTO: 9.1 FL (ref 7.5–11.1)
POTASSIUM SERPL-SCNC: 5.2 MMOL/L (ref 3.5–5.1)
PROTEIN FLUID: 2.9 G/DL
PROTEIN FLUID: 3.2 G/DL
RBC # BLD: 2.29 M/CU MM (ref 4.6–6.2)
RBC # BLD: ABNORMAL 10*6/UL
RBC FLUID: NORMAL /CU MM
RBC FLUID: NORMAL /CU MM
SEGMENTED NEUTROPHILS ABSOLUTE COUNT: 4.4 K/CU MM
SEGMENTED NEUTROPHILS RELATIVE PERCENT: 75 % (ref 36–66)
SODIUM BLD-SCNC: 134 MMOL/L (ref 135–145)
SPECIMEN: NORMAL
SPECIMEN: NORMAL
VANCOMYCIN RANDOM: 22 UG/ML
WBC # BLD: 6 K/CU MM (ref 4–10.5)
WBC FLUID: 853 /CU MM
WBC FLUID: 989 /CU MM

## 2023-02-22 PROCEDURE — 2580000003 HC RX 258: Performed by: INTERNAL MEDICINE

## 2023-02-22 PROCEDURE — 80048 BASIC METABOLIC PNL TOTAL CA: CPT

## 2023-02-22 PROCEDURE — 6360000002 HC RX W HCPCS: Performed by: INTERNAL MEDICINE

## 2023-02-22 PROCEDURE — 6370000000 HC RX 637 (ALT 250 FOR IP): Performed by: INTERNAL MEDICINE

## 2023-02-22 PROCEDURE — 89220 SPUTUM SPECIMEN COLLECTION: CPT

## 2023-02-22 PROCEDURE — C1729 CATH, DRAINAGE: HCPCS

## 2023-02-22 PROCEDURE — 85027 COMPLETE CBC AUTOMATED: CPT

## 2023-02-22 PROCEDURE — 2700000000 HC OXYGEN THERAPY PER DAY

## 2023-02-22 PROCEDURE — 82962 GLUCOSE BLOOD TEST: CPT

## 2023-02-22 PROCEDURE — 36415 COLL VENOUS BLD VENIPUNCTURE: CPT

## 2023-02-22 PROCEDURE — 0W993ZZ DRAINAGE OF RIGHT PLEURAL CAVITY, PERCUTANEOUS APPROACH: ICD-10-PCS | Performed by: RADIOLOGY

## 2023-02-22 PROCEDURE — 80202 ASSAY OF VANCOMYCIN: CPT

## 2023-02-22 PROCEDURE — 32555 ASPIRATE PLEURA W/ IMAGING: CPT

## 2023-02-22 PROCEDURE — 94640 AIRWAY INHALATION TREATMENT: CPT

## 2023-02-22 PROCEDURE — 85007 BL SMEAR W/DIFF WBC COUNT: CPT

## 2023-02-22 PROCEDURE — 89051 BODY FLUID CELL COUNT: CPT

## 2023-02-22 PROCEDURE — 0W9B3ZZ DRAINAGE OF LEFT PLEURAL CAVITY, PERCUTANEOUS APPROACH: ICD-10-PCS | Performed by: RADIOLOGY

## 2023-02-22 PROCEDURE — 87070 CULTURE OTHR SPECIMN AEROBIC: CPT

## 2023-02-22 PROCEDURE — 71045 X-RAY EXAM CHEST 1 VIEW: CPT

## 2023-02-22 PROCEDURE — 6360000002 HC RX W HCPCS

## 2023-02-22 PROCEDURE — 2060000000 HC ICU INTERMEDIATE R&B

## 2023-02-22 PROCEDURE — 90935 HEMODIALYSIS ONE EVALUATION: CPT

## 2023-02-22 PROCEDURE — 94761 N-INVAS EAR/PLS OXIMETRY MLT: CPT

## 2023-02-22 PROCEDURE — 87205 SMEAR GRAM STAIN: CPT

## 2023-02-22 RX ADMIN — METOPROLOL TARTRATE 25 MG: 25 TABLET, FILM COATED ORAL at 22:26

## 2023-02-22 RX ADMIN — LEVOTHYROXINE SODIUM 100 MCG: 0.1 TABLET ORAL at 05:33

## 2023-02-22 RX ADMIN — IPRATROPIUM BROMIDE AND ALBUTEROL SULFATE 1 AMPULE: 2.5; .5 SOLUTION RESPIRATORY (INHALATION) at 12:15

## 2023-02-22 RX ADMIN — INSULIN LISPRO 6 UNITS: 100 INJECTION, SOLUTION INTRAVENOUS; SUBCUTANEOUS at 16:44

## 2023-02-22 RX ADMIN — QUETIAPINE FUMARATE 50 MG: 25 TABLET ORAL at 22:26

## 2023-02-22 RX ADMIN — INSULIN LISPRO 4 UNITS: 100 INJECTION, SOLUTION INTRAVENOUS; SUBCUTANEOUS at 12:38

## 2023-02-22 RX ADMIN — ATORVASTATIN CALCIUM 40 MG: 40 TABLET, FILM COATED ORAL at 22:26

## 2023-02-22 RX ADMIN — HEPARIN SODIUM 5000 UNITS: 5000 INJECTION INTRAVENOUS; SUBCUTANEOUS at 12:19

## 2023-02-22 RX ADMIN — METOPROLOL TARTRATE 25 MG: 25 TABLET, FILM COATED ORAL at 12:19

## 2023-02-22 RX ADMIN — TRAZODONE HYDROCHLORIDE 100 MG: 50 TABLET ORAL at 01:27

## 2023-02-22 RX ADMIN — HEPARIN SODIUM 5000 UNITS: 5000 INJECTION INTRAVENOUS; SUBCUTANEOUS at 22:25

## 2023-02-22 RX ADMIN — CEFEPIME HYDROCHLORIDE 1000 MG: 1 INJECTION, POWDER, FOR SOLUTION INTRAMUSCULAR; INTRAVENOUS at 22:42

## 2023-02-22 RX ADMIN — METHYLPREDNISOLONE SODIUM SUCCINATE 40 MG: 40 INJECTION, POWDER, FOR SOLUTION INTRAMUSCULAR; INTRAVENOUS at 15:22

## 2023-02-22 RX ADMIN — EPOETIN ALFA-EPBX 8000 UNITS: 4000 INJECTION, SOLUTION INTRAVENOUS; SUBCUTANEOUS at 09:49

## 2023-02-22 RX ADMIN — LANSOPRAZOLE 30 MG: KIT at 05:34

## 2023-02-22 RX ADMIN — INSULIN GLARGINE 15 UNITS: 100 INJECTION, SOLUTION SUBCUTANEOUS at 22:25

## 2023-02-22 RX ADMIN — SODIUM CHLORIDE, PRESERVATIVE FREE 10 ML: 5 INJECTION INTRAVENOUS at 22:51

## 2023-02-22 RX ADMIN — ACETAMINOPHEN 650 MG: 325 TABLET ORAL at 01:29

## 2023-02-22 RX ADMIN — ASPIRIN 81 MG 81 MG: 81 TABLET ORAL at 12:19

## 2023-02-22 RX ADMIN — IPRATROPIUM BROMIDE AND ALBUTEROL SULFATE 1 AMPULE: 2.5; .5 SOLUTION RESPIRATORY (INHALATION) at 15:45

## 2023-02-22 RX ADMIN — POLYETHYLENE GLYCOL 3350 17 G: 17 POWDER, FOR SOLUTION ORAL at 12:21

## 2023-02-22 RX ADMIN — SODIUM CHLORIDE, PRESERVATIVE FREE 10 ML: 5 INJECTION INTRAVENOUS at 12:40

## 2023-02-22 RX ADMIN — METHYLPREDNISOLONE SODIUM SUCCINATE 40 MG: 40 INJECTION, POWDER, FOR SOLUTION INTRAMUSCULAR; INTRAVENOUS at 01:27

## 2023-02-22 RX ADMIN — HEPARIN SODIUM 5000 UNITS: 5000 INJECTION INTRAVENOUS; SUBCUTANEOUS at 05:33

## 2023-02-22 RX ADMIN — IPRATROPIUM BROMIDE AND ALBUTEROL SULFATE 1 AMPULE: 2.5; .5 SOLUTION RESPIRATORY (INHALATION) at 19:53

## 2023-02-22 ASSESSMENT — PAIN DESCRIPTION - ORIENTATION: ORIENTATION: LOWER

## 2023-02-22 ASSESSMENT — PAIN DESCRIPTION - DESCRIPTORS: DESCRIPTORS: DISCOMFORT;ACHING

## 2023-02-22 ASSESSMENT — PAIN DESCRIPTION - LOCATION: LOCATION: COCCYX

## 2023-02-22 ASSESSMENT — PAIN SCALES - GENERAL: PAINLEVEL_OUTOF10: 5

## 2023-02-22 NOTE — PROGRESS NOTES
Occupational Therapy  . Occupational Therapy Treatment Note    Name: Aristeo Nogueira MRN: [id number] :   1950   Date:  2023   Admission Date: 2023 Room:  -A       Attempted x2 to see patient. 1st att patient at dialysis,. . 2nd procedure patient in special procedures. Will check back when able.        Electronically signed by:    JAVY Perrin COTA/L [id number]    2023, 2:52 PM

## 2023-02-22 NOTE — OR NURSING
PROCEDURE PERFORMED: Bilateral Thoracentesis     INFORMED CONSENT:  Obtained prior to procedure. Consent placed in chart. BARRIER PRECAUTIONS & STERILE TECHNIQUE:               Pt positioned for optimal access. Pt remained on Monitor. Pt prepped and draped in a sterile fashion with chlorhexadine. PAIN/LOCAL ANESTHESIA/SEDATION MANAGEMENT:           Local: Lidocaine 1% given by              INTRAOPERATIVE:           ACCESS TIME: 1322          US/FLUORO: 5 US images          CATHETER USED: one step x2             During Right Thoracentesis patient became anxious and oxygenation decreased, Pt oxygen was increased and Respiratory Staff Kandy PALUMBO called bedside to assist. Pt was placed on Nonrebreather mask for supplemental oxygenation. Pt suctioned repeatedly to assist with clearing airway with copious mucous secretions. Pt oxygen saturation improved. Pt stated he had large Bowel movement prior to arrival to unit. IR staff proceeded to clean up patient and continue to Left side Thoracentesis. Pt again prepped and draped in sterile fashion.      SPECIMENS: Right -600 cc serosanguinous pleural fluid, 600 cc sent to lab                           Left-   700 cc serosanguineous pleural fluid,700 cc sent to lab       FOLLOW- UP X-RAY: Ordered Stat    REPORT CALLED TO: Samantha FINLEY Bedside

## 2023-02-22 NOTE — PROGRESS NOTES
V2.0  [facility] [place]      Name:  Ashlyn Zeng /Age/Sex: 1950  (68 y.o. male)   MRN & CSN:  [id number] & [id number] Encounter Date/Time: 2023 7:40 AM EST    Location:  -A PCP: Kait Seaman, APRN - [facility] Day: 4    Assessment and Plan:   Ashlyn Zeng is a 68 y.o. male with pmh of  coronary artery disease s/p CABG complicated by cardiogenic shock, respiratory failure, s/p tracheostomy, PEG tube placement, recurrent admissions for respiratory failure since CABG, chronic hypotension, diabetes mellitus type 2, on long-term insulin, hypothyroidism, GERD, ESRD on HD who presents with Acute on chronic respiratory failure with hypoxia ([facility] [address]      Plan:  #. Acute on chronic respiratory failure with hypoxia  -Patient was recently discharged on 6 L through trach, currently on 6 L, improved  -Continue bronchodilator therapy, acetylcysteine x3  -CT chest-moderate layering bilateral pleural effusions, worsening area of atelectasis, complete collapse of the right lower lobe with moderate right upper lobe and right middle lobe atelectasis. Moderate left lower lobe and mid left upper lobe atelectasis. -Bronchoscopy done yesterday which showed multiple areas of pulmonary atelectasis secondary to mucous plugging. #.  Probable HAP  -Patient has copious amount of secretions from his trach  -Sputum culture pending, BAL pending  -Continue broad-spectrum antibiotics until cultures are reported. #.  Patient has recurrent admissions secondary to respiratory failure. -Recent admission -2023     #. Coronary artery disease s/p CABG-2022  -History of PTCA with HENRY to LAD  -Patient was transferred to [facility] after CABG for cardiogenic shock.  -Patient is on metoprolol tartrate, atorvastatin     #. Patient had tracheostomy (10/12/2022) and PEG tube placement 10/19/2022) at [facility]  -Currently at [facility] for rehab.      #.  ESRD on HD  -Consult nephrology HD on      #. COPD  -On DuoNeb 4 times daily     #. Persistent atrial fibrillation s/p maze procedure-9/2022  -Patient is on amiodarone, Coumadin on hold will continue to hold if patient needs bronchoscopy     #. Chronic hypotension-currently on midodrine. #.  Diabetes mellitus type 2, on long-term insulin  -Patient is on glargine 25 units nightly     #. Hypothyroidism patient on levothyroxine 100 mcg     #. GERD-on lansoprazole     #. History of DVT-right femoral vein  -Patient on anticoagulation     #. Insomnia-on quetiapine, trazodone     #. History of right ankle fracture s/p ORIF-6/2019     #. Morbid obesity with BMI 34.87. Consult speech therapy for swallow evaluation. Diet Diet NPO  ADULT TUBE FEEDING; PEG; Renal Formula; Continuous; 10; Yes; 10; Q 6 hours; 60; 30; Q 4 hours   DVT Prophylaxis [] Lovenox, []  Heparin, [x] SCDs, [] Ambulation,  [] Eliquis, [] Xarelto  [] Coumadin   Code Status Full Code   Disposition From: LTAC  Expected Disposition: Same  Estimated Date of Discharge: 3 days  Patient requires continued admission due to respiratory failure   Surrogate Decision Maker/ POA      Subjective:     Chief Complaint: Chest Pain (Pressure all over ) and Shortness of Breath (Newly placed on 3L of oxygen )       Savanah Garcia is a 68 y.o. male who presents with worsening breathing difficulties. .  Seen and examined at bedside he is awake and alert today complains of dry mouth, wants to have a hard candy, discussed with the patient regarding speech therapy consultation can give him something to eat after he passes swallow eval.      Review of Systems:    Review of Systems unable to obtain extensive ROS due to mentation      Objective:      Intake/Output Summary (Last 24 hours) at 2/22/2023 1238  Last data filed at 2/22/2023 1110  Gross per 24 hour   Intake 560 ml   Output 2500 ml   Net -1940 ml          Vitals:   Vitals:    02/22/23 1224   BP:    Pulse: (!) 108   Resp: 19   Temp:    SpO2: 93% Physical Exam:   Physical Exam GEN  -Awake, alert. EYES   -PERRL. HENT  -MM are, trach collar in place, right IJ TDC in place  RESP  -congested, rhonchi. Symmetric chest movement. C/V  -S1/S2 auscultated, tachycardia without appreciable M/R/G. No JVD or carotid bruits. Peripheral pulses equal bilaterally and palpable. No peripheral edema. No reproducible chest wall tenderness. GI  -Abdomen is soft, non-distended, no significant tenderness. No masses or guarding. + BS in all quadrants. Rectal exam deferred.   -No CVA tenderness. Gonzalez catheter is not present. MS  -B/L extremities - No gross joint deformities, venous stasis changes noted in bilateral lower extremities no swelling, intact sensation symmetrical.   SKIN  -Normal coloration, warm, dry. NEURO  -awake, alert, following commands, answering questions.       Medications:   Medications:    metoprolol tartrate  25 mg Per G Tube BID    methylPREDNISolone  40 mg IntraVENous Q12H    sodium chloride flush  5-40 mL IntraVENous 2 times per day    aspirin  81 mg PEG Tube Daily    atorvastatin  40 mg PEG Tube Nightly    insulin glargine  15 Units SubCUTAneous Nightly    lansoprazole  30 mg Per G Tube QAM AC    levothyroxine  100 mcg Per G Tube Daily    midodrine  5 mg PEG Tube TID WC    polyethylene glycol  17 g Oral BID    QUEtiapine  50 mg Per G Tube Nightly    insulin lispro  0-8 Units SubCUTAneous TID WC    insulin lispro  0-4 Units SubCUTAneous Nightly    heparin (porcine)  5,000 Units SubCUTAneous 3 times per day    ipratropium-albuterol  1 ampule Inhalation Q4H WA    cefepime  1,000 mg IntraVENous Q24H    vancomycin (VANCOCIN) intermittent dosing (placeholder)   Other RX Placeholder      Infusions:    sodium chloride      dextrose       PRN Meds: HYDROmorphone, 0.5 mg, Q5 Min PRN  fentanNYL, 50 mcg, Q5 Min PRN  oxyCODONE, 5 mg, Once PRN  prochlorperazine, 5 mg, Once PRN  haloperidol lactate, 1 mg, Once PRN  midazolam, 2 mg, Once PRN  diphenhydrAMINE, 12.5 mg, Once PRN  labetalol, 10 mg, Q15 Min PRN   Or  hydrALAZINE, 10 mg, Q15 Min PRN  sodium chloride flush, 5-40 mL, PRN  sodium chloride, , PRN  ondansetron, 4 mg, Q8H PRN   Or  ondansetron, 4 mg, Q6H PRN  polyethylene glycol, 17 g, Daily PRN  acetaminophen, 650 mg, Q6H PRN   Or  acetaminophen, 650 mg, Q6H PRN  polyvinyl alcohol, 2 drop, 4x Daily PRN  sennosides-docusate sodium, 2 tablet, Daily PRN  traZODone, 100 mg, Nightly PRN  glucose, 4 tablet, PRN  dextrose bolus, 125 mL, PRN   Or  dextrose bolus, 250 mL, PRN  glucagon (rDNA), 1 mg, PRN  dextrose, , Continuous PRN      Labs      Recent Results (from the past 24 hour(s))   POCT Glucose    Collection Time: 02/21/23 12:48 PM   Result Value Ref Range    POC Glucose 123 (H) 70 - 99 MG/DL   Culture with Smear, Acid Fast Bacillius    Collection Time: 02/21/23  1:25 PM    Specimen: BAL- Bronch. Lavage   Result Value Ref Range    Specimen BRONC ALVEOLAR LAVAGE     Special Requests NONE     AFB Smear Further report to follow    POCT Glucose    Collection Time: 02/21/23  4:48 PM   Result Value Ref Range    POC Glucose 134 (H) 70 - 99 MG/DL   POCT Glucose    Collection Time: 02/21/23  8:29 PM   Result Value Ref Range    POC Glucose 150 (H) 70 - 99 MG/DL   Vancomycin Level, Random    Collection Time: 02/22/23  6:27 AM   Result Value Ref Range    Vancomycin Rm 22.0 UG/ML    DOSE AMOUNT DOSE AMT.  GIVEN - `     DOSE TIME DOSE TIME GIVEN - `    CBC with Auto Differential    Collection Time: 02/22/23  6:53 AM   Result Value Ref Range    WBC 6.0 4.0 - 10.5 K/CU MM    RBC 2.29 (L) 4.6 - 6.2 M/CU MM    Hemoglobin 8.2 (L) 13.5 - 18.0 GM/DL    Hematocrit 26.1 (L) 42 - 52 %    .0 (H) 78 - 100 FL    MCH 35.8 (H) 27 - 31 PG    MCHC 31.4 (L) 32.0 - 36.0 %    RDW 18.8 (H) 11.7 - 14.9 %    Platelets 410 877 - 239 K/CU MM    MPV 9.1 7.5 - 11.1 FL   Basic Metabolic Panel w/ Reflex to MG    Collection Time: 02/22/23  6:53 AM   Result Value Ref Range    Sodium 134 (L) 135 - 145 MMOL/L    Potassium 5.2 (H) 3.5 - 5.1 MMOL/L    Chloride 97 (L) 99 - 110 mMol/L    CO2 20 (L) 21 - 32 MMOL/L    Anion Gap 17 (H) 4 - 16    BUN 50 (H) 6 - 23 MG/DL    Creatinine 4.4 (H) 0.9 - 1.3 MG/DL    Est, Glom Filt Rate 14 (L) >60 mL/min/1.73m2    Glucose 387 (H) 70 - 99 MG/DL    Calcium 8.7 8.3 - 10.6 MG/DL   POCT Glucose    Collection Time: 02/22/23  7:36 AM   Result Value Ref Range    POC Glucose 409 (H) 70 - 99 MG/DL   POCT Glucose    Collection Time: 02/22/23 12:05 PM   Result Value Ref Range    POC Glucose 299 (H) 70 - 99 MG/DL        Imaging/Diagnostics Last 24 Hours   CT CHEST WO CONTRAST    Result Date: 2/19/2023  EXAMINATION: CT OF THE CHEST WITHOUT CONTRAST 2/19/2023 8:44 pm TECHNIQUE: CT of the chest was performed without the administration of intravenous contrast. Multiplanar reformatted images are provided for review. Automated exposure control, iterative reconstruction, and/or weight based adjustment of the mA/kV was utilized to reduce the radiation dose to as low as reasonably achievable. COMPARISON: 02/12/2023 HISTORY: ORDERING SYSTEM PROVIDED HISTORY: hypoxia TECHNOLOGIST PROVIDED HISTORY: Reason for exam:->hypoxia Reason for Exam: hypoxia FINDINGS: Mediastinum: Moderate cardiomegaly. Status post median sternotomy and CABG. Right subclavian catheter with tip at the right atrium. No pericardial effusion. No thoracic adenopathy. Lungs/pleura: Tracheostomy terminates 4 cm above the natalia. Moderate low-attenuation layering bilateral pleural effusions. Complete collapse of the right lower lobe. Moderate dependent atelectasis in the right upper lobe and right middle lobe. Moderate-severe left lower lobe atelectasis. Mild dependent atelectasis in the left upper lobe. No central endobronchial lesion or mucous plug. Upper abdomen:  Limited images of the upper abdomen demonstrate no significant abnormality.  Soft Tissues/Bones:  No acute abnormality of the visualized osseous structures. 1. Moderate layering bilateral pleural effusions have increased. 2. Worsening areas of atelectasis. Complete collapse of the right lower lobe with moderate right upper lobe and right middle lobe atelectasis. Moderate left lower lobe and mild left upper lobe atelectasis. No central mucous plug identified. XR CHEST PORTABLE    Result Date: 2/19/2023  EXAMINATION: ONE XRAY VIEW OF THE CHEST 2/19/2023 5:39 pm COMPARISON: February 15, 2023 HISTORY: ORDERING SYSTEM PROVIDED HISTORY: chest pain TECHNOLOGIST PROVIDED HISTORY: Reason for exam:->chest pain Reason for Exam:  chest pain Chest pain FINDINGS: There is pulmonary vascular congestion with moderate to severe bilateral pleural effusions. There is cardiomegaly. The patient is status post CABG and left atrial appendage clipping. A right-sided the central line is present with its tip in the SVC. A tracheostomy tube is noted. Worsening pulmonary vascular congestion and bilateral effusions.        Electronically signed by Kaylynn Hdez MD on 2/22/2023 at 12:38 PM

## 2023-02-22 NOTE — PROGRESS NOTES
Patient seen this morning full note to follow patient condition improved after bronchoscopy. Correct potassium with dialysis today from renal standpoint stable for discharge and outpatient dialysis Monday Wednesday Friday at [facility].

## 2023-02-22 NOTE — PROGRESS NOTES
pulmonary      SUBJECTIVE:  seen early am and doing well     OBJECTIVE    VITALS:  /64   Pulse (!) 230   Temp 97.6 °F (36.4 °C)   Resp 17   Ht 6' 2.02\" (1.88 m)   Wt 281 lb 12 oz (127.8 kg)   SpO2 92%   BMI 36.16 kg/m²   HEAD AND FACE EXAM:  No throat injection, no active exudate,no thrush  NECK EXAM;No JVD, no masses, symmetrical  CHEST EXAM; Expansion equal and symmetrical, no masses  LUNG EXAM; Good breath sounds bilaterally. There are expiratory wheezes both lungs, there are crackles at both lung bases  CARDIOVASCULAR EXAM: Positive S1 and S2, no S3 or S4, no clicks ,no murmurs  RIGHT AND LEFT LOWER EXTRIMITY EXAM: No edema, no swelling, no inflamation  CNS EXAM: Alert and oriented X3          LABS   Lab Results   Component Value Date    WBC 6.0 02/22/2023    HGB 8.2 (L) 02/22/2023    HCT 26.1 (L) 02/22/2023    .0 (H) 02/22/2023     02/22/2023     Lab Results   Component Value Date    CREATININE 4.4 (H) 02/22/2023    BUN 50 (H) 02/22/2023     (L) 02/22/2023    K 5.2 (H) 02/22/2023    CL 97 (L) 02/22/2023    CO2 20 (L) 02/22/2023     Lab Results   Component Value Date    INR 1.21 02/17/2023    PROTIME 15.7 (H) 02/17/2023          Lab Results   Component Value Date/Time    PHOS 3.7 02/15/2023 03:15 AM    PHOS 2.1 02/14/2023 06:10 AM    PHOS 3.5 02/13/2023 04:54 AM      No results for input(s): PH, PO2ART, ETO9LVZ, HCO3, BEART, O2SAT in the last 72 hours.       Wt Readings from Last 3 Encounters:   02/22/23 281 lb 12 oz (127.8 kg)   02/17/23 289 lb 3.9 oz (131.2 kg)   02/08/23 260 lb (117.9 kg)               ASSESMENT  Ac on ch resp failure  Ac copd  Pneumonia  Pulm atx        PLAN  Cpm  Cont o2    2/22/2023  Merlyn Maurice MD, M.D.

## 2023-02-22 NOTE — OP NOTE
[address], [address] [facility]                                OPERATIVE REPORT    PATIENT NAME: John Church                    :        1950  MED REC NO:   [id number]                          ROOM:         ACCOUNT NO:   [de-identified]                           ADMIT DATE: 2023  PROVIDER:     Liane Oneill MD    DATE OF PROCEDURE:  2023    OPERATION PERFORMED:  Bronchoscopy with thorough pulmonary toilet and  bronchoalveolar lavage of both lungs. SURGEON:  Liane Oneill MD    ANESTHESIA USED:  Please see Anesthesia note. COMPLICATIONS:  None. BLOOD LOSS:  None, as no biopsies or brushing was done. INDICATION FOR THE PROCEDURE:  The patient with multiple areas of  pulmonary atelectasis secondary to mucous plugging. OPERATIVE PROCEDURE:  After all aseptic precaution and giving adequate  local anesthesia, bronchoscope was introduced through the tracheal tube. The lower part of the trachea was full of very thick secretions, which  were thoroughly aspirated. The patient's saturation dropped into the  upper 70s during the procedure, so we had to take the bronchoscope out  and when the saturation went up into the 90s, so it was reintroduced. There was small amount of secretion present in the left lung and there  was large amount of mucous plugs present in the right lung. A thorough  pulmonary toilet of both lungs was performed. There was marked  inflammation and swelling of the bronchi, possibly secondary to  inflammation or infection. After performing a thorough pulmonary  toilet, we also did bronchoalveolar lavage from both lungs. These  secretions will be sent for lab analysis. Solu-Medrol already added to  the patient's treatment and the patient is in stable condition at  present time. There is no blood loss.         Clinton Litten, MD    D: 2023 13:31:06       T: 2023 19:44:47     [email or web address]  Job#: [id number]     Doc#: [id number]    CC:

## 2023-02-22 NOTE — PROGRESS NOTES
Physical Therapy  Attempted to see patient this AM, patient out of room for dialysis. Attempted to see patient in PM, patient in special procedures. Will continue to follow and attempt tomorrow.    [address]olly [address] [place]  2/22/2023 3:24 PM

## 2023-02-22 NOTE — PROGRESS NOTES
Message sent to  per pt request - pt asking if you can come back to speak to him about what you said bedside. He currently feels \"you are being mean.  said I can have candy and eat today\" I educated you had explained he needed a speech eval first and I could not give him anything till after speech saw him. He has continued to insist I have it wrong and is requesting to speak with you again.

## 2023-02-22 NOTE — PROGRESS NOTES
[address] [facility]  consulted by Dr. Ledy Patton for monitoring and adjustment. Indication for treatment: Vancomycin indication: HAP  Goal trough: Trough Goal: 15-20 mcg/mL  Goal pre-HD: 21-24 mcg/mL    Risk Factors for MRSA Identified:   Hospitalization within the past 90 days, Received IV antibiotics within the past 90 days, Patient on hemodialysis    Pertinent Laboratory Values:   Temp Readings from Last 3 Encounters:   02/22/23 97.6 °F (36.4 °C)   02/17/23 97.5 °F (36.4 °C) (Oral)   01/05/23 98 °F (36.7 °C) (Oral)     Recent Labs     02/19/23  1756 02/20/23  0613 02/22/23  0653   WBC 8.4 7.9 6.0       Recent Labs     02/19/23  1756 02/20/23  0613 02/22/23  0653   BUN 53* 58* 50*   CREATININE 4.6* 5.3* 4.4*       Estimated Creatinine Clearance: 22 mL/min (A) (based on SCr of 4.4 mg/dL (H)). Intake/Output Summary (Last 24 hours) at 2/22/2023 1007  Last data filed at 2/21/2023 1835  Gross per 24 hour   Intake 60 ml   Output --   Net 60 ml         Pertinent Cultures:   Date    Source    Results  2/19               Sputum/Respiratory  Pending  2/20               MRSA Nasal   Pending    Vancomycin level:   TROUGH:  No results for input(s): VANCOTROUGH in the last 72 hours. RANDOM:    Recent Labs     02/20/23  0613 02/22/23  0627   VANCORANDOM 17.0 22.0         Assessment:  HPI: 68 y.o male with pmh of ESRD on HD, atrial fibrillation, diabetes, COPD, and GERD who presents with worsening shortness of breath and chest pain. Patient reported he has been having intermittent chest pain and shortness of breath for the past week. At presentation, patient's chest x-ray revealed pulmonary vascular congestion and pleural effusion. He also had an episode of hypoxia, where his oxygen level dropped to 70%, but it did return to 96%. Sputum culture and MRSA nares are ordered and pending.   SCr, BUN, and urine output: ESRD on HD  Dialyzes on Mondays, Wednesdays and Fridays  Day(s) of therapy: 4 of 7  Vancomycin concentration:  2/20 - 17, pre-HD, appropriate to re-dose post-HD  2/22 - 22.0, pre-HD  Plan:  Intermittent dosing based on levels due to ESRD/HD  Random level @ 22.0  No Vancomycin today  Repeat next level on Friday AM, prior to HD  Pharmacy will continue to monitor patient and adjust therapy as indicated    VANCOMYCIN CONCENTRATION SCHEDULED FOR 2/22 @0600    Thank you for the consult,  Diamond Escobar, [facility]  2/22/2023 10:07 AM

## 2023-02-22 NOTE — PROGRESS NOTES
Speech Language Pathology  Zee Gonzales  1950  [id number]      Attempted to see Zee Gonzales for a bedside swallowing evaluation 02/22/23. Deferred assessment- pt is currently out of the room for a procedure. Noted pt has significant hx of dysphagia with most recent modified barium swallow study completed 12/14/22, indicating severe pharyngeal dysphagia. It was recommended that pt remain NPO with nutrition and medication administrated through PEG-tube. SLP will re-attempt as schedule allows.       Mone Radford Garo [address], CCC-SLP, 02/22/23

## 2023-02-22 NOTE — PROCEDURES
PATIENT COMPLETED A 3 HOUR HD TREATMENT, 2.0L OF FLUID WAS REMOVED AS ORDERED. RIGHT TUNNELED CVC WAS USED FOR ACCESS WITH NO COMPLICATIONS. CVC DRESSING CHANGE COMPLETED THIS TREATMENT. POST TREATMENT LUMENS WERE FLUSHED AND CAPPED X2.  RETACRIT WAS GIVEN BY NURSE AS ORDERED. TREATMENT COMPLETED BY:  Ana Lincolneusebia De La Fuente      Patient Name: Jono Vega  Patient : 1950  MRN: [id number]     Acct: [de-identified]  Date of Admission: 2023  Room/Bed: -A  Code Status:  Full Code  Allergies: No Known Allergies  Diagnosis:    Patient Active Problem List   Diagnosis    Atrial fibrillation ([address]    CAD (coronary artery disease)    Morbid obesity ([address]    COPD (chronic obstructive pulmonary disease) ([address]    Sleep apnea    Type 2 diabetes mellitus ([address]    Thyroid disease    Hyperlipidemia    Acute congestive heart failure ([address]    Coronary artery disease involving native coronary artery of native heart with angina pectoris (HCC)    Chronic renal disease, stage III ([address] [132924]    Hypertension    CAD, multiple vessel    Dyspnea    Moderate malnutrition (HCC)    Pneumonia of both lungs due to infectious organism    Severe malnutrition (HCC)    Chronic respiratory failure with hypoxia (HCC)    Aspiration pneumonia of right lower lobe (HCC)    ESRD on dialysis ([address]    Aspiration pneumonia, unspecified aspiration pneumonia type, unspecified laterality, unspecified part of lung ([address]    Acute aspiration pneumonia ([address]    Acute on chronic respiratory failure with hypoxia ([address]         Treatment:  Hemodilaysis 2:1  Priority: Routine  Location: Acute Room    Diabetic: Yes  NPO: Yes  Isolation Precautions: Dialysis     Consent for Treatment Verified: Yes  Blood Consent Verified: Not Applicable     Safety Verified: Identify (I), Consent (C), Equipment (E), HepB Status (B), Orders Complete (O), Access Verified (A), and Timeliness (T)  Time out performed prior to access at 0805 hours.     Report Received from Primary RN at 0731 hours. Primary RN (First Initial, Last Name, Title): Shady Matthews RN  Incapacitated Nurse Education Completed: Yes     HBsAg ONLY:  Date Drawn: February 13, 2023       Results: Negative  HBsAb:  Date Drawn:  February 13, 2023       Results: Susceptible <10    Order  Dialysis Bath  K+ (Potassium): 3  Ca+ (Calcium): 2.5  Na+ (Sodium): 138  HCO3 (Bicarb): 30  Bicarbonate Concentrate Lot No.: [id number]  Acid Concentrate Lot No.: [id number]     Na+ Modeling: Not Applicable  Dialyzer: [id number]  Dialysate Temperature (C):  35  Blood Flow Rate (BFR):  300   Dialysate Flow Rate (DFR):   600        Access to be Utilized   Access:  Tunneled Catheter  Location: Internal Jugular  Side: Right       First Use X-ray Verified: Not Applicable  OK to use line order: Not Applicable    Site Assessment:  Signs and Symptoms of Infection/Inflammation: None  If yes: Not Applicable  Dressing: Dry and Intact  Site Prep: Medical Aseptic Technique  Dressing Changed this Treatment: Yes  If yes, by whom:  AJ SCHWAB  Date of Last Dressing Change: Not Applicable  Antimicrobial Patch in place?: Yes  BLUE Caps in place?: Yes  Gauze Dressing?: No  Non Dialysis Use?: No  Comment:    Flows: Good, Patent  If access problem, who was notified:     Pre and Post-Assessment  Patient Vitals for the past 8 hrs:   Level of Consciousness Heart Rhythm Respiratory Quality/Effort O2 Device Bilateral Breath Sounds Skin Condition/Temp Abdomen Inspection Bowel Sounds (All Quadrants) Edema Edema Generalized   02/22/23 0805 0 Regular Unlabored T-Piece Diminished Cool;Dry Gastrostomy Active Generalized Non-pitting;+1   02/22/23 1110 0 Regular Unlabored T-Piece Diminished Cool;Dry Gastrostomy Active Generalized Non-pitting;+1   02/22/23 1212 0 -- -- -- -- -- -- -- -- --   02/22/23 1219 -- -- -- T-Piece -- -- -- -- -- --   02/22/23 1226 0 -- Unlabored -- -- Cool;Dry Gastrostomy -- Generalized Non-pitting;+1     Labs  Recent Labs     02/19/23  7079 02/20/23  0613 02/22/23  0653   WBC 8.4 7.9 6.0   HGB 9.4* 8.4* 8.2*   HCT 29.7* 27.4* 26.1*    228 272                                                                  Recent Labs     02/19/23  1756 02/20/23  0613 02/22/23  0653   * 132* 134*   K 3.9 4.0 5.2*   CL 90* 95* 97*   CO2 27 25 20*   BUN 53* 58* 50*   CREATININE 4.6* 5.3* 4.4*   GLUCOSE 239* 181* 387*     IV Drips and Rate/Dose   sodium chloride      dextrose        Safety - Before each treatment:   Dialysis Machine No.: [id number]   Machine Number: [id number]  Dialyzer Lot No.: [id number]  RO Machine Log Sheet Completed: Yes  Machine Alarm Self Test: Completed; Passed (02/22/23 0805)     Air Foam Detector: Tested, Proper Function  Extracorporeal Circuit Tested for Integrity: Yes  Machine Conductivity: 13.9  Manual Conductivity: 14     Bicarbonate Concentrate Lot No.: [id number]  Acid Concentrate Lot No.: [id number]  Manual Ph: 7.4  Bleach Test (Neg): Yes  Bath Temperature: 95 °F (35 °C)  Tubing Lot#: [id number]  Conductivity Meter Serial #: [id number]  All Connections Secure?: Yes  Venous Parameters Set?: Yes  Arterial Parameters Set?: Yes  Saline Line Double Clamped?: Yes  Air Foam Detector Engaged?: Yes  Machine Functioning Alarm Free?  Yes  Prime Given: 200ml    Chlorine Testing - Before each treatment and every 4 hours:   Treatment  Treatment Number: 2  Time On: 0810  Time Off: 1110  Treatment Goal: 3 HOUR, 2.5L  Weight: 281 lb 12 oz (127.8 kg) (02/22/23 0805)  1st check: less than 0.1 ppm at: 0645 hours  2nd check: less than 0.1 ppm at: 1035 hours  3rd check: Not Applicable  (if greater than 0.1 ppm, then check every 30 minutes from secondary)    Access Flows and Pressures  Patient Vitals for the past 8 hrs:   Blood Flow Rate (ml/min) Ultrafiltration Rate (ml/hr) Arterial Pressure (mmHg) Venous Pressure (mmHg) TMP DFR Comments Access Visible   02/22/23 0810 300 ml/min 830 ml/hr -80 mmHg 110 40 600 TX STARTED, PRIME GIVEN, LINES SECURE AND CALL LIGHT WITHIN REACH Yes   02/22/23 0815 300 ml/min 830 ml/hr -80 mmHg 110 40 600 AWAKE AND ALERT Yes   02/22/23 0833 300 ml/min 830 ml/hr -80 mmHg 110 40 600 no complaints Yes   02/22/23 0850 300 ml/min 830 ml/hr -80 mmHg 110 50 600 resting quietly Yes   02/22/23 0900 300 ml/min 830 ml/hr -90 mmHg 120 40 600 WARM BLANKET GIVEN Yes   02/22/23 0915 300 ml/min 830 ml/hr -90 mmHg 120 40 600 AWAKE AND WATCHING TV Yes   02/22/23 0930 300 ml/min 830 ml/hr -90 mmHg 120 50 600 AWAKE AND WATCHING TV Yes   02/22/23 0945 300 ml/min 830 ml/hr -90 mmHg 120 50 600 denies needs Yes   02/22/23 1000 300 ml/min 830 ml/hr -100 mmHg 120 50 600 no complaints Yes   02/22/23 1015 300 ml/min 830 ml/hr -100 mmHg 120 50 600 alert Yes   02/22/23 1030 300 ml/min 830 ml/hr -100 mmHg 120 50 600 no changes Yes   02/22/23 1045 300 ml/min 830 ml/hr -100 mmHg 120 50 600 alert, calm Yes   02/22/23 1100 300 ml/min 830 ml/hr -100 mmHg 120 50 600 no distress Yes   02/22/23 1110 -- -- -- -- -- -- tx ended Yes     Vital Signs  Patient Vitals for the past 8 hrs:   BP Temp Pulse Resp SpO2 Weight Weight Method Percent Weight Change   02/22/23 0536 -- -- -- -- -- 281 lb 4.9 oz (127.6 kg) Bed scale 0   02/22/23 0715 (!) 149/76 -- 94 16 92 % -- -- --   02/22/23 0805 138/64 97.6 °F (36.4 °C) 84 16 94 % 281 lb 12 oz (127.8 kg) Bed scale 0.16   02/22/23 0810 (!) 146/71 -- 82 19 94 % -- -- --   02/22/23 0815 (!) 141/72 -- 83 16 94 % -- -- --   02/22/23 0833 132/63 -- 76 -- -- -- -- --   02/22/23 0850 117/66 -- 81 -- -- -- -- --   02/22/23 0900 132/74 -- (!) 105 17 91 % -- -- --   02/22/23 0915 (!) 142/70 -- (!) 102 18 93 % -- -- --   02/22/23 0930 (!) 142/79 -- 95 16 92 % -- -- --   02/22/23 0945 136/75 -- 98 17 92 % -- -- --   02/22/23 1000 138/80 -- (!) 110 17 92 % -- -- --   02/22/23 1015 124/78 -- (!) 109 -- -- -- -- --   02/22/23 1030 136/70 -- (!) 115 -- -- -- -- --   02/22/23 1045 128/79 -- (!) 110 -- -- -- -- --   02/22/23 1100 131/64 -- (!) 130 -- -- -- -- -- 02/22/23 1110 133/64 97.6 °F (36.4 °C) (!) 109 17 92 % -- -- --   02/22/23 1212 (!) 144/76 98.1 °F (36.7 °C) (!) 106 23 92 % -- -- --   02/22/23 1219 -- -- -- -- 93 % -- -- --   02/22/23 1224 -- -- (!) 108 19 93 % -- -- --     Post-Dialysis  Arterial Catheter Locking Solution:  1.9 ML NS  Venous Catheter Locking Solution:  1.9 ML NS  Post-Treatment Procedures: Blood returned, Catheter Capped, clamped with Saline x2 ports  Machine Disinfection Process: Heat Disinfect, Exterior Machine Disinfection  Rinseback Volume (ml): 300 ml  Blood Volume Processed (Liters): 52 l/min  Dialyzer Clearance: Moderately streaked  Duration of Treatment (minutes): 180 minutes     Hemodialysis Intake (ml): 500 ml  Hemodialysis Output (ml): 2500 ml     Tolerated Treatment: Good  Patient Response to Treatment: well  Physician Notified: No       Provider Notification        Handoff complete and report given to Primary RN at 1128 hours.   Primary RN (First Initial, Last Name, Title):  Lissa Joshi RN     Education  Person Educated: Patient   Knowledge Base: Substantial  Barriers to Learning?: None  Preferred method of Learning: Oral  Topic(s): Access Care and Procedural   Teaching Tools: Explanation   Response to Education: Verbalized Understanding     Electronically signed by Sergio Hernandez RN on 2/22/2023 at 12:46 PM

## 2023-02-22 NOTE — PROGRESS NOTES
Nephrology Progress Note  2/22/2023 3:42 PM  Subjective: Interval History: David Christensen is a 68 y.o. male tired weak but overall doing better after bronchoscopy and thoracentesis today is dialysis as well        Data:   Scheduled Meds:   metoprolol tartrate  25 mg Per G Tube BID    methylPREDNISolone  40 mg IntraVENous Q12H    sodium chloride flush  5-40 mL IntraVENous 2 times per day    aspirin  81 mg PEG Tube Daily    atorvastatin  40 mg PEG Tube Nightly    insulin glargine  15 Units SubCUTAneous Nightly    lansoprazole  30 mg Per G Tube QAM AC    levothyroxine  100 mcg Per G Tube Daily    midodrine  5 mg PEG Tube TID WC    polyethylene glycol  17 g Oral BID    QUEtiapine  50 mg Per G Tube Nightly    insulin lispro  0-8 Units SubCUTAneous TID WC    insulin lispro  0-4 Units SubCUTAneous Nightly    heparin (porcine)  5,000 Units SubCUTAneous 3 times per day    ipratropium-albuterol  1 ampule Inhalation Q4H WA    cefepime  1,000 mg IntraVENous Q24H    vancomycin (VANCOCIN) intermittent dosing (placeholder)   Other RX Placeholder     Continuous Infusions:   sodium chloride      dextrose           CBC   Recent Labs     02/19/23 1756 02/20/23  0613 02/22/23  0653   WBC 8.4 7.9 6.0   HGB 9.4* 8.4* 8.2*   HCT 29.7* 27.4* 26.1*    228 272      BMP   Recent Labs     02/19/23 1756 02/20/23  0613 02/22/23  0653   * 132* 134*   K 3.9 4.0 5.2*   CL 90* 95* 97*   CO2 27 25 20*   BUN 53* 58* 50*   CREATININE 4.6* 5.3* 4.4*     Hepatic:   Recent Labs     02/19/23 1756   AST 14*   ALT 11   BILITOT 0.3   ALKPHOS 213*     Troponin: No results for input(s): TROPONINI in the last 72 hours. BNP: No results for input(s): BNP in the last 72 hours. Lipids: No results for input(s): CHOL, HDL in the last 72 hours.     Invalid input(s): LDLCALCU  ABGs:   Lab Results   Component Value Date/Time    PO2ART 70.3 09/24/2022 11:49 AM    IBB0YOO 35.5 09/24/2022 11:49 AM     INR: No results for input(s): INR in the last 72 hours.  Renal Labs  Albumin:    Lab Results   Component Value Date/Time    LABALBU 3.6 02/19/2023 05:56 PM     Calcium:    Lab Results   Component Value Date/Time    CALCIUM 8.7 02/22/2023 06:53 AM     Phosphorus:    Lab Results   Component Value Date/Time    PHOS 3.7 02/15/2023 03:15 AM     U/A:    Lab Results   Component Value Date/Time    NITRU NEGATIVE 02/13/2023 12:15 AM    COLORU YELLOW 02/13/2023 12:15 AM    PHUR 6.5 06/30/2021 02:11 PM    WBCUA 67 02/13/2023 12:15 AM    RBCUA 37 02/13/2023 12:15 AM    MUCUS RARE 09/17/2022 06:21 PM    TRICHOMONAS NONE SEEN 02/13/2023 12:15 AM    BACTERIA NEGATIVE 02/13/2023 12:15 AM    CLARITYU CLEAR 02/13/2023 12:15 AM    SPECGRAV 1.025 02/13/2023 12:15 AM    UROBILINOGEN 0.2 02/13/2023 12:15 AM    BILIRUBINUR SMALL NUMBER OR AMOUNT OBSERVED 02/13/2023 12:15 AM    BILIRUBINUR small 06/30/2021 02:11 PM    BLOODU MODERATE NUMBER OR AMOUNT OBSERVED 02/13/2023 12:15 AM    GLUCOSEU neg 06/30/2021 02:11 PM    KETUA TRACE 02/13/2023 12:15 AM     ABG:    Lab Results   Component Value Date/Time    WNU2FSB 35.5 09/24/2022 11:49 AM    PO2ART 70.3 09/24/2022 11:49 AM    HWP7JSM 21.2 09/24/2022 11:49 AM     HgBA1c:    Lab Results   Component Value Date/Time    LABA1C 7.0 02/12/2023 10:53 PM     Microalbumen/Creatinine ratio:  No components found for: RUCREAT  TSH:    Lab Results   Component Value Date/Time    TSH 1.66 04/03/2018 11:33 AM     IRON:    Lab Results   Component Value Date/Time    IRON 60 02/13/2023 10:39 AM     Iron Saturation:  No components found for: PERCENTFE  TIBC:    Lab Results   Component Value Date/Time    TIBC 229 02/13/2023 10:39 AM     FERRITIN:    Lab Results   Component Value Date/Time    FERRITIN 1,063 02/13/2023 10:39 AM     RPR:  No results found for: RPR  YAYA:  No results found for: ANATITER, YAYA  24 Hour Urine for Creatinine Clearance:  No components found for: CREAT4, UHRS10, UTV10      Objective:   I/O: 02/21 0701 - 02/22 0700  In: 120   Out: -   I/O last 3 completed shifts: In: 120 [NG/GT:120]  Out: -   I/O this shift:  In: 1828 [NG/GT:1148]  Out: 3471 [Urine:100; Other:1300; Blood:1]  Vitals: /61   Pulse 96   Temp 98.1 °F (36.7 °C)   Resp 18   Ht 6' 2.02\" (1.88 m)   Wt 281 lb 12 oz (127.8 kg)   SpO2 96%   BMI 36.16 kg/m²  {  General appearance: awake weak  HEENT: Head: Normal, normocephalic, atraumatic.   Neck: Positive trach  Lungs: diminished breath sounds bilaterally  Heart: S1, S2 normal  Abdomen: abnormal findings:  soft nt positive PEG tube  Extremities: edema trace  Neurologic: Mental status: alertness: alert        Assessment and Plan:      IMP:  #1 end-stage renal disease on dialysis Monday Wednesday Friday  #2 tracheostomy with recurrent congestion and secretion  #3 possible aspiration pneumonia  4 protein malnutrition  #5 hyponatremia  #6 increased troponin  #7 anemia    Plan     #1 doing dialysis today maintain current schedule Monday Wednesday Friday  #2 status post bronchoscopy doing thoracentesis well monitor O2  #3 careful oral nutrition maintain the tube feeds  #4 sodium potassium stable dialysis  #5 medical management coronary disease  #6 maintain EPO  Supportive care hopeful can discharge to nursing home tomorrow         Vargas Carolina MD, MD

## 2023-02-23 LAB
GLUCOSE BLD-MCNC: 244 MG/DL (ref 70–99)
GLUCOSE BLD-MCNC: 294 MG/DL (ref 70–99)
GLUCOSE BLD-MCNC: 407 MG/DL (ref 70–99)
GLUCOSE BLD-MCNC: 452 MG/DL (ref 70–99)
GLUCOSE BLD-MCNC: 470 MG/DL (ref 70–99)

## 2023-02-23 PROCEDURE — 2700000000 HC OXYGEN THERAPY PER DAY

## 2023-02-23 PROCEDURE — 94640 AIRWAY INHALATION TREATMENT: CPT

## 2023-02-23 PROCEDURE — 6370000000 HC RX 637 (ALT 250 FOR IP): Performed by: INTERNAL MEDICINE

## 2023-02-23 PROCEDURE — 97530 THERAPEUTIC ACTIVITIES: CPT

## 2023-02-23 PROCEDURE — 6360000002 HC RX W HCPCS: Performed by: INTERNAL MEDICINE

## 2023-02-23 PROCEDURE — 2060000000 HC ICU INTERMEDIATE R&B

## 2023-02-23 PROCEDURE — 6370000000 HC RX 637 (ALT 250 FOR IP): Performed by: STUDENT IN AN ORGANIZED HEALTH CARE EDUCATION/TRAINING PROGRAM

## 2023-02-23 PROCEDURE — 2580000003 HC RX 258: Performed by: INTERNAL MEDICINE

## 2023-02-23 PROCEDURE — 94761 N-INVAS EAR/PLS OXIMETRY MLT: CPT

## 2023-02-23 PROCEDURE — 82962 GLUCOSE BLOOD TEST: CPT

## 2023-02-23 PROCEDURE — 89220 SPUTUM SPECIMEN COLLECTION: CPT

## 2023-02-23 PROCEDURE — 92610 EVALUATE SWALLOWING FUNCTION: CPT

## 2023-02-23 RX ORDER — INSULIN LISPRO 100 [IU]/ML
0-4 INJECTION, SOLUTION INTRAVENOUS; SUBCUTANEOUS NIGHTLY
Status: DISCONTINUED | OUTPATIENT
Start: 2023-02-23 | End: 2023-02-24 | Stop reason: HOSPADM

## 2023-02-23 RX ORDER — INSULIN LISPRO 100 [IU]/ML
4 INJECTION, SOLUTION INTRAVENOUS; SUBCUTANEOUS ONCE
Status: COMPLETED | OUTPATIENT
Start: 2023-02-23 | End: 2023-02-23

## 2023-02-23 RX ORDER — METHYLPREDNISOLONE SODIUM SUCCINATE 40 MG/ML
40 INJECTION, POWDER, LYOPHILIZED, FOR SOLUTION INTRAMUSCULAR; INTRAVENOUS DAILY
Status: DISCONTINUED | OUTPATIENT
Start: 2023-02-24 | End: 2023-02-24

## 2023-02-23 RX ORDER — INSULIN LISPRO 100 [IU]/ML
0-16 INJECTION, SOLUTION INTRAVENOUS; SUBCUTANEOUS
Status: DISCONTINUED | OUTPATIENT
Start: 2023-02-23 | End: 2023-02-24 | Stop reason: HOSPADM

## 2023-02-23 RX ORDER — INSULIN LISPRO 100 [IU]/ML
4 INJECTION, SOLUTION INTRAVENOUS; SUBCUTANEOUS ONCE
Status: DISCONTINUED | OUTPATIENT
Start: 2023-02-23 | End: 2023-02-23

## 2023-02-23 RX ORDER — INSULIN GLARGINE 100 [IU]/ML
25 INJECTION, SOLUTION SUBCUTANEOUS NIGHTLY
Status: DISCONTINUED | OUTPATIENT
Start: 2023-02-23 | End: 2023-02-24 | Stop reason: HOSPADM

## 2023-02-23 RX ORDER — INSULIN GLARGINE 100 [IU]/ML
10 INJECTION, SOLUTION SUBCUTANEOUS ONCE
Status: COMPLETED | OUTPATIENT
Start: 2023-02-23 | End: 2023-02-23

## 2023-02-23 RX ORDER — INSULIN LISPRO 100 [IU]/ML
10 INJECTION, SOLUTION INTRAVENOUS; SUBCUTANEOUS
Status: DISCONTINUED | OUTPATIENT
Start: 2023-02-23 | End: 2023-02-24 | Stop reason: HOSPADM

## 2023-02-23 RX ADMIN — INSULIN LISPRO 10 UNITS: 100 INJECTION, SOLUTION INTRAVENOUS; SUBCUTANEOUS at 13:57

## 2023-02-23 RX ADMIN — METOPROLOL TARTRATE 25 MG: 25 TABLET, FILM COATED ORAL at 08:28

## 2023-02-23 RX ADMIN — INSULIN GLARGINE 10 UNITS: 100 INJECTION, SOLUTION SUBCUTANEOUS at 08:42

## 2023-02-23 RX ADMIN — IPRATROPIUM BROMIDE AND ALBUTEROL SULFATE 1 AMPULE: 2.5; .5 SOLUTION RESPIRATORY (INHALATION) at 11:21

## 2023-02-23 RX ADMIN — INSULIN GLARGINE 25 UNITS: 100 INJECTION, SOLUTION SUBCUTANEOUS at 21:24

## 2023-02-23 RX ADMIN — HEPARIN SODIUM 5000 UNITS: 5000 INJECTION INTRAVENOUS; SUBCUTANEOUS at 13:57

## 2023-02-23 RX ADMIN — POLYETHYLENE GLYCOL 3350 17 G: 17 POWDER, FOR SOLUTION ORAL at 08:28

## 2023-02-23 RX ADMIN — METHYLPREDNISOLONE SODIUM SUCCINATE 40 MG: 40 INJECTION, POWDER, FOR SOLUTION INTRAMUSCULAR; INTRAVENOUS at 02:55

## 2023-02-23 RX ADMIN — INSULIN LISPRO 10 UNITS: 100 INJECTION, SOLUTION INTRAVENOUS; SUBCUTANEOUS at 17:44

## 2023-02-23 RX ADMIN — SODIUM CHLORIDE, PRESERVATIVE FREE 10 ML: 5 INJECTION INTRAVENOUS at 08:29

## 2023-02-23 RX ADMIN — IPRATROPIUM BROMIDE AND ALBUTEROL SULFATE 1 AMPULE: 2.5; .5 SOLUTION RESPIRATORY (INHALATION) at 07:34

## 2023-02-23 RX ADMIN — METOPROLOL TARTRATE 25 MG: 25 TABLET, FILM COATED ORAL at 21:24

## 2023-02-23 RX ADMIN — IPRATROPIUM BROMIDE AND ALBUTEROL SULFATE 1 AMPULE: 2.5; .5 SOLUTION RESPIRATORY (INHALATION) at 15:26

## 2023-02-23 RX ADMIN — HEPARIN SODIUM 5000 UNITS: 5000 INJECTION INTRAVENOUS; SUBCUTANEOUS at 21:24

## 2023-02-23 RX ADMIN — IPRATROPIUM BROMIDE AND ALBUTEROL SULFATE 1 AMPULE: 2.5; .5 SOLUTION RESPIRATORY (INHALATION) at 20:16

## 2023-02-23 RX ADMIN — SODIUM CHLORIDE, PRESERVATIVE FREE 10 ML: 5 INJECTION INTRAVENOUS at 21:14

## 2023-02-23 RX ADMIN — LEVOTHYROXINE SODIUM 100 MCG: 0.1 TABLET ORAL at 08:28

## 2023-02-23 RX ADMIN — ATORVASTATIN CALCIUM 40 MG: 40 TABLET, FILM COATED ORAL at 21:24

## 2023-02-23 RX ADMIN — INSULIN LISPRO 4 UNITS: 100 INJECTION, SOLUTION INTRAVENOUS; SUBCUTANEOUS at 08:42

## 2023-02-23 RX ADMIN — INSULIN LISPRO 16 UNITS: 100 INJECTION, SOLUTION INTRAVENOUS; SUBCUTANEOUS at 08:42

## 2023-02-23 RX ADMIN — HEPARIN SODIUM 5000 UNITS: 5000 INJECTION INTRAVENOUS; SUBCUTANEOUS at 04:58

## 2023-02-23 RX ADMIN — QUETIAPINE FUMARATE 50 MG: 25 TABLET ORAL at 21:24

## 2023-02-23 RX ADMIN — ASPIRIN 81 MG 81 MG: 81 TABLET ORAL at 08:28

## 2023-02-23 RX ADMIN — INSULIN LISPRO 8 UNITS: 100 INJECTION, SOLUTION INTRAVENOUS; SUBCUTANEOUS at 17:43

## 2023-02-23 RX ADMIN — INSULIN LISPRO 16 UNITS: 100 INJECTION, SOLUTION INTRAVENOUS; SUBCUTANEOUS at 12:51

## 2023-02-23 RX ADMIN — LANSOPRAZOLE 30 MG: KIT at 08:28

## 2023-02-23 NOTE — PROGRESS NOTES
pulmonary      SUBJECTIVE:  improving and doing better     OBJECTIVE    VITALS:  BP (!) 145/59   Pulse 85   Temp 98.2 °F (36.8 °C)   Resp 22   Ht 6' 2.02\" (1.88 m)   Wt 266 lb (120.7 kg)   SpO2 99%   BMI 34.14 kg/m²   HEAD AND FACE EXAM:  No throat injection, no active exudate,no thrush  NECK EXAM;No JVD, no masses, symmetrical  CHEST EXAM; Expansion equal and symmetrical, no masses  LUNG EXAM; Good breath sounds bilaterally. There are expiratory wheezes both lungs, there are crackles at both lung bases  CARDIOVASCULAR EXAM: Positive S1 and S2, no S3 or S4, no clicks ,no murmurs  RIGHT AND LEFT LOWER EXTRIMITY EXAM: No edema, no swelling, no inflamation  CNS EXAM: Alert and oriented X3          LABS   Lab Results   Component Value Date    WBC 6.0 02/22/2023    HGB 8.2 (L) 02/22/2023    HCT 26.1 (L) 02/22/2023    .0 (H) 02/22/2023     02/22/2023     Lab Results   Component Value Date    CREATININE 4.4 (H) 02/22/2023    BUN 50 (H) 02/22/2023     (L) 02/22/2023    K 5.2 (H) 02/22/2023    CL 97 (L) 02/22/2023    CO2 20 (L) 02/22/2023     Lab Results   Component Value Date    INR 1.21 02/17/2023    PROTIME 15.7 (H) 02/17/2023          Lab Results   Component Value Date/Time    PHOS 3.7 02/15/2023 03:15 AM    PHOS 2.1 02/14/2023 06:10 AM    PHOS 3.5 02/13/2023 04:54 AM      No results for input(s): PH, PO2ART, PLF7COB, HCO3, BEART, O2SAT in the last 72 hours.       Wt Readings from Last 3 Encounters:   02/23/23 266 lb (120.7 kg)   02/17/23 289 lb 3.9 oz (131.2 kg)   02/08/23 260 lb (117.9 kg)               ASSESMENT  Ac on ch resp failure  Ac copd  Pulm atx  Pneumonia  [address] effusion        PLAN  Antibx  Bd rx  O2 adm    2/23/2023  Derrek Avendano MD, M.D.

## 2023-02-23 NOTE — PROGRESS NOTES
Occupational Therapy    Occupational Therapy Treatment Note    Name: Chapis Maldonado MRN: [id number] :   1950   Date:  2023   Admission Date: 2023 Room:  -A     Primary Problem:  The primary encounter diagnosis was Chest pain, unspecified type. Diagnoses of Acute on chronic respiratory failure with hypoxia (HCC), Acute pleurisy without pleural effusion, Pulmonary vascular congestion, and Elevated troponin were also pertinent to this visit. Restrictions/Precautions:          General Precautions, Fall Risk    Communication with other providers: TUSHAR Cornejo, [facility]     Subjective:  Patient states:  \" My scrotum hurts. \"    Pain:   Location, Type, Intensity (0/10 to 10/10):  reports discomfort in scrotum, did not rate. Reports discomfort with trach with rolling/attempting to sit EOB. Objective:    Observation: Pt in semi-elder's, agreeable to session. Objective Measures:  Tele, Trach+Suction, PEG tube. O2 fluctuated with movement including 100% at rest, 78-80's with bed mobility, rebounds with 97% at rest, mid 80's and recovered to 100%. Treatment, including education:  Therapeutic Activity Training:   Therapeutic activity training was instructed today. Cues were given for safety, sequence, UE/LE placement, awareness, and balance. Activities performed today included bed mobility training, sup-sit. Rolling R/L with Max A X2 with cues for UE placement and technique with pt requiring Mod A to sustain roll to manage pad, pt requested to return supine after d/t poor tolerance. Supine to Sit with HOB raised with Max A x2 and max cues for trunk control d/t pt attempting to lay trunk back onto [facility]. Pt tolerated for ~2 min and started laying back down. Max A X2 for Sit to Supine. Pt required Dep Assist X2 to scoot up/reposition in bed for multiple reps d/t pt frequently repositioning himself. Pt positioned with HOB raised, lines managed, and all needs met.  Handoff to TUSHAR Mercer at end of session. Assessment / Impression:    Patient's tolerance of treatment: Fair  Adverse Reaction: o2 fluctuating with bed mobility   Significant change in status and impact: none   Barriers to improvement: Strength, activity tolerance, medical status       Plan for Next Session:    Cont OT POC    Time in:  1315  Time out:  1338  Timed treatment minutes:  18  Total treatment time:  23      Electronically signed by:     JAVY Croft,   2/23/2023, 2:50 PM

## 2023-02-23 NOTE — CARE COORDINATION
CM reviewed chart and discussed with attending physician. Pt is not medically ready today. Possible discharge tomorrow.

## 2023-02-23 NOTE — PLAN OF CARE
Problem: Musculoskeletal - Adult  Goal: Return mobility to safest level of function  Outcome: Not Progressing     Problem: Discharge Planning  Goal: Discharge to home or other facility with appropriate resources  Outcome: Progressing     Problem: Safety - Adult  Goal: Free from fall injury  Outcome: Progressing     Problem: Nutrition Deficit:  Goal: Optimize nutritional status  Outcome: Progressing     Problem: Chronic Conditions and Co-morbidities  Goal: Patient's chronic conditions and co-morbidity symptoms are monitored and maintained or improved  Outcome: Progressing     Problem: Neurosensory - Adult  Goal: Achieves stable or improved neurological status  Outcome: Progressing  Goal: Achieves maximal functionality and self care  Outcome: Progressing     Problem: Respiratory - Adult  Goal: Achieves optimal ventilation and oxygenation  Outcome: Progressing     Problem: Cardiovascular - Adult  Goal: Maintains optimal cardiac output and hemodynamic stability  Outcome: Progressing  Goal: Absence of cardiac dysrhythmias or at baseline  Outcome: Progressing     Problem: Musculoskeletal - Adult  Goal: Maintain proper alignment of affected body part  Outcome: Progressing  Goal: Return ADL status to a safe level of function  Outcome: Progressing     Problem: Gastrointestinal - Adult  Goal: Minimal or absence of nausea and vomiting  Outcome: Progressing  Goal: Maintains or returns to baseline bowel function  Outcome: Progressing  Goal: Maintains adequate nutritional intake  Outcome: Progressing     Problem: Infection - Adult  Goal: Absence of infection at discharge  Outcome: Progressing  Goal: Absence of infection during hospitalization  Outcome: Progressing  Goal: Absence of fever/infection during anticipated neutropenic period  Outcome: Progressing     Problem: Metabolic/Fluid and Electrolytes - Adult  Goal: Electrolytes maintained within normal limits  Outcome: Progressing  Goal: Hemodynamic stability and optimal renal function maintained  Outcome: Progressing  Goal: Glucose maintained within prescribed range  Outcome: Progressing     Problem: Hematologic - Adult  Goal: Maintains hematologic stability  Outcome: Progressing     Problem: Pain  Goal: Verbalizes/displays adequate comfort level or baseline comfort level  Outcome: Progressing     Problem: Musculoskeletal - Adult  Goal: Return mobility to safest level of function  Outcome: Not Progressing

## 2023-02-23 NOTE — PROGRESS NOTES
[id number] [facility]  consulted by Dr. Yamilex Rosales for monitoring and adjustment. Indication for treatment: Vancomycin indication: HAP  Goal trough: Trough Goal: 15-20 mcg/mL  Goal pre-HD: 21-24 mcg/mL    Risk Factors for MRSA Identified:   Hospitalization within the past 90 days, Received IV antibiotics within the past 90 days, Patient on hemodialysis    Pertinent Laboratory Values:   Temp Readings from Last 3 Encounters:   02/23/23 98.2 °F (36.8 °C)   02/17/23 97.5 °F (36.4 °C) (Oral)   01/05/23 98 °F (36.7 °C) (Oral)     Recent Labs     02/22/23  0653   WBC 6.0     Recent Labs     02/22/23  0653   BUN 50*   CREATININE 4.4*     Estimated Creatinine Clearance: 21 mL/min (A) (based on SCr of 4.4 mg/dL (H)). Intake/Output Summary (Last 24 hours) at 2/23/2023 0939  Last data filed at 2/23/2023 0645  Gross per 24 hour   Intake 1718 ml   Output 3901 ml   Net -2183 ml       Pertinent Cultures:   Date    Source    Results  2/19               Sputum/Respiratory  Gram + Bacilli  2/20               MRSA Nasal   Pending    Vancomycin level:   TROUGH:  No results for input(s): VANCOTROUGH in the last 72 hours. RANDOM:    Recent Labs     02/22/23  0627   VANCORANDOM 22.0       Assessment:  HPI: 68 y.o male with pmh of ESRD on HD, atrial fibrillation, diabetes, COPD, and GERD who presents with worsening shortness of breath and chest pain. Patient reported he has been having intermittent chest pain and shortness of breath for the past week. At presentation, patient's chest x-ray revealed pulmonary vascular congestion and pleural effusion. He also had an episode of hypoxia, where his oxygen level dropped to 70%, but it did return to 96%. Sputum culture and MRSA nares are ordered and pending.   SCr, BUN, and urine output: ESRD on HD  Dialyzes on Mondays, Wednesdays and Fridays  Day(s) of therapy: 5 of 7  Vancomycin concentration:  2/20 - 17, pre-HD, appropriate to re-dose post-HD  2/22 - 22.0, pre-HD  Plan:  Intermittent dosing based on levels due to ESRD/HD  No Vancomycin today  Repeat next level on Friday AM, prior to HD  Pharmacy will continue to monitor patient and adjust therapy as indicated    VANCOMYCIN CONCENTRATION SCHEDULED FOR 2/24 @0600    Thank you for the consult,  Elinor Johnson, [facility]  2/23/2023 9:39 AM

## 2023-02-23 NOTE — PROGRESS NOTES
Physical Therapy  Name: Savanah Garcia MRN: [id number] :   1950   Date:  2023   Admission Date: 2023 Room:  -A   Restrictions/Precautions:        Trach+suction, IV, tele, PEG tube, on 02, fall risk, sacral wound, general    Communication with other providers:  Luis Ibarra RN is notified that the patient is desating with bed mobility today to SpO2 in the 70's. Cotx with Angela Betancurer for safety with transfers and for patient tolerance with rehab    Subjective:  Patient states:  Patient is agreeable for rehab today  Pain:   Location, Type, Intensity (0/10 to 10/10): Patient c/o trach pain with positioning changes stating \"It's pulling\" patient trach supported throughout session. Also, patient c/o scrotum pain with seated posture and at rest in the bed. Unable to alleviate this discomfort with positional readjustment     Objective:    Observation:  Patient is supine on bed on 6L of tach O2 with SpO2 at rest 100%. However, with rolling patient desats to 70-80's, face red, speech restricted. Patient recovers when placed into supine. Patient in supine with Hob flat, SpO2 80's. Seated SpO2 85-88%, face becomes red. Patinet returns to supine with recovery to 100% SpO2. RN notified    Treatment, including education/measures:    Transfers with line management of Tele Monitor, Trach, Peg feed, Pulse Ox  Rolling: Patient needs Max A x 2 for roll to ea side. HE is able to sustain lying with Mod A. He has poor tolerance with side lying today  Supine to sit : with HOB fully elevated with Max A x 2 to pivot patient BLE and hips around to EOB with assist for trunk support   Seated tolerance : patient Needs Mod-Max A 1-2 for upright posture. He  Sit to supine :Max A x 2 for trunk guidance and BLE on to bed  Scooting :Supine scooting Dep x 2 with bed in trendelenburg and again with bed zero's    Safety  Patient left safely in the bed, with call light/phone in reach with alarm applied.  Gait belt and mask were used for transfers and gait. Assessment / Impression:   Patient needs added time and education on sequencing and tolerating today's mobility. He desats when being flat in supine and with rolling   Patient's tolerance of treatment:  Fair   Adverse Reaction: SpO2 desat  Significant change in status and impact:  decrease tolerance with mobility  Barriers to improvement:  medical status, strength, endruance    Plan for Next Session:    Will cont to work towards pt's goals per patient tolerance  Time in:  1315  Time out:  1338  Timed treatment minutes:  23  Total treatment time:  23  Previously filed items:  Social/Functional History  Lives With: Other (comment) ([facility])  ADL Assistance: Needs assistance  Active : No  Occupation: Retired  Short Term Goals  Time Frame for Short Term Goals: 1 week  Short Term Goal 1: Pt will tolerate rolling with Max x2 to side-lying x2 min for skin health. Short Term Goal 2: Pt will tolerate sup>sit with Max A x2 from [facility] raised  Short Term Goal 3: Pt will tolerate seated EOB with Max A x5'  Short Term Goal 4: Pt will complete LE TherEx in seated x15 ea with Min A       Electronically signed by:     Jimena Henderson PTA  2/23/2023, 2:39 PM

## 2023-02-23 NOTE — PROGRESS NOTES
V2.0  [facility] Hospitalist Progress Note      Name:  Gian Vergara /Age/Sex: 1950  (68 y.o. male)   MRN & CSN:  [id number] & [id number] Encounter Date/Time: 2023 7:40 AM EST    Location:  -A PCP: ANKITA Swenson - [address] [facility] Day: 5    Assessment and Plan:   Gian Vergara is a 68 y.o. male with pmh of  coronary artery disease s/p CABG complicated by cardiogenic shock, respiratory failure, s/p tracheostomy, PEG tube placement, recurrent admissions for respiratory failure since CABG, chronic hypotension, diabetes mellitus type 2, on long-term insulin, hypothyroidism, GERD, ESRD on HD who presents with Acute on chronic respiratory failure with hypoxia ([facility] [address]      Plan:  #. Acute on chronic respiratory failure with hypoxia  -Patient was recently discharged on 6 L through trach, currently on 6 L, improved  -Continue bronchodilator therapy,   -CT chest-moderate layering bilateral pleural effusions, worsening area of atelectasis, complete collapse of the right lower lobe with moderate right upper lobe and right middle lobe atelectasis. Moderate left lower lobe and mid left upper lobe atelectasis. -Bronchoscopy done on  which showed multiple areas of pulmonary atelectasis secondary to mucous plugging. #.  Probable HAP  -Patient has copious amount of secretions from his trach  -Sputum culture normal respiratory amy BAL pending  -Will discontinue Abx as no clear evidence of infection     #. Patient has recurrent admissions secondary to respiratory failure. -Recent admission -2023     #. Coronary artery disease s/p CABG-2022  -History of PTCA with [facility] to LAD  -Patient was [facility] after [facility]G for cardiogenic shock.  -Patient is on metoprolol tartrate, atorvastatin     #. Patient had tracheostomy (10/12/2022) and PEG tube placement 10/19/2022) at [facility]  -Currently at [facility] for rehab.      #.  ESRD on HD  -Consult nephrology HD on      #. COPD  -On DuoNeb 4 times daily     #. Persistent atrial fibrillation s/p maze procedure-9/2022  -Patient is on amiodarone, Coumadin on hold will continue to hold if patient needs bronchoscopy     #. Chronic hypotension-currently on midodrine. #.  Diabetes mellitus type 2, on long-term insulin  -Patient is on glargine 25 units nightly     #. Hypothyroidism patient on levothyroxine 100 mcg     #. GERD-on lansoprazole     #. History of DVT-right femoral vein  -Patient on anticoagulation     #. Insomnia-on quetiapine, trazodone     #. History of right ankle fracture s/p ORIF-6/2019     #. Morbid obesity with BMI 34.87. Consult speech therapy for swallow evaluation. Diet Diet NPO  ADULT TUBE FEEDING; PEG; Renal Formula; Continuous; 10; Yes; 10; Q 6 hours; 60; 30; Q 4 hours   DVT Prophylaxis [] Lovenox, []  Heparin, [x] SCDs, [] Ambulation,  [] Eliquis, [] Xarelto  [] Coumadin   Code Status Full Code   Disposition From: LTAC  Expected Disposition: Same  Estimated Date of Discharge: 3 days  Patient requires continued admission due to respiratory failure   Surrogate Decision Maker/ POA      Subjective:     Chief Complaint: Chest Pain (Pressure all over ) and Shortness of Breath (Newly placed on 3L of oxygen )       Ora Cardoso is a 68 y.o. male who presents with worsening breathing difficulties. .  Seen and examined at bedside he is awake and alert today awaiting swallow eval, wants to go back home or different nursing home, discussed with him hat he needs to go to [facility] and from there Dr. Herlinda Hunt will try to get him to some facility in [facility]    Review of Systems:    Review of Systems unable to obtain extensive ROS due to mentation      Objective:      Intake/Output Summary (Last 24 hours) at 2/23/2023 0746  Last data filed at 2/22/2023 2000  Gross per 24 hour   Intake 1708 ml   Output 3901 ml   Net -2193 ml          Vitals:   Vitals:    02/23/23 0737   BP:    Pulse: 83   Resp: 13   Temp:    SpO2: 96%       Physical Exam:   Physical Exam GEN  -Awake, alert. EYES   -PERRL. HENT  -MM are, trach collar in place, right IJ TDC in place  RESP  -congested, rhonchi. Symmetric chest movement. C/V  -S1/S2 auscultated, tachycardia without appreciable M/R/G. No JVD or carotid bruits. Peripheral pulses equal bilaterally and palpable. No peripheral edema. No reproducible chest wall tenderness. GI  -Abdomen is soft, non-distended, no significant tenderness. No masses or guarding. + BS in all quadrants. Rectal exam deferred.   -No CVA tenderness. Gonzalez catheter is not present. MS  -B/L extremities - No gross joint deformities, venous stasis changes noted in bilateral lower extremities no swelling, intact sensation symmetrical.   SKIN  -Normal coloration, warm, dry. NEURO  -awake, alert, following commands, answering questions.       Medications:   Medications:    insulin glargine  10 Units SubCUTAneous Once    metoprolol tartrate  25 mg Per G Tube BID    methylPREDNISolone  40 mg IntraVENous Q12H    sodium chloride flush  5-40 mL IntraVENous 2 times per day    aspirin  81 mg PEG Tube Daily    atorvastatin  40 mg PEG Tube Nightly    insulin glargine  15 Units SubCUTAneous Nightly    lansoprazole  30 mg Per G Tube QAM AC    levothyroxine  100 mcg Per G Tube Daily    midodrine  5 mg PEG Tube TID WC    polyethylene glycol  17 g Oral BID    QUEtiapine  50 mg Per G Tube Nightly    insulin lispro  0-8 Units SubCUTAneous TID WC    insulin lispro  0-4 Units SubCUTAneous Nightly    heparin (porcine)  5,000 Units SubCUTAneous 3 times per day    ipratropium-albuterol  1 ampule Inhalation Q4H WA    cefepime  1,000 mg IntraVENous Q24H    vancomycin (VANCOCIN) intermittent dosing (placeholder)   Other RX Placeholder      Infusions:    sodium chloride      dextrose       PRN Meds: HYDROmorphone, 0.5 mg, Q5 Min PRN  fentanNYL, 50 mcg, Q5 Min PRN  labetalol, 10 mg, Q15 Min PRN   Or  hydrALAZINE, 10 mg, Q15 Min PRN  sodium chloride flush, 5-40 mL, PRN  sodium chloride, , PRN  ondansetron, 4 mg, Q8H PRN   Or  ondansetron, 4 mg, Q6H PRN  polyethylene glycol, 17 g, Daily PRN  acetaminophen, 650 mg, Q6H PRN   Or  acetaminophen, 650 mg, Q6H PRN  polyvinyl alcohol, 2 drop, 4x Daily PRN  sennosides-docusate sodium, 2 tablet, Daily PRN  traZODone, 100 mg, Nightly PRN  glucose, 4 tablet, PRN  dextrose bolus, 125 mL, PRN   Or  dextrose bolus, 250 mL, PRN  glucagon (rDNA), 1 mg, PRN  dextrose, , Continuous PRN      Labs      Recent Results (from the past 24 hour(s))   POCT Glucose    Collection Time: 02/22/23 12:05 PM   Result Value Ref Range    POC Glucose 299 (H) 70 - 99 MG/DL   PLEURAL/PERITONEAL FLUID PANEL    Collection Time: 02/22/23  1:40 PM   Result Value Ref Range    Glucose, Fluid 322 >60 MG/DL    LD, Fluid 100 <200 IU/L    Protein, Fluid 3.2 G/DL    Fluid Type PLEURAL FLUID INDEX    RBC, Fluid 52,000 /CU MM    WBC, Fluid 853 /CU MM    Neutrophil Count, Fluid 20 %    Lymphocytes, Body Fluid 62 %    Monocyte Count, Fluid 16 %    Mesothelial, Fluid 1 /100 WBC    Other Cells, Fluid 2    POCT Glucose    Collection Time: 02/22/23  4:27 PM   Result Value Ref Range    POC Glucose 320 (H) 70 - 99 MG/DL   POCT Glucose    Collection Time: 02/22/23  8:07 PM   Result Value Ref Range    POC Glucose 296 (H) 70 - 99 MG/DL   POCT Glucose    Collection Time: 02/23/23  6:44 AM   Result Value Ref Range    POC Glucose 452 (H) 70 - 99 MG/DL        Imaging/Diagnostics Last 24 Hours   CT CHEST WO CONTRAST    Result Date: 2/19/2023  EXAMINATION: CT OF THE CHEST WITHOUT CONTRAST 2/19/2023 8:44 pm TECHNIQUE: CT of the chest was performed without the administration of intravenous contrast. Multiplanar reformatted images are provided for review. Automated exposure control, iterative reconstruction, and/or weight based adjustment of the mA/kV was utilized to reduce the radiation dose to as low as reasonably achievable.  COMPARISON: 02/12/2023 HISTORY: [id number] Koenig PROVIDED HISTORY: hypoxia TECHNOLOGIST PROVIDED HISTORY: Reason for exam:->hypoxia Reason for Exam: hypoxia FINDINGS: Mediastinum: Moderate cardiomegaly. Status post median sternotomy and CABG. Right subclavian catheter with tip at the right atrium. No pericardial effusion. No thoracic adenopathy. Lungs/pleura: Tracheostomy terminates 4 cm above the natalia. Moderate low-attenuation layering bilateral pleural effusions. Complete collapse of the right lower lobe. Moderate dependent atelectasis in the right upper lobe and right middle lobe. Moderate-severe left lower lobe atelectasis. Mild dependent atelectasis in the left upper lobe. No central endobronchial lesion or mucous plug. Upper abdomen:  Limited images of the upper abdomen demonstrate no significant abnormality. Soft Tissues/Bones:  No acute abnormality of the visualized osseous structures. 1. Moderate layering bilateral pleural effusions have increased. 2. Worsening areas of atelectasis. Complete collapse of the right lower lobe with moderate right upper lobe and right middle lobe atelectasis. Moderate left lower lobe and mild left upper lobe atelectasis. No central mucous plug identified. XR CHEST PORTABLE    Result Date: 2/19/2023  EXAMINATION: ONE XRAY VIEW OF THE CHEST 2/19/2023 5:39 pm COMPARISON: February 15, 2023 HISTORY: ORDERING SYSTEM PROVIDED HISTORY: chest pain TECHNOLOGIST PROVIDED HISTORY: Reason for exam:->chest pain Reason for Exam:  chest pain Chest pain FINDINGS: There is pulmonary vascular congestion with moderate to severe bilateral pleural effusions. There is cardiomegaly. The patient is status post CABG and left atrial appendage clipping. A right-sided the central line is present with its tip in the SVC. A tracheostomy tube is noted. Worsening pulmonary vascular congestion and bilateral effusions.        Electronically signed by Kathya Ralph MD on 2/23/2023 at 7:46 AM

## 2023-02-23 NOTE — PROGRESS NOTES
Speech Language Pathology  Facility/Department: [facility] STEPDOWN   CLINICAL BEDSIDE SWALLOW EVALUATION    NAME: Rosalina Lau  : 1950  MRN: [id number]    ADMISSION DATE: 2023  ADMITTING DIAGNOSIS: has Atrial fibrillation ([address] CAD (coronary artery disease); Morbid obesity ([address] COPD (chronic obstructive pulmonary disease) ([address] Sleep apnea; Type 2 diabetes mellitus ([address] Thyroid disease; Hyperlipidemia; Acute congestive heart failure ([address] Coronary artery disease involving native coronary artery of native heart with angina pectoris ([address] Chronic renal disease, stage III ([address] [471331]; Hypertension; CAD, multiple vessel; Dyspnea; Moderate malnutrition ([address] Pneumonia of both lungs due to infectious organism; Severe malnutrition ([address] Chronic respiratory failure with hypoxia ([address] Aspiration pneumonia of right lower lobe ([address] ESRD on dialysis ([address] Aspiration pneumonia, unspecified aspiration pneumonia type, unspecified laterality, unspecified part of lung ([address] Acute aspiration pneumonia ([address] and Acute on chronic respiratory failure with hypoxia ([address] on their problem list.        Impressions: Rosalina Lau was seen for a bedside swallowing evaluation after being admitted to [facility] with acute respiratory failure. Pt was alert and cooperative throughout assessment. Relevant medical hx includes CABG with postop complications resulting in tracheostomy and PEG tube, CAD, hypertension and thyroid disease. Pt had a recent modified barium swallow study completed 22 which indicated severe pharyngeal dysphagia with silent aspiration of thin and nectar thick liquids and penetration of nectar thick liquids with significant pharyngeal residue. Pharyngeal deficits appeared to be related to suspected osteophytes at the C4 and C5. It was recommended that pt remain NPO at that time d/t nonfunctional swallow. Per physician and nursing pt has been requesting to eat this admission.   SLP spoke with nursing at [facility] who report pt has remained NPO since most recent hospital admission. For today's assessment pt was positioned upright in bed. He presents with a cuffless trach with t-piece and was voicing over trach throughout assessment. PMV was at bedside, however pt did not want it placed at this time. He denies any difficulty with PMV use. Oral mechanism examination was [facility] with no focal orofacial weakness. Limited PO trials of ice chips x3 and 1/2 tsp of puree were given. Adequate oral manipulation and clearance was observed. Suspect severe pharyngeal dysphagia characterized by delayed swallow initiation and reduced laryngeal elevation. Repetitive swallows (8-12 swallows per bolus) were observed, likely d/t reduced pharyngeal clearance. Pt presented with a delayed cough with 1/3 ice chips trials. SLP discussed reduced pharyngeal clearance and nonfunctional swallow observed on previous MBSS with pt following PO trials. Pt demonstrated understanding, stating \"it is what it is. .. I just can't eat right now\" and expressed discomfort with limited PO trials given. SLP provided ongoing education and rationale for NPO status and risk of aspiration. Recommend continue NPO status with nutrition and medication administration through PEG-tube. Limited ice chips with nursing for comfort. If pt chooses to wear PMV please remove during sleep and in the event of respiratory decompensation. No further acute SLP needs were identified at this time as pt's swallowing status appears to be at baseline from previous hospital admissions.          ONSET DATE: this admission     Date of Eval: 2/23/2023  Evaluating Therapist: ROLANDO Flores    Current Diet level:  Current Diet : NPO      Primary Complaint  Patient Complaint: wanting to eat and drink    Pain:  Pain Assessment  Pain Assessment: None - Denies Pain  Pain Level: 5  Ca-Baker Pain Rating: No hurt  Patient's Stated Pain Goal: 0 - No pain  Pain Location: Coccyx  Pain Orientation: Lower  Pain Descriptors: Discomfort, Aching    Reason for Referral  Savanah Garcia was referred for a bedside swallow evaluation to assess the efficiency of his swallow function, identify signs and symptoms of aspiration and make recommendations regarding safe dietary consistencies, effective compensatory strategies, and safe eating environment. Impression  Dysphagia Diagnosis: Severe pharyngeal stage dysphagia  Dysphagia Outcome Severity Scale: Level 1: Severe dysphagia- NPO. Unable to tolerate any PO safely     Treatment Plan  Requires SLP Intervention: No  Duration of Treatment: n/a  D/C Recommendations: To be determined       Recommended Diet and Intervention        Recommended Form of Meds: Via alternative means of nutrition          Compensatory Swallowing Strategies       Treatment/Goals  Short-term Goals  Timeframe for Short-term Goals: n/a    General  Chart Reviewed: Yes  Behavior/Cognition: Alert; Cooperative;Pleasant mood  Respiratory Status: Trach uncuffed  O2 Device: T-Piece  Communication Observation: Functional  Follows Directions: Complex  Dentition: Adequate  Patient Positioning: Upright in bed  Baseline Vocal Quality: Dysphonic  Volitional Cough: Weak  Volitional Swallow: Delayed  Prior Dysphagia History: significant hx of dysphagia  Consistencies Administered:  Ice Chips;Pureed           Vision/Hearing  Vision  Vision: Within Functional Limits  Hearing  Hearing: Within functional limits    Oral Motor Deficits       Oral Phase Dysfunction  Oral Phase  Oral Phase: Exceptions     Indicators of Pharyngeal Phase Dysfunction        Prognosis       Education  Patient Education: recommendations/POC  Patient Education Response: Verbalizes understanding             Therapy Time  SLP Individual Minutes  Time In: 1000  Time Out: 5369  Minutes: [address], [id number] [facility], 2/23/2023

## 2023-02-23 NOTE — PROGRESS NOTES
Nephrology Progress Note  2/23/2023 2:09 PM  Subjective: Interval History: Wayne Evangelista is a 68 y.o. male  more awake interactive benign seen before after the thoracentesis and bronchoscopy. He really wants to try to take p.o. diet as well      Data:   Scheduled Meds:   insulin lispro  0-16 Units SubCUTAneous TID WC    insulin lispro  0-4 Units SubCUTAneous Nightly    [START ON 2/24/2023] methylPREDNISolone  40 mg IntraVENous Daily    insulin glargine  25 Units SubCUTAneous Nightly    insulin lispro  10 Units SubCUTAneous TID WC    metoprolol tartrate  25 mg Per G Tube BID    sodium chloride flush  5-40 mL IntraVENous 2 times per day    aspirin  81 mg PEG Tube Daily    atorvastatin  40 mg PEG Tube Nightly    lansoprazole  30 mg Per G Tube QAM AC    levothyroxine  100 mcg Per G Tube Daily    midodrine  5 mg PEG Tube TID WC    polyethylene glycol  17 g Oral BID    QUEtiapine  50 mg Per G Tube Nightly    heparin (porcine)  5,000 Units SubCUTAneous 3 times per day    ipratropium-albuterol  1 ampule Inhalation Q4H WA     Continuous Infusions:   sodium chloride      dextrose           CBC   Recent Labs     02/22/23  0653   WBC 6.0   HGB 8.2*   HCT 26.1*         BMP   Recent Labs     02/22/23  0653   *   K 5.2*   CL 97*   CO2 20*   BUN 50*   CREATININE 4.4*     Hepatic:   No results for input(s): AST, ALT, ALB, BILITOT, ALKPHOS in the last 72 hours. Troponin: No results for input(s): TROPONINI in the last 72 hours. BNP: No results for input(s): BNP in the last 72 hours. Lipids: No results for input(s): CHOL, HDL in the last 72 hours. Invalid input(s): LDLCALCU  ABGs:   Lab Results   Component Value Date/Time    PO2ART 70.3 09/24/2022 11:49 AM    QQJ6PSA 35.5 09/24/2022 11:49 AM     INR: No results for input(s): INR in the last 72 hours.   Renal Labs  Albumin:    Lab Results   Component Value Date/Time    LABALBU 3.6 02/19/2023 05:56 PM     Calcium:    Lab Results   Component Value Date/Time CALCIUM 8.7 02/22/2023 06:53 AM     Phosphorus:    Lab Results   Component Value Date/Time    PHOS 3.7 02/15/2023 03:15 AM     U/A:    Lab Results   Component Value Date/Time    NITRU NEGATIVE 02/13/2023 12:15 AM    COLORU YELLOW 02/13/2023 12:15 AM    PHUR 6.5 06/30/2021 02:11 PM    WBCUA 67 02/13/2023 12:15 AM    RBCUA 37 02/13/2023 12:15 AM    MUCUS RARE 09/17/2022 06:21 PM    TRICHOMONAS NONE SEEN 02/13/2023 12:15 AM    BACTERIA NEGATIVE 02/13/2023 12:15 AM    CLARITYU CLEAR 02/13/2023 12:15 AM    SPECGRAV 1.025 02/13/2023 12:15 AM    UROBILINOGEN 0.2 02/13/2023 12:15 AM    BILIRUBINUR SMALL NUMBER OR AMOUNT OBSERVED 02/13/2023 12:15 AM    BILIRUBINUR small 06/30/2021 02:11 PM    BLOODU MODERATE NUMBER OR AMOUNT OBSERVED 02/13/2023 12:15 AM    GLUCOSEU neg 06/30/2021 02:11 PM    KETUA TRACE 02/13/2023 12:15 AM     ABG:    Lab Results   Component Value Date/Time    LQB7AKJ 35.5 09/24/2022 11:49 AM    PO2ART 70.3 09/24/2022 11:49 AM    RFL0KZN 21.2 09/24/2022 11:49 AM     HgBA1c:    Lab Results   Component Value Date/Time    LABA1C 7.0 02/12/2023 10:53 PM     Microalbumen/Creatinine ratio:  No components found for: RUCREAT  TSH:    Lab Results   Component Value Date/Time    TSH 1.66 04/03/2018 11:33 AM     IRON:    Lab Results   Component Value Date/Time    IRON 60 02/13/2023 10:39 AM     Iron Saturation:  No components found for: PERCENTFE  TIBC:    Lab Results   Component Value Date/Time    TIBC 229 02/13/2023 10:39 AM     FERRITIN:    Lab Results   Component Value Date/Time    FERRITIN 1,063 02/13/2023 10:39 AM     RPR:  No results found for: RPR  YAYA:  No results found for: ANATITER, YAYA  24 Hour Urine for Creatinine Clearance:  No components found for: CREAT4, UHRS10, UTV10      Objective:   I/O: 02/22 0701 - 02/23 0700  In: 1708   Out: 3901 [Urine:100]  I/O last 3 completed shifts: In: 6613 [NG/GT:1208]  Out: 7973 [Urine:100;  Other:1300; Blood:1]  I/O this shift:  In: 245 [I.V.:10; NG/GT:235]  Out: - Vitals: BP (!) 155/64   Pulse 84   Temp 98.4 °F (36.9 °C) (Oral)   Resp 16   Ht 6' 2.02\" (1.88 m)   Wt 266 lb (120.7 kg)   SpO2 99%   BMI 34.14 kg/m²  {  General appearance: awake weak  HEENT: Head: Normal, normocephalic, atraumatic.   Neck: Positive trach  Lungs: diminished breath sounds bilaterally  Heart: S1, S2 normal  Abdomen: abnormal findings:  soft nt positive PEG tube  Extremities: edema trace  Neurologic: Mental status: alertness: alert        Assessment and Plan:      IMP:  #1 end-stage renal disease on dialysis Monday Wednesday Friday  #2 tracheostomy with recurrent congestion and secretion  #3 possible aspiration pneumonia  4 protein malnutrition  #5 hyponatremia  #6 increased troponin  #7 anemia    Plan      #1 do next dialysis on Friday with routine dialysis today   #2 status post bronchoscopy and thoracentesis trach care monitor   #3  tried swallow eval today and patient failed unfortunately keep n.p.o.   #4 maintain with tube feed   #5 sodium low stable potassium correct with dialysis   #6 cardiac status monitor   #7 maintain with  erythropoietin     hopeful can discharge to skilled nursing tomorrow         Savage Escalona MD, MD

## 2023-02-23 NOTE — PROGRESS NOTES
02/23/23 0751   Encounter Summary   Encounter Overview/Reason  Attempted Encounter   Service Provided For: Patient   Referral/Consult From: [address] Family members   Last Encounter  02/23/23  (Patient sleeping on a treak. silent prayer at the door.  Patient did not respond)   Complexity of Encounter Low   Begin Time 0740   End Time  0745   Total Time Calculated 5 min   Encounter    Type Initial Screen/Assessment   Spiritual/Emotional needs   Type Spiritual Support   Assessment/Intervention/Outcome   Assessment Peaceful   Intervention Prayer (assurance of)/[place];Sustaining Presence/Ministry of presence   Outcome Did not respond   Plan and Referrals   Plan/Referrals Continue to visit, (comment)

## 2023-02-24 VITALS
TEMPERATURE: 98.2 F | HEART RATE: 101 BPM | BODY MASS INDEX: 35.25 KG/M2 | DIASTOLIC BLOOD PRESSURE: 81 MMHG | HEIGHT: 74 IN | RESPIRATION RATE: 17 BRPM | OXYGEN SATURATION: 95 % | SYSTOLIC BLOOD PRESSURE: 145 MMHG | WEIGHT: 274.69 LBS

## 2023-02-24 LAB
ANION GAP SERPL CALCULATED.3IONS-SCNC: 13 MMOL/L (ref 4–16)
BASOPHILS ABSOLUTE: 0 K/CU MM
BASOPHILS RELATIVE PERCENT: 0.1 % (ref 0–1)
BUN SERPL-MCNC: 40 MG/DL (ref 6–23)
CALCIUM SERPL-MCNC: 9.1 MG/DL (ref 8.3–10.6)
CHLORIDE BLD-SCNC: 98 MMOL/L (ref 99–110)
CO2: 30 MMOL/L (ref 21–32)
CREAT SERPL-MCNC: 2.9 MG/DL (ref 0.9–1.3)
DIFFERENTIAL TYPE: ABNORMAL
EOSINOPHILS ABSOLUTE: 0 K/CU MM
EOSINOPHILS RELATIVE PERCENT: 0.1 % (ref 0–3)
GFR SERPL CREATININE-BSD FRML MDRD: 22 ML/MIN/1.73M2
GLUCOSE BLD-MCNC: 131 MG/DL (ref 70–99)
GLUCOSE BLD-MCNC: 152 MG/DL (ref 70–99)
GLUCOSE BLD-MCNC: 283 MG/DL (ref 70–99)
GLUCOSE SERPL-MCNC: 178 MG/DL (ref 70–99)
HCT VFR BLD CALC: 31.3 % (ref 42–52)
HEMOGLOBIN: 9.6 GM/DL (ref 13.5–18)
IMMATURE NEUTROPHIL %: 1.4 % (ref 0–0.43)
LYMPHOCYTES ABSOLUTE: 1.4 K/CU MM
LYMPHOCYTES RELATIVE PERCENT: 12.2 % (ref 24–44)
MAGNESIUM: 2.7 MG/DL (ref 1.8–2.4)
MCH RBC QN AUTO: 34.5 PG (ref 27–31)
MCHC RBC AUTO-ENTMCNC: 30.7 % (ref 32–36)
MCV RBC AUTO: 112.6 FL (ref 78–100)
MONOCYTES ABSOLUTE: 0.9 K/CU MM
MONOCYTES RELATIVE PERCENT: 7.6 % (ref 0–4)
NUCLEATED RBC %: 0.5 %
PDW BLD-RTO: 18.3 % (ref 11.7–14.9)
PLATELET # BLD: 329 K/CU MM (ref 140–440)
PMV BLD AUTO: 8.8 FL (ref 7.5–11.1)
POTASSIUM SERPL-SCNC: 3.5 MMOL/L (ref 3.5–5.1)
RBC # BLD: 2.78 M/CU MM (ref 4.6–6.2)
SEGMENTED NEUTROPHILS ABSOLUTE COUNT: 8.8 K/CU MM
SEGMENTED NEUTROPHILS RELATIVE PERCENT: 78.6 % (ref 36–66)
SODIUM BLD-SCNC: 141 MMOL/L (ref 135–145)
TOTAL IMMATURE NEUTOROPHIL: 0.16 K/CU MM
TOTAL NUCLEATED RBC: 0.1 K/CU MM
WBC # BLD: 11.2 K/CU MM (ref 4–10.5)

## 2023-02-24 PROCEDURE — 80048 BASIC METABOLIC PNL TOTAL CA: CPT

## 2023-02-24 PROCEDURE — 94640 AIRWAY INHALATION TREATMENT: CPT

## 2023-02-24 PROCEDURE — 85025 COMPLETE CBC W/AUTO DIFF WBC: CPT

## 2023-02-24 PROCEDURE — 6370000000 HC RX 637 (ALT 250 FOR IP): Performed by: INTERNAL MEDICINE

## 2023-02-24 PROCEDURE — 82962 GLUCOSE BLOOD TEST: CPT

## 2023-02-24 PROCEDURE — 2700000000 HC OXYGEN THERAPY PER DAY

## 2023-02-24 PROCEDURE — 6360000002 HC RX W HCPCS: Performed by: INTERNAL MEDICINE

## 2023-02-24 PROCEDURE — 2580000003 HC RX 258: Performed by: INTERNAL MEDICINE

## 2023-02-24 PROCEDURE — 83735 ASSAY OF MAGNESIUM: CPT

## 2023-02-24 PROCEDURE — 94761 N-INVAS EAR/PLS OXIMETRY MLT: CPT

## 2023-02-24 RX ORDER — PREDNISONE 20 MG/1
40 TABLET ORAL DAILY
Qty: 10 TABLET | Refills: 0 | Status: SHIPPED | OUTPATIENT
Start: 2023-02-24 | End: 2023-03-01

## 2023-02-24 RX ORDER — INSULIN LISPRO 100 [IU]/ML
0-16 INJECTION, SOLUTION INTRAVENOUS; SUBCUTANEOUS
Qty: 3 ML | Refills: 3 | Status: SHIPPED | OUTPATIENT
Start: 2023-02-24

## 2023-02-24 RX ADMIN — LANSOPRAZOLE 30 MG: KIT at 12:29

## 2023-02-24 RX ADMIN — SODIUM CHLORIDE, PRESERVATIVE FREE 10 ML: 5 INJECTION INTRAVENOUS at 12:30

## 2023-02-24 RX ADMIN — POLYETHYLENE GLYCOL 3350 17 G: 17 POWDER, FOR SOLUTION ORAL at 12:29

## 2023-02-24 RX ADMIN — INSULIN LISPRO 10 UNITS: 100 INJECTION, SOLUTION INTRAVENOUS; SUBCUTANEOUS at 08:18

## 2023-02-24 RX ADMIN — EPOETIN ALFA-EPBX 8000 UNITS: 4000 INJECTION, SOLUTION INTRAVENOUS; SUBCUTANEOUS at 09:05

## 2023-02-24 RX ADMIN — INSULIN LISPRO 8 UNITS: 100 INJECTION, SOLUTION INTRAVENOUS; SUBCUTANEOUS at 08:18

## 2023-02-24 RX ADMIN — ASPIRIN 81 MG 81 MG: 81 TABLET ORAL at 12:29

## 2023-02-24 RX ADMIN — IPRATROPIUM BROMIDE AND ALBUTEROL SULFATE 1 AMPULE: 2.5; .5 SOLUTION RESPIRATORY (INHALATION) at 15:19

## 2023-02-24 RX ADMIN — HEPARIN SODIUM 5000 UNITS: 5000 INJECTION INTRAVENOUS; SUBCUTANEOUS at 06:25

## 2023-02-24 RX ADMIN — HEPARIN SODIUM 5000 UNITS: 5000 INJECTION INTRAVENOUS; SUBCUTANEOUS at 14:36

## 2023-02-24 RX ADMIN — IPRATROPIUM BROMIDE AND ALBUTEROL SULFATE 1 AMPULE: 2.5; .5 SOLUTION RESPIRATORY (INHALATION) at 07:09

## 2023-02-24 RX ADMIN — LEVOTHYROXINE SODIUM 100 MCG: 0.1 TABLET ORAL at 12:29

## 2023-02-24 RX ADMIN — METOPROLOL TARTRATE 25 MG: 25 TABLET, FILM COATED ORAL at 12:29

## 2023-02-24 NOTE — PROGRESS NOTES
Attempted to call report to [facility]. The phone rang for over 5 minutes without a answer. I called back and left a message with Elijah the  for the Nurse.

## 2023-02-24 NOTE — PROGRESS NOTES
Nephrology Progress Note  2/24/2023 12:34 PM  Subjective: Interval History: Mili Gay is a 68 y.o. male  appear doing ok weak no distress      Data:   Scheduled Meds:   insulin lispro  0-16 Units SubCUTAneous TID WC    insulin lispro  0-4 Units SubCUTAneous Nightly    methylPREDNISolone  40 mg IntraVENous Daily    insulin glargine  25 Units SubCUTAneous Nightly    insulin lispro  10 Units SubCUTAneous TID WC    metoprolol tartrate  25 mg Per G Tube BID    sodium chloride flush  5-40 mL IntraVENous 2 times per day    aspirin  81 mg PEG Tube Daily    atorvastatin  40 mg PEG Tube Nightly    lansoprazole  30 mg Per G Tube QAM AC    levothyroxine  100 mcg Per G Tube Daily    midodrine  5 mg PEG Tube TID WC    polyethylene glycol  17 g Oral BID    QUEtiapine  50 mg Per G Tube Nightly    heparin (porcine)  5,000 Units SubCUTAneous 3 times per day    ipratropium-albuterol  1 ampule Inhalation Q4H WA     Continuous Infusions:   sodium chloride      dextrose           CBC   Recent Labs     02/22/23  0653 02/24/23  1000   WBC 6.0 11.2*   HGB 8.2* 9.6*   HCT 26.1* 31.3*    329      BMP   Recent Labs     02/22/23  0653 02/24/23  1000   * 141   K 5.2* 3.5   CL 97* 98*   CO2 20* 30   BUN 50* 40*   CREATININE 4.4* 2.9*     Hepatic:   No results for input(s): AST, ALT, ALB, BILITOT, ALKPHOS in the last 72 hours. Troponin: No results for input(s): TROPONINI in the last 72 hours. BNP: No results for input(s): BNP in the last 72 hours. Lipids: No results for input(s): CHOL, HDL in the last 72 hours. Invalid input(s): LDLCALCU  ABGs:   Lab Results   Component Value Date/Time    PO2ART 70.3 09/24/2022 11:49 AM    ATC3ZRN 35.5 09/24/2022 11:49 AM     INR: No results for input(s): INR in the last 72 hours.   Renal Labs  Albumin:    Lab Results   Component Value Date/Time    LABALBU 3.6 02/19/2023 05:56 PM     Calcium:    Lab Results   Component Value Date/Time    CALCIUM 9.1 02/24/2023 10:00 AM Phosphorus:    Lab Results   Component Value Date/Time    PHOS 3.7 02/15/2023 03:15 AM     U/A:    Lab Results   Component Value Date/Time    NITRU NEGATIVE 02/13/2023 12:15 AM    COLORU YELLOW 02/13/2023 12:15 AM    PHUR 6.5 06/30/2021 02:11 PM    WBCUA 67 02/13/2023 12:15 AM    RBCUA 37 02/13/2023 12:15 AM    MUCUS RARE 09/17/2022 06:21 PM    TRICHOMONAS NONE SEEN 02/13/2023 12:15 AM    BACTERIA NEGATIVE 02/13/2023 12:15 AM    CLARITYU CLEAR 02/13/2023 12:15 AM    SPECGRAV 1.025 02/13/2023 12:15 AM    UROBILINOGEN 0.2 02/13/2023 12:15 AM    BILIRUBINUR SMALL NUMBER OR AMOUNT OBSERVED 02/13/2023 12:15 AM    BILIRUBINUR small 06/30/2021 02:11 PM    BLOODU MODERATE NUMBER OR AMOUNT OBSERVED 02/13/2023 12:15 AM    GLUCOSEU neg 06/30/2021 02:11 PM    KETUA TRACE 02/13/2023 12:15 AM     ABG:    Lab Results   Component Value Date/Time    NHL5GLT 35.5 09/24/2022 11:49 AM    PO2ART 70.3 09/24/2022 11:49 AM    MQI3UCF 21.2 09/24/2022 11:49 AM     HgBA1c:    Lab Results   Component Value Date/Time    LABA1C 7.0 02/12/2023 10:53 PM     Microalbumen/Creatinine ratio:  No components found for: RUCREAT  TSH:    Lab Results   Component Value Date/Time    TSH 1.66 04/03/2018 11:33 AM     IRON:    Lab Results   Component Value Date/Time    IRON 60 02/13/2023 10:39 AM     Iron Saturation:  No components found for: PERCENTFE  TIBC:    Lab Results   Component Value Date/Time    TIBC 229 02/13/2023 10:39 AM     FERRITIN:    Lab Results   Component Value Date/Time    FERRITIN 1,063 02/13/2023 10:39 AM     RPR:  No results found for: RPR  YAYA:  No results found for: ANATITER, YAYA  24 Hour Urine for Creatinine Clearance:  No components found for: CREAT4, UHRS10, UTV10      Objective:   I/O: 02/23 0701 - 02/24 0700  In: 285 [I.V.:10]  Out: -   I/O last 3 completed shifts: In: 345 [I.V.:10; NG/GT:335]  Out: -   I/O this shift:   In: 590 [NG/GT:90]  Out: 2500   Vitals: BP (!) 144/64   Pulse (!) 101   Temp 98.4 °F (36.9 °C) (Oral) Resp 13   Ht 6' 2.02\" (1.88 m)   Wt 274 lb 11.1 oz (124.6 kg)   SpO2 98%   BMI 35.25 kg/m²  {  General appearance: awake weak  HEENT: Head: Normal, normocephalic, atraumatic.   Neck: Positive trach  Lungs: diminished breath sounds bilaterally  Heart: S1, S2 normal  Abdomen: abnormal findings:  soft nt positive PEG tube  Extremities: edema trace  Neurologic: Mental status: alertness: alert        Assessment and Plan:      IMP:  #1 end-stage renal disease on dialysis Monday Wednesday Friday  #2 tracheostomy with recurrent congestion and secretion  #3 possible aspiration pneumonia  4 protein malnutrition  #5 hyponatremia  #6 increased troponin  #7 anemia    Plan     1 doing hd today  2 congestion better after bronch and  thoracentesis   #3 patient still has an interest in taking p.o. diet but will need neurosurgical evaluation for the cervical spine first patient probably would better off to be evaluated [facility] but informed to be seen by neurosurgery locally could make referral as well for that   #3 maintain on tube feeds   #4 monitor electrolytes holding stable dialysis   #5 cardiac stable monitor   #6 monitor hemoglobin maintain EPO   work on discharge back to skilled nursing we will follow         Thomas Madden MD, MD

## 2023-02-24 NOTE — PROGRESS NOTES
Comprehensive Nutrition Assessment    Type and Reason for Visit:  Reassess    Nutrition Recommendations/Plan:   Continue current EN regimen  Monitor GI status, lytes, glucose, weights      Malnutrition Assessment:  Malnutrition Status: At risk for malnutrition (Comment) (02/20/23 1404)      Nutrition Assessment:    TF running at goal rate in room, pt denies any GI issues at this time; will continue to follow at high risk. Nutrition Related Findings:    Cl 98, BUN 40, Cr 2.9, GFR 22, glucose 178, Mg 2.7 Wound Type: None       Current Nutrition Intake & Therapies:    Average Meal Intake: NPO  Average Supplements Intake: NPO  Diet NPO  ADULT TUBE FEEDING; PEG; Renal Formula; Continuous; 10; Yes; 10; Q 6 hours; 60; 30; Q 4 hours  Current Tube Feeding (TF) Orders:  Feeding Route: PEG  Formula: Renal Formula  Schedule: Continuous  Feeding Regimen: 60mL/hr  Additives/Modulars: None  Water Flushes: 30mL Q4H  Current TF & Flush Orders Provides: Nepro @ 60 ml/hr will provide 2592 kcal and 117g PRO  Goal TF & Flush Orders Provides: Nepro @ 60 ml/hr will provide 2592 kcal and 117g PRO    Anthropometric Measures:  Height: 6' 2.02\" (188 cm)  Ideal Body Weight (IBW): 190 lbs (86 kg)       Current Body Weight: 274 lb 11.1 oz (124.6 kg), 141.3 % IBW. Weight Source: Bed Scale  Current BMI (kg/m2): 35.3  Usual Body Weight: 343 lb 4.1 oz (155.7 kg) (9/23/22 per chart review)  % Weight Change (Calculated): -21.8  Weight Adjustment For: No Adjustment                 BMI Categories: Obese Class 1 (BMI 30.0-34. 9)    Estimated Daily Nutrient Needs:  Energy Requirements Based On: Kcal/kg  Weight Used for Energy Requirements: Ideal  Energy (kcal/day): 9048-4875 (30-35 kcal/kg)  Weight Used for Protein Requirements: Ideal  Protein (g/day): 103-120 (1.2-1.4 g/kg)  Method Used for Fluid Requirements: Standard Renal  Fluid (ml/day):      Nutrition Diagnosis:   Inadequate oral intake related to acute injury/trauma as evidenced by nutrition support - enteral nutrition, NPO or clear liquid status due to medical condition    Nutrition Interventions:   Food and/or Nutrient Delivery: Continue Current Tube Feeding  Nutrition Education/Counseling: No recommendation at this time  Coordination of Nutrition Care: Continue to monitor while inpatient  Plan of Care discussed with: -    Goals:  Previous Goal Met: Progressing toward Goal(s)  Goals: Meet at least 75% of estimated needs, by next RD assessment       Nutrition Monitoring and Evaluation:   Behavioral-Environmental Outcomes: None Identified  Food/Nutrient Intake Outcomes: Enteral Nutrition Intake/Tolerance  Physical Signs/Symptoms Outcomes: Biochemical Data, GI Status, Hemodynamic Status, Fluid Status or Edema, Weight, Skin    Discharge Planning:     Too soon to determine     Theron Hoskins, [address]: [id number]

## 2023-02-24 NOTE — PROGRESS NOTES
Patient Name: David Christensen  Patient : 1950  MRN: [id number]     Acct: [de-identified]  Date of Admission: 2023  Room/Bed: -A  Code Status:  Full Code  Allergies: No Known Allergies  Diagnosis:    Patient Active Problem List   Diagnosis    Atrial fibrillation ([facility]    CAD (coronary artery disease)    Morbid obesity ([facility]    COPD (chronic obstructive pulmonary disease) ([facility]    Sleep apnea    Type 2 diabetes mellitus ([facility]    Thyroid disease    Hyperlipidemia    Acute congestive heart failure ([facility]    Coronary artery disease involving native coronary artery of native heart with angina pectoris (HCC)    Chronic renal disease, stage III ([facility] [[id number]]    Hypertension    CAD, multiple vessel    Dyspnea    Moderate malnutrition ([address]    Pneumonia of both lungs due to infectious organism    Severe malnutrition (HCC)    Chronic respiratory failure with hypoxia (HCC)    Aspiration pneumonia of right lower lobe (HCC)    ESRD on dialysis ([facility]    Aspiration pneumonia, unspecified aspiration pneumonia type, unspecified laterality, unspecified part of lung ([address]    Acute aspiration pneumonia ([address]    Acute on chronic respiratory failure with hypoxia ([facility]         Treatment:  Hemodilaysis 2:1  Priority: 1st Time Acute  Location: Acute Room    Diabetic: Yes  NPO: No  Isolation Precautions: None     Consent for Treatment Verified: Yes  Blood Consent Verified: Not Applicable     Safety Verified: Identify (I), Consent (C), Equipment (E), HepB Status (B), Orders Complete (O), Access Verified (A), and Timeliness (T)  Time out performed prior to access at 0800 hours. Report Received from Primary RN at 0745 hours. Primary RN (First Initial, Last Name, Title): ENEIDA Huang Nurse Education Completed: Not Applicable     HBsAg ONLY:  Date Drawn: 2023       Results: Negative  HBsAb:  Date Drawn:  2023       Results: Susceptible <10    Order  Dialysis Bath  K+ (Potassium): 2  Ca+ (Calcium): 2.5  Na+ (Sodium): 138  HCO3 (Bicarb): 35  Bicarbonate Concentrate Lot No.: [id number]  Acid Concentrate Lot No.: [id number]     Na+ Modeling: Not Applicable  Dialyzer: P780  Dialysate Temperature (C):  35  Blood Flow Rate (BFR):  300   Dialysate Flow Rate (DFR):   600        Access to be Utilized   Access: Tunneled Catheter  Location: Internal Jugular  Side: Right   Needle gauge:  Not Applicable  + Bruit/Thrill: Not Applicable    First Use X-ray Verified: Yes  OK to use line order: Yes    Site Assessment:  Signs and Symptoms of Infection/Inflammation: None  If yes: Not Applicable  Dressing: Dry and Intact  Site Prep: Medical Aseptic Technique  Dressing Changed this Treatment: No  If yes, by whom: NA - not changed today  Date of Last Dressing Change: Not Applicable  Antimicrobial Patch in place?: Yes  Red Alcohol Caps in place?: Yes  Gauze Dressing?: No  Non Dialysis Use?: No  Comment:    Flows: Good, Patent  If access problem, who was notified:     Pre and Post-Assessment  Patient Vitals for the past 8 hrs:   Level of Consciousness O2 Device Skin Color Abdomen Inspection Edema Edema Generalized   02/24/23 0405 -- T-Piece -- -- -- --   02/24/23 0709 -- T-Piece -- -- -- --   02/24/23 0827 0 T-Piece Purple Gastrostomy Generalized Non-pitting   02/24/23 0845 -- -- -- -- Generalized --     Labs  Recent Labs     02/22/23  0653   WBC 6.0   HGB 8.2*   HCT 26.1*                                                                     Recent Labs     02/22/23  0653   *   K 5.2*   CL 97*   CO2 20*   BUN 50*   CREATININE 4.4*   GLUCOSE 387*     IV Drips and Rate/Dose   sodium chloride      dextrose        Safety - Before each treatment:   Dialysis Machine No.: [id number]  RO Machine Number: [id number]  Dialyzer Lot No.: [id number]  RO Machine Log Sheet Completed: Yes  Machine Alarm Self Test: Completed; Passed (02/24/23 0845)     Air Foam Detector: Tested, Proper Function  Extracorporeal Circuit Tested for Integrity: Yes  Machine Conductivity: 13.9  Manual Conductivity: 14.1     Bicarbonate Concentrate Lot No.: [id number]  Acid Concentrate Lot No.: [id number]  Manual Ph: 7.4  Bleach Test (Neg): Yes  Bath Temperature: 95 °F (35 °C)  Tubing Lot#: [id number]  Conductivity Meter Serial #: [id number]  All Connections Secure?: Yes  Venous Parameters Set?: Yes  Arterial Parameters Set?: Yes  Saline Line Double Clamped?: Yes  Air Foam Detector Engaged?: Yes  Machine Functioning Alarm Free?  Yes  Prime Given: 200ml    Chlorine Testing - Before each treatment and every 4 hours:   Treatment  Treatment Number: 3  Time On: 4808  Time Off: 1110  Treatment Goal: 2kg  Weight: 274 lb 11.1 oz (124.6 kg) (02/24/23 0845)  1st check: less than 0.1 ppm at: 0650 hours  2nd check: less than 0.1 ppm at: 1035 hours  3rd check: Not Applicable  (if greater than 0.1 ppm, then check every 30 minutes from secondary)    Access Flows and Pressures  Patient Vitals for the past 8 hrs:   Blood Flow Rate (ml/min) Ultrafiltration Rate (ml/hr) Arterial Pressure (mmHg) Venous Pressure (mmHg) TMP DFR Comments Access Visible   02/24/23 0845 300 ml/min 830 ml/hr -100 mmHg 100 60 600 tx started Yes   02/24/23 0900 300 ml/min 830 ml/hr -100 mmHg 100 40 600 no complaints Yes   02/24/23 0915 300 ml/min 830 ml/hr -100 mmHg 100 40 600 watching tv Yes   02/24/23 0930 300 ml/min 830 ml/hr -110 mmHg 100 50 600 resting ion bed Yes   02/24/23 0945 300 ml/min 830 ml/hr -110 mmHg 100 50 600 watching tv Yes     Vital Signs  Patient Vitals for the past 8 hrs:   BP Temp Pulse Resp SpO2 Weight Weight Method Percent Weight Change   02/24/23 0402 133/67 -- 79 14 99 % -- -- --   02/24/23 0405 -- -- -- -- 99 % -- -- --   02/24/23 0615 -- 97.7 °F (36.5 °C) -- -- -- -- -- --   02/24/23 0709 -- -- 81 13 94 % -- -- --   02/24/23 0827 137/68 98.4 °F (36.9 °C) 88 14 97 % -- -- --   02/24/23 0845 (!) 145/65 -- 88 13 97 % 274 lb 11.1 oz (124.6 kg) Bed scale 3.27   02/24/23 0900 (!) 140/84 -- 83 14 98 % -- -- -- 02/24/23 0915 133/75 -- (!) 109 15 -- -- -- --   02/24/23 0930 123/68 -- 97 16 -- -- -- --   02/24/23 0945 123/67 -- 91 16 -- -- -- --     Post-Dialysis  Arterial Catheter Locking Solution: Not Applicable  Venous Catheter Locking Solution: Not Applicable  Post-Treatment Procedures: Blood returned, Catheter Capped, clamped with Saline x2 ports  Machine Disinfection Process: Heat Disinfect, Exterior Machine Disinfection  Rinseback Volume (ml): 300 ml  Blood Volume Processed (Liters): 52 l/min  Dialyzer Clearance: Moderately streaked  Duration of Treatment (minutes): 180 minutes     Hemodialysis Intake (ml): 500 ml  Hemodialysis Output (ml): 2500 ml     Tolerated Treatment: Good  Patient Response to Treatment: well  Physician Notified: No       Provider Notification        Handoff complete and report given to Primary RN at 1200 hours. Primary RN (First Initial, Last Name, Title):  ENEIDA Riley     Electronically signed by Calos Recinos RN on 2/24/2023 at 9:56 AM

## 2023-02-24 NOTE — CARE COORDINATION
CRIS spoke to Ally with [facility] to inform her that pt has discharge orders. Ally will look at the chart and return CM's call.    [facility]    Please call report to [phone]    Complete and sign DESMOND then fax to   [phone]    Place AVS and scripts in the envelope that is in the soft chart. This is to go with the pt to [facility]    [facility]   time 17:30  [phone]  After 5 pm: [phone]    CM called POA    No Covid test needed

## 2023-02-24 NOTE — PROGRESS NOTES
pulmonary      SUBJECTIVE:  no sob     OBJECTIVE    VITALS:  /67   Pulse 81   Temp 97.7 °F (36.5 °C) (Oral)   Resp 13   Ht 6' 2.02\" (1.88 m)   Wt 266 lb (120.7 kg)   SpO2 94%   BMI 34.14 kg/m²   HEAD AND FACE EXAM:  No throat injection, no active exudate,no thrush  NECK EXAM;No JVD, no masses, symmetrical  CHEST EXAM; Expansion equal and symmetrical, no masses  LUNG EXAM; Good breath sounds bilaterally. There are expiratory wheezes both lungs, there are crackles at both lung bases  CARDIOVASCULAR EXAM: Positive S1 and S2, no S3 or S4, no clicks ,no murmurs  RIGHT AND LEFT LOWER EXTRIMITY EXAM: No edema, no swelling, no inflamation  CNS EXAM: Alert and oriented X3          LABS   Lab Results   Component Value Date    WBC 6.0 02/22/2023    HGB 8.2 (L) 02/22/2023    HCT 26.1 (L) 02/22/2023    .0 (H) 02/22/2023     02/22/2023     Lab Results   Component Value Date    CREATININE 4.4 (H) 02/22/2023    BUN 50 (H) 02/22/2023     (L) 02/22/2023    K 5.2 (H) 02/22/2023    CL 97 (L) 02/22/2023    CO2 20 (L) 02/22/2023     Lab Results   Component Value Date    INR 1.21 02/17/2023    PROTIME 15.7 (H) 02/17/2023          Lab Results   Component Value Date/Time    PHOS 3.7 02/15/2023 03:15 AM    PHOS 2.1 02/14/2023 06:10 AM    PHOS 3.5 02/13/2023 04:54 AM      No results for input(s): PH, PO2ART, KEX4FWF, HCO3, BEART, O2SAT in the last 72 hours.       Wt Readings from Last 3 Encounters:   02/23/23 266 lb (120.7 kg)   02/17/23 289 lb 3.9 oz (131.2 kg)   02/08/23 260 lb (117.9 kg)               ASSESMENT  Ac on ch resp failure  Pneumonia  Ac copd        PLAN  Cpm  Cont o2  Had pleural tap and post procedure cxr shows improvement    2/24/2023  Johana Mora MD, MCHRISTINA.

## 2023-02-24 NOTE — PROGRESS NOTES
02/24/23 0405   Oxygen Therapy/Pulse Ox   O2 Therapy Oxygen humidified   O2 Device T-Piece   O2 Flow Rate (L/min) 6 L/min   FiO2  28 %   SpO2 99 %

## 2023-02-25 LAB
CULTURE: NORMAL
CULTURE: NORMAL
Lab: NORMAL
Lab: NORMAL
SPECIMEN: NORMAL
SPECIMEN: NORMAL

## 2023-03-02 LAB
CULTURE: ABNORMAL
GRAM SMEAR: ABNORMAL
Lab: ABNORMAL
SPECIMEN: ABNORMAL

## 2023-03-03 LAB
CULTURE: ABNORMAL
GRAM SMEAR: ABNORMAL
Lab: ABNORMAL
SPECIMEN: ABNORMAL

## 2023-03-05 ENCOUNTER — HOSPITAL ENCOUNTER (INPATIENT)
Age: 73
LOS: 16 days | Discharge: NURSING, REHAB OR LONG-TERM CARE | DRG: 177 | End: 2023-03-21
Attending: EMERGENCY MEDICINE | Admitting: INTERNAL MEDICINE
Payer: COMMERCIAL

## 2023-03-05 ENCOUNTER — APPOINTMENT (OUTPATIENT)
Dept: GENERAL RADIOLOGY | Age: 73
DRG: 177 | End: 2023-03-05
Payer: COMMERCIAL

## 2023-03-05 DIAGNOSIS — J18.9 HCAP (HEALTHCARE-ASSOCIATED PNEUMONIA): ICD-10-CM

## 2023-03-05 DIAGNOSIS — J96.01 ACUTE RESPIRATORY FAILURE WITH HYPOXEMIA (HCC): Primary | ICD-10-CM

## 2023-03-05 DIAGNOSIS — Z93.0 TRACHEOSTOMY IN PLACE (HCC): ICD-10-CM

## 2023-03-05 DIAGNOSIS — N18.6 ESRD (END STAGE RENAL DISEASE) (HCC): ICD-10-CM

## 2023-03-05 LAB
ALBUMIN SERPL-MCNC: 3.5 GM/DL (ref 3.4–5)
ALP BLD-CCNC: 224 IU/L (ref 40–129)
ALT SERPL-CCNC: 16 U/L (ref 10–40)
ANION GAP SERPL CALCULATED.3IONS-SCNC: 10 MMOL/L (ref 4–16)
APTT: 34.5 SECONDS (ref 25.1–37.1)
AST SERPL-CCNC: 15 IU/L (ref 15–37)
B PARAP IS1001 DNA NPH QL NAA+NON-PROBE: NOT DETECTED
B PERT.PT PRMT NPH QL NAA+NON-PROBE: NOT DETECTED
BASE EXCESS MIXED: 1.9 (ref 0–1.2)
BASOPHILS ABSOLUTE: 0.1 K/CU MM
BASOPHILS RELATIVE PERCENT: 1.1 % (ref 0–1)
BILIRUB SERPL-MCNC: 0.3 MG/DL (ref 0–1)
BUN SERPL-MCNC: 84 MG/DL (ref 6–23)
C PNEUM DNA NPH QL NAA+NON-PROBE: NOT DETECTED
CALCIUM SERPL-MCNC: 8.9 MG/DL (ref 8.3–10.6)
CHLORIDE BLD-SCNC: 90 MMOL/L (ref 99–110)
CO2: 28 MMOL/L (ref 21–32)
COMMENT: ABNORMAL
CREAT SERPL-MCNC: 4.7 MG/DL (ref 0.9–1.3)
DIFFERENTIAL TYPE: ABNORMAL
EOSINOPHILS ABSOLUTE: 0.4 K/CU MM
EOSINOPHILS RELATIVE PERCENT: 6.8 % (ref 0–3)
FLUAV H1 2009 PAN RNA NPH NAA+NON-PROBE: NOT DETECTED
FLUAV H1 RNA NPH QL NAA+NON-PROBE: NOT DETECTED
FLUAV H3 RNA NPH QL NAA+NON-PROBE: NOT DETECTED
FLUAV RNA NPH QL NAA+NON-PROBE: NOT DETECTED
FLUBV RNA NPH QL NAA+NON-PROBE: NOT DETECTED
GFR SERPL CREATININE-BSD FRML MDRD: 12 ML/MIN/1.73M2
GLUCOSE SERPL-MCNC: 219 MG/DL (ref 70–99)
HADV DNA NPH QL NAA+NON-PROBE: NOT DETECTED
HCO3 VENOUS: 29 MMOL/L (ref 19–25)
HCOV 229E RNA NPH QL NAA+NON-PROBE: NOT DETECTED
HCOV HKU1 RNA NPH QL NAA+NON-PROBE: NOT DETECTED
HCOV NL63 RNA NPH QL NAA+NON-PROBE: NOT DETECTED
HCOV OC43 RNA NPH QL NAA+NON-PROBE: NOT DETECTED
HCT VFR BLD CALC: 30.2 % (ref 42–52)
HEMOGLOBIN: 9.8 GM/DL (ref 13.5–18)
HMPV RNA NPH QL NAA+NON-PROBE: NOT DETECTED
HPIV1 RNA NPH QL NAA+NON-PROBE: NOT DETECTED
HPIV2 RNA NPH QL NAA+NON-PROBE: NOT DETECTED
HPIV3 RNA NPH QL NAA+NON-PROBE: NOT DETECTED
HPIV4 RNA NPH QL NAA+NON-PROBE: NOT DETECTED
IMMATURE NEUTROPHIL %: 1.1 % (ref 0–0.43)
INR BLD: 3.33 INDEX
LACTIC ACID, SEPSIS: 1.7 MMOL/L (ref 0.5–1.9)
LYMPHOCYTES ABSOLUTE: 1.3 K/CU MM
LYMPHOCYTES RELATIVE PERCENT: 19.4 % (ref 24–44)
M PNEUMO DNA NPH QL NAA+NON-PROBE: NOT DETECTED
MCH RBC QN AUTO: 36 PG (ref 27–31)
MCHC RBC AUTO-ENTMCNC: 32.5 % (ref 32–36)
MCV RBC AUTO: 111 FL (ref 78–100)
MONOCYTES ABSOLUTE: 0.5 K/CU MM
MONOCYTES RELATIVE PERCENT: 7.7 % (ref 0–4)
NUCLEATED RBC %: 0 %
O2 SAT, VEN: 58.6 % (ref 50–70)
PCO2, VEN: 55 MMHG (ref 38–52)
PDW BLD-RTO: 16.3 % (ref 11.7–14.9)
PH VENOUS: 7.33 (ref 7.32–7.42)
PLATELET # BLD: 243 K/CU MM (ref 140–440)
PMV BLD AUTO: 9.3 FL (ref 7.5–11.1)
PO2, VEN: 34 MMHG (ref 28–48)
POTASSIUM SERPL-SCNC: 4.7 MMOL/L (ref 3.5–5.1)
PRO-BNP: 6466 PG/ML
PROTHROMBIN TIME: 43.5 SECONDS (ref 11.7–14.5)
RBC # BLD: 2.72 M/CU MM (ref 4.6–6.2)
RSV RNA NPH QL NAA+NON-PROBE: NOT DETECTED
RV+EV RNA NPH QL NAA+NON-PROBE: NOT DETECTED
SARS-COV-2 RNA NPH QL NAA+NON-PROBE: NOT DETECTED
SEGMENTED NEUTROPHILS ABSOLUTE COUNT: 4.1 K/CU MM
SEGMENTED NEUTROPHILS RELATIVE PERCENT: 63.9 % (ref 36–66)
SODIUM BLD-SCNC: 128 MMOL/L (ref 135–145)
TOTAL IMMATURE NEUTOROPHIL: 0.07 K/CU MM
TOTAL NUCLEATED RBC: 0 K/CU MM
TOTAL PROTEIN: 6 GM/DL (ref 6.4–8.2)
TROPONIN T: 0.64 NG/ML
WBC # BLD: 6.5 K/CU MM (ref 4–10.5)

## 2023-03-05 PROCEDURE — 85730 THROMBOPLASTIN TIME PARTIAL: CPT

## 2023-03-05 PROCEDURE — 85025 COMPLETE CBC W/AUTO DIFF WBC: CPT

## 2023-03-05 PROCEDURE — 87040 BLOOD CULTURE FOR BACTERIA: CPT

## 2023-03-05 PROCEDURE — 80053 COMPREHEN METABOLIC PANEL: CPT

## 2023-03-05 PROCEDURE — 96368 THER/DIAG CONCURRENT INF: CPT

## 2023-03-05 PROCEDURE — 96375 TX/PRO/DX INJ NEW DRUG ADDON: CPT

## 2023-03-05 PROCEDURE — 71045 X-RAY EXAM CHEST 1 VIEW: CPT

## 2023-03-05 PROCEDURE — 0202U NFCT DS 22 TRGT SARS-COV-2: CPT

## 2023-03-05 PROCEDURE — 5A1D70Z PERFORMANCE OF URINARY FILTRATION, INTERMITTENT, LESS THAN 6 HOURS PER DAY: ICD-10-PCS | Performed by: INTERNAL MEDICINE

## 2023-03-05 PROCEDURE — 6360000002 HC RX W HCPCS: Performed by: EMERGENCY MEDICINE

## 2023-03-05 PROCEDURE — 2580000003 HC RX 258: Performed by: EMERGENCY MEDICINE

## 2023-03-05 PROCEDURE — 85610 PROTHROMBIN TIME: CPT

## 2023-03-05 PROCEDURE — 99285 EMERGENCY DEPT VISIT HI MDM: CPT

## 2023-03-05 PROCEDURE — 96365 THER/PROPH/DIAG IV INF INIT: CPT

## 2023-03-05 PROCEDURE — 83605 ASSAY OF LACTIC ACID: CPT

## 2023-03-05 PROCEDURE — 93005 ELECTROCARDIOGRAM TRACING: CPT | Performed by: EMERGENCY MEDICINE

## 2023-03-05 PROCEDURE — 2100000000 HC CCU R&B

## 2023-03-05 PROCEDURE — 82805 BLOOD GASES W/O2 SATURATION: CPT

## 2023-03-05 PROCEDURE — 87070 CULTURE OTHR SPECIMN AEROBIC: CPT

## 2023-03-05 PROCEDURE — 89220 SPUTUM SPECIMEN COLLECTION: CPT

## 2023-03-05 PROCEDURE — 96376 TX/PRO/DX INJ SAME DRUG ADON: CPT

## 2023-03-05 PROCEDURE — 96366 THER/PROPH/DIAG IV INF ADDON: CPT

## 2023-03-05 PROCEDURE — 83880 ASSAY OF NATRIURETIC PEPTIDE: CPT

## 2023-03-05 PROCEDURE — 87205 SMEAR GRAM STAIN: CPT

## 2023-03-05 PROCEDURE — 84484 ASSAY OF TROPONIN QUANT: CPT

## 2023-03-05 RX ORDER — ACETYLCYSTEINE 200 MG/ML
600 SOLUTION ORAL; RESPIRATORY (INHALATION) 2 TIMES DAILY
Status: DISCONTINUED | OUTPATIENT
Start: 2023-03-05 | End: 2023-03-21 | Stop reason: HOSPADM

## 2023-03-05 RX ORDER — MORPHINE SULFATE 4 MG/ML
4 INJECTION, SOLUTION INTRAMUSCULAR; INTRAVENOUS ONCE
Status: COMPLETED | OUTPATIENT
Start: 2023-03-05 | End: 2023-03-05

## 2023-03-05 RX ORDER — 0.9 % SODIUM CHLORIDE 0.9 %
1000 INTRAVENOUS SOLUTION INTRAVENOUS ONCE
Status: COMPLETED | OUTPATIENT
Start: 2023-03-05 | End: 2023-03-05

## 2023-03-05 RX ORDER — ONDANSETRON 2 MG/ML
4 INJECTION INTRAMUSCULAR; INTRAVENOUS EVERY 6 HOURS PRN
Status: DISCONTINUED | OUTPATIENT
Start: 2023-03-05 | End: 2023-03-06 | Stop reason: SDUPTHER

## 2023-03-05 RX ADMIN — MORPHINE SULFATE 4 MG: 4 INJECTION, SOLUTION INTRAMUSCULAR; INTRAVENOUS at 17:43

## 2023-03-05 RX ADMIN — CEFEPIME 2000 MG: 2 INJECTION, POWDER, FOR SOLUTION INTRAVENOUS at 17:43

## 2023-03-05 RX ADMIN — AZITHROMYCIN MONOHYDRATE 500 MG: 500 INJECTION, POWDER, LYOPHILIZED, FOR SOLUTION INTRAVENOUS at 16:20

## 2023-03-05 RX ADMIN — ONDANSETRON 4 MG: 2 INJECTION INTRAMUSCULAR; INTRAVENOUS at 17:43

## 2023-03-05 RX ADMIN — SODIUM CHLORIDE 1000 ML: 9 INJECTION, SOLUTION INTRAVENOUS at 16:21

## 2023-03-05 RX ADMIN — VANCOMYCIN HYDROCHLORIDE 2000 MG: 10 INJECTION, POWDER, LYOPHILIZED, FOR SOLUTION INTRAVENOUS at 20:31

## 2023-03-05 RX ADMIN — ONDANSETRON 4 MG: 2 INJECTION INTRAMUSCULAR; INTRAVENOUS at 20:26

## 2023-03-05 RX ADMIN — MORPHINE SULFATE 4 MG: 4 INJECTION, SOLUTION INTRAMUSCULAR; INTRAVENOUS at 20:26

## 2023-03-05 NOTE — ED PROVIDER NOTES
02/13/2023 HISTORY: ORDERING SYSTEM PROVIDED HISTORY: fu pneumonia TECHNOLOGIST PROVIDED HISTORY: Reason for exam:->fu pneumonia Reason for Exam: fu pneumonia Additional signs and symptoms: na Relevant Medical/Surgical History: CAD, COPD, hypertension FINDINGS: Tracheostomy tube is stable. Central line is stable. Stable cardiomegaly with median sternotomy wires. Decreased size of the bilateral pleural effusions. No pneumothorax. No new airspace disease. Mild central pulmonary edema. Decreasing bilateral pleural effusions with improving mild central pulmonary edema. Stable cardiomegaly. Stable lines and tubes. XR CHEST PORTABLE    Result Date: 2/13/2023  EXAMINATION: ONE XRAY VIEW OF THE CHEST 2/13/2023 8:32 am COMPARISON: 02/12/2023 HISTORY: ORDERING SYSTEM PROVIDED HISTORY: fu pneumonia TECHNOLOGIST PROVIDED HISTORY: Reason for exam:->fu pneumonia Reason for Exam: fu pneumonia FINDINGS: Right subclavian catheter projects over the right atrium. Tracheostomy tube is unchanged. No significant change in right lower lobe airspace disease, and questionable right effusion. Increased left lower lobe airspace disease and questionable effusion. Probable cardiomegaly. No pneumothorax. Unchanged large right consolidation and interval presence of left airspace disease. Possible bilateral pleural effusions. IR TUNNELED CVC PLACE WO SQ PORT/PUMP > 5 YEARS    Result Date: 2/14/2023  PROCEDURE: Removal of previously placed tunneled dialysis access catheter with tip sent for culture. ULTRASOUND GUIDED VASCULAR ACCESS. FLUOROSCOPY GUIDED PLACEMENT OF A SUBCUTANEOUS PORT-A-CATH. 2/14/2023.  HISTORY: ORDERING SYSTEM PROVIDED HISTORY: remove tunnel hd catheter and culture cath tip and place new tunnel hd catheter other side if able, had resp secretion that site and red and better to change the access site (held coumadin fu inr today) TECHNOLOGIST PROVIDED HISTORY: remove tunnel hd catheter and culture cath tip

## 2023-03-05 NOTE — PROGRESS NOTES
[facility] consulted by Dr Lorna Peck to calculate a one time Vancomycin dose to be administered in the ED. Recent Labs     03/05/23  1554   BUN 84*   CREATININE 4.7*     Estimated Creatinine Clearance: 20 mL/min (A) (based on SCr of 4.7 mg/dL (H)). No intake or output data in the 24 hours ending 03/05/23 1820    Assessment/Plan:  Pharmacy will order Vancomycin 2000 mg   Please note, pharmacy will order a one-time dose of vancomycin for the Emergency Department. The consult will need to be re-ordered if vancomycin is to continue upon admission. Thank you for the consult.   [place], [facility]  3/5/2023 6:20 PM

## 2023-03-06 ENCOUNTER — APPOINTMENT (OUTPATIENT)
Dept: INTERVENTIONAL RADIOLOGY/VASCULAR | Age: 73
DRG: 177 | End: 2023-03-06
Payer: COMMERCIAL

## 2023-03-06 LAB
ANION GAP SERPL CALCULATED.3IONS-SCNC: 15 MMOL/L (ref 4–16)
BASOPHILS ABSOLUTE: 0.1 K/CU MM
BASOPHILS RELATIVE PERCENT: 1.1 % (ref 0–1)
BUN SERPL-MCNC: 87 MG/DL (ref 6–23)
CALCIUM SERPL-MCNC: 8 MG/DL (ref 8.3–10.6)
CHLORIDE BLD-SCNC: 94 MMOL/L (ref 99–110)
CO2: 25 MMOL/L (ref 21–32)
CREAT SERPL-MCNC: 5.2 MG/DL (ref 0.9–1.3)
DIFFERENTIAL TYPE: ABNORMAL
DOSE AMOUNT: NORMAL
DOSE TIME: NORMAL
EOSINOPHILS ABSOLUTE: 0.7 K/CU MM
EOSINOPHILS RELATIVE PERCENT: 10.7 % (ref 0–3)
GFR SERPL CREATININE-BSD FRML MDRD: 11 ML/MIN/1.73M2
GLUCOSE BLD-MCNC: 102 MG/DL (ref 70–99)
GLUCOSE BLD-MCNC: 133 MG/DL (ref 70–99)
GLUCOSE BLD-MCNC: 155 MG/DL (ref 70–99)
GLUCOSE BLD-MCNC: 88 MG/DL (ref 70–99)
GLUCOSE SERPL-MCNC: 134 MG/DL (ref 70–99)
HCT VFR BLD CALC: 30 % (ref 42–52)
HEMOGLOBIN: 9.7 GM/DL (ref 13.5–18)
IMMATURE NEUTROPHIL %: 1 % (ref 0–0.43)
INR BLD: 3.87 INDEX
LYMPHOCYTES ABSOLUTE: 1.7 K/CU MM
LYMPHOCYTES RELATIVE PERCENT: 27.5 % (ref 24–44)
MCH RBC QN AUTO: 36.3 PG (ref 27–31)
MCHC RBC AUTO-ENTMCNC: 32.3 % (ref 32–36)
MCV RBC AUTO: 112.4 FL (ref 78–100)
MONOCYTES ABSOLUTE: 0.5 K/CU MM
MONOCYTES RELATIVE PERCENT: 8 % (ref 0–4)
NUCLEATED RBC %: 0 %
PDW BLD-RTO: 16.1 % (ref 11.7–14.9)
PLATELET # BLD: 203 K/CU MM (ref 140–440)
PMV BLD AUTO: 9.5 FL (ref 7.5–11.1)
POTASSIUM SERPL-SCNC: 4.5 MMOL/L (ref 3.5–5.1)
PROCALCITONIN SERPL-MCNC: 0.29 NG/ML
PROTHROMBIN TIME: 50.6 SECONDS (ref 11.7–14.5)
RBC # BLD: 2.67 M/CU MM (ref 4.6–6.2)
SEGMENTED NEUTROPHILS ABSOLUTE COUNT: 3.2 K/CU MM
SEGMENTED NEUTROPHILS RELATIVE PERCENT: 51.7 % (ref 36–66)
SODIUM BLD-SCNC: 134 MMOL/L (ref 135–145)
TOTAL IMMATURE NEUTOROPHIL: 0.06 K/CU MM
TOTAL NUCLEATED RBC: 0 K/CU MM
VANCOMYCIN RANDOM: 23 UG/ML
WBC # BLD: 6.2 K/CU MM (ref 4–10.5)

## 2023-03-06 PROCEDURE — 36415 COLL VENOUS BLD VENIPUNCTURE: CPT

## 2023-03-06 PROCEDURE — 94640 AIRWAY INHALATION TREATMENT: CPT

## 2023-03-06 PROCEDURE — 6370000000 HC RX 637 (ALT 250 FOR IP): Performed by: INTERNAL MEDICINE

## 2023-03-06 PROCEDURE — 94761 N-INVAS EAR/PLS OXIMETRY MLT: CPT

## 2023-03-06 PROCEDURE — 90935 HEMODIALYSIS ONE EVALUATION: CPT

## 2023-03-06 PROCEDURE — 2700000000 HC OXYGEN THERAPY PER DAY

## 2023-03-06 PROCEDURE — 2580000003 HC RX 258: Performed by: INTERNAL MEDICINE

## 2023-03-06 PROCEDURE — 1200000000 HC SEMI PRIVATE

## 2023-03-06 PROCEDURE — 80202 ASSAY OF VANCOMYCIN: CPT

## 2023-03-06 PROCEDURE — 85025 COMPLETE CBC W/AUTO DIFF WBC: CPT

## 2023-03-06 PROCEDURE — 82962 GLUCOSE BLOOD TEST: CPT

## 2023-03-06 PROCEDURE — 85610 PROTHROMBIN TIME: CPT

## 2023-03-06 PROCEDURE — 6360000002 HC RX W HCPCS: Performed by: INTERNAL MEDICINE

## 2023-03-06 PROCEDURE — 80048 BASIC METABOLIC PNL TOTAL CA: CPT

## 2023-03-06 PROCEDURE — 2580000003 HC RX 258: Performed by: SURGERY

## 2023-03-06 PROCEDURE — 84145 PROCALCITONIN (PCT): CPT

## 2023-03-06 RX ORDER — SODIUM CHLORIDE 9 MG/ML
INJECTION, SOLUTION INTRAVENOUS PRN
Status: DISCONTINUED | OUTPATIENT
Start: 2023-03-06 | End: 2023-03-21 | Stop reason: HOSPADM

## 2023-03-06 RX ORDER — SODIUM CHLORIDE 0.9 % (FLUSH) 0.9 %
5-40 SYRINGE (ML) INJECTION EVERY 12 HOURS SCHEDULED
Status: DISCONTINUED | OUTPATIENT
Start: 2023-03-06 | End: 2023-03-21 | Stop reason: HOSPADM

## 2023-03-06 RX ORDER — POLYETHYLENE GLYCOL 3350 17 G/17G
17 POWDER, FOR SOLUTION ORAL DAILY PRN
Status: DISCONTINUED | OUTPATIENT
Start: 2023-03-06 | End: 2023-03-21 | Stop reason: HOSPADM

## 2023-03-06 RX ORDER — SODIUM CHLORIDE 0.9 % (FLUSH) 0.9 %
5-40 SYRINGE (ML) INJECTION PRN
Status: DISCONTINUED | OUTPATIENT
Start: 2023-03-06 | End: 2023-03-21 | Stop reason: HOSPADM

## 2023-03-06 RX ORDER — MIDODRINE HYDROCHLORIDE 5 MG/1
5 TABLET ORAL
Status: DISCONTINUED | OUTPATIENT
Start: 2023-03-06 | End: 2023-03-09

## 2023-03-06 RX ORDER — 0.9 % SODIUM CHLORIDE 0.9 %
500 INTRAVENOUS SOLUTION INTRAVENOUS ONCE
Status: COMPLETED | OUTPATIENT
Start: 2023-03-06 | End: 2023-03-06

## 2023-03-06 RX ORDER — ONDANSETRON 2 MG/ML
4 INJECTION INTRAMUSCULAR; INTRAVENOUS EVERY 6 HOURS PRN
Status: DISCONTINUED | OUTPATIENT
Start: 2023-03-06 | End: 2023-03-21 | Stop reason: HOSPADM

## 2023-03-06 RX ORDER — ACETAMINOPHEN 650 MG/1
650 SUPPOSITORY RECTAL EVERY 6 HOURS PRN
Status: DISCONTINUED | OUTPATIENT
Start: 2023-03-06 | End: 2023-03-21 | Stop reason: HOSPADM

## 2023-03-06 RX ORDER — ONDANSETRON 4 MG/1
4 TABLET, ORALLY DISINTEGRATING ORAL EVERY 8 HOURS PRN
Status: DISCONTINUED | OUTPATIENT
Start: 2023-03-06 | End: 2023-03-21 | Stop reason: HOSPADM

## 2023-03-06 RX ORDER — ENOXAPARIN SODIUM 100 MG/ML
30 INJECTION SUBCUTANEOUS DAILY
Status: DISCONTINUED | OUTPATIENT
Start: 2023-03-06 | End: 2023-03-06

## 2023-03-06 RX ORDER — IPRATROPIUM BROMIDE AND ALBUTEROL SULFATE 2.5; .5 MG/3ML; MG/3ML
1 SOLUTION RESPIRATORY (INHALATION)
Status: DISCONTINUED | OUTPATIENT
Start: 2023-03-06 | End: 2023-03-13

## 2023-03-06 RX ORDER — ACETAMINOPHEN 325 MG/1
650 TABLET ORAL EVERY 6 HOURS PRN
Status: DISCONTINUED | OUTPATIENT
Start: 2023-03-06 | End: 2023-03-21 | Stop reason: HOSPADM

## 2023-03-06 RX ORDER — SENNA AND DOCUSATE SODIUM 50; 8.6 MG/1; MG/1
2 TABLET, FILM COATED ORAL DAILY PRN
Status: DISCONTINUED | OUTPATIENT
Start: 2023-03-06 | End: 2023-03-21 | Stop reason: HOSPADM

## 2023-03-06 RX ORDER — ATORVASTATIN CALCIUM 40 MG/1
40 TABLET, FILM COATED ORAL NIGHTLY
Status: DISCONTINUED | OUTPATIENT
Start: 2023-03-06 | End: 2023-03-21 | Stop reason: HOSPADM

## 2023-03-06 RX ORDER — INSULIN GLARGINE 100 [IU]/ML
10 INJECTION, SOLUTION SUBCUTANEOUS NIGHTLY
Status: DISCONTINUED | OUTPATIENT
Start: 2023-03-06 | End: 2023-03-21 | Stop reason: HOSPADM

## 2023-03-06 RX ORDER — QUETIAPINE FUMARATE 25 MG/1
50 TABLET, FILM COATED ORAL NIGHTLY
Status: DISCONTINUED | OUTPATIENT
Start: 2023-03-06 | End: 2023-03-21 | Stop reason: HOSPADM

## 2023-03-06 RX ORDER — INSULIN LISPRO 100 [IU]/ML
0-4 INJECTION, SOLUTION INTRAVENOUS; SUBCUTANEOUS
Status: DISCONTINUED | OUTPATIENT
Start: 2023-03-06 | End: 2023-03-08

## 2023-03-06 RX ORDER — TRAZODONE HYDROCHLORIDE 50 MG/1
100 TABLET ORAL NIGHTLY
Status: DISCONTINUED | OUTPATIENT
Start: 2023-03-06 | End: 2023-03-21 | Stop reason: HOSPADM

## 2023-03-06 RX ORDER — INSULIN LISPRO 100 [IU]/ML
0-4 INJECTION, SOLUTION INTRAVENOUS; SUBCUTANEOUS NIGHTLY
Status: DISCONTINUED | OUTPATIENT
Start: 2023-03-06 | End: 2023-03-08

## 2023-03-06 RX ORDER — ASPIRIN 81 MG/1
81 TABLET, CHEWABLE ORAL DAILY
Status: DISCONTINUED | OUTPATIENT
Start: 2023-03-06 | End: 2023-03-21 | Stop reason: HOSPADM

## 2023-03-06 RX ORDER — LANSOPRAZOLE
30 KIT
Status: DISCONTINUED | OUTPATIENT
Start: 2023-03-06 | End: 2023-03-21 | Stop reason: HOSPADM

## 2023-03-06 RX ORDER — LEVOTHYROXINE SODIUM 0.1 MG/1
100 TABLET ORAL DAILY
Status: DISCONTINUED | OUTPATIENT
Start: 2023-03-06 | End: 2023-03-21 | Stop reason: HOSPADM

## 2023-03-06 RX ADMIN — CEFEPIME HYDROCHLORIDE 1000 MG: 1 INJECTION, POWDER, FOR SOLUTION INTRAMUSCULAR; INTRAVENOUS at 20:43

## 2023-03-06 RX ADMIN — ASPIRIN 81 MG CHEWABLE TABLET 81 MG: 81 TABLET CHEWABLE at 20:54

## 2023-03-06 RX ADMIN — METOPROLOL TARTRATE 25 MG: 25 TABLET, FILM COATED ORAL at 20:39

## 2023-03-06 RX ADMIN — VANCOMYCIN HYDROCHLORIDE 1500 MG: 5 INJECTION, POWDER, LYOPHILIZED, FOR SOLUTION INTRAVENOUS at 15:52

## 2023-03-06 RX ADMIN — SODIUM CHLORIDE, PRESERVATIVE FREE 10 ML: 5 INJECTION INTRAVENOUS at 16:00

## 2023-03-06 RX ADMIN — INSULIN GLARGINE 10 UNITS: 100 INJECTION, SOLUTION SUBCUTANEOUS at 20:54

## 2023-03-06 RX ADMIN — QUETIAPINE FUMARATE 50 MG: 25 TABLET ORAL at 01:50

## 2023-03-06 RX ADMIN — ACETYLCYSTEINE 600 MG: 200 SOLUTION ORAL; RESPIRATORY (INHALATION) at 07:05

## 2023-03-06 RX ADMIN — METOPROLOL TARTRATE 25 MG: 25 TABLET, FILM COATED ORAL at 01:50

## 2023-03-06 RX ADMIN — ATORVASTATIN CALCIUM 40 MG: 40 TABLET, FILM COATED ORAL at 01:50

## 2023-03-06 RX ADMIN — IPRATROPIUM BROMIDE AND ALBUTEROL SULFATE 1 AMPULE: 2.5; .5 SOLUTION RESPIRATORY (INHALATION) at 19:44

## 2023-03-06 RX ADMIN — IPRATROPIUM BROMIDE AND ALBUTEROL SULFATE 1 AMPULE: 2.5; .5 SOLUTION RESPIRATORY (INHALATION) at 11:24

## 2023-03-06 RX ADMIN — ACETYLCYSTEINE 600 MG: 200 SOLUTION ORAL; RESPIRATORY (INHALATION) at 19:44

## 2023-03-06 RX ADMIN — IPRATROPIUM BROMIDE AND ALBUTEROL SULFATE 1 AMPULE: 2.5; .5 SOLUTION RESPIRATORY (INHALATION) at 15:26

## 2023-03-06 RX ADMIN — IPRATROPIUM BROMIDE AND ALBUTEROL SULFATE 1 AMPULE: 2.5; .5 SOLUTION RESPIRATORY (INHALATION) at 07:05

## 2023-03-06 RX ADMIN — ATORVASTATIN CALCIUM 40 MG: 40 TABLET, FILM COATED ORAL at 20:39

## 2023-03-06 RX ADMIN — TRAZODONE HYDROCHLORIDE 100 MG: 50 TABLET ORAL at 20:38

## 2023-03-06 RX ADMIN — SODIUM CHLORIDE 25 ML: 9 INJECTION, SOLUTION INTRAVENOUS at 20:42

## 2023-03-06 RX ADMIN — MIDODRINE HYDROCHLORIDE 5 MG: 5 TABLET ORAL at 09:44

## 2023-03-06 RX ADMIN — INSULIN GLARGINE 10 UNITS: 100 INJECTION, SOLUTION SUBCUTANEOUS at 01:50

## 2023-03-06 RX ADMIN — METOPROLOL TARTRATE 25 MG: 25 TABLET, FILM COATED ORAL at 09:44

## 2023-03-06 RX ADMIN — LEVOTHYROXINE SODIUM 100 MCG: 0.1 TABLET ORAL at 05:44

## 2023-03-06 RX ADMIN — QUETIAPINE FUMARATE 50 MG: 25 TABLET ORAL at 20:38

## 2023-03-06 RX ADMIN — SODIUM CHLORIDE, PRESERVATIVE FREE 10 ML: 5 INJECTION INTRAVENOUS at 20:39

## 2023-03-06 RX ADMIN — SODIUM CHLORIDE 500 ML: 9 INJECTION, SOLUTION INTRAVENOUS at 21:30

## 2023-03-06 ASSESSMENT — PAIN SCALES - GENERAL: PAINLEVEL_OUTOF10: 0

## 2023-03-06 NOTE — CONSULTS
Weight Change (Calculated): -17.4  Weight Adjustment For: No Adjustment                 BMI Categories: Obese Class 2 (BMI 35.0 -39.9)    Estimated Daily Nutrient Needs:  Energy Requirements Based On: Kcal/kg  Weight Used for Energy Requirements: Ideal  Energy (kcal/day): 5064-0226 (30-35 kcals/kg IBW)  Weight Used for Protein Requirements: Ideal  Protein (g/day): 104-112 (1.2-1.3 g/kg)  Method Used for Fluid Requirements: Standard Renal  Fluid (ml/day): fluids per nephrology    Nutrition Diagnosis:   Inadequate oral intake related to inadequate enteral nutrition infusion, increase demand for energy/nutrients as evidenced by NPO or clear liquid status due to medical condition, dialysis    Nutrition Interventions:   Food and/or Nutrient Delivery: Start Tube Feeding  Nutrition Education/Counseling: Education not indicated  Coordination of Nutrition Care: Continue to monitor while inpatient, Coordination of Care       Goals:  Goals: Initiate nutrition support, Tolerate nutrition support at goal rate, by next RD assessment, Meet at least 75% of estimated needs       Nutrition Monitoring and Evaluation:   Behavioral-Environmental Outcomes: None Identified  Food/Nutrient Intake Outcomes: Diet Advancement/Tolerance, Enteral Nutrition Intake/Tolerance  Physical Signs/Symptoms Outcomes: Biochemical Data, Chewing or Swallowing, GI Status, Fluid Status or Edema, Weight, Skin    Discharge Planning:     Too soon to determine     Iliana Banda RD, LD  Contact: [phone]

## 2023-03-06 NOTE — H&P
and possible HAP  2/20-2/24  Disposition:   Current Living situation: SNF  Expected Disposition: SNF  Estimated D/C: 3 days    Diet No diet orders on file   DVT Prophylaxis [x] Lovenox, []  Heparin, [] SCDs, [] Ambulation,  [] Eliquis, [] Xarelto   Code Status Prior   Surrogate Decision Maker/ DEVI Maldonado     History from:     patient    History of Present Illness:     Chief Complaint: Healthcare-associated pneumonia  Michelle Joyce is a 68 y.o. male with pmh of afib on warfarin, COPD, DM, GERD, hypothyroidism, HLD,  s/p trach and PEG who presents with laboured breathing. Patient states that this started this morning. He has increased sputum production. He also stated that the colour of his sputum changed (normally it's clear and today it is brownish). He denies any fever, chills, chest pain, nausea, vomiting, abdominal pain, constipation, diarrhea, changes in urination. He does endorse mild SOB. He also states that he had a thoracentesis last week. Labs significant for hemoglobin 9.8, sodium of 128, BUN 84, creatinine 4.7, glucose of 219, alk phos of 224, troponin of 0.638, proBNP of 6436, INR of 3.33, normal lactic acid level and negative respiratory PCR. Blood cultures were also taken. Chest x-ray with right pleural effusion with right basilar opacity suggesting atelectasis or infection. EKG showing A-fib with RVR, stable RBBB and rate of 110, no acute ischemic changes. Patient received IV fluids, Zofran, azithromycin, cefepime, vancomycin and morphine. Nephro consulted from the ED.       Review of Systems: Need 10 Elements   Review of Systems    As above     Objective:   No intake or output data in the 24 hours ending 03/05/23 2047   Vitals:   Vitals:    03/05/23 1809 03/05/23 1815 03/05/23 1900 03/05/23 2031   BP:   118/65 125/65   Pulse: (!) 108 50 (!) 109 87   Resp: 20  14 18   Temp:    97.5 °F (36.4 °C)   TempSrc:    Oral   SpO2: 99% 98% 98% 99%   Weight:  275 lb (124.7 kg)         Medications Provider, MD   aspirin 81 MG chewable tablet 1 tablet by Per NG tube route daily 9/25/22   Kami Kimble MD   blood glucose monitor kit and supplies Dispense sufficient amount for indicated testing frequency plus additional to accommodate PRN testing needs. Dispense all needed supplies to include: monitor, strips, lancing device, lancets, control solutions, alcohol swabs. 7/26/22   ANKITA Gallardo CNP   Lancets MISC by D3EA route three times a day. 7/25/22   ANKITA Gallardo CNP       Physical Exam: Need 8 Elements   Physical Exam     General: NAD, AAO x3-4  Eyes: EOMI  HENT: Atraumatic  CVS: Normal S1 and S2, no murmurs, tachycardia  Respiratory: Normal breath sounds, clear to auscultation bilaterally, no respiratory distress, trach in place with beige-yee sputum from trach  GI: soft, nondistended, nontender  MSK: no lower extremity edema  Skin: Intact, dry, warm, no rashes  Neuro: CN II to XII grossly intact  Psych: Normal mood      Past Medical History:   PMHx   Past Medical History:   Diagnosis Date    Allergic rhinitis     Anticoagulated on Coumadin     1/6/17-**[facility] to follow pt's PT/INRs, along w/prescribing his Coumadin. **    Anxiety     Arthritis     Atrial fibrillation (HCC)     On Coumadin.     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 09/21/2022    positive at 15C, negative at 18C    COPD (chronic obstructive pulmonary disease) (HCC)     Depression     Diabetes mellitus (HCC)     NIDDM- dx over 10 yrs ago- follows with Dr Sindy Pinon's contracture of hand     Right hand    Family history of cardiovascular disease     Father-MI    GERD (gastroesophageal reflux disease)     H/O cardiac catheterization 03/2007    cardiac cath- stent LAD patent, [id number]%, RCA 40-50%    H/O cardiac catheterization 06/12/2008    cardiac cath- mild disease mid RCA    H/O cardiovascular stress test 04/10/2014    cardiolite- normal, no ischemia, EF63%    H/O

## 2023-03-06 NOTE — CARE COORDINATION
LSW reviewed pt medical record and discussed pt in the IDR. Pt is from [address] [place] [address] where he was there skilled but is in transition to LTC. Pt will need pre-cert to return skilled to [address] [place] [address].

## 2023-03-06 NOTE — PROGRESS NOTES
PHARMACY ANTICOAGULATION MONITORING SERVICE    Anahi Hodgson is a 68 y.o. male on warfarin therapy for atrial fibrillation. Pharmacy consulted by Dr. Ana Monroe for monitoring and adjustment of treatment. Indication for anticoagulation: A-Fib  INR goal: 2-3  Warfarin dose prior to admission: 1 mg daily (dose used during recent admission)    Pertinent Laboratory Values   Recent Labs     03/05/23  1554 03/06/23  0537   INR 3.33 3.87   HGB 9.8* 9.7*   HCT 30.2* 30.0*    203         Assessment/Plan:  Drug Interactions:   Home meds: Aspirin, levothyroxine, trazodone  Patient started on cefepime, which may increase warfarin effects  INR supra-therapeutic on admission @3.33 yesterday and continues to trend up to 3.87 today. No warfarin dose given last night. Continue to hold warfarin until INR is back down within therapeutic range  Pharmacy will continue to monitor and adjust warfarin therapy as indicated    Thank you for the consult. Deya Velazquez, [facility]  3/6/2023 3:05 PM

## 2023-03-06 NOTE — CARE COORDINATION
Patient possible readmission. Patient admitted to hospital 2/19 - 2/24 for Acute on chronic respiratory failure with hypoxia. Patient here with C/O increased coughing and phlegm production from his trach. Patient admitted for PNE.

## 2023-03-06 NOTE — PROGRESS NOTES
Patient seen and examined full note to follow patient end-stage renal disease on dialysis at [facility] [address] Monday Wednesday Friday presents with recurrent pneumonia has underlying trach as well will need to follow-up with pulmonology recommendations maintain antibiotics and monitor closely. Patient's brother is back in town I will try to reach out to him as well to talk to him about plan of care.   Full note to follow

## 2023-03-06 NOTE — CONSULTS
2011    COLONOSCOPY  5/18/16    1 polyp removed, diverticulosis and hemorrhoids noted    CORONARY ANGIOPLASTY WITH STENT PLACEMENT  03/21/2005    PTCA with 3.5 x 16 TAXUS stent to LAD    CORONARY ARTERY BYPASS GRAFT N/A 9/22/2022    CABG CORONARY ARTERY BYPASS x3 (LIMA TO LAD, SVG TO PDA-RCA SEQUENTIAL) , INTRAOPERATIVE MILTON, ENDOVEIN HARVEST OF LEFT SAPHENOUS VEIN, MODIFIED MAZE PROCEDURE, LEFT ATRIAL APPENDAGE LIGATION, INTERCOSTAL NERVE BLOCK, INTRA-AORTIC BALLOON PUMP INSERTION performed by Marly Rai MD at [address], COLON, DIAGNOSTIC  2014    egd    HAND SURGERY Right 1997    IR TUNNELED CATHETER PLACEMENT GREATER THAN 5 YEARS  2/14/2023    IR TUNNELED CATHETER PLACEMENT GREATER THAN 5 YEARS 2/14/2023 Chase Rodriguez DO [address], [place]. \Hospital Has a New Name and Outlook.\"" SPECIAL PROCEDURES    NC OPTX ANKLE DISLOCATION W/REPAIR/INT/XTRNL FIXJ Right 6/3/2019    RIGHT OPEN REDUCTION INTERNAL FIXATION OF DISTAL TIBIA  AND FIBULA performed by Stephanie Lundy MD at [address] [place] [address] N/A 9/24/2022    STERNUM WASHOUT performed by Marly Rai MD at [address] [place]. \Hospital Has a New Name and Outlook.\"" OR       Medications:   Scheduled Meds:   sodium chloride flush  5-40 mL IntraVENous 2 times per day    ipratropium-albuterol  1 ampule Inhalation Q4H WA    aspirin  81 mg Per NG tube Daily    atorvastatin  40 mg Oral Nightly    insulin glargine  10 Units SubCUTAneous Nightly    insulin lispro  0-4 Units SubCUTAneous TID WC    insulin lispro  0-4 Units SubCUTAneous Nightly    lansoprazole  30 mg Oral QAM AC    levothyroxine  100 mcg Per G Tube Daily    metoprolol tartrate  25 mg Per G Tube BID    midodrine  5 mg Oral TID WC    QUEtiapine  50 mg Oral Nightly    traZODone  100 mg Oral Nightly    cefepime  1,000 mg IntraVENous Q24H    vancomycin (VANCOCIN) intermittent dosing (placeholder)   Other RX Placeholder    acetylcysteine  600 mg Inhalation BID    warfarin placeholder: dosing by pharmacy   Other RX Placeholder     Continuous Infusions:   sodium chloride       PRN Meds:.sodium RPR:  No results found for: RPR  YAYA:  No results found for: ANATITER, YAYA  24 Hour Urine for Creatinine Clearance:  No components found for: CREAT4, UHRS10, UTV10  -----------------------------------------------------------------      Assessment and Recommendations     Patient Active Problem List   Diagnosis Code    Atrial fibrillation ([facility]) I48.91    CAD (coronary artery disease) I25.10    Morbid obesity ([facility]) E66.01    COPD (chronic obstructive pulmonary disease) ([facility] J44.9    Sleep apnea G47.30    Type 2 diabetes mellitus ([facility]) E11.9    Thyroid disease E07.9    Hyperlipidemia E78.5    Acute congestive heart failure ([facility]) I50.9    Coronary artery disease involving native coronary artery of native heart with angina pectoris ([facility]) I25.119    Chronic renal disease, stage III ([facility] [139623] N18.30    Hypertension I10    CAD, multiple vessel I25.10    Dyspnea R06.00    Moderate malnutrition ([facility]) E44.0    Pneumonia of both lungs due to infectious organism J18.9    Severe malnutrition ([facility]) E43    Chronic respiratory failure with hypoxia ([facility]) J96.11    Aspiration pneumonia of right lower lobe ([facility]) J69.0    ESRD on dialysis ([facility]) N18.6, Z99.2    Aspiration pneumonia, unspecified aspiration pneumonia type, unspecified laterality, unspecified part of lung ([facility]) J69.0    Acute aspiration pneumonia ([facility]) J69.0    Acute on chronic respiratory failure with hypoxia ([facility] J96.21    Healthcare-associated pneumonia J18.9     Impression plan  #1 end-stage renal disease on dialysis Monday Wednesday Friday  #2 possible pneumonia with respiratory secretion with trach  #3 A-fib rapid rate  #4 COPD likely pleural effusion  #5 type 2 diabetes  #6 status post CABG with complication requiring trach and PEG lives in a nursing home/depression    Plan  #1 do dialysis today maintain current schedule when needed better IV access for dialysis but currently using a catheter can discuss options with him as well  #2 follow-up cultures

## 2023-03-06 NOTE — PROGRESS NOTES
PHARMACY ANTICOAGULATION MONITORING SERVICE    Yissel Ruth is a 68 y.o. male on warfarin therapy for atrial fibrillation. Pharmacy consulted by Dr. Jes Garibay for monitoring and adjustment of treatment. Indication for anticoagulation: A-Fib  INR goal: 2-3  Warfarin dose prior to admission: 1 mg daily (dose used during recent admission)    Pertinent Laboratory Values   Recent Labs     03/05/23  1554   INR 3.33   HGB 9.8*   HCT 30.2*          Assessment/Plan:  Drug Interactions:   Home meds: Aspirin, levothyroxine, trazodone  Patient started on cefepime, which may increase warfarin effects  INR supra-therapeutic on admission  Will hold warfarin until INR is within therapeutic range  Pharmacy will continue to monitor and adjust warfarin therapy as indicated    Thank you for the consult.   Lawrence Dias [facility]  3/6/2023 2:22 AM

## 2023-03-06 NOTE — DISCHARGE INSTR - COC
Splint laterality, unspecified part of lung ([facility] J69.0    Acute aspiration pneumonia ([facility] J69.0    Acute on chronic respiratory failure with hypoxia ([facility] J96.21    Healthcare-associated pneumonia J18.9       Isolation/Infection:   Isolation            No Isolation          Patient Infection Status       Infection Onset Added Last Indicated Last Indicated By Review Planned Expiration Resolved Resolved By    None active    Resolved    COVID-19 (Rule Out) 23 Respiratory Panel, Molecular, with COVID-19 (Restricted: peds pts or suitable admitted adults) (Ordered)   23 Rule-Out Test Resulted    COVID-19 (Rule Out) 23 Respiratory Panel, Molecular, with COVID-19 (Restricted: peds pts or suitable admitted adults) (Ordered)   23 Rule-Out Test Resulted    COVID-19 (Rule Out) 22 COVID-19, Rapid (Ordered)   22 Rule-Out Test Resulted    COVID-19 (Rule Out) 22 Respiratory Panel, Molecular, with COVID-19 (Restricted: peds pts or suitable admitted adults) (Ordered)   22 Rule-Out Test Resulted    COVID-19 20 Covid-19 Ambulatory   20     COVID-19 (Rule Out) 20 Covid-19 Ambulatory (Ordered)   20 Rule-Out Test Resulted            Nurse Assessment:  Last Vital Signs: /62   Pulse (!) 122   Temp 98.8 °F (37.1 °C)   Resp 17   Ht 6' 2\" (1.88 m)   Wt 267 lb 10.2 oz (121.4 kg)   SpO2 99%   BMI 34.36 kg/m²     Last documented pain score (0-10 scale): Pain Level: 0  Last Weight:   Wt Readings from Last 1 Encounters:   23 267 lb 10.2 oz (121.4 kg)     Mental Status:  oriented, alert, coherent, logical, thought processes intact, and able to concentrate and follow conversation    IV Access:  - Dialysis Catheter  - site  subclavian, insertion date: 23 RIGHT SIDE    Nursing Mobility/ADLs:  Walking   Dependent  Transfer  Dependent  Bathing [phone]    Dialysis Facility (if applicable)   Name:  Address:  Dialysis Schedule:  Phone:  Fax:      PHYSICIAN SECTION    Nutrition Therapy:  Current Nutrition Therapy:   - Tube Feedings:  Renal with 250 q4h [facility]WF    Routes of Feeding: Gastrostomy Tube  Liquids: No Liquids  Daily Fluid Restriction: no  Last Modified Barium Swallow with Video (Video Swallowing Test): not done    Treatments at the Time of Hospital Discharge:   Respiratory Treatments: Trach mask with 6 L O2  Oxygen Therapy:  is on oxygen at 6 L/min per trach mask  Ventilator:    - No ventilator support    Rehab Therapies: Physical Therapy, Occupational Therapy, and Speech/Language Therapy  Weight Bearing Status/Restrictions: No weight bearing restrictions  Other Medical Equipment (for information only, NOT a DME order):  wheelchair and walker  Other Treatments:   Prognosis: Guarded    Condition at Discharge: Stable    Rehab Potential (if transferring to Rehab): Guarded    Recommended Labs or Other Treatments After Discharge: Please check Vancomycin levels prior to HD on wednesday    Physician Certification: I certify the above information and transfer of Richard Conn  is necessary for the continuing treatment of the diagnosis listed and that he requires [facility] for greater 30 days.      Update Admission H&P: No change in H&P    PHYSICIAN SIGNATURE:  Electronically signed by Lanny Tay MD on 3/20/23 at 12:47 PM EDT

## 2023-03-06 NOTE — PROGRESS NOTES
[facility]  consulted by Dr. Tatiana Miranda for monitoring and adjustment. Indication for treatment: Vancomycin indication: CAP with risk factors  Goal pre-HD level: 21-24 mcg/mL  Goal trough: Trough Goal: 15-20 mcg/mL      Risk Factors for MRSA Identified:   Hospitalization within the past 90 days, Received IV antibiotics within the past 90 days, Patient on hemodialysis    Pertinent Laboratory Values:   Temp Readings from Last 3 Encounters:   03/06/23 97 °F (36.1 °C)   02/24/23 98.2 °F (36.8 °C) (Oral)   02/17/23 97.5 °F (36.4 °C) (Oral)     Recent Labs     03/05/23  1554 03/06/23  0537   WBC 6.5 6.2       Recent Labs     03/05/23  1554 03/06/23  0537   BUN 84* 87*   CREATININE 4.7* 5.2*       Estimated Creatinine Clearance: 18 mL/min (A) (based on SCr of 5.2 mg/dL (H)). No intake or output data in the 24 hours ending 03/06/23 1134    Pertinent Cultures:   Date    Source    Results  3/05               Blood    Pending  3/05               Sputum/Respiratory  In process  3/05               Resp panel PCR  Negative  3/05               MRSA Nasal   Pending  3/05               Strep pneumo/Legionella Pending      Vancomycin level:   TROUGH:  No results for input(s): VANCOTROUGH in the last 72 hours. RANDOM:    Recent Labs     03/06/23  0537   VANCORANDOM 23.0       Assessment:  HPI: 69 yo male with pmh of ESRD on HD, atrial fibrillation, COPD, diabetes, GERD, hyperlipidemia and hypothyroidism who presented to the ED with increased coughing and phlegm production. Patient reported that the color of his phlegm has changed to brown and he has difficulty breathing. Chest x-ray shows right plural effusion with right basilar opacity suggesting atelectasis or infection.   SCr, BUN, and urine output: ESRD on HD  Dialyzes on Mondays, Wednesdays and Fridays - Pt ordered to receive dialysis today  Day(s) of therapy: 1 of 7  Vancomycin concentration:  3/6 - 23, pre-HD level post initial 2000mg dose last night @20:31, ok to re-dose    Plan:  Intermittent dosing based on levels due to ESRD/HD  Vanco level at 23 post initial vanco 2000mg IVPB dose last night, ok to re-dose. Give vancomycin 1500mg ivpb x1 dose after dialysis today. Recheck the vanco level pre-HD on Wednesday. Pharmacy will continue to monitor patient and adjust therapy as indicated    [place]edar 3 3/8 @06:00    Thank you for the consult. Sherice Gonzalez, [facility]  3/6/2023 11:34 AM

## 2023-03-06 NOTE — PROGRESS NOTES
Physician Progress Note      PATIENT:               Gwendolyn Michael  CSN #:                  [id number]  :                       1950  ADMIT DATE:       3/5/2023 3:32 PM  [address] [place] DATE:  RESPONDING  PROVIDER #:        Ayaz Dutton MD          QUERY TEXT:    Pt admitted with pneumonia, COPD. Pt noted to have labored breathing   documented, tachypnea. If possible, please document in the progress notes and   discharge summary if you are evaluating and/or treating any of the following: The medical record reflects the following:  Risk Factors: COPD, pneumonia, trach status  Clinical Indicators: Resp 24. Sat 82% on RA. On 6L via trach mask. Treatment: continuous pulse oximetry, VBG's, mucomyst, duonebs    JOSE SimonsN, RN, [facility], [place], operated by [facility]  Clinical   [phone]  Options provided:  -- Acute on chronic respiratory failure with hypoxia  -- Chronic respiratory failure with hypoxia  -- Other - I will add my own diagnosis  -- Disagree - Not applicable / Not valid  -- Disagree - Clinically unable to determine / Unknown  -- Refer to Clinical Documentation Reviewer    PROVIDER RESPONSE TEXT:    This patient is in acute on chronic respiratory failure with hypoxia.     Query created by: Milagro Cyr on 3/6/2023 12:11 PM      Electronically signed by:  Ayaz Dutton MD 3/6/2023 12:47 PM

## 2023-03-06 NOTE — ED NOTES
ED TO INPATIENT SBAR HANDOFF    Patient Name: Jae Bell   :  1950  68 y.o. MRN:  [id number]  Preferred Name    ED Room #:  ED17/ED-17  Family/Caregiver Present no   Restraints no   Sitter no   Sepsis Risk Score Sepsis Risk Score: 4.4    Situation  Code Status: Prior No additional code details. Allergies: Patient has no known allergies. Weight: Patient Vitals for the past 96 hrs (Last 3 readings):   Weight   23 1815 275 lb (124.7 kg)     Arrived from: home  Chief Complaint:   Chief Complaint   Patient presents with   [facility] Problem/Diagnosis:  Active Problems:    * No active hospital problems. *  Resolved Problems:    * No resolved hospital problems.  *    Imaging:   XR CHEST PORTABLE   Final Result   Right pleural effusion with right basilar opacity suggesting atelectasis or   infection           Abnormal labs:   Abnormal Labs Reviewed   CBC WITH AUTO DIFFERENTIAL - Abnormal; Notable for the following components:       Result Value    RBC 2.72 (*)     Hemoglobin 9.8 (*)     Hematocrit 30.2 (*)     .0 (*)     MCH 36.0 (*)     RDW 16.3 (*)     Lymphocytes % 19.4 (*)     Monocytes % 7.7 (*)     Eosinophils % 6.8 (*)     Basophils % 1.1 (*)     Immature Neutrophil % 1.1 (*)     All other components within normal limits   COMPREHENSIVE METABOLIC PANEL - Abnormal; Notable for the following components:    Sodium 128 (*)     Chloride 90 (*)     BUN 84 (*)     Creatinine 4.7 (*)     Est, Glom Filt Rate 12 (*)     Glucose 219 (*)     Total Protein 6.0 (*)     Alkaline Phosphatase 224 (*)     All other components within normal limits   TROPONIN - Abnormal; Notable for the following components:    Troponin T 0.638 (*)     All other components within normal limits   BRAIN NATRIURETIC PEPTIDE - Abnormal; Notable for the following components:    Pro-BNP 6,466 (*)     All other components within normal limits   PROTIME/INR & PTT - Abnormal; Notable for the following components:

## 2023-03-06 NOTE — PROGRESS NOTES
RENAL DOSE ADJUSTMENT MADE PER P/T PROTOCOL    PREVIOUS ORDER:  Cefepime 2 g q8h    Estimated Creatinine Clearance: 20 mL/min (A) (based on SCr of 4.7 mg/dL (H)).   Recent Labs     03/05/23  1554   BUN 84*   CREATININE 4.7*      INR 3.33     NEW RENALLY ADJUSTED ORDER:  Indication: Pneumonia   ESRD on HD    Cefepime 1 g q24h    DIOGO Shay [facility]  3/6/2023 12:38 AM

## 2023-03-06 NOTE — PROGRESS NOTES
Pt admitted to 2003 at his time. Skin issues are foot and coccyx that have been previously documented as well as BLE whitaker. No new issues noted at this time. Skin assessed with Randee Morin RN. Pt calm and cooperative, alert and oriented, no Sob, not currently c/o pain. Resting in comfortable position with call light in reach. No belongings noted.

## 2023-03-06 NOTE — PROGRESS NOTES
monitor patient and adjust therapy as indicated    VANCOMYCIN CONCENTRATION SCHEDULED FOR 3/06 @0600    Thank you for the consult.   Fredy Pedraza, [facility]  3/6/2023 12:50 AM

## 2023-03-06 NOTE — PROGRESS NOTES
V2.0  [facility] Hospitalist Progress Note      Name:  Soledad Nice /Age/Sex: 1950  (68 y.o. male)   MRN & CSN:  [id number] & [id number] Encounter Date/Time: 3/6/2023 9:03 AM EST    Location:  -A PCP: Ida Koyanagi, [address] [facility] Day: 2    Assessment and Plan:   Soledad Nice is a 68 y.o. male with hypoxia      Plan: Diet consult for Tfs via PEG tube. Trach cannula of appropriate size needs to be found.      #Healthcare acquired pneumonia  #Possible parapneumonic effusion  -1 day of increased sputum production which has changed in color to brownish with labored breathing  -No leukocytosis, normal lactic acid level  -Chest x-ray showing right pleural effusion with right basilar opacity suggesting atelectasis or infection  -negative resp PCR     Plan:  MRSA screen  Telemetry monitoring  Continue cefepime, vancomycin  IR consulted for thoracentesis  Strep pneumoniae and Legionella urinary antigens  Tracheal aspirate culture sent by ED, follow-up  Follow-up blood cultures  Pulmonary toilet with Mucomyst     #A-fib with RVR-resolved  -Takes warfarin and metoprolol 25 mg twice daily     Plan  Continue monitoring on telemetry  Holding warfarin for now as patient is supratherapeutic, will consult pharmacy to dose  Continue metoprolol     #Elevated troponin, likely in the setting of demand ischemia  -trop 0.638  (baseline seems to be ~0.2)  -no chest pain  -EKG without acute ischemic changes     Plan:  Continue trending troponin     #COPD, not in exacerbation  duonebs     #Type 2 diabetes  -Takes lantus 25U nightly +sliding scale  Start lantus 10U nightly +sliding scale     #GERD  Continue lansoprazole      #Hypothyroidism  Continue levothyroxine     #HLD  Continue statin     #Recent admission for hypoxic resp failure due to pleural effusions and atelectasis due to mucous plugging and possible HAP  -    Ppx: Coumadin  Dispo: inpatient    Subjective:     Chief Complaint: Other Patient is without complaints. No adverse interval events. Patient has no voice 2/2 trach without passy valve. Nods yes and no appropriately. Review of Systems:    Review of Systems    10 point ROS negative except as stated above in \"subjective\" section    Objective:   No intake or output data in the 24 hours ending 03/06/23 0903     Vitals:   Vitals:    03/06/23 0733   BP: 124/67   Pulse: 87   Resp: 15   Temp: 97 °F (36.1 °C)   SpO2: 94%       Physical Exam:     General: NAD  Eyes: EOMI  ENT: neck supple, trach collar  Cardiovascular: Regular rate. Respiratory: Diffusely coarse lung sounds  Gastrointestinal: Soft, non tender, PEG tube   Genitourinary: no suprapubic tenderness  Musculoskeletal: 1+ edema  Skin: warm, dry  Neuro: Alert. Psych: Mood appropriate.      Medications:   Medications:    sodium chloride flush  5-40 mL IntraVENous 2 times per day    ipratropium-albuterol  1 ampule Inhalation Q4H WA    aspirin  81 mg Per NG tube Daily    atorvastatin  40 mg Oral Nightly    insulin glargine  10 Units SubCUTAneous Nightly    insulin lispro  0-4 Units SubCUTAneous TID WC    insulin lispro  0-4 Units SubCUTAneous Nightly    lansoprazole  30 mg Oral QAM AC    levothyroxine  100 mcg Per G Tube Daily    metoprolol tartrate  25 mg Per G Tube BID    midodrine  5 mg Oral TID WC    QUEtiapine  50 mg Oral Nightly    traZODone  100 mg Oral Nightly    cefepime  1,000 mg IntraVENous Q24H    vancomycin (VANCOCIN) intermittent dosing (placeholder)   Other RX Placeholder    acetylcysteine  600 mg Inhalation BID    warfarin placeholder: dosing by pharmacy   Other RX Placeholder      Infusions:    sodium chloride       PRN Meds: sodium chloride flush, 5-40 mL, PRN  sodium chloride, , PRN  ondansetron, 4 mg, Q8H PRN   Or  ondansetron, 4 mg, Q6H PRN  polyethylene glycol, 17 g, Daily PRN  acetaminophen, 650 mg, Q6H PRN   Or  acetaminophen, 650 mg, Q6H PRN  sennosides-docusate sodium, 2 tablet, Daily PRN        Labs

## 2023-03-06 NOTE — PROCEDURES
87*   CREATININE 4.7* 5.2*   GLUCOSE 219* 134*     IV Drips and Rate/Dose   sodium chloride        Safety - Before each treatment:   Dialysis Machine No.: [id number]  RO Machine Number: [id number]  Dialyzer Lot No.: [id number]  RO Machine Log Sheet Completed: Yes  Machine Alarm Self Test: Passed; Completed (03/06/23 1157)     Air Foam Detector: Tested, Proper Function  Extracorporeal Circuit Tested for Integrity: Yes  Machine Conductivity: 14.1  Manual Conductivity: 13.8     Bicarbonate Concentrate Lot No.: [id number]  Acid Concentrate Lot No.: [id number]  Manual Ph: 7.4  Bleach Test (Neg): Yes  Bath Temperature: 95 °F (35 °C)  Tubing Lot#: [id number]  Conductivity Meter Serial #: [id number]  All Connections Secure?: Yes  Venous Parameters Set?: Yes  Arterial Parameters Set?: Yes  Saline Line Double Clamped?: Yes  Air Foam Detector Engaged?: Yes  Machine Functioning Alarm Free?  Yes  Prime Given: 200ml    Chlorine Testing - Before each treatment and every 4 hours:   Treatment  Time On: 1205  Time Off: 1454  Treatment Goal: 2 liters  Weight: 267 lb 10.2 oz (121.4 kg) (03/06/23 0600)  1st check: less than 0.1 ppm at: 1054 hours  2nd check: less than 0.1 ppm at: 1430  (if greater than 0.1 ppm, then check every 30 minutes from secondary)    Access Flows and Pressures  Patient Vitals for the past 8 hrs:   Blood Flow Rate (ml/min) Ultrafiltration Rate (ml/hr) Arterial Pressure (mmHg) Venous Pressure (mmHg) TMP DFR Comments Access Visible   03/06/23 1210 350 ml/min 770 ml/hr -110 mmHg 150 40 467 on circ per policy, lines secure Yes   03/06/23 1245 350 ml/min 770 ml/hr -130 mmHg 150 40 700 vss Yes   03/06/23 1300 350 ml/min 770 ml/hr -130 mmHg 150 40 700 resting, vss Yes   03/06/23 1315 350 ml/min 770 ml/hr -130 mmHg 150 40 700 vss Yes   03/06/23 1330 350 ml/min 770 ml/hr -130 mmHg 150 40 700 lines secure Yes   03/06/23 1345 350 ml/min 770 ml/hr -130 mmHg 150 40 700 vss Yes   03/06/23 1400 350 ml/min 770 ml/hr -140 mmHg 150 40 700 lines secure Yes   03/06/23 1415 350 ml/min 770 ml/hr -140 mmHg 150 40 700 vss Yes   03/06/23 1430 350 ml/min 770 ml/hr -130 mmHg 160 40 700 lines secure, dressing changed Yes   03/06/23 1445 350 ml/min 770 ml/hr -140 mmHg 160 40 700 vss Yes     Vital Signs  Patient Vitals for the past 8 hrs:   BP Temp Pulse Resp SpO2 Height   03/06/23 0711 -- -- -- -- 98 % --   03/06/23 0733 124/67 97 °F (36.1 °C) 87 15 94 % --   03/06/23 0944 124/67 -- 96 -- -- --   03/06/23 1124 -- -- -- -- 98 % --   03/06/23 1128 -- -- -- -- -- 6' 2\" (1.88 m)   03/06/23 1157 (!) 96/47 -- 90 13 95 % --   03/06/23 1210 (!) 101/47 -- 80 17 94 % --   03/06/23 1215 (!) 111/59 -- 82 12 -- --   03/06/23 1245 98/85 -- 88 14 -- --   03/06/23 1300 (!) 97/49 -- 93 16 -- --   03/06/23 1315 110/70 -- (!) 114 13 -- --   03/06/23 1330 98/64 -- (!) 106 22 -- --   03/06/23 1345 (!) 90/59 -- (!) 111 16 -- --   03/06/23 1400 (!) 82/53 -- (!) 108 14 -- --   03/06/23 1415 93/65 -- (!) 111 14 -- --   03/06/23 1430 (!) 84/50 -- (!) 108 16 -- --   03/06/23 1445 (!) 90/55 -- (!) 124 18 -- --   03/06/23 1454 102/63 -- (!) 123 21 -- --     Post-Dialysis  Arterial Catheter Locking Solution: normal saline    Venous Catheter Locking Solution: normal saline  Post-Treatment Procedures: Blood returned, Catheter capped, clamped and heparinized x 2 ports  Machine Disinfection Process: Exterior Machine Disinfection  Rinseback Volume (ml): 200 ml  Blood Volume Processed (Liters): 55.4 l/min  Dialyzer Clearance: Lightly streaked  Duration of Treatment (minutes): 170 minutes     Hemodialysis Intake (ml): 300 ml  Hemodialysis Output (ml): 2200 ml     Tolerated Treatment: Good  Patient Response to Treatment: tolerates tx with only 1.9 removed, off 10 min early due to increased HR  Physician Notified: No       Provider Notification        Handoff complete and report given to Primary RN at 1458 hours. Primary RN (First Initial, Last Name, Title):   Jeff Vela RN     Education  Person Educated: Patient

## 2023-03-07 ENCOUNTER — APPOINTMENT (OUTPATIENT)
Dept: INTERVENTIONAL RADIOLOGY/VASCULAR | Age: 73
DRG: 177 | End: 2023-03-07
Payer: COMMERCIAL

## 2023-03-07 LAB
ALBUMIN SERPL-MCNC: 3.4 GM/DL (ref 3.4–5)
ANION GAP SERPL CALCULATED.3IONS-SCNC: 12 MMOL/L (ref 4–16)
APTT: 42.7 SECONDS (ref 25.1–37.1)
BASOPHILS ABSOLUTE: 0.1 K/CU MM
BASOPHILS RELATIVE PERCENT: 1.1 % (ref 0–1)
BUN SERPL-MCNC: 48 MG/DL (ref 6–23)
CALCIUM SERPL-MCNC: 8.1 MG/DL (ref 8.3–10.6)
CHLORIDE BLD-SCNC: 101 MMOL/L (ref 99–110)
CO2: 24 MMOL/L (ref 21–32)
CREAT SERPL-MCNC: 3.8 MG/DL (ref 0.9–1.3)
CULTURE: NORMAL
DIFFERENTIAL TYPE: ABNORMAL
EKG ATRIAL RATE: 122 BPM
EKG DIAGNOSIS: NORMAL
EKG Q-T INTERVAL: 318 MS
EKG QRS DURATION: 152 MS
EKG QTC CALCULATION (BAZETT): 430 MS
EKG R AXIS: -72 DEGREES
EKG T AXIS: 56 DEGREES
EKG VENTRICULAR RATE: 110 BPM
EOSINOPHILS ABSOLUTE: 0.5 K/CU MM
EOSINOPHILS RELATIVE PERCENT: 8.8 % (ref 0–3)
GFR SERPL CREATININE-BSD FRML MDRD: 16 ML/MIN/1.73M2
GLUCOSE BLD-MCNC: 112 MG/DL (ref 70–99)
GLUCOSE BLD-MCNC: 113 MG/DL (ref 70–99)
GLUCOSE BLD-MCNC: 118 MG/DL (ref 70–99)
GLUCOSE BLD-MCNC: 124 MG/DL (ref 70–99)
GLUCOSE SERPL-MCNC: 100 MG/DL (ref 70–99)
GRAM SMEAR: NORMAL
HCT VFR BLD CALC: 27.1 % (ref 42–52)
HEMOGLOBIN: 8.9 GM/DL (ref 13.5–18)
IMMATURE NEUTROPHIL %: 0.5 % (ref 0–0.43)
INR BLD: 3.6 INDEX
INR BLD: 3.61 INDEX
LACTATE DEHYDROGENASE: 81 IU/L (ref 120–246)
LYMPHOCYTES ABSOLUTE: 1.3 K/CU MM
LYMPHOCYTES RELATIVE PERCENT: 20.9 % (ref 24–44)
Lab: NORMAL
MCH RBC QN AUTO: 37.1 PG (ref 27–31)
MCHC RBC AUTO-ENTMCNC: 32.8 % (ref 32–36)
MCV RBC AUTO: 112.9 FL (ref 78–100)
MONOCYTES ABSOLUTE: 0.5 K/CU MM
MONOCYTES RELATIVE PERCENT: 8.2 % (ref 0–4)
NUCLEATED RBC %: 0 %
PDW BLD-RTO: 16.2 % (ref 11.7–14.9)
PHOSPHORUS: 4.2 MG/DL (ref 2.5–4.9)
PLATELET # BLD: 203 K/CU MM (ref 140–440)
PMV BLD AUTO: 9.6 FL (ref 7.5–11.1)
POTASSIUM SERPL-SCNC: 4.2 MMOL/L (ref 3.5–5.1)
PROCALCITONIN SERPL-MCNC: 0.32 NG/ML
PROTHROMBIN TIME: 47.1 SECONDS (ref 11.7–14.5)
PROTHROMBIN TIME: 47.2 SECONDS (ref 11.7–14.5)
RBC # BLD: 2.4 M/CU MM (ref 4.6–6.2)
SEGMENTED NEUTROPHILS ABSOLUTE COUNT: 3.7 K/CU MM
SEGMENTED NEUTROPHILS RELATIVE PERCENT: 60.5 % (ref 36–66)
SODIUM BLD-SCNC: 137 MMOL/L (ref 135–145)
SPECIMEN: NORMAL
TOTAL IMMATURE NEUTOROPHIL: 0.03 K/CU MM
TOTAL NUCLEATED RBC: 0 K/CU MM
TROPONIN T: 0.82 NG/ML
WBC # BLD: 6.1 K/CU MM (ref 4–10.5)

## 2023-03-07 PROCEDURE — 87081 CULTURE SCREEN ONLY: CPT

## 2023-03-07 PROCEDURE — 84484 ASSAY OF TROPONIN QUANT: CPT

## 2023-03-07 PROCEDURE — 93010 ELECTROCARDIOGRAM REPORT: CPT | Performed by: INTERNAL MEDICINE

## 2023-03-07 PROCEDURE — 94761 N-INVAS EAR/PLS OXIMETRY MLT: CPT

## 2023-03-07 PROCEDURE — 85025 COMPLETE CBC W/AUTO DIFF WBC: CPT

## 2023-03-07 PROCEDURE — 2580000003 HC RX 258: Performed by: INTERNAL MEDICINE

## 2023-03-07 PROCEDURE — 85730 THROMBOPLASTIN TIME PARTIAL: CPT

## 2023-03-07 PROCEDURE — 2700000000 HC OXYGEN THERAPY PER DAY

## 2023-03-07 PROCEDURE — 94640 AIRWAY INHALATION TREATMENT: CPT

## 2023-03-07 PROCEDURE — 85610 PROTHROMBIN TIME: CPT

## 2023-03-07 PROCEDURE — 6370000000 HC RX 637 (ALT 250 FOR IP): Performed by: INTERNAL MEDICINE

## 2023-03-07 PROCEDURE — 2060000000 HC ICU INTERMEDIATE R&B

## 2023-03-07 PROCEDURE — 6360000002 HC RX W HCPCS: Performed by: INTERNAL MEDICINE

## 2023-03-07 PROCEDURE — 84145 PROCALCITONIN (PCT): CPT

## 2023-03-07 PROCEDURE — 82962 GLUCOSE BLOOD TEST: CPT

## 2023-03-07 PROCEDURE — 83615 LACTATE (LD) (LDH) ENZYME: CPT

## 2023-03-07 PROCEDURE — 80069 RENAL FUNCTION PANEL: CPT

## 2023-03-07 PROCEDURE — 36415 COLL VENOUS BLD VENIPUNCTURE: CPT

## 2023-03-07 PROCEDURE — 6360000002 HC RX W HCPCS

## 2023-03-07 RX ADMIN — MIDODRINE HYDROCHLORIDE 5 MG: 5 TABLET ORAL at 17:24

## 2023-03-07 RX ADMIN — QUETIAPINE FUMARATE 50 MG: 25 TABLET ORAL at 21:03

## 2023-03-07 RX ADMIN — ACETYLCYSTEINE 600 MG: 200 SOLUTION ORAL; RESPIRATORY (INHALATION) at 08:19

## 2023-03-07 RX ADMIN — ASPIRIN 81 MG CHEWABLE TABLET 81 MG: 81 TABLET CHEWABLE at 09:14

## 2023-03-07 RX ADMIN — TRAZODONE HYDROCHLORIDE 100 MG: 50 TABLET ORAL at 21:04

## 2023-03-07 RX ADMIN — ATORVASTATIN CALCIUM 40 MG: 40 TABLET, FILM COATED ORAL at 21:03

## 2023-03-07 RX ADMIN — METOPROLOL TARTRATE 25 MG: 25 TABLET, FILM COATED ORAL at 21:03

## 2023-03-07 RX ADMIN — ACETYLCYSTEINE 600 MG: 200 SOLUTION ORAL; RESPIRATORY (INHALATION) at 19:35

## 2023-03-07 RX ADMIN — LEVOTHYROXINE SODIUM 100 MCG: 0.1 TABLET ORAL at 05:28

## 2023-03-07 RX ADMIN — SODIUM CHLORIDE, PRESERVATIVE FREE 10 ML: 5 INJECTION INTRAVENOUS at 09:20

## 2023-03-07 RX ADMIN — LANSOPRAZOLE 30 MG: KIT at 05:28

## 2023-03-07 RX ADMIN — CEFEPIME HYDROCHLORIDE 1000 MG: 1 INJECTION, POWDER, FOR SOLUTION INTRAMUSCULAR; INTRAVENOUS at 20:58

## 2023-03-07 RX ADMIN — MIDODRINE HYDROCHLORIDE 5 MG: 5 TABLET ORAL at 12:16

## 2023-03-07 RX ADMIN — IPRATROPIUM BROMIDE AND ALBUTEROL SULFATE 1 AMPULE: 2.5; .5 SOLUTION RESPIRATORY (INHALATION) at 19:34

## 2023-03-07 RX ADMIN — IPRATROPIUM BROMIDE AND ALBUTEROL SULFATE 1 AMPULE: 2.5; .5 SOLUTION RESPIRATORY (INHALATION) at 08:19

## 2023-03-07 RX ADMIN — SODIUM CHLORIDE, PRESERVATIVE FREE 10 ML: 5 INJECTION INTRAVENOUS at 21:05

## 2023-03-07 RX ADMIN — MIDODRINE HYDROCHLORIDE 5 MG: 5 TABLET ORAL at 09:22

## 2023-03-07 NOTE — CARE COORDINATION
Chart reviewed. CM met with the patient and brother at bedside. The brother and patient state that the plan is to return to  [facility] at discharge, however, they are not paying for bed hold days at this time. The brother is going to call and speak with [address] [place] [address] about potentially paying for bed hold days if needed. He states the best plan would be if the patient could go to [place] for an evaluation with neuro surgery but that plan may not happen. The brother states that the patient keeps aspirating on his fluids that he swallows and they cannot keep ahead of the infections in his lungs long enough to fix the issue with him making too much mucus. He states that the patient has a house that the brother is maintaining in hopes that the patient will return to it. He understands that to qualify for medicaid, the patient must spend down all assets to less then $2000. He states at this time, medistacy is paying for skilled days at [address] [place] [address]. He states they were going to stop paying, but the patient appealed his discharge x2 and the doctor talked the insurance and the appeal was overturned and continue to pay for a skilled stay at [address] [place] [address]. The patient is on HD so he feels [facility] are his only options and he likes [facility] more then [facility]. CRIS called Patricia to update on patients plan and left a VM. At this time the plan is to return to  [facility], but the patient will need a precert. 1513  CRIS talked to [facility]. She states she has left messages for the patient's brother about the option to place the patient on a bedhold. She talks to him about that option every time the patient comes to the hospital. She states he will need therapy and a precert to return.

## 2023-03-07 NOTE — PROGRESS NOTES
PHARMACY ANTICOAGULATION MONITORING SERVICE    Anahi Hodgson is a 68 y.o. male on warfarin therapy for atrial fibrillation. Pharmacy consulted by Dr. Ana Monroe for monitoring and adjustment of treatment. Indication for anticoagulation: A-Fib  INR goal: 2-3  Warfarin dose prior to admission: 1 mg daily (dose used during recent admission)    Pertinent Laboratory Values   Recent Labs     03/05/23  1554 03/06/23  0537 03/07/23  0259 03/07/23  0632   INR 3.33 3.87   < > 3.61   HGB 9.8* 9.7*  --   --    HCT 30.2* 30.0*  --   --     203  --   --     < > = values in this interval not displayed. INR MONITORING  Recent Labs     03/05/23  1554 03/06/23  0537 03/07/23  0259 03/07/23  0632   INR 3.33 3.87 3.60 3.61       Assessment/Plan:  Drug Interactions:   Home meds: Aspirin, levothyroxine, trazodone  Patient started on cefepime, which may increase warfarin effects  INR supra-therapeutic on admission then up to 3.87 yesterday. INR now slightly down to 3.61 today. No warfarin doses given so far this encounter. HGB appears stable, noted ESRD on HD. Continue to hold warfarin until INR is back down within therapeutic range  Pharmacy will continue to monitor and adjust warfarin therapy as indicated    Thank you for the consult. Deya Velazquez, [facility]  3/7/2023 7:45 AM

## 2023-03-07 NOTE — PROGRESS NOTES
Patients INR is 3.61 this am. INR is still too high to safely do thoracentesis. Nurse Rayna RN also called and stated that patients heart rate is in the 130s. IR will hold off on doing thoracentesis until patient is more clinically stable.

## 2023-03-07 NOTE — PROGRESS NOTES
Nephrology Progress Note  3/7/2023 8:44 AM  Subjective: Interval History: Efrain Guardado is a 68 y.o. male with increased secretion trach site weak in bed had dialysis yesterday thoracentesis today and discussed with patient's brother about options of care as well and he is aware and understanding. Data:   Scheduled Meds:   sodium chloride flush  5-40 mL IntraVENous 2 times per day    ipratropium-albuterol  1 ampule Inhalation Q4H WA    aspirin  81 mg Per NG tube Daily    atorvastatin  40 mg Oral Nightly    insulin glargine  10 Units SubCUTAneous Nightly    insulin lispro  0-4 Units SubCUTAneous TID WC    insulin lispro  0-4 Units SubCUTAneous Nightly    lansoprazole  30 mg Oral QAM AC    levothyroxine  100 mcg Per G Tube Daily    metoprolol tartrate  25 mg Per G Tube BID    midodrine  5 mg Oral TID WC    QUEtiapine  50 mg Oral Nightly    traZODone  100 mg Oral Nightly    cefepime  1,000 mg IntraVENous Q24H    vancomycin (VANCOCIN) intermittent dosing (placeholder)   Other RX Placeholder    acetylcysteine  600 mg Inhalation BID    warfarin placeholder: dosing by pharmacy   Other RX Placeholder     Continuous Infusions:   sodium chloride 25 mL (03/06/23 2042)         CBC   Recent Labs     03/05/23  1554 03/06/23  0537   WBC 6.5 6.2   HGB 9.8* 9.7*   HCT 30.2* 30.0*    203      BMP   Recent Labs     03/05/23  1554 03/06/23  0537   * 134*   K 4.7 4.5   CL 90* 94*   CO2 28 25   BUN 84* 87*   CREATININE 4.7* 5.2*     Hepatic:   Recent Labs     03/05/23  1554   AST 15   ALT 16   BILITOT 0.3   ALKPHOS 224*     Troponin: No results for input(s): TROPONINI in the last 72 hours. BNP: No results for input(s): BNP in the last 72 hours. Lipids: No results for input(s): CHOL, HDL in the last 72 hours.     Invalid input(s): LDLCALCU  ABGs:   Lab Results   Component Value Date/Time    PO2ART 70.3 09/24/2022 11:49 AM    OSE2VJN 35.5 09/24/2022 11:49 AM     INR:   Recent Labs     03/06/23  0537 03/07/23  0259 03/07/23  0632   INR 3.87 3.60 3.61     Renal Labs  Albumin:    Lab Results   Component Value Date/Time    LABALBU 3.5 03/05/2023 03:54 PM     Calcium:    Lab Results   Component Value Date/Time    CALCIUM 8.0 03/06/2023 05:37 AM     Phosphorus:    Lab Results   Component Value Date/Time    PHOS 3.7 02/15/2023 03:15 AM     U/A:    Lab Results   Component Value Date/Time    NITRU NEGATIVE 02/13/2023 12:15 AM    COLORU YELLOW 02/13/2023 12:15 AM    PHUR 6.5 06/30/2021 02:11 PM    WBCUA 67 02/13/2023 12:15 AM    RBCUA 37 02/13/2023 12:15 AM    MUCUS RARE 09/17/2022 06:21 PM    TRICHOMONAS NONE SEEN 02/13/2023 12:15 AM    BACTERIA NEGATIVE 02/13/2023 12:15 AM    CLARITYU CLEAR 02/13/2023 12:15 AM    SPECGRAV 1.025 02/13/2023 12:15 AM    UROBILINOGEN 0.2 02/13/2023 12:15 AM    BILIRUBINUR SMALL NUMBER OR AMOUNT OBSERVED 02/13/2023 12:15 AM    BILIRUBINUR small 06/30/2021 02:11 PM    BLOODU MODERATE NUMBER OR AMOUNT OBSERVED 02/13/2023 12:15 AM    GLUCOSEU neg 06/30/2021 02:11 PM    KETUA TRACE 02/13/2023 12:15 AM     ABG:    Lab Results   Component Value Date/Time    FLF2VLS 35.5 09/24/2022 11:49 AM    PO2ART 70.3 09/24/2022 11:49 AM    KKJ4MDQ 21.2 09/24/2022 11:49 AM     HgBA1c:    Lab Results   Component Value Date/Time    LABA1C 7.0 02/12/2023 10:53 PM     Microalbumen/Creatinine ratio:  No components found for: RUCREAT  TSH:    Lab Results   Component Value Date/Time    TSH 1.66 04/03/2018 11:33 AM     IRON:    Lab Results   Component Value Date/Time    IRON 60 02/13/2023 10:39 AM     Iron Saturation:  No components found for: PERCENTFE  TIBC:    Lab Results   Component Value Date/Time    TIBC 229 02/13/2023 10:39 AM     FERRITIN:    Lab Results   Component Value Date/Time    FERRITIN 1,063 02/13/2023 10:39 AM     RPR:  No results found for: RPR  YAYA:  No results found for: ANATITER, YAYA  24 Hour Urine for Creatinine Clearance:  No components found for: CREAT4, UHRS10, UTV10      Objective:   I/O:

## 2023-03-07 NOTE — PLAN OF CARE
Problem: Skin/Tissue Integrity  Goal: Absence of new skin breakdown  Description: 1. Monitor for areas of redness and/or skin breakdown  2. Assess vascular access sites hourly  3. Every 4-6 hours minimum:  Change oxygen saturation probe site  4. Every 4-6 hours:  If on nasal continuous positive airway pressure, respiratory therapy assess nares and determine need for appliance change or resting period.   Outcome: Progressing     Problem: Chronic Conditions and Co-morbidities  Goal: Patient's chronic conditions and co-morbidity symptoms are monitored and maintained or improved  Outcome: Progressing  Flowsheets (Taken 3/6/2023 1950 by Carolyn Burr RN)  Care Plan - Patient's Chronic Conditions and Co-Morbidity Symptoms are Monitored and Maintained or Improved: Monitor and assess patient's chronic conditions and comorbid symptoms for stability, deterioration, or improvement

## 2023-03-07 NOTE — PROGRESS NOTES
Dr Capo Bennett paged via [facility] for new consult for jean willingham, came w [facility], may need another bronch.

## 2023-03-07 NOTE — PROGRESS NOTES
mitral and tricuspid insufficiencies, no pericardial effusion. H/O echocardiogram 09/21/2016    EF60% normal study    H/O echocardiogram 11/07/2018    EF55-60% no significant valular disease    H/O hiatal hernia     Hx of Venous Doppler 03/14/2019    Significant reflux in Left Deep System, No reflux in right lower extremity, No DVT or SVT    Hyperlipidemia     Hypertension     Obesity     S/P PTCA (percutaneous transluminal coronary angioplasty) 03/21/2005    s/p PTCA with 3.5 X 16 TAXUS stent to LAD- follows with Dr Des Jay    Sleep apnea     had sleep study done 10+ yrs ago- has cpap but does not use it    Thyroid disease     hypothyroid             Per charting, pt with inc HR at rest and elevated INR. Will defer therapy evals at this time and cont to follow for when medically appropriate to participate in therapy evals.            Electronically signed by:    SARAH Morin/L  License: [id number]  9/5/0516, 1:53 PM

## 2023-03-07 NOTE — PROGRESS NOTES
[address] [facility]  consulted by Dr. Mayelin Sullivan for monitoring and adjustment. Indication for treatment: Vancomycin indication: CAP with risk factors  Goal pre-HD level: 21-24 mcg/mL  Goal trough: Trough Goal: 15-20 mcg/mL      Risk Factors for MRSA Identified:   Hospitalization within the past 90 days, Received IV antibiotics within the past 90 days, Patient on hemodialysis    Pertinent Laboratory Values:   Temp Readings from Last 3 Encounters:   03/07/23 98 °F (36.7 °C) (Oral)   02/24/23 98.2 °F (36.8 °C) (Oral)   02/17/23 97.5 °F (36.4 °C) (Oral)     Recent Labs     03/05/23  1554 03/06/23  0537 03/07/23  0632   WBC 6.5 6.2 6.1       Recent Labs     03/05/23  1554 03/06/23  0537 03/07/23  0632   BUN 84* 87* 48*   CREATININE 4.7* 5.2* 3.8*       Estimated Creatinine Clearance: 25 mL/min (A) (based on SCr of 3.8 mg/dL (H)). Intake/Output Summary (Last 24 hours) at 3/7/2023 1208  Last data filed at 3/7/2023 0920  Gross per 24 hour   Intake 670 ml   Output 2200 ml   Net -1530 ml       Pertinent Cultures:   Date    Source    Results  3/05               Blood    No growth at 48 hours  3/05               Sputum/Respiratory  Final- normal amy  3/05               Resp panel PCR  Negative  3/05               MRSA Nasal   Never collected  3/05               Strep pneumo/Legionella Never collected      Vancomycin level:   TROUGH:  No results for input(s): VANCOTROUGH in the last 72 hours. RANDOM:    Recent Labs     03/06/23  0537   VANCORANDOM 23.0         Assessment:  HPI: 69 yo male with pmh of ESRD on HD, atrial fibrillation, COPD, diabetes, GERD, hyperlipidemia and hypothyroidism who presented to the ED with increased coughing and phlegm production. Patient reported that the color of his phlegm has changed to brown and he has difficulty breathing. Chest x-ray shows right plural effusion with right basilar opacity suggesting atelectasis or infection.   SCr, BUN, and urine output: ESRD on HD  Dialyzes on Mondays, Wednesdays and Fridays - Last dialysis Monday 3/6  Day(s) of therapy: 3 of 7  Vancomycin concentration:  3/6 - 23, pre-HD level post initial 2000mg dose last night @20:31, ok to re-dose    Plan:  Intermittent dosing based on levels due to ESRD/HD  Vanco level at 23 post initial vanco 2000mg IVPB dose last night, ok to re-dose. Give vancomycin 1500mg ivpb x1 dose after dialysis today. Recheck the vanco level pre-HD on Wednesday. Pharmacy will continue to monitor patient and adjust therapy as indicated    Kedar 3 3/8 @06:00    Thank you for the consult.   Ute Mathew, [facility]  3/7/2023 12:08 PM

## 2023-03-07 NOTE — PROGRESS NOTES
ipratropium-albuterol  1 ampule Inhalation Q4H WA    aspirin  81 mg Per NG tube Daily    atorvastatin  40 mg Oral Nightly    insulin glargine  10 Units SubCUTAneous Nightly    insulin lispro  0-4 Units SubCUTAneous TID WC    insulin lispro  0-4 Units SubCUTAneous Nightly    lansoprazole  30 mg Oral QAM AC    levothyroxine  100 mcg Per G Tube Daily    metoprolol tartrate  25 mg Per G Tube BID    midodrine  5 mg Oral TID WC    QUEtiapine  50 mg Oral Nightly    traZODone  100 mg Oral Nightly    cefepime  1,000 mg IntraVENous Q24H    vancomycin (VANCOCIN) intermittent dosing (placeholder)   Other RX Placeholder    acetylcysteine  600 mg Inhalation BID      Infusions:    sodium chloride 25 mL (03/06/23 2042)     PRN Meds: sodium chloride flush, 5-40 mL, PRN  sodium chloride, , PRN  ondansetron, 4 mg, Q8H PRN   Or  ondansetron, 4 mg, Q6H PRN  polyethylene glycol, 17 g, Daily PRN  acetaminophen, 650 mg, Q6H PRN   Or  acetaminophen, 650 mg, Q6H PRN  sennosides-docusate sodium, 2 tablet, Daily PRN      Labs           Imaging/Diagnostics Last 24 Hours   XR CHEST PORTABLE    Result Date: 3/5/2023  EXAMINATION: ONE XRAY VIEW OF THE CHEST 3/5/2023 4:11 pm COMPARISON: 02/22/2023 HISTORY: ORDERING SYSTEM PROVIDED HISTORY: cough with thick phlegm, tracheostomy status TECHNOLOGIST PROVIDED HISTORY: Reason for exam:->cough with thick phlegm, tracheostomy status Reason for Exam: cough with thick phlegm, tracheostomy status FINDINGS: Tracheostomy tube and right-sided central venous catheter remain in place. Heart size stable. Right pleural effusion. Right basilar opacity.      Right pleural effusion with right basilar opacity suggesting atelectasis or infection       Electronically signed by Bruce Hyatt MD on 3/7/2023 at 10:01 AM

## 2023-03-08 ENCOUNTER — APPOINTMENT (OUTPATIENT)
Dept: GENERAL RADIOLOGY | Age: 73
DRG: 177 | End: 2023-03-08
Payer: COMMERCIAL

## 2023-03-08 ENCOUNTER — APPOINTMENT (OUTPATIENT)
Dept: INTERVENTIONAL RADIOLOGY/VASCULAR | Age: 73
DRG: 177 | End: 2023-03-08
Payer: COMMERCIAL

## 2023-03-08 LAB
ALBUMIN SERPL-MCNC: 3.5 GM/DL (ref 3.4–5)
ANION GAP SERPL CALCULATED.3IONS-SCNC: 11 MMOL/L (ref 4–16)
APTT: 42.5 SECONDS (ref 25.1–37.1)
BASOPHILS ABSOLUTE: 0.1 K/CU MM
BASOPHILS RELATIVE PERCENT: 1.1 % (ref 0–1)
BUN SERPL-MCNC: 27 MG/DL (ref 6–23)
CALCIUM SERPL-MCNC: 8.3 MG/DL (ref 8.3–10.6)
CHLORIDE BLD-SCNC: 97 MMOL/L (ref 99–110)
CO2: 28 MMOL/L (ref 21–32)
CREAT SERPL-MCNC: 2.7 MG/DL (ref 0.9–1.3)
DIFFERENTIAL TYPE: ABNORMAL
EOSINOPHILS ABSOLUTE: 0.6 K/CU MM
EOSINOPHILS RELATIVE PERCENT: 9.7 % (ref 0–3)
FLUID TYPE: NORMAL INDEX
GFR SERPL CREATININE-BSD FRML MDRD: 24 ML/MIN/1.73M2
GLUCOSE BLD-MCNC: 176 MG/DL (ref 70–99)
GLUCOSE BLD-MCNC: 179 MG/DL (ref 70–99)
GLUCOSE BLD-MCNC: 220 MG/DL (ref 70–99)
GLUCOSE BLD-MCNC: 239 MG/DL (ref 70–99)
GLUCOSE SERPL-MCNC: 192 MG/DL (ref 70–99)
GLUCOSE, FLUID: 197 MG/DL
HCT VFR BLD CALC: 28.1 % (ref 42–52)
HEMOGLOBIN: 9.2 GM/DL (ref 13.5–18)
IMMATURE NEUTROPHIL %: 0.5 % (ref 0–0.43)
INR BLD: 2.36 INDEX
INR BLD: 2.37 INDEX
LACTATE DEHYDROGENASE, FLUID: 60 IU/L
LACTATE DEHYDROGENASE: 85 IU/L (ref 120–246)
LYMPHOCYTES ABSOLUTE: 1.3 K/CU MM
LYMPHOCYTES RELATIVE PERCENT: 22.1 % (ref 24–44)
LYMPHOCYTES, BODY FLUID: 27 %
MCH RBC QN AUTO: 37.1 PG (ref 27–31)
MCHC RBC AUTO-ENTMCNC: 32.7 % (ref 32–36)
MCV RBC AUTO: 113.3 FL (ref 78–100)
MESOTHELIAL FLUID: 1 /100 WBC
MONOCYTE, FLUID: 52 %
MONOCYTES ABSOLUTE: 0.6 K/CU MM
MONOCYTES RELATIVE PERCENT: 10.2 % (ref 0–4)
NEUTROPHIL, FLUID: 20 %
NUCLEATED RBC %: 0 %
OTHER CELLS FLUID: 1
PDW BLD-RTO: 16 % (ref 11.7–14.9)
PHOSPHORUS: 2.9 MG/DL (ref 2.5–4.9)
PLATELET # BLD: 186 K/CU MM (ref 140–440)
PMV BLD AUTO: 9.5 FL (ref 7.5–11.1)
POTASSIUM SERPL-SCNC: 3.5 MMOL/L (ref 3.5–5.1)
PROTEIN FLUID: 2.8 G/DL
PROTHROMBIN TIME: 30.7 SECONDS (ref 11.7–14.5)
PROTHROMBIN TIME: 30.9 SECONDS (ref 11.7–14.5)
RBC # BLD: 2.48 M/CU MM (ref 4.6–6.2)
RBC FLUID: NORMAL /CU MM
SEGMENTED NEUTROPHILS ABSOLUTE COUNT: 3.2 K/CU MM
SEGMENTED NEUTROPHILS RELATIVE PERCENT: 56.4 % (ref 36–66)
SODIUM BLD-SCNC: 136 MMOL/L (ref 135–145)
TOTAL IMMATURE NEUTOROPHIL: 0.03 K/CU MM
TOTAL NUCLEATED RBC: 0 K/CU MM
TOTAL PROTEIN: 5.7 GM/DL (ref 6.4–8.2)
TROPONIN T: 0.76 NG/ML
WBC # BLD: 5.7 K/CU MM (ref 4–10.5)
WBC FLUID: 400 /CU MM

## 2023-03-08 PROCEDURE — 90935 HEMODIALYSIS ONE EVALUATION: CPT

## 2023-03-08 PROCEDURE — 85610 PROTHROMBIN TIME: CPT

## 2023-03-08 PROCEDURE — 6370000000 HC RX 637 (ALT 250 FOR IP): Performed by: INTERNAL MEDICINE

## 2023-03-08 PROCEDURE — 2709999900 IR GUIDED THORACENTESIS PLEURAL

## 2023-03-08 PROCEDURE — 80069 RENAL FUNCTION PANEL: CPT

## 2023-03-08 PROCEDURE — 84155 ASSAY OF PROTEIN SERUM: CPT

## 2023-03-08 PROCEDURE — 87070 CULTURE OTHR SPECIMN AEROBIC: CPT

## 2023-03-08 PROCEDURE — 84157 ASSAY OF PROTEIN OTHER: CPT

## 2023-03-08 PROCEDURE — 2700000000 HC OXYGEN THERAPY PER DAY

## 2023-03-08 PROCEDURE — 6370000000 HC RX 637 (ALT 250 FOR IP): Performed by: STUDENT IN AN ORGANIZED HEALTH CARE EDUCATION/TRAINING PROGRAM

## 2023-03-08 PROCEDURE — 2060000000 HC ICU INTERMEDIATE R&B

## 2023-03-08 PROCEDURE — 6360000002 HC RX W HCPCS: Performed by: INTERNAL MEDICINE

## 2023-03-08 PROCEDURE — 85730 THROMBOPLASTIN TIME PARTIAL: CPT

## 2023-03-08 PROCEDURE — 87205 SMEAR GRAM STAIN: CPT

## 2023-03-08 PROCEDURE — 2580000003 HC RX 258: Performed by: INTERNAL MEDICINE

## 2023-03-08 PROCEDURE — 82945 GLUCOSE OTHER FLUID: CPT

## 2023-03-08 PROCEDURE — 83615 LACTATE (LD) (LDH) ENZYME: CPT

## 2023-03-08 PROCEDURE — 82962 GLUCOSE BLOOD TEST: CPT

## 2023-03-08 PROCEDURE — 89220 SPUTUM SPECIMEN COLLECTION: CPT

## 2023-03-08 PROCEDURE — 89051 BODY FLUID CELL COUNT: CPT

## 2023-03-08 PROCEDURE — 71045 X-RAY EXAM CHEST 1 VIEW: CPT

## 2023-03-08 PROCEDURE — 32555 ASPIRATE PLEURA W/ IMAGING: CPT

## 2023-03-08 PROCEDURE — 0W993ZZ DRAINAGE OF RIGHT PLEURAL CAVITY, PERCUTANEOUS APPROACH: ICD-10-PCS | Performed by: RADIOLOGY

## 2023-03-08 PROCEDURE — 94761 N-INVAS EAR/PLS OXIMETRY MLT: CPT

## 2023-03-08 PROCEDURE — 6360000002 HC RX W HCPCS: Performed by: STUDENT IN AN ORGANIZED HEALTH CARE EDUCATION/TRAINING PROGRAM

## 2023-03-08 PROCEDURE — 36415 COLL VENOUS BLD VENIPUNCTURE: CPT

## 2023-03-08 PROCEDURE — 85025 COMPLETE CBC W/AUTO DIFF WBC: CPT

## 2023-03-08 PROCEDURE — 94640 AIRWAY INHALATION TREATMENT: CPT

## 2023-03-08 RX ORDER — INSULIN LISPRO 100 [IU]/ML
0-4 INJECTION, SOLUTION INTRAVENOUS; SUBCUTANEOUS EVERY 6 HOURS
Status: DISCONTINUED | OUTPATIENT
Start: 2023-03-09 | End: 2023-03-21 | Stop reason: HOSPADM

## 2023-03-08 RX ORDER — HEPARIN SODIUM 5000 [USP'U]/ML
5000 INJECTION, SOLUTION INTRAVENOUS; SUBCUTANEOUS EVERY 8 HOURS SCHEDULED
Status: DISCONTINUED | OUTPATIENT
Start: 2023-03-08 | End: 2023-03-09

## 2023-03-08 RX ORDER — DEXTROSE MONOHYDRATE 100 MG/ML
1000 INJECTION, SOLUTION INTRAVENOUS CONTINUOUS PRN
Status: DISCONTINUED | OUTPATIENT
Start: 2023-03-08 | End: 2023-03-21 | Stop reason: HOSPADM

## 2023-03-08 RX ADMIN — MIDODRINE HYDROCHLORIDE 5 MG: 5 TABLET ORAL at 07:57

## 2023-03-08 RX ADMIN — INSULIN GLARGINE 10 UNITS: 100 INJECTION, SOLUTION SUBCUTANEOUS at 20:33

## 2023-03-08 RX ADMIN — ACETYLCYSTEINE 600 MG: 200 SOLUTION ORAL; RESPIRATORY (INHALATION) at 08:36

## 2023-03-08 RX ADMIN — MIDODRINE HYDROCHLORIDE 5 MG: 5 TABLET ORAL at 17:17

## 2023-03-08 RX ADMIN — SODIUM CHLORIDE, PRESERVATIVE FREE 10 ML: 5 INJECTION INTRAVENOUS at 20:34

## 2023-03-08 RX ADMIN — IPRATROPIUM BROMIDE AND ALBUTEROL SULFATE 1 AMPULE: 2.5; .5 SOLUTION RESPIRATORY (INHALATION) at 16:10

## 2023-03-08 RX ADMIN — ATORVASTATIN CALCIUM 40 MG: 40 TABLET, FILM COATED ORAL at 20:23

## 2023-03-08 RX ADMIN — EPOETIN ALFA-EPBX 3000 UNITS: 3000 INJECTION, SOLUTION INTRAVENOUS; SUBCUTANEOUS at 14:00

## 2023-03-08 RX ADMIN — IPRATROPIUM BROMIDE AND ALBUTEROL SULFATE 1 AMPULE: 2.5; .5 SOLUTION RESPIRATORY (INHALATION) at 08:32

## 2023-03-08 RX ADMIN — QUETIAPINE FUMARATE 50 MG: 25 TABLET ORAL at 20:23

## 2023-03-08 RX ADMIN — HEPARIN SODIUM 5000 UNITS: 5000 INJECTION INTRAVENOUS; SUBCUTANEOUS at 22:36

## 2023-03-08 RX ADMIN — METOPROLOL TARTRATE 25 MG: 25 TABLET, FILM COATED ORAL at 20:23

## 2023-03-08 RX ADMIN — LANSOPRAZOLE 30 MG: KIT at 05:17

## 2023-03-08 RX ADMIN — TRAZODONE HYDROCHLORIDE 100 MG: 50 TABLET ORAL at 20:23

## 2023-03-08 RX ADMIN — LEVOTHYROXINE SODIUM 100 MCG: 0.1 TABLET ORAL at 05:17

## 2023-03-08 RX ADMIN — INSULIN LISPRO 1 UNITS: 100 INJECTION, SOLUTION INTRAVENOUS; SUBCUTANEOUS at 08:17

## 2023-03-08 RX ADMIN — ASPIRIN 81 MG CHEWABLE TABLET 81 MG: 81 TABLET CHEWABLE at 12:49

## 2023-03-08 RX ADMIN — MIDODRINE HYDROCHLORIDE 5 MG: 5 TABLET ORAL at 12:49

## 2023-03-08 RX ADMIN — IPRATROPIUM BROMIDE AND ALBUTEROL SULFATE 1 AMPULE: 2.5; .5 SOLUTION RESPIRATORY (INHALATION) at 12:45

## 2023-03-08 RX ADMIN — SODIUM CHLORIDE, PRESERVATIVE FREE 10 ML: 5 INJECTION INTRAVENOUS at 12:51

## 2023-03-08 RX ADMIN — CEFEPIME HYDROCHLORIDE 1000 MG: 1 INJECTION, POWDER, FOR SOLUTION INTRAMUSCULAR; INTRAVENOUS at 20:25

## 2023-03-08 RX ADMIN — IPRATROPIUM BROMIDE AND ALBUTEROL SULFATE 1 AMPULE: 2.5; .5 SOLUTION RESPIRATORY (INHALATION) at 19:21

## 2023-03-08 RX ADMIN — ACETYLCYSTEINE 600 MG: 200 SOLUTION ORAL; RESPIRATORY (INHALATION) at 19:21

## 2023-03-08 RX ADMIN — EPOETIN ALFA-EPBX 4000 UNITS: 4000 INJECTION, SOLUTION INTRAVENOUS; SUBCUTANEOUS at 14:00

## 2023-03-08 RX ADMIN — INSULIN LISPRO 1 UNITS: 100 INJECTION, SOLUTION INTRAVENOUS; SUBCUTANEOUS at 17:17

## 2023-03-08 ASSESSMENT — PAIN SCALES - GENERAL: PAINLEVEL_OUTOF10: 0

## 2023-03-08 NOTE — PROGRESS NOTES
Spoke with Radiology regarding releasing imaging to [facility] for possible transfer. Radiology to release results. Pt's hr has been in 110's-120's made Dr Shanae Sanabria aware.

## 2023-03-08 NOTE — PROCEDURES
PATIENT COMPLETED A 3 HOUR HD TREATMENT, 1.2L OF FLUID WAS REMOVED. UF GOAL DECREASED FOR BP SUPPORT. RIGHT TUNNELED CVC WAS USED  FOR ACCESS WITH NO COMPLICATIONS. POST TREATMENT LUMENS WERE FLUSHED AND CAPPED X2.  RETACRIT GIVEN BY NURSE AS ORDERED. TREATMENT OVERSEEN BY:  Donovan Griggs RN    Patient Name: Jhoana Sanchez  Patient : 1950  MRN: [id number]     Acct: [de-identified]  Date of Admission: 3/5/2023  Room/Bed: -A  Code Status:  Full Code  Allergies: No Known Allergies  Diagnosis:    Patient Active Problem List   Diagnosis    Atrial fibrillation (HCC)    CAD (coronary artery disease)    Morbid obesity (HCC)    COPD (chronic obstructive pulmonary disease) ([address]    Sleep apnea    Type 2 diabetes mellitus ([address]    Thyroid disease    Hyperlipidemia    Acute congestive heart failure ([address]    Coronary artery disease involving native coronary artery of native heart with angina pectoris (HCC)    Chronic renal disease, stage III ([address] [022132]    Hypertension    CAD, multiple vessel    Dyspnea    Moderate malnutrition ([address]    Pneumonia of both lungs due to infectious organism    Severe malnutrition (HCC)    Chronic respiratory failure with hypoxia (HCC)    Aspiration pneumonia of right lower lobe (HCC)    ESRD on dialysis ([address]    Aspiration pneumonia, unspecified aspiration pneumonia type, unspecified laterality, unspecified part of lung ([address]    Acute aspiration pneumonia ([address]    Acute on chronic respiratory failure with hypoxia ([address]    Healthcare-associated pneumonia         Treatment:  Hemodilaysis 2:1  Priority: Routine  Location: Acute Room    Diabetic: No  NPO: Yes  Isolation Precautions: Dialysis     Consent for Treatment Verified: Yes  Blood Consent Verified: Not Applicable     Safety Verified: Identify (I), Consent (C), Equipment (E), HepB Status (B), Orders Complete (O), Access Verified (A), and Timeliness (T)  Time out performed prior to access at 0855 hours.     Report Received from 03/08/23 1200 (!) 109/55 97.2 °F (36.2 °C) (!) 112 19 97 % 275 lb 9.2 oz (125 kg) Bed scale -0.4   03/08/23 1238 (!) 91/50 98 °F (36.7 °C) (!) 107 15 -- -- -- --   03/08/23 1250 -- -- (!) 112 17 97 % -- -- --     Post-Dialysis  Arterial Catheter Locking Solution:  1.9 ML NS  Venous Catheter Locking Solution:  1.9 ML NS  Post-Treatment Procedures: Blood returned, Catheter Capped, clamped with Saline x2 ports  Machine Disinfection Process: Exterior Machine Disinfection  Rinseback Volume (ml): 300 ml  Blood Volume Processed (Liters): 53.1 l/min  Dialyzer Clearance: Moderately streaked  Duration of Treatment (minutes): 180 minutes     Hemodialysis Intake (ml): 500 ml  Hemodialysis Output (ml): 1700 ml     Tolerated Treatment: Good  Patient Response to Treatment: [address]  Physician Notified: No       Provider Notification        Handoff complete and report given to Primary RN at 1202 hours. Primary RN (First Initial, Last Name, Title):   HIRO DAVILA RN     Education  Person Educated: Patient   Knowledge Base: Substantial  Barriers to Learning?: None  Preferred method of Learning: Oral  Topic(s): Procedural   Teaching Tools: Explanation   Response to Education: Verbalized Understanding     Electronically signed by José Manuel Ibarra on 3/8/2023 at 1:08 PM

## 2023-03-08 NOTE — PROGRESS NOTES
Nephrology Progress Note  3/8/2023 5:25 PM  Subjective: Interval History: Zeinab Gonzalez is a 68 y.o. male seen this a.m. doing okay but still tachycardic and agrees to go to [facility]:   Scheduled Meds:   sodium chloride flush  5-40 mL IntraVENous 2 times per day    ipratropium-albuterol  1 ampule Inhalation Q4H WA    aspirin  81 mg Per NG tube Daily    atorvastatin  40 mg Oral Nightly    insulin glargine  10 Units SubCUTAneous Nightly    insulin lispro  0-4 Units SubCUTAneous TID WC    insulin lispro  0-4 Units SubCUTAneous Nightly    lansoprazole  30 mg Oral QAM AC    levothyroxine  100 mcg Per G Tube Daily    metoprolol tartrate  25 mg Per G Tube BID    midodrine  5 mg Oral TID WC    QUEtiapine  50 mg Oral Nightly    traZODone  100 mg Oral Nightly    cefepime  1,000 mg IntraVENous Q24H    acetylcysteine  600 mg Inhalation BID     Continuous Infusions:   sodium chloride 25 mL (03/06/23 2042)         CBC   Recent Labs     03/06/23  0537 03/07/23  0632 03/08/23  0737   WBC 6.2 6.1 5.7   HGB 9.7* 8.9* 9.2*   HCT 30.0* 27.1* 28.1*    203 186      BMP   Recent Labs     03/06/23  0537 03/07/23  0632 03/08/23  1353   * 137 136   K 4.5 4.2 3.5   CL 94* 101 97*   CO2 25 24 28   PHOS  --  4.2 2.9   BUN 87* 48* 27*   CREATININE 5.2* 3.8* 2.7*     Hepatic:   No results for input(s): AST, ALT, ALB, BILITOT, ALKPHOS in the last 72 hours. Troponin: No results for input(s): TROPONINI in the last 72 hours. BNP: No results for input(s): BNP in the last 72 hours. Lipids: No results for input(s): CHOL, HDL in the last 72 hours.     Invalid input(s): LDLCALCU  ABGs:   Lab Results   Component Value Date/Time    PO2ART 70.3 09/24/2022 11:49 AM    OUG8UTW 35.5 09/24/2022 11:49 AM     INR:   Recent Labs     03/07/23  0259 03/07/23  0632 03/08/23  0737   INR 3.60 3.61 2.36  2.37     Renal Labs  Albumin:    Lab Results   Component Value Date/Time    LABALBU 3.5 03/08/2023 01:53 PM     Calcium:    Lab

## 2023-03-08 NOTE — CONSULTS
Subjective:   CHIEF COMPLAINT / HPI:  68year old male with ch trach admitted with hy[poxemia,increasing o2 requirement and copious secretions thru trach. He has chronic trach and peg      Past Medical History:  Past Medical History:   Diagnosis Date    Allergic rhinitis     Anticoagulated on Coumadin     1/6/17-**[facility] to follow pt's PT/INRs, along w/prescribing his Coumadin. **    Anxiety     Arthritis     Atrial fibrillation (HCC)     On Coumadin. 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 09/21/2022    positive at 15C, negative at 18C    COPD (chronic obstructive pulmonary disease) (HCC)     Depression     Diabetes mellitus (HCC)     NIDDM- dx over 10 yrs ago- follows with Dr Maria Elena Pinon's contracture of hand     Right hand    Family history of cardiovascular disease     Father-MI    GERD (gastroesophageal reflux disease)     H/O cardiac catheterization 03/2007    cardiac cath- stent LAD patent, [id number]%, RCA 40-50%    H/O cardiac catheterization 06/12/2008    cardiac cath- mild disease mid RCA    H/O cardiovascular stress test 04/10/2014    cardiolite- normal, no ischemia, EF63%    H/O cardiovascular stress test 09/21/2016    EF59% normal study  pt in atrial fib    H/O cardiovascular stress test 09/20/2017    EF 60%   afib    H/O cardiovascular stress test 11/07/2018    EF60% normal study    H/O Doppler ultrasound     1/11/2010-CAROTID_Intimal thickening but no significant atherosclerotic plaque noted in LAUREN. Doppler flow velocities within the LAUREN are WNL. Intimal thickening but no significant atherosclerotic plaque noted in LICA. Doppler flow velociities within the LICA are WNL. H/O echocardiogram 04/10/2014    EF 60%, normal LV systolic function, mild mitral and tricuspid insufficiencies, no pericardial effusion.     H/O echocardiogram 09/21/2016    EF60% normal study    H/O echocardiogram 11/07/2018    EF55-60% no significant valular disease H/O hiatal hernia     Hx of Venous Doppler 03/14/2019    Significant reflux in Left Deep System, No reflux in right lower extremity, No DVT or SVT    Hyperlipidemia     Hypertension     Obesity     S/P PTCA (percutaneous transluminal coronary angioplasty) 03/21/2005    s/p PTCA with 3.5 X 16 TAXUS stent to LAD- follows with Dr Rogerio Boyce    Sleep apnea     had sleep study done 10+ yrs ago- has cpap but does not use it    Thyroid disease     hypothyroid       Past Surgical History:        Procedure Laterality Date    BRONCHOSCOPY N/A 2/21/2023    BRONCHOSCOPY WITH LAVAGE performed by Geronimo Bradley MD at [facility]  6/12/08    mild disease mid RCA,  stent to LAD patent,    CARDIAC CATHETERIZATION  3/07    Stent to LAD patent, Diagonal 40-50%, RCA 40-50%    CARDIAC CATHETERIZATION  3/05    COLONOSCOPY  2011    COLONOSCOPY  5/18/16    1 polyp removed, diverticulosis and hemorrhoids noted    CORONARY ANGIOPLASTY WITH STENT PLACEMENT  03/21/2005    PTCA with 3.5 x 16 TAXUS stent to LAD    CORONARY ARTERY BYPASS GRAFT N/A 9/22/2022    CABG CORONARY ARTERY BYPASS x3 (LIMA TO LAD, SVG TO PDA-RCA SEQUENTIAL) , INTRAOPERATIVE MILTON, ENDOVEIN HARVEST OF LEFT SAPHENOUS VEIN, MODIFIED MAZE PROCEDURE, LEFT ATRIAL APPENDAGE LIGATION, INTERCOSTAL NERVE BLOCK, INTRA-AORTIC BALLOON PUMP INSERTION performed by Nafisa Ramirez MD at [address] [place] [address], [address] [place] [address] St, DIAGNOSTIC  2014    egd    HAND SURGERY Right 1997    IR TUNNELED [address] 5 YEARS  2/14/2023    IR TUNNELED CATHETER PLACEMENT GREATER THAN 5 YEARS 2/14/2023 Augustina Nelson, [facility] SPECIAL PROCEDURES    SD OPTX ANKLE DISLOCATION W/REPAIR/INT/XTRNL FIXJ Right 6/3/2019    RIGHT OPEN REDUCTION INTERNAL FIXATION OF DISTAL TIBIA  AND FIBULA performed by Austin Steele MD at [address] [place] [address] N/A 9/24/2022    STERNUM WASHOUT performed by Nafisa Ramirez MD at [facility] OR       Current Medications:    Current

## 2023-03-08 NOTE — OR NURSING
PROCEDURE PERFORMED: right thoracentesis    PRIMARY INDICATION FOR PROCEDURE: right pleural effusion    INFORMED CONSENT:  Obtained prior to procedure. Consent placed in chart. PT TRANSPORTED FROM:                                    TO THE IR ROOM:  Bedside                      ASSESSMENT: Pt alert & oriented on trach collar oxygen. Pt tachycardic @ baseline. BARRIER PRECAUTIONS & STERILE TECHNIQUE:               Pt positioned laying on left side for comfort. Pt placed on Cardiac Monitor. Pt prepped and draped in a sterile fashion with chlorhexadine. PAIN/LOCAL ANESTHESIA/SEDATION MANAGEMENT:           Local: Lidocaine 1% given by Dr. Barriga Yoon:           ACCESS TIME: 0271          US/FLUORO: US 3 images          ACCESS USED: OneStep           STERILE DRESSINGS: guaze & tegaderm    SPECIMENS: 700cc serosanguinous pleural fluid removed & sent to lab    EBL:  <1cc        FOLLOW- UP X-RAY: Stat ordered    COMPLICATIONS/ OUTCOME: Pt with frequent coughing near end of procedure & O2 sats dropped to mid 80's. Pt returned to supine position & bedside nurse present to suction.      REPORT CALLED TO: Margarito Hernandez

## 2023-03-08 NOTE — PROGRESS NOTES
V2.0  [facility] Progress Note      Name:  Zee Gonzales /Age/Sex: 1950  (68 y.o. male)   MRN & CSN:  [id number] & [id number] Encounter Date/Time: 3/8/2023 9:03 AM EST    Location:  -A PCP: Aaliyah Sanchez, [address] [facility] Day: 4      Subjective:     Chief Complaint: Other     Pt remains on 6L. White/yellows secretions noted from trach site. Feels no change in his breathing or cough. Denies lightheadedness, dizziness, fever, night sweats, chills, chest pain, palpitations, abd pain, nausea, vomiting, diarrhea, dysuria. Pt had thora today afternoon. Discussed w pt and brother. They are interested in having pt transferred to [facility] to see if there is anything can be done regarding the pt's dysphagia. He had CT cervical  which showed prominent anterior osteophytes C3-C4, C6-C7. Pt was never evaluated by neurosurgery in this hospital or [facility]. We currently have no neurosurgery coverage until 3/12. Explained to pt and brother that there is no guarantee that [facility] will accept pt given the chronicity of the problem. They were still interested in trying to have the pt transferred. [address] and initiated transfer to [facility]. CT cervical from [facility] was pushed to [facility]. Received call that Dr. Ekta Mendieta (neurosurgery) has declined the request as the issue can be managed outpatient; no indication to accept pt for transfer per [facility]. Assessment and Plan:   Suspected healthcare acquired vs aspiration PNA  Possible parapneumonic effusion  Few days of increased sputum production. Same O2 requirement  Multiple readmission where he was treated for aspiration PNA likely related to dysphagia and trach.    S/p thora 2023 more suggestive of transudate effusion (no serum LDH, protein to compare though)  S/p bronchoscopy 2023 trachea full of thick secretions, large mucus plug right lung, marked inflammation/swelling bronchi, BAL obtained only AFP checked Oral QAM AC    levothyroxine  100 mcg Per G Tube Daily    metoprolol tartrate  25 mg Per G Tube BID    midodrine  5 mg Oral TID WC    QUEtiapine  50 mg Oral Nightly    traZODone  100 mg Oral Nightly    cefepime  1,000 mg IntraVENous Q24H    acetylcysteine  600 mg Inhalation BID      Infusions:    dextrose      sodium chloride 25 mL (03/06/23 2042)     PRN Meds: glucose, 4 tablet, PRN  dextrose bolus, 125 mL, PRN   Or  dextrose bolus, 250 mL, PRN  glucagon (rDNA), 1 mg, PRN  dextrose, 1,000 mL, Continuous PRN  sodium chloride flush, 5-40 mL, PRN  sodium chloride, , PRN  ondansetron, 4 mg, Q8H PRN   Or  ondansetron, 4 mg, Q6H PRN  polyethylene glycol, 17 g, Daily PRN  acetaminophen, 650 mg, Q6H PRN   Or  acetaminophen, 650 mg, Q6H PRN  sennosides-docusate sodium, 2 tablet, Daily PRN      Labs           Imaging/Diagnostics Last 24 Hours   XR CHEST PORTABLE    Result Date: 3/5/2023  EXAMINATION: ONE XRAY VIEW OF THE CHEST 3/5/2023 4:11 pm COMPARISON: 02/22/2023 HISTORY: ORDERING SYSTEM PROVIDED HISTORY: cough with thick phlegm, tracheostomy status TECHNOLOGIST PROVIDED HISTORY: Reason for exam:->cough with thick phlegm, tracheostomy status Reason for Exam: cough with thick phlegm, tracheostomy status FINDINGS: Tracheostomy tube and right-sided central venous catheter remain in place. Heart size stable. Right pleural effusion. Right basilar opacity.      Right pleural effusion with right basilar opacity suggesting atelectasis or infection       Electronically signed by Nasima Catalan MD on 3/8/2023 at 7:01 PM

## 2023-03-08 NOTE — PLAN OF CARE
Problem: Skin/Tissue Integrity  Goal: Absence of new skin breakdown  Description: 1. Monitor for areas of redness and/or skin breakdown  2. Assess vascular access sites hourly  3. Every 4-6 hours minimum:  Change oxygen saturation probe site  4. Every 4-6 hours:  If on nasal continuous positive airway pressure, respiratory therapy assess nares and determine need for appliance change or resting period.   3/8/2023 0808 by Braxton Cordoba RN  Outcome: Progressing  3/8/2023 0807 by Braxton Cordoba RN  Outcome: Progressing     Problem: Safety - Adult  Goal: Free from fall injury  3/8/2023 0808 by Braxton Cordoba RN  Outcome: Progressing  3/8/2023 0807 by Braxton Cordoba RN  Outcome: Progressing     Problem: ABCDS Injury Assessment  Goal: Absence of physical injury  3/8/2023 0808 by Braxton Cordoba RN  Outcome: Progressing  3/8/2023 0807 by Braxton Cordoba RN  Outcome: Progressing     Problem: Chronic Conditions and Co-morbidities  Goal: Patient's chronic conditions and co-morbidity symptoms are monitored and maintained or improved  3/8/2023 0808 by Braxton Cordoba RN  Outcome: Progressing  3/8/2023 0807 by Braxton Cordoba RN  Outcome: Progressing     Problem: Nutrition Deficit:  Goal: Optimize nutritional status  3/8/2023 0808 by Braxton Cordoba RN  Outcome: Progressing  3/8/2023 0807 by Braxton Cordoba RN  Outcome: Progressing     Problem: Pain  Goal: Verbalizes/displays adequate comfort level or baseline comfort level  3/8/2023 0808 by Braxton Cordoba RN  Outcome: Progressing  3/8/2023 0807 by Braxton Cordoba RN  Outcome: Progressing     Problem: Respiratory - Adult  Goal: Achieves optimal ventilation and oxygenation  3/8/2023 0808 by Braxton Cordoba RN  Outcome: Progressing  3/8/2023 0807 by Braxton Cordoba RN  Outcome: Progressing     Problem: Neurosensory - Adult  Goal: Achieves stable or improved neurological status  3/8/2023 0808 by Braxton Cordoba RN  Outcome: Progressing  3/8/2023 0807 by Braxton Cordoba RN  Outcome: Progressing  Goal: Achieves maximal functionality and self care  3/8/2023 0808 by Marky Ross RN  Outcome: Progressing  3/8/2023 0807 by Marky Ross RN  Outcome: Progressing     Problem: Skin/Tissue Integrity - Adult  Goal: Skin integrity remains intact  3/8/2023 0808 by Marky Ross RN  Outcome: Progressing  3/8/2023 0807 by Marky Ross RN  Outcome: Progressing  Goal: Incisions, wounds, or drain sites healing without S/S of infection  3/8/2023 0808 by Marky Ross RN  Outcome: Progressing  3/8/2023 0807 by Marky Ross RN  Outcome: Progressing  Goal: Oral mucous membranes remain intact  3/8/2023 0808 by Marky Ross RN  Outcome: Progressing  3/8/2023 0807 by Marky Ross RN  Outcome: Progressing     Problem: Musculoskeletal - Adult  Goal: Return mobility to safest level of function  3/8/2023 0808 by Marky Ross RN  Outcome: Progressing  3/8/2023 0807 by Marky Ross RN  Outcome: Progressing  Goal: Maintain proper alignment of affected body part  3/8/2023 0808 by Marky Ross RN  Outcome: Progressing  3/8/2023 0807 by Marky Ross RN  Outcome: Progressing  Goal: Return ADL status to a safe level of function  3/8/2023 0808 by Marky Ross RN  Outcome: Progressing  3/8/2023 0807 by Marky Ross RN  Outcome: Progressing     Problem: Gastrointestinal - Adult  Goal: Minimal or absence of nausea and vomiting  3/8/2023 0808 by Marky Ross RN  Outcome: Progressing  3/8/2023 0807 by Marky Ross RN  Outcome: Progressing  Goal: Maintains or returns to baseline bowel function  3/8/2023 0808 by Marky Ross RN  Outcome: Progressing  3/8/2023 0807 by Marky Ross RN  Outcome: Progressing  Goal: Maintains adequate nutritional intake  3/8/2023 0808 by Marky Ross RN  Outcome: Progressing  3/8/2023 0807 by Marky Ross RN  Outcome: Progressing

## 2023-03-09 ENCOUNTER — APPOINTMENT (OUTPATIENT)
Dept: CT IMAGING | Age: 73
DRG: 177 | End: 2023-03-09
Payer: COMMERCIAL

## 2023-03-09 LAB
ANION GAP SERPL CALCULATED.3IONS-SCNC: 12 MMOL/L (ref 4–16)
BASOPHILS ABSOLUTE: 0.1 K/CU MM
BASOPHILS RELATIVE PERCENT: 1.1 % (ref 0–1)
BUN SERPL-MCNC: 42 MG/DL (ref 6–23)
CALCIUM SERPL-MCNC: 9 MG/DL (ref 8.3–10.6)
CHLORIDE BLD-SCNC: 95 MMOL/L (ref 99–110)
CO2: 27 MMOL/L (ref 21–32)
CREAT SERPL-MCNC: 4.1 MG/DL (ref 0.9–1.3)
DIFFERENTIAL TYPE: ABNORMAL
EOSINOPHILS ABSOLUTE: 0.6 K/CU MM
EOSINOPHILS RELATIVE PERCENT: 9.1 % (ref 0–3)
GFR SERPL CREATININE-BSD FRML MDRD: 15 ML/MIN/1.73M2
GLUCOSE BLD-MCNC: 170 MG/DL (ref 70–99)
GLUCOSE BLD-MCNC: 204 MG/DL (ref 70–99)
GLUCOSE BLD-MCNC: 208 MG/DL (ref 70–99)
GLUCOSE BLD-MCNC: 210 MG/DL (ref 70–99)
GLUCOSE BLD-MCNC: 223 MG/DL (ref 70–99)
GLUCOSE BLD-MCNC: 242 MG/DL (ref 70–99)
GLUCOSE BLD-MCNC: 251 MG/DL (ref 70–99)
GLUCOSE SERPL-MCNC: 206 MG/DL (ref 70–99)
HCT VFR BLD CALC: 27.9 % (ref 42–52)
HEMOGLOBIN: 8.8 GM/DL (ref 13.5–18)
IMMATURE NEUTROPHIL %: 0.6 % (ref 0–0.43)
LYMPHOCYTES ABSOLUTE: 1.4 K/CU MM
LYMPHOCYTES RELATIVE PERCENT: 21 % (ref 24–44)
MAGNESIUM: 2.3 MG/DL (ref 1.8–2.4)
MCH RBC QN AUTO: 34.5 PG (ref 27–31)
MCHC RBC AUTO-ENTMCNC: 31.5 % (ref 32–36)
MCV RBC AUTO: 109.4 FL (ref 78–100)
MONOCYTES ABSOLUTE: 0.7 K/CU MM
MONOCYTES RELATIVE PERCENT: 10 % (ref 0–4)
NUCLEATED RBC %: 0 %
PDW BLD-RTO: 15.6 % (ref 11.7–14.9)
PLATELET # BLD: 183 K/CU MM (ref 140–440)
PMV BLD AUTO: 9.4 FL (ref 7.5–11.1)
POTASSIUM SERPL-SCNC: 3.6 MMOL/L (ref 3.5–5.1)
RBC # BLD: 2.55 M/CU MM (ref 4.6–6.2)
SEGMENTED NEUTROPHILS ABSOLUTE COUNT: 3.9 K/CU MM
SEGMENTED NEUTROPHILS RELATIVE PERCENT: 58.2 % (ref 36–66)
SODIUM BLD-SCNC: 134 MMOL/L (ref 135–145)
TOTAL IMMATURE NEUTOROPHIL: 0.04 K/CU MM
TOTAL NUCLEATED RBC: 0 K/CU MM
WBC # BLD: 6.6 K/CU MM (ref 4–10.5)

## 2023-03-09 PROCEDURE — 6370000000 HC RX 637 (ALT 250 FOR IP): Performed by: INTERNAL MEDICINE

## 2023-03-09 PROCEDURE — 6370000000 HC RX 637 (ALT 250 FOR IP): Performed by: STUDENT IN AN ORGANIZED HEALTH CARE EDUCATION/TRAINING PROGRAM

## 2023-03-09 PROCEDURE — 94761 N-INVAS EAR/PLS OXIMETRY MLT: CPT

## 2023-03-09 PROCEDURE — 80048 BASIC METABOLIC PNL TOTAL CA: CPT

## 2023-03-09 PROCEDURE — 6360000002 HC RX W HCPCS: Performed by: INTERNAL MEDICINE

## 2023-03-09 PROCEDURE — 6360000002 HC RX W HCPCS

## 2023-03-09 PROCEDURE — 36415 COLL VENOUS BLD VENIPUNCTURE: CPT

## 2023-03-09 PROCEDURE — 2060000000 HC ICU INTERMEDIATE R&B

## 2023-03-09 PROCEDURE — 85025 COMPLETE CBC W/AUTO DIFF WBC: CPT

## 2023-03-09 PROCEDURE — 94640 AIRWAY INHALATION TREATMENT: CPT

## 2023-03-09 PROCEDURE — 71250 CT THORAX DX C-: CPT

## 2023-03-09 PROCEDURE — 6370000000 HC RX 637 (ALT 250 FOR IP)

## 2023-03-09 PROCEDURE — 89220 SPUTUM SPECIMEN COLLECTION: CPT

## 2023-03-09 PROCEDURE — 85730 THROMBOPLASTIN TIME PARTIAL: CPT

## 2023-03-09 PROCEDURE — 83735 ASSAY OF MAGNESIUM: CPT

## 2023-03-09 PROCEDURE — 2700000000 HC OXYGEN THERAPY PER DAY

## 2023-03-09 PROCEDURE — 6360000002 HC RX W HCPCS: Performed by: STUDENT IN AN ORGANIZED HEALTH CARE EDUCATION/TRAINING PROGRAM

## 2023-03-09 PROCEDURE — 82962 GLUCOSE BLOOD TEST: CPT

## 2023-03-09 PROCEDURE — 2580000003 HC RX 258: Performed by: INTERNAL MEDICINE

## 2023-03-09 RX ORDER — MIDODRINE HYDROCHLORIDE 5 MG/1
10 TABLET ORAL
Status: DISCONTINUED | OUTPATIENT
Start: 2023-03-09 | End: 2023-03-21 | Stop reason: HOSPADM

## 2023-03-09 RX ORDER — HEPARIN SODIUM 5000 [USP'U]/ML
5000 INJECTION, SOLUTION INTRAVENOUS; SUBCUTANEOUS EVERY 8 HOURS SCHEDULED
Status: DISCONTINUED | OUTPATIENT
Start: 2023-03-09 | End: 2023-03-20

## 2023-03-09 RX ORDER — HEPARIN SODIUM 10000 [USP'U]/100ML
5-30 INJECTION, SOLUTION INTRAVENOUS CONTINUOUS
Status: DISCONTINUED | OUTPATIENT
Start: 2023-03-09 | End: 2023-03-09

## 2023-03-09 RX ORDER — HEPARIN SODIUM 1000 [USP'U]/ML
30 INJECTION, SOLUTION INTRAVENOUS; SUBCUTANEOUS PRN
Status: DISCONTINUED | OUTPATIENT
Start: 2023-03-09 | End: 2023-03-09

## 2023-03-09 RX ORDER — HEPARIN SODIUM 1000 [USP'U]/ML
60 INJECTION, SOLUTION INTRAVENOUS; SUBCUTANEOUS PRN
Status: DISCONTINUED | OUTPATIENT
Start: 2023-03-09 | End: 2023-03-09

## 2023-03-09 RX ORDER — HEPARIN SODIUM 1000 [USP'U]/ML
60 INJECTION, SOLUTION INTRAVENOUS; SUBCUTANEOUS ONCE
Status: DISCONTINUED | OUTPATIENT
Start: 2023-03-09 | End: 2023-03-09

## 2023-03-09 RX ORDER — AMIODARONE HYDROCHLORIDE 200 MG/1
200 TABLET ORAL DAILY
Status: DISCONTINUED | OUTPATIENT
Start: 2023-03-09 | End: 2023-03-21 | Stop reason: HOSPADM

## 2023-03-09 RX ORDER — SCOLOPAMINE TRANSDERMAL SYSTEM 1 MG/1
1 PATCH, EXTENDED RELEASE TRANSDERMAL
Status: DISCONTINUED | OUTPATIENT
Start: 2023-03-09 | End: 2023-03-21 | Stop reason: HOSPADM

## 2023-03-09 RX ADMIN — HEPARIN SODIUM 5000 UNITS: 5000 INJECTION INTRAVENOUS; SUBCUTANEOUS at 06:02

## 2023-03-09 RX ADMIN — METOPROLOL TARTRATE 25 MG: 25 TABLET, FILM COATED ORAL at 20:59

## 2023-03-09 RX ADMIN — INSULIN LISPRO 1 UNITS: 100 INJECTION, SOLUTION INTRAVENOUS; SUBCUTANEOUS at 18:07

## 2023-03-09 RX ADMIN — MIDODRINE HYDROCHLORIDE 5 MG: 5 TABLET ORAL at 09:48

## 2023-03-09 RX ADMIN — SODIUM CHLORIDE, PRESERVATIVE FREE 10 ML: 5 INJECTION INTRAVENOUS at 21:16

## 2023-03-09 RX ADMIN — IPRATROPIUM BROMIDE AND ALBUTEROL SULFATE 1 AMPULE: 2.5; .5 SOLUTION RESPIRATORY (INHALATION) at 19:30

## 2023-03-09 RX ADMIN — ACETYLCYSTEINE 600 MG: 200 SOLUTION ORAL; RESPIRATORY (INHALATION) at 19:35

## 2023-03-09 RX ADMIN — CEFEPIME HYDROCHLORIDE 1000 MG: 1 INJECTION, POWDER, FOR SOLUTION INTRAMUSCULAR; INTRAVENOUS at 21:09

## 2023-03-09 RX ADMIN — QUETIAPINE FUMARATE 50 MG: 25 TABLET ORAL at 20:58

## 2023-03-09 RX ADMIN — AMIODARONE HYDROCHLORIDE 200 MG: 200 TABLET ORAL at 18:07

## 2023-03-09 RX ADMIN — LANSOPRAZOLE 30 MG: KIT at 06:02

## 2023-03-09 RX ADMIN — SODIUM CHLORIDE, PRESERVATIVE FREE 10 ML: 5 INJECTION INTRAVENOUS at 09:48

## 2023-03-09 RX ADMIN — ATORVASTATIN CALCIUM 40 MG: 40 TABLET, FILM COATED ORAL at 20:59

## 2023-03-09 RX ADMIN — ACETYLCYSTEINE 600 MG: 200 SOLUTION ORAL; RESPIRATORY (INHALATION) at 07:08

## 2023-03-09 RX ADMIN — MIDODRINE HYDROCHLORIDE 5 MG: 5 TABLET ORAL at 12:12

## 2023-03-09 RX ADMIN — IPRATROPIUM BROMIDE AND ALBUTEROL SULFATE 1 AMPULE: 2.5; .5 SOLUTION RESPIRATORY (INHALATION) at 15:12

## 2023-03-09 RX ADMIN — INSULIN LISPRO 2 UNITS: 100 INJECTION, SOLUTION INTRAVENOUS; SUBCUTANEOUS at 12:08

## 2023-03-09 RX ADMIN — HEPARIN SODIUM 5000 UNITS: 5000 INJECTION INTRAVENOUS; SUBCUTANEOUS at 20:59

## 2023-03-09 RX ADMIN — ASPIRIN 81 MG CHEWABLE TABLET 81 MG: 81 TABLET CHEWABLE at 09:47

## 2023-03-09 RX ADMIN — IPRATROPIUM BROMIDE AND ALBUTEROL SULFATE 1 AMPULE: 2.5; .5 SOLUTION RESPIRATORY (INHALATION) at 07:08

## 2023-03-09 RX ADMIN — LEVOTHYROXINE SODIUM 100 MCG: 0.1 TABLET ORAL at 06:02

## 2023-03-09 RX ADMIN — INSULIN GLARGINE 10 UNITS: 100 INJECTION, SOLUTION SUBCUTANEOUS at 21:14

## 2023-03-09 RX ADMIN — INSULIN LISPRO 1 UNITS: 100 INJECTION, SOLUTION INTRAVENOUS; SUBCUTANEOUS at 06:02

## 2023-03-09 RX ADMIN — HEPARIN SODIUM 5000 UNITS: 5000 INJECTION INTRAVENOUS; SUBCUTANEOUS at 15:02

## 2023-03-09 RX ADMIN — TRAZODONE HYDROCHLORIDE 100 MG: 50 TABLET ORAL at 20:58

## 2023-03-09 RX ADMIN — MIDODRINE HYDROCHLORIDE 10 MG: 5 TABLET ORAL at 18:07

## 2023-03-09 RX ADMIN — IPRATROPIUM BROMIDE AND ALBUTEROL SULFATE 1 AMPULE: 2.5; .5 SOLUTION RESPIRATORY (INHALATION) at 11:19

## 2023-03-09 ASSESSMENT — PAIN SCALES - WONG BAKER: WONGBAKER_NUMERICALRESPONSE: 0

## 2023-03-09 ASSESSMENT — PAIN SCALES - GENERAL: PAINLEVEL_OUTOF10: 0

## 2023-03-09 NOTE — PROGRESS NOTES
Speech Language Pathology  Jhoana Sanchez  1950  [id number]      Noted bedside swallowing evaluation ordered and chart reviewed 03/09/23. Pt is familiar to SLP and was found to have a nonfuctional swallow on recent modified barium swallow study completed 12/14/22 d/t osteophytes wtih aspiration of thin and nectar thick liquids and significant pharyngeal residue with all textures given. . Pt was seen last admission on 2/23/23 with no change in swallowing status. Repeat swallowing evaluation is not warrented at this time- pt should remain NPO with peg-tube. SLP paged physician and will discontinue order.

## 2023-03-09 NOTE — PROGRESS NOTES
pressures and elevated take beta-blocker for now. Recs pending.  - holding warfarin for possible bronchoscopy     Elevated troponin, likely in the setting of demand ischemia, ESRD - trending downward  no chest pain  EKG without acute ischemic changes  Cardiology on board. ESRD. On HD. Nephro consulted    Coronary artery disease status post three-vessel CABG in September 2022  Postprocedure complications include mediastinal infection requiring multiple washouts with IABP placement and transfer to [facility] for further management where he underwent washout and closure and removal of IABP. Tracheostomy dependence since CABG. History of chronic trach and PEG: Needs frequent suctioning, pulmonology on board. Dietitian following. CT cervical 2/14/2023 showed prominent anterior osteophytes C3-C4, C6-C7 sufficient size to cause swallowing difficulties per radiology report. - Pt will need referral to nsx outpatient for cervical surgery. Attempted to transfer patient to ICU as per their wishes however [facility] neurosurgery states he does needed urgent intervention at this time and would see them outpatient. - consider consulting nsx if pt stayed past 3/11 as a service currently unavailable. - consult SLP      COPD, not in exacerbation  duonebs     Type 2 diabetes  On home lantus 25U nightly +sliding scale  Glucose high today; pt didn't received Lantus last night.    - continue lantus 10U nightly +sliding scale     GERD  Continue lansoprazole      Hypothyroidism  Continue levothyroxine        Recent admission for hypoxic resp failure due to pleural effusions and atelectasis due to mucous plugging and possible HAP  2/20-2/24      Diet Diet NPO Exceptions are: Sips of Clear Liquids  ADULT TUBE FEEDING; PEG; Renal Formula; Continuous; 10; Yes; 10; Q 4 hours; 60; 250; Q 4 hours   DVT Prophylaxis [] Lovenox, [x]  Heparin, [] SCDs, [] Ambulation,  [] Eliquis, [] Xarelto  [] Coumadin   Code Status Full Code Disposition From: SNF  Expected Disposition: LTC? Estimated Date of Discharge: 2-3 days  Patient requires continued admission due to may need bronch, await pulm final recs   Surrogate Decision Maker/ POA          Review of Systems:    Review of Systems    See above    Objective: Intake/Output Summary (Last 24 hours) at 3/9/2023 1010  Last data filed at 3/9/2023 0957  Gross per 24 hour   Intake 3164 ml   Output 1700 ml   Net 1464 ml          Vitals:   Vitals:    03/09/23 1002   BP: (!) 111/58   Pulse: (!) 106   Resp: 23   Temp:    SpO2: 96%       Physical Exam:     General: NAD  Eyes: EOMI  ENT: neck supple, trach collar  Cardiovascular: Regular rate. Respiratory: Diffusely coarse lung sounds  Gastrointestinal: Soft, non tender, PEG tube   Genitourinary: no suprapubic tenderness  Musculoskeletal: trace edema  Skin: warm, dry  Neuro: Alert. Psych: Mood appropriate.      Medications:   Medications:    scopolamine  1 patch TransDERmal Q72H    insulin lispro  0-4 Units SubCUTAneous Q6H    heparin (porcine)  5,000 Units SubCUTAneous 3 times per day    sodium chloride flush  5-40 mL IntraVENous 2 times per day    ipratropium-albuterol  1 ampule Inhalation Q4H WA    aspirin  81 mg Per NG tube Daily    atorvastatin  40 mg Oral Nightly    insulin glargine  10 Units SubCUTAneous Nightly    lansoprazole  30 mg Oral QAM AC    levothyroxine  100 mcg Per G Tube Daily    metoprolol tartrate  25 mg Per G Tube BID    midodrine  5 mg Oral TID WC    QUEtiapine  50 mg Oral Nightly    traZODone  100 mg Oral Nightly    cefepime  1,000 mg IntraVENous Q24H    acetylcysteine  600 mg Inhalation BID      Infusions:    dextrose      sodium chloride 25 mL (03/06/23 2042)     PRN Meds: glucose, 4 tablet, PRN  dextrose bolus, 125 mL, PRN   Or  dextrose bolus, 250 mL, PRN  glucagon (rDNA), 1 mg, PRN  dextrose, 1,000 mL, Continuous PRN  sodium chloride flush, 5-40 mL, PRN  sodium chloride, , PRN  ondansetron, 4 mg, Q8H PRN   Or  ondansetron,

## 2023-03-09 NOTE — PROGRESS NOTES
pulmonary      SUBJECTIVE:  had pleural tap yesterday with 700 ml     OBJECTIVE    VITALS:  /62   Pulse 99   Temp 98.9 °F (37.2 °C) (Oral)   Resp 20   Ht 6' 2\" (1.88 m)   Wt 268 lb 4.8 oz (121.7 kg)   SpO2 95%   BMI 34.45 kg/m²   HEAD AND FACE EXAM:  No throat injection, no active exudate,no thrush  NECK EXAM;No JVD, no masses, symmetrical  CHEST EXAM; Expansion equal and symmetrical, no masses  LUNG EXAM; Good breath sounds bilaterally. There are expiratory wheezes both lungs, there are crackles at both lung bases  CARDIOVASCULAR EXAM: Positive S1 and S2, no S3 or S4, no clicks ,no murmurs  RIGHT AND LEFT LOWER EXTRIMITY EXAM: No edema, no swelling,           LABS   Lab Results   Component Value Date    WBC 5.7 03/08/2023    HGB 9.2 (L) 03/08/2023    HCT 28.1 (L) 03/08/2023    .3 (H) 03/08/2023     03/08/2023     Lab Results   Component Value Date    CREATININE 2.7 (H) 03/08/2023    BUN 27 (H) 03/08/2023     03/08/2023    K 3.5 03/08/2023    CL 97 (L) 03/08/2023    CO2 28 03/08/2023     Lab Results   Component Value Date    INR 2.37 03/08/2023    INR 2.36 03/08/2023    PROTIME 30.9 (H) 03/08/2023    PROTIME 30.7 (H) 03/08/2023          Lab Results   Component Value Date/Time    PHOS 2.9 03/08/2023 01:53 PM    PHOS 4.2 03/07/2023 06:32 AM    PHOS 3.7 02/15/2023 03:15 AM      No results for input(s): PH, PO2ART, RNF1RRB, HCO3, BEART, O2SAT in the last 72 hours.       Wt Readings from Last 3 Encounters:   03/09/23 268 lb 4.8 oz (121.7 kg)   02/24/23 274 lb 11.1 oz (124.6 kg)   02/17/23 289 lb 3.9 oz (131.2 kg)               ASSESMENT  Ac on ch resp failure  Copd  Pneumonia  Right pl effusion s/p rap  Rll atx improved post tap        PLAN  Cpm  Add scopolamine patch  Ct chest. If significant mucus plugging then bronch    3/9/2023  Erik Doss MD, M.D.

## 2023-03-09 NOTE — CONSULTS
Full note to follow, continue rate control for afib, due to hypotension, patient is not getting lopressor due ot low bp, will restart amiodarone,may need to decrease free water extraction during dialysis, he has MICAH ligation, no need for anticoagulation at this time.

## 2023-03-09 NOTE — PROGRESS NOTES
Nephrology Progress Note  3/9/2023 8:52 AM  Subjective: Interval History: Erich Tavera is a 68 y.o. male appears to be doing slightly better today status post thoracentesis    Data:   Scheduled Meds:   scopolamine  1 patch TransDERmal Q72H    insulin lispro  0-4 Units SubCUTAneous Q6H    heparin (porcine)  5,000 Units SubCUTAneous 3 times per day    sodium chloride flush  5-40 mL IntraVENous 2 times per day    ipratropium-albuterol  1 ampule Inhalation Q4H WA    aspirin  81 mg Per NG tube Daily    atorvastatin  40 mg Oral Nightly    insulin glargine  10 Units SubCUTAneous Nightly    lansoprazole  30 mg Oral QAM AC    levothyroxine  100 mcg Per G Tube Daily    metoprolol tartrate  25 mg Per G Tube BID    midodrine  5 mg Oral TID WC    QUEtiapine  50 mg Oral Nightly    traZODone  100 mg Oral Nightly    cefepime  1,000 mg IntraVENous Q24H    acetylcysteine  600 mg Inhalation BID     Continuous Infusions:   dextrose      sodium chloride 25 mL (03/06/23 2042)         CBC   Recent Labs     03/07/23  0632 03/08/23  0737   WBC 6.1 5.7   HGB 8.9* 9.2*   HCT 27.1* 28.1*    186      BMP   Recent Labs     03/07/23  0632 03/08/23  1353    136   K 4.2 3.5    97*   CO2 24 28   PHOS 4.2 2.9   BUN 48* 27*   CREATININE 3.8* 2.7*     Hepatic:   No results for input(s): AST, ALT, ALB, BILITOT, ALKPHOS in the last 72 hours. Troponin: No results for input(s): TROPONINI in the last 72 hours. BNP: No results for input(s): BNP in the last 72 hours. Lipids: No results for input(s): CHOL, HDL in the last 72 hours.     Invalid input(s): LDLCALCU  ABGs:   Lab Results   Component Value Date/Time    PO2ART 70.3 09/24/2022 11:49 AM    FMZ3QGX 35.5 09/24/2022 11:49 AM     INR:   Recent Labs     03/07/23  0259 03/07/23  0632 03/08/23  0737   INR 3.60 3.61 2.36  2.37     Renal Labs  Albumin:    Lab Results   Component Value Date/Time    LABALBU 3.5 03/08/2023 01:53 PM     Calcium:    Lab Results   Component Value

## 2023-03-09 NOTE — CARE COORDINATION
Chart reviewed. CM updated that [facility] said no to a transfer for neuro surgery consult. Patient is getting a CT of chest and cardiology is following for A-fib. CM sent a whiteboard to therapy requesting updated notes for precert to [facility].

## 2023-03-09 NOTE — PLAN OF CARE
Problem: Skin/Tissue Integrity  Goal: Absence of new skin breakdown  Description: 1. Monitor for areas of redness and/or skin breakdown  2. Assess vascular access sites hourly  3. Every 4-6 hours minimum:  Change oxygen saturation probe site  4. Every 4-6 hours:  If on nasal continuous positive airway pressure, respiratory therapy assess nares and determine need for appliance change or resting period.   3/9/2023 1024 by Oleg Corcoran RN  Outcome: Progressing  3/9/2023 0107 by Jordan Malik RN  Outcome: Progressing     Problem: Safety - Adult  Goal: Free from fall injury  3/9/2023 1024 by Oleg Corcoran RN  Outcome: Progressing  Flowsheets (Taken 3/9/2023 1024)  Free From Fall Injury:   Instruct family/caregiver on patient safety   Based on caregiver fall risk screen, instruct family/caregiver to ask for assistance with transferring infant if caregiver noted to have fall risk factors  3/9/2023 0107 by Jordan Malik RN  Outcome: Progressing     Problem: ABCDS Injury Assessment  Goal: Absence of physical injury  3/9/2023 1024 by Oleg Corcoran RN  Outcome: Progressing  Flowsheets (Taken 3/9/2023 1024)  Absence of Physical Injury: Implement safety measures based on patient assessment  3/9/2023 0107 by Jordan Malik RN  Outcome: Progressing     Problem: Chronic Conditions and Co-morbidities  Goal: Patient's chronic conditions and co-morbidity symptoms are monitored and maintained or improved  3/9/2023 1024 by Oleg Corcoran RN  Outcome: Progressing  Flowsheets (Taken 3/9/2023 1024)  Care Plan - Patient's Chronic Conditions and Co-Morbidity Symptoms are Monitored and Maintained or Improved:   Monitor and assess patient's chronic conditions and comorbid symptoms for stability, deterioration, or improvement   Collaborate with multidisciplinary team to address chronic and comorbid conditions and prevent exacerbation or deterioration   Update acute care plan with appropriate goals if chronic or comorbid symptoms are self care  3/9/2023 1024 by Maria Isabel Auguste RN  Outcome: Progressing  3/9/2023 0107 by Tari Lindsey RN  Outcome: Progressing     Problem: Skin/Tissue Integrity - Adult  Goal: Skin integrity remains intact  3/9/2023 1024 by Maria Isabel Auguste RN  Outcome: Progressing  3/9/2023 0107 by Tari Lindsey RN  Outcome: Progressing  Goal: Incisions, wounds, or drain sites healing without S/S of infection  3/9/2023 1024 by Maria Isabel Auguste RN  Outcome: Progressing  3/9/2023 0107 by Tari Lindsey RN  Outcome: Progressing  Goal: Oral mucous membranes remain intact  3/9/2023 1024 by Maria Isabel Auguste RN  Outcome: Progressing  3/9/2023 0107 by Tari Lindsey RN  Outcome: Progressing     Problem: Musculoskeletal - Adult  Goal: Return mobility to safest level of function  3/9/2023 1024 by Maria Isabel Auguste RN  Outcome: Progressing  3/9/2023 0107 by Tari Lindsey RN  Outcome: Progressing  Goal: Maintain proper alignment of affected body part  3/9/2023 1024 by Maria Isabel Auguste RN  Outcome: Progressing  3/9/2023 0107 by Tari Lindsey RN  Outcome: Progressing  Goal: Return ADL status to a safe level of function  3/9/2023 1024 by Maria Isabel Auguste RN  Outcome: Progressing  3/9/2023 0107 by Tari Lindsey RN  Outcome: Progressing     Problem: Gastrointestinal - Adult  Goal: Minimal or absence of nausea and vomiting  3/9/2023 1024 by Maria Isabel Auguste RN  Outcome: Progressing  3/9/2023 0107 by Tari Lindsey RN  Outcome: Progressing  Goal: Maintains or returns to baseline bowel function  3/9/2023 1024 by Maria Isabel Auguste RN  Outcome: Progressing  3/9/2023 0107 by Tari Lindsey RN  Outcome: Progressing  Goal: Maintains adequate nutritional intake  3/9/2023 1024 by Maria Isabel Auguste RN  Outcome: Progressing  3/9/2023 0107 by Tari Lindsey RN  Outcome: Progressing

## 2023-03-10 ENCOUNTER — ANESTHESIA EVENT (OUTPATIENT)
Dept: ENDOSCOPY | Age: 73
End: 2023-03-10

## 2023-03-10 LAB
ANION GAP SERPL CALCULATED.3IONS-SCNC: 11 MMOL/L (ref 4–16)
APTT: 34.5 SECONDS (ref 25.1–37.1)
BASOPHILS ABSOLUTE: 0.1 K/CU MM
BASOPHILS RELATIVE PERCENT: 0.9 % (ref 0–1)
BUN SERPL-MCNC: 47 MG/DL (ref 6–23)
CALCIUM SERPL-MCNC: 9 MG/DL (ref 8.3–10.6)
CHLORIDE BLD-SCNC: 93 MMOL/L (ref 99–110)
CO2: 27 MMOL/L (ref 21–32)
CREAT SERPL-MCNC: 4.5 MG/DL (ref 0.9–1.3)
CULTURE: NORMAL
CULTURE: NORMAL
DIFFERENTIAL TYPE: ABNORMAL
EOSINOPHILS ABSOLUTE: 0.6 K/CU MM
EOSINOPHILS RELATIVE PERCENT: 8.8 % (ref 0–3)
GFR SERPL CREATININE-BSD FRML MDRD: 13 ML/MIN/1.73M2
GLUCOSE BLD-MCNC: 204 MG/DL (ref 70–99)
GLUCOSE BLD-MCNC: 214 MG/DL (ref 70–99)
GLUCOSE BLD-MCNC: 226 MG/DL (ref 70–99)
GLUCOSE BLD-MCNC: 243 MG/DL (ref 70–99)
GLUCOSE SERPL-MCNC: 196 MG/DL (ref 70–99)
HBV SURFACE AB SERPL IA-ACNC: <3.5 M[IU]/ML
HBV SURFACE AG SERPL QL IA: NON REACTIVE
HCT VFR BLD CALC: 26.2 % (ref 42–52)
HEMOGLOBIN: 8.6 GM/DL (ref 13.5–18)
IMMATURE NEUTROPHIL %: 0.8 % (ref 0–0.43)
LYMPHOCYTES ABSOLUTE: 1.5 K/CU MM
LYMPHOCYTES RELATIVE PERCENT: 23.3 % (ref 24–44)
Lab: NORMAL
Lab: NORMAL
MAGNESIUM: 2.4 MG/DL (ref 1.8–2.4)
MCH RBC QN AUTO: 35.7 PG (ref 27–31)
MCHC RBC AUTO-ENTMCNC: 32.8 % (ref 32–36)
MCV RBC AUTO: 108.7 FL (ref 78–100)
MONOCYTES ABSOLUTE: 0.6 K/CU MM
MONOCYTES RELATIVE PERCENT: 9 % (ref 0–4)
NUCLEATED RBC %: 0 %
PDW BLD-RTO: 15.7 % (ref 11.7–14.9)
PLATELET # BLD: 178 K/CU MM (ref 140–440)
PMV BLD AUTO: 9.5 FL (ref 7.5–11.1)
POTASSIUM SERPL-SCNC: 3.4 MMOL/L (ref 3.5–5.1)
RBC # BLD: 2.41 M/CU MM (ref 4.6–6.2)
SEGMENTED NEUTROPHILS ABSOLUTE COUNT: 3.8 K/CU MM
SEGMENTED NEUTROPHILS RELATIVE PERCENT: 57.2 % (ref 36–66)
SODIUM BLD-SCNC: 131 MMOL/L (ref 135–145)
SPECIMEN: NORMAL
SPECIMEN: NORMAL
T4 FREE SERPL-MCNC: 0.94 NG/DL (ref 0.9–1.8)
TOTAL IMMATURE NEUTOROPHIL: 0.05 K/CU MM
TOTAL NUCLEATED RBC: 0 K/CU MM
TSH SERPL DL<=0.005 MIU/L-ACNC: 8.7 UIU/ML (ref 0.27–4.2)
WBC # BLD: 6.6 K/CU MM (ref 4–10.5)

## 2023-03-10 PROCEDURE — 6370000000 HC RX 637 (ALT 250 FOR IP): Performed by: INTERNAL MEDICINE

## 2023-03-10 PROCEDURE — 6360000002 HC RX W HCPCS: Performed by: INTERNAL MEDICINE

## 2023-03-10 PROCEDURE — 86706 HEP B SURFACE ANTIBODY: CPT

## 2023-03-10 PROCEDURE — 89220 SPUTUM SPECIMEN COLLECTION: CPT

## 2023-03-10 PROCEDURE — 90935 HEMODIALYSIS ONE EVALUATION: CPT

## 2023-03-10 PROCEDURE — 80048 BASIC METABOLIC PNL TOTAL CA: CPT

## 2023-03-10 PROCEDURE — 6370000000 HC RX 637 (ALT 250 FOR IP)

## 2023-03-10 PROCEDURE — 94761 N-INVAS EAR/PLS OXIMETRY MLT: CPT

## 2023-03-10 PROCEDURE — 85025 COMPLETE CBC W/AUTO DIFF WBC: CPT

## 2023-03-10 PROCEDURE — 94640 AIRWAY INHALATION TREATMENT: CPT

## 2023-03-10 PROCEDURE — 6370000000 HC RX 637 (ALT 250 FOR IP): Performed by: STUDENT IN AN ORGANIZED HEALTH CARE EDUCATION/TRAINING PROGRAM

## 2023-03-10 PROCEDURE — 84443 ASSAY THYROID STIM HORMONE: CPT

## 2023-03-10 PROCEDURE — 6360000002 HC RX W HCPCS

## 2023-03-10 PROCEDURE — 85730 THROMBOPLASTIN TIME PARTIAL: CPT

## 2023-03-10 PROCEDURE — 36415 COLL VENOUS BLD VENIPUNCTURE: CPT

## 2023-03-10 PROCEDURE — 83735 ASSAY OF MAGNESIUM: CPT

## 2023-03-10 PROCEDURE — 99223 1ST HOSP IP/OBS HIGH 75: CPT | Performed by: INTERNAL MEDICINE

## 2023-03-10 PROCEDURE — 82962 GLUCOSE BLOOD TEST: CPT

## 2023-03-10 PROCEDURE — 87340 HEPATITIS B SURFACE AG IA: CPT

## 2023-03-10 PROCEDURE — 2700000000 HC OXYGEN THERAPY PER DAY

## 2023-03-10 PROCEDURE — 84439 ASSAY OF FREE THYROXINE: CPT

## 2023-03-10 PROCEDURE — 2060000000 HC ICU INTERMEDIATE R&B

## 2023-03-10 PROCEDURE — 2580000003 HC RX 258: Performed by: INTERNAL MEDICINE

## 2023-03-10 RX ORDER — POTASSIUM CHLORIDE 20 MEQ/1
40 TABLET, EXTENDED RELEASE ORAL PRN
Status: DISCONTINUED | OUTPATIENT
Start: 2023-03-10 | End: 2023-03-21 | Stop reason: HOSPADM

## 2023-03-10 RX ORDER — POTASSIUM CHLORIDE 7.45 MG/ML
10 INJECTION INTRAVENOUS PRN
Status: DISCONTINUED | OUTPATIENT
Start: 2023-03-10 | End: 2023-03-21 | Stop reason: HOSPADM

## 2023-03-10 RX ORDER — MAGNESIUM SULFATE IN WATER 40 MG/ML
2000 INJECTION, SOLUTION INTRAVENOUS PRN
Status: DISCONTINUED | OUTPATIENT
Start: 2023-03-10 | End: 2023-03-21 | Stop reason: HOSPADM

## 2023-03-10 RX ADMIN — LANSOPRAZOLE 30 MG: KIT at 12:12

## 2023-03-10 RX ADMIN — ONDANSETRON 4 MG: 2 INJECTION INTRAMUSCULAR; INTRAVENOUS at 07:47

## 2023-03-10 RX ADMIN — ACETYLCYSTEINE 600 MG: 200 SOLUTION ORAL; RESPIRATORY (INHALATION) at 19:02

## 2023-03-10 RX ADMIN — MIDODRINE HYDROCHLORIDE 10 MG: 5 TABLET ORAL at 16:42

## 2023-03-10 RX ADMIN — QUETIAPINE FUMARATE 50 MG: 25 TABLET ORAL at 21:29

## 2023-03-10 RX ADMIN — AMIODARONE HYDROCHLORIDE 200 MG: 200 TABLET ORAL at 12:07

## 2023-03-10 RX ADMIN — IPRATROPIUM BROMIDE AND ALBUTEROL SULFATE 1 AMPULE: 2.5; .5 SOLUTION RESPIRATORY (INHALATION) at 19:02

## 2023-03-10 RX ADMIN — INSULIN LISPRO 1 UNITS: 100 INJECTION, SOLUTION INTRAVENOUS; SUBCUTANEOUS at 16:43

## 2023-03-10 RX ADMIN — TRAZODONE HYDROCHLORIDE 100 MG: 50 TABLET ORAL at 21:29

## 2023-03-10 RX ADMIN — ASPIRIN 81 MG CHEWABLE TABLET 81 MG: 81 TABLET CHEWABLE at 12:05

## 2023-03-10 RX ADMIN — INSULIN LISPRO 1 UNITS: 100 INJECTION, SOLUTION INTRAVENOUS; SUBCUTANEOUS at 06:27

## 2023-03-10 RX ADMIN — HEPARIN SODIUM 5000 UNITS: 5000 INJECTION INTRAVENOUS; SUBCUTANEOUS at 21:38

## 2023-03-10 RX ADMIN — HEPARIN SODIUM 5000 UNITS: 5000 INJECTION INTRAVENOUS; SUBCUTANEOUS at 13:42

## 2023-03-10 RX ADMIN — IPRATROPIUM BROMIDE AND ALBUTEROL SULFATE 1 AMPULE: 2.5; .5 SOLUTION RESPIRATORY (INHALATION) at 12:16

## 2023-03-10 RX ADMIN — INSULIN LISPRO 1 UNITS: 100 INJECTION, SOLUTION INTRAVENOUS; SUBCUTANEOUS at 00:22

## 2023-03-10 RX ADMIN — HEPARIN SODIUM 5000 UNITS: 5000 INJECTION INTRAVENOUS; SUBCUTANEOUS at 06:23

## 2023-03-10 RX ADMIN — LEVOTHYROXINE SODIUM 100 MCG: 0.1 TABLET ORAL at 06:23

## 2023-03-10 RX ADMIN — METOPROLOL TARTRATE 25 MG: 25 TABLET, FILM COATED ORAL at 21:30

## 2023-03-10 RX ADMIN — INSULIN LISPRO 1 UNITS: 100 INJECTION, SOLUTION INTRAVENOUS; SUBCUTANEOUS at 23:37

## 2023-03-10 RX ADMIN — EPOETIN ALFA-EPBX 8000 UNITS: 4000 INJECTION, SOLUTION INTRAVENOUS; SUBCUTANEOUS at 08:55

## 2023-03-10 RX ADMIN — POTASSIUM BICARBONATE 40 MEQ: 782 TABLET, EFFERVESCENT ORAL at 09:50

## 2023-03-10 RX ADMIN — MIDODRINE HYDROCHLORIDE 10 MG: 5 TABLET ORAL at 12:05

## 2023-03-10 RX ADMIN — SODIUM CHLORIDE, PRESERVATIVE FREE 10 ML: 5 INJECTION INTRAVENOUS at 21:38

## 2023-03-10 RX ADMIN — ATORVASTATIN CALCIUM 40 MG: 40 TABLET, FILM COATED ORAL at 21:29

## 2023-03-10 RX ADMIN — INSULIN GLARGINE 10 UNITS: 100 INJECTION, SOLUTION SUBCUTANEOUS at 21:29

## 2023-03-10 RX ADMIN — POTASSIUM CHLORIDE 40 MEQ: 1500 TABLET, EXTENDED RELEASE ORAL at 16:43

## 2023-03-10 RX ADMIN — INSULIN LISPRO 1 UNITS: 100 INJECTION, SOLUTION INTRAVENOUS; SUBCUTANEOUS at 12:28

## 2023-03-10 RX ADMIN — IPRATROPIUM BROMIDE AND ALBUTEROL SULFATE 1 AMPULE: 2.5; .5 SOLUTION RESPIRATORY (INHALATION) at 16:09

## 2023-03-10 ASSESSMENT — PAIN SCALES - GENERAL: PAINLEVEL_OUTOF10: 0

## 2023-03-10 ASSESSMENT — ENCOUNTER SYMPTOMS: SHORTNESS OF BREATH: 1

## 2023-03-10 ASSESSMENT — PAIN SCALES - WONG BAKER: WONGBAKER_NUMERICALRESPONSE: 0

## 2023-03-10 NOTE — PROGRESS NOTES
V2.0  [facility] Hospitalist Progress Note      Name:  Jossue Flowers /Age/Sex: 1950  (68 y.o. male)   MRN & CSN:  [id number] & [id number] Encounter Date/Time: 3/10/2023 9:03 AM EST    Location:  -A PCP: Kishan Mejia, [address] [facility] Day: 6      Subjective:     Chief Complaint: Other     Seen during dialysis. Pt remains on 6L. Denies lightheadedness, dizziness, fever, night sweats, chills, chest pain, palpitations, abd pain, nausea, vomiting, diarrhea, dysuria. Assessment and Plan:   Suspected healthcare acquired vs aspiration PNA  Possible parapneumonic effusion  Few days of increased sputum production. Same O2 requirement  Multiple readmission where he was treated for aspiration PNA likely related to dysphagia and trach. S/p thora 2023 more suggestive of transudate effusion (no serum LDH, protein to compare though)  S/p bronchoscopy 2023 trachea full of thick secretions, large mucus plug right lung, marked inflammation/swelling bronchi, BAL obtained only AFP checked negative no other culture found. CXR 3/5 R pleural effusion with right basilar opacity suggesting atelectasis or infection. S/p thora 3/8 exudative pleural effusion; awaiting gram stain   negative resp PCR. Tracheal aspirate culture negative. No leukocytosis, normal lactic acid level. - low suspicion for PNA; throat culture negative for growth at 36 hours. Stop IV cefepime. - breathing treatment, Mucomyst  -Pulmonology consulted. Chest CT ordered. Plan for bronchoscopy on Monday morning as per pulmonology. A-fib with RVR  Takes warfarin and metoprolol 25 mg twice daily  HR remains 110-120's.    -Keep on tele   - Continue metoprolol with holding parameters  - consult edcardiology given soft blood pressures.   In addition to his metoprolol he was added on amiodarone.  - holding warfarin for bronchoscopy     Elevated troponin, likely in the setting of demand ischemia, ESRD - trending Or  ondansetron, 4 mg, Q6H PRN  polyethylene glycol, 17 g, Daily PRN  acetaminophen, 650 mg, Q6H PRN   Or  acetaminophen, 650 mg, Q6H PRN  sennosides-docusate sodium, 2 tablet, Daily PRN      Labs           Imaging/Diagnostics Last 24 Hours   XR CHEST PORTABLE    Result Date: 3/5/2023  EXAMINATION: ONE XRAY VIEW OF THE CHEST 3/5/2023 4:11 pm COMPARISON: 02/22/2023 HISTORY: ORDERING SYSTEM PROVIDED HISTORY: cough with thick phlegm, tracheostomy status TECHNOLOGIST PROVIDED HISTORY: Reason for exam:->cough with thick phlegm, tracheostomy status Reason for Exam: cough with thick phlegm, tracheostomy status FINDINGS: Tracheostomy tube and right-sided central venous catheter remain in place. Heart size stable. Right pleural effusion. Right basilar opacity.      Right pleural effusion with right basilar opacity suggesting atelectasis or infection       Electronically signed by Kinsey Damico MD on 3/10/2023 at 9:51 AM

## 2023-03-10 NOTE — PROGRESS NOTES
Comprehensive Nutrition Assessment    Type and Reason for Visit:  Reassess    Nutrition Recommendations/Plan:   Continue current EN meeting 100% estimated needs     Malnutrition Assessment:  Malnutrition Status: At risk for malnutrition  (03/06/23 1139)    Context:  Chronic Illness       Nutrition Assessment:    Pt continues NPO per SLP, with EN running at goal via PEG tube. Will continue to follow as high nutrition risk. Nutrition Related Findings:    meds/labs reviewed Wound Type: Open Wounds, Multiple (Cluster on buttocks)       Current Nutrition Intake & Therapies:    Average Meal Intake: NPO  Average Supplements Intake: NPO  Diet NPO Exceptions are: Sips of Clear Liquids  ADULT TUBE FEEDING; PEG; Renal Formula; Continuous; 10; Yes; 10; Q 4 hours; 60; 250; Q 4 hours  Current Tube Feeding (TF) Orders:  Feeding Route: PEG  Formula: Renal Formula  Schedule: Continuous  Feeding Regimen: 60 ml/hr  Additives/Modulars: None  Water Flushes: 250 ml Q4H  Current TF & Flush Orders Provides: 2592 kcal and 116 gm protein  Goal TF & Flush Orders Provides: 2592 kcal and 116 gm protein    Anthropometric Measures:  Height: 6' 2\" (188 cm)  Ideal Body Weight (IBW): 190 lbs (86 kg)    Admission Body Weight: 267 lb 10.2 oz (121.4 kg)  Current Body Weight: 274 lb 14.6 oz (124.7 kg), 140.9 % IBW.  Weight Source: Bed Scale  Current BMI (kg/m2): 35.3  Usual Body Weight: 324 lb (147 kg) (September 2022 from RD notes)  % Weight Change (Calculated): -17.4  Weight Adjustment For: No Adjustment                 BMI Categories: Obese Class 2 (BMI 35.0 -39.9)    Estimated Daily Nutrient Needs:  Energy Requirements Based On: Kcal/kg  Weight Used for Energy Requirements: Ideal  Energy (kcal/day): 7625-7036 (30-35 kcals/kg IBW)  Weight Used for Protein Requirements: Ideal  Protein (g/day): 104-112 (1.2-1.3 g/kg)  Method Used for Fluid Requirements: Standard Renal  Fluid (ml/day): fluids per nephrology    Nutrition Diagnosis:   Inadequate oral intake related to swallowing difficulty as evidenced by NPO or clear liquid status due to medical condition, nutrition support - enteral nutrition    Nutrition Interventions:   Food and/or Nutrient Delivery: Continue Current Tube Feeding  Nutrition Education/Counseling: Education not indicated  Coordination of Nutrition Care: Continue to monitor while inpatient       Goals:     Goals:  Tolerate nutrition support at goal rate       Nutrition Monitoring and Evaluation:   Behavioral-Environmental Outcomes: None Identified  Food/Nutrient Intake Outcomes: Enteral Nutrition Intake/Tolerance  Physical Signs/Symptoms Outcomes: Biochemical Data, Chewing or Swallowing, GI Status, Fluid Status or Edema, Weight, Skin    Discharge Planning:    Enteral Nutrition     Ailyn Thakur RD, LD  Contact: [phone]

## 2023-03-10 NOTE — PLAN OF CARE
Progressing  Goal: Return ADL status to a safe level of function  Outcome: Progressing     Problem: Gastrointestinal - Adult  Goal: Minimal or absence of nausea and vomiting  Outcome: Progressing  Goal: Maintains or returns to baseline bowel function  Outcome: Progressing  Goal: Maintains adequate nutritional intake  Outcome: Progressing

## 2023-03-10 NOTE — PROGRESS NOTES
Ok to hold scheduled 25mg metoprolol as patient's BP is still soft per Marybeth CHOUDHARY. Will monitor. Patient did receive his dose of amiodarone.

## 2023-03-10 NOTE — CONSULTS
CARDIOLOGY CONSULT NOTE   Reason for consultation: A-fib    Referring physician:  Enoch Awad MD     Primary care physician: Justa Dean, APRN - CNP      Dear   Thanks for the consult. History of present illness:Ji is a 68 y. o.year old who  presents with hypoxic respiratory failure due to mucous plugging of the track [employer] pneumonia, has been in ICU, Getting dialysis and blood pressure gets lower and metoprolol is being held and then he becomes tachycardic, patient had a bypass in September 22 and had a maze procedure also, he was deemed persistent A-diamond was off anticoagulation after left atrial appendage ligation, his amiodarone was discontinued on five 3/21 23.,  Patient is very poor historian all information obtained after review medical record discussion with staff  Chief Complaint   Patient presents with    Other     Blood pressure, cholesterol, blood glucose and weight are well controlled. 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, COPD (chronic obstructive pulmonary disease) ([address] Depression, Diabetes mellitus ([address] Dupuytren's contracture of hand, Family history of cardiovascular disease, GERD (gastroesophageal reflux disease), H/O cardiac catheterization, H/O cardiac catheterization, H/O cardiovascular stress test, H/O cardiovascular stress test, H/O cardiovascular stress test, H/O cardiovascular stress test, H/O Doppler ultrasound, H/O echocardiogram, H/O echocardiogram, H/O echocardiogram, H/O hiatal hernia, Hx of Venous Doppler, Hyperlipidemia, Hypertension, Obesity, S/P PTCA (percutaneous transluminal coronary angioplasty), Sleep apnea, and Thyroid disease. Past surgical history:   has a past surgical history that includes Coronary angioplasty with stent (03/21/2005); Cardiac catheterization (6/12/08);  Cardiac mL IntraVENous PRN Ryanne Cortes MD        Or    dextrose bolus 10% 250 mL  250 mL IntraVENous LATOYAN Ryanne Cortes MD        glucagon (rDNA) injection 1 mg  1 mg SubCUTAneous PRN Ryanne Cortes MD        dextrose 10 % infusion  1,000 mL IntraVENous Continuous DEEDEE Cortes MD        insulin lispro (HUMALOG) injection vial 0-4 Units  0-4 Units SubCUTAneous Q6H Ryanne Cortes MD   1 Units at 03/10/23 0627    sodium chloride flush 0.9 % injection 5-40 mL  5-40 mL IntraVENous 2 times per day Mitchell Day MD   10 mL at 03/09/23 2116    sodium chloride flush 0.9 % injection 5-40 mL  5-40 mL IntraVENous PRN Mitchell Day MD        0.9 % sodium chloride infusion   IntraVENous DEEDEE Day  mL/hr at 03/06/23 2042 25 mL at 03/06/23 2042    ondansetron (ZOFRAN-ODT) disintegrating tablet 4 mg  4 mg Oral Q8H PRN Mitchell Day MD        Or    ondansetron (ZOFRAN) injection 4 mg  4 mg IntraVENous Q6H PRN Mitchell Day MD   4 mg at 03/10/23 0747    polyethylene glycol (GLYCOLAX) packet 17 g  17 g Oral Daily PRN Mitchell Day MD        acetaminophen (TYLENOL) tablet 650 mg  650 mg Oral Q6H PRN Mitchell Day MD        Or    acetaminophen (TYLENOL) suppository 650 mg  650 mg Rectal Q6H PRN Mitchell Day MD        ipratropium-albuterol (DUONEB) nebulizer solution 1 ampule  1 ampule Inhalation Q4H WA Valorie Meier MD   1 ampule at 03/09/23 1930    aspirin chewable tablet 81 mg  81 mg Per NG tube Daily Valorie Meier MD   81 mg at 03/09/23 0947    atorvastatin (LIPITOR) tablet 40 mg  40 mg Oral Nightly Valorie Meier MD   40 mg at 03/09/23 2059    insulin glargine (LANTUS) injection vial 10 Units  10 Units SubCUTAneous Nightly Ryanne Cortes MD   10 Units at 03/09/23 2114    lansoprazole suspension SUSP 30 mg  30 mg Oral QAM AC Mitchell Day MD   30 mg at 03/09/23 0602    levothyroxine (SYNTHROID) tablet 100 mcg  100 mcg Per G Tube Daily Mitchell Day MD   100 mcg at 03/10/23 0623    metoprolol tartrate (LOPRESSOR)

## 2023-03-10 NOTE — PROGRESS NOTES
[NG/GT:1594]  Out: -   No intake/output data recorded. Vitals: BP (!) 104/54   Pulse (!) 106   Temp 97.9 °F (36.6 °C) (Oral)   Resp 25   Ht 6' 2\" (1.88 m)   Wt 274 lb 14.6 oz (124.7 kg)   SpO2 94%   BMI 35.30 kg/m²  {  General appearance: awake weak  HEENT: Head: Normal, normocephalic, atraumatic.   Neck: Positive trach  Lungs: diminished breath sounds bilaterally  Heart: S1, S2 normal  Abdomen: abnormal findings:  soft nt  Extremities: edema trace  Neurologic: Mental status: alertness: alert        Assessment and Plan:      IMP:  #1 end-stage renal disease on dialysis Monday Wednesday Friday  #2 possible pneumonia with respiratory secretion with trach  #3 A-fib rapid rate  #4 COPD likely pleural effusion  #5 type 2 diabetes  #6 status post CABG with complication requiring trach and PEG lives in a nursing home/depression    Plan     #1 doing dialysis today  #2 continue treating for pneumonia  #3 heart rate variable still trying control  #4 status post thoracentesis medical management treat COPD  #5 monitor his sugar  #6 trach care medical management when discharged will need follow-up with neurosurgery outpatient and follow-up [address] at that time  We will follow supportive care medical management as able  Work on discharge back to skilled nursing  We will follow monitor electrolytes in setting of dialysis               Ben Cameron MD, MD

## 2023-03-10 NOTE — PROCEDURES
treatment:   Dialysis Machine No.: [id number]   Machine Number: [id number]  Dialyzer Lot No.: [id number]  RO Machine Log Sheet Completed: Yes  Machine Alarm Self Test: Completed; Passed (03/10/23 0815)     Air Foam Detector: Tested, Proper Function  Extracorporeal Circuit Tested for Integrity: Yes  Machine Conductivity: 14.0  Manual Conductivity: 13.8     Bicarbonate Concentrate Lot No.: [id number]  Acid Concentrate Lot No.: [id number]  Manual Ph: 7.6  Bleach Test (Neg): Yes  Bath Temperature: 95 °F (35 °C)  Tubing Lot#: [id number]   Conductivity Meter Serial #: [id number]  All Connections Secure?: Yes  Venous Parameters Set?: Yes  Arterial Parameters Set?: Yes  Saline Line Double Clamped?: Yes  Air Foam Detector Engaged?: Yes  Machine Functioning Alarm Free?  Yes  Prime Given: 200ml    Chlorine Testing - Before each treatment and every 4 hours:   Treatment  Treatment Number: 2  Time On: 0820  Time Off: 1125  Treatment Goal: 3000  Weight: 274 lb 14.6 oz (124.7 kg) (03/10/23 0600)  1st check: less than 0.1 ppm at: 0710 hours  2nd check: less than 0.1 ppm at: 1040 hours  (if greater than 0.1 ppm, then check every 30 minutes from secondary)    Access Flows and Pressures  Patient Vitals for the past 8 hrs:   Blood Flow Rate (ml/min) Ultrafiltration Rate (ml/hr) Arterial Pressure (mmHg) Venous Pressure (mmHg) TMP DFR Comments Access Visible   03/10/23 0820 300 ml/min 1100 ml/hr -60 mmHg 110 50 874 on circ per policy, lines secure Yes   03/10/23 0830 300 ml/min 1100 ml/hr -60 mmHg 110 50 600 vss Yes   03/10/23 0845 300 ml/min 1100 ml/hr -60 mmHg 110 50 600 resting Yes   03/10/23 0900 300 ml/min 110 ml/hr -60 mmHg 110 50 600 vss Yes   03/10/23 0915 300 ml/min 1100 ml/hr -90 mmHg 100 60 600 lines secure Yes   03/10/23 0930 300 ml/min 1100 ml/hr -90 mmHg 100 60 600 vss Yes   03/10/23 0945 300 ml/min 1100 ml/hr -90 mmHg 100 60 600 COUGHING Yes   03/10/23 1000 300 ml/min 1100 ml/hr -100 mmHg 110 60 600 resting Yes   03/10/23 1015 300 ml/min 1100 Process: Acid/Vinegar Clean, Heat Disinfect, Exterior Machine Disinfection  Rinseback Volume (ml): 300 ml  Blood Volume Processed (Liters): 54.2 l/min  Dialyzer Clearance: Lightly streaked  Duration of Treatment (minutes): 180 minutes     Hemodialysis Intake (ml): 300 ml  Hemodialysis Output (ml): 3300 ml     Tolerated Treatment: Good  Patient Response to Treatment: tolerates tx well  Physician Notified: No       Provider Notification        Handoff complete and report given to Primary RN at 1130 hours.   Primary RN (First Initial, Last Name, Title):  TUSHAR Ch     Education  Person Educated: Patient   Knowledge Base: Minimal  Barriers to Learning?: None  Preferred method of Learning: Oral  Topic(s): Signs and Symptoms of Infection   Teaching Tools: Explanation   Response to Education: Requires Follow-up     Electronically signed by Kenneth Cast RN on 3/10/2023 at 11:28 AM

## 2023-03-10 NOTE — PROGRESS NOTES
Glenroy Drake, 1950, 2003/2003-A, 3/10/2023     Attempted OT/PT evals this AM, pt LO at dialysis. Will re-attempt as able and appropriate.     Isauro Bloom, OTR/L  3/10/2023, 11:31 AM

## 2023-03-10 NOTE — PROGRESS NOTES
Received call from Dr Cortney Mcguire, bronchoscopy is scheduled for Monday at 11:30am. Patient is currently in dialysis.

## 2023-03-10 NOTE — ANESTHESIA PRE PROCEDURE
Department of Anesthesiology  Preprocedure Note       Name:  Gian Vergara   Age:  68 y.o.  :  1950                                          MRN:  [id number]         Date:  3/10/2023      Surgeon: Sondra Herrera):  Tari Aldana MD    Procedure: Procedure(s):  BRONCHOSCOPY with trach, no fluor needed    Medications prior to admission:   Prior to Admission medications    Medication Sig Start Date End Date Taking?  Authorizing Provider   insulin lispro (HUMALOG) 100 UNIT/ML SOLN injection vial Inject 0-16 Units into the skin 3 times daily (with meals) **High Dose Corrective Algorithm** Glucose: Dose:  No Insulin 200-249 4 Units 250-299 8 Units 300-349 12 Units Over 349 16 Units and notify physician 23   Elmira Rhoades MD   metoprolol tartrate (LOPRESSOR) 25 MG tablet 1 tablet by Per G Tube route 2 times daily 23   Elmira Rhoades MD   insulin glargine (LANTUS) 100 UNIT/ML injection vial Inject 25 Units into the skin nightly 23   Rekha Pinto MD   polyvinyl alcohol (LIQUIFILM TEARS) 1.4 % ophthalmic solution Place 2 drops into both eyes 4 times daily as needed for Dry Eyes 2/17/23 3/19/23  Rekha Pinto MD   midodrine (PROAMATINE) 5 MG tablet Take 1 tablet by mouth 3 times daily (with meals) 23   Rekha Pinto MD   levothyroxine (SYNTHROID) 100 MCG tablet 1 tablet by Per G Tube route Daily 23   Rekha Pinto MD   lansoprazole 3 MG/ML SUSP Take 10 mLs by mouth every morning (before breakfast) 2/18/23 3/20/23  Rekha Pinto MD   sennosides-docusate sodium (SENOKOT-S) 8.6-50 MG tablet Take 2 tablets by mouth daily as needed for Constipation 12/15/22   Yared Piper MD   ipratropium-albuterol (DUONEB) 0.5-2.5 (3) MG/3ML SOLN nebulizer solution Inhale 3 mLs into the lungs 4 times daily 12/15/22   Yared Piper MD   atorvastatin (LIPITOR) 40 MG tablet 40 mg nightly 22   Historical Provider, MD   Nutritional Supplements (NEPRO/CARBSTEADY) LIQD 50 mL/hr continuous 11/28/22   Historical Provider, MD   Nutritional Supplements (PROSOURCE TF) LIQD 1 Package 3 times daily 11/28/22   Historical Provider, MD   traZODone (DESYREL) 100 MG tablet 100 mg nightly 11/28/22   Historical Provider, MD   QUEtiapine (SEROQUEL) 50 MG tablet 50 mg nightly 11/28/22   Historical Provider, MD   aspirin 81 MG chewable tablet 1 tablet by Per NG tube route daily 9/25/22   Hayes Campos MD   blood glucose monitor kit and supplies Dispense sufficient amount for indicated testing frequency plus additional to accommodate PRN testing needs. Dispense all needed supplies to include: monitor, strips, lancing device, lancets, control solutions, alcohol swabs. 7/26/22   ANKITA Wang CNP   Lancets MISC by D3EA route three times a day. 7/25/22   ANKITA Wang CNP       Current medications:    No current facility-administered medications for this visit. No current outpatient medications on file.      Facility-Administered Medications Ordered in Other Visits   Medication Dose Route Frequency Provider Last Rate Last Admin    potassium bicarb-citric acid (EFFER-K) effervescent tablet 40 mEq  40 mEq Per G Tube Once Lea Lim MD        potassium chloride (KLOR-CON M) extended release tablet 40 mEq  40 mEq Oral PRN FUNMI Flowers-C        Or    potassium bicarb-citric acid (EFFER-K) effervescent tablet 40 mEq  40 mEq Oral PRN Icard Chancellor PA-C        Or    potassium chloride 10 mEq/100 mL IVPB (Peripheral Line)  10 mEq IntraVENous PRN Darian Foster, PA-C        magnesium sulfate 2000 mg in 50 mL IVPB premix  2,000 mg IntraVENous PRN Icard , PA-C        scopolamine (TRANSDERM-SCOP) transdermal patch 1 patch  1 patch TransDERmal Q72H Elina Carcamo MD   1 patch at 03/09/23 1209    midodrine (PROAMATINE) tablet 10 mg  10 mg Oral TID WC Darian Foster PA-C   10 mg at 03/10/23 1205    amiodarone (CORDARONE) tablet 200 mg  200 mg Oral Daily Kerri Hylton PA-C   200 mg at 03/10/23 1207    heparin (porcine) injection 5,000 Units  5,000 Units SubCUTAneous 3 times per day Kerri Hylton PA-C   5,000 Units at 03/10/23 1342    glucose chewable tablet 16 g  4 tablet Oral PRN Joe Landon MD        dextrose bolus 10% 125 mL  125 mL IntraVENous PRN Joe Landon MD        Or    dextrose bolus 10% 250 mL  250 mL IntraVENous PRN Joe Landon MD        glucagon (rDNA) injection 1 mg  1 mg SubCUTAneous PRN Joe Landon MD        dextrose 10 % infusion  1,000 mL IntraVENous Continuous PRN Joe Landon MD        insulin lispro (HUMALOG) injection vial 0-4 Units  0-4 Units SubCUTAneous Q6H Joe Landon MD   1 Units at 03/10/23 1228    sodium chloride flush 0.9 % injection 5-40 mL  5-40 mL IntraVENous 2 times per day Wellington Osgood, MD   10 mL at 03/09/23 2116    sodium chloride flush 0.9 % injection 5-40 mL  5-40 mL IntraVENous PRN Wellington Osgood, MD        0.9 % sodium chloride infusion   IntraVENous PRN Wellington Osgood,  mL/hr at 03/06/23 2042 25 mL at 03/06/23 2042    ondansetron (ZOFRAN-ODT) disintegrating tablet 4 mg  4 mg Oral Q8H PRN Wellington Osgood, MD        Or    ondansetron (ZOFRAN) injection 4 mg  4 mg IntraVENous Q6H PRN Wellington Osgood, MD   4 mg at 03/10/23 0747    polyethylene glycol (GLYCOLAX) packet 17 g  17 g Oral Daily PRN Wellington Osgood, MD        acetaminophen (TYLENOL) tablet 650 mg  650 mg Oral Q6H PRN Wellington Osgood, MD        Or    acetaminophen (TYLENOL) suppository 650 mg  650 mg Rectal Q6H PRN Wellington Osgood, MD        ipratropium-albuterol (DUONEB) nebulizer solution 1 ampule  1 ampule Inhalation Q4H WA Valorie Meier MD   1 ampule at 03/10/23 1216    aspirin chewable tablet 81 mg  81 mg Per NG tube Daily Valorie Meier MD   81 mg at 03/10/23 1205    atorvastatin (LIPITOR) tablet 40 mg  40 mg Oral Nightly Valorie Meier MD   40 mg at 03/09/23 2059    insulin glargine (LANTUS) injection vial 10 Units  10 Units SubCUTAneous Nightly Michoacano Medina MD   10 Units at 03/09/23 2114   • lansoprazole suspension SUSP 30 mg  30 mg Oral QAM AC Valorie Meier MD   30 mg at 03/10/23 1212   • levothyroxine (SYNTHROID) tablet 100 mcg  100 mcg Per G Tube Daily Valorie Meier MD   100 mcg at 03/10/23 0623   • metoprolol tartrate (LOPRESSOR) tablet 25 mg  25 mg Per G Tube BID Valorie Meier MD   25 mg at 03/09/23 2059   • QUEtiapine (SEROQUEL) tablet 50 mg  50 mg Oral Nightly Valorie Meier MD   50 mg at 03/09/23 2058   • sennosides-docusate sodium (SENOKOT-S) 8.6-50 MG tablet 2 tablet  2 tablet Oral Daily PRN Valorie Meier MD       • traZODone (DESYREL) tablet 100 mg  100 mg Oral Nightly Valorie Meier MD   100 mg at 03/09/23 2058   • acetylcysteine (MUCOMYST) 20 % solution 600 mg  600 mg Inhalation BID Valorie Meier MD   600 mg at 03/09/23 1935       Allergies:  No Known Allergies    Problem List:    Patient Active Problem List   Diagnosis Code   • Atrial fibrillation ([facility]) I48.91   • CAD (coronary artery disease) I25.10   • Morbid obesity ([facility]) E66.01   • COPD (chronic obstructive pulmonary disease) ([facility]) J44.9   • Sleep apnea G47.30   • Type 2 diabetes mellitus ([facility]) E11.9   • Thyroid disease E07.9   • Hyperlipidemia E78.5   • Acute congestive heart failure ([facility]) I50.9   • Coronary artery disease involving native coronary artery of native heart with angina pectoris ([facility]) I25.119   • Chronic renal disease, stage III ([facility]) [340626] N18.30   • Hypertension I10   • CAD, multiple vessel I25.10   • Dyspnea R06.00   • Moderate malnutrition ([facility]) E44.0   • Pneumonia of both lungs due to infectious organism J18.9   • Severe malnutrition ([facility]) E43   • Chronic respiratory failure with hypoxia ([facility]) J96.11   • Aspiration pneumonia of right lower lobe ([facility]) J69.0   • ESRD on dialysis ([facility]) N18.6, Z99.2   • Aspiration pneumonia, unspecified aspiration pneumonia type, unspecified laterality, unspecified part of lung ([facility]) J69.0   Acute aspiration pneumonia ([facility] J69.0    Acute on chronic respiratory failure with hypoxia ([facility]) J96.21    Healthcare-associated pneumonia J18.9       Past Medical History:        Diagnosis Date    Allergic rhinitis     Anticoagulated on Coumadin     1/6/17-**[facility] to follow pt's PT/INRs, along w/prescribing his Coumadin. **    Anxiety     Arthritis     Atrial fibrillation (HCC)     On Coumadin. 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 positive 09/21/2022    positive at 15C, negative at 18C    COPD (chronic obstructive pulmonary disease) ([facility])     Depression     Diabetes mellitus ([facility])     NIDDM- dx over 10 yrs ago- follows with Dr Griselda Cross Dupuytrekatie's contracture of hand     Right hand    Family history of cardiovascular disease     Father-MI    GERD (gastroesophageal reflux disease)     H/O cardiac catheterization 03/2007    cardiac cath- stent LAD patent, Pxsjpqio-13-58%, RCA 40-50%    H/O cardiac catheterization 06/12/2008    cardiac cath- mild disease mid RCA    H/O cardiovascular stress test 04/10/2014    cardiolite- normal, no ischemia, EF63%    H/O cardiovascular stress test 09/21/2016    EF59% normal study  pt in atrial fib    H/O cardiovascular stress test 09/20/2017    EF 60%   afib    H/O cardiovascular stress test 11/07/2018    EF60% normal study    H/O Doppler ultrasound     1/11/2010-CAROTID_Intimal thickening but no significant atherosclerotic plaque noted in LAUREN. Doppler flow velocities within the LAUREN are WNL. Intimal thickening but no significant atherosclerotic plaque noted in LICA. Doppler flow velociities within the LICA are WNL.  H/O echocardiogram 04/10/2014    EF 60%, normal LV systolic function, mild mitral and tricuspid insufficiencies, no pericardial effusion.     H/O echocardiogram 09/21/2016    EF60% normal study    H/O echocardiogram 11/07/2018    EF55-60% no significant valular disease    H/O hiatal hernia     Hx of Venous Doppler 03/14/2019    Significant reflux in Left Deep System, No reflux in right lower extremity, No DVT or SVT    Hyperlipidemia     Hypertension     Obesity     S/P PTCA (percutaneous transluminal coronary angioplasty) 03/21/2005    s/p PTCA with 3.5 X 16 TAXUS stent to LAD- follows with Dr Dee Anna Sleep apnea     had sleep study done 10+ yrs ago- has cpap but does not use it    Thyroid disease     hypothyroid       Past Surgical History:        Procedure Laterality Date    BRONCHOSCOPY N/A 2/21/2023    BRONCHOSCOPY WITH LAVAGE performed by Cheko Galaviz MD at [facility]  6/12/08    mild disease mid RCA,  stent to LAD patent,    CARDIAC CATHETERIZATION  3/07    Stent to LAD patent, Diagonal 40-50%, RCA 40-50%    CARDIAC CATHETERIZATION  3/05    COLONOSCOPY  2011    COLONOSCOPY  5/18/16    1 polyp removed, diverticulosis and hemorrhoids noted    CORONARY ANGIOPLASTY WITH STENT PLACEMENT  03/21/2005    PTCA with 3.5 x 16 TAXUS stent to LAD    CORONARY ARTERY BYPASS GRAFT N/A 9/22/2022    CABG CORONARY ARTERY BYPASS x3 (LIMA TO LAD, SVG TO PDA-RCA SEQUENTIAL) , INTRAOPERATIVE MILTON, ENDOVEIN HARVEST OF LEFT SAPHENOUS VEIN, MODIFIED MAZE PROCEDURE, LEFT ATRIAL APPENDAGE LIGATION, INTERCOSTAL NERVE BLOCK, INTRA-AORTIC BALLOON PUMP INSERTION performed by Modseto Casas MD at [facility]  2014    egd    HAND SURGERY Right 1997    IR TUNNELED [address] 5 YEARS  2/14/2023    IR TUNNELED CATHETER PLACEMENT GREATER THAN 5 YEARS 2/14/2023 Junior Braxton DO [facility] SPECIAL PROCEDURES    SD OPTX ANKLE DISLOCATION W/REPAIR/INT/XTRNL FIXJ Right 6/3/2019    RIGHT OPEN REDUCTION INTERNAL FIXATION OF DISTAL TIBIA  AND FIBULA performed by Negrita Soni MD at [facility] N/A 9/24/2022    STERNUM WASHOUT performed by Modesto Casas MD at [address] [place] [address] History:    Social History     Tobacco Use    Smoking status: Former     Packs/day: 1.00     Years: 10.00     Pack years: 10.00     Types: Cigarettes     Quit date: 1976     Years since quittin.3    Smokeless tobacco: Never   Substance Use Topics    Alcohol use: Not Currently     Alcohol/week: 6.0 standard drinks     Types: 6 Cans of beer per week     Comment: caffeine 2 cups of tea a day                                 Counseling given: Not Answered      Vital Signs (Current): There were no vitals filed for this visit.                                            BP Readings from Last 3 Encounters:   03/10/23 (!) 98/57   23 (!) 145/81   23 110/60       NPO Status:                                                                                 BMI:   Wt Readings from Last 3 Encounters:   03/10/23 274 lb 14.6 oz (124.7 kg)   23 274 lb 11.1 oz (124.6 kg)   23 289 lb 3.9 oz (131.2 kg)     There is no height or weight on file to calculate BMI.    CBC:   Lab Results   Component Value Date/Time    WBC 6.6 03/10/2023 02:39 AM    RBC 2.41 03/10/2023 02:39 AM    HGB 8.6 03/10/2023 02:39 AM    HCT 26.2 03/10/2023 02:39 AM    .7 03/10/2023 02:39 AM    RDW 15.7 03/10/2023 02:39 AM     03/10/2023 02:39 AM       CMP:   Lab Results   Component Value Date/Time     03/10/2023 02:39 AM    K 3.4 03/10/2023 02:39 AM    CL 93 03/10/2023 02:39 AM    CO2 27 03/10/2023 02:39 AM    BUN 47 03/10/2023 02:39 AM    CREATININE 4.5 03/10/2023 02:39 AM    GFRAA >60 2022 12:08 PM    AGRATIO 1.1 02/15/2022 03:35 PM    LABGLOM 13 03/10/2023 02:39 AM    GLUCOSE 196 03/10/2023 02:39 AM    PROT 5.7 2023 03:53 PM    CALCIUM 9.0 03/10/2023 02:39 AM    BILITOT 0.3 2023 03:54 PM    ALKPHOS 224 2023 03:54 PM    AST 15 2023 03:54 PM    ALT 16 2023 03:54 PM       POC Tests:   Recent Labs     03/10/23  1222   POCGLU 214*       Coags:   Lab Results   Component Value Date/Time    PROTIME 30.9 2023 07:37 AM    PROTIME 30.7 03/08/2023 07:37 AM    PROTIME 27.3 09/09/2022 03:22 PM    INR 2.37 03/08/2023 07:37 AM    INR 2.36 03/08/2023 07:37 AM    APTT 34.5 03/10/2023 02:39 AM       HCG (If Applicable): No results found for: PREGTESTUR, PREGSERUM, HCG, HCGQUANT     ABGs:   Lab Results   Component Value Date/Time    PO2ART 70.3 09/24/2022 11:49 AM    CDN4UJL 35.5 09/24/2022 11:49 AM    GAF7PZL 21.2 09/24/2022 11:49 AM        Type & Screen (If Applicable):  No results found for: LABABO, LABRH    Drug/Infectious Status (If Applicable):  No results found for: HIV, HEPCAB    COVID-19 Screening (If Applicable):   Lab Results   Component Value Date/Time    COVID19 NOT DETECTED 03/05/2023 04:20 PM           Anesthesia Evaluation  Patient summary reviewed no history of anesthetic complications:   Airway: Mallampati: Unable to assess / NA         Tracheostomy present Dental:          Pulmonary:   (+) pneumonia:  COPD:  shortness of breath: chronic,  sleep apnea:                            ROS comment: Former smoker   Cardiovascular:  Exercise tolerance: poor (<4 METS),   (+) hypertension:, angina:, CAD:, CABG/stent:, dysrhythmias: atrial fibrillation, CHF:, hyperlipidemia         Beta Blocker:  Not on Beta Blocker         Neuro/Psych:   (+) depression/anxiety             GI/Hepatic/Renal:   (+) GERD:,           Endo/Other:    (+) DiabetesType II DM, , hypothyroidism: arthritis: OA., .                 Abdominal:             Vascular: negative vascular ROS. Other Findings:             Anesthesia Plan      MAC     ASA 4     ( Pre Anesthesia Assessment complete. Chart reviewed on 3/10/2023)            Plan discussed with CRNA. Pre Anesthesia Evaluation complete. Anesthesia plan, risks, benefits, alternatives, and personnel involved discussed with patient. Patients and/or legal guardian verbalized an understanding and agreed to proceed.   Roetta Favre, APRN - CRNA  3/10/2023

## 2023-03-10 NOTE — PROGRESS NOTES
pulmonary      SUBJECTIVE:  no change and getting hd     OBJECTIVE    VITALS:  BP (!) 92/51   Pulse (!) 111   Temp 97.9 °F (36.6 °C) (Oral)   Resp 25   Ht 6' 2\" (1.88 m)   Wt 274 lb 14.6 oz (124.7 kg)   SpO2 94%   BMI 35.30 kg/m²   HEAD AND FACE EXAM:  No throat injection, no active exudate,no thrush  NECK EXAM;No JVD, no masses, symmetrical  CHEST EXAM; Expansion equal and symmetrical, no masses  LUNG EXAM; Good breath sounds bilaterally. There are expiratory wheezes both lungs, there are crackles at both lung bases  CARDIOVASCULAR EXAM: Positive S1 and S2, no S3 or S4, no clicks ,no murmurs  RIGHT AND LEFT LOWER EXTRIMITY EXAM: No edema, no swelling, no inflamation  CNS EXAM: Alert and oriented X3          LABS   Lab Results   Component Value Date    WBC 6.6 03/10/2023    HGB 8.6 (L) 03/10/2023    HCT 26.2 (L) 03/10/2023    .7 (H) 03/10/2023     03/10/2023     Lab Results   Component Value Date    CREATININE 4.5 (H) 03/10/2023    BUN 47 (H) 03/10/2023     (L) 03/10/2023    K 3.4 (L) 03/10/2023    CL 93 (L) 03/10/2023    CO2 27 03/10/2023     Lab Results   Component Value Date    INR 2.37 03/08/2023    INR 2.36 03/08/2023    PROTIME 30.9 (H) 03/08/2023    PROTIME 30.7 (H) 03/08/2023          Lab Results   Component Value Date/Time    PHOS 2.9 03/08/2023 01:53 PM    PHOS 4.2 03/07/2023 06:32 AM    PHOS 3.7 02/15/2023 03:15 AM      No results for input(s): PH, PO2ART, KGT1WAS, HCO3, BEART, O2SAT in the last 72 hours. Wt Readings from Last 3 Encounters:   03/10/23 274 lb 14.6 oz (124.7 kg)   02/24/23 274 lb 11.1 oz (124.6 kg)   02/17/23 289 lb 3.9 oz (131.2 kg)               ASSESMENT  Ac on ch resp failure  Pneumonia  Mucus plugging  Georgi pl effusion  copd        PLAN  Cpm  Cont o2  Antibx  Frequent suctioning  BECAUSE PT IS GETTING HD., BRONCH CAN NOT BE DONE TODAY.  I HAVE SCHEDULED PT FOR [facility] FOR Monday 1130 AM    3/10/2023  Gloria Trivedi MD, M.D.

## 2023-03-11 PROBLEM — J96.01 ACUTE RESPIRATORY FAILURE WITH HYPOXEMIA (HCC): Status: ACTIVE | Noted: 2023-03-11

## 2023-03-11 LAB
ANION GAP SERPL CALCULATED.3IONS-SCNC: 9 MMOL/L (ref 4–16)
BASOPHILS ABSOLUTE: 0.1 K/CU MM
BASOPHILS RELATIVE PERCENT: 0.8 % (ref 0–1)
BUN SERPL-MCNC: 31 MG/DL (ref 6–23)
CALCIUM SERPL-MCNC: 9.1 MG/DL (ref 8.3–10.6)
CHLORIDE BLD-SCNC: 97 MMOL/L (ref 99–110)
CO2: 29 MMOL/L (ref 21–32)
CREAT SERPL-MCNC: 3.5 MG/DL (ref 0.9–1.3)
CULTURE: ABNORMAL
DIFFERENTIAL TYPE: ABNORMAL
EOSINOPHILS ABSOLUTE: 0.6 K/CU MM
EOSINOPHILS RELATIVE PERCENT: 9.2 % (ref 0–3)
GFR SERPL CREATININE-BSD FRML MDRD: 18 ML/MIN/1.73M2
GLUCOSE BLD-MCNC: 203 MG/DL (ref 70–99)
GLUCOSE BLD-MCNC: 205 MG/DL (ref 70–99)
GLUCOSE BLD-MCNC: 210 MG/DL (ref 70–99)
GLUCOSE BLD-MCNC: 210 MG/DL (ref 70–99)
GLUCOSE SERPL-MCNC: 192 MG/DL (ref 70–99)
GRAM SMEAR: ABNORMAL
HCT VFR BLD CALC: 25.4 % (ref 42–52)
HEMOGLOBIN: 8.5 GM/DL (ref 13.5–18)
IMMATURE NEUTROPHIL %: 1.5 % (ref 0–0.43)
LYMPHOCYTES ABSOLUTE: 1.5 K/CU MM
LYMPHOCYTES RELATIVE PERCENT: 24.3 % (ref 24–44)
Lab: ABNORMAL
MAGNESIUM: 2.2 MG/DL (ref 1.8–2.4)
MCH RBC QN AUTO: 37.8 PG (ref 27–31)
MCHC RBC AUTO-ENTMCNC: 33.5 % (ref 32–36)
MCV RBC AUTO: 112.9 FL (ref 78–100)
MONOCYTES ABSOLUTE: 0.6 K/CU MM
MONOCYTES RELATIVE PERCENT: 9.9 % (ref 0–4)
NUCLEATED RBC %: 0.5 %
PDW BLD-RTO: 15.9 % (ref 11.7–14.9)
PLATELET # BLD: 168 K/CU MM (ref 140–440)
PMV BLD AUTO: 9.5 FL (ref 7.5–11.1)
POTASSIUM SERPL-SCNC: 4.5 MMOL/L (ref 3.5–5.1)
RBC # BLD: 2.25 M/CU MM (ref 4.6–6.2)
SEGMENTED NEUTROPHILS ABSOLUTE COUNT: 3.2 K/CU MM
SEGMENTED NEUTROPHILS RELATIVE PERCENT: 54.3 % (ref 36–66)
SODIUM BLD-SCNC: 135 MMOL/L (ref 135–145)
SPECIMEN: ABNORMAL
TOTAL IMMATURE NEUTOROPHIL: 0.09 K/CU MM
TOTAL NUCLEATED RBC: 0 K/CU MM
WBC # BLD: 6 K/CU MM (ref 4–10.5)

## 2023-03-11 PROCEDURE — 94640 AIRWAY INHALATION TREATMENT: CPT

## 2023-03-11 PROCEDURE — 36415 COLL VENOUS BLD VENIPUNCTURE: CPT

## 2023-03-11 PROCEDURE — 6360000002 HC RX W HCPCS: Performed by: INTERNAL MEDICINE

## 2023-03-11 PROCEDURE — 85025 COMPLETE CBC W/AUTO DIFF WBC: CPT

## 2023-03-11 PROCEDURE — 2060000000 HC ICU INTERMEDIATE R&B

## 2023-03-11 PROCEDURE — 6360000002 HC RX W HCPCS

## 2023-03-11 PROCEDURE — 6370000000 HC RX 637 (ALT 250 FOR IP): Performed by: INTERNAL MEDICINE

## 2023-03-11 PROCEDURE — 2580000003 HC RX 258: Performed by: INTERNAL MEDICINE

## 2023-03-11 PROCEDURE — 80048 BASIC METABOLIC PNL TOTAL CA: CPT

## 2023-03-11 PROCEDURE — 94761 N-INVAS EAR/PLS OXIMETRY MLT: CPT

## 2023-03-11 PROCEDURE — 2500000003 HC RX 250 WO HCPCS: Performed by: STUDENT IN AN ORGANIZED HEALTH CARE EDUCATION/TRAINING PROGRAM

## 2023-03-11 PROCEDURE — 99232 SBSQ HOSP IP/OBS MODERATE 35: CPT | Performed by: INTERNAL MEDICINE

## 2023-03-11 PROCEDURE — 83735 ASSAY OF MAGNESIUM: CPT

## 2023-03-11 PROCEDURE — 6370000000 HC RX 637 (ALT 250 FOR IP): Performed by: STUDENT IN AN ORGANIZED HEALTH CARE EDUCATION/TRAINING PROGRAM

## 2023-03-11 PROCEDURE — 82962 GLUCOSE BLOOD TEST: CPT

## 2023-03-11 PROCEDURE — 6370000000 HC RX 637 (ALT 250 FOR IP)

## 2023-03-11 PROCEDURE — 89220 SPUTUM SPECIMEN COLLECTION: CPT

## 2023-03-11 PROCEDURE — 2700000000 HC OXYGEN THERAPY PER DAY

## 2023-03-11 RX ADMIN — QUETIAPINE FUMARATE 50 MG: 25 TABLET ORAL at 22:05

## 2023-03-11 RX ADMIN — SODIUM CHLORIDE, PRESERVATIVE FREE 10 ML: 5 INJECTION INTRAVENOUS at 22:04

## 2023-03-11 RX ADMIN — IPRATROPIUM BROMIDE AND ALBUTEROL SULFATE 1 AMPULE: 2.5; .5 SOLUTION RESPIRATORY (INHALATION) at 10:51

## 2023-03-11 RX ADMIN — INSULIN LISPRO 1 UNITS: 100 INJECTION, SOLUTION INTRAVENOUS; SUBCUTANEOUS at 06:23

## 2023-03-11 RX ADMIN — MIDODRINE HYDROCHLORIDE 10 MG: 5 TABLET ORAL at 09:16

## 2023-03-11 RX ADMIN — HEPARIN SODIUM 5000 UNITS: 5000 INJECTION INTRAVENOUS; SUBCUTANEOUS at 13:37

## 2023-03-11 RX ADMIN — IPRATROPIUM BROMIDE AND ALBUTEROL SULFATE 1 AMPULE: 2.5; .5 SOLUTION RESPIRATORY (INHALATION) at 14:44

## 2023-03-11 RX ADMIN — LEVOTHYROXINE SODIUM 100 MCG: 0.1 TABLET ORAL at 06:07

## 2023-03-11 RX ADMIN — AMIODARONE HYDROCHLORIDE 200 MG: 200 TABLET ORAL at 09:17

## 2023-03-11 RX ADMIN — METOPROLOL TARTRATE 25 MG: 25 TABLET, FILM COATED ORAL at 22:05

## 2023-03-11 RX ADMIN — LANSOPRAZOLE 30 MG: KIT at 06:07

## 2023-03-11 RX ADMIN — IPRATROPIUM BROMIDE AND ALBUTEROL SULFATE 1 AMPULE: 2.5; .5 SOLUTION RESPIRATORY (INHALATION) at 20:31

## 2023-03-11 RX ADMIN — IPRATROPIUM BROMIDE AND ALBUTEROL SULFATE 1 AMPULE: 2.5; .5 SOLUTION RESPIRATORY (INHALATION) at 07:12

## 2023-03-11 RX ADMIN — ACETYLCYSTEINE 600 MG: 200 SOLUTION ORAL; RESPIRATORY (INHALATION) at 20:31

## 2023-03-11 RX ADMIN — MIDODRINE HYDROCHLORIDE 10 MG: 5 TABLET ORAL at 12:22

## 2023-03-11 RX ADMIN — INSULIN LISPRO 1 UNITS: 100 INJECTION, SOLUTION INTRAVENOUS; SUBCUTANEOUS at 17:20

## 2023-03-11 RX ADMIN — HEPARIN SODIUM 5000 UNITS: 5000 INJECTION INTRAVENOUS; SUBCUTANEOUS at 06:07

## 2023-03-11 RX ADMIN — MIDODRINE HYDROCHLORIDE 10 MG: 5 TABLET ORAL at 17:20

## 2023-03-11 RX ADMIN — INSULIN LISPRO 1 UNITS: 100 INJECTION, SOLUTION INTRAVENOUS; SUBCUTANEOUS at 12:22

## 2023-03-11 RX ADMIN — ACETYLCYSTEINE 600 MG: 200 SOLUTION ORAL; RESPIRATORY (INHALATION) at 07:12

## 2023-03-11 RX ADMIN — MICONAZOLE NITRATE: 2 POWDER TOPICAL at 22:19

## 2023-03-11 RX ADMIN — ATORVASTATIN CALCIUM 40 MG: 40 TABLET, FILM COATED ORAL at 22:05

## 2023-03-11 RX ADMIN — TRAZODONE HYDROCHLORIDE 100 MG: 50 TABLET ORAL at 22:05

## 2023-03-11 RX ADMIN — INSULIN GLARGINE 10 UNITS: 100 INJECTION, SOLUTION SUBCUTANEOUS at 22:06

## 2023-03-11 RX ADMIN — ASPIRIN 81 MG CHEWABLE TABLET 81 MG: 81 TABLET CHEWABLE at 09:16

## 2023-03-11 RX ADMIN — HEPARIN SODIUM 5000 UNITS: 5000 INJECTION INTRAVENOUS; SUBCUTANEOUS at 22:05

## 2023-03-11 RX ADMIN — SODIUM CHLORIDE, PRESERVATIVE FREE 10 ML: 5 INJECTION INTRAVENOUS at 09:17

## 2023-03-11 ASSESSMENT — PAIN SCALES - GENERAL: PAINLEVEL_OUTOF10: 0

## 2023-03-11 NOTE — PROGRESS NOTES
Priority: Medium    Severe malnutrition ([facility] 12/30/2022     Priority: Medium     Class: Acute    Pneumonia of both lungs due to infectious organism 12/29/2022     Priority: Medium    Dyspnea 12/12/2022     Priority: Medium    Moderate malnutrition ([facility] 12/12/2022     Priority: Medium    CAD, multiple vessel 09/15/2022     Priority: Medium    Hypertension      Priority: Medium    Acute congestive heart failure ([facility] 05/31/2022     Priority: Medium    Coronary artery disease involving native coronary artery of native heart with angina pectoris ([facility] 05/31/2022     Priority: Medium    Chronic renal disease, stage III [facility]) [[id number]] 05/31/2022     Priority: Medium    Thyroid disease      Overview Note:     hypothyroid      Hyperlipidemia     Type 2 diabetes mellitus ([facility] 11/09/2015    Atrial fibrillation (HCC)     Morbid obesity (HCC)     COPD (chronic obstructive pulmonary disease) (HCC)     Sleep apnea     CAD (coronary artery disease) 03/21/2005     Overview Note:     s/p PTCA with 3.5 X 16 TAXUS stent to LAD       Ac on ch resp failure  Pneumonia  Mucus plugging  Georgi pl effusion  copd     Abx  F/u C&S  Inhalers  Solumedrol  ICS  OOB  Keep sats > 92%  C/w present management  HD per renal  CPT  Mucomyst    Electronically signed by Анна Holcomb MD on 3/11/2023 at 11:25 AM

## 2023-03-11 NOTE — PROGRESS NOTES
LABALBU 3.5 03/08/2023 01:53 PM     Calcium:    Lab Results   Component Value Date/Time    CALCIUM 9.1 03/11/2023 02:14 AM     Phosphorus:    Lab Results   Component Value Date/Time    PHOS 2.9 03/08/2023 01:53 PM     U/A:    Lab Results   Component Value Date/Time    NITRU NEGATIVE 02/13/2023 12:15 AM    COLORU YELLOW 02/13/2023 12:15 AM    PHUR 6.5 06/30/2021 02:11 PM    WBCUA 67 02/13/2023 12:15 AM    RBCUA 37 02/13/2023 12:15 AM    MUCUS RARE 09/17/2022 06:21 PM    TRICHOMONAS NONE SEEN 02/13/2023 12:15 AM    BACTERIA NEGATIVE 02/13/2023 12:15 AM    CLARITYU CLEAR 02/13/2023 12:15 AM    SPECGRAV 1.025 02/13/2023 12:15 AM    UROBILINOGEN 0.2 02/13/2023 12:15 AM    BILIRUBINUR SMALL NUMBER OR AMOUNT OBSERVED 02/13/2023 12:15 AM    BILIRUBINUR small 06/30/2021 02:11 PM    BLOODU MODERATE NUMBER OR AMOUNT OBSERVED 02/13/2023 12:15 AM    GLUCOSEU neg 06/30/2021 02:11 PM    KETUA TRACE 02/13/2023 12:15 AM     ABG:    Lab Results   Component Value Date/Time    JEW8KUB 35.5 09/24/2022 11:49 AM    PO2ART 70.3 09/24/2022 11:49 AM    AOO9YQT 21.2 09/24/2022 11:49 AM     HgBA1c:    Lab Results   Component Value Date/Time    LABA1C 7.0 02/12/2023 10:53 PM     Microalbumen/Creatinine ratio:  No components found for: RUCREAT  TSH:    Lab Results   Component Value Date/Time    TSH 1.66 04/03/2018 11:33 AM     IRON:    Lab Results   Component Value Date/Time    IRON 60 02/13/2023 10:39 AM     Iron Saturation:  No components found for: PERCENTFE  TIBC:    Lab Results   Component Value Date/Time    TIBC 229 02/13/2023 10:39 AM     FERRITIN:    Lab Results   Component Value Date/Time    FERRITIN 1,063 02/13/2023 10:39 AM     RPR:  No results found for: RPR  YAYA:  No results found for: ANATITER, YAYA  24 Hour Urine for Creatinine Clearance:  No components found for: CREAT4, UHRS10, UTV10      Objective:   I/O: 03/10 0701 - 03/11 0700  In: 6608   Out: 3300   I/O last 3 completed shifts:   In: 1802 [NG/GT:1502]  Out: 3300   No

## 2023-03-11 NOTE — PROGRESS NOTES
V2.0  [facility] Hospitalist Progress Note      Name:  Lis Chaudhry /Age/Sex: 1950  (68 y.o. male)   MRN & CSN:  [id number] & [id number] Encounter Date/Time: 3/11/2023 9:03 AM EST    Location:  -A PCP: Andres Garcia, [address] [facility] Day: 7      Subjective:     Chief Complaint: Other     Oxygen requirement down to 5 L. Denies lightheadedness, dizziness, fever, night sweats, chills, chest pain, palpitations, abd pain, nausea, vomiting, diarrhea, dysuria. Assessment and Plan:   Suspected healthcare acquired vs aspiration PNA  Possible parapneumonic effusion  Few days of increased sputum production. Same O2 requirement  Multiple readmission where he was treated for aspiration PNA likely related to dysphagia and trach. S/p thora 2023 more suggestive of transudate effusion (no serum LDH, protein to compare though)  S/p bronchoscopy 2023 trachea full of thick secretions, large mucus plug right lung, marked inflammation/swelling bronchi, BAL obtained only AFP checked negative no other culture found. CXR 3/5 R pleural effusion with right basilar opacity suggesting atelectasis or infection. S/p thora 3/8 exudative pleural effusion; awaiting gram stain   negative resp PCR. Tracheal aspirate culture negative. No leukocytosis, normal lactic acid level. - low suspicion for PNA; Thora culture negative for growth at 72hours. IV cefepime has thus been discontinued. - breathing treatment, Mucomyst  -Pulmonology consulted. Chest CT ordered. Plan for bronchoscopy on Monday morning as per pulmonology. A-fib with RVR  Takes warfarin and metoprolol 25 mg twice daily  HR remains 110-120's.    -Keep on tele   - Continue metoprolol with holding parameters  - consulted edcardiology given soft blood pressures.   In addition to his metoprolol he was added on amiodarone.  - holding warfarin for bronchoscopy     Elevated troponin, likely in the setting of demand ischemia, ESRD - Surrogate Decision Maker/ POA          Review of Systems:    Review of Systems    See above    Objective: Intake/Output Summary (Last 24 hours) at 3/11/2023 0957  Last data filed at 3/10/2023 1145  Gross per 24 hour   Intake 1802 ml   Output 3300 ml   Net -1498 ml          Vitals:   Vitals:    03/11/23 0916   BP: (!) 103/53   Pulse: 99   Resp:    Temp:    SpO2:        Physical Exam:     General: NAD  Eyes: EOMI  ENT: neck supple, trach collar  Cardiovascular: Regular rate. Respiratory: Diffusely coarse lung sounds  Gastrointestinal: Soft, non tender, PEG tube   Genitourinary: no suprapubic tenderness  Musculoskeletal: trace edema  Skin: warm, dry  Neuro: Alert. Psych: Mood appropriate.      Medications:   Medications:    miconazole   Topical BID    scopolamine  1 patch TransDERmal Q72H    midodrine  10 mg Oral TID WC    amiodarone  200 mg Oral Daily    heparin (porcine)  5,000 Units SubCUTAneous 3 times per day    insulin lispro  0-4 Units SubCUTAneous Q6H    sodium chloride flush  5-40 mL IntraVENous 2 times per day    ipratropium-albuterol  1 ampule Inhalation Q4H WA    aspirin  81 mg Per NG tube Daily    atorvastatin  40 mg Oral Nightly    insulin glargine  10 Units SubCUTAneous Nightly    lansoprazole  30 mg Oral QAM AC    levothyroxine  100 mcg Per G Tube Daily    metoprolol tartrate  25 mg Per G Tube BID    QUEtiapine  50 mg Oral Nightly    traZODone  100 mg Oral Nightly    acetylcysteine  600 mg Inhalation BID      Infusions:    dextrose      sodium chloride 25 mL (03/06/23 2042)     PRN Meds: potassium chloride, 40 mEq, PRN   Or  potassium alternative oral replacement, 40 mEq, PRN   Or  potassium chloride, 10 mEq, PRN  magnesium sulfate, 2,000 mg, PRN  glucose, 4 tablet, PRN  dextrose bolus, 125 mL, PRN   Or  dextrose bolus, 250 mL, PRN  glucagon (rDNA), 1 mg, PRN  dextrose, 1,000 mL, Continuous PRN  sodium chloride flush, 5-40 mL, PRN  sodium chloride, , PRN  ondansetron, 4 mg, Q8H PRN

## 2023-03-11 NOTE — PROGRESS NOTES
Occupational Therapy    Attempted to see pt twice today. Pt greatly fatigued deferring therapy despite education on importance of therapy for functional independence and discharge planning.  Will re-attempt when pt is medically appropriate      Kasia Finder SARAH/L [id number]  10:10 AM,3/11/2023

## 2023-03-12 ENCOUNTER — APPOINTMENT (OUTPATIENT)
Dept: GENERAL RADIOLOGY | Age: 73
DRG: 177 | End: 2023-03-12
Payer: COMMERCIAL

## 2023-03-12 LAB
GLUCOSE BLD-MCNC: 177 MG/DL (ref 70–99)
GLUCOSE BLD-MCNC: 183 MG/DL (ref 70–99)
GLUCOSE BLD-MCNC: 189 MG/DL (ref 70–99)
GLUCOSE BLD-MCNC: 194 MG/DL (ref 70–99)
GLUCOSE BLD-MCNC: 224 MG/DL (ref 70–99)
GLUCOSE BLD-MCNC: 256 MG/DL (ref 70–99)
MAGNESIUM: 2.5 MG/DL (ref 1.8–2.4)

## 2023-03-12 PROCEDURE — 2580000003 HC RX 258: Performed by: INTERNAL MEDICINE

## 2023-03-12 PROCEDURE — 2060000000 HC ICU INTERMEDIATE R&B

## 2023-03-12 PROCEDURE — 6370000000 HC RX 637 (ALT 250 FOR IP): Performed by: INTERNAL MEDICINE

## 2023-03-12 PROCEDURE — 94761 N-INVAS EAR/PLS OXIMETRY MLT: CPT

## 2023-03-12 PROCEDURE — 6370000000 HC RX 637 (ALT 250 FOR IP)

## 2023-03-12 PROCEDURE — 94640 AIRWAY INHALATION TREATMENT: CPT

## 2023-03-12 PROCEDURE — 82962 GLUCOSE BLOOD TEST: CPT

## 2023-03-12 PROCEDURE — 6370000000 HC RX 637 (ALT 250 FOR IP): Performed by: STUDENT IN AN ORGANIZED HEALTH CARE EDUCATION/TRAINING PROGRAM

## 2023-03-12 PROCEDURE — 2700000000 HC OXYGEN THERAPY PER DAY

## 2023-03-12 PROCEDURE — 99232 SBSQ HOSP IP/OBS MODERATE 35: CPT | Performed by: INTERNAL MEDICINE

## 2023-03-12 PROCEDURE — 83735 ASSAY OF MAGNESIUM: CPT

## 2023-03-12 PROCEDURE — 6360000002 HC RX W HCPCS

## 2023-03-12 PROCEDURE — 6360000002 HC RX W HCPCS: Performed by: INTERNAL MEDICINE

## 2023-03-12 PROCEDURE — 71045 X-RAY EXAM CHEST 1 VIEW: CPT

## 2023-03-12 PROCEDURE — 36415 COLL VENOUS BLD VENIPUNCTURE: CPT

## 2023-03-12 RX ADMIN — IPRATROPIUM BROMIDE AND ALBUTEROL SULFATE 1 AMPULE: 2.5; .5 SOLUTION RESPIRATORY (INHALATION) at 07:35

## 2023-03-12 RX ADMIN — MICONAZOLE NITRATE: 2 POWDER TOPICAL at 20:54

## 2023-03-12 RX ADMIN — AMIODARONE HYDROCHLORIDE 200 MG: 200 TABLET ORAL at 09:50

## 2023-03-12 RX ADMIN — ASPIRIN 81 MG CHEWABLE TABLET 81 MG: 81 TABLET CHEWABLE at 09:50

## 2023-03-12 RX ADMIN — LANSOPRAZOLE 30 MG: KIT at 05:42

## 2023-03-12 RX ADMIN — MIDODRINE HYDROCHLORIDE 10 MG: 5 TABLET ORAL at 09:50

## 2023-03-12 RX ADMIN — MICONAZOLE NITRATE: 2 POWDER TOPICAL at 09:51

## 2023-03-12 RX ADMIN — MIDODRINE HYDROCHLORIDE 10 MG: 5 TABLET ORAL at 12:21

## 2023-03-12 RX ADMIN — LEVOTHYROXINE SODIUM 100 MCG: 0.1 TABLET ORAL at 05:42

## 2023-03-12 RX ADMIN — ACETYLCYSTEINE 600 MG: 200 SOLUTION ORAL; RESPIRATORY (INHALATION) at 20:47

## 2023-03-12 RX ADMIN — TRAZODONE HYDROCHLORIDE 100 MG: 50 TABLET ORAL at 20:38

## 2023-03-12 RX ADMIN — IPRATROPIUM BROMIDE AND ALBUTEROL SULFATE 1 AMPULE: 2.5; .5 SOLUTION RESPIRATORY (INHALATION) at 20:47

## 2023-03-12 RX ADMIN — INSULIN LISPRO 2 UNITS: 100 INJECTION, SOLUTION INTRAVENOUS; SUBCUTANEOUS at 12:21

## 2023-03-12 RX ADMIN — METOPROLOL TARTRATE 25 MG: 25 TABLET, FILM COATED ORAL at 20:38

## 2023-03-12 RX ADMIN — IPRATROPIUM BROMIDE AND ALBUTEROL SULFATE 1 AMPULE: 2.5; .5 SOLUTION RESPIRATORY (INHALATION) at 12:29

## 2023-03-12 RX ADMIN — ATORVASTATIN CALCIUM 40 MG: 40 TABLET, FILM COATED ORAL at 20:38

## 2023-03-12 RX ADMIN — HEPARIN SODIUM 5000 UNITS: 5000 INJECTION INTRAVENOUS; SUBCUTANEOUS at 20:38

## 2023-03-12 RX ADMIN — INSULIN GLARGINE 10 UNITS: 100 INJECTION, SOLUTION SUBCUTANEOUS at 20:40

## 2023-03-12 RX ADMIN — INSULIN LISPRO 1 UNITS: 100 INJECTION, SOLUTION INTRAVENOUS; SUBCUTANEOUS at 18:44

## 2023-03-12 RX ADMIN — ACETYLCYSTEINE 600 MG: 200 SOLUTION ORAL; RESPIRATORY (INHALATION) at 07:36

## 2023-03-12 RX ADMIN — HEPARIN SODIUM 5000 UNITS: 5000 INJECTION INTRAVENOUS; SUBCUTANEOUS at 05:41

## 2023-03-12 RX ADMIN — HEPARIN SODIUM 5000 UNITS: 5000 INJECTION INTRAVENOUS; SUBCUTANEOUS at 15:49

## 2023-03-12 RX ADMIN — SODIUM CHLORIDE, PRESERVATIVE FREE 10 ML: 5 INJECTION INTRAVENOUS at 09:51

## 2023-03-12 RX ADMIN — IPRATROPIUM BROMIDE AND ALBUTEROL SULFATE 1 AMPULE: 2.5; .5 SOLUTION RESPIRATORY (INHALATION) at 15:55

## 2023-03-12 RX ADMIN — QUETIAPINE FUMARATE 50 MG: 25 TABLET ORAL at 20:38

## 2023-03-12 RX ADMIN — METOPROLOL TARTRATE 25 MG: 25 TABLET, FILM COATED ORAL at 09:50

## 2023-03-12 ASSESSMENT — PAIN SCALES - GENERAL
PAINLEVEL_OUTOF10: 0
PAINLEVEL_OUTOF10: 0

## 2023-03-12 NOTE — PROGRESS NOTES
Component Value Date/Time    CALCIUM 9.1 03/11/2023 02:14 AM     Phosphorus:    Lab Results   Component Value Date/Time    PHOS 2.9 03/08/2023 01:53 PM     U/A:    Lab Results   Component Value Date/Time    NITRU NEGATIVE 02/13/2023 12:15 AM    COLORU YELLOW 02/13/2023 12:15 AM    PHUR 6.5 06/30/2021 02:11 PM    WBCUA 67 02/13/2023 12:15 AM    RBCUA 37 02/13/2023 12:15 AM    MUCUS RARE 09/17/2022 06:21 PM    TRICHOMONAS NONE SEEN 02/13/2023 12:15 AM    BACTERIA NEGATIVE 02/13/2023 12:15 AM    CLARITYU CLEAR 02/13/2023 12:15 AM    SPECGRAV 1.025 02/13/2023 12:15 AM    UROBILINOGEN 0.2 02/13/2023 12:15 AM    BILIRUBINUR SMALL NUMBER OR AMOUNT OBSERVED 02/13/2023 12:15 AM    BILIRUBINUR small 06/30/2021 02:11 PM    BLOODU MODERATE NUMBER OR AMOUNT OBSERVED 02/13/2023 12:15 AM    GLUCOSEU neg 06/30/2021 02:11 PM    KETUA TRACE 02/13/2023 12:15 AM     ABG:    Lab Results   Component Value Date/Time    NNI5FDE 35.5 09/24/2022 11:49 AM    PO2ART 70.3 09/24/2022 11:49 AM    VDG4DAV 21.2 09/24/2022 11:49 AM     HgBA1c:    Lab Results   Component Value Date/Time    LABA1C 7.0 02/12/2023 10:53 PM     Microalbumen/Creatinine ratio:  No components found for: RUCREAT  TSH:    Lab Results   Component Value Date/Time    TSH 1.66 04/03/2018 11:33 AM     IRON:    Lab Results   Component Value Date/Time    IRON 60 02/13/2023 10:39 AM     Iron Saturation:  No components found for: PERCENTFE  TIBC:    Lab Results   Component Value Date/Time    TIBC 229 02/13/2023 10:39 AM     FERRITIN:    Lab Results   Component Value Date/Time    FERRITIN 1,063 02/13/2023 10:39 AM     RPR:  No results found for: RPR  YAYA:  No results found for: ANATITER, YAYA  24 Hour Urine for Creatinine Clearance:  No components found for: CREAT4, UHRS10, UTV10      Objective:   I/O: 03/11 0701 - 03/12 0700  In: -   Out: 200 [Urine:200]  I/O last 3 completed shifts:  In: -   Out: 200 [Urine:200]  No intake/output data recorded.   Vitals: BP (!) 124/52   Pulse 93 Temp 97.7 °F (36.5 °C) (Oral)   Resp 20   Ht 6' 2\" (1.88 m)   Wt 281 lb 12 oz (127.8 kg)   SpO2 96%   BMI 36.17 kg/m²  {  General appearance: awake weak  HEENT: Head: Normal, normocephalic, atraumatic.   Neck: Positive trach  Lungs: diminished breath sounds bilaterally  Heart: S1, S2 normal  Abdomen: abnormal findings:  soft nt  Extremities: edema trace  Neurologic: Mental status: alertness: alert        Assessment and Plan:      IMP:  #1 end-stage renal disease on dialysis Monday Wednesday Friday  #2 possible pneumonia with respiratory secretion with trach  #3 A-fib rapid rate  #4 COPD likely pleural effusion  #5 type 2 diabetes  #6 status post CABG with complication requiring trach and PEG lives in a nursing home/depression    Plan     1 doing dialysis on Monday after bronc  #2 parabronchial area and tomorrow  #3 heart rate slightly high but stable  #4 status post thoracentesis  #5 glucose control  Continue supportive care with trach and PEG and follow-up plans with neurosurgery outpatient           Carlton Quijano MD, MD

## 2023-03-12 NOTE — PROGRESS NOTES
continued admission due to may need bronch, await pulm final recs   Surrogate Decision Maker/ POA          Review of Systems:    Review of Systems    See above    Objective: Intake/Output Summary (Last 24 hours) at 3/12/2023 1123  Last data filed at 3/11/2023 2100  Gross per 24 hour   Intake --   Output 200 ml   Net -200 ml          Vitals:   Vitals:    03/12/23 0450   BP: (!) 124/52   Pulse: 93   Resp: 20   Temp: 97.7 °F (36.5 °C)   SpO2: 96%       Physical Exam:     General: NAD  Eyes: EOMI  ENT: neck supple, trach collar  Cardiovascular: Regular rate. Respiratory: Diffusely coarse lung sounds  Gastrointestinal: Soft, non tender, PEG tube   Genitourinary: no suprapubic tenderness  Musculoskeletal: trace edema  Skin: warm, dry  Neuro: Alert. Psych: Mood appropriate.      Medications:   Medications:    miconazole   Topical BID    scopolamine  1 patch TransDERmal Q72H    midodrine  10 mg Oral TID WC    amiodarone  200 mg Oral Daily    heparin (porcine)  5,000 Units SubCUTAneous 3 times per day    insulin lispro  0-4 Units SubCUTAneous Q6H    sodium chloride flush  5-40 mL IntraVENous 2 times per day    ipratropium-albuterol  1 ampule Inhalation Q4H WA    aspirin  81 mg Per NG tube Daily    atorvastatin  40 mg Oral Nightly    insulin glargine  10 Units SubCUTAneous Nightly    lansoprazole  30 mg Oral QAM AC    levothyroxine  100 mcg Per G Tube Daily    metoprolol tartrate  25 mg Per G Tube BID    QUEtiapine  50 mg Oral Nightly    traZODone  100 mg Oral Nightly    acetylcysteine  600 mg Inhalation BID      Infusions:    dextrose      sodium chloride 25 mL (03/06/23 2042)     PRN Meds: potassium chloride, 40 mEq, PRN   Or  potassium alternative oral replacement, 40 mEq, PRN   Or  potassium chloride, 10 mEq, PRN  magnesium sulfate, 2,000 mg, PRN  glucose, 4 tablet, PRN  dextrose bolus, 125 mL, PRN   Or  dextrose bolus, 250 mL, PRN  glucagon (rDNA), 1 mg, PRN  dextrose, 1,000 mL, Continuous PRN  sodium chloride flush, 5-40 mL, PRN  sodium chloride, , PRN  ondansetron, 4 mg, Q8H PRN   Or  ondansetron, 4 mg, Q6H PRN  polyethylene glycol, 17 g, Daily PRN  acetaminophen, 650 mg, Q6H PRN   Or  acetaminophen, 650 mg, Q6H PRN  sennosides-docusate sodium, 2 tablet, Daily PRN      Labs           Imaging/Diagnostics Last 24 Hours   XR CHEST PORTABLE    Result Date: 3/5/2023  EXAMINATION: ONE XRAY VIEW OF THE CHEST 3/5/2023 4:11 pm COMPARISON: 02/22/2023 HISTORY: ORDERING SYSTEM PROVIDED HISTORY: cough with thick phlegm, tracheostomy status TECHNOLOGIST PROVIDED HISTORY: Reason for exam:->cough with thick phlegm, tracheostomy status Reason for Exam: cough with thick phlegm, tracheostomy status FINDINGS: Tracheostomy tube and right-sided central venous catheter remain in place. Heart size stable. Right pleural effusion. Right basilar opacity.      Right pleural effusion with right basilar opacity suggesting atelectasis or infection       Electronically signed by Jackelin Meadows MD on 3/12/2023 at 11:23 AM

## 2023-03-12 NOTE — PROGRESS NOTES
with hypoxia ([facility] 01/03/2023     Priority: Medium    Aspiration pneumonia of right lower lobe ([facility] 01/03/2023     Priority: Medium    ESRD on dialysis ([facility] 01/03/2023     Priority: Medium    Severe malnutrition ([facility] 12/30/2022     Priority: Medium     Class: Acute    Pneumonia of both lungs due to infectious organism 12/29/2022     Priority: Medium    Dyspnea 12/12/2022     Priority: Medium    Moderate malnutrition ([facility] 12/12/2022     Priority: Medium    CAD, multiple vessel 09/15/2022     Priority: Medium    Hypertension      Priority: Medium    Acute congestive heart failure ([facility] 05/31/2022     Priority: Medium    Coronary artery disease involving native coronary artery of native heart with angina pectoris ([facility] 05/31/2022     Priority: Medium    Chronic renal disease, stage III [facility]) [[id number]] 05/31/2022     Priority: Medium    Thyroid disease      Overview Note:     hypothyroid      Hyperlipidemia     Type 2 diabetes mellitus ([facility] 11/09/2015    Atrial fibrillation (HCC)     Morbid obesity (HCC)     COPD (chronic obstructive pulmonary disease) (HCC)     Sleep apnea     CAD (coronary artery disease) 03/21/2005     Overview Note:     s/p PTCA with 3.5 X 16 TAXUS stent to LAD       Ac on ch resp failure  Pneumonia  Mucus plugging  Georgi pl effusion R > L  copd     Possible right thoracentesis in am  Abx  F/u C&S  ICS  OOB  HD per renal  CPT  Keep  sats > 92%  C/w present management    Electronically signed by Nahum Shepherd MD on 3/12/2023 at 11:35 AM

## 2023-03-12 NOTE — PLAN OF CARE
Problem: Skin/Tissue Integrity  Goal: Absence of new skin breakdown  Description: 1. Monitor for areas of redness and/or skin breakdown  2. Assess vascular access sites hourly  3. Every 4-6 hours minimum:  Change oxygen saturation probe site  4. Every 4-6 hours:  If on nasal continuous positive airway pressure, respiratory therapy assess nares and determine need for appliance change or resting period.   Outcome: Progressing     Problem: Safety - Adult  Goal: Free from fall injury  Outcome: Progressing     Problem: ABCDS Injury Assessment  Goal: Absence of physical injury  Outcome: Progressing     Problem: Chronic Conditions and Co-morbidities  Goal: Patient's chronic conditions and co-morbidity symptoms are monitored and maintained or improved  Outcome: Progressing  Flowsheets (Taken 3/11/2023 2100 by Kahlil Alberts RN)  Care Plan - Patient's Chronic Conditions and Co-Morbidity Symptoms are Monitored and Maintained or Improved: Monitor and assess patient's chronic conditions and comorbid symptoms for stability, deterioration, or improvement

## 2023-03-13 ENCOUNTER — ANESTHESIA (OUTPATIENT)
Dept: ENDOSCOPY | Age: 73
End: 2023-03-13

## 2023-03-13 LAB
GLUCOSE BLD-MCNC: 148 MG/DL (ref 70–99)
GLUCOSE BLD-MCNC: 151 MG/DL (ref 70–99)
GLUCOSE BLD-MCNC: 176 MG/DL (ref 70–99)
GLUCOSE BLD-MCNC: 189 MG/DL (ref 70–99)
GLUCOSE BLD-MCNC: 228 MG/DL (ref 70–99)
MAGNESIUM: 2.5 MG/DL (ref 1.8–2.4)

## 2023-03-13 PROCEDURE — 6360000002 HC RX W HCPCS: Performed by: INTERNAL MEDICINE

## 2023-03-13 PROCEDURE — 6370000000 HC RX 637 (ALT 250 FOR IP): Performed by: INTERNAL MEDICINE

## 2023-03-13 PROCEDURE — 2700000000 HC OXYGEN THERAPY PER DAY

## 2023-03-13 PROCEDURE — 82962 GLUCOSE BLOOD TEST: CPT

## 2023-03-13 PROCEDURE — 2580000003 HC RX 258: Performed by: INTERNAL MEDICINE

## 2023-03-13 PROCEDURE — 6370000000 HC RX 637 (ALT 250 FOR IP)

## 2023-03-13 PROCEDURE — 6370000000 HC RX 637 (ALT 250 FOR IP): Performed by: FAMILY MEDICINE

## 2023-03-13 PROCEDURE — 36415 COLL VENOUS BLD VENIPUNCTURE: CPT

## 2023-03-13 PROCEDURE — 83735 ASSAY OF MAGNESIUM: CPT

## 2023-03-13 PROCEDURE — 90935 HEMODIALYSIS ONE EVALUATION: CPT

## 2023-03-13 PROCEDURE — 94640 AIRWAY INHALATION TREATMENT: CPT

## 2023-03-13 PROCEDURE — 2060000000 HC ICU INTERMEDIATE R&B

## 2023-03-13 PROCEDURE — 94761 N-INVAS EAR/PLS OXIMETRY MLT: CPT

## 2023-03-13 PROCEDURE — 6370000000 HC RX 637 (ALT 250 FOR IP): Performed by: STUDENT IN AN ORGANIZED HEALTH CARE EDUCATION/TRAINING PROGRAM

## 2023-03-13 PROCEDURE — 6360000002 HC RX W HCPCS

## 2023-03-13 RX ORDER — IPRATROPIUM BROMIDE AND ALBUTEROL SULFATE 2.5; .5 MG/3ML; MG/3ML
1 SOLUTION RESPIRATORY (INHALATION) EVERY 4 HOURS PRN
Status: DISCONTINUED | OUTPATIENT
Start: 2023-03-13 | End: 2023-03-18

## 2023-03-13 RX ADMIN — HEPARIN SODIUM 5000 UNITS: 5000 INJECTION INTRAVENOUS; SUBCUTANEOUS at 20:57

## 2023-03-13 RX ADMIN — MIDODRINE HYDROCHLORIDE 10 MG: 5 TABLET ORAL at 18:01

## 2023-03-13 RX ADMIN — MICONAZOLE NITRATE: 2 POWDER TOPICAL at 09:41

## 2023-03-13 RX ADMIN — ATORVASTATIN CALCIUM 40 MG: 40 TABLET, FILM COATED ORAL at 20:52

## 2023-03-13 RX ADMIN — METOPROLOL TARTRATE 25 MG: 25 TABLET, FILM COATED ORAL at 20:52

## 2023-03-13 RX ADMIN — LEVOTHYROXINE SODIUM 100 MCG: 0.1 TABLET ORAL at 05:06

## 2023-03-13 RX ADMIN — QUETIAPINE FUMARATE 50 MG: 25 TABLET ORAL at 20:52

## 2023-03-13 RX ADMIN — IPRATROPIUM BROMIDE AND ALBUTEROL SULFATE 1 AMPULE: 2.5; .5 SOLUTION RESPIRATORY (INHALATION) at 03:41

## 2023-03-13 RX ADMIN — INSULIN GLARGINE 10 UNITS: 100 INJECTION, SOLUTION SUBCUTANEOUS at 20:59

## 2023-03-13 RX ADMIN — SODIUM CHLORIDE, PRESERVATIVE FREE 10 ML: 5 INJECTION INTRAVENOUS at 09:22

## 2023-03-13 RX ADMIN — HEPARIN SODIUM 5000 UNITS: 5000 INJECTION INTRAVENOUS; SUBCUTANEOUS at 05:06

## 2023-03-13 RX ADMIN — IPRATROPIUM BROMIDE AND ALBUTEROL SULFATE 1 AMPULE: 2.5; .5 SOLUTION RESPIRATORY (INHALATION) at 07:31

## 2023-03-13 RX ADMIN — MIDODRINE HYDROCHLORIDE 10 MG: 5 TABLET ORAL at 11:55

## 2023-03-13 RX ADMIN — MIDODRINE HYDROCHLORIDE 10 MG: 5 TABLET ORAL at 09:21

## 2023-03-13 RX ADMIN — TRAZODONE HYDROCHLORIDE 100 MG: 50 TABLET ORAL at 20:51

## 2023-03-13 RX ADMIN — ACETYLCYSTEINE 600 MG: 200 SOLUTION ORAL; RESPIRATORY (INHALATION) at 07:32

## 2023-03-13 RX ADMIN — ASPIRIN 81 MG CHEWABLE TABLET 81 MG: 81 TABLET CHEWABLE at 09:21

## 2023-03-13 RX ADMIN — MICONAZOLE NITRATE: 2 POWDER TOPICAL at 20:58

## 2023-03-13 RX ADMIN — AMIODARONE HYDROCHLORIDE 200 MG: 200 TABLET ORAL at 09:22

## 2023-03-13 RX ADMIN — SODIUM CHLORIDE, PRESERVATIVE FREE 10 ML: 5 INJECTION INTRAVENOUS at 20:52

## 2023-03-13 RX ADMIN — IPRATROPIUM BROMIDE AND ALBUTEROL SULFATE 1 AMPULE: 2.5; .5 SOLUTION RESPIRATORY (INHALATION) at 19:14

## 2023-03-13 RX ADMIN — ACETYLCYSTEINE 600 MG: 200 SOLUTION ORAL; RESPIRATORY (INHALATION) at 19:14

## 2023-03-13 RX ADMIN — LANSOPRAZOLE 30 MG: KIT at 05:14

## 2023-03-13 NOTE — PROGRESS NOTES
Drawn: March 13, 2023       Results: Immune >10    Order  Dialysis Bath  K+ (Potassium): 3  Ca+ (Calcium): 2.5  Na+ (Sodium): 138  HCO3 (Bicarb): 35  Bicarbonate Concentrate Lot No.: [id number]  Acid Concentrate Lot No.: [id number]     Na+ Modeling: Not Applicable  Dialyzer: [id number]  Dialysate Temperature (C):  35  Blood Flow Rate (BFR):  300   Dialysate Flow Rate (DFR):   600        Access to be Utilized   Access:  Tunneled Catheter  Location: Internal Jugular  Side: Right   Needle gauge:  Not Applicable  + Bruit/Thrill: Not Applicable    First Use X-ray Verified: Yes  OK to use line order: Yes    Site Assessment:  Signs and Symptoms of Infection/Inflammation: None  If yes: Not Applicable  Dressing: Dry and Intact  Site Prep: Medical Aseptic Technique  Dressing Changed this Treatment: Yes  If yes, by whom: Jacoby Velazquez RN  Date of Last Dressing Change: March 6, 2023  Antimicrobial Patch in place?: Yes  Red Alcohol Caps in place?: Yes  Gauze Dressing?: No  Non Dialysis Use?: No  Comment:    Flows: Good, Patent  If access problem, who was notified:     Pre and Post-Assessment  Patient Vitals for the past 8 hrs:   Level of Consciousness Oriented X Heart Rhythm Respiratory Quality/Effort O2 Device Bilateral Breath Sounds Skin Color Skin Condition/Temp Abdomen Inspection Bowel Sounds (All Quadrants) Edema Edema Generalized   03/13/23 0913 0 -- -- -- Bev Salcedo mask -- -- -- -- -- -- --   03/13/23 1118 -- -- -- -- Trach mask -- -- -- -- -- -- --   03/13/23 1134 0 -- -- -- Trach mask -- -- -- -- -- -- --   03/13/23 1247 0 -- -- Unlabored -- -- Pink Warm;Dry -- -- Generalized Non-pitting   03/13/23 1615 0 4 Irregular Unlabored Trach mask Diminished;Rhonchi Pink Dry;Warm Gastrostomy Active Generalized --     Labs  Recent Labs     03/11/23 0214   WBC 6.0   HGB 8.5*   HCT 25.4*                                                                     Recent Labs     03/11/23  0214      K 4.5   CL 97*   CO2 29   BUN 31*   CREATININE Treatment: well  Physician Notified: No       Provider Notification        Handoff complete and report given to Primary RN at 1635 hours.   Primary RN (First Initial, Last Name, Title):  KENJI Meyer RN     Education  Person Educated: Patient   Knowledge Base: Substantial  Barriers to Learning?: None  Preferred method of Learning: Oral  Topic(s): Access Care, Signs and Symptoms of Infection, and Procedural   Teaching Tools: Explanation   Response to Education: Verbalized Understanding     Electronically signed by James Marie RN on 3/13/2023 at 4:36 PM

## 2023-03-13 NOTE — PROGRESS NOTES
pulmonary      SUBJECTIVE:  remains on o2     OBJECTIVE    VITALS:  BP (!) 135/57   Pulse (!) 118   Temp 98.1 °F (36.7 °C) (Axillary)   Resp 23   Ht 6' 2\" (1.88 m)   Wt 281 lb 12 oz (127.8 kg)   SpO2 92%   BMI 36.17 kg/m²   HEAD AND FACE EXAM:  No throat injection, no active exudate,no thrush  NECK EXAM;No JVD, no masses, symmetrical  CHEST EXAM; Expansion equal and symmetrical, no masses  LUNG EXAM; Good breath sounds bilaterally. There are expiratory wheezes both lungs, there are crackles at both lung bases  CARDIOVASCULAR EXAM: Positive S1 and S2, no S3 or S4, no clicks ,no murmurs  RIGHT AND LEFT LOWER EXTRIMITY EXAM: No edema, no swelling,           LABS   Lab Results   Component Value Date    WBC 6.0 03/11/2023    HGB 8.5 (L) 03/11/2023    HCT 25.4 (L) 03/11/2023    .9 (H) 03/11/2023     03/11/2023     Lab Results   Component Value Date    CREATININE 3.5 (H) 03/11/2023    BUN 31 (H) 03/11/2023     03/11/2023    K 4.5 03/11/2023    CL 97 (L) 03/11/2023    CO2 29 03/11/2023     Lab Results   Component Value Date    INR 2.37 03/08/2023    INR 2.36 03/08/2023    PROTIME 30.9 (H) 03/08/2023    PROTIME 30.7 (H) 03/08/2023          Lab Results   Component Value Date/Time    PHOS 2.9 03/08/2023 01:53 PM    PHOS 4.2 03/07/2023 06:32 AM    PHOS 3.7 02/15/2023 03:15 AM      No results for input(s): PH, PO2ART, [id number], HCO3, BEART, O2SAT in the last 72 hours.       Wt Readings from Last 3 Encounters:   03/12/23 281 lb 12 oz (127.8 kg)   02/24/23 274 lb 11.1 oz (124.6 kg)   02/17/23 289 lb 3.9 oz (131.2 kg)               ASSESMENT  Ac on ch resp failure  Pneumonia  Copd  Pulm atelectasis sec to mucus plugging  Georgi pl effusions        PLAN  Cpm  Bronch today for pulm toilet  Will schedule for right pleural tap afterwards    3/13/2023  Kerline Jefferson MD, MCHRISTINA.

## 2023-03-13 NOTE — PROGRESS NOTES
Physical Therapy  Attempt Note    PT / OT evals attempted. Pt refusing at this time 2/2 lethargy ; reinforced need for OOB, role in DC planning, need for inc mobility to assist in functional return. Pt requesting reattempt following his bronchoscopy and thoracentesis pending. Will cont to follow and assist as able.     Lu Richards PT, DPT

## 2023-03-13 NOTE — PLAN OF CARE
Problem: Skin/Tissue Integrity  Goal: Absence of new skin breakdown  Description: 1. Monitor for areas of redness and/or skin breakdown  2. Assess vascular access sites hourly  3. Every 4-6 hours minimum:  Change oxygen saturation probe site  4. Every 4-6 hours:  If on nasal continuous positive airway pressure, respiratory therapy assess nares and determine need for appliance change or resting period.   Outcome: Progressing     Problem: Safety - Adult  Goal: Free from fall injury  Outcome: Progressing     Problem: ABCDS Injury Assessment  Goal: Absence of physical injury  Outcome: Progressing     Problem: Chronic Conditions and Co-morbidities  Goal: Patient's chronic conditions and co-morbidity symptoms are monitored and maintained or improved  Outcome: Progressing  Flowsheets (Taken 3/13/2023 0816)  Care Plan - Patient's Chronic Conditions and Co-Morbidity Symptoms are Monitored and Maintained or Improved:   Monitor and assess patient's chronic conditions and comorbid symptoms for stability, deterioration, or improvement   Collaborate with multidisciplinary team to address chronic and comorbid conditions and prevent exacerbation or deterioration   Update acute care plan with appropriate goals if chronic or comorbid symptoms are exacerbated and prevent overall improvement and discharge     Problem: Nutrition Deficit:  Goal: Optimize nutritional status  Outcome: Progressing     Problem: Pain  Goal: Verbalizes/displays adequate comfort level or baseline comfort level  Outcome: Progressing  Flowsheets (Taken 3/13/2023 0913)  Verbalizes/displays adequate comfort level or baseline comfort level:   Encourage patient to monitor pain and request assistance   Assess pain using appropriate pain scale   Administer analgesics based on type and severity of pain and evaluate response   Implement non-pharmacological measures as appropriate and evaluate response   Consider cultural and social influences on pain and pain in ordered activity level  Goal: Maintain proper alignment of affected body part  Outcome: Progressing  Flowsheets (Taken 3/13/2023 0816)  Maintain proper alignment of affected body part:   Support and protect limb and body alignment per provider's orders   Instruct and reinforce with patient and family use of appropriate assistive device and precautions (e.g. spinal or hip dislocation precautions)  Goal: Return ADL status to a safe level of function  Outcome: Progressing  Flowsheets (Taken 3/13/2023 0816)  Return ADL Status to a Safe Level of Function:   Administer medication as ordered   Assess activities of daily living deficits and provide assistive devices as needed   Obtain physical therapy/occupational therapy consults as needed   Assist and instruct patient to increase activity and self care as tolerated     Problem: Gastrointestinal - Adult  Goal: Minimal or absence of nausea and vomiting  Outcome: Progressing  Goal: Maintains or returns to baseline bowel function  Outcome: Progressing  Goal: Maintains adequate nutritional intake  Outcome: Progressing

## 2023-03-13 NOTE — PROGRESS NOTES
V2.0  [facility] [place] Hospitalist Progress Note      Name:  Lis Chaudhry /Age/Sex: 1950  (68 y.o. male)   MRN & CSN:  [id number] & [id number] Encounter Date/Time: 3/13/2023 9:03 AM EST    Location:  -A PCP: Andres Garcia, [address] [facility] Day: 9      Subjective:     Chief Complaint: Other     Oxygen requirement down to 5 L. Awaiting bronchoscopy and right pleural tap    Denies lightheadedness, dizziness, fever, night sweats, chills, chest pain, palpitations, abd pain, nausea, vomiting, diarrhea, dysuria. Assessment and Plan:   Suspected healthcare acquired vs aspiration PNA  Possible parapneumonic effusion  Few days of increased sputum production. Same O2 requirement  Multiple readmission where he was treated for aspiration PNA likely related to dysphagia and trach. S/p thora 2023 more suggestive of transudate effusion (no serum LDH, protein to compare though)  S/p bronchoscopy 2023 trachea full of thick secretions, large mucus plug right lung, marked inflammation/swelling bronchi, BAL obtained only AFP checked negative no other culture found. CXR 3/5 R pleural effusion with right basilar opacity suggesting atelectasis or infection. S/p thora 3/8 exudative pleural effusion; awaiting gram stain   negative resp PCR. Tracheal aspirate culture negative. No leukocytosis, normal lactic acid level. - low suspicion for PNA; Thora culture negative for growth at 72hours. IV cefepime has thus been discontinued. Likely etiology of hypoxia is the mucous plugging noted on the chest CT. - breathing treatment, Mucomyst  -Pulmonology consulted. Chest CT ordered and reviewed. Plan for bronchoscopy and right pleural tap this morning as per pulmonology. A-fib with RVR  Takes warfarin and metoprolol 25 mg twice daily  HR remains 110-120's.    -Keep on tele   - Continue metoprolol with holding parameters  - consulted cardiology given soft blood pressures.   In addition to his Continuous PRN  sodium chloride flush, 5-40 mL, PRN  sodium chloride, , PRN  ondansetron, 4 mg, Q8H PRN   Or  ondansetron, 4 mg, Q6H PRN  polyethylene glycol, 17 g, Daily PRN  acetaminophen, 650 mg, Q6H PRN   Or  acetaminophen, 650 mg, Q6H PRN  sennosides-docusate sodium, 2 tablet, Daily PRN      Labs           Imaging/Diagnostics Last 24 Hours   XR CHEST PORTABLE    Result Date: 3/5/2023  EXAMINATION: ONE XRAY VIEW OF THE CHEST 3/5/2023 4:11 pm COMPARISON: 02/22/2023 HISTORY: ORDERING SYSTEM PROVIDED HISTORY: cough with thick phlegm, tracheostomy status TECHNOLOGIST PROVIDED HISTORY: Reason for exam:->cough with thick phlegm, tracheostomy status Reason for Exam: cough with thick phlegm, tracheostomy status FINDINGS: Tracheostomy tube and right-sided central venous catheter remain in place. Heart size stable. Right pleural effusion. Right basilar opacity.      Right pleural effusion with right basilar opacity suggesting atelectasis or infection       Electronically signed by Barrie Harvey MD on 3/13/2023 at 9:59 AM

## 2023-03-13 NOTE — PROGRESS NOTES
NITRU NEGATIVE 02/13/2023 12:15 AM    COLORU YELLOW 02/13/2023 12:15 AM    PHUR 6.5 06/30/2021 02:11 PM    WBCUA 67 02/13/2023 12:15 AM    RBCUA 37 02/13/2023 12:15 AM    MUCUS RARE 09/17/2022 06:21 PM    TRICHOMONAS NONE SEEN 02/13/2023 12:15 AM    BACTERIA NEGATIVE 02/13/2023 12:15 AM    CLARITYU CLEAR 02/13/2023 12:15 AM    SPECGRAV 1.025 02/13/2023 12:15 AM    UROBILINOGEN 0.2 02/13/2023 12:15 AM    BILIRUBINUR SMALL NUMBER OR AMOUNT OBSERVED 02/13/2023 12:15 AM    BILIRUBINUR small 06/30/2021 02:11 PM    BLOODU MODERATE NUMBER OR AMOUNT OBSERVED 02/13/2023 12:15 AM    GLUCOSEU neg 06/30/2021 02:11 PM    KETUA TRACE 02/13/2023 12:15 AM     ABG:    Lab Results   Component Value Date/Time    IQA0KFT 35.5 09/24/2022 11:49 AM    PO2ART 70.3 09/24/2022 11:49 AM    HNT6BSI 21.2 09/24/2022 11:49 AM     HgBA1c:    Lab Results   Component Value Date/Time    LABA1C 7.0 02/12/2023 10:53 PM     Microalbumen/Creatinine ratio:  No components found for: RUCREAT  TSH:    Lab Results   Component Value Date/Time    TSH 1.66 04/03/2018 11:33 AM     IRON:    Lab Results   Component Value Date/Time    IRON 60 02/13/2023 10:39 AM     Iron Saturation:  No components found for: PERCENTFE  TIBC:    Lab Results   Component Value Date/Time    TIBC 229 02/13/2023 10:39 AM     FERRITIN:    Lab Results   Component Value Date/Time    FERRITIN 1,063 02/13/2023 10:39 AM     RPR:  No results found for: RPR  YAYA:  No results found for: ANATITER, YAYA  24 Hour Urine for Creatinine Clearance:  No components found for: CREAT4, UHRS10, UTV10      Objective:   I/O: No intake/output data recorded. I/O last 3 completed shifts:  In: -   Out: 200 [Urine:200]  No intake/output data recorded. Vitals: BP 99/63   Pulse 99   Temp 97.7 °F (36.5 °C)   Resp 21   Ht 6' 2\" (1.88 m)   Wt 281 lb 12 oz (127.8 kg)   SpO2 95%   BMI 36.17 kg/m²  {  General appearance: awake weak  HEENT: Head: Normal, normocephalic, atraumatic.   Neck: Positive trach  Lungs: diminished breath sounds bilaterally  Heart: S1, S2 normal  Abdomen: abnormal findings:  soft nt  Extremities: edema trace  Neurologic: Mental status: alertness: alert        Assessment and Plan:      IMP:  #1 end-stage renal disease on dialysis Monday Wednesday Friday  #2 possible pneumonia with respiratory secretion with trach  #3 A-fib rapid rate  #4 COPD likely pleural effusion  #5 type 2 diabetes  #6 status post CABG with complication requiring trach and PEG lives in a nursing home/depression    Plan     1 hd today  2 await bronch pulm  3 hr monitor  4 thoracentesis as need  5 glucose monitor  6 maitnain care ecf for now             Norma Hernández MD, MD

## 2023-03-14 ENCOUNTER — ANESTHESIA EVENT (OUTPATIENT)
Dept: ENDOSCOPY | Age: 73
End: 2023-03-14
Payer: COMMERCIAL

## 2023-03-14 ENCOUNTER — ANESTHESIA (OUTPATIENT)
Dept: ENDOSCOPY | Age: 73
End: 2023-03-14
Payer: COMMERCIAL

## 2023-03-14 LAB
ANION GAP SERPL CALCULATED.3IONS-SCNC: 9 MMOL/L (ref 4–16)
BUN SERPL-MCNC: 40 MG/DL (ref 6–23)
CALCIUM SERPL-MCNC: 9.7 MG/DL (ref 8.3–10.6)
CHLORIDE BLD-SCNC: 92 MMOL/L (ref 99–110)
CO2: 29 MMOL/L (ref 21–32)
CREAT SERPL-MCNC: 3.8 MG/DL (ref 0.9–1.3)
GFR SERPL CREATININE-BSD FRML MDRD: 16 ML/MIN/1.73M2
GLUCOSE BLD-MCNC: 157 MG/DL (ref 70–99)
GLUCOSE BLD-MCNC: 177 MG/DL (ref 70–99)
GLUCOSE BLD-MCNC: 178 MG/DL (ref 70–99)
GLUCOSE BLD-MCNC: 188 MG/DL (ref 70–99)
GLUCOSE BLD-MCNC: 199 MG/DL (ref 70–99)
GLUCOSE SERPL-MCNC: 176 MG/DL (ref 70–99)
HCT VFR BLD CALC: 26 % (ref 42–52)
HEMOGLOBIN: 8.5 GM/DL (ref 13.5–18)
MCH RBC QN AUTO: 36.2 PG (ref 27–31)
MCHC RBC AUTO-ENTMCNC: 32.7 % (ref 32–36)
MCV RBC AUTO: 110.6 FL (ref 78–100)
PDW BLD-RTO: 15.9 % (ref 11.7–14.9)
PLATELET # BLD: 175 K/CU MM (ref 140–440)
PMV BLD AUTO: 8.9 FL (ref 7.5–11.1)
POTASSIUM SERPL-SCNC: 4.4 MMOL/L (ref 3.5–5.1)
RBC # BLD: 2.35 M/CU MM (ref 4.6–6.2)
SODIUM BLD-SCNC: 130 MMOL/L (ref 135–145)
WBC # BLD: 6.1 K/CU MM (ref 4–10.5)

## 2023-03-14 PROCEDURE — 6360000002 HC RX W HCPCS

## 2023-03-14 PROCEDURE — 31645 BRNCHSC W/THER ASPIR 1ST: CPT

## 2023-03-14 PROCEDURE — 89220 SPUTUM SPECIMEN COLLECTION: CPT

## 2023-03-14 PROCEDURE — 87147 CULTURE TYPE IMMUNOLOGIC: CPT

## 2023-03-14 PROCEDURE — 2700000000 HC OXYGEN THERAPY PER DAY

## 2023-03-14 PROCEDURE — 88112 CYTOPATH CELL ENHANCE TECH: CPT

## 2023-03-14 PROCEDURE — 3700000000 HC ANESTHESIA ATTENDED CARE: Performed by: INTERNAL MEDICINE

## 2023-03-14 PROCEDURE — 2580000003 HC RX 258: Performed by: INTERNAL MEDICINE

## 2023-03-14 PROCEDURE — 6370000000 HC RX 637 (ALT 250 FOR IP)

## 2023-03-14 PROCEDURE — 6370000000 HC RX 637 (ALT 250 FOR IP): Performed by: STUDENT IN AN ORGANIZED HEALTH CARE EDUCATION/TRAINING PROGRAM

## 2023-03-14 PROCEDURE — 82962 GLUCOSE BLOOD TEST: CPT

## 2023-03-14 PROCEDURE — 7100000000 HC PACU RECOVERY - FIRST 15 MIN

## 2023-03-14 PROCEDURE — 6370000000 HC RX 637 (ALT 250 FOR IP): Performed by: INTERNAL MEDICINE

## 2023-03-14 PROCEDURE — 7100000001 HC PACU RECOVERY - ADDTL 15 MIN: Performed by: INTERNAL MEDICINE

## 2023-03-14 PROCEDURE — 80048 BASIC METABOLIC PNL TOTAL CA: CPT

## 2023-03-14 PROCEDURE — 87015 SPECIMEN INFECT AGNT CONCNTJ: CPT

## 2023-03-14 PROCEDURE — 31624 DX BRONCHOSCOPE/LAVAGE: CPT

## 2023-03-14 PROCEDURE — 7100000000 HC PACU RECOVERY - FIRST 15 MIN: Performed by: INTERNAL MEDICINE

## 2023-03-14 PROCEDURE — 87186 SC STD MICRODIL/AGAR DIL: CPT

## 2023-03-14 PROCEDURE — 87077 CULTURE AEROBIC IDENTIFY: CPT

## 2023-03-14 PROCEDURE — 31622 DX BRONCHOSCOPE/WASH: CPT

## 2023-03-14 PROCEDURE — 2709999900 HC NON-CHARGEABLE SUPPLY: Performed by: INTERNAL MEDICINE

## 2023-03-14 PROCEDURE — 0B978ZZ DRAINAGE OF LEFT MAIN BRONCHUS, VIA NATURAL OR ARTIFICIAL OPENING ENDOSCOPIC: ICD-10-PCS | Performed by: INTERNAL MEDICINE

## 2023-03-14 PROCEDURE — 94761 N-INVAS EAR/PLS OXIMETRY MLT: CPT

## 2023-03-14 PROCEDURE — 7100000001 HC PACU RECOVERY - ADDTL 15 MIN

## 2023-03-14 PROCEDURE — 6370000000 HC RX 637 (ALT 250 FOR IP): Performed by: FAMILY MEDICINE

## 2023-03-14 PROCEDURE — 0B9F8ZZ DRAINAGE OF RIGHT LOWER LUNG LOBE, VIA NATURAL OR ARTIFICIAL OPENING ENDOSCOPIC: ICD-10-PCS | Performed by: INTERNAL MEDICINE

## 2023-03-14 PROCEDURE — 88305 TISSUE EXAM BY PATHOLOGIST: CPT

## 2023-03-14 PROCEDURE — 6360000002 HC RX W HCPCS: Performed by: INTERNAL MEDICINE

## 2023-03-14 PROCEDURE — 94640 AIRWAY INHALATION TREATMENT: CPT

## 2023-03-14 PROCEDURE — 2060000000 HC ICU INTERMEDIATE R&B

## 2023-03-14 PROCEDURE — 87205 SMEAR GRAM STAIN: CPT

## 2023-03-14 PROCEDURE — 0B9J8ZZ DRAINAGE OF LEFT LOWER LUNG LOBE, VIA NATURAL OR ARTIFICIAL OPENING ENDOSCOPIC: ICD-10-PCS | Performed by: INTERNAL MEDICINE

## 2023-03-14 PROCEDURE — 87181 SC STD AGAR DILUTION PER AGT: CPT

## 2023-03-14 PROCEDURE — 87106 FUNGI IDENTIFICATION YEAST: CPT

## 2023-03-14 PROCEDURE — 85027 COMPLETE CBC AUTOMATED: CPT

## 2023-03-14 PROCEDURE — 87116 MYCOBACTERIA CULTURE: CPT

## 2023-03-14 PROCEDURE — 0B938ZZ DRAINAGE OF RIGHT MAIN BRONCHUS, VIA NATURAL OR ARTIFICIAL OPENING ENDOSCOPIC: ICD-10-PCS | Performed by: INTERNAL MEDICINE

## 2023-03-14 PROCEDURE — 36415 COLL VENOUS BLD VENIPUNCTURE: CPT

## 2023-03-14 PROCEDURE — 3700000001 HC ADD 15 MINUTES (ANESTHESIA): Performed by: INTERNAL MEDICINE

## 2023-03-14 PROCEDURE — 87070 CULTURE OTHR SPECIMN AEROBIC: CPT

## 2023-03-14 PROCEDURE — 87102 FUNGUS ISOLATION CULTURE: CPT

## 2023-03-14 RX ADMIN — HEPARIN SODIUM 5000 UNITS: 5000 INJECTION INTRAVENOUS; SUBCUTANEOUS at 05:20

## 2023-03-14 RX ADMIN — ACETYLCYSTEINE 600 MG: 200 SOLUTION ORAL; RESPIRATORY (INHALATION) at 08:33

## 2023-03-14 RX ADMIN — HEPARIN SODIUM 5000 UNITS: 5000 INJECTION INTRAVENOUS; SUBCUTANEOUS at 20:30

## 2023-03-14 RX ADMIN — IPRATROPIUM BROMIDE AND ALBUTEROL SULFATE 1 AMPULE: 2.5; .5 SOLUTION RESPIRATORY (INHALATION) at 03:42

## 2023-03-14 RX ADMIN — MICONAZOLE NITRATE: 2 POWDER TOPICAL at 21:25

## 2023-03-14 RX ADMIN — HEPARIN SODIUM 5000 UNITS: 5000 INJECTION INTRAVENOUS; SUBCUTANEOUS at 16:38

## 2023-03-14 RX ADMIN — AMIODARONE HYDROCHLORIDE 200 MG: 200 TABLET ORAL at 09:17

## 2023-03-14 RX ADMIN — ACETYLCYSTEINE 600 MG: 200 SOLUTION ORAL; RESPIRATORY (INHALATION) at 19:26

## 2023-03-14 RX ADMIN — QUETIAPINE FUMARATE 50 MG: 25 TABLET ORAL at 20:30

## 2023-03-14 RX ADMIN — IPRATROPIUM BROMIDE AND ALBUTEROL SULFATE 1 AMPULE: 2.5; .5 SOLUTION RESPIRATORY (INHALATION) at 19:26

## 2023-03-14 RX ADMIN — IPRATROPIUM BROMIDE AND ALBUTEROL SULFATE 1 AMPULE: 2.5; .5 SOLUTION RESPIRATORY (INHALATION) at 08:33

## 2023-03-14 RX ADMIN — MIDODRINE HYDROCHLORIDE 10 MG: 5 TABLET ORAL at 09:17

## 2023-03-14 RX ADMIN — METOPROLOL TARTRATE 25 MG: 25 TABLET, FILM COATED ORAL at 09:17

## 2023-03-14 RX ADMIN — INSULIN GLARGINE 10 UNITS: 100 INJECTION, SOLUTION SUBCUTANEOUS at 21:32

## 2023-03-14 RX ADMIN — MICONAZOLE NITRATE: 2 POWDER TOPICAL at 09:24

## 2023-03-14 RX ADMIN — METOPROLOL TARTRATE 25 MG: 25 TABLET, FILM COATED ORAL at 20:30

## 2023-03-14 RX ADMIN — TRAZODONE HYDROCHLORIDE 100 MG: 50 TABLET ORAL at 20:30

## 2023-03-14 RX ADMIN — MIDODRINE HYDROCHLORIDE 10 MG: 5 TABLET ORAL at 16:38

## 2023-03-14 RX ADMIN — SODIUM CHLORIDE: 9 INJECTION, SOLUTION INTRAVENOUS at 10:48

## 2023-03-14 RX ADMIN — SODIUM CHLORIDE, PRESERVATIVE FREE 10 ML: 5 INJECTION INTRAVENOUS at 09:18

## 2023-03-14 RX ADMIN — ATORVASTATIN CALCIUM 40 MG: 40 TABLET, FILM COATED ORAL at 20:30

## 2023-03-14 ASSESSMENT — PAIN SCALES - WONG BAKER: WONGBAKER_NUMERICALRESPONSE: 0

## 2023-03-14 ASSESSMENT — PAIN SCALES - GENERAL
PAINLEVEL_OUTOF10: 0
PAINLEVEL_OUTOF10: 0

## 2023-03-14 NOTE — CARE COORDINATION
CM reviewed chart and discussed in IDR. Pt is not medically ready at this time. Plan remains to return to [facility].

## 2023-03-14 NOTE — PROGRESS NOTES
V2.0  [facility] [place] Note      Name:  Elvie Bence /Age/Sex: 1950  (68 y.o. male)   MRN & CSN:  [id number] & [id number] Encounter Date/Time: 3/14/2023 1:32 PM EDT    Location:  ENDO/NONE PCP: Corin Barnett [address] [facility] Day: 10    Assessment and Plan:   Elvie Bence is a 68 y.o. male  who presents with Healthcare-associated pneumonia    1. Pneumonia   -On trach  -With possible parapneumonic effusion  -Increased congestion and sputum production   -History of multiple admissions for aspiration pneumonia secondary to dysphagia   -S/p thora 2023 more suggestive of transudate effusion (no serum LDH, protein to compare though)  -S/p bronchoscopy 2023 trachea full of thick secretions, large mucus plug right lung, marked inflammation/swelling bronchi, BAL obtained only AFP checked negative no other culture found. -CXR 3/5 R pleural effusion with right basilar opacity suggesting atelectasis or infection. -S/p thora 3/8 exudative pleural effusion; awaiting gram stain   negative resp PCR. Tracheal aspirate culture negative. No leukocytosis, normal lactic acid level. - low suspicion for PNA; Thora culture negative for growth at 72hours. IV cefepime has thus been discontinued. Likely etiology of hypoxia is the mucous plugging noted on the chest CT.  -Continue pulmonary toilet  -Pulmonology consulted. Bronchoscopy and thoracentesis plan    2. A-fib with RVR   -On warfarin and metoprolol 25 mg twice daily  - Continue metoprolol with holding parameters  - consulted cardiology given soft blood pressures. Started on amiodarone  - holding warfarin for bronchoscopy     3. Elevated troponin, likely in the setting of demand ischemia, ESRD  -EKG without acute ischemic changes  -Cardiology on board. 4.ESRD. On HD. Nephro consulted     5.  Coronary artery disease   -status post three-vessel CABG in 2022  -Postprocedure complications include mediastinal infection today      Objective: Intake/Output Summary (Last 24 hours) at 3/14/2023 1332  Last data filed at 3/14/2023 1127  Gross per 24 hour   Intake 4084 ml   Output 2500 ml   Net 1584 ml        Vitals:   Vitals:    03/14/23 1200   BP: (!) 116/58   Pulse: 91   Resp: 18   Temp: 97.6 °F (36.4 °C)   SpO2: 95%       Physical Exam:     General: NAD  Eyes: EOMI  ENT: Moris Flake in place  Cardiovascular: Tachycardic  Respiratory: ronchi  Gastrointestinal: Soft, non tender, PEG tube in place  Genitourinary: no suprapubic tenderness  Musculoskeletal: No edema  Skin: warm, dry  Neuro: Alert. Psych: Mood appropriate.      Medications:   Medications:    miconazole   Topical BID    scopolamine  1 patch TransDERmal Q72H    midodrine  10 mg Oral TID WC    amiodarone  200 mg Oral Daily    heparin (porcine)  5,000 Units SubCUTAneous 3 times per day    insulin lispro  0-4 Units SubCUTAneous Q6H    sodium chloride flush  5-40 mL IntraVENous 2 times per day    aspirin  81 mg Per NG tube Daily    atorvastatin  40 mg Oral Nightly    insulin glargine  10 Units SubCUTAneous Nightly    lansoprazole  30 mg Oral QAM AC    levothyroxine  100 mcg Per G Tube Daily    metoprolol tartrate  25 mg Per G Tube BID    QUEtiapine  50 mg Oral Nightly    traZODone  100 mg Oral Nightly    acetylcysteine  600 mg Inhalation BID      Infusions:    dextrose      sodium chloride 40 mL/hr at 03/14/23 1059     PRN Meds: ipratropium-albuterol, 1 ampule, Q4H PRN  potassium chloride, 40 mEq, PRN   Or  potassium alternative oral replacement, 40 mEq, PRN   Or  potassium chloride, 10 mEq, PRN  magnesium sulfate, 2,000 mg, PRN  glucose, 4 tablet, PRN  dextrose bolus, 125 mL, PRN   Or  dextrose bolus, 250 mL, PRN  glucagon (rDNA), 1 mg, PRN  dextrose, 1,000 mL, Continuous PRN  sodium chloride flush, 5-40 mL, PRN  sodium chloride, , PRN  ondansetron, 4 mg, Q8H PRN   Or  ondansetron, 4 mg, Q6H PRN  polyethylene glycol, 17 g, Daily PRN  acetaminophen, 650 mg, Q6H PRN

## 2023-03-14 NOTE — PLAN OF CARE
Safe Level of Function:   Administer medication as ordered   Assess activities of daily living deficits and provide assistive devices as needed   Obtain physical therapy/occupational therapy consults as needed   Assist and instruct patient to increase activity and self care as tolerated     Problem: Gastrointestinal - Adult  Goal: Minimal or absence of nausea and vomiting  Outcome: Progressing  Flowsheets (Taken 3/14/2023 0825)  Minimal or absence of nausea and vomiting:   Administer IV fluids as ordered to ensure adequate hydration   Maintain NPO status until nausea and vomiting are resolved   Nasogastric tube to low intermittent suction as ordered   Administer ordered antiemetic medications as needed   Provide nonpharmacologic comfort measures as appropriate   Advance diet as tolerated, if ordered   Nutrition consult to assist patient with adequate nutrition and appropriate food choices  Goal: Maintains or returns to baseline bowel function  Outcome: Progressing  Flowsheets (Taken 3/14/2023 0825)  Maintains or returns to baseline bowel function:   Assess bowel function   Encourage oral fluids to ensure adequate hydration   Administer IV fluids as ordered to ensure adequate hydration   Administer ordered medications as needed   Encourage mobilization and activity   Nutrition consult to assist patient with appropriate food choices  Goal: Maintains adequate nutritional intake  Outcome: Progressing  Flowsheets (Taken 3/14/2023 0825)  Maintains adequate nutritional intake:   Monitor percentage of each meal consumed   Identify factors contributing to decreased intake, treat as appropriate   Assist with meals as needed   Monitor intake and output, weight and lab values   Obtain nutritional consult as needed

## 2023-03-14 NOTE — PROGRESS NOTES
Nephrology Progress Note  3/14/2023 9:17 AM  Subjective: Interval History: Lis Chaudhry is a 68 y.o. male appears to be doing okay overall tired and weak awaiting on plan with [facility]  Data:   Scheduled Meds:   miconazole   Topical BID    scopolamine  1 patch TransDERmal Q72H    midodrine  10 mg Oral TID WC    amiodarone  200 mg Oral Daily    heparin (porcine)  5,000 Units SubCUTAneous 3 times per day    insulin lispro  0-4 Units SubCUTAneous Q6H    sodium chloride flush  5-40 mL IntraVENous 2 times per day    aspirin  81 mg Per NG tube Daily    atorvastatin  40 mg Oral Nightly    insulin glargine  10 Units SubCUTAneous Nightly    lansoprazole  30 mg Oral QAM AC    levothyroxine  100 mcg Per G Tube Daily    metoprolol tartrate  25 mg Per G Tube BID    QUEtiapine  50 mg Oral Nightly    traZODone  100 mg Oral Nightly    acetylcysteine  600 mg Inhalation BID     Continuous Infusions:   dextrose      sodium chloride 25 mL (03/06/23 2042)         CBC   Recent Labs     03/14/23 0821   WBC 6.1   HGB 8.5*   HCT 26.0*         BMP   Recent Labs     03/14/23 0821   *   K 4.4   CL 92*   CO2 29   BUN 40*   CREATININE 3.8*     Hepatic:   No results for input(s): AST, ALT, ALB, BILITOT, ALKPHOS in the last 72 hours. Troponin: No results for input(s): TROPONINI in the last 72 hours. BNP: No results for input(s): BNP in the last 72 hours. Lipids: No results for input(s): CHOL, HDL in the last 72 hours. Invalid input(s): LDLCALCU  ABGs:   Lab Results   Component Value Date/Time    PO2ART 70.3 09/24/2022 11:49 AM    GRH2JWR 35.5 09/24/2022 11:49 AM     INR:   No results for input(s): INR in the last 72 hours.     Renal Labs  Albumin:    Lab Results   Component Value Date/Time    LABALBU 3.5 03/08/2023 01:53 PM     Calcium:    Lab Results   Component Value Date/Time    CALCIUM 9.7 03/14/2023 08:21 AM     Phosphorus:    Lab Results   Component Value Date/Time    PHOS 2.9 03/08/2023 01:53 PM     U/A:    Lab Results   Component Value Date/Time    NITRU NEGATIVE 02/13/2023 12:15 AM    COLORU YELLOW 02/13/2023 12:15 AM    PHUR 6.5 06/30/2021 02:11 PM    WBCUA 67 02/13/2023 12:15 AM    RBCUA 37 02/13/2023 12:15 AM    MUCUS RARE 09/17/2022 06:21 PM    TRICHOMONAS NONE SEEN 02/13/2023 12:15 AM    BACTERIA NEGATIVE 02/13/2023 12:15 AM    CLARITYU CLEAR 02/13/2023 12:15 AM    SPECGRAV 1.025 02/13/2023 12:15 AM    UROBILINOGEN 0.2 02/13/2023 12:15 AM    BILIRUBINUR SMALL NUMBER OR AMOUNT OBSERVED 02/13/2023 12:15 AM    BILIRUBINUR small 06/30/2021 02:11 PM    BLOODU MODERATE NUMBER OR AMOUNT OBSERVED 02/13/2023 12:15 AM    GLUCOSEU neg 06/30/2021 02:11 PM    KETUA TRACE 02/13/2023 12:15 AM     ABG:    Lab Results   Component Value Date/Time    BAW0DGH 35.5 09/24/2022 11:49 AM    PO2ART 70.3 09/24/2022 11:49 AM    ISW5MVM 21.2 09/24/2022 11:49 AM     HgBA1c:    Lab Results   Component Value Date/Time    LABA1C 7.0 02/12/2023 10:53 PM     Microalbumen/Creatinine ratio:  No components found for: RUCREAT  TSH:    Lab Results   Component Value Date/Time    TSH 1.66 04/03/2018 11:33 AM     IRON:    Lab Results   Component Value Date/Time    IRON 60 02/13/2023 10:39 AM     Iron Saturation:  No components found for: PERCENTFE  TIBC:    Lab Results   Component Value Date/Time    TIBC 229 02/13/2023 10:39 AM     FERRITIN:    Lab Results   Component Value Date/Time    FERRITIN 1,063 02/13/2023 10:39 AM     RPR:  No results found for: RPR  YAYA:  No results found for: ANATITER, YAYA  24 Hour Urine for Creatinine Clearance:  No components found for: CREAT4, UHRS10, UTV10      Objective:   I/O: 03/13 0701 - 03/14 0700  In: 4034   Out: 2500   I/O last 3 completed shifts: In: 4034 [NG/GT:3534]  Out: 2500   No intake/output data recorded.   Vitals: /63   Pulse 97   Temp 97.7 °F (36.5 °C) (Axillary)   Resp 16   Ht 6' 2\" (1.88 m)   Wt 281 lb 12 oz (127.8 kg)   SpO2 95%   BMI 36.17 kg/m²  {  General appearance: awake weak  HEENT: Head:

## 2023-03-14 NOTE — PROGRESS NOTES
pulmonary      SUBJECTIVE:  no change and remains on o2     OBJECTIVE    VITALS:  BP (!) 109/59   Pulse 93   Temp 97.2 °F (36.2 °C) (Temporal)   Resp 18   Ht 6' 2\" (1.88 m)   Wt 281 lb 12 oz (127.8 kg)   SpO2 95%   BMI 36.17 kg/m²   HEAD AND FACE EXAM:  No throat injection, no active exudate,no thrush  NECK EXAM;No JVD, no masses, symmetrical  CHEST EXAM; Expansion equal and symmetrical, no masses  LUNG EXAM; Good breath sounds bilaterally. There are expiratory wheezes both lungs, there are crackles at both lung bases  CARDIOVASCULAR EXAM: Positive S1 and S2, no S3 or S4, no clicks ,no murmurs  RIGHT AND LEFT LOWER EXTRIMITY EXAM: No edema, no swelling,           LABS   Lab Results   Component Value Date    WBC 6.1 03/14/2023    HGB 8.5 (L) 03/14/2023    HCT 26.0 (L) 03/14/2023    .6 (H) 03/14/2023     03/14/2023     Lab Results   Component Value Date    CREATININE 3.8 (H) 03/14/2023    BUN 40 (H) 03/14/2023     (L) 03/14/2023    K 4.4 03/14/2023    CL 92 (L) 03/14/2023    CO2 29 03/14/2023     Lab Results   Component Value Date    INR 2.37 03/08/2023    INR 2.36 03/08/2023    PROTIME 30.9 (H) 03/08/2023    PROTIME 30.7 (H) 03/08/2023          Lab Results   Component Value Date/Time    PHOS 2.9 03/08/2023 01:53 PM    PHOS 4.2 03/07/2023 06:32 AM    PHOS 3.7 02/15/2023 03:15 AM      No results for input(s): PH, PO2ART, PQX5MGC, HCO3, BEART, O2SAT in the last 72 hours.       Wt Readings from Last 3 Encounters:   03/12/23 281 lb 12 oz (127.8 kg)   02/24/23 274 lb 11.1 oz (124.6 kg)   02/17/23 289 lb 3.9 oz (131.2 kg)               ASSESMENT  Ac on ch resp failure  Pneumonia  Copd  Right pl effusion  Right pulm atx        PLAN  Cpm  Cont o2  Bronch today    3/14/2023  Keesha Riggins MD, M.D.

## 2023-03-14 NOTE — PROGRESS NOTES
Patient transferred to PACU, bay 3 by bed with trach mask at 6 lpm, portable cardiac monitor and paperwork accompanied by Joaquina Carvajal and this nurse.  Bedside report given  to Nando Honeycutt

## 2023-03-14 NOTE — PROGRESS NOTES
1123-Patient arrived from Endoscopy to PACU, PACU monitors applied and alarms set. Bedside report from [facility], RN and Derrek Huntley CRNA. Patient appears in no acute distress; A+Ox4, respirations equal and unlabored on 6 LPM trach mask, coughing frequently with thick tan/yellow secretions. 1125- Patient repositioned in bed, partial linen change complete. Patient trach suctioned after repositioning  1135- Patient suctioned via trach  1145- Patient denies needs at this time, resting quietly  [phone]- Patient report called to Edin Kerr, [address]  8400- Patient taken from PACU to 2E by this RN, placed on portable monitor and O2. Bedside report to Edin Kerr RN. Patient appears in no acute distress; A+Ox4, respirations equal and unlabored on 5 LPM trach mask, which is baseline. No belongings at time of transfer.

## 2023-03-14 NOTE — PROGRESS NOTES
Comprehensive Nutrition Assessment    Type and Reason for Visit:  Reassess    Nutrition Recommendations/Plan:   Continue current EN regimen  Monitor weights, GI status, glucose, lytes, HOB     Malnutrition Assessment:  Malnutrition Status: At risk for malnutrition (Comment) (03/06/23 1139)    Context:  Chronic Illness       Nutrition Assessment:    Pt remains NPO w/ sips of clear liquids, TF hung but not running in room, pt returned from bronchoscopy earlier today. Pt denies any N/V/D/C or bloating/abdominal distention. Will continue to follow at high nutrition risk. Nutrition Related Findings:    glucose 148-228 Wound Type: Open Wounds, Multiple (Cluster on buttocks)       Current Nutrition Intake & Therapies:    Average Meal Intake: NPO  Average Supplements Intake: NPO  Diet NPO Exceptions are: Sips of Clear Liquids  ADULT TUBE FEEDING; PEG; Renal Formula; Continuous; 10; Yes; 10; Q 4 hours; 60; 250; Q 4 hours  Current Tube Feeding (TF) Orders:  Feeding Route: PEG  Formula: Renal Formula  Schedule: Continuous  Feeding Regimen: stopped for procedure  Additives/Modulars: None  Water Flushes: 250 ml Q4H  Current TF & Flush Orders Provides: 2592 kcal and 116 gm protein  Goal TF & Flush Orders Provides: 60mL/hr to provide 2592kcal, 116g PRO    Anthropometric Measures:  Height: 6' 2\" (188 cm)  Ideal Body Weight (IBW): 190 lbs (86 kg)    Admission Body Weight: 267 lb 10.2 oz (121.4 kg)  Current Body Weight: 281 lb 12 oz (127.8 kg), 140.9 % IBW.  Weight Source: Bed Scale  Current BMI (kg/m2): 36.2  Usual Body Weight: 324 lb (147 kg) (September 2022 from RD notes)  % Weight Change (Calculated): -17.4  Weight Adjustment For: No Adjustment                 BMI Categories: Obese Class 2 (BMI 35.0 -39.9)    Estimated Daily Nutrient Needs:  Energy Requirements Based On: Kcal/kg  Weight Used for Energy Requirements: Ideal  Energy (kcal/day): 7806-7946 (30-35 kcals/kg IBW)  Weight Used for Protein Requirements: Ideal  Protein (g/day): 104-112 (1.2-1.3 g/kg)  Method Used for Fluid Requirements: Standard Renal  Fluid (ml/day): fluids per nephrology    Nutrition Diagnosis:   Inadequate oral intake related to swallowing difficulty as evidenced by NPO or clear liquid status due to medical condition, nutrition support - enteral nutrition    Nutrition Interventions:   Food and/or Nutrient Delivery: Continue Current Tube Feeding  Nutrition Education/Counseling: Education not indicated  Coordination of Nutrition Care: Continue to monitor while inpatient  Plan of Care discussed with: pt    Goals:  Previous Goal Met: Goal(s) Achieved  Goals: Meet at least 75% of estimated needs, by next RD assessment       Nutrition Monitoring and Evaluation:   Behavioral-Environmental Outcomes: None Identified  Food/Nutrient Intake Outcomes: Enteral Nutrition Intake/Tolerance  Physical Signs/Symptoms Outcomes: Biochemical Data, Chewing or Swallowing, GI Status, Fluid Status or Edema, Weight, Skin    Discharge Planning:    Enteral Nutrition     Jign Delgadillo, [address]: [id number]

## 2023-03-14 NOTE — ANESTHESIA PRE PROCEDURE
Department of Anesthesiology  Preprocedure Note       Name:  Ora Cardoso   Age:  68 y.o.  :  1950                                          MRN:  [id number]         Date:  3/14/2023      Surgeon: Marily Agustin):  Ananda Newsome MD    Procedure: Procedure(s):  BRONCHOSCOPY with trach, no fluor needed    Medications prior to admission:   Prior to Admission medications    Medication Sig Start Date End Date Taking?  Authorizing Provider   insulin lispro (HUMALOG) 100 UNIT/ML SOLN injection vial Inject 0-16 Units into the skin 3 times daily (with meals) **High Dose Corrective Algorithm** Glucose: Dose:  No Insulin 200-249 4 Units 250-299 8 Units 300-349 12 Units Over 349 16 Units and notify physician 23   Suzanne Chapin MD   metoprolol tartrate (LOPRESSOR) 25 MG tablet 1 tablet by Per G Tube route 2 times daily 23   Suzanne Chapin MD   insulin glargine (LANTUS) 100 UNIT/ML injection vial Inject 25 Units into the skin nightly 23   Noah Parra MD   polyvinyl alcohol (LIQUIFILM TEARS) 1.4 % ophthalmic solution Place 2 drops into both eyes 4 times daily as needed for Dry Eyes 2/17/23 3/19/23  Noah Parra MD   midodrine (PROAMATINE) 5 MG tablet Take 1 tablet by mouth 3 times daily (with meals) 23   Noah Parra MD   levothyroxine (SYNTHROID) 100 MCG tablet 1 tablet by Per G Tube route Daily 23   Noah Parra MD   lansoprazole 3 MG/ML SUSP Take 10 mLs by mouth every morning (before breakfast) 2/18/23 3/20/23  Noah Parra MD   sennosides-docusate sodium (SENOKOT-S) 8.6-50 MG tablet Take 2 tablets by mouth daily as needed for Constipation 12/15/22   Yared Ambrose MD   ipratropium-albuterol (DUONEB) 0.5-2.5 (3) MG/3ML SOLN nebulizer solution Inhale 3 mLs into the lungs 4 times daily 12/15/22   Yared Ambrose MD   atorvastatin (LIPITOR) 40 MG tablet 40 mg nightly 22   Historical Provider, MD   Nutritional Supplements (NEPRO/CARBSTEADY) LIQD 50 mL/hr continuous 11/28/22   Historical Provider, MD   Nutritional Supplements (PROSOURCE TF) LIQD 1 Package 3 times daily 11/28/22   Historical Provider, MD   traZODone (DESYREL) 100 MG tablet 100 mg nightly 11/28/22   Historical Provider, MD   QUEtiapine (SEROQUEL) 50 MG tablet 50 mg nightly 11/28/22   Historical Provider, MD   aspirin 81 MG chewable tablet 1 tablet by Per NG tube route daily 9/25/22   Yaima De Leon MD   blood glucose monitor kit and supplies Dispense sufficient amount for indicated testing frequency plus additional to accommodate PRN testing needs. Dispense all needed supplies to include: monitor, strips, lancing device, lancets, control solutions, alcohol swabs. 7/26/22   ANKITA Patricio CNP   Lancets MISC by D3EA route three times a day.  7/25/22   ANKITA Patricio CNP       Current medications:    Current Facility-Administered Medications   Medication Dose Route Frequency Provider Last Rate Last Admin    ipratropium-albuterol (DUONEB) nebulizer solution 1 ampule  1 ampule Inhalation Q4H PRN LongANKITA Posey CNP   1 ampule at 03/14/23 7135    potassium chloride (KLOR-CON M) extended release tablet 40 mEq  40 mEq Oral PRN FUNMI Haines-C   40 mEq at 03/10/23 1643    Or    potassium bicarb-citric acid (EFFER-K) effervescent tablet 40 mEq  40 mEq Oral PRN Abeliram Hurtado PA-C        Or    potassium chloride 10 mEq/100 mL IVPB (Peripheral Line)  10 mEq IntraVENous PRN Abel Hurtado PA-C        magnesium sulfate 2000 mg in 50 mL IVPB premix  2,000 mg IntraVENous PRN Abeliram Hurtado PA-C        miconazole (MICOTIN) 2 % powder   Topical BID Kinsey Baugh MD   Given at 03/14/23 0924    scopolamine (TRANSDERM-SCOP) transdermal patch 1 patch  1 patch TransDERmal Q72H Mak Saez MD   1 patch at 03/12/23 1213    midodrine (PROAMATINE) tablet 10 mg  10 mg Oral TID WC FUNMI Haines-C   10 mg at 03/14/23 0917    amiodarone (CORDARONE) tablet 200 mg  200 mg Oral Daily Jacinta Thomas PA-C   200 mg at 03/14/23 7649    heparin (porcine) injection 5,000 Units  5,000 Units SubCUTAneous 3 times per day Jacinta Thomas PA-C   5,000 Units at 03/14/23 2150    glucose chewable tablet 16 g  4 tablet Oral PRN Karina Toth MD        dextrose bolus 10% 125 mL  125 mL IntraVENous PRN Karina Toth MD        Or    dextrose bolus 10% 250 mL  250 mL IntraVENous PRN Karina Toth MD        glucagon (rDNA) injection 1 mg  1 mg SubCUTAneous PRN Karina Toth MD        dextrose 10 % infusion  1,000 mL IntraVENous Continuous PRN Karina Toth MD        insulin lispro (HUMALOG) injection vial 0-4 Units  0-4 Units SubCUTAneous Q6H Karina Toth MD   1 Units at 03/12/23 1844    sodium chloride flush 0.9 % injection 5-40 mL  5-40 mL IntraVENous 2 times per day Corby Patricio MD   10 mL at 03/14/23 0918    sodium chloride flush 0.9 % injection 5-40 mL  5-40 mL IntraVENous PRN Corby Patricio MD        0.9 % sodium chloride infusion   IntraVENous PRN Corby Patricio  mL/hr at 03/06/23 2042 25 mL at 03/06/23 2042    ondansetron (ZOFRAN-ODT) disintegrating tablet 4 mg  4 mg Oral Q8H PRN Corby Patricio MD        Or    ondansetron (ZOFRAN) injection 4 mg  4 mg IntraVENous Q6H PRN Corby Patricio MD   4 mg at 03/10/23 0747    polyethylene glycol (GLYCOLAX) packet 17 g  17 g Oral Daily PRN Corby Patricio MD        acetaminophen (TYLENOL) tablet 650 mg  650 mg Oral Q6H PRN Corby Patricio MD        Or    acetaminophen (TYLENOL) suppository 650 mg  650 mg Rectal Q6H PRN Corby Patricio MD        aspirin chewable tablet 81 mg  81 mg Per NG tube Daily Corby Patricio MD   81 mg at 03/13/23 0921    atorvastatin (LIPITOR) tablet 40 mg  40 mg Oral Nightly Corby Patricio MD   40 mg at 03/13/23 2052    insulin glargine (LANTUS) injection vial 10 Units  10 Units SubCUTAneous Nightly Karina Toth MD   10 Units at 03/13/23 2059    lansoprazole suspension SUSP 30 mg 30 mg Oral QAM AC Moraima Smith MD   30 mg at 03/13/23 0514    levothyroxine (SYNTHROID) tablet 100 mcg  100 mcg Per G Tube Daily Moraima Smith MD   100 mcg at 03/13/23 0506    metoprolol tartrate (LOPRESSOR) tablet 25 mg  25 mg Per G Tube BID Moraima Smith MD   25 mg at 03/14/23 0917    QUEtiapine (SEROQUEL) tablet 50 mg  50 mg Oral Nightly Moraima Smith MD   50 mg at 03/13/23 2052    sennosides-docusate sodium (SENOKOT-S) 8.6-50 MG tablet 2 tablet  2 tablet Oral Daily PRN Moraima Smith MD        traZODone (DESYREL) tablet 100 mg  100 mg Oral Nightly Moraima Smith MD   100 mg at 03/13/23 2051    acetylcysteine (MUCOMYST) 20 % solution 600 mg  600 mg Inhalation BID Moraima Smith MD   600 mg at 03/14/23 8966       Allergies:  No Known Allergies    Problem List:    Patient Active Problem List   Diagnosis Code    Atrial fibrillation (HCC) I48.91    CAD (coronary artery disease) I25.10    Morbid obesity ([facility] E66.01    COPD (chronic obstructive pulmonary disease) ([facility] J44.9    Sleep apnea G47.30    Type 2 diabetes mellitus (HCC) E11.9    Thyroid disease E07.9    Hyperlipidemia E78.5    Acute congestive heart failure ([facility] I50.9    Coronary artery disease involving native coronary artery of native heart with angina pectoris ([facility] I25.119    Chronic renal disease, stage III ([address] [016142] N18.30    Hypertension I10    CAD, multiple vessel I25.10    Dyspnea R06.00    Moderate malnutrition ([address] E44.0    Pneumonia of both lungs due to infectious organism J18.9    Severe malnutrition ([address] E43    Chronic respiratory failure with hypoxia ([facility]) J96.11    Aspiration pneumonia of right lower lobe ([address] J69.0    ESRD on dialysis ([address] N18.6, Z99.2    Aspiration pneumonia, unspecified aspiration pneumonia type, unspecified laterality, unspecified part of lung ([address] J69.0    Acute aspiration pneumonia ([address] J69.0    Acute on chronic respiratory failure with hypoxia ([address] J96.21    Healthcare-associated pneumonia J18.9    Acute respiratory failure with hypoxemia ([facility]) J96.01       Past Medical History:        Diagnosis Date    Allergic rhinitis     Anticoagulated on Coumadin     1/6/17-**[facility] to follow pt's PT/INRs, along w/prescribing his Coumadin. **    Anxiety     Arthritis     Atrial fibrillation (HCC)     On Coumadin. 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 positive 09/21/2022    positive at 15C, negative at 18C    COPD (chronic obstructive pulmonary disease) ([facility])     Depression     Diabetes mellitus ([facility])     NIDDM- dx over 10 yrs ago- follows with Dr Javon Pinon's contracture of hand     Right hand    Family history of cardiovascular disease     Father-MI    GERD (gastroesophageal reflux disease)     H/O cardiac catheterization 03/2007    cardiac cath- stent LAD patent, Acmlrtum-72-54%, RCA 40-50%    H/O cardiac catheterization 06/12/2008    cardiac cath- mild disease mid RCA    H/O cardiovascular stress test 04/10/2014    cardiolite- normal, no ischemia, EF63%    H/O cardiovascular stress test 09/21/2016    EF59% normal study  pt in atrial fib    H/O cardiovascular stress test 09/20/2017    EF 60%   afib    H/O cardiovascular stress test 11/07/2018    EF60% normal study    H/O Doppler ultrasound     1/11/2010-CAROTID_Intimal thickening but no significant atherosclerotic plaque noted in LAUREN. Doppler flow velocities within the LAUREN are WNL. Intimal thickening but no significant atherosclerotic plaque noted in LICA. Doppler flow velociities within the LICA are WNL.  H/O echocardiogram 04/10/2014    EF 60%, normal LV systolic function, mild mitral and tricuspid insufficiencies, no pericardial effusion.     H/O echocardiogram 09/21/2016    EF60% normal study    H/O echocardiogram 11/07/2018    EF55-60% no significant valular disease    H/O hiatal hernia     Hx of Venous Doppler 03/14/2019    Significant reflux in Left Deep System, No reflux in right lower extremity, No DVT or SVT    Hyperlipidemia     Hypertension     Obesity     S/P PTCA (percutaneous transluminal coronary angioplasty) 03/21/2005    s/p PTCA with 3.5 X 16 TAXUS stent to LAD- follows with Dr Miranda Valdez Sleep apnea     had sleep study done 10+ yrs ago- has cpap but does not use it    Thyroid disease     hypothyroid       Past Surgical History:        Procedure Laterality Date    BRONCHOSCOPY N/A 2/21/2023    BRONCHOSCOPY WITH LAVAGE performed by Angela Betancourt MD at [facility]  6/12/08    mild disease mid RCA,  stent to LAD patent,    CARDIAC CATHETERIZATION  3/07    Stent to LAD patent, Diagonal 40-50%, RCA 40-50%    CARDIAC CATHETERIZATION  3/05    COLONOSCOPY  2011    COLONOSCOPY  5/18/16    1 polyp removed, diverticulosis and hemorrhoids noted    CORONARY ANGIOPLASTY WITH STENT PLACEMENT  03/21/2005    PTCA with 3.5 x 16 TAXUS stent to LAD    CORONARY ARTERY BYPASS GRAFT N/A 9/22/2022    CABG CORONARY ARTERY BYPASS x3 (LIMA TO LAD, SVG TO PDA-RCA SEQUENTIAL) , INTRAOPERATIVE MILTON, ENDOVEIN HARVEST OF LEFT SAPHENOUS VEIN, MODIFIED MAZE PROCEDURE, LEFT ATRIAL APPENDAGE LIGATION, INTERCOSTAL NERVE BLOCK, INTRA-AORTIC BALLOON PUMP INSERTION performed by Flor Gandhi MD at [facility]  2014    egd    HAND SURGERY Right 1997    IR TUNNELED [address] 5 YEARS  2/14/2023    IR TUNNELED CATHETER PLACEMENT GREATER THAN 5 YEARS 2/14/2023 [facility],  [facility] SPECIAL PROCEDURES    HI OPTX ANKLE DISLOCATION W/REPAIR/INT/XTRNL FIXJ Right 6/3/2019    RIGHT OPEN REDUCTION INTERNAL FIXATION OF DISTAL TIBIA  AND FIBULA performed by Elmira Middleton MD at [address] N/A 9/24/2022    STERNUM WASHOUT performed by Flor Gandhi MD at [address] [place] [address] History:    Social History     Tobacco Use    Smoking status: Former     Packs/day: 1.00     Years: 10.00 Pack years: 10.00     Types: Cigarettes     Quit date: 1976     Years since quittin.3    Smokeless tobacco: Never   Substance Use Topics    Alcohol use: Not Currently     Alcohol/week: 6.0 standard drinks     Types: 6 Cans of beer per week     Comment: caffeine 2 cups of tea a day                                 Counseling given: Not Answered      Vital Signs (Current):   Vitals:    23 0400 23 0402 23 0836 23 0914   BP: (!) 114/53   117/63   Pulse: (!) 101 97     Resp: 16      Temp: 36.6 °C (97.8 °F)   36.5 °C (97.7 °F)   TempSrc: Axillary   Axillary   SpO2: 95%  95%    Weight:       Height:                                                  BP Readings from Last 3 Encounters:   23 117/63   23 (!) 145/81   23 110/60       NPO Status: Time of last liquid consumption: 0000                        Time of last solid consumption: 0000                        Date of last liquid consumption: 23                        Date of last solid food consumption: 23    BMI:   Wt Readings from Last 3 Encounters:   23 281 lb 12 oz (127.8 kg)   23 274 lb 11.1 oz (124.6 kg)   23 289 lb 3.9 oz (131.2 kg)     Body mass index is 36.17 kg/m².     CBC:   Lab Results   Component Value Date/Time    WBC 6.1 2023 08:21 AM    RBC 2.35 2023 08:21 AM    HGB 8.5 2023 08:21 AM    HCT 26.0 2023 08:21 AM    .6 2023 08:21 AM    RDW 15.9 2023 08:21 AM     2023 08:21 AM       CMP:   Lab Results   Component Value Date/Time     2023 08:21 AM    K 4.4 2023 08:21 AM    CL 92 2023 08:21 AM    CO2 29 2023 08:21 AM    BUN 40 2023 08:21 AM    CREATININE 3.8 2023 08:21 AM    GFRAA >60 2022 12:08 PM    AGRATIO 1.1 02/15/2022 03:35 PM    LABGLOM 16 2023 08:21 AM    GLUCOSE 176 2023 08:21 AM    PROT 5.7 2023 03:53 PM    CALCIUM 9.7 2023 08:21 AM    BILITOT 0.3 03/05/2023 03:54 PM    ALKPHOS 224 03/05/2023 03:54 PM    AST 15 03/05/2023 03:54 PM    ALT 16 03/05/2023 03:54 PM       POC Tests:   Recent Labs     03/14/23  0517   POCGLU 199*       Coags:   Lab Results   Component Value Date/Time    PROTIME 30.9 03/08/2023 07:37 AM    PROTIME 30.7 03/08/2023 07:37 AM    PROTIME 27.3 09/09/2022 03:22 PM    INR 2.37 03/08/2023 07:37 AM    INR 2.36 03/08/2023 07:37 AM    APTT 34.5 03/10/2023 02:39 AM       HCG (If Applicable): No results found for: PREGTESTUR, PREGSERUM, HCG, HCGQUANT     ABGs:   Lab Results   Component Value Date/Time    PO2ART 70.3 09/24/2022 11:49 AM    NQM2VJM 35.5 09/24/2022 11:49 AM    KNY6LYI 21.2 09/24/2022 11:49 AM        Type & Screen (If Applicable):  No results found for: LABABO, LABRH    Drug/Infectious Status (If Applicable):  No results found for: HIV, HEPCAB    COVID-19 Screening (If Applicable):   Lab Results   Component Value Date/Time    COVID19 NOT DETECTED 03/05/2023 04:20 PM           Anesthesia Evaluation  Patient summary reviewed no history of anesthetic complications:   Airway: Mallampati: II  TM distance: >3 FB   Neck ROM: full  Comment: trach  Mouth opening: > = 3 FB   Dental: normal exam         Pulmonary:   (+) COPD:  sleep apnea:  rhonchi                           ROS comment: CXR 3/12/2023:  Impression  Worsening moderate-sized right pleural effusion compatible with progressive  CHF.    3/8/2023:  FINDINGS:  3 sonographic images were obtained.  A total of 700 cc of serosanguineous  fluid was removed.  All the fluid was sent to lab for analysis.       Impression  Successful ultrasound guided right thoracentesis at the CVICU bedside.     Trach - 5L O2   Cardiovascular:  Exercise tolerance: poor (<4 METS),   (+) hypertension:, CAD:, dysrhythmias: atrial fibrillation, hyperlipidemia        Rhythm: irregular  Rate: normal           Beta Blocker:  Dose within 24 Hrs      ROS comment: Atrial fibrillation with rapid ventricular response Right bundle branch block   Left anterior fascicular block    Bifascicular block   Abnormal ECG   When compared with ECG of 19-FEB-2023 17:08,   No significant change was found   Confirmed by Nayeli Nogueira MD, Digna Delgadillo ([id number]) on 3/7/2023 7:42:40 AM     Echo 12/2022:  Summary   Left ventricular systolic function is normal.   Ejection fraction is visually estimated at 55-60%   No significant valvular disease noted. No evidence of any pericardial effusion. Neuro/Psych:   (+) depression/anxiety             GI/Hepatic/Renal:   (+) GERD:, renal disease: ESRD and dialysis, morbid obesity         ROS comment: CT cervical 2/14/2023 showed prominent anterior osteophytes C3-C4, C6-C7 sufficient size to cause swallowing difficulties per radiology report. .   Endo/Other:    (+) DiabetesType II DM, , blood dyscrasia: anticoagulation therapy and anemia:., .                 Abdominal:   (+) obese,           Vascular: negative vascular ROS. Other Findings:           Anesthesia Plan      general     ASA 4       Induction: inhalational.      Anesthetic plan and risks discussed with patient. ANKITA Adam CRNA   3/14/2023    Pre Anesthesia Evaluation complete. Anesthesia plan, risks, benefits, alternatives, and personnel discussed with patient and/or legal guardian. Patient and/or legal guardian verbalized an understanding and agreed to proceed. Anesthesia plan discussed with care team members and agreed upon.   ANKITA Adam CRNA  3/14/2023

## 2023-03-14 NOTE — PROGRESS NOTES
Received report from [place], [facility]. Patient alert and oriented. Verified patient's name, , allergies and procedure. Took metoprolol on 3/14/23, had Heparin SQ on 3/14/23 @ 0521, has implants in  right tibia/fibia, cardiac stents and sternal wires. . H&P on chart. No family/friend at bedside.

## 2023-03-14 NOTE — PROGRESS NOTES
Bronch:  Assisted Dr. Lulu Porter        with transbronchial bronchoscopy. Scope used #            . Prepared patient for bronchoscopy. See Physicians note for details.

## 2023-03-15 ENCOUNTER — APPOINTMENT (OUTPATIENT)
Dept: GENERAL RADIOLOGY | Age: 73
DRG: 177 | End: 2023-03-15
Payer: COMMERCIAL

## 2023-03-15 LAB
AFB SMEAR: NORMAL
ANION GAP SERPL CALCULATED.3IONS-SCNC: 8 MMOL/L (ref 4–16)
BUN SERPL-MCNC: 50 MG/DL (ref 6–23)
CALCIUM SERPL-MCNC: 10.1 MG/DL (ref 8.3–10.6)
CHLORIDE BLD-SCNC: 90 MMOL/L (ref 99–110)
CO2: 27 MMOL/L (ref 21–32)
CREAT SERPL-MCNC: 4.7 MG/DL (ref 0.9–1.3)
GFR SERPL CREATININE-BSD FRML MDRD: 12 ML/MIN/1.73M2
GLUCOSE BLD-MCNC: 161 MG/DL (ref 70–99)
GLUCOSE BLD-MCNC: 180 MG/DL (ref 70–99)
GLUCOSE BLD-MCNC: 184 MG/DL (ref 70–99)
GLUCOSE BLD-MCNC: 226 MG/DL (ref 70–99)
GLUCOSE SERPL-MCNC: 219 MG/DL (ref 70–99)
HCT VFR BLD CALC: 25.9 % (ref 42–52)
HEMOGLOBIN: 8.5 GM/DL (ref 13.5–18)
Lab: NORMAL
MCH RBC QN AUTO: 36.3 PG (ref 27–31)
MCHC RBC AUTO-ENTMCNC: 32.8 % (ref 32–36)
MCV RBC AUTO: 110.7 FL (ref 78–100)
PDW BLD-RTO: 16 % (ref 11.7–14.9)
PLATELET # BLD: 187 K/CU MM (ref 140–440)
PMV BLD AUTO: 9.3 FL (ref 7.5–11.1)
POTASSIUM SERPL-SCNC: 4.6 MMOL/L (ref 3.5–5.1)
RBC # BLD: 2.34 M/CU MM (ref 4.6–6.2)
SODIUM BLD-SCNC: 125 MMOL/L (ref 135–145)
SPECIMEN: NORMAL
WBC # BLD: 6.4 K/CU MM (ref 4–10.5)

## 2023-03-15 PROCEDURE — 6360000002 HC RX W HCPCS: Performed by: INTERNAL MEDICINE

## 2023-03-15 PROCEDURE — 71045 X-RAY EXAM CHEST 1 VIEW: CPT

## 2023-03-15 PROCEDURE — 6370000000 HC RX 637 (ALT 250 FOR IP): Performed by: INTERNAL MEDICINE

## 2023-03-15 PROCEDURE — 2700000000 HC OXYGEN THERAPY PER DAY

## 2023-03-15 PROCEDURE — 2580000003 HC RX 258: Performed by: STUDENT IN AN ORGANIZED HEALTH CARE EDUCATION/TRAINING PROGRAM

## 2023-03-15 PROCEDURE — 36415 COLL VENOUS BLD VENIPUNCTURE: CPT

## 2023-03-15 PROCEDURE — 94640 AIRWAY INHALATION TREATMENT: CPT

## 2023-03-15 PROCEDURE — 6370000000 HC RX 637 (ALT 250 FOR IP): Performed by: FAMILY MEDICINE

## 2023-03-15 PROCEDURE — 94761 N-INVAS EAR/PLS OXIMETRY MLT: CPT

## 2023-03-15 PROCEDURE — 82962 GLUCOSE BLOOD TEST: CPT

## 2023-03-15 PROCEDURE — 2580000003 HC RX 258: Performed by: INTERNAL MEDICINE

## 2023-03-15 PROCEDURE — 6360000002 HC RX W HCPCS: Performed by: STUDENT IN AN ORGANIZED HEALTH CARE EDUCATION/TRAINING PROGRAM

## 2023-03-15 PROCEDURE — 90935 HEMODIALYSIS ONE EVALUATION: CPT

## 2023-03-15 PROCEDURE — 6360000002 HC RX W HCPCS

## 2023-03-15 PROCEDURE — 85027 COMPLETE CBC AUTOMATED: CPT

## 2023-03-15 PROCEDURE — 2060000000 HC ICU INTERMEDIATE R&B

## 2023-03-15 PROCEDURE — 6370000000 HC RX 637 (ALT 250 FOR IP): Performed by: STUDENT IN AN ORGANIZED HEALTH CARE EDUCATION/TRAINING PROGRAM

## 2023-03-15 PROCEDURE — 6370000000 HC RX 637 (ALT 250 FOR IP)

## 2023-03-15 PROCEDURE — 80048 BASIC METABOLIC PNL TOTAL CA: CPT

## 2023-03-15 RX ADMIN — IPRATROPIUM BROMIDE AND ALBUTEROL SULFATE 1 AMPULE: 2.5; .5 SOLUTION RESPIRATORY (INHALATION) at 07:10

## 2023-03-15 RX ADMIN — HEPARIN SODIUM 5000 UNITS: 5000 INJECTION INTRAVENOUS; SUBCUTANEOUS at 21:05

## 2023-03-15 RX ADMIN — SODIUM CHLORIDE, PRESERVATIVE FREE 10 ML: 5 INJECTION INTRAVENOUS at 13:08

## 2023-03-15 RX ADMIN — IPRATROPIUM BROMIDE AND ALBUTEROL SULFATE 1 AMPULE: 2.5; .5 SOLUTION RESPIRATORY (INHALATION) at 00:47

## 2023-03-15 RX ADMIN — AMIODARONE HYDROCHLORIDE 200 MG: 200 TABLET ORAL at 13:07

## 2023-03-15 RX ADMIN — MICONAZOLE NITRATE: 2 POWDER TOPICAL at 22:10

## 2023-03-15 RX ADMIN — QUETIAPINE FUMARATE 50 MG: 25 TABLET ORAL at 21:05

## 2023-03-15 RX ADMIN — ATORVASTATIN CALCIUM 40 MG: 40 TABLET, FILM COATED ORAL at 21:05

## 2023-03-15 RX ADMIN — TRAZODONE HYDROCHLORIDE 100 MG: 50 TABLET ORAL at 21:05

## 2023-03-15 RX ADMIN — METOPROLOL TARTRATE 25 MG: 25 TABLET, FILM COATED ORAL at 13:07

## 2023-03-15 RX ADMIN — MIDODRINE HYDROCHLORIDE 10 MG: 5 TABLET ORAL at 17:40

## 2023-03-15 RX ADMIN — HEPARIN SODIUM 5000 UNITS: 5000 INJECTION INTRAVENOUS; SUBCUTANEOUS at 13:07

## 2023-03-15 RX ADMIN — ASPIRIN 81 MG CHEWABLE TABLET 81 MG: 81 TABLET CHEWABLE at 13:08

## 2023-03-15 RX ADMIN — METOPROLOL TARTRATE 25 MG: 25 TABLET, FILM COATED ORAL at 21:05

## 2023-03-15 RX ADMIN — ACETYLCYSTEINE 600 MG: 200 SOLUTION ORAL; RESPIRATORY (INHALATION) at 19:14

## 2023-03-15 RX ADMIN — VANCOMYCIN HYDROCHLORIDE 2000 MG: 10 INJECTION, POWDER, LYOPHILIZED, FOR SOLUTION INTRAVENOUS at 17:41

## 2023-03-15 RX ADMIN — MIDODRINE HYDROCHLORIDE 10 MG: 5 TABLET ORAL at 13:07

## 2023-03-15 RX ADMIN — INSULIN GLARGINE 10 UNITS: 100 INJECTION, SOLUTION SUBCUTANEOUS at 22:01

## 2023-03-15 RX ADMIN — ACETYLCYSTEINE 600 MG: 200 SOLUTION ORAL; RESPIRATORY (INHALATION) at 07:10

## 2023-03-15 RX ADMIN — EPOETIN ALFA-EPBX 8000 UNITS: 4000 INJECTION, SOLUTION INTRAVENOUS; SUBCUTANEOUS at 11:14

## 2023-03-15 RX ADMIN — IPRATROPIUM BROMIDE AND ALBUTEROL SULFATE 1 AMPULE: 2.5; .5 SOLUTION RESPIRATORY (INHALATION) at 19:14

## 2023-03-15 RX ADMIN — MICONAZOLE NITRATE: 2 POWDER TOPICAL at 13:09

## 2023-03-15 NOTE — PROGRESS NOTES
[address]  consulted by Dr. Cristal Gaytan for monitoring and adjustment. Indication for treatment: Vancomycin indication: HAP  Goal trough: Trough Goal: 15-20 mcg/mL  AUC/MT: 400-600    Risk Factors for MRSA Identified:   Documented isolation of MRSA within 1 year     Pertinent Laboratory Values:   Temp Readings from Last 3 Encounters:   03/15/23 98 °F (36.7 °C) (Axillary)   02/24/23 98.2 °F (36.8 °C) (Oral)   02/17/23 97.5 °F (36.4 °C) (Oral)     Recent Labs     03/14/23  0821 03/15/23  0815   WBC 6.1 6.4     Recent Labs     03/14/23  0821 03/15/23  0815   BUN 40* 50*   CREATININE 3.8* 4.7*     Estimated Creatinine Clearance: 20 mL/min (A) (based on SCr of 4.7 mg/dL (H)). Intake/Output Summary (Last 24 hours) at 3/15/2023 1611  Last data filed at 3/15/2023 1145  Gross per 24 hour   Intake 893 ml   Output 2300 ml   Net -1407 ml       Pertinent Cultures:   Date    Source    Results  03/14/23  Bronchial Culture  MRSA  03/15/23  Urine Antigens   To be collected    Vancomycin level:   TROUGH:  No results for input(s): VANCOTROUGH in the last 72 hours. RANDOM:  No results for input(s): VANCORANDOM in the last 72 hours. Assessment:  HPI: Pneumonia, patient with chronic trach, bronchial washing with MRSA  SCr, BUN, and urine output: ESRD on HD Monday, Wednesday, Friday  Day(s) of therapy: 1 of 7  Vancomycin concentration:   3/17/23 @0600    Plan:  Give a one time dose of vancomycin IV 2000mg followed by random level dosing guided by levels. Pharmacy will continue to monitor patient and adjust therapy as indicated    Kedar 3 03/17/2023 @0600    Thank you for the consult.   Liliam MCMAHAN, [facility]  3/15/2023 4:11 PM

## 2023-03-15 NOTE — PROCEDURES
Before each treatment:   Dialysis Machine No.: [id number]   Machine Number: [id number]  Dialyzer Lot No.: [id number]  RO Machine Log Sheet Completed: Yes  Machine Alarm Self Test: Completed (03/15/23 0845)     Air Foam Detector: Tested, Proper Function  Extracorporeal Circuit Tested for Integrity: Yes  Machine Conductivity: 13.8  Manual Conductivity: 13.8     Bicarbonate Concentrate Lot No.: [id number]  Acid Concentrate Lot No.: [id number]  Manual Ph: 7.4  Bleach Test (Neg): Yes  Bath Temperature: 95 °F (35 °C)  Tubing Lot#: [id number]  Conductivity Meter Serial #: [id number]  All Connections Secure?: Yes  Venous Parameters Set?: Yes  Arterial Parameters Set?: Yes  Saline Line Double Clamped?: Yes  Air Foam Detector Engaged?: Yes  Machine Functioning Alarm Free?  Yes  Prime Given: 200ml    Chlorine Testing - Before each treatment and every 4 hours:   Treatment  Treatment Number: 2  Time On: 0845  Time Off: 8616  Treatment Goal: 2000  Weight: 281 lb 12 oz (127.8 kg) (03/12/23 0556)  1st check: less than 0.1 ppm at: 0645 hours  2nd check: less than 0.1 ppm at: 1030    (if greater than 0.1 ppm, then check every 30 minutes from secondary)    Access Flows and Pressures  Patient Vitals for the past 8 hrs:   Blood Flow Rate (ml/min) Ultrafiltration Rate (ml/hr) Arterial Pressure (mmHg) Venous Pressure (mmHg) TMP DFR Comments Access Visible   03/15/23 0845 350 ml/min 830 ml/hr -70 mmHg 90 40 617 on circ per policy, lines secure Yes   03/15/23 0900 350 ml/min 830 ml/hr -90 mmHg 100 50 600 AWAKE AND WATCHING TV Yes   03/15/23 0915 350 ml/min 830 ml/hr -90 mmHg 100 50 600 VSS Yes   03/15/23 0945 350 ml/min 830 ml/hr -100 mmHg 100 50 600 RESTING Yes   03/15/23 1000 350 ml/min 830 ml/hr -140 mmHg 100 50 600 vss Yes   03/15/23 1030 350 ml/min 830 ml/hr -140 mmHg 160 50 600 RESTING Yes   03/15/23 1100 350 ml/min 830 ml/hr -160 mmHg 180 50 600 VSS Yes   03/15/23 1115 350 ml/min 830 ml/hr -160 mmHg 170 50 600 RESTING Yes   03/15/23 1130 350 ml/min 830 ml/hr -160 mmHg 180 50 600 VSS Yes     Vital Signs  Patient Vitals for the past 8 hrs:   BP Temp Pulse Resp SpO2   03/15/23 0600 127/72 -- 98 (!) 31 90 %   03/15/23 0713 129/67 97.5 °F (36.4 °C) 85 17 96 %   03/15/23 0835 -- -- 97 17 92 %   03/15/23 0845 122/68 97.2 °F (36.2 °C) 96 24 96 %   03/15/23 0900 (!) 115/59 -- 90 15 --   03/15/23 0915 113/67 -- 88 13 --   03/15/23 0945 (!) 101/58 -- 92 12 --   03/15/23 1000 (!) 98/56 -- (!) 101 20 --   03/15/23 1030 102/65 -- 100 16 --   03/15/23 1100 112/66 -- -- -- --   03/15/23 1115 118/68 -- 96 16 --   03/15/23 1130 114/66 -- (!) 104 19 --   03/15/23 1145 119/67 -- 100 20 --     Post-Dialysis  Arterial Catheter Locking Solution:  NS  Venous Catheter Locking Solution:  NS  Post-Treatment Procedures: Blood returned, Catheter capped, clamped and heparinized x 2 ports  Machine Disinfection Process: Acid/Vinegar Clean, Heat Disinfect  Rinseback Volume (ml): 300 ml  Blood Volume Processed (Liters): 52.5 l/min  Dialyzer Clearance: Clear  Duration of Treatment (minutes): 180 minutes     Hemodialysis Intake (ml): 300 ml  Hemodialysis Output (ml): 2300 ml     Tolerated Treatment: Good  Patient Response to Treatment: tolerates tx well  Physician Notified: No       Provider Notification        Handoff complete and report given to Primary RN at 1150 hours.   Primary RN (First Initial, Last Name, Title):  thaddeus ortega     Education  Person Educated: Patient   Knowledge Base: None  Barriers to Learning?: None  Preferred method of Learning: Hands-on  Topic(s): Signs and Symptoms of Infection   Teaching Tools: Explanation   Response to Education: Requires Follow-up     Electronically signed by Lizet Dixon RN on 3/15/2023 at 11:56 AM

## 2023-03-15 NOTE — PROGRESS NOTES
pulmonary      SUBJECTIVE:  no sob     OBJECTIVE    VITALS:  /72   Pulse 98   Temp 97.8 °F (36.6 °C) (Oral)   Resp (!) 31   Ht 6' 2\" (1.88 m)   Wt 281 lb 12 oz (127.8 kg)   SpO2 90%   BMI 36.17 kg/m²   HEAD AND FACE EXAM:  No throat injection, no active exudate,no thrush  NECK EXAM;No JVD, no masses, symmetrical  CHEST EXAM; Expansion equal and symmetrical, no masses  LUNG EXAM; Good breath sounds bilaterally. There are expiratory wheezes both lungs, there are crackles at both lung bases  CARDIOVASCULAR EXAM: Positive S1 and S2, no S3 or S4, no clicks ,no murmurs  RIGHT AND LEFT LOWER EXTRIMITY EXAM: No edema, no swelling, no inflamation  CNS EXAM: Alert and oriented X3          LABS   Lab Results   Component Value Date    WBC 6.1 03/14/2023    HGB 8.5 (L) 03/14/2023    HCT 26.0 (L) 03/14/2023    .6 (H) 03/14/2023     03/14/2023     Lab Results   Component Value Date    CREATININE 3.8 (H) 03/14/2023    BUN 40 (H) 03/14/2023     (L) 03/14/2023    K 4.4 03/14/2023    CL 92 (L) 03/14/2023    CO2 29 03/14/2023     Lab Results   Component Value Date    INR 2.37 03/08/2023    INR 2.36 03/08/2023    PROTIME 30.9 (H) 03/08/2023    PROTIME 30.7 (H) 03/08/2023          Lab Results   Component Value Date/Time    PHOS 2.9 03/08/2023 01:53 PM    PHOS 4.2 03/07/2023 06:32 AM    PHOS 3.7 02/15/2023 03:15 AM      No results for input(s): PH, PO2ART, LGD8ACD, HCO3, BEART, O2SAT in the last 72 hours.       Wt Readings from Last 3 Encounters:   03/12/23 281 lb 12 oz (127.8 kg)   02/24/23 274 lb 11.1 oz (124.6 kg)   02/17/23 289 lb 3.9 oz (131.2 kg)               ASSESMENT  Ac on ch resp failure  Pneumonia  Pulm atx  Right pl effusion        PLAN  Cpm  Cont o2  Check cxr and if pleural effusion then tap    3/15/2023  Tom Benitez MD, MCHRISTINA.

## 2023-03-15 NOTE — PROGRESS NOTES
Nephrology Progress Note  3/15/2023 7:48 AM  Subjective: Interval History: Janine Rodrigues is a 68 y.o. male overall doing better status post [facility] dialysis today  Data:   Scheduled Meds:   epoetin jessenia-epbx  8,000 Units IntraVENous Once per day on Mon Wed Fri    miconazole   Topical BID    scopolamine  1 patch TransDERmal Q72H    midodrine  10 mg Oral TID WC    amiodarone  200 mg Oral Daily    heparin (porcine)  5,000 Units SubCUTAneous 3 times per day    insulin lispro  0-4 Units SubCUTAneous Q6H    sodium chloride flush  5-40 mL IntraVENous 2 times per day    aspirin  81 mg Per NG tube Daily    atorvastatin  40 mg Oral Nightly    insulin glargine  10 Units SubCUTAneous Nightly    lansoprazole  30 mg Oral QAM AC    levothyroxine  100 mcg Per G Tube Daily    metoprolol tartrate  25 mg Per G Tube BID    QUEtiapine  50 mg Oral Nightly    traZODone  100 mg Oral Nightly    acetylcysteine  600 mg Inhalation BID     Continuous Infusions:   dextrose      sodium chloride 40 mL/hr at 03/14/23 1059         CBC   Recent Labs     03/14/23  0821   WBC 6.1   HGB 8.5*   HCT 26.0*         BMP   Recent Labs     03/14/23  0821   *   K 4.4   CL 92*   CO2 29   BUN 40*   CREATININE 3.8*     Hepatic:   No results for input(s): AST, ALT, ALB, BILITOT, ALKPHOS in the last 72 hours. Troponin: No results for input(s): TROPONINI in the last 72 hours. BNP: No results for input(s): BNP in the last 72 hours. Lipids: No results for input(s): CHOL, HDL in the last 72 hours. Invalid input(s): LDLCALCU  ABGs:   Lab Results   Component Value Date/Time    PO2ART 70.3 09/24/2022 11:49 AM    QPX4BNO 35.5 09/24/2022 11:49 AM     INR:   No results for input(s): INR in the last 72 hours.     Renal Labs  Albumin:    Lab Results   Component Value Date/Time    LABALBU 3.5 03/08/2023 01:53 PM     Calcium:    Lab Results   Component Value Date/Time    CALCIUM 9.7 03/14/2023 08:21 AM     Phosphorus:    Lab Results   Component Value BMI 36.17 kg/m²  {  General appearance: awake weak  HEENT: Head: Normal, normocephalic, atraumatic.   Neck: Positive trach  Lungs: diminished breath sounds bilaterally  Heart: S1, S2 normal  Abdomen: abnormal findings:  soft nt  Extremities: edema trace  Neurologic: Mental status: alertness: alert        Assessment and Plan:      IMP:  #1 end-stage renal disease on dialysis Monday Wednesday Friday  #2 possible pneumonia with respiratory secretion with trach  #3 A-fib rapid rate  #4 COPD likely pleural effusion  #5 type 2 diabetes  #6 status post CABG with complication requiring trach and PEG lives in a nursing home/depression    Plan  #1 doing dialysis today maintain current schedule  #2 status post [facility] monitor secretions  #3 heart rate improved  #4 monitor oxygenation with pulmonology  #5 monitor glucose control  #6 maintain feeding tube  #7 work on discharge planning back to skilled nursing outpatient dialysis Monday Wednesday Friday  We will need to follow-up neurosurgery [facility] outpatient as well for options related to swallowing  Okay to discharge from renal             Cascade Locksmark Hennessy MD, MD

## 2023-03-15 NOTE — PROGRESS NOTES
PRN  acetaminophen, 650 mg, Q6H PRN   Or  acetaminophen, 650 mg, Q6H PRN  sennosides-docusate sodium, 2 tablet, Daily PRN      Labs      Recent Results (from the past 24 hour(s))   POCT Glucose    Collection Time: 03/14/23  4:36 PM   Result Value Ref Range    POC Glucose 157 (H) 70 - 99 MG/DL   POCT Glucose    Collection Time: 03/14/23  9:03 PM   Result Value Ref Range    POC Glucose 188 (H) 70 - 99 MG/DL   POCT Glucose    Collection Time: 03/15/23  7:35 AM   Result Value Ref Range    POC Glucose 226 (H) 70 - 99 MG/DL   CBC    Collection Time: 03/15/23  8:15 AM   Result Value Ref Range    WBC 6.4 4.0 - 10.5 K/CU MM    RBC 2.34 (L) 4.6 - 6.2 M/CU MM    Hemoglobin 8.5 (L) 13.5 - 18.0 GM/DL    Hematocrit 25.9 (L) 42 - 52 %    .7 (H) 78 - 100 FL    MCH 36.3 (H) 27 - 31 PG    MCHC 32.8 32.0 - 36.0 %    RDW 16.0 (H) 11.7 - 14.9 %    Platelets 149 940 - 029 K/CU MM    MPV 9.3 7.5 - 11.1 FL   Basic Metabolic Panel    Collection Time: 03/15/23  8:15 AM   Result Value Ref Range    Sodium 125 (L) 135 - 145 MMOL/L    Potassium 4.6 3.5 - 5.1 MMOL/L    Chloride 90 (L) 99 - 110 mMol/L    CO2 27 21 - 32 MMOL/L    Anion Gap 8 4 - 16    BUN 50 (H) 6 - 23 MG/DL    Creatinine 4.7 (H) 0.9 - 1.3 MG/DL    Est, Glom Filt Rate 12 (L) >60 mL/min/1.73m2    Glucose 219 (H) 70 - 99 MG/DL    Calcium 10.1 8.3 - 10.6 MG/DL   POCT Glucose    Collection Time: 03/15/23 12:25 PM   Result Value Ref Range    POC Glucose 161 (H) 70 - 99 MG/DL        Imaging/Diagnostics Last 24 Hours   No results found.     Electronically signed by Bela Mason MD on 3/15/2023 at 12:52 PM

## 2023-03-15 NOTE — CARE COORDINATION
Pt to return to [facility] will need updated PT/OT notes. PS to Dr Jordana Church asking for an order. CM placed WB.

## 2023-03-15 NOTE — PROGRESS NOTES
Occupational Therapy      Attempted to see pt today. Pt LO for dialysis today.  Will re-attempt to see when pt is medically appropriate    Sindy Velazquez OTR/L [id number]  10:17 AM,3/15/2023

## 2023-03-16 ENCOUNTER — APPOINTMENT (OUTPATIENT)
Dept: INTERVENTIONAL RADIOLOGY/VASCULAR | Age: 73
DRG: 177 | End: 2023-03-16
Payer: COMMERCIAL

## 2023-03-16 ENCOUNTER — APPOINTMENT (OUTPATIENT)
Dept: GENERAL RADIOLOGY | Age: 73
DRG: 177 | End: 2023-03-16
Payer: COMMERCIAL

## 2023-03-16 LAB
APTT: 33.1 SECONDS (ref 25.1–37.1)
CULTURE: ABNORMAL
CULTURE: ABNORMAL
FLUID TYPE: NORMAL INDEX
FUNGUS STAIN: ABNORMAL
GLUCOSE BLD-MCNC: 149 MG/DL (ref 70–99)
GLUCOSE BLD-MCNC: 161 MG/DL (ref 70–99)
GLUCOSE BLD-MCNC: 186 MG/DL (ref 70–99)
GLUCOSE BLD-MCNC: 203 MG/DL (ref 70–99)
GLUCOSE BLD-MCNC: 213 MG/DL (ref 70–99)
GLUCOSE, FLUID: 199 MG/DL
INR BLD: 0.96 INDEX
LACTATE DEHYDROGENASE, FLUID: 70 IU/L
LYMPHOCYTES, BODY FLUID: NORMAL %
Lab: ABNORMAL
MESOTHELIAL FLUID: NORMAL /100 WBC
MONOCYTE, FLUID: NORMAL %
NEUTROPHIL, FLUID: NORMAL %
PROTEIN FLUID: 2.7 G/DL
PROTHROMBIN TIME: 12.4 SECONDS (ref 11.7–14.5)
RBC FLUID: NORMAL /CU MM
SPECIMEN: ABNORMAL
WBC FLUID: 376 /CU MM

## 2023-03-16 PROCEDURE — 94664 DEMO&/EVAL PT USE INHALER: CPT

## 2023-03-16 PROCEDURE — 82962 GLUCOSE BLOOD TEST: CPT

## 2023-03-16 PROCEDURE — 87899 AGENT NOS ASSAY W/OPTIC: CPT

## 2023-03-16 PROCEDURE — 85730 THROMBOPLASTIN TIME PARTIAL: CPT

## 2023-03-16 PROCEDURE — 6370000000 HC RX 637 (ALT 250 FOR IP): Performed by: FAMILY MEDICINE

## 2023-03-16 PROCEDURE — 97163 PT EVAL HIGH COMPLEX 45 MIN: CPT

## 2023-03-16 PROCEDURE — 71045 X-RAY EXAM CHEST 1 VIEW: CPT

## 2023-03-16 PROCEDURE — 2060000000 HC ICU INTERMEDIATE R&B

## 2023-03-16 PROCEDURE — 6370000000 HC RX 637 (ALT 250 FOR IP): Performed by: INTERNAL MEDICINE

## 2023-03-16 PROCEDURE — 32555 ASPIRATE PLEURA W/ IMAGING: CPT

## 2023-03-16 PROCEDURE — 6360000002 HC RX W HCPCS

## 2023-03-16 PROCEDURE — 97112 NEUROMUSCULAR REEDUCATION: CPT

## 2023-03-16 PROCEDURE — 84157 ASSAY OF PROTEIN OTHER: CPT

## 2023-03-16 PROCEDURE — 89220 SPUTUM SPECIMEN COLLECTION: CPT

## 2023-03-16 PROCEDURE — 97530 THERAPEUTIC ACTIVITIES: CPT

## 2023-03-16 PROCEDURE — 89051 BODY FLUID CELL COUNT: CPT

## 2023-03-16 PROCEDURE — 6370000000 HC RX 637 (ALT 250 FOR IP): Performed by: STUDENT IN AN ORGANIZED HEALTH CARE EDUCATION/TRAINING PROGRAM

## 2023-03-16 PROCEDURE — 6360000002 HC RX W HCPCS: Performed by: INTERNAL MEDICINE

## 2023-03-16 PROCEDURE — 2580000003 HC RX 258: Performed by: INTERNAL MEDICINE

## 2023-03-16 PROCEDURE — 0W993ZZ DRAINAGE OF RIGHT PLEURAL CAVITY, PERCUTANEOUS APPROACH: ICD-10-PCS | Performed by: RADIOLOGY

## 2023-03-16 PROCEDURE — 83615 LACTATE (LD) (LDH) ENZYME: CPT

## 2023-03-16 PROCEDURE — 99223 1ST HOSP IP/OBS HIGH 75: CPT | Performed by: INTERNAL MEDICINE

## 2023-03-16 PROCEDURE — 82945 GLUCOSE OTHER FLUID: CPT

## 2023-03-16 PROCEDURE — 87449 NOS EACH ORGANISM AG IA: CPT

## 2023-03-16 PROCEDURE — 94640 AIRWAY INHALATION TREATMENT: CPT

## 2023-03-16 PROCEDURE — 2709999900 IR GUIDED THORACENTESIS PLEURAL

## 2023-03-16 PROCEDURE — 36415 COLL VENOUS BLD VENIPUNCTURE: CPT

## 2023-03-16 PROCEDURE — 85610 PROTHROMBIN TIME: CPT

## 2023-03-16 PROCEDURE — 2700000000 HC OXYGEN THERAPY PER DAY

## 2023-03-16 PROCEDURE — 6370000000 HC RX 637 (ALT 250 FOR IP)

## 2023-03-16 PROCEDURE — 94761 N-INVAS EAR/PLS OXIMETRY MLT: CPT

## 2023-03-16 PROCEDURE — 97167 OT EVAL HIGH COMPLEX 60 MIN: CPT

## 2023-03-16 RX ORDER — WARFARIN SODIUM 1 MG/1
1 TABLET ORAL DAILY
Status: DISCONTINUED | OUTPATIENT
Start: 2023-03-16 | End: 2023-03-18

## 2023-03-16 RX ADMIN — SODIUM CHLORIDE 25 ML: 9 INJECTION, SOLUTION INTRAVENOUS at 14:36

## 2023-03-16 RX ADMIN — WARFARIN SODIUM 1 MG: 1 TABLET ORAL at 17:55

## 2023-03-16 RX ADMIN — MICONAZOLE NITRATE: 2 POWDER TOPICAL at 10:12

## 2023-03-16 RX ADMIN — SODIUM CHLORIDE, PRESERVATIVE FREE 10 ML: 5 INJECTION INTRAVENOUS at 22:02

## 2023-03-16 RX ADMIN — TRAZODONE HYDROCHLORIDE 100 MG: 50 TABLET ORAL at 22:50

## 2023-03-16 RX ADMIN — INSULIN LISPRO 1 UNITS: 100 INJECTION, SOLUTION INTRAVENOUS; SUBCUTANEOUS at 09:57

## 2023-03-16 RX ADMIN — SODIUM CHLORIDE, PRESERVATIVE FREE 10 ML: 5 INJECTION INTRAVENOUS at 09:56

## 2023-03-16 RX ADMIN — QUETIAPINE FUMARATE 50 MG: 25 TABLET ORAL at 22:50

## 2023-03-16 RX ADMIN — ACETYLCYSTEINE 600 MG: 200 SOLUTION ORAL; RESPIRATORY (INHALATION) at 19:39

## 2023-03-16 RX ADMIN — ASPIRIN 81 MG CHEWABLE TABLET 81 MG: 81 TABLET CHEWABLE at 09:57

## 2023-03-16 RX ADMIN — ATORVASTATIN CALCIUM 40 MG: 40 TABLET, FILM COATED ORAL at 22:50

## 2023-03-16 RX ADMIN — HEPARIN SODIUM 5000 UNITS: 5000 INJECTION INTRAVENOUS; SUBCUTANEOUS at 14:34

## 2023-03-16 RX ADMIN — AMIODARONE HYDROCHLORIDE 200 MG: 200 TABLET ORAL at 09:56

## 2023-03-16 RX ADMIN — CEFEPIME 1000 MG: 1 INJECTION, POWDER, FOR SOLUTION INTRAMUSCULAR; INTRAVENOUS at 14:37

## 2023-03-16 RX ADMIN — METOPROLOL TARTRATE 25 MG: 25 TABLET, FILM COATED ORAL at 22:50

## 2023-03-16 RX ADMIN — ACETYLCYSTEINE 600 MG: 200 SOLUTION ORAL; RESPIRATORY (INHALATION) at 07:56

## 2023-03-16 RX ADMIN — MIDODRINE HYDROCHLORIDE 10 MG: 5 TABLET ORAL at 09:57

## 2023-03-16 RX ADMIN — METOPROLOL TARTRATE 25 MG: 25 TABLET, FILM COATED ORAL at 09:57

## 2023-03-16 RX ADMIN — LANSOPRAZOLE 30 MG: KIT at 09:56

## 2023-03-16 RX ADMIN — IPRATROPIUM BROMIDE AND ALBUTEROL SULFATE 1 AMPULE: 2.5; .5 SOLUTION RESPIRATORY (INHALATION) at 19:39

## 2023-03-16 RX ADMIN — INSULIN GLARGINE 10 UNITS: 100 INJECTION, SOLUTION SUBCUTANEOUS at 22:51

## 2023-03-16 RX ADMIN — LEVOTHYROXINE SODIUM 100 MCG: 0.1 TABLET ORAL at 14:34

## 2023-03-16 RX ADMIN — HEPARIN SODIUM 5000 UNITS: 5000 INJECTION INTRAVENOUS; SUBCUTANEOUS at 09:56

## 2023-03-16 RX ADMIN — IPRATROPIUM BROMIDE AND ALBUTEROL SULFATE 1 AMPULE: 2.5; .5 SOLUTION RESPIRATORY (INHALATION) at 07:55

## 2023-03-16 RX ADMIN — HEPARIN SODIUM 5000 UNITS: 5000 INJECTION INTRAVENOUS; SUBCUTANEOUS at 22:49

## 2023-03-16 RX ADMIN — MIDODRINE HYDROCHLORIDE 10 MG: 5 TABLET ORAL at 14:34

## 2023-03-16 ASSESSMENT — PAIN SCALES - GENERAL: PAINLEVEL_OUTOF10: 0

## 2023-03-16 ASSESSMENT — PAIN SCALES - WONG BAKER: WONGBAKER_NUMERICALRESPONSE: 0

## 2023-03-16 NOTE — CONSULTS
hypothyroid      Past Surgical History:   Procedure Laterality Date    BRONCHOSCOPY N/A 2/21/2023    BRONCHOSCOPY WITH LAVAGE performed by Roula Leon MD at [address] Bilateral 3/14/2023    BRONCHOSCOPY with trach, no fluor needed performed by Roula Leon MD at [facility]  6/12/08    mild disease mid RCA,  stent to LAD patent,    CARDIAC CATHETERIZATION  3/07    Stent to LAD patent, Diagonal 40-50%, RCA 40-50%    CARDIAC CATHETERIZATION  3/05    COLONOSCOPY  2011    COLONOSCOPY  5/18/16    1 polyp removed, diverticulosis and hemorrhoids noted    CORONARY ANGIOPLASTY WITH STENT PLACEMENT  03/21/2005    PTCA with 3.5 x 16 TAXUS stent to LAD    CORONARY ARTERY BYPASS GRAFT N/A 9/22/2022    CABG CORONARY ARTERY BYPASS x3 (LIMA TO LAD, SVG TO PDA-RCA SEQUENTIAL) , INTRAOPERATIVE MILTON, ENDOVEIN HARVEST OF LEFT SAPHENOUS VEIN, MODIFIED MAZE PROCEDURE, LEFT ATRIAL APPENDAGE LIGATION, INTERCOSTAL NERVE BLOCK, INTRA-AORTIC BALLOON PUMP INSERTION performed by Lucy Almaguer MD at [address] [place] [address], COLON, DIAGNOSTIC  2014    egd    HAND SURGERY Right 1997    IR TUNNELED [address] 5 YEARS  2/14/2023    IR TUNNELED CATHETER PLACEMENT GREATER THAN 5 YEARS 2/14/2023 Abelino Aden, DO [facility] SPECIAL PROCEDURES    CT OPTX ANKLE DISLOCATION W/REPAIR/INT/XTRNL FIXJ Right 6/3/2019    RIGHT OPEN REDUCTION INTERNAL FIXATION OF DISTAL TIBIA  AND FIBULA performed by Roland Smith MD at [address] [place] [address] N/A 9/24/2022    STERNUM WASHOUT performed by Lucy Almaguer MD at [facility] OR      Family History   Problem Relation Age of Onset    Cancer Mother         breast ca    Coronary Art Dis Father          MI    Heart Disease Father     Rheum Arthritis Other     Rheum Arthritis Sister     No Known Problems Brother     Rheum Arthritis Sister       Infectious disease related family history - not contibutory.    SOCIAL HISTORY  Social History     Tobacco Use intact and no focal sensory or motor deficits    ? Diagnostic Studies: reviewed  ? ? I have examined this patient and available medical records on this date and have made the above observations, conclusions and recommendations.   Electronically signed by: Electronically signed by Vicki Neal MD on 3/16/2023 at 1:49 PM

## 2023-03-16 NOTE — PROGRESS NOTES
/72   Pulse 89   Temp 98 °F (36.7 °C) (Oral)   Resp 25   Ht 6' 2\" (1.88 m)   Wt 281 lb 12 oz (127.8 kg)   SpO2 96%   BMI 36.17 kg/m²  {  General appearance: awake weak  HEENT: Head: Normal, normocephalic, atraumatic. Neck: Positive trach  Lungs: diminished breath sounds bilaterally  Heart: S1, S2 normal  Abdomen: abnormal findings:  soft nt  Extremities: edema trace  Neurologic: Mental status: alertness: alert        Assessment and Plan:      IMP:  #1 end-stage renal disease on dialysis Monday Wednesday Friday  #2 possible pneumonia with respiratory secretion with trach  #3  Hyponatremia  #4 COPD likely pleural effusion  #5 type 2 diabetes  #6 status post CABG with complication requiring trach and PEG lives in a nursing home/depression    Plan  #1 doing dialysis next on Friday  #2 status post bronc plan on thoracentesis pulmonary toilet as able  #3 sodium low prior to dialysis follow-up repeat labs in the morning  #4 monitor oxygenation stable  #5 monitor glucose control  6 cardiac status stable maintain with trach and PEG  Overall supportive care discharge planning when cleared by pulmonology unfortunately due to the path for the cervical spine patient will likely rehab recurrent risk of aspiration and increase pulmonary secretions is my opinion over the long-term plan and will succeed will be some type of cervical surgery which patient be at high risk for will need to be done at [facility].   Karol Santillan MD, MD

## 2023-03-16 NOTE — PROGRESS NOTES
[address] [facility],2[address] ACUTE CARE OCCUPATIONAL THERAPY EVALUATION    Maddy Vyas, 1950, 2003/2003-A, 3/16/2023    Discharge Recommendation: SNF v LTC with OT    History:  [place]:  The primary encounter diagnosis was Acute respiratory failure with hypoxemia ([facility] [address] Diagnoses of HCAP (healthcare-associated pneumonia), ESRD (end stage renal disease) ([facility] [address] and Tracheostomy in place [facility]) were also pertinent to this visit. 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 [address] Depression, Diabetes mellitus ([facility] [address] Dupuytren's contracture of hand, Family history of cardiovascular disease, GERD (gastroesophageal reflux disease), H/O cardiac catheterization, H/O cardiac catheterization, H/O cardiovascular stress test, H/O cardiovascular stress test, H/O cardiovascular stress test, H/O cardiovascular stress test, H/O Doppler ultrasound, H/O echocardiogram, H/O echocardiogram, H/O echocardiogram, H/O hiatal hernia, Hx of Venous Doppler, Hyperlipidemia, Hypertension, Obesity, S/P PTCA (percutaneous transluminal coronary angioplasty), Sleep apnea, and Thyroid disease. Patient  has a past surgical history that includes Coronary angioplasty with stent (03/21/2005); Cardiac catheterization (6/12/08); Cardiac catheterization (3/07); Cardiac catheterization (3/05); Endoscopy, colon, diagnostic (2014); Colonoscopy (2011); Colonoscopy (5/18/16); Hand surgery (Right, 1997); pr optx ankle dislocation w/repair/int/xtrnl fixj (Right, 6/3/2019); Sternum Debridement (N/A, 9/24/2022); Coronary artery bypass graft (N/A, 9/22/2022); IR TUNNELED CVC PLACE WO SQ PORT/PUMP > 5 YEARS (2/14/2023); bronchoscopy (N/A, 2/21/2023); and bronchoscopy (Bilateral, 3/14/2023).     Subjective:  Patient states: \"I need a tissue\"  Pain: endorses generalized

## 2023-03-16 NOTE — PLAN OF CARE
Problem: Skin/Tissue Integrity  Goal: Absence of new skin breakdown  Description: 1. Monitor for areas of redness and/or skin breakdown  2. Assess vascular access sites hourly  3. Every 4-6 hours minimum:  Change oxygen saturation probe site  4. Every 4-6 hours:  If on nasal continuous positive airway pressure, respiratory therapy assess nares and determine need for appliance change or resting period.   Outcome: Progressing     Problem: Safety - Adult  Goal: Free from fall injury  Outcome: Progressing     Problem: ABCDS Injury Assessment  Goal: Absence of physical injury  Outcome: Progressing     Problem: Chronic Conditions and Co-morbidities  Goal: Patient's chronic conditions and co-morbidity symptoms are monitored and maintained or improved  Outcome: Progressing     Problem: Nutrition Deficit:  Goal: Optimize nutritional status  Outcome: Progressing     Problem: Pain  Goal: Verbalizes/displays adequate comfort level or baseline comfort level  Outcome: Progressing     Problem: Respiratory - Adult  Goal: Achieves optimal ventilation and oxygenation  Outcome: Progressing     Problem: Neurosensory - Adult  Goal: Achieves stable or improved neurological status  Outcome: Progressing  Goal: Achieves maximal functionality and self care  Outcome: Progressing     Problem: Skin/Tissue Integrity - Adult  Goal: Skin integrity remains intact  Outcome: Progressing  Goal: Incisions, wounds, or drain sites healing without S/S of infection  Outcome: Progressing  Goal: Oral mucous membranes remain intact  Outcome: Progressing     Problem: Musculoskeletal - Adult  Goal: Return mobility to safest level of function  Outcome: Progressing  Goal: Maintain proper alignment of affected body part  Outcome: Progressing  Goal: Return ADL status to a safe level of function  Outcome: Progressing     Problem: Gastrointestinal - Adult  Goal: Minimal or absence of nausea and vomiting  Outcome: Progressing  Goal: Maintains or returns to

## 2023-03-16 NOTE — PROGRESS NOTES
congestion in his chest.  Cultures from bronchial aspirate growing MRSA. Pending bilateral thoracentesis today      Objective: Intake/Output Summary (Last 24 hours) at 3/16/2023 1047  Last data filed at 3/15/2023 1745  Gross per 24 hour   Intake 1282 ml   Output 2300 ml   Net -1018 ml        Vitals:   Vitals:    03/16/23 0945   BP: 125/74   Pulse: 98   Resp: 15   Temp: 99 °F (37.2 °C)   SpO2: 96%       Physical Exam:     General: NAD  Eyes: EOMI  ENT: Chey Ba in place  Cardiovascular: Regular  Respiratory: Bilateral ronchi  Gastrointestinal: Soft, non tender, PEG tube in place  Genitourinary: no suprapubic tenderness  Musculoskeletal: No edema  Skin: warm, dry  Neuro: Alert. Psych: Mood appropriate.      Medications:   Medications:    epoetin jessenia-epbx  8,000 Units IntraVENous Once per day on Mon Wed Fri    vancomycin (VANCOCIN) intermittent dosing (placeholder)   Other RX Placeholder    miconazole   Topical BID    scopolamine  1 patch TransDERmal Q72H    midodrine  10 mg Oral TID WC    amiodarone  200 mg Oral Daily    heparin (porcine)  5,000 Units SubCUTAneous 3 times per day    insulin lispro  0-4 Units SubCUTAneous Q6H    sodium chloride flush  5-40 mL IntraVENous 2 times per day    aspirin  81 mg Per NG tube Daily    atorvastatin  40 mg Oral Nightly    insulin glargine  10 Units SubCUTAneous Nightly    lansoprazole  30 mg Oral QAM AC    levothyroxine  100 mcg Per G Tube Daily    metoprolol tartrate  25 mg Per G Tube BID    QUEtiapine  50 mg Oral Nightly    traZODone  100 mg Oral Nightly    acetylcysteine  600 mg Inhalation BID      Infusions:    dextrose      sodium chloride 40 mL/hr at 03/14/23 1059     PRN Meds: ipratropium-albuterol, 1 ampule, Q4H PRN  potassium chloride, 40 mEq, PRN   Or  potassium alternative oral replacement, 40 mEq, PRN   Or  potassium chloride, 10 mEq, PRN  magnesium sulfate, 2,000 mg, PRN  glucose, 4 tablet, PRN  dextrose bolus, 125 mL, PRN   Or  dextrose bolus, 250 mL, PRN  glucagon (rDNA), 1 mg, PRN  dextrose, 1,000 mL, Continuous PRN  sodium chloride flush, 5-40 mL, PRN  sodium chloride, , PRN  ondansetron, 4 mg, Q8H PRN   Or  ondansetron, 4 mg, Q6H PRN  polyethylene glycol, 17 g, Daily PRN  acetaminophen, 650 mg, Q6H PRN   Or  acetaminophen, 650 mg, Q6H PRN  sennosides-docusate sodium, 2 tablet, Daily PRN      Labs      Recent Results (from the past 24 hour(s))   POCT Glucose    Collection Time: 03/15/23 12:25 PM   Result Value Ref Range    POC Glucose 161 (H) 70 - 99 MG/DL   POCT Glucose    Collection Time: 03/15/23  5:32 PM   Result Value Ref Range    POC Glucose 180 (H) 70 - 99 MG/DL   POCT Glucose    Collection Time: 03/15/23  9:58 PM   Result Value Ref Range    POC Glucose 184 (H) 70 - 99 MG/DL   POCT Glucose    Collection Time: 03/16/23  7:48 AM   Result Value Ref Range    POC Glucose 213 (H) 70 - 99 MG/DL   Protime/INR & PTT    Collection Time: 03/16/23  9:34 AM   Result Value Ref Range    Protime 12.4 11.7 - 14.5 SECONDS    INR 0.96 INDEX    aPTT 33.1 25.1 - 37.1 SECONDS   POCT Glucose    Collection Time: 03/16/23  9:47 AM   Result Value Ref Range    POC Glucose 203 (H) 70 - 99 MG/DL        Imaging/Diagnostics Last 24 Hours   No results found.     Electronically signed by Bela Mason MD on 3/16/2023 at 10:47 AM

## 2023-03-16 NOTE — PROGRESS NOTES
Physical Therapy  Attempted to see pt for PT/OT tone. RN requested therapy hold this morning due to pt going for a thoracentesis. Will re-attempt this afternoon.

## 2023-03-16 NOTE — PROGRESS NOTES
Glenroy Drake, 1950, 2003/2003-A, 3/16/2023    Attempted OT/PT evals this AM, RN requests therapy hold as pt will be heading LO for thoracentesis later this morning. Updated CM Beba. Plan to re-attempt as able and appropriate this afternoon.     Florence Mendez, OTR/L  3/16/2023, 9:28 AM

## 2023-03-16 NOTE — CONSULTS
PHARMACY ANTICOAGULATION MONITORING SERVICE    Jossue Flowers is a 68 y.o. male on warfarin therapy for A fib. Pharmacy consulted by Dr. Messi Myers for monitoring and adjustment of treatment. Indication for anticoagulation: A fib  INR goal: 2-3  Warfarin dose prior to admission: 1 mg daily? Pertinent Laboratory Values   Recent Labs     03/14/23  0821 03/15/23  0815 03/16/23  0934   INR  --   --  0.96   HGB 8.5* 8.5*  --    HCT 26.0* 25.9*  --     187  --        Assessment/Plan:  Drug Interactions: Amiodarone, aspirin  Continuing sq hepatin for DVT prophylaxis  INR 0.96 today- hgb stable  Past warfarin dosing unclear- dosing ranged from 0.5 to 2 mg daily with frequent supra therapeutic INRs. Will start warfarin 1mg daily   Pharmacy will continue to monitor and adjust warfarin therapy as indicated    Thank you for the consult.   Asha Jenkins, [facility]  3/16/2023 12:32 PM

## 2023-03-16 NOTE — ANESTHESIA POSTPROCEDURE EVALUATION
Department of Anesthesiology  Postprocedure Note    Patient: Enma Sanchez  MRN: [id number]  YOB: 1950  Date of evaluation: 3/16/2023      Procedure Summary     Date: 03/14/23 Room / Location: [address] [place] [address]    Anesthesia Start: 1059 Anesthesia Stop: 1127    Procedure: BRONCHOSCOPY with trach, no fluor needed (Bilateral) Diagnosis:       Pneumonia due to infectious organism, unspecified laterality, unspecified part of lung      (Pneumonia due to infectious organism, unspecified laterality, unspecified part of lung [J18.9])    Surgeons: Samantha Mccurdy MD Responsible Provider: Sabino Woo MD    Anesthesia Type: general ASA Status: 4          Anesthesia Type: No value filed.     Jay Phase I: 10  Jay Phase II:  10      Anesthesia Post Evaluation    Patient location during evaluation: bedside  Patient participation: complete - patient participated  Level of consciousness: awake and alert  Pain score: 0  Airway patency: patent  Nausea & Vomiting: no nausea and no vomiting  Complications: no  Cardiovascular status: hemodynamically stable  Respiratory status: acceptable, spontaneous ventilation and nonlabored ventilation (Trach collar -- 5L)  Hydration status: euvolemic

## 2023-03-16 NOTE — PROGRESS NOTES
pulmonary      SUBJECTIVE:  no change and remains on trach collar     OBJECTIVE    VITALS:  /72   Pulse 89   Temp 98 °F (36.7 °C) (Oral)   Resp 25   Ht 6' 2\" (1.88 m)   Wt 281 lb 12 oz (127.8 kg)   SpO2 96%   BMI 36.17 kg/m²   HEAD AND FACE EXAM:  No throat injection, no active exudate,no thrush  NECK EXAM;No JVD, no masses, symmetrical  CHEST EXAM; Expansion equal and symmetrical, no masses  LUNG EXAM; Good breath sounds bilaterally. There are expiratory wheezes both lungs, there are crackles at both lung bases  CARDIOVASCULAR EXAM: Positive S1 and S2, no S3 or S4, no clicks ,no murmurs  RIGHT AND LEFT LOWER EXTRIMITY EXAM: No edema, no swelling,           LABS   Lab Results   Component Value Date    WBC 6.4 03/15/2023    HGB 8.5 (L) 03/15/2023    HCT 25.9 (L) 03/15/2023    .7 (H) 03/15/2023     03/15/2023     Lab Results   Component Value Date    CREATININE 4.7 (H) 03/15/2023    BUN 50 (H) 03/15/2023     (L) 03/15/2023    K 4.6 03/15/2023    CL 90 (L) 03/15/2023    CO2 27 03/15/2023     Lab Results   Component Value Date    INR 2.37 03/08/2023    INR 2.36 03/08/2023    PROTIME 30.9 (H) 03/08/2023    PROTIME 30.7 (H) 03/08/2023          Lab Results   Component Value Date/Time    PHOS 2.9 03/08/2023 01:53 PM    PHOS 4.2 03/07/2023 06:32 AM    PHOS 3.7 02/15/2023 03:15 AM      No results for input(s): PH, PO2ART, BLC5NEA, HCO3, BEART, O2SAT in the last 72 hours.       Wt Readings from Last 3 Encounters:   03/12/23 281 lb 12 oz (127.8 kg)   02/24/23 274 lb 11.1 oz (124.6 kg)   02/17/23 289 lb 3.9 oz (131.2 kg)               ASSESMENT  Ac on ch resp failure  Pulm atx sec to mucus plugging,s/p bronch and cleanout second time  Pneumonia  Copd  [address] pl effusion        PLAN  Cpm  Cont o2  Ordered georgi pl taps    3/16/2023  Concepción Perea MD, MCHRISTINA.

## 2023-03-16 NOTE — OR NURSING
PROCEDURE PERFORMED Right Thoracentesis-  Pt refused Left Thora at this [id number]    INFORMED CONSENT:  Obtained prior to procedure. Consent placed in chart. BARRIER PRECAUTIONS & STERILE TECHNIQUE:               Pt positioned for comfort. Warm blankets given. Pt placed on Cardiac/Vital Monitor. Pt prepped and draped in a sterile fashion with chlorhexadine.     PAIN/LOCAL ANESTHESIA/SEDATION MANAGEMENT:           Local: Lidocaine 1% given by             INTRAOPERATIVE:           ACCESS TIME: 2647 with one step right side                                                US/FLUORO: 3  US Images          STERILE DRESSINGS: Guaze and tegaderm    SPECIMENS: 600 cc clear red pleural fluid sent to lab,600 cc total - right    FOLLOW- UP X-RAY: Ordered Stat    REPORT CALLED TO: ROBBIE FINLEY

## 2023-03-16 NOTE — PROGRESS NOTES
Physical Therapy  [facility] ACUTE CARE PHYSICAL THERAPY EVALUATION  Susan Nieto, 1950, 2003/2003-A, 3/16/2023    History  [place]:  The primary encounter diagnosis was Acute respiratory failure with hypoxemia ([address] Diagnoses of HCAP (healthcare-associated pneumonia), ESRD (end stage renal disease) ([address] and Tracheostomy in place [facility]) were also pertinent to this visit. 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, COPD (chronic obstructive pulmonary disease) ([address] Depression, Diabetes mellitus ([address] Dupuytren's contracture of hand, Family history of cardiovascular disease, GERD (gastroesophageal reflux disease), H/O cardiac catheterization, H/O cardiac catheterization, H/O cardiovascular stress test, H/O cardiovascular stress test, H/O cardiovascular stress test, H/O cardiovascular stress test, H/O Doppler ultrasound, H/O echocardiogram, H/O echocardiogram, H/O echocardiogram, H/O hiatal hernia, Hx of Venous Doppler, Hyperlipidemia, Hypertension, Obesity, S/P PTCA (percutaneous transluminal coronary angioplasty), Sleep apnea, and Thyroid disease. Patient  has a past surgical history that includes Coronary angioplasty with stent (03/21/2005); Cardiac catheterization (6/12/08); Cardiac catheterization (3/07); Cardiac catheterization (3/05); Endoscopy, colon, diagnostic (2014); Colonoscopy (2011); Colonoscopy (5/18/16); Hand surgery (Right, 1997); pr optx ankle dislocation w/repair/int/xtrnl fixj (Right, 6/3/2019); Sternum Debridement (N/A, 9/24/2022); Coronary artery bypass graft (N/A, 9/22/2022); IR TUNNELED CVC PLACE WO SQ PORT/PUMP > 5 YEARS (2/14/2023); bronchoscopy (N/A, 2/21/2023); and bronchoscopy (Bilateral, 3/14/2023).     Subjective:  Patient states:  \"I'm going to need a box of tissues\"   Pain:   denies   Communication with other providers:  Handoff to RN, co-eval with Payton CORNELL   Restrictions: contact precautions     Home Setup/Prior level of function  Lives With: Alone  Type of Home: House  Home Layout: One level  Home Access: Stairs to enter without rails (holds onto door molding)  Entrance Stairs - Number of Steps: 2  Bathroom Shower/Tub: Tub/Shower unit  Bathroom Toilet: Standard  Bathroom Equipment: Grab bars in shower, Shower chair  Home Equipment: Rolling walker, Wheelchair  ADL Assistance: Independent  Homemaking Assistance: Independent  Homemaking Responsibilities: Yes  Ambulation Assistance: Independent (used no AD)  Transfer Assistance: Independent  Active : Yes  Occupation: Retired  Additional Comments: Above information is for prior to pt's tracheostomy placement in September, 2022. Pt has been at [facility] for rehab for several months and has had a large functional decline. Per charting pt/family in transition to LTC. Examination of body systems (includes body structures/functions, activity/participation limitations):  Observation:  Supine in bed upon arrival. Cooperative with therapy to his tolerance. Dependent on PEG and trach. [facility]   Hearing:  WFL  Cardiopulmonary:  stable vitals on 5L O2 through trach mask. Reported dizziness with supine to sit. /80 sitting on EOB. Musculoskeletal  ROM R/L:  General tightness to all LE joints from prolonged immobility.    Strength R/L:  bilat hips 2-/5, knees and ankles 3-/5  Neuro:  diminished sensation to bilat hands and feet     Mobility/treatment:   Rolling L/R:   maxA x 2 for full roll to the left, maxA x 1 for partial roll to the right   Supine to sit:  maxA x 2 for advancement of BLEs, hip scooting, and uprighting trunk   Sit to supine: maxA x 2 for bilat LE advancement and guidance of trunk  Scooting: fwd to EOB maxA x  2 for hip advancement and trunk stability; supine to HOB dep x 2   Transfers: not safe to attempt this date, recommend taylor at this

## 2023-03-16 NOTE — PROGRESS NOTES
[address] [facility]  consulted by Dr. Jake Winchester for monitoring and adjustment. Indication for treatment: Vancomycin indication: HAP  Goal trough: Trough Goal: 15-20 mcg/mL  AUC/MT: 400-600    Risk Factors for MRSA Identified:   Documented isolation of MRSA within 1 year     Pertinent Laboratory Values:   Temp Readings from Last 3 Encounters:   03/16/23 98 °F (36.7 °C) (Oral)   02/24/23 98.2 °F (36.8 °C) (Oral)   02/17/23 97.5 °F (36.4 °C) (Oral)     Recent Labs     03/14/23  0821 03/15/23  0815   WBC 6.1 6.4       Recent Labs     03/14/23  0821 03/15/23  0815   BUN 40* 50*   CREATININE 3.8* 4.7*       Estimated Creatinine Clearance: 20 mL/min (A) (based on SCr of 4.7 mg/dL (H)). Intake/Output Summary (Last 24 hours) at 3/16/2023 1259  Last data filed at 3/16/2023 1115  Gross per 24 hour   Intake 982 ml   Output 1 ml   Net 981 ml         Pertinent Cultures:   Date    Source    Results  03/14/23  Bronchial Culture  MRSA  03/15/23  Urine Antigens   To be collected    Vancomycin level:   TROUGH:  No results for input(s): VANCOTROUGH in the last 72 hours. RANDOM:  No results for input(s): VANCORANDOM in the last 72 hours. Assessment:  HPI: Pneumonia, patient with chronic trach, bronchial washing with MRSA  SCr, BUN, and urine output: ESRD on HD Monday, Wednesday, Friday  Day(s) of therapy: 2 of 7  Vancomycin concentration:   3/17/23 @0600    Plan:  Patient received vancomycin IV 2000mg 3/15- next HD planned for 3/17. Will obtain pre HD level and redose vanco post dialysis if necessary. Pharmacy will continue to monitor patient and adjust therapy as indicated    Kedar 3 03/17/2023 @0600    Thank you for the consult.   Mary Chicas, [address] [place] [address]  3/16/2023 12:59 PM

## 2023-03-17 LAB
ANION GAP SERPL CALCULATED.3IONS-SCNC: 11 MMOL/L (ref 4–16)
BUN SERPL-MCNC: 39 MG/DL (ref 6–23)
CALCIUM SERPL-MCNC: 9.4 MG/DL (ref 8.3–10.6)
CHLORIDE BLD-SCNC: 93 MMOL/L (ref 99–110)
CO2: 24 MMOL/L (ref 21–32)
CREAT SERPL-MCNC: 4.1 MG/DL (ref 0.9–1.3)
DOSE AMOUNT: NORMAL
DOSE TIME: NORMAL
GFR SERPL CREATININE-BSD FRML MDRD: 15 ML/MIN/1.73M2
GLUCOSE BLD-MCNC: 182 MG/DL (ref 70–99)
GLUCOSE BLD-MCNC: 190 MG/DL (ref 70–99)
GLUCOSE BLD-MCNC: 192 MG/DL (ref 70–99)
GLUCOSE BLD-MCNC: 201 MG/DL (ref 70–99)
GLUCOSE BLD-MCNC: 207 MG/DL (ref 70–99)
GLUCOSE SERPL-MCNC: 170 MG/DL (ref 70–99)
HCT VFR BLD CALC: 25.4 % (ref 42–52)
HEMOGLOBIN: 8.4 GM/DL (ref 13.5–18)
INR BLD: 0.98 INDEX
L PNEUMO AG UR QL IA: NEGATIVE
MCH RBC QN AUTO: 36.7 PG (ref 27–31)
MCHC RBC AUTO-ENTMCNC: 33.1 % (ref 32–36)
MCV RBC AUTO: 110.9 FL (ref 78–100)
PDW BLD-RTO: 15.7 % (ref 11.7–14.9)
PLATELET # BLD: 201 K/CU MM (ref 140–440)
PMV BLD AUTO: 9 FL (ref 7.5–11.1)
POTASSIUM SERPL-SCNC: 4.2 MMOL/L (ref 3.5–5.1)
PROTHROMBIN TIME: 12.6 SECONDS (ref 11.7–14.5)
RBC # BLD: 2.29 M/CU MM (ref 4.6–6.2)
S PNEUM AG CSF QL: NORMAL
SODIUM BLD-SCNC: 128 MMOL/L (ref 135–145)
VANCOMYCIN RANDOM: 27 UG/ML
WBC # BLD: 5.9 K/CU MM (ref 4–10.5)

## 2023-03-17 PROCEDURE — 6360000002 HC RX W HCPCS

## 2023-03-17 PROCEDURE — 6370000000 HC RX 637 (ALT 250 FOR IP)

## 2023-03-17 PROCEDURE — 80048 BASIC METABOLIC PNL TOTAL CA: CPT

## 2023-03-17 PROCEDURE — 85610 PROTHROMBIN TIME: CPT

## 2023-03-17 PROCEDURE — 2700000000 HC OXYGEN THERAPY PER DAY

## 2023-03-17 PROCEDURE — 80202 ASSAY OF VANCOMYCIN: CPT

## 2023-03-17 PROCEDURE — 6370000000 HC RX 637 (ALT 250 FOR IP): Performed by: STUDENT IN AN ORGANIZED HEALTH CARE EDUCATION/TRAINING PROGRAM

## 2023-03-17 PROCEDURE — 6360000002 HC RX W HCPCS: Performed by: INTERNAL MEDICINE

## 2023-03-17 PROCEDURE — 6370000000 HC RX 637 (ALT 250 FOR IP): Performed by: INTERNAL MEDICINE

## 2023-03-17 PROCEDURE — 2580000003 HC RX 258: Performed by: INTERNAL MEDICINE

## 2023-03-17 PROCEDURE — 82962 GLUCOSE BLOOD TEST: CPT

## 2023-03-17 PROCEDURE — 94761 N-INVAS EAR/PLS OXIMETRY MLT: CPT

## 2023-03-17 PROCEDURE — 85027 COMPLETE CBC AUTOMATED: CPT

## 2023-03-17 PROCEDURE — 99213 OFFICE O/P EST LOW 20 MIN: CPT

## 2023-03-17 PROCEDURE — 99233 SBSQ HOSP IP/OBS HIGH 50: CPT | Performed by: INTERNAL MEDICINE

## 2023-03-17 PROCEDURE — 94640 AIRWAY INHALATION TREATMENT: CPT

## 2023-03-17 PROCEDURE — 2060000000 HC ICU INTERMEDIATE R&B

## 2023-03-17 PROCEDURE — 36415 COLL VENOUS BLD VENIPUNCTURE: CPT

## 2023-03-17 PROCEDURE — 90935 HEMODIALYSIS ONE EVALUATION: CPT

## 2023-03-17 PROCEDURE — 6370000000 HC RX 637 (ALT 250 FOR IP): Performed by: FAMILY MEDICINE

## 2023-03-17 RX ORDER — LANOLIN ALCOHOL/MO/W.PET/CERES
3 CREAM (GRAM) TOPICAL ONCE
Status: DISCONTINUED | OUTPATIENT
Start: 2023-03-17 | End: 2023-03-21 | Stop reason: HOSPADM

## 2023-03-17 RX ADMIN — HEPARIN SODIUM 5000 UNITS: 5000 INJECTION INTRAVENOUS; SUBCUTANEOUS at 12:46

## 2023-03-17 RX ADMIN — METOPROLOL TARTRATE 25 MG: 25 TABLET, FILM COATED ORAL at 12:46

## 2023-03-17 RX ADMIN — HEPARIN SODIUM 5000 UNITS: 5000 INJECTION INTRAVENOUS; SUBCUTANEOUS at 06:44

## 2023-03-17 RX ADMIN — ASPIRIN 81 MG CHEWABLE TABLET 81 MG: 81 TABLET CHEWABLE at 12:46

## 2023-03-17 RX ADMIN — HEPARIN SODIUM 5000 UNITS: 5000 INJECTION INTRAVENOUS; SUBCUTANEOUS at 21:52

## 2023-03-17 RX ADMIN — MIDODRINE HYDROCHLORIDE 10 MG: 5 TABLET ORAL at 12:46

## 2023-03-17 RX ADMIN — ATORVASTATIN CALCIUM 40 MG: 40 TABLET, FILM COATED ORAL at 21:52

## 2023-03-17 RX ADMIN — CEFEPIME 1000 MG: 1 INJECTION, POWDER, FOR SOLUTION INTRAMUSCULAR; INTRAVENOUS at 13:05

## 2023-03-17 RX ADMIN — SODIUM CHLORIDE, PRESERVATIVE FREE 10 ML: 5 INJECTION INTRAVENOUS at 22:03

## 2023-03-17 RX ADMIN — WARFARIN SODIUM 1 MG: 1 TABLET ORAL at 17:55

## 2023-03-17 RX ADMIN — AMIODARONE HYDROCHLORIDE 200 MG: 200 TABLET ORAL at 12:47

## 2023-03-17 RX ADMIN — SODIUM CHLORIDE 25 ML: 9 INJECTION, SOLUTION INTRAVENOUS at 13:04

## 2023-03-17 RX ADMIN — SODIUM CHLORIDE, PRESERVATIVE FREE 10 ML: 5 INJECTION INTRAVENOUS at 12:45

## 2023-03-17 RX ADMIN — QUETIAPINE FUMARATE 50 MG: 25 TABLET ORAL at 21:53

## 2023-03-17 RX ADMIN — IPRATROPIUM BROMIDE AND ALBUTEROL SULFATE 1 AMPULE: 2.5; .5 SOLUTION RESPIRATORY (INHALATION) at 19:08

## 2023-03-17 RX ADMIN — MIDODRINE HYDROCHLORIDE 10 MG: 5 TABLET ORAL at 17:55

## 2023-03-17 RX ADMIN — HEPARIN SODIUM 5000 UNITS: 5000 INJECTION INTRAVENOUS; SUBCUTANEOUS at 06:45

## 2023-03-17 RX ADMIN — LANSOPRAZOLE 30 MG: KIT at 12:46

## 2023-03-17 RX ADMIN — TRAZODONE HYDROCHLORIDE 100 MG: 50 TABLET ORAL at 21:52

## 2023-03-17 RX ADMIN — ACETYLCYSTEINE 600 MG: 200 SOLUTION ORAL; RESPIRATORY (INHALATION) at 19:08

## 2023-03-17 RX ADMIN — LEVOTHYROXINE SODIUM 100 MCG: 0.1 TABLET ORAL at 06:44

## 2023-03-17 RX ADMIN — INSULIN GLARGINE 10 UNITS: 100 INJECTION, SOLUTION SUBCUTANEOUS at 21:54

## 2023-03-17 RX ADMIN — MICONAZOLE NITRATE: 2 POWDER TOPICAL at 12:47

## 2023-03-17 RX ADMIN — INSULIN LISPRO 1 UNITS: 100 INJECTION, SOLUTION INTRAVENOUS; SUBCUTANEOUS at 12:47

## 2023-03-17 RX ADMIN — MICONAZOLE NITRATE: 2 POWDER TOPICAL at 00:01

## 2023-03-17 RX ADMIN — METOPROLOL TARTRATE 25 MG: 25 TABLET, FILM COATED ORAL at 21:52

## 2023-03-17 RX ADMIN — MICONAZOLE NITRATE: 2 POWDER TOPICAL at 21:53

## 2023-03-17 ASSESSMENT — PAIN SCALES - GENERAL: PAINLEVEL_OUTOF10: 0

## 2023-03-17 ASSESSMENT — PAIN SCALES - WONG BAKER: WONGBAKER_NUMERICALRESPONSE: 0

## 2023-03-17 NOTE — PROGRESS NOTES
V2.0  [facility] [place]      Name:  Elvie Bence /Age/Sex: 1950  (68 y.o. male)   MRN & CSN:  [id number] & [id number] Encounter Date/Time: 3/17/2023 1:32 PM EDT    Location:  -A PCP: Corin Barnett, [address] [facility] Day: 13    Assessment and Plan:   Elvie Bence is a 68 y.o. male  who presents with Healthcare-associated pneumonia    1. Pneumonia   -On trach  -With possible parapneumonic effusion  -Increased congestion and sputum production   -History of multiple admissions for aspiration pneumonia secondary to dysphagia   -S/p thora 2023 more suggestive of transudate effusion (no serum LDH, protein to compare though)  -S/p bronchoscopy 2023 trachea full of thick secretions, large mucus plug right lung, marked inflammation/swelling bronchi, BAL obtained only AFP checked negative no other culture found. -CXR 3/5 R pleural effusion with right basilar opacity suggesting atelectasis or infection. -S/p thora 3/8 exudative pleural effusion; awaiting gram stain   negative resp PCR. Tracheal aspirate culture negative. No leukocytosis, normal lactic acid level. - low suspicion for PNA; Thora culture negative for growth at 72hours. IV cefepime has thus been discontinued. Likely etiology of hypoxia is the mucous plugging noted on the chest CT.  -Continue pulmonary toilet  -Pulmonology consulted. Status post bronchoscopy 3/14/2023. Bronchial aspirate growing MRSA, Pseudomonas and Candida albicans. Sensitivities pending. ID consulted  -Repeat chest x-ray showed bilateral pleural effusions. Status post thoracentesis for right pleural effusion with removal of 600 cc of fluid. Labs pending    2. A-fib with RVR   -On warfarin and metoprolol 25 mg twice daily  - Continue metoprolol with holding parameters  - consulted cardiology given soft blood pressures. Started on amiodarone  -Resumed warfarin    3.  Elevated troponin, likely in the setting of demand ipratropium-albuterol, 1 ampule, Q4H PRN  potassium chloride, 40 mEq, PRN   Or  potassium alternative oral replacement, 40 mEq, PRN   Or  potassium chloride, 10 mEq, PRN  magnesium sulfate, 2,000 mg, PRN  glucose, 4 tablet, PRN  dextrose bolus, 125 mL, PRN   Or  dextrose bolus, 250 mL, PRN  glucagon (rDNA), 1 mg, PRN  dextrose, 1,000 mL, Continuous PRN  sodium chloride flush, 5-40 mL, PRN  sodium chloride, , PRN  ondansetron, 4 mg, Q8H PRN   Or  ondansetron, 4 mg, Q6H PRN  polyethylene glycol, 17 g, Daily PRN  acetaminophen, 650 mg, Q6H PRN   Or  acetaminophen, 650 mg, Q6H PRN  sennosides-docusate sodium, 2 tablet, Daily PRN      Labs      Recent Results (from the past 24 hour(s))   POCT Glucose    Collection Time: 03/16/23  7:48 AM   Result Value Ref Range    POC Glucose 213 (H) 70 - 99 MG/DL   Protime/INR & PTT    Collection Time: 03/16/23  9:34 AM   Result Value Ref Range    Protime 12.4 11.7 - 14.5 SECONDS    INR 0.96 INDEX    aPTT 33.1 25.1 - 37.1 SECONDS   POCT Glucose    Collection Time: 03/16/23  9:47 AM   Result Value Ref Range    POC Glucose 203 (H) 70 - 99 MG/DL   PLEURAL/PERITONEAL FLUID PANEL    Collection Time: 03/16/23 11:35 AM   Result Value Ref Range    Glucose, Fluid 199 >60 MG/DL    LD, Fluid 70 <200 IU/L    Protein, Fluid 2.7 G/DL    Fluid Type PLEURAL FLUID INDEX    RBC, Fluid 11,000 /CU MM    WBC, Fluid 376 /CU MM    Neutrophil Count, Fluid 13% %    Lymphocytes, Body Fluid 44% %    Monocyte Count, Fluid 36 % MONO/MACROPHAGE %    Mesothelial, Fluid 7% /100 WBC   POCT Glucose    Collection Time: 03/16/23 12:13 PM   Result Value Ref Range    POC Glucose 186 (H) 70 - 99 MG/DL   POCT Glucose    Collection Time: 03/16/23  4:49 PM   Result Value Ref Range    POC Glucose 161 (H) 70 - 99 MG/DL   POCT Glucose    Collection Time: 03/16/23  7:46 PM   Result Value Ref Range    POC Glucose 149 (H) 70 - 99 MG/DL   POCT Glucose    Collection Time: 03/17/23  1:10 AM   Result Value Ref Range    POC Glucose 182

## 2023-03-17 NOTE — PROGRESS NOTES
PHARMACY ANTICOAGULATION MONITORING SERVICE    Byron Madison is a 68 y.o. male on warfarin therapy for A fib. Pharmacy consulted by Dr. Ally Marquez for monitoring and adjustment of treatment. Indication for anticoagulation: A fib  INR goal: 2-3  Warfarin dose prior to admission: 1 mg daily? Pertinent Laboratory Values   Recent Labs     03/15/23  0815 03/16/23  0934 03/17/23  0212   INR  --  0.96  --    HGB 8.5*  --  8.4*   HCT 25.9*  --  25.4*     --  201         Assessment/Plan:  Drug Interactions: Amiodarone, aspirin  Continuing sq hepatin for DVT prophylaxis  INR 0.96 yesterday and patient received warfarin 1mg. INR not drawn this AM- order placed for repeat. Will follow. Okay to continue warfarin 1mg daily  Past warfarin dosing unclear- dosing ranged from 0.5 to 2 mg daily with frequent supra therapeutic INRs. Pharmacy will continue to monitor and adjust warfarin therapy as indicated    Thank you for the consult.   DIOGO Reyna [facility]  3/17/2023 11:05 AM

## 2023-03-17 NOTE — CONSULTS
[facility])    Healthcare-associated pneumonia    Acute respiratory failure with hypoxemia (HCC)       Measurements:  Wound 12/12/22 Buttocks intergluteal cleft (Active)   Wound Image   03/17/23 1430   Wound Etiology Pressure Stage 2 03/17/23 1430   Dressing Status New dressing applied 03/17/23 1430   Wound Cleansed Cleansed with saline 03/17/23 1430   Dressing/Treatment Silicone border;Moisture barrier 03/17/23 1430   Wound Length (cm) 2 cm 03/17/23 1430   Wound Width (cm) 0.2 cm 03/17/23 1430   Wound Depth (cm) 0.1 cm 03/17/23 1430   Wound Surface Area (cm^2) 0.4 cm^2 03/17/23 1430   Change in Wound Size % (l*w) 80 03/17/23 1430   Wound Volume (cm^3) 0.04 cm^3 03/17/23 1430   Distance Tunneling (cm) 0 cm 03/17/23 1430   Tunneling Position ___ O'Clock 0 03/17/23 1430   Undermining Starts ___ O'Clock 0 03/17/23 1430   Undermining Ends___ O'Clock 0 03/17/23 1430   Undermining Maxium Distance (cm) 0 03/17/23 1430   Wound Assessment Pink/red 03/17/23 1430   Drainage Amount Scant 03/17/23 1430   Drainage Description Serosanguinous 03/17/23 1430   Odor None 03/17/23 1430   Cat-wound Assessment Blanchable erythema 03/17/23 1430   Margins Defined edges 03/17/23 1430   Wound Thickness Description not for Pressure Injury Partial thickness 03/17/23 1430   Number of days: 95       Wound 01/05/23 Foot Left;Plantar cluster (Active)   Wound Image   03/17/23 1430   Wound Etiology Pressure Unstageable 03/17/23 1430   Dressing Status Other (Comment) 03/17/23 1430   Wound Cleansed Cleansed with saline 03/17/23 1430   Dressing/Treatment Open to air 03/17/23 1430   Wound Length (cm) 10 cm 03/17/23 1430   Wound Width (cm) 3 cm 03/17/23 1430   Wound Depth (cm) 0.1 cm 03/17/23 1430   Wound Surface Area (cm^2) 30 cm^2 03/17/23 1430   Change in Wound Size % (l*w) -50 03/17/23 1430   Wound Volume (cm^3) 3 cm^3 03/17/23 1430   Wound Healing % -50 03/17/23 1430   Distance Tunneling (cm) 0 cm 03/17/23 1430   Tunneling Position ___ O'Clock 0 03/17/23 cluster of dry eschar/purple areas. Possible diabetic/pressure related. Recommend to keep JAVY and pressure off. Left heel with DTI. Right heel intact. Groin/perineum/posterior thighs with erythema and macerated. MASD with possible candidiasis. Already has Micotin powder ordered. Incontinent of stool. Cat care done. Intergluteal cleft with wound. Stage 2/MASD. Recommend silicone foam border to upper sacrum and moisture barrier cream to lower sacrum/perirectal area. Applied. Bed pad changed. Turned to right side with heels floated. Pictures and measurements were taken. Pt is a high risk for skin breakdown AEB Jeremy. Follow Jeremy orders. Specialty Bed Required : yes  [x] Low Air Loss   [x] Pressure Redistribution  [] Fluid Immersion  [] Bariatric  [] Total Pressure Relief  [] Other:     Discharge Plan:  Placement for patient upon discharge: tbd  Hospice Care: no  Patient appropriate for Outpatient [address]: [facility]    Patient/Caregiver Teaching:  Level of patient/caregiver understanding able to:   Needs reinforcement.        Electronically signed by Cindy Araujo RN, CWOCN on 3/17/2023 at 3:19 PM

## 2023-03-17 NOTE — PROGRESS NOTES
I/O last 3 completed shifts: In: 2348 [NG/GT:2348]  Out: 1 [Blood:1]  I/O this shift:  In: 1031 [NG/GT:531]  Out: 3500   Vitals: /60   Pulse 100   Temp 97.4 °F (36.3 °C) (Oral)   Resp 18   Ht 6' 2\" (1.88 m)   Wt 284 lb 13.4 oz (129.2 kg)   SpO2 96%   BMI 36.57 kg/m²  {  General appearance: awake weak  HEENT: Head: Normal, normocephalic, atraumatic.   Neck: Positive trach  Lungs: diminished breath sounds bilaterally  Heart: S1, S2 normal  Abdomen: abnormal findings:  soft nt  Extremities: edema trace  Neurologic: Mental status: alertness: alert        Assessment and Plan:      IMP:  #1 end-stage renal disease on dialysis Monday Wednesday Friday  #2 possible pneumonia with respiratory secretion with trach  #3  Hyponatremia  #4 COPD likely pleural effusion  #5 type 2 diabetes  #6 status post CABG with complication requiring trach and PEG lives in a nursing home/depression    Plan  #1 doing dialysis today  #2 treat as pneumonia supportive care follow-up with recommendations  #3 correct sodium with dialysis  #4 status post thoracentesis follow culture  #5 monitor glucose  #6 supportive care maintain tube feeds plan on return to [facility] for rehab and dialysis once stable         Jordan Walls MD, MD

## 2023-03-17 NOTE — PROGRESS NOTES
70 600 vss Yes   03/17/23 1000 300 ml/min 1170 ml/hr -70 mmHg 80 70 600 new bicarb Yes   03/17/23 1015 300 ml/min 1170 ml/hr -60 mmHg 80 70 600 new acid Yes   03/17/23 1030 300 ml/min 1170 ml/hr -70 mmHg 90 60 600 resting quietly Yes   03/17/23 1045 300 ml/min 1170 ml/hr -70 mmHg 90 60 600 no change in condition Yes   03/17/23 1052 -- 1170 ml/hr -- -- -- -- txt completed Yes     Vital Signs  Patient Vitals for the past 8 hrs:   BP Temp Pulse Resp SpO2 Weight Weight Method Percent Weight Change   03/17/23 0330 -- -- 91 12 91 % -- -- --   03/17/23 0345 -- -- 83 18 92 % -- -- --   03/17/23 0400 (!) 116/55 98.8 °F (37.1 °C) 85 17 96 % -- -- --   03/17/23 0415 -- -- 84 15 93 % -- -- --   03/17/23 0430 -- -- 89 14 94 % -- -- --   03/17/23 0600 -- -- -- -- -- 282 lb 13.6 oz (128.3 kg) Bed scale 0   03/17/23 0750 132/64 97.3 °F (36.3 °C) 84 16 94 % 289 lb 14.5 oz (131.5 kg) Bed scale 2.49   03/17/23 0800 129/62 -- 92 16 -- -- -- --   03/17/23 0815 127/63 -- 89 18 -- -- -- --   03/17/23 0830 100/75 -- 100 19 -- -- -- --   03/17/23 0845 (!) 111/58 -- 98 25 -- -- -- --   03/17/23 0900 (!) 110/51 -- 92 10 -- -- -- --   03/17/23 0915 (!) 95/41 -- 96 15 -- -- -- --   03/17/23 0930 (!) 104/57 -- 92 10 -- -- -- --   03/17/23 0945 (!) 97/38 -- 90 16 -- -- -- --   03/17/23 1000 (!) 92/46 -- 98 20 -- -- -- --   03/17/23 1015 (!) 101/54 -- 87 16 -- -- -- --   03/17/23 1030 (!) 91/51 -- 97 21 -- -- -- --   03/17/23 1045 114/63 -- (!) 106 10 -- -- -- --   03/17/23 1052 (!) 111/54 97.1 °F (36.2 °C) 97 21 96 % 284 lb 13.4 oz (129.2 kg) Bed scale -1.75     Post-Dialysis  Arterial Catheter Locking Solution:  saline  Venous Catheter Locking Solution:  saline  Post-Treatment Procedures: Blood returned, Catheter Capped, clamped with Saline x2 ports  Machine Disinfection Process: Acid/Vinegar Clean, Heat Disinfect, Exterior Machine Disinfection  Rinseback Volume (ml): 300 ml  Blood Volume Processed (Liters): 52.1 l/min  Dialyzer Clearance:

## 2023-03-17 NOTE — PROGRESS NOTES
Notified patient was having issues with his PEG tube. Some suggestions were given to nursing. They switched out the pump and the garbage \"enfit\" connector and tube is working better at this time.     - will cancel consult, please reach out if further problems    Electronically signed by Jo Ann Esquead MD on 3/16/2023 at 8:20 PM

## 2023-03-17 NOTE — PROGRESS NOTES
NEGATIVE NEGATIVE   Strep Pneumoniae Antigen    Collection Time: 03/16/23  6:05 PM    Specimen: CSF   Result Value Ref Range    Strep pneumo Ag URINE NEGATIVE    POCT Glucose    Collection Time: 03/16/23  7:46 PM   Result Value Ref Range    POC Glucose 149 (H) 70 - 99 MG/DL   POCT Glucose    Collection Time: 03/17/23  1:10 AM   Result Value Ref Range    POC Glucose 182 (H) 70 - 99 MG/DL   Vancomycin Level, Random    Collection Time: 03/17/23  2:12 AM   Result Value Ref Range    Vancomycin Rm 27.0 UG/ML    DOSE AMOUNT DOSE AMT.  GIVEN - 2000 mg     DOSE TIME DOSE TIME GIVEN - 3/15/23 @ 1700    CBC    Collection Time: 03/17/23  2:12 AM   Result Value Ref Range    WBC 5.9 4.0 - 10.5 K/CU MM    RBC 2.29 (L) 4.6 - 6.2 M/CU MM    Hemoglobin 8.4 (L) 13.5 - 18.0 GM/DL    Hematocrit 25.4 (L) 42 - 52 %    .9 (H) 78 - 100 FL    MCH 36.7 (H) 27 - 31 PG    MCHC 33.1 32.0 - 36.0 %    RDW 15.7 (H) 11.7 - 14.9 %    Platelets 932 473 - 201 K/CU MM    MPV 9.0 7.5 - 11.1 FL   Basic Metabolic Panel    Collection Time: 03/17/23  2:12 AM   Result Value Ref Range    Sodium 128 (L) 135 - 145 MMOL/L    Potassium 4.2 3.5 - 5.1 MMOL/L    Chloride 93 (L) 99 - 110 mMol/L    CO2 24 21 - 32 MMOL/L    Anion Gap 11 4 - 16    BUN 39 (H) 6 - 23 MG/DL    Creatinine 4.1 (H) 0.9 - 1.3 MG/DL    Est, Glom Filt Rate 15 (L) >60 mL/min/1.73m2    Glucose 170 (H) 70 - 99 MG/DL    Calcium 9.4 8.3 - 10.6 MG/DL   POCT Glucose    Collection Time: 03/17/23  6:06 AM   Result Value Ref Range    POC Glucose 190 (H) 70 - 99 MG/DL   Protime-INR    Collection Time: 03/17/23 11:29 AM   Result Value Ref Range    Protime 12.6 11.7 - 14.5 SECONDS    INR 0.98 INDEX   POCT Glucose    Collection Time: 03/17/23 12:22 PM   Result Value Ref Range    POC Glucose 201 (H) 70 - 99 MG/DL     CULTURE results: Invalid input(s): BLOOD CULTURE,  URINE CULTURE, SURGICAL CULTURE    Diagnosis:  Patient Active Problem List   Diagnosis    Atrial fibrillation (HCC)    CAD (coronary artery disease)    Morbid obesity (HCC)    COPD (chronic obstructive pulmonary disease) (HCC)    Sleep apnea    Type 2 diabetes mellitus (HCC)    Thyroid disease    Hyperlipidemia    Acute congestive heart failure (HCC)    Coronary artery disease involving native coronary artery of native heart with angina pectoris (HCC)    Chronic renal disease, stage III ([address] [[id number]]    Hypertension    CAD, multiple vessel    Dyspnea    Moderate malnutrition (HCC)    Pneumonia of both lungs due to infectious organism    Severe malnutrition (HCC)    Chronic respiratory failure with hypoxia (HCC)    Aspiration pneumonia of right lower lobe (HCC)    ESRD on dialysis (HCC)    Aspiration pneumonia, unspecified aspiration pneumonia type, unspecified laterality, unspecified part of lung ([address]    Acute aspiration pneumonia ([address]    Acute on chronic respiratory failure with hypoxia ([address]    Healthcare-associated pneumonia    Acute respiratory failure with hypoxemia ([address]       Active Problems  Principal Problem:    Healthcare-associated pneumonia  Active Problems:    Acute respiratory failure with hypoxemia (HCC)  Resolved Problems:    * No resolved hospital problems.  *              Electronically signed by: Electronically signed by Osiel Paulson MD on 3/17/2023 at 2:15 PM

## 2023-03-17 NOTE — PROGRESS NOTES
pulmonary      SUBJECTIVE:  no sob     OBJECTIVE    VITALS:  BP (!) 116/55   Pulse 89   Temp 98.8 °F (37.1 °C) (Oral)   Resp 14   Ht 6' 2\" (1.88 m)   Wt 282 lb 13.6 oz (128.3 kg)   SpO2 94%   BMI 36.32 kg/m²   HEAD AND FACE EXAM:  No throat injection, no active exudate,no thrush  NECK EXAM;No JVD, no masses, symmetrical  CHEST EXAM; Expansion equal and symmetrical, no masses  LUNG EXAM; Good breath sounds bilaterally. There are expiratory wheezes both lungs, there are crackles at both lung bases  CARDIOVASCULAR EXAM: Positive S1 and S2, no S3 or S4, no clicks ,no murmurs  RIGHT AND LEFT LOWER EXTRIMITY EXAM: No edema, no swelling,           LABS   Lab Results   Component Value Date    WBC 6.4 03/15/2023    HGB 8.5 (L) 03/15/2023    HCT 25.9 (L) 03/15/2023    .7 (H) 03/15/2023     03/15/2023     Lab Results   Component Value Date    CREATININE 4.7 (H) 03/15/2023    BUN 50 (H) 03/15/2023     (L) 03/15/2023    K 4.6 03/15/2023    CL 90 (L) 03/15/2023    CO2 27 03/15/2023     Lab Results   Component Value Date    INR 0.96 03/16/2023    PROTIME 12.4 03/16/2023          Lab Results   Component Value Date/Time    PHOS 2.9 03/08/2023 01:53 PM    PHOS 4.2 03/07/2023 06:32 AM    PHOS 3.7 02/15/2023 03:15 AM      No results for input(s): PH, PO2ART, PVZ5RMP, HCO3, BEART, O2SAT in the last 72 hours. Wt Readings from Last 3 Encounters:   03/17/23 282 lb 13.6 oz (128.3 kg)   02/24/23 274 lb 11.1 oz (124.6 kg)   02/17/23 289 lb 3.9 oz (131.2 kg)        EXAMINATION:   ONE XRAY VIEW OF THE CHEST       3/16/2023 11:58 am       COMPARISON:   03/15/2023       HISTORY:   ORDERING SYSTEM PROVIDED HISTORY: post right thoracentesis   TECHNOLOGIST PROVIDED HISTORY:   Reason for exam:->post right thoracentesis   Reason for Exam: post right thoracentesis       FINDINGS:   Cardiomediastinal silhouette is stable. Similar position of devices. Similar pleural effusions and airspace opacities. No pneumothorax.

## 2023-03-18 LAB
CULTURE: ABNORMAL
GLUCOSE BLD-MCNC: 205 MG/DL (ref 70–99)
GLUCOSE BLD-MCNC: 206 MG/DL (ref 70–99)
GLUCOSE BLD-MCNC: 206 MG/DL (ref 70–99)
GLUCOSE BLD-MCNC: 228 MG/DL (ref 70–99)
GLUCOSE BLD-MCNC: 312 MG/DL (ref 70–99)
GRAM SMEAR: ABNORMAL
INR BLD: 0.98 INDEX
Lab: ABNORMAL
PROTHROMBIN TIME: 12.7 SECONDS (ref 11.7–14.5)
SPECIMEN: ABNORMAL

## 2023-03-18 PROCEDURE — 85610 PROTHROMBIN TIME: CPT

## 2023-03-18 PROCEDURE — 6370000000 HC RX 637 (ALT 250 FOR IP): Performed by: INTERNAL MEDICINE

## 2023-03-18 PROCEDURE — 2700000000 HC OXYGEN THERAPY PER DAY

## 2023-03-18 PROCEDURE — 82962 GLUCOSE BLOOD TEST: CPT

## 2023-03-18 PROCEDURE — 6360000002 HC RX W HCPCS

## 2023-03-18 PROCEDURE — 6370000000 HC RX 637 (ALT 250 FOR IP)

## 2023-03-18 PROCEDURE — 6370000000 HC RX 637 (ALT 250 FOR IP): Performed by: STUDENT IN AN ORGANIZED HEALTH CARE EDUCATION/TRAINING PROGRAM

## 2023-03-18 PROCEDURE — 2580000003 HC RX 258: Performed by: INTERNAL MEDICINE

## 2023-03-18 PROCEDURE — 2060000000 HC ICU INTERMEDIATE R&B

## 2023-03-18 PROCEDURE — 6360000002 HC RX W HCPCS: Performed by: INTERNAL MEDICINE

## 2023-03-18 PROCEDURE — 94640 AIRWAY INHALATION TREATMENT: CPT

## 2023-03-18 PROCEDURE — 97535 SELF CARE MNGMENT TRAINING: CPT

## 2023-03-18 PROCEDURE — 36415 COLL VENOUS BLD VENIPUNCTURE: CPT

## 2023-03-18 PROCEDURE — 6370000000 HC RX 637 (ALT 250 FOR IP): Performed by: FAMILY MEDICINE

## 2023-03-18 PROCEDURE — 97530 THERAPEUTIC ACTIVITIES: CPT

## 2023-03-18 PROCEDURE — 97112 NEUROMUSCULAR REEDUCATION: CPT

## 2023-03-18 PROCEDURE — 94761 N-INVAS EAR/PLS OXIMETRY MLT: CPT

## 2023-03-18 PROCEDURE — 89220 SPUTUM SPECIMEN COLLECTION: CPT

## 2023-03-18 RX ORDER — IPRATROPIUM BROMIDE AND ALBUTEROL SULFATE 2.5; .5 MG/3ML; MG/3ML
1 SOLUTION RESPIRATORY (INHALATION) 3 TIMES DAILY
Status: DISCONTINUED | OUTPATIENT
Start: 2023-03-18 | End: 2023-03-21 | Stop reason: HOSPADM

## 2023-03-18 RX ORDER — WARFARIN SODIUM 3 MG/1
1.5 TABLET ORAL DAILY
Status: DISCONTINUED | OUTPATIENT
Start: 2023-03-18 | End: 2023-03-20

## 2023-03-18 RX ADMIN — ATORVASTATIN CALCIUM 40 MG: 40 TABLET, FILM COATED ORAL at 21:48

## 2023-03-18 RX ADMIN — SODIUM CHLORIDE, PRESERVATIVE FREE 10 ML: 5 INJECTION INTRAVENOUS at 08:21

## 2023-03-18 RX ADMIN — IPRATROPIUM BROMIDE AND ALBUTEROL SULFATE 1 AMPULE: 2.5; .5 SOLUTION RESPIRATORY (INHALATION) at 14:49

## 2023-03-18 RX ADMIN — TRAZODONE HYDROCHLORIDE 100 MG: 50 TABLET ORAL at 21:48

## 2023-03-18 RX ADMIN — INSULIN LISPRO 1 UNITS: 100 INJECTION, SOLUTION INTRAVENOUS; SUBCUTANEOUS at 12:57

## 2023-03-18 RX ADMIN — INSULIN LISPRO 1 UNITS: 100 INJECTION, SOLUTION INTRAVENOUS; SUBCUTANEOUS at 01:57

## 2023-03-18 RX ADMIN — ACETYLCYSTEINE 600 MG: 200 SOLUTION ORAL; RESPIRATORY (INHALATION) at 18:57

## 2023-03-18 RX ADMIN — INSULIN GLARGINE 10 UNITS: 100 INJECTION, SOLUTION SUBCUTANEOUS at 21:49

## 2023-03-18 RX ADMIN — LEVOTHYROXINE SODIUM 100 MCG: 0.1 TABLET ORAL at 06:45

## 2023-03-18 RX ADMIN — AMIODARONE HYDROCHLORIDE 200 MG: 200 TABLET ORAL at 08:21

## 2023-03-18 RX ADMIN — ASPIRIN 81 MG CHEWABLE TABLET 81 MG: 81 TABLET CHEWABLE at 08:21

## 2023-03-18 RX ADMIN — CEFEPIME 1000 MG: 1 INJECTION, POWDER, FOR SOLUTION INTRAMUSCULAR; INTRAVENOUS at 14:20

## 2023-03-18 RX ADMIN — SODIUM CHLORIDE, PRESERVATIVE FREE 10 ML: 5 INJECTION INTRAVENOUS at 22:10

## 2023-03-18 RX ADMIN — IPRATROPIUM BROMIDE AND ALBUTEROL SULFATE 1 AMPULE: 2.5; .5 SOLUTION RESPIRATORY (INHALATION) at 18:57

## 2023-03-18 RX ADMIN — WARFARIN SODIUM 1.5 MG: 3 TABLET ORAL at 16:49

## 2023-03-18 RX ADMIN — HEPARIN SODIUM 5000 UNITS: 5000 INJECTION INTRAVENOUS; SUBCUTANEOUS at 06:45

## 2023-03-18 RX ADMIN — METOPROLOL TARTRATE 25 MG: 25 TABLET, FILM COATED ORAL at 08:21

## 2023-03-18 RX ADMIN — INSULIN LISPRO 1 UNITS: 100 INJECTION, SOLUTION INTRAVENOUS; SUBCUTANEOUS at 16:49

## 2023-03-18 RX ADMIN — INSULIN LISPRO 1 UNITS: 100 INJECTION, SOLUTION INTRAVENOUS; SUBCUTANEOUS at 06:48

## 2023-03-18 RX ADMIN — QUETIAPINE FUMARATE 50 MG: 25 TABLET ORAL at 21:48

## 2023-03-18 RX ADMIN — METOPROLOL TARTRATE 25 MG: 25 TABLET, FILM COATED ORAL at 21:48

## 2023-03-18 RX ADMIN — HEPARIN SODIUM 5000 UNITS: 5000 INJECTION INTRAVENOUS; SUBCUTANEOUS at 12:57

## 2023-03-18 RX ADMIN — HEPARIN SODIUM 5000 UNITS: 5000 INJECTION INTRAVENOUS; SUBCUTANEOUS at 21:48

## 2023-03-18 RX ADMIN — MIDODRINE HYDROCHLORIDE 10 MG: 5 TABLET ORAL at 12:57

## 2023-03-18 RX ADMIN — MIDODRINE HYDROCHLORIDE 10 MG: 5 TABLET ORAL at 08:21

## 2023-03-18 RX ADMIN — MICONAZOLE NITRATE: 2 POWDER TOPICAL at 21:28

## 2023-03-18 RX ADMIN — MICONAZOLE NITRATE: 2 POWDER TOPICAL at 08:22

## 2023-03-18 RX ADMIN — ACETYLCYSTEINE 600 MG: 200 SOLUTION ORAL; RESPIRATORY (INHALATION) at 14:49

## 2023-03-18 RX ADMIN — LANSOPRAZOLE 30 MG: KIT at 08:20

## 2023-03-18 ASSESSMENT — PAIN SCALES - WONG BAKER
WONGBAKER_NUMERICALRESPONSE: 0
WONGBAKER_NUMERICALRESPONSE: 0

## 2023-03-18 ASSESSMENT — PAIN SCALES - GENERAL
PAINLEVEL_OUTOF10: 0
PAINLEVEL_OUTOF10: 0

## 2023-03-18 NOTE — PLAN OF CARE
0200)  Verbalizes/displays adequate comfort level or baseline comfort level:   Encourage patient to monitor pain and request assistance   Assess pain using appropriate pain scale   Administer analgesics based on type and severity of pain and evaluate response   Implement non-pharmacological measures as appropriate and evaluate response   Consider cultural and social influences on pain and pain management   Notify Licensed Independent Practitioner if interventions unsuccessful or patient reports new pain     Problem: Respiratory - Adult  Goal: Achieves optimal ventilation and oxygenation  Outcome: Progressing  Flowsheets (Taken 3/15/2023 0806 by Eduar Otto RN)  Achieves optimal ventilation and oxygenation:   Assess for changes in respiratory status   Assess for changes in mentation and behavior   Position to facilitate oxygenation and minimize respiratory effort   Oxygen supplementation based on oxygen saturation or arterial blood gases   Initiate smoking cessation protocol as indicated   Encourage broncho-pulmonary hygiene including cough, deep breathe, incentive spirometry   Assess the need for suctioning and aspirate as needed   Assess and instruct to report shortness of breath or any respiratory difficulty   Respiratory therapy support as indicated     Problem: Neurosensory - Adult  Goal: Achieves stable or improved neurological status  Outcome: Progressing  Flowsheets (Taken 3/18/2023 0847)  Achieves stable or improved neurological status:   Assess for and report changes in neurological status   Initiate measures to prevent increased intracranial pressure   Maintain blood pressure and fluid volume within ordered parameters to optimize cerebral perfusion and minimize risk of hemorrhage   Monitor temperature, glucose, and sodium.  Initiate appropriate interventions as ordered  Goal: Achieves maximal functionality and self care  Outcome: Progressing  Flowsheets (Taken 3/18/2023 0847)  Achieves maximal medications as needed   Nutrition consult to assist patient with appropriate food choices  Goal: Maintains adequate nutritional intake  Outcome: Progressing  Flowsheets (Taken 3/18/2023 4483)  Maintains adequate nutritional intake:   Monitor percentage of each meal consumed   Assist with meals as needed   Obtain nutritional consult as needed   Identify factors contributing to decreased intake, treat as appropriate   Monitor intake and output, weight and lab values

## 2023-03-18 NOTE — CARE COORDINATION
On weekend f/u list. Spoke with Marybeth Quintero. She states that pt will require a pre-cert on day of d/c and will need updated PT/OT notes. Sent  a PS asking when pt may be ready for d/c.  informed CM that he is waiting for final ID rec's for d/c. ID not in building on the weekend. Informed  to notify the CM when he knows what IV atb pt will be d/c'd on so that [address] can be notified. Requested updated PT/OT notes via WB. Pt will need a rapid covid on day of d/c.  PLEASE NOTIFY CM BEFORE DISCHARGING PT.  TE

## 2023-03-18 NOTE — PROGRESS NOTES
Patient's tolerance of treatment:  Good  Adverse Reaction: SpO2 desat  Significant change in status and impact:  none  Barriers to improvement:  strength    Plan for Next Session:    Will cont to work towards pt's goals per patient tolerance  Time in:  1130  Time out:  1210  Timed treatment minutes:  40  Total treatment time:  40  Previously filed items:     Short Term Goals  Time Frame for Short Term Goals: 2 weeks  Short Term Goal 1: Pt will perform bilat rolling maxA  Short Term Goal 2: Pt will transition sit><supine maxA  Short Term Goal 3: Pt will perform static sitting at EOB, BUE support CGA  Short Term Goal 4: Pt will perform sit><stand maxA x 2       Electronically signed by:     Marylou Atkinson PTA  3/18/2023, 12:18 PM

## 2023-03-18 NOTE — PROGRESS NOTES
ischemia, ESRD  -EKG without acute ischemic changes  -Cardiology on board. 4.ESRD. On HD. Nephro consulted     5. Coronary artery disease   -status post three-vessel CABG in September 2022  -Postprocedure complications include mediastinal infection requiring multiple washouts with IABP placement and transfer to [facility] for further management where he underwent washout and closure and removal of IABP. Tracheostomy and PEG dependence since CABG. 6. History of chronic trach and PEG:   -Dietitian following. CT cervical 2/14/2023 showed prominent anterior osteophytes C3-C4, C6-C7 sufficient size to cause swallowing difficulties per radiology report. - Pt will need referral to nsx outpatient for cervical surgery. Attempted to transfer patient to [facility] as per their wishes however [facility] neurosurgery states he does needed urgent intervention at this time and would see them outpatient. - consider consulting nsx if pt stayed past 3/11 as a service currently unavailable. - consult SLP        7. COPD, not in exacerbation  -Breathing treatments as needed    8. Type 2 diabetes  -On home lantus 25U nightly   -Continue SSI  -Hypoglycemic protocol    9. GERD  -Continue lansoprazole      10. Hypothyroidism  -Continue levothyroxine        Recent admission for hypoxic resp failure due to pleural effusions and atelectasis due to mucous plugging and possible HAP  2/20-2/24        Diet Diet NPO Exceptions are: Sips of Clear Liquids  ADULT TUBE FEEDING; PEG; Renal Formula; Continuous; 10; Yes; 10; Q 4 hours; 60; 250; Q 4 hours   DVT Prophylaxis [] Lovenox, [x]  Heparin, [] SCDs, [] Ambulation,  [] Eliquis, [] Xarelto  [] Coumadin   Code Status Full Code   Disposition From: SNF  Expected Disposition: LTC?   Estimated Date of Discharge: TBD  Patient requires continued admission pending bronchial aspirate sensitivities and final ID recommendations   Surrogate Decision Maker/ DEVI Rodgers        Subjective:     Chief replacement, 40 mEq, PRN   Or  potassium chloride, 10 mEq, PRN  magnesium sulfate, 2,000 mg, PRN  glucose, 4 tablet, PRN  dextrose bolus, 125 mL, PRN   Or  dextrose bolus, 250 mL, PRN  glucagon (rDNA), 1 mg, PRN  dextrose, 1,000 mL, Continuous PRN  sodium chloride flush, 5-40 mL, PRN  sodium chloride, , PRN  ondansetron, 4 mg, Q8H PRN   Or  ondansetron, 4 mg, Q6H PRN  polyethylene glycol, 17 g, Daily PRN  acetaminophen, 650 mg, Q6H PRN   Or  acetaminophen, 650 mg, Q6H PRN  sennosides-docusate sodium, 2 tablet, Daily PRN      Labs      Recent Results (from the past 24 hour(s))   Protime-INR    Collection Time: 03/17/23 11:29 AM   Result Value Ref Range    Protime 12.6 11.7 - 14.5 SECONDS    INR 0.98 INDEX   POCT Glucose    Collection Time: 03/17/23 12:22 PM   Result Value Ref Range    POC Glucose 201 (H) 70 - 99 MG/DL   POCT Glucose    Collection Time: 03/17/23  4:52 PM   Result Value Ref Range    POC Glucose 192 (H) 70 - 99 MG/DL   POCT Glucose    Collection Time: 03/17/23  9:51 PM   Result Value Ref Range    POC Glucose 207 (H) 70 - 99 MG/DL   POCT Glucose    Collection Time: 03/18/23  1:53 AM   Result Value Ref Range    POC Glucose 206 (H) 70 - 99 MG/DL   Protime-INR    Collection Time: 03/18/23  5:38 AM   Result Value Ref Range    Protime 12.7 11.7 - 14.5 SECONDS    INR 0.98 INDEX   POCT Glucose    Collection Time: 03/18/23  6:43 AM   Result Value Ref Range    POC Glucose 228 (H) 70 - 99 MG/DL        Imaging/Diagnostics Last 24 Hours   No results found.     Electronically signed by Dorma Collet, MD on 3/18/2023 at 10:15 AM

## 2023-03-18 NOTE — PROGRESS NOTES
PHARMACY ANTICOAGULATION MONITORING SERVICE    Lashawn Mitchell is a 68 y.o. male on warfarin therapy for A fib. Pharmacy consulted by Dr. Mikaela Franco for monitoring and adjustment of treatment. Indication for anticoagulation: A fib  INR goal: 2-3  Warfarin dose prior to admission: 1 mg daily? Pertinent Laboratory Values   Recent Labs     03/17/23  0212 03/17/23  1129 03/18/23  0538   INR  --    < > 0.98   HGB 8.4*  --   --    HCT 25.4*  --   --      --   --     < > = values in this interval not displayed. Assessment/Plan:  Drug Interactions: Amiodarone, aspirin, and Synthroid  Continuing sq hepatin for DVT prophylaxis  INR remains sub-therapeutic @ 0.98 today after 2 days of home dose warfarin 1mg. Increase dose today to Warfarin 1.5mg daily  Past warfarin dosing unclear- dosing ranged from 0.5 to 2 mg daily with frequent supra therapeutic INRs. Pharmacy will continue to monitor and adjust warfarin therapy as indicated    Thank you for the consult.   Hany Navarro [facility]  3/18/2023 2:14 PM

## 2023-03-18 NOTE — PROGRESS NOTES
[address] [facility]  consulted by Dr. Reyes Brisker for monitoring and adjustment. Indication for treatment: Vancomycin indication: HAP  Goal trough: Trough Goal: 15-20 mcg/mL  AUC/MT: 400-600    Risk Factors for MRSA Identified:   Documented isolation of MRSA within 1 year     Pertinent Laboratory Values:   Temp Readings from Last 3 Encounters:   03/18/23 98.5 °F (36.9 °C) (Oral)   02/24/23 98.2 °F (36.8 °C) (Oral)   02/17/23 97.5 °F (36.4 °C) (Oral)     Recent Labs     03/17/23  0212   WBC 5.9       Recent Labs     03/17/23  0212   BUN 39*   CREATININE 4.1*       Estimated Creatinine Clearance: 23 mL/min (A) (based on SCr of 4.1 mg/dL (H)). Intake/Output Summary (Last 24 hours) at 3/18/2023 1243  Last data filed at 3/17/2023 1755  Gross per 24 hour   Intake 287 ml   Output --   Net 287 ml         Pertinent Cultures:   Date    Source    Results  03/14/23  Bronchial Culture  MRSA, pseudomonas  03/15/23  Urine Antigens   To be collected    Vancomycin level:   TROUGH:  No results for input(s): VANCOTROUGH in the last 72 hours. RANDOM:    Recent Labs     03/17/23  0212   VANCORANDOM 27.0         Assessment:  HPI: Pneumonia, patient with chronic trach, bronchial washing with MRSA and now pseudomonas  SCr, BUN, and urine output: ESRD on HD Monday, Wednesday, Friday  Day(s) of therapy: 4of 7  Vancomycin concentration:   3/17/23 = 27 per HD  3/20 - repeat random level pre HD    Plan:  Patient received vancomycin IV 2000mg 3/15, vanco level this AM was 27 pre HD. Per notes- patient tolerated 3 hour HD session today. Will plan for vanco level 3/19 to ensure vanco level has not gone sub therapeutic- unclear if patient still makes urine. Would not re-dose vanco unless level <15.  Otherwise will plan for vanco to be redosed after HD [place] [address] will continue to monitor patient and adjust therapy as indicated    VANCOMYCIN CONCENTRATION SCHEDULED FOR 03/19/2023 @0600    Thank you for the consult.   Merline Lefevre, [facility]  3/18/2023 12:43 PM

## 2023-03-18 NOTE — PROGRESS NOTES
Occupational Therapy    Occupational Therapy Treatment Note    Name: Gloria Scott MRN: [id number] :   1950   Date:  3/18/2023   Admission Date: 3/5/2023 Room:  -A     Primary Problem:    The primary encounter diagnosis was Acute respiratory failure with hypoxemia ([facility] [address] Diagnoses of HCAP (healthcare-associated pneumonia), ESRD (end stage renal disease) ([email or web address] [facility] and Tracheostomy in place [facility]) were also pertinent to this visit    Restrictions/Precautions:          general precautions, fall risk, contact precautions, trach, PEG, trach mask    Communication with other providers: RN Yun Bennett approved session, PTA Ravi Arias for safety/assist     Subjective:  Patient states:  \"Can I get a bath? \"   Pain:   Location, Type, Intensity (0/10 to 10/10):  denies   Objective:    Observation: Pt presented in semi-elder's, agreeable to session. Objective Measures:  Tele, Pulse Ox, o2, PEG tube, trach     Treatment, including education:  Therapeutic Activity Training:   Therapeutic activity training was instructed today. Cues were given for safety, sequence, UE/LE placement, awareness, and balance. Activities performed today included bed mobility training, sup-sit, sitting EOB. Supine to Sit with Max A X2 with emphasis on trunk rotation. Max A X1-2 to scoot hips forward on EOB. Sitting balance for ~25 min with Max A initially and once repositioned SBA/CGA. Scooting hips upward on EOB with Max A X2 for X2 trials. Sit to Supine with Max A X2. DEP Assist X2 to scoot up in bed. Self Care Training:   Cues were given for safety, sequence, UE/LE placement, visual cues, and balance. DEP assist to don bilat socks while supine. Hygiene seated EOB:  Min A for UB for posterior aspect for sponge bath. Max A for LB      Grooming:  SBA to comb hair seated EOB. Dressing: Mod A to doff/don gown while seated EOB in order to manage around lines.      Handoff to PTA at end of session with pt supine. Educated pt on role of OT and continuing to progress POC. Assessment / Impression:    Patient's tolerance of treatment: Well  Adverse Reaction: None  Significant change in status and impact: Improved from initial evaluation  Barriers to improvement: Strength, activity tolerance      Plan for Next Session:    Cont OT POC     Time in:  1130  Time out:  1205  Timed treatment minutes:  35  Total treatment time:  35      Electronically signed by:     JAVY Leiva,   3/18/2023, 12:10 PM

## 2023-03-18 NOTE — PROGRESS NOTES
pulmonary      SUBJECTIVE:  stable and no sob     OBJECTIVE    VITALS:  /63   Pulse 97   Temp 98 °F (36.7 °C) (Oral)   Resp 17   Ht 6' 2\" (1.88 m)   Wt 275 lb 9.2 oz (125 kg)   SpO2 95%   BMI 35.38 kg/m²   HEAD AND FACE EXAM:  No throat injection, no active exudate,no thrush  NECK EXAM;No JVD, no masses, symmetrical  CHEST EXAM; Expansion equal and symmetrical, no masses  LUNG EXAM; Good breath sounds bilaterally. There are expiratory wheezes both lungs, there are crackles at both lung bases  CARDIOVASCULAR EXAM: Positive S1 and S2, no S3 or S4, no clicks ,no murmurs  RIGHT AND LEFT LOWER EXTRIMITY EXAM: No edema, no swelling, no inflamation  CNS EXAM: Alert and oriented X3          LABS   Lab Results   Component Value Date    WBC 5.9 03/17/2023    HGB 8.4 (L) 03/17/2023    HCT 25.4 (L) 03/17/2023    .9 (H) 03/17/2023     03/17/2023     Lab Results   Component Value Date    CREATININE 4.1 (H) 03/17/2023    BUN 39 (H) 03/17/2023     (L) 03/17/2023    K 4.2 03/17/2023    CL 93 (L) 03/17/2023    CO2 24 03/17/2023     Lab Results   Component Value Date    INR 0.98 03/18/2023    PROTIME 12.7 03/18/2023          Lab Results   Component Value Date/Time    PHOS 2.9 03/08/2023 01:53 PM    PHOS 4.2 03/07/2023 06:32 AM    PHOS 3.7 02/15/2023 03:15 AM      No results for input(s): PH, PO2ART, LJN4OPP, HCO3, BEART, O2SAT in the last 72 hours.       Wt Readings from Last 3 Encounters:   03/18/23 275 lb 9.2 oz (125 kg)   02/24/23 274 lb 11.1 oz (124.6 kg)   02/17/23 289 lb 3.9 oz (131.2 kg)               ASSESMENT  Ac on ch resp failure  Ac copd  Pneumonia  Pulm atx  Pl effusion,s/p tap        PLAN  Cpm  Cont o2  Bd rx    3/18/2023  Phil Flor MD, M.D.

## 2023-03-18 NOTE — PROGRESS NOTES
Nephrology Progress Note  3/18/2023 9:06 AM        Subjective:   Admit Date: 3/5/2023  PCP: Tilford Lesch, APRN - CNP    Interval History: Patient seen in early morning, this is a late entry. No major event overnight. He was alert awake and answers all my question    Diet: Tube feed per percutaneous endoscopic gastrostomy tube    ROS: No overt shortness of breath or confusion  He tolerated dialysis for 3 hours with 3 L of ultrafiltration. He seems to be oliguric to anuric  No fever, acceptable blood pressure    Data:     Current meds:    melatonin  3 mg Oral Once    warfarin  1 mg Oral Daily    cefepime  1,000 mg IntraVENous Q24H    epoetin jessenia-epbx  8,000 Units IntraVENous Once per day on Mon Wed Fri    vancomycin (VANCOCIN) intermittent dosing (placeholder)   Other RX Placeholder    miconazole   Topical BID    scopolamine  1 patch TransDERmal Q72H    midodrine  10 mg Oral TID WC    amiodarone  200 mg Oral Daily    heparin (porcine)  5,000 Units SubCUTAneous 3 times per day    insulin lispro  0-4 Units SubCUTAneous Q6H    sodium chloride flush  5-40 mL IntraVENous 2 times per day    aspirin  81 mg Per NG tube Daily    atorvastatin  40 mg Oral Nightly    insulin glargine  10 Units SubCUTAneous Nightly    lansoprazole  30 mg Oral QAM AC    levothyroxine  100 mcg Per G Tube Daily    metoprolol tartrate  25 mg Per G Tube BID    QUEtiapine  50 mg Oral Nightly    traZODone  100 mg Oral Nightly    acetylcysteine  600 mg Inhalation BID      dextrose      sodium chloride 25 mL (03/17/23 1304)         I/O last 3 completed shifts:   In: 2701 [NG/GT:2201]  Out: 3500     CBC:   Recent Labs     03/17/23  0212   WBC 5.9   HGB 8.4*             Recent Labs     03/17/23  0212   *   K 4.2   CL 93*   CO2 24   BUN 39*   CREATININE 4.1*   GLUCOSE 170*       Lab Results   Component Value Date    CALCIUM 9.4 03/17/2023    PHOS 2.9 03/08/2023       Objective:     Vitals: /69   Pulse 96   Temp 98.5 °F (36.9 °C) (Oral)   Resp 16   Ht 6' 2\" (1.88 m)   Wt 275 lb 9.2 oz (125 kg)   SpO2 93%   BMI 35.38 kg/m² ,    General appearance: Alert, awake this morning without any acute distress  HEENT: At least 1+ conjunctival pallor no scleral icterus  Neck: He has tracheostomy, right IJ tunneled dialysis catheter, head of the bed elevated about 30 degrees  Lungs: Positive adventitious breath sound anteriorly  Heart: Irregular-well-healed incision from previous sternotomy  Abdomen: Soft, PEG in place  Extremities: No gross edema      Problem List :         Impression :     End-stage kidney disease on maintenance dialysis-he was dialyzed yesterday and fluid status and electrolytes are acceptable  Underlying coronary artery disease, status post CABG, diabetes and atherosclerotic cardiovascular disease  Recurrent pleural effusion likely from heart failure-of course kidney failure does not help either-  Getting antimicrobial agent broad-spectrum for presumed pneumonitis  Underlying dysrhythmia    Recommendation/Plan  :     From kidney standpoint of course he can be discharged-we will continue dialysis Monday Wednesday and Friday-the goal would be to keep him close to euvolemia-as he is oliguric/anuric-only way he can do it by adequate and proper ultrafiltration-that in turn may reduce the risk of pleural effusion-unless it is due to infection which would be exudative-good glycemic control-he remains on multiple broad-spectrum antimicrobial agent also on warfarin for dysrhythmia-watch for iatrogenic and nosocomial complication and follow clinically      Ju Aimn MD MD

## 2023-03-18 NOTE — CONSULTS
Comprehensive Nutrition Assessment    Type and Reason for Visit:  Reassess    Nutrition Recommendations/Plan:   Continue EN as ordered     Malnutrition Assessment:  Malnutrition Status: At risk for malnutrition (Comment) (03/06/23 1139)    Context:  Chronic Illness       Nutrition Assessment:    Pt is on sips of clear liquid and has the EN of renal formula running at goal. Pt is tolerating the formula and is now at moderate risk at this time    Nutrition Related Findings:    . Wound Type: Open Wounds, Multiple (Cluster on buttocks)       Current Nutrition Intake & Therapies:    Average Meal Intake: NPO  Average Supplements Intake: NPO  Current Tube Feeding (TF) Orders:  Feeding Route: PEG  Formula: Renal Formula  Schedule: Continuous  Additives/Modulars: None  Water Flushes: 250 ml Q4H  Goal TF & Flush Orders Provides: 60mL/hr to provide 2592kcal, 116g PRO    Anthropometric Measures:  Height: 6' 2\" (188 cm)  Ideal Body Weight (IBW): 190 lbs (86 kg)    Admission Body Weight: 267 lb 10.2 oz (121.4 kg)  Current Body Weight: 284 lb (128.8 kg), 140.9 % IBW.  Weight Source: Bed Scale  Current BMI (kg/m2): 36.4  Usual Body Weight: 324 lb (147 kg) (September 2022 from RD notes)  % Weight Change (Calculated): -17.4  Weight Adjustment For: No Adjustment                 BMI Categories: Obese Class 2 (BMI 35.0 -39.9)    Estimated Daily Nutrient Needs:  Energy Requirements Based On: Kcal/kg  Weight Used for Energy Requirements: Ideal  Energy (kcal/day): 9965-7015 (30-35 kcals/kg IBW)  Weight Used for Protein Requirements: Ideal  Protein (g/day): 104-112 (1.2-1.3 g/kg)  Method Used for Fluid Requirements: Standard Renal  Fluid (ml/day): fluids per nephrology    Nutrition Diagnosis:   Inadequate oral intake related to swallowing difficulty as evidenced by NPO or clear liquid status due to medical condition, nutrition support - enteral nutrition    Nutrition Interventions:   Food and/or Nutrient Delivery: Continue Current Tube

## 2023-03-19 LAB
DOSE AMOUNT: NORMAL
DOSE TIME: NORMAL
GLUCOSE BLD-MCNC: 199 MG/DL (ref 70–99)
GLUCOSE BLD-MCNC: 215 MG/DL (ref 70–99)
GLUCOSE BLD-MCNC: 229 MG/DL (ref 70–99)
GLUCOSE BLD-MCNC: 237 MG/DL (ref 70–99)
GLUCOSE BLD-MCNC: 238 MG/DL (ref 70–99)
INR BLD: 0.98 INDEX
PROTHROMBIN TIME: 12.6 SECONDS (ref 11.7–14.5)
VANCOMYCIN RANDOM: 21.2 UG/ML

## 2023-03-19 PROCEDURE — 2580000003 HC RX 258: Performed by: INTERNAL MEDICINE

## 2023-03-19 PROCEDURE — 85610 PROTHROMBIN TIME: CPT

## 2023-03-19 PROCEDURE — 6370000000 HC RX 637 (ALT 250 FOR IP)

## 2023-03-19 PROCEDURE — 6370000000 HC RX 637 (ALT 250 FOR IP): Performed by: STUDENT IN AN ORGANIZED HEALTH CARE EDUCATION/TRAINING PROGRAM

## 2023-03-19 PROCEDURE — 94640 AIRWAY INHALATION TREATMENT: CPT

## 2023-03-19 PROCEDURE — 36415 COLL VENOUS BLD VENIPUNCTURE: CPT

## 2023-03-19 PROCEDURE — 6360000002 HC RX W HCPCS

## 2023-03-19 PROCEDURE — 2700000000 HC OXYGEN THERAPY PER DAY

## 2023-03-19 PROCEDURE — 80202 ASSAY OF VANCOMYCIN: CPT

## 2023-03-19 PROCEDURE — 82962 GLUCOSE BLOOD TEST: CPT

## 2023-03-19 PROCEDURE — 6370000000 HC RX 637 (ALT 250 FOR IP): Performed by: INTERNAL MEDICINE

## 2023-03-19 PROCEDURE — 2060000000 HC ICU INTERMEDIATE R&B

## 2023-03-19 PROCEDURE — 6360000002 HC RX W HCPCS: Performed by: INTERNAL MEDICINE

## 2023-03-19 PROCEDURE — 94761 N-INVAS EAR/PLS OXIMETRY MLT: CPT

## 2023-03-19 RX ADMIN — ACETYLCYSTEINE 600 MG: 200 SOLUTION ORAL; RESPIRATORY (INHALATION) at 19:38

## 2023-03-19 RX ADMIN — CEFEPIME 1000 MG: 1 INJECTION, POWDER, FOR SOLUTION INTRAMUSCULAR; INTRAVENOUS at 14:43

## 2023-03-19 RX ADMIN — HEPARIN SODIUM 5000 UNITS: 5000 INJECTION INTRAVENOUS; SUBCUTANEOUS at 06:34

## 2023-03-19 RX ADMIN — MICONAZOLE NITRATE: 2 POWDER TOPICAL at 08:09

## 2023-03-19 RX ADMIN — HEPARIN SODIUM 5000 UNITS: 5000 INJECTION INTRAVENOUS; SUBCUTANEOUS at 21:07

## 2023-03-19 RX ADMIN — WARFARIN SODIUM 1.5 MG: 3 TABLET ORAL at 17:24

## 2023-03-19 RX ADMIN — TRAZODONE HYDROCHLORIDE 100 MG: 50 TABLET ORAL at 20:45

## 2023-03-19 RX ADMIN — METOPROLOL TARTRATE 25 MG: 25 TABLET, FILM COATED ORAL at 08:08

## 2023-03-19 RX ADMIN — ATORVASTATIN CALCIUM 40 MG: 40 TABLET, FILM COATED ORAL at 20:46

## 2023-03-19 RX ADMIN — AMIODARONE HYDROCHLORIDE 200 MG: 200 TABLET ORAL at 08:08

## 2023-03-19 RX ADMIN — IPRATROPIUM BROMIDE AND ALBUTEROL SULFATE 1 AMPULE: 2.5; .5 SOLUTION RESPIRATORY (INHALATION) at 16:01

## 2023-03-19 RX ADMIN — SODIUM CHLORIDE, PRESERVATIVE FREE 10 ML: 5 INJECTION INTRAVENOUS at 08:10

## 2023-03-19 RX ADMIN — INSULIN LISPRO 2 UNITS: 100 INJECTION, SOLUTION INTRAVENOUS; SUBCUTANEOUS at 12:38

## 2023-03-19 RX ADMIN — INSULIN LISPRO 1 UNITS: 100 INJECTION, SOLUTION INTRAVENOUS; SUBCUTANEOUS at 17:24

## 2023-03-19 RX ADMIN — MIDODRINE HYDROCHLORIDE 10 MG: 5 TABLET ORAL at 08:08

## 2023-03-19 RX ADMIN — HEPARIN SODIUM 5000 UNITS: 5000 INJECTION INTRAVENOUS; SUBCUTANEOUS at 14:46

## 2023-03-19 RX ADMIN — METOPROLOL TARTRATE 25 MG: 25 TABLET, FILM COATED ORAL at 20:45

## 2023-03-19 RX ADMIN — QUETIAPINE FUMARATE 50 MG: 25 TABLET ORAL at 20:45

## 2023-03-19 RX ADMIN — ACETYLCYSTEINE 600 MG: 200 SOLUTION ORAL; RESPIRATORY (INHALATION) at 07:19

## 2023-03-19 RX ADMIN — INSULIN GLARGINE 10 UNITS: 100 INJECTION, SOLUTION SUBCUTANEOUS at 20:44

## 2023-03-19 RX ADMIN — ASPIRIN 81 MG CHEWABLE TABLET 81 MG: 81 TABLET CHEWABLE at 08:08

## 2023-03-19 RX ADMIN — IPRATROPIUM BROMIDE AND ALBUTEROL SULFATE 1 AMPULE: 2.5; .5 SOLUTION RESPIRATORY (INHALATION) at 07:20

## 2023-03-19 RX ADMIN — LANSOPRAZOLE 30 MG: KIT at 08:08

## 2023-03-19 RX ADMIN — LEVOTHYROXINE SODIUM 100 MCG: 0.1 TABLET ORAL at 06:34

## 2023-03-19 RX ADMIN — INSULIN LISPRO 1 UNITS: 100 INJECTION, SOLUTION INTRAVENOUS; SUBCUTANEOUS at 06:35

## 2023-03-19 RX ADMIN — IPRATROPIUM BROMIDE AND ALBUTEROL SULFATE 1 AMPULE: 2.5; .5 SOLUTION RESPIRATORY (INHALATION) at 19:38

## 2023-03-19 RX ADMIN — SODIUM CHLORIDE, PRESERVATIVE FREE 10 ML: 5 INJECTION INTRAVENOUS at 20:47

## 2023-03-19 ASSESSMENT — PAIN SCALES - WONG BAKER
WONGBAKER_NUMERICALRESPONSE: 0

## 2023-03-19 ASSESSMENT — PAIN SCALES - GENERAL
PAINLEVEL_OUTOF10: 0
PAINLEVEL_OUTOF10: 0

## 2023-03-19 NOTE — PROGRESS NOTES
PHARMACY ANTICOAGULATION MONITORING SERVICE    Jhoana Sanchez is a 68 y.o. male on warfarin therapy for A fib. Pharmacy consulted by Dr. Tamiko Noel for monitoring and adjustment of treatment. Indication for anticoagulation: A fib  INR goal: 2-3  Warfarin dose prior to admission: 1 mg daily? Pertinent Laboratory Values   Recent Labs     03/17/23  0212 03/17/23  1129 03/19/23  0635   INR  --    < > 0.98   HGB 8.4*  --   --    HCT 25.4*  --   --      --   --     < > = values in this interval not displayed. Assessment/Plan:  Drug Interactions: Amiodarone, aspirin, and Synthroid  Continuing sq hepatin for DVT prophylaxis  INR remained sub-therapeutic @ 0.98 today after 2 days of home dose warfarin 1mg. Continue on increased dose of Warfarin 1.5mg daily  Past warfarin dosing unclear- dosing ranged from 0.5 to 2 mg daily with frequent supra therapeutic INRs. Pharmacy will continue to monitor and adjust warfarin therapy as indicated    Thank you for the consult.   DIOGO Maxwell [facility]  3/19/2023 3:24 PM

## 2023-03-19 NOTE — PROGRESS NOTES
[address] [facility]  consulted by Dr. Karlee De La Fuente for monitoring and adjustment. Indication for treatment: Vancomycin indication: HAP  Goal trough: Trough Goal: 15-20 mcg/mL  AUC/MT: 400-600    Risk Factors for MRSA Identified:   Documented isolation of MRSA within 1 year     Pertinent Laboratory Values:   Temp Readings from Last 3 Encounters:   03/19/23 97.6 °F (36.4 °C) (Oral)   02/24/23 98.2 °F (36.8 °C) (Oral)   02/17/23 97.5 °F (36.4 °C) (Oral)     Recent Labs     03/17/23  0212   WBC 5.9       Recent Labs     03/17/23  0212   BUN 39*   CREATININE 4.1*       Estimated Creatinine Clearance: 23 mL/min (A) (based on SCr of 4.1 mg/dL (H)). Intake/Output Summary (Last 24 hours) at 3/19/2023 1301  Last data filed at 3/18/2023 1655  Gross per 24 hour   Intake 544 ml   Output --   Net 544 ml         Pertinent Cultures:   Date    Source    Results  03/14/23  Bronchial Culture  MRSA, pseudomonas  03/15/23  Urine Antigens   To be collected    Vancomycin level:   TROUGH:  No results for input(s): VANCOTROUGH in the last 72 hours. RANDOM:    Recent Labs     03/17/23  0212 03/19/23  0635   VANCORANDOM 27.0 21.2         Assessment:  HPI: Pneumonia, patient with chronic trach, bronchial washing with MRSA and now pseudomonas  SCr, BUN, and urine output: ESRD on HD Monday, Wednesday, Friday  Day(s) of therapy: 5 of 7  Vancomycin concentration:   3/17/23 = 27 per HD  3/19 - 21.2, no dose today  3/20 - repeat random level pre HD    Plan:  ESRD on HD:  intermittent dosing based on random levels  Random level above goal, no Vancomycin today  Will repeat level tomorrow AM, pre-HD  Would not re-dose vanco unless level <15. Otherwise will plan for vanco to be redosed after HD [place] will continue to monitor patient and adjust therapy as indicated    [place] 3 03/20/2023 @0600    Thank you for the consult.   Homer Gruber, [facility]  3/19/2023 1:01 PM

## 2023-03-19 NOTE — PROGRESS NOTES
V2.0  [facility] [place]      Name:  Shanique Thomas /Age/Sex: 1950  (68 y.o. male)   MRN & CSN:  [id number] & [id number] Encounter Date/Time: 3/19/2023 1:32 PM EDT    Location:  -A PCP: Daniel Bower Day: 15    Assessment and Plan:   Shanique Thomas is a 68 y.o. male  who presents with Healthcare-associated pneumonia    1. Pneumonia   -On trach  -With possible parapneumonic effusion  -History of multiple admissions for aspiration pneumonia secondary to dysphagia     -S/p bronchoscopy 2023 trachea full of thick secretions, large mucus plug right lung, marked inflammation/swelling bronchi, BAL obtained only AFP checked negative no other culture found. -CXR 3/5 R pleural effusion with right basilar opacity suggesting atelectasis or infection. -S/p thora 3/8 exudative pleural effusion;   negative resp PCR. Tracheal aspirate culture negative. No leukocytosis, normal lactic acid level. - low suspicion for PNA; Thora culture negative for growth at 72hours. IV cefepime has thus been discontinued. Likely etiology of hypoxia is the mucous plugging noted on the chest CT.  -Continue pulmonary toilet  -Pulmonology consulted. Status post bronchoscopy 3/14/2023. Bronchial aspirate growing MRSA, Pseudomonas and Candida albicans. Sensitivities pending. ID consulted  -Repeat chest x-ray showed bilateral pleural effusions. Status post thoracentesis for right pleural effusion with removal of 600 cc of fluid. On vancomycin and cefepime for 2 weeks    2. A-fib with RVR   -On warfarin and metoprolol 25 mg twice daily  - Continue metoprolol with holding parameters  - consulted cardiology given soft blood pressures. Started on amiodarone  -Resumed warfarin, INR 0.98 today on 1.5 mg PO daily    3. Elevated troponin, likely in the setting of demand ischemia, ESRD  -EKG without acute ischemic changes  -Cardiology on board. 4.ESRD. On HD.   Nephro consulted PRN  acetaminophen, 650 mg, Q6H PRN   Or  acetaminophen, 650 mg, Q6H PRN  sennosides-docusate sodium, 2 tablet, Daily PRN      Labs      Recent Results (from the past 24 hour(s))   POCT Glucose    Collection Time: 03/18/23 12:49 PM   Result Value Ref Range    POC Glucose 205 (H) 70 - 99 MG/DL   POCT Glucose    Collection Time: 03/18/23  4:36 PM   Result Value Ref Range    POC Glucose 206 (H) 70 - 99 MG/DL   POCT Glucose    Collection Time: 03/18/23  9:31 PM   Result Value Ref Range    POC Glucose 312 (H) 70 - 99 MG/DL   POCT Glucose    Collection Time: 03/19/23  4:04 AM   Result Value Ref Range    POC Glucose 229 (H) 70 - 99 MG/DL   POCT Glucose    Collection Time: 03/19/23  5:42 AM   Result Value Ref Range    POC Glucose 215 (H) 70 - 99 MG/DL   Protime-INR    Collection Time: 03/19/23  6:35 AM   Result Value Ref Range    Protime 12.6 11.7 - 14.5 SECONDS    INR 0.98 INDEX   Vancomycin Level, Random    Collection Time: 03/19/23  6:35 AM   Result Value Ref Range    Vancomycin Rm 21.2 UG/ML    DOSE AMOUNT DOSE AMT. GIVEN - UNKNOWN     DOSE TIME DOSE TIME GIVEN - UNKNOWN         Imaging/Diagnostics Last 24 Hours   No results found.     Electronically signed by Angie Haas MD on 3/19/2023 at 10:23 AM

## 2023-03-19 NOTE — PROGRESS NOTES
pulmonary      SUBJECTIVE:  no sob     OBJECTIVE    VITALS:  BP (!) 125/52   Pulse 80   Temp 97.6 °F (36.4 °C) (Oral)   Resp 15   Ht 6' 2\" (1.88 m)   Wt 278 lb (126.1 kg)   SpO2 97%   BMI 35.69 kg/m²   HEAD AND FACE EXAM:  No throat injection, no active exudate,no thrush  NECK EXAM;No JVD, no masses, symmetrical  CHEST EXAM; Expansion equal and symmetrical, no masses  LUNG EXAM; Good breath sounds bilaterally. There are expiratory wheezes both lungs, there are crackles at both lung bases  CARDIOVASCULAR EXAM: Positive S1 and S2, no S3 or S4, no clicks ,no murmurs  RIGHT AND LEFT LOWER EXTRIMITY EXAM: No edema, no swelling, no inflamation  CNS EXAM: Alert and oriented X3          LABS   Lab Results   Component Value Date    WBC 5.9 03/17/2023    HGB 8.4 (L) 03/17/2023    HCT 25.4 (L) 03/17/2023    .9 (H) 03/17/2023     03/17/2023     Lab Results   Component Value Date    CREATININE 4.1 (H) 03/17/2023    BUN 39 (H) 03/17/2023     (L) 03/17/2023    K 4.2 03/17/2023    CL 93 (L) 03/17/2023    CO2 24 03/17/2023     Lab Results   Component Value Date    INR 0.98 03/19/2023    PROTIME 12.6 03/19/2023          Lab Results   Component Value Date/Time    PHOS 2.9 03/08/2023 01:53 PM    PHOS 4.2 03/07/2023 06:32 AM    PHOS 3.7 02/15/2023 03:15 AM      No results for input(s): PH, PO2ART, RVU9PAG, HCO3, BEART, O2SAT in the last 72 hours.       Wt Readings from Last 3 Encounters:   03/19/23 278 lb (126.1 kg)   02/24/23 274 lb 11.1 oz (124.6 kg)   02/17/23 289 lb 3.9 oz (131.2 kg)               ASSESMENT  Ac on ch resp failure  Pneumonia HA  Pulm atx resolved  Right pl effusion,s/p tap        PLAN  Bd rx  O2 via trach    3/19/2023  Brooklyn Wilkinson MD, MCHRISTINA.

## 2023-03-19 NOTE — PROGRESS NOTES
Nephrology Progress Note  3/19/2023 9:13 AM        Subjective:   Admit Date: 3/5/2023  PCP: ANKITA Bell - CNP    Interval History: Patient seen early morning, this is a late entry, no major event    Diet: Tube feed per percutaneous endoscopic gastrostomy tube    ROS: No overt shortness of breath or confusion-I had a good discussion with him  He is anuric  No fever, acceptable blood pressure    Data:     Current meds:    warfarin  1.5 mg Oral Daily    ipratropium-albuterol  1 ampule Inhalation TID    melatonin  3 mg Oral Once    cefepime  1,000 mg IntraVENous Q24H    epoetin jessenia-epbx  8,000 Units IntraVENous Once per day on Mon Wed Fri    vancomycin (VANCOCIN) intermittent dosing (placeholder)   Other RX Placeholder    miconazole   Topical BID    scopolamine  1 patch TransDERmal Q72H    midodrine  10 mg Oral TID WC    amiodarone  200 mg Oral Daily    heparin (porcine)  5,000 Units SubCUTAneous 3 times per day    insulin lispro  0-4 Units SubCUTAneous Q6H    sodium chloride flush  5-40 mL IntraVENous 2 times per day    aspirin  81 mg Per NG tube Daily    atorvastatin  40 mg Oral Nightly    insulin glargine  10 Units SubCUTAneous Nightly    lansoprazole  30 mg Oral QAM AC    levothyroxine  100 mcg Per G Tube Daily    metoprolol tartrate  25 mg Per G Tube BID    QUEtiapine  50 mg Oral Nightly    traZODone  100 mg Oral Nightly    acetylcysteine  600 mg Inhalation BID      dextrose      sodium chloride 25 mL (03/17/23 1304)         I/O last 3 completed shifts:   In: 544 [NG/GT:544]  Out: -     CBC:   Recent Labs     03/17/23 0212   WBC 5.9   HGB 8.4*             Recent Labs     03/17/23 0212   *   K 4.2   CL 93*   CO2 24   BUN 39*   CREATININE 4.1*   GLUCOSE 170*       Lab Results   Component Value Date    CALCIUM 9.4 03/17/2023    PHOS 2.9 03/08/2023       Objective:     Vitals: /62   Pulse 94   Temp 97.6 °F (36.4 °C) (Oral)   Resp 22   Ht 6' 2\" (1.88 m)   Wt 278 lb (126.1 kg)   SpO2 98%   BMI 35.69 kg/m² ,    General appearance: Alert, awake and oriented  HEENT: At least 1+ conjunctival pallor no scleral icterus  Neck: Tracheostomy-right IJ tunneled dialysis catheter  Lungs: Positive adventitious breath sound anteriorly  Heart: Irregular-well-healed incision from previous sternotomy  Abdomen: Soft, percutaneous endoscopy gastrostomy tube in place  Extremities: Trace bilateral edema      Problem List :         Impression :     End-stage kidney disease on maintenance dialysis-we will get dialysis tomorrow  Coronary artery disease, diabetes and atherosclerotic cardiovascular disease with recurrent pleural effusion and fluid overload  Also has dysrhythmia    Recommendation/Plan  :     Hopefully can be sent back to nursing home soon-plan for hemodialysis and ultrafiltration tomorrow-the goal is to get him close to euvolemia and decongest his chest-with appropriate diet-and controlling of other comorbid disease-follow clinically      Gabby Arriola MD MD

## 2023-03-20 LAB
ALBUMIN SERPL-MCNC: 3.3 GM/DL (ref 3.4–5)
ALP BLD-CCNC: 222 IU/L (ref 40–128)
ALT SERPL-CCNC: 10 U/L (ref 10–40)
ANION GAP SERPL CALCULATED.3IONS-SCNC: 9 MMOL/L (ref 4–16)
AST SERPL-CCNC: 11 IU/L (ref 15–37)
BASOPHILS ABSOLUTE: 0.1 K/CU MM
BASOPHILS RELATIVE PERCENT: 0.8 % (ref 0–1)
BILIRUB SERPL-MCNC: 0.3 MG/DL (ref 0–1)
BUN SERPL-MCNC: 53 MG/DL (ref 6–23)
CALCIUM SERPL-MCNC: 10.3 MG/DL (ref 8.3–10.6)
CHLORIDE BLD-SCNC: 89 MMOL/L (ref 99–110)
CO2: 26 MMOL/L (ref 21–32)
CREAT SERPL-MCNC: 4.8 MG/DL (ref 0.9–1.3)
DIFFERENTIAL TYPE: ABNORMAL
DOSE AMOUNT: NORMAL
DOSE TIME: NORMAL
EOSINOPHILS ABSOLUTE: 0.6 K/CU MM
EOSINOPHILS RELATIVE PERCENT: 9.7 % (ref 0–3)
GFR SERPL CREATININE-BSD FRML MDRD: 12 ML/MIN/1.73M2
GLUCOSE BLD-MCNC: 186 MG/DL (ref 70–99)
GLUCOSE BLD-MCNC: 193 MG/DL (ref 70–99)
GLUCOSE BLD-MCNC: 197 MG/DL (ref 70–99)
GLUCOSE BLD-MCNC: 203 MG/DL (ref 70–99)
GLUCOSE BLD-MCNC: 204 MG/DL (ref 70–99)
GLUCOSE SERPL-MCNC: 181 MG/DL (ref 70–99)
HCT VFR BLD CALC: 26.4 % (ref 42–52)
HEMOGLOBIN: 8.5 GM/DL (ref 13.5–18)
IMMATURE NEUTROPHIL %: 1.3 % (ref 0–0.43)
INR BLD: 0.99 INDEX
LYMPHOCYTES ABSOLUTE: 1.3 K/CU MM
LYMPHOCYTES RELATIVE PERCENT: 21.8 % (ref 24–44)
MAGNESIUM: 2.7 MG/DL (ref 1.8–2.4)
MCH RBC QN AUTO: 34.4 PG (ref 27–31)
MCHC RBC AUTO-ENTMCNC: 32.2 % (ref 32–36)
MCV RBC AUTO: 106.9 FL (ref 78–100)
MONOCYTES ABSOLUTE: 0.6 K/CU MM
MONOCYTES RELATIVE PERCENT: 9.9 % (ref 0–4)
NUCLEATED RBC %: 0 %
PDW BLD-RTO: 15.8 % (ref 11.7–14.9)
PHOSPHORUS: 4.7 MG/DL (ref 2.5–4.9)
PLATELET # BLD: 229 K/CU MM (ref 140–440)
PMV BLD AUTO: 9 FL (ref 7.5–11.1)
POTASSIUM SERPL-SCNC: 4.4 MMOL/L (ref 3.5–5.1)
PROTHROMBIN TIME: 12.8 SECONDS (ref 11.7–14.5)
RBC # BLD: 2.47 M/CU MM (ref 4.6–6.2)
SEGMENTED NEUTROPHILS ABSOLUTE COUNT: 3.4 K/CU MM
SEGMENTED NEUTROPHILS RELATIVE PERCENT: 56.5 % (ref 36–66)
SODIUM BLD-SCNC: 124 MMOL/L (ref 135–145)
TOTAL IMMATURE NEUTOROPHIL: 0.08 K/CU MM
TOTAL NUCLEATED RBC: 0 K/CU MM
TOTAL PROTEIN: 5.6 GM/DL (ref 6.4–8.2)
VANCOMYCIN RANDOM: 17.4 UG/ML
WBC # BLD: 6.1 K/CU MM (ref 4–10.5)

## 2023-03-20 PROCEDURE — 6370000000 HC RX 637 (ALT 250 FOR IP): Performed by: INTERNAL MEDICINE

## 2023-03-20 PROCEDURE — 6360000002 HC RX W HCPCS: Performed by: INTERNAL MEDICINE

## 2023-03-20 PROCEDURE — 2580000003 HC RX 258: Performed by: INTERNAL MEDICINE

## 2023-03-20 PROCEDURE — 6370000000 HC RX 637 (ALT 250 FOR IP): Performed by: STUDENT IN AN ORGANIZED HEALTH CARE EDUCATION/TRAINING PROGRAM

## 2023-03-20 PROCEDURE — 94640 AIRWAY INHALATION TREATMENT: CPT

## 2023-03-20 PROCEDURE — 85610 PROTHROMBIN TIME: CPT

## 2023-03-20 PROCEDURE — 6370000000 HC RX 637 (ALT 250 FOR IP)

## 2023-03-20 PROCEDURE — 6360000002 HC RX W HCPCS

## 2023-03-20 PROCEDURE — 94761 N-INVAS EAR/PLS OXIMETRY MLT: CPT

## 2023-03-20 PROCEDURE — 83735 ASSAY OF MAGNESIUM: CPT

## 2023-03-20 PROCEDURE — 2060000000 HC ICU INTERMEDIATE R&B

## 2023-03-20 PROCEDURE — 85025 COMPLETE CBC W/AUTO DIFF WBC: CPT

## 2023-03-20 PROCEDURE — 80053 COMPREHEN METABOLIC PANEL: CPT

## 2023-03-20 PROCEDURE — 99232 SBSQ HOSP IP/OBS MODERATE 35: CPT | Performed by: INTERNAL MEDICINE

## 2023-03-20 PROCEDURE — 6360000002 HC RX W HCPCS: Performed by: STUDENT IN AN ORGANIZED HEALTH CARE EDUCATION/TRAINING PROGRAM

## 2023-03-20 PROCEDURE — 89220 SPUTUM SPECIMEN COLLECTION: CPT

## 2023-03-20 PROCEDURE — 80202 ASSAY OF VANCOMYCIN: CPT

## 2023-03-20 PROCEDURE — 2580000003 HC RX 258: Performed by: STUDENT IN AN ORGANIZED HEALTH CARE EDUCATION/TRAINING PROGRAM

## 2023-03-20 PROCEDURE — 82962 GLUCOSE BLOOD TEST: CPT

## 2023-03-20 PROCEDURE — 84100 ASSAY OF PHOSPHORUS: CPT

## 2023-03-20 PROCEDURE — 90935 HEMODIALYSIS ONE EVALUATION: CPT

## 2023-03-20 PROCEDURE — 36415 COLL VENOUS BLD VENIPUNCTURE: CPT

## 2023-03-20 PROCEDURE — 2700000000 HC OXYGEN THERAPY PER DAY

## 2023-03-20 RX ORDER — ACETYLCYSTEINE 200 MG/ML
600 SOLUTION ORAL; RESPIRATORY (INHALATION) 2 TIMES DAILY
Qty: 84 ML | Refills: 0 | Status: ON HOLD | OUTPATIENT
Start: 2023-03-20 | End: 2023-04-03

## 2023-03-20 RX ORDER — AMIODARONE HYDROCHLORIDE 200 MG/1
200 TABLET ORAL DAILY
Qty: 30 TABLET | Refills: 3 | Status: ON HOLD | OUTPATIENT
Start: 2023-03-21 | End: 2023-04-20

## 2023-03-20 RX ORDER — WARFARIN SODIUM 4 MG/1
2 TABLET ORAL DAILY
Status: DISCONTINUED | OUTPATIENT
Start: 2023-03-20 | End: 2023-03-20

## 2023-03-20 RX ADMIN — IPRATROPIUM BROMIDE AND ALBUTEROL SULFATE 1 AMPULE: 2.5; .5 SOLUTION RESPIRATORY (INHALATION) at 19:23

## 2023-03-20 RX ADMIN — IPRATROPIUM BROMIDE AND ALBUTEROL SULFATE 1 AMPULE: 2.5; .5 SOLUTION RESPIRATORY (INHALATION) at 15:15

## 2023-03-20 RX ADMIN — TRAZODONE HYDROCHLORIDE 100 MG: 50 TABLET ORAL at 20:16

## 2023-03-20 RX ADMIN — METOPROLOL TARTRATE 25 MG: 25 TABLET, FILM COATED ORAL at 20:15

## 2023-03-20 RX ADMIN — ASPIRIN 81 MG CHEWABLE TABLET 81 MG: 81 TABLET CHEWABLE at 11:29

## 2023-03-20 RX ADMIN — ACETAMINOPHEN 650 MG: 325 TABLET ORAL at 16:48

## 2023-03-20 RX ADMIN — QUETIAPINE FUMARATE 50 MG: 25 TABLET ORAL at 20:15

## 2023-03-20 RX ADMIN — INSULIN LISPRO 1 UNITS: 100 INJECTION, SOLUTION INTRAVENOUS; SUBCUTANEOUS at 05:44

## 2023-03-20 RX ADMIN — MICONAZOLE NITRATE: 2 POWDER TOPICAL at 11:41

## 2023-03-20 RX ADMIN — INSULIN GLARGINE 10 UNITS: 100 INJECTION, SOLUTION SUBCUTANEOUS at 20:17

## 2023-03-20 RX ADMIN — ACETYLCYSTEINE 600 MG: 200 SOLUTION ORAL; RESPIRATORY (INHALATION) at 19:23

## 2023-03-20 RX ADMIN — APIXABAN 2.5 MG: 2.5 TABLET, FILM COATED ORAL at 20:15

## 2023-03-20 RX ADMIN — SODIUM CHLORIDE, PRESERVATIVE FREE 10 ML: 5 INJECTION INTRAVENOUS at 20:16

## 2023-03-20 RX ADMIN — HEPARIN SODIUM 5000 UNITS: 5000 INJECTION INTRAVENOUS; SUBCUTANEOUS at 05:44

## 2023-03-20 RX ADMIN — LEVOTHYROXINE SODIUM 100 MCG: 0.1 TABLET ORAL at 05:45

## 2023-03-20 RX ADMIN — VANCOMYCIN HYDROCHLORIDE 1000 MG: 1 INJECTION, POWDER, LYOPHILIZED, FOR SOLUTION INTRAVENOUS at 15:32

## 2023-03-20 RX ADMIN — INSULIN LISPRO 1 UNITS: 100 INJECTION, SOLUTION INTRAVENOUS; SUBCUTANEOUS at 17:05

## 2023-03-20 RX ADMIN — APIXABAN 2.5 MG: 2.5 TABLET, FILM COATED ORAL at 15:35

## 2023-03-20 RX ADMIN — AMIODARONE HYDROCHLORIDE 200 MG: 200 TABLET ORAL at 11:29

## 2023-03-20 RX ADMIN — SODIUM CHLORIDE, PRESERVATIVE FREE 10 ML: 5 INJECTION INTRAVENOUS at 11:30

## 2023-03-20 RX ADMIN — ATORVASTATIN CALCIUM 40 MG: 40 TABLET, FILM COATED ORAL at 20:16

## 2023-03-20 RX ADMIN — METOPROLOL TARTRATE 25 MG: 25 TABLET, FILM COATED ORAL at 11:30

## 2023-03-20 RX ADMIN — MIDODRINE HYDROCHLORIDE 10 MG: 5 TABLET ORAL at 11:30

## 2023-03-20 RX ADMIN — MEROPENEM 500 MG: 500 INJECTION, POWDER, FOR SOLUTION INTRAVENOUS at 16:49

## 2023-03-20 RX ADMIN — MICONAZOLE NITRATE: 2 POWDER TOPICAL at 20:17

## 2023-03-20 RX ADMIN — EPOETIN ALFA-EPBX 8000 UNITS: 4000 INJECTION, SOLUTION INTRAVENOUS; SUBCUTANEOUS at 08:41

## 2023-03-20 ASSESSMENT — PAIN SCALES - WONG BAKER
WONGBAKER_NUMERICALRESPONSE: 0

## 2023-03-20 ASSESSMENT — PAIN DESCRIPTION - DESCRIPTORS: DESCRIPTORS: DISCOMFORT

## 2023-03-20 ASSESSMENT — PAIN DESCRIPTION - LOCATION: LOCATION: CHEST;STERNUM

## 2023-03-20 ASSESSMENT — PAIN SCALES - GENERAL: PAINLEVEL_OUTOF10: 7

## 2023-03-20 NOTE — DISCHARGE SUMMARY
V2.0  Discharge Summary    Name:  Andrew Galindo /Age/Sex: 1950 (16 y.o. male)   Admit Date: 3/5/2023  Discharge Date: 3/20/23    MRN & CSN:  [id number] & [id number] Encounter Date and Time 3/20/23 12:55 PM EDT    Attending:  Juan Faith MD Discharging Provider: Juan Faith MD       Hospital Course:     Brief HPI: Andrew Galindo is a 68 y.o. male with pmh of afib on warfarin, COPD, DM, GERD, hypothyroidism, HLD,  s/p trach and PEG who presents with laboured breathing. Patient states that this started this morning. He has increased sputum production. He also stated that the colour of his sputum changed (normally it's clear and today it is brownish). He denies any fever, chills, chest pain, nausea, vomiting, abdominal pain, constipation, diarrhea, changes in urination. He does endorse mild SOB. He also states that he had a thoracentesis last week. Brief Problem Based Course:   1. Healthcare associated Pneumonia   -On trach  -With possible parapneumonic effusion  -History of multiple admissions for aspiration pneumonia secondary to dysphagia      -CXR 3/5 R pleural effusion with right basilar opacity suggesting atelectasis or infection. -S/p thora 3/8 exudative pleural effusion;   negative resp PCR. Tracheal aspirate culture negative. No leukocytosis, normal lactic acid level. Thora culture negative for growth at 72hours. IV cefepime has thus been discontinued. Likely etiology of hypoxia is the mucous plugging noted on the chest CT.  -Continue pulmonary toilet  -Pulmonology consulted. Status post bronchoscopy 3/14/2023. Bronchial aspirate growing MRSA, Pseudomonas and Candida albicans. ID consulted recommends vancomycin and cefepime for 2 weeks     2.  A-fib with RVR   -On warfarin and metoprolol 25 mg twice daily  - Continue metoprolol with holding parameters  - consulted cardiology who Started on amiodarone  -Patient has had maze procedure and atrial clipping done in the past for his A fib,

## 2023-03-20 NOTE — CARE COORDINATION
WHEN MEDICALLY READY FOR DISCHARGE-  COMPLETE DESMOND   RN/PHYSICIAN  COPY DESMOND - FAX - [phone]  ADD DESMOND TO PACKET  CALL REPORT -[phone]  CALL FAMILY   CALL TRANSPORT-SUPERIOR   Before 5 - [phone]  After 5 - [phone]

## 2023-03-20 NOTE — PROGRESS NOTES
03/20/2023       Objective:     Vitals: BP (!) 114/46   Pulse (!) 109   Temp 97.7 °F (36.5 °C) (Oral)   Resp 18   Ht 6' 2\" (1.88 m)   Wt 281 lb 15.5 oz (127.9 kg)   SpO2 96%   BMI 36.20 kg/m² ,    General appearance:   he was alert, awake in his room as well as in dialysis unit  HEENT:   he has at least 2+ conjunctival pallor but no overt scleral icterus  Neck:   he has tracheostomy- right IJ tunneled cuffed dialysis catheter  Lungs:   positive initial breath sound only anterior exam  Heart:   seemed irregular this morning, well-healed incision from previous sternotomy  Abdomen:  soft, percutaneous endoscopic gastrostomy is in place  Extremities:   bilateral edema      Problem List :         Impression :      end-stage kidney disease on maintenance dialysis- he will get dialysis and ultrafiltration today   underlying coronary artery disease recurrent pulmonary edema/pleural effusion and atherosclerotic cardiovascular disease   he also have diabetes and dysrhythmia   pneumonitis were suspected and has been treated    Recommendation/Plan  :      hemodialysis and ultrafiltration today- goal is to get his volume status and prevent pulmonary edema- with adequate ultrafiltration as he does not make any urine- he is of course  with tube feed- watch for iatrogenic and nosocomial complication- hopefully he can go back to nursing home soon- follow clinically- discontinue any nonessential medication if he is on any- i.e. deprescribed any unnecessary medication      Joy Cool MD MD

## 2023-03-20 NOTE — PROGRESS NOTES
Infectious Disease Progress Note  3/20/2023   Patient Name: Serena Sepulveda : 1950     Assessment  Bilateral pneumonia with bilateral pleural effusion  Bronchial culture positive for MRSA and P aeruginosa  Chronic respiratory failure  ESRD on HD  Comorbid conditions: Type 2 diabetes mellitus, thyroid disease, ESRD on HD, coronary artery disease status post PCI, paroxysmal atrial fibrillation on Coumadin, chronic tracheostomy and PEG tube     Plan  Therapeutic:   Continue vancomycin (end-date: 3/29/2023)  Discontinue cefepime   Start meropenem 500 mg IV daily for 14 days ( end-date: 2023)  Diagnostic: trend CRP and pct  F/u: Susceptibility testing of bronchial culture. Other:     Reason for visit: F/u bilateral pneumonia with bilateral pleural effusion  History:? Interval history noted  Denies n/v/d/f or untoward effects of antimicrobials  Physical Exam:  Vital Signs: BP (!) 114/56   Pulse 93   Temp 97.6 °F (36.4 °C) (Oral)   Resp 15   Ht 6' 2\" (1.88 m)   Wt 281 lb 15.5 oz (127.9 kg)   SpO2 92%   BMI 36.20 kg/m²     Gen: alert and oriented X3, no distress  Skin: no stigmata of endocarditis  Wounds: C/D/I  HEMT: AT/NC Oropharynx pink, moist, and without lesions or exudates; tracheostomy  Eyes: PERRLA, EOMI, conjunctiva pink, sclera anicteric. Neck: Supple. Trachea midline. No LAD. Chest: bibasilar crackles  Heart: RRR and no MRG. Abd: PEG tube,soft, non-distended, no tenderness, no hepatomegaly. Normoactive bowel sounds. Ext: no clubbing, cyanosis, or edema  Catheter Site: without erythema or tenderness  LDA: CVC:tunneled HD catheter,  Neuro: Mental status intact. CN 2-12 intact and no focal sensory or motor deficits     Radiologic / Imaging / TESTING  No results found.      Labs:    Recent Results (from the past 24 hour(s))   POCT Glucose    Collection Time: 23 12:15 PM   Result Value Ref Range    POC Glucose 238 (H) 70 - 99 MG/DL   POCT Glucose    Collection Time: 23  5:21 PM

## 2023-03-20 NOTE — PROGRESS NOTES
PHARMACY ANTICOAGULATION MONITORING SERVICE    Lincoln Slater is a 68 y.o. male on warfarin therapy for A fib. Pharmacy consulted by Dr. Karlee De La Fuente for monitoring and adjustment of treatment. Indication for anticoagulation: A fib  INR goal: 2-3  Warfarin dose prior to admission: 1 mg daily? Pertinent Laboratory Values   Recent Labs     03/20/23  0014   INR 0.99   HGB 8.5*   HCT 26.4*        INR MONITORING  Lab Results   Component Value Date/Time    INR 0.99 03/20/2023 12:14 AM    INR 0.98 03/19/2023 06:35 AM    INR 0.98 03/18/2023 05:38 AM    INR 0.98 03/17/2023 11:29 AM    INR 0.96 03/16/2023 09:34 AM       Assessment/Plan:  Drug Interactions: Amiodarone, aspirin, and Synthroid  Continuing sq hepatin for DVT prophylaxis  INR remained sub-therapeutic @ 0.99 today after 2 days of home dose warfarin 1mg and 2 days of 1.5mg daily  Will increase to warfarin 2mg daily  Past warfarin dosing unclear- dosing ranged from 0.5 to 2 mg daily with frequent supra therapeutic INRs. Pharmacy will continue to monitor and adjust warfarin therapy as indicated    Thank you for the consult.   Grant Dubois [facility]  3/20/2023 10:04 AM

## 2023-03-20 NOTE — PROGRESS NOTES
[address] [facility]  consulted by Dr. Marbella Morris for monitoring and adjustment. Indication for treatment: Vancomycin indication: HAP  Goal trough: Trough Goal: 15-20 mcg/mL  AUC/MT: 400-600    Risk Factors for MRSA Identified:   Documented isolation of MRSA within 1 year     Pertinent Laboratory Values:   Temp Readings from Last 3 Encounters:   03/20/23 97.7 °F (36.5 °C) (Oral)   02/24/23 98.2 °F (36.8 °C) (Oral)   02/17/23 97.5 °F (36.4 °C) (Oral)     Recent Labs     03/20/23  0014   WBC 6.1       Recent Labs     03/20/23  0014   BUN 53*   CREATININE 4.8*       Estimated Creatinine Clearance: 20 mL/min (A) (based on SCr of 4.8 mg/dL (H)). Intake/Output Summary (Last 24 hours) at 3/20/2023 1207  Last data filed at 3/20/2023 1130  Gross per 24 hour   Intake 1395 ml   Output 3300 ml   Net -1905 ml         Pertinent Cultures:   Date    Source    Results  03/14/23  Bronchial Culture  MRSA (Vanco MT 1), pseudomonas  03/15/23  Urine Antigens   To be collected    Vancomycin level:   TROUGH:  No results for input(s): VANCOTROUGH in the last 72 hours. RANDOM:    Recent Labs     03/19/23  0635 03/20/23  0014   VANCORANDOM 21.2 17.4     Ideal body weight: 82.2 kg (181 lb 3.5 oz)  Adjusted ideal body weight: 100.5 kg (221 lb 8.3 oz)      Assessment:  HPI: Pneumonia, patient with chronic trach, bronchial washing with MRSA and now pseudomonas  SCr, BUN, and urine output: ESRD on HD Monday, Wednesday, Friday  Day(s) of therapy: 6 of 14  Vancomycin concentration:   3/17/23 = 27 per HD  3/19 - 21.2, no dose today  3/20 - 17.4    Plan:  ESRD on HD:  intermittent dosing based on random levels  Pre-HD level 17.4- Will give vanco 1000mg IV x 1 post HD today and repeat level Wednesday AM pre HD.   If level Wednesday is reasonable will schedule MWF post HD vanco doses  Pharmacy will continue to monitor patient and adjust therapy as indicated    VANCOMYCIN CONCENTRATION SCHEDULED FOR 03/22/2023 @0600    Thank

## 2023-03-20 NOTE — PROCEDURES
Results: Susceptible <10    Order  Dialysis Bath  K+ (Potassium): 3  Ca+ (Calcium): 2.5  Na+ (Sodium): 138  HCO3 (Bicarb): 35  Bicarbonate Concentrate Lot No.: [id number]  Acid Concentrate Lot No.: [id number]     Na+ Modeling: Not Applicable  Dialyzer: V713  Dialysate Temperature (C):  35  Blood Flow Rate (BFR):  350   Dialysate Flow Rate (DFR):   600        Access to be Utilized   Access:  Tunneled Catheter  Location: Internal Jugular  Side: Right   Needle gauge:  Not Applicable  + Bruit/Thrill: Not Applicable    First Use X-ray Verified: Not Applicable  OK to use line order: Yes    Site Assessment:  Signs and Symptoms of Infection/Inflammation: None  If yes: Not Applicable  Dressing: Dry and Intact  Site Prep: Medical Aseptic Technique  Dressing Changed this Treatment: no    If yes, by whom: NA - not changed today  Date of Last Dressing Change: March 15, 2023  Antimicrobial Patch in place?: Yes  Red Alcohol Caps in place?: No  Gauze Dressing?: No  Non Dialysis Use?: No  Comment:    Flows: Good, Patent  If access problem, who was notified:     Pre and Post-Assessment  Patient Vitals for the past 8 hrs:   Level of Consciousness Oriented X Heart Rhythm Respiratory Quality/Effort O2 Device Bilateral Breath Sounds Skin Color Skin Condition/Temp Abdomen Inspection Bowel Sounds (All Quadrants) Edema Edema Generalized Comments   03/20/23 0600 -- -- -- -- Trach mask -- -- -- -- -- -- -- --   03/20/23 0744 0 4 Irregular Unlabored Trach mask Rhonchi Pink Warm Rounded Active Generalized Non-pitting alert and oriented, denies any pain at this time     Labs  Recent Labs     03/20/23  0014   WBC 6.1   HGB 8.5*   HCT 26.4*                                                                     Recent Labs     03/20/23  0014   *   K 4.4   CL 89*   CO2 26   BUN 53*   CREATININE 4.8*   GLUCOSE 181*     IV Drips and Rate/Dose   dextrose      sodium chloride 25 mL (03/17/23 1304)      Safety - Before each treatment:   Dialysis Treatment: Good  Patient Response to Treatment: tolerates tx well  Physician Notified: No       Provider Notification        Handoff complete and report given to Primary RN at 1050 hours.   Primary RN (First Initial, Last Name, Title):  thaddeus harley     Education  Person Educated: Patient   Knowledge Base: Substantial  Barriers to Learning?: None  Preferred method of Learning: Hands-on  Topic(s): Access Care   Teaching Tools: Explanation   Response to Education: Verbalized Understanding and Requires Follow-up     Electronically signed by Yahir Chapman RN on 3/20/2023 at 10:53 AM

## 2023-03-20 NOTE — PROGRESS NOTES
pulmonary      SUBJECTIVE:  seen early am and comfortable breathing     OBJECTIVE    VITALS:  BP (!) 114/46   Pulse (!) 109   Temp 97.7 °F (36.5 °C) (Oral)   Resp 18   Ht 6' 2\" (1.88 m)   Wt 281 lb 15.5 oz (127.9 kg)   SpO2 96%   BMI 36.20 kg/m²   HEAD AND FACE EXAM:  No throat injection, no active exudate,no thrush  NECK EXAM;No JVD, no masses, symmetrical  CHEST EXAM; Expansion equal and symmetrical, no masses  LUNG EXAM; Good breath sounds bilaterally. There are expiratory wheezes both lungs, there are crackles at both lung bases  CARDIOVASCULAR EXAM: Positive S1 and S2, no S3 or S4, no clicks ,no murmurs  RIGHT AND LEFT LOWER EXTRIMITY EXAM: No edema, no swelling,           LABS   Lab Results   Component Value Date    WBC 6.1 03/20/2023    HGB 8.5 (L) 03/20/2023    HCT 26.4 (L) 03/20/2023    .9 (H) 03/20/2023     03/20/2023     Lab Results   Component Value Date    CREATININE 4.8 (H) 03/20/2023    BUN 53 (H) 03/20/2023     (L) 03/20/2023    K 4.4 03/20/2023    CL 89 (L) 03/20/2023    CO2 26 03/20/2023     Lab Results   Component Value Date    INR 0.99 03/20/2023    PROTIME 12.8 03/20/2023          Lab Results   Component Value Date/Time    PHOS 4.7 03/20/2023 12:14 AM    PHOS 2.9 03/08/2023 01:53 PM    PHOS 4.2 03/07/2023 06:32 AM      No results for input(s): PH, PO2ART, [id number], HCO3, BEART, O2SAT in the last 72 hours.       Wt Readings from Last 3 Encounters:   03/20/23 281 lb 15.5 oz (127.9 kg)   02/24/23 274 lb 11.1 oz (124.6 kg)   02/17/23 289 lb 3.9 oz (131.2 kg)               ASSESMENT  Ac on ch resp failure  Pneumonia  Pulm atx resolved  Ac copd  Right pl effusion,s/p tap        PLAN  Bd rx  O2 adm  Trach care    3/20/2023  Scot Ellis MD, M.D.

## 2023-03-20 NOTE — PROGRESS NOTES
03/20/23 1018   Encounter Summary   Encounter Overview/Reason  Initial Encounter   Service Provided For: Patient not available   Referral/Consult From: [address] Family members   Last Encounter  03/20/23  (Patient out of room; silent prayer given.)   Complexity of Encounter Low   Begin Time 1011   End Time  1021   Total Time Calculated 10 min   Encounter    Type Initial Screen/Assessment   Spiritual/Emotional needs   Type Spiritual Support   Assessment/Intervention/Outcome   Assessment Unable to assess   Plan and Referrals   Plan/Referrals Continue to visit, (comment)  (Visit as needed)

## 2023-03-21 VITALS
HEART RATE: 92 BPM | SYSTOLIC BLOOD PRESSURE: 130 MMHG | DIASTOLIC BLOOD PRESSURE: 57 MMHG | TEMPERATURE: 98.2 F | HEIGHT: 74 IN | WEIGHT: 279.32 LBS | OXYGEN SATURATION: 94 % | BODY MASS INDEX: 35.85 KG/M2 | RESPIRATION RATE: 19 BRPM

## 2023-03-21 LAB
AFB SMEAR: NORMAL
CULTURE: NORMAL
GLUCOSE BLD-MCNC: 184 MG/DL (ref 70–99)
GLUCOSE BLD-MCNC: 219 MG/DL (ref 70–99)
GLUCOSE BLD-MCNC: 225 MG/DL (ref 70–99)
INR BLD: 1.25 INDEX
Lab: NORMAL
PROTHROMBIN TIME: 16.1 SECONDS (ref 11.7–14.5)
SARS-COV-2 RDRP RESP QL NAA+PROBE: NOT DETECTED
SOURCE: NORMAL
SPECIMEN: NORMAL

## 2023-03-21 PROCEDURE — 85610 PROTHROMBIN TIME: CPT

## 2023-03-21 PROCEDURE — 2580000003 HC RX 258: Performed by: INTERNAL MEDICINE

## 2023-03-21 PROCEDURE — 99232 SBSQ HOSP IP/OBS MODERATE 35: CPT | Performed by: INTERNAL MEDICINE

## 2023-03-21 PROCEDURE — 94640 AIRWAY INHALATION TREATMENT: CPT

## 2023-03-21 PROCEDURE — 94761 N-INVAS EAR/PLS OXIMETRY MLT: CPT

## 2023-03-21 PROCEDURE — 6370000000 HC RX 637 (ALT 250 FOR IP): Performed by: INTERNAL MEDICINE

## 2023-03-21 PROCEDURE — 6370000000 HC RX 637 (ALT 250 FOR IP)

## 2023-03-21 PROCEDURE — 6360000002 HC RX W HCPCS: Performed by: INTERNAL MEDICINE

## 2023-03-21 PROCEDURE — 6370000000 HC RX 637 (ALT 250 FOR IP): Performed by: STUDENT IN AN ORGANIZED HEALTH CARE EDUCATION/TRAINING PROGRAM

## 2023-03-21 PROCEDURE — 87635 SARS-COV-2 COVID-19 AMP PRB: CPT

## 2023-03-21 PROCEDURE — 2700000000 HC OXYGEN THERAPY PER DAY

## 2023-03-21 PROCEDURE — 82962 GLUCOSE BLOOD TEST: CPT

## 2023-03-21 PROCEDURE — 36415 COLL VENOUS BLD VENIPUNCTURE: CPT

## 2023-03-21 RX ADMIN — LEVOTHYROXINE SODIUM 100 MCG: 0.1 TABLET ORAL at 05:13

## 2023-03-21 RX ADMIN — AMIODARONE HYDROCHLORIDE 200 MG: 200 TABLET ORAL at 08:26

## 2023-03-21 RX ADMIN — METOPROLOL TARTRATE 25 MG: 25 TABLET, FILM COATED ORAL at 08:26

## 2023-03-21 RX ADMIN — IPRATROPIUM BROMIDE AND ALBUTEROL SULFATE 1 AMPULE: 2.5; .5 SOLUTION RESPIRATORY (INHALATION) at 07:12

## 2023-03-21 RX ADMIN — MICONAZOLE NITRATE: 2 POWDER TOPICAL at 08:44

## 2023-03-21 RX ADMIN — LANSOPRAZOLE 30 MG: KIT at 05:14

## 2023-03-21 RX ADMIN — INSULIN LISPRO 1 UNITS: 100 INJECTION, SOLUTION INTRAVENOUS; SUBCUTANEOUS at 05:14

## 2023-03-21 RX ADMIN — APIXABAN 2.5 MG: 2.5 TABLET, FILM COATED ORAL at 08:26

## 2023-03-21 RX ADMIN — ACETYLCYSTEINE 600 MG: 200 SOLUTION ORAL; RESPIRATORY (INHALATION) at 07:14

## 2023-03-21 RX ADMIN — SODIUM CHLORIDE, PRESERVATIVE FREE 10 ML: 5 INJECTION INTRAVENOUS at 08:27

## 2023-03-21 RX ADMIN — ASPIRIN 81 MG CHEWABLE TABLET 81 MG: 81 TABLET CHEWABLE at 08:26

## 2023-03-21 RX ADMIN — MIDODRINE HYDROCHLORIDE 10 MG: 5 TABLET ORAL at 08:25

## 2023-03-21 NOTE — PROGRESS NOTES
Joe faxed to [phone] per case management instructions.  Packet printed and to be sent with transport

## 2023-03-21 NOTE — PROGRESS NOTES
Chief Complaint: Other     Patient seen and examined at bedside this morning. Feels about the same, c/o some dyspnea. Objective: Intake/Output Summary (Last 24 hours) at 3/21/2023 1363  Last data filed at 3/21/2023 0400  Gross per 24 hour   Intake 1780 ml   Output 3550 ml   Net -1770 ml          Vitals:   Vitals:    03/21/23 0714   BP:    Pulse: (!) 107   Resp: 16   Temp:    SpO2: 96%       Physical Exam:     General: NAD  Eyes: EOMI  ENT: Trach collar   cardiovascular: Regular  Respiratory: Bilateral ronchi  Gastrointestinal: Soft, non tender, PEG tube in place  Genitourinary: no suprapubic tenderness  Musculoskeletal: No edema  Skin: warm, dry  Neuro: Alert. Psych: Mood appropriate.      Medications:   Medications:    epoetin jessenia-epbx  8,000 Units IntraVENous Once per day on Mon Wed Fri    apixaban  2.5 mg Oral BID    meropenem  500 mg IntraVENous Q24H    ipratropium-albuterol  1 ampule Inhalation TID    melatonin  3 mg Oral Once    vancomycin (VANCOCIN) intermittent dosing (placeholder)   Other RX Placeholder    miconazole   Topical BID    scopolamine  1 patch TransDERmal Q72H    midodrine  10 mg Oral TID WC    amiodarone  200 mg Oral Daily    insulin lispro  0-4 Units SubCUTAneous Q6H    sodium chloride flush  5-40 mL IntraVENous 2 times per day    aspirin  81 mg Per NG tube Daily    atorvastatin  40 mg Oral Nightly    insulin glargine  10 Units SubCUTAneous Nightly    lansoprazole  30 mg Oral QAM AC    levothyroxine  100 mcg Per G Tube Daily    metoprolol tartrate  25 mg Per G Tube BID    QUEtiapine  50 mg Oral Nightly    traZODone  100 mg Oral Nightly    acetylcysteine  600 mg Inhalation BID      Infusions:    dextrose      sodium chloride 25 mL (03/17/23 1304)     PRN Meds: potassium chloride, 40 mEq, PRN   Or  potassium alternative oral replacement, 40 mEq, PRN   Or  potassium chloride, 10 mEq, PRN  magnesium sulfate, 2,000 mg, PRN  glucose, 4 tablet, PRN  dextrose bolus, 125 mL, PRN Or  dextrose bolus, 250 mL, PRN  glucagon (rDNA), 1 mg, PRN  dextrose, 1,000 mL, Continuous PRN  sodium chloride flush, 5-40 mL, PRN  sodium chloride, , PRN  ondansetron, 4 mg, Q8H PRN   Or  ondansetron, 4 mg, Q6H PRN  polyethylene glycol, 17 g, Daily PRN  acetaminophen, 650 mg, Q6H PRN   Or  acetaminophen, 650 mg, Q6H PRN  sennosides-docusate sodium, 2 tablet, Daily PRN      Labs      Recent Results (from the past 24 hour(s))   POCT Glucose    Collection Time: 03/20/23 11:48 AM   Result Value Ref Range    POC Glucose 186 (H) 70 - 99 MG/DL   POCT Glucose    Collection Time: 03/20/23  5:00 PM   Result Value Ref Range    POC Glucose 203 (H) 70 - 99 MG/DL   POCT Glucose    Collection Time: 03/20/23  8:06 PM   Result Value Ref Range    POC Glucose 193 (H) 70 - 99 MG/DL   POCT Glucose    Collection Time: 03/21/23 12:20 AM   Result Value Ref Range    POC Glucose 184 (H) 70 - 99 MG/DL   POCT Glucose    Collection Time: 03/21/23  5:05 AM   Result Value Ref Range    POC Glucose 219 (H) 70 - 99 MG/DL        Imaging/Diagnostics Last 24 Hours   No results found.     Electronically signed by Ming Craig MD on 3/21/2023 at 7:33 AM

## 2023-03-21 NOTE — PLAN OF CARE
baseline bowel function  Outcome: Progressing  Goal: Maintains adequate nutritional intake  Outcome: Progressing

## 2023-03-21 NOTE — PROGRESS NOTES
Infectious Disease Progress Note  3/21/2023   Patient Name: Gloria Scott : 1950     Assessment  Bilateral pneumonia with bilateral pleural effusion  Chronic tracheostomy to trach mask  Continues to produce phlegm  Bronchial culture positive for MRSA and P aeruginosa  S/p right sided thoracentesis on 3/17/2023; 600 mL was removed. Transudate  Cefepime was discontinued on 3/20/2023, since the Pseudomonas aeruginosa displayed intermediate susceptibility to cefepime  Chronic respiratory failure  ESRD on HD  Comorbid conditions: Type 2 diabetes mellitus, thyroid disease, ESRD on HD, coronary artery disease status post PCI, paroxysmal atrial fibrillation on Coumadin, chronic tracheostomy and PEG tube     Plan  Therapeutic:   Continue vancomycin post-HD to completed a 14 day course(end-date: 3/29/2023)  continue meropenem 500 mg IV daily for 14 days ( end-date: 2023)  After discharge the following should be done:  Weekly labs drawn on Monday during the course of treatment  CBC with differential, CMP, ESR, CRP,Vancomycin Trough  IV vancomycin pharmacy to dose  Cathflo for blocked vascular access as needed  Fax results to Attn: [facility] Infectious Diseases Staff  # [phone]  See in clinic within one week after discharge  Disposition: [facility]  Diagnostic:   F/u:  Other:     Reason for visit: F/u bilateral pneumonia with bilateral pleural effusion  History:? Interval history noted  Denies n/v/d/f or untoward effects of antimicrobials  Physical Exam:  Vital Signs: BP (!) 130/57   Pulse 92   Temp 98.2 °F (36.8 °C) (Oral)   Resp 19   Ht 6' 2\" (1.88 m)   Wt 279 lb 5.2 oz (126.7 kg)   SpO2 94%   BMI 35.86 kg/m²     Gen: alert and oriented X3, no distress  Skin: no stigmata of endocarditis  Wounds: C/D/I  HEMT: AT/NC Oropharynx pink, moist, and without lesions or exudates; tracheostomy  Eyes: PERRLA, EOMI, conjunctiva pink, sclera anicteric. Neck: Supple. Trachea midline. No LAD.   Chest: bibasilar both lungs due to infectious organism    Severe malnutrition (HCC)    Chronic respiratory failure with hypoxia (HCC)    Aspiration pneumonia of right lower lobe (HCC)    ESRD on dialysis ([address]    Aspiration pneumonia, unspecified aspiration pneumonia type, unspecified laterality, unspecified part of lung ([address]    Acute aspiration pneumonia ([address]    Acute on chronic respiratory failure with hypoxia ([address]    Healthcare-associated pneumonia    Acute respiratory failure with hypoxemia ([address]       Active Problems  Principal Problem:    Healthcare-associated pneumonia  Active Problems:    Acute respiratory failure with hypoxemia (HCC)  Resolved Problems:    * No resolved hospital problems.  *              Electronically signed by: Electronically signed by Scarlet Robbins MD on 3/21/2023 at 9:22 AM

## 2023-03-21 NOTE — PROGRESS NOTES
Called family to notify that pt was being discharged and transport would be taking him back to [facility] it went straight to voicemail and voicemail was left

## 2023-03-21 NOTE — PROGRESS NOTES
appearance: He was alert awake, without any acute distress  HEENT: At least 2+ conjunctival pallor, no scleral icterus  Neck: Right IJ tunneled cuffed dialysis catheter-tracheostomy  Lungs: Positive adventitious breath sound only anterior exam  Heart: Irregular-well-healed incision from previous sternotomy  Abdomen: Soft, PEG in place  Extremities: Bilateral leg edema, super hyperpigmented skin of legs      Problem List :         Impression :     End-stage kidney disease on maintenance dialysis-tolerated dialysis and ultrafiltration well  He is low sodium mainly from excess total body water-expected to come up with ultrafiltration  Getting treatment for presumed pneumonitis with underlying diabetes, dysrhythmia, atherosclerotic cardiovascular disease    Recommendation/Plan  :     Okay to discharge from kidney standpoint to nursing home-if he stays today we will dialyze tomorrow with good ultrafiltration-the goal would be to avoid pulmonary edema and get him close to normovolemia-control of his comorbid disease-watch for iatrogenic and nosocomial complication-good glycemic control.   He is still with multiple antimicrobial agents-follow clinically      Joy Cool MD MD

## 2023-03-21 NOTE — PROGRESS NOTES
[address] [facility]  consulted by Dr. Mikaela Franco for monitoring and adjustment. Indication for treatment: Vancomycin indication: HAP  Goal trough: Trough Goal: 15-20 mcg/mL  AUC/MT: 400-600    Risk Factors for MRSA Identified:   Documented isolation of MRSA within 1 year     Pertinent Laboratory Values:   Temp Readings from Last 3 Encounters:   03/21/23 98.2 °F (36.8 °C) (Oral)   02/24/23 98.2 °F (36.8 °C) (Oral)   02/17/23 97.5 °F (36.4 °C) (Oral)     Recent Labs     03/20/23  0014   WBC 6.1       Recent Labs     03/20/23  0014   BUN 53*   CREATININE 4.8*       Estimated Creatinine Clearance: 20 mL/min (A) (based on SCr of 4.8 mg/dL (H)). Intake/Output Summary (Last 24 hours) at 3/21/2023 2219  Last data filed at 3/21/2023 0400  Gross per 24 hour   Intake 1780 ml   Output 3550 ml   Net -1770 ml         Pertinent Cultures:   Date    Source    Results  03/14/23  Bronchial Culture  MRSA (Vanco MT 1), pseudomonas  03/15/23  Urine Antigens   To be collected    Vancomycin level:   TROUGH:  No results for input(s): VANCOTROUGH in the last 72 hours. RANDOM:    Recent Labs     03/19/23  0635 03/20/23  0014   VANCORANDOM 21.2 17.4     Ideal body weight: 82.2 kg (181 lb 3.5 oz)  Adjusted ideal body weight: 100 kg (220 lb 7.4 oz)      Assessment:  HPI: Pneumonia, patient with chronic trach, bronchial washing with MRSA and now pseudomonas  SCr, BUN, and urine output: ESRD on HD Monday, Wednesday, Friday  Day(s) of therapy: 7 of 14  Vancomycin concentration:   3/17/23 = 27 per HD  3/19 - 21.2, no dose today  3/20 - 17.4    Plan:  ESRD on HD:  intermittent dosing based on random levels  If level Wednesday is reasonable will schedule MWF post HD vanco doses  Pharmacy will continue to monitor patient and adjust therapy as indicated    Kedar 3 03/22/2023 @0600    Thank you for the consult.   Danielle Pallas, [facility]  3/21/2023 9:39 AM

## 2023-03-21 NOTE — PROGRESS NOTES
pulmonary      SUBJECTIVE:  no sob     OBJECTIVE    VITALS:  /72   Pulse 96   Temp 98.2 °F (36.8 °C) (Oral)   Resp 16   Ht 6' 2\" (1.88 m)   Wt 279 lb 5.2 oz (126.7 kg)   SpO2 92%   BMI 35.86 kg/m²   HEAD AND FACE EXAM:  No throat injection, no active exudate,no thrush  NECK EXAM;No JVD, no masses, symmetrical  CHEST EXAM; Expansion equal and symmetrical, no masses  LUNG EXAM; Good breath sounds bilaterally. There are expiratory wheezes both lungs, there are crackles at both lung bases  CARDIOVASCULAR EXAM: Positive S1 and S2, no S3 or S4, no clicks ,no murmurs  RIGHT AND LEFT LOWER EXTRIMITY EXAM: No edema, no swelling, no inflamation  CNS EXAM: Alert and oriented X3          LABS   Lab Results   Component Value Date    WBC 6.1 03/20/2023    HGB 8.5 (L) 03/20/2023    HCT 26.4 (L) 03/20/2023    .9 (H) 03/20/2023     03/20/2023     Lab Results   Component Value Date    CREATININE 4.8 (H) 03/20/2023    BUN 53 (H) 03/20/2023     (L) 03/20/2023    K 4.4 03/20/2023    CL 89 (L) 03/20/2023    CO2 26 03/20/2023     Lab Results   Component Value Date    INR 0.99 03/20/2023    PROTIME 12.8 03/20/2023          Lab Results   Component Value Date/Time    PHOS 4.7 03/20/2023 12:14 AM    PHOS 2.9 03/08/2023 01:53 PM    PHOS 4.2 03/07/2023 06:32 AM      No results for input(s): PH, PO2ART, MDK6RJO, HCO3, BEART, O2SAT in the last 72 hours.       Wt Readings from Last 3 Encounters:   03/21/23 279 lb 5.2 oz (126.7 kg)   02/24/23 274 lb 11.1 oz (124.6 kg)   02/17/23 289 lb 3.9 oz (131.2 kg)               ASSESMENT  Ac on ch resp failure  Pneumonia  Right pulm atx resolved  Ac copd  Right pl effusion,s/p tap        PLAN  Cpm  Awaits placement    3/21/2023  Enrike Parsons MD, M.D.

## 2023-03-21 NOTE — CARE COORDINATION
CM collaborated with nursing regarding time for discharge. CM called J Carlos Major with [facility] and left a VM. Envelope given to nursing. CM called Ally at [address]. 1521 pt set up transport at 10 AM per [facility]. CM updated  Ally at [address]. CM called pts brotherCarlos Eduardo and let him know of discharge and transport time. [address] [facility] CM received call back from pts brother. CM discussed discharge plans. Brother remains in agreement of discharge plan.  [address]

## 2023-03-21 NOTE — CONSULTS
Tunneled Hemodialysis Catheter dressing changed per protocol. Patient tolerated well. Please consult IV/PICC Team if patient's needs change.

## 2023-03-21 NOTE — DISCHARGE SUMMARY
V2.0  Discharge Summary    Name:  Ara Land /Age/Sex: 1950 (50 y.o. male)   Admit Date: 3/5/2023  Discharge Date: 3/20/23    MRN & CSN:  [id number] & [id number] Encounter Date and Time 3/20/23 12:55 PM EDT    Attending:  Janie Hopper MD Discharging Provider: Janie Hopper MD       Hospital Course:     Brief HPI: Ara Land is a 68 y.o. male with pmh of afib on warfarin, COPD, DM, GERD, hypothyroidism, HLD,  s/p trach and PEG who presents with laboured breathing. Patient states that this started this morning. He has increased sputum production. He also stated that the colour of his sputum changed (normally it's clear and today it is brownish). He denies any fever, chills, chest pain, nausea, vomiting, abdominal pain, constipation, diarrhea, changes in urination. He does endorse mild SOB. He also states that he had a thoracentesis last week. Brief Problem Based Course:   1. Healthcare associated Pneumonia   -On trach  -With possible parapneumonic effusion  -History of multiple admissions for aspiration pneumonia secondary to dysphagia      -CXR 3/5 R pleural effusion with right basilar opacity suggesting atelectasis or infection. -S/p thora 3/8 exudative pleural effusion;   negative resp PCR. Tracheal aspirate culture negative. No leukocytosis, normal lactic acid level. Thora culture negative for growth at 72hours. IV cefepime has thus been discontinued. Likely etiology of hypoxia is the mucous plugging noted on the chest CT.  -Continue pulmonary toilet  -Pulmonology consulted. Status post bronchoscopy 3/14/2023. Bronchial aspirate growing MRSA, Pseudomonas and Candida albicans. ID consulted recommends vancomycin and cefepime for 2 weeks     2.  A-fib with RVR   -On warfarin and metoprolol 25 mg twice daily  - Continue metoprolol with holding parameters  - consulted cardiology who Started on amiodarone  -Patient has had maze procedure and atrial clipping done in the past for his A fib, discussed with cardiology regarding Anticoagulation, his past warfarin dosage was very unclear, also not known if he was supposed to be on warfarin or not. Discussed with cardiology Dr. Nicole He regarding the same and he recommends discharge on eliquis 2.5 mg PO BID dosing. 3. Elevated troponin, likely in the setting of demand ischemia, ESRD  -EKG without acute ischemic changes  -Cardiology on board. No intervention required     4. ESRD. On HD. Nephro consulted, HD M-W-F     5. Coronary artery disease   -status post three-vessel CABG in September 2022  -Postprocedure complications include mediastinal infection requiring multiple washouts with IABP placement and transfer to [facility] for further management where he underwent washout and closure and removal of IABP. Tracheostomy and PEG dependence since CABG. 6. History of chronic trach and PEG:   -Dietitian following. CT cervical 2/14/2023 showed prominent anterior osteophytes C3-C4, C6-C7 sufficient size to cause swallowing difficulties per radiology report. - Pt will need referral to nsx outpatient for cervical surgery. Attempted to transfer patient to [facility] as per their wishes however [facility] neurosurgery states he does needed urgent intervention at this time and would see them outpatient. - consider consulting nsx if pt stayed past 3/11 as a service currently unavailable. - consult SLP        7. COPD, not in exacerbation  -Breathing treatments as needed     8. Type 2 diabetes  -On home lantus 25U nightly   -Continue SSI  -Hypoglycemic protocol     9. GERD  -Continue lansoprazole      10. Hypothyroidism  -Continue levothyroxine         The patient expressed appropriate understanding of, and agreement with the discharge recommendations, medications, and plan.      Consults this admission:  IP CONSULT TO NEPHROLOGY  PHARMACY TO DOSE WARFARIN  IP CONSULT TO INTERVENTIONAL RADIOLOGY  IP CONSULT TO DIETITIAN  IP CONSULT TO PULMONOLOGY  IP CONSULT TO

## 2023-03-22 NOTE — ADT AUTH CERT
8:24 AM EDT by London Haider      wbc 6.1    (X) Incentive spirometry    3/22/2023 8:24 AM EDT by Alycia Porter      routine    * Milestone   Additional Notes   DATE: 3/20/2023       PERTINENT UPDATES:    -patient complains of some dyspnea   -patient is noted to be tahcypneic and tachycardic   -continued iv antibiotic   -with scheduled dialysis   -desaturating Spo2 seen           VITALS:   T 98.4 (36.9)   RR 20 21 24 23 22     109 116 108 101   /78 197/70    Spo2 73% on 6L via trach mask   SpO2 85% on 6L via trach mask   FiO2 28%       ABNL/PERTINENT LABS/RADIOLOGY/DIAGNOSTIC STUDIES:    rbc 2.47 (L0   hgb 8.5 (L)   hct 26.4 (L)   sodium 124 (L)   chloride 89 (L)   BUN 53 (H)   Crea 4.8 (H)   Est., Glom filt rate 12 (L)   Glucose 181 (H)   Albumin 3.3 (L)   Total Protein 5.6 (L)   AST 11 (L)   Alkaline Phosphatase 222 (H)   POC Glucose 204 (H) 203 (H)       PHYSICAL EXAM:    cardiovascular: Regular   Respiratory: Bilateral ronchi   Gastrointestinal: Soft, non tender, PEG tube in place         MD CONSULTS/ASSESSMENT AND PLAN:    IM Notes:   1. Pneumonia    -On trach   -With possible parapneumonic effusion   -History of multiple admissions for aspiration pneumonia secondary to dysphagia     -Pulmonology consulted. Status post bronchoscopy 3/14/2023. Bronchial aspirate growing MRSA, Pseudomonas and Candida albicans. Sensitivities pending. ID consulted   -Repeat chest x-ray showed bilateral pleural effusions. Status post thoracentesis for right pleural effusion with removal of 600 cc of fluid. On vancomycin and cefepime for 2 weeks    2. A-fib with RVR    -On warfarin and metoprolol 25 mg twice daily   - Continue metoprolol with holding parameters   - consulted cardiology given soft blood pressures.   Started on amiodarone   -Patient has had maze procedure and atrial clipping done in the past for his A fib, discussed with cardiology regarding Anticoagulation, his past warfarin dosage was very fluid was removed         VITALS:   BP   : 128/72 133/95; 103/50 (MAP 65)   UT   : 100   RR   : 19-25   Temp : 99   SpO2 : 87% on 4.5-6L O2 at 28 FiO2 per trach collar/trach mask          ABNL/PERTINENT LABS/RADIOLOGY/DIAGNOSTIC STUDIES:   POC Glucose: 149-213 (H)      XR CHEST PORTABLE    Impression   Stable chest.      IR GUIDED THORACENTESIS PLEURAL    Impression   Successful ultrasound guided right thoracentesis with specimen sent for laboratory evaluation. PHYSICAL EXAM:   Neck: Positive trach   LUNG EXAM; There are expiratory wheezes both lungs, there are crackles at both lung bases   Extremities: edema trace         MD CONSULTS/ASSESSMENT AND PLAN:   PULMO NOTES   ASSESMENT   Ac on ch resp failure   Pulm atx sec to mucus plugging,s/p bronch and cleanout second time   Pneumonia   Copd   [address] pl effusion       PLAN   1. Cpm   2. Cont o2   3. Ordered georgi pl taps         NEPHRO NOTES    Assessment and Plan:    IMP:   #1 end-stage renal disease on dialysis Monday Wednesday Friday   #2 possible pneumonia with respiratory secretion with trach   #3  Hyponatremia   #4 COPD likely pleural effusion   #5 type 2 diabetes   #6 status post CABG with complication requiring trach and PEG lives in a nursing home/depression       Plan   #1 doing dialysis next on Friday   #2 status post bronc plan on thoracentesis pulmonary toilet as able   #3 sodium low prior to dialysis follow-up repeat labs in the morning   #4 monitor oxygenation stable   #5 monitor glucose control   #6 cardiac status stable maintain with trach and PEG   Overall supportive care discharge planning when cleared by pulmonology unfortunately due to the path for the cervical spine patient will likely rehab recurrent risk of aspiration and increase pulmonary secretions is my opinion over the long-term plan and will succeed will be some type of cervical surgery which patient be at high risk for will need to be done at [facility].    Follow-up         IM NOTES

## 2023-03-27 ENCOUNTER — APPOINTMENT (OUTPATIENT)
Dept: GENERAL RADIOLOGY | Age: 73
End: 2023-03-27
Attending: STUDENT IN AN ORGANIZED HEALTH CARE EDUCATION/TRAINING PROGRAM
Payer: COMMERCIAL

## 2023-03-27 ENCOUNTER — HOSPITAL ENCOUNTER (INPATIENT)
Age: 73
LOS: 32 days | Discharge: NURSING, REHAB OR LONG-TERM CARE | End: 2023-04-28
Attending: STUDENT IN AN ORGANIZED HEALTH CARE EDUCATION/TRAINING PROGRAM | Admitting: STUDENT IN AN ORGANIZED HEALTH CARE EDUCATION/TRAINING PROGRAM
Payer: COMMERCIAL

## 2023-03-27 DIAGNOSIS — N18.6 ESRD ON DIALYSIS (HCC): Primary | ICD-10-CM

## 2023-03-27 DIAGNOSIS — Z99.2 ESRD ON DIALYSIS (HCC): Primary | ICD-10-CM

## 2023-03-27 PROBLEM — J18.9 HAP (HOSPITAL-ACQUIRED PNEUMONIA): Status: ACTIVE | Noted: 2023-03-27

## 2023-03-27 PROBLEM — J96.90 RESPIRATORY FAILURE (HCC): Status: ACTIVE | Noted: 2023-03-27
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
## 2023-03-27 LAB
B PARAP IS1001 DNA NPH QL NAA+NON-PROBE: NOT DETECTED
B PERT.PT PRMT NPH QL NAA+NON-PROBE: NOT DETECTED
BASE EXCESS: 3 (ref 0–3.3)
C PNEUM DNA NPH QL NAA+NON-PROBE: NOT DETECTED
CARBON MONOXIDE, BLOOD: 3 % (ref 0–5)
CO2 CONTENT: 28 MMOL/L (ref 19–24)
COMMENT: ABNORMAL
DOSE AMOUNT: NORMAL
DOSE TIME: NORMAL
FLUAV H1 2009 PAN RNA NPH NAA+NON-PROBE: NOT DETECTED
FLUAV H1 RNA NPH QL NAA+NON-PROBE: NOT DETECTED
FLUAV H3 RNA NPH QL NAA+NON-PROBE: NOT DETECTED
FLUAV RNA NPH QL NAA+NON-PROBE: NOT DETECTED
FLUBV RNA NPH QL NAA+NON-PROBE: NOT DETECTED
GLUCOSE BLD-MCNC: 151 MG/DL (ref 70–99)
GLUCOSE BLD-MCNC: 167 MG/DL (ref 70–99)
GLUCOSE BLD-MCNC: 298 MG/DL (ref 70–99)
HADV DNA NPH QL NAA+NON-PROBE: NOT DETECTED
HCO3 ARTERIAL: 26.1 MMOL/L (ref 18–23)
HCOV 229E RNA NPH QL NAA+NON-PROBE: NOT DETECTED
HCOV HKU1 RNA NPH QL NAA+NON-PROBE: NOT DETECTED
HCOV NL63 RNA NPH QL NAA+NON-PROBE: NOT DETECTED
HCOV OC43 RNA NPH QL NAA+NON-PROBE: NOT DETECTED
HMPV RNA NPH QL NAA+NON-PROBE: NOT DETECTED
HPIV1 RNA NPH QL NAA+NON-PROBE: NOT DETECTED
HPIV2 RNA NPH QL NAA+NON-PROBE: NOT DETECTED
HPIV3 RNA NPH QL NAA+NON-PROBE: NOT DETECTED
HPIV4 RNA NPH QL NAA+NON-PROBE: NOT DETECTED
M PNEUMO DNA NPH QL NAA+NON-PROBE: NOT DETECTED
METHEMOGLOBIN ARTERIAL: 1.6 %
O2 SATURATION: 93.4 % (ref 96–97)
PCO2 ARTERIAL: 61 MMHG (ref 32–45)
PH BLOOD: 7.24 (ref 7.34–7.45)
PO2 ARTERIAL: 93 MMHG (ref 75–100)
RSV RNA NPH QL NAA+NON-PROBE: NOT DETECTED
RV+EV RNA NPH QL NAA+NON-PROBE: NOT DETECTED
SARS-COV-2 RNA NPH QL NAA+NON-PROBE: NOT DETECTED
VANCOMYCIN RANDOM: 37.4 UG/ML

## 2023-03-27 PROCEDURE — 87077 CULTURE AEROBIC IDENTIFY: CPT

## 2023-03-27 PROCEDURE — 2700000000 HC OXYGEN THERAPY PER DAY

## 2023-03-27 PROCEDURE — 6370000000 HC RX 637 (ALT 250 FOR IP): Performed by: NURSE PRACTITIONER

## 2023-03-27 PROCEDURE — 87040 BLOOD CULTURE FOR BACTERIA: CPT

## 2023-03-27 PROCEDURE — 87181 SC STD AGAR DILUTION PER AGT: CPT

## 2023-03-27 PROCEDURE — 2580000003 HC RX 258: Performed by: STUDENT IN AN ORGANIZED HEALTH CARE EDUCATION/TRAINING PROGRAM

## 2023-03-27 PROCEDURE — 6360000002 HC RX W HCPCS: Performed by: NURSE PRACTITIONER

## 2023-03-27 PROCEDURE — 87070 CULTURE OTHR SPECIMN AEROBIC: CPT

## 2023-03-27 PROCEDURE — 71045 X-RAY EXAM CHEST 1 VIEW: CPT

## 2023-03-27 PROCEDURE — 94640 AIRWAY INHALATION TREATMENT: CPT

## 2023-03-27 PROCEDURE — 2000000000 HC ICU R&B

## 2023-03-27 PROCEDURE — 87106 FUNGI IDENTIFICATION YEAST: CPT

## 2023-03-27 PROCEDURE — 87150 DNA/RNA AMPLIFIED PROBE: CPT

## 2023-03-27 PROCEDURE — 2580000003 HC RX 258: Performed by: NURSE PRACTITIONER

## 2023-03-27 PROCEDURE — 87205 SMEAR GRAM STAIN: CPT

## 2023-03-27 PROCEDURE — 94761 N-INVAS EAR/PLS OXIMETRY MLT: CPT

## 2023-03-27 PROCEDURE — 36600 WITHDRAWAL OF ARTERIAL BLOOD: CPT

## 2023-03-27 PROCEDURE — 80202 ASSAY OF VANCOMYCIN: CPT

## 2023-03-27 PROCEDURE — 87186 SC STD MICRODIL/AGAR DIL: CPT

## 2023-03-27 PROCEDURE — 6360000002 HC RX W HCPCS: Performed by: STUDENT IN AN ORGANIZED HEALTH CARE EDUCATION/TRAINING PROGRAM

## 2023-03-27 PROCEDURE — 0202U NFCT DS 22 TRGT SARS-COV-2: CPT

## 2023-03-27 PROCEDURE — 82803 BLOOD GASES ANY COMBINATION: CPT

## 2023-03-27 PROCEDURE — 82962 GLUCOSE BLOOD TEST: CPT

## 2023-03-27 PROCEDURE — 90935 HEMODIALYSIS ONE EVALUATION: CPT

## 2023-03-27 PROCEDURE — 82550 ASSAY OF CK (CPK): CPT

## 2023-03-27 RX ORDER — TRAZODONE HYDROCHLORIDE 50 MG/1
100 TABLET ORAL NIGHTLY
Status: DISCONTINUED | OUTPATIENT
Start: 2023-03-27 | End: 2023-04-28

## 2023-03-27 RX ORDER — ATORVASTATIN CALCIUM 40 MG/1
40 TABLET, FILM COATED ORAL NIGHTLY
Status: DISCONTINUED | OUTPATIENT
Start: 2023-03-27 | End: 2023-04-28 | Stop reason: HOSPADM

## 2023-03-27 RX ORDER — SODIUM CHLORIDE 0.9 % (FLUSH) 0.9 %
5-40 SYRINGE (ML) INJECTION PRN
Status: DISCONTINUED | OUTPATIENT
Start: 2023-03-27 | End: 2023-04-28 | Stop reason: HOSPADM

## 2023-03-27 RX ORDER — MIDODRINE HYDROCHLORIDE 5 MG/1
5 TABLET ORAL
Status: DISCONTINUED | OUTPATIENT
Start: 2023-03-27 | End: 2023-03-28

## 2023-03-27 RX ORDER — ASPIRIN 81 MG/1
81 TABLET, CHEWABLE ORAL DAILY
Status: DISCONTINUED | OUTPATIENT
Start: 2023-03-27 | End: 2023-04-28 | Stop reason: HOSPADM

## 2023-03-27 RX ORDER — HEPARIN SODIUM 5000 [USP'U]/ML
5000 INJECTION, SOLUTION INTRAVENOUS; SUBCUTANEOUS EVERY 8 HOURS SCHEDULED
Status: DISCONTINUED | OUTPATIENT
Start: 2023-03-27 | End: 2023-03-29

## 2023-03-27 RX ORDER — ACETAMINOPHEN 650 MG/1
650 SUPPOSITORY RECTAL EVERY 6 HOURS PRN
Status: DISCONTINUED | OUTPATIENT
Start: 2023-03-27 | End: 2023-04-28 | Stop reason: HOSPADM

## 2023-03-27 RX ORDER — INSULIN LISPRO 100 [IU]/ML
0-16 INJECTION, SOLUTION INTRAVENOUS; SUBCUTANEOUS EVERY 4 HOURS
Status: DISCONTINUED | OUTPATIENT
Start: 2023-03-27 | End: 2023-04-28 | Stop reason: HOSPADM

## 2023-03-27 RX ORDER — INSULIN LISPRO 100 [IU]/ML
0-16 INJECTION, SOLUTION INTRAVENOUS; SUBCUTANEOUS
Status: DISCONTINUED | OUTPATIENT
Start: 2023-03-27 | End: 2023-03-27

## 2023-03-27 RX ORDER — IPRATROPIUM BROMIDE AND ALBUTEROL SULFATE 2.5; .5 MG/3ML; MG/3ML
1 SOLUTION RESPIRATORY (INHALATION) 4 TIMES DAILY
Status: DISCONTINUED | OUTPATIENT
Start: 2023-03-27 | End: 2023-04-02

## 2023-03-27 RX ORDER — ENOXAPARIN SODIUM 100 MG/ML
30 INJECTION SUBCUTANEOUS DAILY
Status: DISCONTINUED | OUTPATIENT
Start: 2023-03-27 | End: 2023-03-27

## 2023-03-27 RX ORDER — SENNA AND DOCUSATE SODIUM 50; 8.6 MG/1; MG/1
2 TABLET, FILM COATED ORAL DAILY PRN
Status: DISCONTINUED | OUTPATIENT
Start: 2023-03-27 | End: 2023-04-28 | Stop reason: HOSPADM

## 2023-03-27 RX ORDER — PANTOPRAZOLE SODIUM 40 MG/10ML
40 INJECTION, POWDER, LYOPHILIZED, FOR SOLUTION INTRAVENOUS DAILY
Status: DISCONTINUED | OUTPATIENT
Start: 2023-03-27 | End: 2023-03-27

## 2023-03-27 RX ORDER — QUETIAPINE FUMARATE 25 MG/1
50 TABLET, FILM COATED ORAL NIGHTLY
Status: DISCONTINUED | OUTPATIENT
Start: 2023-03-27 | End: 2023-04-28

## 2023-03-27 RX ORDER — ONDANSETRON 2 MG/ML
4 INJECTION INTRAMUSCULAR; INTRAVENOUS EVERY 6 HOURS PRN
Status: DISCONTINUED | OUTPATIENT
Start: 2023-03-27 | End: 2023-04-28 | Stop reason: HOSPADM

## 2023-03-27 RX ORDER — INSULIN GLARGINE 100 [IU]/ML
25 INJECTION, SOLUTION SUBCUTANEOUS NIGHTLY
Status: DISCONTINUED | OUTPATIENT
Start: 2023-03-27 | End: 2023-04-13

## 2023-03-27 RX ORDER — SODIUM CHLORIDE 9 MG/ML
INJECTION, SOLUTION INTRAVENOUS PRN
Status: DISCONTINUED | OUTPATIENT
Start: 2023-03-27 | End: 2023-04-28 | Stop reason: HOSPADM

## 2023-03-27 RX ORDER — AMIODARONE HYDROCHLORIDE 200 MG/1
200 TABLET ORAL DAILY
Status: DISCONTINUED | OUTPATIENT
Start: 2023-03-27 | End: 2023-03-28

## 2023-03-27 RX ORDER — DEXTROSE MONOHYDRATE 100 MG/ML
INJECTION, SOLUTION INTRAVENOUS CONTINUOUS PRN
Status: DISCONTINUED | OUTPATIENT
Start: 2023-03-27 | End: 2023-04-28 | Stop reason: HOSPADM

## 2023-03-27 RX ORDER — LEVOTHYROXINE SODIUM 0.1 MG/1
100 TABLET ORAL DAILY
Status: DISCONTINUED | OUTPATIENT
Start: 2023-03-27 | End: 2023-04-28 | Stop reason: HOSPADM

## 2023-03-27 RX ORDER — POLYETHYLENE GLYCOL 3350 17 G/17G
17 POWDER, FOR SOLUTION ORAL DAILY PRN
Status: DISCONTINUED | OUTPATIENT
Start: 2023-03-27 | End: 2023-04-23

## 2023-03-27 RX ORDER — ACETAMINOPHEN 325 MG/1
650 TABLET ORAL EVERY 6 HOURS PRN
Status: DISCONTINUED | OUTPATIENT
Start: 2023-03-27 | End: 2023-04-28 | Stop reason: HOSPADM

## 2023-03-27 RX ORDER — ONDANSETRON 4 MG/1
4 TABLET, ORALLY DISINTEGRATING ORAL EVERY 8 HOURS PRN
Status: DISCONTINUED | OUTPATIENT
Start: 2023-03-27 | End: 2023-04-28 | Stop reason: HOSPADM

## 2023-03-27 RX ORDER — LANSOPRAZOLE
30 KIT
Status: DISCONTINUED | OUTPATIENT
Start: 2023-03-28 | End: 2023-04-16

## 2023-03-27 RX ORDER — SODIUM CHLORIDE 0.9 % (FLUSH) 0.9 %
5-40 SYRINGE (ML) INJECTION EVERY 12 HOURS SCHEDULED
Status: DISCONTINUED | OUTPATIENT
Start: 2023-03-27 | End: 2023-04-28 | Stop reason: HOSPADM

## 2023-03-27 RX ADMIN — HEPARIN SODIUM 5000 UNITS: 5000 INJECTION INTRAVENOUS; SUBCUTANEOUS at 16:20

## 2023-03-27 RX ADMIN — SODIUM CHLORIDE, PRESERVATIVE FREE 10 ML: 5 INJECTION INTRAVENOUS at 20:53

## 2023-03-27 RX ADMIN — INSULIN LISPRO 8 UNITS: 100 INJECTION, SOLUTION INTRAVENOUS; SUBCUTANEOUS at 13:04

## 2023-03-27 RX ADMIN — LEVOTHYROXINE SODIUM 100 MCG: 0.1 TABLET ORAL at 11:32

## 2023-03-27 RX ADMIN — ASPIRIN 81 MG 81 MG: 81 TABLET ORAL at 11:32

## 2023-03-27 RX ADMIN — MIDODRINE HYDROCHLORIDE 5 MG: 5 TABLET ORAL at 16:20

## 2023-03-27 RX ADMIN — IPRATROPIUM BROMIDE AND ALBUTEROL SULFATE 3 ML: 2.5; .5 SOLUTION RESPIRATORY (INHALATION) at 19:49

## 2023-03-27 RX ADMIN — IPRATROPIUM BROMIDE AND ALBUTEROL SULFATE 3 ML: 2.5; .5 SOLUTION RESPIRATORY (INHALATION) at 16:07

## 2023-03-27 RX ADMIN — METOPROLOL TARTRATE 25 MG: 25 TABLET, FILM COATED ORAL at 11:32

## 2023-03-27 RX ADMIN — SODIUM CHLORIDE, PRESERVATIVE FREE 10 ML: 5 INJECTION INTRAVENOUS at 11:33

## 2023-03-27 RX ADMIN — QUETIAPINE FUMARATE 50 MG: 25 TABLET ORAL at 20:52

## 2023-03-27 RX ADMIN — MIDODRINE HYDROCHLORIDE 5 MG: 5 TABLET ORAL at 11:33

## 2023-03-27 RX ADMIN — MEROPENEM 500 MG: 500 INJECTION, POWDER, FOR SOLUTION INTRAVENOUS at 18:25

## 2023-03-27 RX ADMIN — AMIODARONE HYDROCHLORIDE 200 MG: 200 TABLET ORAL at 11:32

## 2023-03-27 RX ADMIN — TRAZODONE HYDROCHLORIDE 100 MG: 50 TABLET ORAL at 20:52

## 2023-03-27 RX ADMIN — ATORVASTATIN CALCIUM 40 MG: 40 TABLET, FILM COATED ORAL at 20:52

## 2023-03-27 RX ADMIN — HEPARIN SODIUM 5000 UNITS: 5000 INJECTION INTRAVENOUS; SUBCUTANEOUS at 21:54

## 2023-03-27 RX ADMIN — METOPROLOL TARTRATE 25 MG: 25 TABLET, FILM COATED ORAL at 20:53

## 2023-03-28 ENCOUNTER — APPOINTMENT (OUTPATIENT)
Dept: GENERAL RADIOLOGY | Age: 73
End: 2023-03-28
Attending: STUDENT IN AN ORGANIZED HEALTH CARE EDUCATION/TRAINING PROGRAM
Payer: COMMERCIAL

## 2023-03-28 LAB
AFB SMEAR: NORMAL
AFB SMEAR: NORMAL
ALBUMIN SERPL-MCNC: 3.3 GM/DL (ref 3.4–5)
ALP BLD-CCNC: 141 IU/L (ref 40–129)
ALT SERPL-CCNC: 10 U/L (ref 10–40)
ANION GAP SERPL CALCULATED.3IONS-SCNC: 7 MMOL/L (ref 4–16)
AST SERPL-CCNC: 11 IU/L (ref 15–37)
BASOPHILS ABSOLUTE: 0.1 K/CU MM
BASOPHILS RELATIVE PERCENT: 1.3 % (ref 0–1)
BILIRUB SERPL-MCNC: 0.3 MG/DL (ref 0–1)
BILIRUBIN DIRECT: 0.2 MG/DL (ref 0–0.3)
BILIRUBIN, INDIRECT: 0.1 MG/DL (ref 0–0.7)
BUN SERPL-MCNC: 38 MG/DL (ref 6–23)
CALCIUM SERPL-MCNC: 8.6 MG/DL (ref 8.3–10.6)
CHLORIDE BLD-SCNC: 99 MMOL/L (ref 99–110)
CO2: 27 MMOL/L (ref 21–32)
CREAT SERPL-MCNC: 3.5 MG/DL (ref 0.9–1.3)
CULTURE: NORMAL
CULTURE: NORMAL
DIFFERENTIAL TYPE: ABNORMAL
EOSINOPHILS ABSOLUTE: 0.2 K/CU MM
EOSINOPHILS RELATIVE PERCENT: 3 % (ref 0–3)
GFR SERPL CREATININE-BSD FRML MDRD: 18 ML/MIN/1.73M2
GLUCOSE BLD-MCNC: 125 MG/DL (ref 70–99)
GLUCOSE BLD-MCNC: 126 MG/DL (ref 70–99)
GLUCOSE BLD-MCNC: 128 MG/DL (ref 70–99)
GLUCOSE BLD-MCNC: 131 MG/DL (ref 70–99)
GLUCOSE BLD-MCNC: 145 MG/DL (ref 70–99)
GLUCOSE BLD-MCNC: 158 MG/DL (ref 70–99)
GLUCOSE SERPL-MCNC: 133 MG/DL (ref 70–99)
HCT VFR BLD CALC: 26.6 % (ref 42–52)
HEMOGLOBIN: 8.1 GM/DL (ref 13.5–18)
IMMATURE NEUTROPHIL %: 1.6 % (ref 0–0.43)
LYMPHOCYTES ABSOLUTE: 1.5 K/CU MM
LYMPHOCYTES RELATIVE PERCENT: 22.8 % (ref 24–44)
Lab: NORMAL
Lab: NORMAL
MAGNESIUM: 2.4 MG/DL (ref 1.8–2.4)
MCH RBC QN AUTO: 34.3 PG (ref 27–31)
MCHC RBC AUTO-ENTMCNC: 30.5 % (ref 32–36)
MCV RBC AUTO: 105.9 FL (ref 78–100)
MONOCYTES ABSOLUTE: 0.8 K/CU MM
MONOCYTES RELATIVE PERCENT: 11.5 % (ref 0–4)
NUCLEATED RBC %: 1.8 %
PDW BLD-RTO: 17.1 % (ref 11.7–14.9)
PHOSPHORUS: 3.4 MG/DL (ref 2.5–4.9)
PLATELET # BLD: 237 K/CU MM (ref 140–440)
PMV BLD AUTO: 8.5 FL (ref 7.5–11.1)
POTASSIUM SERPL-SCNC: 4.2 MMOL/L (ref 3.5–5.1)
RBC # BLD: 2.36 M/CU MM (ref 4.6–6.2)
SEGMENTED NEUTROPHILS ABSOLUTE COUNT: 4 K/CU MM
SEGMENTED NEUTROPHILS RELATIVE PERCENT: 59.8 % (ref 36–66)
SODIUM BLD-SCNC: 133 MMOL/L (ref 135–145)
SPECIMEN: NORMAL
SPECIMEN: NORMAL
TOTAL CK: 22 IU/L (ref 38–174)
TOTAL IMMATURE NEUTOROPHIL: 0.11 K/CU MM
TOTAL NUCLEATED RBC: 0.1 K/CU MM
TOTAL PROTEIN: 5.6 GM/DL (ref 6.4–8.2)
WBC # BLD: 6.7 K/CU MM (ref 4–10.5)

## 2023-03-28 PROCEDURE — 6370000000 HC RX 637 (ALT 250 FOR IP): Performed by: STUDENT IN AN ORGANIZED HEALTH CARE EDUCATION/TRAINING PROGRAM

## 2023-03-28 PROCEDURE — 84100 ASSAY OF PHOSPHORUS: CPT

## 2023-03-28 PROCEDURE — 71045 X-RAY EXAM CHEST 1 VIEW: CPT

## 2023-03-28 PROCEDURE — 6360000002 HC RX W HCPCS: Performed by: INTERNAL MEDICINE

## 2023-03-28 PROCEDURE — 94640 AIRWAY INHALATION TREATMENT: CPT

## 2023-03-28 PROCEDURE — 85025 COMPLETE CBC W/AUTO DIFF WBC: CPT

## 2023-03-28 PROCEDURE — 89220 SPUTUM SPECIMEN COLLECTION: CPT

## 2023-03-28 PROCEDURE — 99223 1ST HOSP IP/OBS HIGH 75: CPT | Performed by: INTERNAL MEDICINE

## 2023-03-28 PROCEDURE — 2580000003 HC RX 258: Performed by: INTERNAL MEDICINE

## 2023-03-28 PROCEDURE — 83735 ASSAY OF MAGNESIUM: CPT

## 2023-03-28 PROCEDURE — 2700000000 HC OXYGEN THERAPY PER DAY

## 2023-03-28 PROCEDURE — 94761 N-INVAS EAR/PLS OXIMETRY MLT: CPT

## 2023-03-28 PROCEDURE — 6360000002 HC RX W HCPCS: Performed by: NURSE PRACTITIONER

## 2023-03-28 PROCEDURE — 80053 COMPREHEN METABOLIC PANEL: CPT

## 2023-03-28 PROCEDURE — 2580000003 HC RX 258: Performed by: NURSE PRACTITIONER

## 2023-03-28 PROCEDURE — 2580000003 HC RX 258: Performed by: STUDENT IN AN ORGANIZED HEALTH CARE EDUCATION/TRAINING PROGRAM

## 2023-03-28 PROCEDURE — 6360000002 HC RX W HCPCS: Performed by: STUDENT IN AN ORGANIZED HEALTH CARE EDUCATION/TRAINING PROGRAM

## 2023-03-28 PROCEDURE — 82962 GLUCOSE BLOOD TEST: CPT

## 2023-03-28 PROCEDURE — 6370000000 HC RX 637 (ALT 250 FOR IP): Performed by: NURSE PRACTITIONER

## 2023-03-28 PROCEDURE — 82248 BILIRUBIN DIRECT: CPT

## 2023-03-28 PROCEDURE — 2000000000 HC ICU R&B

## 2023-03-28 RX ORDER — AMIODARONE HYDROCHLORIDE 200 MG/1
200 TABLET ORAL DAILY
Status: DISCONTINUED | OUTPATIENT
Start: 2023-03-29 | End: 2023-04-28 | Stop reason: HOSPADM

## 2023-03-28 RX ORDER — MIDODRINE HYDROCHLORIDE 5 MG/1
5 TABLET ORAL
Status: DISCONTINUED | OUTPATIENT
Start: 2023-03-28 | End: 2023-03-28

## 2023-03-28 RX ORDER — MIDODRINE HYDROCHLORIDE 5 MG/1
10 TABLET ORAL
Status: DISCONTINUED | OUTPATIENT
Start: 2023-03-29 | End: 2023-04-28 | Stop reason: HOSPADM

## 2023-03-28 RX ADMIN — TRAZODONE HYDROCHLORIDE 100 MG: 50 TABLET ORAL at 22:08

## 2023-03-28 RX ADMIN — MIDODRINE HYDROCHLORIDE 5 MG: 5 TABLET ORAL at 12:37

## 2023-03-28 RX ADMIN — SODIUM CHLORIDE 200 MG: 9 INJECTION, SOLUTION INTRAVENOUS at 14:37

## 2023-03-28 RX ADMIN — SODIUM CHLORIDE, PRESERVATIVE FREE 10 ML: 5 INJECTION INTRAVENOUS at 22:09

## 2023-03-28 RX ADMIN — HEPARIN SODIUM 5000 UNITS: 5000 INJECTION INTRAVENOUS; SUBCUTANEOUS at 22:09

## 2023-03-28 RX ADMIN — HEPARIN SODIUM 5000 UNITS: 5000 INJECTION INTRAVENOUS; SUBCUTANEOUS at 17:19

## 2023-03-28 RX ADMIN — ATORVASTATIN CALCIUM 40 MG: 40 TABLET, FILM COATED ORAL at 22:08

## 2023-03-28 RX ADMIN — IPRATROPIUM BROMIDE AND ALBUTEROL SULFATE 3 ML: 2.5; .5 SOLUTION RESPIRATORY (INHALATION) at 19:51

## 2023-03-28 RX ADMIN — LANSOPRAZOLE 30 MG: KIT at 05:35

## 2023-03-28 RX ADMIN — HEPARIN SODIUM 5000 UNITS: 5000 INJECTION INTRAVENOUS; SUBCUTANEOUS at 05:35

## 2023-03-28 RX ADMIN — AMIODARONE HYDROCHLORIDE 200 MG: 200 TABLET ORAL at 09:31

## 2023-03-28 RX ADMIN — METOPROLOL TARTRATE 25 MG: 25 TABLET, FILM COATED ORAL at 22:08

## 2023-03-28 RX ADMIN — QUETIAPINE FUMARATE 50 MG: 25 TABLET ORAL at 22:08

## 2023-03-28 RX ADMIN — ACETAMINOPHEN 650 MG: 325 TABLET ORAL at 22:08

## 2023-03-28 RX ADMIN — IPRATROPIUM BROMIDE AND ALBUTEROL SULFATE 3 ML: 2.5; .5 SOLUTION RESPIRATORY (INHALATION) at 08:44

## 2023-03-28 RX ADMIN — ASPIRIN 81 MG 81 MG: 81 TABLET ORAL at 09:32

## 2023-03-28 RX ADMIN — LEVOTHYROXINE SODIUM 100 MCG: 0.1 TABLET ORAL at 05:35

## 2023-03-28 RX ADMIN — MIDODRINE HYDROCHLORIDE 5 MG: 5 TABLET ORAL at 09:32

## 2023-03-28 RX ADMIN — IPRATROPIUM BROMIDE AND ALBUTEROL SULFATE 3 ML: 2.5; .5 SOLUTION RESPIRATORY (INHALATION) at 16:17

## 2023-03-28 RX ADMIN — IPRATROPIUM BROMIDE AND ALBUTEROL SULFATE 3 ML: 2.5; .5 SOLUTION RESPIRATORY (INHALATION) at 12:17

## 2023-03-28 RX ADMIN — MIDODRINE HYDROCHLORIDE 5 MG: 5 TABLET ORAL at 17:24

## 2023-03-28 RX ADMIN — MEROPENEM 500 MG: 500 INJECTION, POWDER, FOR SOLUTION INTRAVENOUS at 17:31

## 2023-03-28 ASSESSMENT — PAIN SCALES - GENERAL
PAINLEVEL_OUTOF10: 2
PAINLEVEL_OUTOF10: 6
PAINLEVEL_OUTOF10: 3
PAINLEVEL_OUTOF10: 3

## 2023-03-28 ASSESSMENT — PAIN - FUNCTIONAL ASSESSMENT
PAIN_FUNCTIONAL_ASSESSMENT: ACTIVITIES ARE NOT PREVENTED

## 2023-03-28 ASSESSMENT — PAIN DESCRIPTION - ONSET
ONSET: ON-GOING

## 2023-03-28 ASSESSMENT — PAIN DESCRIPTION - LOCATION
LOCATION: COCCYX

## 2023-03-28 ASSESSMENT — PAIN DESCRIPTION - ORIENTATION
ORIENTATION: MID;POSTERIOR
ORIENTATION: POSTERIOR;MID

## 2023-03-28 ASSESSMENT — PAIN DESCRIPTION - DESCRIPTORS
DESCRIPTORS: ACHING

## 2023-03-28 ASSESSMENT — PAIN DESCRIPTION - FREQUENCY
FREQUENCY: CONTINUOUS

## 2023-03-28 ASSESSMENT — PAIN DESCRIPTION - PAIN TYPE
TYPE: ACUTE PAIN

## 2023-03-28 NOTE — PROGRESS NOTES
Physician Progress Note      PATIENT:               Alessandra Marshall  CSN #:                  [id number]  :                       1950  ADMIT DATE:       3/5/2023 3:32 PM  [address] DATE:        3/21/2023 2:00 PM  RESPONDING  PROVIDER #:        Reji Stout MD          QUERY TEXT:    Patient admitted with Pneumonia, COPD. Documentation reflects \"Suspected   healthcare acquired vs aspiration PNA\" in attending progress note on 3/8. If   possible, please document pneumonia specificity: The medical record reflects the following:  Risk Factors: dysphagia, trach, hx of pneumonitis, COPD  Clinical Indicators: CXR on 3/5: Right pleural effusion with right basilar   opacity suggesting atelectasis or  infection. Bronchial culture positive for MRSA and P aeruginosa. Treatment: IV Zithromax, IV Cefepime, IV Merrem, IV Vancomycin, ID consult,   pulmonology consult    JOSE Jordan RaN, RN, [facility]  Clinical   [phone]  Options provided:  -- Aspiration pneumonia  -- MRSA pneumonia  -- Gram negative pneumonia  -- Other - I will add my own diagnosis  -- Disagree - Not applicable / Not valid  -- Disagree - Clinically unable to determine / Unknown  -- Refer to Clinical Documentation Reviewer    PROVIDER RESPONSE TEXT:    This patient has MRSA pneumonia.     Query created by: Alla Guzmán on 3/23/2023 1:33 PM      Electronically signed by:  Reji Stout MD 3/27/2023 10:19 PM

## 2023-03-29 ENCOUNTER — APPOINTMENT (OUTPATIENT)
Dept: INTERVENTIONAL RADIOLOGY/VASCULAR | Age: 73
End: 2023-03-29
Attending: STUDENT IN AN ORGANIZED HEALTH CARE EDUCATION/TRAINING PROGRAM
Payer: COMMERCIAL

## 2023-03-29 ENCOUNTER — APPOINTMENT (OUTPATIENT)
Dept: GENERAL RADIOLOGY | Age: 73
End: 2023-03-29
Attending: STUDENT IN AN ORGANIZED HEALTH CARE EDUCATION/TRAINING PROGRAM
Payer: COMMERCIAL

## 2023-03-29 LAB
ALBUMIN SERPL-MCNC: 3.3 GM/DL (ref 3.4–5)
ALP BLD-CCNC: 118 IU/L (ref 40–129)
ALT SERPL-CCNC: 8 U/L (ref 10–40)
ANION GAP SERPL CALCULATED.3IONS-SCNC: 12 MMOL/L (ref 4–16)
APTT: 40.3 SECONDS (ref 25.1–37.1)
AST SERPL-CCNC: 11 IU/L (ref 15–37)
BASOPHILS ABSOLUTE: 0.1 K/CU MM
BASOPHILS RELATIVE PERCENT: 1.4 % (ref 0–1)
BILIRUB SERPL-MCNC: 0.4 MG/DL (ref 0–1)
BUN SERPL-MCNC: 55 MG/DL (ref 6–23)
CALCIUM SERPL-MCNC: 8.6 MG/DL (ref 8.3–10.6)
CHLORIDE BLD-SCNC: 99 MMOL/L (ref 99–110)
CO2: 24 MMOL/L (ref 21–32)
CREAT SERPL-MCNC: 4.8 MG/DL (ref 0.9–1.3)
CULTURE: ABNORMAL
CULTURE: ABNORMAL
DIFFERENTIAL TYPE: ABNORMAL
DOSE AMOUNT: NORMAL
DOSE TIME: NORMAL
EOSINOPHILS ABSOLUTE: 0.2 K/CU MM
EOSINOPHILS RELATIVE PERCENT: 4.1 % (ref 0–3)
FERRITIN: 540 NG/ML (ref 30–400)
GFR SERPL CREATININE-BSD FRML MDRD: 12 ML/MIN/1.73M2
GLUCOSE BLD-MCNC: 101 MG/DL (ref 70–99)
GLUCOSE BLD-MCNC: 109 MG/DL (ref 70–99)
GLUCOSE BLD-MCNC: 120 MG/DL (ref 70–99)
GLUCOSE BLD-MCNC: 122 MG/DL (ref 70–99)
GLUCOSE BLD-MCNC: 123 MG/DL (ref 70–99)
GLUCOSE BLD-MCNC: 129 MG/DL (ref 70–99)
GLUCOSE SERPL-MCNC: 119 MG/DL (ref 70–99)
GRAM SMEAR: ABNORMAL
HCT VFR BLD CALC: 25.4 % (ref 42–52)
HCT VFR BLD CALC: 28.5 % (ref 42–52)
HEMOGLOBIN: 8 GM/DL (ref 13.5–18)
HEMOGLOBIN: 9.1 GM/DL (ref 13.5–18)
IMMATURE NEUTROPHIL %: 1.2 % (ref 0–0.43)
IRON: 51 UG/DL (ref 59–158)
LV EF: 55 %
LVEF MODALITY: NORMAL
LYMPHOCYTES ABSOLUTE: 1.5 K/CU MM
LYMPHOCYTES RELATIVE PERCENT: 26.9 % (ref 24–44)
Lab: ABNORMAL
MAGNESIUM: 2.6 MG/DL (ref 1.8–2.4)
MCH RBC QN AUTO: 35.7 PG (ref 27–31)
MCH RBC QN AUTO: 36.2 PG (ref 27–31)
MCHC RBC AUTO-ENTMCNC: 31.5 % (ref 32–36)
MCHC RBC AUTO-ENTMCNC: 31.9 % (ref 32–36)
MCV RBC AUTO: 111.8 FL (ref 78–100)
MCV RBC AUTO: 114.9 FL (ref 78–100)
MONOCYTES ABSOLUTE: 0.7 K/CU MM
MONOCYTES RELATIVE PERCENT: 12.8 % (ref 0–4)
NUCLEATED RBC %: 1.2 %
PCT TRANSFERRIN: 22 % (ref 10–44)
PDW BLD-RTO: 17.2 % (ref 11.7–14.9)
PDW BLD-RTO: 17.3 % (ref 11.7–14.9)
PHOSPHORUS: 3.9 MG/DL (ref 2.5–4.9)
PLATELET # BLD: 223 K/CU MM (ref 140–440)
PLATELET # BLD: 260 K/CU MM (ref 140–440)
PMV BLD AUTO: 8.8 FL (ref 7.5–11.1)
PMV BLD AUTO: 8.9 FL (ref 7.5–11.1)
POTASSIUM SERPL-SCNC: 4 MMOL/L (ref 3.5–5.1)
RBC # BLD: 2.21 M/CU MM (ref 4.6–6.2)
RBC # BLD: 2.55 M/CU MM (ref 4.6–6.2)
SEGMENTED NEUTROPHILS ABSOLUTE COUNT: 3 K/CU MM
SEGMENTED NEUTROPHILS RELATIVE PERCENT: 53.6 % (ref 36–66)
SODIUM BLD-SCNC: 135 MMOL/L (ref 135–145)
SPECIMEN: ABNORMAL
TOTAL IMMATURE NEUTOROPHIL: 0.07 K/CU MM
TOTAL IRON BINDING CAPACITY: 237 UG/DL (ref 250–450)
TOTAL NUCLEATED RBC: 0.1 K/CU MM
TOTAL PROTEIN: 5.6 GM/DL (ref 6.4–8.2)
UNSATURATED IRON BINDING CAPACITY: 186 UG/DL (ref 110–370)
VANCOMYCIN RANDOM: 25.7 UG/ML
WBC # BLD: 5.6 K/CU MM (ref 4–10.5)
WBC # BLD: 6.6 K/CU MM (ref 4–10.5)

## 2023-03-29 PROCEDURE — 02PYX3Z REMOVAL OF INFUSION DEVICE FROM GREAT VESSEL, EXTERNAL APPROACH: ICD-10-PCS | Performed by: RADIOLOGY

## 2023-03-29 PROCEDURE — 90935 HEMODIALYSIS ONE EVALUATION: CPT

## 2023-03-29 PROCEDURE — 6370000000 HC RX 637 (ALT 250 FOR IP): Performed by: STUDENT IN AN ORGANIZED HEALTH CARE EDUCATION/TRAINING PROGRAM

## 2023-03-29 PROCEDURE — 80202 ASSAY OF VANCOMYCIN: CPT

## 2023-03-29 PROCEDURE — 82962 GLUCOSE BLOOD TEST: CPT

## 2023-03-29 PROCEDURE — 89220 SPUTUM SPECIMEN COLLECTION: CPT

## 2023-03-29 PROCEDURE — 6360000002 HC RX W HCPCS: Performed by: NURSE PRACTITIONER

## 2023-03-29 PROCEDURE — 2700000000 HC OXYGEN THERAPY PER DAY

## 2023-03-29 PROCEDURE — 83550 IRON BINDING TEST: CPT

## 2023-03-29 PROCEDURE — 6370000000 HC RX 637 (ALT 250 FOR IP): Performed by: NURSE PRACTITIONER

## 2023-03-29 PROCEDURE — 6360000002 HC RX W HCPCS: Performed by: STUDENT IN AN ORGANIZED HEALTH CARE EDUCATION/TRAINING PROGRAM

## 2023-03-29 PROCEDURE — 80053 COMPREHEN METABOLIC PANEL: CPT

## 2023-03-29 PROCEDURE — 84100 ASSAY OF PHOSPHORUS: CPT

## 2023-03-29 PROCEDURE — 71045 X-RAY EXAM CHEST 1 VIEW: CPT

## 2023-03-29 PROCEDURE — 2060000000 HC ICU INTERMEDIATE R&B

## 2023-03-29 PROCEDURE — 2000000000 HC ICU R&B

## 2023-03-29 PROCEDURE — 2580000003 HC RX 258: Performed by: INTERNAL MEDICINE

## 2023-03-29 PROCEDURE — 2580000003 HC RX 258: Performed by: STUDENT IN AN ORGANIZED HEALTH CARE EDUCATION/TRAINING PROGRAM

## 2023-03-29 PROCEDURE — 87071 CULTURE AEROBIC QUANT OTHER: CPT

## 2023-03-29 PROCEDURE — 85025 COMPLETE CBC W/AUTO DIFF WBC: CPT

## 2023-03-29 PROCEDURE — 87449 NOS EACH ORGANISM AG IA: CPT

## 2023-03-29 PROCEDURE — 2500000003 HC RX 250 WO HCPCS: Performed by: PHYSICIAN ASSISTANT

## 2023-03-29 PROCEDURE — 85730 THROMBOPLASTIN TIME PARTIAL: CPT

## 2023-03-29 PROCEDURE — 94640 AIRWAY INHALATION TREATMENT: CPT

## 2023-03-29 PROCEDURE — 87040 BLOOD CULTURE FOR BACTERIA: CPT

## 2023-03-29 PROCEDURE — 82728 ASSAY OF FERRITIN: CPT

## 2023-03-29 PROCEDURE — 93308 TTE F-UP OR LMTD: CPT

## 2023-03-29 PROCEDURE — 94761 N-INVAS EAR/PLS OXIMETRY MLT: CPT

## 2023-03-29 PROCEDURE — 6360000002 HC RX W HCPCS: Performed by: INTERNAL MEDICINE

## 2023-03-29 PROCEDURE — 83735 ASSAY OF MAGNESIUM: CPT

## 2023-03-29 PROCEDURE — 85027 COMPLETE CBC AUTOMATED: CPT

## 2023-03-29 PROCEDURE — 99233 SBSQ HOSP IP/OBS HIGH 50: CPT | Performed by: INTERNAL MEDICINE

## 2023-03-29 PROCEDURE — 83540 ASSAY OF IRON: CPT

## 2023-03-29 PROCEDURE — 6360000002 HC RX W HCPCS: Performed by: PHYSICIAN ASSISTANT

## 2023-03-29 PROCEDURE — 2580000003 HC RX 258: Performed by: NURSE PRACTITIONER

## 2023-03-29 PROCEDURE — 36589 REMOVAL TUNNELED CV CATH: CPT

## 2023-03-29 RX ORDER — MORPHINE SULFATE 2 MG/ML
2 INJECTION, SOLUTION INTRAMUSCULAR; INTRAVENOUS ONCE
Status: COMPLETED | OUTPATIENT
Start: 2023-03-29 | End: 2023-03-29

## 2023-03-29 RX ORDER — HEPARIN SODIUM 1000 [USP'U]/ML
4000 INJECTION, SOLUTION INTRAVENOUS; SUBCUTANEOUS ONCE
Status: COMPLETED | OUTPATIENT
Start: 2023-03-29 | End: 2023-03-29

## 2023-03-29 RX ORDER — HEPARIN SODIUM 1000 [USP'U]/ML
4000 INJECTION, SOLUTION INTRAVENOUS; SUBCUTANEOUS PRN
Status: DISCONTINUED | OUTPATIENT
Start: 2023-03-29 | End: 2023-04-03

## 2023-03-29 RX ORDER — HEPARIN SODIUM 10000 [USP'U]/100ML
5-30 INJECTION, SOLUTION INTRAVENOUS CONTINUOUS
Status: DISCONTINUED | OUTPATIENT
Start: 2023-03-29 | End: 2023-04-03

## 2023-03-29 RX ORDER — HEPARIN SODIUM 1000 [USP'U]/ML
2000 INJECTION, SOLUTION INTRAVENOUS; SUBCUTANEOUS PRN
Status: DISCONTINUED | OUTPATIENT
Start: 2023-03-29 | End: 2023-04-03

## 2023-03-29 RX ADMIN — MEROPENEM 500 MG: 500 INJECTION, POWDER, FOR SOLUTION INTRAVENOUS at 17:54

## 2023-03-29 RX ADMIN — MORPHINE SULFATE 2 MG: 2 INJECTION, SOLUTION INTRAMUSCULAR; INTRAVENOUS at 22:12

## 2023-03-29 RX ADMIN — ONDANSETRON 4 MG: 2 INJECTION INTRAMUSCULAR; INTRAVENOUS at 22:12

## 2023-03-29 RX ADMIN — IPRATROPIUM BROMIDE AND ALBUTEROL SULFATE 3 ML: 2.5; .5 SOLUTION RESPIRATORY (INHALATION) at 08:36

## 2023-03-29 RX ADMIN — HEPARIN SODIUM 8 UNITS/KG/HR: 10000 INJECTION, SOLUTION INTRAVENOUS at 22:40

## 2023-03-29 RX ADMIN — HEPARIN SODIUM 5000 UNITS: 5000 INJECTION INTRAVENOUS; SUBCUTANEOUS at 06:31

## 2023-03-29 RX ADMIN — MIDODRINE HYDROCHLORIDE 10 MG: 5 TABLET ORAL at 09:44

## 2023-03-29 RX ADMIN — MIDODRINE HYDROCHLORIDE 10 MG: 5 TABLET ORAL at 17:44

## 2023-03-29 RX ADMIN — SODIUM CHLORIDE 100 MG: 9 INJECTION, SOLUTION INTRAVENOUS at 15:07

## 2023-03-29 RX ADMIN — MIDODRINE HYDROCHLORIDE 10 MG: 5 TABLET ORAL at 14:37

## 2023-03-29 RX ADMIN — HEPARIN SODIUM 4000 UNITS: 1000 INJECTION INTRAVENOUS; SUBCUTANEOUS at 22:36

## 2023-03-29 RX ADMIN — SODIUM CHLORIDE, PRESERVATIVE FREE 10 ML: 5 INJECTION INTRAVENOUS at 09:30

## 2023-03-29 RX ADMIN — TRAZODONE HYDROCHLORIDE 100 MG: 50 TABLET ORAL at 20:50

## 2023-03-29 RX ADMIN — METOPROLOL TARTRATE 25 MG: 25 TABLET, FILM COATED ORAL at 20:49

## 2023-03-29 RX ADMIN — VANCOMYCIN HYDROCHLORIDE 500 MG: 500 INJECTION, POWDER, LYOPHILIZED, FOR SOLUTION INTRAVENOUS at 15:26

## 2023-03-29 RX ADMIN — ACETAMINOPHEN 650 MG: 325 TABLET ORAL at 06:31

## 2023-03-29 RX ADMIN — LANSOPRAZOLE 30 MG: KIT at 06:31

## 2023-03-29 RX ADMIN — LEVOTHYROXINE SODIUM 100 MCG: 0.1 TABLET ORAL at 06:31

## 2023-03-29 RX ADMIN — SODIUM CHLORIDE, PRESERVATIVE FREE 10 ML: 5 INJECTION INTRAVENOUS at 20:51

## 2023-03-29 RX ADMIN — SODIUM CHLORIDE 10 ML/HR: 9 INJECTION, SOLUTION INTRAVENOUS at 15:05

## 2023-03-29 RX ADMIN — IPRATROPIUM BROMIDE AND ALBUTEROL SULFATE 3 ML: 2.5; .5 SOLUTION RESPIRATORY (INHALATION) at 20:50

## 2023-03-29 RX ADMIN — QUETIAPINE FUMARATE 50 MG: 25 TABLET ORAL at 20:49

## 2023-03-29 RX ADMIN — ATORVASTATIN CALCIUM 40 MG: 40 TABLET, FILM COATED ORAL at 20:50

## 2023-03-29 RX ADMIN — ASPIRIN 81 MG 81 MG: 81 TABLET ORAL at 09:34

## 2023-03-29 RX ADMIN — AMIODARONE HYDROCHLORIDE 200 MG: 200 TABLET ORAL at 09:36

## 2023-03-29 RX ADMIN — ACETAMINOPHEN 650 MG: 325 TABLET ORAL at 20:50

## 2023-03-29 ASSESSMENT — PAIN DESCRIPTION - LOCATION
LOCATION: COCCYX;BUTTOCKS;SACRUM
LOCATION: COCCYX
LOCATION: COCCYX
LOCATION: SACRUM

## 2023-03-29 ASSESSMENT — PAIN DESCRIPTION - DESCRIPTORS
DESCRIPTORS: DISCOMFORT;CRAMPING
DESCRIPTORS: OTHER (COMMENT)
DESCRIPTORS: OTHER (COMMENT)
DESCRIPTORS: DISCOMFORT

## 2023-03-29 ASSESSMENT — PAIN DESCRIPTION - ORIENTATION
ORIENTATION: MID;POSTERIOR
ORIENTATION: MID;POSTERIOR

## 2023-03-29 ASSESSMENT — PAIN SCALES - GENERAL
PAINLEVEL_OUTOF10: 0
PAINLEVEL_OUTOF10: 3
PAINLEVEL_OUTOF10: 10
PAINLEVEL_OUTOF10: 0

## 2023-03-29 ASSESSMENT — PAIN DESCRIPTION - ONSET
ONSET: ON-GOING
ONSET: ON-GOING

## 2023-03-29 ASSESSMENT — PAIN DESCRIPTION - FREQUENCY
FREQUENCY: CONTINUOUS
FREQUENCY: CONTINUOUS

## 2023-03-29 ASSESSMENT — PAIN SCALES - WONG BAKER
WONGBAKER_NUMERICALRESPONSE: 0
WONGBAKER_NUMERICALRESPONSE: 0

## 2023-03-29 ASSESSMENT — PAIN DESCRIPTION - PAIN TYPE
TYPE: ACUTE PAIN
TYPE: ACUTE PAIN

## 2023-03-30 LAB
1,3 BETA-D-GLUCAN INTERP: POSITIVE
1,3 BETA-D-GLUCAN: 258 PG/ML
ANION GAP SERPL CALCULATED.3IONS-SCNC: 9 MMOL/L (ref 4–16)
APTT: 65.4 SECONDS (ref 25.1–37.1)
APTT: 72 SECONDS (ref 25.1–37.1)
BUN SERPL-MCNC: 37 MG/DL (ref 6–23)
CALCIUM SERPL-MCNC: 8.5 MG/DL (ref 8.3–10.6)
CHLORIDE BLD-SCNC: 96 MMOL/L (ref 99–110)
CO2: 26 MMOL/L (ref 21–32)
CREAT SERPL-MCNC: 4 MG/DL (ref 0.9–1.3)
GFR SERPL CREATININE-BSD FRML MDRD: 15 ML/MIN/1.73M2
GLUCOSE BLD-MCNC: 112 MG/DL (ref 70–99)
GLUCOSE BLD-MCNC: 148 MG/DL (ref 70–99)
GLUCOSE BLD-MCNC: 161 MG/DL (ref 70–99)
GLUCOSE BLD-MCNC: 162 MG/DL (ref 70–99)
GLUCOSE BLD-MCNC: 162 MG/DL (ref 70–99)
GLUCOSE SERPL-MCNC: 138 MG/DL (ref 70–99)
MAGNESIUM: 2.6 MG/DL (ref 1.8–2.4)
PHOSPHORUS: 4 MG/DL (ref 2.5–4.9)
POTASSIUM SERPL-SCNC: 3.8 MMOL/L (ref 3.5–5.1)
SODIUM BLD-SCNC: 131 MMOL/L (ref 135–145)

## 2023-03-30 PROCEDURE — 6370000000 HC RX 637 (ALT 250 FOR IP): Performed by: NURSE PRACTITIONER

## 2023-03-30 PROCEDURE — 6360000002 HC RX W HCPCS: Performed by: INTERNAL MEDICINE

## 2023-03-30 PROCEDURE — 94761 N-INVAS EAR/PLS OXIMETRY MLT: CPT

## 2023-03-30 PROCEDURE — 2500000003 HC RX 250 WO HCPCS: Performed by: PHYSICIAN ASSISTANT

## 2023-03-30 PROCEDURE — 2580000003 HC RX 258: Performed by: INTERNAL MEDICINE

## 2023-03-30 PROCEDURE — 6360000002 HC RX W HCPCS: Performed by: STUDENT IN AN ORGANIZED HEALTH CARE EDUCATION/TRAINING PROGRAM

## 2023-03-30 PROCEDURE — 2580000003 HC RX 258: Performed by: STUDENT IN AN ORGANIZED HEALTH CARE EDUCATION/TRAINING PROGRAM

## 2023-03-30 PROCEDURE — 6370000000 HC RX 637 (ALT 250 FOR IP): Performed by: STUDENT IN AN ORGANIZED HEALTH CARE EDUCATION/TRAINING PROGRAM

## 2023-03-30 PROCEDURE — 36415 COLL VENOUS BLD VENIPUNCTURE: CPT

## 2023-03-30 PROCEDURE — 94640 AIRWAY INHALATION TREATMENT: CPT

## 2023-03-30 PROCEDURE — 2700000000 HC OXYGEN THERAPY PER DAY

## 2023-03-30 PROCEDURE — 80048 BASIC METABOLIC PNL TOTAL CA: CPT

## 2023-03-30 PROCEDURE — 85730 THROMBOPLASTIN TIME PARTIAL: CPT

## 2023-03-30 PROCEDURE — 2000000000 HC ICU R&B

## 2023-03-30 PROCEDURE — 83735 ASSAY OF MAGNESIUM: CPT

## 2023-03-30 PROCEDURE — 82962 GLUCOSE BLOOD TEST: CPT

## 2023-03-30 PROCEDURE — 2060000000 HC ICU INTERMEDIATE R&B

## 2023-03-30 PROCEDURE — 2580000003 HC RX 258: Performed by: NURSE PRACTITIONER

## 2023-03-30 PROCEDURE — 84100 ASSAY OF PHOSPHORUS: CPT

## 2023-03-30 RX ORDER — ACETYLCYSTEINE 200 MG/ML
600 SOLUTION ORAL; RESPIRATORY (INHALATION) 2 TIMES DAILY
Status: DISCONTINUED | OUTPATIENT
Start: 2023-03-30 | End: 2023-04-20

## 2023-03-30 RX ADMIN — IPRATROPIUM BROMIDE AND ALBUTEROL SULFATE 3 ML: 2.5; .5 SOLUTION RESPIRATORY (INHALATION) at 15:22

## 2023-03-30 RX ADMIN — SODIUM CHLORIDE 100 MG: 9 INJECTION, SOLUTION INTRAVENOUS at 13:29

## 2023-03-30 RX ADMIN — QUETIAPINE FUMARATE 50 MG: 25 TABLET ORAL at 20:53

## 2023-03-30 RX ADMIN — SODIUM CHLORIDE, PRESERVATIVE FREE 10 ML: 5 INJECTION INTRAVENOUS at 20:53

## 2023-03-30 RX ADMIN — MEROPENEM 500 MG: 500 INJECTION, POWDER, FOR SOLUTION INTRAVENOUS at 17:18

## 2023-03-30 RX ADMIN — ASPIRIN 81 MG 81 MG: 81 TABLET ORAL at 08:44

## 2023-03-30 RX ADMIN — HEPARIN SODIUM 8 UNITS/KG/HR: 10000 INJECTION, SOLUTION INTRAVENOUS at 21:39

## 2023-03-30 RX ADMIN — LANSOPRAZOLE 30 MG: KIT at 08:44

## 2023-03-30 RX ADMIN — MIDODRINE HYDROCHLORIDE 10 MG: 5 TABLET ORAL at 13:29

## 2023-03-30 RX ADMIN — MIDODRINE HYDROCHLORIDE 10 MG: 5 TABLET ORAL at 08:44

## 2023-03-30 RX ADMIN — IPRATROPIUM BROMIDE AND ALBUTEROL SULFATE 3 ML: 2.5; .5 SOLUTION RESPIRATORY (INHALATION) at 19:53

## 2023-03-30 RX ADMIN — MIDODRINE HYDROCHLORIDE 10 MG: 5 TABLET ORAL at 17:15

## 2023-03-30 RX ADMIN — IPRATROPIUM BROMIDE AND ALBUTEROL SULFATE 3 ML: 2.5; .5 SOLUTION RESPIRATORY (INHALATION) at 08:19

## 2023-03-30 RX ADMIN — TRAZODONE HYDROCHLORIDE 100 MG: 50 TABLET ORAL at 20:53

## 2023-03-30 RX ADMIN — ATORVASTATIN CALCIUM 40 MG: 40 TABLET, FILM COATED ORAL at 20:53

## 2023-03-30 RX ADMIN — AMIODARONE HYDROCHLORIDE 200 MG: 200 TABLET ORAL at 08:44

## 2023-03-30 RX ADMIN — METOPROLOL TARTRATE 25 MG: 25 TABLET, FILM COATED ORAL at 20:53

## 2023-03-30 RX ADMIN — IPRATROPIUM BROMIDE AND ALBUTEROL SULFATE 3 ML: 2.5; .5 SOLUTION RESPIRATORY (INHALATION) at 12:18

## 2023-03-30 RX ADMIN — ACETYLCYSTEINE 600 MG: 200 SOLUTION ORAL; RESPIRATORY (INHALATION) at 19:54

## 2023-03-30 RX ADMIN — LEVOTHYROXINE SODIUM 100 MCG: 0.1 TABLET ORAL at 08:44

## 2023-03-30 ASSESSMENT — PAIN SCALES - GENERAL
PAINLEVEL_OUTOF10: 0
PAINLEVEL_OUTOF10: 0

## 2023-03-31 ENCOUNTER — APPOINTMENT (OUTPATIENT)
Dept: GENERAL RADIOLOGY | Age: 73
End: 2023-03-31
Attending: STUDENT IN AN ORGANIZED HEALTH CARE EDUCATION/TRAINING PROGRAM
Payer: COMMERCIAL

## 2023-03-31 ENCOUNTER — APPOINTMENT (OUTPATIENT)
Dept: INTERVENTIONAL RADIOLOGY/VASCULAR | Age: 73
End: 2023-03-31
Attending: STUDENT IN AN ORGANIZED HEALTH CARE EDUCATION/TRAINING PROGRAM
Payer: COMMERCIAL

## 2023-03-31 ENCOUNTER — APPOINTMENT (OUTPATIENT)
Dept: CT IMAGING | Age: 73
End: 2023-03-31
Attending: STUDENT IN AN ORGANIZED HEALTH CARE EDUCATION/TRAINING PROGRAM
Payer: COMMERCIAL

## 2023-03-31 LAB
ANION GAP SERPL CALCULATED.3IONS-SCNC: 12 MMOL/L (ref 4–16)
APTT: 36.8 SECONDS (ref 25.1–37.1)
APTT: 62 SECONDS (ref 25.1–37.1)
BUN SERPL-MCNC: 49 MG/DL (ref 6–23)
CALCIUM SERPL-MCNC: 8.4 MG/DL (ref 8.3–10.6)
CHLORIDE BLD-SCNC: 99 MMOL/L (ref 99–110)
CO2: 27 MMOL/L (ref 21–32)
CREAT SERPL-MCNC: 5 MG/DL (ref 0.9–1.3)
CULTURE: ABNORMAL
GFR SERPL CREATININE-BSD FRML MDRD: 12 ML/MIN/1.73M2
GLUCOSE BLD-MCNC: 129 MG/DL (ref 70–99)
GLUCOSE BLD-MCNC: 143 MG/DL (ref 70–99)
GLUCOSE BLD-MCNC: 146 MG/DL (ref 70–99)
GLUCOSE BLD-MCNC: 149 MG/DL (ref 70–99)
GLUCOSE BLD-MCNC: 166 MG/DL (ref 70–99)
GLUCOSE BLD-MCNC: 205 MG/DL (ref 70–99)
GLUCOSE BLD-MCNC: 225 MG/DL (ref 70–99)
GLUCOSE SERPL-MCNC: 145 MG/DL (ref 70–99)
HCT VFR BLD CALC: 25.5 % (ref 42–52)
HEMOGLOBIN: 8.4 GM/DL (ref 13.5–18)
Lab: ABNORMAL
MAGNESIUM: 2.6 MG/DL (ref 1.8–2.4)
MCH RBC QN AUTO: 37 PG (ref 27–31)
MCHC RBC AUTO-ENTMCNC: 32.9 % (ref 32–36)
MCV RBC AUTO: 112.3 FL (ref 78–100)
PDW BLD-RTO: 17.5 % (ref 11.7–14.9)
PHOSPHORUS: 4.8 MG/DL (ref 2.5–4.9)
PLATELET # BLD: 222 K/CU MM (ref 140–440)
PMV BLD AUTO: 8.8 FL (ref 7.5–11.1)
POTASSIUM SERPL-SCNC: 3.9 MMOL/L (ref 3.5–5.1)
RBC # BLD: 2.27 M/CU MM (ref 4.6–6.2)
SODIUM BLD-SCNC: 138 MMOL/L (ref 135–145)
SPECIMEN: ABNORMAL
WBC # BLD: 5.9 K/CU MM (ref 4–10.5)

## 2023-03-31 PROCEDURE — 6360000004 HC RX CONTRAST MEDICATION: Performed by: INTERNAL MEDICINE

## 2023-03-31 PROCEDURE — 87186 SC STD MICRODIL/AGAR DIL: CPT

## 2023-03-31 PROCEDURE — 87040 BLOOD CULTURE FOR BACTERIA: CPT

## 2023-03-31 PROCEDURE — 2500000003 HC RX 250 WO HCPCS: Performed by: INTERNAL MEDICINE

## 2023-03-31 PROCEDURE — 6370000000 HC RX 637 (ALT 250 FOR IP): Performed by: STUDENT IN AN ORGANIZED HEALTH CARE EDUCATION/TRAINING PROGRAM

## 2023-03-31 PROCEDURE — 36556 INSERT NON-TUNNEL CV CATH: CPT

## 2023-03-31 PROCEDURE — 84100 ASSAY OF PHOSPHORUS: CPT

## 2023-03-31 PROCEDURE — 85730 THROMBOPLASTIN TIME PARTIAL: CPT

## 2023-03-31 PROCEDURE — 80048 BASIC METABOLIC PNL TOTAL CA: CPT

## 2023-03-31 PROCEDURE — 87077 CULTURE AEROBIC IDENTIFY: CPT

## 2023-03-31 PROCEDURE — 87205 SMEAR GRAM STAIN: CPT

## 2023-03-31 PROCEDURE — 2580000003 HC RX 258: Performed by: NURSE PRACTITIONER

## 2023-03-31 PROCEDURE — 2700000000 HC OXYGEN THERAPY PER DAY

## 2023-03-31 PROCEDURE — 83735 ASSAY OF MAGNESIUM: CPT

## 2023-03-31 PROCEDURE — 02HV33Z INSERTION OF INFUSION DEVICE INTO SUPERIOR VENA CAVA, PERCUTANEOUS APPROACH: ICD-10-PCS | Performed by: STUDENT IN AN ORGANIZED HEALTH CARE EDUCATION/TRAINING PROGRAM

## 2023-03-31 PROCEDURE — 6360000002 HC RX W HCPCS: Performed by: STUDENT IN AN ORGANIZED HEALTH CARE EDUCATION/TRAINING PROGRAM

## 2023-03-31 PROCEDURE — 71045 X-RAY EXAM CHEST 1 VIEW: CPT

## 2023-03-31 PROCEDURE — 87070 CULTURE OTHR SPECIMN AEROBIC: CPT

## 2023-03-31 PROCEDURE — 74177 CT ABD & PELVIS W/CONTRAST: CPT

## 2023-03-31 PROCEDURE — 85027 COMPLETE CBC AUTOMATED: CPT

## 2023-03-31 PROCEDURE — 2580000003 HC RX 258: Performed by: INTERNAL MEDICINE

## 2023-03-31 PROCEDURE — 36592 COLLECT BLOOD FROM PICC: CPT

## 2023-03-31 PROCEDURE — 89220 SPUTUM SPECIMEN COLLECTION: CPT

## 2023-03-31 PROCEDURE — 87181 SC STD AGAR DILUTION PER AGT: CPT

## 2023-03-31 PROCEDURE — 94640 AIRWAY INHALATION TREATMENT: CPT

## 2023-03-31 PROCEDURE — 94761 N-INVAS EAR/PLS OXIMETRY MLT: CPT

## 2023-03-31 PROCEDURE — 82962 GLUCOSE BLOOD TEST: CPT

## 2023-03-31 PROCEDURE — 2060000000 HC ICU INTERMEDIATE R&B

## 2023-03-31 PROCEDURE — 6360000002 HC RX W HCPCS: Performed by: INTERNAL MEDICINE

## 2023-03-31 PROCEDURE — 6370000000 HC RX 637 (ALT 250 FOR IP): Performed by: NURSE PRACTITIONER

## 2023-03-31 RX ORDER — MORPHINE SULFATE 2 MG/ML
2 INJECTION, SOLUTION INTRAMUSCULAR; INTRAVENOUS EVERY 4 HOURS PRN
Status: DISCONTINUED | OUTPATIENT
Start: 2023-03-31 | End: 2023-03-31

## 2023-03-31 RX ORDER — HEPARIN SODIUM 1000 [USP'U]/ML
2000 INJECTION, SOLUTION INTRAVENOUS; SUBCUTANEOUS PRN
Status: DISCONTINUED | OUTPATIENT
Start: 2023-03-31 | End: 2023-04-28 | Stop reason: HOSPADM

## 2023-03-31 RX ORDER — MORPHINE SULFATE 2 MG/ML
2 INJECTION, SOLUTION INTRAMUSCULAR; INTRAVENOUS ONCE
Status: CANCELLED | OUTPATIENT
Start: 2023-03-31

## 2023-03-31 RX ADMIN — LANSOPRAZOLE 30 MG: KIT at 05:31

## 2023-03-31 RX ADMIN — IPRATROPIUM BROMIDE AND ALBUTEROL SULFATE 3 ML: 2.5; .5 SOLUTION RESPIRATORY (INHALATION) at 08:10

## 2023-03-31 RX ADMIN — QUETIAPINE FUMARATE 50 MG: 25 TABLET ORAL at 21:06

## 2023-03-31 RX ADMIN — IOPAMIDOL 80 ML: 755 INJECTION, SOLUTION INTRAVENOUS at 09:49

## 2023-03-31 RX ADMIN — MEROPENEM 500 MG: 500 INJECTION, POWDER, FOR SOLUTION INTRAVENOUS at 18:23

## 2023-03-31 RX ADMIN — SODIUM CHLORIDE 100 MG: 9 INJECTION, SOLUTION INTRAVENOUS at 12:50

## 2023-03-31 RX ADMIN — MIDODRINE HYDROCHLORIDE 10 MG: 5 TABLET ORAL at 12:40

## 2023-03-31 RX ADMIN — MORPHINE SULFATE 2 MG: 2 INJECTION, SOLUTION INTRAMUSCULAR; INTRAVENOUS at 16:05

## 2023-03-31 RX ADMIN — ATORVASTATIN CALCIUM 40 MG: 40 TABLET, FILM COATED ORAL at 21:06

## 2023-03-31 RX ADMIN — HEPARIN SODIUM 2000 UNITS: 1000 INJECTION INTRAVENOUS; SUBCUTANEOUS at 18:15

## 2023-03-31 RX ADMIN — MORPHINE SULFATE 2 MG: 2 INJECTION, SOLUTION INTRAMUSCULAR; INTRAVENOUS at 10:33

## 2023-03-31 RX ADMIN — MIDODRINE HYDROCHLORIDE 10 MG: 5 TABLET ORAL at 08:53

## 2023-03-31 RX ADMIN — BARIUM SULFATE 900 ML: 20 SUSPENSION ORAL at 09:50

## 2023-03-31 RX ADMIN — IPRATROPIUM BROMIDE AND ALBUTEROL SULFATE 3 ML: 2.5; .5 SOLUTION RESPIRATORY (INHALATION) at 19:41

## 2023-03-31 RX ADMIN — IPRATROPIUM BROMIDE AND ALBUTEROL SULFATE 3 ML: 2.5; .5 SOLUTION RESPIRATORY (INHALATION) at 12:56

## 2023-03-31 RX ADMIN — MIDODRINE HYDROCHLORIDE 10 MG: 5 TABLET ORAL at 16:53

## 2023-03-31 RX ADMIN — SODIUM CHLORIDE, PRESERVATIVE FREE 10 ML: 5 INJECTION INTRAVENOUS at 08:53

## 2023-03-31 RX ADMIN — ASPIRIN 81 MG 81 MG: 81 TABLET ORAL at 08:54

## 2023-03-31 RX ADMIN — ACETYLCYSTEINE 600 MG: 200 SOLUTION ORAL; RESPIRATORY (INHALATION) at 08:11

## 2023-03-31 RX ADMIN — TRAZODONE HYDROCHLORIDE 100 MG: 50 TABLET ORAL at 21:06

## 2023-03-31 RX ADMIN — SODIUM CHLORIDE, PRESERVATIVE FREE 10 ML: 5 INJECTION INTRAVENOUS at 21:08

## 2023-03-31 RX ADMIN — INSULIN LISPRO 4 UNITS: 100 INJECTION, SOLUTION INTRAVENOUS; SUBCUTANEOUS at 16:14

## 2023-03-31 RX ADMIN — LEVOTHYROXINE SODIUM 100 MCG: 0.1 TABLET ORAL at 05:31

## 2023-03-31 RX ADMIN — HYDROMORPHONE HYDROCHLORIDE 0.5 MG: 1 INJECTION, SOLUTION INTRAMUSCULAR; INTRAVENOUS; SUBCUTANEOUS at 16:53

## 2023-03-31 RX ADMIN — INSULIN LISPRO 4 UNITS: 100 INJECTION, SOLUTION INTRAVENOUS; SUBCUTANEOUS at 12:42

## 2023-03-31 RX ADMIN — AMIODARONE HYDROCHLORIDE 200 MG: 200 TABLET ORAL at 08:53

## 2023-03-31 RX ADMIN — ACETYLCYSTEINE 600 MG: 200 SOLUTION ORAL; RESPIRATORY (INHALATION) at 19:42

## 2023-03-31 RX ADMIN — METOPROLOL TARTRATE 25 MG: 25 TABLET, FILM COATED ORAL at 08:53

## 2023-03-31 RX ADMIN — IPRATROPIUM BROMIDE AND ALBUTEROL SULFATE 3 ML: 2.5; .5 SOLUTION RESPIRATORY (INHALATION) at 17:03

## 2023-03-31 ASSESSMENT — PAIN DESCRIPTION - LOCATION
LOCATION: BUTTOCKS;COCCYX
LOCATION: COCCYX
LOCATION: BUTTOCKS;COCCYX

## 2023-03-31 ASSESSMENT — PAIN DESCRIPTION - ORIENTATION
ORIENTATION: MID
ORIENTATION: MID
ORIENTATION: MID;RIGHT

## 2023-03-31 ASSESSMENT — PAIN DESCRIPTION - ONSET: ONSET: PROGRESSIVE

## 2023-03-31 ASSESSMENT — PAIN SCALES - GENERAL
PAINLEVEL_OUTOF10: 5
PAINLEVEL_OUTOF10: 7
PAINLEVEL_OUTOF10: 7
PAINLEVEL_OUTOF10: 8
PAINLEVEL_OUTOF10: 1
PAINLEVEL_OUTOF10: 6
PAINLEVEL_OUTOF10: 0

## 2023-03-31 ASSESSMENT — PAIN DESCRIPTION - DESCRIPTORS
DESCRIPTORS: DISCOMFORT;ACHING
DESCRIPTORS: ACHING
DESCRIPTORS: ACHING;DISCOMFORT
DESCRIPTORS: ACHING;DISCOMFORT

## 2023-04-01 ENCOUNTER — APPOINTMENT (OUTPATIENT)
Dept: GENERAL RADIOLOGY | Age: 73
End: 2023-04-01
Attending: STUDENT IN AN ORGANIZED HEALTH CARE EDUCATION/TRAINING PROGRAM
Payer: COMMERCIAL

## 2023-04-01 LAB
ANION GAP SERPL CALCULATED.3IONS-SCNC: 14 MMOL/L (ref 4–16)
APTT: 58 SECONDS (ref 25.1–37.1)
APTT: 63.2 SECONDS (ref 25.1–37.1)
APTT: 74.5 SECONDS (ref 25.1–37.1)
APTT: 79.8 SECONDS (ref 25.1–37.1)
BUN SERPL-MCNC: 53 MG/DL (ref 6–23)
CALCIUM SERPL-MCNC: 9.2 MG/DL (ref 8.3–10.6)
CHLORIDE BLD-SCNC: 90 MMOL/L (ref 99–110)
CO2: 26 MMOL/L (ref 21–32)
CREAT SERPL-MCNC: 5.8 MG/DL (ref 0.9–1.3)
CULTURE: NORMAL
GFR SERPL CREATININE-BSD FRML MDRD: 10 ML/MIN/1.73M2
GLUCOSE BLD-MCNC: 173 MG/DL (ref 70–99)
GLUCOSE BLD-MCNC: 175 MG/DL (ref 70–99)
GLUCOSE BLD-MCNC: 189 MG/DL (ref 70–99)
GLUCOSE BLD-MCNC: 203 MG/DL (ref 70–99)
GLUCOSE BLD-MCNC: 221 MG/DL (ref 70–99)
GLUCOSE BLD-MCNC: 231 MG/DL (ref 70–99)
GLUCOSE SERPL-MCNC: 199 MG/DL (ref 70–99)
Lab: NORMAL
MAGNESIUM: 2.8 MG/DL (ref 1.8–2.4)
PHOSPHORUS: 6.2 MG/DL (ref 2.5–4.9)
POTASSIUM SERPL-SCNC: 4.5 MMOL/L (ref 3.5–5.1)
SODIUM BLD-SCNC: 130 MMOL/L (ref 135–145)
SPECIMEN: NORMAL

## 2023-04-01 PROCEDURE — 6360000002 HC RX W HCPCS: Performed by: INTERNAL MEDICINE

## 2023-04-01 PROCEDURE — 6370000000 HC RX 637 (ALT 250 FOR IP): Performed by: STUDENT IN AN ORGANIZED HEALTH CARE EDUCATION/TRAINING PROGRAM

## 2023-04-01 PROCEDURE — 2580000003 HC RX 258: Performed by: INTERNAL MEDICINE

## 2023-04-01 PROCEDURE — 6370000000 HC RX 637 (ALT 250 FOR IP): Performed by: NURSE PRACTITIONER

## 2023-04-01 PROCEDURE — 6360000002 HC RX W HCPCS: Performed by: STUDENT IN AN ORGANIZED HEALTH CARE EDUCATION/TRAINING PROGRAM

## 2023-04-01 PROCEDURE — 2060000000 HC ICU INTERMEDIATE R&B

## 2023-04-01 PROCEDURE — 80048 BASIC METABOLIC PNL TOTAL CA: CPT

## 2023-04-01 PROCEDURE — 90935 HEMODIALYSIS ONE EVALUATION: CPT

## 2023-04-01 PROCEDURE — 94761 N-INVAS EAR/PLS OXIMETRY MLT: CPT

## 2023-04-01 PROCEDURE — 82962 GLUCOSE BLOOD TEST: CPT

## 2023-04-01 PROCEDURE — 84100 ASSAY OF PHOSPHORUS: CPT

## 2023-04-01 PROCEDURE — 94640 AIRWAY INHALATION TREATMENT: CPT

## 2023-04-01 PROCEDURE — 83735 ASSAY OF MAGNESIUM: CPT

## 2023-04-01 PROCEDURE — 36592 COLLECT BLOOD FROM PICC: CPT

## 2023-04-01 PROCEDURE — 2700000000 HC OXYGEN THERAPY PER DAY

## 2023-04-01 PROCEDURE — 85730 THROMBOPLASTIN TIME PARTIAL: CPT

## 2023-04-01 PROCEDURE — 2500000003 HC RX 250 WO HCPCS: Performed by: PHYSICIAN ASSISTANT

## 2023-04-01 PROCEDURE — 89220 SPUTUM SPECIMEN COLLECTION: CPT

## 2023-04-01 PROCEDURE — 71045 X-RAY EXAM CHEST 1 VIEW: CPT

## 2023-04-01 PROCEDURE — 2580000003 HC RX 258: Performed by: NURSE PRACTITIONER

## 2023-04-01 RX ORDER — MAG HYDROX/ALUMINUM HYD/SIMETH 400-400-40
1 SUSPENSION, ORAL (FINAL DOSE FORM) ORAL ONCE
Status: COMPLETED | OUTPATIENT
Start: 2023-04-02 | End: 2023-04-02

## 2023-04-01 RX ADMIN — HYDROMORPHONE HYDROCHLORIDE 0.5 MG: 1 INJECTION, SOLUTION INTRAMUSCULAR; INTRAVENOUS; SUBCUTANEOUS at 01:58

## 2023-04-01 RX ADMIN — SODIUM CHLORIDE, PRESERVATIVE FREE 10 ML: 5 INJECTION INTRAVENOUS at 21:12

## 2023-04-01 RX ADMIN — IPRATROPIUM BROMIDE AND ALBUTEROL SULFATE 3 ML: 2.5; .5 SOLUTION RESPIRATORY (INHALATION) at 19:40

## 2023-04-01 RX ADMIN — MIDODRINE HYDROCHLORIDE 10 MG: 5 TABLET ORAL at 12:51

## 2023-04-01 RX ADMIN — TRAZODONE HYDROCHLORIDE 100 MG: 50 TABLET ORAL at 21:01

## 2023-04-01 RX ADMIN — IPRATROPIUM BROMIDE AND ALBUTEROL SULFATE 3 ML: 2.5; .5 SOLUTION RESPIRATORY (INHALATION) at 15:29

## 2023-04-01 RX ADMIN — MIDODRINE HYDROCHLORIDE 10 MG: 5 TABLET ORAL at 08:53

## 2023-04-01 RX ADMIN — POLYETHYLENE GLYCOL 3350 17 G: 17 POWDER, FOR SOLUTION ORAL at 23:51

## 2023-04-01 RX ADMIN — HEPARIN SODIUM 10 UNITS/KG/HR: 10000 INJECTION, SOLUTION INTRAVENOUS at 08:54

## 2023-04-01 RX ADMIN — MIDODRINE HYDROCHLORIDE 10 MG: 5 TABLET ORAL at 17:08

## 2023-04-01 RX ADMIN — SODIUM CHLORIDE, PRESERVATIVE FREE 10 ML: 5 INJECTION INTRAVENOUS at 08:57

## 2023-04-01 RX ADMIN — IPRATROPIUM BROMIDE AND ALBUTEROL SULFATE 3 ML: 2.5; .5 SOLUTION RESPIRATORY (INHALATION) at 11:29

## 2023-04-01 RX ADMIN — LANSOPRAZOLE 30 MG: KIT at 06:43

## 2023-04-01 RX ADMIN — INSULIN LISPRO 4 UNITS: 100 INJECTION, SOLUTION INTRAVENOUS; SUBCUTANEOUS at 09:12

## 2023-04-01 RX ADMIN — SODIUM CHLORIDE 100 MG: 9 INJECTION, SOLUTION INTRAVENOUS at 13:17

## 2023-04-01 RX ADMIN — METOPROLOL TARTRATE 25 MG: 25 TABLET, FILM COATED ORAL at 21:04

## 2023-04-01 RX ADMIN — INSULIN LISPRO 4 UNITS: 100 INJECTION, SOLUTION INTRAVENOUS; SUBCUTANEOUS at 20:58

## 2023-04-01 RX ADMIN — ACETYLCYSTEINE 600 MG: 200 SOLUTION ORAL; RESPIRATORY (INHALATION) at 19:40

## 2023-04-01 RX ADMIN — DOCUSATE SODIUM 50MG AND SENNOSIDES 8.6MG 2 TABLET: 8.6; 5 TABLET, FILM COATED ORAL at 23:51

## 2023-04-01 RX ADMIN — IPRATROPIUM BROMIDE AND ALBUTEROL SULFATE 3 ML: 2.5; .5 SOLUTION RESPIRATORY (INHALATION) at 08:14

## 2023-04-01 RX ADMIN — AMIODARONE HYDROCHLORIDE 200 MG: 200 TABLET ORAL at 08:53

## 2023-04-01 RX ADMIN — INSULIN LISPRO 4 UNITS: 100 INJECTION, SOLUTION INTRAVENOUS; SUBCUTANEOUS at 17:14

## 2023-04-01 RX ADMIN — ATORVASTATIN CALCIUM 40 MG: 40 TABLET, FILM COATED ORAL at 21:00

## 2023-04-01 RX ADMIN — LEVOTHYROXINE SODIUM 100 MCG: 0.1 TABLET ORAL at 06:43

## 2023-04-01 RX ADMIN — QUETIAPINE FUMARATE 50 MG: 25 TABLET ORAL at 21:00

## 2023-04-01 RX ADMIN — ACETYLCYSTEINE 600 MG: 200 SOLUTION ORAL; RESPIRATORY (INHALATION) at 08:14

## 2023-04-01 RX ADMIN — ASPIRIN 81 MG 81 MG: 81 TABLET ORAL at 08:54

## 2023-04-01 RX ADMIN — MEROPENEM 500 MG: 500 INJECTION, POWDER, FOR SOLUTION INTRAVENOUS at 17:10

## 2023-04-01 ASSESSMENT — PAIN DESCRIPTION - ORIENTATION
ORIENTATION: RIGHT;LEFT;MID
ORIENTATION: RIGHT;LEFT;MID

## 2023-04-01 ASSESSMENT — PAIN DESCRIPTION - FREQUENCY
FREQUENCY: INTERMITTENT
FREQUENCY: INTERMITTENT

## 2023-04-01 ASSESSMENT — PAIN DESCRIPTION - PAIN TYPE
TYPE: CHRONIC PAIN
TYPE: ACUTE PAIN

## 2023-04-01 ASSESSMENT — PAIN DESCRIPTION - ONSET: ONSET: OTHER (COMMENT)

## 2023-04-01 ASSESSMENT — PAIN DESCRIPTION - LOCATION
LOCATION: BUTTOCKS
LOCATION: BUTTOCKS;SACRUM

## 2023-04-01 ASSESSMENT — PAIN DESCRIPTION - DESCRIPTORS
DESCRIPTORS: ACHING;BURNING;DISCOMFORT
DESCRIPTORS: ACHING;BURNING

## 2023-04-01 ASSESSMENT — PAIN SCALES - GENERAL
PAINLEVEL_OUTOF10: 8
PAINLEVEL_OUTOF10: 0
PAINLEVEL_OUTOF10: 3

## 2023-04-02 LAB
ANION GAP SERPL CALCULATED.3IONS-SCNC: 8 MMOL/L (ref 4–16)
APTT: 85.2 SECONDS (ref 25.1–37.1)
BUN SERPL-MCNC: 34 MG/DL (ref 6–23)
CALCIUM SERPL-MCNC: 8.8 MG/DL (ref 8.3–10.6)
CHLORIDE BLD-SCNC: 95 MMOL/L (ref 99–110)
CO2: 29 MMOL/L (ref 21–32)
CREAT SERPL-MCNC: 4.1 MG/DL (ref 0.9–1.3)
CULTURE: ABNORMAL
CULTURE: NORMAL
GFR SERPL CREATININE-BSD FRML MDRD: 15 ML/MIN/1.73M2
GLUCOSE BLD-MCNC: 181 MG/DL (ref 70–99)
GLUCOSE BLD-MCNC: 189 MG/DL (ref 70–99)
GLUCOSE BLD-MCNC: 189 MG/DL (ref 70–99)
GLUCOSE BLD-MCNC: 190 MG/DL (ref 70–99)
GLUCOSE BLD-MCNC: 203 MG/DL (ref 70–99)
GLUCOSE BLD-MCNC: 285 MG/DL (ref 70–99)
GLUCOSE SERPL-MCNC: 199 MG/DL (ref 70–99)
HCT VFR BLD CALC: 25.5 % (ref 42–52)
HEMOGLOBIN: 7.8 GM/DL (ref 13.5–18)
Lab: ABNORMAL
Lab: NORMAL
MAGNESIUM: 2.5 MG/DL (ref 1.8–2.4)
MCH RBC QN AUTO: 34.4 PG (ref 27–31)
MCHC RBC AUTO-ENTMCNC: 30.6 % (ref 32–36)
MCV RBC AUTO: 112.3 FL (ref 78–100)
PDW BLD-RTO: 17.4 % (ref 11.7–14.9)
PHOSPHORUS: 3.6 MG/DL (ref 2.5–4.9)
PLATELET # BLD: 186 K/CU MM (ref 140–440)
PMV BLD AUTO: 9 FL (ref 7.5–11.1)
POTASSIUM SERPL-SCNC: 3.7 MMOL/L (ref 3.5–5.1)
RBC # BLD: 2.27 M/CU MM (ref 4.6–6.2)
SODIUM BLD-SCNC: 132 MMOL/L (ref 135–145)
SPECIMEN: ABNORMAL
SPECIMEN: NORMAL
WBC # BLD: 6.4 K/CU MM (ref 4–10.5)

## 2023-04-02 PROCEDURE — 6370000000 HC RX 637 (ALT 250 FOR IP): Performed by: STUDENT IN AN ORGANIZED HEALTH CARE EDUCATION/TRAINING PROGRAM

## 2023-04-02 PROCEDURE — 2060000000 HC ICU INTERMEDIATE R&B

## 2023-04-02 PROCEDURE — 6370000000 HC RX 637 (ALT 250 FOR IP): Performed by: NURSE PRACTITIONER

## 2023-04-02 PROCEDURE — 94640 AIRWAY INHALATION TREATMENT: CPT

## 2023-04-02 PROCEDURE — 83735 ASSAY OF MAGNESIUM: CPT

## 2023-04-02 PROCEDURE — 2700000000 HC OXYGEN THERAPY PER DAY

## 2023-04-02 PROCEDURE — 2580000003 HC RX 258: Performed by: NURSE PRACTITIONER

## 2023-04-02 PROCEDURE — 85730 THROMBOPLASTIN TIME PARTIAL: CPT

## 2023-04-02 PROCEDURE — 36592 COLLECT BLOOD FROM PICC: CPT

## 2023-04-02 PROCEDURE — 80048 BASIC METABOLIC PNL TOTAL CA: CPT

## 2023-04-02 PROCEDURE — 6360000002 HC RX W HCPCS: Performed by: STUDENT IN AN ORGANIZED HEALTH CARE EDUCATION/TRAINING PROGRAM

## 2023-04-02 PROCEDURE — 94761 N-INVAS EAR/PLS OXIMETRY MLT: CPT

## 2023-04-02 PROCEDURE — 2500000003 HC RX 250 WO HCPCS: Performed by: PHYSICIAN ASSISTANT

## 2023-04-02 PROCEDURE — 89220 SPUTUM SPECIMEN COLLECTION: CPT

## 2023-04-02 PROCEDURE — 84100 ASSAY OF PHOSPHORUS: CPT

## 2023-04-02 PROCEDURE — 6360000002 HC RX W HCPCS: Performed by: INTERNAL MEDICINE

## 2023-04-02 PROCEDURE — 87040 BLOOD CULTURE FOR BACTERIA: CPT

## 2023-04-02 PROCEDURE — 85027 COMPLETE CBC AUTOMATED: CPT

## 2023-04-02 PROCEDURE — 2580000003 HC RX 258: Performed by: INTERNAL MEDICINE

## 2023-04-02 RX ORDER — IPRATROPIUM BROMIDE AND ALBUTEROL SULFATE 2.5; .5 MG/3ML; MG/3ML
1 SOLUTION RESPIRATORY (INHALATION) 4 TIMES DAILY
Status: DISCONTINUED | OUTPATIENT
Start: 2023-04-02 | End: 2023-04-28 | Stop reason: HOSPADM

## 2023-04-02 RX ADMIN — QUETIAPINE FUMARATE 50 MG: 25 TABLET ORAL at 20:50

## 2023-04-02 RX ADMIN — MIDODRINE HYDROCHLORIDE 10 MG: 5 TABLET ORAL at 08:17

## 2023-04-02 RX ADMIN — LANSOPRAZOLE 30 MG: KIT at 06:21

## 2023-04-02 RX ADMIN — SODIUM CHLORIDE, PRESERVATIVE FREE 10 ML: 5 INJECTION INTRAVENOUS at 20:52

## 2023-04-02 RX ADMIN — SODIUM CHLORIDE 100 MG: 9 INJECTION, SOLUTION INTRAVENOUS at 13:09

## 2023-04-02 RX ADMIN — AMIODARONE HYDROCHLORIDE 200 MG: 200 TABLET ORAL at 08:17

## 2023-04-02 RX ADMIN — IPRATROPIUM BROMIDE AND ALBUTEROL SULFATE 3 ML: 2.5; .5 SOLUTION RESPIRATORY (INHALATION) at 08:35

## 2023-04-02 RX ADMIN — MEROPENEM 500 MG: 500 INJECTION, POWDER, FOR SOLUTION INTRAVENOUS at 18:07

## 2023-04-02 RX ADMIN — HEPARIN SODIUM 10 UNITS/KG/HR: 10000 INJECTION, SOLUTION INTRAVENOUS at 07:07

## 2023-04-02 RX ADMIN — ATORVASTATIN CALCIUM 40 MG: 40 TABLET, FILM COATED ORAL at 20:51

## 2023-04-02 RX ADMIN — LEVOTHYROXINE SODIUM 100 MCG: 0.1 TABLET ORAL at 06:22

## 2023-04-02 RX ADMIN — ASPIRIN 81 MG 81 MG: 81 TABLET ORAL at 08:17

## 2023-04-02 RX ADMIN — TRAZODONE HYDROCHLORIDE 100 MG: 50 TABLET ORAL at 20:50

## 2023-04-02 RX ADMIN — ACETYLCYSTEINE 600 MG: 200 SOLUTION ORAL; RESPIRATORY (INHALATION) at 20:56

## 2023-04-02 RX ADMIN — METOPROLOL TARTRATE 25 MG: 25 TABLET, FILM COATED ORAL at 08:17

## 2023-04-02 RX ADMIN — MIDODRINE HYDROCHLORIDE 10 MG: 5 TABLET ORAL at 17:58

## 2023-04-02 RX ADMIN — METOPROLOL TARTRATE 25 MG: 25 TABLET, FILM COATED ORAL at 20:50

## 2023-04-02 RX ADMIN — IPRATROPIUM BROMIDE AND ALBUTEROL SULFATE 3 ML: 2.5; .5 SOLUTION RESPIRATORY (INHALATION) at 16:20

## 2023-04-02 RX ADMIN — ACETYLCYSTEINE 600 MG: 200 SOLUTION ORAL; RESPIRATORY (INHALATION) at 08:36

## 2023-04-02 RX ADMIN — IPRATROPIUM BROMIDE AND ALBUTEROL SULFATE 3 ML: 2.5; .5 SOLUTION RESPIRATORY (INHALATION) at 12:03

## 2023-04-02 RX ADMIN — IPRATROPIUM BROMIDE AND ALBUTEROL SULFATE 3 ML: 2.5; .5 SOLUTION RESPIRATORY (INHALATION) at 20:56

## 2023-04-02 RX ADMIN — INSULIN LISPRO 8 UNITS: 100 INJECTION, SOLUTION INTRAVENOUS; SUBCUTANEOUS at 12:54

## 2023-04-02 RX ADMIN — GLYCERIN 2 G: 2 SUPPOSITORY RECTAL at 00:27

## 2023-04-02 RX ADMIN — MIDODRINE HYDROCHLORIDE 10 MG: 5 TABLET ORAL at 12:48

## 2023-04-03 ENCOUNTER — APPOINTMENT (OUTPATIENT)
Dept: GENERAL RADIOLOGY | Age: 73
End: 2023-04-03
Attending: STUDENT IN AN ORGANIZED HEALTH CARE EDUCATION/TRAINING PROGRAM
Payer: COMMERCIAL

## 2023-04-03 LAB
ANION GAP SERPL CALCULATED.3IONS-SCNC: 10 MMOL/L (ref 4–16)
APTT: 75.8 SECONDS (ref 25.1–37.1)
BUN SERPL-MCNC: 45 MG/DL (ref 6–23)
CALCIUM SERPL-MCNC: 9.9 MG/DL (ref 8.3–10.6)
CHLORIDE BLD-SCNC: 95 MMOL/L (ref 99–110)
CO2: 29 MMOL/L (ref 21–32)
CREAT SERPL-MCNC: 4.9 MG/DL (ref 0.9–1.3)
GFR SERPL CREATININE-BSD FRML MDRD: 12 ML/MIN/1.73M2
GLUCOSE BLD-MCNC: 172 MG/DL (ref 70–99)
GLUCOSE BLD-MCNC: 195 MG/DL (ref 70–99)
GLUCOSE BLD-MCNC: 202 MG/DL (ref 70–99)
GLUCOSE BLD-MCNC: 208 MG/DL (ref 70–99)
GLUCOSE BLD-MCNC: 211 MG/DL (ref 70–99)
GLUCOSE SERPL-MCNC: 196 MG/DL (ref 70–99)
MAGNESIUM: 2.8 MG/DL (ref 1.8–2.4)
PHOSPHORUS: 5.2 MG/DL (ref 2.5–4.9)
POTASSIUM SERPL-SCNC: 3.9 MMOL/L (ref 3.5–5.1)
SODIUM BLD-SCNC: 134 MMOL/L (ref 135–145)

## 2023-04-03 PROCEDURE — 84100 ASSAY OF PHOSPHORUS: CPT

## 2023-04-03 PROCEDURE — 6360000002 HC RX W HCPCS: Performed by: STUDENT IN AN ORGANIZED HEALTH CARE EDUCATION/TRAINING PROGRAM

## 2023-04-03 PROCEDURE — 94640 AIRWAY INHALATION TREATMENT: CPT

## 2023-04-03 PROCEDURE — 94761 N-INVAS EAR/PLS OXIMETRY MLT: CPT

## 2023-04-03 PROCEDURE — 83735 ASSAY OF MAGNESIUM: CPT

## 2023-04-03 PROCEDURE — 6360000002 HC RX W HCPCS: Performed by: NURSE PRACTITIONER

## 2023-04-03 PROCEDURE — 6370000000 HC RX 637 (ALT 250 FOR IP): Performed by: STUDENT IN AN ORGANIZED HEALTH CARE EDUCATION/TRAINING PROGRAM

## 2023-04-03 PROCEDURE — 90935 HEMODIALYSIS ONE EVALUATION: CPT

## 2023-04-03 PROCEDURE — 80048 BASIC METABOLIC PNL TOTAL CA: CPT

## 2023-04-03 PROCEDURE — 2580000003 HC RX 258: Performed by: NURSE PRACTITIONER

## 2023-04-03 PROCEDURE — 2500000003 HC RX 250 WO HCPCS: Performed by: PHYSICIAN ASSISTANT

## 2023-04-03 PROCEDURE — 6370000000 HC RX 637 (ALT 250 FOR IP): Performed by: NURSE PRACTITIONER

## 2023-04-03 PROCEDURE — 71045 X-RAY EXAM CHEST 1 VIEW: CPT

## 2023-04-03 PROCEDURE — 85730 THROMBOPLASTIN TIME PARTIAL: CPT

## 2023-04-03 PROCEDURE — 36592 COLLECT BLOOD FROM PICC: CPT

## 2023-04-03 PROCEDURE — 82962 GLUCOSE BLOOD TEST: CPT

## 2023-04-03 PROCEDURE — 2700000000 HC OXYGEN THERAPY PER DAY

## 2023-04-03 PROCEDURE — 2060000000 HC ICU INTERMEDIATE R&B

## 2023-04-03 PROCEDURE — 99211 OFF/OP EST MAY X REQ PHY/QHP: CPT

## 2023-04-03 RX ADMIN — TRAZODONE HYDROCHLORIDE 100 MG: 50 TABLET ORAL at 20:42

## 2023-04-03 RX ADMIN — INSULIN LISPRO 4 UNITS: 100 INJECTION, SOLUTION INTRAVENOUS; SUBCUTANEOUS at 08:14

## 2023-04-03 RX ADMIN — SODIUM CHLORIDE, PRESERVATIVE FREE 10 ML: 5 INJECTION INTRAVENOUS at 20:44

## 2023-04-03 RX ADMIN — SODIUM CHLORIDE 100 MG: 9 INJECTION, SOLUTION INTRAVENOUS at 13:12

## 2023-04-03 RX ADMIN — MIDODRINE HYDROCHLORIDE 10 MG: 5 TABLET ORAL at 08:14

## 2023-04-03 RX ADMIN — HEPARIN SODIUM 10 UNITS/KG/HR: 10000 INJECTION, SOLUTION INTRAVENOUS at 07:28

## 2023-04-03 RX ADMIN — ACETYLCYSTEINE 600 MG: 200 SOLUTION ORAL; RESPIRATORY (INHALATION) at 08:24

## 2023-04-03 RX ADMIN — MIDODRINE HYDROCHLORIDE 10 MG: 5 TABLET ORAL at 17:34

## 2023-04-03 RX ADMIN — SODIUM CHLORIDE, PRESERVATIVE FREE 10 ML: 5 INJECTION INTRAVENOUS at 08:13

## 2023-04-03 RX ADMIN — HYDROMORPHONE HYDROCHLORIDE 0.5 MG: 1 INJECTION, SOLUTION INTRAMUSCULAR; INTRAVENOUS; SUBCUTANEOUS at 20:44

## 2023-04-03 RX ADMIN — AMIODARONE HYDROCHLORIDE 200 MG: 200 TABLET ORAL at 08:14

## 2023-04-03 RX ADMIN — HYDROMORPHONE HYDROCHLORIDE 0.5 MG: 1 INJECTION, SOLUTION INTRAMUSCULAR; INTRAVENOUS; SUBCUTANEOUS at 08:11

## 2023-04-03 RX ADMIN — QUETIAPINE FUMARATE 50 MG: 25 TABLET ORAL at 20:42

## 2023-04-03 RX ADMIN — HYDROMORPHONE HYDROCHLORIDE 0.5 MG: 1 INJECTION, SOLUTION INTRAMUSCULAR; INTRAVENOUS; SUBCUTANEOUS at 13:05

## 2023-04-03 RX ADMIN — ATORVASTATIN CALCIUM 40 MG: 40 TABLET, FILM COATED ORAL at 20:43

## 2023-04-03 RX ADMIN — METOPROLOL TARTRATE 25 MG: 25 TABLET, FILM COATED ORAL at 13:06

## 2023-04-03 RX ADMIN — IPRATROPIUM BROMIDE AND ALBUTEROL SULFATE 3 ML: 2.5; .5 SOLUTION RESPIRATORY (INHALATION) at 16:09

## 2023-04-03 RX ADMIN — INSULIN LISPRO 4 UNITS: 100 INJECTION, SOLUTION INTRAVENOUS; SUBCUTANEOUS at 17:34

## 2023-04-03 RX ADMIN — LANSOPRAZOLE 30 MG: KIT at 06:21

## 2023-04-03 RX ADMIN — ASPIRIN 81 MG 81 MG: 81 TABLET ORAL at 08:14

## 2023-04-03 RX ADMIN — METOPROLOL TARTRATE 25 MG: 25 TABLET, FILM COATED ORAL at 20:42

## 2023-04-03 RX ADMIN — IPRATROPIUM BROMIDE AND ALBUTEROL SULFATE 3 ML: 2.5; .5 SOLUTION RESPIRATORY (INHALATION) at 12:10

## 2023-04-03 RX ADMIN — MIDODRINE HYDROCHLORIDE 10 MG: 5 TABLET ORAL at 13:05

## 2023-04-03 RX ADMIN — APIXABAN 2.5 MG: 2.5 TABLET, FILM COATED ORAL at 20:42

## 2023-04-03 RX ADMIN — INSULIN LISPRO 4 UNITS: 100 INJECTION, SOLUTION INTRAVENOUS; SUBCUTANEOUS at 03:59

## 2023-04-03 RX ADMIN — IPRATROPIUM BROMIDE AND ALBUTEROL SULFATE 3 ML: 2.5; .5 SOLUTION RESPIRATORY (INHALATION) at 20:17

## 2023-04-03 RX ADMIN — APIXABAN 2.5 MG: 2.5 TABLET, FILM COATED ORAL at 13:05

## 2023-04-03 RX ADMIN — LEVOTHYROXINE SODIUM 100 MCG: 0.1 TABLET ORAL at 06:21

## 2023-04-03 RX ADMIN — IPRATROPIUM BROMIDE AND ALBUTEROL SULFATE 3 ML: 2.5; .5 SOLUTION RESPIRATORY (INHALATION) at 08:24

## 2023-04-03 RX ADMIN — ACETYLCYSTEINE 600 MG: 200 SOLUTION ORAL; RESPIRATORY (INHALATION) at 20:18

## 2023-04-03 ASSESSMENT — PAIN DESCRIPTION - DESCRIPTORS
DESCRIPTORS: ACHING;DISCOMFORT;SORE
DESCRIPTORS: ACHING

## 2023-04-03 ASSESSMENT — PAIN DESCRIPTION - LOCATION
LOCATION: BUTTOCKS

## 2023-04-03 ASSESSMENT — PAIN SCALES - GENERAL
PAINLEVEL_OUTOF10: 0
PAINLEVEL_OUTOF10: 7
PAINLEVEL_OUTOF10: 3
PAINLEVEL_OUTOF10: 7
PAINLEVEL_OUTOF10: 7

## 2023-04-03 ASSESSMENT — PAIN DESCRIPTION - ORIENTATION: ORIENTATION: MID

## 2023-04-03 ASSESSMENT — PAIN - FUNCTIONAL ASSESSMENT: PAIN_FUNCTIONAL_ASSESSMENT: ACTIVITIES ARE NOT PREVENTED

## 2023-04-04 LAB
AFB SMEAR: NORMAL
AFB SMEAR: NORMAL
ANION GAP SERPL CALCULATED.3IONS-SCNC: 8 MMOL/L (ref 4–16)
APTT: 40.2 SECONDS (ref 25.1–37.1)
BUN SERPL-MCNC: 25 MG/DL (ref 6–23)
CALCIUM SERPL-MCNC: 9.2 MG/DL (ref 8.3–10.6)
CHLORIDE BLD-SCNC: 94 MMOL/L (ref 99–110)
CO2: 32 MMOL/L (ref 21–32)
CREAT SERPL-MCNC: 3 MG/DL (ref 0.9–1.3)
CULTURE: NORMAL
CULTURE: NORMAL
GFR SERPL CREATININE-BSD FRML MDRD: 21 ML/MIN/1.73M2
GLUCOSE BLD-MCNC: 176 MG/DL (ref 70–99)
GLUCOSE BLD-MCNC: 197 MG/DL (ref 70–99)
GLUCOSE BLD-MCNC: 198 MG/DL (ref 70–99)
GLUCOSE BLD-MCNC: 201 MG/DL (ref 70–99)
GLUCOSE BLD-MCNC: 211 MG/DL (ref 70–99)
GLUCOSE BLD-MCNC: 218 MG/DL (ref 70–99)
GLUCOSE SERPL-MCNC: 186 MG/DL (ref 70–99)
HCT VFR BLD CALC: 27.5 % (ref 42–52)
HEMOGLOBIN: 8.5 GM/DL (ref 13.5–18)
Lab: NORMAL
Lab: NORMAL
MAGNESIUM: 2.4 MG/DL (ref 1.8–2.4)
MCH RBC QN AUTO: 34.4 PG (ref 27–31)
MCHC RBC AUTO-ENTMCNC: 30.9 % (ref 32–36)
MCV RBC AUTO: 111.3 FL (ref 78–100)
PDW BLD-RTO: 17 % (ref 11.7–14.9)
PHOSPHORUS: 3.1 MG/DL (ref 2.5–4.9)
PLATELET # BLD: 188 K/CU MM (ref 140–440)
PMV BLD AUTO: 9.1 FL (ref 7.5–11.1)
POTASSIUM SERPL-SCNC: 3.6 MMOL/L (ref 3.5–5.1)
RBC # BLD: 2.47 M/CU MM (ref 4.6–6.2)
SODIUM BLD-SCNC: 134 MMOL/L (ref 135–145)
SPECIMEN: NORMAL
SPECIMEN: NORMAL
WBC # BLD: 6.3 K/CU MM (ref 4–10.5)

## 2023-04-04 PROCEDURE — 2060000000 HC ICU INTERMEDIATE R&B

## 2023-04-04 PROCEDURE — 6370000000 HC RX 637 (ALT 250 FOR IP): Performed by: STUDENT IN AN ORGANIZED HEALTH CARE EDUCATION/TRAINING PROGRAM

## 2023-04-04 PROCEDURE — 85730 THROMBOPLASTIN TIME PARTIAL: CPT

## 2023-04-04 PROCEDURE — 6360000002 HC RX W HCPCS: Performed by: STUDENT IN AN ORGANIZED HEALTH CARE EDUCATION/TRAINING PROGRAM

## 2023-04-04 PROCEDURE — 84100 ASSAY OF PHOSPHORUS: CPT

## 2023-04-04 PROCEDURE — 83735 ASSAY OF MAGNESIUM: CPT

## 2023-04-04 PROCEDURE — 2700000000 HC OXYGEN THERAPY PER DAY

## 2023-04-04 PROCEDURE — 2580000003 HC RX 258: Performed by: NURSE PRACTITIONER

## 2023-04-04 PROCEDURE — 6360000002 HC RX W HCPCS: Performed by: NURSE PRACTITIONER

## 2023-04-04 PROCEDURE — 82962 GLUCOSE BLOOD TEST: CPT

## 2023-04-04 PROCEDURE — 85027 COMPLETE CBC AUTOMATED: CPT

## 2023-04-04 PROCEDURE — 94640 AIRWAY INHALATION TREATMENT: CPT

## 2023-04-04 PROCEDURE — 0W9B3ZZ DRAINAGE OF LEFT PLEURAL CAVITY, PERCUTANEOUS APPROACH: ICD-10-PCS | Performed by: RADIOLOGY

## 2023-04-04 PROCEDURE — 80048 BASIC METABOLIC PNL TOTAL CA: CPT

## 2023-04-04 PROCEDURE — 94761 N-INVAS EAR/PLS OXIMETRY MLT: CPT

## 2023-04-04 PROCEDURE — 6370000000 HC RX 637 (ALT 250 FOR IP): Performed by: NURSE PRACTITIONER

## 2023-04-04 PROCEDURE — 89220 SPUTUM SPECIMEN COLLECTION: CPT

## 2023-04-04 RX ADMIN — SODIUM CHLORIDE, PRESERVATIVE FREE 10 ML: 5 INJECTION INTRAVENOUS at 08:09

## 2023-04-04 RX ADMIN — TRAZODONE HYDROCHLORIDE 100 MG: 50 TABLET ORAL at 20:45

## 2023-04-04 RX ADMIN — ATORVASTATIN CALCIUM 40 MG: 40 TABLET, FILM COATED ORAL at 20:45

## 2023-04-04 RX ADMIN — ACETYLCYSTEINE 600 MG: 200 SOLUTION ORAL; RESPIRATORY (INHALATION) at 20:21

## 2023-04-04 RX ADMIN — MIDODRINE HYDROCHLORIDE 10 MG: 5 TABLET ORAL at 08:08

## 2023-04-04 RX ADMIN — INSULIN LISPRO 4 UNITS: 100 INJECTION, SOLUTION INTRAVENOUS; SUBCUTANEOUS at 00:16

## 2023-04-04 RX ADMIN — LANSOPRAZOLE 30 MG: KIT at 05:24

## 2023-04-04 RX ADMIN — METOPROLOL TARTRATE 25 MG: 25 TABLET, FILM COATED ORAL at 20:45

## 2023-04-04 RX ADMIN — INSULIN LISPRO 4 UNITS: 100 INJECTION, SOLUTION INTRAVENOUS; SUBCUTANEOUS at 15:58

## 2023-04-04 RX ADMIN — APIXABAN 2.5 MG: 2.5 TABLET, FILM COATED ORAL at 08:08

## 2023-04-04 RX ADMIN — SODIUM CHLORIDE, PRESERVATIVE FREE 10 ML: 5 INJECTION INTRAVENOUS at 20:46

## 2023-04-04 RX ADMIN — MIDODRINE HYDROCHLORIDE 10 MG: 5 TABLET ORAL at 12:40

## 2023-04-04 RX ADMIN — IPRATROPIUM BROMIDE AND ALBUTEROL SULFATE 3 ML: 2.5; .5 SOLUTION RESPIRATORY (INHALATION) at 07:23

## 2023-04-04 RX ADMIN — QUETIAPINE FUMARATE 50 MG: 25 TABLET ORAL at 20:45

## 2023-04-04 RX ADMIN — LEVOTHYROXINE SODIUM 100 MCG: 0.1 TABLET ORAL at 05:24

## 2023-04-04 RX ADMIN — IPRATROPIUM BROMIDE AND ALBUTEROL SULFATE 3 ML: 2.5; .5 SOLUTION RESPIRATORY (INHALATION) at 20:20

## 2023-04-04 RX ADMIN — ASPIRIN 81 MG 81 MG: 81 TABLET ORAL at 08:08

## 2023-04-04 RX ADMIN — HYDROMORPHONE HYDROCHLORIDE 0.5 MG: 1 INJECTION, SOLUTION INTRAMUSCULAR; INTRAVENOUS; SUBCUTANEOUS at 16:00

## 2023-04-04 RX ADMIN — HYDROMORPHONE HYDROCHLORIDE 0.5 MG: 1 INJECTION, SOLUTION INTRAMUSCULAR; INTRAVENOUS; SUBCUTANEOUS at 21:34

## 2023-04-04 RX ADMIN — SODIUM CHLORIDE 100 MG: 9 INJECTION, SOLUTION INTRAVENOUS at 12:53

## 2023-04-04 RX ADMIN — INSULIN LISPRO 4 UNITS: 100 INJECTION, SOLUTION INTRAVENOUS; SUBCUTANEOUS at 08:15

## 2023-04-04 RX ADMIN — ACETYLCYSTEINE 600 MG: 200 SOLUTION ORAL; RESPIRATORY (INHALATION) at 07:23

## 2023-04-04 RX ADMIN — MIDODRINE HYDROCHLORIDE 10 MG: 5 TABLET ORAL at 15:46

## 2023-04-04 RX ADMIN — IPRATROPIUM BROMIDE AND ALBUTEROL SULFATE 3 ML: 2.5; .5 SOLUTION RESPIRATORY (INHALATION) at 15:18

## 2023-04-04 RX ADMIN — IPRATROPIUM BROMIDE AND ALBUTEROL SULFATE 3 ML: 2.5; .5 SOLUTION RESPIRATORY (INHALATION) at 11:27

## 2023-04-04 RX ADMIN — METOPROLOL TARTRATE 25 MG: 25 TABLET, FILM COATED ORAL at 08:08

## 2023-04-04 RX ADMIN — AMIODARONE HYDROCHLORIDE 200 MG: 200 TABLET ORAL at 08:08

## 2023-04-04 RX ADMIN — APIXABAN 2.5 MG: 2.5 TABLET, FILM COATED ORAL at 20:45

## 2023-04-04 ASSESSMENT — PAIN SCALES - GENERAL
PAINLEVEL_OUTOF10: 0
PAINLEVEL_OUTOF10: 7
PAINLEVEL_OUTOF10: 0
PAINLEVEL_OUTOF10: 0
PAINLEVEL_OUTOF10: 3
PAINLEVEL_OUTOF10: 0
PAINLEVEL_OUTOF10: 3
PAINLEVEL_OUTOF10: 0
PAINLEVEL_OUTOF10: 3
PAINLEVEL_OUTOF10: 0

## 2023-04-04 ASSESSMENT — PAIN SCALES - WONG BAKER: WONGBAKER_NUMERICALRESPONSE: 0

## 2023-04-04 ASSESSMENT — PAIN DESCRIPTION - LOCATION
LOCATION: BUTTOCKS
LOCATION: BACK;BUTTOCKS

## 2023-04-04 ASSESSMENT — PAIN DESCRIPTION - ORIENTATION
ORIENTATION: MID
ORIENTATION: MID

## 2023-04-04 ASSESSMENT — PAIN DESCRIPTION - DESCRIPTORS
DESCRIPTORS: DISCOMFORT
DESCRIPTORS: DISCOMFORT;ACHING

## 2023-04-05 LAB
ANION GAP SERPL CALCULATED.3IONS-SCNC: 10 MMOL/L (ref 4–16)
BUN SERPL-MCNC: 43 MG/DL (ref 6–23)
CALCIUM SERPL-MCNC: 9.9 MG/DL (ref 8.3–10.6)
CHLORIDE BLD-SCNC: 94 MMOL/L (ref 99–110)
CO2: 32 MMOL/L (ref 21–32)
CREAT SERPL-MCNC: 4.2 MG/DL (ref 0.9–1.3)
CULTURE: NORMAL
CULTURE: NORMAL
GFR SERPL CREATININE-BSD FRML MDRD: 14 ML/MIN/1.73M2
GLUCOSE BLD-MCNC: 169 MG/DL (ref 70–99)
GLUCOSE BLD-MCNC: 169 MG/DL (ref 70–99)
GLUCOSE BLD-MCNC: 193 MG/DL (ref 70–99)
GLUCOSE BLD-MCNC: 195 MG/DL (ref 70–99)
GLUCOSE BLD-MCNC: 197 MG/DL (ref 70–99)
GLUCOSE BLD-MCNC: 228 MG/DL (ref 70–99)
GLUCOSE SERPL-MCNC: 214 MG/DL (ref 70–99)
Lab: NORMAL
Lab: NORMAL
MAGNESIUM: 2.9 MG/DL (ref 1.8–2.4)
PHOSPHORUS: 4.5 MG/DL (ref 2.5–4.9)
POTASSIUM SERPL-SCNC: 3.5 MMOL/L (ref 3.5–5.1)
SODIUM BLD-SCNC: 136 MMOL/L (ref 135–145)
SPECIMEN: NORMAL
SPECIMEN: NORMAL

## 2023-04-05 PROCEDURE — 80048 BASIC METABOLIC PNL TOTAL CA: CPT

## 2023-04-05 PROCEDURE — 6370000000 HC RX 637 (ALT 250 FOR IP): Performed by: STUDENT IN AN ORGANIZED HEALTH CARE EDUCATION/TRAINING PROGRAM

## 2023-04-05 PROCEDURE — 6360000002 HC RX W HCPCS: Performed by: INTERNAL MEDICINE

## 2023-04-05 PROCEDURE — 2580000003 HC RX 258: Performed by: NURSE PRACTITIONER

## 2023-04-05 PROCEDURE — 90935 HEMODIALYSIS ONE EVALUATION: CPT

## 2023-04-05 PROCEDURE — 2060000000 HC ICU INTERMEDIATE R&B

## 2023-04-05 PROCEDURE — 31502 CHANGE OF WINDPIPE AIRWAY: CPT

## 2023-04-05 PROCEDURE — 94761 N-INVAS EAR/PLS OXIMETRY MLT: CPT

## 2023-04-05 PROCEDURE — 6360000002 HC RX W HCPCS: Performed by: STUDENT IN AN ORGANIZED HEALTH CARE EDUCATION/TRAINING PROGRAM

## 2023-04-05 PROCEDURE — 6370000000 HC RX 637 (ALT 250 FOR IP): Performed by: NURSE PRACTITIONER

## 2023-04-05 PROCEDURE — 82962 GLUCOSE BLOOD TEST: CPT

## 2023-04-05 PROCEDURE — 2700000000 HC OXYGEN THERAPY PER DAY

## 2023-04-05 PROCEDURE — 94640 AIRWAY INHALATION TREATMENT: CPT

## 2023-04-05 PROCEDURE — 6370000000 HC RX 637 (ALT 250 FOR IP): Performed by: PHYSICIAN ASSISTANT

## 2023-04-05 PROCEDURE — P9047 ALBUMIN (HUMAN), 25%, 50ML: HCPCS | Performed by: INTERNAL MEDICINE

## 2023-04-05 PROCEDURE — 84100 ASSAY OF PHOSPHORUS: CPT

## 2023-04-05 PROCEDURE — 83735 ASSAY OF MAGNESIUM: CPT

## 2023-04-05 PROCEDURE — 2580000003 HC RX 258: Performed by: INTERNAL MEDICINE

## 2023-04-05 RX ORDER — INSULIN GLARGINE 100 [IU]/ML
10 INJECTION, SOLUTION SUBCUTANEOUS NIGHTLY
Status: DISCONTINUED | OUTPATIENT
Start: 2023-04-05 | End: 2023-04-13

## 2023-04-05 RX ORDER — OXYCODONE HYDROCHLORIDE 5 MG/1
5 TABLET ORAL EVERY 6 HOURS PRN
Status: DISCONTINUED | OUTPATIENT
Start: 2023-04-05 | End: 2023-04-21

## 2023-04-05 RX ORDER — ALBUMIN (HUMAN) 12.5 G/50ML
25 SOLUTION INTRAVENOUS ONCE
Status: COMPLETED | OUTPATIENT
Start: 2023-04-05 | End: 2023-04-05

## 2023-04-05 RX ADMIN — METOPROLOL TARTRATE 25 MG: 25 TABLET, FILM COATED ORAL at 07:48

## 2023-04-05 RX ADMIN — MIDODRINE HYDROCHLORIDE 10 MG: 5 TABLET ORAL at 16:29

## 2023-04-05 RX ADMIN — SODIUM CHLORIDE 100 MG: 9 INJECTION, SOLUTION INTRAVENOUS at 11:39

## 2023-04-05 RX ADMIN — SODIUM CHLORIDE, PRESERVATIVE FREE 10 ML: 5 INJECTION INTRAVENOUS at 20:13

## 2023-04-05 RX ADMIN — MIDODRINE HYDROCHLORIDE 10 MG: 5 TABLET ORAL at 11:36

## 2023-04-05 RX ADMIN — ACETYLCYSTEINE 600 MG: 200 SOLUTION ORAL; RESPIRATORY (INHALATION) at 07:15

## 2023-04-05 RX ADMIN — IPRATROPIUM BROMIDE AND ALBUTEROL SULFATE 3 ML: 2.5; .5 SOLUTION RESPIRATORY (INHALATION) at 20:21

## 2023-04-05 RX ADMIN — ATORVASTATIN CALCIUM 40 MG: 40 TABLET, FILM COATED ORAL at 20:12

## 2023-04-05 RX ADMIN — SODIUM CHLORIDE, PRESERVATIVE FREE 10 ML: 5 INJECTION INTRAVENOUS at 07:49

## 2023-04-05 RX ADMIN — APIXABAN 2.5 MG: 2.5 TABLET, FILM COATED ORAL at 20:12

## 2023-04-05 RX ADMIN — INSULIN LISPRO 4 UNITS: 100 INJECTION, SOLUTION INTRAVENOUS; SUBCUTANEOUS at 11:46

## 2023-04-05 RX ADMIN — APIXABAN 2.5 MG: 2.5 TABLET, FILM COATED ORAL at 07:48

## 2023-04-05 RX ADMIN — ALBUMIN (HUMAN) 25 G: 0.25 INJECTION, SOLUTION INTRAVENOUS at 12:55

## 2023-04-05 RX ADMIN — IPRATROPIUM BROMIDE AND ALBUTEROL SULFATE 3 ML: 2.5; .5 SOLUTION RESPIRATORY (INHALATION) at 07:16

## 2023-04-05 RX ADMIN — IPRATROPIUM BROMIDE AND ALBUTEROL SULFATE 3 ML: 2.5; .5 SOLUTION RESPIRATORY (INHALATION) at 11:05

## 2023-04-05 RX ADMIN — METOPROLOL TARTRATE 25 MG: 25 TABLET, FILM COATED ORAL at 20:12

## 2023-04-05 RX ADMIN — IPRATROPIUM BROMIDE AND ALBUTEROL SULFATE 3 ML: 2.5; .5 SOLUTION RESPIRATORY (INHALATION) at 15:09

## 2023-04-05 RX ADMIN — TRAZODONE HYDROCHLORIDE 100 MG: 50 TABLET ORAL at 20:12

## 2023-04-05 RX ADMIN — INSULIN GLARGINE 10 UNITS: 100 INJECTION, SOLUTION SUBCUTANEOUS at 20:15

## 2023-04-05 RX ADMIN — AMIODARONE HYDROCHLORIDE 200 MG: 200 TABLET ORAL at 07:48

## 2023-04-05 RX ADMIN — MIDODRINE HYDROCHLORIDE 10 MG: 5 TABLET ORAL at 07:48

## 2023-04-05 RX ADMIN — LANSOPRAZOLE 30 MG: KIT at 06:17

## 2023-04-05 RX ADMIN — ASPIRIN 81 MG 81 MG: 81 TABLET ORAL at 07:48

## 2023-04-05 RX ADMIN — LEVOTHYROXINE SODIUM 100 MCG: 0.1 TABLET ORAL at 06:17

## 2023-04-05 RX ADMIN — ACETYLCYSTEINE 600 MG: 200 SOLUTION ORAL; RESPIRATORY (INHALATION) at 20:22

## 2023-04-05 RX ADMIN — QUETIAPINE FUMARATE 50 MG: 25 TABLET ORAL at 20:12

## 2023-04-05 ASSESSMENT — PAIN SCALES - GENERAL
PAINLEVEL_OUTOF10: 0

## 2023-04-06 LAB
ANION GAP SERPL CALCULATED.3IONS-SCNC: 10 MMOL/L (ref 4–16)
BUN SERPL-MCNC: 33 MG/DL (ref 6–23)
CALCIUM SERPL-MCNC: 10.2 MG/DL (ref 8.3–10.6)
CANDIDA AURIS BY PCR: DETECTED
CHLORIDE BLD-SCNC: 101 MMOL/L (ref 99–110)
CO2: 28 MMOL/L (ref 21–32)
CREAT SERPL-MCNC: 3.4 MG/DL (ref 0.9–1.3)
CULTURE: ABNORMAL
GFR SERPL CREATININE-BSD FRML MDRD: 18 ML/MIN/1.73M2
GLUCOSE BLD-MCNC: 186 MG/DL (ref 70–99)
GLUCOSE BLD-MCNC: 189 MG/DL (ref 70–99)
GLUCOSE BLD-MCNC: 209 MG/DL (ref 70–99)
GLUCOSE BLD-MCNC: 222 MG/DL (ref 70–99)
GLUCOSE BLD-MCNC: 223 MG/DL (ref 70–99)
GLUCOSE BLD-MCNC: 224 MG/DL (ref 70–99)
GLUCOSE SERPL-MCNC: 178 MG/DL (ref 70–99)
HCT VFR BLD CALC: 25.9 % (ref 42–52)
HEMOGLOBIN: 8.4 GM/DL (ref 13.5–18)
Lab: ABNORMAL
MAGNESIUM: 2.8 MG/DL (ref 1.8–2.4)
MCH RBC QN AUTO: 36.5 PG (ref 27–31)
MCHC RBC AUTO-ENTMCNC: 32.4 % (ref 32–36)
MCV RBC AUTO: 112.6 FL (ref 78–100)
PDW BLD-RTO: 16.6 % (ref 11.7–14.9)
PHOSPHORUS: 3.1 MG/DL (ref 2.5–4.9)
PLATELET # BLD: 229 K/CU MM (ref 140–440)
PMV BLD AUTO: 9.6 FL (ref 7.5–11.1)
POTASSIUM SERPL-SCNC: 3.8 MMOL/L (ref 3.5–5.1)
RBC # BLD: 2.3 M/CU MM (ref 4.6–6.2)
SODIUM BLD-SCNC: 139 MMOL/L (ref 135–145)
SPECIMEN: ABNORMAL
STAPHYLOCOCCUS EPIDERMIDIS BY PCR: DETECTED
WBC # BLD: 6.9 K/CU MM (ref 4–10.5)

## 2023-04-06 PROCEDURE — 6360000002 HC RX W HCPCS: Performed by: STUDENT IN AN ORGANIZED HEALTH CARE EDUCATION/TRAINING PROGRAM

## 2023-04-06 PROCEDURE — 6370000000 HC RX 637 (ALT 250 FOR IP): Performed by: STUDENT IN AN ORGANIZED HEALTH CARE EDUCATION/TRAINING PROGRAM

## 2023-04-06 PROCEDURE — 2580000003 HC RX 258: Performed by: NURSE PRACTITIONER

## 2023-04-06 PROCEDURE — 82962 GLUCOSE BLOOD TEST: CPT

## 2023-04-06 PROCEDURE — 6370000000 HC RX 637 (ALT 250 FOR IP): Performed by: PHYSICIAN ASSISTANT

## 2023-04-06 PROCEDURE — 6370000000 HC RX 637 (ALT 250 FOR IP): Performed by: NURSE PRACTITIONER

## 2023-04-06 PROCEDURE — 43762 RPLC GTUBE NO REVJ TRC: CPT

## 2023-04-06 PROCEDURE — 94761 N-INVAS EAR/PLS OXIMETRY MLT: CPT

## 2023-04-06 PROCEDURE — 2060000000 HC ICU INTERMEDIATE R&B

## 2023-04-06 PROCEDURE — 6360000002 HC RX W HCPCS: Performed by: INTERNAL MEDICINE

## 2023-04-06 PROCEDURE — 94640 AIRWAY INHALATION TREATMENT: CPT

## 2023-04-06 PROCEDURE — 99232 SBSQ HOSP IP/OBS MODERATE 35: CPT | Performed by: NURSE PRACTITIONER

## 2023-04-06 PROCEDURE — 80048 BASIC METABOLIC PNL TOTAL CA: CPT

## 2023-04-06 PROCEDURE — 84100 ASSAY OF PHOSPHORUS: CPT

## 2023-04-06 PROCEDURE — 2700000000 HC OXYGEN THERAPY PER DAY

## 2023-04-06 PROCEDURE — 2580000003 HC RX 258: Performed by: INTERNAL MEDICINE

## 2023-04-06 PROCEDURE — 83735 ASSAY OF MAGNESIUM: CPT

## 2023-04-06 PROCEDURE — 89220 SPUTUM SPECIMEN COLLECTION: CPT

## 2023-04-06 PROCEDURE — 85027 COMPLETE CBC AUTOMATED: CPT

## 2023-04-06 RX ORDER — LANOLIN ALCOHOL/MO/W.PET/CERES
6 CREAM (GRAM) TOPICAL NIGHTLY PRN
Status: DISCONTINUED | OUTPATIENT
Start: 2023-04-06 | End: 2023-04-28 | Stop reason: HOSPADM

## 2023-04-06 RX ADMIN — METOPROLOL TARTRATE 25 MG: 25 TABLET, FILM COATED ORAL at 08:39

## 2023-04-06 RX ADMIN — ATORVASTATIN CALCIUM 40 MG: 40 TABLET, FILM COATED ORAL at 20:22

## 2023-04-06 RX ADMIN — MIDODRINE HYDROCHLORIDE 10 MG: 5 TABLET ORAL at 12:45

## 2023-04-06 RX ADMIN — TRAZODONE HYDROCHLORIDE 100 MG: 50 TABLET ORAL at 20:22

## 2023-04-06 RX ADMIN — INSULIN LISPRO 4 UNITS: 100 INJECTION, SOLUTION INTRAVENOUS; SUBCUTANEOUS at 20:33

## 2023-04-06 RX ADMIN — APIXABAN 2.5 MG: 2.5 TABLET, FILM COATED ORAL at 20:22

## 2023-04-06 RX ADMIN — LEVOTHYROXINE SODIUM 100 MCG: 0.1 TABLET ORAL at 06:15

## 2023-04-06 RX ADMIN — IPRATROPIUM BROMIDE AND ALBUTEROL SULFATE 3 ML: 2.5; .5 SOLUTION RESPIRATORY (INHALATION) at 20:16

## 2023-04-06 RX ADMIN — MIDODRINE HYDROCHLORIDE 10 MG: 5 TABLET ORAL at 08:38

## 2023-04-06 RX ADMIN — SODIUM CHLORIDE 100 MG: 9 INJECTION, SOLUTION INTRAVENOUS at 14:21

## 2023-04-06 RX ADMIN — APIXABAN 2.5 MG: 2.5 TABLET, FILM COATED ORAL at 08:39

## 2023-04-06 RX ADMIN — ACETYLCYSTEINE 600 MG: 200 SOLUTION ORAL; RESPIRATORY (INHALATION) at 07:34

## 2023-04-06 RX ADMIN — ASPIRIN 81 MG 81 MG: 81 TABLET ORAL at 08:39

## 2023-04-06 RX ADMIN — INSULIN LISPRO 4 UNITS: 100 INJECTION, SOLUTION INTRAVENOUS; SUBCUTANEOUS at 12:41

## 2023-04-06 RX ADMIN — AMIODARONE HYDROCHLORIDE 200 MG: 200 TABLET ORAL at 08:39

## 2023-04-06 RX ADMIN — MIDODRINE HYDROCHLORIDE 10 MG: 5 TABLET ORAL at 17:04

## 2023-04-06 RX ADMIN — SODIUM CHLORIDE, PRESERVATIVE FREE 10 ML: 5 INJECTION INTRAVENOUS at 20:22

## 2023-04-06 RX ADMIN — METOPROLOL TARTRATE 25 MG: 25 TABLET, FILM COATED ORAL at 20:22

## 2023-04-06 RX ADMIN — ACETYLCYSTEINE 600 MG: 200 SOLUTION ORAL; RESPIRATORY (INHALATION) at 20:16

## 2023-04-06 RX ADMIN — Medication 6 MG: at 20:33

## 2023-04-06 RX ADMIN — INSULIN LISPRO 4 UNITS: 100 INJECTION, SOLUTION INTRAVENOUS; SUBCUTANEOUS at 00:20

## 2023-04-06 RX ADMIN — SODIUM CHLORIDE, PRESERVATIVE FREE 10 ML: 5 INJECTION INTRAVENOUS at 10:04

## 2023-04-06 RX ADMIN — IPRATROPIUM BROMIDE AND ALBUTEROL SULFATE 3 ML: 2.5; .5 SOLUTION RESPIRATORY (INHALATION) at 11:21

## 2023-04-06 RX ADMIN — QUETIAPINE FUMARATE 50 MG: 25 TABLET ORAL at 20:22

## 2023-04-06 RX ADMIN — IPRATROPIUM BROMIDE AND ALBUTEROL SULFATE 3 ML: 2.5; .5 SOLUTION RESPIRATORY (INHALATION) at 07:34

## 2023-04-06 RX ADMIN — INSULIN GLARGINE 10 UNITS: 100 INJECTION, SOLUTION SUBCUTANEOUS at 20:30

## 2023-04-06 RX ADMIN — INSULIN LISPRO 4 UNITS: 100 INJECTION, SOLUTION INTRAVENOUS; SUBCUTANEOUS at 17:04

## 2023-04-06 RX ADMIN — LANSOPRAZOLE 30 MG: KIT at 06:15

## 2023-04-06 RX ADMIN — POLYETHYLENE GLYCOL 3350 17 G: 17 POWDER, FOR SOLUTION ORAL at 10:53

## 2023-04-06 ASSESSMENT — PAIN SCALES - GENERAL
PAINLEVEL_OUTOF10: 0

## 2023-04-07 LAB
ANION GAP SERPL CALCULATED.3IONS-SCNC: 11 MMOL/L (ref 4–16)
BUN SERPL-MCNC: 50 MG/DL (ref 6–23)
CALCIUM SERPL-MCNC: 10.6 MG/DL (ref 8.3–10.6)
CHLORIDE BLD-SCNC: 99 MMOL/L (ref 99–110)
CO2: 28 MMOL/L (ref 21–32)
CREAT SERPL-MCNC: 4.4 MG/DL (ref 0.9–1.3)
CULTURE: NORMAL
CULTURE: NORMAL
GFR SERPL CREATININE-BSD FRML MDRD: 14 ML/MIN/1.73M2
GLUCOSE BLD-MCNC: 165 MG/DL (ref 70–99)
GLUCOSE BLD-MCNC: 167 MG/DL (ref 70–99)
GLUCOSE BLD-MCNC: 173 MG/DL (ref 70–99)
GLUCOSE BLD-MCNC: 192 MG/DL (ref 70–99)
GLUCOSE BLD-MCNC: 201 MG/DL (ref 70–99)
GLUCOSE BLD-MCNC: 225 MG/DL (ref 70–99)
GLUCOSE SERPL-MCNC: 165 MG/DL (ref 70–99)
HBV SURFACE AB SERPL IA-ACNC: <3.5 M[IU]/ML
HBV SURFACE AG SERPL QL IA: NON REACTIVE
Lab: NORMAL
Lab: NORMAL
MAGNESIUM: 3.2 MG/DL (ref 1.8–2.4)
PHOSPHORUS: 4.9 MG/DL (ref 2.5–4.9)
POTASSIUM SERPL-SCNC: 3.7 MMOL/L (ref 3.5–5.1)
SODIUM BLD-SCNC: 138 MMOL/L (ref 135–145)
SPECIMEN: NORMAL
SPECIMEN: NORMAL

## 2023-04-07 PROCEDURE — 84100 ASSAY OF PHOSPHORUS: CPT

## 2023-04-07 PROCEDURE — 2580000003 HC RX 258: Performed by: NURSE PRACTITIONER

## 2023-04-07 PROCEDURE — 6360000002 HC RX W HCPCS: Performed by: NURSE PRACTITIONER

## 2023-04-07 PROCEDURE — 2060000000 HC ICU INTERMEDIATE R&B

## 2023-04-07 PROCEDURE — 6370000000 HC RX 637 (ALT 250 FOR IP): Performed by: STUDENT IN AN ORGANIZED HEALTH CARE EDUCATION/TRAINING PROGRAM

## 2023-04-07 PROCEDURE — 6360000002 HC RX W HCPCS: Performed by: STUDENT IN AN ORGANIZED HEALTH CARE EDUCATION/TRAINING PROGRAM

## 2023-04-07 PROCEDURE — 90935 HEMODIALYSIS ONE EVALUATION: CPT

## 2023-04-07 PROCEDURE — 83735 ASSAY OF MAGNESIUM: CPT

## 2023-04-07 PROCEDURE — 2700000000 HC OXYGEN THERAPY PER DAY

## 2023-04-07 PROCEDURE — 94761 N-INVAS EAR/PLS OXIMETRY MLT: CPT

## 2023-04-07 PROCEDURE — 80048 BASIC METABOLIC PNL TOTAL CA: CPT

## 2023-04-07 PROCEDURE — 87340 HEPATITIS B SURFACE AG IA: CPT

## 2023-04-07 PROCEDURE — 86706 HEP B SURFACE ANTIBODY: CPT

## 2023-04-07 PROCEDURE — 6370000000 HC RX 637 (ALT 250 FOR IP): Performed by: PHYSICIAN ASSISTANT

## 2023-04-07 PROCEDURE — 82962 GLUCOSE BLOOD TEST: CPT

## 2023-04-07 PROCEDURE — 6370000000 HC RX 637 (ALT 250 FOR IP): Performed by: NURSE PRACTITIONER

## 2023-04-07 PROCEDURE — 94640 AIRWAY INHALATION TREATMENT: CPT

## 2023-04-07 RX ADMIN — ATORVASTATIN CALCIUM 40 MG: 40 TABLET, FILM COATED ORAL at 20:32

## 2023-04-07 RX ADMIN — MIDODRINE HYDROCHLORIDE 10 MG: 5 TABLET ORAL at 12:54

## 2023-04-07 RX ADMIN — POLYETHYLENE GLYCOL 3350 17 G: 17 POWDER, FOR SOLUTION ORAL at 08:56

## 2023-04-07 RX ADMIN — MIDODRINE HYDROCHLORIDE 10 MG: 5 TABLET ORAL at 08:55

## 2023-04-07 RX ADMIN — ASPIRIN 81 MG 81 MG: 81 TABLET ORAL at 08:55

## 2023-04-07 RX ADMIN — SODIUM CHLORIDE, PRESERVATIVE FREE 10 ML: 5 INJECTION INTRAVENOUS at 20:33

## 2023-04-07 RX ADMIN — DOCUSATE SODIUM LIQUID 100 MG: 100 LIQUID ORAL at 12:54

## 2023-04-07 RX ADMIN — APIXABAN 2.5 MG: 2.5 TABLET, FILM COATED ORAL at 20:31

## 2023-04-07 RX ADMIN — SODIUM CHLORIDE, PRESERVATIVE FREE 10 ML: 5 INJECTION INTRAVENOUS at 09:15

## 2023-04-07 RX ADMIN — LEVOTHYROXINE SODIUM 100 MCG: 0.1 TABLET ORAL at 06:26

## 2023-04-07 RX ADMIN — ACETYLCYSTEINE 600 MG: 200 SOLUTION ORAL; RESPIRATORY (INHALATION) at 07:39

## 2023-04-07 RX ADMIN — AMIODARONE HYDROCHLORIDE 200 MG: 200 TABLET ORAL at 08:55

## 2023-04-07 RX ADMIN — METOPROLOL TARTRATE 25 MG: 25 TABLET, FILM COATED ORAL at 08:55

## 2023-04-07 RX ADMIN — QUETIAPINE FUMARATE 50 MG: 25 TABLET ORAL at 20:31

## 2023-04-07 RX ADMIN — LANSOPRAZOLE 30 MG: KIT at 06:25

## 2023-04-07 RX ADMIN — INSULIN LISPRO 4 UNITS: 100 INJECTION, SOLUTION INTRAVENOUS; SUBCUTANEOUS at 00:27

## 2023-04-07 RX ADMIN — IPRATROPIUM BROMIDE AND ALBUTEROL SULFATE 3 ML: 2.5; .5 SOLUTION RESPIRATORY (INHALATION) at 11:44

## 2023-04-07 RX ADMIN — IPRATROPIUM BROMIDE AND ALBUTEROL SULFATE 3 ML: 2.5; .5 SOLUTION RESPIRATORY (INHALATION) at 07:39

## 2023-04-07 RX ADMIN — IPRATROPIUM BROMIDE AND ALBUTEROL SULFATE 3 ML: 2.5; .5 SOLUTION RESPIRATORY (INHALATION) at 20:28

## 2023-04-07 RX ADMIN — APIXABAN 2.5 MG: 2.5 TABLET, FILM COATED ORAL at 08:55

## 2023-04-07 RX ADMIN — IPRATROPIUM BROMIDE AND ALBUTEROL SULFATE 3 ML: 2.5; .5 SOLUTION RESPIRATORY (INHALATION) at 16:00

## 2023-04-07 RX ADMIN — TRAZODONE HYDROCHLORIDE 100 MG: 50 TABLET ORAL at 20:31

## 2023-04-07 RX ADMIN — METOPROLOL TARTRATE 25 MG: 25 TABLET, FILM COATED ORAL at 20:31

## 2023-04-07 RX ADMIN — SODIUM CHLORIDE 100 MG: 9 INJECTION, SOLUTION INTRAVENOUS at 12:53

## 2023-04-07 RX ADMIN — MIDODRINE HYDROCHLORIDE 10 MG: 5 TABLET ORAL at 16:55

## 2023-04-07 RX ADMIN — INSULIN LISPRO 4 UNITS: 100 INJECTION, SOLUTION INTRAVENOUS; SUBCUTANEOUS at 13:03

## 2023-04-07 RX ADMIN — INSULIN GLARGINE 10 UNITS: 100 INJECTION, SOLUTION SUBCUTANEOUS at 20:33

## 2023-04-07 RX ADMIN — SODIUM CHLORIDE 5 ML/HR: 9 INJECTION, SOLUTION INTRAVENOUS at 12:53

## 2023-04-07 RX ADMIN — ACETYLCYSTEINE 600 MG: 200 SOLUTION ORAL; RESPIRATORY (INHALATION) at 20:30

## 2023-04-07 ASSESSMENT — PAIN SCALES - GENERAL
PAINLEVEL_OUTOF10: 0

## 2023-04-07 ASSESSMENT — PAIN SCALES - WONG BAKER
WONGBAKER_NUMERICALRESPONSE: 0
WONGBAKER_NUMERICALRESPONSE: 0

## 2023-04-08 PROBLEM — J18.9 HAP (HOSPITAL-ACQUIRED PNEUMONIA): Status: RESOLVED | Noted: 2023-03-27 | Resolved: 2023-04-08

## 2023-04-08 PROBLEM — J96.90 RESPIRATORY FAILURE (HCC): Status: RESOLVED | Noted: 2023-03-27 | Resolved: 2023-04-08
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
## 2023-04-08 LAB
ANION GAP SERPL CALCULATED.3IONS-SCNC: 9 MMOL/L (ref 4–16)
BUN SERPL-MCNC: 37 MG/DL (ref 6–23)
CALCIUM SERPL-MCNC: 9.9 MG/DL (ref 8.3–10.6)
CHLORIDE BLD-SCNC: 97 MMOL/L (ref 99–110)
CO2: 26 MMOL/L (ref 21–32)
CREAT SERPL-MCNC: 3.3 MG/DL (ref 0.9–1.3)
GFR SERPL CREATININE-BSD FRML MDRD: 19 ML/MIN/1.73M2
GLUCOSE BLD-MCNC: 145 MG/DL (ref 70–99)
GLUCOSE BLD-MCNC: 184 MG/DL (ref 70–99)
GLUCOSE BLD-MCNC: 186 MG/DL (ref 70–99)
GLUCOSE BLD-MCNC: 188 MG/DL (ref 70–99)
GLUCOSE BLD-MCNC: 192 MG/DL (ref 70–99)
GLUCOSE BLD-MCNC: 203 MG/DL (ref 70–99)
GLUCOSE SERPL-MCNC: 187 MG/DL (ref 70–99)
HCT VFR BLD CALC: 26.6 % (ref 42–52)
HEMOGLOBIN: 8.5 GM/DL (ref 13.5–18)
MAGNESIUM: 2.8 MG/DL (ref 1.8–2.4)
MCH RBC QN AUTO: 35.7 PG (ref 27–31)
MCHC RBC AUTO-ENTMCNC: 32 % (ref 32–36)
MCV RBC AUTO: 111.8 FL (ref 78–100)
PDW BLD-RTO: 16.2 % (ref 11.7–14.9)
PHOSPHORUS: 3.5 MG/DL (ref 2.5–4.9)
PLATELET # BLD: 248 K/CU MM (ref 140–440)
PMV BLD AUTO: 9.5 FL (ref 7.5–11.1)
POTASSIUM SERPL-SCNC: 4.1 MMOL/L (ref 3.5–5.1)
RBC # BLD: 2.38 M/CU MM (ref 4.6–6.2)
SODIUM BLD-SCNC: 132 MMOL/L (ref 135–145)
WBC # BLD: 7.1 K/CU MM (ref 4–10.5)

## 2023-04-08 PROCEDURE — 2709999900 HC NON-CHARGEABLE SUPPLY

## 2023-04-08 PROCEDURE — 94640 AIRWAY INHALATION TREATMENT: CPT

## 2023-04-08 PROCEDURE — 2700000000 HC OXYGEN THERAPY PER DAY

## 2023-04-08 PROCEDURE — 6370000000 HC RX 637 (ALT 250 FOR IP): Performed by: STUDENT IN AN ORGANIZED HEALTH CARE EDUCATION/TRAINING PROGRAM

## 2023-04-08 PROCEDURE — 83735 ASSAY OF MAGNESIUM: CPT

## 2023-04-08 PROCEDURE — 89220 SPUTUM SPECIMEN COLLECTION: CPT

## 2023-04-08 PROCEDURE — 2060000000 HC ICU INTERMEDIATE R&B

## 2023-04-08 PROCEDURE — 6360000002 HC RX W HCPCS: Performed by: NURSE PRACTITIONER

## 2023-04-08 PROCEDURE — 80048 BASIC METABOLIC PNL TOTAL CA: CPT

## 2023-04-08 PROCEDURE — 2580000003 HC RX 258: Performed by: NURSE PRACTITIONER

## 2023-04-08 PROCEDURE — 6370000000 HC RX 637 (ALT 250 FOR IP): Performed by: NURSE PRACTITIONER

## 2023-04-08 PROCEDURE — 84100 ASSAY OF PHOSPHORUS: CPT

## 2023-04-08 PROCEDURE — 76937 US GUIDE VASCULAR ACCESS: CPT

## 2023-04-08 PROCEDURE — 82962 GLUCOSE BLOOD TEST: CPT

## 2023-04-08 PROCEDURE — 36592 COLLECT BLOOD FROM PICC: CPT

## 2023-04-08 PROCEDURE — 6360000002 HC RX W HCPCS: Performed by: STUDENT IN AN ORGANIZED HEALTH CARE EDUCATION/TRAINING PROGRAM

## 2023-04-08 PROCEDURE — 36410 VNPNXR 3YR/> PHY/QHP DX/THER: CPT

## 2023-04-08 PROCEDURE — 6370000000 HC RX 637 (ALT 250 FOR IP): Performed by: PHYSICIAN ASSISTANT

## 2023-04-08 PROCEDURE — 85027 COMPLETE CBC AUTOMATED: CPT

## 2023-04-08 PROCEDURE — 94761 N-INVAS EAR/PLS OXIMETRY MLT: CPT

## 2023-04-08 RX ORDER — INSULIN GLARGINE 100 [IU]/ML
15 INJECTION, SOLUTION SUBCUTANEOUS NIGHTLY
Qty: 10 ML | Refills: 3 | Status: SHIPPED | OUTPATIENT
Start: 2023-04-08

## 2023-04-08 RX ORDER — POLYETHYLENE GLYCOL 3350 17 G/17G
17 POWDER, FOR SOLUTION ORAL DAILY PRN
Qty: 527 G | Refills: 1 | Status: SHIPPED | OUTPATIENT
Start: 2023-04-08 | End: 2023-06-09

## 2023-04-08 RX ADMIN — APIXABAN 2.5 MG: 2.5 TABLET, FILM COATED ORAL at 20:36

## 2023-04-08 RX ADMIN — MIDODRINE HYDROCHLORIDE 10 MG: 5 TABLET ORAL at 17:16

## 2023-04-08 RX ADMIN — SODIUM CHLORIDE, PRESERVATIVE FREE 10 ML: 5 INJECTION INTRAVENOUS at 20:38

## 2023-04-08 RX ADMIN — INSULIN LISPRO 4 UNITS: 100 INJECTION, SOLUTION INTRAVENOUS; SUBCUTANEOUS at 09:06

## 2023-04-08 RX ADMIN — IPRATROPIUM BROMIDE AND ALBUTEROL SULFATE 3 ML: 2.5; .5 SOLUTION RESPIRATORY (INHALATION) at 11:17

## 2023-04-08 RX ADMIN — LEVOTHYROXINE SODIUM 100 MCG: 0.1 TABLET ORAL at 06:37

## 2023-04-08 RX ADMIN — MIDODRINE HYDROCHLORIDE 10 MG: 5 TABLET ORAL at 08:51

## 2023-04-08 RX ADMIN — MIDODRINE HYDROCHLORIDE 10 MG: 5 TABLET ORAL at 12:12

## 2023-04-08 RX ADMIN — ACETYLCYSTEINE 600 MG: 200 SOLUTION ORAL; RESPIRATORY (INHALATION) at 19:48

## 2023-04-08 RX ADMIN — INSULIN GLARGINE 10 UNITS: 100 INJECTION, SOLUTION SUBCUTANEOUS at 20:42

## 2023-04-08 RX ADMIN — ATORVASTATIN CALCIUM 40 MG: 40 TABLET, FILM COATED ORAL at 20:36

## 2023-04-08 RX ADMIN — QUETIAPINE FUMARATE 50 MG: 25 TABLET ORAL at 20:36

## 2023-04-08 RX ADMIN — APIXABAN 2.5 MG: 2.5 TABLET, FILM COATED ORAL at 08:51

## 2023-04-08 RX ADMIN — TRAZODONE HYDROCHLORIDE 100 MG: 50 TABLET ORAL at 20:37

## 2023-04-08 RX ADMIN — AMIODARONE HYDROCHLORIDE 200 MG: 200 TABLET ORAL at 08:51

## 2023-04-08 RX ADMIN — SODIUM CHLORIDE, PRESERVATIVE FREE 10 ML: 5 INJECTION INTRAVENOUS at 09:03

## 2023-04-08 RX ADMIN — METOPROLOL TARTRATE 25 MG: 25 TABLET, FILM COATED ORAL at 08:51

## 2023-04-08 RX ADMIN — METOPROLOL TARTRATE 25 MG: 25 TABLET, FILM COATED ORAL at 20:36

## 2023-04-08 RX ADMIN — DOCUSATE SODIUM LIQUID 100 MG: 100 LIQUID ORAL at 08:51

## 2023-04-08 RX ADMIN — IPRATROPIUM BROMIDE AND ALBUTEROL SULFATE 3 ML: 2.5; .5 SOLUTION RESPIRATORY (INHALATION) at 15:23

## 2023-04-08 RX ADMIN — IPRATROPIUM BROMIDE AND ALBUTEROL SULFATE 3 ML: 2.5; .5 SOLUTION RESPIRATORY (INHALATION) at 19:48

## 2023-04-08 RX ADMIN — ASPIRIN 81 MG 81 MG: 81 TABLET ORAL at 08:51

## 2023-04-08 RX ADMIN — SODIUM CHLORIDE 100 MG: 9 INJECTION, SOLUTION INTRAVENOUS at 12:12

## 2023-04-08 RX ADMIN — IPRATROPIUM BROMIDE AND ALBUTEROL SULFATE 3 ML: 2.5; .5 SOLUTION RESPIRATORY (INHALATION) at 07:40

## 2023-04-08 RX ADMIN — ACETYLCYSTEINE 600 MG: 200 SOLUTION ORAL; RESPIRATORY (INHALATION) at 07:40

## 2023-04-08 RX ADMIN — LANSOPRAZOLE 30 MG: KIT at 06:37

## 2023-04-08 ASSESSMENT — PAIN SCALES - GENERAL
PAINLEVEL_OUTOF10: 0

## 2023-04-09 LAB
ANION GAP SERPL CALCULATED.3IONS-SCNC: 10 MMOL/L (ref 4–16)
BUN SERPL-MCNC: 55 MG/DL (ref 6–23)
CALCIUM SERPL-MCNC: 10.8 MG/DL (ref 8.3–10.6)
CHLORIDE BLD-SCNC: 97 MMOL/L (ref 99–110)
CO2: 28 MMOL/L (ref 21–32)
CREAT SERPL-MCNC: 4 MG/DL (ref 0.9–1.3)
GFR SERPL CREATININE-BSD FRML MDRD: 15 ML/MIN/1.73M2
GLUCOSE BLD-MCNC: 180 MG/DL (ref 70–99)
GLUCOSE BLD-MCNC: 184 MG/DL (ref 70–99)
GLUCOSE BLD-MCNC: 190 MG/DL (ref 70–99)
GLUCOSE BLD-MCNC: 195 MG/DL (ref 70–99)
GLUCOSE BLD-MCNC: 201 MG/DL (ref 70–99)
GLUCOSE SERPL-MCNC: 189 MG/DL (ref 70–99)
MAGNESIUM: 3.2 MG/DL (ref 1.8–2.4)
PHOSPHORUS: 5 MG/DL (ref 2.5–4.9)
POTASSIUM SERPL-SCNC: 4.1 MMOL/L (ref 3.5–5.1)
SODIUM BLD-SCNC: 135 MMOL/L (ref 135–145)

## 2023-04-09 PROCEDURE — 6370000000 HC RX 637 (ALT 250 FOR IP): Performed by: STUDENT IN AN ORGANIZED HEALTH CARE EDUCATION/TRAINING PROGRAM

## 2023-04-09 PROCEDURE — 6370000000 HC RX 637 (ALT 250 FOR IP): Performed by: PHYSICIAN ASSISTANT

## 2023-04-09 PROCEDURE — 94761 N-INVAS EAR/PLS OXIMETRY MLT: CPT

## 2023-04-09 PROCEDURE — 2580000003 HC RX 258: Performed by: NURSE PRACTITIONER

## 2023-04-09 PROCEDURE — 6360000002 HC RX W HCPCS: Performed by: STUDENT IN AN ORGANIZED HEALTH CARE EDUCATION/TRAINING PROGRAM

## 2023-04-09 PROCEDURE — 83735 ASSAY OF MAGNESIUM: CPT

## 2023-04-09 PROCEDURE — 6370000000 HC RX 637 (ALT 250 FOR IP): Performed by: NURSE PRACTITIONER

## 2023-04-09 PROCEDURE — 94640 AIRWAY INHALATION TREATMENT: CPT

## 2023-04-09 PROCEDURE — 6360000002 HC RX W HCPCS: Performed by: NURSE PRACTITIONER

## 2023-04-09 PROCEDURE — 36592 COLLECT BLOOD FROM PICC: CPT

## 2023-04-09 PROCEDURE — 2060000000 HC ICU INTERMEDIATE R&B

## 2023-04-09 PROCEDURE — 84100 ASSAY OF PHOSPHORUS: CPT

## 2023-04-09 PROCEDURE — 80048 BASIC METABOLIC PNL TOTAL CA: CPT

## 2023-04-09 PROCEDURE — 82962 GLUCOSE BLOOD TEST: CPT

## 2023-04-09 PROCEDURE — 89220 SPUTUM SPECIMEN COLLECTION: CPT

## 2023-04-09 PROCEDURE — 2700000000 HC OXYGEN THERAPY PER DAY

## 2023-04-09 RX ADMIN — IPRATROPIUM BROMIDE AND ALBUTEROL SULFATE 3 ML: 2.5; .5 SOLUTION RESPIRATORY (INHALATION) at 07:28

## 2023-04-09 RX ADMIN — ACETYLCYSTEINE 600 MG: 200 SOLUTION ORAL; RESPIRATORY (INHALATION) at 07:28

## 2023-04-09 RX ADMIN — SODIUM CHLORIDE: 9 INJECTION, SOLUTION INTRAVENOUS at 11:48

## 2023-04-09 RX ADMIN — SODIUM CHLORIDE 100 MG: 9 INJECTION, SOLUTION INTRAVENOUS at 11:59

## 2023-04-09 RX ADMIN — LANSOPRAZOLE 30 MG: KIT at 06:49

## 2023-04-09 RX ADMIN — MIDODRINE HYDROCHLORIDE 10 MG: 5 TABLET ORAL at 08:38

## 2023-04-09 RX ADMIN — METOPROLOL TARTRATE 25 MG: 25 TABLET, FILM COATED ORAL at 08:39

## 2023-04-09 RX ADMIN — ATORVASTATIN CALCIUM 40 MG: 40 TABLET, FILM COATED ORAL at 20:39

## 2023-04-09 RX ADMIN — METOPROLOL TARTRATE 25 MG: 25 TABLET, FILM COATED ORAL at 20:39

## 2023-04-09 RX ADMIN — SODIUM CHLORIDE, PRESERVATIVE FREE 10 ML: 5 INJECTION INTRAVENOUS at 08:38

## 2023-04-09 RX ADMIN — ASPIRIN 81 MG 81 MG: 81 TABLET ORAL at 08:38

## 2023-04-09 RX ADMIN — MIDODRINE HYDROCHLORIDE 10 MG: 5 TABLET ORAL at 11:51

## 2023-04-09 RX ADMIN — INSULIN LISPRO 4 UNITS: 100 INJECTION, SOLUTION INTRAVENOUS; SUBCUTANEOUS at 16:09

## 2023-04-09 RX ADMIN — MIDODRINE HYDROCHLORIDE 10 MG: 5 TABLET ORAL at 15:42

## 2023-04-09 RX ADMIN — QUETIAPINE FUMARATE 50 MG: 25 TABLET ORAL at 20:39

## 2023-04-09 RX ADMIN — IPRATROPIUM BROMIDE AND ALBUTEROL SULFATE 3 ML: 2.5; .5 SOLUTION RESPIRATORY (INHALATION) at 15:08

## 2023-04-09 RX ADMIN — INSULIN GLARGINE 10 UNITS: 100 INJECTION, SOLUTION SUBCUTANEOUS at 21:00

## 2023-04-09 RX ADMIN — AMIODARONE HYDROCHLORIDE 200 MG: 200 TABLET ORAL at 08:38

## 2023-04-09 RX ADMIN — LEVOTHYROXINE SODIUM 100 MCG: 0.1 TABLET ORAL at 06:49

## 2023-04-09 RX ADMIN — IPRATROPIUM BROMIDE AND ALBUTEROL SULFATE 3 ML: 2.5; .5 SOLUTION RESPIRATORY (INHALATION) at 20:07

## 2023-04-09 RX ADMIN — DOCUSATE SODIUM LIQUID 100 MG: 100 LIQUID ORAL at 08:38

## 2023-04-09 RX ADMIN — TRAZODONE HYDROCHLORIDE 100 MG: 50 TABLET ORAL at 20:39

## 2023-04-09 RX ADMIN — SODIUM CHLORIDE, PRESERVATIVE FREE 10 ML: 5 INJECTION INTRAVENOUS at 15:44

## 2023-04-09 RX ADMIN — IPRATROPIUM BROMIDE AND ALBUTEROL SULFATE 3 ML: 2.5; .5 SOLUTION RESPIRATORY (INHALATION) at 11:40

## 2023-04-09 RX ADMIN — ACETYLCYSTEINE 600 MG: 200 SOLUTION ORAL; RESPIRATORY (INHALATION) at 20:07

## 2023-04-09 RX ADMIN — SODIUM CHLORIDE, PRESERVATIVE FREE 10 ML: 5 INJECTION INTRAVENOUS at 20:59

## 2023-04-09 ASSESSMENT — PAIN SCALES - GENERAL
PAINLEVEL_OUTOF10: 0

## 2023-04-10 ENCOUNTER — APPOINTMENT (OUTPATIENT)
Dept: INTERVENTIONAL RADIOLOGY/VASCULAR | Age: 73
End: 2023-04-10
Attending: STUDENT IN AN ORGANIZED HEALTH CARE EDUCATION/TRAINING PROGRAM
Payer: COMMERCIAL

## 2023-04-10 LAB
ANION GAP SERPL CALCULATED.3IONS-SCNC: 10 MMOL/L (ref 4–16)
BUN SERPL-MCNC: 74 MG/DL (ref 6–23)
CALCIUM SERPL-MCNC: 11.2 MG/DL (ref 8.3–10.6)
CHLORIDE BLD-SCNC: 92 MMOL/L (ref 99–110)
CO2: 27 MMOL/L (ref 21–32)
CREAT SERPL-MCNC: 4.8 MG/DL (ref 0.9–1.3)
CULTURE: ABNORMAL
CULTURE: ABNORMAL
GFR SERPL CREATININE-BSD FRML MDRD: 12 ML/MIN/1.73M2
GLUCOSE BLD-MCNC: 181 MG/DL (ref 70–99)
GLUCOSE BLD-MCNC: 191 MG/DL (ref 70–99)
GLUCOSE BLD-MCNC: 200 MG/DL (ref 70–99)
GLUCOSE BLD-MCNC: 204 MG/DL (ref 70–99)
GLUCOSE BLD-MCNC: 213 MG/DL (ref 70–99)
GLUCOSE BLD-MCNC: 230 MG/DL (ref 70–99)
GLUCOSE SERPL-MCNC: 193 MG/DL (ref 70–99)
GRAM SMEAR: ABNORMAL
INR BLD: 1.2 INDEX
Lab: ABNORMAL
MAGNESIUM: 3.5 MG/DL (ref 1.8–2.4)
PHOSPHORUS: 6.1 MG/DL (ref 2.5–4.9)
POTASSIUM SERPL-SCNC: 4.1 MMOL/L (ref 3.5–5.1)
PROTHROMBIN TIME: 15.2 SECONDS (ref 11.7–14.5)
SODIUM BLD-SCNC: 129 MMOL/L (ref 135–145)
SPECIMEN: ABNORMAL

## 2023-04-10 PROCEDURE — 6360000002 HC RX W HCPCS: Performed by: NURSE PRACTITIONER

## 2023-04-10 PROCEDURE — 85610 PROTHROMBIN TIME: CPT

## 2023-04-10 PROCEDURE — 90935 HEMODIALYSIS ONE EVALUATION: CPT

## 2023-04-10 PROCEDURE — 6370000000 HC RX 637 (ALT 250 FOR IP): Performed by: STUDENT IN AN ORGANIZED HEALTH CARE EDUCATION/TRAINING PROGRAM

## 2023-04-10 PROCEDURE — 89220 SPUTUM SPECIMEN COLLECTION: CPT

## 2023-04-10 PROCEDURE — 36592 COLLECT BLOOD FROM PICC: CPT

## 2023-04-10 PROCEDURE — 82962 GLUCOSE BLOOD TEST: CPT

## 2023-04-10 PROCEDURE — 2060000000 HC ICU INTERMEDIATE R&B

## 2023-04-10 PROCEDURE — 2580000003 HC RX 258: Performed by: NURSE PRACTITIONER

## 2023-04-10 PROCEDURE — 6360000002 HC RX W HCPCS: Performed by: STUDENT IN AN ORGANIZED HEALTH CARE EDUCATION/TRAINING PROGRAM

## 2023-04-10 PROCEDURE — 6370000000 HC RX 637 (ALT 250 FOR IP): Performed by: NURSE PRACTITIONER

## 2023-04-10 PROCEDURE — 94640 AIRWAY INHALATION TREATMENT: CPT

## 2023-04-10 PROCEDURE — 83735 ASSAY OF MAGNESIUM: CPT

## 2023-04-10 PROCEDURE — 6370000000 HC RX 637 (ALT 250 FOR IP): Performed by: PHYSICIAN ASSISTANT

## 2023-04-10 PROCEDURE — 94761 N-INVAS EAR/PLS OXIMETRY MLT: CPT

## 2023-04-10 PROCEDURE — 84100 ASSAY OF PHOSPHORUS: CPT

## 2023-04-10 PROCEDURE — 80048 BASIC METABOLIC PNL TOTAL CA: CPT

## 2023-04-10 PROCEDURE — 99232 SBSQ HOSP IP/OBS MODERATE 35: CPT | Performed by: NURSE PRACTITIONER

## 2023-04-10 PROCEDURE — 2700000000 HC OXYGEN THERAPY PER DAY

## 2023-04-10 PROCEDURE — 6360000002 HC RX W HCPCS: Performed by: INTERNAL MEDICINE

## 2023-04-10 RX ORDER — SEVELAMER CARBONATE 800 MG/1
800 TABLET, FILM COATED ORAL
Status: DISCONTINUED | OUTPATIENT
Start: 2023-04-10 | End: 2023-04-14

## 2023-04-10 RX ADMIN — METOPROLOL TARTRATE 25 MG: 25 TABLET, FILM COATED ORAL at 08:58

## 2023-04-10 RX ADMIN — MIDODRINE HYDROCHLORIDE 10 MG: 5 TABLET ORAL at 12:40

## 2023-04-10 RX ADMIN — MIDODRINE HYDROCHLORIDE 10 MG: 5 TABLET ORAL at 08:58

## 2023-04-10 RX ADMIN — INSULIN LISPRO 4 UNITS: 100 INJECTION, SOLUTION INTRAVENOUS; SUBCUTANEOUS at 09:07

## 2023-04-10 RX ADMIN — ONDANSETRON 4 MG: 2 INJECTION INTRAMUSCULAR; INTRAVENOUS at 06:26

## 2023-04-10 RX ADMIN — LEVOTHYROXINE SODIUM 100 MCG: 0.1 TABLET ORAL at 06:27

## 2023-04-10 RX ADMIN — INSULIN GLARGINE 10 UNITS: 100 INJECTION, SOLUTION SUBCUTANEOUS at 21:30

## 2023-04-10 RX ADMIN — INSULIN LISPRO 4 UNITS: 100 INJECTION, SOLUTION INTRAVENOUS; SUBCUTANEOUS at 12:40

## 2023-04-10 RX ADMIN — METOPROLOL TARTRATE 25 MG: 25 TABLET, FILM COATED ORAL at 21:30

## 2023-04-10 RX ADMIN — ACETYLCYSTEINE 600 MG: 200 SOLUTION ORAL; RESPIRATORY (INHALATION) at 08:38

## 2023-04-10 RX ADMIN — INSULIN LISPRO 4 UNITS: 100 INJECTION, SOLUTION INTRAVENOUS; SUBCUTANEOUS at 01:04

## 2023-04-10 RX ADMIN — ACETYLCYSTEINE 600 MG: 200 SOLUTION ORAL; RESPIRATORY (INHALATION) at 20:37

## 2023-04-10 RX ADMIN — SODIUM CHLORIDE 100 MG: 9 INJECTION, SOLUTION INTRAVENOUS at 12:51

## 2023-04-10 RX ADMIN — QUETIAPINE FUMARATE 50 MG: 25 TABLET ORAL at 21:30

## 2023-04-10 RX ADMIN — SODIUM CHLORIDE, PRESERVATIVE FREE 10 ML: 5 INJECTION INTRAVENOUS at 21:31

## 2023-04-10 RX ADMIN — IPRATROPIUM BROMIDE AND ALBUTEROL SULFATE 3 ML: 2.5; .5 SOLUTION RESPIRATORY (INHALATION) at 08:38

## 2023-04-10 RX ADMIN — EPOETIN ALFA-EPBX 8000 UNITS: 4000 INJECTION, SOLUTION INTRAVENOUS; SUBCUTANEOUS at 11:15

## 2023-04-10 RX ADMIN — AMIODARONE HYDROCHLORIDE 200 MG: 200 TABLET ORAL at 08:58

## 2023-04-10 RX ADMIN — MIDODRINE HYDROCHLORIDE 10 MG: 5 TABLET ORAL at 16:13

## 2023-04-10 RX ADMIN — ATORVASTATIN CALCIUM 40 MG: 40 TABLET, FILM COATED ORAL at 21:30

## 2023-04-10 RX ADMIN — IPRATROPIUM BROMIDE AND ALBUTEROL SULFATE 3 ML: 2.5; .5 SOLUTION RESPIRATORY (INHALATION) at 15:58

## 2023-04-10 RX ADMIN — TRAZODONE HYDROCHLORIDE 100 MG: 50 TABLET ORAL at 21:30

## 2023-04-10 RX ADMIN — DOCUSATE SODIUM LIQUID 100 MG: 100 LIQUID ORAL at 08:58

## 2023-04-10 RX ADMIN — ASPIRIN 81 MG 81 MG: 81 TABLET ORAL at 08:58

## 2023-04-10 RX ADMIN — IPRATROPIUM BROMIDE AND ALBUTEROL SULFATE 3 ML: 2.5; .5 SOLUTION RESPIRATORY (INHALATION) at 20:37

## 2023-04-10 RX ADMIN — LANSOPRAZOLE 30 MG: KIT at 06:27

## 2023-04-10 RX ADMIN — SODIUM CHLORIDE, PRESERVATIVE FREE 10 ML: 5 INJECTION INTRAVENOUS at 08:58

## 2023-04-10 RX ADMIN — IPRATROPIUM BROMIDE AND ALBUTEROL SULFATE 3 ML: 2.5; .5 SOLUTION RESPIRATORY (INHALATION) at 12:11

## 2023-04-10 ASSESSMENT — PAIN SCALES - GENERAL
PAINLEVEL_OUTOF10: 0
PAINLEVEL_OUTOF10: 0

## 2023-04-10 ASSESSMENT — PAIN SCALES - WONG BAKER
WONGBAKER_NUMERICALRESPONSE: 0
WONGBAKER_NUMERICALRESPONSE: 0

## 2023-04-11 ENCOUNTER — APPOINTMENT (OUTPATIENT)
Dept: INTERVENTIONAL RADIOLOGY/VASCULAR | Age: 73
End: 2023-04-11
Attending: STUDENT IN AN ORGANIZED HEALTH CARE EDUCATION/TRAINING PROGRAM
Payer: COMMERCIAL

## 2023-04-11 LAB
ANION GAP SERPL CALCULATED.3IONS-SCNC: 8 MMOL/L (ref 4–16)
BUN SERPL-MCNC: 50 MG/DL (ref 6–23)
CALCIUM SERPL-MCNC: 10.1 MG/DL (ref 8.3–10.6)
CHLORIDE BLD-SCNC: 95 MMOL/L (ref 99–110)
CO2: 30 MMOL/L (ref 21–32)
CREAT SERPL-MCNC: 3.7 MG/DL (ref 0.9–1.3)
GFR SERPL CREATININE-BSD FRML MDRD: 17 ML/MIN/1.73M2
GLUCOSE BLD-MCNC: 176 MG/DL (ref 70–99)
GLUCOSE BLD-MCNC: 181 MG/DL (ref 70–99)
GLUCOSE BLD-MCNC: 190 MG/DL (ref 70–99)
GLUCOSE BLD-MCNC: 199 MG/DL (ref 70–99)
GLUCOSE BLD-MCNC: 205 MG/DL (ref 70–99)
GLUCOSE BLD-MCNC: 205 MG/DL (ref 70–99)
GLUCOSE SERPL-MCNC: 197 MG/DL (ref 70–99)
HCT VFR BLD CALC: 26.5 % (ref 42–52)
HEMOGLOBIN: 8.4 GM/DL (ref 13.5–18)
MAGNESIUM: 3 MG/DL (ref 1.8–2.4)
MCH RBC QN AUTO: 35.1 PG (ref 27–31)
MCHC RBC AUTO-ENTMCNC: 31.7 % (ref 32–36)
MCV RBC AUTO: 110.9 FL (ref 78–100)
PDW BLD-RTO: 15.9 % (ref 11.7–14.9)
PHOSPHORUS: 4.5 MG/DL (ref 2.5–4.9)
PLATELET # BLD: 289 K/CU MM (ref 140–440)
PMV BLD AUTO: 9.2 FL (ref 7.5–11.1)
POTASSIUM SERPL-SCNC: 4.3 MMOL/L (ref 3.5–5.1)
RBC # BLD: 2.39 M/CU MM (ref 4.6–6.2)
SODIUM BLD-SCNC: 133 MMOL/L (ref 135–145)
WBC # BLD: 8.5 K/CU MM (ref 4–10.5)

## 2023-04-11 PROCEDURE — 99211 OFF/OP EST MAY X REQ PHY/QHP: CPT

## 2023-04-11 PROCEDURE — 94640 AIRWAY INHALATION TREATMENT: CPT

## 2023-04-11 PROCEDURE — 6370000000 HC RX 637 (ALT 250 FOR IP): Performed by: STUDENT IN AN ORGANIZED HEALTH CARE EDUCATION/TRAINING PROGRAM

## 2023-04-11 PROCEDURE — 85027 COMPLETE CBC AUTOMATED: CPT

## 2023-04-11 PROCEDURE — 6370000000 HC RX 637 (ALT 250 FOR IP): Performed by: PHYSICIAN ASSISTANT

## 2023-04-11 PROCEDURE — 99232 SBSQ HOSP IP/OBS MODERATE 35: CPT | Performed by: NURSE PRACTITIONER

## 2023-04-11 PROCEDURE — 2580000003 HC RX 258: Performed by: NURSE PRACTITIONER

## 2023-04-11 PROCEDURE — 6360000002 HC RX W HCPCS

## 2023-04-11 PROCEDURE — 80048 BASIC METABOLIC PNL TOTAL CA: CPT

## 2023-04-11 PROCEDURE — 82962 GLUCOSE BLOOD TEST: CPT

## 2023-04-11 PROCEDURE — 6360000002 HC RX W HCPCS: Performed by: STUDENT IN AN ORGANIZED HEALTH CARE EDUCATION/TRAINING PROGRAM

## 2023-04-11 PROCEDURE — 83735 ASSAY OF MAGNESIUM: CPT

## 2023-04-11 PROCEDURE — 6370000000 HC RX 637 (ALT 250 FOR IP): Performed by: NURSE PRACTITIONER

## 2023-04-11 PROCEDURE — 89220 SPUTUM SPECIMEN COLLECTION: CPT

## 2023-04-11 PROCEDURE — 2060000000 HC ICU INTERMEDIATE R&B

## 2023-04-11 PROCEDURE — 2700000000 HC OXYGEN THERAPY PER DAY

## 2023-04-11 PROCEDURE — 6370000000 HC RX 637 (ALT 250 FOR IP): Performed by: INTERNAL MEDICINE

## 2023-04-11 PROCEDURE — 94761 N-INVAS EAR/PLS OXIMETRY MLT: CPT

## 2023-04-11 PROCEDURE — 84100 ASSAY OF PHOSPHORUS: CPT

## 2023-04-11 PROCEDURE — 6360000002 HC RX W HCPCS: Performed by: NURSE PRACTITIONER

## 2023-04-11 PROCEDURE — 2500000003 HC RX 250 WO HCPCS

## 2023-04-11 RX ORDER — HYDROMORPHONE HYDROCHLORIDE 1 MG/ML
0.5 INJECTION, SOLUTION INTRAMUSCULAR; INTRAVENOUS; SUBCUTANEOUS ONCE
Status: COMPLETED | OUTPATIENT
Start: 2023-04-12 | End: 2023-04-12

## 2023-04-11 RX ORDER — HYDROMORPHONE HYDROCHLORIDE 1 MG/ML
0.5 INJECTION, SOLUTION INTRAMUSCULAR; INTRAVENOUS; SUBCUTANEOUS ONCE
Status: DISCONTINUED | OUTPATIENT
Start: 2023-04-11 | End: 2023-04-11

## 2023-04-11 RX ADMIN — AMIODARONE HYDROCHLORIDE 200 MG: 200 TABLET ORAL at 07:51

## 2023-04-11 RX ADMIN — ASPIRIN 81 MG 81 MG: 81 TABLET ORAL at 07:51

## 2023-04-11 RX ADMIN — LEVOTHYROXINE SODIUM 100 MCG: 0.1 TABLET ORAL at 06:31

## 2023-04-11 RX ADMIN — INSULIN LISPRO 4 UNITS: 100 INJECTION, SOLUTION INTRAVENOUS; SUBCUTANEOUS at 11:33

## 2023-04-11 RX ADMIN — MIDODRINE HYDROCHLORIDE 10 MG: 5 TABLET ORAL at 07:51

## 2023-04-11 RX ADMIN — ATORVASTATIN CALCIUM 40 MG: 40 TABLET, FILM COATED ORAL at 20:38

## 2023-04-11 RX ADMIN — IPRATROPIUM BROMIDE AND ALBUTEROL SULFATE 3 ML: 2.5; .5 SOLUTION RESPIRATORY (INHALATION) at 19:35

## 2023-04-11 RX ADMIN — ACETYLCYSTEINE 600 MG: 200 SOLUTION ORAL; RESPIRATORY (INHALATION) at 07:38

## 2023-04-11 RX ADMIN — SODIUM CHLORIDE 100 MG: 9 INJECTION, SOLUTION INTRAVENOUS at 12:55

## 2023-04-11 RX ADMIN — TRAZODONE HYDROCHLORIDE 100 MG: 50 TABLET ORAL at 20:37

## 2023-04-11 RX ADMIN — DOCUSATE SODIUM LIQUID 100 MG: 100 LIQUID ORAL at 07:51

## 2023-04-11 RX ADMIN — SEVELAMER CARBONATE 800 MG: 800 TABLET, FILM COATED ORAL at 16:13

## 2023-04-11 RX ADMIN — MIDODRINE HYDROCHLORIDE 10 MG: 5 TABLET ORAL at 16:13

## 2023-04-11 RX ADMIN — OXYCODONE HYDROCHLORIDE 5 MG: 5 TABLET ORAL at 09:36

## 2023-04-11 RX ADMIN — METOPROLOL TARTRATE 25 MG: 25 TABLET, FILM COATED ORAL at 07:51

## 2023-04-11 RX ADMIN — IPRATROPIUM BROMIDE AND ALBUTEROL SULFATE 3 ML: 2.5; .5 SOLUTION RESPIRATORY (INHALATION) at 07:37

## 2023-04-11 RX ADMIN — SODIUM CHLORIDE, PRESERVATIVE FREE 10 ML: 5 INJECTION INTRAVENOUS at 20:39

## 2023-04-11 RX ADMIN — METOPROLOL TARTRATE 25 MG: 25 TABLET, FILM COATED ORAL at 20:38

## 2023-04-11 RX ADMIN — SODIUM CHLORIDE, PRESERVATIVE FREE 10 ML: 5 INJECTION INTRAVENOUS at 07:51

## 2023-04-11 RX ADMIN — SEVELAMER CARBONATE 800 MG: 800 TABLET, FILM COATED ORAL at 07:51

## 2023-04-11 RX ADMIN — INSULIN GLARGINE 10 UNITS: 100 INJECTION, SOLUTION SUBCUTANEOUS at 20:41

## 2023-04-11 RX ADMIN — IPRATROPIUM BROMIDE AND ALBUTEROL SULFATE 3 ML: 2.5; .5 SOLUTION RESPIRATORY (INHALATION) at 11:50

## 2023-04-11 RX ADMIN — SEVELAMER CARBONATE 800 MG: 800 TABLET, FILM COATED ORAL at 11:11

## 2023-04-11 RX ADMIN — ACETYLCYSTEINE 600 MG: 200 SOLUTION ORAL; RESPIRATORY (INHALATION) at 19:35

## 2023-04-11 RX ADMIN — INSULIN LISPRO 4 UNITS: 100 INJECTION, SOLUTION INTRAVENOUS; SUBCUTANEOUS at 00:27

## 2023-04-11 RX ADMIN — MIDODRINE HYDROCHLORIDE 10 MG: 5 TABLET ORAL at 11:11

## 2023-04-11 RX ADMIN — LANSOPRAZOLE 30 MG: KIT at 06:31

## 2023-04-11 RX ADMIN — QUETIAPINE FUMARATE 50 MG: 25 TABLET ORAL at 20:37

## 2023-04-11 ASSESSMENT — PAIN DESCRIPTION - LOCATION: LOCATION: BUTTOCKS;BACK

## 2023-04-11 ASSESSMENT — PAIN DESCRIPTION - ONSET: ONSET: ON-GOING

## 2023-04-11 ASSESSMENT — PAIN DESCRIPTION - PAIN TYPE: TYPE: ACUTE PAIN

## 2023-04-11 ASSESSMENT — PAIN SCALES - GENERAL
PAINLEVEL_OUTOF10: 8
PAINLEVEL_OUTOF10: 0

## 2023-04-11 ASSESSMENT — PAIN DESCRIPTION - ORIENTATION: ORIENTATION: LOWER

## 2023-04-11 ASSESSMENT — PAIN DESCRIPTION - FREQUENCY: FREQUENCY: INTERMITTENT

## 2023-04-11 ASSESSMENT — PAIN - FUNCTIONAL ASSESSMENT: PAIN_FUNCTIONAL_ASSESSMENT: PREVENTS OR INTERFERES WITH ALL ACTIVE AND SOME PASSIVE ACTIVITIES

## 2023-04-11 ASSESSMENT — PAIN DESCRIPTION - DESCRIPTORS: DESCRIPTORS: DISCOMFORT

## 2023-04-12 ENCOUNTER — APPOINTMENT (OUTPATIENT)
Dept: INTERVENTIONAL RADIOLOGY/VASCULAR | Age: 73
End: 2023-04-12
Attending: STUDENT IN AN ORGANIZED HEALTH CARE EDUCATION/TRAINING PROGRAM
Payer: COMMERCIAL

## 2023-04-12 LAB
ANION GAP SERPL CALCULATED.3IONS-SCNC: 9 MMOL/L (ref 4–16)
BUN SERPL-MCNC: 69 MG/DL (ref 6–23)
CALCIUM SERPL-MCNC: 10.9 MG/DL (ref 8.3–10.6)
CHLORIDE BLD-SCNC: 92 MMOL/L (ref 99–110)
CO2: 30 MMOL/L (ref 21–32)
CREAT SERPL-MCNC: 4.8 MG/DL (ref 0.9–1.3)
GFR SERPL CREATININE-BSD FRML MDRD: 12 ML/MIN/1.73M2
GLUCOSE BLD-MCNC: 173 MG/DL (ref 70–99)
GLUCOSE BLD-MCNC: 174 MG/DL (ref 70–99)
GLUCOSE BLD-MCNC: 174 MG/DL (ref 70–99)
GLUCOSE BLD-MCNC: 203 MG/DL (ref 70–99)
GLUCOSE BLD-MCNC: 210 MG/DL (ref 70–99)
GLUCOSE BLD-MCNC: 239 MG/DL (ref 70–99)
GLUCOSE SERPL-MCNC: 201 MG/DL (ref 70–99)
MAGNESIUM: 3.4 MG/DL (ref 1.8–2.4)
PHOSPHORUS: 5.3 MG/DL (ref 2.5–4.9)
POTASSIUM SERPL-SCNC: 4.3 MMOL/L (ref 3.5–5.1)
SODIUM BLD-SCNC: 131 MMOL/L (ref 135–145)

## 2023-04-12 PROCEDURE — 80048 BASIC METABOLIC PNL TOTAL CA: CPT

## 2023-04-12 PROCEDURE — 2060000000 HC ICU INTERMEDIATE R&B

## 2023-04-12 PROCEDURE — 2580000003 HC RX 258: Performed by: NURSE PRACTITIONER

## 2023-04-12 PROCEDURE — 76937 US GUIDE VASCULAR ACCESS: CPT

## 2023-04-12 PROCEDURE — 02H633Z INSERTION OF INFUSION DEVICE INTO RIGHT ATRIUM, PERCUTANEOUS APPROACH: ICD-10-PCS | Performed by: RADIOLOGY

## 2023-04-12 PROCEDURE — 36558 INSERT TUNNELED CV CATH: CPT

## 2023-04-12 PROCEDURE — 6370000000 HC RX 637 (ALT 250 FOR IP): Performed by: STUDENT IN AN ORGANIZED HEALTH CARE EDUCATION/TRAINING PROGRAM

## 2023-04-12 PROCEDURE — 36556 INSERT NON-TUNNEL CV CATH: CPT

## 2023-04-12 PROCEDURE — 2700000000 HC OXYGEN THERAPY PER DAY

## 2023-04-12 PROCEDURE — 2500000003 HC RX 250 WO HCPCS

## 2023-04-12 PROCEDURE — 6360000002 HC RX W HCPCS: Performed by: INTERNAL MEDICINE

## 2023-04-12 PROCEDURE — 6370000000 HC RX 637 (ALT 250 FOR IP): Performed by: NURSE PRACTITIONER

## 2023-04-12 PROCEDURE — C1751 CATH, INF, PER/CENT/MIDLINE: HCPCS

## 2023-04-12 PROCEDURE — 94640 AIRWAY INHALATION TREATMENT: CPT

## 2023-04-12 PROCEDURE — 02HV33Z INSERTION OF INFUSION DEVICE INTO SUPERIOR VENA CAVA, PERCUTANEOUS APPROACH: ICD-10-PCS | Performed by: RADIOLOGY

## 2023-04-12 PROCEDURE — 82962 GLUCOSE BLOOD TEST: CPT

## 2023-04-12 PROCEDURE — 6360000002 HC RX W HCPCS: Performed by: NURSE PRACTITIONER

## 2023-04-12 PROCEDURE — 83735 ASSAY OF MAGNESIUM: CPT

## 2023-04-12 PROCEDURE — 93308 TTE F-UP OR LMTD: CPT

## 2023-04-12 PROCEDURE — 6360000002 HC RX W HCPCS

## 2023-04-12 PROCEDURE — 6370000000 HC RX 637 (ALT 250 FOR IP): Performed by: PHYSICIAN ASSISTANT

## 2023-04-12 PROCEDURE — 94761 N-INVAS EAR/PLS OXIMETRY MLT: CPT

## 2023-04-12 PROCEDURE — 84100 ASSAY OF PHOSPHORUS: CPT

## 2023-04-12 PROCEDURE — 89220 SPUTUM SPECIMEN COLLECTION: CPT

## 2023-04-12 PROCEDURE — C1769 GUIDE WIRE: HCPCS

## 2023-04-12 PROCEDURE — 99232 SBSQ HOSP IP/OBS MODERATE 35: CPT | Performed by: NURSE PRACTITIONER

## 2023-04-12 PROCEDURE — 77001 FLUOROGUIDE FOR VEIN DEVICE: CPT

## 2023-04-12 PROCEDURE — 90935 HEMODIALYSIS ONE EVALUATION: CPT

## 2023-04-12 PROCEDURE — 0JH63WZ INSERTION OF TOTALLY IMPLANTABLE VASCULAR ACCESS DEVICE INTO CHEST SUBCUTANEOUS TISSUE AND FASCIA, PERCUTANEOUS APPROACH: ICD-10-PCS | Performed by: RADIOLOGY

## 2023-04-12 RX ADMIN — QUETIAPINE FUMARATE 50 MG: 25 TABLET ORAL at 20:37

## 2023-04-12 RX ADMIN — MIDODRINE HYDROCHLORIDE 10 MG: 5 TABLET ORAL at 07:54

## 2023-04-12 RX ADMIN — METOPROLOL TARTRATE 25 MG: 25 TABLET, FILM COATED ORAL at 07:54

## 2023-04-12 RX ADMIN — LANSOPRAZOLE 30 MG: KIT at 06:24

## 2023-04-12 RX ADMIN — INSULIN GLARGINE 10 UNITS: 100 INJECTION, SOLUTION SUBCUTANEOUS at 20:38

## 2023-04-12 RX ADMIN — AMIODARONE HYDROCHLORIDE 200 MG: 200 TABLET ORAL at 07:54

## 2023-04-12 RX ADMIN — ATORVASTATIN CALCIUM 40 MG: 40 TABLET, FILM COATED ORAL at 20:37

## 2023-04-12 RX ADMIN — HYDROMORPHONE HYDROCHLORIDE 0.5 MG: 1 INJECTION, SOLUTION INTRAMUSCULAR; INTRAVENOUS; SUBCUTANEOUS at 07:26

## 2023-04-12 RX ADMIN — SODIUM CHLORIDE 100 MG: 9 INJECTION, SOLUTION INTRAVENOUS at 14:21

## 2023-04-12 RX ADMIN — MIDODRINE HYDROCHLORIDE 10 MG: 5 TABLET ORAL at 11:51

## 2023-04-12 RX ADMIN — DOCUSATE SODIUM LIQUID 100 MG: 100 LIQUID ORAL at 07:54

## 2023-04-12 RX ADMIN — IPRATROPIUM BROMIDE AND ALBUTEROL SULFATE 3 ML: 2.5; .5 SOLUTION RESPIRATORY (INHALATION) at 16:28

## 2023-04-12 RX ADMIN — LEVOTHYROXINE SODIUM 100 MCG: 0.1 TABLET ORAL at 06:23

## 2023-04-12 RX ADMIN — METOPROLOL TARTRATE 25 MG: 25 TABLET, FILM COATED ORAL at 20:37

## 2023-04-12 RX ADMIN — TRAZODONE HYDROCHLORIDE 100 MG: 50 TABLET ORAL at 20:36

## 2023-04-12 RX ADMIN — MIDODRINE HYDROCHLORIDE 10 MG: 5 TABLET ORAL at 15:58

## 2023-04-12 RX ADMIN — Medication 6 MG: at 22:27

## 2023-04-12 RX ADMIN — OXYCODONE HYDROCHLORIDE 5 MG: 5 TABLET ORAL at 12:12

## 2023-04-12 RX ADMIN — IPRATROPIUM BROMIDE AND ALBUTEROL SULFATE 3 ML: 2.5; .5 SOLUTION RESPIRATORY (INHALATION) at 13:06

## 2023-04-12 RX ADMIN — INSULIN LISPRO 4 UNITS: 100 INJECTION, SOLUTION INTRAVENOUS; SUBCUTANEOUS at 11:51

## 2023-04-12 RX ADMIN — INSULIN LISPRO 4 UNITS: 100 INJECTION, SOLUTION INTRAVENOUS; SUBCUTANEOUS at 20:38

## 2023-04-12 RX ADMIN — SODIUM CHLORIDE, PRESERVATIVE FREE 10 ML: 5 INJECTION INTRAVENOUS at 20:47

## 2023-04-12 RX ADMIN — INSULIN LISPRO 4 UNITS: 100 INJECTION, SOLUTION INTRAVENOUS; SUBCUTANEOUS at 00:29

## 2023-04-12 ASSESSMENT — PAIN DESCRIPTION - PAIN TYPE: TYPE: ACUTE PAIN

## 2023-04-12 ASSESSMENT — PAIN DESCRIPTION - DESCRIPTORS: DESCRIPTORS: ACHING

## 2023-04-12 ASSESSMENT — PAIN DESCRIPTION - LOCATION: LOCATION: BACK;BUTTOCKS

## 2023-04-12 ASSESSMENT — PAIN SCALES - GENERAL: PAINLEVEL_OUTOF10: 5

## 2023-04-12 ASSESSMENT — PAIN - FUNCTIONAL ASSESSMENT: PAIN_FUNCTIONAL_ASSESSMENT: ACTIVITIES ARE NOT PREVENTED

## 2023-04-12 ASSESSMENT — PAIN DESCRIPTION - FREQUENCY: FREQUENCY: CONTINUOUS

## 2023-04-12 ASSESSMENT — PAIN DESCRIPTION - ONSET: ONSET: ON-GOING

## 2023-04-13 LAB
ANION GAP SERPL CALCULATED.3IONS-SCNC: 10 MMOL/L (ref 4–16)
BUN SERPL-MCNC: 52 MG/DL (ref 6–23)
CALCIUM SERPL-MCNC: 10.1 MG/DL (ref 8.3–10.6)
CHLORIDE BLD-SCNC: 95 MMOL/L (ref 99–110)
CO2: 27 MMOL/L (ref 21–32)
CREAT SERPL-MCNC: 3.4 MG/DL (ref 0.9–1.3)
GFR SERPL CREATININE-BSD FRML MDRD: 18 ML/MIN/1.73M2
GLUCOSE BLD-MCNC: 164 MG/DL (ref 70–99)
GLUCOSE BLD-MCNC: 170 MG/DL (ref 70–99)
GLUCOSE BLD-MCNC: 195 MG/DL (ref 70–99)
GLUCOSE BLD-MCNC: 196 MG/DL (ref 70–99)
GLUCOSE BLD-MCNC: 222 MG/DL (ref 70–99)
GLUCOSE BLD-MCNC: 231 MG/DL (ref 70–99)
GLUCOSE SERPL-MCNC: 228 MG/DL (ref 70–99)
HCT VFR BLD CALC: 26.5 % (ref 42–52)
HEMOGLOBIN: 8.1 GM/DL (ref 13.5–18)
MAGNESIUM: 3 MG/DL (ref 1.8–2.4)
MCH RBC QN AUTO: 33.1 PG (ref 27–31)
MCHC RBC AUTO-ENTMCNC: 30.6 % (ref 32–36)
MCV RBC AUTO: 108.2 FL (ref 78–100)
PDW BLD-RTO: 15.8 % (ref 11.7–14.9)
PHOSPHORUS: 3.4 MG/DL (ref 2.5–4.9)
PLATELET # BLD: 308 K/CU MM (ref 140–440)
PMV BLD AUTO: 9.2 FL (ref 7.5–11.1)
POTASSIUM SERPL-SCNC: 4 MMOL/L (ref 3.5–5.1)
RBC # BLD: 2.45 M/CU MM (ref 4.6–6.2)
SODIUM BLD-SCNC: 132 MMOL/L (ref 135–145)
WBC # BLD: 7.7 K/CU MM (ref 4–10.5)

## 2023-04-13 PROCEDURE — 6360000002 HC RX W HCPCS: Performed by: STUDENT IN AN ORGANIZED HEALTH CARE EDUCATION/TRAINING PROGRAM

## 2023-04-13 PROCEDURE — 6370000000 HC RX 637 (ALT 250 FOR IP): Performed by: STUDENT IN AN ORGANIZED HEALTH CARE EDUCATION/TRAINING PROGRAM

## 2023-04-13 PROCEDURE — 6370000000 HC RX 637 (ALT 250 FOR IP): Performed by: NURSE PRACTITIONER

## 2023-04-13 PROCEDURE — 80048 BASIC METABOLIC PNL TOTAL CA: CPT

## 2023-04-13 PROCEDURE — 2700000000 HC OXYGEN THERAPY PER DAY

## 2023-04-13 PROCEDURE — 89220 SPUTUM SPECIMEN COLLECTION: CPT

## 2023-04-13 PROCEDURE — 2580000003 HC RX 258: Performed by: NURSE PRACTITIONER

## 2023-04-13 PROCEDURE — 6370000000 HC RX 637 (ALT 250 FOR IP): Performed by: INTERNAL MEDICINE

## 2023-04-13 PROCEDURE — 83735 ASSAY OF MAGNESIUM: CPT

## 2023-04-13 PROCEDURE — 2060000000 HC ICU INTERMEDIATE R&B

## 2023-04-13 PROCEDURE — 6360000002 HC RX W HCPCS: Performed by: NURSE PRACTITIONER

## 2023-04-13 PROCEDURE — 84100 ASSAY OF PHOSPHORUS: CPT

## 2023-04-13 PROCEDURE — 94640 AIRWAY INHALATION TREATMENT: CPT

## 2023-04-13 PROCEDURE — 82962 GLUCOSE BLOOD TEST: CPT

## 2023-04-13 PROCEDURE — 94761 N-INVAS EAR/PLS OXIMETRY MLT: CPT

## 2023-04-13 PROCEDURE — 85027 COMPLETE CBC AUTOMATED: CPT

## 2023-04-13 PROCEDURE — 99232 SBSQ HOSP IP/OBS MODERATE 35: CPT | Performed by: NURSE PRACTITIONER

## 2023-04-13 RX ORDER — INSULIN GLARGINE 100 [IU]/ML
12 INJECTION, SOLUTION SUBCUTANEOUS NIGHTLY
Status: DISCONTINUED | OUTPATIENT
Start: 2023-04-13 | End: 2023-04-28 | Stop reason: HOSPADM

## 2023-04-13 RX ADMIN — QUETIAPINE FUMARATE 50 MG: 25 TABLET ORAL at 21:17

## 2023-04-13 RX ADMIN — IPRATROPIUM BROMIDE AND ALBUTEROL SULFATE 3 ML: 2.5; .5 SOLUTION RESPIRATORY (INHALATION) at 15:47

## 2023-04-13 RX ADMIN — SODIUM CHLORIDE 100 MG: 9 INJECTION, SOLUTION INTRAVENOUS at 12:56

## 2023-04-13 RX ADMIN — MIDODRINE HYDROCHLORIDE 10 MG: 5 TABLET ORAL at 08:25

## 2023-04-13 RX ADMIN — MIDODRINE HYDROCHLORIDE 10 MG: 5 TABLET ORAL at 12:47

## 2023-04-13 RX ADMIN — SEVELAMER CARBONATE 800 MG: 800 TABLET, FILM COATED ORAL at 12:47

## 2023-04-13 RX ADMIN — IPRATROPIUM BROMIDE AND ALBUTEROL SULFATE 3 ML: 2.5; .5 SOLUTION RESPIRATORY (INHALATION) at 11:46

## 2023-04-13 RX ADMIN — LEVOTHYROXINE SODIUM 100 MCG: 0.1 TABLET ORAL at 06:14

## 2023-04-13 RX ADMIN — METOPROLOL TARTRATE 25 MG: 25 TABLET, FILM COATED ORAL at 21:18

## 2023-04-13 RX ADMIN — INSULIN LISPRO 4 UNITS: 100 INJECTION, SOLUTION INTRAVENOUS; SUBCUTANEOUS at 08:38

## 2023-04-13 RX ADMIN — ACETYLCYSTEINE 600 MG: 200 SOLUTION ORAL; RESPIRATORY (INHALATION) at 20:36

## 2023-04-13 RX ADMIN — IPRATROPIUM BROMIDE AND ALBUTEROL SULFATE 3 ML: 2.5; .5 SOLUTION RESPIRATORY (INHALATION) at 20:35

## 2023-04-13 RX ADMIN — MIDODRINE HYDROCHLORIDE 10 MG: 5 TABLET ORAL at 17:04

## 2023-04-13 RX ADMIN — Medication 6 MG: at 21:17

## 2023-04-13 RX ADMIN — ONDANSETRON 4 MG: 2 INJECTION INTRAMUSCULAR; INTRAVENOUS at 21:54

## 2023-04-13 RX ADMIN — SEVELAMER CARBONATE 800 MG: 800 TABLET, FILM COATED ORAL at 08:24

## 2023-04-13 RX ADMIN — ACETYLCYSTEINE 600 MG: 200 SOLUTION ORAL; RESPIRATORY (INHALATION) at 08:24

## 2023-04-13 RX ADMIN — LANSOPRAZOLE 30 MG: KIT at 06:14

## 2023-04-13 RX ADMIN — TRAZODONE HYDROCHLORIDE 100 MG: 50 TABLET ORAL at 21:17

## 2023-04-13 RX ADMIN — METOPROLOL TARTRATE 25 MG: 25 TABLET, FILM COATED ORAL at 08:26

## 2023-04-13 RX ADMIN — ATORVASTATIN CALCIUM 40 MG: 40 TABLET, FILM COATED ORAL at 21:18

## 2023-04-13 RX ADMIN — INSULIN GLARGINE 12 UNITS: 100 INJECTION, SOLUTION SUBCUTANEOUS at 21:00

## 2023-04-13 RX ADMIN — AMIODARONE HYDROCHLORIDE 200 MG: 200 TABLET ORAL at 08:24

## 2023-04-13 RX ADMIN — IPRATROPIUM BROMIDE AND ALBUTEROL SULFATE 3 ML: 2.5; .5 SOLUTION RESPIRATORY (INHALATION) at 08:24

## 2023-04-13 RX ADMIN — ASPIRIN 81 MG 81 MG: 81 TABLET ORAL at 08:25

## 2023-04-13 RX ADMIN — DOCUSATE SODIUM LIQUID 100 MG: 100 LIQUID ORAL at 08:25

## 2023-04-13 RX ADMIN — SODIUM CHLORIDE, PRESERVATIVE FREE 10 ML: 5 INJECTION INTRAVENOUS at 21:54

## 2023-04-13 RX ADMIN — SODIUM CHLORIDE, PRESERVATIVE FREE 10 ML: 5 INJECTION INTRAVENOUS at 08:25

## 2023-04-14 ENCOUNTER — APPOINTMENT (OUTPATIENT)
Dept: GENERAL RADIOLOGY | Age: 73
End: 2023-04-14
Attending: STUDENT IN AN ORGANIZED HEALTH CARE EDUCATION/TRAINING PROGRAM
Payer: COMMERCIAL

## 2023-04-14 LAB
ANION GAP SERPL CALCULATED.3IONS-SCNC: 9 MMOL/L (ref 4–16)
BUN SERPL-MCNC: 72 MG/DL (ref 6–23)
CALCIUM SERPL-MCNC: 10.5 MG/DL (ref 8.3–10.6)
CHLORIDE BLD-SCNC: 93 MMOL/L (ref 99–110)
CO2: 28 MMOL/L (ref 21–32)
CREAT SERPL-MCNC: 4.4 MG/DL (ref 0.9–1.3)
GFR SERPL CREATININE-BSD FRML MDRD: 14 ML/MIN/1.73M2
GLUCOSE BLD-MCNC: 174 MG/DL (ref 70–99)
GLUCOSE BLD-MCNC: 185 MG/DL (ref 70–99)
GLUCOSE BLD-MCNC: 188 MG/DL (ref 70–99)
GLUCOSE BLD-MCNC: 190 MG/DL (ref 70–99)
GLUCOSE BLD-MCNC: 195 MG/DL (ref 70–99)
GLUCOSE BLD-MCNC: 203 MG/DL (ref 70–99)
GLUCOSE BLD-MCNC: 230 MG/DL (ref 70–99)
GLUCOSE SERPL-MCNC: 193 MG/DL (ref 70–99)
MAGNESIUM: 3.4 MG/DL (ref 1.8–2.4)
PHOSPHORUS: 4.8 MG/DL (ref 2.5–4.9)
POTASSIUM SERPL-SCNC: 4.3 MMOL/L (ref 3.5–5.1)
SODIUM BLD-SCNC: 130 MMOL/L (ref 135–145)

## 2023-04-14 PROCEDURE — 2580000003 HC RX 258: Performed by: NURSE PRACTITIONER

## 2023-04-14 PROCEDURE — 6360000002 HC RX W HCPCS: Performed by: INTERNAL MEDICINE

## 2023-04-14 PROCEDURE — 2060000000 HC ICU INTERMEDIATE R&B

## 2023-04-14 PROCEDURE — 90935 HEMODIALYSIS ONE EVALUATION: CPT

## 2023-04-14 PROCEDURE — 94761 N-INVAS EAR/PLS OXIMETRY MLT: CPT

## 2023-04-14 PROCEDURE — 6370000000 HC RX 637 (ALT 250 FOR IP): Performed by: NURSE PRACTITIONER

## 2023-04-14 PROCEDURE — 2500000003 HC RX 250 WO HCPCS: Performed by: INTERNAL MEDICINE

## 2023-04-14 PROCEDURE — 94640 AIRWAY INHALATION TREATMENT: CPT

## 2023-04-14 PROCEDURE — 84100 ASSAY OF PHOSPHORUS: CPT

## 2023-04-14 PROCEDURE — 6370000000 HC RX 637 (ALT 250 FOR IP): Performed by: STUDENT IN AN ORGANIZED HEALTH CARE EDUCATION/TRAINING PROGRAM

## 2023-04-14 PROCEDURE — 6360000002 HC RX W HCPCS: Performed by: NURSE PRACTITIONER

## 2023-04-14 PROCEDURE — 36592 COLLECT BLOOD FROM PICC: CPT

## 2023-04-14 PROCEDURE — 2700000000 HC OXYGEN THERAPY PER DAY

## 2023-04-14 PROCEDURE — 71045 X-RAY EXAM CHEST 1 VIEW: CPT

## 2023-04-14 PROCEDURE — 83735 ASSAY OF MAGNESIUM: CPT

## 2023-04-14 PROCEDURE — 6360000002 HC RX W HCPCS: Performed by: STUDENT IN AN ORGANIZED HEALTH CARE EDUCATION/TRAINING PROGRAM

## 2023-04-14 PROCEDURE — 80048 BASIC METABOLIC PNL TOTAL CA: CPT

## 2023-04-14 PROCEDURE — 82962 GLUCOSE BLOOD TEST: CPT

## 2023-04-14 RX ORDER — MIDODRINE HYDROCHLORIDE 5 MG/1
10 TABLET ORAL ONCE
Status: COMPLETED | OUTPATIENT
Start: 2023-04-14 | End: 2023-04-14

## 2023-04-14 RX ORDER — CALCIUM ACETATE 667 MG/1
667 CAPSULE ORAL
Status: DISCONTINUED | OUTPATIENT
Start: 2023-04-14 | End: 2023-04-16

## 2023-04-14 RX ADMIN — MIDODRINE HYDROCHLORIDE 10 MG: 5 TABLET ORAL at 12:35

## 2023-04-14 RX ADMIN — SODIUM CHLORIDE, PRESERVATIVE FREE 10 ML: 5 INJECTION INTRAVENOUS at 21:33

## 2023-04-14 RX ADMIN — INSULIN LISPRO 4 UNITS: 100 INJECTION, SOLUTION INTRAVENOUS; SUBCUTANEOUS at 13:16

## 2023-04-14 RX ADMIN — DOCUSATE SODIUM LIQUID 100 MG: 100 LIQUID ORAL at 08:41

## 2023-04-14 RX ADMIN — MIDODRINE HYDROCHLORIDE 10 MG: 5 TABLET ORAL at 08:41

## 2023-04-14 RX ADMIN — APIXABAN 2.5 MG: 2.5 TABLET, FILM COATED ORAL at 15:21

## 2023-04-14 RX ADMIN — ASPIRIN 81 MG 81 MG: 81 TABLET ORAL at 15:21

## 2023-04-14 RX ADMIN — ACETYLCYSTEINE 600 MG: 200 SOLUTION ORAL; RESPIRATORY (INHALATION) at 07:27

## 2023-04-14 RX ADMIN — Medication 6 MG: at 21:23

## 2023-04-14 RX ADMIN — QUETIAPINE FUMARATE 50 MG: 25 TABLET ORAL at 21:24

## 2023-04-14 RX ADMIN — SODIUM CHLORIDE, PRESERVATIVE FREE 10 ML: 5 INJECTION INTRAVENOUS at 08:43

## 2023-04-14 RX ADMIN — MIDODRINE HYDROCHLORIDE 10 MG: 5 TABLET ORAL at 23:07

## 2023-04-14 RX ADMIN — POLYETHYLENE GLYCOL 3350 17 G: 17 POWDER, FOR SOLUTION ORAL at 05:32

## 2023-04-14 RX ADMIN — IPRATROPIUM BROMIDE AND ALBUTEROL SULFATE 3 ML: 2.5; .5 SOLUTION RESPIRATORY (INHALATION) at 11:11

## 2023-04-14 RX ADMIN — TRAZODONE HYDROCHLORIDE 100 MG: 50 TABLET ORAL at 21:24

## 2023-04-14 RX ADMIN — IPRATROPIUM BROMIDE AND ALBUTEROL SULFATE 3 ML: 2.5; .5 SOLUTION RESPIRATORY (INHALATION) at 15:33

## 2023-04-14 RX ADMIN — ACETYLCYSTEINE 600 MG: 200 SOLUTION ORAL; RESPIRATORY (INHALATION) at 20:25

## 2023-04-14 RX ADMIN — CALCIUM ACETATE 667 MG: 667 CAPSULE ORAL at 08:41

## 2023-04-14 RX ADMIN — IPRATROPIUM BROMIDE AND ALBUTEROL SULFATE 3 ML: 2.5; .5 SOLUTION RESPIRATORY (INHALATION) at 07:26

## 2023-04-14 RX ADMIN — INSULIN GLARGINE 12 UNITS: 100 INJECTION, SOLUTION SUBCUTANEOUS at 21:24

## 2023-04-14 RX ADMIN — MIDODRINE HYDROCHLORIDE 10 MG: 5 TABLET ORAL at 17:30

## 2023-04-14 RX ADMIN — ATORVASTATIN CALCIUM 40 MG: 40 TABLET, FILM COATED ORAL at 21:24

## 2023-04-14 RX ADMIN — INSULIN LISPRO 4 UNITS: 100 INJECTION, SOLUTION INTRAVENOUS; SUBCUTANEOUS at 00:30

## 2023-04-14 RX ADMIN — CALCIUM ACETATE 667 MG: 667 CAPSULE ORAL at 17:30

## 2023-04-14 RX ADMIN — EPOETIN ALFA-EPBX 8000 UNITS: 4000 INJECTION, SOLUTION INTRAVENOUS; SUBCUTANEOUS at 11:56

## 2023-04-14 RX ADMIN — IPRATROPIUM BROMIDE AND ALBUTEROL SULFATE 3 ML: 2.5; .5 SOLUTION RESPIRATORY (INHALATION) at 20:20

## 2023-04-14 RX ADMIN — OXYCODONE HYDROCHLORIDE 5 MG: 5 TABLET ORAL at 14:51

## 2023-04-14 RX ADMIN — SODIUM CHLORIDE 100 MG: 9 INJECTION, SOLUTION INTRAVENOUS at 15:28

## 2023-04-14 RX ADMIN — APIXABAN 2.5 MG: 2.5 TABLET, FILM COATED ORAL at 21:24

## 2023-04-14 RX ADMIN — CALCIUM ACETATE 667 MG: 667 CAPSULE ORAL at 12:35

## 2023-04-14 RX ADMIN — METOPROLOL TARTRATE 25 MG: 25 TABLET, FILM COATED ORAL at 21:30

## 2023-04-14 RX ADMIN — AMIODARONE HYDROCHLORIDE 200 MG: 200 TABLET ORAL at 15:21

## 2023-04-14 RX ADMIN — LANSOPRAZOLE 30 MG: KIT at 05:32

## 2023-04-14 RX ADMIN — LEVOTHYROXINE SODIUM 100 MCG: 0.1 TABLET ORAL at 05:31

## 2023-04-14 ASSESSMENT — PAIN DESCRIPTION - LOCATION: LOCATION: ARM

## 2023-04-14 ASSESSMENT — PAIN DESCRIPTION - DESCRIPTORS: DESCRIPTORS: ACHING

## 2023-04-14 ASSESSMENT — PAIN DESCRIPTION - ORIENTATION: ORIENTATION: RIGHT

## 2023-04-14 ASSESSMENT — PAIN SCALES - GENERAL: PAINLEVEL_OUTOF10: 7

## 2023-04-15 LAB
ANION GAP SERPL CALCULATED.3IONS-SCNC: 10 MMOL/L (ref 4–16)
BUN SERPL-MCNC: 49 MG/DL (ref 6–23)
CALCIUM SERPL-MCNC: 9.5 MG/DL (ref 8.3–10.6)
CHLORIDE BLD-SCNC: 95 MMOL/L (ref 99–110)
CO2: 27 MMOL/L (ref 21–32)
CREAT SERPL-MCNC: 3.3 MG/DL (ref 0.9–1.3)
GFR SERPL CREATININE-BSD FRML MDRD: 19 ML/MIN/1.73M2
GLUCOSE BLD-MCNC: 159 MG/DL (ref 70–99)
GLUCOSE BLD-MCNC: 188 MG/DL (ref 70–99)
GLUCOSE BLD-MCNC: 208 MG/DL (ref 70–99)
GLUCOSE BLD-MCNC: 212 MG/DL (ref 70–99)
GLUCOSE BLD-MCNC: 216 MG/DL (ref 70–99)
GLUCOSE SERPL-MCNC: 199 MG/DL (ref 70–99)
HCT VFR BLD CALC: 24.2 % (ref 42–52)
HEMOGLOBIN: 7.9 GM/DL (ref 13.5–18)
MAGNESIUM: 3 MG/DL (ref 1.8–2.4)
MCH RBC QN AUTO: 36.4 PG (ref 27–31)
MCHC RBC AUTO-ENTMCNC: 32.6 % (ref 32–36)
MCV RBC AUTO: 111.5 FL (ref 78–100)
PDW BLD-RTO: 16 % (ref 11.7–14.9)
PHOSPHORUS: 3.8 MG/DL (ref 2.5–4.9)
PLATELET # BLD: 280 K/CU MM (ref 140–440)
PMV BLD AUTO: 9.1 FL (ref 7.5–11.1)
POTASSIUM SERPL-SCNC: 4.2 MMOL/L (ref 3.5–5.1)
RBC # BLD: 2.17 M/CU MM (ref 4.6–6.2)
SODIUM BLD-SCNC: 132 MMOL/L (ref 135–145)
WBC # BLD: 7.7 K/CU MM (ref 4–10.5)

## 2023-04-15 PROCEDURE — 84100 ASSAY OF PHOSPHORUS: CPT

## 2023-04-15 PROCEDURE — 6370000000 HC RX 637 (ALT 250 FOR IP): Performed by: NURSE PRACTITIONER

## 2023-04-15 PROCEDURE — 2580000003 HC RX 258: Performed by: NURSE PRACTITIONER

## 2023-04-15 PROCEDURE — 6370000000 HC RX 637 (ALT 250 FOR IP): Performed by: STUDENT IN AN ORGANIZED HEALTH CARE EDUCATION/TRAINING PROGRAM

## 2023-04-15 PROCEDURE — 82962 GLUCOSE BLOOD TEST: CPT

## 2023-04-15 PROCEDURE — 6360000002 HC RX W HCPCS: Performed by: NURSE PRACTITIONER

## 2023-04-15 PROCEDURE — 83735 ASSAY OF MAGNESIUM: CPT

## 2023-04-15 PROCEDURE — 6360000002 HC RX W HCPCS: Performed by: STUDENT IN AN ORGANIZED HEALTH CARE EDUCATION/TRAINING PROGRAM

## 2023-04-15 PROCEDURE — 2700000000 HC OXYGEN THERAPY PER DAY

## 2023-04-15 PROCEDURE — 2060000000 HC ICU INTERMEDIATE R&B

## 2023-04-15 PROCEDURE — 89220 SPUTUM SPECIMEN COLLECTION: CPT

## 2023-04-15 PROCEDURE — 94761 N-INVAS EAR/PLS OXIMETRY MLT: CPT

## 2023-04-15 PROCEDURE — 80048 BASIC METABOLIC PNL TOTAL CA: CPT

## 2023-04-15 PROCEDURE — 1200000000 HC SEMI PRIVATE

## 2023-04-15 PROCEDURE — 94640 AIRWAY INHALATION TREATMENT: CPT

## 2023-04-15 PROCEDURE — 85027 COMPLETE CBC AUTOMATED: CPT

## 2023-04-15 PROCEDURE — 2500000003 HC RX 250 WO HCPCS: Performed by: INTERNAL MEDICINE

## 2023-04-15 RX ORDER — PROCHLORPERAZINE EDISYLATE 5 MG/ML
10 INJECTION INTRAMUSCULAR; INTRAVENOUS ONCE
Status: COMPLETED | OUTPATIENT
Start: 2023-04-15 | End: 2023-04-15

## 2023-04-15 RX ADMIN — DOCUSATE SODIUM LIQUID 100 MG: 100 LIQUID ORAL at 11:03

## 2023-04-15 RX ADMIN — CALCIUM ACETATE 667 MG: 667 CAPSULE ORAL at 11:03

## 2023-04-15 RX ADMIN — SODIUM CHLORIDE 100 MG: 9 INJECTION, SOLUTION INTRAVENOUS at 11:02

## 2023-04-15 RX ADMIN — IPRATROPIUM BROMIDE AND ALBUTEROL SULFATE 3 ML: 2.5; .5 SOLUTION RESPIRATORY (INHALATION) at 15:49

## 2023-04-15 RX ADMIN — PROCHLORPERAZINE EDISYLATE 10 MG: 5 INJECTION INTRAMUSCULAR; INTRAVENOUS at 22:44

## 2023-04-15 RX ADMIN — SODIUM CHLORIDE, PRESERVATIVE FREE 10 ML: 5 INJECTION INTRAVENOUS at 22:44

## 2023-04-15 RX ADMIN — CALCIUM ACETATE 667 MG: 667 CAPSULE ORAL at 17:55

## 2023-04-15 RX ADMIN — SODIUM CHLORIDE, PRESERVATIVE FREE 10 ML: 5 INJECTION INTRAVENOUS at 11:03

## 2023-04-15 RX ADMIN — INSULIN LISPRO 4 UNITS: 100 INJECTION, SOLUTION INTRAVENOUS; SUBCUTANEOUS at 05:22

## 2023-04-15 RX ADMIN — INSULIN GLARGINE 12 UNITS: 100 INJECTION, SOLUTION SUBCUTANEOUS at 21:20

## 2023-04-15 RX ADMIN — INSULIN LISPRO 4 UNITS: 100 INJECTION, SOLUTION INTRAVENOUS; SUBCUTANEOUS at 17:55

## 2023-04-15 RX ADMIN — IPRATROPIUM BROMIDE AND ALBUTEROL SULFATE 3 ML: 2.5; .5 SOLUTION RESPIRATORY (INHALATION) at 09:01

## 2023-04-15 RX ADMIN — INSULIN LISPRO 4 UNITS: 100 INJECTION, SOLUTION INTRAVENOUS; SUBCUTANEOUS at 21:21

## 2023-04-15 RX ADMIN — MIDODRINE HYDROCHLORIDE 10 MG: 5 TABLET ORAL at 17:55

## 2023-04-15 RX ADMIN — SODIUM CHLORIDE 25 ML: 9 INJECTION, SOLUTION INTRAVENOUS at 11:01

## 2023-04-15 RX ADMIN — APIXABAN 2.5 MG: 2.5 TABLET, FILM COATED ORAL at 11:03

## 2023-04-15 RX ADMIN — Medication 6 MG: at 21:20

## 2023-04-15 RX ADMIN — METOPROLOL TARTRATE 25 MG: 25 TABLET, FILM COATED ORAL at 21:20

## 2023-04-15 RX ADMIN — TRAZODONE HYDROCHLORIDE 100 MG: 50 TABLET ORAL at 21:20

## 2023-04-15 RX ADMIN — LEVOTHYROXINE SODIUM 100 MCG: 0.1 TABLET ORAL at 05:17

## 2023-04-15 RX ADMIN — OXYCODONE HYDROCHLORIDE 5 MG: 5 TABLET ORAL at 21:20

## 2023-04-15 RX ADMIN — ASPIRIN 81 MG 81 MG: 81 TABLET ORAL at 11:03

## 2023-04-15 RX ADMIN — ATORVASTATIN CALCIUM 40 MG: 40 TABLET, FILM COATED ORAL at 21:20

## 2023-04-15 RX ADMIN — APIXABAN 2.5 MG: 2.5 TABLET, FILM COATED ORAL at 21:20

## 2023-04-15 RX ADMIN — ACETYLCYSTEINE 600 MG: 200 SOLUTION ORAL; RESPIRATORY (INHALATION) at 09:02

## 2023-04-15 RX ADMIN — AMIODARONE HYDROCHLORIDE 200 MG: 200 TABLET ORAL at 11:03

## 2023-04-15 RX ADMIN — QUETIAPINE FUMARATE 50 MG: 25 TABLET ORAL at 21:20

## 2023-04-15 RX ADMIN — LANSOPRAZOLE 30 MG: KIT at 05:17

## 2023-04-15 RX ADMIN — ONDANSETRON 4 MG: 2 INJECTION INTRAMUSCULAR; INTRAVENOUS at 18:05

## 2023-04-15 RX ADMIN — IPRATROPIUM BROMIDE AND ALBUTEROL SULFATE 3 ML: 2.5; .5 SOLUTION RESPIRATORY (INHALATION) at 12:56

## 2023-04-15 ASSESSMENT — PAIN SCALES - GENERAL
PAINLEVEL_OUTOF10: 6
PAINLEVEL_OUTOF10: 0

## 2023-04-15 ASSESSMENT — PAIN DESCRIPTION - DESCRIPTORS: DESCRIPTORS: DISCOMFORT

## 2023-04-15 ASSESSMENT — PAIN DESCRIPTION - LOCATION: LOCATION: GENERALIZED

## 2023-04-15 ASSESSMENT — PAIN DESCRIPTION - FREQUENCY: FREQUENCY: INTERMITTENT

## 2023-04-15 ASSESSMENT — PAIN DESCRIPTION - PAIN TYPE: TYPE: ACUTE PAIN

## 2023-04-16 LAB
ANION GAP SERPL CALCULATED.3IONS-SCNC: 9 MMOL/L (ref 4–16)
BASOPHILS ABSOLUTE: 0.1 K/CU MM
BASOPHILS RELATIVE PERCENT: 1.3 % (ref 0–1)
BUN SERPL-MCNC: 68 MG/DL (ref 6–23)
CALCIUM SERPL-MCNC: 9.4 MG/DL (ref 8.3–10.6)
CHLORIDE BLD-SCNC: 94 MMOL/L (ref 99–110)
CO2: 28 MMOL/L (ref 21–32)
CREAT SERPL-MCNC: 4.5 MG/DL (ref 0.9–1.3)
DIFFERENTIAL TYPE: ABNORMAL
EOSINOPHILS ABSOLUTE: 0.4 K/CU MM
EOSINOPHILS RELATIVE PERCENT: 5.5 % (ref 0–3)
GFR SERPL CREATININE-BSD FRML MDRD: 13 ML/MIN/1.73M2
GLUCOSE BLD-MCNC: 141 MG/DL (ref 70–99)
GLUCOSE BLD-MCNC: 149 MG/DL (ref 70–99)
GLUCOSE BLD-MCNC: 152 MG/DL (ref 70–99)
GLUCOSE BLD-MCNC: 174 MG/DL (ref 70–99)
GLUCOSE BLD-MCNC: 232 MG/DL (ref 70–99)
GLUCOSE BLD-MCNC: 238 MG/DL (ref 70–99)
GLUCOSE SERPL-MCNC: 173 MG/DL (ref 70–99)
HCT VFR BLD CALC: 24.5 % (ref 42–52)
HEMOGLOBIN: 7.9 GM/DL (ref 13.5–18)
IMMATURE NEUTROPHIL %: 0.7 % (ref 0–0.43)
LYMPHOCYTES ABSOLUTE: 1.4 K/CU MM
LYMPHOCYTES RELATIVE PERCENT: 18.7 % (ref 24–44)
MAGNESIUM: 3.3 MG/DL (ref 1.8–2.4)
MCH RBC QN AUTO: 35.3 PG (ref 27–31)
MCHC RBC AUTO-ENTMCNC: 32.2 % (ref 32–36)
MCV RBC AUTO: 109.4 FL (ref 78–100)
MONOCYTES ABSOLUTE: 0.8 K/CU MM
MONOCYTES RELATIVE PERCENT: 10.4 % (ref 0–4)
NUCLEATED RBC %: 0 %
PDW BLD-RTO: 16.1 % (ref 11.7–14.9)
PHOSPHORUS: 5 MG/DL (ref 2.5–4.9)
PLATELET # BLD: 280 K/CU MM (ref 140–440)
PMV BLD AUTO: 9 FL (ref 7.5–11.1)
POTASSIUM SERPL-SCNC: 4.4 MMOL/L (ref 3.5–5.1)
RBC # BLD: 2.24 M/CU MM (ref 4.6–6.2)
SEGMENTED NEUTROPHILS ABSOLUTE COUNT: 4.8 K/CU MM
SEGMENTED NEUTROPHILS RELATIVE PERCENT: 63.4 % (ref 36–66)
SODIUM BLD-SCNC: 131 MMOL/L (ref 135–145)
TOTAL IMMATURE NEUTOROPHIL: 0.05 K/CU MM
TOTAL NUCLEATED RBC: 0 K/CU MM
WBC # BLD: 7.6 K/CU MM (ref 4–10.5)

## 2023-04-16 PROCEDURE — 6370000000 HC RX 637 (ALT 250 FOR IP): Performed by: STUDENT IN AN ORGANIZED HEALTH CARE EDUCATION/TRAINING PROGRAM

## 2023-04-16 PROCEDURE — 6370000000 HC RX 637 (ALT 250 FOR IP): Performed by: NURSE PRACTITIONER

## 2023-04-16 PROCEDURE — 2580000003 HC RX 258: Performed by: NURSE PRACTITIONER

## 2023-04-16 PROCEDURE — 85018 HEMOGLOBIN: CPT

## 2023-04-16 PROCEDURE — 1200000000 HC SEMI PRIVATE

## 2023-04-16 PROCEDURE — 89220 SPUTUM SPECIMEN COLLECTION: CPT

## 2023-04-16 PROCEDURE — 83735 ASSAY OF MAGNESIUM: CPT

## 2023-04-16 PROCEDURE — 94761 N-INVAS EAR/PLS OXIMETRY MLT: CPT

## 2023-04-16 PROCEDURE — 6360000002 HC RX W HCPCS: Performed by: STUDENT IN AN ORGANIZED HEALTH CARE EDUCATION/TRAINING PROGRAM

## 2023-04-16 PROCEDURE — 94640 AIRWAY INHALATION TREATMENT: CPT

## 2023-04-16 PROCEDURE — 80048 BASIC METABOLIC PNL TOTAL CA: CPT

## 2023-04-16 PROCEDURE — 2500000003 HC RX 250 WO HCPCS: Performed by: INTERNAL MEDICINE

## 2023-04-16 PROCEDURE — 84100 ASSAY OF PHOSPHORUS: CPT

## 2023-04-16 PROCEDURE — 85014 HEMATOCRIT: CPT

## 2023-04-16 PROCEDURE — 2700000000 HC OXYGEN THERAPY PER DAY

## 2023-04-16 PROCEDURE — 6360000002 HC RX W HCPCS: Performed by: NURSE PRACTITIONER

## 2023-04-16 PROCEDURE — 82962 GLUCOSE BLOOD TEST: CPT

## 2023-04-16 PROCEDURE — 85025 COMPLETE CBC W/AUTO DIFF WBC: CPT

## 2023-04-16 PROCEDURE — 6370000000 HC RX 637 (ALT 250 FOR IP): Performed by: INTERNAL MEDICINE

## 2023-04-16 PROCEDURE — C9113 INJ PANTOPRAZOLE SODIUM, VIA: HCPCS | Performed by: STUDENT IN AN ORGANIZED HEALTH CARE EDUCATION/TRAINING PROGRAM

## 2023-04-16 RX ORDER — PANTOPRAZOLE SODIUM 40 MG/10ML
40 INJECTION, POWDER, LYOPHILIZED, FOR SOLUTION INTRAVENOUS DAILY
Status: DISCONTINUED | OUTPATIENT
Start: 2023-04-16 | End: 2023-04-16

## 2023-04-16 RX ORDER — SUCRALFATE 1 G/1
1 TABLET ORAL EVERY 12 HOURS SCHEDULED
Status: DISCONTINUED | OUTPATIENT
Start: 2023-04-16 | End: 2023-04-28 | Stop reason: HOSPADM

## 2023-04-16 RX ORDER — PANTOPRAZOLE SODIUM 40 MG/10ML
40 INJECTION, POWDER, LYOPHILIZED, FOR SOLUTION INTRAVENOUS 2 TIMES DAILY
Status: DISCONTINUED | OUTPATIENT
Start: 2023-04-16 | End: 2023-04-20

## 2023-04-16 RX ADMIN — LEVOTHYROXINE SODIUM 100 MCG: 0.1 TABLET ORAL at 05:00

## 2023-04-16 RX ADMIN — Medication 6 MG: at 20:24

## 2023-04-16 RX ADMIN — IPRATROPIUM BROMIDE AND ALBUTEROL SULFATE 3 ML: 2.5; .5 SOLUTION RESPIRATORY (INHALATION) at 07:59

## 2023-04-16 RX ADMIN — SODIUM CHLORIDE 100 MG: 9 INJECTION, SOLUTION INTRAVENOUS at 12:15

## 2023-04-16 RX ADMIN — SODIUM CHLORIDE, PRESERVATIVE FREE 10 ML: 5 INJECTION INTRAVENOUS at 08:19

## 2023-04-16 RX ADMIN — IPRATROPIUM BROMIDE AND ALBUTEROL SULFATE 3 ML: 2.5; .5 SOLUTION RESPIRATORY (INHALATION) at 19:39

## 2023-04-16 RX ADMIN — OXYCODONE HYDROCHLORIDE 5 MG: 5 TABLET ORAL at 08:18

## 2023-04-16 RX ADMIN — MIDODRINE HYDROCHLORIDE 10 MG: 5 TABLET ORAL at 12:14

## 2023-04-16 RX ADMIN — IPRATROPIUM BROMIDE AND ALBUTEROL SULFATE 3 ML: 2.5; .5 SOLUTION RESPIRATORY (INHALATION) at 16:19

## 2023-04-16 RX ADMIN — PANTOPRAZOLE SODIUM 40 MG: 40 INJECTION, POWDER, FOR SOLUTION INTRAVENOUS at 12:33

## 2023-04-16 RX ADMIN — AMIODARONE HYDROCHLORIDE 200 MG: 200 TABLET ORAL at 08:18

## 2023-04-16 RX ADMIN — SUCRALFATE 1 G: 1 TABLET ORAL at 20:20

## 2023-04-16 RX ADMIN — APIXABAN 2.5 MG: 2.5 TABLET, FILM COATED ORAL at 08:19

## 2023-04-16 RX ADMIN — IPRATROPIUM BROMIDE AND ALBUTEROL SULFATE 3 ML: 2.5; .5 SOLUTION RESPIRATORY (INHALATION) at 12:15

## 2023-04-16 RX ADMIN — PANTOPRAZOLE SODIUM 40 MG: 40 INJECTION, POWDER, FOR SOLUTION INTRAVENOUS at 20:19

## 2023-04-16 RX ADMIN — CALCIUM ACETATE 667 MG: 667 CAPSULE ORAL at 08:18

## 2023-04-16 RX ADMIN — ACETYLCYSTEINE 600 MG: 200 SOLUTION ORAL; RESPIRATORY (INHALATION) at 19:39

## 2023-04-16 RX ADMIN — METOPROLOL TARTRATE 25 MG: 25 TABLET, FILM COATED ORAL at 08:17

## 2023-04-16 RX ADMIN — ONDANSETRON 4 MG: 2 INJECTION INTRAMUSCULAR; INTRAVENOUS at 20:24

## 2023-04-16 RX ADMIN — OXYCODONE HYDROCHLORIDE 5 MG: 5 TABLET ORAL at 14:49

## 2023-04-16 RX ADMIN — INSULIN LISPRO 4 UNITS: 100 INJECTION, SOLUTION INTRAVENOUS; SUBCUTANEOUS at 12:15

## 2023-04-16 RX ADMIN — MIDODRINE HYDROCHLORIDE 10 MG: 5 TABLET ORAL at 17:28

## 2023-04-16 RX ADMIN — QUETIAPINE FUMARATE 50 MG: 25 TABLET ORAL at 20:20

## 2023-04-16 RX ADMIN — DOCUSATE SODIUM LIQUID 100 MG: 100 LIQUID ORAL at 08:17

## 2023-04-16 RX ADMIN — SODIUM CHLORIDE, PRESERVATIVE FREE 10 ML: 5 INJECTION INTRAVENOUS at 22:46

## 2023-04-16 RX ADMIN — ACETYLCYSTEINE 600 MG: 200 SOLUTION ORAL; RESPIRATORY (INHALATION) at 07:59

## 2023-04-16 RX ADMIN — LANSOPRAZOLE 30 MG: KIT at 05:00

## 2023-04-16 RX ADMIN — ASPIRIN 81 MG 81 MG: 81 TABLET ORAL at 08:18

## 2023-04-16 RX ADMIN — MIDODRINE HYDROCHLORIDE 10 MG: 5 TABLET ORAL at 08:18

## 2023-04-16 RX ADMIN — SUCRALFATE 1 G: 1 TABLET ORAL at 12:14

## 2023-04-16 RX ADMIN — TRAZODONE HYDROCHLORIDE 100 MG: 50 TABLET ORAL at 20:20

## 2023-04-16 RX ADMIN — ATORVASTATIN CALCIUM 40 MG: 40 TABLET, FILM COATED ORAL at 20:20

## 2023-04-16 ASSESSMENT — PAIN SCALES - GENERAL
PAINLEVEL_OUTOF10: 6
PAINLEVEL_OUTOF10: 6

## 2023-04-17 PROBLEM — K92.0 COFFEE GROUND EMESIS: Status: ACTIVE | Noted: 2023-04-17

## 2023-04-17 LAB
ABO/RH: NORMAL
ANION GAP SERPL CALCULATED.3IONS-SCNC: 11 MMOL/L (ref 4–16)
ANTIBODY SCREEN: NEGATIVE
BASOPHILS ABSOLUTE: 0.1 K/CU MM
BASOPHILS RELATIVE PERCENT: 0.9 % (ref 0–1)
BUN SERPL-MCNC: 88 MG/DL (ref 6–23)
CALCIUM SERPL-MCNC: 9 MG/DL (ref 8.3–10.6)
CHLORIDE BLD-SCNC: 94 MMOL/L (ref 99–110)
CO2: 24 MMOL/L (ref 21–32)
CREAT SERPL-MCNC: 5.3 MG/DL (ref 0.9–1.3)
CULTURE: ABNORMAL
CULTURE: ABNORMAL
DIFFERENTIAL TYPE: ABNORMAL
EOSINOPHILS ABSOLUTE: 0.4 K/CU MM
EOSINOPHILS RELATIVE PERCENT: 4.3 % (ref 0–3)
FUNGUS STAIN: ABNORMAL
GFR SERPL CREATININE-BSD FRML MDRD: 11 ML/MIN/1.73M2
GLUCOSE BLD-MCNC: 133 MG/DL (ref 70–99)
GLUCOSE BLD-MCNC: 138 MG/DL (ref 70–99)
GLUCOSE BLD-MCNC: 142 MG/DL (ref 70–99)
GLUCOSE BLD-MCNC: 152 MG/DL (ref 70–99)
GLUCOSE BLD-MCNC: 203 MG/DL (ref 70–99)
GLUCOSE SERPL-MCNC: 139 MG/DL (ref 70–99)
HCT VFR BLD CALC: 23.9 % (ref 42–52)
HEMOGLOBIN: 7.9 GM/DL (ref 13.5–18)
IMMATURE NEUTROPHIL %: 0.9 % (ref 0–0.43)
LYMPHOCYTES ABSOLUTE: 1.6 K/CU MM
LYMPHOCYTES RELATIVE PERCENT: 19.9 % (ref 24–44)
Lab: ABNORMAL
MAGNESIUM: 3.5 MG/DL (ref 1.8–2.4)
MCH RBC QN AUTO: 36.2 PG (ref 27–31)
MCHC RBC AUTO-ENTMCNC: 33.1 % (ref 32–36)
MCV RBC AUTO: 109.6 FL (ref 78–100)
MONOCYTES ABSOLUTE: 0.8 K/CU MM
MONOCYTES RELATIVE PERCENT: 9.8 % (ref 0–4)
NUCLEATED RBC %: 0 %
PDW BLD-RTO: 16 % (ref 11.7–14.9)
PHOSPHORUS: 5.2 MG/DL (ref 2.5–4.9)
PLATELET # BLD: 257 K/CU MM (ref 140–440)
PMV BLD AUTO: 9.1 FL (ref 7.5–11.1)
POTASSIUM SERPL-SCNC: 4.4 MMOL/L (ref 3.5–5.1)
RBC # BLD: 2.18 M/CU MM (ref 4.6–6.2)
SEGMENTED NEUTROPHILS ABSOLUTE COUNT: 5.3 K/CU MM
SEGMENTED NEUTROPHILS RELATIVE PERCENT: 64.2 % (ref 36–66)
SODIUM BLD-SCNC: 129 MMOL/L (ref 135–145)
SPECIMEN: ABNORMAL
TOTAL IMMATURE NEUTOROPHIL: 0.07 K/CU MM
TOTAL NUCLEATED RBC: 0 K/CU MM
WBC # BLD: 8.2 K/CU MM (ref 4–10.5)

## 2023-04-17 PROCEDURE — 6370000000 HC RX 637 (ALT 250 FOR IP): Performed by: STUDENT IN AN ORGANIZED HEALTH CARE EDUCATION/TRAINING PROGRAM

## 2023-04-17 PROCEDURE — 6360000002 HC RX W HCPCS: Performed by: STUDENT IN AN ORGANIZED HEALTH CARE EDUCATION/TRAINING PROGRAM

## 2023-04-17 PROCEDURE — 6360000002 HC RX W HCPCS: Performed by: INTERNAL MEDICINE

## 2023-04-17 PROCEDURE — 83735 ASSAY OF MAGNESIUM: CPT

## 2023-04-17 PROCEDURE — 99222 1ST HOSP IP/OBS MODERATE 55: CPT | Performed by: INTERNAL MEDICINE

## 2023-04-17 PROCEDURE — 86900 BLOOD TYPING SEROLOGIC ABO: CPT

## 2023-04-17 PROCEDURE — 6370000000 HC RX 637 (ALT 250 FOR IP): Performed by: NURSE PRACTITIONER

## 2023-04-17 PROCEDURE — 82962 GLUCOSE BLOOD TEST: CPT

## 2023-04-17 PROCEDURE — C9113 INJ PANTOPRAZOLE SODIUM, VIA: HCPCS | Performed by: STUDENT IN AN ORGANIZED HEALTH CARE EDUCATION/TRAINING PROGRAM

## 2023-04-17 PROCEDURE — 94761 N-INVAS EAR/PLS OXIMETRY MLT: CPT

## 2023-04-17 PROCEDURE — 2700000000 HC OXYGEN THERAPY PER DAY

## 2023-04-17 PROCEDURE — 6370000000 HC RX 637 (ALT 250 FOR IP): Performed by: INTERNAL MEDICINE

## 2023-04-17 PROCEDURE — 85018 HEMOGLOBIN: CPT

## 2023-04-17 PROCEDURE — 85025 COMPLETE CBC W/AUTO DIFF WBC: CPT

## 2023-04-17 PROCEDURE — 86850 RBC ANTIBODY SCREEN: CPT

## 2023-04-17 PROCEDURE — 2580000003 HC RX 258: Performed by: NURSE PRACTITIONER

## 2023-04-17 PROCEDURE — 84100 ASSAY OF PHOSPHORUS: CPT

## 2023-04-17 PROCEDURE — 80048 BASIC METABOLIC PNL TOTAL CA: CPT

## 2023-04-17 PROCEDURE — 99231 SBSQ HOSP IP/OBS SF/LOW 25: CPT | Performed by: NURSE PRACTITIONER

## 2023-04-17 PROCEDURE — 85014 HEMATOCRIT: CPT

## 2023-04-17 PROCEDURE — 86901 BLOOD TYPING SEROLOGIC RH(D): CPT

## 2023-04-17 PROCEDURE — 94640 AIRWAY INHALATION TREATMENT: CPT

## 2023-04-17 PROCEDURE — 1200000000 HC SEMI PRIVATE

## 2023-04-17 RX ADMIN — EPOETIN ALFA-EPBX 8000 UNITS: 4000 INJECTION, SOLUTION INTRAVENOUS; SUBCUTANEOUS at 11:02

## 2023-04-17 RX ADMIN — SUCRALFATE 1 G: 1 TABLET ORAL at 08:31

## 2023-04-17 RX ADMIN — IPRATROPIUM BROMIDE AND ALBUTEROL SULFATE 3 ML: 2.5; .5 SOLUTION RESPIRATORY (INHALATION) at 11:31

## 2023-04-17 RX ADMIN — SODIUM CHLORIDE, PRESERVATIVE FREE 10 ML: 5 INJECTION INTRAVENOUS at 08:31

## 2023-04-17 RX ADMIN — QUETIAPINE FUMARATE 50 MG: 25 TABLET ORAL at 20:36

## 2023-04-17 RX ADMIN — AMIODARONE HYDROCHLORIDE 200 MG: 200 TABLET ORAL at 08:31

## 2023-04-17 RX ADMIN — IPRATROPIUM BROMIDE AND ALBUTEROL SULFATE 3 ML: 2.5; .5 SOLUTION RESPIRATORY (INHALATION) at 15:24

## 2023-04-17 RX ADMIN — OXYCODONE HYDROCHLORIDE 5 MG: 5 TABLET ORAL at 20:35

## 2023-04-17 RX ADMIN — MIDODRINE HYDROCHLORIDE 10 MG: 5 TABLET ORAL at 17:13

## 2023-04-17 RX ADMIN — TRAZODONE HYDROCHLORIDE 100 MG: 50 TABLET ORAL at 20:36

## 2023-04-17 RX ADMIN — ATORVASTATIN CALCIUM 40 MG: 40 TABLET, FILM COATED ORAL at 20:35

## 2023-04-17 RX ADMIN — METOPROLOL TARTRATE 25 MG: 25 TABLET, FILM COATED ORAL at 20:36

## 2023-04-17 RX ADMIN — DOCUSATE SODIUM LIQUID 100 MG: 100 LIQUID ORAL at 08:31

## 2023-04-17 RX ADMIN — MIDODRINE HYDROCHLORIDE 10 MG: 5 TABLET ORAL at 12:09

## 2023-04-17 RX ADMIN — PANTOPRAZOLE SODIUM 40 MG: 40 INJECTION, POWDER, FOR SOLUTION INTRAVENOUS at 08:31

## 2023-04-17 RX ADMIN — SUCRALFATE 1 G: 1 TABLET ORAL at 20:36

## 2023-04-17 RX ADMIN — IPRATROPIUM BROMIDE AND ALBUTEROL SULFATE 3 ML: 2.5; .5 SOLUTION RESPIRATORY (INHALATION) at 07:37

## 2023-04-17 RX ADMIN — SODIUM CHLORIDE, PRESERVATIVE FREE 10 ML: 5 INJECTION INTRAVENOUS at 20:35

## 2023-04-17 RX ADMIN — Medication 6 MG: at 20:36

## 2023-04-17 RX ADMIN — PANTOPRAZOLE SODIUM 40 MG: 40 INJECTION, POWDER, FOR SOLUTION INTRAVENOUS at 20:35

## 2023-04-17 RX ADMIN — INSULIN GLARGINE 12 UNITS: 100 INJECTION, SOLUTION SUBCUTANEOUS at 20:34

## 2023-04-17 RX ADMIN — MIDODRINE HYDROCHLORIDE 10 MG: 5 TABLET ORAL at 08:31

## 2023-04-17 RX ADMIN — ASPIRIN 81 MG 81 MG: 81 TABLET ORAL at 08:31

## 2023-04-17 RX ADMIN — ACETYLCYSTEINE 600 MG: 200 SOLUTION ORAL; RESPIRATORY (INHALATION) at 07:37

## 2023-04-17 RX ADMIN — LEVOTHYROXINE SODIUM 100 MCG: 0.1 TABLET ORAL at 05:13

## 2023-04-18 LAB
AFB SMEAR: NORMAL
ANION GAP SERPL CALCULATED.3IONS-SCNC: 9 MMOL/L (ref 4–16)
BASOPHILS ABSOLUTE: 0.1 K/CU MM
BASOPHILS RELATIVE PERCENT: 1.4 % (ref 0–1)
BUN SERPL-MCNC: 61 MG/DL (ref 6–23)
CALCIUM SERPL-MCNC: 8.3 MG/DL (ref 8.3–10.6)
CHLORIDE BLD-SCNC: 99 MMOL/L (ref 99–110)
CO2: 27 MMOL/L (ref 21–32)
CREAT SERPL-MCNC: 3.7 MG/DL (ref 0.9–1.3)
CULTURE: NORMAL
DIFFERENTIAL TYPE: ABNORMAL
EOSINOPHILS ABSOLUTE: 0.2 K/CU MM
EOSINOPHILS RELATIVE PERCENT: 3.4 % (ref 0–3)
GFR SERPL CREATININE-BSD FRML MDRD: 17 ML/MIN/1.73M2
GLUCOSE BLD-MCNC: 128 MG/DL (ref 70–99)
GLUCOSE BLD-MCNC: 171 MG/DL (ref 70–99)
GLUCOSE BLD-MCNC: 176 MG/DL (ref 70–99)
GLUCOSE BLD-MCNC: 204 MG/DL (ref 70–99)
GLUCOSE BLD-MCNC: 205 MG/DL (ref 70–99)
GLUCOSE SERPL-MCNC: 139 MG/DL (ref 70–99)
HCT VFR BLD CALC: 23.7 % (ref 42–52)
HEMOGLOBIN: 7.6 GM/DL (ref 13.5–18)
IMMATURE NEUTROPHIL %: 0.9 % (ref 0–0.43)
LYMPHOCYTES ABSOLUTE: 1.4 K/CU MM
LYMPHOCYTES RELATIVE PERCENT: 21.8 % (ref 24–44)
Lab: NORMAL
MAGNESIUM: 2.8 MG/DL (ref 1.8–2.4)
MCH RBC QN AUTO: 35.2 PG (ref 27–31)
MCHC RBC AUTO-ENTMCNC: 32.1 % (ref 32–36)
MCV RBC AUTO: 109.7 FL (ref 78–100)
MONOCYTES ABSOLUTE: 0.7 K/CU MM
MONOCYTES RELATIVE PERCENT: 10.8 % (ref 0–4)
NUCLEATED RBC %: 0.8 %
PDW BLD-RTO: 15.9 % (ref 11.7–14.9)
PHOSPHORUS: 4.2 MG/DL (ref 2.5–4.9)
PLATELET # BLD: 223 K/CU MM (ref 140–440)
PMV BLD AUTO: 9.2 FL (ref 7.5–11.1)
POTASSIUM SERPL-SCNC: 4.2 MMOL/L (ref 3.5–5.1)
RBC # BLD: 2.16 M/CU MM (ref 4.6–6.2)
SEGMENTED NEUTROPHILS ABSOLUTE COUNT: 4 K/CU MM
SEGMENTED NEUTROPHILS RELATIVE PERCENT: 61.7 % (ref 36–66)
SODIUM BLD-SCNC: 135 MMOL/L (ref 135–145)
SPECIMEN: NORMAL
TOTAL IMMATURE NEUTOROPHIL: 0.06 K/CU MM
TOTAL NUCLEATED RBC: 0.1 K/CU MM
WBC # BLD: 6.5 K/CU MM (ref 4–10.5)

## 2023-04-18 PROCEDURE — 89220 SPUTUM SPECIMEN COLLECTION: CPT

## 2023-04-18 PROCEDURE — 6360000002 HC RX W HCPCS: Performed by: STUDENT IN AN ORGANIZED HEALTH CARE EDUCATION/TRAINING PROGRAM

## 2023-04-18 PROCEDURE — 6370000000 HC RX 637 (ALT 250 FOR IP): Performed by: STUDENT IN AN ORGANIZED HEALTH CARE EDUCATION/TRAINING PROGRAM

## 2023-04-18 PROCEDURE — 99232 SBSQ HOSP IP/OBS MODERATE 35: CPT | Performed by: INTERNAL MEDICINE

## 2023-04-18 PROCEDURE — 2700000000 HC OXYGEN THERAPY PER DAY

## 2023-04-18 PROCEDURE — 99213 OFFICE O/P EST LOW 20 MIN: CPT

## 2023-04-18 PROCEDURE — 83735 ASSAY OF MAGNESIUM: CPT

## 2023-04-18 PROCEDURE — 99232 SBSQ HOSP IP/OBS MODERATE 35: CPT | Performed by: NURSE PRACTITIONER

## 2023-04-18 PROCEDURE — 84100 ASSAY OF PHOSPHORUS: CPT

## 2023-04-18 PROCEDURE — 6370000000 HC RX 637 (ALT 250 FOR IP): Performed by: NURSE PRACTITIONER

## 2023-04-18 PROCEDURE — 6370000000 HC RX 637 (ALT 250 FOR IP): Performed by: INTERNAL MEDICINE

## 2023-04-18 PROCEDURE — 2580000003 HC RX 258: Performed by: NURSE PRACTITIONER

## 2023-04-18 PROCEDURE — 94761 N-INVAS EAR/PLS OXIMETRY MLT: CPT

## 2023-04-18 PROCEDURE — 85025 COMPLETE CBC W/AUTO DIFF WBC: CPT

## 2023-04-18 PROCEDURE — 80048 BASIC METABOLIC PNL TOTAL CA: CPT

## 2023-04-18 PROCEDURE — 94640 AIRWAY INHALATION TREATMENT: CPT

## 2023-04-18 PROCEDURE — 1200000000 HC SEMI PRIVATE

## 2023-04-18 PROCEDURE — 82962 GLUCOSE BLOOD TEST: CPT

## 2023-04-18 PROCEDURE — C9113 INJ PANTOPRAZOLE SODIUM, VIA: HCPCS | Performed by: STUDENT IN AN ORGANIZED HEALTH CARE EDUCATION/TRAINING PROGRAM

## 2023-04-18 RX ADMIN — SUCRALFATE 1 G: 1 TABLET ORAL at 21:02

## 2023-04-18 RX ADMIN — ATORVASTATIN CALCIUM 40 MG: 40 TABLET, FILM COATED ORAL at 21:01

## 2023-04-18 RX ADMIN — INSULIN LISPRO 4 UNITS: 100 INJECTION, SOLUTION INTRAVENOUS; SUBCUTANEOUS at 16:42

## 2023-04-18 RX ADMIN — SODIUM CHLORIDE, PRESERVATIVE FREE 10 ML: 5 INJECTION INTRAVENOUS at 08:58

## 2023-04-18 RX ADMIN — PANTOPRAZOLE SODIUM 40 MG: 40 INJECTION, POWDER, FOR SOLUTION INTRAVENOUS at 21:03

## 2023-04-18 RX ADMIN — PANTOPRAZOLE SODIUM 40 MG: 40 INJECTION, POWDER, FOR SOLUTION INTRAVENOUS at 08:57

## 2023-04-18 RX ADMIN — ASPIRIN 81 MG 81 MG: 81 TABLET ORAL at 08:57

## 2023-04-18 RX ADMIN — ACETYLCYSTEINE 600 MG: 200 SOLUTION ORAL; RESPIRATORY (INHALATION) at 19:26

## 2023-04-18 RX ADMIN — TRAZODONE HYDROCHLORIDE 100 MG: 50 TABLET ORAL at 21:01

## 2023-04-18 RX ADMIN — DOCUSATE SODIUM LIQUID 100 MG: 100 LIQUID ORAL at 08:57

## 2023-04-18 RX ADMIN — AMIODARONE HYDROCHLORIDE 200 MG: 200 TABLET ORAL at 08:57

## 2023-04-18 RX ADMIN — INSULIN LISPRO 4 UNITS: 100 INJECTION, SOLUTION INTRAVENOUS; SUBCUTANEOUS at 08:58

## 2023-04-18 RX ADMIN — IPRATROPIUM BROMIDE AND ALBUTEROL SULFATE 3 ML: 2.5; .5 SOLUTION RESPIRATORY (INHALATION) at 19:25

## 2023-04-18 RX ADMIN — MIDODRINE HYDROCHLORIDE 10 MG: 5 TABLET ORAL at 12:37

## 2023-04-18 RX ADMIN — ACETYLCYSTEINE 600 MG: 200 SOLUTION ORAL; RESPIRATORY (INHALATION) at 07:25

## 2023-04-18 RX ADMIN — METOPROLOL TARTRATE 25 MG: 25 TABLET, FILM COATED ORAL at 21:02

## 2023-04-18 RX ADMIN — MIDODRINE HYDROCHLORIDE 10 MG: 5 TABLET ORAL at 08:57

## 2023-04-18 RX ADMIN — IPRATROPIUM BROMIDE AND ALBUTEROL SULFATE 3 ML: 2.5; .5 SOLUTION RESPIRATORY (INHALATION) at 16:11

## 2023-04-18 RX ADMIN — LEVOTHYROXINE SODIUM 100 MCG: 0.1 TABLET ORAL at 05:56

## 2023-04-18 RX ADMIN — IPRATROPIUM BROMIDE AND ALBUTEROL SULFATE 3 ML: 2.5; .5 SOLUTION RESPIRATORY (INHALATION) at 07:25

## 2023-04-18 RX ADMIN — QUETIAPINE FUMARATE 50 MG: 25 TABLET ORAL at 21:01

## 2023-04-18 RX ADMIN — IPRATROPIUM BROMIDE AND ALBUTEROL SULFATE 3 ML: 2.5; .5 SOLUTION RESPIRATORY (INHALATION) at 11:53

## 2023-04-18 RX ADMIN — MIDODRINE HYDROCHLORIDE 10 MG: 5 TABLET ORAL at 16:42

## 2023-04-18 RX ADMIN — INSULIN GLARGINE 12 UNITS: 100 INJECTION, SOLUTION SUBCUTANEOUS at 21:20

## 2023-04-18 RX ADMIN — SUCRALFATE 1 G: 1 TABLET ORAL at 08:57

## 2023-04-18 ASSESSMENT — PAIN SCALES - GENERAL
PAINLEVEL_OUTOF10: 0
PAINLEVEL_OUTOF10: 0

## 2023-04-19 LAB
ANION GAP SERPL CALCULATED.3IONS-SCNC: 11 MMOL/L (ref 4–16)
BASOPHILS ABSOLUTE: 0.1 K/CU MM
BASOPHILS RELATIVE PERCENT: 1.3 % (ref 0–1)
BUN SERPL-MCNC: 84 MG/DL (ref 6–23)
CALCIUM SERPL-MCNC: 8.6 MG/DL (ref 8.3–10.6)
CHLORIDE BLD-SCNC: 96 MMOL/L (ref 99–110)
CO2: 25 MMOL/L (ref 21–32)
CREAT SERPL-MCNC: 4.8 MG/DL (ref 0.9–1.3)
DIFFERENTIAL TYPE: ABNORMAL
EOSINOPHILS ABSOLUTE: 0.4 K/CU MM
EOSINOPHILS RELATIVE PERCENT: 5.7 % (ref 0–3)
GFR SERPL CREATININE-BSD FRML MDRD: 12 ML/MIN/1.73M2
GLUCOSE BLD-MCNC: 141 MG/DL (ref 70–99)
GLUCOSE BLD-MCNC: 163 MG/DL (ref 70–99)
GLUCOSE BLD-MCNC: 189 MG/DL (ref 70–99)
GLUCOSE BLD-MCNC: 195 MG/DL (ref 70–99)
GLUCOSE BLD-MCNC: 207 MG/DL (ref 70–99)
GLUCOSE BLD-MCNC: 218 MG/DL (ref 70–99)
GLUCOSE SERPL-MCNC: 147 MG/DL (ref 70–99)
HCT VFR BLD CALC: 22.2 % (ref 42–52)
HEMOGLOBIN: 7.2 GM/DL (ref 13.5–18)
IMMATURE NEUTROPHIL %: 1.7 % (ref 0–0.43)
LYMPHOCYTES ABSOLUTE: 1.3 K/CU MM
LYMPHOCYTES RELATIVE PERCENT: 19.4 % (ref 24–44)
MAGNESIUM: 3.1 MG/DL (ref 1.8–2.4)
MCH RBC QN AUTO: 35.6 PG (ref 27–31)
MCHC RBC AUTO-ENTMCNC: 32.4 % (ref 32–36)
MCV RBC AUTO: 109.9 FL (ref 78–100)
MONOCYTES ABSOLUTE: 0.8 K/CU MM
MONOCYTES RELATIVE PERCENT: 12 % (ref 0–4)
NUCLEATED RBC %: 0.6 %
PDW BLD-RTO: 15.9 % (ref 11.7–14.9)
PHOSPHORUS: 3.4 MG/DL (ref 2.5–4.9)
PLATELET # BLD: 257 K/CU MM (ref 140–440)
PMV BLD AUTO: 9.3 FL (ref 7.5–11.1)
POTASSIUM SERPL-SCNC: 3.9 MMOL/L (ref 3.5–5.1)
RBC # BLD: 2.02 M/CU MM (ref 4.6–6.2)
SEGMENTED NEUTROPHILS ABSOLUTE COUNT: 4.1 K/CU MM
SEGMENTED NEUTROPHILS RELATIVE PERCENT: 59.9 % (ref 36–66)
SODIUM BLD-SCNC: 132 MMOL/L (ref 135–145)
TOTAL IMMATURE NEUTOROPHIL: 0.12 K/CU MM
TOTAL NUCLEATED RBC: 0 K/CU MM
WBC # BLD: 6.9 K/CU MM (ref 4–10.5)

## 2023-04-19 PROCEDURE — 6370000000 HC RX 637 (ALT 250 FOR IP): Performed by: STUDENT IN AN ORGANIZED HEALTH CARE EDUCATION/TRAINING PROGRAM

## 2023-04-19 PROCEDURE — 6370000000 HC RX 637 (ALT 250 FOR IP): Performed by: INTERNAL MEDICINE

## 2023-04-19 PROCEDURE — 6370000000 HC RX 637 (ALT 250 FOR IP): Performed by: NURSE PRACTITIONER

## 2023-04-19 PROCEDURE — 94761 N-INVAS EAR/PLS OXIMETRY MLT: CPT

## 2023-04-19 PROCEDURE — 97163 PT EVAL HIGH COMPLEX 45 MIN: CPT

## 2023-04-19 PROCEDURE — 85025 COMPLETE CBC W/AUTO DIFF WBC: CPT

## 2023-04-19 PROCEDURE — C9113 INJ PANTOPRAZOLE SODIUM, VIA: HCPCS | Performed by: STUDENT IN AN ORGANIZED HEALTH CARE EDUCATION/TRAINING PROGRAM

## 2023-04-19 PROCEDURE — 2700000000 HC OXYGEN THERAPY PER DAY

## 2023-04-19 PROCEDURE — 80048 BASIC METABOLIC PNL TOTAL CA: CPT

## 2023-04-19 PROCEDURE — 97530 THERAPEUTIC ACTIVITIES: CPT

## 2023-04-19 PROCEDURE — 97167 OT EVAL HIGH COMPLEX 60 MIN: CPT

## 2023-04-19 PROCEDURE — 99231 SBSQ HOSP IP/OBS SF/LOW 25: CPT | Performed by: NURSE PRACTITIONER

## 2023-04-19 PROCEDURE — 97112 NEUROMUSCULAR REEDUCATION: CPT

## 2023-04-19 PROCEDURE — 6360000002 HC RX W HCPCS: Performed by: STUDENT IN AN ORGANIZED HEALTH CARE EDUCATION/TRAINING PROGRAM

## 2023-04-19 PROCEDURE — 84100 ASSAY OF PHOSPHORUS: CPT

## 2023-04-19 PROCEDURE — 83735 ASSAY OF MAGNESIUM: CPT

## 2023-04-19 PROCEDURE — 82962 GLUCOSE BLOOD TEST: CPT

## 2023-04-19 PROCEDURE — 94640 AIRWAY INHALATION TREATMENT: CPT

## 2023-04-19 PROCEDURE — 2580000003 HC RX 258: Performed by: NURSE PRACTITIONER

## 2023-04-19 PROCEDURE — 1200000000 HC SEMI PRIVATE

## 2023-04-19 PROCEDURE — 90935 HEMODIALYSIS ONE EVALUATION: CPT

## 2023-04-19 PROCEDURE — 89220 SPUTUM SPECIMEN COLLECTION: CPT

## 2023-04-19 PROCEDURE — 6360000002 HC RX W HCPCS: Performed by: INTERNAL MEDICINE

## 2023-04-19 RX ORDER — MIDODRINE HYDROCHLORIDE 10 MG/1
10 TABLET ORAL
Qty: 90 TABLET | Refills: 3
Start: 2023-04-19

## 2023-04-19 RX ADMIN — IPRATROPIUM BROMIDE AND ALBUTEROL SULFATE 3 ML: 2.5; .5 SOLUTION RESPIRATORY (INHALATION) at 16:04

## 2023-04-19 RX ADMIN — INSULIN GLARGINE 12 UNITS: 100 INJECTION, SOLUTION SUBCUTANEOUS at 20:34

## 2023-04-19 RX ADMIN — SUCRALFATE 1 G: 1 TABLET ORAL at 20:35

## 2023-04-19 RX ADMIN — IPRATROPIUM BROMIDE AND ALBUTEROL SULFATE 3 ML: 2.5; .5 SOLUTION RESPIRATORY (INHALATION) at 08:24

## 2023-04-19 RX ADMIN — EPOETIN ALFA-EPBX 2000 UNITS: 2000 INJECTION, SOLUTION INTRAVENOUS; SUBCUTANEOUS at 11:52

## 2023-04-19 RX ADMIN — AMIODARONE HYDROCHLORIDE 200 MG: 200 TABLET ORAL at 08:38

## 2023-04-19 RX ADMIN — ASPIRIN 81 MG 81 MG: 81 TABLET ORAL at 08:38

## 2023-04-19 RX ADMIN — Medication 6 MG: at 20:35

## 2023-04-19 RX ADMIN — PANTOPRAZOLE SODIUM 40 MG: 40 INJECTION, POWDER, FOR SOLUTION INTRAVENOUS at 08:38

## 2023-04-19 RX ADMIN — IPRATROPIUM BROMIDE AND ALBUTEROL SULFATE 3 ML: 2.5; .5 SOLUTION RESPIRATORY (INHALATION) at 12:04

## 2023-04-19 RX ADMIN — SUCRALFATE 1 G: 1 TABLET ORAL at 08:38

## 2023-04-19 RX ADMIN — MIDODRINE HYDROCHLORIDE 10 MG: 5 TABLET ORAL at 08:38

## 2023-04-19 RX ADMIN — ACETYLCYSTEINE 600 MG: 200 SOLUTION ORAL; RESPIRATORY (INHALATION) at 22:29

## 2023-04-19 RX ADMIN — QUETIAPINE FUMARATE 50 MG: 25 TABLET ORAL at 20:35

## 2023-04-19 RX ADMIN — SODIUM CHLORIDE, PRESERVATIVE FREE 10 ML: 5 INJECTION INTRAVENOUS at 20:34

## 2023-04-19 RX ADMIN — DOCUSATE SODIUM LIQUID 100 MG: 100 LIQUID ORAL at 08:38

## 2023-04-19 RX ADMIN — MIDODRINE HYDROCHLORIDE 10 MG: 5 TABLET ORAL at 18:24

## 2023-04-19 RX ADMIN — LEVOTHYROXINE SODIUM 100 MCG: 0.1 TABLET ORAL at 08:38

## 2023-04-19 RX ADMIN — ACETYLCYSTEINE 600 MG: 200 SOLUTION ORAL; RESPIRATORY (INHALATION) at 08:23

## 2023-04-19 RX ADMIN — PANTOPRAZOLE SODIUM 40 MG: 40 INJECTION, POWDER, FOR SOLUTION INTRAVENOUS at 20:35

## 2023-04-19 RX ADMIN — SODIUM CHLORIDE, PRESERVATIVE FREE 10 ML: 5 INJECTION INTRAVENOUS at 08:36

## 2023-04-19 RX ADMIN — IPRATROPIUM BROMIDE AND ALBUTEROL SULFATE 3 ML: 2.5; .5 SOLUTION RESPIRATORY (INHALATION) at 22:29

## 2023-04-19 RX ADMIN — TRAZODONE HYDROCHLORIDE 100 MG: 50 TABLET ORAL at 20:34

## 2023-04-19 RX ADMIN — METOPROLOL TARTRATE 25 MG: 25 TABLET, FILM COATED ORAL at 20:35

## 2023-04-19 RX ADMIN — EPOETIN ALFA-EPBX 3000 UNITS: 3000 INJECTION, SOLUTION INTRAVENOUS; SUBCUTANEOUS at 11:52

## 2023-04-19 RX ADMIN — MIDODRINE HYDROCHLORIDE 10 MG: 5 TABLET ORAL at 13:37

## 2023-04-19 RX ADMIN — ATORVASTATIN CALCIUM 40 MG: 40 TABLET, FILM COATED ORAL at 20:35

## 2023-04-19 RX ADMIN — INSULIN LISPRO 4 UNITS: 100 INJECTION, SOLUTION INTRAVENOUS; SUBCUTANEOUS at 04:30

## 2023-04-20 LAB
ANION GAP SERPL CALCULATED.3IONS-SCNC: 11 MMOL/L (ref 4–16)
BASOPHILS ABSOLUTE: 0.1 K/CU MM
BASOPHILS RELATIVE PERCENT: 1.1 % (ref 0–1)
BUN SERPL-MCNC: 47 MG/DL (ref 6–23)
CALCIUM SERPL-MCNC: 8.9 MG/DL (ref 8.3–10.6)
CHLORIDE BLD-SCNC: 95 MMOL/L (ref 99–110)
CO2: 28 MMOL/L (ref 21–32)
CREAT SERPL-MCNC: 3.3 MG/DL (ref 0.9–1.3)
DIFFERENTIAL TYPE: ABNORMAL
EOSINOPHILS ABSOLUTE: 0.4 K/CU MM
EOSINOPHILS RELATIVE PERCENT: 6.4 % (ref 0–3)
GFR SERPL CREATININE-BSD FRML MDRD: 19 ML/MIN/1.73M2
GLUCOSE BLD-MCNC: 155 MG/DL (ref 70–99)
GLUCOSE BLD-MCNC: 207 MG/DL (ref 70–99)
GLUCOSE BLD-MCNC: 208 MG/DL (ref 70–99)
GLUCOSE BLD-MCNC: 223 MG/DL (ref 70–99)
GLUCOSE BLD-MCNC: 233 MG/DL (ref 70–99)
GLUCOSE BLD-MCNC: 246 MG/DL (ref 70–99)
GLUCOSE SERPL-MCNC: 153 MG/DL (ref 70–99)
HCT VFR BLD CALC: 23 % (ref 42–52)
HEMOGLOBIN: 7.6 GM/DL (ref 13.5–18)
IMMATURE NEUTROPHIL %: 2 % (ref 0–0.43)
LYMPHOCYTES ABSOLUTE: 1.4 K/CU MM
LYMPHOCYTES RELATIVE PERCENT: 21.8 % (ref 24–44)
MAGNESIUM: 2.6 MG/DL (ref 1.8–2.4)
MCH RBC QN AUTO: 35.7 PG (ref 27–31)
MCHC RBC AUTO-ENTMCNC: 33 % (ref 32–36)
MCV RBC AUTO: 108 FL (ref 78–100)
MONOCYTES ABSOLUTE: 1 K/CU MM
MONOCYTES RELATIVE PERCENT: 15.6 % (ref 0–4)
NUCLEATED RBC %: 1.1 %
PDW BLD-RTO: 16.4 % (ref 11.7–14.9)
PHOSPHORUS: 2.1 MG/DL (ref 2.5–4.9)
PLATELET # BLD: 251 K/CU MM (ref 140–440)
PMV BLD AUTO: 9.1 FL (ref 7.5–11.1)
POTASSIUM SERPL-SCNC: 3.5 MMOL/L (ref 3.5–5.1)
RBC # BLD: 2.13 M/CU MM (ref 4.6–6.2)
SEGMENTED NEUTROPHILS ABSOLUTE COUNT: 3.5 K/CU MM
SEGMENTED NEUTROPHILS RELATIVE PERCENT: 53.1 % (ref 36–66)
SODIUM BLD-SCNC: 134 MMOL/L (ref 135–145)
TOTAL IMMATURE NEUTOROPHIL: 0.13 K/CU MM
TOTAL NUCLEATED RBC: 0.1 K/CU MM
WBC # BLD: 6.6 K/CU MM (ref 4–10.5)

## 2023-04-20 PROCEDURE — 2500000003 HC RX 250 WO HCPCS: Performed by: INTERNAL MEDICINE

## 2023-04-20 PROCEDURE — 6370000000 HC RX 637 (ALT 250 FOR IP): Performed by: INTERNAL MEDICINE

## 2023-04-20 PROCEDURE — 6360000002 HC RX W HCPCS: Performed by: STUDENT IN AN ORGANIZED HEALTH CARE EDUCATION/TRAINING PROGRAM

## 2023-04-20 PROCEDURE — 2580000003 HC RX 258

## 2023-04-20 PROCEDURE — C9113 INJ PANTOPRAZOLE SODIUM, VIA: HCPCS | Performed by: STUDENT IN AN ORGANIZED HEALTH CARE EDUCATION/TRAINING PROGRAM

## 2023-04-20 PROCEDURE — 6370000000 HC RX 637 (ALT 250 FOR IP): Performed by: STUDENT IN AN ORGANIZED HEALTH CARE EDUCATION/TRAINING PROGRAM

## 2023-04-20 PROCEDURE — 6370000000 HC RX 637 (ALT 250 FOR IP): Performed by: NURSE PRACTITIONER

## 2023-04-20 PROCEDURE — 82962 GLUCOSE BLOOD TEST: CPT

## 2023-04-20 PROCEDURE — 1200000000 HC SEMI PRIVATE

## 2023-04-20 PROCEDURE — A4216 STERILE WATER/SALINE, 10 ML: HCPCS

## 2023-04-20 PROCEDURE — 2580000003 HC RX 258: Performed by: INTERNAL MEDICINE

## 2023-04-20 PROCEDURE — 80048 BASIC METABOLIC PNL TOTAL CA: CPT

## 2023-04-20 PROCEDURE — 2700000000 HC OXYGEN THERAPY PER DAY

## 2023-04-20 PROCEDURE — 94640 AIRWAY INHALATION TREATMENT: CPT

## 2023-04-20 PROCEDURE — 85025 COMPLETE CBC W/AUTO DIFF WBC: CPT

## 2023-04-20 PROCEDURE — 94761 N-INVAS EAR/PLS OXIMETRY MLT: CPT

## 2023-04-20 PROCEDURE — 89220 SPUTUM SPECIMEN COLLECTION: CPT

## 2023-04-20 PROCEDURE — 83735 ASSAY OF MAGNESIUM: CPT

## 2023-04-20 PROCEDURE — 2580000003 HC RX 258: Performed by: NURSE PRACTITIONER

## 2023-04-20 PROCEDURE — 84100 ASSAY OF PHOSPHORUS: CPT

## 2023-04-20 RX ORDER — LANSOPRAZOLE 30 MG/1
30 TABLET, ORALLY DISINTEGRATING, DELAYED RELEASE ORAL 2 TIMES DAILY
Status: DISCONTINUED | OUTPATIENT
Start: 2023-04-20 | End: 2023-04-28 | Stop reason: HOSPADM

## 2023-04-20 RX ORDER — SODIUM CHLORIDE 9 MG/ML
INJECTION INTRAVENOUS
Status: COMPLETED
Start: 2023-04-20 | End: 2023-04-20

## 2023-04-20 RX ADMIN — INSULIN LISPRO 4 UNITS: 100 INJECTION, SOLUTION INTRAVENOUS; SUBCUTANEOUS at 08:43

## 2023-04-20 RX ADMIN — SODIUM CHLORIDE, PRESERVATIVE FREE 10 ML: 5 INJECTION INTRAVENOUS at 20:27

## 2023-04-20 RX ADMIN — INSULIN LISPRO 4 UNITS: 100 INJECTION, SOLUTION INTRAVENOUS; SUBCUTANEOUS at 12:33

## 2023-04-20 RX ADMIN — INSULIN GLARGINE 12 UNITS: 100 INJECTION, SOLUTION SUBCUTANEOUS at 20:25

## 2023-04-20 RX ADMIN — PANTOPRAZOLE SODIUM 40 MG: 40 INJECTION, POWDER, FOR SOLUTION INTRAVENOUS at 08:21

## 2023-04-20 RX ADMIN — QUETIAPINE FUMARATE 50 MG: 25 TABLET ORAL at 20:25

## 2023-04-20 RX ADMIN — ATORVASTATIN CALCIUM 40 MG: 40 TABLET, FILM COATED ORAL at 20:25

## 2023-04-20 RX ADMIN — LANSOPRAZOLE 30 MG: 30 TABLET, ORALLY DISINTEGRATING, DELAYED RELEASE ORAL at 20:25

## 2023-04-20 RX ADMIN — MIDODRINE HYDROCHLORIDE 10 MG: 5 TABLET ORAL at 12:25

## 2023-04-20 RX ADMIN — SODIUM PHOSPHATE, MONOBASIC, MONOHYDRATE AND SODIUM PHOSPHATE, DIBASIC, ANHYDROUS 10 MMOL: 142; 276 INJECTION, SOLUTION INTRAVENOUS at 10:25

## 2023-04-20 RX ADMIN — IPRATROPIUM BROMIDE AND ALBUTEROL SULFATE 3 ML: 2.5; .5 SOLUTION RESPIRATORY (INHALATION) at 07:48

## 2023-04-20 RX ADMIN — IPRATROPIUM BROMIDE AND ALBUTEROL SULFATE 3 ML: 2.5; .5 SOLUTION RESPIRATORY (INHALATION) at 12:48

## 2023-04-20 RX ADMIN — SUCRALFATE 1 G: 1 TABLET ORAL at 08:20

## 2023-04-20 RX ADMIN — INSULIN LISPRO 4 UNITS: 100 INJECTION, SOLUTION INTRAVENOUS; SUBCUTANEOUS at 00:17

## 2023-04-20 RX ADMIN — SODIUM CHLORIDE, PRESERVATIVE FREE 10 ML: 5 INJECTION INTRAVENOUS at 08:21

## 2023-04-20 RX ADMIN — ASPIRIN 81 MG 81 MG: 81 TABLET ORAL at 08:20

## 2023-04-20 RX ADMIN — AMIODARONE HYDROCHLORIDE 200 MG: 200 TABLET ORAL at 08:20

## 2023-04-20 RX ADMIN — INSULIN LISPRO 4 UNITS: 100 INJECTION, SOLUTION INTRAVENOUS; SUBCUTANEOUS at 16:16

## 2023-04-20 RX ADMIN — ACETAMINOPHEN 650 MG: 325 TABLET ORAL at 20:25

## 2023-04-20 RX ADMIN — LEVOTHYROXINE SODIUM 100 MCG: 0.1 TABLET ORAL at 05:27

## 2023-04-20 RX ADMIN — ACETYLCYSTEINE 600 MG: 200 SOLUTION ORAL; RESPIRATORY (INHALATION) at 07:49

## 2023-04-20 RX ADMIN — IPRATROPIUM BROMIDE AND ALBUTEROL SULFATE 3 ML: 2.5; .5 SOLUTION RESPIRATORY (INHALATION) at 21:07

## 2023-04-20 RX ADMIN — MIDODRINE HYDROCHLORIDE 10 MG: 5 TABLET ORAL at 16:15

## 2023-04-20 RX ADMIN — SODIUM CHLORIDE, PRESERVATIVE FREE 10 ML: 5 INJECTION INTRAVENOUS at 21:44

## 2023-04-20 RX ADMIN — SUCRALFATE 1 G: 1 TABLET ORAL at 20:25

## 2023-04-20 RX ADMIN — TRAZODONE HYDROCHLORIDE 100 MG: 50 TABLET ORAL at 20:25

## 2023-04-20 RX ADMIN — SODIUM CHLORIDE 10 ML: 9 INJECTION INTRAMUSCULAR; INTRAVENOUS; SUBCUTANEOUS at 08:21

## 2023-04-20 RX ADMIN — METOPROLOL TARTRATE 25 MG: 25 TABLET, FILM COATED ORAL at 08:20

## 2023-04-20 RX ADMIN — DOCUSATE SODIUM LIQUID 100 MG: 100 LIQUID ORAL at 08:20

## 2023-04-20 RX ADMIN — METOPROLOL TARTRATE 25 MG: 25 TABLET, FILM COATED ORAL at 20:25

## 2023-04-20 RX ADMIN — IPRATROPIUM BROMIDE AND ALBUTEROL SULFATE 3 ML: 2.5; .5 SOLUTION RESPIRATORY (INHALATION) at 15:30

## 2023-04-20 RX ADMIN — MIDODRINE HYDROCHLORIDE 10 MG: 5 TABLET ORAL at 08:20

## 2023-04-21 ENCOUNTER — APPOINTMENT (OUTPATIENT)
Dept: GENERAL RADIOLOGY | Age: 73
End: 2023-04-21
Attending: STUDENT IN AN ORGANIZED HEALTH CARE EDUCATION/TRAINING PROGRAM
Payer: COMMERCIAL

## 2023-04-21 LAB
GLUCOSE BLD-MCNC: 159 MG/DL (ref 70–99)
GLUCOSE BLD-MCNC: 200 MG/DL (ref 70–99)
GLUCOSE BLD-MCNC: 207 MG/DL (ref 70–99)
GLUCOSE BLD-MCNC: 208 MG/DL (ref 70–99)
GLUCOSE BLD-MCNC: 234 MG/DL (ref 70–99)
GLUCOSE BLD-MCNC: 273 MG/DL (ref 70–99)

## 2023-04-21 PROCEDURE — 94640 AIRWAY INHALATION TREATMENT: CPT

## 2023-04-21 PROCEDURE — 6370000000 HC RX 637 (ALT 250 FOR IP): Performed by: STUDENT IN AN ORGANIZED HEALTH CARE EDUCATION/TRAINING PROGRAM

## 2023-04-21 PROCEDURE — 6370000000 HC RX 637 (ALT 250 FOR IP): Performed by: INTERNAL MEDICINE

## 2023-04-21 PROCEDURE — 1200000000 HC SEMI PRIVATE

## 2023-04-21 PROCEDURE — 90935 HEMODIALYSIS ONE EVALUATION: CPT

## 2023-04-21 PROCEDURE — 94761 N-INVAS EAR/PLS OXIMETRY MLT: CPT

## 2023-04-21 PROCEDURE — P9047 ALBUMIN (HUMAN), 25%, 50ML: HCPCS | Performed by: INTERNAL MEDICINE

## 2023-04-21 PROCEDURE — 89220 SPUTUM SPECIMEN COLLECTION: CPT

## 2023-04-21 PROCEDURE — 6370000000 HC RX 637 (ALT 250 FOR IP): Performed by: NURSE PRACTITIONER

## 2023-04-21 PROCEDURE — 6360000002 HC RX W HCPCS: Performed by: INTERNAL MEDICINE

## 2023-04-21 PROCEDURE — 82962 GLUCOSE BLOOD TEST: CPT

## 2023-04-21 PROCEDURE — 2700000000 HC OXYGEN THERAPY PER DAY

## 2023-04-21 PROCEDURE — 2580000003 HC RX 258: Performed by: NURSE PRACTITIONER

## 2023-04-21 PROCEDURE — 71045 X-RAY EXAM CHEST 1 VIEW: CPT

## 2023-04-21 RX ORDER — ALBUMIN (HUMAN) 12.5 G/50ML
12.5 SOLUTION INTRAVENOUS
Status: COMPLETED | OUTPATIENT
Start: 2023-04-21 | End: 2023-04-21

## 2023-04-21 RX ORDER — ALBUMIN (HUMAN) 12.5 G/50ML
SOLUTION INTRAVENOUS
Status: DISPENSED
Start: 2023-04-21 | End: 2023-04-21

## 2023-04-21 RX ADMIN — SUCRALFATE 1 G: 1 TABLET ORAL at 21:11

## 2023-04-21 RX ADMIN — LANSOPRAZOLE 30 MG: 30 TABLET, ORALLY DISINTEGRATING, DELAYED RELEASE ORAL at 08:31

## 2023-04-21 RX ADMIN — INSULIN LISPRO 4 UNITS: 100 INJECTION, SOLUTION INTRAVENOUS; SUBCUTANEOUS at 21:17

## 2023-04-21 RX ADMIN — IPRATROPIUM BROMIDE AND ALBUTEROL SULFATE 3 ML: 2.5; .5 SOLUTION RESPIRATORY (INHALATION) at 15:54

## 2023-04-21 RX ADMIN — LEVOTHYROXINE SODIUM 100 MCG: 0.1 TABLET ORAL at 05:27

## 2023-04-21 RX ADMIN — MIDODRINE HYDROCHLORIDE 10 MG: 5 TABLET ORAL at 08:26

## 2023-04-21 RX ADMIN — ATORVASTATIN CALCIUM 40 MG: 40 TABLET, FILM COATED ORAL at 21:11

## 2023-04-21 RX ADMIN — METOPROLOL TARTRATE 25 MG: 25 TABLET, FILM COATED ORAL at 08:25

## 2023-04-21 RX ADMIN — QUETIAPINE FUMARATE 50 MG: 25 TABLET ORAL at 21:11

## 2023-04-21 RX ADMIN — MIDODRINE HYDROCHLORIDE 10 MG: 5 TABLET ORAL at 12:48

## 2023-04-21 RX ADMIN — DOCUSATE SODIUM LIQUID 100 MG: 100 LIQUID ORAL at 08:25

## 2023-04-21 RX ADMIN — AMIODARONE HYDROCHLORIDE 200 MG: 200 TABLET ORAL at 08:26

## 2023-04-21 RX ADMIN — LANSOPRAZOLE 30 MG: 30 TABLET, ORALLY DISINTEGRATING, DELAYED RELEASE ORAL at 23:07

## 2023-04-21 RX ADMIN — INSULIN LISPRO 4 UNITS: 100 INJECTION, SOLUTION INTRAVENOUS; SUBCUTANEOUS at 01:19

## 2023-04-21 RX ADMIN — INSULIN GLARGINE 12 UNITS: 100 INJECTION, SOLUTION SUBCUTANEOUS at 21:17

## 2023-04-21 RX ADMIN — SODIUM CHLORIDE, PRESERVATIVE FREE 10 ML: 5 INJECTION INTRAVENOUS at 21:12

## 2023-04-21 RX ADMIN — MIDODRINE HYDROCHLORIDE 10 MG: 5 TABLET ORAL at 17:03

## 2023-04-21 RX ADMIN — METOPROLOL TARTRATE 25 MG: 25 TABLET, FILM COATED ORAL at 21:11

## 2023-04-21 RX ADMIN — SUCRALFATE 1 G: 1 TABLET ORAL at 08:26

## 2023-04-21 RX ADMIN — EPOETIN ALFA-EPBX 8000 UNITS: 4000 INJECTION, SOLUTION INTRAVENOUS; SUBCUTANEOUS at 10:08

## 2023-04-21 RX ADMIN — IPRATROPIUM BROMIDE AND ALBUTEROL SULFATE 3 ML: 2.5; .5 SOLUTION RESPIRATORY (INHALATION) at 08:30

## 2023-04-21 RX ADMIN — ALBUMIN (HUMAN) 12.5 G: 0.25 INJECTION, SOLUTION INTRAVENOUS at 10:50

## 2023-04-21 RX ADMIN — SODIUM CHLORIDE, PRESERVATIVE FREE 10 ML: 5 INJECTION INTRAVENOUS at 08:31

## 2023-04-21 RX ADMIN — TRAZODONE HYDROCHLORIDE 100 MG: 50 TABLET ORAL at 21:11

## 2023-04-21 RX ADMIN — INSULIN LISPRO 8 UNITS: 100 INJECTION, SOLUTION INTRAVENOUS; SUBCUTANEOUS at 08:44

## 2023-04-21 RX ADMIN — ASPIRIN 81 MG 81 MG: 81 TABLET ORAL at 08:26

## 2023-04-21 RX ADMIN — IPRATROPIUM BROMIDE AND ALBUTEROL SULFATE 3 ML: 2.5; .5 SOLUTION RESPIRATORY (INHALATION) at 23:48

## 2023-04-21 ASSESSMENT — PAIN SCALES - GENERAL
PAINLEVEL_OUTOF10: 0
PAINLEVEL_OUTOF10: 0

## 2023-04-21 ASSESSMENT — PAIN SCALES - WONG BAKER
WONGBAKER_NUMERICALRESPONSE: 0
WONGBAKER_NUMERICALRESPONSE: 0

## 2023-04-22 LAB
GLUCOSE BLD-MCNC: 187 MG/DL (ref 70–99)
GLUCOSE BLD-MCNC: 201 MG/DL (ref 70–99)
GLUCOSE BLD-MCNC: 202 MG/DL (ref 70–99)
GLUCOSE BLD-MCNC: 211 MG/DL (ref 70–99)
GLUCOSE BLD-MCNC: 222 MG/DL (ref 70–99)

## 2023-04-22 PROCEDURE — 6370000000 HC RX 637 (ALT 250 FOR IP): Performed by: NURSE PRACTITIONER

## 2023-04-22 PROCEDURE — 94761 N-INVAS EAR/PLS OXIMETRY MLT: CPT

## 2023-04-22 PROCEDURE — 6370000000 HC RX 637 (ALT 250 FOR IP): Performed by: STUDENT IN AN ORGANIZED HEALTH CARE EDUCATION/TRAINING PROGRAM

## 2023-04-22 PROCEDURE — 1200000000 HC SEMI PRIVATE

## 2023-04-22 PROCEDURE — 2580000003 HC RX 258: Performed by: NURSE PRACTITIONER

## 2023-04-22 PROCEDURE — 89220 SPUTUM SPECIMEN COLLECTION: CPT

## 2023-04-22 PROCEDURE — 82962 GLUCOSE BLOOD TEST: CPT

## 2023-04-22 PROCEDURE — 94640 AIRWAY INHALATION TREATMENT: CPT

## 2023-04-22 PROCEDURE — 6370000000 HC RX 637 (ALT 250 FOR IP): Performed by: INTERNAL MEDICINE

## 2023-04-22 PROCEDURE — 2700000000 HC OXYGEN THERAPY PER DAY

## 2023-04-22 RX ADMIN — AMIODARONE HYDROCHLORIDE 200 MG: 200 TABLET ORAL at 08:30

## 2023-04-22 RX ADMIN — INSULIN LISPRO 4 UNITS: 100 INJECTION, SOLUTION INTRAVENOUS; SUBCUTANEOUS at 12:11

## 2023-04-22 RX ADMIN — INSULIN LISPRO 4 UNITS: 100 INJECTION, SOLUTION INTRAVENOUS; SUBCUTANEOUS at 08:11

## 2023-04-22 RX ADMIN — QUETIAPINE FUMARATE 50 MG: 25 TABLET ORAL at 20:37

## 2023-04-22 RX ADMIN — INSULIN LISPRO 4 UNITS: 100 INJECTION, SOLUTION INTRAVENOUS; SUBCUTANEOUS at 16:04

## 2023-04-22 RX ADMIN — IPRATROPIUM BROMIDE AND ALBUTEROL SULFATE 3 ML: 2.5; .5 SOLUTION RESPIRATORY (INHALATION) at 07:32

## 2023-04-22 RX ADMIN — METOPROLOL TARTRATE 25 MG: 25 TABLET, FILM COATED ORAL at 20:36

## 2023-04-22 RX ADMIN — LANSOPRAZOLE 30 MG: 30 TABLET, ORALLY DISINTEGRATING, DELAYED RELEASE ORAL at 11:26

## 2023-04-22 RX ADMIN — TRAZODONE HYDROCHLORIDE 100 MG: 50 TABLET ORAL at 20:37

## 2023-04-22 RX ADMIN — SUCRALFATE 1 G: 1 TABLET ORAL at 20:36

## 2023-04-22 RX ADMIN — SUCRALFATE 1 G: 1 TABLET ORAL at 08:31

## 2023-04-22 RX ADMIN — LANSOPRAZOLE 30 MG: 30 TABLET, ORALLY DISINTEGRATING, DELAYED RELEASE ORAL at 20:36

## 2023-04-22 RX ADMIN — METOPROLOL TARTRATE 25 MG: 25 TABLET, FILM COATED ORAL at 08:31

## 2023-04-22 RX ADMIN — MIDODRINE HYDROCHLORIDE 10 MG: 5 TABLET ORAL at 16:13

## 2023-04-22 RX ADMIN — IPRATROPIUM BROMIDE AND ALBUTEROL SULFATE 3 ML: 2.5; .5 SOLUTION RESPIRATORY (INHALATION) at 11:10

## 2023-04-22 RX ADMIN — ATORVASTATIN CALCIUM 40 MG: 40 TABLET, FILM COATED ORAL at 20:37

## 2023-04-22 RX ADMIN — MIDODRINE HYDROCHLORIDE 10 MG: 5 TABLET ORAL at 08:31

## 2023-04-22 RX ADMIN — LEVOTHYROXINE SODIUM 100 MCG: 0.1 TABLET ORAL at 06:00

## 2023-04-22 RX ADMIN — INSULIN LISPRO 4 UNITS: 100 INJECTION, SOLUTION INTRAVENOUS; SUBCUTANEOUS at 00:24

## 2023-04-22 RX ADMIN — DOCUSATE SODIUM LIQUID 100 MG: 100 LIQUID ORAL at 08:30

## 2023-04-22 RX ADMIN — MIDODRINE HYDROCHLORIDE 10 MG: 5 TABLET ORAL at 11:26

## 2023-04-22 RX ADMIN — SODIUM CHLORIDE, PRESERVATIVE FREE 5 ML: 5 INJECTION INTRAVENOUS at 10:33

## 2023-04-22 RX ADMIN — IPRATROPIUM BROMIDE AND ALBUTEROL SULFATE 3 ML: 2.5; .5 SOLUTION RESPIRATORY (INHALATION) at 15:15

## 2023-04-22 RX ADMIN — INSULIN GLARGINE 12 UNITS: 100 INJECTION, SOLUTION SUBCUTANEOUS at 20:37

## 2023-04-22 RX ADMIN — INSULIN LISPRO 4 UNITS: 100 INJECTION, SOLUTION INTRAVENOUS; SUBCUTANEOUS at 04:24

## 2023-04-22 RX ADMIN — IPRATROPIUM BROMIDE AND ALBUTEROL SULFATE 3 ML: 2.5; .5 SOLUTION RESPIRATORY (INHALATION) at 21:37

## 2023-04-22 RX ADMIN — ASPIRIN 81 MG 81 MG: 81 TABLET ORAL at 08:30

## 2023-04-23 ENCOUNTER — APPOINTMENT (OUTPATIENT)
Dept: GENERAL RADIOLOGY | Age: 73
End: 2023-04-23
Attending: STUDENT IN AN ORGANIZED HEALTH CARE EDUCATION/TRAINING PROGRAM
Payer: COMMERCIAL

## 2023-04-23 LAB
ANION GAP SERPL CALCULATED.3IONS-SCNC: 13 MMOL/L (ref 4–16)
BUN SERPL-MCNC: 67 MG/DL (ref 6–23)
CALCIUM SERPL-MCNC: 9.5 MG/DL (ref 8.3–10.6)
CHLORIDE BLD-SCNC: 94 MMOL/L (ref 99–110)
CO2: 25 MMOL/L (ref 21–32)
CREAT SERPL-MCNC: 4.3 MG/DL (ref 0.9–1.3)
GFR SERPL CREATININE-BSD FRML MDRD: 14 ML/MIN/1.73M2
GLUCOSE BLD-MCNC: 176 MG/DL (ref 70–99)
GLUCOSE BLD-MCNC: 176 MG/DL (ref 70–99)
GLUCOSE BLD-MCNC: 192 MG/DL (ref 70–99)
GLUCOSE BLD-MCNC: 210 MG/DL (ref 70–99)
GLUCOSE BLD-MCNC: 236 MG/DL (ref 70–99)
GLUCOSE SERPL-MCNC: 205 MG/DL (ref 70–99)
MAGNESIUM: 2.7 MG/DL (ref 1.8–2.4)
PHOSPHORUS: 3.1 MG/DL (ref 2.5–4.9)
POTASSIUM SERPL-SCNC: 3.5 MMOL/L (ref 3.5–5.1)
SODIUM BLD-SCNC: 132 MMOL/L (ref 135–145)

## 2023-04-23 PROCEDURE — 6370000000 HC RX 637 (ALT 250 FOR IP): Performed by: STUDENT IN AN ORGANIZED HEALTH CARE EDUCATION/TRAINING PROGRAM

## 2023-04-23 PROCEDURE — 82962 GLUCOSE BLOOD TEST: CPT

## 2023-04-23 PROCEDURE — 74018 RADEX ABDOMEN 1 VIEW: CPT

## 2023-04-23 PROCEDURE — 84100 ASSAY OF PHOSPHORUS: CPT

## 2023-04-23 PROCEDURE — 94640 AIRWAY INHALATION TREATMENT: CPT

## 2023-04-23 PROCEDURE — 80048 BASIC METABOLIC PNL TOTAL CA: CPT

## 2023-04-23 PROCEDURE — 1200000000 HC SEMI PRIVATE

## 2023-04-23 PROCEDURE — 89220 SPUTUM SPECIMEN COLLECTION: CPT

## 2023-04-23 PROCEDURE — 6370000000 HC RX 637 (ALT 250 FOR IP): Performed by: INTERNAL MEDICINE

## 2023-04-23 PROCEDURE — 6370000000 HC RX 637 (ALT 250 FOR IP): Performed by: NURSE PRACTITIONER

## 2023-04-23 PROCEDURE — 2700000000 HC OXYGEN THERAPY PER DAY

## 2023-04-23 PROCEDURE — 83735 ASSAY OF MAGNESIUM: CPT

## 2023-04-23 PROCEDURE — 2580000003 HC RX 258: Performed by: NURSE PRACTITIONER

## 2023-04-23 PROCEDURE — 94761 N-INVAS EAR/PLS OXIMETRY MLT: CPT

## 2023-04-23 RX ORDER — POLYETHYLENE GLYCOL 3350 17 G/17G
17 POWDER, FOR SOLUTION ORAL DAILY
Status: DISCONTINUED | OUTPATIENT
Start: 2023-04-23 | End: 2023-04-28 | Stop reason: HOSPADM

## 2023-04-23 RX ORDER — BISACODYL 10 MG
10 SUPPOSITORY, RECTAL RECTAL DAILY
Status: DISCONTINUED | OUTPATIENT
Start: 2023-04-23 | End: 2023-04-28 | Stop reason: HOSPADM

## 2023-04-23 RX ORDER — MINERAL OIL 100 G/100G
1 OIL RECTAL PRN
Status: ACTIVE | OUTPATIENT
Start: 2023-04-23 | End: 2023-04-24

## 2023-04-23 RX ADMIN — ASPIRIN 81 MG 81 MG: 81 TABLET ORAL at 09:02

## 2023-04-23 RX ADMIN — INSULIN LISPRO 4 UNITS: 100 INJECTION, SOLUTION INTRAVENOUS; SUBCUTANEOUS at 02:00

## 2023-04-23 RX ADMIN — QUETIAPINE FUMARATE 50 MG: 25 TABLET ORAL at 21:06

## 2023-04-23 RX ADMIN — INSULIN LISPRO 4 UNITS: 100 INJECTION, SOLUTION INTRAVENOUS; SUBCUTANEOUS at 09:02

## 2023-04-23 RX ADMIN — MIDODRINE HYDROCHLORIDE 10 MG: 5 TABLET ORAL at 17:07

## 2023-04-23 RX ADMIN — POLYETHYLENE GLYCOL 3350 17 G: 17 POWDER, FOR SOLUTION ORAL at 11:36

## 2023-04-23 RX ADMIN — TRAZODONE HYDROCHLORIDE 100 MG: 50 TABLET ORAL at 21:47

## 2023-04-23 RX ADMIN — DOCUSATE SODIUM LIQUID 100 MG: 100 LIQUID ORAL at 09:01

## 2023-04-23 RX ADMIN — SODIUM CHLORIDE, PRESERVATIVE FREE 10 ML: 5 INJECTION INTRAVENOUS at 09:06

## 2023-04-23 RX ADMIN — LEVOTHYROXINE SODIUM 100 MCG: 0.1 TABLET ORAL at 05:36

## 2023-04-23 RX ADMIN — MIDODRINE HYDROCHLORIDE 10 MG: 5 TABLET ORAL at 11:36

## 2023-04-23 RX ADMIN — IPRATROPIUM BROMIDE AND ALBUTEROL SULFATE 3 ML: 2.5; .5 SOLUTION RESPIRATORY (INHALATION) at 08:36

## 2023-04-23 RX ADMIN — BISACODYL 10 MG: 10 SUPPOSITORY RECTAL at 11:36

## 2023-04-23 RX ADMIN — METOPROLOL TARTRATE 25 MG: 25 TABLET, FILM COATED ORAL at 21:06

## 2023-04-23 RX ADMIN — LANSOPRAZOLE 30 MG: 30 TABLET, ORALLY DISINTEGRATING, DELAYED RELEASE ORAL at 21:47

## 2023-04-23 RX ADMIN — IPRATROPIUM BROMIDE AND ALBUTEROL SULFATE 3 ML: 2.5; .5 SOLUTION RESPIRATORY (INHALATION) at 15:48

## 2023-04-23 RX ADMIN — LANSOPRAZOLE 30 MG: 30 TABLET, ORALLY DISINTEGRATING, DELAYED RELEASE ORAL at 09:02

## 2023-04-23 RX ADMIN — INSULIN GLARGINE 12 UNITS: 100 INJECTION, SOLUTION SUBCUTANEOUS at 21:48

## 2023-04-23 RX ADMIN — MIDODRINE HYDROCHLORIDE 10 MG: 5 TABLET ORAL at 09:01

## 2023-04-23 RX ADMIN — ATORVASTATIN CALCIUM 40 MG: 40 TABLET, FILM COATED ORAL at 21:06

## 2023-04-23 RX ADMIN — SODIUM CHLORIDE, PRESERVATIVE FREE 10 ML: 5 INJECTION INTRAVENOUS at 21:48

## 2023-04-23 RX ADMIN — IPRATROPIUM BROMIDE AND ALBUTEROL SULFATE 3 ML: 2.5; .5 SOLUTION RESPIRATORY (INHALATION) at 21:36

## 2023-04-23 RX ADMIN — INSULIN LISPRO 4 UNITS: 100 INJECTION, SOLUTION INTRAVENOUS; SUBCUTANEOUS at 05:35

## 2023-04-23 RX ADMIN — METOPROLOL TARTRATE 25 MG: 25 TABLET, FILM COATED ORAL at 09:01

## 2023-04-23 RX ADMIN — IPRATROPIUM BROMIDE AND ALBUTEROL SULFATE 3 ML: 2.5; .5 SOLUTION RESPIRATORY (INHALATION) at 11:53

## 2023-04-23 RX ADMIN — SUCRALFATE 1 G: 1 TABLET ORAL at 09:01

## 2023-04-23 RX ADMIN — AMIODARONE HYDROCHLORIDE 200 MG: 200 TABLET ORAL at 09:02

## 2023-04-23 RX ADMIN — SUCRALFATE 1 G: 1 TABLET ORAL at 21:06

## 2023-04-24 ENCOUNTER — APPOINTMENT (OUTPATIENT)
Dept: INTERVENTIONAL RADIOLOGY/VASCULAR | Age: 73
End: 2023-04-24
Attending: STUDENT IN AN ORGANIZED HEALTH CARE EDUCATION/TRAINING PROGRAM
Payer: COMMERCIAL

## 2023-04-24 ENCOUNTER — APPOINTMENT (OUTPATIENT)
Dept: GENERAL RADIOLOGY | Age: 73
End: 2023-04-24
Attending: STUDENT IN AN ORGANIZED HEALTH CARE EDUCATION/TRAINING PROGRAM
Payer: COMMERCIAL

## 2023-04-24 LAB
FLUID TYPE: NORMAL INDEX
GLUCOSE BLD-MCNC: 110 MG/DL (ref 70–99)
GLUCOSE BLD-MCNC: 110 MG/DL (ref 70–99)
GLUCOSE BLD-MCNC: 111 MG/DL (ref 70–99)
GLUCOSE BLD-MCNC: 141 MG/DL (ref 70–99)
GLUCOSE BLD-MCNC: 145 MG/DL (ref 70–99)
GLUCOSE BLD-MCNC: 149 MG/DL (ref 70–99)
GLUCOSE, FLUID: 116 MG/DL
LACTATE DEHYDROGENASE, FLUID: 48 IU/L
LYMPHOCYTES, BODY FLUID: 60 %
MONOCYTE, FLUID: 6 %
NEUTROPHIL, FLUID: 34 %
PROTEIN FLUID: 3.4 G/DL
RBC FLUID: NORMAL /CU MM
WBC FLUID: 652 /CU MM

## 2023-04-24 PROCEDURE — 32555 ASPIRATE PLEURA W/ IMAGING: CPT

## 2023-04-24 PROCEDURE — 6370000000 HC RX 637 (ALT 250 FOR IP): Performed by: NURSE PRACTITIONER

## 2023-04-24 PROCEDURE — 89220 SPUTUM SPECIMEN COLLECTION: CPT

## 2023-04-24 PROCEDURE — 1200000000 HC SEMI PRIVATE

## 2023-04-24 PROCEDURE — P9047 ALBUMIN (HUMAN), 25%, 50ML: HCPCS

## 2023-04-24 PROCEDURE — 2700000000 HC OXYGEN THERAPY PER DAY

## 2023-04-24 PROCEDURE — 6370000000 HC RX 637 (ALT 250 FOR IP): Performed by: INTERNAL MEDICINE

## 2023-04-24 PROCEDURE — 84157 ASSAY OF PROTEIN OTHER: CPT

## 2023-04-24 PROCEDURE — 36592 COLLECT BLOOD FROM PICC: CPT

## 2023-04-24 PROCEDURE — 82962 GLUCOSE BLOOD TEST: CPT

## 2023-04-24 PROCEDURE — 94640 AIRWAY INHALATION TREATMENT: CPT

## 2023-04-24 PROCEDURE — 36556 INSERT NON-TUNNEL CV CATH: CPT

## 2023-04-24 PROCEDURE — 94761 N-INVAS EAR/PLS OXIMETRY MLT: CPT

## 2023-04-24 PROCEDURE — 88108 CYTOPATH CONCENTRATE TECH: CPT

## 2023-04-24 PROCEDURE — 6360000002 HC RX W HCPCS

## 2023-04-24 PROCEDURE — 43762 RPLC GTUBE NO REVJ TRC: CPT

## 2023-04-24 PROCEDURE — 6370000000 HC RX 637 (ALT 250 FOR IP): Performed by: STUDENT IN AN ORGANIZED HEALTH CARE EDUCATION/TRAINING PROGRAM

## 2023-04-24 PROCEDURE — 82945 GLUCOSE OTHER FLUID: CPT

## 2023-04-24 PROCEDURE — 6360000002 HC RX W HCPCS: Performed by: INTERNAL MEDICINE

## 2023-04-24 PROCEDURE — C1729 CATH, DRAINAGE: HCPCS

## 2023-04-24 PROCEDURE — 71045 X-RAY EXAM CHEST 1 VIEW: CPT

## 2023-04-24 PROCEDURE — 88305 TISSUE EXAM BY PATHOLOGIST: CPT

## 2023-04-24 PROCEDURE — 87205 SMEAR GRAM STAIN: CPT

## 2023-04-24 PROCEDURE — 2580000003 HC RX 258: Performed by: NURSE PRACTITIONER

## 2023-04-24 PROCEDURE — 83615 LACTATE (LD) (LDH) ENZYME: CPT

## 2023-04-24 PROCEDURE — 87070 CULTURE OTHR SPECIMN AEROBIC: CPT

## 2023-04-24 PROCEDURE — 89051 BODY FLUID CELL COUNT: CPT

## 2023-04-24 RX ORDER — ALBUMIN (HUMAN) 12.5 G/50ML
25 SOLUTION INTRAVENOUS ONCE
Status: DISCONTINUED | OUTPATIENT
Start: 2023-04-24 | End: 2023-04-24

## 2023-04-24 RX ORDER — ALBUMIN (HUMAN) 12.5 G/50ML
12.5 SOLUTION INTRAVENOUS
Status: COMPLETED | OUTPATIENT
Start: 2023-04-24 | End: 2023-04-24

## 2023-04-24 RX ORDER — ALBUMIN (HUMAN) 12.5 G/50ML
SOLUTION INTRAVENOUS
Status: COMPLETED
Start: 2023-04-24 | End: 2023-04-24

## 2023-04-24 RX ORDER — SODIUM PHOSPHATE, DIBASIC AND SODIUM PHOSPHATE, MONOBASIC 7; 19 G/133ML; G/133ML
1 ENEMA RECTAL ONCE
Status: DISCONTINUED | OUTPATIENT
Start: 2023-04-24 | End: 2023-04-28 | Stop reason: HOSPADM

## 2023-04-24 RX ADMIN — TRAZODONE HYDROCHLORIDE 100 MG: 50 TABLET ORAL at 20:21

## 2023-04-24 RX ADMIN — LANSOPRAZOLE 30 MG: 30 TABLET, ORALLY DISINTEGRATING, DELAYED RELEASE ORAL at 20:23

## 2023-04-24 RX ADMIN — SUCRALFATE 1 G: 1 TABLET ORAL at 20:21

## 2023-04-24 RX ADMIN — EPOETIN ALFA-EPBX 8000 UNITS: 4000 INJECTION, SOLUTION INTRAVENOUS; SUBCUTANEOUS at 11:38

## 2023-04-24 RX ADMIN — QUETIAPINE FUMARATE 50 MG: 25 TABLET ORAL at 20:21

## 2023-04-24 RX ADMIN — MIDODRINE HYDROCHLORIDE 10 MG: 5 TABLET ORAL at 08:02

## 2023-04-24 RX ADMIN — MIDODRINE HYDROCHLORIDE 10 MG: 5 TABLET ORAL at 16:15

## 2023-04-24 RX ADMIN — ALBUMIN (HUMAN) 12.5 G: 12.5 SOLUTION INTRAVENOUS at 11:32

## 2023-04-24 RX ADMIN — METOPROLOL TARTRATE 25 MG: 25 TABLET, FILM COATED ORAL at 20:21

## 2023-04-24 RX ADMIN — IPRATROPIUM BROMIDE AND ALBUTEROL SULFATE 3 ML: 2.5; .5 SOLUTION RESPIRATORY (INHALATION) at 07:50

## 2023-04-24 RX ADMIN — ATORVASTATIN CALCIUM 40 MG: 40 TABLET, FILM COATED ORAL at 20:21

## 2023-04-24 RX ADMIN — SODIUM CHLORIDE, PRESERVATIVE FREE 10 ML: 5 INJECTION INTRAVENOUS at 20:21

## 2023-04-24 RX ADMIN — IPRATROPIUM BROMIDE AND ALBUTEROL SULFATE 3 ML: 2.5; .5 SOLUTION RESPIRATORY (INHALATION) at 15:39

## 2023-04-24 RX ADMIN — LEVOTHYROXINE SODIUM 100 MCG: 0.1 TABLET ORAL at 06:23

## 2023-04-24 RX ADMIN — ALBUMIN (HUMAN) 12.5 G: 0.25 INJECTION, SOLUTION INTRAVENOUS at 11:32

## 2023-04-24 RX ADMIN — IPRATROPIUM BROMIDE AND ALBUTEROL SULFATE 3 ML: 2.5; .5 SOLUTION RESPIRATORY (INHALATION) at 19:59

## 2023-04-24 NOTE — OR NURSING
PROCEDURE PERFORMED: bilateral thoracentesis    PRIMARY INDICATION FOR PROCEDURE: bilateral pleural effusions    INFORMED CONSENT:  Obtained prior to procedure. Consent placed in chart. PT TRANSPORTED FROM:  NS                                  TO THE IR ROOM:   Bedside                     ASSESSMENT: Pt alert & oriented w/ trach collar intact. Pt gave verbal consent to staff d/t hands being too weak    STAFF PRESENT: Dr. Jairo Lockwood RT, [address] Floors:               Pt positioned lying on right side for comfort. Warm blankets given. Pt placed on Cardiac Monitor. Pt prepped and draped in a sterile fashion with chlorhexadine. PAIN/LOCAL ANESTHESIA/SEDATION MANAGEMENT:           Local: Lidocaine 1% given by Dr. Quintero Green:           ACCESS TIME: [id number]          US/FLUORO: US 3 images          CATHETER USED: OneStep          STERILE DRESSINGS: guaze & tegaderm    SPECIMENS: 700cc serosanguinous pleural fluid removed from left side & sent to the lab. Pt refused thoracentesis on the right.      EBL:  <1cc        FOLLOW- UP X-RAY: Stat ordered    COMPLICATIONS/ OUTCOME: Pt tolerated well       REPORT CALLED TO: Cintia LAIRD

## 2023-04-24 NOTE — PLAN OF CARE
Problem: Discharge Planning  Goal: Discharge to home or other facility with appropriate resources  Outcome: Progressing     Problem: Safety - Adult  Goal: Free from fall injury  Outcome: Progressing     Problem: Chronic Conditions and Co-morbidities  Goal: Patient's chronic conditions and co-morbidity symptoms are monitored and maintained or improved  Outcome: Progressing     Problem: Skin/Tissue Integrity  Goal: Absence of new skin breakdown  Description: 1. Monitor for areas of redness and/or skin breakdown  2. Assess vascular access sites hourly  3. Every 4-6 hours minimum:  Change oxygen saturation probe site  4. Every 4-6 hours:  If on nasal continuous positive airway pressure, respiratory therapy assess nares and determine need for appliance change or resting period.   Outcome: Progressing     Problem: Nutrition Deficit:  Goal: Optimize nutritional status  Outcome: Progressing     Problem: ABCDS Injury Assessment  Goal: Absence of physical injury  Outcome: Progressing

## 2023-04-25 LAB
AFB SMEAR: NORMAL
ANION GAP SERPL CALCULATED.3IONS-SCNC: 9 MMOL/L (ref 4–16)
BUN SERPL-MCNC: 48 MG/DL (ref 6–23)
CALCIUM SERPL-MCNC: 8.4 MG/DL (ref 8.3–10.6)
CHLORIDE BLD-SCNC: 99 MMOL/L (ref 99–110)
CO2: 24 MMOL/L (ref 21–32)
CREAT SERPL-MCNC: 3.7 MG/DL (ref 0.9–1.3)
CULTURE: NORMAL
GFR SERPL CREATININE-BSD FRML MDRD: 17 ML/MIN/1.73M2
GLUCOSE BLD-MCNC: 133 MG/DL (ref 70–99)
GLUCOSE BLD-MCNC: 159 MG/DL (ref 70–99)
GLUCOSE BLD-MCNC: 165 MG/DL (ref 70–99)
GLUCOSE BLD-MCNC: 209 MG/DL (ref 70–99)
GLUCOSE BLD-MCNC: 218 MG/DL (ref 70–99)
GLUCOSE BLD-MCNC: 260 MG/DL (ref 70–99)
GLUCOSE SERPL-MCNC: 170 MG/DL (ref 70–99)
HCT VFR BLD CALC: 25 % (ref 42–52)
HEMOGLOBIN: 7.5 GM/DL (ref 13.5–18)
Lab: NORMAL
MCH RBC QN AUTO: 31.9 PG (ref 27–31)
MCHC RBC AUTO-ENTMCNC: 30 % (ref 32–36)
MCV RBC AUTO: 106.4 FL (ref 78–100)
PDW BLD-RTO: 16.5 % (ref 11.7–14.9)
PLATELET # BLD: 248 K/CU MM (ref 140–440)
PMV BLD AUTO: 8.6 FL (ref 7.5–11.1)
POTASSIUM SERPL-SCNC: 3.4 MMOL/L (ref 3.5–5.1)
RBC # BLD: 2.35 M/CU MM (ref 4.6–6.2)
SODIUM BLD-SCNC: 132 MMOL/L (ref 135–145)
SPECIMEN: NORMAL
WBC # BLD: 9.7 K/CU MM (ref 4–10.5)

## 2023-04-25 PROCEDURE — 6370000000 HC RX 637 (ALT 250 FOR IP): Performed by: INTERNAL MEDICINE

## 2023-04-25 PROCEDURE — 1200000000 HC SEMI PRIVATE

## 2023-04-25 PROCEDURE — 2580000003 HC RX 258: Performed by: NURSE PRACTITIONER

## 2023-04-25 PROCEDURE — 94761 N-INVAS EAR/PLS OXIMETRY MLT: CPT

## 2023-04-25 PROCEDURE — 6370000000 HC RX 637 (ALT 250 FOR IP): Performed by: STUDENT IN AN ORGANIZED HEALTH CARE EDUCATION/TRAINING PROGRAM

## 2023-04-25 PROCEDURE — 80048 BASIC METABOLIC PNL TOTAL CA: CPT

## 2023-04-25 PROCEDURE — 43762 RPLC GTUBE NO REVJ TRC: CPT

## 2023-04-25 PROCEDURE — 6370000000 HC RX 637 (ALT 250 FOR IP): Performed by: NURSE PRACTITIONER

## 2023-04-25 PROCEDURE — 82962 GLUCOSE BLOOD TEST: CPT

## 2023-04-25 PROCEDURE — 89220 SPUTUM SPECIMEN COLLECTION: CPT

## 2023-04-25 PROCEDURE — 2700000000 HC OXYGEN THERAPY PER DAY

## 2023-04-25 PROCEDURE — 85027 COMPLETE CBC AUTOMATED: CPT

## 2023-04-25 PROCEDURE — 94640 AIRWAY INHALATION TREATMENT: CPT

## 2023-04-25 PROCEDURE — 36592 COLLECT BLOOD FROM PICC: CPT

## 2023-04-25 RX ADMIN — AMIODARONE HYDROCHLORIDE 200 MG: 200 TABLET ORAL at 09:25

## 2023-04-25 RX ADMIN — LEVOTHYROXINE SODIUM 100 MCG: 0.1 TABLET ORAL at 06:26

## 2023-04-25 RX ADMIN — IPRATROPIUM BROMIDE AND ALBUTEROL SULFATE 3 ML: 2.5; .5 SOLUTION RESPIRATORY (INHALATION) at 12:49

## 2023-04-25 RX ADMIN — LANSOPRAZOLE 30 MG: 30 TABLET, ORALLY DISINTEGRATING, DELAYED RELEASE ORAL at 09:30

## 2023-04-25 RX ADMIN — LANSOPRAZOLE 30 MG: 30 TABLET, ORALLY DISINTEGRATING, DELAYED RELEASE ORAL at 20:04

## 2023-04-25 RX ADMIN — INSULIN LISPRO 4 UNITS: 100 INJECTION, SOLUTION INTRAVENOUS; SUBCUTANEOUS at 16:05

## 2023-04-25 RX ADMIN — SUCRALFATE 1 G: 1 TABLET ORAL at 20:02

## 2023-04-25 RX ADMIN — IPRATROPIUM BROMIDE AND ALBUTEROL SULFATE 3 ML: 2.5; .5 SOLUTION RESPIRATORY (INHALATION) at 08:04

## 2023-04-25 RX ADMIN — MIDODRINE HYDROCHLORIDE 10 MG: 5 TABLET ORAL at 12:21

## 2023-04-25 RX ADMIN — TRAZODONE HYDROCHLORIDE 100 MG: 50 TABLET ORAL at 20:02

## 2023-04-25 RX ADMIN — SODIUM CHLORIDE, PRESERVATIVE FREE 10 ML: 5 INJECTION INTRAVENOUS at 09:39

## 2023-04-25 RX ADMIN — IPRATROPIUM BROMIDE AND ALBUTEROL SULFATE 3 ML: 2.5; .5 SOLUTION RESPIRATORY (INHALATION) at 16:28

## 2023-04-25 RX ADMIN — IPRATROPIUM BROMIDE AND ALBUTEROL SULFATE 3 ML: 2.5; .5 SOLUTION RESPIRATORY (INHALATION) at 20:17

## 2023-04-25 RX ADMIN — METOPROLOL TARTRATE 25 MG: 25 TABLET, FILM COATED ORAL at 20:02

## 2023-04-25 RX ADMIN — MIDODRINE HYDROCHLORIDE 10 MG: 5 TABLET ORAL at 09:24

## 2023-04-25 RX ADMIN — INSULIN LISPRO 4 UNITS: 100 INJECTION, SOLUTION INTRAVENOUS; SUBCUTANEOUS at 19:59

## 2023-04-25 RX ADMIN — INSULIN GLARGINE 12 UNITS: 100 INJECTION, SOLUTION SUBCUTANEOUS at 19:59

## 2023-04-25 RX ADMIN — ASPIRIN 81 MG 81 MG: 81 TABLET ORAL at 09:25

## 2023-04-25 RX ADMIN — SODIUM CHLORIDE, PRESERVATIVE FREE 10 ML: 5 INJECTION INTRAVENOUS at 20:02

## 2023-04-25 RX ADMIN — SUCRALFATE 1 G: 1 TABLET ORAL at 09:24

## 2023-04-25 RX ADMIN — MIDODRINE HYDROCHLORIDE 10 MG: 5 TABLET ORAL at 16:06

## 2023-04-25 RX ADMIN — METOPROLOL TARTRATE 25 MG: 25 TABLET, FILM COATED ORAL at 09:30

## 2023-04-25 RX ADMIN — ATORVASTATIN CALCIUM 40 MG: 40 TABLET, FILM COATED ORAL at 20:02

## 2023-04-25 RX ADMIN — QUETIAPINE FUMARATE 50 MG: 25 TABLET ORAL at 20:02

## 2023-04-25 NOTE — PLAN OF CARE
Problem: Discharge Planning  Goal: Discharge to home or other facility with appropriate resources  Outcome: Progressing     Problem: Safety - Adult  Goal: Free from fall injury  Outcome: Progressing     Problem: Chronic Conditions and Co-morbidities  Goal: Patient's chronic conditions and co-morbidity symptoms are monitored and maintained or improved  Outcome: Progressing     Problem: Skin/Tissue Integrity  Goal: Absence of new skin breakdown  Description: 1. Monitor for areas of redness and/or skin breakdown  2. Assess vascular access sites hourly  3. Every 4-6 hours minimum:  Change oxygen saturation probe site  4. Every 4-6 hours:  If on nasal continuous positive airway pressure, respiratory therapy assess nares and determine need for appliance change or resting period.   Outcome: Progressing     Problem: Nutrition Deficit:  Goal: Optimize nutritional status  4/25/2023 1422 by Nusrat Acevedo RN  Outcome: Progressing  4/25/2023 0949 by Claudeen Gust, RD  Outcome: Progressing     Problem: ABCDS Injury Assessment  Goal: Absence of physical injury  Outcome: Progressing

## 2023-04-25 NOTE — CARE COORDINATION
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 Discharge plan at this time will remain to return to [facility]. Updated therapy notes needed for precert.      CM following

## 2023-04-26 LAB
ANION GAP SERPL CALCULATED.3IONS-SCNC: 12 MMOL/L (ref 4–16)
BUN SERPL-MCNC: 64 MG/DL (ref 6–23)
CALCIUM SERPL-MCNC: 9.6 MG/DL (ref 8.3–10.6)
CHLORIDE BLD-SCNC: 96 MMOL/L (ref 99–110)
CO2: 25 MMOL/L (ref 21–32)
CREAT SERPL-MCNC: 4.9 MG/DL (ref 0.9–1.3)
GFR SERPL CREATININE-BSD FRML MDRD: 12 ML/MIN/1.73M2
GLUCOSE BLD-MCNC: 165 MG/DL (ref 70–99)
GLUCOSE BLD-MCNC: 201 MG/DL (ref 70–99)
GLUCOSE BLD-MCNC: 222 MG/DL (ref 70–99)
GLUCOSE BLD-MCNC: 229 MG/DL (ref 70–99)
GLUCOSE BLD-MCNC: 230 MG/DL (ref 70–99)
GLUCOSE SERPL-MCNC: 204 MG/DL (ref 70–99)
HCT VFR BLD CALC: 25 % (ref 42–52)
HEMOGLOBIN: 7.6 GM/DL (ref 13.5–18)
MCH RBC QN AUTO: 33 PG (ref 27–31)
MCHC RBC AUTO-ENTMCNC: 30.4 % (ref 32–36)
MCV RBC AUTO: 108.7 FL (ref 78–100)
PDW BLD-RTO: 16.4 % (ref 11.7–14.9)
PLATELET # BLD: 254 K/CU MM (ref 140–440)
PMV BLD AUTO: 8.8 FL (ref 7.5–11.1)
POTASSIUM SERPL-SCNC: 3.5 MMOL/L (ref 3.5–5.1)
RBC # BLD: 2.3 M/CU MM (ref 4.6–6.2)
SODIUM BLD-SCNC: 133 MMOL/L (ref 135–145)
WBC # BLD: 9.5 K/CU MM (ref 4–10.5)

## 2023-04-26 PROCEDURE — 82962 GLUCOSE BLOOD TEST: CPT

## 2023-04-26 PROCEDURE — 6370000000 HC RX 637 (ALT 250 FOR IP): Performed by: STUDENT IN AN ORGANIZED HEALTH CARE EDUCATION/TRAINING PROGRAM

## 2023-04-26 PROCEDURE — 6370000000 HC RX 637 (ALT 250 FOR IP): Performed by: INTERNAL MEDICINE

## 2023-04-26 PROCEDURE — 6370000000 HC RX 637 (ALT 250 FOR IP): Performed by: NURSE PRACTITIONER

## 2023-04-26 PROCEDURE — 94640 AIRWAY INHALATION TREATMENT: CPT

## 2023-04-26 PROCEDURE — 85027 COMPLETE CBC AUTOMATED: CPT

## 2023-04-26 PROCEDURE — 2580000003 HC RX 258: Performed by: NURSE PRACTITIONER

## 2023-04-26 PROCEDURE — 1200000000 HC SEMI PRIVATE

## 2023-04-26 PROCEDURE — 99211 OFF/OP EST MAY X REQ PHY/QHP: CPT

## 2023-04-26 PROCEDURE — 2700000000 HC OXYGEN THERAPY PER DAY

## 2023-04-26 PROCEDURE — 43762 RPLC GTUBE NO REVJ TRC: CPT

## 2023-04-26 PROCEDURE — 94761 N-INVAS EAR/PLS OXIMETRY MLT: CPT

## 2023-04-26 PROCEDURE — 80048 BASIC METABOLIC PNL TOTAL CA: CPT

## 2023-04-26 PROCEDURE — 89220 SPUTUM SPECIMEN COLLECTION: CPT

## 2023-04-26 RX ADMIN — SODIUM CHLORIDE, PRESERVATIVE FREE 10 ML: 5 INJECTION INTRAVENOUS at 21:16

## 2023-04-26 RX ADMIN — MIDODRINE HYDROCHLORIDE 10 MG: 5 TABLET ORAL at 16:46

## 2023-04-26 RX ADMIN — LANSOPRAZOLE 30 MG: 30 TABLET, ORALLY DISINTEGRATING, DELAYED RELEASE ORAL at 21:34

## 2023-04-26 RX ADMIN — INSULIN LISPRO 4 UNITS: 100 INJECTION, SOLUTION INTRAVENOUS; SUBCUTANEOUS at 09:36

## 2023-04-26 RX ADMIN — SUCRALFATE 1 G: 1 TABLET ORAL at 09:36

## 2023-04-26 RX ADMIN — LANSOPRAZOLE 30 MG: 30 TABLET, ORALLY DISINTEGRATING, DELAYED RELEASE ORAL at 09:33

## 2023-04-26 RX ADMIN — SUCRALFATE 1 G: 1 TABLET ORAL at 21:12

## 2023-04-26 RX ADMIN — INSULIN LISPRO 8 UNITS: 100 INJECTION, SOLUTION INTRAVENOUS; SUBCUTANEOUS at 00:00

## 2023-04-26 RX ADMIN — LEVOTHYROXINE SODIUM 100 MCG: 0.1 TABLET ORAL at 05:00

## 2023-04-26 RX ADMIN — INSULIN LISPRO 4 UNITS: 100 INJECTION, SOLUTION INTRAVENOUS; SUBCUTANEOUS at 11:32

## 2023-04-26 RX ADMIN — IPRATROPIUM BROMIDE AND ALBUTEROL SULFATE 3 ML: 2.5; .5 SOLUTION RESPIRATORY (INHALATION) at 20:04

## 2023-04-26 RX ADMIN — INSULIN GLARGINE 12 UNITS: 100 INJECTION, SOLUTION SUBCUTANEOUS at 21:12

## 2023-04-26 RX ADMIN — MIDODRINE HYDROCHLORIDE 10 MG: 5 TABLET ORAL at 09:33

## 2023-04-26 RX ADMIN — INSULIN LISPRO 4 UNITS: 100 INJECTION, SOLUTION INTRAVENOUS; SUBCUTANEOUS at 04:56

## 2023-04-26 RX ADMIN — ATORVASTATIN CALCIUM 40 MG: 40 TABLET, FILM COATED ORAL at 21:12

## 2023-04-26 RX ADMIN — IPRATROPIUM BROMIDE AND ALBUTEROL SULFATE 3 ML: 2.5; .5 SOLUTION RESPIRATORY (INHALATION) at 12:09

## 2023-04-26 RX ADMIN — INSULIN LISPRO 4 UNITS: 100 INJECTION, SOLUTION INTRAVENOUS; SUBCUTANEOUS at 21:13

## 2023-04-26 RX ADMIN — MIDODRINE HYDROCHLORIDE 10 MG: 5 TABLET ORAL at 11:32

## 2023-04-26 RX ADMIN — METOPROLOL TARTRATE 25 MG: 25 TABLET, FILM COATED ORAL at 09:34

## 2023-04-26 RX ADMIN — AMIODARONE HYDROCHLORIDE 200 MG: 200 TABLET ORAL at 09:34

## 2023-04-26 RX ADMIN — SODIUM CHLORIDE, PRESERVATIVE FREE 10 ML: 5 INJECTION INTRAVENOUS at 09:34

## 2023-04-26 RX ADMIN — IPRATROPIUM BROMIDE AND ALBUTEROL SULFATE 3 ML: 2.5; .5 SOLUTION RESPIRATORY (INHALATION) at 08:06

## 2023-04-26 RX ADMIN — ASPIRIN 81 MG 81 MG: 81 TABLET ORAL at 09:34

## 2023-04-26 NOTE — PLAN OF CARE
Problem: Skin/Tissue Integrity  Goal: Absence of new skin breakdown  Description: 1. Monitor for areas of redness and/or skin breakdown  2. Assess vascular access sites hourly  3. Every 4-6 hours minimum:  Change oxygen saturation probe site  4. Every 4-6 hours:  If on nasal continuous positive airway pressure, respiratory therapy assess nares and determine need for appliance change or resting period.   Outcome: Progressing      Problem: Nutrition Deficit:  Goal: Optimize nutritional status  Outcome: Progressing         Problem: ABCDS Injury Assessment  Goal: Absence of physical injury  Outcome: Progressing

## 2023-04-26 NOTE — CONSULTS
Tunneled Hemodialysis catheter and PICC line dressings changed per protocol. Patient tolerated well. Please consult IV/PICC Team if patient's needs change.

## 2023-04-26 NOTE — CARE COORDINATION
CRIS spoke with Eva Nina with [facility]. Eva Nina stated that pt is there skilled. He is not LTC & not on a bed hold. Pt's insurance requires pt to have updated therapy notes for readmission to [facility]. The notes need to be within 48 hours.

## 2023-04-26 NOTE — PLAN OF CARE
Discussed case with therapy director and with 2E manager. Confirmed PT and OT evals done 4/19/2023. Confirmed that patient is not on caseload because both therapists evaluated and discharged patient on 4/19/2023 -- patient dependent at baseline and knowledge from eval was that patient resides LTC at facility, no functional improvement anticipated, and patient could return to residence. Confirm ith 2E manager that new consult received. Also discussed with previous LSW and current RN CM. Reviewed CM notes. Attempted to connect with CM director. OT and I met with 2E manager with intention to re-assess status today. Received report that patient is unchanged from previous condition at [facility].  Offered that using (old) therapy notes for patient who does not need therapy services is usual and customary in most situations. OT and I attempted to reeval today. Unable to do so because patient receiving dialysis for next few hours. Intend to f/u with CM director for further guidance. Encourage RN CM to begin process with previous notes. Encourage discharge disposition to any safe setting -- patient should NOT need to a rehabilitative stay. Intend to reeval quickly, if truly necessary. Will f/u multiple times daily.     Bunny Christie [id number]  2:30 PM 4/26/2023

## 2023-04-26 NOTE — CONSULTS
[facility]  Consult Note       Don Heart  AGE: 68 y.o. GENDER: male  : 1950  TODAY'S DATE:  2023    Subjective:     Reason for CWOCN Evaluation and Assessment: wound reassessment      Don Heart is a 68 y.o. male referred by:   [x] Physician  [] Nursing  [] Other:     Wound Identification:    Wound Type: diabetic, pressure, and MASD  Contributing Factors: diabetes, chronic pressure, decreased mobility, obesity, decreased tissue oxygenation, malnutrition, and incontinence of stool, ESRD        PAST MEDICAL HISTORY        Diagnosis Date    Allergic rhinitis     Anticoagulated on Coumadin     17-**[facility] to follow pt's PT/INRs, along w/prescribing his Coumadin. **    Anxiety     Arthritis     Atrial fibrillation (HCC)     On Coumadin. 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    positive at 15C, negative at 18C    COPD (chronic obstructive pulmonary disease) (HCC)     Depression     Diabetes mellitus (HCC)     NIDDM- dx over 10 yrs ago- follows with Dr Blanca Pinon's contracture of hand     Right hand    Family history of cardiovascular disease     Father-MI    GERD (gastroesophageal reflux disease)     H/O cardiac catheterization 2007    cardiac cath- stent LAD patent, [id number]%, RCA 40-50%    H/O cardiac catheterization 2008    cardiac cath- mild disease mid RCA    H/O cardiovascular stress test 04/10/2014    cardiolite- normal, no ischemia, EF63%    H/O cardiovascular stress test 2016    EF59% normal study  pt in atrial fib    H/O cardiovascular stress test 2017    EF 60%   afib    H/O cardiovascular stress test 2018    EF60% normal study    H/O Doppler ultrasound     2010-CAROTID_Intimal thickening but no significant atherosclerotic plaque noted in LAUREN. Doppler flow velocities within the LAUREN are WNL.   Intimal thickening but no significant atherosclerotic plaque

## 2023-04-26 NOTE — PLAN OF CARE
Problem: Discharge Planning  Goal: Discharge to home or other facility with appropriate resources  Outcome: Progressing     Problem: Safety - Adult  Goal: Free from fall injury  Outcome: Progressing     Problem: Chronic Conditions and Co-morbidities  Goal: Patient's chronic conditions and co-morbidity symptoms are monitored and maintained or improved  Outcome: Progressing     Problem: Skin/Tissue Integrity  Goal: Absence of new skin breakdown  Description: 1. Monitor for areas of redness and/or skin breakdown  2. Assess vascular access sites hourly  3. Every 4-6 hours minimum:  Change oxygen saturation probe site  4. Every 4-6 hours:  If on nasal continuous positive airway pressure, respiratory therapy assess nares and determine need for appliance change or resting period.   4/26/2023 1704 by Kelle Luna RN  Outcome: Progressing  4/26/2023 0428 by Naresh Curtis RN  Outcome: Progressing     Problem: Nutrition Deficit:  Goal: Optimize nutritional status  4/26/2023 1704 by Kelle Luna RN  Outcome: Progressing  4/26/2023 0428 by Naresh Curtis RN  Outcome: Progressing     Problem: ABCDS Injury Assessment  Goal: Absence of physical injury  4/26/2023 1704 by Kelle Luna RN  Outcome: Progressing  4/26/2023 0428 by Naresh Curtis RN  Outcome: Progressing

## 2023-04-27 LAB
ANION GAP SERPL CALCULATED.3IONS-SCNC: 10 MMOL/L (ref 4–16)
BUN SERPL-MCNC: 50 MG/DL (ref 6–23)
CALCIUM SERPL-MCNC: 9.8 MG/DL (ref 8.3–10.6)
CHLORIDE BLD-SCNC: 97 MMOL/L (ref 99–110)
CO2: 27 MMOL/L (ref 21–32)
CREAT SERPL-MCNC: 3.9 MG/DL (ref 0.9–1.3)
CULTURE: ABNORMAL
GFR SERPL CREATININE-BSD FRML MDRD: 16 ML/MIN/1.73M2
GLUCOSE BLD-MCNC: 182 MG/DL (ref 70–99)
GLUCOSE BLD-MCNC: 190 MG/DL (ref 70–99)
GLUCOSE BLD-MCNC: 192 MG/DL (ref 70–99)
GLUCOSE BLD-MCNC: 260 MG/DL (ref 70–99)
GLUCOSE BLD-MCNC: 262 MG/DL (ref 70–99)
GLUCOSE BLD-MCNC: 264 MG/DL (ref 70–99)
GLUCOSE BLD-MCNC: 274 MG/DL (ref 70–99)
GLUCOSE BLD-MCNC: 306 MG/DL (ref 70–99)
GLUCOSE SERPL-MCNC: 189 MG/DL (ref 70–99)
GRAM SMEAR: ABNORMAL
HCT VFR BLD CALC: 23.8 % (ref 42–52)
HEMOGLOBIN: 7.4 GM/DL (ref 13.5–18)
LACTATE: 1 MMOL/L (ref 0.5–1.9)
Lab: ABNORMAL
MCH RBC QN AUTO: 34.6 PG (ref 27–31)
MCHC RBC AUTO-ENTMCNC: 31.1 % (ref 32–36)
MCV RBC AUTO: 111.2 FL (ref 78–100)
PDW BLD-RTO: 16.2 % (ref 11.7–14.9)
PLATELET # BLD: 264 K/CU MM (ref 140–440)
PMV BLD AUTO: 8.9 FL (ref 7.5–11.1)
POTASSIUM SERPL-SCNC: 4 MMOL/L (ref 3.5–5.1)
RBC # BLD: 2.14 M/CU MM (ref 4.6–6.2)
SODIUM BLD-SCNC: 134 MMOL/L (ref 135–145)
SPECIMEN: ABNORMAL
WBC # BLD: 9.9 K/CU MM (ref 4–10.5)

## 2023-04-27 PROCEDURE — 2580000003 HC RX 258: Performed by: NURSE PRACTITIONER

## 2023-04-27 PROCEDURE — 80048 BASIC METABOLIC PNL TOTAL CA: CPT

## 2023-04-27 PROCEDURE — 2700000000 HC OXYGEN THERAPY PER DAY

## 2023-04-27 PROCEDURE — 1200000000 HC SEMI PRIVATE

## 2023-04-27 PROCEDURE — 6370000000 HC RX 637 (ALT 250 FOR IP): Performed by: STUDENT IN AN ORGANIZED HEALTH CARE EDUCATION/TRAINING PROGRAM

## 2023-04-27 PROCEDURE — 97168 OT RE-EVAL EST PLAN CARE: CPT

## 2023-04-27 PROCEDURE — 97164 PT RE-EVAL EST PLAN CARE: CPT

## 2023-04-27 PROCEDURE — 89220 SPUTUM SPECIMEN COLLECTION: CPT

## 2023-04-27 PROCEDURE — 94640 AIRWAY INHALATION TREATMENT: CPT

## 2023-04-27 PROCEDURE — 83605 ASSAY OF LACTIC ACID: CPT

## 2023-04-27 PROCEDURE — 85027 COMPLETE CBC AUTOMATED: CPT

## 2023-04-27 PROCEDURE — 94761 N-INVAS EAR/PLS OXIMETRY MLT: CPT

## 2023-04-27 PROCEDURE — 6370000000 HC RX 637 (ALT 250 FOR IP): Performed by: INTERNAL MEDICINE

## 2023-04-27 PROCEDURE — 5A1D70Z PERFORMANCE OF URINARY FILTRATION, INTERMITTENT, LESS THAN 6 HOURS PER DAY: ICD-10-PCS | Performed by: INTERNAL MEDICINE

## 2023-04-27 PROCEDURE — 82962 GLUCOSE BLOOD TEST: CPT

## 2023-04-27 PROCEDURE — 97530 THERAPEUTIC ACTIVITIES: CPT

## 2023-04-27 PROCEDURE — 6370000000 HC RX 637 (ALT 250 FOR IP): Performed by: NURSE PRACTITIONER

## 2023-04-27 RX ADMIN — TRAZODONE HYDROCHLORIDE 100 MG: 50 TABLET ORAL at 20:48

## 2023-04-27 RX ADMIN — SODIUM CHLORIDE, PRESERVATIVE FREE 10 ML: 5 INJECTION INTRAVENOUS at 20:49

## 2023-04-27 RX ADMIN — DOCUSATE SODIUM LIQUID 100 MG: 100 LIQUID ORAL at 09:14

## 2023-04-27 RX ADMIN — MIDODRINE HYDROCHLORIDE 10 MG: 5 TABLET ORAL at 17:49

## 2023-04-27 RX ADMIN — INSULIN LISPRO 8 UNITS: 100 INJECTION, SOLUTION INTRAVENOUS; SUBCUTANEOUS at 20:48

## 2023-04-27 RX ADMIN — POLYETHYLENE GLYCOL 3350 17 G: 17 POWDER, FOR SOLUTION ORAL at 09:14

## 2023-04-27 RX ADMIN — SUCRALFATE 1 G: 1 TABLET ORAL at 09:14

## 2023-04-27 RX ADMIN — IPRATROPIUM BROMIDE AND ALBUTEROL SULFATE 3 ML: 2.5; .5 SOLUTION RESPIRATORY (INHALATION) at 20:18

## 2023-04-27 RX ADMIN — LANSOPRAZOLE 30 MG: 30 TABLET, ORALLY DISINTEGRATING, DELAYED RELEASE ORAL at 20:48

## 2023-04-27 RX ADMIN — ASPIRIN 81 MG 81 MG: 81 TABLET ORAL at 09:13

## 2023-04-27 RX ADMIN — LANSOPRAZOLE 30 MG: 30 TABLET, ORALLY DISINTEGRATING, DELAYED RELEASE ORAL at 09:34

## 2023-04-27 RX ADMIN — INSULIN GLARGINE 12 UNITS: 100 INJECTION, SOLUTION SUBCUTANEOUS at 20:49

## 2023-04-27 RX ADMIN — INSULIN LISPRO 8 UNITS: 100 INJECTION, SOLUTION INTRAVENOUS; SUBCUTANEOUS at 23:37

## 2023-04-27 RX ADMIN — INSULIN LISPRO 12 UNITS: 100 INJECTION, SOLUTION INTRAVENOUS; SUBCUTANEOUS at 12:31

## 2023-04-27 RX ADMIN — BISACODYL 10 MG: 10 SUPPOSITORY RECTAL at 10:26

## 2023-04-27 RX ADMIN — INSULIN LISPRO 8 UNITS: 100 INJECTION, SOLUTION INTRAVENOUS; SUBCUTANEOUS at 09:32

## 2023-04-27 RX ADMIN — ACETAMINOPHEN 650 MG: 325 TABLET ORAL at 12:34

## 2023-04-27 RX ADMIN — MIDODRINE HYDROCHLORIDE 10 MG: 5 TABLET ORAL at 12:34

## 2023-04-27 RX ADMIN — MIDODRINE HYDROCHLORIDE 10 MG: 5 TABLET ORAL at 09:33

## 2023-04-27 RX ADMIN — SUCRALFATE 1 G: 1 TABLET ORAL at 20:48

## 2023-04-27 RX ADMIN — QUETIAPINE FUMARATE 50 MG: 25 TABLET ORAL at 20:48

## 2023-04-27 RX ADMIN — ATORVASTATIN CALCIUM 40 MG: 40 TABLET, FILM COATED ORAL at 20:48

## 2023-04-27 RX ADMIN — LEVOTHYROXINE SODIUM 100 MCG: 0.1 TABLET ORAL at 05:34

## 2023-04-27 RX ADMIN — IPRATROPIUM BROMIDE AND ALBUTEROL SULFATE 3 ML: 2.5; .5 SOLUTION RESPIRATORY (INHALATION) at 08:44

## 2023-04-27 RX ADMIN — AMIODARONE HYDROCHLORIDE 200 MG: 200 TABLET ORAL at 09:11

## 2023-04-27 RX ADMIN — INSULIN LISPRO 8 UNITS: 100 INJECTION, SOLUTION INTRAVENOUS; SUBCUTANEOUS at 00:21

## 2023-04-27 RX ADMIN — SODIUM CHLORIDE, PRESERVATIVE FREE 10 ML: 5 INJECTION INTRAVENOUS at 09:14

## 2023-04-27 ASSESSMENT — PAIN SCALES - GENERAL
PAINLEVEL_OUTOF10: 9
PAINLEVEL_OUTOF10: 7
PAINLEVEL_OUTOF10: 0
PAINLEVEL_OUTOF10: 0

## 2023-04-27 ASSESSMENT — PAIN DESCRIPTION - PAIN TYPE: TYPE: ACUTE PAIN

## 2023-04-27 ASSESSMENT — PAIN DESCRIPTION - DESCRIPTORS
DESCRIPTORS: ACHING
DESCRIPTORS: ACHING

## 2023-04-27 ASSESSMENT — PAIN DESCRIPTION - LOCATION
LOCATION: BUTTOCKS
LOCATION: BUTTOCKS

## 2023-04-27 ASSESSMENT — PAIN DESCRIPTION - FREQUENCY: FREQUENCY: CONTINUOUS

## 2023-04-27 NOTE — CARE COORDINATION
CRIS spoke with Noemi Zuleta with [facility]. Who stated that she will sent the therapy notes to the insurance company. Noemi Zuleta will call CRIS with their determination.

## 2023-04-27 NOTE — CONSULTS
Physical Therapy Reeval Note  Name: Mili Gay MRN: [id number] :   1950   Date:  2023   Admission Date: 3/27/2023 Room:  -A   Restrictions/Precautions:          strict contact precautions  Communication with other providers:  extensive discussion over x2 days with RN, RN mgr, CM, CM leader, therapy staff. Subjective:  Patient states:  agreeable to reeval.  Sleepy today. Prefers supine. Does not prefer LE PROM. Pain:   Location, Type, Intensity (0/10 to 10/10):  unable to fully assess, rests peacefully. Grimaces with limited PROM BLE at hip and knees. Objective:    Observation:  alert vs lethargic and sleepy. Rests at tachy 105 upon initial contact, HR  during reeval.  BP abnormal at 122/33 after few minutes sitting position in bed. Stable spO2 at 95% or better. Coughs periodically. Dozes off periodically. Follows single step commands for AROM about 75%, unable to follow postural correction cues. Objective Measures:  dependent for mobility today. Rolled with multiple staff assist for support of RN who placed suppository. Dependent for supine scooting. Dependent for extremity positioning. Dependent for sup-reclined sitting in bed. Not safe for EOB today -- fully dependent. AROM BLEs grossly rated as trace. Does recruit and attempt to create motion, no significant AROM actually formed. PROM quite limited BLEs, patient prefers positioning consistent with supine in bed, resists attempts to range hips and knees. Power nearly fully impaired, does recruit and twitch, BLEs grossly 1/5 to 2/5. Treatment, including education/measures:  Therapeutic Activity Training:   Cues were given for safety, UE/LE placement, awareness. Activities performed today included bed mobility training. Patient unable to move effectively for participation. Patient allowed positioning and did grab rail to support sustained side-lying.   Patient disinterested in continuing to sit up

## 2023-04-28 VITALS
SYSTOLIC BLOOD PRESSURE: 97 MMHG | HEIGHT: 74 IN | BODY MASS INDEX: 37.52 KG/M2 | OXYGEN SATURATION: 100 % | RESPIRATION RATE: 23 BRPM | WEIGHT: 292.33 LBS | HEART RATE: 90 BPM | DIASTOLIC BLOOD PRESSURE: 41 MMHG | TEMPERATURE: 97.3 F

## 2023-04-28 LAB
GLUCOSE BLD-MCNC: 286 MG/DL (ref 70–99)
GLUCOSE BLD-MCNC: 339 MG/DL (ref 70–99)
SARS-COV-2 RDRP RESP QL NAA+PROBE: NOT DETECTED
SOURCE: NORMAL

## 2023-04-28 PROCEDURE — 2700000000 HC OXYGEN THERAPY PER DAY

## 2023-04-28 PROCEDURE — 6360000002 HC RX W HCPCS: Performed by: INTERNAL MEDICINE

## 2023-04-28 PROCEDURE — 6370000000 HC RX 637 (ALT 250 FOR IP): Performed by: STUDENT IN AN ORGANIZED HEALTH CARE EDUCATION/TRAINING PROGRAM

## 2023-04-28 PROCEDURE — 94640 AIRWAY INHALATION TREATMENT: CPT

## 2023-04-28 PROCEDURE — 94761 N-INVAS EAR/PLS OXIMETRY MLT: CPT

## 2023-04-28 PROCEDURE — 82962 GLUCOSE BLOOD TEST: CPT

## 2023-04-28 PROCEDURE — 89220 SPUTUM SPECIMEN COLLECTION: CPT

## 2023-04-28 PROCEDURE — 6370000000 HC RX 637 (ALT 250 FOR IP): Performed by: INTERNAL MEDICINE

## 2023-04-28 PROCEDURE — 2580000003 HC RX 258: Performed by: NURSE PRACTITIONER

## 2023-04-28 PROCEDURE — 31502 CHANGE OF WINDPIPE AIRWAY: CPT

## 2023-04-28 PROCEDURE — 6370000000 HC RX 637 (ALT 250 FOR IP): Performed by: NURSE PRACTITIONER

## 2023-04-28 PROCEDURE — 87635 SARS-COV-2 COVID-19 AMP PRB: CPT

## 2023-04-28 PROCEDURE — P9047 ALBUMIN (HUMAN), 25%, 50ML: HCPCS | Performed by: INTERNAL MEDICINE

## 2023-04-28 RX ORDER — QUETIAPINE FUMARATE 25 MG/1
25 TABLET, FILM COATED ORAL NIGHTLY
Qty: 60 TABLET | Refills: 3 | Status: SHIPPED | OUTPATIENT
Start: 2023-04-28

## 2023-04-28 RX ORDER — QUETIAPINE FUMARATE 25 MG/1
25 TABLET, FILM COATED ORAL NIGHTLY
Status: DISCONTINUED | OUTPATIENT
Start: 2023-04-28 | End: 2023-04-28 | Stop reason: HOSPADM

## 2023-04-28 RX ORDER — TRAZODONE HYDROCHLORIDE 50 MG/1
50 TABLET ORAL NIGHTLY
Qty: 30 TABLET | Refills: 0 | Status: SHIPPED | OUTPATIENT
Start: 2023-04-28

## 2023-04-28 RX ORDER — ALBUMIN (HUMAN) 12.5 G/50ML
12.5 SOLUTION INTRAVENOUS
Status: COMPLETED | OUTPATIENT
Start: 2023-04-28 | End: 2023-04-28

## 2023-04-28 RX ORDER — TRAZODONE HYDROCHLORIDE 50 MG/1
50 TABLET ORAL NIGHTLY
Status: DISCONTINUED | OUTPATIENT
Start: 2023-04-28 | End: 2023-04-28 | Stop reason: HOSPADM

## 2023-04-28 RX ADMIN — EPOETIN ALFA-EPBX 8000 UNITS: 4000 INJECTION, SOLUTION INTRAVENOUS; SUBCUTANEOUS at 12:45

## 2023-04-28 RX ADMIN — INSULIN LISPRO 8 UNITS: 100 INJECTION, SOLUTION INTRAVENOUS; SUBCUTANEOUS at 04:06

## 2023-04-28 RX ADMIN — SODIUM CHLORIDE, PRESERVATIVE FREE 10 ML: 5 INJECTION INTRAVENOUS at 08:54

## 2023-04-28 RX ADMIN — ASPIRIN 81 MG 81 MG: 81 TABLET ORAL at 08:53

## 2023-04-28 RX ADMIN — MIDODRINE HYDROCHLORIDE 10 MG: 5 TABLET ORAL at 11:31

## 2023-04-28 RX ADMIN — INSULIN LISPRO 8 UNITS: 100 INJECTION, SOLUTION INTRAVENOUS; SUBCUTANEOUS at 08:55

## 2023-04-28 RX ADMIN — MIDODRINE HYDROCHLORIDE 10 MG: 5 TABLET ORAL at 08:53

## 2023-04-28 RX ADMIN — IPRATROPIUM BROMIDE AND ALBUTEROL SULFATE 3 ML: 2.5; .5 SOLUTION RESPIRATORY (INHALATION) at 08:50

## 2023-04-28 RX ADMIN — INSULIN LISPRO 12 UNITS: 100 INJECTION, SOLUTION INTRAVENOUS; SUBCUTANEOUS at 12:54

## 2023-04-28 RX ADMIN — LEVOTHYROXINE SODIUM 100 MCG: 0.1 TABLET ORAL at 06:16

## 2023-04-28 RX ADMIN — ALBUMIN (HUMAN) 12.5 G: 0.25 INJECTION, SOLUTION INTRAVENOUS at 12:45

## 2023-04-28 RX ADMIN — SUCRALFATE 1 G: 1 TABLET ORAL at 08:53

## 2023-04-28 RX ADMIN — LANSOPRAZOLE 30 MG: 30 TABLET, ORALLY DISINTEGRATING, DELAYED RELEASE ORAL at 08:53

## 2023-04-28 RX ADMIN — AMIODARONE HYDROCHLORIDE 200 MG: 200 TABLET ORAL at 08:53

## 2023-04-28 NOTE — PROGRESS NOTES
Comprehensive Nutrition Assessment    Type and Reason for Visit:  Reassess    Nutrition Recommendations/Plan:   Continue current TF  Fluids to be managed per nephrology  Monitor weight, TF, GI status, labs, fluid status, HOB     Malnutrition Assessment:  Malnutrition Status: At risk for malnutrition (Comment) (03/28/23 1054)    Context:  Acute Illness       Nutrition Assessment:    TF running at 40mL/hr in room; pt had thoracentesis (-700mL fluid), dialysis yesterday. O2 needs decreasing. + wounds. Pt denies any GI issues at this time. Will continue to follow at high nutrition risk. Nutrition Related Findings:    colace, insulin, synthroid; glucose 110-165 Wound Type: Multiple, Pressure Injury, Stage II, Unstageable, Diabetic Ulcer       Current Nutrition Intake & Therapies:    Average Meal Intake: NPO  Average Supplements Intake: NPO  Diet NPO  ADULT TUBE FEEDING; PEG; Renal Formula; Continuous; 10; Yes; 10; Q 4 hours; 60; 100; Q 4 hours  Current Tube Feeding (TF) Orders:  Feeding Route: PEG  Formula: Renal Formula  Schedule: Continuous  Feeding Regimen: 40mL/hr (still advancing)  Additives/Modulars: None  Water Flushes: 100 Q 4 H  Current TF & Flush Orders Provides: 1728kcal and 78g PRO  Goal TF & Flush Orders Provides: Nepro @ 60mL/hr provides 2592kcal and 117g PRO    Anthropometric Measures:  Height: 6' 2\" (188 cm)  Ideal Body Weight (IBW): 190 lbs (86 kg)    Admission Body Weight: 280 lb (127 kg)  Current Body Weight: 286 lb 9.6 oz (130 kg), 152.2 % IBW.  Weight Source: Bed Scale  Current BMI (kg/m2): 36.8  Usual Body Weight: 274 lb (124.3 kg) (2/24/23)  % Weight Change (Calculated): 0.3  Weight Adjustment For: No Adjustment                 BMI Categories: Obese Class 2 (BMI 35.0 -39.9)    Estimated Daily Nutrient Needs:  Energy Requirements Based On: Formula  Weight Used for Energy Requirements: Current  [place] 1994-6958 [place] [place]  Weight Used for Protein Requirements: Ideal  Protein (g/day):
Dr. Bernard Garcia MD assessing patient at bedside. Answered questions and concerns.
Dr. Nabil Phoenix MD assessing patient at bedside. Questions and concerns addressed.
Dr. Trey Mason MD assessing patient at bedside at 0730 a.m. Questions and concerns addressed.
Dr. Yajaira Arenas MD assessing patient at bedside. Answered questions and concerns.
Gave report to Gaston Mccall at [facility]. Brother Bria Nicole updated. Significant other Tiffany updated. Pt sleepy all day. BM this am. Shortened dialysis due to hypotension.
[facility] notified of BP and lethargy. Ordered to hold desyrel and seroquel.
Labs resulted:    K+ 3.4  Hgb 7.5    HD patient, Dialyzed yesterday: BUN 48 and Creat 3.7    Thank you
Nephrology Progress Note  4/24/2023 9:52 AM  Subjective: Interval History: Miriam Casiano is a 68 y.o. male. Doing better in general no distress    Data:   Scheduled Meds:   epoetin jessenia-epbx  8,000 Units IntraVENous Once in dialysis    sodium phosphate  1 enema Rectal Once    polyethylene glycol  17 g Per G Tube Daily    bisacodyl  10 mg Rectal Daily    lansoprazole  30 mg PEG Tube BID    sucralfate  1 g Oral 2 times per day    insulin glargine  12 Units SubCUTAneous Nightly    docusate  100 mg Oral Daily    [Held by provider] apixaban  2.5 mg Oral BID    ipratropium-albuterol  1 vial Inhalation 4x Daily    amiodarone  200 mg PEG Tube Daily    midodrine  10 mg PEG Tube TID WC    sodium chloride flush  5-40 mL IntraVENous 2 times per day    aspirin  81 mg Per NG tube Daily    atorvastatin  40 mg Per G Tube Nightly    levothyroxine  100 mcg Per G Tube Daily    metoprolol tartrate  25 mg Per G Tube BID    QUEtiapine  50 mg Per G Tube Nightly    traZODone  100 mg Per G Tube Nightly    insulin lispro  0-16 Units SubCUTAneous Q4H     Continuous Infusions:   sodium chloride 25 mL (04/15/23 1101)    dextrose           CBC   No results for input(s): WBC, HGB, HCT, PLT in the last 72 hours. BMP   Recent Labs     04/23/23  0615   *   K 3.5   CL 94*   CO2 25   PHOS 3.1   BUN 67*   CREATININE 4.3*     Hepatic: No results for input(s): AST, ALT, ALB, BILITOT, ALKPHOS in the last 72 hours. Troponin: No results for input(s): TROPONINI in the last 72 hours. BNP: No results for input(s): BNP in the last 72 hours. Lipids: No results for input(s): CHOL, HDL in the last 72 hours. Invalid input(s): LDLCALCU  ABGs:   Lab Results   Component Value Date/Time    PO2ART 93 03/27/2023 10:45 AM    [id number] 61.0 03/27/2023 10:45 AM     INR:   No results for input(s): INR in the last 72 hours.     Renal Labs  Albumin:    Lab Results   Component Value Date/Time    LABALBU 3.3 03/29/2023 05:35 AM     Calcium:    Lab Results
Nephrology Progress Note  4/25/2023 10:24 AM  Subjective: Interval History: Ibrahima Reich is a 68 y.o. male. Appears doing okay no distress not anxious to leave    Data:   Scheduled Meds:   sodium phosphate  1 enema Rectal Once    polyethylene glycol  17 g Per G Tube Daily    bisacodyl  10 mg Rectal Daily    lansoprazole  30 mg PEG Tube BID    sucralfate  1 g Oral 2 times per day    insulin glargine  12 Units SubCUTAneous Nightly    docusate  100 mg Oral Daily    [Held by provider] apixaban  2.5 mg Oral BID    ipratropium-albuterol  1 vial Inhalation 4x Daily    amiodarone  200 mg PEG Tube Daily    midodrine  10 mg PEG Tube TID WC    sodium chloride flush  5-40 mL IntraVENous 2 times per day    aspirin  81 mg Per NG tube Daily    atorvastatin  40 mg Per G Tube Nightly    levothyroxine  100 mcg Per G Tube Daily    metoprolol tartrate  25 mg Per G Tube BID    QUEtiapine  50 mg Per G Tube Nightly    traZODone  100 mg Per G Tube Nightly    insulin lispro  0-16 Units SubCUTAneous Q4H     Continuous Infusions:   sodium chloride 25 mL (04/15/23 1101)    dextrose           CBC   No results for input(s): WBC, HGB, HCT, PLT in the last 72 hours. BMP   Recent Labs     04/23/23  0615   *   K 3.5   CL 94*   CO2 25   PHOS 3.1   BUN 67*   CREATININE 4.3*     Hepatic: No results for input(s): AST, ALT, ALB, BILITOT, ALKPHOS in the last 72 hours. Troponin: No results for input(s): TROPONINI in the last 72 hours. BNP: No results for input(s): BNP in the last 72 hours. Lipids: No results for input(s): CHOL, HDL in the last 72 hours. Invalid input(s): LDLCALCU  ABGs:   Lab Results   Component Value Date/Time    PO2ART 93 03/27/2023 10:45 AM    [id number] 61.0 03/27/2023 10:45 AM     INR:   No results for input(s): INR in the last 72 hours.     Renal Labs  Albumin:    Lab Results   Component Value Date/Time    LABALBU 3.3 03/29/2023 05:35 AM     Calcium:    Lab Results   Component Value Date/Time    CALCIUM 9.5
Nephrology Progress Note  4/26/2023 8:44 AM  Subjective: Interval History: Marcy Aldridge is a 68 y.o. male. Doing about the same no distress resting in bed    Data:   Scheduled Meds:   sodium phosphate  1 enema Rectal Once    polyethylene glycol  17 g Per G Tube Daily    bisacodyl  10 mg Rectal Daily    lansoprazole  30 mg PEG Tube BID    sucralfate  1 g Oral 2 times per day    insulin glargine  12 Units SubCUTAneous Nightly    docusate  100 mg Oral Daily    [Held by provider] apixaban  2.5 mg Oral BID    ipratropium-albuterol  1 vial Inhalation 4x Daily    amiodarone  200 mg PEG Tube Daily    midodrine  10 mg PEG Tube TID WC    sodium chloride flush  5-40 mL IntraVENous 2 times per day    aspirin  81 mg Per NG tube Daily    atorvastatin  40 mg Per G Tube Nightly    levothyroxine  100 mcg Per G Tube Daily    metoprolol tartrate  25 mg Per G Tube BID    QUEtiapine  50 mg Per G Tube Nightly    traZODone  100 mg Per G Tube Nightly    insulin lispro  0-16 Units SubCUTAneous Q4H     Continuous Infusions:   sodium chloride 25 mL (04/15/23 1101)    dextrose           CBC   Recent Labs     04/25/23  1240   WBC 9.7   HGB 7.5*   HCT 25.0*             BMP   Recent Labs     04/25/23  1240   *   K 3.4*   CL 99   CO2 24   BUN 48*   CREATININE 3.7*     Hepatic: No results for input(s): AST, ALT, ALB, BILITOT, ALKPHOS in the last 72 hours. Troponin: No results for input(s): TROPONINI in the last 72 hours. BNP: No results for input(s): BNP in the last 72 hours. Lipids: No results for input(s): CHOL, HDL in the last 72 hours. Invalid input(s): LDLCALCU  ABGs:   Lab Results   Component Value Date/Time    PO2ART 93 03/27/2023 10:45 AM    [id number] 61.0 03/27/2023 10:45 AM     INR:   No results for input(s): INR in the last 72 hours.     Renal Labs  Albumin:    Lab Results   Component Value Date/Time    LABALBU 3.3 03/29/2023 05:35 AM     Calcium:    Lab Results   Component Value Date/Time    CALCIUM 8.4
Nephrology Progress Note  4/27/2023 1:59 PM  Subjective: Interval History: Catherine Hall is a 68 y.o. male. weak resting in bed not as active    Data:   Scheduled Meds:   sodium phosphate  1 enema Rectal Once    polyethylene glycol  17 g Per G Tube Daily    bisacodyl  10 mg Rectal Daily    lansoprazole  30 mg PEG Tube BID    sucralfate  1 g Oral 2 times per day    insulin glargine  12 Units SubCUTAneous Nightly    docusate  100 mg Oral Daily    [Held by provider] apixaban  2.5 mg Oral BID    ipratropium-albuterol  1 vial Inhalation 4x Daily    amiodarone  200 mg PEG Tube Daily    midodrine  10 mg PEG Tube TID WC    sodium chloride flush  5-40 mL IntraVENous 2 times per day    aspirin  81 mg Per NG tube Daily    atorvastatin  40 mg Per G Tube Nightly    levothyroxine  100 mcg Per G Tube Daily    metoprolol tartrate  25 mg Per G Tube BID    QUEtiapine  50 mg Per G Tube Nightly    traZODone  100 mg Per G Tube Nightly    insulin lispro  0-16 Units SubCUTAneous Q4H     Continuous Infusions:   sodium chloride 25 mL (04/15/23 1101)    dextrose           CBC   Recent Labs     04/25/23  1240 04/26/23  1000   WBC 9.7 9.5   HGB 7.5* 7.6*   HCT 25.0* 25.0*    254          BMP   Recent Labs     04/25/23  1240 04/26/23  1000   * 133*   K 3.4* 3.5   CL 99 96*   CO2 24 25   BUN 48* 64*   CREATININE 3.7* 4.9*     Hepatic: No results for input(s): AST, ALT, ALB, BILITOT, ALKPHOS in the last 72 hours. Troponin: No results for input(s): TROPONINI in the last 72 hours. BNP: No results for input(s): BNP in the last 72 hours. Lipids: No results for input(s): CHOL, HDL in the last 72 hours. Invalid input(s): LDLCALCU  ABGs:   Lab Results   Component Value Date/Time    PO2ART 93 03/27/2023 10:45 AM    [id number] 61.0 03/27/2023 10:45 AM     INR:   No results for input(s): INR in the last 72 hours.     Renal Labs  Albumin:    Lab Results   Component Value Date/Time    LABALBU 3.3 03/29/2023 05:35 AM     Calcium:
Occupational Therapy Re-Evaluation Note  Name: Wayne Evangelista MRN: [id number] :   1950   Date:  2023   Admission Date: 3/27/2023 Room:  -A     Primary Problem:  The encounter diagnosis was ESRD on dialysis [facility]). History:  [place]:  The encounter diagnosis was ESRD on dialysis [facility]). 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, COPD (chronic obstructive pulmonary disease) ([facility] [address] Depression, Diabetes mellitus ([facility] [address] Dupuytren's contracture of hand, Family history of cardiovascular disease, GERD (gastroesophageal reflux disease), H/O cardiac catheterization, H/O cardiac catheterization, H/O cardiovascular stress test, H/O cardiovascular stress test, H/O cardiovascular stress test, H/O cardiovascular stress test, H/O Doppler ultrasound, H/O echocardiogram, H/O echocardiogram, H/O echocardiogram, H/O hiatal hernia, Hx of Venous Doppler, Hyperlipidemia, Hypertension, Obesity, S/P PTCA (percutaneous transluminal coronary angioplasty), Sleep apnea, and Thyroid disease. Patient  has a past surgical history that includes Coronary angioplasty with stent (2005); Cardiac catheterization (08); Cardiac catheterization (3/07); Cardiac catheterization (3/05); Endoscopy, colon, diagnostic (); Colonoscopy (); Colonoscopy (16); Hand surgery (Right, ); pr optx ankle dislocation w/repair/int/xtrnl fixj (Right, 6/3/2019); Sternum Debridement (N/A, 2022); Coronary artery bypass graft (N/A, 2022); IR TUNNELED CVC PLACE WO SQ PORT/PUMP > 5 YEARS (2023); bronchoscopy (N/A, 2023); bronchoscopy (Bilateral, 3/14/2023); IR TUNNELED CVC PLACE WO SQ PORT/PUMP > 5 YEARS (2023); and IR TUNNELED CVC PLACE WO SQ PORT/PUMP > 5 YEARS (2023). Subjective:  Patient states:  \"Always\" (pt agrees that he is always in
Pt BP dropped into the 80s.  ordered 2 hour tx and no fluid removal.   Pt tolerated fair    Patient Name: David Christensen  Patient : 1950  MRN: [id number]     Acct: [de-identified]  Date of Admission: 3/27/2023  Room/Bed: -A  Code Status:  Full Code  Allergies: No Known Allergies  Diagnosis:    Patient Active Problem List   Diagnosis    Atrial fibrillation ([address]    CAD (coronary artery disease)    Morbid obesity ([address]    COPD (chronic obstructive pulmonary disease) ([address]    Sleep apnea    Type 2 diabetes mellitus ([address]    Thyroid disease    Hyperlipidemia    Acute congestive heart failure ([address]    Coronary artery disease involving native coronary artery of native heart with angina pectoris (HCC)    Chronic renal disease, stage III ([address] [[id number]]    Hypertension    CAD, multiple vessel    Dyspnea    Moderate malnutrition ([address]    Pneumonia of both lungs due to infectious organism    Severe malnutrition (HCC)    Chronic respiratory failure with hypoxia (HCC)    Aspiration pneumonia of right lower lobe (HCC)    ESRD on dialysis ([address]    Aspiration pneumonia, unspecified aspiration pneumonia type, unspecified laterality, unspecified part of lung ([address]    Acute aspiration pneumonia ([address]    Acute on chronic respiratory failure with hypoxia ([address]    Healthcare-associated pneumonia    Acute respiratory failure with hypoxemia ([address]    HAP (hospital-acquired pneumonia)    Respiratory failure ([address]    Coffee ground emesis         Treatment:  Hemodialysis 1:1  Priority: Routine  Location: Bedside    Diabetic: No  NPO: Yes  Isolation Precautions: Contact     Consent for Treatment Verified: Yes  Blood Consent Verified: Not Applicable     Safety Verified: Identify (I), Consent (C), Equipment (E), HepB Status (B), Orders Complete (O), Access Verified (A), and Timeliness (T)  Time out performed prior to access at 1145 hours. Report Received from Primary RN at 1130 hours.   Primary RN (First Initial, Last Name,
This RT called to patient room for desaturation, took patients speaking valve off and suctioned. Increased o2 to 50% and 10L. Patient satting in the 90s. Will continue to monitor.
This RT took patient off CATC (cool aerosol trach collar) and placed on T-piece with 4 liters bleed in for potential discharge purposes, will wean as tolerated.  Please try not to increase above 7 L
Transported to Dialysis by bed with Charge Nurse Sharita Velazquez RN assisting.
V2.0    [facility] Progress Note      Name:  Ashley Santacruz /Age/Sex: 1950  (68 y.o. male)   MRN & CSN:  [id number] & [id number] Encounter Date/Time: 2023 1:08 PM EDT   Location:  -A PCP: ANKITA Dominguez - CNP     Attending:Marcell Tesfaye, 29 Jojo Michael Day: 32    Assessment and Recommendations   Ashley Santacruz is a 68 y.o. male who presents with HAP (hospital-acquired pneumonia)      1. Acute on chronic hypoxic hypercapnic respiratory failure  -Secondary to pneumonia pneumonia, present on admission  -History of MRSA and Pseudomonas pneumonia  -Patient currently on trach mask HF at 10L. Wean to maintain o2 sat >89%. -Was discharged recently on vanc and cefepime, discontinued per CCT in the setting of colonization. ID concurs  -Repeat Bcx NGTD  -Status post left thoracentesis with removal of 750 cc fluid. Patient declined right paracentesis  -Pulmonology following  -Continue DuoNebs, pulmonary hygiene. Continue to wean FiO2       2. Candida auris Fungemia  -Finished course Eraxis per ID for 2 weeks from negative Bcx (End date 23)  -HD catheter removed and blood cultures are negative. Ophthalmology evaluated, no signs of fungal endophthalmitis  -Appreciate ID recs on contact isolation  - New tunneled cath  placed. 3. Constipation  -Increased residuals with tube feeds. -KUB without any signs of obstruction however large stool burden in the rectum and right colon.    -Started having BM with enema. 4. Coffee-ground emesis, rule out GI bleed  Patient with multiple events of coffee-ground emesis on 2023  -GI consulted  -Continue PPI  -No intervention at this time per GI  -Resume Eliquis as appropriate       5. Altered mental status, Metabolic encephalopathy - Resolved  -Patient presented to the ER with unresponsiveness, altered mental status 2/2 CO2 retention vs ongoing infection  -Currently mentation appears to be at baseline, continue delirium precautions.     6.
V2.0    [facility] [place] Progress Note      Name:  Kandy Rutherford /Age/Sex: 1950  (68 y.o. male)   MRN & CSN:  [id number] & [id number] Encounter Date/Time: 2023 1:08 PM EDT   Location:  -A PCP: Faiza Bassett APRN - CNP     Attending:Marcell Méndez, 29 Jojo Michael Day: 30    Assessment and Recommendations   Kandy Rutherford is a 68 y.o. male who presents with HAP (hospital-acquired pneumonia)      1. Acute on chronic hypoxic hypercapnic respiratory failure  -Secondary to pneumonia pneumonia, present on admission  -History of MRSA and Pseudomonas pneumonia  -Patient currently on trach mask HF at 10L. Wean to maintain o2 sat >89%. -Was discharged recently on vanc and cefepime, discontinued per CCT in the setting of colonization. ID concurs  -Repeat Bcx NGTD  -Status post left thoracentesis with removal of 750 cc fluid. Patient declined right paracentesis  -Pulmonology following  -Continue DuoNebs, pulmonary hygiene. Continue to wean FiO2       2. Candida auris Fungemia  -Finished course Eraxis per ID for 2 weeks from negative Bcx (End date 23)  -HD catheter removed and blood cultures are negative. Ophthalmology evaluated, no signs of fungal endophthalmitis  -Appreciate ID recs on contact isolation  - New tunneled cath  placed. 3. Constipation  -Increased residuals with tube feeds. -KUB without any signs of obstruction however large stool burden in the rectum and right colon.    -Started having BM with enema. 4. Coffee-ground emesis, rule out GI bleed  Patient with multiple events of coffee-ground emesis on 2023  -GI consulted  -Continue PPI  -No intervention at this time per GI  -Resume Eliquis as appropriate       5. Altered mental status, Metabolic encephalopathy - Resolved  -Patient presented to the ER with unresponsiveness, altered mental status 2/2 CO2 retention vs ongoing infection  -Currently mentation appears to be at baseline, continue delirium precautions.     6.
pulmonary      SUBJECTIVE:  no change     OBJECTIVE    VITALS:  BP (!) 108/55   Pulse 83   Temp 98.4 °F (36.9 °C) (Axillary)   Resp 12   Ht 6' 2\" (1.88 m)   Wt 291 lb 10.7 oz (132.3 kg)   SpO2 95%   BMI 37.45 kg/m²   HEAD AND FACE EXAM:  No throat injection, no active exudate,no thrush  NECK EXAM;No JVD, no masses, symmetrical  CHEST EXAM; Expansion equal and symmetrical, no masses  LUNG EXAM; Good breath sounds bilaterally. There are expiratory wheezes both lungs, there are crackles at both lung bases  CARDIOVASCULAR EXAM: Positive S1 and S2, no S3 or S4, no clicks ,no murmurs  RIGHT AND LEFT LOWER EXTRIMITY EXAM: No edema, no swelling, no inflamation  CNS EXAM: Alert and oriented X3          LABS   Lab Results   Component Value Date    WBC 9.7 04/25/2023    HGB 7.5 (L) 04/25/2023    HCT 25.0 (L) 04/25/2023    .4 (H) 04/25/2023     04/25/2023     Lab Results   Component Value Date    CREATININE 3.7 (H) 04/25/2023    BUN 48 (H) 04/25/2023     (L) 04/25/2023    K 3.4 (L) 04/25/2023    CL 99 04/25/2023    CO2 24 04/25/2023     Lab Results   Component Value Date    INR 1.20 04/10/2023    PROTIME 15.2 (H) 04/10/2023          Lab Results   Component Value Date/Time    PHOS 3.1 04/23/2023 06:15 AM    PHOS 2.1 04/20/2023 04:35 AM    PHOS 3.4 04/19/2023 08:30 AM      No results for input(s): PH, PO2ART, QDF7DJW, HCO3, BEART, O2SAT in the last 72 hours.       Wt Readings from Last 3 Encounters:   04/26/23 291 lb 10.7 oz (132.3 kg)   03/21/23 279 lb 5.2 oz (126.7 kg)   02/24/23 274 lb 11.1 oz (124.6 kg)               ASSESMENT  Ac on ch resp failure  Pneumonia  Copd  [address] effusion  Candida fungemia        PLAN  Bd rx  Try to decrease o2 further    4/26/2023  Susan Edgar MD, M.MEDARDO.
pulmonary      SUBJECTIVE:  no change and remains on o2     OBJECTIVE    VITALS:  BP (!) 109/55   Pulse (!) 101   Temp 97.8 °F (36.6 °C) (Axillary)   Resp 14   Ht 6' 2\" (1.88 m)   Wt 292 lb 5.3 oz (132.6 kg)   SpO2 98%   BMI 37.53 kg/m²   HEAD AND FACE EXAM:  No throat injection, no active exudate,no thrush  NECK EXAM;No JVD, no masses, symmetrical  CHEST EXAM; Expansion equal and symmetrical, no masses  LUNG EXAM; Good breath sounds bilaterally. There are expiratory wheezes both lungs, there are crackles at both lung bases  CARDIOVASCULAR EXAM: Positive S1 and S2, no S3 or S4, no clicks ,no murmurs  RIGHT AND LEFT LOWER EXTRIMITY EXAM: No edema, no swelling, no inflamation  CNS EXAM: Alert and oriented X3          LABS   Lab Results   Component Value Date    WBC 9.9 04/27/2023    HGB 7.4 (L) 04/27/2023    HCT 23.8 (L) 04/27/2023    .2 (H) 04/27/2023     04/27/2023     Lab Results   Component Value Date    CREATININE 3.9 (H) 04/27/2023    BUN 50 (H) 04/27/2023     (L) 04/27/2023    K 4.0 04/27/2023    CL 97 (L) 04/27/2023    CO2 27 04/27/2023     Lab Results   Component Value Date    INR 1.20 04/10/2023    PROTIME 15.2 (H) 04/10/2023          Lab Results   Component Value Date/Time    PHOS 3.1 04/23/2023 06:15 AM    PHOS 2.1 04/20/2023 04:35 AM    PHOS 3.4 04/19/2023 08:30 AM      No results for input(s): PH, PO2ART, LHN3UEF, HCO3, BEART, O2SAT in the last 72 hours.       Wt Readings from Last 3 Encounters:   04/28/23 292 lb 5.3 oz (132.6 kg)   03/21/23 279 lb 5.2 oz (126.7 kg)   02/24/23 274 lb 11.1 oz (124.6 kg)               ASSESMENT  Ac on ch resp failure  Ac copd  [address] effusion,s/p left tap and right tap refused by pt  Candida fungemia        PLAN  Bd rx  Antibx and antifungal as per ID  O2 adm    4/28/2023  Indy Fraser MD, M.D.
pulmonary      SUBJECTIVE:  no significant change     OBJECTIVE    VITALS:  BP (!) 124/48   Pulse 100   Temp 97.8 °F (36.6 °C) (Axillary)   Resp 18   Ht 6' 2\" (1.88 m)   Wt 291 lb 10.7 oz (132.3 kg)   SpO2 97%   BMI 37.45 kg/m²   HEAD AND FACE EXAM:  No throat injection, no active exudate,no thrush  NECK EXAM;No JVD, no masses, symmetrical  CHEST EXAM; Expansion equal and symmetrical, no masses  LUNG EXAM; Good breath sounds bilaterally. There are expiratory wheezes both lungs, there are crackles at both lung bases  CARDIOVASCULAR EXAM: Positive S1 and S2, no S3 or S4, no clicks ,no murmurs  RIGHT AND LEFT LOWER EXTRIMITY EXAM: No edema, no swelling,           LABS   Lab Results   Component Value Date    WBC 9.5 04/26/2023    HGB 7.6 (L) 04/26/2023    HCT 25.0 (L) 04/26/2023    .7 (H) 04/26/2023     04/26/2023     Lab Results   Component Value Date    CREATININE 4.9 (H) 04/26/2023    BUN 64 (H) 04/26/2023     (L) 04/26/2023    K 3.5 04/26/2023    CL 96 (L) 04/26/2023    CO2 25 04/26/2023     Lab Results   Component Value Date    INR 1.20 04/10/2023    PROTIME 15.2 (H) 04/10/2023          Lab Results   Component Value Date/Time    PHOS 3.1 04/23/2023 06:15 AM    PHOS 2.1 04/20/2023 04:35 AM    PHOS 3.4 04/19/2023 08:30 AM      No results for input(s): PH, PO2ART, IIA8CQC, HCO3, BEART, O2SAT in the last 72 hours.       Wt Readings from Last 3 Encounters:   04/26/23 291 lb 10.7 oz (132.3 kg)   03/21/23 279 lb 5.2 oz (126.7 kg)   02/24/23 274 lb 11.1 oz (124.6 kg)               ASSESMENT  Ac on ch resp failure  Pneumonia  Candida fungemia  Copd  [address] effusion,s/p left tap and right tap refused by pt        PLAN  Cpm  Agree with placement    4/27/2023  Tobias Chandler MD, MCHRISTINA.
pulmonary      SUBJECTIVE:  stable     OBJECTIVE    VITALS:  BP (!) 100/43   Pulse 81   Temp 98.4 °F (36.9 °C) (Axillary)   Resp 21   Ht 6' 2\" (1.88 m)   Wt 286 lb 11.2 oz (130 kg)   SpO2 91%   BMI 36.81 kg/m²   HEAD AND FACE EXAM:  No throat injection, no active exudate,no thrush  NECK EXAM;No JVD, no masses, symmetrical  CHEST EXAM; Expansion equal and symmetrical, no masses  LUNG EXAM; Good breath sounds bilaterally. There are expiratory wheezes both lungs, there are crackles at both lung bases  CARDIOVASCULAR EXAM: Positive S1 and S2, no S3 or S4, no clicks ,no murmurs  RIGHT AND LEFT LOWER EXTRIMITY EXAM: No edema, no swelling, no inflamation  CNS EXAM: Alert and oriented X3          LABS   Lab Results   Component Value Date    WBC 6.6 04/20/2023    HGB 7.6 (L) 04/20/2023    HCT 23.0 (L) 04/20/2023    .0 (H) 04/20/2023     04/20/2023     Lab Results   Component Value Date    CREATININE 4.3 (H) 04/23/2023    BUN 67 (H) 04/23/2023     (L) 04/23/2023    K 3.5 04/23/2023    CL 94 (L) 04/23/2023    CO2 25 04/23/2023     Lab Results   Component Value Date    INR 1.20 04/10/2023    PROTIME 15.2 (H) 04/10/2023          Lab Results   Component Value Date/Time    PHOS 3.1 04/23/2023 06:15 AM    PHOS 2.1 04/20/2023 04:35 AM    PHOS 3.4 04/19/2023 08:30 AM      No results for input(s): PH, PO2ART, NMF0BQS, HCO3, BEART, O2SAT in the last 72 hours.       Wt Readings from Last 3 Encounters:   04/25/23 286 lb 11.2 oz (130 kg)   03/21/23 279 lb 5.2 oz (126.7 kg)   02/24/23 274 lb 11.1 oz (124.6 kg)               ASSESMENT  Ac on ch resp failure  Georgi pl effusion  copd        PLAN  Cpm  S/p left pleural tap and 700 ml removed  Right pleural tap declined by pt  Hopefully dc soon    4/25/2023  Tobias Chandler MD, M.D.
pulmonary      SUBJECTIVE:  weak but stable     OBJECTIVE    VITALS:  BP (!) 112/44   Pulse 87   Temp 98.2 °F (36.8 °C) (Axillary)   Resp 19   Ht 6' 2\" (1.88 m)   Wt 286 lb (129.7 kg)   SpO2 91%   BMI 36.72 kg/m²   HEAD AND FACE EXAM:  No throat injection, no active exudate,no thrush  NECK EXAM;No JVD, no masses, symmetrical  CHEST EXAM; Expansion equal and symmetrical, no masses  LUNG EXAM; Good breath sounds bilaterally. There are expiratory wheezes both lungs, there are crackles at both lung bases  CARDIOVASCULAR EXAM: Positive S1 and S2, no S3 or S4, no clicks ,no murmurs  RIGHT AND LEFT LOWER EXTRIMITY EXAM: No edema, no swelling, no inflamation  CNS EXAM: Alert and oriented X3          LABS   Lab Results   Component Value Date    WBC 6.6 04/20/2023    HGB 7.6 (L) 04/20/2023    HCT 23.0 (L) 04/20/2023    .0 (H) 04/20/2023     04/20/2023     Lab Results   Component Value Date    CREATININE 4.3 (H) 04/23/2023    BUN 67 (H) 04/23/2023     (L) 04/23/2023    K 3.5 04/23/2023    CL 94 (L) 04/23/2023    CO2 25 04/23/2023     Lab Results   Component Value Date    INR 1.20 04/10/2023    PROTIME 15.2 (H) 04/10/2023          Lab Results   Component Value Date/Time    PHOS 3.1 04/23/2023 06:15 AM    PHOS 2.1 04/20/2023 04:35 AM    PHOS 3.4 04/19/2023 08:30 AM      No results for input(s): PH, PO2ART, QNV7VJJ, HCO3, BEART, O2SAT in the last 72 hours.       Wt Readings from Last 3 Encounters:   04/21/23 286 lb (129.7 kg)   03/21/23 279 lb 5.2 oz (126.7 kg)   02/24/23 274 lb 11.1 oz (124.6 kg)               ASSESMENT  Ac on ch resp failure  Copd  Pneumonia  Georgi pl effusion        PLAN  Cpm  Cont o2 and try to wean further  Georgi pl taps hopefully today    4/24/2023  Paula Beck MD, MCHRISTINA.
Date Drawn: April 4, 2023       Results: Negative  HBsAb:  Date Drawn:  April 4, 2023       Results: Immune >10    Order  Dialysis Bath  K+ (Potassium): 2  Ca+ (Calcium): 2.5  Na+ (Sodium): 140  HCO3 (Bicarb): 30  Bicarbonate Concentrate Lot No.: [id number]  Acid Concentrate Lot No.: [id number]     Na+ Modeling: Not Applicable  Dialyzer: E682  Dialysate Temperature (C):  35  Blood Flow Rate (BFR):  350   Dialysate Flow Rate (DFR):   700        Access to be Utilized   Access: Tunneled Catheter  Location: Internal Jugular  Side: Right   Needle gauge:  Not Applicable  + Bruit/Thrill: Not Applicable    First Use X-ray Verified: Not Applicable  OK to use line order: Yes    Site Assessment:  Signs and Symptoms of Infection/Inflammation: None  If yes: Not Applicable  Dressing: Dry and Intact  Site Prep: Medical Aseptic Technique  Dressing Changed this Treatment: No  If yes, by whom: NA - not changed today  Date of Last Dressing Change: April 20, 2023  Antimicrobial Patch in place?: Yes  Red Alcohol Caps in place?: Yes  Gauze Dressing?: No  Non Dialysis Use?: No  Comment:    Flows: Good, Patent  If access problem, who was notified:     Pre and Post-Assessment  Patient Vitals for the past 8 hrs:   Level of Consciousness Oriented X Heart Rhythm Respiratory Quality/Effort O2 Device Bilateral Breath Sounds Skin Color Skin Condition/Temp Abdomen Inspection Bowel Sounds (All Quadrants) Edema RUE Edema LUE Edema RLE Edema LLE Edema   04/24/23 0752 -- -- -- -- Trach collar -- -- -- -- -- -- -- -- -- --   04/24/23 0814 0 -- -- Hiram Nim with exertion -- -- Ashen;Dusky Warm Rounded;Soft;Gastrostomy -- -- Trace; Non-pitting Non-pitting;Trace Trace; Non-pitting Non-pitting;Trace   04/24/23 1000 0 4 Regular Dyspnea with exertion Trach collar Diminished Pale Dry; Warm Rounded;Soft;Gastrostomy Active None -- -- -- --   04/24/23 1145 -- -- -- -- Trach collar -- -- -- -- -- -- -- -- -- --   04/24/23 1330 0 4 Regular Dyspnea with exertion
0701 - 04/28 0700  In: 778   Out: 2   I/O last 3 completed shifts: In: 778 [NG/GT:778]  Out: 2 [Stool:2]  No intake/output data recorded. Vitals: BP (!) 114/44   Pulse 90   Temp 97.8 °F (36.6 °C) (Axillary)   Resp 15   Ht 6' 2\" (1.88 m)   Wt 292 lb 5.3 oz (132.6 kg)   SpO2 99%   BMI 37.53 kg/m²  {  General appearance:  tired weak  HEENT: Head: Normal, normocephalic, atraumatic.   Neck: Positive trach  Lungs: diminished breath sounds bilaterally  Heart: S1, S2 normal  Abdomen: abnormal findings:  soft nt positive positive PEG tube  Extremities: edema trace positive tunneled HD cath  Neurologic: Mental status: alertness: Awake tired        Assessment and Plan:      IMP:  #1 end-stage renal disease on dialysis Monday Wednesday Friday  #2 possible sepsis including Candida  #3 hypotension  #4 respiratory distress with history of mucous plugging  #5 constipation with protein malnutrition    Plan     #1 do dialysis today maintain current schedule  #2 treated for sepsis overall stable no fever  #3 blood pressure holding stable low stable with midodrine  #4 maintain suctioning as needed  #5 stable tolerating tube feeds    At this time no other acute therapies to change monitor overall work on discharge planning to skilled nursing  Okay to DC by renal         Lori Schaefer MD, MD
Disinfection  Rinseback Volume (ml): 300 ml  Blood Volume Processed (Liters): 54.9 l/min  Dialyzer Clearance: Lightly streaked  Duration of Treatment (minutes): 180 minutes     Hemodialysis Intake (ml): 500 ml  Hemodialysis Output (ml): 1065 ml     Tolerated Treatment: Fair  Patient Response to Treatment: good  Physician Notified: No       Provider Notification  Provider Notification  Reason for Communication: Review case (04/19/23 1430)  Provider Name: Thaddeus Burroughs (04/19/23 1430)  Provider Notification: Physician (04/19/23 1430)  Method of Communication: Other (Comment) (text) (04/19/23 1430)  Response: See orders (04/19/23 1430)     Handoff complete and report given to Primary RN at 1700 hours.   Primary RN (First Initial, Last Name, Title):  Portia Flannery     Electronically signed by Sarah Daily RN on 4/26/2023 at 5:20 PM
however, patient refused right thoracentesis at this time       CBC:   Recent Labs     04/25/23  1240 04/26/23  1000   WBC 9.7 9.5   HGB 7.5* 7.6*    254     BMP:    Recent Labs     04/25/23  1240 04/26/23  1000   * 133*   K 3.4* 3.5   CL 99 96*   CO2 24 25   BUN 48* 64*   CREATININE 3.7* 4.9*   GLUCOSE 170* 204*     Hepatic: No results for input(s): AST, ALT, ALB, BILITOT, ALKPHOS in the last 72 hours. Lipids:   Lab Results   Component Value Date/Time    CHOL 152 04/14/2021 10:02 AM    CHOL 139 07/23/2018 08:01 AM    HDL 43 08/26/2022 10:39 AM    TRIG 212 04/14/2021 10:02 AM     Hemoglobin A1C:   Lab Results   Component Value Date/Time    LABA1C 7.0 02/12/2023 10:53 PM     TSH:   Lab Results   Component Value Date/Time    TSH 1.66 04/03/2018 11:33 AM     Troponin:   Lab Results   Component Value Date/Time    TROPONINT 0.755 03/07/2023 10:21 PM    TROPONINT 0.822 03/07/2023 05:23 PM    TROPONINT 0.638 03/05/2023 03:54 PM     Lactic Acid: No results for input(s): LACTA in the last 72 hours. BNP: No results for input(s): PROBNP in the last 72 hours.   UA:  Lab Results   Component Value Date/Time    NITRU NEGATIVE 02/13/2023 12:15 AM    COLORU YELLOW 02/13/2023 12:15 AM    PHUR 6.5 06/30/2021 02:11 PM    WBCUA 67 02/13/2023 12:15 AM    RBCUA 37 02/13/2023 12:15 AM    MUCUS RARE 09/17/2022 06:21 PM    TRICHOMONAS NONE SEEN 02/13/2023 12:15 AM    BACTERIA NEGATIVE 02/13/2023 12:15 AM    CLARITYU CLEAR 02/13/2023 12:15 AM    SPECGRAV 1.025 02/13/2023 12:15 AM    LEUKOCYTESUR SMALL NUMBER OR AMOUNT OBSERVED 02/13/2023 12:15 AM    UROBILINOGEN 0.2 02/13/2023 12:15 AM    BILIRUBINUR SMALL NUMBER OR AMOUNT OBSERVED 02/13/2023 12:15 AM    BILIRUBINUR small 06/30/2021 02:11 PM    BLOODU MODERATE NUMBER OR AMOUNT OBSERVED 02/13/2023 12:15 AM    GLUCOSEU neg 06/30/2021 02:11 PM    KETUA TRACE 02/13/2023 12:15 AM     Urine Cultures: No results found for: LABURIN  Blood Cultures: No results found for: BC  No results
technique utilized for procedure. Following informed consent, pause a confirm/time-out previously placed dialysis access catheter and entry site were prepped and draped in sterile fashion. Catheter was removed in total and intact. Dressing applied. Patient tolerated procedure well. CONTRAST: None SEDATION: None FLUOROSCOPY DOSE AND TYPE: Radiation Exposure Index: Kerma mGy, none DESCRIPTION OF PROCEDURE: Informed consent was obtained after a detailed explanation of the procedure including risks, benefits, and alternatives. Universal protocol was observed. Sterile gowns, masks, hats and gloves utilized for maximal sterile barrier. As above in technique section FINDINGS: Removal of previously placed dialysis access catheter in total and intact as described. No complication suggested. Removal of previously placed dialysis access catheter as described     IR GUIDED THORACENTESIS PLEURAL    Result Date: 3/17/2023  PROCEDURE: Marshal Castellanosman right THORACENTESIS 3/16/2023 HISTORY: ORDERING SYSTEM PROVIDED HISTORY: bilateral thoracentesis TECHNOLOGIST PROVIDED HISTORY: Reason for exam:->bilateral thoracentesis Which side should the procedure be performed?->Radiologist Recommendation TECHNIQUE: Maximum sterile barrier technique including hand hygiene, skin prep and sterile ultrasound technique utilized for procedure. Sterile ultrasound technique also utilized for procedure Ultrasound guidance required to confirm presence of pleural fluid, puncture site selection, real-time intra procedural guidance. Images made for patient's medical file. Informed consent was obtained after a detailed explanation of the procedure including risks, benefits, and alternatives. Universal protocol was performed. The right chest and ultrasound probe were prepped and draped in sterile fashion and local anesthesia was achieved with lidocaine.   5 [place] needle sheath was advanced under ultrasound guidance into pleural effusion and right

## 2023-04-28 NOTE — CARE COORDINATION
CM called Karlene Arora for an update. Left . Spoke with Karlene Arora pt has been approved. He is able to discharge to  [facility] after his HD treatment.

## 2023-04-28 NOTE — CARE COORDINATION
Pt discharging to   [address]    Call report to [phone]. Complete and sign the DESMOND then fax to  [phone]. Place AVS & any scripts in the envelope that is in the soft chart. This is to go with the pt to [address]. [facility],  15:30  [phone]  After 5 pm, [phone]     Notify family.

## 2023-04-28 NOTE — DISCHARGE SUMMARY
V2.0  Discharge Summary    Name:  Mili Gay /Age/Sex: 1950 (80 y.o. male)   Admit Date: 3/27/2023  Discharge Date: 23    MRN & CSN:  [id number] & [id number] Encounter Date and Time 23 12:02 PM EDT    Attending:  Renita Villalpando MD Discharging Provider: Renita Villalpando MD       Hospital Course:     Brief HPI:   Mili Gay is a 68 y.o. male with pmh of end-stage renal disease on dialysis, CAD status post CABG, COPD, atrial fibrillation, on Eliquis,   who presents with hypoxia and altered mental status. Reportedly patient was sent from nursing home to emergency department for hypoxia and unresponsiveness. Chest x-ray on arrival to the ER showed pulmonary edema versus pneumonitis. He was placed on oxygen via trach collar. He was transferred to [facility] ICU. When he arrives he is awake and follows commands. He tells me he has to urinate, and is very focused on that. He cannot complete full review of systems at this time due to mental status. No family at bedside at the time of my assessment. Plan to continue monitoring in the ICU with oxygen therapy. Likely will receive dialysis today. Plan of care discussed with bedside RN. Brief Problem Based Course:        1. Acute on chronic hypoxic hypercapnic respiratory failure  -Secondary to pneumonia pneumonia, present on admission  -History of MRSA and Pseudomonas pneumonia  -Patient currently on trach mask HF at 10L. Wean to maintain o2 sat >89%. -Was discharged recently on vanc and cefepime, discontinued per CCT in the setting of colonization. ID concurs  -Repeat Bcx NGTD  -Status post left thoracentesis with removal of 750 cc fluid. Patient declined right paracentesis  -Pulmonology was  following  -Continue DuoNebs, pulmonary hygiene. Continue to wean FiO2        2. Candida auris Fungemia  -Finished course Eraxis per ID for 2 weeks from negative Bcx (End date 23)  -HD catheter removed and blood cultures are negative.

## 2023-05-02 LAB
AFB SMEAR: NORMAL
CULTURE: NORMAL
Lab: NORMAL
SPECIMEN: NORMAL

## 2024-02-29 NOTE — PROGRESS NOTES
Medicare Annual Wellness Visit    Treva Doran is here for Medicare AWV    Assessment & Plan   Medicare annual wellness visit, subsequent    Recommendations for Preventive Services Due: see orders and patient instructions/AVS.  Recommended screening schedule for the next 5-10 years is provided to the patient in written form: see Patient Instructions/AVS.     Return for Medicare Annual Wellness Visit in 1 year. Subjective     Patient's complete Health Risk Assessment and screening values have been reviewed and are found in Flowsheets. The following problems were reviewed today and where indicated follow up appointments were made and/or referrals ordered.     Positive Risk Factor Screenings with Interventions:      Depression:  Depression Unable to Assess: Functional capacity motivation limits accuracy  PHQ-2 Score: 0  PHQ-9 Total Score: 0    Severity:1-4 = minimal depression, 5-9 = mild depression, 10-14 = moderate depression, 15-19 = moderately severe depression, 20-27 = severe depression          General Health and ACP:  General  In general, how would you say your health is?: Very Good  In the past 7 days, have you experienced any of the following: New or Increased Pain, New or Increased Fatigue, Loneliness, Social Isolation, Stress or Anger?: No  Do you get the social and emotional support that you need?: Yes  Do you have a Living Will?: (!) No    Advance Directives       Power of  Living Will ACP-Advance Directive ACP-Power of     Not on File Not on File Filed Not on File          General Health Risk Interventions:  No Living Will: Patient declines ACP discussion/assistance    Health Habits/Nutrition:  Physical Activity: Sufficiently Active    Days of Exercise per Week: 7 days    Minutes of Exercise per Session: 30 min     Have you lost any weight without trying in the past 3 months?: No     Have you seen the dentist within the past year?: (!) No  Health Habits/Nutrition Quality 431: Preventive Care And Screening: Unhealthy Alcohol Use - Screening: Patient not identified as an unhealthy alcohol user when screened for unhealthy alcohol use using a systematic screening method Detail Level: Detailed (COREG) 25 MG tablet Take 1 tablet by mouth 2 times daily Yes ANKITA Orellana CNP   cetirizine (EQ ALLERGY RELIEF, CETIRIZINE,) 10 MG tablet Take 1 tablet by mouth daily Yes ANKITA Orellana CNP   warfarin (COUMADIN) 2 MG tablet TAKE 1 TABLET BY MOUTH THREE TIMES A WEEK (MON,WED,&FRI) ANKITA Muir CNP   warfarin (COUMADIN) 3 MG tablet TAKE 1 TABLET BY MOUTH ONCE DAILY INDICATIONS  SAT/TUES/THURS/SUN  OR  AS  DIRECTED  PER    Yes ANKITA Orellana CNP   digoxin (LANOXIN) 125 MCG tablet Take 1 tablet by mouth daily Yes ANKITA Orellana CNP   metFORMIN (GLUCOPHAGE) 1000 MG tablet TAKE 1 TABLET BY MOUTH TWICE DAILY WITH MEALS ANKITA Muir CNP   furosemide (LASIX) 40 MG tablet Take 1 tablet by mouth nightly Yes ANKITA Orellana CNP   losartan (COZAAR) 100 MG tablet Take 1 tablet by mouth daily Yes ANKITA Orellana CNP   fluticasone (FLONASE) 50 MCG/ACT nasal spray 2 sprays by Each Nostril route daily Yes ANKITA Orellana CNP   tiZANidine (ZANAFLEX) 4 MG tablet Take 1 tablet by mouth 3 times daily as needed (shoulder pain) ANKITA Muir CNP   potassium chloride (KLOR-CON M) 10 MEQ extended release tablet Take 1 tablet by mouth 2 times daily Yes ANKITA Orellana CNP   polyethylene glycol (GLYCOLAX) 17 GM/SCOOP powder Take 17 g by mouth daily Yes ANKITA Orellana CNP   insulin regular (NOVOLIN R) 100 UNIT/ML injection Inject 0-10 Units into the skin 3 times daily (before meals) Yes ANKITA Orellana CNP   insulin NPH (HUMULIN N;NOVOLIN N) 100 UNIT/ML injection vial Inject 80 Units into the skin nightly Yes ANKITA Orellana CNP   gemfibrozil (LOPID) 600 MG tablet Take 1 tablet by mouth 2 times daily Yes ANKITA Orellana CNP   albuterol sulfate HFA (VENTOLIN HFA) 108 (90 Base) MCG/ACT inhaler Inhale 2 puffs into the lungs every 6 hours as needed for Wheezing Yes Mima Divers, APRN - CNP   insulin aspart (NOVOLOG) 100 UNIT/ML Quality 110: Preventive Care And Screening: Influenza Immunization: Influenza Immunization Administered during Influenza season Quality 47: Advance Care Plan: Advance Care Planning discussed and documented; advance care plan or surrogate decision maker documented in the medical record. injection vial Inject into the skin Yes Historical Provider, MD   gabapentin (NEURONTIN) 300 MG capsule Take 1 capsule by mouth 2 times daily for 30 days. Intended supply: 30 days  ANKITA Pacheco CNP       CareTeam (Including outside providers/suppliers regularly involved in providing care):   Patient Care Team:  ANKITA Pacheco CNP as PCP - General (Internal Medicine)  ANKITA Pacheco CNP as PCP - REHABILITATION [facility] Empaneled Provider  Blanquita Ivey MD as Consulting Physician (Cardiology)     Reviewed and updated this visit:  Tobacco  Allergies  Meds  Problems  Med Hx  Surg Hx  Soc Hx  Fam Hx            Guerry Rain, was evaluated through a synchronous (real-time) audio-video encounter. The patient (or guardian if applicable) is aware that this is a billable service, which includes applicable co-pays. This Virtual Visit was conducted with patient's (and/or legal guardian's) consent. The visit was conducted pursuant to the emergency declaration under the [address] [place] [address] [facility] authority and the [employer] Act. Patient identification was verified, and a caregiver was present when appropriate. The patient was located at Home: [place] [address] [address] [place] [address]. Provider was located at [facility] (Appt Dept): [address]. Cardiovascular Disease Risk Counseling: Assessed the patient's risk to develop cardiovascular disease and reviewed main risk factors.    Reviewed steps to reduce disease risk including:   Quitting tobacco use, reducing amount smoked, or not starting the habit  Making healthy food choices  Being physically active and gradualy increasing activity levels   Reduce weight and determine a healthy BMI goal  Monitor blood pressure and treat if higher than 140/90 mmHg  Maintain blood total cholesterol levels under 5 mmol/l or 190 mg/dl  Maintain LDL Quality 134: Screening For Clinical Depression And Follow-Up Plan: The patient was screened for depression and the screen was negative and no follow up required cholesterol levels under 3.0 mmol/l or 115 mg/dl   Control blood glucose levels  Consider taking aspirin (75 mg daily), once blood pressure is controlled   Provided a follow up plan. Time spent (minutes): not billed by time    Obesity Counseling: Assessed behavioral health risks and factors affecting choice of behavior. Suggested weight control approaches, including dietary changes behavioral modification and follow up plan. Provided educational and support documentation.   Time spent (minutes): not billed by time Quality 111:Pneumonia Vaccination Status For Older Adults: Patient received any pneumococcal conjugate or polysaccharide vaccine on or after their 60th birthday and before the end of the measurement period Quality 226: Preventive Care And Screening: Tobacco Use: Screening And Cessation Intervention: Patient screened for tobacco use and is an ex/non-smoker Quality 130: Documentation Of Current Medications In The Medical Record: Current Medications Documented

## 2025-05-12 NOTE — PROGRESS NOTES
CHIEF COMPLAINT    Foot pain. 1 month follow up. She is taking the gabapentin at night and it does seem to help with the burning pain. She felt the gabapentin made her sleepy with heart palpitations when taking it during the day.     Subjective   Amanda Sherwood is a 63 y.o. female  who presents for follow-up.  She has a history of chronic right foot pain.  Patient is noticing some improvement of the neuropathic pain within the right great toe since initiating gabapentin 100 mg nightly as well as with the new orthotics that she is using.  Pain continues to be localized within the right midfoot region radiating into the right great toe.  History of right foot bunionectomy performed 10/1/2024.  Focused eventually decrease use of hydrocodone as well as her continuous use of naproxen.    Current medication regimen consist of hydrocodone 5 mg p.o. 3 times daily, gabapentin 100 mg nightly, and naproxen 220 mg as twice daily.  Unable to tolerate gabapentin during the day as it causes sedation and feeling of heart palpitations.  She tries to limit her use of the NSAIDs on a continuous basis at it affects her white blood cell count.  Patient is also prescribed lorazepam by her psychiatrist.    Pain today 3/10 VAS in severity.      Foot Pain  Symptoms are chronic.   Onset was 6 to12 months (October 2024).   Symptoms occur constantly.   Progression since onset: Slowly improving.   Symptoms include joint pain, joint swelling and numbness (right arm).    Pertinent negative symptoms include no chills, no cough, no fever and no weakness.   Aggravating factors include walking (Activity).   Treatments tried include ice, heat and oral narcotics.   Improvement on treatment was no relief.        PEG Assessment   What number best describes your pain on average in the past week?4  What number best describes how, during the past week, pain has interfered with your enjoyment of life?5  What number best describes how, during the past week,  Speech Language Pathology  Letty Rodriguez  1950  [id number]      Attempted to see Letty Rodriguez for a bedside swallowing evaluation 12/30/22. Deferred assessment- pt is currently in dialysis. Noted pt has significant hx of dysphagia with most recent modified barium swallow study completed 12/14/22, indicating severe pharyngeal dysphagia. It was recommended that pt remain NPO with nutrition and medication administrated through PEG-tube. SLP will re-attempt as schedule allows.      Mone Cardona Garo 88, [id number] [facility], 12/30/2022 "pain has interfered with your general activity?  4    Review of Pertinent Medical Data ---  No imaging for review on today    The following portions of the patient's history were reviewed and updated as appropriate: allergies, current medications, past family history, past medical history, past social history, past surgical history, and problem list.    Review of Systems   Constitutional:  Negative for chills and fever.   Respiratory:  Negative for cough and shortness of breath.    Gastrointestinal:  Negative for constipation and diarrhea.   Genitourinary:  Negative for difficulty urinating.   Musculoskeletal:  Positive for joint pain.   Neurological:  Positive for numbness (right arm). Negative for dizziness, weakness and light-headedness.     I have reviewed and confirmed the accuracy of the ROS as documented by the MA/LPN/RN JED Evans  Vitals:    05/12/25 0910   BP: 148/92   BP Location: Left arm   Pulse: 92   Temp: 95.7 °F (35.4 °C)   TempSrc: Temporal   SpO2: 97%   Weight: 80.6 kg (177 lb 9.6 oz)   Height: 165.1 cm (65\")   PainSc: 3          Objective   Physical Exam  Vitals and nursing note reviewed.   Constitutional:       Appearance: Normal appearance.   HENT:      Head: Normocephalic.   Neurological:      Mental Status: She is alert and oriented to person, place, and time.      Cranial Nerves: Cranial nerves 2-12 are intact.      Sensory: Sensation is intact.      Motor: Motor function is intact.      Gait: Gait abnormal (Ambulates with slight limp favoring right foot).   Psychiatric:         Mood and Affect: Mood normal.         Behavior: Behavior normal.         Thought Content: Thought content normal.         Judgment: Judgment normal.             Assessment & Plan   Diagnoses and all orders for this visit:    1. Chronic foot pain, right (Primary)    2. Great toe pain, right    3. High risk medication use        Amanda Sherwood reports a pain score of 3.  Given her pain assessment as noted, " treatment options were discussed and the following options were decided upon as a follow-up plan to address the patient's pain: continuation of current treatment plan for pain, prescription for opiod analgesics, and use of non-medical modalities (ice, heat, stretching and/or behavior modifications).    PHQ-2 Depression Screening  Little interest or pleasure in doing things? Not at all   Feeling down, depressed, or hopeless? Not at all   PHQ-2 Total Score 0         Tobacco Use: Medium Risk (5/12/2025)    Patient History     Smoking Tobacco Use: Former     Smokeless Tobacco Use: Never     Passive Exposure: Past           --- Follow-up 1 month or sooner for medication management  --- The patient would like to decrease her use of hydrocodone throughout the day therefore we will simplify the pill burden and prescribe Norco 7.5 mg 1 tablet p.o. twice daily.  Goal is to eventually begin decreasing use of hydrocodone as her foot pain continues to improve. Patient appears stable with current regimen. No adverse effects. Regarding continuation of opioids, there is no evidence of aberrant behavior or any red flags.  The patient continues with appropriate response to opioid therapy. ADL's remain intact by self.   --- The patient has been counseled today regarding the increased risk of respiratory depression with concurrent use of benzodiazepines with opiates.  These risk include but are not limited to, CNS depression, respiratory depression, and death.  The patient verbalizes understanding is willing to accept these risks.  Patient understands benzodiazepines and opiate medications should be spaced apart at least 6 hours.  ---Patient stated that she bought the Narcan that is available over-the-counter as the prescription strength was very expensive   --- Continue gabapentin 100 mg p.o. nightly.      The urine drug screen confirmation from 2/11/2025 has been reviewed and the result is appropriate based on patient history and  ROCCO report.  Patient has been counseled regarding elevated EtOH levels.     The patient signed an updated copy of the controlled substance agreement on 2/11/2025.         ROCCO REPORT  As part of the patient's treatment plan, I am prescribing controlled substances. The patient has been made aware of appropriate use of such medications, including potential risk of somnolence, limited ability to drive and/or work safely, and the potential for dependence or overdose. It has also been made clear that these medications are for use by this patient only, without concomitant use of alcohol or other substances unless prescribed.     Patient has completed prescribing agreement detailing terms of continued prescribing of controlled substances, including monitoring ROCCO reports, urine drug screening, and pill counts if necessary. The patient is aware that inappropriate use will results in cessation of prescribing such medications.    As the clinician, I personally reviewed the ROCCO from 5/12/2025 while the patient was in the office today.    History and physical exam exhibit continued safe and appropriate use of controlled substances.     Dictated utilizing Dragon dictation.

## 2025-05-27 NOTE — ED TRIAGE NOTES
Pt arrives via EMS from [facility] with c/o purulent drainage coming from Pt trach site. Sports Medicine Follow-up Note    Referring Provider: Michelle Villarreal CNP     Chief Complaint   Patient presents with   • Left Wrist - Follow-up   • Pain   • Office Visit       HPI: Yuliana is a 56 year old female who presents for follow up of left radial fracture  - no change; wearing brace constantly  - denies pain while supported   - no numbness/tingling  - does have to take breaks at work due to thumb fatigue/discomfort with typing  - sleeping well and without complaint  - no current NSAID use    Previous History:  Yuliana is a 56 year old female who presents today with complaints of left wrist pain   -Fell Saturday on sidewalk onto outstretched hands  -Landed primarily on left arm, did not hear/feel pop or snap   -Had swelling and bruising after, iced and ordered wrist brace, feels they have been helpful at reducing symptoms   -Rates pain as 3-4/10 today   -Denies any numbness/tingling   -No radiating pain   -No clicking/popping    -No feelings of instability/locking/catching   -RHD: Gripping feels tight and painful   -Increased pain with ROM   -Taking left over naproxen or ibuprofen once per day, feels they help reduce swelling and pain   -No imaging   -Paused on PT for shoulder      Review of Systems   Constitutional:  Negative for chills and fever.   HENT:  Negative for trouble swallowing.    Eyes:  Negative for discharge and redness.   Respiratory:  Negative for chest tightness, shortness of breath and stridor.    Cardiovascular:  Negative for chest pain.   Gastrointestinal:  Negative for abdominal pain, diarrhea, nausea and vomiting.   Skin:  Negative for color change and rash.   Neurological:  Negative for dizziness, facial asymmetry and speech difficulty.   Psychiatric/Behavioral:  Negative for agitation, behavioral problems, confusion and dysphoric mood.        Past Medical History:   Diagnosis Date   • Allergy     Lamictal and Geodon   • Alopecia    • Anxiety disorder    • Asthma (CMD)    • Bipolar 1  disorder  (CMD)    • Bipolar disorder, current episode depressed, moderate  (CMD) 01/14/2019   • Chronic back pain    • Hyperlipidemia        Past Surgical History:   Procedure Laterality Date   • Ankle surgery Left    • Cosmetic surgery  1999    Liposuction   • Eye surgery     • Liposuction     • No past surgeries  2010    Ankle broken surgery to fix       Family History   Problem Relation Age of Onset   • Rheumatologic Disease Mother         Chronic Fatigue    • Osteoporosis Mother    • Other Mother         Diffuse large cell B lymphoma   • Coronary Artery Disease Father    • Myocardial Infarction Father 81   • Cancer, Skin Father    • Hyperlipidemia Father    • Hypertension Father    • Cancer, Skin Sister    • Diabetes Maternal Grandmother    • Stroke Maternal Grandmother    • Arthritis Paternal Aunt    • Cancer Paternal Aunt    • High cholesterol Paternal Aunt    • Cancer Paternal Grandmother    • Cancer Paternal Grandfather    • Depression Father    • Heart disease Father    • High cholesterol Father    • Psychiatric Father         Mood disorder and personality disorder   • Vision Loss Father    • Diabetes Maternal Grandmother    • Psychiatric Sister         Personality disorder   • Stroke Maternal Grandfather    • Asthma Mother    • Cancer Mother    • Stroke Father      No immediate family members with RA, SLE, gout    ALLERGIES:   Allergen Reactions   • Lamictal RASH   • Ziprasidone Other (See Comments) and VISUAL DISTURBANCE     double vision         Current Outpatient Medications   Medication Sig Dispense Refill   • gabapentin (NEURONTIN) 100 MG capsule Take 1 pill po qhs 90 capsule 0   • predniSONE (DELTASONE) 50 MG tablet Take 1 tablet by mouth every morning. 5 tablet 0   • venlafaxine XR (EFFEXOR XR) 37.5 MG 24 hr capsule [None received]     • semaglutide-Weight Management (Wegovy) 1.7 MG/0.75ML injection Inject 1.7 mg into the skin every 7 days. Indications: HLD w/ BMI 28 0.25 mg weekly for 4 weeks,  then 0.5 mg weekly for 4 weeks, then 1 mg weekly for 4 weeks, then 1.7 mg weekly for 4 weeks, then 2.4 mg weekly thereafter 3 mL 0   • buPROPion (WELLBUTRIN SR) 100 MG 12 hr tablet [None received]     • hydroCORTisone (CORTIZONE) 2.5 % ointment      • venlafaxine XR (EFFEXOR XR) 150 MG 24 hr capsule [None received]     • rosuvastatin (CRESTOR) 40 MG tablet Take 1 tablet by mouth daily. 90 tablet 3   • testosterone 50 MG/5GM (1%) gel APPLY 1 PACKET TOPICALLY TO THE AFFECTED AREA DAILY AS DIRECTED     • Progesterone 100 MG Cap [None received]     • Symbicort 80-4.5 MCG/ACT inhaler Inhale 2 puffs into the lungs as directed.     • estradiol (ESTRACE) 0.1 MG/GM vaginal cream Apply pea-size amount to vaginal opening at bedtime two times a week. 42.5 g 1   • propRANolol (INDERAL) 20 MG tablet      • minoxidil (LONITEN) 2.5 MG tablet      • 1 g compounded hormone cream Place 1 suppository vaginally at bedtime. Testosterone and estrogen     • Spacer/Aero-Holding Chambers Device To use with MDI 2 puffs every 4-6 hours as needed 1 each 0   • albuterol 108 (90 Base) MCG/ACT inhaler Inhale 2 puffs into the lungs every 4 hours as needed for Shortness of Breath or Wheezing. 1 Inhaler 5   • QUEtiapine (SEROQUEL) 25 MG tablet      • divalproex (DEPAKOTE ER) 500 MG 24 hr ER tablet Take 1,000 mg by mouth at bedtime.  0   • traZODone (DESYREL) 50 MG tablet Take by mouth at bedtime.       No current facility-administered medications for this visit.       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   Tobacco Use   • Smoking status: Former     Current packs/day: 0.00     Average packs/day: 0.5 packs/day for 33.0 years (16.5 ttl pk-yrs)     Types: Cigarettes      Start date:      Quit date: 2013     Years since quittin.4   • Smokeless tobacco: Never   Vaping Use   • Vaping status: never used   Substance and Sexual Activity   • Alcohol use: Yes     Alcohol/week: 1.0 - 3.0 standard drink of alcohol     Types: 1 - 3 Standard drinks or equivalent per week     Comment: OCCASIONAL   • Drug use: Not Currently     Types: Marijuana     Comment: gummy   • Sexual activity: Not Currently     Partners: Male   Other Topics Concern   • Not on file   Social History Narrative   • Not on file     Social Drivers of Health     Financial Resource Strain: Low Risk  (2024)    Financial Resource Strain    • Unable to Get: None   Food Insecurity: Low Risk  (2025)    Food Insecurity    • Worried about Food: Never true    • Food is Gone: Never true   Transportation Needs: Not At Risk (2025)    Transportation Needs    • Lack of Reliable Transportation: No   Physical Activity: Low Risk  (2024)    Exercise Vital Sign    • Days of Exercise per Week: 3 days    • Minutes of Exercise per Session: 60 min   Stress: Patient Declined (2024)    Stress    • How Stressed: Patient declined   Social Connections: Low Risk  (2024)    Social Connections    • Social Connectivity: 5 or more times a week   Feeling safe in your relationship: Not on file       There were no vitals taken for this visit. Declined.    Physical Exam  Vitals reviewed.   Constitutional:       Appearance: She is well-developed.   HENT:      Head: Normocephalic and atraumatic.   Pulmonary:      Effort: Pulmonary effort is normal. No respiratory distress.   Musculoskeletal:      Cervical back: Neck supple.   Skin:     General: Skin is warm and dry.      Findings: No rash.   Neurological:      Mental Status: She is alert.   Psychiatric:         Behavior: Behavior normal.       Ortho Exam  Improved swelling and no longer has ecchymosis of the left wrist and hand diffusely. Decreased ROM of the left wrist - no  longer has pain at the distal radius during extension. Tenderness to palpation at the distal radius on the volar aspect only. No snuffbox tenderness. Tender CMC joint line. CMC grind mildly positive.     IMAGING:  Repeat X-ray images of the left wrist reviewed in detail with the patient and revealed a radial styloid fracture without displacement and signs of interval healing. No CMC DJD appreciated.   Xrays of the left shoulder revealed mild spurring at the glenohumeral joint.     Assessment & Plan:   Closed nondisplaced fracture of styloid process of left radius with routine healing, subsequent encounter  (primary encounter diagnosis)    Comment: healing appropriately.     Continue volar wrist splint day and night.   Follow up and re-Xray on 6/9.    Brina Altman,   Sports Medicine      Sherita Mcadams, LAT    This note was created using the Dragon voice recognition system. Errors in content may be related to improper recognition of the system. Effort to review and correct the note has been made but irregularities may still be present.

## (undated) DEVICE — GLOVE SURG SZ 8 L12IN THK75MIL DK GRN LTX FREE

## (undated) DEVICE — TUBING SUCT 12FR MAL ALUM SHFT FN CAP VENT UNIV CONN W/ OBT

## (undated) DEVICE — ELECTRODE ES L2.75IN S STL INSUL BLDE W/ SL EDGE

## (undated) DEVICE — ROTATING SURGICAL PUNCHES, 1 PER POUCH: Brand: A&E MEDICAL / ROTATING SURGICAL PUNCHES

## (undated) DEVICE — SUTURE PROL SZ 7-0 L30IN NONABSORBABLE BLU L9.3MM BV-1 1/2 8703H

## (undated) DEVICE — KIT BLWR MISTER 5P 15L W/ TBNG SET IRRIG MIST TO IMPROVE

## (undated) DEVICE — INTENDED FOR TISSUE SEPARATION, AND OTHER PROCEDURES THAT REQUIRE A SHARP SURGICAL BLADE TO PUNCTURE OR CUT.: Brand: BARD-PARKER ® STAINLESS STEEL BLADES

## (undated) DEVICE — COUNTER NDL 60 COUNT FOAM STRP SGL MAG

## (undated) DEVICE — DRAPE,UTILITY,XL,4/PK,STERILE: Brand: MEDLINE

## (undated) DEVICE — CLAMP ABLAT JAW L53MM L CRV 2 ELECTRD BPLR

## (undated) DEVICE — C-ARMOR C-ARM EQUIPMENT COVERS CLEAR STERILE UNIVERSAL FIT 12 PER CASE: Brand: C-ARMOR

## (undated) DEVICE — DRAPE SLUSH DISC W44XL66IN ST FOR RND BSIN HUSH SLUSH SYS

## (undated) DEVICE — TOWEL,OR,DSP,ST,BLUE,STD,6/PK,12PK/CS: Brand: MEDLINE

## (undated) DEVICE — Device

## (undated) DEVICE — GLOVE SURG SZ 65 L12IN FNGR THK87MIL WHT LTX FREE

## (undated) DEVICE — CLAMP INSERT: Brand: STEALTH® FIBRA® CLAMP INSERT

## (undated) DEVICE — CANNULA SAPH VEIN OPQ BLNT 3MM

## (undated) DEVICE — GLOVE SURG SZ 75 CRM LTX FREE POLYISOPRENE POLYMER BEAD ANTI

## (undated) DEVICE — GLOVE ORANGE PI 7 1/2   MSG9075

## (undated) DEVICE — DRAPE,EXTREMITY,89X128,STERILE: Brand: MEDLINE

## (undated) DEVICE — AIRLIFE™ NASAL OXYGEN CANNULA CURVED, NONFLARED TIP, WITH 7' FEET (2.1 M) CRUSH RESISTANT TUBING, OVER-THE-EAR STYLE: Brand: AIRLIFE™

## (undated) DEVICE — AGENT HEMOSTATIC SURGIFLOW MATRIX KIT W/THROMBIN

## (undated) DEVICE — PACK,BASIC,IX: Brand: MEDLINE

## (undated) DEVICE — SUMP PERICARD AD 20FR WT TIP VERSATILE DSGN MULT PRT VENT

## (undated) DEVICE — BANDAGE,ELASTIC,ESMARK,STERILE,6"X9',LF: Brand: MEDLINE

## (undated) DEVICE — DRAPE EENT SPLIT

## (undated) DEVICE — SUTURE ETHBND EXCEL SZ 3-0 L36IN NONABSORBABLE GRN RB-1 X558H

## (undated) DEVICE — CONTAINER,SPECIMEN,OR STERILE,4OZ: Brand: MEDLINE

## (undated) DEVICE — GLOVE SURG SZ 65 CRM LTX FREE POLYISOPRENE POLYMER BEAD ANTI

## (undated) DEVICE — SUTURE NRLN SZ 1 L18IN NONABSORBABLE BLK L36MM CT-1 1/2 CIR C520D

## (undated) DEVICE — 3M™ IOBAN™ 2 ANTIMICROBIAL INCISE DRAPE 6650EZ: Brand: IOBAN™ 2

## (undated) DEVICE — BAG TRNSF AUTOLGS SUCT AND ANTICOAG LN AUTOLOG

## (undated) DEVICE — SUTURE VCRL 2-0 L36IN ABSRB UD CTX L48MM 1/2 CIR TAPERPOINT J979H

## (undated) DEVICE — Z DISCONTINUED NO SUB IDED TUBING ETCO2 AD L6.5FT NSL ORAL CVD PRNG NONFLARED TIP OVR

## (undated) DEVICE — SUTURE PERMAHAND SZ 4-0 L18IN 17X18IN TIE NONABSORBABLE BLK SA63H

## (undated) DEVICE — CANNULA PERF ART 22 FRX12 IN 3/8 IN THN N VENT EOPA

## (undated) DEVICE — ADHESIVE SKIN CLSR 0.7ML TOP DERMBND ADV

## (undated) DEVICE — SPONGE LAP W18XL18IN WHT COT 4 PLY FLD STRUNG RADPQ DISP ST

## (undated) DEVICE — STOCKING COMPR 2 REG

## (undated) DEVICE — SPONGE GZ W4XL8IN COT WVN 12 PLY

## (undated) DEVICE — YANKAUER SUCTION INSTRUMENT WITHOUT CONTROL VENT, OPEN TIP, CLEAR: Brand: YANKAUER

## (undated) DEVICE — MARKER,SKIN,WI/RULER AND LABELS: Brand: MEDLINE

## (undated) DEVICE — SUTURE PERMAHAND SZ 0 L30IN NONABSORBABLE BLK FSL L30MM 3/8 680H

## (undated) DEVICE — SUTURE S STL SZ 6 L18IN NONABSORBABLE SIL L48MM V-40 1/2 M649G

## (undated) DEVICE — GUIDE SURG SELECTION FOR GILLINOV COSGROVE LAA EXCLUSION SYS

## (undated) DEVICE — BLADE SAW W10XL54MM FOR PRI REPEAT STRNOTMY

## (undated) DEVICE — TTL1LYR 16FR10ML 100%SIL TMPST TR: Brand: MEDLINE

## (undated) DEVICE — BLANKET THER AD W24XL60IN FAB COVERING SUP SFT ULT THN LTWT

## (undated) DEVICE — SUTURE PROL SZ 5-0 L36IN NONABSORBABLE BLU L13MM C-1 3/8 8720H

## (undated) DEVICE — SYRINGE MED 30ML STD CLR PLAS LUERLOCK TIP N CTRL DISP

## (undated) DEVICE — OPEN HEART PACK: Brand: MEDLINE INDUSTRIES, INC.

## (undated) DEVICE — PADDING,UNDERCAST,COTTON, 4"X4YD STERILE: Brand: MEDLINE

## (undated) DEVICE — SET TRNQT W/ STD 7IN 12FR 5 TB 1 SNR DLP

## (undated) DEVICE — 3M™ STERI-DRAPE™ U-DRAPE 1015: Brand: STERI-DRAPE™

## (undated) DEVICE — PATCH SURG FIBRIN SEAL 4.8X4.8 CM ABSORBABLE TACHOSIL

## (undated) DEVICE — GLOVE SURG SZ 65 THK91MIL LTX FREE SYN POLYISOPRENE

## (undated) DEVICE — SUTURE PROL SZ 4-0 L36IN NONABSORBABLE BLU L26MM SH 1/2 CIR 8521H

## (undated) DEVICE — GLOVE SURG SIGNATURE LTX ESS LTX PF 7.5

## (undated) DEVICE — 3M™ IOBAN™ 2 ANTIMICROBIAL INCISE DRAPE 6648EZ: Brand: IOBAN™ 2

## (undated) DEVICE — ELECTRODE ES L4IN PTFE INSUL BLDE W/ SFTY SL DISP EDGE

## (undated) DEVICE — SENSOR PLSE OXMTR AD CBL L3FT ADH TRANSMISSIVE

## (undated) DEVICE — BANDAGE,GAUZE,BULKEE II,4.5"X4.1YD,STRL: Brand: MEDLINE

## (undated) DEVICE — GAUZE,SPONGE,4"X4",16PLY,XRAY,STRL,LF: Brand: MEDLINE

## (undated) DEVICE — CATHETER IV 22GA L1IN OD0.8382-0.9144MM ID0.6096-0.6858MM

## (undated) DEVICE — SUTURE N ABSRB BRAIDED 3-0 CV305 36 IN 25 MM BLU TICRON 8886333641

## (undated) DEVICE — CLIP LIG M BLU TI HRT SHP WIRE HORZ 600 PER BX

## (undated) DEVICE — NEEDLE SPNL 20GA L3.5IN YEL HUB S STL REG WALL FIT STYL W/

## (undated) DEVICE — SYRINGE 20ML LL S/C 50

## (undated) DEVICE — SUMP INTCARD SUCT AD 20FR PERICARD MAYO STYL FLX VERSATILE

## (undated) DEVICE — 20 ML SYRINGE LUER-LOCK TIP: Brand: MONOJECT

## (undated) DEVICE — APPLIER CLP L9.375IN APER 2.1MM CLS L3.8MM 20 SM TI CLP

## (undated) DEVICE — SET ADMIN PRIMING 67ML L105IN NVENT 180UM FLTR 3 RLER CLMP

## (undated) DEVICE — SUTURE PERMA-HAND 1 L30IN NONABSORBABLE BLK SILK BRAID W/O SA87G

## (undated) DEVICE — AGENT HEMSTAT 3GM OXIDIZED REGENERATED CELOS ABSRB FOR CONT (ORDER MULTIPLES OF 5EA)

## (undated) DEVICE — DEVICE RESUS AD TB L40IN SELF INFL MASK TEXT BG DBL SWVL EL

## (undated) DEVICE — DRAPE,U/ SHT,SPLIT,PLAS,STERIL: Brand: MEDLINE

## (undated) DEVICE — GLOVE ORANGE PI 8   MSG9080

## (undated) DEVICE — SUTURE PROL SZ 7-0 L30IN NONABSORBABLE BLU L9.3MM BV-1 3/8 M8703

## (undated) DEVICE — TOWEL,OR,DSP,ST,WHITE,DLX,XR,4/PK,20PK/C: Brand: MEDLINE

## (undated) DEVICE — SOLUTION IV IRRIG POUR BRL 0.9% SODIUM CHL 2F7124

## (undated) DEVICE — MYOCARDIAL TEMPERATURE PROBE, 18MM NEEDLE: Brand: SURGE CARDIOVASCULAR

## (undated) DEVICE — TAPE MED W1/8XL30IN WHT POLY

## (undated) DEVICE — GLOVE SURG SZ 6 THK91MIL LTX FREE SYN POLYISOPRENE ANTI

## (undated) DEVICE — KIT CATHETER 20GA L12CM ART CUST

## (undated) DEVICE — CORD ES L15FT PT RET REUSE VALLEYLAB REM

## (undated) DEVICE — BLADE OPHTH 180DEG CUT SURF BLU STR SHRP DBL BVL GRINDLESS

## (undated) DEVICE — STERILE POLYISOPRENE POWDER-FREE SURGICAL GLOVES: Brand: PROTEXIS

## (undated) DEVICE — SUTURE NONABSORBABLE MONOFILAMENT 6-0 BV-1 1X30 IN PROLENE 8709H

## (undated) DEVICE — CHLORAPREP 26ML ORANGE

## (undated) DEVICE — DRESSING,GAUZE,XEROFORM,CURAD,1"X8",ST: Brand: CURAD

## (undated) DEVICE — BAG BLD TRNSF 1 CPLR IV ST 600ML TERUFLEX

## (undated) DEVICE — SOLUTION IV 1000ML 0.9% SOD CHL FOR IRRIG PLAS CONT

## (undated) DEVICE — CATHETERIZATION KIT 7 FRX20 CM 3L

## (undated) DEVICE — SUTURE BOOT: Brand: DEROYAL

## (undated) DEVICE — BIT DRL L200MM DIA2.8MM CALIB L100MM FOR 3.5MM VA LCP PROX

## (undated) DEVICE — SUTURE PROL SZ 3-0 L36IN NONABSORBABLE BLU L26MM SH 1/2 CIR 8522H

## (undated) DEVICE — ADAPTER IV TBNG CLR POLYCARB DBL M LUERLOCK

## (undated) DEVICE — AEGIS 1" DISK 4MM HOLE, PEEL OPEN: Brand: MEDLINE

## (undated) DEVICE — CATHETER IABP 8FR 50CC FBROPT CONN W INSRT KT TWO STATLOK

## (undated) DEVICE — YANKAUER,BULB TIP,W/O VENT,RIGID,STERILE: Brand: MEDLINE

## (undated) DEVICE — 6 FOOT DISPOSABLE EXTENSION CABLE WITH SAFE CONNECT / SCREW-DOWN
Type: IMPLANTABLE DEVICE | Site: CHEST | Status: NON-FUNCTIONAL
Removed: 2022-09-22

## (undated) DEVICE — PAD,ABDOMINAL,5"X9",ST,LF,25/BX: Brand: MEDLINE INDUSTRIES, INC.

## (undated) DEVICE — SYSTEM BLD MONITORING 1/2 IN TRI-OPTIC CELL EXTN BIOTREND

## (undated) DEVICE — SYRINGE IRRIG 60ML SFT PLIABLE BLB EZ TO GRP 1 HND USE W/

## (undated) DEVICE — SET PERF L15IN BLU CLMP MULT FEM LUER ON SGL INLET LEG DLP

## (undated) DEVICE — AUTOTRANSFUSION PACK 1 SOURC 120 MU M AUTOLOG ATLS21

## (undated) DEVICE — NEEDLE HYPO 20GA L1.5IN YEL POLYPR HUB S STL REG BVL STR

## (undated) DEVICE — SUTURE PERMAHAND SZ 2-0 L17X18IN NONABSORBABLE BLK SILK SA65H

## (undated) DEVICE — BLADE ES L6IN ELASTOMERIC COAT EXT DURABLE BEND UPTO 90DEG

## (undated) DEVICE — SUTURE S STL SZ 5 L18IN NONABSORBABLE SIL L48MM CCS 1/4 CIR M657G

## (undated) DEVICE — CATHETER THOR 28FR L23CM DIA9.3MM POLYVI CHL TAPR CONN TIP

## (undated) DEVICE — TUBING SUCT 9 11FR L475IN RIG SHFT MINI SUC TIP DLP

## (undated) DEVICE — DRAIN SURG CHN CARD ADPT CONN W/ 19-32FR

## (undated) DEVICE — SPLINT ORTH W5XL30IN WHT FBRGLS 1 SIDE FELT PD CNFRM LO

## (undated) DEVICE — SUTURE NONABSORBABLE MONOFILAMENT 7-0 BV-1 1X24 IN PROLENE 8702H

## (undated) DEVICE — SHEET,DRAPE,53X77,STERILE: Brand: MEDLINE

## (undated) DEVICE — SUTURE S STL SZ 6 L18IN NONABSORBABLE SIL L48MM CCS 1/2 CIR M654G

## (undated) DEVICE — CATHETER ETER IV L1IN OD22GA POLYUR PERIPH WNG HUB SFTY SHLD

## (undated) DEVICE — MINI STICK MAX COAXIAL MICROINTRODUCER KIT: Brand: ANGIODYNAMICS

## (undated) DEVICE — BIT DRL L110MM DIA2.5MM G QUIK CPL W/O STP REUSE

## (undated) DEVICE — SUTURE VCRL SZ 2 L27IN ABSRB UD L65MM TP-1 1/2 CIR J849G

## (undated) DEVICE — SYSTEM ENDOSCP VES HARV W/ TOOL CANN SEAL SHT PRT BLNT TIP

## (undated) DEVICE — SUTURE PROL 3-0 L36IN NONABSORBABLE BLU L26MM SH 1/2 CIR P8522

## (undated) DEVICE — DRAIN SURG SGL COLL PT TB FOR ATS BG OASIS

## (undated) DEVICE — PRESSURE TUBING: Brand: TRUWAVE

## (undated) DEVICE — SYRINGE MED BLNT 18 GAX15 IN 10 CC W/ NDL FILL LUERLOCK TIP

## (undated) DEVICE — LINER,SEMI-RIGID,3000CC,50EA/CS: Brand: MEDLINE

## (undated) DEVICE — CANNULA PERF L2IN BLNT TIP 2MM VES CLR RADPQ BODY FEM LUER

## (undated) DEVICE — SNAP KOVER: Brand: UNBRANDED

## (undated) DEVICE — 3M™ IOBAN™ 2 ANTIMICROBIAL INCISE DRAPE 6640EZ: Brand: IOBAN™ 2

## (undated) DEVICE — SUTURE ABSORBABLE BRAIDED 2-0 CT-1 27 IN UD VICRYL J259H

## (undated) DEVICE — TRAY PREP DRY W/ PREM GLV 2 APPL 6 SPNG 2 UNDPD 1 OVERWRAP

## (undated) DEVICE — PACKING GZ W2INXL6FT WVN COT VAG RADPQ

## (undated) DEVICE — APPLICATOR MEDICATED 26 CC SOLUTION HI LT ORNG CHLORAPREP

## (undated) DEVICE — CATHETER THORACENTESIS STR 28 FRX23 IN 6 EYELET TAPR TIP LF

## (undated) DEVICE — ANESTHESIA CIRCUIT ADULT-LF: Brand: MEDLINE INDUSTRIES, INC.

## (undated) DEVICE — COTTON UNDERCAST PADDING,REGULAR FINISH: Brand: WEBRIL

## (undated) DEVICE — PREMIUM WET SKIN PREP TRAY: Brand: MEDLINE INDUSTRIES, INC.

## (undated) DEVICE — DRAIN SURG L49IN DIA1/4IN 10IN H SIL W/O TRCR END PERF

## (undated) DEVICE — SUTURE NONABSORBABLE MONOFILAMENT 4-0 RB-1 36 IN BLU PROLENE 8557H

## (undated) DEVICE — CANNULA PERF 32/40FR L15IN NVENT 1/2IN CONN TWO STG VEN

## (undated) DEVICE — TUBING INSUFFLATOR HEAT HI FLO SET PNEUMOCLEAR

## (undated) DEVICE — AGENT HEMSTAT W2XL14IN OXIDIZED REGENERATED CELOS ABSRB FOR

## (undated) DEVICE — KIT INTRO 9FR L4IN POLYUR PERC SHTH RADPQ W INTEGR HEMSTAS

## (undated) DEVICE — GOWN,SIRUS,FABRNF,RAGLAN,XL,ST,28/CS: Brand: MEDLINE

## (undated) DEVICE — BANDAGE,SELF ADHRNT,COFLEX,4"X5YD,STRL: Brand: COLABEL

## (undated) DEVICE — VESSEL LOOPS X-RAY DETECTABLE: Brand: DEROYAL

## (undated) DEVICE — DRAPE SHEET ULTRAGARD: Brand: MEDLINE

## (undated) DEVICE — APPLIER CLP LIG SM TI PREM SURGCLP SUPER INTLOK 20 DISP

## (undated) DEVICE — SUTURE VCRL + SZ 1-0 L36IN ABSRB UD CTX 1/2 CIR TAPR PNT VCP977H

## (undated) DEVICE — SWAN-GANZ CCOMBO V THERMODILUTION CATHETER: Brand: SWAN-GANZ CCOMBO V

## (undated) DEVICE — APPLIER LIG CLP M L11IN TI STR RNG HNDL FOR 20 CLP DISP

## (undated) DEVICE — GOWN,SIRUS,FABRNF,RAGLAN,L,ST,30/CS: Brand: MEDLINE

## (undated) DEVICE — CAP PROTCT ORIG SET ZONE SPEC

## (undated) DEVICE — SUTURE ETHBND EXCEL SZ 2-0 L36IN NONABSORBABLE GRN L26MM SH X523H

## (undated) DEVICE — LINER SUCT CANSTR 1500CC SEMI RIG W/ POR HYDROPHOBIC SHUT

## (undated) DEVICE — TUBING, SUCTION, 9/32" X 10', STRAIGHT: Brand: MEDLINE

## (undated) DEVICE — SUTURE VCRL SZ 2-0 L18IN ABSRB UD CT-1 L36MM 1/2 CIR J839D

## (undated) DEVICE — ELECTRODE ES AD CRDLSS PT RET REM POLYHESIVE

## (undated) DEVICE — KIT COMPL CK0289R4  BB9L99R8

## (undated) DEVICE — BLADE OPHTH D5MM 15DEG GRN W/ RND KNURLED HNDL MICRO-SHARP

## (undated) DEVICE — 34" SINGLE PATIENT USE HOVERMATT BREATHABLE: Brand: SINGLE PATIENT USE HOVERMATT

## (undated) DEVICE — CATHETER CTRL VEN L20CM OD5FR .032IN GWIRE KT SGL LUMN N AK04210] TELEFLEX ARROW INTL INC]

## (undated) DEVICE — RETROGRADE CARDIOPLEGIA CATHETER: Brand: EDWARDS LIFESCIENCES RETROGRADE CARDIOPLEGIA CATHETER

## (undated) DEVICE — PADDING CAST W6INXL4YD COT COHESIVE HND TEARABLE SPEC 100

## (undated) DEVICE — 3M™ STERI-DRAPE™ INSTRUMENT POUCH 1018: Brand: STERI-DRAPE™

## (undated) DEVICE — SUTURE ETHLN SZ 3-0 L30IN NONABSORBABLE BLK FS-1 L24MM 3/8 669H

## (undated) DEVICE — GLOVE BIOGEL LATEX SENSITIVE SIZE 6.5

## (undated) DEVICE — GLOVE SURG SZ 65 L12IN FNGR THK79MIL GRN LTX FREE

## (undated) DEVICE — CANNULA PERF L5.5IN DIA9FR AORT ROOT AG STD TIP W/ VENT LN

## (undated) DEVICE — STANDARD HYPODERMIC NEEDLE,POLYPROPYLENE HUB: Brand: MONOJECT

## (undated) DEVICE — SUTURE MCRYL + SZ 3-0 L27IN ABSRB UD PS1 L24MM 3/8 CIR REV MCP936H

## (undated) DEVICE — KIT CVC AD 7FR L20CM POLYUR BLU FLEXTIP ANTIMIC MULTILUMEN

## (undated) DEVICE — MARKER SURG SKIN UTIL REGULAR/FINE 2 TIP W/ RUL AND 9 LBL

## (undated) DEVICE — CUSTOM PK 10A99R2 RED VENT

## (undated) DEVICE — SUTURE PROL SZ 5-0 L24IN NONABSORBABLE BLU L9.3MM BV-1 3/8 9702H

## (undated) DEVICE — PENCIL ES CRD L10FT HND SWCHING ROCK SWCH W/ EDGE COAT BLDE

## (undated) DEVICE — SENSOR O2 AD 40 KG SATURATION INVOS LF DISP

## (undated) DEVICE — FOAM BUMP ROUND LARGE: Brand: MEDLINE INDUSTRIES, INC.